# Patient Record
Sex: MALE | Race: BLACK OR AFRICAN AMERICAN | Employment: OTHER | URBAN - METROPOLITAN AREA
[De-identification: names, ages, dates, MRNs, and addresses within clinical notes are randomized per-mention and may not be internally consistent; named-entity substitution may affect disease eponyms.]

---

## 2021-01-01 ENCOUNTER — HOSPITAL ENCOUNTER (OUTPATIENT)
Dept: PHYSICAL THERAPY | Age: 51
Discharge: HOME | End: 2021-12-29
Payer: MEDICAID

## 2021-01-01 ENCOUNTER — TELEPHONE (OUTPATIENT)
Dept: ONCOLOGY | Age: 51
End: 2021-01-01

## 2021-01-01 ENCOUNTER — HOSPITAL ENCOUNTER (OUTPATIENT)
Dept: PHYSICAL THERAPY | Age: 51
Discharge: HOME | End: 2021-09-21
Payer: MEDICAID

## 2021-01-01 ENCOUNTER — HOSPITAL ENCOUNTER (OUTPATIENT)
Dept: PET IMAGING | Age: 51
Discharge: HOME | End: 2021-10-04
Attending: NURSE PRACTITIONER
Payer: MEDICAID

## 2021-01-01 ENCOUNTER — HOSPITAL ENCOUNTER (OUTPATIENT)
Dept: PHYSICAL THERAPY | Age: 51
Discharge: HOME | End: 2021-10-21
Payer: MEDICAID

## 2021-01-01 ENCOUNTER — APPOINTMENT (OUTPATIENT)
Dept: INFUSION THERAPY | Age: 51
End: 2021-01-01

## 2021-01-01 ENCOUNTER — HOSPITAL ENCOUNTER (OUTPATIENT)
Dept: PHYSICAL THERAPY | Age: 51
Discharge: HOME | End: 2021-11-30
Payer: MEDICAID

## 2021-01-01 ENCOUNTER — OFFICE VISIT (OUTPATIENT)
Dept: ONCOLOGY | Age: 51
End: 2021-01-01
Payer: MEDICAID

## 2021-01-01 ENCOUNTER — HOSPITAL ENCOUNTER (EMERGENCY)
Age: 51
Discharge: HOME | End: 2021-12-04
Attending: STUDENT IN AN ORGANIZED HEALTH CARE EDUCATION/TRAINING PROGRAM | Admitting: STUDENT IN AN ORGANIZED HEALTH CARE EDUCATION/TRAINING PROGRAM
Payer: MEDICAID

## 2021-01-01 ENCOUNTER — HOSPITAL ENCOUNTER (OUTPATIENT)
Dept: PHYSICAL THERAPY | Age: 51
Discharge: HOME | End: 2021-10-26
Payer: MEDICAID

## 2021-01-01 ENCOUNTER — HOSPITAL ENCOUNTER (OUTPATIENT)
Dept: PHYSICAL THERAPY | Age: 51
Discharge: HOME | End: 2021-10-07
Payer: MEDICAID

## 2021-01-01 ENCOUNTER — HOSPITAL ENCOUNTER (OUTPATIENT)
Dept: PHYSICAL THERAPY | Age: 51
Discharge: HOME | End: 2021-10-14
Payer: MEDICAID

## 2021-01-01 ENCOUNTER — HOSPITAL ENCOUNTER (OUTPATIENT)
Dept: PHYSICAL THERAPY | Age: 51
Discharge: HOME | End: 2021-12-17
Payer: MEDICAID

## 2021-01-01 ENCOUNTER — HOSPITAL ENCOUNTER (OUTPATIENT)
Dept: INFUSION THERAPY | Age: 51
Discharge: HOME | End: 2021-09-02
Payer: MEDICAID

## 2021-01-01 ENCOUNTER — HOSPITAL ENCOUNTER (OUTPATIENT)
Dept: PHYSICAL THERAPY | Age: 51
Discharge: HOME | End: 2021-09-30
Payer: MEDICAID

## 2021-01-01 ENCOUNTER — HOSPITAL ENCOUNTER (OUTPATIENT)
Dept: NON INVASIVE DIAGNOSTICS | Age: 51
Discharge: HOME | End: 2021-12-30
Payer: MEDICAID

## 2021-01-01 ENCOUNTER — HOSPITAL ENCOUNTER (OUTPATIENT)
Dept: NON INVASIVE DIAGNOSTICS | Age: 51
Discharge: HOME | End: 2021-12-20
Payer: MEDICAID

## 2021-01-01 ENCOUNTER — HOSPITAL ENCOUNTER (OUTPATIENT)
Dept: RADIATION THERAPY | Age: 51
Discharge: HOME | End: 2021-08-19

## 2021-01-01 ENCOUNTER — HOSPITAL ENCOUNTER (OUTPATIENT)
Dept: INFUSION THERAPY | Age: 51
Discharge: HOME | End: 2021-11-16
Payer: MEDICAID

## 2021-01-01 ENCOUNTER — HOSPITAL ENCOUNTER (OUTPATIENT)
Dept: NON INVASIVE DIAGNOSTICS | Age: 51
Discharge: HOME | End: 2021-11-22
Payer: MEDICAID

## 2021-01-01 ENCOUNTER — HOSPITAL ENCOUNTER (OUTPATIENT)
Dept: CT IMAGING | Age: 51
Discharge: HOME | End: 2021-11-22
Payer: MEDICAID

## 2021-01-01 ENCOUNTER — HOSPITAL ENCOUNTER (OUTPATIENT)
Dept: INFUSION THERAPY | Age: 51
Discharge: HOME | End: 2021-12-16
Payer: MEDICAID

## 2021-01-01 ENCOUNTER — HOSPITAL ENCOUNTER (OUTPATIENT)
Dept: PHYSICAL THERAPY | Age: 51
Discharge: HOME | End: 2021-09-28
Payer: MEDICAID

## 2021-01-01 ENCOUNTER — HOSPITAL ENCOUNTER (OUTPATIENT)
Dept: PHYSICAL THERAPY | Age: 51
Discharge: HOME | End: 2021-09-08
Payer: MEDICAID

## 2021-01-01 ENCOUNTER — APPOINTMENT (OUTPATIENT)
Dept: PHYSICAL THERAPY | Age: 51
End: 2021-01-01
Payer: MEDICAID

## 2021-01-01 ENCOUNTER — HOSPITAL ENCOUNTER (OUTPATIENT)
Dept: PHYSICAL THERAPY | Age: 51
Discharge: HOME | End: 2021-10-12
Payer: MEDICAID

## 2021-01-01 ENCOUNTER — APPOINTMENT (OUTPATIENT)
Dept: GENERAL RADIOLOGY | Age: 51
End: 2021-01-01
Attending: STUDENT IN AN ORGANIZED HEALTH CARE EDUCATION/TRAINING PROGRAM
Payer: MEDICAID

## 2021-01-01 ENCOUNTER — OFFICE VISIT (OUTPATIENT)
Dept: ONCOLOGY | Age: 51
End: 2021-01-01

## 2021-01-01 ENCOUNTER — HOSPITAL ENCOUNTER (OUTPATIENT)
Dept: PHYSICAL THERAPY | Age: 51
Discharge: HOME | End: 2021-09-23
Payer: MEDICAID

## 2021-01-01 ENCOUNTER — HOSPITAL ENCOUNTER (OUTPATIENT)
Dept: INFUSION THERAPY | Age: 51
Discharge: HOME | End: 2021-10-05
Payer: MEDICAID

## 2021-01-01 ENCOUNTER — APPOINTMENT (OUTPATIENT)
Dept: PHYSICAL THERAPY | Age: 51
End: 2021-01-01

## 2021-01-01 ENCOUNTER — HOSPITAL ENCOUNTER (OUTPATIENT)
Dept: PHYSICAL THERAPY | Age: 51
Discharge: HOME | End: 2021-10-19
Payer: MEDICAID

## 2021-01-01 VITALS
SYSTOLIC BLOOD PRESSURE: 131 MMHG | DIASTOLIC BLOOD PRESSURE: 100 MMHG | HEART RATE: 100 BPM | RESPIRATION RATE: 20 BRPM | TEMPERATURE: 98.8 F | OXYGEN SATURATION: 95 %

## 2021-01-01 VITALS
TEMPERATURE: 97.7 F | OXYGEN SATURATION: 100 % | HEART RATE: 106 BPM | RESPIRATION RATE: 18 BRPM | DIASTOLIC BLOOD PRESSURE: 60 MMHG | SYSTOLIC BLOOD PRESSURE: 119 MMHG

## 2021-01-01 VITALS
HEART RATE: 125 BPM | BODY MASS INDEX: 20.4 KG/M2 | SYSTOLIC BLOOD PRESSURE: 127 MMHG | OXYGEN SATURATION: 96 % | WEIGHT: 150.6 LBS | TEMPERATURE: 96 F | DIASTOLIC BLOOD PRESSURE: 87 MMHG | HEIGHT: 72 IN

## 2021-01-01 VITALS
DIASTOLIC BLOOD PRESSURE: 88 MMHG | BODY MASS INDEX: 20.28 KG/M2 | RESPIRATION RATE: 18 BRPM | HEART RATE: 103 BPM | SYSTOLIC BLOOD PRESSURE: 135 MMHG | HEIGHT: 72 IN | WEIGHT: 149.69 LBS | OXYGEN SATURATION: 100 %

## 2021-01-01 VITALS
OXYGEN SATURATION: 100 % | BODY MASS INDEX: 20.29 KG/M2 | SYSTOLIC BLOOD PRESSURE: 133 MMHG | HEART RATE: 91 BPM | WEIGHT: 149.8 LBS | DIASTOLIC BLOOD PRESSURE: 90 MMHG | RESPIRATION RATE: 16 BRPM | TEMPERATURE: 97.8 F | HEIGHT: 72 IN

## 2021-01-01 VITALS
TEMPERATURE: 97.8 F | BODY MASS INDEX: 20.97 KG/M2 | WEIGHT: 154.6 LBS | OXYGEN SATURATION: 99 % | HEART RATE: 86 BPM | DIASTOLIC BLOOD PRESSURE: 79 MMHG | RESPIRATION RATE: 16 BRPM | SYSTOLIC BLOOD PRESSURE: 121 MMHG

## 2021-01-01 VITALS
HEART RATE: 92 BPM | OXYGEN SATURATION: 99 % | BODY MASS INDEX: 21.46 KG/M2 | TEMPERATURE: 97.1 F | DIASTOLIC BLOOD PRESSURE: 86 MMHG | SYSTOLIC BLOOD PRESSURE: 127 MMHG | WEIGHT: 158.2 LBS | RESPIRATION RATE: 18 BRPM

## 2021-01-01 VITALS
SYSTOLIC BLOOD PRESSURE: 127 MMHG | HEIGHT: 72 IN | WEIGHT: 158.2 LBS | HEART RATE: 92 BPM | TEMPERATURE: 97.1 F | OXYGEN SATURATION: 99 % | BODY MASS INDEX: 21.43 KG/M2 | DIASTOLIC BLOOD PRESSURE: 86 MMHG

## 2021-01-01 VITALS
DIASTOLIC BLOOD PRESSURE: 86 MMHG | HEART RATE: 92 BPM | BODY MASS INDEX: 21.05 KG/M2 | HEIGHT: 72 IN | OXYGEN SATURATION: 97 % | WEIGHT: 155.4 LBS | SYSTOLIC BLOOD PRESSURE: 136 MMHG | TEMPERATURE: 96.6 F | RESPIRATION RATE: 16 BRPM

## 2021-01-01 VITALS
WEIGHT: 157.8 LBS | DIASTOLIC BLOOD PRESSURE: 60 MMHG | TEMPERATURE: 97.7 F | HEIGHT: 72 IN | RESPIRATION RATE: 18 BRPM | SYSTOLIC BLOOD PRESSURE: 119 MMHG | HEART RATE: 106 BPM | BODY MASS INDEX: 21.37 KG/M2 | OXYGEN SATURATION: 100 %

## 2021-01-01 DIAGNOSIS — C32.9 SQUAMOUS CELL CARCINOMA OF LARYNX (HCC): ICD-10-CM

## 2021-01-01 DIAGNOSIS — E03.9 ACQUIRED HYPOTHYROIDISM: Primary | ICD-10-CM

## 2021-01-01 DIAGNOSIS — D72.819 LEUKOPENIA, UNSPECIFIED TYPE: ICD-10-CM

## 2021-01-01 DIAGNOSIS — E03.9 ACQUIRED HYPOTHYROIDISM: ICD-10-CM

## 2021-01-01 DIAGNOSIS — R00.0 TACHYCARDIA: ICD-10-CM

## 2021-01-01 DIAGNOSIS — E87.6 HYPOKALEMIA: ICD-10-CM

## 2021-01-01 DIAGNOSIS — I26.99 OTHER ACUTE PULMONARY EMBOLISM WITHOUT ACUTE COR PULMONALE (HCC): ICD-10-CM

## 2021-01-01 DIAGNOSIS — R07.0 THROAT PAIN IN ADULT: ICD-10-CM

## 2021-01-01 DIAGNOSIS — C32.9 SQUAMOUS CELL CARCINOMA OF LARYNX (HCC): Primary | ICD-10-CM

## 2021-01-01 DIAGNOSIS — C77.0 METASTASIS TO CERVICAL LYMPH NODE (HCC): ICD-10-CM

## 2021-01-01 DIAGNOSIS — D53.9 MACROCYTIC ANEMIA: ICD-10-CM

## 2021-01-01 DIAGNOSIS — R59.0 CERVICAL LYMPHADENOPATHY: ICD-10-CM

## 2021-01-01 DIAGNOSIS — C32.9 LARYNGEAL CARCINOMA (HCC): Primary | ICD-10-CM

## 2021-01-01 DIAGNOSIS — I26.99 ACUTE PULMONARY EMBOLISM WITHOUT ACUTE COR PULMONALE, UNSPECIFIED PULMONARY EMBOLISM TYPE (HCC): ICD-10-CM

## 2021-01-01 DIAGNOSIS — Z29.8 IMMUNOTHERAPY ENCOUNTER: ICD-10-CM

## 2021-01-01 DIAGNOSIS — I26.99 OTHER ACUTE PULMONARY EMBOLISM WITHOUT ACUTE COR PULMONALE (HCC): Primary | ICD-10-CM

## 2021-01-01 DIAGNOSIS — E87.6 HYPOKALEMIA: Primary | ICD-10-CM

## 2021-01-01 DIAGNOSIS — R07.0 THROAT PAIN: ICD-10-CM

## 2021-01-01 DIAGNOSIS — R07.89 MUSCULAR CHEST PAIN: ICD-10-CM

## 2021-01-01 DIAGNOSIS — R00.0 TACHYCARDIA: Primary | ICD-10-CM

## 2021-01-01 DIAGNOSIS — C32.9 CARCINOMA LARYNX (HCC): ICD-10-CM

## 2021-01-01 LAB
ALBUMIN SERPL-MCNC: 3.2 G/DL (ref 3.5–5)
ALBUMIN SERPL-MCNC: 3.3 G/DL (ref 3.5–5)
ALBUMIN SERPL-MCNC: 3.5 G/DL (ref 3.5–5)
ALBUMIN SERPL-MCNC: 3.8 G/DL (ref 3.5–5)
ALBUMIN SERPL-MCNC: 4.1 G/DL (ref 3.5–5)
ALBUMIN/GLOB SERPL: 0.7 {RATIO} (ref 1.1–2.2)
ALBUMIN/GLOB SERPL: 0.7 {RATIO} (ref 1.1–2.2)
ALBUMIN/GLOB SERPL: 0.8 {RATIO} (ref 1.1–2.2)
ALBUMIN/GLOB SERPL: 0.9 {RATIO} (ref 1.1–2.2)
ALBUMIN/GLOB SERPL: 0.9 {RATIO} (ref 1.1–2.2)
ALP SERPL-CCNC: 45 U/L (ref 45–117)
ALP SERPL-CCNC: 48 U/L (ref 45–117)
ALP SERPL-CCNC: 53 U/L (ref 45–117)
ALP SERPL-CCNC: 58 U/L (ref 45–117)
ALP SERPL-CCNC: 62 U/L (ref 45–117)
ALT SERPL-CCNC: 13 U/L (ref 12–78)
ALT SERPL-CCNC: 14 U/L (ref 12–78)
ALT SERPL-CCNC: 15 U/L (ref 12–78)
ALT SERPL-CCNC: 15 U/L (ref 12–78)
ALT SERPL-CCNC: 19 U/L (ref 12–78)
ANION GAP SERPL CALC-SCNC: 2 MMOL/L (ref 5–15)
ANION GAP SERPL CALC-SCNC: 5 MMOL/L (ref 5–15)
ANION GAP SERPL CALC-SCNC: 7 MMOL/L (ref 5–15)
ANION GAP SERPL CALC-SCNC: 9 MMOL/L (ref 5–15)
ANION GAP SERPL CALC-SCNC: 9 MMOL/L (ref 5–15)
AST SERPL W P-5'-P-CCNC: 47 U/L (ref 15–37)
AST SERPL-CCNC: 15 U/L (ref 15–37)
AST SERPL-CCNC: 18 U/L (ref 15–37)
AST SERPL-CCNC: 36 U/L (ref 15–37)
AST SERPL-CCNC: 42 U/L (ref 15–37)
ATRIAL RATE: 71 BPM
ATRIAL RATE: 79 BPM
ATRIAL RATE: 82 BPM
BASOPHILS # BLD: 0 K/UL (ref 0–0.1)
BASOPHILS NFR BLD: 0 % (ref 0–1)
BASOPHILS NFR BLD: 1 % (ref 0–1)
BILIRUB SERPL-MCNC: 0.2 MG/DL (ref 0.2–1)
BILIRUB SERPL-MCNC: 0.3 MG/DL (ref 0.2–1)
BILIRUB SERPL-MCNC: 0.3 MG/DL (ref 0.2–1)
BILIRUB SERPL-MCNC: 0.4 MG/DL (ref 0.2–1)
BILIRUB SERPL-MCNC: 0.4 MG/DL (ref 0.2–1)
BUN SERPL-MCNC: 10 MG/DL (ref 6–20)
BUN SERPL-MCNC: 15 MG/DL (ref 6–20)
BUN SERPL-MCNC: 24 MG/DL (ref 6–20)
BUN SERPL-MCNC: 26 MG/DL (ref 6–20)
BUN SERPL-MCNC: 8 MG/DL (ref 6–20)
BUN/CREAT SERPL: 12 (ref 12–20)
BUN/CREAT SERPL: 16 (ref 12–20)
BUN/CREAT SERPL: 24 (ref 12–20)
BUN/CREAT SERPL: 24 (ref 12–20)
BUN/CREAT SERPL: 9 (ref 12–20)
CA-I BLD-MCNC: 9 MG/DL (ref 8.5–10.1)
CALCIUM SERPL-MCNC: 9.4 MG/DL (ref 8.5–10.1)
CALCIUM SERPL-MCNC: 9.5 MG/DL (ref 8.5–10.1)
CALCIUM SERPL-MCNC: 9.5 MG/DL (ref 8.5–10.1)
CALCIUM SERPL-MCNC: 9.6 MG/DL (ref 8.5–10.1)
CALCULATED P AXIS, ECG09: 46 DEGREES
CALCULATED P AXIS, ECG09: 48 DEGREES
CALCULATED P AXIS, ECG09: 95 DEGREES
CALCULATED R AXIS, ECG10: 61 DEGREES
CALCULATED R AXIS, ECG10: 74 DEGREES
CALCULATED R AXIS, ECG10: 83 DEGREES
CALCULATED T AXIS, ECG11: 70 DEGREES
CALCULATED T AXIS, ECG11: 71 DEGREES
CALCULATED T AXIS, ECG11: 72 DEGREES
CHLORIDE SERPL-SCNC: 102 MMOL/L (ref 97–108)
CHLORIDE SERPL-SCNC: 102 MMOL/L (ref 97–108)
CHLORIDE SERPL-SCNC: 103 MMOL/L (ref 97–108)
CHLORIDE SERPL-SCNC: 103 MMOL/L (ref 97–108)
CHLORIDE SERPL-SCNC: 104 MMOL/L (ref 97–108)
CK SERPL-CCNC: 525 U/L (ref 39–308)
CO2 SERPL-SCNC: 27 MMOL/L (ref 21–32)
CO2 SERPL-SCNC: 28 MMOL/L (ref 21–32)
CO2 SERPL-SCNC: 28 MMOL/L (ref 21–32)
CO2 SERPL-SCNC: 29 MMOL/L (ref 21–32)
CO2 SERPL-SCNC: 33 MMOL/L (ref 21–32)
CREAT SERPL-MCNC: 0.82 MG/DL (ref 0.7–1.3)
CREAT SERPL-MCNC: 0.9 MG/DL (ref 0.7–1.3)
CREAT SERPL-MCNC: 0.94 MG/DL (ref 0.7–1.3)
CREAT SERPL-MCNC: 1.01 MG/DL (ref 0.7–1.3)
CREAT SERPL-MCNC: 1.09 MG/DL (ref 0.7–1.3)
DIAGNOSIS, 93000: NORMAL
DIFFERENTIAL METHOD BLD: ABNORMAL
EOSINOPHIL # BLD: 0 K/UL (ref 0–0.4)
EOSINOPHIL NFR BLD: 0 % (ref 0–7)
EOSINOPHIL NFR BLD: 1 % (ref 0–7)
EOSINOPHIL NFR BLD: 1 % (ref 0–7)
ERYTHROCYTE [DISTWIDTH] IN BLOOD BY AUTOMATED COUNT: 13.4 % (ref 11.5–14.5)
ERYTHROCYTE [DISTWIDTH] IN BLOOD BY AUTOMATED COUNT: 14.3 % (ref 11.5–14.5)
ERYTHROCYTE [DISTWIDTH] IN BLOOD BY AUTOMATED COUNT: 14.9 % (ref 11.5–14.5)
ERYTHROCYTE [DISTWIDTH] IN BLOOD BY AUTOMATED COUNT: 15.1 % (ref 11.5–14.5)
ERYTHROCYTE [DISTWIDTH] IN BLOOD BY AUTOMATED COUNT: 15.9 % (ref 11.5–14.5)
FERRITIN SERPL-MCNC: 466 NG/ML (ref 26–388)
FERRITIN SERPL-MCNC: 939 NG/ML (ref 26–388)
FOLATE SERPL-MCNC: 24 NG/ML (ref 5–21)
GLOBULIN SER CALC-MCNC: 4.1 G/DL (ref 2–4)
GLOBULIN SER CALC-MCNC: 4.7 G/DL (ref 2–4)
GLOBULIN SER CALC-MCNC: 4.7 G/DL (ref 2–4)
GLOBULIN SER CALC-MCNC: 4.8 G/DL (ref 2–4)
GLOBULIN SER CALC-MCNC: 4.9 G/DL (ref 2–4)
GLUCOSE BLD STRIP.AUTO-MCNC: 113 MG/DL (ref 65–117)
GLUCOSE SERPL-MCNC: 132 MG/DL (ref 65–100)
GLUCOSE SERPL-MCNC: 73 MG/DL (ref 65–100)
GLUCOSE SERPL-MCNC: 74 MG/DL (ref 65–100)
GLUCOSE SERPL-MCNC: 79 MG/DL (ref 65–100)
GLUCOSE SERPL-MCNC: 82 MG/DL (ref 65–100)
HBV CORE AB SERPL QL IA: NEGATIVE
HBV SURFACE AB SER QL: NONREACTIVE
HBV SURFACE AB SER-ACNC: 3.86 MIU/ML
HBV SURFACE AG SER QL: 0.11 INDEX
HBV SURFACE AG SER QL: NEGATIVE
HCT VFR BLD AUTO: 26.6 % (ref 36.6–50.3)
HCT VFR BLD AUTO: 27.5 % (ref 36.6–50.3)
HCT VFR BLD AUTO: 28.7 % (ref 36.6–50.3)
HCT VFR BLD AUTO: 29.9 % (ref 36.6–50.3)
HCT VFR BLD AUTO: 31.7 % (ref 36.6–50.3)
HGB BLD-MCNC: 10.5 G/DL (ref 12.1–17)
HGB BLD-MCNC: 8.6 G/DL (ref 12.1–17)
HGB BLD-MCNC: 8.8 G/DL (ref 12.1–17)
HGB BLD-MCNC: 9.2 G/DL (ref 12.1–17)
HGB BLD-MCNC: 9.9 G/DL (ref 12.1–17)
IMM GRANULOCYTES # BLD AUTO: 0 K/UL (ref 0–0.04)
IMM GRANULOCYTES NFR BLD AUTO: 0 % (ref 0–0.5)
IMM GRANULOCYTES NFR BLD AUTO: 1 % (ref 0–0.5)
IRON SATN MFR SERPL: 16 % (ref 20–50)
IRON SATN MFR SERPL: 16 % (ref 20–50)
IRON SERPL-MCNC: 34 UG/DL (ref 35–150)
IRON SERPL-MCNC: 67 UG/DL (ref 35–150)
LYMPHOCYTES # BLD: 0.2 K/UL (ref 0.8–3.5)
LYMPHOCYTES # BLD: 0.3 K/UL (ref 0.8–3.5)
LYMPHOCYTES NFR BLD: 11 % (ref 12–49)
LYMPHOCYTES NFR BLD: 6 % (ref 12–49)
LYMPHOCYTES NFR BLD: 9 % (ref 12–49)
MAGNESIUM SERPL-MCNC: 1.8 MG/DL (ref 1.6–2.4)
MCH RBC QN AUTO: 31.8 PG (ref 26–34)
MCH RBC QN AUTO: 32.4 PG (ref 26–34)
MCH RBC QN AUTO: 32.7 PG (ref 26–34)
MCH RBC QN AUTO: 33.5 PG (ref 26–34)
MCH RBC QN AUTO: 35.1 PG (ref 26–34)
MCHC RBC AUTO-ENTMCNC: 31.3 G/DL (ref 30–36.5)
MCHC RBC AUTO-ENTMCNC: 32.1 G/DL (ref 30–36.5)
MCHC RBC AUTO-ENTMCNC: 33.1 G/DL (ref 30–36.5)
MCV RBC AUTO: 104.4 FL (ref 80–99)
MCV RBC AUTO: 112.2 FL (ref 80–99)
MCV RBC AUTO: 96.1 FL (ref 80–99)
MCV RBC AUTO: 97.8 FL (ref 80–99)
MCV RBC AUTO: 98.9 FL (ref 80–99)
MONOCYTES # BLD: 0.4 K/UL (ref 0–1)
MONOCYTES # BLD: 0.5 K/UL (ref 0–1)
MONOCYTES # BLD: 0.7 K/UL (ref 0–1)
MONOCYTES NFR BLD: 13 % (ref 5–13)
MONOCYTES NFR BLD: 13 % (ref 5–13)
MONOCYTES NFR BLD: 14 % (ref 5–13)
MONOCYTES NFR BLD: 16 % (ref 5–13)
MONOCYTES NFR BLD: 19 % (ref 5–13)
NEUTS SEG # BLD: 2.1 K/UL (ref 1.8–8)
NEUTS SEG # BLD: 2.3 K/UL (ref 1.8–8)
NEUTS SEG # BLD: 2.5 K/UL (ref 1.8–8)
NEUTS SEG # BLD: 2.5 K/UL (ref 1.8–8)
NEUTS SEG # BLD: 2.6 K/UL (ref 1.8–8)
NEUTS SEG NFR BLD: 69 % (ref 32–75)
NEUTS SEG NFR BLD: 75 % (ref 32–75)
NEUTS SEG NFR BLD: 75 % (ref 32–75)
NEUTS SEG NFR BLD: 77 % (ref 32–75)
NEUTS SEG NFR BLD: 81 % (ref 32–75)
NRBC # BLD: 0 K/UL (ref 0–0.01)
NRBC # BLD: 0.04 K/UL (ref 0–0.01)
NRBC BLD-RTO: 0 PER 100 WBC
NRBC BLD-RTO: 1.1 PER 100 WBC
P-R INTERVAL, ECG05: 176 MS
PLATELET # BLD AUTO: 238 K/UL (ref 150–400)
PLATELET # BLD AUTO: 241 K/UL (ref 150–400)
PLATELET # BLD AUTO: 260 K/UL (ref 150–400)
PLATELET # BLD AUTO: 306 K/UL (ref 150–400)
PLATELET # BLD AUTO: 382 K/UL (ref 150–400)
PMV BLD AUTO: 10.2 FL (ref 8.9–12.9)
PMV BLD AUTO: 10.3 FL (ref 8.9–12.9)
PMV BLD AUTO: 10.4 FL (ref 8.9–12.9)
PMV BLD AUTO: 9.7 FL (ref 8.9–12.9)
PMV BLD AUTO: 9.8 FL (ref 8.9–12.9)
POTASSIUM SERPL-SCNC: 3.4 MMOL/L (ref 3.5–5.1)
POTASSIUM SERPL-SCNC: 3.6 MMOL/L (ref 3.5–5.1)
POTASSIUM SERPL-SCNC: 4 MMOL/L (ref 3.5–5.1)
POTASSIUM SERPL-SCNC: 4 MMOL/L (ref 3.5–5.1)
POTASSIUM SERPL-SCNC: 4.4 MMOL/L (ref 3.5–5.1)
PROT SERPL-MCNC: 7.6 G/DL (ref 6.4–8.2)
PROT SERPL-MCNC: 8.1 G/DL (ref 6.4–8.2)
PROT SERPL-MCNC: 8.1 G/DL (ref 6.4–8.2)
PROT SERPL-MCNC: 8.5 G/DL (ref 6.4–8.2)
PROT SERPL-MCNC: 8.8 G/DL (ref 6.4–8.2)
Q-T INTERVAL, ECG07: 366 MS
Q-T INTERVAL, ECG07: 376 MS
Q-T INTERVAL, ECG07: 392 MS
QRS DURATION, ECG06: 86 MS
QRS DURATION, ECG06: 94 MS
QRS DURATION, ECG06: 94 MS
QTC CALCULATION (BEZET), ECG08: 419 MS
QTC CALCULATION (BEZET), ECG08: 425 MS
QTC CALCULATION (BEZET), ECG08: 439 MS
RBC # BLD AUTO: 2.45 M/UL (ref 4.1–5.7)
RBC # BLD AUTO: 2.69 M/UL (ref 4.1–5.7)
RBC # BLD AUTO: 2.75 M/UL (ref 4.1–5.7)
RBC # BLD AUTO: 3.11 M/UL (ref 4.1–5.7)
RBC # BLD AUTO: 3.24 M/UL (ref 4.1–5.7)
RBC MORPH BLD: ABNORMAL
SERVICE CMNT-IMP: NORMAL
SODIUM SERPL-SCNC: 137 MMOL/L (ref 136–145)
SODIUM SERPL-SCNC: 138 MMOL/L (ref 136–145)
SODIUM SERPL-SCNC: 140 MMOL/L (ref 136–145)
T4 FREE SERPL-MCNC: 0.6 NG/DL (ref 0.8–1.5)
T4 FREE SERPL-MCNC: 0.6 NG/DL (ref 0.8–1.5)
T4 FREE SERPL-MCNC: 0.7 NG/DL (ref 0.8–1.5)
T4 FREE SERPL-MCNC: 0.7 NG/DL (ref 0.8–1.5)
TIBC SERPL-MCNC: 216 UG/DL (ref 250–450)
TIBC SERPL-MCNC: 409 UG/DL (ref 250–450)
TROPONIN-HIGH SENSITIVITY: 6 NG/L (ref 0–76)
TROPONIN-HIGH SENSITIVITY: 6 NG/L (ref 0–76)
TSH SERPL DL<=0.05 MIU/L-ACNC: 10.6 UIU/ML (ref 0.36–3.74)
TSH SERPL DL<=0.05 MIU/L-ACNC: 16.5 UIU/ML (ref 0.36–3.74)
TSH SERPL DL<=0.05 MIU/L-ACNC: 18.6 UIU/ML (ref 0.36–3.74)
TSH SERPL DL<=0.05 MIU/L-ACNC: 36.8 UIU/ML (ref 0.36–3.74)
VENTRICULAR RATE, ECG03: 71 BPM
VENTRICULAR RATE, ECG03: 79 BPM
VENTRICULAR RATE, ECG03: 82 BPM
VIT B12 SERPL-MCNC: 664 PG/ML (ref 193–986)
WBC # BLD AUTO: 2.8 K/UL (ref 4.1–11.1)
WBC # BLD AUTO: 2.9 K/UL (ref 4.1–11.1)
WBC # BLD AUTO: 3.2 K/UL (ref 4.1–11.1)
WBC # BLD AUTO: 3.3 K/UL (ref 4.1–11.1)
WBC # BLD AUTO: 3.6 K/UL (ref 4.1–11.1)

## 2021-01-01 PROCEDURE — 92507 TX SP LANG VOICE COMM INDIV: CPT

## 2021-01-01 PROCEDURE — 80053 COMPREHEN METABOLIC PANEL: CPT

## 2021-01-01 PROCEDURE — 93005 ELECTROCARDIOGRAM TRACING: CPT

## 2021-01-01 PROCEDURE — 99214 OFFICE O/P EST MOD 30 MIN: CPT | Performed by: NURSE PRACTITIONER

## 2021-01-01 PROCEDURE — 84443 ASSAY THYROID STIM HORMONE: CPT

## 2021-01-01 PROCEDURE — 74011250636 HC RX REV CODE- 250/636: Performed by: INTERNAL MEDICINE

## 2021-01-01 PROCEDURE — 84439 ASSAY OF FREE THYROXINE: CPT

## 2021-01-01 PROCEDURE — 83540 ASSAY OF IRON: CPT

## 2021-01-01 PROCEDURE — 87340 HEPATITIS B SURFACE AG IA: CPT

## 2021-01-01 PROCEDURE — 36415 COLL VENOUS BLD VENIPUNCTURE: CPT

## 2021-01-01 PROCEDURE — 85025 COMPLETE CBC W/AUTO DIFF WBC: CPT

## 2021-01-01 PROCEDURE — 71260 CT THORAX DX C+: CPT

## 2021-01-01 PROCEDURE — 74011250636 HC RX REV CODE- 250/636: Performed by: NURSE PRACTITIONER

## 2021-01-01 PROCEDURE — 92524 BEHAVRAL QUALIT ANALYS VOICE: CPT

## 2021-01-01 PROCEDURE — 97110 THERAPEUTIC EXERCISES: CPT

## 2021-01-01 PROCEDURE — 83735 ASSAY OF MAGNESIUM: CPT

## 2021-01-01 PROCEDURE — 86706 HEP B SURFACE ANTIBODY: CPT

## 2021-01-01 PROCEDURE — 74011000636 HC RX REV CODE- 636: Performed by: INTERNAL MEDICINE

## 2021-01-01 PROCEDURE — 99285 EMERGENCY DEPT VISIT HI MDM: CPT

## 2021-01-01 PROCEDURE — 84484 ASSAY OF TROPONIN QUANT: CPT

## 2021-01-01 PROCEDURE — 77030012965 HC NDL HUBR BBMI -A

## 2021-01-01 PROCEDURE — 99214 OFFICE O/P EST MOD 30 MIN: CPT | Performed by: INTERNAL MEDICINE

## 2021-01-01 PROCEDURE — 86704 HEP B CORE ANTIBODY TOTAL: CPT

## 2021-01-01 PROCEDURE — 82728 ASSAY OF FERRITIN: CPT

## 2021-01-01 PROCEDURE — 82607 VITAMIN B-12: CPT

## 2021-01-01 PROCEDURE — A9552 F18 FDG: HCPCS

## 2021-01-01 PROCEDURE — 71045 X-RAY EXAM CHEST 1 VIEW: CPT

## 2021-01-01 PROCEDURE — 74011000258 HC RX REV CODE- 258: Performed by: INTERNAL MEDICINE

## 2021-01-01 PROCEDURE — 77030016057 HC NDL HUBR APOL -B

## 2021-01-01 PROCEDURE — 99215 OFFICE O/P EST HI 40 MIN: CPT | Performed by: INTERNAL MEDICINE

## 2021-01-01 PROCEDURE — 97162 PT EVAL MOD COMPLEX 30 MIN: CPT

## 2021-01-01 PROCEDURE — 74011250637 HC RX REV CODE- 250/637: Performed by: STUDENT IN AN ORGANIZED HEALTH CARE EDUCATION/TRAINING PROGRAM

## 2021-01-01 PROCEDURE — 96413 CHEMO IV INFUSION 1 HR: CPT

## 2021-01-01 PROCEDURE — 93010 ELECTROCARDIOGRAM REPORT: CPT | Performed by: INTERNAL MEDICINE

## 2021-01-01 PROCEDURE — 82550 ASSAY OF CK (CPK): CPT

## 2021-01-01 PROCEDURE — 96361 HYDRATE IV INFUSION ADD-ON: CPT

## 2021-01-01 PROCEDURE — 70491 CT SOFT TISSUE NECK W/DYE: CPT

## 2021-01-01 PROCEDURE — 74011250637 HC RX REV CODE- 250/637: Performed by: EMERGENCY MEDICINE

## 2021-01-01 PROCEDURE — 36591 DRAW BLOOD OFF VENOUS DEVICE: CPT

## 2021-01-01 PROCEDURE — 97140 MANUAL THERAPY 1/> REGIONS: CPT

## 2021-01-01 PROCEDURE — 82746 ASSAY OF FOLIC ACID SERUM: CPT

## 2021-01-01 PROCEDURE — 96523 IRRIG DRUG DELIVERY DEVICE: CPT

## 2021-01-01 RX ORDER — SODIUM CHLORIDE 0.9 % (FLUSH) 0.9 %
10 SYRINGE (ML) INJECTION
Status: COMPLETED | OUTPATIENT
Start: 2021-01-01 | End: 2021-01-01

## 2021-01-01 RX ORDER — SODIUM CHLORIDE 9 MG/ML
500 INJECTION, SOLUTION INTRAVENOUS CONTINUOUS
Status: DISPENSED | OUTPATIENT
Start: 2021-01-01 | End: 2021-01-01

## 2021-01-01 RX ORDER — ACETAMINOPHEN 325 MG/1
650 TABLET ORAL AS NEEDED
Status: CANCELLED
Start: 2021-01-01

## 2021-01-01 RX ORDER — HEPARIN 100 UNIT/ML
500 SYRINGE INTRAVENOUS AS NEEDED
Status: CANCELLED | OUTPATIENT
Start: 2021-01-01

## 2021-01-01 RX ORDER — METHOCARBAMOL 750 MG/1
750 TABLET, FILM COATED ORAL
Qty: 20 TABLET | Refills: 0 | Status: SHIPPED | OUTPATIENT
Start: 2021-01-01 | End: 2022-01-01

## 2021-01-01 RX ORDER — HEPARIN 100 UNIT/ML
300-500 SYRINGE INTRAVENOUS AS NEEDED
Status: CANCELLED
Start: 2021-01-01

## 2021-01-01 RX ORDER — POTASSIUM CHLORIDE 20 MEQ/1
40 TABLET, EXTENDED RELEASE ORAL
Status: DISCONTINUED | OUTPATIENT
Start: 2021-01-01 | End: 2021-01-01 | Stop reason: ALTCHOICE

## 2021-01-01 RX ORDER — DIPHENHYDRAMINE HYDROCHLORIDE 50 MG/ML
50 INJECTION, SOLUTION INTRAMUSCULAR; INTRAVENOUS AS NEEDED
Status: CANCELLED
Start: 2021-01-01

## 2021-01-01 RX ORDER — SODIUM CHLORIDE 9 MG/ML
10 INJECTION INTRAMUSCULAR; INTRAVENOUS; SUBCUTANEOUS AS NEEDED
Status: CANCELLED | OUTPATIENT
Start: 2021-01-01

## 2021-01-01 RX ORDER — SODIUM CHLORIDE 9 MG/ML
10 INJECTION INTRAMUSCULAR; INTRAVENOUS; SUBCUTANEOUS AS NEEDED
Status: ACTIVE | OUTPATIENT
Start: 2021-01-01 | End: 2021-01-01

## 2021-01-01 RX ORDER — MAG HYDROX/ALUMINUM HYD/SIMETH 200-200-20
30 SUSPENSION, ORAL (FINAL DOSE FORM) ORAL ONCE
Status: DISCONTINUED | OUTPATIENT
Start: 2021-01-01 | End: 2021-01-01

## 2021-01-01 RX ORDER — DIPHENHYDRAMINE HYDROCHLORIDE 50 MG/ML
25 INJECTION, SOLUTION INTRAMUSCULAR; INTRAVENOUS AS NEEDED
Status: CANCELLED
Start: 2021-01-01

## 2021-01-01 RX ORDER — DIPHENHYDRAMINE HYDROCHLORIDE 50 MG/ML
50 INJECTION, SOLUTION INTRAMUSCULAR; INTRAVENOUS AS NEEDED
Status: CANCELLED
Start: 2022-01-01

## 2021-01-01 RX ORDER — ALBUTEROL SULFATE 0.83 MG/ML
2.5 SOLUTION RESPIRATORY (INHALATION) AS NEEDED
Status: CANCELLED
Start: 2021-01-01

## 2021-01-01 RX ORDER — DIPHENHYDRAMINE HYDROCHLORIDE 50 MG/ML
25 INJECTION, SOLUTION INTRAMUSCULAR; INTRAVENOUS AS NEEDED
Status: CANCELLED
Start: 2022-01-01

## 2021-01-01 RX ORDER — LIDOCAINE HYDROCHLORIDE 20 MG/ML
15 SOLUTION OROPHARYNGEAL
Status: DISCONTINUED | OUTPATIENT
Start: 2021-01-01 | End: 2021-01-01

## 2021-01-01 RX ORDER — ACETAMINOPHEN 325 MG/1
650 TABLET ORAL
Qty: 20 TABLET | Refills: 0 | Status: SHIPPED | OUTPATIENT
Start: 2021-01-01 | End: 2022-01-01

## 2021-01-01 RX ORDER — SODIUM CHLORIDE 0.9 % (FLUSH) 0.9 %
10 SYRINGE (ML) INJECTION AS NEEDED
Status: DISPENSED | OUTPATIENT
Start: 2021-01-01 | End: 2021-01-01

## 2021-01-01 RX ORDER — ACETAMINOPHEN 325 MG/1
650 TABLET ORAL AS NEEDED
Status: CANCELLED
Start: 2022-01-01

## 2021-01-01 RX ORDER — IBUPROFEN 600 MG/1
600 TABLET ORAL
Status: COMPLETED | OUTPATIENT
Start: 2021-01-01 | End: 2021-01-01

## 2021-01-01 RX ORDER — ONDANSETRON 2 MG/ML
8 INJECTION INTRAMUSCULAR; INTRAVENOUS AS NEEDED
Status: CANCELLED | OUTPATIENT
Start: 2022-01-01

## 2021-01-01 RX ORDER — EPINEPHRINE 1 MG/ML
0.3 INJECTION, SOLUTION, CONCENTRATE INTRAVENOUS AS NEEDED
Status: CANCELLED | OUTPATIENT
Start: 2021-01-01

## 2021-01-01 RX ORDER — HEPARIN 100 UNIT/ML
500 SYRINGE INTRAVENOUS AS NEEDED
Status: ACTIVE | OUTPATIENT
Start: 2021-01-01 | End: 2021-01-01

## 2021-01-01 RX ORDER — METHOCARBAMOL 750 MG/1
1500 TABLET, FILM COATED ORAL ONCE
Status: COMPLETED | OUTPATIENT
Start: 2021-01-01 | End: 2021-01-01

## 2021-01-01 RX ORDER — SODIUM CHLORIDE 0.9 % (FLUSH) 0.9 %
10 SYRINGE (ML) INJECTION AS NEEDED
Status: CANCELLED | OUTPATIENT
Start: 2021-01-01

## 2021-01-01 RX ORDER — OXYCODONE HYDROCHLORIDE 5 MG/1
5 TABLET ORAL
Qty: 90 TABLET | Refills: 0 | Status: SHIPPED | OUTPATIENT
Start: 2021-01-01 | End: 2021-01-01 | Stop reason: SDUPTHER

## 2021-01-01 RX ORDER — LEVOTHYROXINE SODIUM 25 UG/1
25 TABLET ORAL
Qty: 30 TABLET | Refills: 0 | Status: SHIPPED | OUTPATIENT
Start: 2021-01-01 | End: 2021-01-01 | Stop reason: SDUPTHER

## 2021-01-01 RX ORDER — APIXABAN 5 MG (74)
KIT ORAL
Qty: 1 DOSE PACK | Refills: 0 | Status: SHIPPED | OUTPATIENT
Start: 2021-01-01 | End: 2022-01-01 | Stop reason: ALTCHOICE

## 2021-01-01 RX ORDER — EPINEPHRINE 1 MG/ML
0.3 INJECTION, SOLUTION, CONCENTRATE INTRAVENOUS AS NEEDED
Status: CANCELLED | OUTPATIENT
Start: 2022-01-01

## 2021-01-01 RX ORDER — ALBUTEROL SULFATE 0.83 MG/ML
2.5 SOLUTION RESPIRATORY (INHALATION) AS NEEDED
Status: CANCELLED
Start: 2022-01-01

## 2021-01-01 RX ORDER — LEVOTHYROXINE SODIUM 25 UG/1
25 TABLET ORAL
Qty: 30 TABLET | Refills: 0 | Status: SHIPPED | OUTPATIENT
Start: 2021-01-01 | End: 2022-01-01 | Stop reason: DRUGHIGH

## 2021-01-01 RX ORDER — ONDANSETRON 2 MG/ML
8 INJECTION INTRAMUSCULAR; INTRAVENOUS AS NEEDED
Status: CANCELLED | OUTPATIENT
Start: 2021-01-01

## 2021-01-01 RX ORDER — POTASSIUM CHLORIDE 1.5 G/1.77G
40 POWDER, FOR SOLUTION ORAL
Status: COMPLETED | OUTPATIENT
Start: 2021-01-01 | End: 2021-01-01

## 2021-01-01 RX ORDER — HEPARIN 100 UNIT/ML
300-500 SYRINGE INTRAVENOUS AS NEEDED
Status: ACTIVE | OUTPATIENT
Start: 2021-01-01 | End: 2021-01-01

## 2021-01-01 RX ORDER — SODIUM CHLORIDE 0.9 % (FLUSH) 0.9 %
5-10 SYRINGE (ML) INJECTION AS NEEDED
Status: CANCELLED | OUTPATIENT
Start: 2021-01-01

## 2021-01-01 RX ORDER — HYDROCORTISONE SODIUM SUCCINATE 100 MG/2ML
100 INJECTION, POWDER, FOR SOLUTION INTRAMUSCULAR; INTRAVENOUS AS NEEDED
Status: CANCELLED | OUTPATIENT
Start: 2021-01-01

## 2021-01-01 RX ORDER — SODIUM CHLORIDE 0.9 % (FLUSH) 0.9 %
5-10 SYRINGE (ML) INJECTION AS NEEDED
Status: DISPENSED | OUTPATIENT
Start: 2021-01-01 | End: 2021-01-01

## 2021-01-01 RX ORDER — FLUDEOXYGLUCOSE F-18 200 MCI/ML
10 INJECTION INTRAVENOUS ONCE
Status: COMPLETED | OUTPATIENT
Start: 2021-01-01 | End: 2021-01-01

## 2021-01-01 RX ORDER — ACETAMINOPHEN 325 MG/1
650 TABLET ORAL ONCE
Status: COMPLETED | OUTPATIENT
Start: 2021-01-01 | End: 2021-01-01

## 2021-01-01 RX ORDER — SODIUM CHLORIDE 9 MG/ML
25 INJECTION, SOLUTION INTRAVENOUS CONTINUOUS
Status: DISPENSED | OUTPATIENT
Start: 2021-01-01 | End: 2021-01-01

## 2021-01-01 RX ORDER — HYDROCORTISONE SODIUM SUCCINATE 100 MG/2ML
100 INJECTION, POWDER, FOR SOLUTION INTRAMUSCULAR; INTRAVENOUS AS NEEDED
Status: CANCELLED | OUTPATIENT
Start: 2022-01-01

## 2021-01-01 RX ORDER — SODIUM CHLORIDE 9 MG/ML
25 INJECTION, SOLUTION INTRAVENOUS CONTINUOUS
Status: CANCELLED | OUTPATIENT
Start: 2021-01-01

## 2021-01-01 RX ADMIN — SODIUM CHLORIDE 200 MG: 900 INJECTION, SOLUTION INTRAVENOUS at 12:26

## 2021-01-01 RX ADMIN — IBUPROFEN 600 MG: 600 TABLET, FILM COATED ORAL at 14:20

## 2021-01-01 RX ADMIN — SODIUM CHLORIDE 500 ML: 9 INJECTION, SOLUTION INTRAVENOUS at 11:39

## 2021-01-01 RX ADMIN — FLUDEOXYGLUCOSE F-18 10.16 MILLICURIE: 200 INJECTION INTRAVENOUS at 13:05

## 2021-01-01 RX ADMIN — ACETAMINOPHEN 650 MG: 325 TABLET ORAL at 19:19

## 2021-01-01 RX ADMIN — Medication 10 ML: at 13:05

## 2021-01-01 RX ADMIN — SODIUM CHLORIDE 25 ML/HR: 9 INJECTION, SOLUTION INTRAVENOUS at 11:38

## 2021-01-01 RX ADMIN — METHOCARBAMOL 1500 MG: 750 TABLET ORAL at 14:20

## 2021-01-01 RX ADMIN — POTASSIUM CHLORIDE 40 MEQ: 1.5 FOR SOLUTION ORAL at 18:33

## 2021-01-01 RX ADMIN — SODIUM CHLORIDE 10 ML: 9 INJECTION INTRAMUSCULAR; INTRAVENOUS; SUBCUTANEOUS at 13:15

## 2021-01-01 RX ADMIN — IOPAMIDOL 100 ML: 755 INJECTION, SOLUTION INTRAVENOUS at 09:18

## 2021-01-01 RX ADMIN — HEPARIN 500 UNITS: 100 SYRINGE at 12:28

## 2021-01-24 ENCOUNTER — APPOINTMENT (OUTPATIENT)
Dept: GENERAL RADIOLOGY | Age: 51
End: 2021-01-24
Attending: EMERGENCY MEDICINE
Payer: MEDICAID

## 2021-01-24 ENCOUNTER — HOSPITAL ENCOUNTER (EMERGENCY)
Age: 51
Discharge: HOME | End: 2021-01-24
Attending: EMERGENCY MEDICINE | Admitting: EMERGENCY MEDICINE
Payer: MEDICAID

## 2021-01-24 VITALS
OXYGEN SATURATION: 97 % | SYSTOLIC BLOOD PRESSURE: 131 MMHG | HEART RATE: 97 BPM | DIASTOLIC BLOOD PRESSURE: 89 MMHG | TEMPERATURE: 98.3 F | WEIGHT: 148 LBS | RESPIRATION RATE: 16 BRPM

## 2021-01-24 DIAGNOSIS — C32.0 CANCER OF VOCAL CORD (HCC): ICD-10-CM

## 2021-01-24 DIAGNOSIS — R52 ENCOUNTER FOR PAIN MANAGEMENT: ICD-10-CM

## 2021-01-24 DIAGNOSIS — E86.0 DEHYDRATION DETERMINED BY EXAMINATION: ICD-10-CM

## 2021-01-24 DIAGNOSIS — J44.1 ACUTE EXACERBATION OF CHRONIC OBSTRUCTIVE PULMONARY DISEASE (COPD) (HCC): Primary | ICD-10-CM

## 2021-01-24 DIAGNOSIS — R06.2 EXPIRATORY WHEEZING: ICD-10-CM

## 2021-01-24 LAB
ALBUMIN SERPL-MCNC: 3.6 G/DL (ref 3.5–5)
ALBUMIN/GLOB SERPL: 0.8 {RATIO} (ref 1.1–2.2)
ALP SERPL-CCNC: 78 U/L (ref 45–117)
ALT SERPL-CCNC: 25 U/L (ref 12–78)
ANION GAP SERPL CALC-SCNC: 8 MMOL/L (ref 5–15)
AST SERPL W P-5'-P-CCNC: 28 U/L (ref 15–37)
BASOPHILS # BLD: 0 K/UL (ref 0–0.1)
BASOPHILS NFR BLD: 0 % (ref 0–1)
BILIRUB SERPL-MCNC: 0.5 MG/DL (ref 0.2–1)
BUN SERPL-MCNC: 15 MG/DL (ref 6–20)
BUN/CREAT SERPL: 24 (ref 12–20)
CA-I BLD-MCNC: 9.7 MG/DL (ref 8.5–10.1)
CHLORIDE SERPL-SCNC: 100 MMOL/L (ref 97–108)
CO2 SERPL-SCNC: 32 MMOL/L (ref 21–32)
CREAT SERPL-MCNC: 0.62 MG/DL (ref 0.7–1.3)
DIFFERENTIAL METHOD BLD: ABNORMAL
EOSINOPHIL # BLD: 0 K/UL (ref 0–0.4)
EOSINOPHIL NFR BLD: 0 % (ref 0–7)
ERYTHROCYTE [DISTWIDTH] IN BLOOD BY AUTOMATED COUNT: 12.7 % (ref 11.5–14.5)
GLOBULIN SER CALC-MCNC: 4.8 G/DL (ref 2–4)
GLUCOSE SERPL-MCNC: 89 MG/DL (ref 65–100)
HCT VFR BLD AUTO: 43.1 % (ref 36.6–50.3)
HGB BLD-MCNC: 14.3 G/DL (ref 12.1–17)
IMM GRANULOCYTES # BLD AUTO: 0 K/UL (ref 0–0.04)
IMM GRANULOCYTES NFR BLD AUTO: 0 % (ref 0–0.5)
LACTATE SERPL-SCNC: 0.6 MMOL/L (ref 0.4–2)
LYMPHOCYTES # BLD: 1.5 K/UL (ref 0.8–3.5)
LYMPHOCYTES NFR BLD: 25 % (ref 12–49)
MCH RBC QN AUTO: 32.9 PG (ref 26–34)
MCHC RBC AUTO-ENTMCNC: 33.2 G/DL (ref 30–36.5)
MCV RBC AUTO: 99.3 FL (ref 80–99)
MONOCYTES # BLD: 0.5 K/UL (ref 0–1)
MONOCYTES NFR BLD: 8 % (ref 5–13)
NEUTS SEG # BLD: 3.8 K/UL (ref 1.8–8)
NEUTS SEG NFR BLD: 67 % (ref 32–75)
PLATELET # BLD AUTO: 360 K/UL (ref 150–400)
PMV BLD AUTO: 10.4 FL (ref 8.9–12.9)
POTASSIUM SERPL-SCNC: 3.6 MMOL/L (ref 3.5–5.1)
PROT SERPL-MCNC: 8.4 G/DL (ref 6.4–8.2)
RBC # BLD AUTO: 4.34 M/UL (ref 4.1–5.7)
SODIUM SERPL-SCNC: 140 MMOL/L (ref 136–145)
TROPONIN I SERPL-MCNC: <0.05 NG/ML
WBC # BLD AUTO: 5.8 K/UL (ref 4.1–11.1)

## 2021-01-24 PROCEDURE — 99285 EMERGENCY DEPT VISIT HI MDM: CPT

## 2021-01-24 PROCEDURE — 96376 TX/PRO/DX INJ SAME DRUG ADON: CPT

## 2021-01-24 PROCEDURE — 94640 AIRWAY INHALATION TREATMENT: CPT

## 2021-01-24 PROCEDURE — 96361 HYDRATE IV INFUSION ADD-ON: CPT

## 2021-01-24 PROCEDURE — 83605 ASSAY OF LACTIC ACID: CPT

## 2021-01-24 PROCEDURE — 74011250636 HC RX REV CODE- 250/636: Performed by: EMERGENCY MEDICINE

## 2021-01-24 PROCEDURE — 96375 TX/PRO/DX INJ NEW DRUG ADDON: CPT

## 2021-01-24 PROCEDURE — 74011250637 HC RX REV CODE- 250/637: Performed by: EMERGENCY MEDICINE

## 2021-01-24 PROCEDURE — 71045 X-RAY EXAM CHEST 1 VIEW: CPT

## 2021-01-24 PROCEDURE — 80053 COMPREHEN METABOLIC PANEL: CPT

## 2021-01-24 PROCEDURE — 96374 THER/PROPH/DIAG INJ IV PUSH: CPT

## 2021-01-24 PROCEDURE — 85025 COMPLETE CBC W/AUTO DIFF WBC: CPT

## 2021-01-24 PROCEDURE — 96372 THER/PROPH/DIAG INJ SC/IM: CPT

## 2021-01-24 PROCEDURE — 36415 COLL VENOUS BLD VENIPUNCTURE: CPT

## 2021-01-24 PROCEDURE — 84484 ASSAY OF TROPONIN QUANT: CPT

## 2021-01-24 PROCEDURE — 93005 ELECTROCARDIOGRAM TRACING: CPT

## 2021-01-24 RX ORDER — ALBUTEROL SULFATE 90 UG/1
3 AEROSOL, METERED RESPIRATORY (INHALATION) EVERY 8 HOURS
Qty: 1 INHALER | Refills: 3 | Status: SHIPPED | OUTPATIENT
Start: 2021-01-24 | End: 2021-01-01

## 2021-01-24 RX ORDER — ALBUTEROL SULFATE 90 UG/1
4 AEROSOL, METERED RESPIRATORY (INHALATION)
Status: COMPLETED | OUTPATIENT
Start: 2021-01-24 | End: 2021-01-24

## 2021-01-24 RX ORDER — HYDROXYZINE 50 MG/1
50 TABLET, FILM COATED ORAL
Qty: 40 TAB | Refills: 2 | Status: SHIPPED | OUTPATIENT
Start: 2021-01-24 | End: 2021-03-05

## 2021-01-24 RX ORDER — HYDROMORPHONE HYDROCHLORIDE 1 MG/ML
1 INJECTION, SOLUTION INTRAMUSCULAR; INTRAVENOUS; SUBCUTANEOUS ONCE
Status: COMPLETED | OUTPATIENT
Start: 2021-01-24 | End: 2021-01-24

## 2021-01-24 RX ORDER — HYDROMORPHONE HYDROCHLORIDE 1 MG/ML
0.5 INJECTION, SOLUTION INTRAMUSCULAR; INTRAVENOUS; SUBCUTANEOUS
Status: DISCONTINUED | OUTPATIENT
Start: 2021-01-24 | End: 2021-01-24 | Stop reason: HOSPADM

## 2021-01-24 RX ORDER — HYDROXYZINE 50 MG/ML
25 INJECTION, SOLUTION INTRAMUSCULAR
Status: COMPLETED | OUTPATIENT
Start: 2021-01-24 | End: 2021-01-24

## 2021-01-24 RX ORDER — HYDROMORPHONE HYDROCHLORIDE 1 MG/ML
0.5 INJECTION, SOLUTION INTRAMUSCULAR; INTRAVENOUS; SUBCUTANEOUS ONCE
Status: COMPLETED | OUTPATIENT
Start: 2021-01-24 | End: 2021-01-24

## 2021-01-24 RX ORDER — KETOROLAC TROMETHAMINE 30 MG/ML
30 INJECTION, SOLUTION INTRAMUSCULAR; INTRAVENOUS
Status: COMPLETED | OUTPATIENT
Start: 2021-01-24 | End: 2021-01-24

## 2021-01-24 RX ADMIN — SODIUM CHLORIDE 1000 ML: 9 INJECTION, SOLUTION INTRAVENOUS at 14:39

## 2021-01-24 RX ADMIN — HYDROMORPHONE HYDROCHLORIDE 1 MG: 1 INJECTION, SOLUTION INTRAMUSCULAR; INTRAVENOUS; SUBCUTANEOUS at 14:10

## 2021-01-24 RX ADMIN — DIPHENHYDRAMINE HYDROCHLORIDE AND LIDOCAINE HYDROCHLORIDE AND ALUMINUM HYDROXIDE AND MAGNESIUM HYDRO 10 ML: KIT at 14:10

## 2021-01-24 RX ADMIN — KETOROLAC TROMETHAMINE 30 MG: 30 INJECTION, SOLUTION INTRAMUSCULAR at 14:39

## 2021-01-24 RX ADMIN — SODIUM CHLORIDE 1000 ML: 9 INJECTION, SOLUTION INTRAVENOUS at 16:02

## 2021-01-24 RX ADMIN — ALBUTEROL SULFATE 4 PUFF: 108 AEROSOL, METERED RESPIRATORY (INHALATION) at 15:25

## 2021-01-24 RX ADMIN — SODIUM CHLORIDE 1000 ML: 9 INJECTION, SOLUTION INTRAVENOUS at 13:38

## 2021-01-24 RX ADMIN — HYDROXYZINE HYDROCHLORIDE 25 MG: 50 INJECTION, SOLUTION INTRAMUSCULAR at 17:02

## 2021-01-24 RX ADMIN — METHYLPREDNISOLONE SODIUM SUCCINATE 125 MG: 125 INJECTION, POWDER, FOR SOLUTION INTRAMUSCULAR; INTRAVENOUS at 14:39

## 2021-01-24 RX ADMIN — HYDROMORPHONE HYDROCHLORIDE 0.5 MG: 1 INJECTION, SOLUTION INTRAMUSCULAR; INTRAVENOUS; SUBCUTANEOUS at 13:30

## 2021-01-24 NOTE — DISCHARGE INSTRUCTIONS
Use ALBUTEROL INHALER 3 puffs every 8 hours on a tight schedule to reduce wheezing in your lungs. Take your pain medication from your ENT doctor every 8 hours for pain on a very tight schedule. That is the best way to keep pain under control. Use the MAGIC MOUTHWASH as directed to coat the sore areas in your throat and help with pain. Take Hydroxyzine as directed to help with anxiety as needed. Take one at bedtime every night to help you sleep. STOP USING YOUR HOME MOUTHWASH AS IT HAS ALCOHOL IN IT AND IS INCREASING IRRITATION IN YOUR THROAT. Try to eat soft foods like pudding, mash potatoes, protein drinks every 2-3 hours to keep your calories up. Drink lots of water to keep your urine clear in color. Return to the ED as needed or if you become concerned.

## 2021-01-24 NOTE — ED PROVIDER NOTES
EMERGENCY DEPARTMENT HISTORY AND PHYSICAL EXAM        Date: 1/24/2021  Patient Name: Rayo Ball    History of Presenting Illness     Chief Complaint   Patient presents with    Shortness of Breath       2:15 PM    History Provided By: Patient    HPI: Rayo Ball, 53 y.o. male with a history of COPD ( 30 year smoking history) and stage 4 larynx cancer presents to the ED via EMS c/o severe throat/mouth pain that is worsening despite previous ED visits this week for same. Pt has been prescribed hydrocodone for pain management but has not been using as he was having difficulty with approval from MEDICAID. Pt notes that he has been using Listerine mouthwash for additional throat/mouth pain management but feels pain getting worse. Pt reports difficulty swallowing due to his throat pain but denies dyspragia or obstruction. .    Pt also reports difficulty breathing despite using his albuterol inhalers; although, he states that they give minimal symptomatic relief. Pt states he has bee wheezing heavily in his chest but also has a whistling in his throat when he breaths that his Dr is  The reults o the tumor on his right vocal cord. Pt last saw Dr Phoenix May, ENT specialist, on 1/20/21; he is scheduled for surgical resection of his larynx on 2/8/21. Pt was recently told that his cancer is stage 4 and too advance to be cured with chemo. Julianne Hopkins PCP is Dr Mike Frazier at [facility]. PCP: Rosalind, MD Praful    Current Outpatient Medications   Medication Sig Dispense Refill    albuterol (PROVENTIL HFA, VENTOLIN HFA, PROAIR HFA) 90 mcg/actuation inhaler Take 3 Puffs by inhalation every eight (8) hours. Indications: chronic obstructive pulmonary disease 1 Inhaler 3    hydrOXYzine HCL (ATARAX) 50 mg tablet Take 1 Tab by mouth every eight to twelve (8-12) hours as needed for Anxiety or Sleep for up to 40 days. Indications: Pt has throat cancer and anxiety.  40 Tab 2       Past History     Past Medical History:  No past medical history on file. Past Surgical History:  No past surgical history on file. Family History:  No family history on file. Social History:  Social History     Tobacco Use    Smoking status: Not on file   Substance Use Topics    Alcohol use: Not on file    Drug use: Not on file       Allergies:  No Known Allergies    Review of Systems   Review of Systems   Constitutional: Negative for chills, diaphoresis and fever. HENT: Positive for sore throat and trouble swallowing. Negative for congestion. Eyes: Negative for visual disturbance. Respiratory: Positive for wheezing. Negative for cough and shortness of breath. Cardiovascular: Negative for chest pain and palpitations. Gastrointestinal: Negative for abdominal pain, diarrhea, nausea and vomiting. Endocrine: Negative for polydipsia, polyphagia and polyuria. Genitourinary: Negative for dysuria, frequency, hematuria and urgency. Musculoskeletal: Negative for gait problem and neck pain. Skin: Negative for rash. Neurological: Negative for dizziness, syncope and headaches. Psychiatric/Behavioral: The patient is nervous/anxious. Physical Exam   Physical Exam  Vitals signs reviewed. Constitutional:       General: He is not in acute distress. Appearance: He is not ill-appearing or toxic-appearing. Comments: Very thin AAM; in distress secondary to pain;,looks very worried   HENT:      Head: Normocephalic and atraumatic. Nose: Nose normal.      Mouth/Throat:      Pharynx: No posterior oropharyngeal erythema. Comments: Tacky; Raspy voice  No palpable masses; Eyes:      General: Vision grossly intact. Extraocular Movements: Extraocular movements intact. Conjunctiva/sclera: Conjunctivae normal.      Pupils: Pupils are equal, round, and reactive to light. Neck:      Musculoskeletal: Neck supple. Vascular: No JVD. Trachea: No tracheal deviation.       Comments: Good airway movement and mild stridor with expiration  Cardiovascular:      Rate and Rhythm: Regular rhythm. Tachycardia present. Pulses: Normal pulses. Carotid pulses are 2+ on the right side and 2+ on the left side. Radial pulses are 2+ on the right side and 2+ on the left side. Femoral pulses are 2+ on the right side and 2+ on the left side. Popliteal pulses are 2+ on the right side and 2+ on the left side. Dorsalis pedis pulses are 2+ on the right side and 2+ on the left side. Posterior tibial pulses are 2+ on the right side and 2+ on the left side. Heart sounds: Normal heart sounds. Pulmonary:      Effort: Pulmonary effort is normal.      Breath sounds: Normal air entry. Wheezing present. No rhonchi. Abdominal:      General: Abdomen is protuberant. Bowel sounds are normal.      Palpations: Abdomen is soft. Tenderness: There is no abdominal tenderness. There is no guarding or rebound. Musculoskeletal: Normal range of motion. General: No swelling or deformity. Right shoulder: He exhibits normal range of motion and no swelling. Right lower leg: No edema. Left lower leg: No edema. Skin:     General: Skin is warm and dry. Capillary Refill: Capillary refill takes less than 2 seconds. Findings: No signs of injury or rash. Neurological:      General: No focal deficit present. Mental Status: He is alert and oriented to person, place, and time. Cranial Nerves: No cranial nerve deficit. Comments: Normal Speech   Psychiatric:         Attention and Perception: Attention normal.         Mood and Affect: Affect normal. Mood is anxious. Speech: Speech normal.         Behavior: Behavior normal. Behavior is cooperative.          Diagnostic Study Results     Labs -     Recent Results (from the past 48 hour(s))   CBC WITH AUTOMATED DIFF    Collection Time: 01/24/21 12:33 PM   Result Value Ref Range    WBC 5.8 4.1 - 11.1 K/uL    RBC 4.34 4.10 - 5.70 M/uL    HGB 14.3 12.1 - 17.0 g/dL    HCT 43.1 36.6 - 50.3 %    MCV 99.3 (H) 80.0 - 99.0 FL    MCH 32.9 26.0 - 34.0 PG    MCHC 33.2 30.0 - 36.5 g/dL    RDW 12.7 11.5 - 14.5 %    PLATELET 239 657 - 060 K/uL    MPV 10.4 8.9 - 12.9 FL    NEUTROPHILS 67 32 - 75 %    LYMPHOCYTES 25 12 - 49 %    MONOCYTES 8 5 - 13 %    EOSINOPHILS 0 0 - 7 %    BASOPHILS 0 0 - 1 %    IMMATURE GRANULOCYTES 0 0.0 - 0.5 %    ABS. NEUTROPHILS 3.8 1.8 - 8.0 K/UL    ABS. LYMPHOCYTES 1.5 0.8 - 3.5 K/UL    ABS. MONOCYTES 0.5 0.0 - 1.0 K/UL    ABS. EOSINOPHILS 0.0 0.0 - 0.4 K/UL    ABS. BASOPHILS 0.0 0.0 - 0.1 K/UL    ABS. IMM. GRANS. 0.0 0.00 - 0.04 K/UL    DF AUTOMATED     METABOLIC PANEL, COMPREHENSIVE    Collection Time: 01/24/21 12:33 PM   Result Value Ref Range    Sodium 140 136 - 145 mmol/L    Potassium 3.6 3.5 - 5.1 mmol/L    Chloride 100 97 - 108 mmol/L    CO2 32 21 - 32 mmol/L    Anion gap 8 5 - 15 mmol/L    Glucose 89 65 - 100 mg/dL    BUN 15 6 - 20 mg/dL    Creatinine 0.62 (L) 0.70 - 1.30 mg/dL    BUN/Creatinine ratio 24 (H) 12 - 20      GFR est AA >60 >60 ml/min/1.73m2    GFR est non-AA >60 >60 ml/min/1.73m2    Calcium 9.7 8.5 - 10.1 mg/dL    Bilirubin, total 0.5 0.2 - 1.0 mg/dL    AST (SGOT) 28 15 - 37 U/L    ALT (SGPT) 25 12 - 78 U/L    Alk. phosphatase 78 45 - 117 U/L    Protein, total 8.4 (H) 6.4 - 8.2 g/dL    Albumin 3.6 3.5 - 5.0 g/dL    Globulin 4.8 (H) 2.0 - 4.0 g/dL    A-G Ratio 0.8 (L) 1.1 - 2.2     TROPONIN I    Collection Time: 01/24/21 12:33 PM   Result Value Ref Range    Troponin-I, Qt. <0.05 <0.05 ng/mL   LACTIC ACID    Collection Time: 01/24/21  1:25 PM   Result Value Ref Range    Lactic acid 0.6 0.4 - 2.0 mmol/L       Radiologic Studies -   XR CHEST SNGL V   Final Result   No acute process. CT Results  (Last 48 hours)    None        CXR Results  (Last 48 hours)               01/24/21 1256  XR CHEST SNGL V Final result    Impression:  No acute process.            Narrative:  Chest, AP portable, 1249 hours. No comparisons. The heart, mediastinum, and pulmonary vasculature appear within normal limits. No evidence of pulmonary edema, air space pneumonia, or pleural effusion. No   evidence of pneumothorax. Medical Decision Making and ED Course     I have also reviewed the vital signs, available nursing notes, past medical history, past surgical history, family history and social history. Vital Signs - Reviewed the patient's vital signs. No data found. EKG interpretation: (Preliminary): Performed at [id number], and read at 1255  Rhythm: normal sinus rhythm; and regular . Rate (approx.): 87; Axis: right axis deviation; NV interval: normal; QRS interval: normal ; ST/T wave: normal; Other findings: left ventricular hypertrophy. Records Reviewed: Nursing Notes, Old medical records and Ambulance Run Sheet as available. Medical Decision Making/Diff Dx:  Pain management issues, dehydration, electrolyte abnormalities, COPD exacerbation, PNA,     Unfortunate 52 yo AAM with vocal cord CA and imminnet surgery in 15 days who has been struggling with a number of issues related to managing his symptoms until then. Will give IV pain meds, Magic Mouthwash with lidcaine in the ED, IVF boluses, albuerol MDI,  do screening lasbs, CXR and dispo per results. ED course/Re-evaluation/Consultations/Other     ED Course as of Jan 25 0829   David Damon Jan 24, 2021   1709 Pt feeling much better after treatment and pain medications in the ED and would like to go home. . Also had good relief from the [address] with specific instructions for how to take his medications. Have added Vistaril for anxiety/sleep and called in Magic Mouthwash to [facility] so can  tonight.     [SP]      ED Course User Index  [SP] Iona Lefort, MD         Disposition         DISCHARGE PLAN:  1. Discharge Medication List as of 1/24/2021  6:53 PM        2. There are no discharge medications for this patient.     3. Follow-up Information    None       4. Return to ED if worse     Diagnosis     Clinical impression:   1. Acute exacerbation of chronic obstructive pulmonary disease (COPD) ([facility]    2. Cancer of vocal cord ([facility]    3. Encounter for pain management    4. Dehydration determined by examination    5. Expiratory wheezing           Attestation:  I, [Parveen Espitia] am scribing for, and in the presence of Dr. Mina Guerra MD]   I, Dr Mina Guerra MD], have reviewed any and all documentation by a scribe and authenticated its accuracy.

## 2021-01-25 LAB
ATRIAL RATE: 87 BPM
CALCULATED P AXIS, ECG09: 80 DEGREES
CALCULATED R AXIS, ECG10: 93 DEGREES
CALCULATED T AXIS, ECG11: 56 DEGREES
DIAGNOSIS, 93000: NORMAL
P-R INTERVAL, ECG05: 148 MS
Q-T INTERVAL, ECG07: 348 MS
QRS DURATION, ECG06: 92 MS
QTC CALCULATION (BEZET), ECG08: 418 MS
VENTRICULAR RATE, ECG03: 87 BPM

## 2021-01-28 ENCOUNTER — APPOINTMENT (OUTPATIENT)
Dept: GENERAL RADIOLOGY | Age: 51
DRG: 121 | End: 2021-01-28
Attending: EMERGENCY MEDICINE
Payer: MEDICAID

## 2021-01-28 ENCOUNTER — HOSPITAL ENCOUNTER (OUTPATIENT)
Dept: PREADMISSION TESTING | Age: 51
Discharge: HOME | End: 2021-01-28

## 2021-01-28 ENCOUNTER — HOSPITAL ENCOUNTER (EMERGENCY)
Age: 51
Discharge: HOME | DRG: 121 | End: 2021-01-28
Attending: EMERGENCY MEDICINE | Admitting: EMERGENCY MEDICINE
Payer: MEDICAID

## 2021-01-28 ENCOUNTER — APPOINTMENT (OUTPATIENT)
Dept: CT IMAGING | Age: 51
DRG: 121 | End: 2021-01-28
Attending: EMERGENCY MEDICINE
Payer: MEDICAID

## 2021-01-28 VITALS
WEIGHT: 133.82 LBS | SYSTOLIC BLOOD PRESSURE: 117 MMHG | HEIGHT: 72 IN | OXYGEN SATURATION: 97 % | TEMPERATURE: 97.9 F | RESPIRATION RATE: 22 BRPM | BODY MASS INDEX: 18.13 KG/M2 | DIASTOLIC BLOOD PRESSURE: 87 MMHG | HEART RATE: 108 BPM

## 2021-01-28 VITALS
SYSTOLIC BLOOD PRESSURE: 121 MMHG | WEIGHT: 134 LBS | HEIGHT: 72 IN | TEMPERATURE: 97.6 F | OXYGEN SATURATION: 97 % | DIASTOLIC BLOOD PRESSURE: 100 MMHG | HEART RATE: 112 BPM | BODY MASS INDEX: 18.15 KG/M2 | RESPIRATION RATE: 20 BRPM

## 2021-01-28 DIAGNOSIS — J98.8 NARROWING OF AIRWAY: ICD-10-CM

## 2021-01-28 DIAGNOSIS — R07.0 THROAT PAIN: ICD-10-CM

## 2021-01-28 DIAGNOSIS — R06.00 DYSPNEA, UNSPECIFIED TYPE: ICD-10-CM

## 2021-01-28 DIAGNOSIS — Z85.819 HISTORY OF THROAT CANCER: Primary | ICD-10-CM

## 2021-01-28 LAB
ANION GAP SERPL CALC-SCNC: 5 MMOL/L (ref 5–15)
BASOPHILS # BLD: 0 K/UL (ref 0–0.1)
BASOPHILS NFR BLD: 0 % (ref 0–1)
BNP SERPL-MCNC: 76 PG/ML
BUN SERPL-MCNC: 12 MG/DL (ref 6–20)
BUN/CREAT SERPL: 22 (ref 12–20)
CA-I BLD-MCNC: 9.1 MG/DL (ref 8.5–10.1)
CHLORIDE SERPL-SCNC: 101 MMOL/L (ref 97–108)
CO2 SERPL-SCNC: 35 MMOL/L (ref 21–32)
CREAT SERPL-MCNC: 0.55 MG/DL (ref 0.7–1.3)
DIFFERENTIAL METHOD BLD: ABNORMAL
EOSINOPHIL # BLD: 0 K/UL (ref 0–0.4)
EOSINOPHIL NFR BLD: 0 % (ref 0–7)
ERYTHROCYTE [DISTWIDTH] IN BLOOD BY AUTOMATED COUNT: 12.8 % (ref 11.5–14.5)
GLUCOSE SERPL-MCNC: 82 MG/DL (ref 65–100)
HCT VFR BLD AUTO: 41.2 % (ref 36.6–50.3)
HGB BLD-MCNC: 13.7 G/DL (ref 12.1–17)
IMM GRANULOCYTES # BLD AUTO: 0 K/UL (ref 0–0.04)
IMM GRANULOCYTES NFR BLD AUTO: 0 % (ref 0–0.5)
LYMPHOCYTES # BLD: 1.9 K/UL (ref 0.8–3.5)
LYMPHOCYTES NFR BLD: 39 % (ref 12–49)
MCH RBC QN AUTO: 32.6 PG (ref 26–34)
MCHC RBC AUTO-ENTMCNC: 33.3 G/DL (ref 30–36.5)
MCV RBC AUTO: 98.1 FL (ref 80–99)
MONOCYTES # BLD: 0.8 K/UL (ref 0–1)
MONOCYTES NFR BLD: 15 % (ref 5–13)
NEUTS SEG # BLD: 2.2 K/UL (ref 1.8–8)
NEUTS SEG NFR BLD: 46 % (ref 32–75)
PLATELET # BLD AUTO: 322 K/UL (ref 150–400)
PMV BLD AUTO: 10.3 FL (ref 8.9–12.9)
POTASSIUM SERPL-SCNC: 3.2 MMOL/L (ref 3.5–5.1)
RBC # BLD AUTO: 4.2 M/UL (ref 4.1–5.7)
SODIUM SERPL-SCNC: 141 MMOL/L (ref 136–145)
TROPONIN I SERPL-MCNC: <0.05 NG/ML
WBC # BLD AUTO: 4.9 K/UL (ref 4.1–11.1)

## 2021-01-28 PROCEDURE — 83880 ASSAY OF NATRIURETIC PEPTIDE: CPT

## 2021-01-28 PROCEDURE — 99284 EMERGENCY DEPT VISIT MOD MDM: CPT

## 2021-01-28 PROCEDURE — 84484 ASSAY OF TROPONIN QUANT: CPT

## 2021-01-28 PROCEDURE — 93005 ELECTROCARDIOGRAM TRACING: CPT

## 2021-01-28 PROCEDURE — 85025 COMPLETE CBC W/AUTO DIFF WBC: CPT

## 2021-01-28 PROCEDURE — 74011250636 HC RX REV CODE- 250/636: Performed by: EMERGENCY MEDICINE

## 2021-01-28 PROCEDURE — 36415 COLL VENOUS BLD VENIPUNCTURE: CPT

## 2021-01-28 PROCEDURE — 71045 X-RAY EXAM CHEST 1 VIEW: CPT

## 2021-01-28 PROCEDURE — 96375 TX/PRO/DX INJ NEW DRUG ADDON: CPT

## 2021-01-28 PROCEDURE — 96374 THER/PROPH/DIAG INJ IV PUSH: CPT

## 2021-01-28 PROCEDURE — 80048 BASIC METABOLIC PNL TOTAL CA: CPT

## 2021-01-28 PROCEDURE — 74011000250 HC RX REV CODE- 250: Performed by: EMERGENCY MEDICINE

## 2021-01-28 PROCEDURE — 74011250637 HC RX REV CODE- 250/637: Performed by: EMERGENCY MEDICINE

## 2021-01-28 PROCEDURE — 70490 CT SOFT TISSUE NECK W/O DYE: CPT

## 2021-01-28 RX ORDER — LORAZEPAM 2 MG/ML
0.5 INJECTION INTRAMUSCULAR
Status: COMPLETED | OUTPATIENT
Start: 2021-01-28 | End: 2021-01-28

## 2021-01-28 RX ORDER — KETAMINE HYDROCHLORIDE 50 MG/ML
0.25 INJECTION, SOLUTION INTRAMUSCULAR; INTRAVENOUS
Status: COMPLETED | OUTPATIENT
Start: 2021-01-28 | End: 2021-01-28

## 2021-01-28 RX ORDER — IPRATROPIUM BROMIDE AND ALBUTEROL SULFATE 2.5; .5 MG/3ML; MG/3ML
3 SOLUTION RESPIRATORY (INHALATION)
Status: COMPLETED | OUTPATIENT
Start: 2021-01-28 | End: 2021-01-28

## 2021-01-28 RX ORDER — MORPHINE SULFATE 2 MG/ML
2 INJECTION, SOLUTION INTRAMUSCULAR; INTRAVENOUS ONCE
Status: COMPLETED | OUTPATIENT
Start: 2021-01-28 | End: 2021-01-28

## 2021-01-28 RX ORDER — CLONIDINE HYDROCHLORIDE 0.1 MG/1
0.1 TABLET ORAL
Status: COMPLETED | OUTPATIENT
Start: 2021-01-28 | End: 2021-01-28

## 2021-01-28 RX ADMIN — LORAZEPAM 0.5 MG: 2 INJECTION INTRAMUSCULAR; INTRAVENOUS at 03:40

## 2021-01-28 RX ADMIN — METHYLPREDNISOLONE SODIUM SUCCINATE 125 MG: 125 INJECTION, POWDER, FOR SOLUTION INTRAMUSCULAR; INTRAVENOUS at 03:40

## 2021-01-28 RX ADMIN — MORPHINE SULFATE 2 MG: 2 INJECTION, SOLUTION INTRAMUSCULAR; INTRAVENOUS at 05:06

## 2021-01-28 RX ADMIN — IPRATROPIUM BROMIDE AND ALBUTEROL SULFATE 3 ML: .5; 3 SOLUTION RESPIRATORY (INHALATION) at 04:57

## 2021-01-28 RX ADMIN — KETAMINE HYDROCHLORIDE 15 MG: 50 INJECTION INTRAMUSCULAR; INTRAVENOUS at 03:40

## 2021-01-28 RX ADMIN — CLONIDINE HYDROCHLORIDE 0.1 MG: 0.1 TABLET ORAL at 05:09

## 2021-01-28 NOTE — PERIOP NOTES
PT PRESENTED TO PAT WITH SOB AND C/O SEVERE PAIN IN THROAT, EARS, AND NECK. HE WAS SEEN IN ER THIS MORNING AND IT WAS THEIR UNDERSTANDING THAT HIS PAT APPT TODAY  WAS AN APPT WITH ENT. HIS DISCHARGE INSTRUCTIONS TOLD HIM TO KEEP HIS APPT WITH ENT TODAY. PT HAS AUDIBLE WHEEZING AND STATED THAT THE ER TOLD HIM ITS FROM THE \"SWELLING AND CANCER IN HIS THROAT AND NOT FROM MY LUNGS\". PT IS TACHY B/T 107-130'S. O2 SATS B/T 95-99% ON ROOM AIR. SPOKE WITH ALISA-NURSE FOR DR. MATHEWS; PT IS TO BE SENT TO [facility] IMMEDIATELY TO BE SEEN.  DR. Vandana Reilly WILL SEE PT. CALLED TRANSPORTATION FOR PT TO ARRANGE RIDE, BECAUSE THE ORIGINAL  STATES HE WILL NOT BE BACK TO PICK PT UP FOR AT LEAST AN HOUR. ALISA CALLED BACK TO LET HER KNOW PT IS COMING BY TRANSPORTATION BUT I AM NOT SURE OF TIME FRAME. SHE WILL NOTIFY THE [facility]. PAT INTERVIEW NOT COMPLETED, BUT PTA MEDICATIONS WERE COMPLETED AND DIRECTIONS WERE GIVEN, HE WAS GIVEN CHG SOAP AND DIRECTIONS AS WELL. PT IS HOPING TO BE ADMITTED TO THE HOSPITAL FOR CARE AND PAIN CONTROL PRIOR TO HIS SURGERY STATING \"I CAN'T TAKE NO MORE\".

## 2021-01-28 NOTE — ED PROVIDER NOTES
EMERGENCY DEPARTMENT HISTORY AND PHYSICAL EXAM      Date: 1/28/2021  Patient Name: Ngozi Montez    History of Presenting Illness     Chief Complaint   Patient presents with    Respiratory Distress       History Provided By: Patient    HPI: Ngozi Montez, 53 y.o. male presents to the ED with cc of   Chief Complaint   Patient presents with    Respiratory Distress     Patient with history of throat cancer scheduled to see an ENT doctor in a.m. presenting to the ER with increased pain in the throat, patient was on pain medication which he stopped taking recently history of than 20 years of smoking quit 2 months ago history of COPD, denies any fever or increased shortness of breath    There are no other complaints, changes, or physical findings at this time. PCP: Shari Enrique NP    No current facility-administered medications on file prior to encounter. Current Outpatient Medications on File Prior to Encounter   Medication Sig Dispense Refill    aluminum-magnesium hydroxide 200-200 mg/5 mL susp 5 mL, diphenhydrAMINE 12.5 mg/5 mL liqd 5 mL, lidocaine 2 % soln 5 mL 5 mL by Swish and Spit route two (2) times a day. Magic mouth wash   Maalox  Lidocaine 2% viscous   Diphenhydramine oral solution     Pharmacy to mix equal portions of ingredients to a total volume as indicated in the dispense amount.  HYDROCODONE-ACETAMINOPHEN PO Take  by mouth. 1 TSP QID      albuterol (PROVENTIL HFA, VENTOLIN HFA, PROAIR HFA) 90 mcg/actuation inhaler Take 3 Puffs by inhalation every eight (8) hours. Indications: chronic obstructive pulmonary disease 1 Inhaler 3    hydrOXYzine HCL (ATARAX) 50 mg tablet Take 1 Tab by mouth every eight to twelve (8-12) hours as needed for Anxiety or Sleep for up to 40 days. Indications: Pt has throat cancer and anxiety.  36 Tab 2       Past History     Past Medical History:  Past Medical History:   Diagnosis Date    Chronic pain     THROAT, EARS, NECK R/T CANCER    COPD (chronic obstructive pulmonary disease) ([facility]     [facility]    Psychiatric disorder     ANXIETY R/T CANCER    Throat cancer (HCC)        Past Surgical History:  Past Surgical History:   Procedure Laterality Date    HX ORTHOPAEDIC      LEFT ARM- \" PUT PLATE IN\"       Family History:  Family History   Problem Relation Age of Onset    Diabetes Mother     Diabetes Father     Kidney Disease Father     Diabetes Sister     Heart Disease Daughter     Anesth Problems Neg Hx        Social History:  Social History     Tobacco Use    Smoking status: Former Smoker     Packs/day: 1.50     Years: 30.00     Pack years: 45.00     Quit date: 10/28/2020     Years since quittin.2    Smokeless tobacco: Never Used   Substance Use Topics    Alcohol use: Not Currently    Drug use: Never       Allergies:  No Known Allergies      Review of Systems   Review of Systems   Constitutional: Negative. HENT: Positive for sore throat. Negative for congestion, nosebleeds, sinus pressure and sinus pain. Eyes: Negative. Negative for photophobia. Respiratory: Positive for cough and choking. Negative for apnea, chest tightness, shortness of breath, wheezing and stridor. Cardiovascular: Negative for chest pain. Gastrointestinal: Negative. Genitourinary: Negative. Musculoskeletal: Negative. Negative for back pain and gait problem. Skin: Negative. Allergic/Immunologic: Negative. Neurological: Negative. Negative for weakness, light-headedness and numbness. Hematological: Negative. Psychiatric/Behavioral: Negative. Physical Exam   Physical Exam  Vitals signs and nursing note reviewed. Constitutional:       Appearance: Normal appearance. He is normal weight. HENT:      Head: Normocephalic and atraumatic. Jaw: Tenderness present. No swelling. Salivary Glands: Right salivary gland is not diffusely enlarged. Comments: Hoarseness of voice     Nose: No congestion or rhinorrhea.    Eyes: Extraocular Movements: Extraocular movements intact. Pupils: Pupils are equal, round, and reactive to light. Neck:      Musculoskeletal: Normal range of motion and neck supple. Cardiovascular:      Rate and Rhythm: Normal rate and regular rhythm. Pulmonary:      Effort: Pulmonary effort is normal. Tachypnea present. Breath sounds: Wheezing (Mild) present. Abdominal:      General: Abdomen is flat. Bowel sounds are normal. There is no distension. Tenderness: There is no abdominal tenderness. There is no guarding. Musculoskeletal: Normal range of motion. Skin:     General: Skin is warm and dry. Neurological:      General: No focal deficit present. Mental Status: He is alert and oriented to person, place, and time. Psychiatric:         Mood and Affect: Mood normal.         Diagnostic Study Results     Labs -     No results found for this or any previous visit (from the past 12 hour(s)). Labs reviewed by me    Radiologic Studies -   CT NECK SOFT TISSUE WO CONT   Final Result   Irregular soft tissue mass involving the aryepiglottic and the   larynx. There is significant narrowing of the airway at the level of the larynx   and the supraglottic region. Poorly defined cervical adenopathy is noted. Findings discussed with Dr. Thiago Woodson at 5:52 AM         XR CHEST PORT   Final Result   Minimal bilateral densities. Early pneumonia? .                  CT Results  (Last 48 hours)               01/28/21 0525  CT NECK SOFT TISSUE WO CONT Final result    Impression:  Irregular soft tissue mass involving the aryepiglottic and the   larynx. There is significant narrowing of the airway at the level of the larynx   and the supraglottic region. Poorly defined cervical adenopathy is noted. Findings discussed with Dr. Thiago Woodson at 5:52 AM           Narrative:  PROCEDURE: CT NECK SOFT TISSUE WO CONT       HISTORY:Throat cancer.  Short of breath       COMPARISON:None       Department policy stipulates all CT scans at this facility are performed using   dose reduction optimization techniques as appropriate to the performed exam,   including the following: Automated exposure control, adjustments of the mA   and/or KVP according to the patient size, and the use of iterative   reconstruction technique.           TECHNIQUE: Axial images through the neck with multiplanar reconstruction. No IV   contrast.   COMPARISON: None   LIMITATIONS: None   NASOPHARYNX: Normal   OROPHARYNX: Normal   HYPOPHARYNX: Normal   EPIGLOTTIS/ARYEPIGLOTTIC FOLDS: The epiglottis appears normal. At the level of   the area epiglottic folds and extending into the larynx there is a poorly   defined soft tissue mass associated with mucosa and submucosal tissues. The   process is bilateral extending posteriorly across the midline. Anteriorly there   is a very small component across the midline. There is a very asymmetric   narrowing of the airway at this level with a larger mass on the right. The   process extends down to and involves the lateral walls of the larynx including   both the true and false cords. Laryngeal ventricle is distorted on the right and   compressed on the left.   LARYNX: In addition to the mass extending into the larynx described above there   is poorly defined increased soft tissue density around the larynx. Fat planes in   this area are not well defined.   PARAPHARYNGEAL SPACE: The deep parapharyngeal spaces appear relatively clear.   RETROPHARYNGEAL/PREVERTEBRAL SOFT TISSUES: Normal   SALIVARY GLANDS: Normal   LYMPH NODES: There are poorly defined but the lymph nodes at the level of the   parapharyngeal spaces and extending inferiorly appear somewhat enlarged. No   necrotic nodes appreciated on this unenhanced scan   TRACHEA: Trachea appears normal below the level of the larynx   THYROID GLAND: Poorly defined   CAROTID ARTERIES: Limited evaluation without contrast   JUGULAR VEINS: Limited evaluation without  contrast   CERVICAL SPINE/OSSEOUS STRUCTURES: Normal   SOFT TISSUES: Normal   OTHER: None               CXR Results  (Last 48 hours)               01/28/21 0415  XR CHEST PORT Final result    Impression:  Minimal bilateral densities. Early pneumonia? .                    Narrative:  PROCEDURE: XR CHEST PORT       HISTORY:Short of breath       COMPARISON:Chest x-ray 24 January 2021           TECHNIQUE: AP portable chest       LIMITATIONS: None       TUBES/LINES: None       LUNG PARENCHYMA/PLEURA: There is some small areas of asymmetric density in the   right lower lung zone and both midlung zones. No well-defined infiltrate or   mass. TRACHEA/BRONCHI: Normal   PULMONARY VESSELS: Normal   HEART: Normal   MEDIASTINUM: Normal   BONE/SOFT TISSUES: No acute process       OTHER: None                   Medical Decision Making     I am the first provider for this patient. I reviewed the vital signs, available nursing notes, past medical history, past surgical history, family history and social history. RADIOLOGY report and LABS reviewed by me    Vital Signs-Reviewed the patient's vital signs. No data found. EKG interpretation: (Preliminary)  EKG shows sinus rhythm with occasional PACs, ventricular rate of 100, normal axis with nonspecific ST    Records Reviewed: Nurse's note. Provider Notes (Medical Decision Making):    Patient presents with DIFF DX : Laryngeal CA, narrowing of airway epiglottitis          ED Course:   Initial assessment performed. The patients presenting problems have been discussed, and they are in agreement with the care plan formulated and outlined with them. I have encouraged them to ask questions as they arise throughout their visit.     ED Course as of Jan 29 0757   Thu Jan 28, 2021   0606 Spoke to ED physician for transferring patient to [facility], advised to speak to ENT doctor  Jeannine Trotter firstfor acceptance    [address]      ED Course User Index  [KK] Júnior Rachel MD Spoke to ENT on-call at [facility] on call for Dr. Swain advised to send patient to his outpatient appointment, patient has a progressive recurrent laryngeal carcinoma and does not need an emergent surgery at this time, spoke to ED physician Juliet Zamudio, did not accept for transfer to ED ,I  will discharge patient as per ENT advice to keep his pre op appointment this am.    Discharge patient to see and keep his ENT appointment for today          [facility] [address], MD      Disposition:     Diagnostic tests were reviewed and questions answered. Diagnosis, care plan and treatment options were discussed. The patient understand instructions and will follow up as directed. Condition stable    Admitting Provider:  No admitting provider for patient encounter. Consulting Provider:  No ref. provider found       DISCHARGE PLAN:  1. Discharge Medication List as of 1/28/2021  6:53 AM        2. Follow-up Information     Follow up With Specialties Details Why Contact Info    Lisa Miranda NP Nurse Practitioner   [address]  [phone]      May, Garrett Martin MD Otol[facility] Today  [address]  [phone]          3. Return to ED if worse     Diagnosis     Clinical Impression:     ICD-10-CM ICD-9-CM    1. History of throat cancer  Z85.819 V10.02    2. Dyspnea, unspecified type  R06.00 786.09    3. Narrowing of airway  J98.8 519.8    4. Throat pain  R07.0 784.1         Attestations:    Octavia Gaston MD    Please note that this dictation was completed with Seriously, the computer voice recognition software. Quite often unanticipated grammatical, syntax, homophones, and other interpretive errors are inadvertently transcribed by the computer software. Please disregard these errors. Please excuse any errors that have escaped final proofreading. Thank you.

## 2021-01-28 NOTE — ED TRIAGE NOTES
Pt has hx of throat cancer, presents today in respiratory distress. Upon arrival stridor is heard in upper airway. Pt scheduled to see oncologist today at 21 [phone].

## 2021-01-29 LAB
ATRIAL RATE: 100 BPM
CALCULATED P AXIS, ECG09: 87 DEGREES
CALCULATED R AXIS, ECG10: 84 DEGREES
CALCULATED T AXIS, ECG11: 72 DEGREES
DIAGNOSIS, 93000: NORMAL
P-R INTERVAL, ECG05: 156 MS
Q-T INTERVAL, ECG07: 346 MS
QRS DURATION, ECG06: 90 MS
QTC CALCULATION (BEZET), ECG08: 446 MS
VENTRICULAR RATE, ECG03: 100 BPM

## 2021-01-31 ENCOUNTER — APPOINTMENT (OUTPATIENT)
Dept: GENERAL RADIOLOGY | Age: 51
DRG: 121 | End: 2021-01-31
Attending: PHYSICIAN ASSISTANT
Payer: MEDICAID

## 2021-01-31 ENCOUNTER — HOSPITAL ENCOUNTER (INPATIENT)
Age: 51
LOS: 7 days | Discharge: ANOTHER HOSPITAL | DRG: 121 | End: 2021-02-07
Attending: FAMILY MEDICINE | Admitting: FAMILY MEDICINE
Payer: MEDICAID

## 2021-01-31 DIAGNOSIS — R06.1 STRIDOR: ICD-10-CM

## 2021-01-31 DIAGNOSIS — J44.1 COPD EXACERBATION (HCC): Primary | ICD-10-CM

## 2021-01-31 DIAGNOSIS — R09.02 HYPOXIA: ICD-10-CM

## 2021-01-31 DIAGNOSIS — C14.0 THROAT CANCER (HCC): ICD-10-CM

## 2021-01-31 PROBLEM — J96.91 RESPIRATORY FAILURE WITH HYPOXIA (HCC): Status: ACTIVE | Noted: 2021-01-31

## 2021-01-31 PROBLEM — G47.00 INSOMNIA: Status: ACTIVE | Noted: 2021-01-31

## 2021-01-31 PROBLEM — F41.9 ANXIETY: Status: ACTIVE | Noted: 2021-01-31

## 2021-01-31 PROBLEM — J96.21 ACUTE AND CHRONIC RESPIRATORY FAILURE WITH HYPOXIA (HCC): Status: ACTIVE | Noted: 2021-01-31

## 2021-01-31 PROBLEM — G89.3 CHRONIC PAIN DUE TO NEOPLASM: Status: ACTIVE | Noted: 2021-01-31

## 2021-01-31 LAB
ANION GAP SERPL CALC-SCNC: 10 MMOL/L (ref 5–15)
ARTERIAL PATENCY WRIST A: ABNORMAL
BASE EXCESS BLDA CALC-SCNC: 5.6 MMOL/L (ref 0–2)
BASOPHILS # BLD: 0 K/UL (ref 0–0.1)
BASOPHILS NFR BLD: 0 % (ref 0–1)
BDY SITE: ABNORMAL
BUN SERPL-MCNC: 11 MG/DL (ref 6–20)
BUN/CREAT SERPL: 19 (ref 12–20)
CA-I BLD-MCNC: 10 MG/DL (ref 8.5–10.1)
CHLORIDE SERPL-SCNC: 93 MMOL/L (ref 97–108)
CO2 SERPL-SCNC: 32 MMOL/L (ref 21–32)
COVID-19 RAPID TEST, COVR: NOT DETECTED
CREAT SERPL-MCNC: 0.58 MG/DL (ref 0.7–1.3)
DIFFERENTIAL METHOD BLD: ABNORMAL
EOSINOPHIL # BLD: 0 K/UL (ref 0–0.4)
EOSINOPHIL NFR BLD: 0 % (ref 0–7)
ERYTHROCYTE [DISTWIDTH] IN BLOOD BY AUTOMATED COUNT: 12.7 % (ref 11.5–14.5)
GAS FLOW.O2 O2 DELIVERY SYS: 4 L/MIN
GLUCOSE SERPL-MCNC: 69 MG/DL (ref 65–100)
HCO3 BLDA-SCNC: 29 MMOL/L (ref 22–26)
HCT VFR BLD AUTO: 43.2 % (ref 36.6–50.3)
HGB BLD-MCNC: 14.7 G/DL (ref 12.1–17)
IMM GRANULOCYTES # BLD AUTO: 0 K/UL (ref 0–0.04)
IMM GRANULOCYTES NFR BLD AUTO: 0 % (ref 0–0.5)
LYMPHOCYTES # BLD: 1 K/UL (ref 0.8–3.5)
LYMPHOCYTES NFR BLD: 16 % (ref 12–49)
MCH RBC QN AUTO: 33.5 PG (ref 26–34)
MCHC RBC AUTO-ENTMCNC: 34 G/DL (ref 30–36.5)
MCV RBC AUTO: 98.4 FL (ref 80–99)
MONOCYTES # BLD: 0.9 K/UL (ref 0–1)
MONOCYTES NFR BLD: 14 % (ref 5–13)
NEUTS SEG # BLD: 4.5 K/UL (ref 1.8–8)
NEUTS SEG NFR BLD: 70 % (ref 32–75)
PCO2 BLDA: 61 MMHG (ref 35–45)
PH BLDA: 7.35 [PH] (ref 7.35–7.45)
PLATELET # BLD AUTO: 375 K/UL (ref 150–400)
PMV BLD AUTO: 10.1 FL (ref 8.9–12.9)
PO2 BLDA: 80 MMHG (ref 75–100)
POTASSIUM SERPL-SCNC: 3.5 MMOL/L (ref 3.5–5.1)
RBC # BLD AUTO: 4.39 M/UL (ref 4.1–5.7)
SAO2 % BLD: 95 %
SAO2% DEVICE SAO2% SENSOR NAME: ABNORMAL
SARS-COV-2, COV2: NORMAL
SODIUM SERPL-SCNC: 135 MMOL/L (ref 136–145)
SPECIMEN SOURCE: NORMAL
WBC # BLD AUTO: 6.4 K/UL (ref 4.1–11.1)

## 2021-01-31 PROCEDURE — 96375 TX/PRO/DX INJ NEW DRUG ADDON: CPT

## 2021-01-31 PROCEDURE — 94640 AIRWAY INHALATION TREATMENT: CPT

## 2021-01-31 PROCEDURE — 77010033678 HC OXYGEN DAILY

## 2021-01-31 PROCEDURE — 65270000029 HC RM PRIVATE

## 2021-01-31 PROCEDURE — 71045 X-RAY EXAM CHEST 1 VIEW: CPT

## 2021-01-31 PROCEDURE — 82803 BLOOD GASES ANY COMBINATION: CPT

## 2021-01-31 PROCEDURE — 74011000250 HC RX REV CODE- 250: Performed by: NURSE PRACTITIONER

## 2021-01-31 PROCEDURE — 74011250636 HC RX REV CODE- 250/636: Performed by: PHYSICIAN ASSISTANT

## 2021-01-31 PROCEDURE — 99285 EMERGENCY DEPT VISIT HI MDM: CPT

## 2021-01-31 PROCEDURE — 36415 COLL VENOUS BLD VENIPUNCTURE: CPT

## 2021-01-31 PROCEDURE — 87635 SARS-COV-2 COVID-19 AMP PRB: CPT

## 2021-01-31 PROCEDURE — 74011250637 HC RX REV CODE- 250/637: Performed by: NURSE PRACTITIONER

## 2021-01-31 PROCEDURE — 74011000250 HC RX REV CODE- 250: Performed by: PHYSICIAN ASSISTANT

## 2021-01-31 PROCEDURE — 94762 N-INVAS EAR/PLS OXIMTRY CONT: CPT

## 2021-01-31 PROCEDURE — 96374 THER/PROPH/DIAG INJ IV PUSH: CPT

## 2021-01-31 PROCEDURE — 85025 COMPLETE CBC W/AUTO DIFF WBC: CPT

## 2021-01-31 PROCEDURE — 80048 BASIC METABOLIC PNL TOTAL CA: CPT

## 2021-01-31 RX ORDER — DOXYCYCLINE 100 MG/1
100 CAPSULE ORAL EVERY 12 HOURS
Status: DISCONTINUED | OUTPATIENT
Start: 2021-01-31 | End: 2021-02-02

## 2021-01-31 RX ORDER — LORAZEPAM 2 MG/ML
1 INJECTION INTRAMUSCULAR ONCE
Status: COMPLETED | OUTPATIENT
Start: 2021-01-31 | End: 2021-01-31

## 2021-01-31 RX ORDER — BISACODYL 5 MG
5 TABLET, DELAYED RELEASE (ENTERIC COATED) ORAL DAILY PRN
Status: DISCONTINUED | OUTPATIENT
Start: 2021-01-31 | End: 2021-02-07 | Stop reason: HOSPADM

## 2021-01-31 RX ORDER — ONDANSETRON 2 MG/ML
4 INJECTION INTRAMUSCULAR; INTRAVENOUS
Status: DISCONTINUED | OUTPATIENT
Start: 2021-01-31 | End: 2021-02-07 | Stop reason: HOSPADM

## 2021-01-31 RX ORDER — TRAZODONE HYDROCHLORIDE 50 MG/1
100 TABLET ORAL
Status: DISCONTINUED | OUTPATIENT
Start: 2021-01-31 | End: 2021-02-02

## 2021-01-31 RX ORDER — IPRATROPIUM BROMIDE AND ALBUTEROL SULFATE 2.5; .5 MG/3ML; MG/3ML
3 SOLUTION RESPIRATORY (INHALATION)
Status: COMPLETED | OUTPATIENT
Start: 2021-01-31 | End: 2021-01-31

## 2021-01-31 RX ORDER — ACETAMINOPHEN 650 MG/1
650 SUPPOSITORY RECTAL
Status: DISCONTINUED | OUTPATIENT
Start: 2021-01-31 | End: 2021-02-07 | Stop reason: HOSPADM

## 2021-01-31 RX ORDER — DEXAMETHASONE SODIUM PHOSPHATE 10 MG/ML
10 INJECTION INTRAMUSCULAR; INTRAVENOUS ONCE
Status: COMPLETED | OUTPATIENT
Start: 2021-01-31 | End: 2021-01-31

## 2021-01-31 RX ORDER — SODIUM CHLORIDE 0.9 % (FLUSH) 0.9 %
5-40 SYRINGE (ML) INJECTION AS NEEDED
Status: DISCONTINUED | OUTPATIENT
Start: 2021-01-31 | End: 2021-02-07 | Stop reason: HOSPADM

## 2021-01-31 RX ORDER — ALPRAZOLAM 0.5 MG/1
0.5 TABLET ORAL 3 TIMES DAILY
Status: DISCONTINUED | OUTPATIENT
Start: 2021-01-31 | End: 2021-02-02

## 2021-01-31 RX ORDER — POLYETHYLENE GLYCOL 3350 17 G/17G
17 POWDER, FOR SOLUTION ORAL DAILY PRN
Status: DISCONTINUED | OUTPATIENT
Start: 2021-01-31 | End: 2021-02-07 | Stop reason: HOSPADM

## 2021-01-31 RX ORDER — MORPHINE SULFATE 2 MG/ML
2 INJECTION, SOLUTION INTRAMUSCULAR; INTRAVENOUS ONCE
Status: COMPLETED | OUTPATIENT
Start: 2021-01-31 | End: 2021-01-31

## 2021-01-31 RX ORDER — HYDROXYZINE 25 MG/1
50 TABLET, FILM COATED ORAL
Status: DISCONTINUED | OUTPATIENT
Start: 2021-01-31 | End: 2021-02-07 | Stop reason: HOSPADM

## 2021-01-31 RX ORDER — ALBUTEROL SULFATE 90 UG/1
2 AEROSOL, METERED RESPIRATORY (INHALATION)
Status: DISCONTINUED | OUTPATIENT
Start: 2021-01-31 | End: 2021-02-01

## 2021-01-31 RX ORDER — FENTANYL 50 UG/1
1 PATCH TRANSDERMAL
Status: DISCONTINUED | OUTPATIENT
Start: 2021-01-31 | End: 2021-02-02

## 2021-01-31 RX ORDER — ONDANSETRON 4 MG/1
4 TABLET, ORALLY DISINTEGRATING ORAL
Status: DISCONTINUED | OUTPATIENT
Start: 2021-01-31 | End: 2021-02-07 | Stop reason: HOSPADM

## 2021-01-31 RX ORDER — IPRATROPIUM BROMIDE AND ALBUTEROL SULFATE 2.5; .5 MG/3ML; MG/3ML
3 SOLUTION RESPIRATORY (INHALATION)
Status: DISCONTINUED | OUTPATIENT
Start: 2021-01-31 | End: 2021-02-02

## 2021-01-31 RX ORDER — SODIUM CHLORIDE 0.9 % (FLUSH) 0.9 %
5-40 SYRINGE (ML) INJECTION EVERY 8 HOURS
Status: DISCONTINUED | OUTPATIENT
Start: 2021-01-31 | End: 2021-02-07 | Stop reason: HOSPADM

## 2021-01-31 RX ORDER — FAMOTIDINE 20 MG/1
20 TABLET, FILM COATED ORAL EVERY 12 HOURS
Status: DISCONTINUED | OUTPATIENT
Start: 2021-01-31 | End: 2021-02-07 | Stop reason: HOSPADM

## 2021-01-31 RX ORDER — ACETAMINOPHEN 325 MG/1
650 TABLET ORAL
Status: DISCONTINUED | OUTPATIENT
Start: 2021-01-31 | End: 2021-02-07 | Stop reason: HOSPADM

## 2021-01-31 RX ADMIN — IPRATROPIUM BROMIDE AND ALBUTEROL SULFATE 3 ML: .5; 3 SOLUTION RESPIRATORY (INHALATION) at 09:26

## 2021-01-31 RX ADMIN — DOXYCYCLINE HYCLATE 100 MG: 100 CAPSULE ORAL at 22:06

## 2021-01-31 RX ADMIN — LIDOCAINE HYDROCHLORIDE: 20 SOLUTION ORAL; TOPICAL at 16:39

## 2021-01-31 RX ADMIN — ALBUTEROL SULFATE 2 PUFF: 108 AEROSOL, METERED RESPIRATORY (INHALATION) at 19:44

## 2021-01-31 RX ADMIN — ALPRAZOLAM 0.5 MG: 0.5 TABLET ORAL at 16:39

## 2021-01-31 RX ADMIN — Medication 10 ML: at 15:00

## 2021-01-31 RX ADMIN — TRAZODONE HYDROCHLORIDE 100 MG: 50 TABLET ORAL at 22:06

## 2021-01-31 RX ADMIN — DOXYCYCLINE HYCLATE 100 MG: 100 CAPSULE ORAL at 14:59

## 2021-01-31 RX ADMIN — FAMOTIDINE 20 MG: 20 TABLET, FILM COATED ORAL at 15:00

## 2021-01-31 RX ADMIN — LORAZEPAM 1 MG: 2 INJECTION INTRAMUSCULAR; INTRAVENOUS at 09:12

## 2021-01-31 RX ADMIN — FAMOTIDINE 20 MG: 20 TABLET, FILM COATED ORAL at 22:06

## 2021-01-31 RX ADMIN — Medication 5 ML: at 22:00

## 2021-01-31 RX ADMIN — ALPRAZOLAM 0.5 MG: 0.5 TABLET ORAL at 22:06

## 2021-01-31 RX ADMIN — DEXAMETHASONE SODIUM PHOSPHATE 10 MG: 10 INJECTION, SOLUTION INTRAMUSCULAR; INTRAVENOUS at 09:12

## 2021-01-31 RX ADMIN — MORPHINE SULFATE 2 MG: 2 INJECTION, SOLUTION INTRAMUSCULAR; INTRAVENOUS at 09:12

## 2021-01-31 RX ADMIN — LIDOCAINE HYDROCHLORIDE: 20 SOLUTION ORAL; TOPICAL at 22:48

## 2021-01-31 NOTE — ED TRIAGE NOTES
Pt has throat cancer, seen here recently for the same, today complains of pain and spasms in the throat, has surgery scheduled for Feb 8, states his pain medicine is not working and he needs  something for anxiety    Pt states he will return every 3 days until his procedure

## 2021-01-31 NOTE — ED PROVIDER NOTES
EMERGENCY DEPARTMENT HISTORY AND PHYSICAL EXAM      Date: 1/31/2021  Patient Name: Camila Carl    History of Presenting Illness     Chief Complaint   Patient presents with    Sore Throat       History Provided By: Patient    HPI: Camila Carl, 53 y.o. male with a past medical history significant throat cancer, emphysema presents to the ED with cc of throat pain and anxiety secondary to his advanced stage throat cancer. Patient has surgery in 8 days with his ENT specialist who was following him on his treatment. Patient states that he has oxycodone and hydrocodone as well as Magic mouthwash at home however his symptoms are causing him to have difficulty sleeping at night. Patient reports he has not had anything to eat in the last 2 days. He is requesting IV medication for his pain and anxiety. He was taken off of his steroids in preparation for upcoming surgery. Patient has a nebulizer machine at home for his wheezing and shortness of breath secondary to emphysema. Patient specifically denies fever, chills, body aches, chest pain, cough, nausea, vomiting, diarrhea. Patient has been to the ED multiple times for the same complaint and his surgeon has been contacted in the past visits and felt like the patient did not require admission at that time. There are no other complaints, changes, or physical findings at this time. PCP: Montana Antunez NP    Current Outpatient Medications   Medication Sig Dispense Refill    aluminum-magnesium hydroxide 200-200 mg/5 mL susp 5 mL, diphenhydrAMINE 12.5 mg/5 mL liqd 5 mL, lidocaine 2 % soln 5 mL 5 mL by Swish and Spit route two (2) times a day. Magic mouth wash   Maalox  Lidocaine 2% viscous   Diphenhydramine oral solution     Pharmacy to mix equal portions of ingredients to a total volume as indicated in the dispense amount.  HYDROCODONE-ACETAMINOPHEN PO Take  by mouth.  1 TSP QID      albuterol (PROVENTIL HFA, VENTOLIN HFA, PROAIR HFA) 90 mcg/actuation inhaler Take 3 Puffs by inhalation every eight (8) hours. Indications: chronic obstructive pulmonary disease 1 Inhaler 3    hydrOXYzine HCL (ATARAX) 50 mg tablet Take 1 Tab by mouth every eight to twelve (8-12) hours as needed for Anxiety or Sleep for up to 40 days. Indications: Pt has throat cancer and anxiety. 36 Tab 2       Past History     Past Medical History:  Past Medical History:   Diagnosis Date    Chronic pain     THROAT, EARS, NECK R/T CANCER    COPD (chronic obstructive pulmonary disease) ([facility])     EMPHASEMA    Psychiatric disorder     ANXIETY R/T CANCER    Throat cancer (H[facility])        Past Surgical History:  Past Surgical History:   Procedure Laterality Date    HX ORTHOPAEDIC      LEFT ARM- \" PUT PLATE IN\"       Family History:  Family History   Problem Relation Age of Onset    Diabetes Mother     Diabetes Father     Kidney Disease Father     Diabetes Sister     Heart Disease Daughter     Anesth Problems Neg Hx        Social History:  Social History     Tobacco Use    Smoking status: Former Smoker     Packs/day: 1.50     Years: 30.00     Pack years: 45.00     Quit date: 10/28/2020     Years since quittin.2    Smokeless tobacco: Never Used   Substance Use Topics    Alcohol use: Not Currently    Drug use: Never       Allergies:  No Known Allergies      Review of Systems   Review of Systems   Constitutional: Positive for appetite change. Negative for activity change, chills and fever. HENT: Positive for sore throat and trouble swallowing. Negative for congestion, ear pain and rhinorrhea. Eyes: Negative for pain and visual disturbance. Respiratory: Negative for cough and shortness of breath. Cardiovascular: Negative for chest pain. Gastrointestinal: Negative for abdominal pain, diarrhea, nausea and vomiting. Genitourinary: Negative for decreased urine volume, difficulty urinating, dysuria and hematuria. Musculoskeletal: Negative for arthralgias and myalgias.    Skin: Negative for rash. Neurological: Negative for weakness and headaches. Hematological: Negative for adenopathy. Psychiatric/Behavioral: Positive for sleep disturbance. The patient is nervous/anxious. All other systems reviewed and are negative. Physical Exam   Physical Exam  Vitals signs and nursing note reviewed. Constitutional:       General: He is not in acute distress. Appearance: He is well-developed. He is cachectic. HENT:      Head: Normocephalic and atraumatic. Mouth/Throat:      Mouth: Mucous membranes are moist.      Pharynx: Uvula midline. No pharyngeal swelling, oropharyngeal exudate or posterior oropharyngeal erythema. Comments: Airway patent  Eyes:      Extraocular Movements: Extraocular movements intact. Pupils: Pupils are equal, round, and reactive to light. Cardiovascular:      Rate and Rhythm: Normal rate and regular rhythm. Heart sounds: Normal heart sounds. Pulmonary:      Effort: Pulmonary effort is normal. No tachypnea or respiratory distress. Breath sounds: Normal breath sounds. Stridor (inspiratory) and transmitted upper airway sounds present. No wheezing or rhonchi. Comments: Prolonged expiration phase  Abdominal:      General: Abdomen is flat. Bowel sounds are normal.      Palpations: Abdomen is soft. Tenderness: There is no abdominal tenderness. Lymphadenopathy:      Cervical: No cervical adenopathy. Skin:     General: Skin is warm and dry. Capillary Refill: Capillary refill takes less than 2 seconds. Findings: No rash. Neurological:      General: No focal deficit present. Mental Status: He is alert. Cranial Nerves: No cranial nerve deficit. Psychiatric:         Mood and Affect: Mood is anxious.          Behavior: Behavior normal.         Diagnostic Study Results     Labs -     Recent Results (from the past 48 hour(s))   CBC WITH AUTOMATED DIFF    Collection Time: 01/31/21  9:00 AM   Result Value Ref Range    WBC 6.4 4.1 - 11.1 K/uL    RBC 4.39 4. 10 - 5.70 M/uL    HGB 14.7 12.1 - 17.0 g/dL    HCT 43.2 36.6 - 50.3 %    MCV 98.4 80.0 - 99.0 FL    MCH 33.5 26.0 - 34.0 PG    MCHC 34.0 30.0 - 36.5 g/dL    RDW 12.7 11.5 - 14.5 %    PLATELET 318 591 - 062 K/uL    MPV 10.1 8.9 - 12.9 FL    NEUTROPHILS 70 32 - 75 %    LYMPHOCYTES 16 12 - 49 %    MONOCYTES 14 (H) 5 - 13 %    EOSINOPHILS 0 0 - 7 %    BASOPHILS 0 0 - 1 %    IMMATURE GRANULOCYTES 0 0.0 - 0.5 %    ABS. NEUTROPHILS 4.5 1.8 - 8.0 K/UL    ABS. LYMPHOCYTES 1.0 0.8 - 3.5 K/UL    ABS. MONOCYTES 0.9 0.0 - 1.0 K/UL    ABS. EOSINOPHILS 0.0 0.0 - 0.4 K/UL    ABS. BASOPHILS 0.0 0.0 - 0.1 K/UL    ABS. IMM. GRANS. 0.0 0.00 - 0.04 K/UL    DF AUTOMATED     METABOLIC PANEL, BASIC    Collection Time: 01/31/21  9:00 AM   Result Value Ref Range    Sodium 135 (L) 136 - 145 mmol/L    Potassium 3.5 3.5 - 5.1 mmol/L    Chloride 93 (L) 97 - 108 mmol/L    CO2 32 21 - 32 mmol/L    Anion gap 10 5 - 15 mmol/L    Glucose 69 65 - 100 mg/dL    BUN 11 6 - 20 mg/dL    Creatinine 0.58 (L) 0.70 - 1.30 mg/dL    BUN/Creatinine ratio 19 12 - 20      GFR est AA >60 >60 ml/min/1.73m2    GFR est non-AA >60 >60 ml/min/1.73m2    Calcium 10.0 8.5 - 10.1 mg/dL       Radiologic Studies -   XR Results (most recent):  Results from Hospital Encounter encounter on 01/28/21   XR CHEST PORT    Narrative PROCEDURE: XR CHEST PORT    HISTORY:Short of breath    COMPARISON:Chest x-ray 24 January 2021      TECHNIQUE: AP portable chest    LIMITATIONS: None    TUBES/LINES: None    LUNG PARENCHYMA/PLEURA: There is some small areas of asymmetric density in the  right lower lung zone and both midlung zones. No well-defined infiltrate or  mass. TRACHEA/BRONCHI: Normal  PULMONARY VESSELS: Normal  HEART: Normal  MEDIASTINUM: Normal  BONE/SOFT TISSUES: No acute process    OTHER: None      Impression Minimal bilateral densities. Early pneumonia? .             CT Results  (Last 48 hours)    None            Medical Decision Making and ED Course   I am the first provider for this patient. I reviewed the vital signs, available nursing notes, past medical history, past surgical history, family history and social history. Vital Signs-Reviewed the patient's vital signs. Patient Vitals for the past 12 hrs:   Pulse Resp BP SpO2   01/31/21 1005 (!) 109 24 (!) 153/87 95 %   01/31/21 0927    92 %   01/31/21 0819 (!) 113 22 (!) 140/79 92 %       Records Reviewed: Nursing Notes, Old Medical Records, Previous Radiology Studies and Previous Laboratory Studies    Provider Notes (Medical Decision Making):       MDM  Number of Diagnoses or Management Options  COPD exacerbation ([facility] [address]  Hypoxia  Stridor  Throat cancer ([facility] [address]  Diagnosis management comments: This is a 41-year-old male with a history of emphysema stage throat cancer. Scheduled for surgery with ENT in 8 days. He presents with shortness of breath and wheezing. Suspect COPD exacerbation. He is requiring oxygen therapy here in the emergency department. Saturating 84% on room air. He is in no acute distress. No improvement after IV steroids and DuoNeb. Will admit him for oxygen therapy and further observation. Hospitalist agrees with plan. Amount and/or Complexity of Data Reviewed  Clinical lab tests: ordered and reviewed  Tests in the radiology section of CPT®: ordered and reviewed  Review and summarize past medical records: yes  Discuss the patient with other providers: yes (Dr. Annie Dinh)        ED Course:   Initial assessment performed. The patients presenting problems have been discussed, and they are in agreement with the care plan formulated and outlined with them. I have encouraged them to ask questions as they arise throughout their visit. 11:07 AM  Progress Note:  Patient was re-evaluated at bedside. Hypoxic at 84% on room air, improved to 92-94% on 2L NC.     Consult Note:  11:09 AM  Kalpesh Engel PA-C spoke with Dr. Dom Ram  Specialty: Internal Medicine  Discussed pt's hx, disposition, and available diagnostic and imaging results. Reviewed care plans. Dr. Margarito Prado will evaluate for admission. Admit Note:  11:09 AM  Pt is being admitted by Dr. Margarito Prado. The results of their tests and reason(s) for their admission have been discussed with pt and/or available family. They convey agreement and understanding for the need to be admitted and for admission diagnosis. ED Course as of Jan 31 1114   Sun Jan 31, 2021   1113 11:13 AM  I have personally seen and examined the patient, I have reviewed and agree with the MLP's findings, including all diagnostic interpretations, and plans as written. I was present during the key portions of separately billed procedures. Jasmin Stewart MD              [address]      ED Course User Index  [KK] Slava Caraballo MD         Procedures       Clair Palacios PA-C    Procedures     Clair Palacios, [place]        Disposition       Admitted        Diagnosis     Clinical Impression:   1. COPD exacerbation ([facility] [address]    2. Throat cancer ([facility] [address]    3. Stridor    4. Hypoxia        Attestations:    Clair Palacios PA-C    Please note that this dictation was completed with [employer], the computer voice recognition software. Quite often unanticipated grammatical, syntax, homophones, and other interpretive errors are inadvertently transcribed by the computer software. Please disregard these errors. Please excuse any errors that have escaped final proofreading. Thank you.

## 2021-01-31 NOTE — H&P
History and Physical    Patient: Shayne Ceja MRN: [id number]  SSN: [id number]    YOB: 1970  Age: 53 y.o. Sex: male      Subjective:      Shayne Ceja is a 53 y.o. male who admitted on 2021. PMH significant for throat cancer, emphysema, chronic pain and anxiety secondary to advanced age throat cancer. Patient has scheduled surgery in 8 days with ENT specialist at [facility]. He comes in to the emergency room with increased shortness of breath, insomnia, unmanaged throat pain and anxiety. He states he has not been able to eat in 2 days and cannot sleep. He is requesting IV medication for pain and anxiety. He has audible stridor, wheezing shortness of breath and hypoxia on room air sats are 84%. Admit for further management of acute hypoxic respiratory failure, COPD exacerbation, unmanaged pain and anxiety. Past Medical History:   Diagnosis Date    Chronic pain     THROAT, EARS, NECK R/T CANCER    [facility]chronic obstructive [facility] disease) ([facility]     EMPHASEMA    Psychiatric disorder     ANXIETY R/T CANCER    Throat cancer ([facility])      Past Surgical History:   Procedure Laterality Date    HX ORTHOPAEDIC      LEFT ARM- \" PUT PLATE IN\"      Family History   Problem Relation Age of Onset    Diabetes Mother     Diabetes Father     Kidney Disease Father     Diabetes Sister     Heart Disease Daughter     Anesth Problems Neg Hx      Social History     Tobacco Use    Smoking status: Former Smoker     Packs/day: 1.50     Years: 30.00     Pack years: 45.00     Quit date: 10/28/2020     Years since quittin.2    Smokeless tobacco: Never Used   Substance Use Topics    Alcohol use: Not Currently      Prior to Admission medications    Medication Sig Start Date End Date Taking? Authorizing Provider   aluminum-magnesium hydroxide 200-200 mg/5 mL susp 5 mL, diphenhydrAMINE 12.5 mg/5 mL liqd 5 mL, lidocaine 2 % soln 5 mL 5 mL by Swish and Spit route two (2) times a day.  Magic mouth wash   Maalox  Lidocaine 2% viscous   Diphenhydramine oral solution     Pharmacy to mix equal portions of ingredients to a total volume as indicated in the dispense amount. Provider, Historical   HYDROCODONE-ACETAMINOPHEN PO Take  by mouth. 1 TSP QID    Provider, Historical   albuterol (PROVENTIL HFA, VENTOLIN HFA, PROAIR HFA) 90 mcg/actuation inhaler Take 3 Puffs by inhalation every eight (8) hours. Indications: chronic obstructive pulmonary disease 1/24/21   Jo Hutton MD   hydrOXYzine HCL (ATARAX) 50 mg tablet Take 1 Tab by mouth every eight to twelve (8-12) hours as needed for Anxiety or Sleep for up to 40 days. Indications: Pt has throat cancer and anxiety. 1/24/21 3/5/21  Jo Hutton MD        No Known Allergies    Review of Systems:  Constitutional: No fevers, No chills, +++ fatigue, +++ weakness  Eyes: No visual disturbance  Ears, Nose, Mouth, Throat, and Face: +++nasal congestion, +++sore throat  Respiratory: No cough, No sputum, No wheezing, No SOB  Cardiovascular: No chest pain, No lower extremity edema, No Palpitations   Gastrointestinal: Poor appetite due to throat cancer   Genitourinary: No frequency, No dysuria, No hematuria  Integument/Breast: No rash, No skin lesion(s), No dryness  Musculoskeletal: No arthralgias, + + + neck pain, No back pain  Neurological: No headaches, No dizziness, No confusion,  No seizures  Behavioral/Psychiatric: + + Sign anxiety, No depression      Objective:     Vitals:    01/31/21 0819 01/31/21 0927 01/31/21 1005 01/31/21 1208   BP: (!) 140/79  (!) 153/87 (!) 155/81   Pulse: (!) 113  (!) 109 (!) 102   Resp: 22  24 25   SpO2: 92% 92% 95% 90%   Weight: 60.8 kg (134 lb)      Height: 6' (1.829 m)           Physical Exam:  General: alert, cooperative, mild to moderate respiratory distress  Eye: conjunctivae/corneas clear. PERRL, EOM's intact.    Throat and Neck: +++ mass   Lung: Audible upper airway stridor and wheezing  Heart: regular rate and rhythm,   Abdomen: soft, non-tender. Bowel sounds normal. No masses,  Extremities:  able to move all extremities normal, atraumatic  Skin: Normal.  Neurologic: AOx3. Cranial nerves 2-12 and sensation grossly intact. Psychiatric: Anxious    Recent Results (from the past 24 hour(s))   CBC WITH AUTOMATED DIFF    Collection Time: 01/31/21  9:00 AM   Result Value Ref Range    WBC 6.4 4.1 - 11.1 K/uL    RBC 4.39 4. 10 - 5.70 M/uL    HGB 14.7 12.1 - 17.0 g/dL    HCT 43.2 36.6 - 50.3 %    MCV 98.4 80.0 - 99.0 FL    MCH 33.5 26.0 - 34.0 PG    MCHC 34.0 30.0 - 36.5 g/dL    RDW 12.7 11.5 - 14.5 %    PLATELET 652 057 - 191 K/uL    MPV 10.1 8.9 - 12.9 FL    NEUTROPHILS 70 32 - 75 %    LYMPHOCYTES 16 12 - 49 %    MONOCYTES 14 (H) 5 - 13 %    EOSINOPHILS 0 0 - 7 %    BASOPHILS 0 0 - 1 %    IMMATURE GRANULOCYTES 0 0.0 - 0.5 %    ABS. NEUTROPHILS 4.5 1.8 - 8.0 K/UL    ABS. LYMPHOCYTES 1.0 0.8 - 3.5 K/UL    ABS. MONOCYTES 0.9 0.0 - 1.0 K/UL    ABS. EOSINOPHILS 0.0 0.0 - 0.4 K/UL    ABS. BASOPHILS 0.0 0.0 - 0.1 K/UL    ABS. IMM. GRANS. 0.0 0.00 - 0.04 K/UL    DF AUTOMATED     METABOLIC PANEL, BASIC    Collection Time: 01/31/21  9:00 AM   Result Value Ref Range    Sodium 135 (L) 136 - 145 mmol/L    Potassium 3.5 3.5 - 5.1 mmol/L    Chloride 93 (L) 97 - 108 mmol/L    CO2 32 21 - 32 mmol/L    Anion gap 10 5 - 15 mmol/L    Glucose 69 65 - 100 mg/dL    BUN 11 6 - 20 mg/dL    Creatinine 0.58 (L) 0.70 - 1.30 mg/dL    BUN/Creatinine ratio 19 12 - 20      GFR est AA >60 >60 ml/min/1.73m2    GFR est non-AA >60 >60 ml/min/1.73m2    Calcium 10.0 8.5 - 10.1 mg/dL       XR Results (maximum last 3):   Results from Hospital Encounter encounter on 01/28/21   XR CHEST PORT    Narrative PROCEDURE: XR CHEST PORT    HISTORY:Short of breath    COMPARISON:Chest x-ray 24 January 2021      TECHNIQUE: AP portable chest    LIMITATIONS: None    TUBES/LINES: None    LUNG PARENCHYMA/PLEURA: There is some small areas of asymmetric density in the  right lower lung zone and both midlung zones. No well-defined infiltrate or  mass. TRACHEA/BRONCHI: Normal  PULMONARY VESSELS: Normal  HEART: Normal  MEDIASTINUM: Normal  BONE/SOFT TISSUES: No acute process    OTHER: None      Impression Minimal bilateral densities. Early pneumonia? .          Results from [place] encounter on 01/24/21   XR CHEST SNGL V    Narrative Chest, AP portable, 1249 hours. No comparisons. The heart, mediastinum, and pulmonary vasculature appear within normal limits. No evidence of pulmonary edema, air space pneumonia, or pleural effusion. No  evidence of pneumothorax. Impression No acute process. CT Results (maximum last 3): Results from [place] encounter on 01/28/21   CT NECK SOFT TISSUE WO CONT    Narrative PROCEDURE: CT NECK SOFT TISSUE WO CONT    HISTORY:Throat cancer. Short of breath    COMPARISON:None    Department policy stipulates all CT scans at this facility are performed using  dose reduction optimization techniques as appropriate to the performed exam,  including the following: Automated exposure control, adjustments of the mA  and/or KVP according to the patient size, and the use of iterative  reconstruction technique. TECHNIQUE: Axial images through the neck with multiplanar reconstruction. No IV  contrast.  COMPARISON: None  LIMITATIONS: None  NASOPHARYNX: Normal  OROPHARYNX: Normal  HYPOPHARYNX: Normal  EPIGLOTTIS/ARYEPIGLOTTIC FOLDS: The epiglottis appears normal. At the level of  the area epiglottic folds and extending into the larynx there is a poorly  defined soft tissue mass associated with mucosa and submucosal tissues. The  process is bilateral extending posteriorly across the midline. Anteriorly there  is a very small component across the midline. There is a very asymmetric  narrowing of the airway at this level with a larger mass on the right. The  process extends down to and involves the lateral walls of the larynx including  both the true and false cords. Laryngeal ventricle is distorted on the right and  compressed on the left. LARYNX: In addition to the mass extending into the larynx described above there  is poorly defined increased soft tissue density around the larynx. Fat planes in  this area are not well defined. PARAPHARYNGEAL SPACE: The deep parapharyngeal spaces appear relatively clear. RETROPHARYNGEAL/PREVERTEBRAL SOFT TISSUES: Normal  SALIVARY GLANDS: Normal  LYMPH NODES: There are poorly defined but the lymph nodes at the level of the  parapharyngeal spaces and extending inferiorly appear somewhat enlarged. No  necrotic nodes appreciated on this unenhanced scan  TRACHEA: Trachea appears normal below the level of the larynx  THYROID GLAND: Poorly defined  CAROTID ARTERIES: Limited evaluation without contrast  JUGULAR VEINS: Limited evaluation without contrast  CERVICAL SPINE/OSSEOUS STRUCTURES: Normal  SOFT TISSUES: Normal  OTHER: None      Impression Irregular soft tissue mass involving the aryepiglottic and the  larynx. There is significant narrowing of the airway at the level of the larynx  and the supraglottic region. Poorly defined cervical adenopathy is noted. Findings discussed with Dr. Amna Syed at 5:52 AM         MRI Results (maximum last 3): No results found for this or any previous visit. Nuclear Medicine Results (maximum last 3): No results found for this or any previous visit. US Results (maximum last 3): No results found for this or any previous visit. Active Problems:    Throat cancer ([facility] [address] (1/31/2021)      COPD exacerbation ([address] (1/31/2021)      Acute and chronic respiratory failure with hypoxia ([address] (1/31/2021)      Chronic pain due to neoplasm (1/31/2021)      Anxiety (1/31/2021)      Insomnia (1/31/2021)        Assessment/Plan:     1. COPD exacerbation:  2.   Acute on chronic hypoxic respiratory failure:  · Desaturating on room air 84%94% on 2 L  · We will start IV Solu-Medrol, duo nebs, doxycycline  · Oxygen as needed to maintain saturations greater than 92%    3. Throat cancer:  · Scheduled for surgery by ENT in 8 days    4. Chronic pain: Secondary to throat cancer  · Continue home medication Magic mouthwash hydrocodone  · Add fentanyl patch    5. Anxiety:  · Continue home medication as needed hydroxyzine  · Had Xanax 3 times daily    6.   Insomnia:  · Add trazodone      DVT Prophylaxis: Heparin  Code Status: Full  POA/NOK:  Total Time spent in direct and indirect care including assessment review of labs and coordination of services and consultations: Greater than 75 minutes      Signed By: Jennifer Inman NP     January 31, 2021

## 2021-02-01 ENCOUNTER — ANESTHESIA (OUTPATIENT)
Dept: SURGERY | Age: 51
DRG: 121 | End: 2021-02-01
Payer: MEDICAID

## 2021-02-01 ENCOUNTER — ANESTHESIA EVENT (OUTPATIENT)
Dept: SURGERY | Age: 51
DRG: 121 | End: 2021-02-01
Payer: MEDICAID

## 2021-02-01 LAB
ANION GAP SERPL CALC-SCNC: 4 MMOL/L (ref 5–15)
ARTERIAL PATENCY WRIST A: ABNORMAL
BASE EXCESS BLDA CALC-SCNC: 3 MMOL/L (ref 0–2)
BASE EXCESS BLDA CALC-SCNC: 4.8 MMOL/L (ref 0–2)
BASE EXCESS BLDA CALC-SCNC: 8.2 MMOL/L (ref 0–2)
BDY SITE: ABNORMAL
BUN SERPL-MCNC: 16 MG/DL (ref 6–20)
BUN/CREAT SERPL: 22 (ref 12–20)
CA-I BLD-MCNC: 9.2 MG/DL (ref 8.5–10.1)
CHLORIDE SERPL-SCNC: 94 MMOL/L (ref 97–108)
CO2 SERPL-SCNC: 37 MMOL/L (ref 21–32)
CREAT SERPL-MCNC: 0.74 MG/DL (ref 0.7–1.3)
EPAP/CPAP/PEEP, PAPEEP: 0
ERYTHROCYTE [DISTWIDTH] IN BLOOD BY AUTOMATED COUNT: 13 % (ref 11.5–14.5)
FIO2 ON VENT: 100 %
FIO2 ON VENT: 21 %
GAS FLOW.O2 O2 DELIVERY SYS: 6 L/MIN
GLUCOSE SERPL-MCNC: 145 MG/DL (ref 65–100)
HCO3 BLDA-SCNC: 27 MMOL/L (ref 22–26)
HCO3 BLDA-SCNC: 29 MMOL/L (ref 22–26)
HCO3 BLDA-SCNC: 32 MMOL/L (ref 22–26)
HCT VFR BLD AUTO: 42.1 % (ref 36.6–50.3)
HGB BLD-MCNC: 13.9 G/DL (ref 12.1–17)
MCH RBC QN AUTO: 33.1 PG (ref 26–34)
MCHC RBC AUTO-ENTMCNC: 33 G/DL (ref 30–36.5)
MCV RBC AUTO: 100.2 FL (ref 80–99)
MRSA DNA SPEC QL NAA+PROBE: NOT DETECTED
PCO2 BLDA: 121 MMHG (ref 35–45)
PCO2 BLDA: 73 MMHG (ref 35–45)
PCO2 BLDA: 96 MMHG (ref 35–45)
PH BLDA: 7.1 [PH] (ref 7.35–7.45)
PH BLDA: 7.19 [PH] (ref 7.35–7.45)
PH BLDA: 7.32 [PH] (ref 7.35–7.45)
PLATELET # BLD AUTO: 333 K/UL (ref 150–400)
PMV BLD AUTO: 10 FL (ref 8.9–12.9)
PO2 BLDA: 74 MMHG (ref 75–100)
PO2 BLDA: 93 MMHG (ref 75–100)
PO2 BLDA: 99 MMHG (ref 75–100)
POTASSIUM SERPL-SCNC: 4.1 MMOL/L (ref 3.5–5.1)
RBC # BLD AUTO: 4.2 M/UL (ref 4.1–5.7)
SAO2 % BLD: 94 %
SAO2 % BLD: 95 %
SAO2 % BLD: 95 %
SAO2% DEVICE SAO2% SENSOR NAME: ABNORMAL
SAO2% DEVICE SAO2% SENSOR NAME: ABNORMAL
SERVICE CMNT-IMP: ABNORMAL
SERVICE CMNT-IMP: ABNORMAL
SODIUM SERPL-SCNC: 135 MMOL/L (ref 136–145)
WBC # BLD AUTO: 14 K/UL (ref 4.1–11.1)

## 2021-02-01 PROCEDURE — 0CBT8ZX EXCISION OF RIGHT VOCAL CORD, VIA NATURAL OR ARTIFICIAL OPENING ENDOSCOPIC, DIAGNOSTIC: ICD-10-PCS | Performed by: OTOLARYNGOLOGY

## 2021-02-01 PROCEDURE — 36600 WITHDRAWAL OF ARTERIAL BLOOD: CPT

## 2021-02-01 PROCEDURE — 74011000258 HC RX REV CODE- 258: Performed by: FAMILY MEDICINE

## 2021-02-01 PROCEDURE — 77030011267 HC ELECTRD BLD COVD -A: Performed by: OTOLARYNGOLOGY

## 2021-02-01 PROCEDURE — 77030034626 HC LIGASURE SM JAW SEAL OPN SURG COVD -E: Performed by: OTOLARYNGOLOGY

## 2021-02-01 PROCEDURE — 74011000250 HC RX REV CODE- 250: Performed by: OTOLARYNGOLOGY

## 2021-02-01 PROCEDURE — 65610000006 HC RM INTENSIVE CARE

## 2021-02-01 PROCEDURE — 87086 URINE CULTURE/COLONY COUNT: CPT

## 2021-02-01 PROCEDURE — 74011250636 HC RX REV CODE- 250/636: Performed by: FAMILY MEDICINE

## 2021-02-01 PROCEDURE — 94640 AIRWAY INHALATION TREATMENT: CPT

## 2021-02-01 PROCEDURE — 2709999900 HC NON-CHARGEABLE SUPPLY: Performed by: OTOLARYNGOLOGY

## 2021-02-01 PROCEDURE — 74011000250 HC RX REV CODE- 250: Performed by: NURSE ANESTHETIST, CERTIFIED REGISTERED

## 2021-02-01 PROCEDURE — 31535 LARYNGOSCOPY W/BIOPSY: CPT | Performed by: OTOLARYNGOLOGY

## 2021-02-01 PROCEDURE — 5A1945Z RESPIRATORY VENTILATION, 24-96 CONSECUTIVE HOURS: ICD-10-PCS | Performed by: INTERNAL MEDICINE

## 2021-02-01 PROCEDURE — 74011250636 HC RX REV CODE- 250/636: Performed by: NURSE ANESTHETIST, CERTIFIED REGISTERED

## 2021-02-01 PROCEDURE — 88305 TISSUE EXAM BY PATHOLOGIST: CPT

## 2021-02-01 PROCEDURE — 94002 VENT MGMT INPAT INIT DAY: CPT

## 2021-02-01 PROCEDURE — 99222 1ST HOSP IP/OBS MODERATE 55: CPT | Performed by: OTOLARYNGOLOGY

## 2021-02-01 PROCEDURE — 77030008463 HC STPLR SKN PROX J&J -B: Performed by: OTOLARYNGOLOGY

## 2021-02-01 PROCEDURE — 74011250637 HC RX REV CODE- 250/637: Performed by: NURSE PRACTITIONER

## 2021-02-01 PROCEDURE — 76060000034 HC ANESTHESIA 1.5 TO 2 HR: Performed by: OTOLARYNGOLOGY

## 2021-02-01 PROCEDURE — 87641 MR-STAPH DNA AMP PROBE: CPT

## 2021-02-01 PROCEDURE — 74011250636 HC RX REV CODE- 250/636: Performed by: INTERNAL MEDICINE

## 2021-02-01 PROCEDURE — 74011000250 HC RX REV CODE- 250: Performed by: INTERNAL MEDICINE

## 2021-02-01 PROCEDURE — 77010033678 HC OXYGEN DAILY

## 2021-02-01 PROCEDURE — 77030038156 HC CRD BPLR DISP CARF -A: Performed by: OTOLARYNGOLOGY

## 2021-02-01 PROCEDURE — 31603 EMER TRACHEOSTOMY TTRACH: CPT | Performed by: OTOLARYNGOLOGY

## 2021-02-01 PROCEDURE — 76010000153 HC OR TIME 1.5 TO 2 HR: Performed by: OTOLARYNGOLOGY

## 2021-02-01 PROCEDURE — 0B110F4 BYPASS TRACHEA TO CUTANEOUS WITH TRACHEOSTOMY DEVICE, OPEN APPROACH: ICD-10-PCS | Performed by: OTOLARYNGOLOGY

## 2021-02-01 PROCEDURE — 80048 BASIC METABOLIC PNL TOTAL CA: CPT

## 2021-02-01 PROCEDURE — 77030008793 HC TU TRACH CUF COVD -B: Performed by: OTOLARYNGOLOGY

## 2021-02-01 PROCEDURE — 74011000250 HC RX REV CODE- 250: Performed by: FAMILY MEDICINE

## 2021-02-01 PROCEDURE — 82803 BLOOD GASES ANY COMBINATION: CPT

## 2021-02-01 PROCEDURE — 0BH17EZ INSERTION OF ENDOTRACHEAL AIRWAY INTO TRACHEA, VIA NATURAL OR ARTIFICIAL OPENING: ICD-10-PCS | Performed by: INTERNAL MEDICINE

## 2021-02-01 PROCEDURE — 85027 COMPLETE CBC AUTOMATED: CPT

## 2021-02-01 RX ORDER — DEXAMETHASONE SODIUM PHOSPHATE 4 MG/ML
INJECTION, SOLUTION INTRA-ARTICULAR; INTRALESIONAL; INTRAMUSCULAR; INTRAVENOUS; SOFT TISSUE AS NEEDED
Status: DISCONTINUED | OUTPATIENT
Start: 2021-02-01 | End: 2021-02-01 | Stop reason: HOSPADM

## 2021-02-01 RX ORDER — ONDANSETRON 2 MG/ML
INJECTION INTRAMUSCULAR; INTRAVENOUS AS NEEDED
Status: DISCONTINUED | OUTPATIENT
Start: 2021-02-01 | End: 2021-02-01 | Stop reason: HOSPADM

## 2021-02-01 RX ORDER — ROCURONIUM BROMIDE 10 MG/ML
INJECTION, SOLUTION INTRAVENOUS AS NEEDED
Status: DISCONTINUED | OUTPATIENT
Start: 2021-02-01 | End: 2021-02-01 | Stop reason: HOSPADM

## 2021-02-01 RX ORDER — PROPOFOL 10 MG/ML
INJECTION, EMULSION INTRAVENOUS
Status: DISPENSED
Start: 2021-02-01 | End: 2021-02-02

## 2021-02-01 RX ORDER — MIDAZOLAM HYDROCHLORIDE 1 MG/ML
INJECTION, SOLUTION INTRAMUSCULAR; INTRAVENOUS AS NEEDED
Status: DISCONTINUED | OUTPATIENT
Start: 2021-02-01 | End: 2021-02-01 | Stop reason: HOSPADM

## 2021-02-01 RX ORDER — FENTANYL CITRATE 50 UG/ML
INJECTION, SOLUTION INTRAMUSCULAR; INTRAVENOUS AS NEEDED
Status: DISCONTINUED | OUTPATIENT
Start: 2021-02-01 | End: 2021-02-01 | Stop reason: HOSPADM

## 2021-02-01 RX ORDER — KETAMINE HYDROCHLORIDE 100 MG/ML
INJECTION, SOLUTION INTRAMUSCULAR; INTRAVENOUS AS NEEDED
Status: DISCONTINUED | OUTPATIENT
Start: 2021-02-01 | End: 2021-02-01 | Stop reason: HOSPADM

## 2021-02-01 RX ORDER — OXYCODONE AND ACETAMINOPHEN 5; 325 MG/1; MG/1
1 TABLET ORAL
Status: DISCONTINUED | OUTPATIENT
Start: 2021-02-01 | End: 2021-02-01

## 2021-02-01 RX ORDER — BUDESONIDE 0.5 MG/2ML
500 INHALANT ORAL
Status: DISCONTINUED | OUTPATIENT
Start: 2021-02-01 | End: 2021-02-03

## 2021-02-01 RX ORDER — IPRATROPIUM BROMIDE AND ALBUTEROL SULFATE 2.5; .5 MG/3ML; MG/3ML
3 SOLUTION RESPIRATORY (INHALATION)
Status: DISCONTINUED | OUTPATIENT
Start: 2021-02-01 | End: 2021-02-03

## 2021-02-01 RX ORDER — PROPOFOL 10 MG/ML
0-50 VIAL (ML) INTRAVENOUS
Status: DISCONTINUED | OUTPATIENT
Start: 2021-02-01 | End: 2021-02-02

## 2021-02-01 RX ORDER — EPINEPHRINE 0.1 MG/ML
INJECTION INTRACARDIAC; INTRAVENOUS AS NEEDED
Status: DISCONTINUED | OUTPATIENT
Start: 2021-02-01 | End: 2021-02-01 | Stop reason: HOSPADM

## 2021-02-01 RX ORDER — MORPHINE SULFATE 2 MG/ML
2 INJECTION, SOLUTION INTRAMUSCULAR; INTRAVENOUS
Status: DISCONTINUED | OUTPATIENT
Start: 2021-02-01 | End: 2021-02-02

## 2021-02-01 RX ORDER — OXYCODONE AND ACETAMINOPHEN 5; 325 MG/1; MG/1
2 TABLET ORAL
Status: DISCONTINUED | OUTPATIENT
Start: 2021-02-01 | End: 2021-02-01

## 2021-02-01 RX ORDER — LIDOCAINE HYDROCHLORIDE AND EPINEPHRINE 10; 10 MG/ML; UG/ML
INJECTION, SOLUTION INFILTRATION; PERINEURAL AS NEEDED
Status: DISCONTINUED | OUTPATIENT
Start: 2021-02-01 | End: 2021-02-01 | Stop reason: HOSPADM

## 2021-02-01 RX ORDER — ALBUTEROL SULFATE 90 UG/1
2 AEROSOL, METERED RESPIRATORY (INHALATION)
Status: DISCONTINUED | OUTPATIENT
Start: 2021-02-01 | End: 2021-02-02

## 2021-02-01 RX ORDER — SODIUM CHLORIDE, SODIUM LACTATE, POTASSIUM CHLORIDE, CALCIUM CHLORIDE 600; 310; 30; 20 MG/100ML; MG/100ML; MG/100ML; MG/100ML
INJECTION, SOLUTION INTRAVENOUS
Status: DISCONTINUED | OUTPATIENT
Start: 2021-02-01 | End: 2021-02-01 | Stop reason: HOSPADM

## 2021-02-01 RX ADMIN — ROCURONIUM BROMIDE 30 MG: 10 SOLUTION INTRAVENOUS at 14:59

## 2021-02-01 RX ADMIN — KETAMINE HYDROCHLORIDE 15 MG: 100 INJECTION, SOLUTION, CONCENTRATE INTRAMUSCULAR; INTRAVENOUS at 14:45

## 2021-02-01 RX ADMIN — IPRATROPIUM BROMIDE AND ALBUTEROL SULFATE 3 ML: .5; 3 SOLUTION RESPIRATORY (INHALATION) at 19:57

## 2021-02-01 RX ADMIN — Medication 10 ML: at 06:34

## 2021-02-01 RX ADMIN — METHYLPREDNISOLONE SODIUM SUCCINATE 125 MG: 125 INJECTION, POWDER, FOR SOLUTION INTRAMUSCULAR; INTRAVENOUS at 23:45

## 2021-02-01 RX ADMIN — ALBUTEROL SULFATE 2 PUFF: 108 AEROSOL, METERED RESPIRATORY (INHALATION) at 01:46

## 2021-02-01 RX ADMIN — PHENYLEPHRINE HYDROCHLORIDE 200 MCG: 10 INJECTION INTRAVENOUS at 14:00

## 2021-02-01 RX ADMIN — DEXMEDETOMIDINE HYDROCHLORIDE 0.4 MCG/KG/HR: 100 INJECTION, SOLUTION, CONCENTRATE INTRAVENOUS at 18:19

## 2021-02-01 RX ADMIN — PROPOFOL INJECTABLE EMULSION 40 MCG/KG/MIN: 10 INJECTION, EMULSION INTRAVENOUS at 19:30

## 2021-02-01 RX ADMIN — DEXAMETHASONE SODIUM PHOSPHATE 8 MG: 4 INJECTION, SOLUTION INTRA-ARTICULAR; INTRALESIONAL; INTRAMUSCULAR; INTRAVENOUS; SOFT TISSUE at 13:55

## 2021-02-01 RX ADMIN — KETAMINE HYDROCHLORIDE 15 MG: 100 INJECTION, SOLUTION, CONCENTRATE INTRAMUSCULAR; INTRAVENOUS at 14:32

## 2021-02-01 RX ADMIN — ALPRAZOLAM 0.5 MG: 0.5 TABLET ORAL at 09:35

## 2021-02-01 RX ADMIN — SODIUM CHLORIDE, POTASSIUM CHLORIDE, SODIUM LACTATE AND CALCIUM CHLORIDE: 600; 310; 30; 20 INJECTION, SOLUTION INTRAVENOUS at 13:30

## 2021-02-01 RX ADMIN — ALBUTEROL SULFATE 2 PUFF: 108 AEROSOL, METERED RESPIRATORY (INHALATION) at 07:50

## 2021-02-01 RX ADMIN — METHYLPREDNISOLONE SODIUM SUCCINATE 125 MG: 125 INJECTION, POWDER, FOR SOLUTION INTRAMUSCULAR; INTRAVENOUS at 18:49

## 2021-02-01 RX ADMIN — PHENYLEPHRINE HYDROCHLORIDE 200 MCG: 10 INJECTION INTRAVENOUS at 13:57

## 2021-02-01 RX ADMIN — PHENYLEPHRINE HYDROCHLORIDE 300 MCG: 10 INJECTION INTRAVENOUS at 14:02

## 2021-02-01 RX ADMIN — MIDAZOLAM HYDROCHLORIDE 2 MG: 2 INJECTION, SOLUTION INTRAMUSCULAR; INTRAVENOUS at 14:09

## 2021-02-01 RX ADMIN — PHENYLEPHRINE HYDROCHLORIDE 200 MCG: 10 INJECTION INTRAVENOUS at 14:43

## 2021-02-01 RX ADMIN — EPINEPHRINE 0.01 MG: 0.1 INJECTION, SOLUTION ENDOTRACHEAL; INTRACARDIAC; INTRAVENOUS at 14:03

## 2021-02-01 RX ADMIN — PROPOFOL INJECTABLE EMULSION 40 MCG/KG/MIN: 10 INJECTION, EMULSION INTRAVENOUS at 21:54

## 2021-02-01 RX ADMIN — ONDANSETRON 4 MG: 2 INJECTION INTRAMUSCULAR; INTRAVENOUS at 13:58

## 2021-02-01 RX ADMIN — PHENYLEPHRINE HYDROCHLORIDE 300 MCG: 10 INJECTION INTRAVENOUS at 14:07

## 2021-02-01 RX ADMIN — BUDESONIDE 500 MCG: 0.5 INHALANT RESPIRATORY (INHALATION) at 19:57

## 2021-02-01 RX ADMIN — FAMOTIDINE 20 MG: 20 TABLET, FILM COATED ORAL at 09:35

## 2021-02-01 RX ADMIN — FENTANYL CITRATE 100 MCG: 50 INJECTION, SOLUTION INTRAMUSCULAR; INTRAVENOUS at 13:50

## 2021-02-01 RX ADMIN — MIDAZOLAM HYDROCHLORIDE 2 MG: 2 INJECTION, SOLUTION INTRAMUSCULAR; INTRAVENOUS at 13:49

## 2021-02-01 RX ADMIN — ROCURONIUM BROMIDE 50 MG: 10 SOLUTION INTRAVENOUS at 13:51

## 2021-02-01 RX ADMIN — Medication 10 ML: at 21:53

## 2021-02-01 RX ADMIN — DOXYCYCLINE HYCLATE 100 MG: 100 CAPSULE ORAL at 09:35

## 2021-02-01 NOTE — ED NOTES
I have paged Dr. Valery Redd about patients declining respiratory status. Respiratory is on the way to bedside.

## 2021-02-01 NOTE — ANESTHESIA POSTPROCEDURE EVALUATION
Procedure(s):  TRACHEOSTOMY. general    Anesthesia Post Evaluation      Multimodal analgesia: multimodal analgesia used between 6 hours prior to anesthesia start to PACU discharge  Patient location during evaluation: ICU  Post-procedure mental status: sedated. Pain score: 0  Airway patency: patent  Anesthetic complications: no  Cardiovascular status: acceptable, hemodynamically stable and blood pressure returned to baseline  Respiratory status: acceptable and ventilator  Hydration status: acceptable  Comments: This patient was transferred to the ICU by anesthesia personnel. The patient was handed off to the ICU nursing team.  All questions regarding pre-, intra-, and postoperative care were answered.   Post anesthesia nausea and vomiting:  controlled  Final Post Anesthesia Temperature Assessment:  Normothermia (36.0-37.5 degrees C)      INITIAL Post-op Vital signs:   Vitals Value Taken Time   BP 99/76 02/01/21 1527   Temp 98.1    Pulse 116 02/01/21 1527   Resp 16    SpO2 100 % 02/01/21 1527

## 2021-02-01 NOTE — CONSULTS
Consult  Pulmonary, Critical Care    Name: Campos Pathak MRN: [id number]   : 1970 Hospital: [facility]   Date: 2021  Admission date: 2021 Hospital Day: 2       Subjective/Interval History:   Seen in the emergency room with stridor respiratory distress poor air movement over the neck. CT of the neck shows compromised airway. He is using accessory muscles or respirations     Patient Active Problem List   Diagnosis Code    Throat cancer ([facility] [address] C14.0    COPD exacerbation ([facility] [facility] J44.1    Acute and chronic respiratory failure with hypoxia ([facility]) J96.21    Chronic pain due to neoplasm G89.3    Anxiety F41.9    Insomnia G47.00    Respiratory failure with hypoxia ([facility]) J96.91       IMPRESSION:   1. Acute hypercapnic respiratory failure  2. Acute hypoxic respiratory failure  3. Known laryngeal carcinoma no treatment started yet  4. Audible vocal cord paralysis  5. COPD exacerbation  6. Stridor secondary to airway compromise from laryngeal carcinoma  7. Malnutrition  8. Body mass index is 18.17 kg/m². 9.       RECOMMENDATIONS/PLAN:   1. Would consider transfer to [facility] emergently and if not keep in our ICU  2. We will ask Dr. Chinmay Dai to see for consideration of emergency tracheostomy  3. When he has his surgeries he probably would benefit from a PEG tube due to his malnourished state  4. We will give epinephrine nebulized and nebulized albuterol Atrovent  5. Give IV steroids to try and relieve any airway edema  6. Subjective/Initial History:   I have reviewed the flowsheet and previous days notes. Seen earlier today on rounds. I was asked by Lonnie Hastings MD to see Campos Pathak a 53 y.o.    male  in consultation for a chief complaint of acute hypercapnic respiratory failure with respiratory distress and laryngeal carcinoma      The patient is very limited in his history primarily due to shortness of breath and loss of voice          Patient PCP: Vida Steel, Pattie Cornejo NP  PMH:  has a past medical history of Chronic pain, COPD (chronic obstructive pulmonary disease) ([facility] [address] Psychiatric disorder, and Throat cancer ([facility] [address] PSH:   has a past surgical history that includes hx orthopaedic. FHX: family history includes Diabetes in his father, mother, and sister; Heart Disease in his daughter; Kidney Disease in his father. SHX:  reports that he quit smoking about 3 months ago. He has a 45.00 pack-year smoking history. He has never used smokeless tobacco. He reports previous alcohol use. He reports that he does not use drugs.     Systemic review very limited due to his difficulty speaking    No Known Allergies   MEDS:   Current Facility-Administered Medications   Medication    magic mouthwash (FIRST-MOUTHWASH BLM) oral suspension 5 mL    methylPREDNISolone (PF) (Solu-MEDROL) injection 125 mg    racEPINEPHrine (VAPONEFRIN) 2.25% nebulizer solution    albuterol-ipratropium (DUO-NEB) 2.5 MG-0.5 MG/3 ML    budesonide (PULMICORT) 500 mcg/2 ml nebulizer suspension    sodium chloride (NS) flush 5-40 mL    sodium chloride (NS) flush 5-40 mL    acetaminophen (TYLENOL) tablet 650 mg    Or    acetaminophen (TYLENOL) suppository 650 mg    polyethylene glycol (MIRALAX) packet 17 g    bisacodyL (DULCOLAX) tablet 5 mg    ondansetron (ZOFRAN ODT) tablet 4 mg    Or    ondansetron (ZOFRAN) injection 4 mg    famotidine (PEPCID) tablet 20 mg    hydrOXYzine HCL (ATARAX) tablet 50 mg    albuterol (PROVENTIL HFA, VENTOLIN HFA, PROAIR HFA) inhaler 2 Puff    fentaNYL (DURAGESIC) 50 mcg/hr patch 1 Patch    ALPRAZolam (XANAX) tablet 0.5 mg    traZODone (DESYREL) tablet 100 mg    albuterol-ipratropium (DUO-NEB) 2.5 MG-0.5 MG/3 ML    doxycycline (VIBRAMYCIN) capsule 100 mg     Current Outpatient Medications   Medication Sig    aluminum-magnesium hydroxide 200-200 mg/5 mL susp 5 mL, diphenhydrAMINE 12.5 mg/5 mL liqd 5 mL, lidocaine 2 % soln 5 mL 5 mL by Swish and Spit route two (2) times a day. Magic mouth wash   Maalox  Lidocaine 2% viscous   Diphenhydramine oral solution     Pharmacy to mix equal portions of ingredients to a total volume as indicated in the dispense amount.  HYDROCODONE-ACETAMINOPHEN PO Take  by mouth. 1 TSP QID    albuterol (PROVENTIL HFA, VENTOLIN HFA, PROAIR HFA) 90 mcg/actuation inhaler Take 3 Puffs by inhalation every eight (8) hours. Indications: chronic obstructive pulmonary disease    hydrOXYzine HCL (ATARAX) 50 mg tablet Take 1 Tab by mouth every eight to twelve (8-12) hours as needed for Anxiety or Sleep for up to 40 days. Indications: Pt has throat cancer and anxiety.         Current Facility-Administered Medications:     magic mouthwash (FIRST-MOUTHWASH BLM) oral suspension 5 mL, 5 mL, Oral, TIDAC, Osvaldo Crawford MD    methylPREDNISolone (PF) (Solu-MEDROL) injection 125 mg, 125 mg, IntraVENous, Q6H, Ishaan Palacio MD    racEPINEPHrine (VAPONEFRIN) 2.25% nebulizer solution, 0.5 mL, Nebulization, Q1H, Ishaan Palacio MD    albuterol-ipratropium (DUO-NEB) 2.5 MG-0.5 MG/3 ML, 3 mL, Nebulization, Q6H RT, Ishaan Palacio MD    budesonide (PULMICORT) 500 mcg/2 ml nebulizer suspension, 500 mcg, Nebulization, BID RT, Ishaan Palacio MD    sodium chloride (NS) flush 5-40 mL, 5-40 mL, IntraVENous, Q8H, Michelle Reyes NP, 10 mL at 02/01/21 8930    sodium chloride (NS) flush 5-40 mL, 5-40 mL, IntraVENous, PRN, Michelle Reyes NP  Dossie Joann  acetaminophen (TYLENOL) tablet 650 mg, 650 mg, Oral, Q6H PRN **OR** acetaminophen (TYLENOL) suppository 650 mg, 650 mg, Rectal, Q6H PRN, Michelle Reyes NP    polyethylene glycol (MIRALAX) packet 17 g, 17 g, Oral, DAILY PRN, Michelle Reyes NP    bisacodyL (DULCOLAX) tablet 5 mg, 5 mg, Oral, DAILY PRN, Michelle Reyes NP    ondansetron (ZOFRAN ODT) tablet 4 mg, 4 mg, Oral, Q6H PRN **OR** ondansetron (ZOFRAN) injection 4 mg, 4 mg, IntraVENous, Q6H PRN, Michelle Reyes NP    famotidine (PEPCID) tablet 20 mg, 20 mg, Oral, Q12H, Brook Manzano NP, 20 mg at 02/01/21 0935    hydrOXYzine HCL (ATARAX) tablet 50 mg, 50 mg, Oral, QID PRN, Brook Manzano NP    albuterol (PROVENTIL HFA, VENTOLIN HFA, PROAIR HFA) inhaler 2 Puff, 2 Puff, Inhalation, Q6H RT, Brook Manzano NP, 2 Puff at 02/01/21 0750    fentaNYL (DURAGESIC) 50 mcg/hr patch 1 Patch, 1 Patch, TransDERmal, Q72H, Brook Manzano NP, 1 Patch at 01/31/21 1500    ALPRAZolam Earlean Luz Marina) tablet 0.5 mg, 0.5 mg, Oral, TID, Brook Manzano, DAT, 0.5 mg at 02/01/21 0935    traZODone (DESYREL) tablet 100 mg, 100 mg, Oral, QHS, Brook Manzano NP, 100 mg at 01/31/21 2206    albuterol-ipratropium (DUO-NEB) 2.5 MG-0.5 MG/3 ML, 3 mL, Nebulization, Q4H PRN, Brook Manzano NP    doxycycline (VIBRAMYCIN) capsule 100 mg, 100 mg, Oral, Q12H, Brook Manzano NP, 100 mg at 02/01/21 5148    Current Outpatient Medications:     aluminum-magnesium hydroxide 200-200 mg/5 mL susp 5 mL, diphenhydrAMINE 12.5 mg/5 mL liqd 5 mL, lidocaine 2 % soln 5 mL, 5 mL by Swish and Spit route two (2) times a day. Magic mouth wash  Maalox Lidocaine 2% viscous  Diphenhydramine oral solution   Pharmacy to mix equal portions of ingredients to a total volume as indicated in the dispense amount., Disp: , Rfl:     HYDROCODONE-ACETAMINOPHEN PO, Take  by mouth. 1 TSP QID, Disp: , Rfl:     albuterol (PROVENTIL HFA, VENTOLIN HFA, PROAIR HFA) 90 mcg/actuation inhaler, Take 3 Puffs by inhalation every eight (8) hours. Indications: chronic obstructive pulmonary disease, Disp: 1 Inhaler, Rfl: 3    hydrOXYzine HCL (ATARAX) 50 mg tablet, Take 1 Tab by mouth every eight to twelve (8-12) hours as needed for Anxiety or Sleep for up to 40 days. Indications: Pt has throat cancer and anxiety. , Disp: 40 Tab, Rfl: 2      Objective:     Vital Signs: Telemetry:    normal sinus rhythm Intake/Output:   Visit Vitals  BP (!) 140/83   Pulse (!) 114   Temp 97.4 °F (36.3 °C)   Resp 20   Ht 6' (1.829 m)   Wt 60.8 kg (134 lb) SpO2 96%   BMI 18.17 kg/m²       Temp (24hrs), Av.8 °F (36.6 °C), Min:97.4 °F (36.3 °C), Max:98.1 °F (36.7 °C)        O2 Device: Room air O2 Flow Rate (L/min): 5 l/min(decreased 02 to 4lpm nc)         Body mass index is 18.17 kg/m². Wt Readings from Last 4 Encounters:   21 60.8 kg (134 lb)   21 60.7 kg (133 lb 13.1 oz)   21 60.8 kg (134 lb)   21 67.1 kg (148 lb)        No intake or output data in the 24 hours ending 21 1229    Last shift:      No intake/output data recorded. Last 3 shifts: No intake/output data recorded. Hemodynamics:    CO:    CI:    CVP:    SVR:   PAP Systolic:    PAP Diastolic:    PVR:    LJ15:       Ventilator Settings:      Mode Rate TV Press PEEP FiO2 PIP Min. Vent               50 %            Physical Exam:    General:  male; her to severe respiratory distress with stridor severely emaciated  HEENT: NCAT, not able to open up his mouth very wide mucosa looks moist  Eyes: anicteric; conjunctiva clear extraocular movements intact  Neck: Seems to have adenopathy around his trachea with stridorous noise no JVD  Chest: no deformity, muscle wasting  Cardiac: Tachycardic but regular no edema  Lungs: Stridor over the neck with diffuse wheezing extremely poor air movement  Abd: Soft nontender normal bowel sounds  Ext: no edema; no joint swelling;  No clubbing  : clear urine  Neuro: Awake alert marked voice compromise is able to move all 4 extremities  Psych-seems oriented but is inappropriately taking his pants off  Skin: warm, dry, no cyanosis;  Pulses: Pedal radial femoral popliteal pulses intact  Capillary: Normal capillary refill      Labs:    Recent Labs     21  0305 21  0900   WBC 14.0* 6.4   HGB 13.9 14.7    375     Recent Labs     21  0305 21  0900   * 135*   K 4.1 3.5   CL 94* 93*   CO2 37* 32   * 69   BUN 16 11   CREA 0.74 0.58*   CA 9.2 10.0     Recent Labs     21  0620 02/01/21  0450 02/01/21  0430   PH 7.32* 7.19* 7.10*   PCO2 73* 96* 121*   PO2 74* 93 99   HCO3 32* 29* 27*   FIO2  --  21.0 100.0       Imaging:  I have personally reviewed the patients radiographs and have reviewed the reports:    CXR Results  (Last 48 hours)               01/31/21 1232  XR CHEST PORT Final result    Impression:  Impression:   No acute findings. Narrative:  Study: XR CHEST PORT       Clinical indication: hypoxia       Comparison: Chest x-ray 1/28/2021. Findings:       No consolidative airspace disease, pleural effusion or pneumothorax. Cardiomediastinal contours are within normal limits. No pulmonary edema. No acute osseous abnormality identified. Results from Hospital Encounter encounter on 01/31/21   XR CHEST PORT    Narrative Study: XR CHEST PORT    Clinical indication: hypoxia    Comparison: Chest x-ray 1/28/2021. Findings:    No consolidative airspace disease, pleural effusion or pneumothorax. Cardiomediastinal contours are within normal limits. No pulmonary edema. No acute osseous abnormality identified. Impression Impression:  No acute findings. Results from Hospital Encounter encounter on 01/28/21   XR CHEST PORT    Narrative PROCEDURE: XR CHEST PORT    HISTORY:Short of breath    COMPARISON:Chest x-ray 24 January 2021      TECHNIQUE: AP portable chest    LIMITATIONS: None    TUBES/LINES: None    LUNG PARENCHYMA/PLEURA: There is some small areas of asymmetric density in the  right lower lung zone and both midlung zones. No well-defined infiltrate or  mass. TRACHEA/BRONCHI: Normal  PULMONARY VESSELS: Normal  HEART: Normal  MEDIASTINUM: Normal  BONE/SOFT TISSUES: No acute process    OTHER: None      Impression Minimal bilateral densities. Early pneumonia? .          Results from [place] encounter on 01/24/21   XR CHEST SNGL V    Narrative Chest, AP portable, 1249 hours. No comparisons.     The heart, mediastinum, and pulmonary vasculature appear within normal limits. No evidence of pulmonary edema, air space pneumonia, or pleural effusion. No  evidence of pneumothorax. Impression No acute process. Results from [place] encounter on 01/28/21   CT NECK SOFT TISSUE WO CONT    Narrative PROCEDURE: CT NECK SOFT TISSUE WO CONT    HISTORY:Throat cancer. Short of breath    COMPARISON:None    Department policy stipulates all CT scans at this facility are performed using  dose reduction optimization techniques as appropriate to the performed exam,  including the following: Automated exposure control, adjustments of the mA  and/or KVP according to the patient size, and the use of iterative  reconstruction technique. TECHNIQUE: Axial images through the neck with multiplanar reconstruction. No IV  contrast.  COMPARISON: None  LIMITATIONS: None  NASOPHARYNX: Normal  OROPHARYNX: Normal  HYPOPHARYNX: Normal  EPIGLOTTIS/ARYEPIGLOTTIC FOLDS: The epiglottis appears normal. At the level of  the area epiglottic folds and extending into the larynx there is a poorly  defined soft tissue mass associated with mucosa and submucosal tissues. The  process is bilateral extending posteriorly across the midline. Anteriorly there  is a very small component across the midline. There is a very asymmetric  narrowing of the airway at this level with a larger mass on the right. The  process extends down to and involves the lateral walls of the larynx including  both the true and false cords. Laryngeal ventricle is distorted on the right and  compressed on the left. LARYNX: In addition to the mass extending into the larynx described above there  is poorly defined increased soft tissue density around the larynx. Fat planes in  this area are not well defined. PARAPHARYNGEAL SPACE: The deep parapharyngeal spaces appear relatively clear.   RETROPHARYNGEAL/PREVERTEBRAL SOFT TISSUES: Normal  SALIVARY GLANDS: Normal  LYMPH NODES: There are poorly defined but the lymph nodes at the level of the  parapharyngeal spaces and extending inferiorly appear somewhat enlarged. No  necrotic nodes appreciated on this unenhanced scan  TRACHEA: Trachea appears normal below the level of the larynx  THYROID GLAND: Poorly defined  CAROTID ARTERIES: Limited evaluation without contrast  JUGULAR VEINS: Limited evaluation without contrast  CERVICAL SPINE/OSSEOUS STRUCTURES: Normal  SOFT TISSUES: Normal  OTHER: None      Impression Irregular soft tissue mass involving the aryepiglottic and the  larynx. There is significant narrowing of the airway at the level of the larynx  and the supraglottic region. Poorly defined cervical adenopathy is noted. Findings discussed with Dr. Kane uSarez at 5:52 AM         Discussion: Patient with known laryngeal carcinoma scheduled for total laryngectomy radical neck dissection next week has marked worsening respiratory status with stridor CT of the neck shows compromised airway. I am concerned that he may need an emergency tracheostomy. He either needs to be emergently transferred to [facility] or maintained in ICU with consideration of tracheostomy. For the time being we will give steroids nebulized epinephrine and bronchodilators  Have discussed with attending and ENT as well as nursing  Time of care 50 minutes         Thank you for allowing us to participate in the care of this patient.   We will follow along with you    Brayden Swartz MD

## 2021-02-01 NOTE — OP NOTES
Operative Note    Patient: Sheldon Smiley  YOB: 1970  MRN: [id number]    Date of Procedure: 2/1/2021     Pre-Op Diagnosis: Stridor, acute upper airway obstruction    Post-Op Diagnosis: Same as preoperative diagnosis. Procedure(s):  TRACHEOSTOMY, emergency  Direct laryngoscopy with biopsy    Surgeon(s):  Delisa Chaney MD    Surgical Assistant: Surg Asst-1: Ammy Crisostomo    Anesthesia: General     Estimated Blood Loss (mL):  Minimal    Complications: None    Specimens:   ID Type Source Tests Collected by Time Destination   1 : right vocal cords Preservative Vocal Cord(s)  Delisa Chaney MD 2/1/2021 1405 Pathology        Implants: * No implants in log *    Drains: * No LDAs found *    Findings: Bilateral vocal fold paralysis, no jay evidence of mucosal tumor    Indications: 62-year-old male presents with progressive hoarseness and stridor. Apparently was evaluated by outside otolaryngologist was scheduled for biopsy next week but came to the emergency room 1 day prior with shortness of breath, worsening sore throat and stridor. Upon my evaluation I deem it necessary for an emergency tracheostomy. Detailed Description of Procedure:     Patient was brought to the operating room from emergency department and placed in the elevated supine position on the OR table. Patient was kept awake with oxygen running into the mouth via nasal cannula. The patient's neck was then prepped and draped in the usual sterile fashion with Betadine. I injected the proposed incision site with 10 mL of 1% lidocaine with 1-100,000 parts epinephrine. A limited horizontal incision was made just below the cricoid cartilage. Blunt dissection is carried out in the midline  the strap muscles until the thyroid isthmus is encountered. Thyroid isthmus is dissected off the pretracheal fascia and gently retracted revealing the tracheal rings.   Additional local anesthetic was then infiltrated into the pretracheal fascia and transtracheal.  A tracheotomy window was then made using a 15 blade and curved Wolff scissors. The tracheotomy was dilated and a size 6 Shiley cuffed tracheostomy tube was inserted without any difficulty. It was then connected to the ventilator. Good position was confirmed with end-tidal capnography readings. The tracheostomy was then sutured into place with 2-0 silk suture. Surgicel is packed around the incision for hemostasis. Trach strap is used to secure around the neck. General anesthesia is now induced. The bed is rotated 90 degrees and we began the direct laryngoscopy. Upper teeth is protected with a guard and then the Dedo laryngoscope was advanced into the mouth and down to the larynx. There is some retained food products in the postcricoid and piriform region which is suctioned. Careful inspection of the supraglottis and glottis reveals no obvious tumor. Vocal cords are in the midline and opposed despite IV muscle relaxant. I am able to get the long suction through the cords but cannot pass the laryngoscope through. The right false cord felt somewhat more firm therefore I took some biopsies of the right false cord and true cord. These are sent for permanent section. I then removed the laryngoscope and the mouthguard. Patient was then transferred to the ICU in stable but critical condition.       Electronically Signed by Uli Go MD on 2/1/2021 at 2:20 PM

## 2021-02-01 NOTE — PROGRESS NOTES
Hospitalist Progress Note    Subjective:   Daily Progress Note: 2/1/2021 12:35 PM    Hospital Course:     Robert Harrington is a 53 y.o. male who admitted on 1/31/2021. PMH significant for throat cancer, emphysema, chronic pain and anxiety secondary to advanced age throat cancer. Patient has scheduled surgery in 8 days with ENT specialist at [facility]. He comes in to the emergency room with increased shortness of breath, insomnia, unmanaged throat pain and anxiety. He states he has not been able to eat in 2 days and cannot sleep. He is requesting IV medication for pain and anxiety. He has audible stridor, wheezing shortness of breath and hypoxia on room air sats are 84%. Admit for further management of acute hypoxic respiratory failure, COPD exacerbation, unmanaged pain and anxiety. Continue airway compromise ENT consulted for tracheostomy. Subjective:  Examination of patient at the bedside. He is resting comfortably. Continues to have audible wheezing and upper airway.   ENT at the bedside for bedside tracheostomy, will transfer to ICU    Current Facility-Administered Medications   Medication Dose Route Frequency    magic mouthwash (FIRST-MOUTHWASH BLM) oral suspension 5 mL  5 mL Oral TIDAC    methylPREDNISolone (PF) (Solu-MEDROL) injection 125 mg  125 mg IntraVENous Q6H    racEPINEPHrine (VAPONEFRIN) 2.25% nebulizer solution  0.5 mL Nebulization Q1H    albuterol-ipratropium (DUO-NEB) 2.5 MG-0.5 MG/3 ML  3 mL Nebulization Q6H RT    budesonide (PULMICORT) 500 mcg/2 ml nebulizer suspension  500 mcg Nebulization BID RT    sodium chloride (NS) flush 5-40 mL  5-40 mL IntraVENous Q8H    sodium chloride (NS) flush 5-40 mL  5-40 mL IntraVENous PRN    acetaminophen (TYLENOL) tablet 650 mg  650 mg Oral Q6H PRN    Or    acetaminophen (TYLENOL) suppository 650 mg  650 mg Rectal Q6H PRN    polyethylene glycol (MIRALAX) packet 17 g  17 g Oral DAILY PRN    bisacodyL (DULCOLAX) tablet 5 mg  5 mg Oral DAILY PRN  ondansetron (ZOFRAN ODT) tablet 4 mg  4 mg Oral Q6H PRN    Or    ondansetron (ZOFRAN) injection 4 mg  4 mg IntraVENous Q6H PRN    famotidine (PEPCID) tablet 20 mg  20 mg Oral Q12H    hydrOXYzine HCL (ATARAX) tablet 50 mg  50 mg Oral QID PRN    albuterol (PROVENTIL HFA, VENTOLIN HFA, PROAIR HFA) inhaler 2 Puff  2 Puff Inhalation Q6H RT    fentaNYL (DURAGESIC) 50 mcg/hr patch 1 Patch  1 Patch TransDERmal Q72H    ALPRAZolam (XANAX) tablet 0.5 mg  0.5 mg Oral TID    traZODone (DESYREL) tablet 100 mg  100 mg Oral QHS    albuterol-ipratropium (DUO-NEB) 2.5 MG-0.5 MG/3 ML  3 mL Nebulization Q4H PRN    doxycycline (VIBRAMYCIN) capsule 100 mg  100 mg Oral Q12H     Current Outpatient Medications   Medication Sig    aluminum-magnesium hydroxide 200-200 mg/5 mL susp 5 mL, diphenhydrAMINE 12.5 mg/5 mL liqd 5 mL, lidocaine 2 % soln 5 mL 5 mL by Swish and Spit route two (2) times a day. Magic mouth wash   Maalox  Lidocaine 2% viscous   Diphenhydramine oral solution     Pharmacy to mix equal portions of ingredients to a total volume as indicated in the dispense amount.  HYDROCODONE-ACETAMINOPHEN PO Take  by mouth. 1 TSP QID    albuterol (PROVENTIL HFA, VENTOLIN HFA, PROAIR HFA) 90 mcg/actuation inhaler Take 3 Puffs by inhalation every eight (8) hours. Indications: chronic obstructive pulmonary disease    hydrOXYzine HCL (ATARAX) 50 mg tablet Take 1 Tab by mouth every eight to twelve (8-12) hours as needed for Anxiety or Sleep for up to 40 days. Indications: Pt has throat cancer and anxiety. REVIEW OF SYSTEMS    Review of Systems   Constitutional: Positive for malaise/fatigue and weight loss. HENT: Positive for congestion and sore throat. Eyes: Negative. Respiratory: Positive for cough, shortness of breath and stridor. Cardiovascular: Negative. Gastrointestinal: Positive for nausea. Genitourinary: Negative. Musculoskeletal: Positive for myalgias. Neurological: Positive for weakness. Psychiatric/Behavioral: The patient is nervous/anxious. Objective:     Visit Vitals  BP (!) 140/83   Pulse (!) 114   Temp 97.4 °F (36.3 °C)   Resp 20   Ht 6' (1.829 m)   Wt 60.8 kg (134 lb)   SpO2 96%   BMI 18.17 kg/m²    O2 Flow Rate (L/min): 5 l/min(decreased 02 to 4lpm nc) O2 Device: Room air    Temp (24hrs), Av.8 °F (36.6 °C), Min:97.4 °F (36.3 °C), Max:98.1 °F (36.7 °C)      No intake/output data recorded. No intake/output data recorded. PHYSICAL EXAM:    Physical Exam  Constitutional:       Appearance: He is ill-appearing. HENT:      Nose: Congestion present. Cardiovascular:      Rate and Rhythm: Tachycardia present. Pulmonary:      Breath sounds: Stridor present. Abdominal:      General: Bowel sounds are normal.   Skin:     General: Skin is warm and dry. Neurological:      Motor: Weakness present. Data Review    Recent Results (from the past 24 hour(s))   SARS-COV-2    Collection Time: 21  1:00 PM   Result Value Ref Range    SARS-CoV-2 Nasopharyngeal     COVID-19 RAPID TEST    Collection Time: 21  1:00 PM   Result Value Ref Range    Specimen source Nasopharyngeal      COVID-19 rapid test Not Detected Not Detected     METABOLIC PANEL, BASIC    Collection Time: 21  3:05 AM   Result Value Ref Range    Sodium 135 (L) 136 - 145 mmol/L    Potassium 4.1 3.5 - 5.1 mmol/L    Chloride 94 (L) 97 - 108 mmol/L    CO2 37 (H) 21 - 32 mmol/L    Anion gap 4 (L) 5 - 15 mmol/L    Glucose 145 (H) 65 - 100 mg/dL    BUN 16 6 - 20 mg/dL    Creatinine 0.74 0.70 - 1.30 mg/dL    BUN/Creatinine ratio 22 (H) 12 - 20      GFR est AA >60 >60 ml/min/1.73m2    GFR est non-AA >60 >60 ml/min/1.73m2    Calcium 9.2 8.5 - 10.1 mg/dL   CBC W/O DIFF    Collection Time: 21  3:05 AM   Result Value Ref Range    WBC 14.0 (H) 4.1 - 11.1 K/uL    RBC 4.20 4. 10 - 5.70 M/uL    HGB 13.9 12.1 - 17.0 g/dL    HCT 42.1 36.6 - 50.3 %    .2 (H) 80.0 - 99.0 FL    MCH 33.1 26.0 - 34.0 PG    MCHC 33.0 30.0 - 36.5 g/dL    RDW 13.0 11.5 - 14.5 %    PLATELET 175 078 - 152 K/uL    MPV 10.0 8.9 - 12.9 FL   BLOOD GAS, ARTERIAL    Collection Time: 02/01/21  4:30 AM   Result Value Ref Range    pH 7.10 (LL) 7.35 - 7.45      PCO2 121 (H) 35 - 45 mmHg    PO2 99 75 - 100 mmHg    O2 SAT 95 (L) >95 %    BICARBONATE 27 (H) 22 - 26 mmol/L    BASE EXCESS 3.0 (H) 0 - 2 mmol/L    O2 METHOD PENDING     FIO2 100.0 %    Tidal volume PENDING     PRESSURE SUPPORT PENDING     EPAP/CPAP/PEEP 0      SITE Left Brachial      JEN'S TEST PASS      Critical value read back Helen Renner RN    BLOOD GAS, ARTERIAL    Collection Time: 02/01/21  4:50 AM   Result Value Ref Range    pH 7.19 (LL) 7.35 - 7.45      PCO2 96 (H) 35 - 45 mmHg    PO2 93 75 - 100 mmHg    O2 SAT 95 (L) >95 %    BICARBONATE 29 (H) 22 - 26 mmol/L    BASE EXCESS 4.8 (H) 0 - 2 mmol/L    O2 METHOD Room air      FIO2 21.0 %    SITE Right Radial      JEN'S TEST PASS     BLOOD GAS, ARTERIAL    Collection Time: 02/01/21  6:20 AM   Result Value Ref Range    pH 7.32 (L) 7.35 - 7.45      PCO2 73 (H) 35 - 45 mmHg    PO2 74 (L) 75 - 100 mmHg    O2 SAT 94 (L) >95 %    BICARBONATE 32 (H) 22 - 26 mmol/L    BASE EXCESS 8.2 (H) 0 - 2 mmol/L    O2 METHOD Nasal Cannula      O2 FLOW RATE 6 L/min    SITE Right Radial      JEN'S TEST PASS      Critical value read back Helen Renner RN        XR CHEST PORT   Final Result   Impression:   No acute findings. Active Problems:    Throat cancer ([facility] [address] (1/31/2021)      COPD exacerbation ([facility] [address] (1/31/2021)      Acute and chronic respiratory failure with hypoxia ([facility] [address] (1/31/2021)      Chronic pain due to neoplasm (1/31/2021)      Anxiety (1/31/2021)      Insomnia (1/31/2021)      Respiratory failure with hypoxia ([facility] [address] (1/31/2021)        Assessment/Plan:     1. COPD exacerbation:  2.   Acute on chronic hypoxic respiratory failure:  · Desaturating on room air 84%94% on 2 L  · We will start IV Solu-Medrol, duo nebs, doxycycline  · Oxygen as needed to maintain saturations greater than 92%  · Pulmonary consulted     3. Throat cancer:  · Scheduled for surgery by ENT in 8 days  · ENT consulted for tracheostomy     4. Chronic pain: Secondary to throat cancer  · Continue home medication Magic mouthwash hydrocodone  · Add fentanyl patch     5. Anxiety:  · Continue home medication as needed hydroxyzine  · Started Xanax 3 times daily     6. Insomnia:  · Add trazodone        DVT Prophylaxis: Heparin  Code Status: Full  POA/NOK:  ______________________________________________________________________________  Time spent in direct care including coordination of service, review of data and examination: > 35 minutes  Care Plan discussed with: Patient and RN and consult physician ENT and pulmonary. Transferred to ICU.  ______________________________________________________________________________    Chad Atwood NP    This is dictation was done by dragon, computer voice recognition software. Quite often unanticipated grammatical, syntax, homophones and other interpretive errors or inadvertently transcribed by the computer software. Please excuse errors that have escaped final proofreading. Thank you.

## 2021-02-01 NOTE — ANESTHESIA PREPROCEDURE EVALUATION
Relevant Problems   RESPIRATORY SYSTEM   (+) COPD exacerbation (HCC)      PERSONAL HX & FAMILY HX OF CANCER   (+) Throat cancer (HCC)       Anesthetic History   No history of anesthetic complications            Review of Systems / Medical History  Patient summary reviewed, nursing notes reviewed and pertinent labs reviewed    Pulmonary    COPD (exacerbation)            Comments: Impending respiratory failure   Neuro/Psych         Psychiatric history     Cardiovascular  Within defined limits                  Comments: Sinus rhythm with Premature atrial complexes   Septal infarct , age undetermined   Abnormal ECG   When compared with ECG of 24-JAN-2021 12:55,   Premature atrial complexes are now Present   Septal infarct is now Present   Confirmed by Reshma Wu ([id number]) on 1/29/2021 1:08:12 PM       Results for Sherly Marin (MRN [id number]) as of 2/1/2021 13:07    2/1/2021 06:20  pH: 7.32 (L)  PCO2: 73 (H)  PO2: 74 (L)  BICARBONATE: 32 (H)  O2 SAT: 94 (L)  BASE EXCESS: 8.2 (H)  SITE: Right Radial  JEN'S TEST: PASS  O2 FLOW RATE: 6  O2 METHOD: Nasal Cannula  Critical value read back: Brian Nunes RN     GI/Hepatic/Renal  Within defined limits              Endo/Other        Cancer (throat)    Comments: Chronic pain Other Findings   Comments: Results for CYNTHIA LEAVITT (MRN [id number]) as of 2/1/2021 13:07    2/1/2021 03:05  WBC: 14.0 (H)  RBC: 4.20  HGB: 13.9  HCT: 42.1  MCV: 100.2 (H)  MCH: 33.1  MCHC: 33.0  RDW: 13.0  PLATELET: 891  MPV: 87.0  Sodium: 135 (L)  Potassium: 4.1  Chloride: 94 (L)  CO2: 37 (H)  Anion gap: 4 (L)  Glucose: 145 (H)  BUN: 16  Creatinine: 0.74  BUN/Creatinine ratio: 22 (H)  Calcium: 9.2  GFR est non-AA: >60  GFR est AA: >60       CT neck 1/28/21:  IMPRESSION  Irregular soft tissue mass involving the aryepiglottic and the  larynx. There is significant narrowing of the airway at the level of the larynx  and the supraglottic region.   Poorly defined cervical adenopathy is noted.    Physical Exam    Airway  Mallampati: II  TM Distance: 4 - 6 cm  Neck ROM: normal range of motion   Mouth opening: Normal     Cardiovascular    Rhythm: regular  Rate: normal         Dental    Dentition: Poor dentition     Pulmonary  Breath sounds clear to auscultation               Abdominal  GI exam deferred       Other Findings            Anesthetic Plan    ASA: 4, emergent  Anesthesia type: general          Induction: Intravenous  Anesthetic plan and risks discussed with: Patient

## 2021-02-01 NOTE — ED NOTES
Pt moved into trauma room for increased respiratory distress and possible intubation. Pt was given 2mg Narcan IV and a fentanyl patch was removed from left bicep. Pt is now alert and oriented to self.  Will repeat an ABG in 1h.

## 2021-02-01 NOTE — CONSULTS
Otolaryngology-H[facility] Consult    Subjective:    Daniel Pineda   53 y.o.   1970     Chief complaint/reason for consult -stridor    History of present illness    I am asked to urgently see this 80-year-old male admitted 1 day ago with increasing shortness of breath, stridor, dysphagia. He carries a diagnosis of COPD. He is a former heavy smoker quit about 3 months ago. For my interview he reports he is scheduled for a biopsy with Dr. Mariana Sinclair at [facility] next week. It appears he has not had an actual tissue diagnosis of head neck cancer at this point. From my review of the record he has had some desaturations while in emergency department. He was seen by Dr. Mary King who called me to consider him for an urgent tracheostomy. Review of Systems  Review of Systems   Constitutional: Positive for malaise/fatigue and weight loss. Negative for chills and fever. HENT: Positive for sore throat. Negative for ear pain, hearing loss, nosebleeds and tinnitus. Eyes: Negative for blurred vision and double vision. Respiratory: Positive for cough, shortness of breath and stridor. Negative for sputum production. Cardiovascular: Negative for chest pain and palpitations. Gastrointestinal: Negative for heartburn, nausea and vomiting. Musculoskeletal: Negative for joint pain and neck pain. Skin: Negative. Neurological: Positive for weakness. Negative for dizziness, speech change and headaches. Endo/Heme/Allergies: Negative for environmental allergies. Does not bruise/bleed easily. Psychiatric/Behavioral: Negative for memory loss. The patient does not have insomnia.           Past Medical History:   Diagnosis Date    Chronic pain     THROAT, EARS, NECK R/T CANCER    COPD (chronic obstructive pulmonary disease) (HCC)     EMPHASEMA    Psychiatric disorder     ANXIETY R/T CANCER    Throat cancer (HCC)      Past Surgical History:   Procedure Laterality Date    HX ORTHOPAEDIC      LEFT ARM- \" PUT PLATE IN\" Family History   Problem Relation Age of Onset    Diabetes Mother     Diabetes Father     Kidney Disease Father     Diabetes Sister     Heart Disease Daughter     Anesth Problems Neg Hx      Social History     Tobacco Use    Smoking status: Former Smoker     Packs/day: 1.50     Years: 30.00     Pack years: 45.00     Quit date: 10/28/2020     Years since quittin.2    Smokeless tobacco: Never Used   Substance Use Topics    Alcohol use: Not Currently      Prior to Admission medications    Medication Sig Start Date End Date Taking? Authorizing Provider   aluminum-magnesium hydroxide 200-200 mg/5 mL susp 5 mL, diphenhydrAMINE 12.5 mg/5 mL liqd 5 mL, lidocaine 2 % soln 5 mL 5 mL by Swish and Spit route two (2) times a day. Magic mouth wash   Maalox  Lidocaine 2% viscous   Diphenhydramine oral solution     Pharmacy to mix equal portions of ingredients to a total volume as indicated in the dispense amount. Provider, Historical   HYDROCODONE-ACETAMINOPHEN PO Take  by mouth. 1 TSP QID    Provider, Historical   albuterol (PROVENTIL HFA, VENTOLIN HFA, PROAIR HFA) 90 mcg/actuation inhaler Take 3 Puffs by inhalation every eight (8) hours. Indications: chronic obstructive pulmonary disease 21   Axel Mckenna MD   hydrOXYzine HCL (ATARAX) 50 mg tablet Take 1 Tab by mouth every eight to twelve (8-12) hours as needed for Anxiety or Sleep for up to 40 days. Indications: Pt has throat cancer and anxiety. 1/24/21 3/5/21  Axel Mckenna MD        No Known Allergies      Objective:     Visit Vitals  BP (!) 140/83   Pulse (!) 114   Temp 97.4 °F (36.3 °C)   Resp 20   Ht 6' (1.829 m)   Wt 134 lb (60.8 kg)   SpO2 96%   BMI 18.17 kg/m²        Physical Exam  Vitals signs reviewed. Constitutional:       General: He is in acute distress. Appearance: He is cachectic. He is ill-appearing. Interventions: Nasal cannula in place. HENT:      Head: Normocephalic and atraumatic. Jaw:  There is normal jaw occlusion. No trismus, tenderness or malocclusion. Salivary Glands: Right salivary gland is not diffusely enlarged or tender. Left salivary gland is not diffusely enlarged or tender. Right Ear: Hearing, tympanic membrane, ear canal and external ear normal.      Left Ear: Hearing, tympanic membrane, ear canal and external ear normal.      Ears:      Forman exam findings: does not lateralize. Right Rinne: AC > BC. Left Rinne: AC > BC. Nose: No septal deviation, mucosal edema or rhinorrhea. Right Turbinates: Not enlarged, swollen or pale. Left Turbinates: Not enlarged, swollen or pale. Right Sinus: No maxillary sinus tenderness or frontal sinus tenderness. Left Sinus: No maxillary sinus tenderness or frontal sinus tenderness. Mouth/Throat:      Lips: Pink. Mouth: Mucous membranes are moist. No oral lesions. Dentition: Abnormal dentition. No gum lesions. Tongue: No lesions. Palate: No mass and lesions. Pharynx: Oropharynx is clear. Uvula midline. Tonsils: No tonsillar exudate. 0 on the right. 0 on the left. Eyes:      General: Vision grossly intact. Extraocular Movements: Extraocular movements intact. Right eye: No nystagmus. Left eye: No nystagmus. Pupils: Pupils are equal, round, and reactive to light. Neck:      Musculoskeletal: Normal range of motion. No edema or erythema. Thyroid: No thyroid mass, thyromegaly or thyroid tenderness. Trachea: No tracheal tenderness. Comments: Significant hoarseness  Cardiovascular:      Rate and Rhythm: Normal rate and regular rhythm. Pulmonary:      Effort: Tachypnea, accessory muscle usage, respiratory distress and retractions present. Breath sounds: Normal breath sounds. Stridor, decreased air movement and transmitted upper airway sounds present. No wheezing. Musculoskeletal: Normal range of motion. Lymphadenopathy:      Cervical: No cervical adenopathy.   Skin:     General: Skin is warm and dry.   Neurological:      General: No focal deficit present.      Mental Status: He is oriented to person, place, and time. He is lethargic.      Cranial Nerves: Cranial nerves are intact.   Psychiatric:         Mood and Affect: Mood normal.         Behavior: Behavior normal. Behavior is cooperative.       Procedure Note - Fiberoptic Laryngoscopy    The nostril is packed with lidocaine/oxymetazoline cottonball for several minutes for topical anesthesia.  After several minutes the packing is removed and fiberoptic scope is advanced.  Findings are as summarized.    Nose - normal turbinates, septum  Nasopharynx - normal eustachian tubes, no mucosal lesions  Oropharynx - normal tonsils, tongue base and posterior wall  Hypopharynx - normal pyriform sinus and edema post cricoid region  Larynx -fullness right aryepiglottic fold and arytenoid; difficult to visualize vocal cords, cannot see appreciable airway  Subglottis -not visualized        Recent Results (from the past 24 hour(s))   METABOLIC PANEL, BASIC    Collection Time: 02/01/21  3:05 AM   Result Value Ref Range    Sodium 135 (L) 136 - 145 mmol/L    Potassium 4.1 3.5 - 5.1 mmol/L    Chloride 94 (L) 97 - 108 mmol/L    CO2 37 (H) 21 - 32 mmol/L    Anion gap 4 (L) 5 - 15 mmol/L    Glucose 145 (H) 65 - 100 mg/dL    BUN 16 6 - 20 mg/dL    Creatinine 0.74 0.70 - 1.30 mg/dL    BUN/Creatinine ratio 22 (H) 12 - 20      GFR est AA >60 >60 ml/min/1.73m2    GFR est non-AA >60 >60 ml/min/1.73m2    Calcium 9.2 8.5 - 10.1 mg/dL   CBC W/O DIFF    Collection Time: 02/01/21  3:05 AM   Result Value Ref Range    WBC 14.0 (H) 4.1 - 11.1 K/uL    RBC 4.20 4.10 - 5.70 M/uL    HGB 13.9 12.1 - 17.0 g/dL    HCT 42.1 36.6 - 50.3 %    .2 (H) 80.0 - 99.0 FL    MCH 33.1 26.0 - 34.0 PG    MCHC 33.0 30.0 - 36.5 g/dL    RDW 13.0 11.5 - 14.5 %    PLATELET 333 150 - 400 K/uL    MPV 10.0 8.9 - 12.9 FL   BLOOD GAS, ARTERIAL    Collection Time: 02/01/21  4:30  AM   Result Value Ref Range    pH 7.10 (LL) 7.35 - 7.45      PCO2 121 (H) 35 - 45 mmHg    PO2 99 75 - 100 mmHg    O2 SAT 95 (L) >95 %    BICARBONATE 27 (H) 22 - 26 mmol/L    BASE EXCESS 3.0 (H) 0 - 2 mmol/L    O2 METHOD PENDING     FIO2 100.0 %    Tidal volume PENDING     PRESSURE SUPPORT PENDING     EPAP/CPAP/PEEP 0      SITE Left Brachial      JEN'S TEST PASS      Critical value read back Gia Ramirez RN    BLOOD GAS, ARTERIAL    Collection Time: 02/01/21  4:50 AM   Result Value Ref Range    pH 7.19 (LL) 7.35 - 7.45      PCO2 96 (H) 35 - 45 mmHg    PO2 93 75 - 100 mmHg    O2 SAT 95 (L) >95 %    BICARBONATE 29 (H) 22 - 26 mmol/L    BASE EXCESS 4.8 (H) 0 - 2 mmol/L    O2 METHOD Room air      FIO2 21.0 %    SITE Right Radial      JEN'S TEST PASS     BLOOD GAS, ARTERIAL    Collection Time: 02/01/21  6:20 AM   Result Value Ref Range    pH 7.32 (L) 7.35 - 7.45      PCO2 73 (H) 35 - 45 mmHg    PO2 74 (L) 75 - 100 mmHg    O2 SAT 94 (L) >95 %    BICARBONATE 32 (H) 22 - 26 mmol/L    BASE EXCESS 8.2 (H) 0 - 2 mmol/L    O2 METHOD Nasal Cannula      O2 FLOW RATE 6 L/min    SITE Right Radial      JEN'S TEST PASS      Critical value read back Gia Ramirez RN           I have personally reviewed all pertinent notes, labs, results, and imaging studies for this patient. 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 Written consent is obtained for tracheostomy under local anesthesia with direct laryngoscopy to follow. We will take him directly to the operating room      Thank you for this consult. Reilly Crain MD, [address] [facility]  [address] [place] [address] [address]  [facility] [phone]

## 2021-02-01 NOTE — ED NOTES
Patient was found to have an O2 sat of 86-88% with 6 L NC. Respiratory therapist Jenna Connell assisted with getting patient on venti mask, 50% 15L. Patient continued to try to rip it off, until realizing he could breathe better with it. Patient back to resting in bed, no further distress noted.

## 2021-02-01 NOTE — PROGRESS NOTES
PT consult received and acknowledged . Currently , pt off the floor - OR for urgent tracheostomy . PT will continue to monitor and reattempt for PT eval when medically appropriate .  Thanks

## 2021-02-01 NOTE — PROGRESS NOTES
Admission and skin assessment    Pt transferred from [address] [place] [address] via RN and anesthesiologist, Rita Nichols. Pt stable, trach site patent and intact    97.6  82/67--intubated and sedated- Bert and prop on board     SPo2 98% being bagged    RT arrives at bed side with Vent. Settings  F16, ac500, peep 5, 100%. 2PIV and liu initiated. Skin assessment preformed  Dime sized pink tear on right buttock, pelvic bone ridge. Pt restless, obtaining orders from provider for post-op pain management.        Yesika COUGHLIN

## 2021-02-02 ENCOUNTER — APPOINTMENT (OUTPATIENT)
Dept: GENERAL RADIOLOGY | Age: 51
DRG: 121 | End: 2021-02-02
Attending: INTERNAL MEDICINE
Payer: MEDICAID

## 2021-02-02 ENCOUNTER — APPOINTMENT (OUTPATIENT)
Dept: ENDOSCOPY | Age: 51
DRG: 121 | End: 2021-02-02
Attending: INTERNAL MEDICINE
Payer: MEDICAID

## 2021-02-02 LAB
ALBUMIN SERPL-MCNC: 2.6 G/DL (ref 3.5–5)
ANION GAP SERPL CALC-SCNC: 8 MMOL/L (ref 5–15)
ARTERIAL PATENCY WRIST A: ABNORMAL
BASE EXCESS BLDA CALC-SCNC: 10.2 MMOL/L (ref 0–2)
BASOPHILS # BLD: 0 K/UL (ref 0–0.1)
BASOPHILS NFR BLD: 0 % (ref 0–1)
BDY SITE: ABNORMAL
BUN SERPL-MCNC: 23 MG/DL (ref 6–20)
BUN/CREAT SERPL: 33 (ref 12–20)
CA-I BLD-MCNC: 9.2 MG/DL (ref 8.5–10.1)
CHLORIDE SERPL-SCNC: 95 MMOL/L (ref 97–108)
CO2 SERPL-SCNC: 32 MMOL/L (ref 21–32)
CREAT SERPL-MCNC: 0.7 MG/DL (ref 0.7–1.3)
DIFFERENTIAL METHOD BLD: ABNORMAL
EOSINOPHIL # BLD: 0 K/UL (ref 0–0.4)
EOSINOPHIL NFR BLD: 0 % (ref 0–7)
EPAP/CPAP/PEEP, PAPEEP: 5
ERYTHROCYTE [DISTWIDTH] IN BLOOD BY AUTOMATED COUNT: 12.9 % (ref 11.5–14.5)
FIO2 ON VENT: 80 %
GAS FLOW.O2 SETTING OXYMISER: 16 L/MIN
GLUCOSE SERPL-MCNC: 142 MG/DL (ref 65–100)
HCO3 BLDA-SCNC: 34 MMOL/L (ref 22–26)
HCT VFR BLD AUTO: 35.6 % (ref 36.6–50.3)
HGB BLD-MCNC: 11.7 G/DL (ref 12.1–17)
IMM GRANULOCYTES # BLD AUTO: 0 K/UL (ref 0–0.04)
IMM GRANULOCYTES NFR BLD AUTO: 0 % (ref 0–0.5)
LYMPHOCYTES # BLD: 0.3 K/UL (ref 0.8–3.5)
LYMPHOCYTES NFR BLD: 4 % (ref 12–49)
MAGNESIUM SERPL-MCNC: 2.1 MG/DL (ref 1.6–2.4)
MCH RBC QN AUTO: 32.1 PG (ref 26–34)
MCHC RBC AUTO-ENTMCNC: 32.9 G/DL (ref 30–36.5)
MCV RBC AUTO: 97.8 FL (ref 80–99)
MONOCYTES # BLD: 0.2 K/UL (ref 0–1)
MONOCYTES NFR BLD: 3 % (ref 5–13)
NEUTS SEG # BLD: 6.8 K/UL (ref 1.8–8)
NEUTS SEG NFR BLD: 93 % (ref 32–75)
PCO2 BLDA: 43 MMHG (ref 35–45)
PH BLDA: 7.52 [PH] (ref 7.35–7.45)
PHOSPHATE SERPL-MCNC: 3.8 MG/DL (ref 2.6–4.7)
PLATELET # BLD AUTO: 298 K/UL (ref 150–400)
PMV BLD AUTO: 10.3 FL (ref 8.9–12.9)
PO2 BLDA: 272 MMHG (ref 75–100)
POTASSIUM SERPL-SCNC: 3.9 MMOL/L (ref 3.5–5.1)
RBC # BLD AUTO: 3.64 M/UL (ref 4.1–5.7)
SAO2 % BLD: 100 %
SAO2% DEVICE SAO2% SENSOR NAME: ABNORMAL
SODIUM SERPL-SCNC: 135 MMOL/L (ref 136–145)
SPECIMEN SITE: ABNORMAL
VENTILATION MODE VENT: ABNORMAL
VT SETTING VENT: 500 ML
WBC # BLD AUTO: 7.4 K/UL (ref 4.1–11.1)

## 2021-02-02 PROCEDURE — 94003 VENT MGMT INPAT SUBQ DAY: CPT

## 2021-02-02 PROCEDURE — 99232 SBSQ HOSP IP/OBS MODERATE 35: CPT | Performed by: OTOLARYNGOLOGY

## 2021-02-02 PROCEDURE — 83735 ASSAY OF MAGNESIUM: CPT

## 2021-02-02 PROCEDURE — 36600 WITHDRAWAL OF ARTERIAL BLOOD: CPT

## 2021-02-02 PROCEDURE — 36415 COLL VENOUS BLD VENIPUNCTURE: CPT

## 2021-02-02 PROCEDURE — 74011000250 HC RX REV CODE- 250: Performed by: INTERNAL MEDICINE

## 2021-02-02 PROCEDURE — 77010033678 HC OXYGEN DAILY

## 2021-02-02 PROCEDURE — 97116 GAIT TRAINING THERAPY: CPT | Performed by: PHYSICAL THERAPIST

## 2021-02-02 PROCEDURE — 71045 X-RAY EXAM CHEST 1 VIEW: CPT

## 2021-02-02 PROCEDURE — 94640 AIRWAY INHALATION TREATMENT: CPT

## 2021-02-02 PROCEDURE — 74011000250 HC RX REV CODE- 250: Performed by: FAMILY MEDICINE

## 2021-02-02 PROCEDURE — 85025 COMPLETE CBC W/AUTO DIFF WBC: CPT

## 2021-02-02 PROCEDURE — 74011250636 HC RX REV CODE- 250/636: Performed by: INTERNAL MEDICINE

## 2021-02-02 PROCEDURE — 92522 EVALUATE SPEECH PRODUCTION: CPT

## 2021-02-02 PROCEDURE — 74011250636 HC RX REV CODE- 250/636: Performed by: FAMILY MEDICINE

## 2021-02-02 PROCEDURE — 74011000258 HC RX REV CODE- 258: Performed by: FAMILY MEDICINE

## 2021-02-02 PROCEDURE — 97161 PT EVAL LOW COMPLEX 20 MIN: CPT | Performed by: PHYSICAL THERAPIST

## 2021-02-02 PROCEDURE — 80069 RENAL FUNCTION PANEL: CPT

## 2021-02-02 PROCEDURE — 65610000006 HC RM INTENSIVE CARE

## 2021-02-02 PROCEDURE — 82803 BLOOD GASES ANY COMBINATION: CPT

## 2021-02-02 RX ORDER — MORPHINE SULFATE 2 MG/ML
2 INJECTION, SOLUTION INTRAMUSCULAR; INTRAVENOUS
Status: DISPENSED | OUTPATIENT
Start: 2021-02-02 | End: 2021-02-04

## 2021-02-02 RX ORDER — TRAMADOL HYDROCHLORIDE 50 MG/1
50 TABLET ORAL
Status: DISCONTINUED | OUTPATIENT
Start: 2021-02-02 | End: 2021-02-06 | Stop reason: SDUPTHER

## 2021-02-02 RX ORDER — GLYCOPYRROLATE 0.2 MG/ML
0.1 INJECTION INTRAMUSCULAR; INTRAVENOUS EVERY 6 HOURS
Status: DISCONTINUED | OUTPATIENT
Start: 2021-02-02 | End: 2021-02-03

## 2021-02-02 RX ADMIN — IPRATROPIUM BROMIDE AND ALBUTEROL SULFATE 3 ML: .5; 3 SOLUTION RESPIRATORY (INHALATION) at 07:39

## 2021-02-02 RX ADMIN — Medication 10 ML: at 14:00

## 2021-02-02 RX ADMIN — DEXMEDETOMIDINE HYDROCHLORIDE 0.4 MCG/KG/HR: 100 INJECTION, SOLUTION, CONCENTRATE INTRAVENOUS at 04:49

## 2021-02-02 RX ADMIN — IPRATROPIUM BROMIDE AND ALBUTEROL SULFATE 3 ML: .5; 3 SOLUTION RESPIRATORY (INHALATION) at 19:47

## 2021-02-02 RX ADMIN — PROPOFOL INJECTABLE EMULSION 40 MCG/KG/MIN: 10 INJECTION, EMULSION INTRAVENOUS at 03:28

## 2021-02-02 RX ADMIN — PROPOFOL INJECTABLE EMULSION 40 MCG/KG/MIN: 10 INJECTION, EMULSION INTRAVENOUS at 06:04

## 2021-02-02 RX ADMIN — Medication 10 ML: at 05:40

## 2021-02-02 RX ADMIN — IPRATROPIUM BROMIDE AND ALBUTEROL SULFATE 3 ML: .5; 3 SOLUTION RESPIRATORY (INHALATION) at 13:21

## 2021-02-02 RX ADMIN — BUDESONIDE 500 MCG: 0.5 INHALANT RESPIRATORY (INHALATION) at 19:47

## 2021-02-02 RX ADMIN — Medication 10 ML: at 20:51

## 2021-02-02 RX ADMIN — IPRATROPIUM BROMIDE AND ALBUTEROL SULFATE 3 ML: .5; 3 SOLUTION RESPIRATORY (INHALATION) at 01:47

## 2021-02-02 RX ADMIN — METHYLPREDNISOLONE SODIUM SUCCINATE 125 MG: 125 INJECTION, POWDER, FOR SOLUTION INTRAMUSCULAR; INTRAVENOUS at 05:40

## 2021-02-02 RX ADMIN — GLYCOPYRROLATE 0.1 MG: 0.2 INJECTION, SOLUTION INTRAMUSCULAR; INTRAVENOUS at 14:08

## 2021-02-02 RX ADMIN — GLYCOPYRROLATE 0.1 MG: 0.2 INJECTION, SOLUTION INTRAMUSCULAR; INTRAVENOUS at 20:50

## 2021-02-02 RX ADMIN — PROPOFOL INJECTABLE EMULSION 40 MCG/KG/MIN: 10 INJECTION, EMULSION INTRAVENOUS at 01:27

## 2021-02-02 RX ADMIN — BUDESONIDE 500 MCG: 0.5 INHALANT RESPIRATORY (INHALATION) at 07:39

## 2021-02-02 NOTE — PROGRESS NOTES
Problem: Mobility Impaired (Adult and Pediatric)  Goal: *Acute Goals and Plan of Care (Insert Text)  Description: Pt will be I with LE HEP in 7 days. Pt will perform bed mobility with IND in 7 days. Pt will perform transfers with IND in 7 days. Pt will amb 150 feet with LRAD safely with mod I in 7 days. Outcome: Not Met     Problem: Patient Education: Go to Patient Education Activity  Goal: Patient/Family Education  Outcome: Not Met     PHYSICAL THERAPY EVALUATION  Patient: Ted Ríos [de-identified]53 y.o. male)  Date: 2/2/2021  Primary Diagnosis: Acute and chronic respiratory failure with hypoxia ([address] [J96.21]  Throat cancer ([facility] [address] [C14.0]  COPD exacerbation ([address] [J44.1]  Procedure(s) (LRB):  TRACHEOSTOMY (N/A) 1 Day Post-Op   Precautions: trach    ASSESSMENT  53 yom who came to the hospital on 1/31/21 due to SOB, insomnia, unmanaged throat pain and anxiety. Pt is scheduled for surgery on 2/8/21 [facility]. PMHx: throat cancer, emphysema, chronic pain and anxiety secondary to advanced age throat cancer, COPD. Due to tracheotomy yesterday, it's difficult for pt to communicate. Often times he has to write down his answers. Pt lives in 2 story home with girlfriend with 13 HOLLY with B railing. Sleeps on second floor on couch. Does not use AD at [facility]. Girlfriend is home during the day and pt has a brother that lives close by. PT spoke with nursing prior to PT eval explaining to nursing that he will need updated PT orders for PT to see pt due to   tracheotomy. Nursing reported that they would put in updated PT order and that it was ok for PT to see pt at that time. Respiratory was in pt's room as PT entered, starting a breathing treatment for pt. Pt was able to go from semi-supine > long sit > sitting EOB at supervision. Sit to stand at [facility], ambulation to commode at [facility]. While on commode, pt sat there for a long time without having a BM.  Stand to sit from commode at [facility], ambulation back to bed at [facility], sit to semi-supine at SBA. Throughout PT eval, pt coughed up phlegm. Pt demos weakness and poor balance. He would benefit from acute PT to address his limitations. PT rec DC to [facility] PT at this time. Patient will benefit from skilled therapy intervention to address the above noted impairments. PLAN :  Recommendations and Planned Interventions: bed mobility training, transfer training, gait training, therapeutic exercises, neuromuscular re-education, and therapeutic activities      Frequency/Duration: Patient will be followed by physical therapy:  3 times a week to address goals. Recommendation for discharge: (in order for the patient to meet his/her long term goals)   PT    This discharge recommendation:  Has not yet been discussed the attending provider and/or case management    IF patient discharges home will need the following DME: none         SUBJECTIVE:   Patient stated i'm going to have surgery next week and I think I'm going to have complications.     OBJECTIVE DATA SUMMARY:   HISTORY:    Past Medical History:   Diagnosis Date    Chronic pain     THROAT, EARS, NECK R/T CANCER    COPD (chronic obstructive pulmonary disease) ([facility] [address]     EMPHASEMA    Psychiatric disorder     ANXIETY R/T CANCER    Throat cancer (HCC)      Past Surgical History:   Procedure Laterality Date    HX ORTHOPAEDIC      LEFT ARM- \" PUT PLATE IN\"       Personal factors and/or comorbidities impacting plan of care: see pt chart    Home Situation  Home Environment: Private residence  # Steps to Enter: 15  Rails to Enter: Yes  Hand Rails : Bilateral  One/Two Story Residence: Two story  Living Alone: No(girlfrind, at home 24 hours)  Support Systems: Family member(s)  Patient Expects to be Discharged to[de-identified] Private residence  Current DME Used/Available at Home: None    PLOF: Pt IND for ADLS/IADLS, IND with mobility prior to admission.      EXAMINATION/PRESENTATION/DECISION MAKING:   Critical Behavior:  Neurologic State: Pharmacologically induced (comment)  Orientation Level: Unable to verbalize  Cognition: Unable to assess (comment)       Range Of Motion:  AROM: Within functional limits                       Strength:    Strength: Generally decreased, functional         Functional Mobility:  Bed Mobility:     Supine to Sit: Stand-by assistance  Sit to Supine: Stand-by assistance  Scooting: Stand-by assistance  Transfers:  Sit to Stand: Contact guard assistance  Stand to Sit: Contact guard assistance                       Balance:   Sitting: Intact  Standing: Impaired  Standing - Static: Good  Standing - Dynamic : Fair  Ambulation/Gait Training:  Distance (ft): 5 Feet (ft)  Assistive Device: Gait belt  Ambulation - Level of Assistance: Contact guard assistance     Gait Description (WDL): Exceptions to WDL           Base of Support: Widened  Stance: Left increased;Right increased  Speed/Isabelle: Slow  Step Length: Left shortened;Right shortened  Swing Pattern: Left symmetrical;Right symmetrical           Functional Measure:    [address] [place] [id number] AM-PAC 6 Clicks         Basic Mobility Inpatient Short Form  How much difficulty does the patient currently have. .. Unable A Lot A Little None   1. Turning over in bed (including adjusting bedclothes, sheets and blankets)? [] 1   [] 2   [x] 3   [] 4   2. Sitting down on and standing up from a chair with arms ( e.g., wheelchair, bedside commode, etc.)   [] 1   [] 2   [x] 3   [] 4   3. Moving from lying on back to sitting on the side of the bed? [] 1   [] 2   [x] 3   [] 4          How much help from another person does the patient currently need. .. Total A Lot A Little None   4. Moving to and from a bed to a chair (including a wheelchair)? [] 1   [] 2   [x] 3   [] 4   5. Need to walk in hospital room? [] 1   [] 2   [x] 3   [] 4   6. Climbing 3-5 steps with a railing? [] 1   [] 2   [x] 3   [] 4   © 2007, Trustees of [address] [place] [address] [address] [id number], under license to [employer].  All rights reserved     Score:  Initial: ms Most Recent: X (Date: -- )   Interpretation of Tool:  Represents activities that are increasingly more difficult (i.e. Bed mobility, Transfers, Gait). Score 24 23 22-20 19-15 14-10 9-7 6   Modifier CH CI CJ CK CL CM CN          Physical Therapy Evaluation Charge Determination   History Examination Presentation Decision-Making   LOW Complexity : Zero comorbidities / personal factors that will impact the outcome / POC LOW Complexity : 1-2 Standardized tests and measures addressing body structure, function, activity limitation and / or participation in recreation  LOW Complexity : Stable, uncomplicated  Other Functional Measure AMPA 6 18      Based on the above components, the patient evaluation is determined to be of the following complexity level: LOW     Pain Rating:  Reported some pain, nursing notified     Activity Tolerance:   Fair  Please refer to the flowsheet for vital signs taken during this treatment. After treatment patient left in no apparent distress:   Supine in bed and Call bell within reach    COMMUNICATION/EDUCATION:   The patients plan of care was discussed with: Registered nurse. Patient/family agree to work toward stated goals and plan of care.     Thank you for this referral.  Nayely Woody   Time Calculation: 45 mins

## 2021-02-02 NOTE — PROGRESS NOTES
Otolaryngology-HNS Consult follow-up    Subjective:    Ted Ríos   53 y.o.   1970     Chief complaint/reason for consult -stridor    History of present illness    I am asked to urgently see this 81-year-old male admitted 1 day ago with increasing shortness of breath, stridor, dysphagia. He carries a diagnosis of COPD. He is a former heavy smoker quit about 3 months ago. For my interview he reports he is scheduled for a biopsy with Dr. Mere Yeager at [address] [place] [address] next week. It appears he has not had an actual tissue diagnosis of head neck cancer at this point. From my review of the record he has had some desaturations while in emergency department. He was seen by Dr. Virgie Boss who called me to consider him for an urgent tracheostomy. 2021     Postoperative day 1 from urgent tracheotomy yesterday. No events overnight, he remains on the ventilator and sedated this morning. Review of Systems  Unable to obtain as patient is sedated on the ventilator     Past Medical History:   Diagnosis Date    Chronic pain     THROAT, EARS, NECK R/T CANCER    COPD (chronic obstructive pulmonary disease) ([facility] [address]     EMPHASEMA    Psychiatric disorder     ANXIETY R/T CANCER    Throat cancer (HCC)      Past Surgical History:   Procedure Laterality Date    HX ORTHOPAEDIC      LEFT ARM- \" PUT PLATE IN\"      Family History   Problem Relation Age of Onset    Diabetes Mother     Diabetes Father     Kidney Disease Father     Diabetes Sister     Heart Disease Daughter     Anesth Problems Neg Hx      Social History     Tobacco Use    Smoking status: Former Smoker     Packs/day: 1.50     Years: 30.00     Pack years: 45.00     Quit date: 10/28/2020     Years since quittin.2    Smokeless tobacco: Never Used   Substance Use Topics    Alcohol use: Not Currently      Prior to Admission medications    Medication Sig Start Date End Date Taking?  Authorizing Provider   aluminum-magnesium hydroxide 200-200 mg/5 mL susp 5 mL, diphenhydrAMINE 12.5 mg/5 mL liqd 5 mL, lidocaine 2 % soln 5 mL 5 mL by Swish and Spit route two (2) times a day. Magic mouth wash   Maalox  Lidocaine 2% viscous   Diphenhydramine oral solution     Pharmacy to mix equal portions of ingredients to a total volume as indicated in the dispense amount. Provider, Historical   HYDROCODONE-ACETAMINOPHEN PO Take  by mouth. 1 TSP QID    Provider, Historical   albuterol (PROVENTIL HFA, VENTOLIN HFA, PROAIR HFA) 90 mcg/actuation inhaler Take 3 Puffs by inhalation every eight (8) hours. Indications: chronic obstructive pulmonary disease 1/24/21   Braxton Saldivar MD   hydrOXYzine HCL (ATARAX) 50 mg tablet Take 1 Tab by mouth every eight to twelve (8-12) hours as needed for Anxiety or Sleep for up to 40 days. Indications: Pt has throat cancer and anxiety. 1/24/21 3/5/21  Braxton Saldivar MD        No Known Allergies      Objective:     Visit Vitals  BP 92/69 (BP 1 Location: Right upper arm, BP Patient Position: At rest)   Pulse 88   Temp 97.5 °F (36.4 °C)   Resp 15   Ht 6' (1.829 m)   Wt 133 lb 6.1 oz (60.5 kg)   SpO2 100%   BMI 18.09 kg/m²        Physical Exam  Vitals signs reviewed. Constitutional:       General: He is sleeping. Appearance: He is cachectic. He is ill-appearing. Comments: On ventilator   HENT:      Head: Normocephalic and atraumatic. Jaw: There is normal jaw occlusion. No trismus, tenderness or malocclusion. Salivary Glands: Right salivary gland is not diffusely enlarged or tender. Left salivary gland is not diffusely enlarged or tender. Right Ear: Hearing, tympanic membrane, ear canal and external ear normal.      Left Ear: Hearing, tympanic membrane, ear canal and external ear normal.      Ears:      Forman exam findings: does not lateralize. Right Rinne: AC > BC. Left Rinne: AC > BC. Nose: No septal deviation, mucosal edema or rhinorrhea. Right Turbinates: Not enlarged, swollen or pale.       Left Turbinates: Not enlarged, swollen or pale. Right Sinus: No maxillary sinus tenderness or frontal sinus tenderness. Left Sinus: No maxillary sinus tenderness or frontal sinus tenderness. Mouth/Throat:      Lips: Pink. Mouth: Mucous membranes are moist. No oral lesions. Dentition: Abnormal dentition. No gum lesions. Tongue: No lesions. Palate: No mass and lesions. Pharynx: Oropharynx is clear. Uvula midline. Tonsils: No tonsillar exudate. 0 on the right. 0 on the left. Eyes:      General: Vision grossly intact. Extraocular Movements: Extraocular movements intact. Right eye: No nystagmus. Left eye: No nystagmus. Pupils: Pupils are equal, round, and reactive to light. Neck:      Musculoskeletal: No edema or erythema. Thyroid: No thyroid mass, thyromegaly or thyroid tenderness. Trachea: Tracheostomy present. Comments: Minimal secretions around trach  Cardiovascular:      Rate and Rhythm: Normal rate and regular rhythm. Pulmonary:      Breath sounds: Normal breath sounds. No wheezing. Musculoskeletal: Normal range of motion. Lymphadenopathy:      Cervical: No cervical adenopathy. Skin:     General: Skin is warm and dry.    Neurological:      Comments: Sedated             Recent Results (from the past 24 hour(s))   MRSA SCREEN - PCR (NASAL)    Collection Time: 02/01/21  4:00 PM   Result Value Ref Range    MRSA by PCR, Nasal Not Detected Not Detected     RENAL FUNCTION PANEL    Collection Time: 02/02/21  4:50 AM   Result Value Ref Range    Sodium 135 (L) 136 - 145 mmol/L    Potassium 3.9 3.5 - 5.1 mmol/L    Chloride 95 (L) 97 - 108 mmol/L    CO2 32 21 - 32 mmol/L    Anion gap 8 5 - 15 mmol/L    Glucose 142 (H) 65 - 100 mg/dL    BUN 23 (H) 6 - 20 mg/dL    Creatinine 0.70 0.70 - 1.30 mg/dL    BUN/Creatinine ratio 33 (H) 12 - 20      GFR est AA >60 >60 ml/min/1.73m2    GFR est non-AA >60 >60 ml/min/1.73m2    Calcium 9.2 8.5 - 10.1 mg/dL    Phosphorus 3.8 2.6 - 4.7 mg/dL    Albumin 2.6 (L) 3.5 - 5.0 g/dL   CBC WITH AUTOMATED DIFF    Collection Time: 02/02/21  4:50 AM   Result Value Ref Range    WBC 7.4 4.1 - 11.1 K/uL    RBC 3.64 (L) 4.10 - 5.70 M/uL    HGB 11.7 (L) 12.1 - 17.0 g/dL    HCT 35.6 (L) 36.6 - 50.3 %    MCV 97.8 80.0 - 99.0 FL    MCH 32.1 26.0 - 34.0 PG    MCHC 32.9 30.0 - 36.5 g/dL    RDW 12.9 11.5 - 14.5 %    PLATELET 824 313 - 461 K/uL    MPV 10.3 8.9 - 12.9 FL    NEUTROPHILS 93 (H) 32 - 75 %    LYMPHOCYTES 4 (L) 12 - 49 %    MONOCYTES 3 (L) 5 - 13 %    EOSINOPHILS 0 0 - 7 %    BASOPHILS 0 0 - 1 %    IMMATURE GRANULOCYTES 0 0.0 - 0.5 %    ABS. NEUTROPHILS 6.8 1.8 - 8.0 K/UL    ABS. LYMPHOCYTES 0.3 (L) 0.8 - 3.5 K/UL    ABS. MONOCYTES 0.2 0.0 - 1.0 K/UL    ABS. EOSINOPHILS 0.0 0.0 - 0.4 K/UL    ABS. BASOPHILS 0.0 0.0 - 0.1 K/UL    ABS. IMM. GRANS. 0.0 0.00 - 0.04 K/UL    DF AUTOMATED     MAGNESIUM    Collection Time: 02/02/21  4:50 AM   Result Value Ref Range    Magnesium 2.1 1.6 - 2.4 mg/dL   BLOOD GAS, ARTERIAL    Collection Time: 02/02/21  5:05 AM   Result Value Ref Range    pH 7.52 (H) 7.35 - 7.45      PCO2 43 35 - 45 mmHg    PO2 272 (H) 75 - 100 mmHg    O2  >95 %    BICARBONATE 34 (H) 22 - 26 mmol/L    BASE EXCESS 10.2 (H) 0 - 2 mmol/L    O2 METHOD VENT      FIO2 80.0 %    MODE Assist Control/Volume Control      Tidal volume 500      SET RATE 16      EPAP/CPAP/PEEP 5.0      Sample source Arterial      SITE Right Brachial      JEN'S TEST PASS          I have personally reviewed all pertinent notes, labs, results, and imaging studies for this patient. 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 Awaiting results on his biopsy. Upon my review of his CT scan he may have a subglottic tumor and is possible I will have to take him back to the OR for deeper biopsies later this week.     As up with Dr. Posey Just we should work to get him breathing on his own and onto trach vimal. We will consult SLP to try Passy-Blanca valve. I also spoke with hospitalist and we should move forward with PEG tube placement as he will likely need chemoradiation and is already malnutrition. I will continue to follow,    Reilly Alanis MD, [address] [facility]  [address] [place] [address] [address]  [facility] [phone]

## 2021-02-02 NOTE — PROGRESS NOTES
I spoke with Dr. Jose Alberto Swain regarding Mr. Chloe Tello. Updated him that the patient required an urgent tracheostomy. Patient has had work-up already done as an outpatient confirming squamous carcinoma of the larynx, stage IV, he is scheduled for a laryngectomy at [facility] on February 8. He did agree that a PEG tube preoperatively would help with his postoperative care. We discussed that patient's best option may be to have him be transferred to [facility] in order to have his surgery done as scheduled. I have communicated this with Dr. Te Bacon.

## 2021-02-02 NOTE — PROGRESS NOTES
SPEECH LANGUAGE PATHOLOGY EVALUATION  Patient: Maikol Fuentes  (83 y.o. )  Date: 2/2/2021  Primary Diagnosis: Sore throat    Precautions:  aspiration    ASSESSMENT :  Patient is s/p emergent trach post op day 1. Patient now on trach collar w/ shiley 6 cuffed trach. Patient w/ copious thick blood tinged secretions requiring frequent suctioning. Do not recommend cuff deflation. Patient is not appropriate for PMV trials at this time. Provided patient w/ dry erase board. Patient able to effectively communicate basic wants/needs through writing. Patient scheduled for laryngectomy on 2/8/2021 per Dr. Bib Silver w/ possible plans to tx to [facility]. Pre laryngectomy teaching provided. Patient declined swallow eval at this time s/t pain. PLAN :  Recommendations and Planned Interventions:  Cont use of AAC, will plan for PMV once medically appropriate. GI consult for PEG pending. Plan for swallow eval. Discussed w/ Dr. Gregory Tomlin concern for pain and ?magic mouthwash. Frequency/Duration: Patient will be followed by speech-language pathology daily to address goals. Discharge Recommendations: Outpatient     SUBJECTIVE:   Patient reports recent dx of throat ca w/ plans for laryngectomy next week. Patient reports significant pain w/ swallowing and difficulty breathing. OBJECTIVE:   Unable tolerate cuff deflation  Meets wants/needs w/ AAC. After treatment:   Patient left in no apparent distress in bed, Call bell within reach, and Nursing notified    COMMUNICATION/EDUCATION:   Patient was educated regarding his deficit(s) of dysphagia and aphonia as this relates to his diagnosis of throat ca and tracheostomy. Pre laryngectomy education provided. Theodore Pinto He demonstrated Good understanding as evidenced by engagement and appropriate questions. The patient's plan of care including recommendations, planned interventions, and recommended diet changes were discussed with: Registered nurse and Physician.      Patient/family have participated as able in goal setting and plan of care. Thank you for this referral.  Dominik Jackson M.Ed., M.S., CCC-SLP  Time Calculation: 42 mins    Problem: Communication Impaired (Adult)  Goal: *Acute Goals and Plan of Care (Insert Text)  Description: Patient will tolerate PMV w/ adequate airflow and meet basic wants/needs.    Participate in swallow eval.   Outcome: Initial

## 2021-02-02 NOTE — PROGRESS NOTES
Hospitalist Progress Note               Daily Progress Note: 2/2/2021      Subjective: The patient is seen for follow up. 53 y.o. male who admitted on 1/31/2021. PMH significant for throat cancer, emphysema, chronic pain and anxiety secondary to advanced age throat cancer. Patient has scheduled surgery in 8 days with ENT specialist at [facility]. He came in to the emergency room with increased shortness of breath, insomnia, unmanaged throat pain and anxiety. He states he has not been able to eat in 2 days and cannot sleep. He had audible stridor, wheezing shortness of breath and hypoxia on room air sats are 84%. Admit for further management of acute hypoxic respiratory failure, COPD exacerbation, unmanaged pain and anxiety.     He was seen by ENT yesterday and taken for urgent tracheostomy    He continues on mechanical ventilation this morning    Problem List:  Problem List as of 2/2/2021 Never Reviewed          Codes Class Noted - Resolved    Throat cancer ([facility] ICD-10-CM: C14.0  ICD-9-CM: 149.0  1/31/2021 - Present        COPD exacerbation ([facility] ICD-10-CM: J44.1  ICD-9-CM: 491.21  1/31/2021 - Present        Acute and chronic respiratory failure with hypoxia ([facility] ICD-10-CM: J96.21  ICD-9-CM: 518.84, 799.02  1/31/2021 - Present        Chronic pain due to neoplasm ICD-10-CM: G89.3  ICD-9-CM: 338.3  1/31/2021 - Present        Anxiety ICD-10-CM: F41.9  ICD-9-CM: 300.00  1/31/2021 - Present        Insomnia ICD-10-CM: G47.00  ICD-9-CM: 780.52  1/31/2021 - Present        Respiratory failure with hypoxia [facility]) ICD-10-CM: J96.91  ICD-9-CM: 518.81  1/31/2021 - Present              Medications reviewed  Current Facility-Administered Medications   Medication Dose Route Frequency    magic mouthwash (FIRST-MOUTHWASH BLM) oral suspension 5 mL  5 mL Oral TIDAC    methylPREDNISolone (PF) (Solu-MEDROL) injection 125 mg  125 mg IntraVENous Q6H    albuterol-ipratropium (DUO-NEB) 2.5 MG-0.5 MG/3 ML  3 mL Nebulization Q6H RT    budesonide (PULMICORT) 500 mcg/2 ml nebulizer suspension  500 mcg Nebulization BID RT    albuterol (PROVENTIL HFA, VENTOLIN HFA, PROAIR HFA) inhaler 2 Puff  2 Puff Inhalation Q6H PRN    morphine injection 2 mg  2 mg IntraVENous Q4H PRN    dexmedeTOMidine (PRECEDEX) 400 mcg in 0.9% sodium chloride (MBP/ADV) 100 mL MBP  0.1-1.5 mcg/kg/hr IntraVENous TITRATE    propofol (DIPRIVAN) 10 mg/mL infusion  0-50 mcg/kg/min IntraVENous TITRATE    sodium chloride (NS) flush 5-40 mL  5-40 mL IntraVENous Q8H    sodium chloride (NS) flush 5-40 mL  5-40 mL IntraVENous PRN    acetaminophen (TYLENOL) tablet 650 mg  650 mg Oral Q6H PRN    Or    acetaminophen (TYLENOL) suppository 650 mg  650 mg Rectal Q6H PRN    polyethylene glycol (MIRALAX) packet 17 g  17 g Oral DAILY PRN    bisacodyL (DULCOLAX) tablet 5 mg  5 mg Oral DAILY PRN    ondansetron (ZOFRAN ODT) tablet 4 mg  4 mg Oral Q6H PRN    Or    ondansetron (ZOFRAN) injection 4 mg  4 mg IntraVENous Q6H PRN    famotidine (PEPCID) tablet 20 mg  20 mg Oral Q12H    hydrOXYzine HCL (ATARAX) tablet 50 mg  50 mg Oral QID PRN    fentaNYL (DURAGESIC) 50 mcg/hr patch 1 Patch  1 Patch TransDERmal Q72H    ALPRAZolam (XANAX) tablet 0.5 mg  0.5 mg Oral TID    traZODone (DESYREL) tablet 100 mg  100 mg Oral QHS    albuterol-ipratropium (DUO-NEB) 2.5 MG-0.5 MG/3 ML  3 mL Nebulization Q4H PRN    doxycycline (VIBRAMYCIN) capsule 100 mg  100 mg Oral Q12H       Review of Systems:   Review of systems not obtained due to patient factors. Objective:   Physical Exam:     Visit Vitals  BP 92/69 (BP 1 Location: Right upper arm, BP Patient Position: At rest)   Pulse 88   Temp 97.5 °F (36.4 °C)   Resp 15   Ht 6' (1.829 m)   Wt 60.5 kg (133 lb 6.1 oz)   SpO2 100%   BMI 18.09 kg/m²    O2 Flow Rate (L/min): 4 l/min O2 Device: Tracheostomy, Ventilator    Temp (24hrs), Av.7 °F (36.5 °C), Min:96.3 °F (35.7 °C), Max:98.7 °F (37.1 °C)    No intake/output data recorded. 01/31 1901 - 02/02 0700  In: 9472 [I.V.:1158]  Out: 800 [Urine:750]    General:   Sedated, on the ventilator. Appears cachectic and malnourished   Lungs:   Clear to auscultation bilaterally. Chest wall:  No tenderness or deformity. Heart:  Regular rate and rhythm, S1, S2 normal, no murmur, click, rub or gallop. Abdomen:   Soft, non-tender. Bowel sounds normal. No masses,  No organomegaly. Extremities: Extremities normal, atraumatic, no cyanosis or edema. Pulses: 2+ and symmetric all extremities. Skin: Skin color, texture, turgor normal. No rashes or lesions   Neurologic: CNII-XII intact.    Sedated and unresponsive         Data Review:       Recent Days:  Recent Labs     02/02/21  0450 02/01/21  0305 01/31/21  0900   WBC 7.4 14.0* 6.4   HGB 11.7* 13.9 14.7   HCT 35.6* 42.1 43.2    333 375     Recent Labs     02/02/21  0450 02/01/21  0305 01/31/21  0900   * 135* 135*   K 3.9 4.1 3.5   CL 95* 94* 93*   CO2 32 37* 32   * 145* 69   BUN 23* 16 11   CREA 0.70 0.74 0.58*   CA 9.2 9.2 10.0   MG 2.1  --   --    PHOS 3.8  --   --    ALB 2.6*  --   --      Recent Labs     02/02/21  0505 02/01/21  0620 02/01/21  0450 02/01/21  0430   PH 7.52* 7.32* 7.19* 7.10*   PCO2 43 73* 96* 121*   PO2 272* 74* 93 99   HCO3 34* 32* 29* 27*   FIO2 80.0  --  21.0 100.0       24 Hour Results:  Recent Results (from the past 24 hour(s))   MRSA SCREEN - PCR (NASAL)    Collection Time: 02/01/21  4:00 PM   Result Value Ref Range    MRSA by PCR, Nasal Not Detected Not Detected     RENAL FUNCTION PANEL    Collection Time: 02/02/21  4:50 AM   Result Value Ref Range    Sodium 135 (L) 136 - 145 mmol/L    Potassium 3.9 3.5 - 5.1 mmol/L    Chloride 95 (L) 97 - 108 mmol/L    CO2 32 21 - 32 mmol/L    Anion gap 8 5 - 15 mmol/L    Glucose 142 (H) 65 - 100 mg/dL    BUN 23 (H) 6 - 20 mg/dL    Creatinine 0.70 0.70 - 1.30 mg/dL    BUN/Creatinine ratio 33 (H) 12 - 20      GFR est AA >60 >60 ml/min/1.73m2    GFR est non-AA >60 >60 ml/min/1.73m2    Calcium 9.2 8.5 - 10.1 mg/dL    Phosphorus 3.8 2.6 - 4.7 mg/dL    Albumin 2.6 (L) 3.5 - 5.0 g/dL   CBC WITH AUTOMATED DIFF    Collection Time: 02/02/21  4:50 AM   Result Value Ref Range    WBC 7.4 4.1 - 11.1 K/uL    RBC 3.64 (L) 4.10 - 5.70 M/uL    HGB 11.7 (L) 12.1 - 17.0 g/dL    HCT 35.6 (L) 36.6 - 50.3 %    MCV 97.8 80.0 - 99.0 FL    MCH 32.1 26.0 - 34.0 PG    MCHC 32.9 30.0 - 36.5 g/dL    RDW 12.9 11.5 - 14.5 %    PLATELET 677 974 - 921 K/uL    MPV 10.3 8.9 - 12.9 FL    NEUTROPHILS 93 (H) 32 - 75 %    LYMPHOCYTES 4 (L) 12 - 49 %    MONOCYTES 3 (L) 5 - 13 %    EOSINOPHILS 0 0 - 7 %    BASOPHILS 0 0 - 1 %    IMMATURE GRANULOCYTES 0 0.0 - 0.5 %    ABS. NEUTROPHILS 6.8 1.8 - 8.0 K/UL    ABS. LYMPHOCYTES 0.3 (L) 0.8 - 3.5 K/UL    ABS. MONOCYTES 0.2 0.0 - 1.0 K/UL    ABS. EOSINOPHILS 0.0 0.0 - 0.4 K/UL    ABS. BASOPHILS 0.0 0.0 - 0.1 K/UL    ABS. IMM. GRANS. 0.0 0.00 - 0.04 K/UL    DF AUTOMATED     MAGNESIUM    Collection Time: 02/02/21  4:50 AM   Result Value Ref Range    Magnesium 2.1 1.6 - 2.4 mg/dL   BLOOD GAS, ARTERIAL    Collection Time: 02/02/21  5:05 AM   Result Value Ref Range    pH 7.52 (H) 7.35 - 7.45      PCO2 43 35 - 45 mmHg    PO2 272 (H) 75 - 100 mmHg    O2  >95 %    BICARBONATE 34 (H) 22 - 26 mmol/L    BASE EXCESS 10.2 (H) 0 - 2 mmol/L    O2 METHOD VENT      FIO2 80.0 %    MODE Assist Control/Volume Control      Tidal volume 500      SET RATE 16      EPAP/CPAP/PEEP 5.0      Sample source Arterial      SITE Right Brachial      JEN'S TEST PASS         XR CHEST PORT   Final Result   No acute cardiopulmonary abnormality. .       XR CHEST PORT   Final Result   Impression:   No acute findings.            Assessment:  Stridor due to vocal fold paralysis versus tumor, status post urgent tracheostomy    Acute on chronic respiratory failure with hypoxia    Throat cancer    Acute exacerbation of COPD    Chronic pain syndrome, opioid dependent    Protein calorie malnutrition    Plan:  Wean mechanical ventilation as tolerated per pulmonology  Continue treatment for COPD    Consult GI for PEG tube    Care Plan discussed with: consultant    Total time spent with patient: 30 minutes.     Cassius Ray MD

## 2021-02-02 NOTE — PROGRESS NOTES
OT eval order received 1/31/21. Pt has since had decline in medical status required urgent tracheostomy, transferred to ICU, intubated and sedated. Not appropriate for skilled acute OT services at this time. OT eval orders will be discontinued and will need new OT eval order once pt medically stable for evaluation. Thank you.

## 2021-02-02 NOTE — PROGRESS NOTES
Consult  Pulmonary, Critical Care    Name: Alok Allen MRN: [id number]   : 1970 Hospital: [address] [place] [address]   Date: 2021  Admission date: 2021 Hospital Day: 3       Subjective/Interval History:   Seen in the emergency room with stridor respiratory distress poor air movement over the neck. CT of the neck shows compromised airway. He is using accessory muscles or respirations     Patient Active Problem List   Diagnosis Code    Throat cancer ([facility] C14.0    COPD exacerbation ([facility] J44.1    Acute and chronic respiratory failure with hypoxia ([facility]) J96.21    Chronic pain due to neoplasm G89.3    Anxiety F41.9    Insomnia G47.00    Respiratory failure with hypoxia ([facility]) J96.91       IMPRESSION:   1. Acute hypercapnic respiratory failure resolved on ventilator  2. Acute hypoxic respiratory failure resolved on ventilator  3. 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 Status post emergent tracheostomy  5.  vocal cord paralysis  6. COPD exacerbation no wheezing today we will discontinue steroids  7. Stridor secondary to airway compromise from laryngeal carcinoma status post tracheostomy  8. Malnutrition  Body mass index is 18.09 kg/m². 9.       RECOMMENDATIONS/PLAN:   1. We will discontinue sedation and remove the ventilator try trach collar  2. We will add increased dose of morphine for pain  3. Would suggest GI consult for consideration of PEG tube due to malnourished state  4. We will ask speech to evaluate for potential Passy-Blanca valve  5. Subjective/Initial History:   I have reviewed the flowsheet and previous days notes. Seen earlier today on rounds. I was asked by Ana Sahu MD to see Alok Allen a 53 y.o.    male  in consultation for a chief complaint of acute hypercapnic respiratory failure with respiratory distress and laryngeal carcinoma      The patient is very limited in his history primarily due to shortness of breath and loss of voice          Patient PCP: Luis Enrique Arboleda NP  PMH:  has a past medical history of Chronic pain, COPD (chronic obstructive pulmonary disease) ([facility] Psychiatric disorder, and Throat cancer ([facility] PSH:   has a past surgical history that includes hx orthopaedic. FHX: family history includes Diabetes in his father, mother, and sister; Heart Disease in his daughter; Kidney Disease in his father. SHX:  reports that he quit smoking about 3 months ago. He has a 45.00 pack-year smoking history. He has never used smokeless tobacco. He reports previous alcohol use. He reports that he does not use drugs.     Systemic review very limited due to his difficulty speaking    No Known Allergies   MEDS:   Current Facility-Administered Medications   Medication    morphine injection 2 mg    traMADoL (ULTRAM) tablet 50 mg    magic mouthwash (FIRST-MOUTHWASH BLM) oral suspension 5 mL    albuterol-ipratropium (DUO-NEB) 2.5 MG-0.5 MG/3 ML    budesonide (PULMICORT) 500 mcg/2 ml nebulizer suspension    sodium chloride (NS) flush 5-40 mL    sodium chloride (NS) flush 5-40 mL    acetaminophen (TYLENOL) tablet 650 mg    Or    acetaminophen (TYLENOL) suppository 650 mg    polyethylene glycol (MIRALAX) packet 17 g    bisacodyL (DULCOLAX) tablet 5 mg    ondansetron (ZOFRAN ODT) tablet 4 mg    Or    ondansetron (ZOFRAN) injection 4 mg    famotidine (PEPCID) tablet 20 mg    hydrOXYzine HCL (ATARAX) tablet 50 mg        Current Facility-Administered Medications:     morphine injection 2 mg, 2 mg, IntraVENous, Q2H PRN, Gonzalez Vang MD    traMADoL [place]) tablet 50 mg, 50 mg, Oral, Q6H PRN, Gonzalez Vang MD    magic mouthwash (FIRST-MOUTHWASH BLM) oral suspension 5 mL, 5 mL, Oral, TIDAC, Renetta Sanchez MD, Stopped at 02/01/21 1630    albuterol-ipratropium (DUO-NEB) 2.5 MG-0.5 MG/3 ML, 3 mL, Nebulization, Q6H RT, Gonzalez Vang MD, 3 mL at 02/02/21 0739    budesonide (PULMICORT) 500 mcg/2 ml nebulizer suspension, 500 mcg, Nebulization, BID RT, Naeem Encarnacion MD, 500 mcg at 21 7589    sodium chloride (NS) flush 5-40 mL, 5-40 mL, IntraVENous, Q8H, Zach Wolff NP, 10 mL at 21 0540    sodium chloride (NS) flush 5-40 mL, 5-40 mL, IntraVENous, PRN, DAT [place]  acetaminophen (TYLENOL) tablet 650 mg, 650 mg, Oral, Q6H PRN **OR** acetaminophen (TYLENOL) suppository 650 mg, 650 mg, Rectal, Q6H PRN, Zach Wolff NP    polyethylene glycol (MIRALAX) packet 17 g, 17 g, Oral, DAILY PRN, Zach Wolff NP    bisacodyL (DULCOLAX) tablet 5 mg, 5 mg, Oral, DAILY PRN, Zach Wolff NP    ondansetron (ZOFRAN ODT) tablet 4 mg, 4 mg, Oral, Q6H PRN **OR** ondansetron (ZOFRAN) injection 4 mg, 4 mg, IntraVENous, Q6H PRN, Zach Wolff NP    famotidine (PEPCID) tablet 20 mg, 20 mg, Oral, Q12H, Zach Wolff NP, Stopped at 21 2100    hydrOXYzine HCL (ATARAX) tablet 50 mg, 50 mg, Oral, QID PRN, Zach Wolff NP      Objective:     Vital Signs: Telemetry:    normal sinus rhythm Intake/Output:   Visit Vitals  BP 92/69 (BP 1 Location: Right upper arm, BP Patient Position: At rest)   Pulse 88   Temp 97.5 °F (36.4 °C)   Resp 15   Ht 6' (1.829 m)   Wt 60.5 kg (133 lb 6.1 oz)   SpO2 100%   BMI 18.09 kg/m²       Temp (24hrs), Av.7 °F (36.5 °C), Min:96.3 °F (35.7 °C), Max:98.7 °F (37.1 °C)        O2 Device: Tracheostomy, Ventilator O2 Flow Rate (L/min): 4 l/min         Body mass index is 18.09 kg/m². Wt Readings from Last 4 Encounters:   21 60.5 kg (133 lb 6.1 oz)   21 60.7 kg (133 lb 13.1 oz)   21 60.8 kg (134 lb)   21 67.1 kg (148 lb)          Intake/Output Summary (Last 24 hours) at 2021 0943  Last data filed at 2021 0500  Gross per 24 hour   Intake 1158 ml   Output 800 ml   Net 358 ml       Last shift:      No intake/output data recorded.   Last 3 shifts: 1901 -  0700  In: 5547 [I.V.:1158]  Out: 800 [Urine:750]       Hemodynamics:    CO:    CI: CVP:    SVR:   PAP Systolic:    PAP Diastolic:    PVR:    ME56:       Ventilator Settings:      Mode Rate TV Press PEEP FiO2 PIP Min. Vent   Assist control, Volume control    500 ml    5 cm H20 25 %  25 cm H2O  8.43 l/min      Physical Exam:    General:  male; sedated on propofol and Precedex severely emaciated  HEENT: NCAT, oral mucosa clear  Eyes: anicteric; conjunctiva clear some random eye movement when eyelids are open  Neck:  tracheostomy in place  Chest: no deformity, muscle wasting  Cardiac: Tachycardic but regular no edema  Lungs: Good breath sounds bilaterally and clear no wheezes no rales  Abd: Soft nontender normal bowel sounds  Ext: no edema; no joint swelling; No clubbing  : clear urine  Neuro: Sedated on propofol and Precedex unresponsive some random eye movement flaccid extremities  Psych-unable to assess  Skin: warm, dry, no cyanosis;  Pulses: Pedal radial femoral popliteal pulses intact  Capillary: Normal capillary refill      Labs:    Recent Labs     02/02/21  0450 02/01/21  0305 01/31/21  0900   WBC 7.4 14.0* 6.4   HGB 11.7* 13.9 14.7    333 375     Recent Labs     02/02/21  0450 02/01/21  0305 01/31/21  0900   * 135* 135*   K 3.9 4.1 3.5   CL 95* 94* 93*   CO2 32 37* 32   * 145* 69   BUN 23* 16 11   CREA 0.70 0.74 0.58*   CA 9.2 9.2 10.0   MG 2.1  --   --    PHOS 3.8  --   --    ALB 2.6*  --   --      Recent Labs     02/02/21  0505 02/01/21  0620 02/01/21  0450 02/01/21  0430   PH 7.52* 7.32* 7.19* 7.10*   PCO2 43 73* 96* 121*   PO2 272* 74* 93 99   HCO3 34* 32* 29* 27*   FIO2 80.0  --  21.0 100.0   2/2 assist control 14 tidal volume 500 PEEP 5 FiO2 25%    Imaging:  I have personally reviewed the patients radiographs and have reviewed the reports:    CXR Results  (Last 48 hours)               02/02/21 0259  XR CHEST PORT Final result    Impression:  No acute cardiopulmonary abnormality. .        Narrative:  HISTORY:  Acute respiratory failure TECHNIQUE:  AP chest radiograph       COMPARISON: 1/31/2021   LIMITATIONS: None       TUBES/LINES: Tracheostomy tube present with tip below thoracic inlet. LUNG PARENCHYMA: No convincing acute pulmonary parenchymal abnormality   TRACHEA/BRONCHI: Normal   PULMONARY VESSELS: Normal   PLEURA: Normal   HEART: Normal   AORTIC SHADOW:Normal.     MEDIASTINUM: Normal   BONE/SOFT TISSUES: No acute abnormality. OTHER: None           01/31/21 1232  XR CHEST PORT Final result    Impression:  Impression:   No acute findings. Narrative:  Study: XR CHEST PORT       Clinical indication: hypoxia       Comparison: Chest x-ray 1/28/2021. Findings:       No consolidative airspace disease, pleural effusion or pneumothorax. Cardiomediastinal contours are within normal limits. No pulmonary edema. No acute osseous abnormality identified. Results from Hospital Encounter encounter on 01/31/21   XR CHEST PORT    Narrative HISTORY:  Acute respiratory failure    TECHNIQUE:  AP chest radiograph    COMPARISON: 1/31/2021  LIMITATIONS: None    TUBES/LINES: Tracheostomy tube present with tip below thoracic inlet. LUNG PARENCHYMA: No convincing acute pulmonary parenchymal abnormality  TRACHEA/BRONCHI: Normal  PULMONARY VESSELS: Normal  PLEURA: Normal  HEART: Normal  AORTIC SHADOW:Normal.    MEDIASTINUM: Normal  BONE/SOFT TISSUES: No acute abnormality. OTHER: None      Impression No acute cardiopulmonary abnormality. .    XR CHEST PORT    Narrative Study: XR CHEST PORT    Clinical indication: hypoxia    Comparison: Chest x-ray 1/28/2021. Findings:    No consolidative airspace disease, pleural effusion or pneumothorax. Cardiomediastinal contours are within normal limits. No pulmonary edema. No acute osseous abnormality identified. Impression Impression:  No acute findings.    Results from [place] encounter on 01/28/21   XR CHEST PORT    Narrative PROCEDURE: XR CHEST PORT    HISTORY:Short of breath    COMPARISON:Chest x-ray 24 January 2021      TECHNIQUE: AP portable chest    LIMITATIONS: None    TUBES/LINES: None    LUNG PARENCHYMA/PLEURA: There is some small areas of asymmetric density in the  right lower lung zone and both midlung zones. No well-defined infiltrate or  mass. TRACHEA/BRONCHI: Normal  PULMONARY VESSELS: Normal  HEART: Normal  MEDIASTINUM: Normal  BONE/SOFT TISSUES: No acute process    OTHER: None      Impression Minimal bilateral densities. Early pneumonia? .            Results from [place] encounter on 01/28/21   CT NECK SOFT TISSUE WO CONT    Narrative PROCEDURE: CT NECK SOFT TISSUE WO CONT    HISTORY:Throat cancer. Short of breath    COMPARISON:None    Department policy stipulates all CT scans at this facility are performed using  dose reduction optimization techniques as appropriate to the performed exam,  including the following: Automated exposure control, adjustments of the mA  and/or KVP according to the patient size, and the use of iterative  reconstruction technique. TECHNIQUE: Axial images through the neck with multiplanar reconstruction. No IV  contrast.  COMPARISON: None  LIMITATIONS: None  NASOPHARYNX: Normal  OROPHARYNX: Normal  HYPOPHARYNX: Normal  EPIGLOTTIS/ARYEPIGLOTTIC FOLDS: The epiglottis appears normal. At the level of  the area epiglottic folds and extending into the larynx there is a poorly  defined soft tissue mass associated with mucosa and submucosal tissues. The  process is bilateral extending posteriorly across the midline. Anteriorly there  is a very small component across the midline. There is a very asymmetric  narrowing of the airway at this level with a larger mass on the right. The  process extends down to and involves the lateral walls of the larynx including  both the true and false cords. Laryngeal ventricle is distorted on the right and  compressed on the left.   LARYNX: In addition to the mass extending into the larynx described above there  is poorly defined increased soft tissue density around the larynx. Fat planes in  this area are not well defined. PARAPHARYNGEAL SPACE: The deep parapharyngeal spaces appear relatively clear. RETROPHARYNGEAL/PREVERTEBRAL SOFT TISSUES: Normal  SALIVARY GLANDS: Normal  LYMPH NODES: There are poorly defined but the lymph nodes at the level of the  parapharyngeal spaces and extending inferiorly appear somewhat enlarged. No  necrotic nodes appreciated on this unenhanced scan  TRACHEA: Trachea appears normal below the level of the larynx  THYROID GLAND: Poorly defined  CAROTID ARTERIES: Limited evaluation without contrast  JUGULAR VEINS: Limited evaluation without contrast  CERVICAL SPINE/OSSEOUS STRUCTURES: Normal  SOFT TISSUES: Normal  OTHER: None      Impression Irregular soft tissue mass involving the aryepiglottic and the  larynx. There is significant narrowing of the airway at the level of the larynx  and the supraglottic region. Poorly defined cervical adenopathy is noted. Findings discussed with Dr. Cirilo Torres at 5:52 AM         Discussion: 2/1 patient with known laryngeal carcinoma scheduled for total laryngectomy radical neck dissection next week has marked worsening respiratory status with stridor CT of the neck shows compromised airway. I am concerned that he may need an emergency tracheostomy. He either needs to be emergently transferred to [facility] or maintained in ICU with consideration of tracheostomy. For the time being we will give steroids nebulized epinephrine and bronchodilators  2/2 respirations nonlabored but on the ventilator will attempt to switch to trach collar and discontinue sedation. We will ask speech to evaluate for swallow as well as for Passy-Ambridge valve.   He would probably benefit from PEG tube placement for nutritional supplementation  Time of care 30 minutes         Thank you for allowing us to participate in the care of this patient.   We will follow along with you    Kamrny Haskins MD

## 2021-02-02 NOTE — PROGRESS NOTES
Called and spoke with Dr. Efe Avalos about BP running in the 83F systolic and MAPS in the 79H. MD OK as long as MAP greater than 65. Will cont to watch pt.

## 2021-02-02 NOTE — WOUND CARE
Wound Care Note: Wound Care into see patient because of possible skin tear on buttocks Skin Care & Pressure Relief Recommendations: 
Minimize layers of linen Pads under patient to optimize support surface and microclimate Turn/reposition approximately every 2 hours. Pillow Wedges Manage incontinence Promote continence; Skin Protective lotion to buttocks and sacrum daily and as needed with incontinence care Offload heels with pillows or offloading boots. Discussed above plan with patient MADYSON Elliott no evidence of skin tear. Skin to sacrum, coccyx and buttocks in tack. Pt moves self around pretty good in bed. Place mepilex lower on sacrum. Pt with very prominent coccyx. Will plan to place on waffle overlay, patient does reposition self. Not currently incontinent. PT and Speech has been ordered.

## 2021-02-02 NOTE — CONSULTS
Consult    Patient: Romel Khan MRN: [id number]  SSN: [id number]    YOB: 1970  Age: 53 y.o. Sex: male      Subjective:      Romel Khan is a 53 y.o. male who is being seen for difficulty swallow, malnutrition.   throat cancer.  Patient has scheduled surgery in 8 days with ENT specialist at [facility] came in to the emergency room with increased shortness of breath, insomnia, unmanaged throat pain and anxiety.  He states he has not been able to eat in 2 days, Has been tracheostomy venting breathing,  Past Medical History:   Diagnosis Date    Chronic pain     THROAT, EARS, NECK R/T CANCER    COPD (chronic obstructive pulmonary disease) ([facility] [address]     [facility]    Psychiatric disorder     ANXIETY R/T CANCER    Throat cancer ([facility] [address]      Past Surgical History:   Procedure Laterality Date    HX ORTHOPAEDIC      LEFT ARM- \" PUT PLATE IN\"      Family History   Problem Relation Age of Onset    Diabetes Mother     Diabetes Father     Kidney Disease Father     Diabetes Sister     Heart Disease Daughter     Anesth Problems Neg Hx      Social History     Tobacco Use    Smoking status: Former Smoker     Packs/day: 1.50     Years: 30.00     Pack years: 45.00     Quit date: 10/28/2020     Years since quittin.2    Smokeless tobacco: Never Used   Substance Use Topics    Alcohol use: Not Currently      Current Facility-Administered Medications   Medication Dose Route Frequency Provider Last Rate Last Admin    morphine injection 2 mg  2 mg IntraVENous Q2H PRN Lisa Cardenas MD        traMADoL Pamalee Novel) tablet 50 mg  50 mg Oral Q6H PRN Lisa Cardenas MD        glycopyrrolate (ROBINUL) injection 0.1 mg  0.1 mg IntraVENous Q6H Lisa Cardenas MD   0.1 mg at 21 1408    magic mouthwash (FIRST-MOUTHWASH BLM) oral suspension 5 mL  5 mL Oral Q4H PRN Lisa Cardenas MD        magic mouthwash [facility]) oral suspension 5 mL  5 mL Oral TIDAC Tanika Weber MD   Stopped at 21 1630    albuterol-ipratropium (DUO-NEB) 2.5 MG-0.5 MG/3 ML  3 mL Nebulization Q6H RT Debbie Ramirez MD   3 mL at 02/02/21 0739    budesonide (PULMICORT) 500 mcg/2 ml nebulizer suspension  500 mcg Nebulization BID RT Debbie Ramirez MD   500 mcg at 02/02/21 0739    sodium chloride (NS) flush 5-40 mL  5-40 mL IntraVENous Q8H Alicia Deng NP   10 mL at 02/02/21 1400    sodium chloride (NS) flush 5-40 mL  5-40 mL IntraVENous PRN Alicia Deng, NP        acetaminophen (TYLENOL) tablet 650 mg  650 mg Oral Q6H PRN Alicia Deng NP        Or   Dilli.Crespo acetaminophen (TYLENOL) suppository 650 mg  650 mg Rectal Q6H PRN Alicia Deng NP        polyethylene glycol (MIRALAX) packet 17 g  17 g Oral DAILY PRN Alicia Deng NP        bisacodyL (DULCOLAX) tablet 5 mg  5 mg Oral DAILY PRN Alicia Deng NP        ondansetron (ZOFRAN ODT) tablet 4 mg  4 mg Oral Q6H PRN Alicia Deng NP        Or    ondansetron [facility]) injection 4 mg  4 mg IntraVENous Q6H PRN Alicia Deng NP        famotidine (PEPCID) tablet 20 mg  20 mg Oral Q12H Alicia Deng NP   Stopped at 02/01/21 2100    hydrOXYzine HCL (ATARAX) tablet 50 mg  50 mg Oral QID PRN Alicia Deng NP            No Known Allergies    Review of Systems:  Review of Systems   Constitutional: Positive for malaise/fatigue and weight loss. HENT: Positive for sore throat. Eyes: Negative. Respiratory: Positive for cough, sputum production and shortness of breath. Cardiovascular: Positive for palpitations. Gastrointestinal: Positive for heartburn. Negative for abdominal pain, blood in stool and diarrhea. Genitourinary: Negative. Musculoskeletal: Positive for myalgias and neck pain. Skin: Negative. Neurological: Negative. Psychiatric/Behavioral: Negative.          Objective:     Vitals:    02/02/21 1309 02/02/21 1400 02/02/21 1500 02/02/21 1600   BP:  121/84  119/83   Pulse:  (!) 101  100   Resp:  14  15   Temp:   99.7 °F (37.6 °C)    SpO2: 100% 98%  98%   Weight:       Height:            Physical Exam:  Physical Exam   Constitutional: He appears cachectic. He is cooperative. He has a sickly appearance. He appears ill. He appears distressed. Cervical collar in place. HENT:   Head: Atraumatic. Neck: Decreased carotid pulses and hepatojugular reflux present. No tracheal tenderness present. Cardiovascular: An irregular rhythm present. Pulmonary/Chest: No accessory muscle usage. No apnea. No respiratory distress. Abdominal: Soft. He exhibits no distension, no fluid wave and no mass. Bowel sounds are increased. Lymphadenopathy:        Head (left side): No submandibular and no preauricular adenopathy present. Recent Results (from the past 24 hour(s))   RENAL FUNCTION PANEL    Collection Time: 02/02/21  4:50 AM   Result Value Ref Range    Sodium 135 (L) 136 - 145 mmol/L    Potassium 3.9 3.5 - 5.1 mmol/L    Chloride 95 (L) 97 - 108 mmol/L    CO2 32 21 - 32 mmol/L    Anion gap 8 5 - 15 mmol/L    Glucose 142 (H) 65 - 100 mg/dL    BUN 23 (H) 6 - 20 mg/dL    Creatinine 0.70 0.70 - 1.30 mg/dL    BUN/Creatinine ratio 33 (H) 12 - 20      GFR est AA >60 >60 ml/min/1.73m2    GFR est non-AA >60 >60 ml/min/1.73m2    Calcium 9.2 8.5 - 10.1 mg/dL    Phosphorus 3.8 2.6 - 4.7 mg/dL    Albumin 2.6 (L) 3.5 - 5.0 g/dL   CBC WITH AUTOMATED DIFF    Collection Time: 02/02/21  4:50 AM   Result Value Ref Range    WBC 7.4 4.1 - 11.1 K/uL    RBC 3.64 (L) 4.10 - 5.70 M/uL    HGB 11.7 (L) 12.1 - 17.0 g/dL    HCT 35.6 (L) 36.6 - 50.3 %    MCV 97.8 80.0 - 99.0 FL    MCH 32.1 26.0 - 34.0 PG    MCHC 32.9 30.0 - 36.5 g/dL    RDW 12.9 11.5 - 14.5 %    PLATELET 578 985 - 935 K/uL    MPV 10.3 8.9 - 12.9 FL    NEUTROPHILS 93 (H) 32 - 75 %    LYMPHOCYTES 4 (L) 12 - 49 %    MONOCYTES 3 (L) 5 - 13 %    EOSINOPHILS 0 0 - 7 %    BASOPHILS 0 0 - 1 %    IMMATURE GRANULOCYTES 0 0.0 - 0.5 %    ABS. NEUTROPHILS 6.8 1.8 - 8.0 K/UL    ABS.  LYMPHOCYTES 0.3 (L) 0.8 - 3.5 K/UL    ABS. MONOCYTES 0.2 0.0 - 1.0 K/UL    ABS. EOSINOPHILS 0.0 0.0 - 0.4 K/UL    ABS. BASOPHILS 0.0 0.0 - 0.1 K/UL    ABS. IMM. GRANS. 0.0 0.00 - 0.04 K/UL    DF AUTOMATED     MAGNESIUM    Collection Time: 02/02/21  4:50 AM   Result Value Ref Range    Magnesium 2.1 1.6 - 2.4 mg/dL   BLOOD GAS, ARTERIAL    Collection Time: 02/02/21  5:05 AM   Result Value Ref Range    pH 7.52 (H) 7.35 - 7.45      PCO2 43 35 - 45 mmHg    PO2 272 (H) 75 - 100 mmHg    O2  >95 %    BICARBONATE 34 (H) 22 - 26 mmol/L    BASE EXCESS 10.2 (H) 0 - 2 mmol/L    O2 METHOD VENT      FIO2 80.0 %    MODE Assist Control/Volume Control      Tidal volume 500      SET RATE 16      EPAP/CPAP/PEEP 5.0      Sample source Arterial      SITE Right Brachial      JEN'S TEST PASS          XR CHEST PORT   Final Result   No acute cardiopulmonary abnormality. .       XR CHEST PORT   Final Result   Impression:   No acute findings.          Assessment:     Hospital Problems  Never Reviewed          Codes Class Noted POA    Throat cancer ([facility] ICD-10-CM: C14.0  ICD-9-CM: 149.0  1/31/2021 Unknown        COPD exacerbation ([facility] ICD-10-CM: J44.1  ICD-9-CM: 491.21  1/31/2021 Unknown        Acute and chronic respiratory failure with hypoxia ([facility] ICD-10-CM: J96.21  ICD-9-CM: 518.84, 799.02  1/31/2021 Unknown        Chronic pain due to neoplasm ICD-10-CM: G89.3  ICD-9-CM: 338.3  1/31/2021 Unknown        Anxiety ICD-10-CM: F41.9  ICD-9-CM: 300.00  1/31/2021 Unknown        Insomnia ICD-10-CM: G47.00  ICD-9-CM: 780.52  1/31/2021 Unknown        Respiratory failure with hypoxia [facility]) ICD-10-CM: J96.91  ICD-9-CM: 518.81  1/31/2021 Unknown          severe malnutrition, difficulty swallow,  Throat cancer  Acute exacerbation of COPD         Plan:    Continue treatment for COPD  Antibiotic treatment as ordered,  Nutrition speech to follow, followed by ENT as well,  If he is determined respiratory stable, EGD /PEG tube placement will be performed in the morning.     Indications, risks, option, limitations discussed with patient, who appeared to be understanding the issue, in writing,    Signed By: Ivis Isaacs MD     February 2, 2021         Thank you for allowing me to participate in this patients care  Cc Referring Physician   Luis Enrique Arboleda NP

## 2021-02-02 NOTE — PROGRESS NOTES
Patient currently on ventilator. Writer attempted to contact patient's brother Donny Trivedi [phone] for DCP assessment. No answer, VM full, unable to leave message. CM will attempt again at a later time.

## 2021-02-02 NOTE — PROGRESS NOTES
Comprehensive Nutrition Assessment    Type and Reason for Visit: Initial(low BMI, NST)    Nutrition Recommendations/Plan:   As PEG placed, rec'd Jevity 1.5 at goal rate 55mL/hr, continuous  Add 125mL free water q4hr  Goal feeds providing 1980kcal, 84g protein, 1753mL fluid (>/= 100% est needs)    Document TF rate, water flushes, and GRVs in EMR    Nutrition Assessment:  C/o spasms and pain in throat 2/2 CA; apparently plan for biopsy 2/8, per ENT no official dx H&N CA. Noted hypoxia on RA. Admitted for COPD exacerbation and acute resp failure. CT neck showing compromised airway. ENT consulted for emergent trach. Trach placed, pt intubated via trach. Now off vent, on trach collar. Not requiring pressors. Per H&P, no PO x2 days pta. No OG/NG in place; noted per ENT, pt with poor nutrition status so plans for PEG placement d/t throat mass. Spoke with pt at bedside, using whiteboard to communicate- pt reported poor appetite and significant 30lb wt loss x3 months. Pt aware and agreeable of plan for PEG placement, expressed significant hunger. Per RN, still working on tx to [facility] for tx. Labs: Na 135, BUN 23, Cr 0.70, , lytes wnl. Meds: tramadol, solumedrol, dulcolax, miralax, pulmicort    Malnutrition Assessment:  Malnutrition Status:  Severe malnutrition    Context:  Chronic illness     Findings of the 6 clinical characteristics of malnutrition:   Energy Intake:  7 - 75% or less est energy requirements for 1 month or longer  Weight Loss:  7.0 - Greater than 7.5% over 3 months     Body Fat Loss:  1 - Mild body fat loss, Fat overlying ribs   Muscle Mass Loss:  1 - Mild muscle mass loss, Clavicles (pectoralis &deltoids), Temples (temporalis), Thigh (quadraceps)  Fluid Accumulation:  No significant fluid accumulation,      Estimated Daily Nutrient Needs:  Energy (kcal): 1950kcal (32kcal/kg); Weight Used for Energy Requirements: Current  Protein (g): 79g (1.3g/kg);  Weight Used for Protein Requirements: Current  Fluid (ml/day): 1500mL (25mL/kg); Method Used for Fluid Requirements: ml/kg      Nutrition Related Findings:  NFPE finding mild to moderate fat and muscle wasting. Pt denied n/v/d. No BM documented since admit- noted multiple bowel meds ordered. Per pt, significant constipation- reports no BM x3 weeks pta. Wounds:    None       Current Nutrition Therapies:  No diet orders on file    Anthropometric Measures:  · Height:  6' (182.9 cm)  · Current Body Wt:  60.5 kg (133 lb 6.1 oz)(1/31)   · Usual Body Wt:  70.3 kg (155 lb)(11/2020)     · Ideal Body Wt:  178 lbs:  74.9 %   · BMI Category:  Underweight (BMI less than 18.5)     Per pt stated UBW, wt loss of 10kg (14%BW) x3 months. Wt Readings from Last 3 Encounters:   02/02/21 60.5 kg (133 lb 6.1 oz)   01/28/21 60.8 kg (134 lb)       Nutrition Diagnosis:   · Inadequate protein-energy intake related to catabolic illness, swallowing difficulty as evidenced by weight loss 7.5% in 3 months, BMI    Nutrition Interventions:   Food and/or Nutrient Delivery: Continue NPO, Start tube feeding  Nutrition Education and Counseling: No recommendations at this time  Coordination of Nutrition Care: No recommendation at this time    Goals:  Initiate means of nutrition to meet >75% EENs  Maintain skin integrity       Nutrition Monitoring and Evaluation:   Behavioral-Environmental Outcomes: None identified  Food/Nutrient Intake Outcomes: Enteral nutrition intake/tolerance, Diet advancement/tolerance  Physical Signs/Symptoms Outcomes: None identified, Weight    Discharge Planning:     Too soon to determine     Electronically signed by Marlene Gordillo on 2/2/2021 at 10:08 AM    Contact:

## 2021-02-03 ENCOUNTER — ANESTHESIA EVENT (OUTPATIENT)
Dept: SURGERY | Age: 51
DRG: 098 | End: 2021-02-03
Payer: MEDICAID

## 2021-02-03 ENCOUNTER — ANESTHESIA EVENT (OUTPATIENT)
Dept: ENDOSCOPY | Age: 51
DRG: 121 | End: 2021-02-03
Payer: MEDICAID

## 2021-02-03 ENCOUNTER — APPOINTMENT (OUTPATIENT)
Dept: ENDOSCOPY | Age: 51
DRG: 121 | End: 2021-02-03
Attending: INTERNAL MEDICINE
Payer: MEDICAID

## 2021-02-03 ENCOUNTER — ANESTHESIA (OUTPATIENT)
Dept: ENDOSCOPY | Age: 51
DRG: 121 | End: 2021-02-03
Payer: MEDICAID

## 2021-02-03 LAB
BACTERIA SPEC CULT: NORMAL
GLUCOSE BLD STRIP.AUTO-MCNC: 84 MG/DL (ref 65–100)
PERFORMED BY, TECHID: NORMAL
SARS-COV-2, COV2: NORMAL
SPECIAL REQUESTS,SREQ: NORMAL

## 2021-02-03 PROCEDURE — 2709999900 HC NON-CHARGEABLE SUPPLY: Performed by: INTERNAL MEDICINE

## 2021-02-03 PROCEDURE — 92610 EVALUATE SWALLOWING FUNCTION: CPT

## 2021-02-03 PROCEDURE — 99232 SBSQ HOSP IP/OBS MODERATE 35: CPT | Performed by: OTOLARYNGOLOGY

## 2021-02-03 PROCEDURE — 0DH64UZ INSERTION OF FEEDING DEVICE INTO STOMACH, PERCUTANEOUS ENDOSCOPIC APPROACH: ICD-10-PCS | Performed by: INTERNAL MEDICINE

## 2021-02-03 PROCEDURE — 94640 AIRWAY INHALATION TREATMENT: CPT

## 2021-02-03 PROCEDURE — 77030005122 HC CATH GASTMY PEG BSC -B: Performed by: INTERNAL MEDICINE

## 2021-02-03 PROCEDURE — 65270000029 HC RM PRIVATE

## 2021-02-03 PROCEDURE — 92507 TX SP LANG VOICE COMM INDIV: CPT

## 2021-02-03 PROCEDURE — 76060000033 HC ANESTHESIA 1 TO 1.5 HR: Performed by: INTERNAL MEDICINE

## 2021-02-03 PROCEDURE — 92524 BEHAVRAL QUALIT ANALYS VOICE: CPT

## 2021-02-03 PROCEDURE — 74011000250 HC RX REV CODE- 250: Performed by: NURSE ANESTHETIST, CERTIFIED REGISTERED

## 2021-02-03 PROCEDURE — 82962 GLUCOSE BLOOD TEST: CPT

## 2021-02-03 PROCEDURE — 74011250636 HC RX REV CODE- 250/636: Performed by: NURSE ANESTHETIST, CERTIFIED REGISTERED

## 2021-02-03 PROCEDURE — 74011000250 HC RX REV CODE- 250: Performed by: INTERNAL MEDICINE

## 2021-02-03 PROCEDURE — 74011250636 HC RX REV CODE- 250/636: Performed by: INTERNAL MEDICINE

## 2021-02-03 PROCEDURE — U0003 INFECTIOUS AGENT DETECTION BY NUCLEIC ACID (DNA OR RNA); SEVERE ACUTE RESPIRATORY SYNDROME CORONAVIRUS 2 (SARS-COV-2) (CORONAVIRUS DISEASE [COVID-19]), AMPLIFIED PROBE TECHNIQUE, MAKING USE OF HIGH THROUGHPUT TECHNOLOGIES AS DESCRIBED BY CMS-2020-01-R: HCPCS

## 2021-02-03 PROCEDURE — 97530 THERAPEUTIC ACTIVITIES: CPT

## 2021-02-03 PROCEDURE — 76040000008: Performed by: INTERNAL MEDICINE

## 2021-02-03 PROCEDURE — 97165 OT EVAL LOW COMPLEX 30 MIN: CPT

## 2021-02-03 PROCEDURE — 77010033711 HC HIGH FLOW OXYGEN

## 2021-02-03 RX ORDER — PROPOFOL 10 MG/ML
INJECTION, EMULSION INTRAVENOUS AS NEEDED
Status: DISCONTINUED | OUTPATIENT
Start: 2021-02-03 | End: 2021-02-03 | Stop reason: HOSPADM

## 2021-02-03 RX ORDER — ETOMIDATE 2 MG/ML
INJECTION INTRAVENOUS AS NEEDED
Status: DISCONTINUED | OUTPATIENT
Start: 2021-02-03 | End: 2021-02-03 | Stop reason: HOSPADM

## 2021-02-03 RX ORDER — SODIUM CHLORIDE, SODIUM LACTATE, POTASSIUM CHLORIDE, CALCIUM CHLORIDE 600; 310; 30; 20 MG/100ML; MG/100ML; MG/100ML; MG/100ML
INJECTION, SOLUTION INTRAVENOUS
Status: DISCONTINUED | OUTPATIENT
Start: 2021-02-03 | End: 2021-02-03 | Stop reason: HOSPADM

## 2021-02-03 RX ORDER — LIDOCAINE HCL/PF 100 MG/5ML
SYRINGE (ML) INTRAVENOUS
Status: DISPENSED
Start: 2021-02-03 | End: 2021-02-04

## 2021-02-03 RX ORDER — ETOMIDATE 2 MG/ML
INJECTION INTRAVENOUS
Status: COMPLETED
Start: 2021-02-03 | End: 2021-02-03

## 2021-02-03 RX ORDER — PROPOFOL 10 MG/ML
INJECTION, EMULSION INTRAVENOUS
Status: COMPLETED
Start: 2021-02-03 | End: 2021-02-03

## 2021-02-03 RX ORDER — LORAZEPAM 2 MG/ML
0.5 INJECTION INTRAMUSCULAR
Status: DISCONTINUED | OUTPATIENT
Start: 2021-02-03 | End: 2021-02-04

## 2021-02-03 RX ORDER — NORETHINDRONE AND ETHINYL ESTRADIOL 0.5-0.035
KIT ORAL
Status: DISPENSED
Start: 2021-02-03 | End: 2021-02-04

## 2021-02-03 RX ORDER — GLYCOPYRROLATE 0.2 MG/ML
0.1 INJECTION INTRAMUSCULAR; INTRAVENOUS EVERY 8 HOURS
Status: DISCONTINUED | OUTPATIENT
Start: 2021-02-03 | End: 2021-02-04

## 2021-02-03 RX ORDER — LIDOCAINE HYDROCHLORIDE 20 MG/ML
INJECTION, SOLUTION EPIDURAL; INFILTRATION; INTRACAUDAL; PERINEURAL AS NEEDED
Status: DISCONTINUED | OUTPATIENT
Start: 2021-02-03 | End: 2021-02-03 | Stop reason: HOSPADM

## 2021-02-03 RX ADMIN — BUDESONIDE 500 MCG: 0.5 INHALANT RESPIRATORY (INHALATION) at 08:28

## 2021-02-03 RX ADMIN — ETOMIDATE 4 MG: 2 INJECTION INTRAVENOUS at 14:10

## 2021-02-03 RX ADMIN — GLYCOPYRROLATE 0.1 MG: 0.2 INJECTION, SOLUTION INTRAMUSCULAR; INTRAVENOUS at 16:09

## 2021-02-03 RX ADMIN — ETOMIDATE 6 MG: 2 INJECTION INTRAVENOUS at 14:08

## 2021-02-03 RX ADMIN — PROPOFOL 70 MG: 10 INJECTION, EMULSION INTRAVENOUS at 14:08

## 2021-02-03 RX ADMIN — SODIUM CHLORIDE, POTASSIUM CHLORIDE, SODIUM LACTATE AND CALCIUM CHLORIDE: 600; 310; 30; 20 INJECTION, SOLUTION INTRAVENOUS at 13:58

## 2021-02-03 RX ADMIN — GLYCOPYRROLATE 0.1 MG: 0.2 INJECTION, SOLUTION INTRAMUSCULAR; INTRAVENOUS at 21:07

## 2021-02-03 RX ADMIN — IPRATROPIUM BROMIDE AND ALBUTEROL SULFATE 3 ML: .5; 3 SOLUTION RESPIRATORY (INHALATION) at 01:47

## 2021-02-03 RX ADMIN — LIDOCAINE HYDROCHLORIDE 100 MG: 20 INJECTION, SOLUTION EPIDURAL; INFILTRATION; INTRACAUDAL; PERINEURAL at 14:06

## 2021-02-03 RX ADMIN — PHENYLEPHRINE HYDROCHLORIDE 100 MCG: 10 INJECTION INTRAVENOUS at 14:11

## 2021-02-03 RX ADMIN — PROPOFOL 30 MG: 10 INJECTION, EMULSION INTRAVENOUS at 14:10

## 2021-02-03 RX ADMIN — PROPOFOL 20 MG: 10 INJECTION, EMULSION INTRAVENOUS at 14:16

## 2021-02-03 RX ADMIN — PROPOFOL 30 MG: 10 INJECTION, EMULSION INTRAVENOUS at 14:13

## 2021-02-03 RX ADMIN — IPRATROPIUM BROMIDE AND ALBUTEROL SULFATE 3 ML: .5; 3 SOLUTION RESPIRATORY (INHALATION) at 07:33

## 2021-02-03 RX ADMIN — LORAZEPAM 0.5 MG: 2 INJECTION INTRAMUSCULAR; INTRAVENOUS at 09:39

## 2021-02-03 RX ADMIN — Medication 10 ML: at 16:10

## 2021-02-03 RX ADMIN — MORPHINE SULFATE 2 MG: 2 INJECTION, SOLUTION INTRAMUSCULAR; INTRAVENOUS at 21:07

## 2021-02-03 RX ADMIN — Medication 10 ML: at 03:54

## 2021-02-03 RX ADMIN — Medication 10 ML: at 21:08

## 2021-02-03 RX ADMIN — GLYCOPYRROLATE 0.1 MG: 0.2 INJECTION, SOLUTION INTRAMUSCULAR; INTRAVENOUS at 03:53

## 2021-02-03 NOTE — PROGRESS NOTES
OCCUPATIONAL THERAPY EVALUATION  Patient: Shayne Ceja (57 y.o. male)  Date: 2/3/2021  Primary Diagnosis: Acute and chronic respiratory failure with hypoxia ([facility] [address] [J96.21]  Throat cancer ([facility] [address] [C14.0]  COPD exacerbation ([facility] [address] [J44.1]  Procedure(s) (LRB):  PERCUTANEOUS ENDOSCOPIC GASTROSTOMY TUBE INSERTION (N/A)  ESOPHAGOGASTRODUODENOSCOPY (EGD) (N/A) Day of Surgery   Precautions: Fall risk       ASSESSMENT  Pt is a 54 y/o M with PMH significant for throat cancer, emphysema, chronic pain and anxiety secondary to advanced age throat cancer, COPD, presenting to [facility] 1/31/21 due to SOB, insomnia, unmanaged throat pain and anxiety. Per medical record, pt is scheduled for surgery on 2/8/21 with ENT at [facility]. Pt with limited vocal communication 2' to recent tracheotomy, however is able to mouth words and use pen/paper appropriately to answer questions as needed. Pt received semi-supine in bed upon arrival, AXO x4, and agreeable to OT evaluation at this time. Per pt, he lives in 2 story home with girlfriend with 13 HOLLY with B railing from exterior, 12 HOLLY B HR to second story bed/bath. Pt reports being IND with ADLs, driving, and ambulating without AD at OF. Also reports his girlfriend is home during the day and pt has a brother that lives close by. Based on current observations, pt presents with deficits in generalized strength/AROM, static/dynamic sitting balance, static/dynamic standing balance, and functional activity tolerance, impacting overall performance of ADLs and functional transfers/mobility. Pt currently setup/SBA for basic grooming (face washing) and donning of socks at bed level with increased time. He transfers supine>sit with SBA to EOB, denies any dizziness. Sit><stand completed to/from EOB with RW and SBA and pt able to side step to [facility] with continued SBA for safety, no LOB observed. Pt limited in functional ambulation 2' to lines/leads in ICU.  HR noted 116 at rest, up to 138 with standing today, O2 sats >95% throughout session. Overall, pt tolerates session fair today. Pt would benefit from continued skilled OT services during hospital stay and at next level of care to address current impairments and improve overall IND and safety with self cares and functional mobility upon d/c. Pt noted to have possible scheduled laryngectomy at [facility] 2/8 and is pending transfer. If pt were to d/c prior to transfer, OT currently recommending HHOT once medically appropriate. Other factors to consider for discharge: Family support, DME, time since onset, severity of deficits      Patient will benefit from skilled therapy intervention to address the above noted impairments. PLAN :  Recommendations and Planned Interventions: self care training, functional mobility training, therapeutic exercise, balance training, therapeutic activities, endurance activities, neuromuscular re-education, patient education, and home safety training    Frequency/Duration: Patient will be followed by occupational therapy 4 times a week to address goals. Recommendation for discharge: (in order for the patient to meet his/her long term goals)  Occupational therapy at least 2 days/week in the home     This discharge recommendation:  Has been made in collaboration with the attending provider and/or case management    IF patient discharges home will need the following DME: TBD       SUBJECTIVE:   Patient stated I haven't slept in 5 days.     OBJECTIVE DATA SUMMARY:   HISTORY:   Past Medical History:   Diagnosis Date    Chronic pain     THROAT, EARS, NECK R/T CANCER    COPD (chronic obstructive pulmonary disease) ([facility] [address]     EMPHASEMA    Psychiatric disorder     ANXIETY R/T CANCER    Throat cancer (HCC)      Past Surgical History:   Procedure Laterality Date    HX ORTHOPAEDIC      LEFT ARM- \" PUT PLATE IN\"       Expanded or extensive additional review of patient history:     Home Situation  Home Environment: Private residence  # Steps to Enter: 15  Rails to Enter: Yes  Hand Rails : Bilateral  One/Two Story Residence: Two story  # of Interior Steps: 12(Full flight)  Living Alone: No  Support Systems: Spouse/Significant Other/Partner(Girlfriend)  Patient Expects to be Discharged to[de-identified] Private residence  Current DME Used/Available at Home: None    Hand dominance: Right    EXAMINATION OF PERFORMANCE DEFICITS:  Cognitive/Behavioral Status:  Neurologic State: Alert  Orientation Level: Oriented X4  Cognition: Follows commands    Hearing: Auditory  Auditory Impairment: None    Vision/Perceptual:     WFL      Range of Motion:  AROM: Generally decreased, functional    Strength:  Strength: Generally decreased, functional    Coordination:     Fine Motor Skills-Upper: Left Intact; Right Intact    Gross Motor Skills-Upper: Left Intact; Right Intact    Tone & Sensation:  WFL    Balance:  Sitting: Intact  Standing: Impaired; With support  Standing - Static: Fair;Occasional  Standing - Dynamic : Fair;Occasional    Functional Mobility and Transfers for ADLs:  Bed Mobility:  Rolling: Stand-by assistance  Supine to Sit: Stand-by assistance  Sit to Supine: Stand-by assistance  Scooting: Stand-by assistance    Transfers:  Sit to Stand: Contact guard assistance  Stand to Sit: Stand-by assistance    ADL Intervention and task modifications:  Grooming  Grooming Assistance: Set-up; Stand-by assistance  Position Performed: Long sitting on bed  Washing Face: Set-up; Stand-by assistance    Lower Body Dressing Assistance  Socks: Stand-by assistance(Additional time)  Position Performed: Long sitting on bed    Therapeutic Exercise:  Pt educated on UE HEP to complete throughout the day to improve ROM and strength with good understanding verbalized and demonstrated.      Functional Measure:    [address] [facility] [address] [address] AM-PACTM \"6 Clicks\"                                                       Daily Activity Inpatient Short Form  How much help from another person does the patient currently need... Total; A Lot A Little None   1. Putting on and taking off regular lower body clothing? []  1 []  2 [x]  3 []  4   2. Bathing (including washing, rinsing, drying)? []  1 []  2 [x]  3 []  4   3. Toileting, which includes using toilet, bedpan or urinal? [] 1 []  2 [x]  3 []  4   4. Putting on and taking off regular upper body clothing? []  1 []  2 [x]  3 []  4   5. Taking care of personal grooming such as brushing teeth? []  1 []  2 [x]  3 []  4   6. Eating meals? []  1 []  2 [x]  3 []  4   © , Trustees of [facility] [employer], under license to [employer]. All rights reserved     Score: 24     Interpretation of Tool:  Represents clinically-significant functional categories (i.e. Activities of daily living). Percentage of Impairment CH    0%   CI    1-19% CJ    20-39% CK    40-59% CL    60-79% CM    80-99% CN     100%   [facility]  Score 6-24 24 23 20-22 15-19 10-14 7-9 6        Occupational Therapy Evaluation Charge Determination   History Examination Decision-Making   LOW Complexity : Brief history review  MEDIUM Complexity : 3-5 performance deficits relating to physical, cognitive , or psychosocial skils that result in activity limitations and / or participation restrictions MEDIUM Complexity : Patient may present with comorbidities that affect occupational performnce. Miniml to moderate modification of tasks or assistance (eg, physical or verbal ) with assesment(s) is necessary to enable patient to complete evaluation       Based on the above components, the patient evaluation is determined to be of the following complexity level: LOW   Pain Ratin/10    Activity Tolerance:   Fair  Please refer to the flowsheet for vital signs taken during this treatment. After treatment patient left in no apparent distress:    Supine in bed, Call bell within reach, and Side rails x 3    COMMUNICATION/EDUCATION:   The patients plan of care was discussed with: Registered nurse.      Patient/family have participated as able in goal setting and plan of care. and Patient/family agree to work toward stated goals and plan of care. This patients plan of care is appropriate for delegation to \[facility] [place] [facility]\"".     Thank you for this referral.  Neo Wangshaw  Time Calculation: 37 mins    Problem: Self Care Deficits Care Plan (Adult)  Goal: *Acute Goals and Plan of Care (Insert Text)  Description: Pt will be Mod I sup <> sit in prep for EOB ADLs  Pt will be Mod I grooming standing sink side LRAD  Pt will be Mod I LE dressing sitting EOB/long sit  Pt will be Mod I sit <>  prep for toileting LRAD  Pt will be Mod I toileting/toilet transfer/cloth mgmt LRAD  Pt will be IND following UE HEP in prep for self care tasks  Outcome: Not Met

## 2021-02-03 NOTE — PROGRESS NOTES
Chart reviewed. Patient scheduled to have laryngectomy at [facility] 2/8. Plans for patient to transfer to [facility]. CM will continue to follow.

## 2021-02-03 NOTE — ANESTHESIA PREPROCEDURE EVALUATION
Relevant Problems   RESPIRATORY SYSTEM   (+) COPD exacerbation (HCC)      PERSONAL HX & FAMILY HX OF CANCER   (+) Throat cancer (HCC)       Anesthetic History   No history of anesthetic complications            Review of Systems / Medical History  Patient summary reviewed, nursing notes reviewed and pertinent labs reviewed    Pulmonary    COPD (exacerbation)            Comments: S/p trach 2/1/2021   Neuro/Psych         Psychiatric history    Comments: weakness Cardiovascular  Within defined limits                  Comments: Sinus rhythm with Premature atrial complexes   Septal infarct , age undetermined   Abnormal ECG   When compared with ECG of 24-JAN-2021 12:55,   Premature atrial complexes are now Present   Septal infarct is now Present   Confirmed by Kwan Nelson ([id number]) on 1/29/2021 1:08:12 PM       Results for Ivana Lujan (MRN [id number]) as of 2/1/2021 13:07    2/1/2021 06:20  pH: 7.32 (L)  PCO2: 73 (H)  PO2: 74 (L)  BICARBONATE: 32 (H)  O2 SAT: 94 (L)  BASE EXCESS: 8.2 (H)  SITE: Right Radial  JEN'S TEST: PASS  O2 FLOW RATE: 6  O2 METHOD: Nasal Cannula  Critical value read back: Ryan Lechuga RN     GI/Hepatic/Renal  Within defined limits             Comments: Weight loss Endo/Other        Cancer (throat)    Comments: Chronic pain Other Findings   Comments: Copied from IM admit note:  Ted Ríos is a 53 y.o. male who admitted on 1/31/2021. PMH significant for throat cancer, emphysema, chronic pain and anxiety secondary to advanced age throat cancer. Patient has scheduled surgery in 8 days with ENT specialist at [facility]. He comes in to the emergency room with increased shortness of breath, insomnia, unmanaged throat pain and anxiety. He states he has not been able to eat in 2 days and cannot sleep. He is requesting IV medication for pain and anxiety. He has audible stridor, wheezing shortness of breath and hypoxia on room air sats are 84%.   Admit for further management of acute hypoxic respiratory failure, COPD exacerbation, unmanaged pain and anxiety. Results for Herminia Henderson (MRN [id number]) as of 2/1/2021 13:07    2/1/2021 03:05  WBC: 14.0 (H)  RBC: 4.20  HGB: 13.9  HCT: 42.1  MCV: 100.2 (H)  MCH: 33.1  MCHC: 33.0  RDW: 13.0  PLATELET: 287  MPV: 85.9  Sodium: 135 (L)  Potassium: 4.1  Chloride: 94 (L)  CO2: 37 (H)  Anion gap: 4 (L)  Glucose: 145 (H)  BUN: 16  Creatinine: 0.74  BUN/Creatinine ratio: 22 (H)  Calcium: 9.2  GFR est non-AA: >60  GFR est AA: >60       CT neck 1/28/21:  IMPRESSION  Irregular soft tissue mass involving the aryepiglottic and the  larynx. There is significant narrowing of the airway at the level of the larynx  and the supraglottic region. Poorly defined cervical adenopathy is noted.            Physical Exam    Airway  Mallampati: II  TM Distance: 4 - 6 cm  Neck ROM: normal range of motion   Mouth opening: Normal     Cardiovascular    Rhythm: regular  Rate: normal         Dental    Dentition: Poor dentition     Pulmonary  Breath sounds clear to auscultation               Abdominal  GI exam deferred       Other Findings            Anesthetic Plan    ASA: 3  Anesthesia type: total IV anesthesia and general          Induction: Intravenous  Anesthetic plan and risks discussed with: Patient      General anesthesia was prescribed for this patient because by definition it is \"a drug-induced loss of consciousness during which patients are not arousable, even by painful stimulation. \" Sometimes, the ability to independently maintain ventilatory function is often impaired and patients often require assistance in maintaining a patent airway. Occasionally, positive pressure ventilation may be required because of depressed spontaneous ventilation or drug-induced depression of neuromuscular function. This depth of anesthesia is preferred for endoscopic procedures to facilitate the procedure and for patient safety/quality of care.

## 2021-02-03 NOTE — PROGRESS NOTES
SPEECH LANGUAGE PATHOLOGY BEDSIDE SWALLOW EVALUATIONS  Patient: Severo Pippins  (5 y.o. )  Date: 2/3/2021  Primary Diagnosis: Acute and chronic respiratory failure with hypoxia ([facility] [J96.21]  Throat cancer ([facility] [C14.0]  COPD exacerbation ([facility] [J44.1]  Procedure(s) (LRB):  PERCUTANEOUS ENDOSCOPIC GASTROSTOMY TUBE INSERTION (N/A)  ESOPHAGOGASTRODUODENOSCOPY (EGD) (N/A) Day of Surgery   Precautions: Aspiration and fall       ASSESSMENT :  Please see PMV eval for full report regarding PMV tolerance and communication. Patient alert, oriented x4, and follows basic commands. PMV donned and PO trials provided. Patient presents w/ moderate to severe pharyngeal dysphagia. Oral phase is wfl. Pharyngeal phase c/b timely swallow and HLE is reduced upon palpation. Most notable is limited hyoid protraction w/ atypical audible swallow. Overt s/s of pen/asp c/b weak cough w/ all consistencies. Patient reports neck fullness w/ PO trials. ? Tracheal suction of applesauce following PO trials. Patient is s/p PEG placement. Do not recommend MBS at this time s/t patient scheduled for laryngectomy on 2/8/2021 and now has PEG. Further questions answered regarding swallow and communication following laryngectomy. Patient will benefit from skilled intervention to address the above impairments. Patients rehabilitation potential is considered to be Fair     PLAN :  Recommendations and Planned Interventions:  Rec cont NPO w/ TF via PEG. Ice chips for pleasure ok. Frequency/Duration: Patient will be followed by speech-language pathology 5 times a week to address goals. Discharge Recommendations: Plans for tx to [facility] for procedure. SUBJECTIVE:   Patient reports dysphagia for past month w/ worsening 4 days prior to hospitalization. He reports 30 pound weight loss. OBJECTIVE:   Patient admitted w/ SOB underwent emergent trach.  Patient dx w/ IV throat ca w/ plans for laryngectomy on 2/8/2021 w/ Dr. Sariah Salazar at [place]. Any. Past Medical History:   Diagnosis Date    Chronic pain     THROAT, EARS, NECK R/T CANCER    COPD (chronic obstructive pulmonary disease) (HCC)     EMPHASEMA    Psychiatric disorder     ANXIETY R/T CANCER    Throat cancer (HCC)        XR CHEST PORT   Final Result   No acute cardiopulmonary abnormality. .       XR CHEST PORT   Final Result   Impression:   No acute findings. Diet prior to admission: Regular (limited)  Current Diet:  DIET NPO     Cognitive and Communication Status:  Neurologic State: Alert  Orientation Level: Oriented X4  Cognition: Follows commands           Swallowing Evaluation:   Oral Assessment:  Oral Assessment  Labial: No impairment  Dentition: Intact  Oral Hygiene: wfl(wfl)  Lingual: No impairment  Velum: No impairment  Mandible: No impairment  P.O. Trials:  Patient Position: upright in bed  Vocal quality prior to P.O.: Hoarse(hoarse w/ PMV, aphonic s/t tracheostomy)  Consistency Presented: Nectar thick liquid;Puree; Thin liquid  How Presented: Self-fed/presented;Cup/sip;Spoon     Bolus Acceptance: No impairment  Bolus Formation/Control: No impairment     Propulsion: No impairment  Oral Residue: None  Initiation of Swallow: No impairment  Laryngeal Elevation: Weak  Aspiration Signs/Symptoms: Weak cough  Pharyngeal Phase Characteristics: Audible swallow;Double swallow;Effortful swallow; Feeling of discomfort             Oral Phase Severity: No impairment  Pharyngeal Phase Severity : Moderate-severe  Voice:  Vocal Quality: Hoarse(hoarse w/ PMV, aphonic s/t tracheostomy)      Pain:  Pain Scale 1: Numeric (0 - 10)  Pain Intensity 1: 0   Does reports significant anxiety. Nsg and MD aware. After treatment:   Patient left in no apparent distress in bed, Call bell within reach, and Nursing notified    COMMUNICATION/EDUCATION:   Patient was educated regarding his deficit(s) of dysphagia as this relates to his diagnosis of throat ca.   He demonstrated Good understanding as evidenced by engagement and appropriate questions. .    The patient's plan of care including recommendations, planned interventions, and recommended diet changes were discussed with: Registered nurse and Physician. Patient/family have participated as able in goal setting and plan of care. Problem: Dysphagia (Adult)  Goal: *Acute Goals and Plan of Care (Insert Text)  Description: Speech Therapy Swallow Goals  Initiated 2/3/2021  -Patient will tolerate PO trials without clinical indicators of aspiration given no cues within 7 day(s).          [ ] Not met  [ ]  MET   [ ] Noam Walker  [ ] Discontinue    Outcome: Initial     Thank you for this referral.  Devan Clarke M.S., M.Ed., CCC-SLP  Time Calculation: 28 mins

## 2021-02-03 NOTE — PROGRESS NOTES
Report called to VLAD Crespo on [address]. Patient transferred to room 427 at 1700 with all belongings, via bed.

## 2021-02-03 NOTE — ANESTHESIA POSTPROCEDURE EVALUATION
Procedure(s):  PERCUTANEOUS ENDOSCOPIC GASTROSTOMY TUBE INSERTION  ESOPHAGOGASTRODUODENOSCOPY (EGD). total IV anesthesia, general    Anesthesia Post Evaluation      Multimodal analgesia: multimodal analgesia not used between 6 hours prior to anesthesia start to PACU discharge  Patient location during evaluation: ICU (Endoscopy suite)  Patient participation: complete - patient cannot participate  Level of consciousness: sleepy but conscious  Pain score: 0  Pain management: adequate  Airway patency: patent  Anesthetic complications: no  Cardiovascular status: acceptable  Respiratory status: acceptable (trach)  Hydration status: acceptable  Comments: This patient remained on the stretcher. The patient was handed off to the endoscopy nursing team.  All questions regarding pre-, intra-, and postoperative care were answered.   Post anesthesia nausea and vomiting:  none  Final Post Anesthesia Temperature Assessment:  Normothermia (36.0-37.5 degrees C)      INITIAL Post-op Vital signs:   Vitals Value Taken Time   /79 02/03/21 1422   Temp 36.7 °C (98 °F) 02/03/21 1422   Pulse 112 02/03/21 1422   Resp 22 02/03/21 1422   SpO2 97 % 02/03/21 1422

## 2021-02-03 NOTE — PROGRESS NOTES
4 eyes skin assessment completed with MADYSON Forde. No wounds noted. Skin dry and intact. Lylia Binning site has bloody drainage noted.

## 2021-02-03 NOTE — PROGRESS NOTES
Patient: Robert Harrington (8 y.o. male)  Date: 2/3/2021  Primary Diagnosis: Acute and chronic respiratory failure with hypoxia (HCC) [J96.21]  Throat cancer ([facility] [address] [C14.0]  COPD exacerbation ([facility] [address] [J44.1]  Procedure(s) (LRB):  PERCUTANEOUS ENDOSCOPIC GASTROSTOMY TUBE INSERTION (N/A)  ESOPHAGOGASTRODUODENOSCOPY (EGD) (N/A) Day of Surgery   Precautions: Aspiration and fall       ASSESSMENT :  Patient w/ improved secretions per nsg was started on Robinul on 2/2/2021. Patient w/ Shiley 6 cuffed trach. Tracheal suctioning provided, cuff deflated. Patient w/ moderate thick blood tinged secretions. W/ finger occlusion there hoarse weak vocal quality. Baseline vitals HR: 117, O2: 92%, RR: 22, and /97. Vitals stable throughout assessment. Education of PMV provided to patient prior to donning PMV. PMV donned to trach hub. Patient w/ free air movement w/ minimal evidence of air stacking. Vocal quality c/b breathy hoarse short phrases. Patient is able to meet basic wants/needs. Patient educated on donning/doffing and able to perform independently. Continued education on pre laryngectomy communication/swallow. Patient is motivated and receptive during eval and tx. Patient will benefit from skilled intervention to address the above impairments. Patients rehabilitation potential is considered to be Good     PLAN :  Recommendations and Planned Interventions:  Rec PMV use as tolerated. Do not wear during breathing txs and while sleeping. Patient able to independently don/doff  Speaking Valve Placement:  SLP / RT / RN and patient/family  Recommended Speaking Valve Wearing Schedule:  [x]  As tolerated  Frequency/Duration: Patient will be followed by speech-language pathology 2 times a week to address goals. Discharge Recommendations: Outpatient following laryngectomy     SUBJECTIVE:   Patient stated he is anxious and hasn't slept in 4 days. Requesting something for anxiety. MD notified.      OBJECTIVE:     Past Medical History:   Diagnosis Date    Chronic pain     THROAT, EARS, NECK R/T CANCER    COPD (chronic obstructive pulmonary disease) ([facility] [address]     EMPHASEMA    Psychiatric disorder     ANXIETY R/T CANCER    Throat cancer ([facility] [address]      Past Surgical History:   Procedure Laterality Date    HX ORTHOPAEDIC      LEFT ARM- \" PUT PLATE IN\"       CXR Results  (Last 48 hours)                 02/02/21 0259  XR CHEST PORT Final result    Impression:  No acute cardiopulmonary abnormality. .        Narrative:  HISTORY:  Acute respiratory failure       TECHNIQUE:  AP chest radiograph       COMPARISON: 1/31/2021   LIMITATIONS: None       TUBES/LINES: Tracheostomy tube present with tip below thoracic inlet. LUNG PARENCHYMA: No convincing acute pulmonary parenchymal abnormality   TRACHEA/BRONCHI: Normal   PULMONARY VESSELS: Normal   PLEURA: Normal   HEART: Normal   AORTIC SHADOW:Normal.     MEDIASTINUM: Normal   BONE/SOFT TISSUES: No acute abnormality. OTHER: None                   Diet prior to admission: regular  Current Diet:  DIET NPO     Cognitive and Communication Status:  Neurologic State: Alert  Orientation Level: Oriented X4  Cognition: Follows commands     Voice:  Breathy hoarse       Pain:  Pain Scale 1: Numeric (0 - 10)  Pain Intensity 1: 0         After treatment:   Patient left in no apparent distress in bed, Call bell within reach, and Nursing notified    COMMUNICATION/EDUCATION:   Patient receptive of education regarding PMV. He demonstrated Good understanding as evidenced by engagement and appropriate questions. The patient's plan of care including recommendations, planned interventions, and recommended diet changes were discussed with: Registered nurse and Physician. Patient/family have participated as able in goal setting and plan of care.       Problem: Communication Impaired (Adult)  Goal: *Acute Goals and Plan of Care (Insert Text)  Description: Patient will tolerate PMV w/ adequate airflow and meet basic wants/needs.    Participate in swallow eval.   Outcome: Progressing Towards Goal   Thank you for this referral.  Nicole Butts M.S., M.Ed., CCC-SLP  Time Calculation: 50 mins

## 2021-02-03 NOTE — PROGRESS NOTES
Hospitalist Progress Note               Daily Progress Note: 2/3/2021      Subjective: The patient is seen for follow up. He was successfully extubated yesterday. I spoke with ENT yesterday who spoke with ENT at [facility].   Our plan is to try to get patient transferred up there later this week for the surgery scheduled on Monday, rather than try to send him home first    He has had a lot of anxiety and has not slept for the past 5 nights reportedly    He is due for a COVID-19 test tomorrow at [facility] which we can do today    He is very anxious about getting up to [facility] for his surgery    Problem List:  Problem List as of 2/3/2021 Never Reviewed          Codes Class Noted - Resolved    Throat cancer [facility]) ICD-10-CM: C14.0  ICD-9-CM: 149.0  1/31/2021 - Present        COPD exacerbation ([facility] ICD-10-CM: J44.1  ICD-9-CM: 491.21  1/31/2021 - Present        Acute and chronic respiratory failure with hypoxia ([facility] ICD-10-CM: J96.21  ICD-9-CM: 518.84, 799.02  1/31/2021 - Present        Chronic pain due to neoplasm ICD-10-CM: G89.3  ICD-9-CM: 338.3  1/31/2021 - Present        Anxiety ICD-10-CM: F41.9  ICD-9-CM: 300.00  1/31/2021 - Present        Insomnia ICD-10-CM: G47.00  ICD-9-CM: 780.52  1/31/2021 - Present        Respiratory failure with hypoxia [facility]) ICD-10-CM: J96.91  ICD-9-CM: 518.81  1/31/2021 - Present              Medications reviewed  Current Facility-Administered Medications   Medication Dose Route Frequency    morphine injection 2 mg  2 mg IntraVENous Q2H PRN    traMADoL (ULTRAM) tablet 50 mg  50 mg Oral Q6H PRN    glycopyrrolate (ROBINUL) injection 0.1 mg  0.1 mg IntraVENous Q6H    magic mouthwash (FIRST-MOUTHWASH BLM) oral suspension 5 mL  5 mL Oral Q4H PRN    magic mouthwash (FIRST-MOUTHWASH BLM) oral suspension 5 mL  5 mL Oral TIDAC    albuterol-ipratropium (DUO-NEB) 2.5 MG-0.5 MG/3 ML  3 mL Nebulization Q6H RT    budesonide (PULMICORT) 500 mcg/2 ml nebulizer suspension  500 mcg Nebulization BID RT    sodium chloride (NS) flush 5-40 mL  5-40 mL IntraVENous Q8H    sodium chloride (NS) flush 5-40 mL  5-40 mL IntraVENous PRN    acetaminophen (TYLENOL) tablet 650 mg  650 mg Oral Q6H PRN    Or    acetaminophen (TYLENOL) suppository 650 mg  650 mg Rectal Q6H PRN    polyethylene glycol (MIRALAX) packet 17 g  17 g Oral DAILY PRN    bisacodyL (DULCOLAX) tablet 5 mg  5 mg Oral DAILY PRN    ondansetron (ZOFRAN ODT) tablet 4 mg  4 mg Oral Q6H PRN    Or    ondansetron (ZOFRAN) injection 4 mg  4 mg IntraVENous Q6H PRN    famotidine (PEPCID) tablet 20 mg  20 mg Oral Q12H    hydrOXYzine HCL (ATARAX) tablet 50 mg  50 mg Oral QID PRN       Review of Systems:   Review of systems not obtained due to patient factors. Objective:   Physical Exam:     Visit Vitals  BP (!) 139/106 (BP 1 Location: Right upper arm, BP Patient Position: At rest)   Pulse 99   Temp 98.6 °F (37 °C)   Resp 19   Ht 6' (1.829 m)   Wt 60.5 kg (133 lb 6.1 oz)   SpO2 100%   BMI 18.09 kg/m²    O2 Flow Rate (L/min): 10 l/min O2 Device: Heated, Tracheal collar    Temp (24hrs), Av.7 °F (37.1 °C), Min:97.9 °F (36.6 °C), Max:99.7 °F (37.6 °C)    No intake/output data recorded.  1901 -  0700  In: 258 [I.V.:258]  Out: 400 [Urine:400]    General:   Alert and oriented   Lungs:   Clear to auscultation bilaterally. Chest wall:  No tenderness or deformity. Heart:  Regular rate and rhythm, S1, S2 normal, no murmur, click, rub or gallop. Abdomen:   Soft, non-tender. Bowel sounds normal. No masses,  No organomegaly. Extremities: Extremities normal, atraumatic, no cyanosis or edema. Pulses: 2+ and symmetric all extremities. Skin: Skin color, texture, turgor normal. No rashes or lesions   Neurologic: CNII-XII intact.    Sedated and unresponsive         Data Review:       Recent Days:  Recent Labs     21  0450 21  0305 21  0900   WBC 7.4 14.0* 6.4   HGB 11.7* 13.9 14.7   HCT 35.6* 42.1 43.2    333 375     Recent Labs     02/02/21  0450 02/01/21  0305 01/31/21  0900   * 135* 135*   K 3.9 4.1 3.5   CL 95* 94* 93*   CO2 32 37* 32   * 145* 69   BUN 23* 16 11   CREA 0.70 0.74 0.58*   CA 9.2 9.2 10.0   MG 2.1  --   --    PHOS 3.8  --   --    ALB 2.6*  --   --      Recent Labs     02/02/21  0505 02/01/21  0620 02/01/21  0450 02/01/21  0430   PH 7.52* 7.32* 7.19* 7.10*   PCO2 43 73* 96* 121*   PO2 272* 74* 93 99   HCO3 34* 32* 29* 27*   FIO2 80.0  --  21.0 100.0       24 Hour Results:  No results found for this or any previous visit (from the past 24 hour(s)). XR CHEST PORT   Final Result   No acute cardiopulmonary abnormality. .       XR CHEST PORT   Final Result   Impression:   No acute findings. Assessment:  Stridor due to vocal fold paralysis versus tumor, status post urgent tracheostomy    Acute on chronic respiratory failure with hypoxia    Throat cancer    Acute exacerbation of COPD    Chronic pain syndrome, opioid dependent    Protein calorie malnutrition    Anxiety    Plan:  PEG tube is scheduled for today  Lorazepam as needed for anxiety  Plan transferred to [facility] tomorrow or Friday for laryngectomy on Monday  Obtain PCR Covid test today      Total time spent with patient: 30 minutes.     Court Mcrae MD

## 2021-02-03 NOTE — PROGRESS NOTES
Otolaryngology-HNS Consult follow-up    Subjective:    Ted Ríos   53 y.o.   1970     Chief complaint/reason for consult -stridor    History of present illness    I am asked to urgently see this 64-year-old male admitted 1 day ago with increasing shortness of breath, stridor, dysphagia. He carries a diagnosis of COPD. He is a former heavy smoker quit about 3 months ago. For my interview he reports he is scheduled for a biopsy with Dr. Mere Yeager at [facility] next week. It appears he has not had an actual tissue diagnosis of head neck cancer at this point. From my review of the record he has had some desaturations while in emergency department. He was seen by Dr. Virgie Boss who called me to consider him for an urgent tracheostomy. 2021   Postoperative day 1 from urgent tracheotomy yesterday. No events overnight, he remains on the ventilator and sedated this morning. 2/3/2021  Postoperative day 2. He is off the vent, has Passy-Blanca valve able to speak. Reports being comfortable, breathing well.     Review of Systems    Positive weight loss  Negative shortness of breath  Positive dysphagia  Positive weakness  Other systems reviewed are negative     Past Medical History:   Diagnosis Date    Chronic pain     THROAT, EARS, NECK R/T CANCER    COPD (chronic obstructive pulmonary disease) (HCC)     EMPHASEMA    Psychiatric disorder     ANXIETY R/T CANCER    Throat cancer (HCC)      Past Surgical History:   Procedure Laterality Date    HX ORTHOPAEDIC      LEFT ARM- \" PUT PLATE IN\"      Family History   Problem Relation Age of Onset    Diabetes Mother     Diabetes Father     Kidney Disease Father     Diabetes Sister     Heart Disease Daughter     Anesth Problems Neg Hx      Social History     Tobacco Use    Smoking status: Former Smoker     Packs/day: 1.50     Years: 30.00     Pack years: 45.00     Quit date: 10/28/2020     Years since quittin.2    Smokeless tobacco: Never Used   Substance Use Topics    Alcohol use: Not Currently      Prior to Admission medications    Medication Sig Start Date End Date Taking? Authorizing Provider   aluminum-magnesium hydroxide 200-200 mg/5 mL susp 5 mL, diphenhydrAMINE 12.5 mg/5 mL liqd 5 mL, lidocaine 2 % soln 5 mL 5 mL by Swish and Spit route two (2) times a day. Magic mouth wash   Maalox  Lidocaine 2% viscous   Diphenhydramine oral solution     Pharmacy to mix equal portions of ingredients to a total volume as indicated in the dispense amount. Provider, Historical   HYDROCODONE-ACETAMINOPHEN PO Take  by mouth. 1 TSP QID    Provider, Historical   albuterol (PROVENTIL HFA, VENTOLIN HFA, PROAIR HFA) 90 mcg/actuation inhaler Take 3 Puffs by inhalation every eight (8) hours. Indications: chronic obstructive pulmonary disease 1/24/21   Pepe Arroyo MD   hydrOXYzine HCL (ATARAX) 50 mg tablet Take 1 Tab by mouth every eight to twelve (8-12) hours as needed for Anxiety or Sleep for up to 40 days. Indications: Pt has throat cancer and anxiety. 1/24/21 3/5/21  Pepe Arroyo MD        No Known Allergies      Objective:     Visit Vitals  BP (!) 126/98 (BP 1 Location: Right upper arm, BP Patient Position: At rest)   Pulse (!) 111   Temp 98.6 °F (37 °C)   Resp 17   Ht 6' (1.829 m)   Wt 133 lb 6.1 oz (60.5 kg)   SpO2 95%   BMI 18.09 kg/m²        Physical Exam  Vitals signs reviewed. Constitutional:       Appearance: He is cachectic. Comments: On ventilator   HENT:      Head: Normocephalic and atraumatic. Jaw: There is normal jaw occlusion. No trismus, tenderness or malocclusion. Salivary Glands: Right salivary gland is not diffusely enlarged or tender. Left salivary gland is not diffusely enlarged or tender. Right Ear: Hearing, tympanic membrane, ear canal and external ear normal.      Left Ear: Hearing, tympanic membrane, ear canal and external ear normal.      Ears:      Forman exam findings: does not lateralize. Right Rinne: AC > BC.      Left Rinne: AC > BC. Nose: No septal deviation, mucosal edema or rhinorrhea. Right Turbinates: Not enlarged, swollen or pale. Left Turbinates: Not enlarged, swollen or pale. Right Sinus: No maxillary sinus tenderness or frontal sinus tenderness. Left Sinus: No maxillary sinus tenderness or frontal sinus tenderness. Mouth/Throat:      Lips: Pink. Mouth: Mucous membranes are moist. No oral lesions. Dentition: Abnormal dentition. No gum lesions. Tongue: No lesions. Palate: No mass and lesions. Pharynx: Oropharynx is clear. Uvula midline. Tonsils: No tonsillar exudate. 0 on the right. 0 on the left. Eyes:      General: Vision grossly intact. Extraocular Movements: Extraocular movements intact. Right eye: No nystagmus. Left eye: No nystagmus. Pupils: Pupils are equal, round, and reactive to light. Neck:      Musculoskeletal: No edema or erythema. Thyroid: No thyroid mass, thyromegaly or thyroid tenderness. Trachea: Tracheostomy present. Comments: Minimal secretions around trach    He is upright speaking with Passy-Cobb valve; hoarseness  Cardiovascular:      Rate and Rhythm: Normal rate and regular rhythm. Pulmonary:      Breath sounds: Normal breath sounds. No wheezing. Musculoskeletal: Normal range of motion. Lymphadenopathy:      Cervical: No cervical adenopathy. Skin:     General: Skin is warm and dry. Neurological:      Mental Status: He is alert and oriented to person, place, and time. Comments: Sedated         No results found for this or any previous visit (from the past 24 hour(s)). I have personally reviewed all pertinent notes, labs, results, and imaging studies for this patient. 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 Using speaking valve well, SLP is in the room currently for teaching. Continue current trach care.     I spoke with his head neck surgeon Dr. Swain who gave me the updated history. 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  He is scheduled for a total laryngectomy on February 8 at [facility]. We had discussed transferring him there so he can have his surgery done on as scheduled. In meantime PEG tube placement is appropriate. I will continue to follow,    Reilly Lambert MD, [address]  [address] [place] [address] [address]  [facility] [phone]

## 2021-02-03 NOTE — PROGRESS NOTES
Consult  Pulmonary, Critical Care    Name: Steven Maki MRN: [id number]   : 1970 Hospital: [facility]   Date: 2/3/2021  Admission date: 2021 Hospital Day: 4       Subjective/Interval History:   Seen in the emergency room with stridor respiratory distress poor air movement over the neck. CT of the neck shows compromised airway. He is using accessory muscles or respirations   2/3 Awake alert able to weakly talk with passey mamie valve. Less secretions with addition of Robinul. Room air oxygen saturation 98%    Patient Active Problem List   Diagnosis Code    Throat cancer ([facility] [address] C14.0    COPD exacerbation ([facility] [address] [facility] J44.1    Acute and chronic respiratory failure with hypoxia ([facility]) J96.21    Chronic pain due to neoplasm G89.3    Anxiety F41.9    Insomnia G47.00    Respiratory failure with hypoxia ([facility]) J96.91       IMPRESSION:   1. Acute hypercapnic respiratory failure resolved   2. Acute hypoxic respiratory failure resolved   3. 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 Status post emergent tracheostomy  5.  vocal cord paralysis  6. COPD exacerbation no wheezing continue nebulizer  7. Stridor secondary to airway compromise from laryngeal carcinoma status post tracheostomy  8. Malnutrition  Body mass index is 18.09 kg/m². 9.       RECOMMENDATIONS/PLAN:   1. Maintain room air  2. Maintain Robinul IV until PEG tube placed  3. Doing well with Passy-Mamie valve  4. Subjective/Initial History:   I have reviewed the flowsheet and previous days notes. Seen earlier today on rounds. I was asked by Russel Palacios MD to see Steven Maki a 53 y.o.  male  in consultation for a chief complaint of acute hypercapnic respiratory failure with respiratory distress and laryngeal carcinoma        Patient PCP: Luis Enrique Arboleda NP  PMH:  has a past medical history of Chronic pain, COPD (chronic obstructive pulmonary disease) ([facility] [address] [facility] Psychiatric disorder, and Throat cancer ([facility] [address] [facility]   PSH: has a past surgical history that includes hx orthopaedic. FHX: family history includes Diabetes in his father, mother, and sister; Heart Disease in his daughter; Kidney Disease in his father. SHX:  reports that he quit smoking about 3 months ago. He has a 45.00 pack-year smoking history. He has never used smokeless tobacco. He reports previous alcohol use. He reports that he does not use drugs.     Systemic review very limited due to his difficulty speaking    No Known Allergies   MEDS:   Current Facility-Administered Medications   Medication    LORazepam (ATIVAN) injection 0.5 mg    morphine injection 2 mg    traMADoL (ULTRAM) tablet 50 mg    glycopyrrolate (ROBINUL) injection 0.1 mg    magic mouthwash (FIRST-MOUTHWASH BLM) oral suspension 5 mL    magic mouthwash (FIRST-MOUTHWASH BLM) oral suspension 5 mL    albuterol-ipratropium (DUO-NEB) 2.5 MG-0.5 MG/3 ML    budesonide (PULMICORT) 500 mcg/2 ml nebulizer suspension    sodium chloride (NS) flush 5-40 mL    sodium chloride (NS) flush 5-40 mL    acetaminophen (TYLENOL) tablet 650 mg    Or    acetaminophen (TYLENOL) suppository 650 mg    polyethylene glycol (MIRALAX) packet 17 g    bisacodyL (DULCOLAX) tablet 5 mg    ondansetron (ZOFRAN ODT) tablet 4 mg    Or    ondansetron (ZOFRAN) injection 4 mg    famotidine (PEPCID) tablet 20 mg    hydrOXYzine HCL (ATARAX) tablet 50 mg        Current Facility-Administered Medications:     LORazepam (ATIVAN) injection 0.5 mg, 0.5 mg, IntraVENous, Q8H PRN, Heath Rendon MD    morphine injection 2 mg, 2 mg, IntraVENous, Q2H PRN, Parrish Knowles MD    traMADoL Opal Conklin) tablet 50 mg, 50 mg, Oral, Q6H PRN, Parrish Knowles MD    glycopyrrolate (ROBINUL) injection 0.1 mg, 0.1 mg, IntraVENous, Q6H, Parrish Knowles MD, 0.1 mg at 02/03/21 0353    magic mouthwash (FIRST-MOUTHWASH BLM) oral suspension 5 mL, 5 mL, Oral, Q4H PRN, Parrish Knowles MD    magic mouthwash [facility]) oral suspension 5 mL, 5 mL, Oral, TIDAC, Carolina Jeffries MD, Stopped at 21 1630    albuterol-ipratropium (DUO-NEB) 2.5 MG-0.5 MG/3 ML, 3 mL, Nebulization, Q6H RT, Jose J Murphy MD, 3 mL at 21 0733    budesonide (PULMICORT) 500 mcg/2 ml nebulizer suspension, 500 mcg, Nebulization, BID RT, Jose J Murphy MD, 500 mcg at 21 0023    sodium chloride (NS) flush 5-40 mL, 5-40 mL, IntraVENous, Q8H, Heidi Course, NP, 10 mL at 21 0354    sodium chloride (NS) flush 5-40 mL, 5-40 mL, IntraVENous, PRN, Heidi Course, NP  [facility]  acetaminophen (TYLENOL) tablet 650 mg, 650 mg, Oral, Q6H PRN **OR** acetaminophen (TYLENOL) suppository 650 mg, 650 mg, Rectal, Q6H PRN, Heidi Course, NP    polyethylene glycol (MIRALAX) packet 17 g, 17 g, Oral, DAILY PRN, Heidi Course, NP    bisacodyL (DULCOLAX) tablet 5 mg, 5 mg, Oral, DAILY PRN, Heidi Course, NP    ondansetron (ZOFRAN ODT) tablet 4 mg, 4 mg, Oral, Q6H PRN **OR** ondansetron (ZOFRAN) injection 4 mg, 4 mg, IntraVENous, Q6H PRN, Heidi Course, NP    famotidine (PEPCID) tablet 20 mg, 20 mg, Oral, Q12H, Heidi Course, NP, Stopped at 21 2100    hydrOXYzine HCL (ATARAX) tablet 50 mg, 50 mg, Oral, QID PRN, Heidi Course, NP      Objective:     Vital Signs: Telemetry:    normal sinus rhythm Intake/Output:   Visit Vitals  BP (!) 139/106 (BP 1 Location: Right upper arm, BP Patient Position: At rest)   Pulse 99   Temp 98.6 °F (37 °C)   Resp 19   Ht 6' (1.829 m)   Wt 60.5 kg (133 lb 6.1 oz)   SpO2 92%   BMI 18.09 kg/m²       Temp (24hrs), Av.7 °F (37.1 °C), Min:97.9 °F (36.6 °C), Max:99.7 °F (37.6 °C)        O2 Device: Heated, Tracheal collar O2 Flow Rate (L/min): 10 l/min         Body mass index is 18.09 kg/m².     Wt Readings from Last 4 Encounters:   21 60.5 kg (133 lb 6.1 oz)   21 60.7 kg (133 lb 13.1 oz)   21 60.8 kg (134 lb)   21 67.1 kg (148 lb)        No intake or output data in the 24 hours ending 21 0831    Last shift:      No intake/output data recorded.  Last 3 shifts: 02/01 1901 - 02/03 0700  In: 258 [I.V.:258]  Out: 400 [Urine:400]       Hemodynamics:    CO:    CI:    CVP:    SVR:   PAP Systolic:    PAP Diastolic:    PVR:    SV02:       Ventilator Settings:      Mode Rate TV Press PEEP FiO2 PIP Min. Vent   Assist control, Volume control    500 ml    5 cm H20 21 %  25 cm H2O  8.43 l/min      Physical Exam:    General:  male; awake alert is able to talk with Passy-Blacna valve in place  HEENT: NCAT, oral mucosa clear  Eyes: anicteric; conjunctiva clear extraocular movements intact  Neck:  tracheostomy in place  Chest: no deformity, muscle wasting  Cardiac: Regular rate and rhythm no murmur no edema  Lungs: Good breath sounds bilaterally and clear no wheezes no rales does have secretion noise in the upper airway  Abd: Soft nontender normal bowel sounds  Ext: no edema; no joint swelling; No clubbing  : clear urine  Neuro: Awake alert oriented moves all 4 extremities  Psych-unable to assess  Skin: warm, dry, no cyanosis;  Pulses: Pedal radial femoral popliteal pulses intact  Capillary: Normal capillary refill      Labs:    Recent Labs     02/02/21  0450 02/01/21  0305 01/31/21  0900   WBC 7.4 14.0* 6.4   HGB 11.7* 13.9 14.7    333 375     Recent Labs     02/02/21  0450 02/01/21  0305 01/31/21  0900   * 135* 135*   K 3.9 4.1 3.5   CL 95* 94* 93*   CO2 32 37* 32   * 145* 69   BUN 23* 16 11   CREA 0.70 0.74 0.58*   CA 9.2 9.2 10.0   MG 2.1  --   --    PHOS 3.8  --   --    ALB 2.6*  --   --      Recent Labs     02/02/21  0505 02/01/21  0620 02/01/21  0450 02/01/21  0430   PH 7.52* 7.32* 7.19* 7.10*   PCO2 43 73* 96* 121*   PO2 272* 74* 93 99   HCO3 34* 32* 29* 27*   FIO2 80.0  --  21.0 100.0   2/2 assist control 14 tidal volume 500 PEEP 5 FiO2 25%  2/3 room air oxygen saturation 98%  Imaging:  I have personally reviewed the patient’s radiographs and have reviewed the  reports:    CXR Results  (Last 48 hours)               02/02/21 0259  XR CHEST PORT Final result    Impression:  No acute cardiopulmonary abnormality. .        Narrative:  HISTORY:  Acute respiratory failure       TECHNIQUE:  AP chest radiograph       COMPARISON: 1/31/2021   LIMITATIONS: None       TUBES/LINES: Tracheostomy tube present with tip below thoracic inlet. LUNG PARENCHYMA: No convincing acute pulmonary parenchymal abnormality   TRACHEA/BRONCHI: Normal   PULMONARY VESSELS: Normal   PLEURA: Normal   HEART: Normal   AORTIC SHADOW:Normal.     MEDIASTINUM: Normal   BONE/SOFT TISSUES: No acute abnormality. OTHER: None               Results from Hospital Encounter encounter on 01/31/21   XR CHEST PORT    Narrative HISTORY:  Acute respiratory failure    TECHNIQUE:  AP chest radiograph    COMPARISON: 1/31/2021  LIMITATIONS: None    TUBES/LINES: Tracheostomy tube present with tip below thoracic inlet. LUNG PARENCHYMA: No convincing acute pulmonary parenchymal abnormality  TRACHEA/BRONCHI: Normal  PULMONARY VESSELS: Normal  PLEURA: Normal  HEART: Normal  AORTIC SHADOW:Normal.    MEDIASTINUM: Normal  BONE/SOFT TISSUES: No acute abnormality. OTHER: None      Impression No acute cardiopulmonary abnormality. .    XR CHEST PORT    Narrative Study: XR CHEST PORT    Clinical indication: hypoxia    Comparison: Chest x-ray 1/28/2021. Findings:    No consolidative airspace disease, pleural effusion or pneumothorax. Cardiomediastinal contours are within normal limits. No pulmonary edema. No acute osseous abnormality identified. Impression Impression:  No acute findings.    Results from Hospital Encounter encounter on 01/28/21   XR CHEST PORT    Narrative PROCEDURE: XR CHEST PORT    HISTORY:Short of breath    COMPARISON:Chest x-ray 24 January 2021      TECHNIQUE: AP portable chest    LIMITATIONS: None    TUBES/LINES: None    LUNG PARENCHYMA/PLEURA: There is some small areas of asymmetric density in the  right lower lung zone and both midlung zones. No well-defined infiltrate or  mass. TRACHEA/BRONCHI: Normal  PULMONARY VESSELS: Normal  HEART: Normal  MEDIASTINUM: Normal  BONE/SOFT TISSUES: No acute process    OTHER: None      Impression Minimal bilateral densities. Early pneumonia? .            Results from [facility] encounter on 01/28/21   CT NECK SOFT TISSUE WO CONT    Narrative PROCEDURE: CT NECK SOFT TISSUE WO CONT    HISTORY:Throat cancer. Short of breath    COMPARISON:None    Department policy stipulates all CT scans at this facility are performed using  dose reduction optimization techniques as appropriate to the performed exam,  including the following: Automated exposure control, adjustments of the mA  and/or KVP according to the patient size, and the use of iterative  reconstruction technique. TECHNIQUE: Axial images through the neck with multiplanar reconstruction. No IV  contrast.  COMPARISON: None  LIMITATIONS: None  NASOPHARYNX: Normal  OROPHARYNX: Normal  HYPOPHARYNX: Normal  EPIGLOTTIS/ARYEPIGLOTTIC FOLDS: The epiglottis appears normal. At the level of  the area epiglottic folds and extending into the larynx there is a poorly  defined soft tissue mass associated with mucosa and submucosal tissues. The  process is bilateral extending posteriorly across the midline. Anteriorly there  is a very small component across the midline. There is a very asymmetric  narrowing of the airway at this level with a larger mass on the right. The  process extends down to and involves the lateral walls of the larynx including  both the true and false cords. Laryngeal ventricle is distorted on the right and  compressed on the left. LARYNX: In addition to the mass extending into the larynx described above there  is poorly defined increased soft tissue density around the larynx. Fat planes in  this area are not well defined.   PARAPHARYNGEAL SPACE: The deep parapharyngeal spaces appear relatively clear. RETROPHARYNGEAL/PREVERTEBRAL SOFT TISSUES: Normal  SALIVARY GLANDS: Normal  LYMPH NODES: There are poorly defined but the lymph nodes at the level of the  parapharyngeal spaces and extending inferiorly appear somewhat enlarged. No  necrotic nodes appreciated on this unenhanced scan  TRACHEA: Trachea appears normal below the level of the larynx  THYROID GLAND: Poorly defined  CAROTID ARTERIES: Limited evaluation without contrast  JUGULAR VEINS: Limited evaluation without contrast  CERVICAL SPINE/OSSEOUS STRUCTURES: Normal  SOFT TISSUES: Normal  OTHER: None      Impression Irregular soft tissue mass involving the aryepiglottic and the  larynx. There is significant narrowing of the airway at the level of the larynx  and the supraglottic region. Poorly defined cervical adenopathy is noted. Findings discussed with Dr. Corinne Raja at 5:52 AM         Discussion: 2/1 patient with known laryngeal carcinoma scheduled for total laryngectomy radical neck dissection next week has marked worsening respiratory status with stridor CT of the neck shows compromised airway. I am concerned that he may need an emergency tracheostomy. He either needs to be emergently transferred to [facility] or maintained in ICU with consideration of tracheostomy. For the time being we will give steroids nebulized epinephrine and bronchodilators  2/2 respirations nonlabored but on the ventilator will attempt to switch to trach collar and discontinue sedation. We will ask speech to evaluate for swallow as well as for Passy-Blanca valve. He would probably benefit from PEG tube placement for nutritional supplementation  Time of care 30 minutes         Thank you for allowing us to participate in the care of this patient.   We will follow along with you    Kaila Benjamin MD

## 2021-02-04 LAB — SARS-COV-2, COV2NT: NOT DETECTED

## 2021-02-04 PROCEDURE — 74011250637 HC RX REV CODE- 250/637: Performed by: INTERNAL MEDICINE

## 2021-02-04 PROCEDURE — 65270000029 HC RM PRIVATE

## 2021-02-04 PROCEDURE — 74011250636 HC RX REV CODE- 250/636: Performed by: INTERNAL MEDICINE

## 2021-02-04 PROCEDURE — 74011250637 HC RX REV CODE- 250/637: Performed by: NURSE PRACTITIONER

## 2021-02-04 PROCEDURE — 94762 N-INVAS EAR/PLS OXIMTRY CONT: CPT

## 2021-02-04 PROCEDURE — 74011000250 HC RX REV CODE- 250: Performed by: INTERNAL MEDICINE

## 2021-02-04 PROCEDURE — 77010033711 HC HIGH FLOW OXYGEN

## 2021-02-04 PROCEDURE — 92526 ORAL FUNCTION THERAPY: CPT

## 2021-02-04 RX ORDER — GLYCOPYRROLATE 1 MG/1
1 TABLET ORAL 3 TIMES DAILY
Status: DISCONTINUED | OUTPATIENT
Start: 2021-02-04 | End: 2021-02-06

## 2021-02-04 RX ORDER — ZOLPIDEM TARTRATE 5 MG/1
10 TABLET ORAL
Status: DISCONTINUED | OUTPATIENT
Start: 2021-02-04 | End: 2021-02-07 | Stop reason: HOSPADM

## 2021-02-04 RX ADMIN — TRAMADOL HYDROCHLORIDE 50 MG: 50 TABLET, FILM COATED ORAL at 12:26

## 2021-02-04 RX ADMIN — Medication 10 ML: at 14:49

## 2021-02-04 RX ADMIN — Medication 10 ML: at 21:02

## 2021-02-04 RX ADMIN — TRAMADOL HYDROCHLORIDE 50 MG: 50 TABLET, FILM COATED ORAL at 21:06

## 2021-02-04 RX ADMIN — MORPHINE SULFATE 2 MG: 2 INJECTION, SOLUTION INTRAMUSCULAR; INTRAVENOUS at 03:47

## 2021-02-04 RX ADMIN — GLYCOPYRROLATE 1 MG: 1 TABLET ORAL at 15:00

## 2021-02-04 RX ADMIN — GLYCOPYRROLATE 0.1 MG: 0.2 INJECTION, SOLUTION INTRAMUSCULAR; INTRAVENOUS at 05:14

## 2021-02-04 RX ADMIN — Medication 10 ML: at 05:14

## 2021-02-04 RX ADMIN — MORPHINE SULFATE 2 MG: 2 INJECTION, SOLUTION INTRAMUSCULAR; INTRAVENOUS at 00:30

## 2021-02-04 RX ADMIN — FAMOTIDINE 20 MG: 20 TABLET, FILM COATED ORAL at 10:17

## 2021-02-04 RX ADMIN — GLYCOPYRROLATE 1 MG: 1 TABLET ORAL at 21:02

## 2021-02-04 RX ADMIN — FAMOTIDINE 20 MG: 20 TABLET, FILM COATED ORAL at 21:02

## 2021-02-04 NOTE — PROGRESS NOTES
Hospitalist Progress Note               Daily Progress Note: 2/4/2021      Subjective: The patient is seen for follow up. He is now on the floor. Breathing comfortably.     Problem List:  Problem List as of 2/4/2021 Never Reviewed          Codes Class Noted - Resolved    Throat cancer ([facility] ICD-10-CM: C14.0  ICD-9-CM: 149.0  1/31/2021 - Present        COPD exacerbation ([facility] ICD-10-CM: J44.1  ICD-9-CM: 491.21  1/31/2021 - Present        Acute and chronic respiratory failure with hypoxia ([facility] ICD-10-CM: J96.21  ICD-9-CM: 518.84, 799.02  1/31/2021 - Present        Chronic pain due to neoplasm ICD-10-CM: G89.3  ICD-9-CM: 338.3  1/31/2021 - Present        Anxiety ICD-10-CM: F41.9  ICD-9-CM: 300.00  1/31/2021 - Present        Insomnia ICD-10-CM: G47.00  ICD-9-CM: 780.52  1/31/2021 - Present        Respiratory failure with hypoxia [facility]) ICD-10-CM: J96.91  ICD-9-CM: 518.81  1/31/2021 - Present              Medications reviewed  Current Facility-Administered Medications   Medication Dose Route Frequency    LORazepam (ATIVAN) injection 0.5 mg  0.5 mg IntraVENous Q8H PRN    glycopyrrolate (ROBINUL) injection 0.1 mg  0.1 mg IntraVENous Q8H    morphine injection 2 mg  2 mg IntraVENous Q2H PRN    traMADoL (ULTRAM) tablet 50 mg  50 mg Oral Q6H PRN    magic mouthwash (FIRST-MOUTHWASH BLM) oral suspension 5 mL  5 mL Oral Q4H PRN    magic mouthwash (FIRST-MOUTHWASH BLM) oral suspension 5 mL  5 mL Oral TIDAC    sodium chloride (NS) flush 5-40 mL  5-40 mL IntraVENous Q8H    sodium chloride (NS) flush 5-40 mL  5-40 mL IntraVENous PRN    acetaminophen (TYLENOL) tablet 650 mg  650 mg Oral Q6H PRN    Or    acetaminophen (TYLENOL) suppository 650 mg  650 mg Rectal Q6H PRN    polyethylene glycol (MIRALAX) packet 17 g  17 g Oral DAILY PRN    bisacodyL (DULCOLAX) tablet 5 mg  5 mg Oral DAILY PRN    ondansetron (ZOFRAN ODT) tablet 4 mg  4 mg Oral Q6H PRN    Or    ondansetron (ZOFRAN) injection 4 mg  4 mg IntraVENous Q6H PRN    famotidine (PEPCID) tablet 20 mg  20 mg Oral Q12H    hydrOXYzine HCL (ATARAX) tablet 50 mg  50 mg Oral QID PRN       Review of Systems:   Review of systems not obtained due to patient factors. Objective:   Physical Exam:     Visit Vitals  /85   Pulse (!) 101   Temp 98.1 °F (36.7 °C)   Resp 20   Ht 6' (1.829 m)   Wt 60.5 kg (133 lb 6.1 oz)   SpO2 99%   BMI 18.09 kg/m²    O2 Flow Rate (L/min): 10 l/min O2 Device: Tracheal collar    Temp (24hrs), Av.5 °F (36.9 °C), Min:97.4 °F (36.3 °C), Max:99.5 °F (37.5 °C)    No intake/output data recorded.  1901 -  0700  In: 250 [I.V.:250]  Out: 1100 [Urine:1100]    General:   Alert and oriented   Lungs:   Clear to auscultation bilaterally. Chest wall:  No tenderness or deformity. Heart:  Regular rate and rhythm, S1, S2 normal, no murmur, click, rub or gallop. Abdomen:   Soft, non-tender. Bowel sounds normal. No masses,  No organomegaly. Extremities: Extremities normal, atraumatic, no cyanosis or edema. Pulses: 2+ and symmetric all extremities. Skin: Skin color, texture, turgor normal. No rashes or lesions   Neurologic: CNII-XII intact. Sedated and unresponsive         Data Review:       Recent Days:  Recent Labs     21  0450   WBC 7.4   HGB 11.7*   HCT 35.6*        Recent Labs     21  0450   *   K 3.9   CL 95*   CO2 32   *   BUN 23*   CREA 0.70   CA 9.2   MG 2.1   PHOS 3.8   ALB 2.6*     Recent Labs     21  0505   PH 7.52*   PCO2 43   PO2 272*   HCO3 34*   FIO2 80.0       24 Hour Results:  Recent Results (from the past 24 hour(s))   GLUCOSE, POC    Collection Time: 21 11:12 AM   Result Value Ref Range    Glucose (POC) 84 65 - 100 mg/dL    Performed by Nimisha Ro    SARS-COV-2    Collection Time: 21  1:00 PM   Result Value Ref Range    SARS-CoV-2 Please find results under separate order         XR CHEST PORT   Final Result   No acute cardiopulmonary abnormality. .       XR CHEST PORT   Final Result   Impression:   No acute findings. Assessment:  Stridor due to vocal fold paralysis versus tumor, status post urgent tracheostomy    Acute on chronic respiratory failure with hypoxia    Throat cancer    Acute exacerbation of COPD    Chronic pain syndrome, opioid dependent    Protein calorie malnutrition    Anxiety    Plan:  PEG tube is scheduled for today  Lorazepam as needed for anxiety  Plan transferred to [facility] tomorrow or Friday for laryngectomy on Monday  Obtain PCR Covid test today      Total time spent with patient: 30 minutes.     Linus Awad MD

## 2021-02-04 NOTE — ROUTINE PROCESS
Report given to Beryl Carrero RN oncoming nurse to include sbar, mar, kardex, recent orders and changes

## 2021-02-04 NOTE — DISCHARGE SUMMARY
Physician transfer Summary     Patient ID:    Shara Abdi  [id number]  33 y.o.  1970    Admit date: 1/31/2021    Transfer date : 2/4/2021    Chronic Diagnoses:    Problem List as of 2/4/2021 Never Reviewed          Codes Class Noted - Resolved    Throat cancer ([facility]) ICD-10-CM: C14.0  ICD-9-CM: 149.0  1/31/2021 - Present        COPD exacerbation ([facility]) ICD-10-CM: J44.1  ICD-9-CM: 491.21  1/31/2021 - Present        Acute and chronic respiratory failure with hypoxia ([facility]) ICD-10-CM: J96.21  ICD-9-CM: 518.84, 799.02  1/31/2021 - Present        Chronic pain due to neoplasm ICD-10-CM: G89.3  ICD-9-CM: 338.3  1/31/2021 - Present        Anxiety ICD-10-CM: F41.9  ICD-9-CM: 300.00  1/31/2021 - Present        Insomnia ICD-10-CM: G47.00  ICD-9-CM: 780.52  1/31/2021 - Present        Respiratory failure with hypoxia [facility]) ICD-10-CM: J96.91  ICD-9-CM: 518.81  1/31/2021 - Present          22    Final Diagnoses:   Acute and chronic respiratory failure with hypoxia ([facility]) [J96.21]  Throat cancer ([facility]) [C14.0]  COPD exacerbation ([facility]) [J44.1]  Protein calorie malnutrition  Anxiety neurosis    Reason for Hospitalization:  Patient is a 50-year-old male with a recently diagnosed T4 laryngeal cancer, biopsy-proven. He already underwent CT scans and PET scan and was scheduled for laryngectomy at [address] [place] [address] on May 8, by Dr. Swain    He presented to our facility on 1/31 with respiratory distress and stridor      Hospital Course:   Patient was admitted to the medical floor initially. He was seen by pulmonology and ENT on 2/1 and emergent tracheostomy was recommended. This was performed by Dr. Velma Oliver on 2/1    Patient was on the ventilator overnight and removed the next day. A Passy [place] valve was placed which allowed him to speak    Dr Pardeep Perez spoke with Dr. Jermain Bowen and the plan is for transfer to [facility] for his upcoming laryngectomy on Monday    GI was consulted for PEG tube which was placed on 2/3.   Tube feeds were started    I spoke with the hospitalist team at [address] [place] [address] who kindly accepted the patient in transfer on 2/4 pending bed availability              Discharge Medications:   Current Discharge Medication List          Current Facility-Administered Medications:     zolpidem (AMBIEN) tablet 10 mg, 10 mg, Per G Tube, QHS PRN, Gin Cruz MD    glycopyrrolate (ROBINUL) injection 0.1 mg, 0.1 mg, IntraVENous, Q8H, Ishaan Palacio MD, 0.1 mg at 02/04/21 0514    traMADoL (ULTRAM) tablet 50 mg, 50 mg, Oral, Q6H PRN, Ishaan Palacio MD, 50 mg at 02/04/21 1226    magic mouthwash (FIRST-MOUTHWASH BLM) oral suspension 5 mL, 5 mL, Oral, Q4H PRN, Ishaan Palacio MD    magic mouthwash (FIRST-MOUTHWASH BLM) oral suspension 5 mL, 5 mL, Oral, TIDAC, Osvaldo Crawford MD, Stopped at 02/01/21 1630    sodium chloride (NS) flush 5-40 mL, 5-40 mL, IntraVENous, Q8H, Michelle Reyes NP, 10 mL at 02/04/21 0514    sodium chloride (NS) flush 5-40 mL, 5-40 mL, IntraVENous, PRN, Michelle Reyes NP  Dossie Joann  acetaminophen (TYLENOL) tablet 650 mg, 650 mg, Oral, Q6H PRN **OR** acetaminophen (TYLENOL) suppository 650 mg, 650 mg, Rectal, Q6H PRN, Michelle Reyes NP    polyethylene glycol (MIRALAX) packet 17 g, 17 g, Oral, DAILY PRN, Michelle Reyes NP    bisacodyL (DULCOLAX) tablet 5 mg, 5 mg, Oral, DAILY PRN, Michelle Reyes NP    ondansetron (ZOFRAN ODT) tablet 4 mg, 4 mg, Oral, Q6H PRN **OR** ondansetron (ZOFRAN) injection 4 mg, 4 mg, IntraVENous, Q6H PRN, Michelle Reyes NP    famotidine (PEPCID) tablet 20 mg, 20 mg, Oral, Q12H, Michelle Reyes NP, 20 mg at 02/04/21 1017    hydrOXYzine HCL (ATARAX) tablet 50 mg, 50 mg, Oral, QID PRN, Michelle Reyes NP      Diet:  Tube feeds  With Jevity    Disposition:  [facility]    Advanced Directive:   FULL x   DNR      Discharge Exam:  General:  Alert, cooperative, no distress, cachectic in appearance. Lungs:   Clear to auscultation bilaterally.    Chest wall:  No tenderness or deformity. Heart:  Regular rate and rhythm, S1, S2 normal, no murmur, click, rub or gallop. Abdomen:   Soft, non-tender. Bowel sounds normal. No masses,  No organomegaly. Extremities: Extremities normal, atraumatic, no cyanosis or edema. Pulses: 2+ and symmetric all extremities. Skin: Skin color, texture, turgor normal. No rashes or lesions   Neurologic: CNII-XII intact. No gross sensory or motor deficits        CONSULTATIONS: Pulmonary/Intensive care, ENT    Significant Diagnostic Studies:   1/31/2021: BUN 11 mg/dL (Ref range: 6 - 20 mg/dL); Calcium 10.0 mg/dL (Ref range: 8.5 - 10.1 mg/dL); CO2 32 mmol/L (Ref range: 21 - 32 mmol/L); Creatinine 0.58 mg/dL* (Ref range: 0.70 - 1.30 mg/dL); Glucose 69 mg/dL (Ref range: 65 - 100 mg/dL); HCT 43.2 % (Ref range: 36.6 - 50.3 %); HGB 14.7 g/dL (Ref range: 12.1 - 17.0 g/dL); Potassium 3.5 mmol/L (Ref range: 3.5 - 5.1 mmol/L); Sodium 135 mmol/L* (Ref range: 136 - 145 mmol/L)  2/1/2021: BUN 16 mg/dL (Ref range: 6 - 20 mg/dL); Calcium 9.2 mg/dL (Ref range: 8.5 - 10.1 mg/dL); CO2 37 mmol/L* (Ref range: 21 - 32 mmol/L); Creatinine 0.74 mg/dL (Ref range: 0.70 - 1.30 mg/dL); Glucose 145 mg/dL* (Ref range: 65 - 100 mg/dL); HCT 42.1 % (Ref range: 36.6 - 50.3 %); HGB 13.9 g/dL (Ref range: 12.1 - 17.0 g/dL); Potassium 4.1 mmol/L (Ref range: 3.5 - 5.1 mmol/L); Sodium 135 mmol/L* (Ref range: 136 - 145 mmol/L)  Recent Labs     02/02/21  0450   WBC 7.4   HGB 11.7*   HCT 35.6*        Recent Labs     02/02/21  0450   *   K 3.9   CL 95*   CO2 32   BUN 23*   CREA 0.70   *   CA 9.2   MG 2.1   PHOS 3.8     Recent Labs     02/02/21  0450   ALB 2.6*     No results for input(s): INR, PTP, APTT, INREXT in the last 72 hours. No results for input(s): FE, TIBC, PSAT, FERR in the last 72 hours. Recent Labs     02/02/21  0505   PH 7.52*   PCO2 43   PO2 272*     No results for input(s): CPK, CKMB in the last 72 hours.     No lab exists for component: TROPONINI  Lab Results   Component Value Date/Time    Glucose (POC) 84 02/03/2021 11:12 AM       Discharge time spent 35 minutes    Signed:  Francine Johnson MD  2/4/2021  12:42 PM

## 2021-02-04 NOTE — PROGRESS NOTES
Consult  Pulmonary, Critical Care    Name: Rowena Elliott MRN: [id number]   : 1970 Hospital: [address] [place] [address]   Date: 2021  Admission date: 2021 Hospital Day: 5       Subjective/Interval History:   Seen in the emergency room with stridor respiratory distress poor air movement over the neck. CT of the neck shows compromised airway. He is using accessory muscles or respirations   2/3 Awake alert able to weakly talk with passey mamie valve. Less secretions with addition of Robinul. Room air oxygen saturation 98%  2/4 awake alert voice understandable with Passy-Mamie valve in place less secretions have turned his oxygen to 21% with saturation remaining 95%    Patient Active Problem List   Diagnosis Code    Throat cancer ([facility] C14.0    COPD exacerbation ([facility] J44.1    Acute and chronic respiratory failure with hypoxia ([facility]) J96.21    Chronic pain due to neoplasm G89.3    Anxiety F41.9    Insomnia G47.00    Respiratory failure with hypoxia ([facility]) J96.91       IMPRESSION:   1. Acute hypercapnic respiratory failure resolved   2. Acute hypoxic respiratory failure resolved   3. 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 Status post emergent tracheostomy  5.  vocal cord paralysis  6. COPD exacerbation no wheezing continue nebulizer  7. Stridor secondary to airway compromise from laryngeal carcinoma status post tracheostomy  8. Malnutrition  Body mass index is 18.09 kg/m². 9.       RECOMMENDATIONS/PLAN:   1. Maintain room air  2. Maintain Robinul per PEG tube   3. Doing well with Passy-Mamie valve  4. Subjective/Initial History:   I have reviewed the flowsheet and previous days notes. Seen earlier today on rounds. I was asked by Christine Leo MD to see Rowena Elliott a 53 y.o.    male  in consultation for a chief complaint of acute hypercapnic respiratory failure with respiratory distress and laryngeal carcinoma        Patient PCP: Emory Rodrigues NP  PMH:  has a past medical history of Chronic pain, COPD (chronic obstructive pulmonary disease) ([facility] Psychiatric disorder, and Throat cancer ([facility] PSH:   has a past surgical history that includes hx orthopaedic. FHX: family history includes Diabetes in his father, mother, and sister; Heart Disease in his daughter; Kidney Disease in his father. SHX:  reports that he quit smoking about 3 months ago. He has a 45.00 pack-year smoking history. He has never used smokeless tobacco. He reports previous alcohol use. He reports that he does not use drugs.     Systemic review very limited due to his difficulty speaking    No Known Allergies   MEDS:   Current Facility-Administered Medications   Medication    zolpidem (AMBIEN) tablet 10 mg    glycopyrrolate (ROBINUL) injection 0.1 mg    traMADoL (ULTRAM) tablet 50 mg    magic mouthwash (FIRST-MOUTHWASH BLM) oral suspension 5 mL    magic mouthwash (FIRST-MOUTHWASH BLM) oral suspension 5 mL    sodium chloride (NS) flush 5-40 mL    sodium chloride (NS) flush 5-40 mL    acetaminophen (TYLENOL) tablet 650 mg    Or    acetaminophen (TYLENOL) suppository 650 mg    polyethylene glycol (MIRALAX) packet 17 g    bisacodyL (DULCOLAX) tablet 5 mg    ondansetron (ZOFRAN ODT) tablet 4 mg    Or    ondansetron (ZOFRAN) injection 4 mg    famotidine (PEPCID) tablet 20 mg    hydrOXYzine HCL (ATARAX) tablet 50 mg        Current Facility-Administered Medications:     zolpidem (AMBIEN) tablet 10 mg, 10 mg, Per G Tube, QHS PRN, Haydee Ramon MD    glycopyrrolate (ROBINUL) injection 0.1 mg, 0.1 mg, IntraVENous, Q8H, Loli Kramer MD, 0.1 mg at 02/04/21 0514    traMADoL (ULTRAM) tablet 50 mg, 50 mg, Oral, Q6H PRN, Loli Kramer MD, 50 mg at 02/04/21 1226    magic mouthwash (FIRST-MOUTHWASH BLM) oral suspension 5 mL, 5 mL, Oral, Q4H PRN, Loli Kramer MD    magic mouthwash (FIRST-MOUTHWASH BLM) oral suspension 5 mL, 5 mL, Oral, TIDAC, Delilah Logan MD, Stopped at 02/01/21 9587   sodium chloride (NS) flush 5-40 mL, 5-40 mL, IntraVENous, Q8H, Radha Ball NP, 10 mL at 21 0514    sodium chloride (NS) flush 5-40 mL, 5-40 mL, IntraVENous, PRN, Radha Ball NP  [facility]  acetaminophen (TYLENOL) tablet 650 mg, 650 mg, Oral, Q6H PRN **OR** acetaminophen (TYLENOL) suppository 650 mg, 650 mg, Rectal, Q6H PRN, Radha Ball NP    polyethylene glycol (MIRALAX) packet 17 g, 17 g, Oral, DAILY PRN, Radha Ball NP    bisacodyL (DULCOLAX) tablet 5 mg, 5 mg, Oral, DAILY PRN, Radha Ball NP    ondansetron (ZOFRAN ODT) tablet 4 mg, 4 mg, Oral, Q6H PRN **OR** ondansetron (ZOFRAN) injection 4 mg, 4 mg, IntraVENous, Q6H PRN, Radha Ball NP    famotidine (PEPCID) tablet 20 mg, 20 mg, Oral, Q12H, Radha Ball NP, 20 mg at 21 1017    hydrOXYzine HCL (ATARAX) tablet 50 mg, 50 mg, Oral, QID PRN, Radha Ball NP      Objective:     Vital Signs: Telemetry:    normal sinus rhythm Intake/Output:   Visit Vitals  /85   Pulse (!) 101   Temp 98.1 °F (36.7 °C)   Resp 20   Ht 6' (1.829 m)   Wt 60.5 kg (133 lb 6.1 oz)   SpO2 99%   BMI 18.09 kg/m²       Temp (24hrs), Av.3 °F (36.8 °C), Min:97.4 °F (36.3 °C), Max:99 °F (37.2 °C)        O2 Device: Tracheal collar O2 Flow Rate (L/min): 10 l/min         Body mass index is 18.09 kg/m².     Wt Readings from Last 4 Encounters:   21 60.5 kg (133 lb 6.1 oz)   21 60.7 kg (133 lb 13.1 oz)   21 60.8 kg (134 lb)   21 67.1 kg (148 lb)          Intake/Output Summary (Last 24 hours) at 2021 1405  Last data filed at 2021 1226  Gross per 24 hour   Intake 250 ml   Output 300 ml   Net -50 ml       Last shift:       07 -  190  In: -   Out: 300 [Urine:300]  Last 3 shifts: 1901 -  0700  In: 250 [I.V.:250]  Out: 1100 [Urine:1100]       Hemodynamics:    CO:    CI:    CVP:    SVR:   PAP Systolic:    PAP Diastolic:    PVR:    AS53:       Ventilator Settings:      Mode Rate TV Press PEEP FiO2 PIP Min. Vent   Assist control, Volume control    500 ml    5 cm H20 30 %  25 cm H2O  8.43 l/min      Physical Exam:    General:  male; awake alert is able to talk with Passy-Blanca valve in place  HEENT: NCAT, oral mucosa clear  Eyes: anicteric; conjunctiva clear extraocular movements intact  Neck:  tracheostomy in place  Chest: no deformity, muscle wasting  Cardiac: Regular rate and rhythm no murmur no edema  Lungs: Good breath sounds bilaterally and clear no wheezes no rales does have secretion noise in the upper airway  Abd: Soft nontender normal bowel sounds  Ext: no edema; no joint swelling; No clubbing  : clear urine  Neuro: Awake alert oriented moves all 4 extremities  Psych-unable to assess  Skin: warm, dry, no cyanosis;  Pulses: Pedal radial femoral popliteal pulses intact  Capillary: Normal capillary refill      Labs:    Recent Labs     02/02/21  0450   WBC 7.4   HGB 11.7*        Recent Labs     02/02/21  0450   *   K 3.9   CL 95*   CO2 32   *   BUN 23*   CREA 0.70   CA 9.2   MG 2.1   PHOS 3.8   ALB 2.6*     Recent Labs     02/02/21  0505   PH 7.52*   PCO2 43   PO2 272*   HCO3 34*   FIO2 80.0   2/2 assist control 14 tidal volume 500 PEEP 5 FiO2 25%  2/3 room air oxygen saturation 98%  Imaging:  I have personally reviewed the patients radiographs and have reviewed the reports:    CXR Results  (Last 48 hours)    None        Results from Hospital Encounter encounter on 01/31/21   XR CHEST PORT    Narrative HISTORY:  Acute respiratory failure    TECHNIQUE:  AP chest radiograph    COMPARISON: 1/31/2021  LIMITATIONS: None    TUBES/LINES: Tracheostomy tube present with tip below thoracic inlet. LUNG PARENCHYMA: No convincing acute pulmonary parenchymal abnormality  TRACHEA/BRONCHI: Normal  PULMONARY VESSELS: Normal  PLEURA: Normal  HEART: Normal  AORTIC SHADOW:Normal.    MEDIASTINUM: Normal  BONE/SOFT TISSUES: No acute abnormality.       OTHER: None      Impression No acute cardiopulmonary abnormality. .    XR CHEST PORT    Narrative Study: XR CHEST PORT    Clinical indication: hypoxia    Comparison: Chest x-ray 1/28/2021. Findings:    No consolidative airspace disease, pleural effusion or pneumothorax. Cardiomediastinal contours are within normal limits. No pulmonary edema. No acute osseous abnormality identified. Impression Impression:  No acute findings. Results from Hospital Encounter encounter on 01/28/21   XR CHEST PORT    Narrative PROCEDURE: XR CHEST PORT    HISTORY:Short of breath    COMPARISON:Chest x-ray 24 January 2021      TECHNIQUE: AP portable chest    LIMITATIONS: None    TUBES/LINES: None    LUNG PARENCHYMA/PLEURA: There is some small areas of asymmetric density in the  right lower lung zone and both midlung zones. No well-defined infiltrate or  mass. TRACHEA/BRONCHI: Normal  PULMONARY VESSELS: Normal  HEART: Normal  MEDIASTINUM: Normal  BONE/SOFT TISSUES: No acute process    OTHER: None      Impression Minimal bilateral densities. Early pneumonia? .            Results from [place] encounter on 01/28/21   CT NECK SOFT TISSUE WO CONT    Narrative PROCEDURE: CT NECK SOFT TISSUE WO CONT    HISTORY:Throat cancer. Short of breath    COMPARISON:None    Department policy stipulates all CT scans at this facility are performed using  dose reduction optimization techniques as appropriate to the performed exam,  including the following: Automated exposure control, adjustments of the mA  and/or KVP according to the patient size, and the use of iterative  reconstruction technique. TECHNIQUE: Axial images through the neck with multiplanar reconstruction.  No IV  contrast.  COMPARISON: None  LIMITATIONS: None  NASOPHARYNX: Normal  OROPHARYNX: Normal  HYPOPHARYNX: Normal  EPIGLOTTIS/ARYEPIGLOTTIC FOLDS: The epiglottis appears normal. At the level of  the area epiglottic folds and extending into the larynx there is a poorly  defined soft tissue mass associated with mucosa and submucosal tissues. The  process is bilateral extending posteriorly across the midline. Anteriorly there  is a very small component across the midline. There is a very asymmetric  narrowing of the airway at this level with a larger mass on the right. The  process extends down to and involves the lateral walls of the larynx including  both the true and false cords. Laryngeal ventricle is distorted on the right and  compressed on the left. LARYNX: In addition to the mass extending into the larynx described above there  is poorly defined increased soft tissue density around the larynx. Fat planes in  this area are not well defined. PARAPHARYNGEAL SPACE: The deep parapharyngeal spaces appear relatively clear. RETROPHARYNGEAL/PREVERTEBRAL SOFT TISSUES: Normal  SALIVARY GLANDS: Normal  LYMPH NODES: There are poorly defined but the lymph nodes at the level of the  parapharyngeal spaces and extending inferiorly appear somewhat enlarged. No  necrotic nodes appreciated on this unenhanced scan  TRACHEA: Trachea appears normal below the level of the larynx  THYROID GLAND: Poorly defined  CAROTID ARTERIES: Limited evaluation without contrast  JUGULAR VEINS: Limited evaluation without contrast  CERVICAL SPINE/OSSEOUS STRUCTURES: Normal  SOFT TISSUES: Normal  OTHER: None      Impression Irregular soft tissue mass involving the aryepiglottic and the  larynx. There is significant narrowing of the airway at the level of the larynx  and the supraglottic region. Poorly defined cervical adenopathy is noted. Findings discussed with Dr. Shine Sweeney at 5:52 AM         Discussion: 2/1 patient with known laryngeal carcinoma scheduled for total laryngectomy radical neck dissection next week has marked worsening respiratory status with stridor CT of the neck shows compromised airway. I am concerned that he may need an emergency tracheostomy.   He either needs to be emergently transferred to [facility] or maintained in ICU with consideration of tracheostomy. For the time being we will give steroids nebulized epinephrine and bronchodilators  2/2 respirations nonlabored but on the ventilator will attempt to switch to trach collar and discontinue sedation. We will ask speech to evaluate for swallow as well as for Passy[place] valve. He would probably benefit from PEG tube placement for nutritional supplementation           Thank you for allowing us to participate in the care of this patient.   We will follow along with you    Nataliia Foreman MD

## 2021-02-04 NOTE — MED STUDENT NOTES
Hospitalist Progress Note Daily Progress Note: 2/4/2021 Subjective: The patient is seen for follow up. Patient had a successful PEG tube placement yesterday. He continues to complain of anxiety despite IV ativan which has caused to not sleep for the past 6 nights. He is also request food via his PEG tube as soon as possible. Per nursing staff, patient's morphine times out at 1 pm and they are requesting a reorder. COVID-19 test is pending prior to transfer to [facility] for his surgery scheduled on Monday 2/8/21. Patient is awaiting Nutrition evaluation prior to starting tube feeding. Problem List: 
Problem List as of 2/4/2021 Never Reviewed Codes Class Noted - Resolved Throat cancer [facility]) ICD-10-CM: C14.0 ICD-9-CM: 149.0  1/31/2021 - Present COPD exacerbation ([facility] [address] ICD-10-CM: J44.1 ICD-9-CM: 491.21  1/31/2021 - Present Acute and chronic respiratory failure with hypoxia [facility]) ICD-10-CM: J96.21 
ICD-9-CM: 518.84, 799.02  1/31/2021 - Present Chronic pain due to neoplasm ICD-10-CM: G89.3 ICD-9-CM: 338.3  1/31/2021 - Present Anxiety ICD-10-CM: F41.9 ICD-9-CM: 300.00  1/31/2021 - Present Insomnia ICD-10-CM: G47.00 ICD-9-CM: 780.52  1/31/2021 - Present Respiratory failure with hypoxia [facility]) ICD-10-CM: J96.91 
ICD-9-CM: 518.81  1/31/2021 - Present Medications reviewed Current Facility-Administered Medications Medication Dose Route Frequency  LORazepam (ATIVAN) injection 0.5 mg  0.5 mg IntraVENous Q8H PRN  
 glycopyrrolate (ROBINUL) injection 0.1 mg  0.1 mg IntraVENous Q8H  
 morphine injection 2 mg  2 mg IntraVENous Q2H PRN  
 traMADoL (ULTRAM) tablet 50 mg  50 mg Oral Q6H PRN  
 magic mouthwash (FIRST-MOUTHWASH BLM) oral suspension 5 mL  5 mL Oral Q4H PRN  
 magic mouthwash (FIRST-MOUTHWASH BLM) oral suspension 5 mL  5 mL Oral TIDAC  sodium chloride (NS) flush 5-40 mL  5-40 mL IntraVENous Q8H  
  sodium chloride (NS) flush 5-40 mL  5-40 mL IntraVENous PRN  
 acetaminophen (TYLENOL) tablet 650 mg  650 mg Oral Q6H PRN Or  
 acetaminophen (TYLENOL) suppository 650 mg  650 mg Rectal Q6H PRN  polyethylene glycol (MIRALAX) packet 17 g  17 g Oral DAILY PRN  
 bisacodyL (DULCOLAX) tablet 5 mg  5 mg Oral DAILY PRN  
 ondansetron (ZOFRAN ODT) tablet 4 mg  4 mg Oral Q6H PRN Or  
 ondansetron (ZOFRAN) injection 4 mg  4 mg IntraVENous Q6H PRN  
 famotidine (PEPCID) tablet 20 mg  20 mg Oral Q12H  hydrOXYzine HCL (ATARAX) tablet 50 mg  50 mg Oral QID PRN Review of Systems:  
Constitutional: Anorexia and fatigue GI: abdominal pain around PEG tube site Psych: anxiety Remained of ROS negative aside from what is noted in HPI and above. Objective:  
Physical Exam:  
 
Visit Vitals /85 Pulse (!) 101 Temp 98.1 °F (36.7 °C) Resp 20 Ht 6' (1.829 m) Wt 60.5 kg (133 lb 6.1 oz) SpO2 99% BMI 18.09 kg/m² O2 Flow Rate (L/min): 10 l/min O2 Device: Tracheal collar Temp (24hrs), Av.5 °F (36.9 °C), Min:97.4 °F (36.3 °C), Max:99.5 °F (37.5 °C) No intake/output data recorded.  1901 -  0700 In: 250 [I.V.:250] Out: 1100 [Urine:1100] General:   Awake and alert Lungs:    Stridor and mild expiratory crackles bilaterally Chest wall:  No tenderness or deformity. Heart:  Regular rate and rhythm, S1, S2 normal, no murmur, click, rub or gallop. Abdomen:   Soft, mildly-tender surrounding PEG tube site which is currently bandaged. Bowel sounds normal. No masses,  No organomegaly. Extremities: Extremities normal, atraumatic, no cyanosis or edema. Pulses: 2+ and symmetric all extremities. Skin: Skin color, texture, turgor normal. No rashes or lesions Neurologic: CNII-XII intact. No gross focal deficits Data Review:  
   
Recent Days: 
Recent Labs 21 
0450 WBC 7.4 HGB 11.7* HCT 35.6*  Recent Labs 21 
0450 * K 3.9 CL 95* CO2 32 * BUN 23* CREA 0.70 CA 9.2 MG 2.1 PHOS 3.8 ALB 2.6* Recent Labs 02/02/21 
0505 PH 7.52* PCO2 43 PO2 272* HCO3 34* FIO2 80.0  
 
 
24 Hour Results: 
Recent Results (from the past 24 hour(s)) GLUCOSE, POC Collection Time: 02/03/21 11:12 AM  
Result Value Ref Range Glucose (POC) 84 65 - 100 mg/dL Performed by Graciela Key   
SARS-COV-2 Collection Time: 02/03/21  1:00 PM  
Result Value Ref Range SARS-CoV-2 Please find results under separate order XR CHEST PORT Final Result No acute cardiopulmonary abnormality. [place] [facility] XR CHEST PORT Final Result Impression: No acute findings. Assessment: 
Stridor due to vocal fold paralysis versus tumor, status post urgent tracheostomy, stable 
  
Acute on chronic respiratory failure with hypoxia, stable. SpO2 99% 
  Throat cancer 
  
Acute exacerbation of COPD, stridor and mild expiratory crackles 
  
Chronic pain syndrome, opioid dependent 
  
Protein calorie malnutrition, Tube feedings pending Nutrition assessment 
  
Anxiety, worsening. Plan: 
Begin tube feedings following Nutritionist assessment Continue Lorazepam as needed for anxiety. Plan transfer to [facility] pending COVID PCR results today or tomorrow. Laryngectomy scheduled for 2/8/21. Care Plan discussed with: Patient/Family Total time spent with patient: 30 minutes. Andrew Ball *ATTENTION:  This note has been created by a medical student for educational purposes only. Please do not refer to the content of this note for clinical decision-making, billing, or other purposes. Please see attending physicians note to obtain clinical information on this patient. *

## 2021-02-04 NOTE — PROGRESS NOTES
Problem: Falls - Risk of  Goal: *Absence of Falls  Description: Document Alda Griffith Fall Risk and appropriate interventions in the flowsheet. Outcome: Progressing Towards Goal  Note: Fall Risk Interventions:  Mobility Interventions: Patient to call before getting OOB, PT Consult for mobility concerns, PT Consult for assist device competence         Medication Interventions: Bed/chair exit alarm, Evaluate medications/consider consulting pharmacy, Patient to call before getting OOB    Elimination Interventions: Bed/chair exit alarm, Call light in reach, Patient to call for help with toileting needs              Problem: Patient Education: Go to Patient Education Activity  Goal: Patient/Family Education  Outcome: Progressing Towards Goal     Problem: Ventilator Management  Goal: *Adequate oxygenation and ventilation  Outcome: Progressing Towards Goal  Goal: *Patient maintains clear airway/free of aspiration  Outcome: Progressing Towards Goal  Goal: *Absence of infection signs and symptoms  Outcome: Progressing Towards Goal  Goal: *Normal spontaneous ventilation  Outcome: Progressing Towards Goal     Problem: Patient Education: Go to Patient Education Activity  Goal: Patient/Family Education  Outcome: Progressing Towards Goal     Problem: Pressure Injury - Risk of  Goal: *Prevention of pressure injury  Description: Document Marin Scale and appropriate interventions in the flowsheet.   Outcome: Progressing Towards Goal  Note: Pressure Injury Interventions:  Sensory Interventions: Assess changes in LOC, Assess need for specialty bed, Avoid rigorous massage over bony prominences, Keep linens dry and wrinkle-free, Maintain/enhance activity level, Minimize linen layers, Monitor skin under medical devices, Pressure redistribution bed/mattress (bed type)    Moisture Interventions: Absorbent underpads, Apply protective barrier, creams and emollients, Assess need for specialty bed, Maintain skin hydration (lotion/cream), Minimize layers, Moisture barrier    Activity Interventions: Increase time out of bed, Pressure redistribution bed/mattress(bed type), PT/OT evaluation    Mobility Interventions: HOB 30 degrees or less, Pressure redistribution bed/mattress (bed type), PT/OT evaluation    Nutrition Interventions: Document food/fluid/supplement intake    Friction and Shear Interventions: Minimize layers, Foam dressings/transparent film/skin sealants, Apply protective barrier, creams and emollients                Problem: Patient Education: Go to Patient Education Activity  Goal: Patient/Family Education  Outcome: Progressing Towards Goal     Problem: Discharge Planning  Goal: *Discharge to safe environment  Outcome: Progressing Towards Goal  Goal: *Knowledge of medication management  Outcome: Progressing Towards Goal  Goal: *Knowledge of discharge instructions  Outcome: Progressing Towards Goal     Problem: Patient Education: Go to Patient Education Activity  Goal: Patient/Family Education  Outcome: Progressing Towards Goal     Problem: Patient Education: Go to Patient Education Activity  Goal: Patient/Family Education  Outcome: Progressing Towards Goal     Problem: Patient Education: Go to Patient Education Activity  Goal: Patient/Family Education  Outcome: Progressing Towards Goal     Problem: Non-Violent Restraints  Goal: *Removal from restraints as soon as assessed to be safe  Outcome: Progressing Towards Goal  Goal: *No harm/injury to patient while restraints in use  Outcome: Progressing Towards Goal  Goal: *Patient's dignity will be maintained  Outcome: Progressing Towards Goal  Goal: *Patient Specific Goal (EDIT GOAL, INSERT TEXT)  Outcome: Progressing Towards Goal  Goal: Non-violent Restaints:Standard Interventions  Outcome: Progressing Towards Goal  Goal: Non-violent Restraints:Patient Interventions  Outcome: Progressing Towards Goal  Goal: Patient/Family Education  Outcome: Progressing Towards Goal     Problem: Patient Education: Go to Patient Education Activity  Goal: Patient/Family Education  Outcome: Progressing Towards Goal     Problem: Patient Education: Go to Patient Education Activity  Goal: Patient/Family Education  Outcome: Progressing Towards Goal

## 2021-02-04 NOTE — PROGRESS NOTES
Hospitalist Progress Note               Daily Progress Note:     Subjective: The patient is seen for follow up. Patient had a successful PEG tube placement yesterday. He continues to complain of anxiety despite IV ativan which has caused to not sleep for the past 6 nights. He is also request food via his PEG tube as soon as possible. Per nursing staff, patient's morphine times out at 1 pm and they are requesting a reorder. COVID-19 test is pending prior to transfer to [facility] for his surgery scheduled on Monday 2/8/21. Patient is awaiting Nutrition evaluation prior to starting tube feeding.      Problem List:  Problem List as of 2/4/2021 Never Reviewed          Codes Class Noted - Resolved    Throat cancer ([facility] ICD-10-CM: C14.0  ICD-9-CM: 149.0  1/31/2021 - Present        COPD exacerbation ([facility] ICD-10-CM: J44.1  ICD-9-CM: 491.21  1/31/2021 - Present        Acute and chronic respiratory failure with hypoxia (HCC) ICD-10-CM: J96.21  ICD-9-CM: 518.84, 799.02  1/31/2021 - Present        Chronic pain due to neoplasm ICD-10-CM: G89.3  ICD-9-CM: 338.3  1/31/2021 - Present        Anxiety ICD-10-CM: F41.9  ICD-9-CM: 300.00  1/31/2021 - Present        Insomnia ICD-10-CM: G47.00  ICD-9-CM: 780.52  1/31/2021 - Present        Respiratory failure with hypoxia [facility]) ICD-10-CM: J96.91  ICD-9-CM: 518.81  1/31/2021 - Present              Medications reviewed  Current Facility-Administered Medications   Medication Dose Route Frequency    zolpidem (AMBIEN) tablet 10 mg  10 mg Per G Tube QHS PRN    glycopyrrolate (ROBINUL) injection 0.1 mg  0.1 mg IntraVENous Q8H    morphine injection 2 mg  2 mg IntraVENous Q2H PRN    traMADoL (ULTRAM) tablet 50 mg  50 mg Oral Q6H PRN    magic mouthwash (FIRST-MOUTHWASH BLM) oral suspension 5 mL  5 mL Oral Q4H PRN    magic mouthwash (FIRST-MOUTHWASH BLM) oral suspension 5 mL  5 mL Oral TIDAC    sodium chloride (NS) flush 5-40 mL  5-40 mL IntraVENous Q8H    sodium chloride (NS) flush 5-40 mL  5-40 mL IntraVENous PRN    acetaminophen (TYLENOL) tablet 650 mg  650 mg Oral Q6H PRN    Or    acetaminophen (TYLENOL) suppository 650 mg  650 mg Rectal Q6H PRN    polyethylene glycol (MIRALAX) packet 17 g  17 g Oral DAILY PRN    bisacodyL (DULCOLAX) tablet 5 mg  5 mg Oral DAILY PRN    ondansetron (ZOFRAN ODT) tablet 4 mg  4 mg Oral Q6H PRN    Or    ondansetron (ZOFRAN) injection 4 mg  4 mg IntraVENous Q6H PRN    famotidine (PEPCID) tablet 20 mg  20 mg Oral Q12H    hydrOXYzine HCL (ATARAX) tablet 50 mg  50 mg Oral QID PRN       Review of Systems:   Constitutional: Anorexia and fatigue  GI: abdominal pain around PEG tube site  Psych: anxiety    Remained of ROS negative aside from what is noted in HPI and above. Objective:   Physical Exam:     Visit Vitals  /85   Pulse (!) 101   Temp 98.1 °F (36.7 °C)   Resp 20   Ht 6' (1.829 m)   Wt 60.5 kg (133 lb 6.1 oz)   SpO2 99%   BMI 18.09 kg/m²    O2 Flow Rate (L/min): 10 l/min O2 Device: Tracheal collar    Temp (24hrs), Av.5 °F (36.9 °C), Min:97.4 °F (36.3 °C), Max:99.5 °F (37.5 °C)    No intake/output data recorded.  1901 -  0700  In: 250 [I.V.:250]  Out: 1100 [Urine:1100]    General:   Awake and alert   Lungs:    Stridor and mild expiratory crackles bilaterally    Chest wall:  No tenderness or deformity. Heart:  Regular rate and rhythm, S1, S2 normal, no murmur, click, rub or gallop. Abdomen:   Soft, mildly-tender surrounding PEG tube site which is currently bandaged. Bowel sounds normal. No masses,  No organomegaly. Extremities: Extremities normal, atraumatic, no cyanosis or edema. Pulses: 2+ and symmetric all extremities. Skin: Skin color, texture, turgor normal. No rashes or lesions   Neurologic: CNII-XII intact.   No gross focal deficits         Data Review:       Recent Days:  Recent Labs     21  0450   WBC 7.4   HGB 11.7*   HCT 35.6*        Recent Labs     21  0450   *   K 3.9   CL 95* CO2 32   *   BUN 23*   CREA 0.70   CA 9.2   MG 2.1   PHOS 3.8   ALB 2.6*     Recent Labs     02/02/21  0505   PH 7.52*   PCO2 43   PO2 272*   HCO3 34*   FIO2 80.0       24 Hour Results:  Recent Results (from the past 24 hour(s))   GLUCOSE, POC    Collection Time: 02/03/21 11:12 AM   Result Value Ref Range    Glucose (POC) 84 65 - 100 mg/dL    Performed by Anthony Barfield    SARS-COV-2    Collection Time: 02/03/21  1:00 PM   Result Value Ref Range    SARS-CoV-2 Please find results under separate order         XR CHEST PORT   Final Result   No acute cardiopulmonary abnormality. .       XR CHEST PORT   Final Result   Impression:   No acute findings. Assessment:  Stridor due to vocal fold paralysis versus tumor, status post urgent tracheostomy, stable     Acute on chronic respiratory failure with hypoxia, stable. SpO2 99%     Throat cancer     Acute exacerbation of COPD, stridor and mild expiratory crackles     Chronic pain syndrome, opioid dependent     Protein calorie malnutrition, Tube feedings pending Nutrition assessment     Anxiety, worsening. Plan:  Begin tube feedings following Nutritionist assessment  Zolpidem at bedtime  Plan transfer to [facility] pending COVID PCR results today or tomorrow. Laryngectomy scheduled for 2/8/21. Care Plan discussed with: Patient/Family    Total time spent with patient: 30 minutes.     Josemanuel Tellez MD

## 2021-02-04 NOTE — PROGRESS NOTES
SPEECH LANGUAGE PATHOLOGY DYSPHAGIA TREATMENT  Patient: Adriane Dotson (81 y.o. male)  Date: 2/4/2021  Diagnosis: Acute and chronic respiratory failure with hypoxia ([facility] [address] [J96.21]  [facility] [C14.0]  COPD exacerbation ([facility] [J44.1] <principal problem not specified>  Procedure(s) (LRB):  PERCUTANEOUS ENDOSCOPIC GASTROSTOMY TUBE INSERTION (N/A)  ESOPHAGOGASTRODUODENOSCOPY (EGD) (N/A) 1 Day Post-Op  Precautions:  Aspiration and fall    ASSESSMENT :  Patient alert sitting EOB upon arrival. No mirror present patient unable to independently randee PMV w/o visual. Clinician donned PMV and patient able to tolerate approx 15 mins prior to requesting break. Patient encouraged to wear w/ medical staff present for increased communication. Patient continues to be request PO trials however is anxious and timid allowing minimal trials. Patient demonstrated increased atypical audible swallow w/ multiple swallows (3-5) per 1/4 tsp bolus. Delayed cough noted w/ increased anxiety. Patient easily frustrated w/ his dysphagia. Do not recommend MBS at this time as patient is schecduled for laryngectomy on 2/8/2021. Plans for possible transfer this date pending bed availability. Recommendations and Planned Interventions:  Rec cont NPO w/ TF via PEG. Ice chips and 1/4 tsp applesauce for pleasure ok. Frequency/Duration: Patient will be followed by speech-language pathology 5 times a week to address goals. Discharge Recommendations: Plans for tx to [facility] for procedure. Rec ST to follow once transferred for continued pre laryngectomy education. SUBJECTIVE:   Patient reports he is hungry and asking when TF will be started. Notified nsg, TF orders in place and needs for initiation.      OBJECTIVE:     Past Medical History:   Diagnosis Date    Chronic pain     THROAT, EARS, NECK R/T CANCER    COPD (chronic obstructive pulmonary disease) (HCC)     EMPHASEMA    Psychiatric disorder     ANXIETY R/T CANCER    Throat cancer ([facility] [address]        CXR Results  (Last 48 hours)      None              Current Diet:  DIET NPO  DIET TUBE FEEDING     Cognitive and Communication Status:  Neurologic State: Alert  Orientation Level: Oriented X4  Cognition: Appropriate decision making, Appropriate for age attention/concentration, Follows commands           Dysphagia Treatment:  Oral Assessment:     P.O. Trials:  Patient Position: sitting EOB  Vocal quality prior to P.O.: (strained hoarse w/ PMV)  Consistency Presented: Ice chips;Pudding       Pain:   3 (PEG site)    After treatment:   Patient left in no apparent distress in bed, Call bell within reach, and Nursing notified    COMMUNICATION/EDUCATION:   Patient receptive of education regarding s/s aspiration, aspiration precautions, and diet recs. The patient's plan of care including recommendations, planned interventions, and recommended diet changes were discussed with: Registered nurse, Physician, and RD . Patient/family have participated as able in goal setting and plan of care. Problem: Dysphagia (Adult)  Goal: *Acute Goals and Plan of Care (Insert Text)  Description: Speech Therapy Swallow Goals  Initiated 2/3/2021  -Patient will tolerate PO trials without clinical indicators of aspiration given no cues within 7 day(s).          [ ] Not met  [ ]  MET   [ ] Progressing  [ ] Discontinue    Outcome: Progressing Towards Goal   Thank you for this referral.  Tashi Cotton M.S., M.Ed., CCC-SLP  Time Calculation: 17 mins

## 2021-02-04 NOTE — ROUTINE PROCESS
Bedside and verbal shift change report given to Mohsen Dixon RN (oncoming nurse) by Barbara Campa RN (offgoing nurse). Report included the following information SBAR, Kardex, Intake/Output, MAR, Recent Results, and Quality Measures.

## 2021-02-04 NOTE — PROGRESS NOTES
Progress Note    Patient: Rowena Elliott MRN: [id number]  SSN: [id number]    YOB: 1970  Age: 53 y.o.   Sex: male      Admit Date: 1/31/2021    LOS: 4 days     Subjective:   Patient examined, on tracheostomy oxygen, no nausea vomiting no shortness of breath, had PEG tube placement yesterday    Past Medical History:   Diagnosis Date    Chronic pain     THROAT, EARS, NECK R/T CANCER    COPD (chronic obstructive pulmonary disease) ([facility] [address]     EMPHASEMA    Psychiatric disorder     ANXIETY R/T CANCER    Throat cancer ([facility])         Current Facility-Administered Medications:     zolpidem (AMBIEN) tablet 10 mg, 10 mg, Per G Tube, QHS PRN, Mariia eHbert MD    glycopyrrolate (ROBINUL) injection 0.1 mg, 0.1 mg, IntraVENous, Q8H, eNss Elliott MD, 0.1 mg at 02/04/21 0514    traMADoL (ULTRAM) tablet 50 mg, 50 mg, Oral, Q6H PRN, Ness Elliott MD    magic mouthwash (FIRST-MOUTHWASH BLM) oral suspension 5 mL, 5 mL, Oral, Q4H PRN, Ness Elliott MD    magic mouthwash (FIRST-MOUTHWASH BLM) oral suspension 5 mL, 5 mL, Oral, TIDAC, Dola Galeazzi, MD, Stopped at 02/01/21 1630    sodium chloride (NS) flush 5-40 mL, 5-40 mL, IntraVENous, Q8H, Lilia Cook NP, 10 mL at 02/04/21 0514    sodium chloride (NS) flush 5-40 mL, 5-40 mL, IntraVENous, PRN, Lilia Cook NP  Jamesetta Medal  acetaminophen (TYLENOL) tablet 650 mg, 650 mg, Oral, Q6H PRN **OR** acetaminophen (TYLENOL) suppository 650 mg, 650 mg, Rectal, Q6H PRN, Lilia Cook NP    polyethylene glycol (MIRALAX) packet 17 g, 17 g, Oral, DAILY PRN, Lilia Cook NP    bisacodyL (DULCOLAX) tablet 5 mg, 5 mg, Oral, DAILY PRN, Lilia Cook NP    ondansetron (ZOFRAN ODT) tablet 4 mg, 4 mg, Oral, Q6H PRN **OR** ondansetron (ZOFRAN) injection 4 mg, 4 mg, IntraVENous, Q6H PRN, Lilia Cook NP    famotidine (PEPCID) tablet 20 mg, 20 mg, Oral, Q12H, Lilia Cook NP, 20 mg at 02/04/21 1017    hydrOXYzine HCL (ATARAX) tablet 50 mg, 50 mg, Oral, QID PRN, David Woods, NP    Objective:     Vitals:    02/03/21 2054 02/04/21 0055 02/04/21 0728 02/04/21 0854   BP: 134/88 (!) 140/98 120/85    Pulse: (!) 110 (!) 124 (!) 101    Resp: 20 20 20    Temp: 99 °F (37.2 °C) 97.4 °F (36.3 °C) 98.1 °F (36.7 °C)    SpO2: 90% 95% 99% 99%   Weight:       Height:            Intake and Output:  Current Shift: No intake/output data recorded. Last three shifts: 02/02 1901 - 02/04 0700  In: 250 [I.V.:250]  Out: 1100 [Urine:1100]    Physical Exam:   Physical Exam   Constitutional: He appears distressed. HENT:   Head: Atraumatic. Eyes: Pupils are equal, round, and reactive to light. Conjunctivae and EOM are normal. No scleral icterus. Neck: No thyromegaly present. Cardiovascular: Normal heart sounds. Pulmonary/Chest: Effort normal.   Abdominal: Soft. There is no abdominal tenderness. Musculoskeletal:         General: No tenderness or edema. Neurological: He is alert. Skin: Skin is dry. Psychiatric: His behavior is normal.   PEG tube site skin purified    Lab/Data Review:  Recent Results (from the past 24 hour(s))   SARS-COV-2    Collection Time: 02/03/21  1:00 PM   Result Value Ref Range    SARS-CoV-2 Please find results under separate order          XR CHEST PORT   Final Result   No acute cardiopulmonary abnormality. .       XR CHEST PORT   Final Result   Impression:   No acute findings.            Assessment:     Active Problems:    Throat cancer ([address] (1/31/2021)      COPD exacerbation (HCC) (1/31/2021)      Acute and chronic respiratory failure with hypoxia (HCC) (1/31/2021)      Chronic pain due to neoplasm (1/31/2021)      Anxiety (1/31/2021)      Insomnia (1/31/2021)      Respiratory failure with hypoxia ([address] (1/31/2021)         severe malnutrition, difficulty swallow,  Throat cancer  Acute exacerbation of COPD           Plan:    Continue treatment for COPD  Antibiotic treatment as ordered,  Nutrition speech to follow,   Start on tube feeding,  Sign off    Plan:       Signed By: Amina Smith MD     February 4, 2021        Thank you for allowing me to participate in this patients care  Cc Referring Physician   Koki Mcnulty NP

## 2021-02-04 NOTE — PROGRESS NOTES
Physician Progress Note      Daquan Hernnadez  [facility] #:                  [id number]  :                       1970  ADMIT DATE:       2021 8:15 AM  [address] [place] DATE:  RESPONDING  PROVIDER #:        Abel White MD          QUERY TEXT:    Dr. Kishore Mulligan    Pt admitted with acute respiratory failure and has protein calorie malnutrition documented. Please further specify type of malnutrition with documentation in the medical record. The medical record reflects the following:  Risk Factors: throat cancer, COPD, chronic pain  Clinical Indicators: BMI 18.0; Albumin 2.6; weight loss; constitutional exam with cachectic appearance  Treatment: liquid diet, speech therapy consult , I&O, daily weight    Thank you,  Rocio Trejo RN, CCDS, CPC  Options provided:  -- Mild Protein calorie malnutrition  -- Moderate Protein calorie malnutrition  -- Severe Protein calorie malnutrition  -- Other - I will add my own diagnosis  -- Disagree - Not applicable / Not valid  -- Disagree - Clinically unable to determine / Unknown  -- Refer to Clinical Documentation Reviewer    PROVIDER RESPONSE TEXT:    This patient has moderate protein calorie malnutrition.     Query created by: Turner Tovar on 2021 1:58 PM      Electronically signed by:  Abel White MD 2021 10:07 AM

## 2021-02-05 PROCEDURE — 74011250637 HC RX REV CODE- 250/637: Performed by: INTERNAL MEDICINE

## 2021-02-05 PROCEDURE — 65270000029 HC RM PRIVATE

## 2021-02-05 PROCEDURE — 92507 TX SP LANG VOICE COMM INDIV: CPT

## 2021-02-05 PROCEDURE — 94762 N-INVAS EAR/PLS OXIMTRY CONT: CPT

## 2021-02-05 PROCEDURE — 77010033711 HC HIGH FLOW OXYGEN

## 2021-02-05 PROCEDURE — 97530 THERAPEUTIC ACTIVITIES: CPT

## 2021-02-05 RX ADMIN — TRAMADOL HYDROCHLORIDE 50 MG: 50 TABLET, FILM COATED ORAL at 20:57

## 2021-02-05 RX ADMIN — POLYETHYLENE GLYCOL 3350 17 G: 17 POWDER, FOR SOLUTION ORAL at 17:54

## 2021-02-05 RX ADMIN — DIPHENHYDRAMINE HYDROCHLORIDE AND LIDOCAINE HYDROCHLORIDE AND ALUMINUM HYDROXIDE AND MAGNESIUM HYDRO 5 ML: KIT at 06:06

## 2021-02-05 RX ADMIN — Medication 10 ML: at 05:26

## 2021-02-05 RX ADMIN — FAMOTIDINE 20 MG: 20 TABLET, FILM COATED ORAL at 11:00

## 2021-02-05 RX ADMIN — TRAMADOL HYDROCHLORIDE 50 MG: 50 TABLET, FILM COATED ORAL at 02:07

## 2021-02-05 RX ADMIN — GLYCOPYRROLATE 1 MG: 1 TABLET ORAL at 17:54

## 2021-02-05 RX ADMIN — FAMOTIDINE 20 MG: 20 TABLET, FILM COATED ORAL at 20:54

## 2021-02-05 RX ADMIN — Medication 10 ML: at 20:55

## 2021-02-05 RX ADMIN — GLYCOPYRROLATE 1 MG: 1 TABLET ORAL at 11:00

## 2021-02-05 RX ADMIN — TRAMADOL HYDROCHLORIDE 50 MG: 50 TABLET, FILM COATED ORAL at 14:26

## 2021-02-05 RX ADMIN — GLYCOPYRROLATE 1 MG: 1 TABLET ORAL at 20:55

## 2021-02-05 RX ADMIN — Medication 10 ML: at 14:27

## 2021-02-05 NOTE — PROGRESS NOTES
Comprehensive Nutrition Assessment    Type and Reason for Visit: Reassess    Nutrition Recommendations/Plan:   Adjust TF via PEG to bolus feeds of Jevity 1.5 at goal rate 220mL/hr q4hr  Add 125mL free water q4hr  Goal feeds providing 1980kcal, 84g protein, 1753mL fluid (>/= 100% est needs)     Document TF rate, water flushes, and GRVs in EMR    Nutrition Assessment:  C/o spasms and pain in throat 2/2 CA; apparently plan for biopsy 2/8, per ENT no official dx H&N CA. Noted hypoxia on RA. Admitted for COPD exacerbation and acute resp failure. CT neck showing compromised airway. ENT consulted for emergent trach. Trach placed, pt intubated via trach. Now off vent, on trach collar. Not requiring pressors. Transferred to med floor 2/3. Per H&P, no PO x2 days pta. Pt with poor nutrition status so plans for PEG placement d/t throat mass. S/p PEG placement 2/3. PEG in place and TF recs in chart (2/1). TF ordered by RD yesterday AM at 0900. Per SLP, who attempted to eval pt, pt still with no TF initiated yesterday (2/4) at 1pm. Apparently, RN unable to decipher TF order entered by RD, and then unable to find pump. D/t lack of initiation, pt without nutrition x4 days since admit and 1x day since PEG placement. TF initiated as bolus feeds of 200mL- presumably q4hr as no documentation of provision available. RD to update TF order to bolus feeds. Pt resting at time of RD visit, did not disturb- will f/u on goal assessment. On initial assessment, spoke with pt at bedside, using whiteboard to communicate- pt reported poor appetite and significant 30lb wt loss x3 months. Per RN, still working on tx to [facility] for tx. Labs (2/2): Na 135, BUN 23, Cr 0.70, , lytes wnl.  Meds: tramadol, solumedrol, dulcolax, miralax, pulmicort       Malnutrition Assessment:  Malnutrition Status:  Severe malnutrition    Context:  Chronic illness     Findings of the 6 clinical characteristics of malnutrition:   Energy Intake:  7 - 75% or less est energy requirements for 1 month or longer  Weight Loss:  7.0 - Greater than 7.5% over 3 months     Body Fat Loss:  1 - Mild body fat loss, Fat overlying ribs   Muscle Mass Loss:  1 - Mild muscle mass loss, Clavicles (pectoralis &deltoids), Temples (temporalis), Thigh (quadraceps)  Fluid Accumulation:  No significant fluid accumulation,     Strength:  Not performed         Estimated Daily Nutrient Needs:  Energy (kcal): 1950kcal (32kcal/kg); Weight Used for Energy Requirements: Current  Protein (g): 79g (1.3g/kg); Weight Used for Protein Requirements: Current  Fluid (ml/day): 1500mL (25mL/kg); Method Used for Fluid Requirements: ml/kg      Nutrition Related Findings:  NFPE finding mild to moderate fat and muscle wasting. Pt denied n/v/d. No BM documented since admit- noted multiple bowel meds ordered. Per pt, significant constipation- reports no BM x3 weeks pta.  Discussed with MD.      Wounds:    None       Current Nutrition Therapies:  DIET NPO  DIET TUBE FEEDING Jevity 1.5    Anthropometric Measures:  · Height:  6' (182.9 cm)  · Current Body Wt:  60.5 kg (133 lb 6.1 oz)(1/31)   · Usual Body Wt:  70.3 kg (155 lb)(11/2020)     · Ideal Body Wt:  178 lbs:  74.9 %   · BMI Category:  Underweight (BMI less than 18.5)       Nutrition Diagnosis:   · Inadequate protein-energy intake related to catabolic illness, swallowing difficulty as evidenced by weight loss 7.5% in 3 months, BMI      Nutrition Interventions:   Food and/or Nutrient Delivery: Continue NPO, Start tube feeding  Nutrition Education and Counseling: No recommendations at this time  Coordination of Nutrition Care: No recommendation at this time    Goals:  Initiate means of nutrition to meet >75% EENs  Maintain skin integrity       Nutrition Monitoring and Evaluation:   Behavioral-Environmental Outcomes: None identified  Food/Nutrient Intake Outcomes: Enteral nutrition intake/tolerance, Diet advancement/tolerance  Physical Signs/Symptoms Outcomes: None identified, Weight    Discharge Planning:     Too soon to determine     Electronically signed by Erma Morales on 2/5/2021 at 1:21 PM    Contact: [id number]

## 2021-02-05 NOTE — PROGRESS NOTES
Problem: Falls - Risk of  Goal: *Absence of Falls  Description: Document Earma Olivia Fall Risk and appropriate interventions in the flowsheet. Outcome: Progressing Towards Goal  Note: Fall Risk Interventions:  Mobility Interventions: Patient to call before getting OOB, PT Consult for mobility concerns, PT Consult for assist device competence         Medication Interventions: Bed/chair exit alarm, Evaluate medications/consider consulting pharmacy, Patient to call before getting OOB, Teach patient to arise slowly    Elimination Interventions: Bed/chair exit alarm, Patient to call for help with toileting needs, Stay With Me (per policy)              Problem: Patient Education: Go to Patient Education Activity  Goal: Patient/Family Education  Outcome: Progressing Towards Goal     Problem: Ventilator Management  Goal: *Adequate oxygenation and ventilation  Outcome: Progressing Towards Goal  Goal: *Patient maintains clear airway/free of aspiration  Outcome: Progressing Towards Goal  Goal: *Absence of infection signs and symptoms  Outcome: Progressing Towards Goal  Goal: *Normal spontaneous ventilation  Outcome: Progressing Towards Goal     Problem: Patient Education: Go to Patient Education Activity  Goal: Patient/Family Education  Outcome: Progressing Towards Goal     Problem: Pressure Injury - Risk of  Goal: *Prevention of pressure injury  Description: Document Marin Scale and appropriate interventions in the flowsheet.   Outcome: Progressing Towards Goal  Note: Pressure Injury Interventions:  Sensory Interventions: Assess changes in LOC, Assess need for specialty bed, Avoid rigorous massage over bony prominences, Maintain/enhance activity level, Minimize linen layers, Monitor skin under medical devices, Keep linens dry and wrinkle-free, Pressure redistribution bed/mattress (bed type)    Moisture Interventions: Absorbent underpads, Apply protective barrier, creams and emollients, Assess need for specialty bed, Maintain skin hydration (lotion/cream), Minimize layers, Moisture barrier    Activity Interventions: Increase time out of bed, Pressure redistribution bed/mattress(bed type), PT/OT evaluation    Mobility Interventions: HOB 30 degrees or less, Pressure redistribution bed/mattress (bed type), PT/OT evaluation    Nutrition Interventions: Document food/fluid/supplement intake    Friction and Shear Interventions: Minimize layers, Foam dressings/transparent film/skin sealants, Apply protective barrier, creams and emollients                Problem: Patient Education: Go to Patient Education Activity  Goal: Patient/Family Education  Outcome: Progressing Towards Goal     Problem: Discharge Planning  Goal: *Discharge to safe environment  Outcome: Progressing Towards Goal  Goal: *Knowledge of medication management  Outcome: Progressing Towards Goal  Goal: *Knowledge of discharge instructions  Outcome: Progressing Towards Goal     Problem: Patient Education: Go to Patient Education Activity  Goal: Patient/Family Education  Outcome: Progressing Towards Goal     Problem: Patient Education: Go to Patient Education Activity  Goal: Patient/Family Education  Outcome: Progressing Towards Goal     Problem: Patient Education: Go to Patient Education Activity  Goal: Patient/Family Education  Outcome: Progressing Towards Goal     Problem: Non-Violent Restraints  Goal: *Removal from restraints as soon as assessed to be safe  Outcome: Progressing Towards Goal  Goal: *No harm/injury to patient while restraints in use  Outcome: Progressing Towards Goal  Goal: *Patient's dignity will be maintained  Outcome: Progressing Towards Goal  Goal: *Patient Specific Goal (EDIT GOAL, INSERT TEXT)  Outcome: Progressing Towards Goal  Goal: Non-violent Restaints:Standard Interventions  Outcome: Progressing Towards Goal  Goal: Non-violent Restraints:Patient Interventions  Outcome: Progressing Towards Goal  Goal: Patient/Family Education  Outcome: Progressing Towards Goal     Problem: Patient Education: Go to Patient Education Activity  Goal: Patient/Family Education  Outcome: Progressing Towards Goal     Problem: Patient Education: Go to Patient Education Activity  Goal: Patient/Family Education  Outcome: Progressing Towards Goal

## 2021-02-05 NOTE — PROGRESS NOTES
PATIENT PLACED ON HME TO ROOM AIR    O2 SAT 97%    O2 SATS CONSISTANT TO HEATED HUMIDITY TRACH COLLAR , 10 LPM AT 21%

## 2021-02-05 NOTE — PROGRESS NOTES
PHYSICAL THERAPY TREATMENT  Patient: Kim Moise (86 y.o. male)  Date: 2/5/2021  Diagnosis: Acute and chronic [facility] [J96.21]  Throat cancer ([facility] [C14.0]  COPD exacerbation ([facility] [J44.1] <principal problem not specified>  Procedure(s) (LRB):  PERCUTANEOUS ENDOSCOPIC GASTROSTOMY TUBE INSERTION (N/A)  ESOPHAGOGASTRODUODENOSCOPY (EGD) (N/A) 2 Days Post-Op  Precautions:    Chart, physical therapy assessment, plan of care and goals were reviewed. ASSESSMENT  Patient continues with skilled PT services and is progressing towards goals. Pt. Sitting on EOB upon arrival and agreeable to therapy session. Reports continued anxiety about future surgery, and pain from peg tube stating it feels like its pulling. Able to perform seated LE TE, and sit to stand transfers. Recommend DC to HHPT. .     Current Level of Function Impacting Discharge (mobility/balance): Endurance, cognition    Other factors to consider for discharge: PMH         PLAN :  Patient continues to benefit from skilled intervention to address the above impairments. Continue treatment per established plan of care. to address goals. Recommendation for discharge: (in order for the patient to meet his/her long term goals)  Physical therapy at least 2 days/week in the home     This discharge recommendation:  Has been made in collaboration with the attending provider and/or case management    IF patient discharges home will need the following DME: rollator       SUBJECTIVE:   Patient stated IM OKAY.     OBJECTIVE DATA SUMMARY:   Critical Behavior:  Neurologic State: Alert  Orientation Level: Oriented X4  Cognition: Appropriate decision making, Appropriate for age attention/concentration, Appropriate safety awareness, Follows commands     Functional Mobility Training:  Bed Mobility:  Rolling: Supervision  Supine to Sit: Supervision  Sit to Supine: Supervision  Scooting: Supervision        Transfers:  Sit to Stand: Supervision  Stand to Sit: Supervision                             Balance:  Sitting: Intact  Standing: Intact  Standing - Static: Good; Unsupported  Ambulation/Gait Training:                                                        Stairs: Therapeutic Exercises:   Therapeutic Exercises:       EXERCISE   Sets   Reps   Active Active Assist   Passive Self ROM   Comments   Ankle Pumps  40 [x] [] [] []    Quad Sets/Glut Sets   [] [] [] []    Hamstring Sets   [] [] [] []    Short Arc Quads   [] [] [] []    Heel Slides   [] [] [] []    Straight Leg Raises   [] [] [] []    Hip abd/add  40 [x] [] [] []    Long Arc Quads  40 [x] [] [] []    Marching  40 [x] [] [] []    Sit to stand  4x10 [x] [] [] []        Pain Ratin abdoment    Activity Tolerance:   Good  Please refer to the flowsheet for vital signs taken during this treatment. After treatment patient left in no apparent distress:   Sitting EOB    COMMUNICATION/COLLABORATION:   The patients plan of care was discussed with: Physical therapy assistant.      Lani Sarabia   Time Calculation: 23 mins

## 2021-02-05 NOTE — PROGRESS NOTES
SPEECH LANGUAGE PATHOLOGY SPEECH TREATMENT  Patient: Jyoti Mosquera (65 y.o. male)  Date: 2/5/2021  Diagnosis: Acute and chronic respiratory failure with hypoxia ([facility] [J96.21]  [facility] [C14.0]  COPD exacerbation ([facility] [J44.1]   Procedure(s) (LRB):  PERCUTANEOUS ENDOSCOPIC GASTROSTOMY TUBE INSERTION (N/A)  ESOPHAGOGASTRODUODENOSCOPY (EGD) (N/A) 2 Days Post-Op  Precautions: Fall and aspiration     ASSESSMENT:  Patient alert requesting clinician to discuss communication after laryngectomy and use of PMV until surgery. Patient continues w/ preferring written communication over PMV use, However he tolerates to meet basic needs/wants and was able to talk with girlfriend this date. Educated on breathing strategies to reduce anxiety w/ PMV. Patient receptive of education on different communication options following laryngectomy. Encouraged to discuss w/ MD and SLP once transferred. Patient continues to be highly motivated. No further ST intervention for PMV use. Patient declined PO trials at this time. Recommendations and Planned Interventions:  Rec PMV as tolerated until laryngectomy on 2/8. Rec cont NPO w/ TF via PEG. Ice chips and 1/4 tsp applesauce for pleasure ok. Frequency/Duration: Patient will be followed by speech-language pathology 3 times a week to address goals. Discharge Recommendations: Plans for tx to [facility] for procedure. Rec ST to follow once transferred for continued pre laryngectomy education. SUBJECTIVE:   Patient reports improvement in pain at PEG site and no longer hungry tolerating bolus TF. Patient continues to express anxiety over upcoming procedure.       OBJECTIVE:           Past Medical History:   Diagnosis Date    Chronic pain       THROAT, EARS, NECK R/T CANCER    COPD (chronic obstructive pulmonary disease) ([facility]       EMPHASEMA    Psychiatric disorder       ANXIETY R/T CANCER    Throat cancer (HCC)           CXR Results  (Last 48 hours)        None Current Diet:  DIET NPO  DIET TUBE FEEDING      Cognitive and Communication Status:  Neurologic State: Alert  Orientation Level: Oriented X4  Cognition: Appropriate decision making, Appropriate for age attention/concentration, Follows commands  Dysphagia Treatment:  Oral Assessment:  P.O. Trials:  Patient Position: sitting EOB  Vocal quality prior to P.O.: (strained hoarse w/ PMV)  Consistency Presented: Ice chips;Pudding     Pain:   3 (PEG site)     After treatment:   Patient left in no apparent distress in bed, Call bell within reach, and Nursing notified     COMMUNICATION/EDUCATION:   Patient receptive of education regarding donning/doffing PMV and pre laryngectomy education. The patient's plan of care including recommendations, planned interventions, and recommended diet changes were discussed with: Registered nurse and RD . Patient/family have participated as able in goal setting and plan of care. Thank you for this referral.  Rai Lau M.S., M.Ed., CCC-SLP  Time Calculation: 22 mins    Problem: Communication Impaired (Adult)  Goal: *Acute Goals and Plan of Care (Insert Text)  Description: Patient will tolerate PMV w/ adequate airflow and meet basic wants/needs.    Participate in swallow eval.   Outcome: Resolved/Met

## 2021-02-06 PROCEDURE — 65270000029 HC RM PRIVATE

## 2021-02-06 PROCEDURE — 74011250637 HC RX REV CODE- 250/637: Performed by: INTERNAL MEDICINE

## 2021-02-06 RX ORDER — ADHESIVE BANDAGE
30 BANDAGE TOPICAL EVERY 6 HOURS
Status: COMPLETED | OUTPATIENT
Start: 2021-02-06 | End: 2021-02-06

## 2021-02-06 RX ORDER — DOCUSATE SODIUM 50 MG/5ML
200 LIQUID ORAL DAILY
Status: DISCONTINUED | OUTPATIENT
Start: 2021-02-06 | End: 2021-02-07 | Stop reason: HOSPADM

## 2021-02-06 RX ORDER — OXYCODONE HYDROCHLORIDE 5 MG/1
5 TABLET ORAL
Status: DISCONTINUED | OUTPATIENT
Start: 2021-02-06 | End: 2021-02-07 | Stop reason: HOSPADM

## 2021-02-06 RX ADMIN — TRAMADOL HYDROCHLORIDE 50 MG: 50 TABLET, FILM COATED ORAL at 02:44

## 2021-02-06 RX ADMIN — MAGNESIUM HYDROXIDE 30 ML: 400 SUSPENSION ORAL at 18:00

## 2021-02-06 RX ADMIN — MAGNESIUM HYDROXIDE 30 ML: 400 SUSPENSION ORAL at 12:00

## 2021-02-06 RX ADMIN — Medication 10 ML: at 20:42

## 2021-02-06 RX ADMIN — Medication 5 ML: at 14:00

## 2021-02-06 RX ADMIN — TRAMADOL HYDROCHLORIDE 50 MG: 50 TABLET, FILM COATED ORAL at 20:42

## 2021-02-06 RX ADMIN — GLYCOPYRROLATE 1 MG: 1 TABLET ORAL at 09:00

## 2021-02-06 RX ADMIN — FAMOTIDINE 20 MG: 20 TABLET, FILM COATED ORAL at 20:42

## 2021-02-06 RX ADMIN — DOCUSATE SODIUM 200 MG: 50 LIQUID ORAL at 12:00

## 2021-02-06 RX ADMIN — FAMOTIDINE 20 MG: 20 TABLET, FILM COATED ORAL at 09:00

## 2021-02-06 RX ADMIN — TRAMADOL HYDROCHLORIDE 50 MG: 50 TABLET, FILM COATED ORAL at 09:00

## 2021-02-06 RX ADMIN — DIPHENHYDRAMINE HYDROCHLORIDE AND LIDOCAINE HYDROCHLORIDE AND ALUMINUM HYDROXIDE AND MAGNESIUM HYDRO 5 ML: KIT at 11:30

## 2021-02-06 RX ADMIN — ACETAMINOPHEN 650 MG: 325 TABLET ORAL at 00:46

## 2021-02-06 RX ADMIN — Medication 10 ML: at 06:18

## 2021-02-06 RX ADMIN — DIPHENHYDRAMINE HYDROCHLORIDE AND LIDOCAINE HYDROCHLORIDE AND ALUMINUM HYDROXIDE AND MAGNESIUM HYDRO 5 ML: KIT at 16:30

## 2021-02-06 NOTE — PROGRESS NOTES
Problem: Falls - Risk of  Goal: *Absence of Falls  Description: Document Green Salvia Fall Risk and appropriate interventions in the flowsheet.   2/6/2021 0347 by Kaykay Ferreira  Outcome: Progressing Towards Goal  Note: Fall Risk Interventions:  Mobility Interventions: Patient to call before getting OOB, PT Consult for mobility concerns, PT Consult for assist device competence         Medication Interventions: Bed/chair exit alarm, Evaluate medications/consider consulting pharmacy, Patient to call before getting OOB    Elimination Interventions: Bed/chair exit alarm, Call light in reach           2/6/2021 0337 by Terra GRAHAM  Outcome: Progressing Towards Goal  Note: Fall Risk Interventions:  Mobility Interventions: Patient to call before getting OOB, PT Consult for mobility concerns, PT Consult for assist device competence         Medication Interventions: Bed/chair exit alarm, Evaluate medications/consider consulting pharmacy, Patient to call before getting OOB    Elimination Interventions: Bed/chair exit alarm, Call light in reach              Problem: Patient Education: Go to Patient Education Activity  Goal: Patient/Family Education  2/6/2021 0347 by Kakyay Ferreira  Outcome: Progressing Towards Goal  2/6/2021 0337 by Kaykay Ferreira  Outcome: Progressing Towards Goal     Problem: Ventilator Management  Goal: *Adequate oxygenation and ventilation  2/6/2021 0347 by Kaykay Ferreira  Outcome: Progressing Towards Goal  2/6/2021 0337 by Kaykay Ferreira  Outcome: Progressing Towards Goal  Goal: *Patient maintains clear airway/free of aspiration  2/6/2021 0347 by Kaykay Ferreira  Outcome: Progressing Towards Goal  2/6/2021 0337 by Kaykay Ferreira  Outcome: Progressing Towards Goal  Goal: *Absence of infection signs and symptoms  2/6/2021 0347 by Kaykay Ferreira  Outcome: Progressing Towards Goal  2/6/2021 0337 by Kaykay Ferreira  Outcome: Progressing Towards Goal  Goal: *Normal spontaneous ventilation  2/6/2021 0347 by Kadie Franco  Outcome: Progressing Towards Goal  2/6/2021 1527 by Kadie Franco  Outcome: Progressing Towards Goal     Problem: Patient Education: Go to Patient Education Activity  Goal: Patient/Family Education  2/6/2021 0347 by Kadie Franco  Outcome: Progressing Towards Goal  2/6/2021 5923 by Marjan GRAHAM  Outcome: Progressing Towards Goal     Problem: Pressure Injury - Risk of  Goal: *Prevention of pressure injury  Description: Document Marin Scale and appropriate interventions in the flowsheet.   2/6/2021 0347 by Kadie Franco  Outcome: Progressing Towards Goal  Note: Pressure Injury Interventions:  Sensory Interventions: Assess changes in LOC, Assess need for specialty bed, Avoid rigorous massage over bony prominences, Keep linens dry and wrinkle-free, Maintain/enhance activity level, Minimize linen layers, Monitor skin under medical devices    Moisture Interventions: Absorbent underpads    Activity Interventions: Increase time out of bed, Pressure redistribution bed/mattress(bed type), PT/OT evaluation    Mobility Interventions: HOB 30 degrees or less, Pressure redistribution bed/mattress (bed type), PT/OT evaluation    Nutrition Interventions: Document food/fluid/supplement intake    Friction and Shear Interventions: Minimize layers, Foam dressings/transparent film/skin sealants, Apply protective barrier, creams and emollients             2/6/2021 0337 by Marjan GRAHAM  Outcome: Progressing Towards Goal  Note: Pressure Injury Interventions:  Sensory Interventions: Assess changes in LOC, Assess need for specialty bed, Avoid rigorous massage over bony prominences, Keep linens dry and wrinkle-free, Maintain/enhance activity level, Minimize linen layers, Monitor skin under medical devices    Moisture Interventions: Absorbent underpads    Activity Interventions: Increase time out of bed, Pressure redistribution bed/mattress(bed type), PT/OT evaluation    Mobility Interventions: HOB 30 degrees or less, Pressure redistribution bed/mattress (bed type), PT/OT evaluation    Nutrition Interventions: Document food/fluid/supplement intake    Friction and Shear Interventions: Minimize layers, Foam dressings/transparent film/skin sealants, Apply protective barrier, creams and emollients                Problem: Patient Education: Go to Patient Education Activity  Goal: Patient/Family Education  2/6/2021 0347 by José Miguel Hahn  Outcome: Progressing Towards Goal  2/6/2021 0337 by José Miguel Hahn  Outcome: Progressing Towards Goal     Problem: Discharge Planning  Goal: *Discharge to safe environment  2/6/2021 0347 by José Miguel Hahn  Outcome: Progressing Towards Goal  2/6/2021 0337 by José Miguel Hahn  Outcome: Progressing Towards Goal  Goal: *Knowledge of medication management  2/6/2021 0347 by José Miguel Hahn  Outcome: Progressing Towards Goal  2/6/2021 0337 by José Miguel Hahn  Outcome: Progressing Towards Goal  Goal: *Knowledge of discharge instructions  2/6/2021 0347 by José Miguel Hahn  Outcome: Progressing Towards Goal  2/6/2021 0337 by José Miguel Hahn  Outcome: Progressing Towards Goal     Problem: Patient Education: Go to Patient Education Activity  Goal: Patient/Family Education  2/6/2021 0347 by José Miguel Hahn  Outcome: Progressing Towards Goal  2/6/2021 0337 by José Miguel Hahn  Outcome: Progressing Towards Goal     Problem: Patient Education: Go to Patient Education Activity  Goal: Patient/Family Education  2/6/2021 0347 by José Miguel Hahn  Outcome: Progressing Towards Goal  2/6/2021 0337 by José Miguel Hahn  Outcome: Progressing Towards Goal     Problem: Patient Education: Go to Patient Education Activity  Goal: Patient/Family Education  2/6/2021 0347 by José Miguel Hahn  Outcome: Progressing Towards Goal  2/6/2021 0337 by José Miguel Hahn  Outcome: Progressing Towards Goal     Problem: Non-Violent Restraints  Goal: *Removal from restraints as soon as assessed to be safe  2/6/2021 0347 by Shelly Jasmine L  Outcome: Progressing Towards Goal  2/6/2021 0337 by Crissie Blight  Outcome: Progressing Towards Goal  Goal: *No harm/injury to patient while restraints in use  2/6/2021 0347 by Crissie Blight  Outcome: Progressing Towards Goal  2/6/2021 0337 by Crissie Blight  Outcome: Progressing Towards Goal  Goal: *Patient's dignity will be maintained  2/6/2021 0347 by Crissie Blight  Outcome: Progressing Towards Goal  2/6/2021 0337 by Crissie Blight  Outcome: Progressing Towards Goal  Goal: *Patient Specific Goal (EDIT GOAL, INSERT TEXT)  2/6/2021 0347 by Crissie Blight  Outcome: Progressing Towards Goal  2/6/2021 0337 by Crissie Blight  Outcome: Progressing Towards Goal  Goal: Non-violent Restaints:Standard Interventions  2/6/2021 0347 by Crissie Blight  Outcome: Progressing Towards Goal  2/6/2021 0337 by Crissie Blight  Outcome: Progressing Towards Goal  Goal: Non-violent Restraints:Patient Interventions  2/6/2021 0347 by Crissie Blight  Outcome: Progressing Towards Goal  2/6/2021 0337 by Crissie Blight  Outcome: Progressing Towards Goal  Goal: Patient/Family Education  2/6/2021 0347 by Crissie Blight  Outcome: Progressing Towards Goal  2/6/2021 0337 by Crissie Blight  Outcome: Progressing Towards Goal     Problem: Patient Education: Go to Patient Education Activity  Goal: Patient/Family Education  2/6/2021 [phone] by Crissie Blight  Outcome: Progressing Towards Goal  2/6/2021 0337 by Crissie Blight  Outcome: Progressing Towards Goal     Problem: Patient Education: Go to Patient Education Activity  Goal: Patient/Family Education  2/6/2021 0347 by Crissie Blight  Outcome: Progressing Towards Goal  2/6/2021 0337 by Rl GRAHAM  Outcome: Progressing Towards Goal

## 2021-02-06 NOTE — PROGRESS NOTES
Hospitalist Progress Note               Daily Progress Note: 2/2/2021      Subjective: The patient is seen for follow up. Patient was accepted to [address] [place] [address] yesterday, awaiting a bed.   No new complaints    Problem List:  Problem List as of 2/6/2021 Never Reviewed          Codes Class Noted - Resolved    Throat cancer ([facility] ICD-10-CM: C14.0  ICD-9-CM: 149.0  1/31/2021 - Present        COPD exacerbation ([facility] ICD-10-CM: J44.1  ICD-9-CM: 491.21  1/31/2021 - Present        Acute and chronic respiratory failure with hypoxia ([facility]) ICD-10-CM: J96.21  ICD-9-CM: 518.84, 799.02  1/31/2021 - Present        Chronic pain due to neoplasm ICD-10-CM: G89.3  ICD-9-CM: 338.3  1/31/2021 - Present        Anxiety ICD-10-CM: F41.9  ICD-9-CM: 300.00  1/31/2021 - Present        Insomnia ICD-10-CM: G47.00  ICD-9-CM: 780.52  1/31/2021 - Present        Respiratory failure with hypoxia [facility]) ICD-10-CM: J96.91  ICD-9-CM: 518.81  1/31/2021 - Present              Medications reviewed  Current Facility-Administered Medications   Medication Dose Route Frequency    zolpidem (AMBIEN) tablet 10 mg  10 mg Per G Tube QHS PRN    glycopyrrolate (ROBINUL) tablet 1 mg  1 mg Per G Tube TID    traMADoL (ULTRAM) tablet 50 mg  50 mg Oral Q6H PRN    magic mouthwash (FIRST-MOUTHWASH BLM) oral suspension 5 mL  5 mL Oral Q4H PRN    magic mouthwash (FIRST-MOUTHWASH BLM) oral suspension 5 mL  5 mL Oral TIDAC    sodium chloride (NS) flush 5-40 mL  5-40 mL IntraVENous Q8H    sodium chloride (NS) flush 5-40 mL  5-40 mL IntraVENous PRN    acetaminophen (TYLENOL) tablet 650 mg  650 mg Oral Q6H PRN    Or    acetaminophen (TYLENOL) suppository 650 mg  650 mg Rectal Q6H PRN    polyethylene glycol (MIRALAX) packet 17 g  17 g Oral DAILY PRN    bisacodyL (DULCOLAX) tablet 5 mg  5 mg Oral DAILY PRN    ondansetron (ZOFRAN ODT) tablet 4 mg  4 mg Oral Q6H PRN    Or    ondansetron (ZOFRAN) injection 4 mg  4 mg IntraVENous Q6H PRN    famotidine (PEPCID) tablet 20 mg  20 mg Oral Q12H    hydrOXYzine HCL (ATARAX) tablet 50 mg  50 mg Oral QID PRN       Review of Systems:   Review of systems not obtained due to patient factors. Objective:   Physical Exam:     Visit Vitals  /82 (BP 1 Location: Left upper arm, BP Patient Position: Sitting)   Pulse 78   Temp 97.5 °F (36.4 °C)   Resp 18   Ht 6' (1.829 m)   Wt 60.5 kg (133 lb 6.1 oz)   SpO2 97%   BMI 18.09 kg/m²    O2 Flow Rate (L/min): 0 l/min O2 Device: Room air    Temp (24hrs), Av.7 °F (36.5 °C), Min:97.5 °F (36.4 °C), Max:97.9 °F (36.6 °C)    No intake/output data recorded.  1901 -  0700  In: 1500   Out: 400 [Urine:400]    General:   Alert and oriented   Lungs:   Clear to auscultation bilaterally. Chest wall:  No tenderness or deformity. Heart:  Regular rate and rhythm, S1, S2 normal, no murmur, click, rub or gallop. Abdomen:   Soft, non-tender. Bowel sounds normal. No masses,  No organomegaly. Extremities: Extremities normal, atraumatic, no cyanosis or edema. Pulses: 2+ and symmetric all extremities. Skin: Skin color, texture, turgor normal. No rashes or lesions   Neurologic: CNII-XII intact. Sedated and unresponsive         Data Review:       Recent Days:  No results for input(s): WBC, HGB, HCT, PLT, HGBEXT, HCTEXT, PLTEXT, HGBEXT, HCTEXT, PLTEXT in the last 72 hours. No results for input(s): NA, K, CL, CO2, GLU, BUN, CREA, CA, MG, PHOS, ALB, TBIL, TBILI, ALT, INR, INREXT, INREXT in the last 72 hours. No lab exists for component: SGOT  No results for input(s): PH, PCO2, PO2, HCO3, FIO2 in the last 72 hours. 24 Hour Results:  No results found for this or any previous visit (from the past 24 hour(s)). XR CHEST PORT   Final Result   No acute cardiopulmonary abnormality. .       XR CHEST PORT   Final Result   Impression:   No acute findings.            Assessment:  Stridor due to vocal fold paralysis versus tumor, status post urgent tracheostomy    Acute on chronic respiratory failure with hypoxia    Throat cancer    Acute exacerbation of COPD    Chronic pain syndrome, opioid dependent    Protein calorie malnutrition    Anxiety    Plan:  Awaiting transfer to [facility]      Total time spent with patient: 30 minutes.     Cassius Ray MD

## 2021-02-06 NOTE — PROGRESS NOTES
Hospitalist Progress Note               Daily Progress Note: 2/2/2021      Subjective: The patient is seen for follow up. Still having trouble sleeping.   We are still awaiting a bed at [facility]    Problem List:  Problem List as of 2/6/2021 Never Reviewed          Codes Class Noted - Resolved    Throat cancer [facility]) ICD-10-CM: C14.0  ICD-9-CM: 149.0  1/31/2021 - Present        COPD exacerbation ([facility] ICD-10-CM: J44.1  ICD-9-CM: 491.21  1/31/2021 - Present        Acute and chronic respiratory failure with hypoxia ([facility] ICD-10-CM: J96.21  ICD-9-CM: 518.84, 799.02  1/31/2021 - Present        Chronic pain due to neoplasm ICD-10-CM: G89.3  ICD-9-CM: 338.3  1/31/2021 - Present        Anxiety ICD-10-CM: F41.9  ICD-9-CM: 300.00  1/31/2021 - Present        Insomnia ICD-10-CM: G47.00  ICD-9-CM: 780.52  1/31/2021 - Present        Respiratory failure with hypoxia [facility]) ICD-10-CM: J96.91  ICD-9-CM: 518.81  1/31/2021 - Present              Medications reviewed  Current Facility-Administered Medications   Medication Dose Route Frequency    zolpidem (AMBIEN) tablet 10 mg  10 mg Per G Tube QHS PRN    glycopyrrolate (ROBINUL) tablet 1 mg  1 mg Per G Tube TID    traMADoL (ULTRAM) tablet 50 mg  50 mg Oral Q6H PRN    magic mouthwash (FIRST-MOUTHWASH BLM) oral suspension 5 mL  5 mL Oral Q4H PRN    magic mouthwash (FIRST-MOUTHWASH BLM) oral suspension 5 mL  5 mL Oral TIDAC    sodium chloride (NS) flush 5-40 mL  5-40 mL IntraVENous Q8H    sodium chloride (NS) flush 5-40 mL  5-40 mL IntraVENous PRN    acetaminophen (TYLENOL) tablet 650 mg  650 mg Oral Q6H PRN    Or    acetaminophen (TYLENOL) suppository 650 mg  650 mg Rectal Q6H PRN    polyethylene glycol (MIRALAX) packet 17 g  17 g Oral DAILY PRN    bisacodyL (DULCOLAX) tablet 5 mg  5 mg Oral DAILY PRN    ondansetron (ZOFRAN ODT) tablet 4 mg  4 mg Oral Q6H PRN    Or    ondansetron (ZOFRAN) injection 4 mg  4 mg IntraVENous Q6H PRN    famotidine (PEPCID) tablet 20 mg  20 mg Oral Q12H    hydrOXYzine HCL (ATARAX) tablet 50 mg  50 mg Oral QID PRN       Review of Systems:   Review of systems not obtained due to patient factors. Objective:   Physical Exam:     Visit Vitals  /82 (BP 1 Location: Left upper arm, BP Patient Position: Sitting)   Pulse 78   Temp 97.5 °F (36.4 °C)   Resp 18   Ht 6' (1.829 m)   Wt 60.5 kg (133 lb 6.1 oz)   SpO2 97%   BMI 18.09 kg/m²    O2 Flow Rate (L/min): 0 l/min O2 Device: Room air    Temp (24hrs), Av.7 °F (36.5 °C), Min:97.5 °F (36.4 °C), Max:97.9 °F (36.6 °C)    No intake/output data recorded.  1901 -  0700  In: 1500   Out: 400 [Urine:400]    General:   Alert and oriented   Lungs:   Clear to auscultation bilaterally. Chest wall:  No tenderness or deformity. Heart:  Regular rate and rhythm, S1, S2 normal, no murmur, click, rub or gallop. Abdomen:   Soft, non-tender. Bowel sounds normal. No masses,  No organomegaly. Extremities: Extremities normal, atraumatic, no cyanosis or edema. Pulses: 2+ and symmetric all extremities. Skin: Skin color, texture, turgor normal. No rashes or lesions   Neurologic: CNII-XII intact. Sedated and unresponsive         Data Review:       Recent Days:  No results for input(s): WBC, HGB, HCT, PLT, HGBEXT, HCTEXT, PLTEXT, HGBEXT, HCTEXT, PLTEXT in the last 72 hours. No results for input(s): NA, K, CL, CO2, GLU, BUN, CREA, CA, MG, PHOS, ALB, TBIL, TBILI, ALT, INR, INREXT, INREXT in the last 72 hours. No lab exists for component: SGOT  No results for input(s): PH, PCO2, PO2, HCO3, FIO2 in the last 72 hours. 24 Hour Results:  No results found for this or any previous visit (from the past 24 hour(s)). XR CHEST PORT   Final Result   No acute cardiopulmonary abnormality. .       XR CHEST PORT   Final Result   Impression:   No acute findings.            Assessment:  Stridor due to vocal fold paralysis versus tumor, status post urgent tracheostomy    Acute on chronic respiratory failure with hypoxia    Throat cancer    Acute exacerbation of COPD    Chronic pain syndrome, opioid dependent    Protein calorie malnutrition    Anxiety    Plan:  Awaiting transfer to [facility]      Total time spent with patient: 30 minutes.     Edson Kolb MD

## 2021-02-06 NOTE — PROGRESS NOTES
Chart reviewed. Per Dr. Isaías Mahan note on 2/4, patient is to transfer to [facility] pending bed availability.

## 2021-02-06 NOTE — PROGRESS NOTES
Problem: Falls - Risk of  Goal: *Absence of Falls  Description: Document Moise Chance Fall Risk and appropriate interventions in the flowsheet. Outcome: Progressing Towards Goal  Note: Fall Risk Interventions:  Mobility Interventions: Patient to call before getting OOB, PT Consult for mobility concerns, PT Consult for assist device competence         Medication Interventions: Bed/chair exit alarm, Evaluate medications/consider consulting pharmacy, Patient to call before getting OOB    Elimination Interventions: Bed/chair exit alarm, Call light in reach              Problem: Patient Education: Go to Patient Education Activity  Goal: Patient/Family Education  Outcome: Progressing Towards Goal     Problem: Ventilator Management  Goal: *Adequate oxygenation and ventilation  Outcome: Progressing Towards Goal  Goal: *Patient maintains clear airway/free of aspiration  Outcome: Progressing Towards Goal  Goal: *Absence of infection signs and symptoms  Outcome: Progressing Towards Goal  Goal: *Normal spontaneous ventilation  Outcome: Progressing Towards Goal     Problem: Patient Education: Go to Patient Education Activity  Goal: Patient/Family Education  Outcome: Progressing Towards Goal     Problem: Pressure Injury - Risk of  Goal: *Prevention of pressure injury  Description: Document Marin Scale and appropriate interventions in the flowsheet.   Outcome: Progressing Towards Goal  Note: Pressure Injury Interventions:  Sensory Interventions: Assess changes in LOC, Assess need for specialty bed, Avoid rigorous massage over bony prominences, Keep linens dry and wrinkle-free, Maintain/enhance activity level, Minimize linen layers, Monitor skin under medical devices    Moisture Interventions: Absorbent underpads    Activity Interventions: Increase time out of bed, Pressure redistribution bed/mattress(bed type), PT/OT evaluation    Mobility Interventions: HOB 30 degrees or less, Pressure redistribution bed/mattress (bed type), PT/OT evaluation    Nutrition Interventions: Document food/fluid/supplement intake    Friction and Shear Interventions: Minimize layers, Foam dressings/transparent film/skin sealants, Apply protective barrier, creams and emollients                Problem: Patient Education: Go to Patient Education Activity  Goal: Patient/Family Education  Outcome: Progressing Towards Goal     Problem: Discharge Planning  Goal: *Discharge to safe environment  Outcome: Progressing Towards Goal  Goal: *Knowledge of medication management  Outcome: Progressing Towards Goal  Goal: *Knowledge of discharge instructions  Outcome: Progressing Towards Goal     Problem: Patient Education: Go to Patient Education Activity  Goal: Patient/Family Education  Outcome: Progressing Towards Goal     Problem: Patient Education: Go to Patient Education Activity  Goal: Patient/Family Education  Outcome: Progressing Towards Goal     Problem: Patient Education: Go to Patient Education Activity  Goal: Patient/Family Education  Outcome: Progressing Towards Goal     Problem: Non-Violent Restraints  Goal: *Removal from restraints as soon as assessed to be safe  Outcome: Progressing Towards Goal  Goal: *No harm/injury to patient while restraints in use  Outcome: Progressing Towards Goal  Goal: *Patient's dignity will be maintained  Outcome: Progressing Towards Goal  Goal: *Patient Specific Goal (EDIT GOAL, INSERT TEXT)  Outcome: Progressing Towards Goal  Goal: Non-violent Restaints:Standard Interventions  Outcome: Progressing Towards Goal  Goal: Non-violent Restraints:Patient Interventions  Outcome: Progressing Towards Goal  Goal: Patient/Family Education  Outcome: Progressing Towards Goal     Problem: Patient Education: Go to Patient Education Activity  Goal: Patient/Family Education  Outcome: Progressing Towards Goal     Problem: Patient Education: Go to Patient Education Activity  Goal: Patient/Family Education  Outcome: Progressing Towards Goal

## 2021-02-06 NOTE — PROGRESS NOTES
Consult  Pulmonary, Critical Care    Name: Natividad Santiago MRN: [id number]   : 1970 Hospital: [facility]   Date: 2021  Admission date: 2021 Hospital Day: 7       Subjective/Interval History:   Seen in the emergency room with stridor respiratory distress poor air movement over the neck. CT of the neck shows compromised airway. He is using accessory muscles or respirations   2/3 Awake alert able to weakly talk with passey mamie valve. Less secretions with addition of Robinul. Room air oxygen saturation 98%  2/4 awake alert voice understandable with Passy-Mamie valve in place less secretions have turned his oxygen to 21% with saturation remaining 95%  Room air oxygen saturation 96%. He complains of abdominal discomfort and constipation states he has not had a bowel movement in over 5 days  Patient Active Problem List   Diagnosis Code    Throat cancer ([facility] C14.0    COPD exacerbation ([facility]) J44.1    Acute and chronic respiratory failure with hypoxia ([facility]) J96.21    Chronic pain due to neoplasm G89.3    Anxiety F41.9    Insomnia G47.00    Respiratory failure with hypoxia ([facility]) J96.91       IMPRESSION:   1. Acute hypercapnic respiratory failure resolved   2. Acute hypoxic respiratory failure resolved   3. 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 Status post emergent tracheostomy  5. Constipation we will discontinue Robinul and give milk of magnesia today  6.  vocal cord paralysis  7. COPD exacerbation no wheezing continue nebulizer  8. Stridor secondary to airway compromise from laryngeal carcinoma status post tracheostomy  9. Malnutrition  Body mass index is 18.09 kg/m². 10.       RECOMMENDATIONS/PLAN:   1. Maintain room air  2. We will discontinue Robinul due to constipation  3. Doing well with Passy-[place] valve  4. Subjective/Initial History:   I have reviewed the flowsheet and previous days notes. Seen earlier today on rounds.     I was asked by Ramon Lopez MD to see Lucia Gutiérrez Whitney Muir a 53 y.o.  male  in consultation for a chief complaint of acute hypercapnic respiratory failure with respiratory distress and laryngeal carcinoma        Patient PCP: Juana Be NP  PMH:  has a past medical history of Chronic pain, COPD (chronic obstructive pulmonary disease) ([facility] Psychiatric disorder, and Throat cancer ([facility] PSH:   has a past surgical history that includes hx orthopaedic. FHX: family history includes Diabetes in his father, mother, and sister; Heart Disease in his daughter; Kidney Disease in his father. SHX:  reports that he quit smoking about 3 months ago. He has a 45.00 pack-year smoking history. He has never used smokeless tobacco. He reports previous alcohol use. He reports that he does not use drugs.     Systemic review very limited due to his difficulty speaking    No Known Allergies   MEDS:   Current Facility-Administered Medications   Medication    docusate (COLACE) 50 mg/5 mL oral liquid 200 mg    magnesium hydroxide (MILK OF MAGNESIA) 400 mg/5 mL oral suspension 30 mL    zolpidem (AMBIEN) tablet 10 mg    traMADoL (ULTRAM) tablet 50 mg    magic mouthwash (FIRST-MOUTHWASH BLM) oral suspension 5 mL    magic mouthwash (FIRST-MOUTHWASH BLM) oral suspension 5 mL    sodium chloride (NS) flush 5-40 mL    sodium chloride (NS) flush 5-40 mL    acetaminophen (TYLENOL) tablet 650 mg    Or    acetaminophen (TYLENOL) suppository 650 mg    polyethylene glycol (MIRALAX) packet 17 g    bisacodyL (DULCOLAX) tablet 5 mg    ondansetron (ZOFRAN ODT) tablet 4 mg    Or    ondansetron (ZOFRAN) injection 4 mg    famotidine (PEPCID) tablet 20 mg    hydrOXYzine HCL (ATARAX) tablet 50 mg        Current Facility-Administered Medications:     docusate (COLACE) 50 mg/5 mL oral liquid 200 mg, 200 mg, Per G Tube, DAILY, Naeem Encarnacion MD    magnesium hydroxide (MILK OF MAGNESIA) 400 mg/5 mL oral suspension 30 mL, 30 mL, Per Edyth Severe, Q6H, MD Rosaline Valencia zolpidem (AMBIEN) tablet 10 mg, 10 mg, Per G Tube, QHS PRN, Yazmin Bliss MD    traMADoL Lucina Foreign) tablet 50 mg, 50 mg, Oral, Q6H PRN, Yazmin Bliss MD, 50 mg at 21 0900    magic mouthwash (FIRST-MOUTHWASH BLM) oral suspension 5 mL, 5 mL, Oral, Q4H PRN, Anila Lynch MD    magic mouthwash (FIRST-MOUTHWASH BLM) oral suspension 5 mL, 5 mL, Oral, TIDAC, Anila Frias MD, 5 mL at 21 0606    sodium chloride (NS) flush 5-40 mL, 5-40 mL, IntraVENous, Q8H, Anila Frias MD, 10 mL at 21 0618    sodium chloride (NS) flush 5-40 mL, 5-40 mL, IntraVENous, PRN, Anila Lynch MD    acetaminophen (TYLENOL) tablet 650 mg, 650 mg, Oral, Q6H PRN, 650 mg at 21 0046 **OR** acetaminophen (TYLENOL) suppository 650 mg, 650 mg, Rectal, Q6H PRN, Yazmin Bliss MD    polyethylene glycol (MIRALAX) packet 17 g, 17 g, Oral, DAILY PRN, Yazmin Bliss MD, 17 g at 21 1754    bisacodyL (DULCOLAX) tablet 5 mg, 5 mg, Oral, DAILY PRN, Anila Lynch MD    ondansetron (ZOFRAN ODT) tablet 4 mg, 4 mg, Oral, Q6H PRN **OR** ondansetron (ZOFRAN) injection 4 mg, 4 mg, IntraVENous, Q6H PRN, Anila Lynch MD    famotidine (PEPCID) tablet 20 mg, 20 mg, Oral, Q12H, Anila Frias MD, 20 mg at 21 0900    hydrOXYzine HCL (ATARAX) tablet 50 mg, 50 mg, Oral, QID PRN, Anila Lynch MD      Objective:     Vital Signs: Telemetry:    normal sinus rhythm Intake/Output:   Visit Vitals  /82 (BP 1 Location: Left upper arm, BP Patient Position: Sitting)   Pulse 78   Temp 97.5 °F (36.4 °C)   Resp 18   Ht 6' (1.829 m)   Wt 60.5 kg (133 lb 6.1 oz)   SpO2 97%   BMI 18.09 kg/m²       Temp (24hrs), Av.7 °F (36.5 °C), Min:97.5 °F (36.4 °C), Max:97.9 °F (36.6 °C)        O2 Device: Room air O2 Flow Rate (L/min): 0 l/min         Body mass index is 18.09 kg/m².     Wt Readings from Last 4 Encounters:   21 60.5 kg (133 lb 6.1 oz)   21 60.7 kg (133 lb 13.1 oz) 01/28/21 60.8 kg (134 lb)   01/24/21 67.1 kg (148 lb)          Intake/Output Summary (Last 24 hours) at 2/6/2021 1157  Last data filed at 2/6/2021 0244  Gross per 24 hour   Intake    Output 200 ml   Net -200 ml       Last shift:      No intake/output data recorded. Last 3 shifts: 02/04 1901 - 02/06 0700  In: 1500   Out: 400 [Urine:400]       Hemodynamics:    CO:    CI:    CVP:    SVR:   PAP Systolic:    PAP Diastolic:    PVR:    YV18:       Ventilator Settings:      Mode Rate TV Press PEEP FiO2 PIP Min. Vent   Assist control, Volume control    500 ml    5 cm H20 21 %  25 cm H2O  8.43 l/min      Physical Exam:    General:  male; awake alert is able to talk with Passy-Blanca valve in place  HEENT: NCAT, oral mucosa clear  Eyes: anicteric; conjunctiva clear extraocular movements intact  Neck:  tracheostomy in place  Chest: no deformity, muscle wasting  Cardiac: Regular rate and rhythm no murmur no edema  Lungs: Good breath sounds bilaterally and clear no wheezes no rales   Abd: Soft mildly distended slightly tender bowel sounds diminished  Ext: no edema; no joint swelling; No clubbing  : clear urine  Neuro: Awake alert oriented moves all 4 extremities  Psych-unable to assess  Skin: warm, dry, no cyanosis;  Pulses: Pedal radial femoral popliteal pulses intact  Capillary: Normal capillary refill      Labs:  2/2 assist control 14 tidal volume 500 PEEP 5 FiO2 25%  2/3 room air oxygen saturation 98%  Imaging:  I have personally reviewed the patients radiographs and have reviewed the reports:    CXR Results  (Last 48 hours)    None        Results from Hospital Encounter encounter on 01/31/21   XR CHEST PORT    Narrative HISTORY:  Acute respiratory failure    TECHNIQUE:  AP chest radiograph    COMPARISON: 1/31/2021  LIMITATIONS: None    TUBES/LINES: Tracheostomy tube present with tip below thoracic inlet.     LUNG PARENCHYMA: No convincing acute pulmonary parenchymal abnormality  TRACHEA/BRONCHI: Normal  PULMONARY VESSELS: Normal  PLEURA: Normal  HEART: Normal  AORTIC SHADOW:Normal.    MEDIASTINUM: Normal  BONE/SOFT TISSUES: No acute abnormality. OTHER: None      Impression No acute cardiopulmonary abnormality. .    XR CHEST PORT    Narrative Study: XR CHEST PORT    Clinical indication: hypoxia    Comparison: Chest x-ray 1/28/2021. Findings:    No consolidative airspace disease, pleural effusion or pneumothorax. Cardiomediastinal contours are within normal limits. No pulmonary edema. No acute osseous abnormality identified. Impression Impression:  No acute findings. Results from Hospital Encounter encounter on 01/28/21   XR CHEST PORT    Narrative PROCEDURE: XR CHEST PORT    HISTORY:Short of breath    COMPARISON:Chest x-ray 24 January 2021      TECHNIQUE: AP portable chest    LIMITATIONS: None    TUBES/LINES: None    LUNG PARENCHYMA/PLEURA: There is some small areas of asymmetric density in the  right lower lung zone and both midlung zones. No well-defined infiltrate or  mass. TRACHEA/BRONCHI: Normal  PULMONARY VESSELS: Normal  HEART: Normal  MEDIASTINUM: Normal  BONE/SOFT TISSUES: No acute process    OTHER: None      Impression Minimal bilateral densities. Early pneumonia? .            Results from [place] encounter on 01/28/21   CT NECK SOFT TISSUE WO CONT    Narrative PROCEDURE: CT NECK SOFT TISSUE WO CONT    HISTORY:Throat cancer. Short of breath    COMPARISON:None    Department policy stipulates all CT scans at this facility are performed using  dose reduction optimization techniques as appropriate to the performed exam,  including the following: Automated exposure control, adjustments of the mA  and/or KVP according to the patient size, and the use of iterative  reconstruction technique. TECHNIQUE: Axial images through the neck with multiplanar reconstruction.  No IV  contrast.  COMPARISON: None  LIMITATIONS: None  NASOPHARYNX: Normal  OROPHARYNX: Normal  HYPOPHARYNX: Normal  EPIGLOTTIS/ARYEPIGLOTTIC FOLDS: The epiglottis appears normal. At the level of  the area epiglottic folds and extending into the larynx there is a poorly  defined soft tissue mass associated with mucosa and submucosal tissues. The  process is bilateral extending posteriorly across the midline. Anteriorly there  is a very small component across the midline. There is a very asymmetric  narrowing of the airway at this level with a larger mass on the right. The  process extends down to and involves the lateral walls of the larynx including  both the true and false cords. Laryngeal ventricle is distorted on the right and  compressed on the left. LARYNX: In addition to the mass extending into the larynx described above there  is poorly defined increased soft tissue density around the larynx. Fat planes in  this area are not well defined. PARAPHARYNGEAL SPACE: The deep parapharyngeal spaces appear relatively clear. RETROPHARYNGEAL/PREVERTEBRAL SOFT TISSUES: Normal  SALIVARY GLANDS: Normal  LYMPH NODES: There are poorly defined but the lymph nodes at the level of the  parapharyngeal spaces and extending inferiorly appear somewhat enlarged. No  necrotic nodes appreciated on this unenhanced scan  TRACHEA: Trachea appears normal below the level of the larynx  THYROID GLAND: Poorly defined  CAROTID ARTERIES: Limited evaluation without contrast  JUGULAR VEINS: Limited evaluation without contrast  CERVICAL SPINE/OSSEOUS STRUCTURES: Normal  SOFT TISSUES: Normal  OTHER: None      Impression Irregular soft tissue mass involving the aryepiglottic and the  larynx. There is significant narrowing of the airway at the level of the larynx  and the supraglottic region. Poorly defined cervical adenopathy is noted.     Findings discussed with Dr. Amna Syed at 5:52 AM         Discussion: 2/1 patient with known laryngeal carcinoma scheduled for total laryngectomy radical neck dissection next week has marked worsening respiratory status with stridor CT of the neck shows compromised airway. I am concerned that he may need an emergency tracheostomy. He either needs to be emergently transferred to [facility] or maintained in ICU with consideration of tracheostomy. For the time being we will give steroids nebulized epinephrine and bronchodilators  2/2 respirations nonlabored but on the ventilator will attempt to switch to trach collar and discontinue sedation. We will ask speech to evaluate for swallow as well as for Passy-Blanca valve. He would probably benefit from PEG tube placement for nutritional supplementation           Thank you for allowing us to participate in the care of this patient.   We will follow along with you    Nasim Fisher MD

## 2021-02-07 ENCOUNTER — HOSPITAL ENCOUNTER (INPATIENT)
Age: 51
LOS: 9 days | Discharge: HOME HEALTH | DRG: 098 | End: 2021-02-16
Attending: HOSPITALIST | Admitting: STUDENT IN AN ORGANIZED HEALTH CARE EDUCATION/TRAINING PROGRAM
Payer: MEDICAID

## 2021-02-07 VITALS
BODY MASS INDEX: 18.07 KG/M2 | RESPIRATION RATE: 18 BRPM | WEIGHT: 133.38 LBS | TEMPERATURE: 97.9 F | HEIGHT: 72 IN | OXYGEN SATURATION: 98 % | HEART RATE: 89 BPM | DIASTOLIC BLOOD PRESSURE: 81 MMHG | SYSTOLIC BLOOD PRESSURE: 117 MMHG

## 2021-02-07 DIAGNOSIS — C32.9 LARYNGEAL CARCINOMA (HCC): Primary | ICD-10-CM

## 2021-02-07 PROCEDURE — 74011250637 HC RX REV CODE- 250/637: Performed by: INTERNAL MEDICINE

## 2021-02-07 PROCEDURE — 65660000000 HC RM CCU STEPDOWN

## 2021-02-07 PROCEDURE — 74011250637 HC RX REV CODE- 250/637: Performed by: STUDENT IN AN ORGANIZED HEALTH CARE EDUCATION/TRAINING PROGRAM

## 2021-02-07 PROCEDURE — 94760 N-INVAS EAR/PLS OXIMETRY 1: CPT

## 2021-02-07 RX ORDER — ACETAMINOPHEN 650 MG/1
650 SUPPOSITORY RECTAL
Status: DISCONTINUED | OUTPATIENT
Start: 2021-02-07 | End: 2021-02-08

## 2021-02-07 RX ORDER — SODIUM CHLORIDE 0.9 % (FLUSH) 0.9 %
5-40 SYRINGE (ML) INJECTION AS NEEDED
Status: DISCONTINUED | OUTPATIENT
Start: 2021-02-07 | End: 2021-02-16 | Stop reason: HOSPADM

## 2021-02-07 RX ORDER — ZOLPIDEM TARTRATE 5 MG/1
5 TABLET ORAL
Status: DISCONTINUED | OUTPATIENT
Start: 2021-02-07 | End: 2021-02-08

## 2021-02-07 RX ORDER — OXYCODONE HYDROCHLORIDE 5 MG/1
5 TABLET ORAL
Status: DISCONTINUED | OUTPATIENT
Start: 2021-02-07 | End: 2021-02-08

## 2021-02-07 RX ORDER — FAMOTIDINE 20 MG/1
20 TABLET, FILM COATED ORAL EVERY 12 HOURS
Status: DISCONTINUED | OUTPATIENT
Start: 2021-02-07 | End: 2021-02-08

## 2021-02-07 RX ORDER — SODIUM CHLORIDE 9 MG/ML
75 INJECTION, SOLUTION INTRAVENOUS CONTINUOUS
Status: DISCONTINUED | OUTPATIENT
Start: 2021-02-07 | End: 2021-02-10

## 2021-02-07 RX ORDER — POLYETHYLENE GLYCOL 3350 17 G/17G
17 POWDER, FOR SOLUTION ORAL DAILY PRN
Status: DISCONTINUED | OUTPATIENT
Start: 2021-02-07 | End: 2021-02-08

## 2021-02-07 RX ORDER — ACETAMINOPHEN 325 MG/1
650 TABLET ORAL
Status: DISCONTINUED | OUTPATIENT
Start: 2021-02-07 | End: 2021-02-08

## 2021-02-07 RX ORDER — BISACODYL 5 MG
5 TABLET, DELAYED RELEASE (ENTERIC COATED) ORAL DAILY PRN
Status: DISCONTINUED | OUTPATIENT
Start: 2021-02-07 | End: 2021-02-08

## 2021-02-07 RX ORDER — HYDROXYZINE 25 MG/1
50 TABLET, FILM COATED ORAL
Status: DISCONTINUED | OUTPATIENT
Start: 2021-02-07 | End: 2021-02-08

## 2021-02-07 RX ORDER — METFORMIN HYDROCHLORIDE 500 MG/1
TABLET ORAL 2 TIMES DAILY WITH MEALS
COMMUNITY
End: 2022-01-01

## 2021-02-07 RX ORDER — SORBITOL SOLUTION 70 %
60 SOLUTION, ORAL MISCELLANEOUS
Status: DISCONTINUED | OUTPATIENT
Start: 2021-02-07 | End: 2021-02-07 | Stop reason: HOSPADM

## 2021-02-07 RX ORDER — DOCUSATE SODIUM 50 MG/5ML
200 LIQUID ORAL DAILY
Status: DISCONTINUED | OUTPATIENT
Start: 2021-02-08 | End: 2021-02-16 | Stop reason: HOSPADM

## 2021-02-07 RX ORDER — SODIUM CHLORIDE 0.9 % (FLUSH) 0.9 %
5-40 SYRINGE (ML) INJECTION EVERY 8 HOURS
Status: DISCONTINUED | OUTPATIENT
Start: 2021-02-07 | End: 2021-02-13 | Stop reason: SDUPTHER

## 2021-02-07 RX ORDER — ONDANSETRON 2 MG/ML
4 INJECTION INTRAMUSCULAR; INTRAVENOUS
Status: DISCONTINUED | OUTPATIENT
Start: 2021-02-07 | End: 2021-02-08

## 2021-02-07 RX ORDER — ONDANSETRON 4 MG/1
4 TABLET, ORALLY DISINTEGRATING ORAL
Status: DISCONTINUED | OUTPATIENT
Start: 2021-02-07 | End: 2021-02-08

## 2021-02-07 RX ADMIN — Medication 5 ML: at 14:00

## 2021-02-07 RX ADMIN — FAMOTIDINE 20 MG: 20 TABLET, FILM COATED ORAL at 23:47

## 2021-02-07 RX ADMIN — OXYCODONE HYDROCHLORIDE 5 MG: 5 TABLET ORAL at 09:53

## 2021-02-07 RX ADMIN — LACTULOSE 45 ML: 20 SOLUTION ORAL at 15:04

## 2021-02-07 RX ADMIN — FAMOTIDINE 20 MG: 20 TABLET, FILM COATED ORAL at 09:53

## 2021-02-07 RX ADMIN — ACETAMINOPHEN 650 MG: 325 TABLET ORAL at 23:47

## 2021-02-07 RX ADMIN — Medication 10 ML: at 06:40

## 2021-02-07 RX ADMIN — DOCUSATE SODIUM 200 MG: 50 LIQUID ORAL at 09:53

## 2021-02-07 RX ADMIN — OXYCODONE HYDROCHLORIDE 5 MG: 5 TABLET ORAL at 15:05

## 2021-02-07 NOTE — PROGRESS NOTES
DC order noted. Chart reviewed. Call placed to [facility] for bed availability@ [phone]. Call transferred to \"[email or web address] [phone]  left on recording with a request for call back today. CM name and number provided for call back this date. Received call back from [facility]. Stated he expects to have a bed available this afternoon, weather permitting. He will follow up with the transfer team.  No intervention needed by [facility] at this time.

## 2021-02-07 NOTE — PROGRESS NOTES
Hospitalist Progress Note               Daily Progress Note: 2/2/2021      Subjective: The patient is seen for follow up. We are still awaiting a bed at [facility] [place].   He has his surgery scheduled in the morning and is getting very anxious    Problem List:  Problem List as of 2/7/2021 Never Reviewed          Codes Class Noted - Resolved    Throat cancer [facility]) ICD-10-CM: C14.0  ICD-9-CM: 149.0  1/31/2021 - Present        COPD exacerbation ([facility] ICD-10-CM: J44.1  ICD-9-CM: 491.21  1/31/2021 - Present        Acute and chronic respiratory failure with hypoxia ([facility] ICD-10-CM: J96.21  ICD-9-CM: 518.84, 799.02  1/31/2021 - Present        Chronic pain due to neoplasm ICD-10-CM: G89.3  ICD-9-CM: 338.3  1/31/2021 - Present        Anxiety ICD-10-CM: F41.9  ICD-9-CM: 300.00  1/31/2021 - Present        Insomnia ICD-10-CM: G47.00  ICD-9-CM: 780.52  1/31/2021 - Present        Respiratory failure with hypoxia [facility]) ICD-10-CM: J96.91  ICD-9-CM: 518.81  1/31/2021 - Present              Medications reviewed  Current Facility-Administered Medications   Medication Dose Route Frequency    docusate (COLACE) 50 mg/5 mL oral liquid 200 mg  200 mg Per G Tube DAILY    oxyCODONE IR (ROXICODONE) tablet 5 mg  5 mg Oral Q6H PRN    zolpidem (AMBIEN) tablet 10 mg  10 mg Per G Tube QHS PRN    magic mouthwash (FIRST-MOUTHWASH BLM) oral suspension 5 mL  5 mL Oral Q4H PRN    magic mouthwash (FIRST-MOUTHWASH BLM) oral suspension 5 mL  5 mL Oral TIDAC    sodium chloride (NS) flush 5-40 mL  5-40 mL IntraVENous Q8H    sodium chloride (NS) flush 5-40 mL  5-40 mL IntraVENous PRN    acetaminophen (TYLENOL) tablet 650 mg  650 mg Oral Q6H PRN    Or    acetaminophen (TYLENOL) suppository 650 mg  650 mg Rectal Q6H PRN    polyethylene glycol (MIRALAX) packet 17 g  17 g Oral DAILY PRN    bisacodyL (DULCOLAX) tablet 5 mg  5 mg Oral DAILY PRN    ondansetron (ZOFRAN ODT) tablet 4 mg  4 mg Oral Q6H PRN    Or    ondansetron (ZOFRAN) injection 4 mg  4 mg IntraVENous Q6H PRN    famotidine (PEPCID) tablet 20 mg  20 mg Oral Q12H    hydrOXYzine HCL (ATARAX) tablet 50 mg  50 mg Oral QID PRN       Review of Systems:   Review of systems not obtained due to patient factors. Objective:   Physical Exam:     Visit Vitals  /81   Pulse 89   Temp 97.9 °F (36.6 °C)   Resp 18   Ht 6' (1.829 m)   Wt 60.5 kg (133 lb 6.1 oz)   SpO2 97%   BMI 18.09 kg/m²    O2 Flow Rate (L/min): 12 l/min O2 Device: Tracheal collar    Temp (24hrs), Av.2 °F (36.8 °C), Min:97.9 °F (36.6 °C), Max:98.3 °F (36.8 °C)    No intake/output data recorded.  1901 -  0700  In: 345   Out: 200 [Urine:200]    General:   Alert and oriented   Lungs:   Clear to auscultation bilaterally. Chest wall:  No tenderness or deformity. Heart:  Regular rate and rhythm, S1, S2 normal, no murmur, click, rub or gallop. Abdomen:   Soft, non-tender. Bowel sounds normal. No masses,  No organomegaly. Extremities: Extremities normal, atraumatic, no cyanosis or edema. Pulses: 2+ and symmetric all extremities. Skin: Skin color, texture, turgor normal. No rashes or lesions   Neurologic: CNII-XII intact. Sedated and unresponsive         Data Review:       Recent Days:  No results for input(s): WBC, HGB, HCT, PLT, HGBEXT, HCTEXT, PLTEXT, HGBEXT, HCTEXT, PLTEXT in the last 72 hours. No results for input(s): NA, K, CL, CO2, GLU, BUN, CREA, CA, MG, PHOS, ALB, TBIL, TBILI, ALT, INR, INREXT, INREXT in the last 72 hours. No lab exists for component: SGOT  No results for input(s): PH, PCO2, PO2, HCO3, FIO2 in the last 72 hours. 24 Hour Results:  No results found for this or any previous visit (from the past 24 hour(s)). XR CHEST PORT   Final Result   No acute cardiopulmonary abnormality. .       XR CHEST PORT   Final Result   Impression:   No acute findings.            Assessment:  Stridor due to vocal fold paralysis versus tumor, status post urgent tracheostomy    Acute on chronic respiratory failure with hypoxia    Throat cancer    Acute exacerbation of COPD    Chronic pain syndrome, opioid dependent    Protein calorie malnutrition    Anxiety    Plan:  Awaiting transfer to [address] [place] [address]  I spoke with the transfer center again and informed them of the importance of patient getting there today    Total time spent with patient: 30 minutes.     Katharine Montague MD

## 2021-02-07 NOTE — ROUTINE PROCESS
TRANSFER - IN REPORT: 
 
Verbal report received from [facility] the [place] (name) on Natividad Rob  being received from [facility] (unit) for routine progression of care Report consisted of patients Situation, Background, Assessment and  
Recommendations(SBAR). Information from the following report(s) SBAR and ED Summary was reviewed with the receiving nurse. Opportunity for questions and clarification was provided. Assessment completed upon patients arrival to unit and care assumed. TF: Jevity 1.5 QID with 220ml water flush  
 
ETA aobut 1.5 hours At 1935:  Pt not yet arrived. Bedside and Verbal shift change report given to Rocio Mcpherson (oncoming nurse) by Christian Padilla (offgoing nurse). Report included the following information SBAR, Kardex, Procedure Summary and Intake/Output.

## 2021-02-08 ENCOUNTER — ANESTHESIA (OUTPATIENT)
Dept: SURGERY | Age: 51
DRG: 098 | End: 2021-02-08
Payer: MEDICAID

## 2021-02-08 ENCOUNTER — HOSPITAL ENCOUNTER (OUTPATIENT)
Dept: LAB | Age: 51
Discharge: HOME | End: 2021-02-08

## 2021-02-08 LAB
ALBUMIN SERPL-MCNC: 2.6 G/DL (ref 3.5–5)
ALBUMIN/GLOB SERPL: 0.5 {RATIO} (ref 1.1–2.2)
ALP SERPL-CCNC: 83 U/L (ref 45–117)
ALT SERPL-CCNC: 22 U/L (ref 12–78)
ANION GAP SERPL CALC-SCNC: 10 MMOL/L (ref 5–15)
AST SERPL-CCNC: 28 U/L (ref 15–37)
BASOPHILS # BLD: 0 K/UL (ref 0–0.1)
BASOPHILS NFR BLD: 0 % (ref 0–1)
BILIRUB SERPL-MCNC: 0.4 MG/DL (ref 0.2–1)
BUN SERPL-MCNC: 13 MG/DL (ref 6–20)
BUN/CREAT SERPL: 26 (ref 12–20)
CALCIUM SERPL-MCNC: 9.6 MG/DL (ref 8.5–10.1)
CHLORIDE SERPL-SCNC: 94 MMOL/L (ref 97–108)
CO2 SERPL-SCNC: 26 MMOL/L (ref 21–32)
COMMENT, HOLDF: NORMAL
CREAT SERPL-MCNC: 0.5 MG/DL (ref 0.7–1.3)
DIFFERENTIAL METHOD BLD: NORMAL
EOSINOPHIL # BLD: 0 K/UL (ref 0–0.4)
EOSINOPHIL NFR BLD: 0 % (ref 0–7)
ERYTHROCYTE [DISTWIDTH] IN BLOOD BY AUTOMATED COUNT: 12.3 % (ref 11.5–14.5)
GLOBULIN SER CALC-MCNC: 5.2 G/DL (ref 2–4)
GLUCOSE BLD STRIP.AUTO-MCNC: 90 MG/DL (ref 65–100)
GLUCOSE SERPL-MCNC: 96 MG/DL (ref 65–100)
HCT VFR BLD AUTO: 39.7 % (ref 36.6–50.3)
HGB BLD-MCNC: 13.2 G/DL (ref 12.1–17)
IMM GRANULOCYTES # BLD AUTO: 0 K/UL (ref 0–0.04)
IMM GRANULOCYTES NFR BLD AUTO: 0 % (ref 0–0.5)
LYMPHOCYTES # BLD: 1 K/UL (ref 0.8–3.5)
LYMPHOCYTES NFR BLD: 14 % (ref 12–49)
MCH RBC QN AUTO: 31.7 PG (ref 26–34)
MCHC RBC AUTO-ENTMCNC: 33.2 G/DL (ref 30–36.5)
MCV RBC AUTO: 95.4 FL (ref 80–99)
MONOCYTES # BLD: 0.8 K/UL (ref 0–1)
MONOCYTES NFR BLD: 12 % (ref 5–13)
NEUTS SEG # BLD: 5 K/UL (ref 1.8–8)
NEUTS SEG NFR BLD: 74 % (ref 32–75)
NRBC # BLD: 0 K/UL (ref 0–0.01)
NRBC BLD-RTO: 0 PER 100 WBC
PLATELET # BLD AUTO: 343 K/UL (ref 150–400)
PMV BLD AUTO: 9.9 FL (ref 8.9–12.9)
POTASSIUM SERPL-SCNC: 3.8 MMOL/L (ref 3.5–5.1)
PROT SERPL-MCNC: 7.8 G/DL (ref 6.4–8.2)
RBC # BLD AUTO: 4.16 M/UL (ref 4.1–5.7)
SAMPLES BEING HELD,HOLD: NORMAL
SERVICE CMNT-IMP: NORMAL
SODIUM SERPL-SCNC: 130 MMOL/L (ref 136–145)
WBC # BLD AUTO: 6.8 K/UL (ref 4.1–11.1)

## 2021-02-08 PROCEDURE — 77030008462 HC STPLR SKN PROX J&J -A: Performed by: OTOLARYNGOLOGY

## 2021-02-08 PROCEDURE — 77030011267 HC ELECTRD BLD COVD -A: Performed by: OTOLARYNGOLOGY

## 2021-02-08 PROCEDURE — 77030002888 HC SUT CHRMC J&J -A: Performed by: OTOLARYNGOLOGY

## 2021-02-08 PROCEDURE — 77030013079 HC BLNKT BAIR HGGR 3M -A: Performed by: ANESTHESIOLOGY

## 2021-02-08 PROCEDURE — 77030002996 HC SUT SLK J&J -A: Performed by: OTOLARYNGOLOGY

## 2021-02-08 PROCEDURE — 74011250636 HC RX REV CODE- 250/636: Performed by: OTOLARYNGOLOGY

## 2021-02-08 PROCEDURE — 77030038552 HC DRN WND MDII -A: Performed by: OTOLARYNGOLOGY

## 2021-02-08 PROCEDURE — 94760 N-INVAS EAR/PLS OXIMETRY 1: CPT

## 2021-02-08 PROCEDURE — 77030020061 HC IV BLD WRMR ADMIN SET 3M -B: Performed by: ANESTHESIOLOGY

## 2021-02-08 PROCEDURE — 74011250636 HC RX REV CODE- 250/636: Performed by: NURSE ANESTHETIST, CERTIFIED REGISTERED

## 2021-02-08 PROCEDURE — 76060000040 HC ANESTHESIA 4.5 TO 5 HR: Performed by: OTOLARYNGOLOGY

## 2021-02-08 PROCEDURE — 74011000250 HC RX REV CODE- 250: Performed by: NURSE ANESTHETIST, CERTIFIED REGISTERED

## 2021-02-08 PROCEDURE — 2709999900 HC NON-CHARGEABLE SUPPLY: Performed by: OTOLARYNGOLOGY

## 2021-02-08 PROCEDURE — 77030002986 HC SUT PROL J&J -A: Performed by: OTOLARYNGOLOGY

## 2021-02-08 PROCEDURE — 77030010512 HC APPL CLP LIG J&J -C: Performed by: OTOLARYNGOLOGY

## 2021-02-08 PROCEDURE — 77030041386: Performed by: OTOLARYNGOLOGY

## 2021-02-08 PROCEDURE — 65660000001 HC RM ICU INTERMED STEPDOWN

## 2021-02-08 PROCEDURE — 77030003029 HC SUT VCRL J&J -B: Performed by: OTOLARYNGOLOGY

## 2021-02-08 PROCEDURE — 0CTS0ZZ RESECTION OF LARYNX, OPEN APPROACH: ICD-10-PCS | Performed by: OTOLARYNGOLOGY

## 2021-02-08 PROCEDURE — 77030040361 HC SLV COMPR DVT MDII -B: Performed by: OTOLARYNGOLOGY

## 2021-02-08 PROCEDURE — 77030005513 HC CATH URETH FOL11 MDII -B: Performed by: OTOLARYNGOLOGY

## 2021-02-08 PROCEDURE — 74011000258 HC RX REV CODE- 258: Performed by: OTOLARYNGOLOGY

## 2021-02-08 PROCEDURE — 77030040356 HC CORD BPLR FRCP COVD -A: Performed by: OTOLARYNGOLOGY

## 2021-02-08 PROCEDURE — 88331 PATH CONSLTJ SURG 1 BLK 1SPC: CPT

## 2021-02-08 PROCEDURE — 77030005401 HC CATH RAD ARRO -A: Performed by: ANESTHESIOLOGY

## 2021-02-08 PROCEDURE — 85025 COMPLETE CBC W/AUTO DIFF WBC: CPT

## 2021-02-08 PROCEDURE — 76010000175 HC OR TIME 4 TO 4.5 HR INTENSV-TIER 1: Performed by: OTOLARYNGOLOGY

## 2021-02-08 PROCEDURE — 74011250637 HC RX REV CODE- 250/637: Performed by: STUDENT IN AN ORGANIZED HEALTH CARE EDUCATION/TRAINING PROGRAM

## 2021-02-08 PROCEDURE — 77030040922 HC BLNKT HYPOTHRM STRY -A

## 2021-02-08 PROCEDURE — 74011250636 HC RX REV CODE- 250/636: Performed by: STUDENT IN AN ORGANIZED HEALTH CARE EDUCATION/TRAINING PROGRAM

## 2021-02-08 PROCEDURE — 77030008684 HC TU ET CUF COVD -B: Performed by: OTOLARYNGOLOGY

## 2021-02-08 PROCEDURE — 77030013797 HC KT TRNSDUC PRSSR EDWD -A: Performed by: ANESTHESIOLOGY

## 2021-02-08 PROCEDURE — 76210000016 HC OR PH I REC 1 TO 1.5 HR: Performed by: OTOLARYNGOLOGY

## 2021-02-08 PROCEDURE — 77030008683 HC TU ET CUF COVD -A: Performed by: OTOLARYNGOLOGY

## 2021-02-08 PROCEDURE — 36415 COLL VENOUS BLD VENIPUNCTURE: CPT

## 2021-02-08 PROCEDURE — P9045 ALBUMIN (HUMAN), 5%, 250 ML: HCPCS | Performed by: NURSE ANESTHETIST, CERTIFIED REGISTERED

## 2021-02-08 PROCEDURE — 77030002916 HC SUT ETHLN J&J -A: Performed by: OTOLARYNGOLOGY

## 2021-02-08 PROCEDURE — 80053 COMPREHEN METABOLIC PANEL: CPT

## 2021-02-08 PROCEDURE — 88305 TISSUE EXAM BY PATHOLOGIST: CPT

## 2021-02-08 PROCEDURE — 74011250636 HC RX REV CODE- 250/636: Performed by: ANESTHESIOLOGY

## 2021-02-08 PROCEDURE — 82962 GLUCOSE BLOOD TEST: CPT

## 2021-02-08 PROCEDURE — 88309 TISSUE EXAM BY PATHOLOGIST: CPT

## 2021-02-08 PROCEDURE — 0K840ZZ DIVISION OF TONGUE, PALATE, PHARYNX MUSCLE, OPEN APPROACH: ICD-10-PCS | Performed by: OTOLARYNGOLOGY

## 2021-02-08 PROCEDURE — 77030013797 HC KT TRNSDUC PRSSR EDWD -A

## 2021-02-08 PROCEDURE — 77030005402 HC CATH RAD ART LN KT TELE -B

## 2021-02-08 RX ORDER — MIDAZOLAM HYDROCHLORIDE 1 MG/ML
0.5 INJECTION, SOLUTION INTRAMUSCULAR; INTRAVENOUS
Status: DISCONTINUED | OUTPATIENT
Start: 2021-02-08 | End: 2021-02-08 | Stop reason: HOSPADM

## 2021-02-08 RX ORDER — FENTANYL CITRATE 50 UG/ML
INJECTION, SOLUTION INTRAMUSCULAR; INTRAVENOUS AS NEEDED
Status: DISCONTINUED | OUTPATIENT
Start: 2021-02-08 | End: 2021-02-08 | Stop reason: HOSPADM

## 2021-02-08 RX ORDER — DIPHENHYDRAMINE HYDROCHLORIDE 50 MG/ML
12.5 INJECTION, SOLUTION INTRAMUSCULAR; INTRAVENOUS AS NEEDED
Status: DISCONTINUED | OUTPATIENT
Start: 2021-02-08 | End: 2021-02-08 | Stop reason: HOSPADM

## 2021-02-08 RX ORDER — EPHEDRINE SULFATE/0.9% NACL/PF 50 MG/5 ML
SYRINGE (ML) INTRAVENOUS AS NEEDED
Status: DISCONTINUED | OUTPATIENT
Start: 2021-02-08 | End: 2021-02-08 | Stop reason: HOSPADM

## 2021-02-08 RX ORDER — SODIUM CHLORIDE 0.9 % (FLUSH) 0.9 %
5-40 SYRINGE (ML) INJECTION AS NEEDED
Status: DISCONTINUED | OUTPATIENT
Start: 2021-02-08 | End: 2021-02-08 | Stop reason: HOSPADM

## 2021-02-08 RX ORDER — HYDROCODONE BITARTRATE AND ACETAMINOPHEN 7.5; 325 MG/15ML; MG/15ML
15 SOLUTION ORAL
Status: DISCONTINUED | OUTPATIENT
Start: 2021-02-08 | End: 2021-02-09

## 2021-02-08 RX ORDER — SODIUM CHLORIDE 0.9 % (FLUSH) 0.9 %
5-40 SYRINGE (ML) INJECTION EVERY 8 HOURS
Status: DISCONTINUED | OUTPATIENT
Start: 2021-02-08 | End: 2021-02-08 | Stop reason: HOSPADM

## 2021-02-08 RX ORDER — SODIUM CHLORIDE 9 MG/ML
25 INJECTION, SOLUTION INTRAVENOUS CONTINUOUS
Status: DISCONTINUED | OUTPATIENT
Start: 2021-02-08 | End: 2021-02-08 | Stop reason: HOSPADM

## 2021-02-08 RX ORDER — ROPIVACAINE HYDROCHLORIDE 5 MG/ML
30 INJECTION, SOLUTION EPIDURAL; INFILTRATION; PERINEURAL ONCE
Status: DISCONTINUED | OUTPATIENT
Start: 2021-02-08 | End: 2021-02-08 | Stop reason: HOSPADM

## 2021-02-08 RX ORDER — ONDANSETRON 2 MG/ML
4 INJECTION INTRAMUSCULAR; INTRAVENOUS AS NEEDED
Status: DISCONTINUED | OUTPATIENT
Start: 2021-02-08 | End: 2021-02-08 | Stop reason: HOSPADM

## 2021-02-08 RX ORDER — ALBUMIN HUMAN 50 G/1000ML
SOLUTION INTRAVENOUS AS NEEDED
Status: DISCONTINUED | OUTPATIENT
Start: 2021-02-08 | End: 2021-02-08 | Stop reason: HOSPADM

## 2021-02-08 RX ORDER — ACETAMINOPHEN 325 MG/1
650 TABLET ORAL ONCE
Status: DISCONTINUED | OUTPATIENT
Start: 2021-02-08 | End: 2021-02-08 | Stop reason: HOSPADM

## 2021-02-08 RX ORDER — ONDANSETRON 2 MG/ML
4 INJECTION INTRAMUSCULAR; INTRAVENOUS
Status: DISCONTINUED | OUTPATIENT
Start: 2021-02-08 | End: 2021-02-16 | Stop reason: HOSPADM

## 2021-02-08 RX ORDER — PROPOFOL 10 MG/ML
INJECTION, EMULSION INTRAVENOUS AS NEEDED
Status: DISCONTINUED | OUTPATIENT
Start: 2021-02-08 | End: 2021-02-08 | Stop reason: HOSPADM

## 2021-02-08 RX ORDER — FENTANYL CITRATE 50 UG/ML
25 INJECTION, SOLUTION INTRAMUSCULAR; INTRAVENOUS
Status: COMPLETED | OUTPATIENT
Start: 2021-02-08 | End: 2021-02-08

## 2021-02-08 RX ORDER — MIDAZOLAM HYDROCHLORIDE 1 MG/ML
1 INJECTION, SOLUTION INTRAMUSCULAR; INTRAVENOUS AS NEEDED
Status: DISCONTINUED | OUTPATIENT
Start: 2021-02-08 | End: 2021-02-08 | Stop reason: HOSPADM

## 2021-02-08 RX ORDER — SODIUM CHLORIDE 0.9 % (FLUSH) 0.9 %
5-40 SYRINGE (ML) INJECTION EVERY 8 HOURS
Status: DISCONTINUED | OUTPATIENT
Start: 2021-02-08 | End: 2021-02-16 | Stop reason: HOSPADM

## 2021-02-08 RX ORDER — SODIUM CHLORIDE 0.9 % (FLUSH) 0.9 %
5-40 SYRINGE (ML) INJECTION AS NEEDED
Status: DISCONTINUED | OUTPATIENT
Start: 2021-02-08 | End: 2021-02-16 | Stop reason: HOSPADM

## 2021-02-08 RX ORDER — DEXAMETHASONE SODIUM PHOSPHATE 4 MG/ML
INJECTION, SOLUTION INTRA-ARTICULAR; INTRALESIONAL; INTRAMUSCULAR; INTRAVENOUS; SOFT TISSUE AS NEEDED
Status: DISCONTINUED | OUTPATIENT
Start: 2021-02-08 | End: 2021-02-08 | Stop reason: HOSPADM

## 2021-02-08 RX ORDER — CEFAZOLIN SODIUM 1 G/3ML
INJECTION, POWDER, FOR SOLUTION INTRAMUSCULAR; INTRAVENOUS AS NEEDED
Status: DISCONTINUED | OUTPATIENT
Start: 2021-02-08 | End: 2021-02-08 | Stop reason: HOSPADM

## 2021-02-08 RX ORDER — SODIUM CHLORIDE, SODIUM LACTATE, POTASSIUM CHLORIDE, CALCIUM CHLORIDE 600; 310; 30; 20 MG/100ML; MG/100ML; MG/100ML; MG/100ML
75 INJECTION, SOLUTION INTRAVENOUS CONTINUOUS
Status: DISCONTINUED | OUTPATIENT
Start: 2021-02-08 | End: 2021-02-08 | Stop reason: HOSPADM

## 2021-02-08 RX ORDER — FENTANYL CITRATE 50 UG/ML
50 INJECTION, SOLUTION INTRAMUSCULAR; INTRAVENOUS AS NEEDED
Status: DISCONTINUED | OUTPATIENT
Start: 2021-02-08 | End: 2021-02-08 | Stop reason: HOSPADM

## 2021-02-08 RX ORDER — ONDANSETRON 2 MG/ML
INJECTION INTRAMUSCULAR; INTRAVENOUS AS NEEDED
Status: DISCONTINUED | OUTPATIENT
Start: 2021-02-08 | End: 2021-02-08 | Stop reason: HOSPADM

## 2021-02-08 RX ORDER — KETAMINE HYDROCHLORIDE 10 MG/ML
INJECTION, SOLUTION INTRAMUSCULAR; INTRAVENOUS AS NEEDED
Status: DISCONTINUED | OUTPATIENT
Start: 2021-02-08 | End: 2021-02-08 | Stop reason: HOSPADM

## 2021-02-08 RX ORDER — SODIUM CHLORIDE, SODIUM LACTATE, POTASSIUM CHLORIDE, CALCIUM CHLORIDE 600; 310; 30; 20 MG/100ML; MG/100ML; MG/100ML; MG/100ML
INJECTION, SOLUTION INTRAVENOUS
Status: DISCONTINUED | OUTPATIENT
Start: 2021-02-08 | End: 2021-02-08 | Stop reason: HOSPADM

## 2021-02-08 RX ORDER — MIDAZOLAM HYDROCHLORIDE 1 MG/ML
INJECTION, SOLUTION INTRAMUSCULAR; INTRAVENOUS AS NEEDED
Status: DISCONTINUED | OUTPATIENT
Start: 2021-02-08 | End: 2021-02-08 | Stop reason: HOSPADM

## 2021-02-08 RX ORDER — MORPHINE SULFATE 10 MG/ML
2 INJECTION, SOLUTION INTRAMUSCULAR; INTRAVENOUS
Status: DISCONTINUED | OUTPATIENT
Start: 2021-02-08 | End: 2021-02-08 | Stop reason: HOSPADM

## 2021-02-08 RX ORDER — SODIUM CHLORIDE, SODIUM LACTATE, POTASSIUM CHLORIDE, CALCIUM CHLORIDE 600; 310; 30; 20 MG/100ML; MG/100ML; MG/100ML; MG/100ML
125 INJECTION, SOLUTION INTRAVENOUS CONTINUOUS
Status: DISCONTINUED | OUTPATIENT
Start: 2021-02-08 | End: 2021-02-08 | Stop reason: HOSPADM

## 2021-02-08 RX ORDER — PHENYLEPHRINE HCL IN 0.9% NACL 0.4MG/10ML
SYRINGE (ML) INTRAVENOUS AS NEEDED
Status: DISCONTINUED | OUTPATIENT
Start: 2021-02-08 | End: 2021-02-08 | Stop reason: HOSPADM

## 2021-02-08 RX ORDER — HYDROMORPHONE HYDROCHLORIDE 2 MG/ML
INJECTION, SOLUTION INTRAMUSCULAR; INTRAVENOUS; SUBCUTANEOUS AS NEEDED
Status: DISCONTINUED | OUTPATIENT
Start: 2021-02-08 | End: 2021-02-08 | Stop reason: HOSPADM

## 2021-02-08 RX ORDER — LIDOCAINE HYDROCHLORIDE 10 MG/ML
0.1 INJECTION, SOLUTION EPIDURAL; INFILTRATION; INTRACAUDAL; PERINEURAL AS NEEDED
Status: DISCONTINUED | OUTPATIENT
Start: 2021-02-08 | End: 2021-02-08 | Stop reason: HOSPADM

## 2021-02-08 RX ORDER — HYDROCODONE BITARTRATE AND ACETAMINOPHEN 7.5; 325 MG/15ML; MG/15ML
30 SOLUTION ORAL
Status: DISCONTINUED | OUTPATIENT
Start: 2021-02-08 | End: 2021-02-09

## 2021-02-08 RX ORDER — HYDROMORPHONE HYDROCHLORIDE 1 MG/ML
0.2 INJECTION, SOLUTION INTRAMUSCULAR; INTRAVENOUS; SUBCUTANEOUS
Status: DISCONTINUED | OUTPATIENT
Start: 2021-02-08 | End: 2021-02-08 | Stop reason: HOSPADM

## 2021-02-08 RX ORDER — DEXMEDETOMIDINE HYDROCHLORIDE 100 UG/ML
INJECTION, SOLUTION INTRAVENOUS AS NEEDED
Status: DISCONTINUED | OUTPATIENT
Start: 2021-02-08 | End: 2021-02-08 | Stop reason: HOSPADM

## 2021-02-08 RX ORDER — MORPHINE SULFATE 2 MG/ML
2 INJECTION, SOLUTION INTRAMUSCULAR; INTRAVENOUS
Status: DISCONTINUED | OUTPATIENT
Start: 2021-02-08 | End: 2021-02-16 | Stop reason: HOSPADM

## 2021-02-08 RX ADMIN — HYDROMORPHONE HYDROCHLORIDE 0.2 MG: 1 INJECTION, SOLUTION INTRAMUSCULAR; INTRAVENOUS; SUBCUTANEOUS at 16:25

## 2021-02-08 RX ADMIN — Medication 10 ML: at 22:00

## 2021-02-08 RX ADMIN — Medication 120 MCG: at 10:48

## 2021-02-08 RX ADMIN — FENTANYL CITRATE 25 MCG: 50 INJECTION, SOLUTION INTRAMUSCULAR; INTRAVENOUS at 17:00

## 2021-02-08 RX ADMIN — HYDROMORPHONE HYDROCHLORIDE 0.2 MG: 1 INJECTION, SOLUTION INTRAMUSCULAR; INTRAVENOUS; SUBCUTANEOUS at 16:10

## 2021-02-08 RX ADMIN — MEPERIDINE HYDROCHLORIDE 12.5 MG: 25 INJECTION INTRAMUSCULAR; INTRAVENOUS; SUBCUTANEOUS at 15:15

## 2021-02-08 RX ADMIN — Medication 10 ML: at 14:15

## 2021-02-08 RX ADMIN — Medication 40 MCG: at 07:55

## 2021-02-08 RX ADMIN — MIDAZOLAM 2 MG: 1 INJECTION INTRAMUSCULAR; INTRAVENOUS at 07:26

## 2021-02-08 RX ADMIN — SODIUM CHLORIDE 75 ML/HR: 9 INJECTION, SOLUTION INTRAVENOUS at 00:00

## 2021-02-08 RX ADMIN — HYDROMORPHONE HYDROCHLORIDE 0.2 MG: 1 INJECTION, SOLUTION INTRAMUSCULAR; INTRAVENOUS; SUBCUTANEOUS at 15:55

## 2021-02-08 RX ADMIN — Medication 10 ML: at 00:00

## 2021-02-08 RX ADMIN — Medication 10 ML: at 06:21

## 2021-02-08 RX ADMIN — Medication 10 MG: at 08:53

## 2021-02-08 RX ADMIN — PROPOFOL 50 MG: 10 INJECTION, EMULSION INTRAVENOUS at 07:55

## 2021-02-08 RX ADMIN — SODIUM CHLORIDE, POTASSIUM CHLORIDE, SODIUM LACTATE AND CALCIUM CHLORIDE: 600; 310; 30; 20 INJECTION, SOLUTION INTRAVENOUS at 09:59

## 2021-02-08 RX ADMIN — Medication 5 MG: at 09:12

## 2021-02-08 RX ADMIN — Medication 40 MCG: at 07:58

## 2021-02-08 RX ADMIN — DEXAMETHASONE SODIUM PHOSPHATE 4 MG: 4 INJECTION, SOLUTION INTRAMUSCULAR; INTRAVENOUS at 08:00

## 2021-02-08 RX ADMIN — DEXAMETHASONE SODIUM PHOSPHATE 4 MG: 4 INJECTION, SOLUTION INTRAMUSCULAR; INTRAVENOUS at 11:29

## 2021-02-08 RX ADMIN — Medication 80 MCG: at 11:57

## 2021-02-08 RX ADMIN — HYDROMORPHONE HYDROCHLORIDE 0.5 MG: 2 INJECTION, SOLUTION INTRAMUSCULAR; INTRAVENOUS; SUBCUTANEOUS at 11:59

## 2021-02-08 RX ADMIN — MEPERIDINE HYDROCHLORIDE 12.5 MG: 25 INJECTION INTRAMUSCULAR; INTRAVENOUS; SUBCUTANEOUS at 14:50

## 2021-02-08 RX ADMIN — KETAMINE HYDROCHLORIDE 25 MG: 10 INJECTION, SOLUTION INTRAMUSCULAR; INTRAVENOUS at 08:35

## 2021-02-08 RX ADMIN — LACTULOSE 45 ML: 20 SOLUTION ORAL at 03:30

## 2021-02-08 RX ADMIN — FENTANYL CITRATE 25 MCG: 50 INJECTION, SOLUTION INTRAMUSCULAR; INTRAVENOUS at 16:42

## 2021-02-08 RX ADMIN — SODIUM CHLORIDE, SODIUM LACTATE, POTASSIUM CHLORIDE, AND CALCIUM CHLORIDE: 600; 310; 30; 20 INJECTION, SOLUTION INTRAVENOUS at 07:26

## 2021-02-08 RX ADMIN — CEFAZOLIN 2 G: 330 INJECTION, POWDER, FOR SOLUTION INTRAMUSCULAR; INTRAVENOUS at 08:00

## 2021-02-08 RX ADMIN — SODIUM CHLORIDE, POTASSIUM CHLORIDE, SODIUM LACTATE AND CALCIUM CHLORIDE: 600; 310; 30; 20 INJECTION, SOLUTION INTRAVENOUS at 07:26

## 2021-02-08 RX ADMIN — PHENYLEPHRINE HYDROCHLORIDE 40 MCG/MIN: 10 INJECTION INTRAVENOUS at 07:54

## 2021-02-08 RX ADMIN — DEXMEDETOMIDINE HYDROCHLORIDE 4 MCG: 100 INJECTION, SOLUTION, CONCENTRATE INTRAVENOUS at 11:45

## 2021-02-08 RX ADMIN — Medication 120 MCG: at 11:42

## 2021-02-08 RX ADMIN — ALBUMIN (HUMAN) 250 ML: 12.5 INJECTION, SOLUTION INTRAVENOUS at 08:15

## 2021-02-08 RX ADMIN — KETAMINE HYDROCHLORIDE 25 MG: 10 INJECTION, SOLUTION INTRAMUSCULAR; INTRAVENOUS at 08:15

## 2021-02-08 RX ADMIN — Medication 200 MCG: at 08:02

## 2021-02-08 RX ADMIN — DEXMEDETOMIDINE HYDROCHLORIDE 8 MCG: 100 INJECTION, SOLUTION, CONCENTRATE INTRAVENOUS at 08:20

## 2021-02-08 RX ADMIN — Medication 120 MCG: at 10:04

## 2021-02-08 RX ADMIN — ONDANSETRON HYDROCHLORIDE 4 MG: 2 INJECTION, SOLUTION INTRAMUSCULAR; INTRAVENOUS at 11:29

## 2021-02-08 RX ADMIN — Medication 20 MG: at 09:46

## 2021-02-08 RX ADMIN — FENTANYL CITRATE 50 MCG: 50 INJECTION, SOLUTION INTRAMUSCULAR; INTRAVENOUS at 07:57

## 2021-02-08 RX ADMIN — FENTANYL CITRATE 25 MCG: 50 INJECTION, SOLUTION INTRAMUSCULAR; INTRAVENOUS at 16:50

## 2021-02-08 RX ADMIN — HYDROMORPHONE HYDROCHLORIDE 0.2 MG: 1 INJECTION, SOLUTION INTRAMUSCULAR; INTRAVENOUS; SUBCUTANEOUS at 15:40

## 2021-02-08 RX ADMIN — Medication 5 MG: at 09:39

## 2021-02-08 RX ADMIN — Medication 10 ML: at 14:00

## 2021-02-08 RX ADMIN — DEXMEDETOMIDINE HYDROCHLORIDE 8 MCG: 100 INJECTION, SOLUTION, CONCENTRATE INTRAVENOUS at 08:24

## 2021-02-08 RX ADMIN — Medication 5 MG: at 08:39

## 2021-02-08 RX ADMIN — FENTANYL CITRATE 50 MCG: 50 INJECTION, SOLUTION INTRAMUSCULAR; INTRAVENOUS at 08:00

## 2021-02-08 RX ADMIN — HYDROMORPHONE HYDROCHLORIDE 0.2 MG: 1 INJECTION, SOLUTION INTRAMUSCULAR; INTRAVENOUS; SUBCUTANEOUS at 16:40

## 2021-02-08 RX ADMIN — Medication 40 MCG: at 08:06

## 2021-02-08 RX ADMIN — CEFAZOLIN SODIUM 1 G: 1 INJECTION, POWDER, FOR SOLUTION INTRAMUSCULAR; INTRAVENOUS at 20:00

## 2021-02-08 RX ADMIN — HYDROMORPHONE HYDROCHLORIDE 0.5 MG: 2 INJECTION, SOLUTION INTRAMUSCULAR; INTRAVENOUS; SUBCUTANEOUS at 11:39

## 2021-02-08 RX ADMIN — FENTANYL CITRATE 50 MCG: 50 INJECTION, SOLUTION INTRAMUSCULAR; INTRAVENOUS at 08:29

## 2021-02-08 RX ADMIN — PROPOFOL 50 MG: 10 INJECTION, EMULSION INTRAVENOUS at 07:56

## 2021-02-08 RX ADMIN — FENTANYL CITRATE 25 MCG: 50 INJECTION, SOLUTION INTRAMUSCULAR; INTRAVENOUS at 17:30

## 2021-02-08 RX ADMIN — Medication 80 MCG: at 11:45

## 2021-02-08 RX ADMIN — FENTANYL CITRATE 100 MCG: 50 INJECTION, SOLUTION INTRAMUSCULAR; INTRAVENOUS at 08:33

## 2021-02-08 RX ADMIN — DEXMEDETOMIDINE HYDROCHLORIDE 8 MCG: 100 INJECTION, SOLUTION, CONCENTRATE INTRAVENOUS at 11:57

## 2021-02-08 RX ADMIN — SODIUM CHLORIDE 75 ML/HR: 9 INJECTION, SOLUTION INTRAVENOUS at 13:00

## 2021-02-08 RX ADMIN — Medication 5 MG: at 08:47

## 2021-02-08 RX ADMIN — MORPHINE SULFATE 2 MG: 2 INJECTION, SOLUTION INTRAMUSCULAR; INTRAVENOUS at 21:15

## 2021-02-08 NOTE — BRIEF OP NOTE
Brief Postoperative Note    Patient: Campos Pathak  YOB: 1970  MRN: [id number]    Date of Procedure: 2/8/2021     Pre-Op Diagnosis: TRANSGLOTTIC MALIGNANT NEOPLASM OF LARYNX    Post-Op Diagnosis: Same as preoperative diagnosis.       Procedure(s):  TOTAL LARYNGECTOMY, cricopharyngeal myotomy    Surgeon(s):  Mariah Swain MD Josiah Core, MD    Surgical Assistant: None    Anesthesia: General     Estimated Blood Loss (mL): 50    Complications: None    Specimens:   ID Type Source Tests Collected by Time Destination   1 : Total Laryngectomy  Fresh Throat  Mariah Swain MD 2/8/2021 6943 Pathology   2 : ANTERIOR TRACHEAL MARGIN Frozen Section Tracheostomy site  Mariah Swain MD 2/8/2021 0957 Pathology   3 : POSTERIOR TRACHEAL MARGIN Frozen Section Tracheostomy site  Mariah Swain MD 2/8/2021 1000 Pathology   4 : POST CRICORD MARGIN Frozen Section Tracheostomy site  Mariah Swain MD 2/8/2021 1011 Pathology   5 : LEFT PYREFORM SINUS Frozen Section Tracheostomy site  Mariah Swain MD 2/8/2021 1011 Pathology   6 : LEFT LATERAL PHARYNGEAL WALL Frozen Section Tracheostomy site  Mariah Swain MD 2/8/2021 1012 Pathology   7 : RIGHT PYREFORM SINUS Frozen Section Tracheostomy site  Mariah Swain MD 2/8/2021 1012 Pathology   8 : RIGHT LATERAL PHARYNGEAL WALL Frozen Section Tracheostomy site  Mariah Swain MD 2/8/2021 1013 Pathology   9 : LEFT BASE OF TONGUE Frozen Section Tracheostomy site  Mariah Swain MD 2/8/2021 1013 Pathology   10 : RIGHT BASE OF TONGUE Frozen Section Tracheostomy site  Mariah Swain MD 2/8/2021 1014 Pathology        Implants: * No implants in log *    Drains:   [REMOVED] PEG/Gastrostomy Tube 02/03/21 (Removed)   Site Assessment Clean, dry, & intact 02/08/21 0400   Dressing Status Clean, dry, & intact 02/08/21 0400   G Port Status Clamped 02/08/21 0400   Action Taken Placement verified (comment) 02/08/21 0400   Gastric Residual (mL) 5 ml 02/04/21 1200 Tube Feeding/Formula Options Jevity 1.5 02/07/21 2015   Tube Feeding/Verify Rate (mL/hr) 200 02/05/21 0600   Water Flush Volume (mL) 125 mL 02/07/21 0333   Intake (ml) 220 ml 02/07/21 0333       Findings: frozen margins neg    Electronically Signed by Parish Caldwell MD on 2/8/2021 at 11:59 AM

## 2021-02-08 NOTE — PROGRESS NOTES
Physical Therapy  Orders received and chart reviewed. Patient in the OR at this time for total laryngectomy. Will follow up tomorrow.   Shanta Benites, PT

## 2021-02-08 NOTE — ANESTHESIA PREPROCEDURE EVALUATION
Relevant Problems   RESPIRATORY SYSTEM   (+) COPD exacerbation (HCC)      PERSONAL HX & FAMILY HX OF CANCER   (+) Throat cancer (HCC)       Anesthetic History   No history of anesthetic complications            Review of Systems / Medical History  Patient summary reviewed, nursing notes reviewed and pertinent labs reviewed    Pulmonary    COPD (exacerbation)            Comments: S/p trach 2/1/2021   Neuro/Psych         Psychiatric history    Comments: weakness Cardiovascular  Within defined limits                  Comments: Sinus rhythm with Premature atrial complexes   Septal infarct , age undetermined   Abnormal ECG   When compared with ECG of 24-JAN-2021 12:55,   Premature atrial complexes are now Present   Septal infarct is now Present   Confirmed by Sofy Comse ([id number]) on 1/29/2021 1:08:12 PM       Results for Shane Ma (MRN [id number]) as of 2/1/2021 13:07    2/1/2021 06:20  pH: 7.32 (L)  PCO2: 73 (H)  PO2: 74 (L)  BICARBONATE: 32 (H)  O2 SAT: 94 (L)  BASE EXCESS: 8.2 (H)  SITE: Right Radial  JEN'S TEST: PASS  O2 FLOW RATE: 6  O2 METHOD: Nasal Cannula  Critical value read back: Ivonne Cortes RN     GI/Hepatic/Renal  Within defined limits             Comments: Weight loss Endo/Other        Cancer (throat)    Comments: Chronic pain Other Findings   Comments: Copied from IM admit note:  Hyun Key is a 53 y.o. male who admitted on 1/31/2021. PMH significant for throat cancer, emphysema, chronic pain and anxiety secondary to advanced age throat cancer. Patient has scheduled surgery in 8 days with ENT specialist at [facility]. He comes in to the emergency room with increased shortness of breath, insomnia, unmanaged throat pain and anxiety. He states he has not been able to eat in 2 days and cannot sleep. He is requesting IV medication for pain and anxiety. He has audible stridor, wheezing shortness of breath and hypoxia on room air sats are 84%.   Admit for further management of acute hypoxic respiratory failure, COPD exacerbation, unmanaged pain and anxiety. Results for Silverio Boudreaux (MRN [id number]) as of 2/1/2021 13:07    2/1/2021 03:05  WBC: 14.0 (H)  RBC: 4.20  HGB: 13.9  HCT: 42.1  MCV: 100.2 (H)  MCH: 33.1  MCHC: 33.0  RDW: 13.0  PLATELET: 271  MPV: 07.9  Sodium: 135 (L)  Potassium: 4.1  Chloride: 94 (L)  CO2: 37 (H)  Anion gap: 4 (L)  Glucose: 145 (H)  BUN: 16  Creatinine: 0.74  BUN/Creatinine ratio: 22 (H)  Calcium: 9.2  GFR est non-AA: >60  GFR est AA: >60       CT neck 1/28/21:  IMPRESSION  Irregular soft tissue mass involving the aryepiglottic and the  larynx. There is significant narrowing of the airway at the level of the larynx  and the supraglottic region. Poorly defined cervical adenopathy is noted.              Physical Exam    Airway  Mallampati: II  TM Distance: 4 - 6 cm  Neck ROM: normal range of motion   Mouth opening: Normal     Cardiovascular    Rhythm: regular  Rate: normal         Dental    Dentition: Poor dentition     Pulmonary  Breath sounds clear to auscultation               Abdominal  GI exam deferred       Other Findings            Anesthetic Plan    ASA: 3  Anesthesia type: general    Monitoring Plan: Arterial line      Induction: Intravenous  Anesthetic plan and risks discussed with: Patient      General anesthesia was prescribed for this patient because by definition it is \"a drug-induced loss of consciousness during which patients are not arousable, even by painful stimulation. \" Sometimes, the ability to independently maintain ventilatory function is often impaired and patients often require assistance in maintaining a patent airway. Occasionally, positive pressure ventilation may be required because of depressed spontaneous ventilation or drug-induced depression of neuromuscular function. This depth of anesthesia is preferred for endoscopic procedures to facilitate the procedure and for patient safety/quality of care.

## 2021-02-08 NOTE — ROUTINE PROCESS
Patient: Alok Allen MRN: [id number]  SSN: [id number] YOB: 1970  Age: 53 y.o. Sex: male Patient is status post Procedure(s): 
TOTAL LARYNGECTOMY,. Surgeon(s) and Role: * Torsten Castellano MD - Primary Terry Carlton MD - Assisting Local/Dose/Irrigation:  SEE MAR Peripheral IV 02/01/21 Left;Posterior Forearm (Active) Site Assessment Clean, dry, & intact 02/08/21 0400 Phlebitis Assessment 0 02/08/21 0400 Infiltration Assessment 0 02/08/21 0400 Dressing Status Clean, dry, & intact 02/08/21 0400 Dressing Type Transparent 02/08/21 0400 Hub Color/Line Status Infusing 02/08/21 0400 Alcohol Cap Used Yes 02/07/21 2015 Peripheral IV 02/08/21 Left;Posterior Hand (Active) Site Assessment Clean, dry, & intact 02/08/21 0734 Phlebitis Assessment 0 02/08/21 0734 Infiltration Assessment 0 02/08/21 0734 Dressing Status Clean, dry, & intact; New 02/08/21 0734 Dressing Type Tape;Transparent 02/08/21 0734 Hub Color/Line Status Green; Infusing 02/08/21 0734 Peripheral IV 02/08/21 Right Forearm (Active) Arterial Line 02/08/21 Right Radial artery (Active) Washington-Reyna Drain 02/08/21 Anterior; Left Neck (Active) Washington-Reyna Drain 02/08/21 Anterior;Right Neck (Active) Airway - Tracheostomy Tube 02/08/21 Cuffless (Active) Dressing/Packing:  Incision 02/08/21 Neck-Dressing/Treatment: Dry dressing(BACITRACIN ON WOUND, OPEN TO AIR) (02/08/21 1149) Splint/Cast:  ] Other:

## 2021-02-08 NOTE — PROGRESS NOTES
Otolaryngology, Head and Neck Surgery      Underwent trach last week for airway compromise. Transferred to [facility] last evening. Recent Results (from the past 24 hour(s))   METABOLIC PANEL, COMPREHENSIVE    Collection Time: 02/08/21  2:07 AM   Result Value Ref Range    Sodium 130 (L) 136 - 145 mmol/L    Potassium 3.8 3.5 - 5.1 mmol/L    Chloride 94 (L) 97 - 108 mmol/L    CO2 26 21 - 32 mmol/L    Anion gap 10 5 - 15 mmol/L    Glucose 96 65 - 100 mg/dL    BUN 13 6 - 20 MG/DL    Creatinine 0.50 (L) 0.70 - 1.30 MG/DL    BUN/Creatinine ratio 26 (H) 12 - 20      GFR est AA >60 >60 ml/min/1.73m2    GFR est non-AA >60 >60 ml/min/1.73m2    Calcium 9.6 8.5 - 10.1 MG/DL    Bilirubin, total 0.4 0.2 - 1.0 MG/DL    ALT (SGPT) 22 12 - 78 U/L    AST (SGOT) 28 15 - 37 U/L    Alk. phosphatase 83 45 - 117 U/L    Protein, total 7.8 6.4 - 8.2 g/dL    Albumin 2.6 (L) 3.5 - 5.0 g/dL    Globulin 5.2 (H) 2.0 - 4.0 g/dL    A-G Ratio 0.5 (L) 1.1 - 2.2     CBC WITH AUTOMATED DIFF    Collection Time: 02/08/21  2:07 AM   Result Value Ref Range    WBC 6.8 4.1 - 11.1 K/uL    RBC 4.16 4.10 - 5.70 M/uL    HGB 13.2 12.1 - 17.0 g/dL    HCT 39.7 36.6 - 50.3 %    MCV 95.4 80.0 - 99.0 FL    MCH 31.7 26.0 - 34.0 PG    MCHC 33.2 30.0 - 36.5 g/dL    RDW 12.3 11.5 - 14.5 %    PLATELET 237 712 - 236 K/uL    MPV 9.9 8.9 - 12.9 FL    NRBC 0.0 0  WBC    ABSOLUTE NRBC 0.00 0.00 - 0.01 K/uL    NEUTROPHILS 74 32 - 75 %    LYMPHOCYTES 14 12 - 49 %    MONOCYTES 12 5 - 13 %    EOSINOPHILS 0 0 - 7 %    BASOPHILS 0 0 - 1 %    IMMATURE GRANULOCYTES 0 0.0 - 0.5 %    ABS. NEUTROPHILS 5.0 1.8 - 8.0 K/UL    ABS. LYMPHOCYTES 1.0 0.8 - 3.5 K/UL    ABS. MONOCYTES 0.8 0.0 - 1.0 K/UL    ABS. EOSINOPHILS 0.0 0.0 - 0.4 K/UL    ABS. BASOPHILS 0.0 0.0 - 0.1 K/UL    ABS. IMM.  GRANS. 0.0 0.00 - 0.04 K/UL    DF AUTOMATED     SAMPLES BEING HELD    Collection Time: 02/08/21  2:07 AM   Result Value Ref Range    SAMPLES BEING HELD  1 blue     COMMENT        Add-on orders for these samples will be processed based on acceptable specimen integrity and analyte stability, which may vary by analyte. Visit Vitals  BP (!) 130/91 (BP 1 Location: Left upper arm, BP Patient Position: Supine)   Pulse 95   Temp 98.5 °F (36.9 °C)   Resp 15   Wt 53.8 kg (118 lb 9.7 oz)   SpO2 96%   BMI 16.09 kg/m²     No intake or output data in the 24 hours ending 02/08/21 4147    The patient is a well developed, well nourished adult in no distress  Neck: trach in place,  Chest: symmetric expansion bilat  Cardiovascular: Regular rate and rhythm    Lower extremities: no calf tenderness    A:   Hospital Problems  Never Reviewed          Codes Class Noted POA    [facility] ICD-10-CM: C32.9  ICD-9-CM: 161.9  2/7/2021 Unknown                    Plan: To OR for total laryngectomy, neck dissection, possible pectoralis flap. Reviewed surgery, risks and recovery at length.

## 2021-02-08 NOTE — PROGRESS NOTES
SLP Contact Note    Patient for total laryngectomy today. Therefore, SLP to hold for now and will follow for education and communication. Note that swallowing will be per Dr. Swain. Will follow-up tomorrow.     Thank you,  FABRICE SalcidoEd, [address] [facility]  Speech-Language Pathologist

## 2021-02-08 NOTE — PERIOP NOTES
TRANSFER - OUT REPORT:    Verbal report given to Lara Joe (name) on Rowena Elliott  being transferred to [facility] room 659 (unit) for routine post - op       Report consisted of patients Situation, Background, Assessment and   Recommendations(SBAR). Time Pre op antibiotic given:0800  Anesthesia Stop time: 5928  Laboy Present on Transfer to floor:YES   Order for Laboy on Chart:YES  Discharge Prescriptions with Chart:NO    Information from the following report(s) SBAR, Kardex, OR Summary, Intake/Output, MAR, Med Rec Status and Cardiac Rhythm SR was reviewed with the receiving nurse. Opportunity for questions and clarification was provided. Is the patient on 02? RA         Is the patient on a monitor? YES    Is the nurse transporting with the patient? YES    Surgical Waiting Area notified of patient's transfer from PACU?  YES      The following personal items collected during your admission accompanied patient upon transfer:   Dental Appliance:    Vision: Visual Aid: None  Hearing Aid: Hearing Aid: None  Jewelry:    Clothing:    Other Valuables:    Valuables sent to safe:

## 2021-02-08 NOTE — H&P
History & Physical    Primary Care Provider: Danielle Hutchinson NP  Source of Information: Patient and chart review    History of Presenting Illness:   Sheldon Smiley is a 53 y.o. male with past medical history of laryngeal carcinoma, emphysema, chronic pain and opioid dependence, generalized anxiety disorder, NIDDM II who presented to [facility] ER on January 31 with complaints of increased shortness of breath and intense neck/throat pain for 2 days. Chart review shows he was noted to have audible stridor, wheezing and hypoxia with oxygen saturations of 84% on room air. He was subsequently admitted for management of hypoxic respiratory failure, intractable pain, anxiety and COPD exacerbation. Patient underwent urgent tracheostomy with Passy-Tilden valve placement. Patient was transiently placed on the ventilator and switched to trach collar. He had PEG tube placement and was started on tube feeds. ENT was consulted and recommendation was made for patient to be transferred to [facility] for laryngectomy and radical neck dissection. The patient denies any fever, chills, chest pain, cough, congestion, recent illness, palpitations, or dysuria. Review of Systems:  A comprehensive review of systems was negative except for that written in the History of Present Illness. Past Medical History:   Diagnosis Date    Chronic pain     THROAT, EARS, NECK R/T CANCER    COPD (chronic obstructive pulmonary disease) (HCC)     EMPHASEMA    Psychiatric disorder     ANXIETY R/T CANCER    Throat cancer (HCC)       Past Surgical History:   Procedure Laterality Date    HX ORTHOPAEDIC      LEFT ARM- \" PUT PLATE IN\"     Prior to Admission medications    Medication Sig Start Date End Date Taking? Authorizing Provider   metFORMIN (GLUCOPHAGE) 500 mg tablet Take  by mouth two (2) times daily (with meals).     Provider, Historical   aluminum-magnesium hydroxide 200-200 mg/5 mL susp 5 mL, diphenhydrAMINE 12.5 mg/5 mL liqd 5 mL, lidocaine 2 % soln 5 mL 5 mL by Swish and Spit route two (2) times a day. Magic mouth wash   Maalox  Lidocaine 2% viscous   Diphenhydramine oral solution     Pharmacy to mix equal portions of ingredients to a total volume as indicated in the dispense amount. Provider, Historical   HYDROCODONE-ACETAMINOPHEN PO Take  by mouth. 1 TSP QID    Provider, Historical   albuterol (PROVENTIL HFA, VENTOLIN HFA, PROAIR HFA) 90 mcg/actuation inhaler Take 3 Puffs by inhalation every eight (8) hours. Indications: chronic obstructive pulmonary disease 1/24/21   David Manuel MD   hydrOXYzine HCL (ATARAX) 50 mg tablet Take 1 Tab by mouth every eight to twelve (8-12) hours as needed for Anxiety or Sleep for up to 40 days. Indications: Pt has throat cancer and anxiety. 1/24/21 3/5/21  David Manuel MD     No Known Allergies   Family History   Problem Relation Age of Onset    Diabetes Mother     Diabetes Father     Kidney Disease Father     Diabetes Sister     Heart Disease Daughter     Anesth Problems Neg Hx         SOCIAL HISTORY:  Patient resides:  Independently x   Assisted Living    SNF    With family care       Smoking history:   None    Former    Chronic x     Alcohol history:   None x   Social    Chronic      Ambulates:   Independently x   w/cane    w/walker    w/wc    CODE STATUS:  DNR    Full x   Other      Objective:     Physical Exam:     Visit Vitals  BP (!) 135/90 (BP 1 Location: Left upper arm, BP Patient Position: At rest)   Pulse (!) 112   Temp 98.4 °F (36.9 °C)   Resp 20   SpO2 100%           General:  Alert, cooperative, no distress, appears stated age. Nonverbal   Head:  Normocephalic, without obvious abnormality, atraumatic. Eyes:  Conjunctivae/corneas clear. PERRL, EOMs intact. Nose: Nares normal. Septum midline. Dry mucosa. No drainage or sinus tenderness.    Throat: Lips, mucosa, and tongue normal. Teeth and gums normal.   Neck: Supple, symmetrical, trachea midline, no adenopathy, thyroid: no enlargement/tenderness/nodules, no carotid bruit and no JVD. Trach collar in place. Back:    No CVA tenderness. Lungs:   Clear to auscultation bilaterally. Chest wall:  No tenderness or deformity. Heart:  Regular rate and rhythm, S1, S2 normal, no murmur, click, rub or gallop. PEG tube in place with no drainage or erythema. Abdomen:   Soft, non-tender. Bowel sounds normal. No masses,  No organomegaly. Extremities: Extremities normal, atraumatic, no cyanosis or edema. Pulses: 2+ and symmetric all extremities. Skin: Skin color, texture, turgor normal. No rashes or lesions   Neurologic: CNII-XII intact. Data Review:     Recent Days:  No results for input(s): WBC, HGB, HCT, PLT, HGBEXT, HCTEXT, PLTEXT in the last 72 hours. No results for input(s): NA, K, CL, CO2, GLU, BUN, CREA, CA, MG, PHOS, ALB, TBIL, ALT, INR, INREXT in the last 72 hours. No lab exists for component: SGOT  No results for input(s): PH, PCO2, PO2, HCO3, FIO2 in the last 72 hours. 24 Hour Results:  No results found for this or any previous visit (from the past 24 hour(s)).       Imaging:     Assessment:     Nirali Anthony is a 53 y.o. male with past medical history of laryngeal carcinoma, emphysema, chronic pain and opioid dependence, generalized anxiety disorder, NIDDM II who is transferred to [address] [place] [address] for surgical management of laryngeal carcinoma       Plan:       Acute on Chronic hypoxic  hypercapnic respiratory failure  2/2 laryngeal carcinoma  Vocal cord paralysis  Status post emergent tracheostomy with trach collar  -Magic mouthwash 3 times daily  -Keep n.p.o.  -Twice daily Pepcid  -Trach collar management per respiratory  -ENT with plans for laryngectomy and radical neck dissection    COPD   -Not in acute exacerbation  -DuoNebs as needed    PEG Tube Feed Dependent  -Nutrition on consult for tube feedings  - resume PEG tube feeds following laryngectomy  -Keep n.p.o.     Chronic constipation  -Continue Dulcolax, MiraLAX and lactulose    Generalized anxiety disorder with panic attacks  -Continue hydroxyzine as needed                FEN/GI -  NS @ 100 ml/hr  Activity - As tolerated  DVT prophylaxis - SCDs  GI prophylaxis -  NI  NOK:  - [phone]  Disposition - TBD    CODE STATUS:  Full code       Signed By: Ela Larson MD     February 7, 2021

## 2021-02-08 NOTE — OP NOTES
[facility]  OPERATIVE REPORT    Name:  Ann Marie Feldman  MR#:  [id number]  :  1970  ACCOUNT #:  [de-identified]  DATE OF SERVICE:  2021      PREOPERATIVE DIAGNOSIS:  Squamous cell carcinoma of the larynx. POSTOPERATIVE DIAGNOSIS:  Squamous cell carcinoma of the larynx. PROCEDURES PERFORMED:  1. Total laryngectomy. 2.  Cricopharyngeal myotomy. SURGEON:  Marisel Heaton MD    ASSISTANT:  Jaden Marshall MD    ANESTHESIA:  General endotracheal anesthesia. COMPLICATIONS:  None. SPECIMENS REMOVED:  Total laryngectomy. IMPLANTS:  none. ESTIMATED BLOOD LOSS:  50 mL. INDICATIONS:  This is a 28-year-old gentleman who presented with progressive throat complaints. He ultimately underwent direct laryngoscopy with biopsy showing squamous cell carcinoma involving the larynx and subglottis. This was confirmed on imaging which showed no regional or distant metastasis. After discussing treatment options at length, he was scheduled for total laryngectomy with possible flap. Prior to his scheduled surgery, he developed worsening shortness of breath such that he presented to an outside emergency room where an emergent tracheostomy was performed. Risks of surgery discussed included bleeding, infection, pain, airway complication, pharyngocutaneous fistula, nerve injury, and need for further surgery. Additionally, the post-laryngectomy rehabilitations of speech and swallowing were discussed at length. OPERATIVE DETAIL:  The patient was brought to the operating room and general endotracheal anesthesia was induced via the existing tracheostomy tube. Once he was asleep, the tracheostomy tube was removed and a wire reinforced endotracheal tube was placed directly into the tracheostomy site. The neck was then prepped and draped in usual sterile fashion.   An apron type incision was then designed including an ellipse of skin around the tracheostomy site such that this would be resected in continuity with the larynx. The incision was infiltrated with 1% lidocaine with epinephrine 1:100,000. The incisions were then made and taken down through the platysma. Subplatysmal skin flaps were raised. Dissection was carried in the plane medial to the sternocleidomastoid muscle so that the sternocleidomastoid muscle and carotid sheath contents could be lateralized on the left side. This was repeated on the right side. The sternal heads of both sternocleidomastoid muscles were freed. The strap muscles were identified inferiorly and freed from the clavicles. The anterior wall of the trachea could then be isolated. Superiorly, dissection was carried down onto the superior surface of the hyoid releasing all the suprahyoid musculature from it. Finally, the pharyngeal constrictors were freed from the lateral edge of the thyroid cartilage on both sides. Gerarda Real was used to free the underlying piriform sinus mucosa from the thyroid cartilage. The right thyroid lobe was carefully dissected out of the neck and left in continuity with the larynx. The left thyroid lobe was then freed from the larynx to be left intact in the neck. A 15-blade was then used to enter the trachea inferior to the tracheostomy site. Curved Wolff scissors were then used to bevel this superiorly. The inferior skin flap was secured to the anterior wall of the trachea with 3-0 Prolene sutures. The endotracheal tube was removed from the tracheostomy site and a new tube was placed directly into the trachea. An Army-Navy was inserted into the oral cavity and advanced down into the vallecula. The pharynx was entered on the left side. The epiglottis was then grabbed through the pharyngotomy site. Under direct vision, the pharyngotomy was then continued on either side with curved Wolff scissors sparing as much normal mucosa as possible. Finally, postcricoid mucosa was freed.   The common party wall was then released freeing the specimen. The tracheal margins were taken from the specimen itself. The pharyngeal margins were then taken from the patient. These were all sent for frozen and returned as negative. A fresh 15-blade was then used to perform a cricopharyngeal myotomy. Attention was then turned towards closing the pharynx. The pharynx was closed in a T shape using 3-0 Vicryl in a modified interrupted Mayte fashion. A 10 flat drain was brought out through either side of the neck through separate stab incisions and secured. The incisions were then closed using 3-0 Vicryl to close the platysma and staples on the skin of the neck. The posterior tracheal wall was secured with the superior skin flap with 3-0 chromic sutures. At this time, the procedure was terminated. The patient was awakened. The endotracheal tube was removed from the stoma and a #10 Provox laryngectomy tube was placed. He was then transported to the recovery room in stable condition.       MD GORDON [email or web address]  D:  02/08/2021 12:14  T:  02/08/2021 16:47  JOB #:  [id number]

## 2021-02-08 NOTE — PROGRESS NOTES
Occupational Therapy   Orders received, chart review completed. Note patient POD #0. OT will follow up tomorrow for evaluation. Recommend OOB to chair three times a day for meals, self-completion of ADLs as able and medically stable. Thank you.

## 2021-02-08 NOTE — ANESTHESIA POSTPROCEDURE EVALUATION
Post-Anesthesia Evaluation and Assessment    Patient: Natividad Rob MRN: [id number]  SSN: [id number]    YOB: 1970  Age: 53 y.o. Sex: male      I have evaluated the patient and they are stable and ready for discharge from the PACU. Cardiovascular Function/Vital Signs  Visit Vitals  /87   Pulse 96   Temp 36.6 °C (97.8 °F)   Resp 11   Wt 53.8 kg (118 lb 9.7 oz)   SpO2 96%   BMI 16.09 kg/m²       Patient is status post General anesthesia for Procedure(s):  TOTAL LARYNGECTOMY,. Nausea/Vomiting: None    Postoperative hydration reviewed and adequate. Pain:  Pain Scale 1: Numeric (0 - 10) (02/08/21 1215)  Pain Intensity 1: 0 (02/08/21 1215)   Managed    Neurological Status:   Neuro (WDL): Exceptions to WDL (02/08/21 1215)  Neuro  Neurologic State: Drowsy; Pharmacologically induced (comment); Eyes open to voice (02/08/21 1215)  Orientation Level: Unable to verbalize(s/p laryngectomy and stoma) (02/08/21 1215)  Cognition: Appropriate decision making; Follows commands (02/08/21 0130)  Assessment L Pupil: Brisk (02/08/21 0130)  Size L Pupil (mm): 3 (02/08/21 0130)  Assessment R Pupil: Brisk (02/08/21 0130)  Size R Pupil (mm): 2 (02/08/21 0130)   At baseline    Mental Status, Level of Consciousness: Alert and  oriented to person, place, and time    Pulmonary Status:   O2 Device: Blow by oxygen(unhumidified) (02/08/21 1215)   Adequate oxygenation and airway patent    Complications related to anesthesia: None    Post-anesthesia assessment completed.  No concerns    Signed By: Bettye Siu MD     February 8, 2021

## 2021-02-09 ENCOUNTER — APPOINTMENT (OUTPATIENT)
Dept: CT IMAGING | Age: 51
DRG: 098 | End: 2021-02-09
Attending: NURSE PRACTITIONER
Payer: MEDICAID

## 2021-02-09 LAB
ALBUMIN SERPL-MCNC: 2.7 G/DL (ref 3.5–5)
ALBUMIN/GLOB SERPL: 0.6 {RATIO} (ref 1.1–2.2)
ALP SERPL-CCNC: 77 U/L (ref 45–117)
ALT SERPL-CCNC: 16 U/L (ref 12–78)
ANION GAP SERPL CALC-SCNC: 11 MMOL/L (ref 5–15)
AST SERPL-CCNC: 28 U/L (ref 15–37)
BASOPHILS # BLD: 0 K/UL (ref 0–0.1)
BASOPHILS NFR BLD: 0 % (ref 0–1)
BILIRUB SERPL-MCNC: 0.4 MG/DL (ref 0.2–1)
BUN SERPL-MCNC: 12 MG/DL (ref 6–20)
BUN/CREAT SERPL: 19 (ref 12–20)
CALCIUM SERPL-MCNC: 8.8 MG/DL (ref 8.5–10.1)
CHLORIDE SERPL-SCNC: 96 MMOL/L (ref 97–108)
CO2 SERPL-SCNC: 27 MMOL/L (ref 21–32)
CREAT SERPL-MCNC: 0.64 MG/DL (ref 0.7–1.3)
DIFFERENTIAL METHOD BLD: ABNORMAL
EOSINOPHIL # BLD: 0 K/UL (ref 0–0.4)
EOSINOPHIL NFR BLD: 0 % (ref 0–7)
ERYTHROCYTE [DISTWIDTH] IN BLOOD BY AUTOMATED COUNT: 12.7 % (ref 11.5–14.5)
EST. AVERAGE GLUCOSE BLD GHB EST-MCNC: 126 MG/DL
GLOBULIN SER CALC-MCNC: 4.2 G/DL (ref 2–4)
GLUCOSE BLD STRIP.AUTO-MCNC: 77 MG/DL (ref 65–100)
GLUCOSE BLD STRIP.AUTO-MCNC: 79 MG/DL (ref 65–100)
GLUCOSE BLD STRIP.AUTO-MCNC: 89 MG/DL (ref 65–100)
GLUCOSE SERPL-MCNC: 92 MG/DL (ref 65–100)
HBA1C MFR BLD: 6 % (ref 4–5.6)
HCT VFR BLD AUTO: 33.8 % (ref 36.6–50.3)
HGB BLD-MCNC: 11.2 G/DL (ref 12.1–17)
IMM GRANULOCYTES # BLD AUTO: 0 K/UL (ref 0–0.04)
IMM GRANULOCYTES NFR BLD AUTO: 0 % (ref 0–0.5)
LYMPHOCYTES # BLD: 1.3 K/UL (ref 0.8–3.5)
LYMPHOCYTES NFR BLD: 16 % (ref 12–49)
MCH RBC QN AUTO: 32.1 PG (ref 26–34)
MCHC RBC AUTO-ENTMCNC: 33.1 G/DL (ref 30–36.5)
MCV RBC AUTO: 96.8 FL (ref 80–99)
MONOCYTES # BLD: 1 K/UL (ref 0–1)
MONOCYTES NFR BLD: 12 % (ref 5–13)
NEUTS SEG # BLD: 5.8 K/UL (ref 1.8–8)
NEUTS SEG NFR BLD: 72 % (ref 32–75)
NRBC # BLD: 0 K/UL (ref 0–0.01)
NRBC BLD-RTO: 0 PER 100 WBC
PLATELET # BLD AUTO: 388 K/UL (ref 150–400)
PMV BLD AUTO: 10.1 FL (ref 8.9–12.9)
POTASSIUM SERPL-SCNC: 3.7 MMOL/L (ref 3.5–5.1)
PROT SERPL-MCNC: 6.9 G/DL (ref 6.4–8.2)
RBC # BLD AUTO: 3.49 M/UL (ref 4.1–5.7)
SERVICE CMNT-IMP: NORMAL
SODIUM SERPL-SCNC: 134 MMOL/L (ref 136–145)
WBC # BLD AUTO: 8.2 K/UL (ref 4.1–11.1)

## 2021-02-09 PROCEDURE — 97116 GAIT TRAINING THERAPY: CPT

## 2021-02-09 PROCEDURE — 92524 BEHAVRAL QUALIT ANALYS VOICE: CPT

## 2021-02-09 PROCEDURE — 74011250636 HC RX REV CODE- 250/636: Performed by: OTOLARYNGOLOGY

## 2021-02-09 PROCEDURE — 74011000258 HC RX REV CODE- 258: Performed by: OTOLARYNGOLOGY

## 2021-02-09 PROCEDURE — 74011250637 HC RX REV CODE- 250/637: Performed by: OTOLARYNGOLOGY

## 2021-02-09 PROCEDURE — 97165 OT EVAL LOW COMPLEX 30 MIN: CPT

## 2021-02-09 PROCEDURE — 85025 COMPLETE CBC W/AUTO DIFF WBC: CPT

## 2021-02-09 PROCEDURE — 74011250637 HC RX REV CODE- 250/637: Performed by: NURSE PRACTITIONER

## 2021-02-09 PROCEDURE — 36415 COLL VENOUS BLD VENIPUNCTURE: CPT

## 2021-02-09 PROCEDURE — 80053 COMPREHEN METABOLIC PANEL: CPT

## 2021-02-09 PROCEDURE — 97161 PT EVAL LOW COMPLEX 20 MIN: CPT

## 2021-02-09 PROCEDURE — 74011000636 HC RX REV CODE- 636: Performed by: RADIOLOGY

## 2021-02-09 PROCEDURE — 83036 HEMOGLOBIN GLYCOSYLATED A1C: CPT

## 2021-02-09 PROCEDURE — 65660000001 HC RM ICU INTERMED STEPDOWN

## 2021-02-09 PROCEDURE — 93005 ELECTROCARDIOGRAM TRACING: CPT

## 2021-02-09 PROCEDURE — 74177 CT ABD & PELVIS W/CONTRAST: CPT

## 2021-02-09 PROCEDURE — 82962 GLUCOSE BLOOD TEST: CPT

## 2021-02-09 RX ORDER — OXYCODONE HCL 5 MG/5 ML
5 SOLUTION, ORAL ORAL EVERY 4 HOURS
Status: COMPLETED | OUTPATIENT
Start: 2021-02-09 | End: 2021-02-09

## 2021-02-09 RX ORDER — HYDROCODONE BITARTRATE AND ACETAMINOPHEN 7.5; 325 MG/15ML; MG/15ML
10 SOLUTION ORAL
Status: DISCONTINUED | OUTPATIENT
Start: 2021-02-09 | End: 2021-02-16 | Stop reason: HOSPADM

## 2021-02-09 RX ORDER — MAGNESIUM SULFATE 100 %
4 CRYSTALS MISCELLANEOUS AS NEEDED
Status: DISCONTINUED | OUTPATIENT
Start: 2021-02-09 | End: 2021-02-16 | Stop reason: HOSPADM

## 2021-02-09 RX ORDER — HYDROCODONE BITARTRATE AND ACETAMINOPHEN 7.5; 325 MG/15ML; MG/15ML
5 SOLUTION ORAL
Status: DISCONTINUED | OUTPATIENT
Start: 2021-02-09 | End: 2021-02-16 | Stop reason: HOSPADM

## 2021-02-09 RX ORDER — LANOLIN ALCOHOL/MO/W.PET/CERES
100 CREAM (GRAM) TOPICAL DAILY
Status: COMPLETED | OUTPATIENT
Start: 2021-02-10 | End: 2021-02-16

## 2021-02-09 RX ORDER — HYDROXYZINE HYDROCHLORIDE 10 MG/5ML
50 SYRUP ORAL
Status: DISCONTINUED | OUTPATIENT
Start: 2021-02-09 | End: 2021-02-16 | Stop reason: HOSPADM

## 2021-02-09 RX ORDER — DEXTROSE 50 % IN WATER (D50W) INTRAVENOUS SYRINGE
25-50 AS NEEDED
Status: DISCONTINUED | OUTPATIENT
Start: 2021-02-09 | End: 2021-02-16 | Stop reason: HOSPADM

## 2021-02-09 RX ORDER — METOCLOPRAMIDE HYDROCHLORIDE 5 MG/ML
10 INJECTION INTRAMUSCULAR; INTRAVENOUS EVERY 6 HOURS
Status: DISCONTINUED | OUTPATIENT
Start: 2021-02-10 | End: 2021-02-10

## 2021-02-09 RX ORDER — INSULIN LISPRO 100 [IU]/ML
INJECTION, SOLUTION INTRAVENOUS; SUBCUTANEOUS EVERY 6 HOURS
Status: DISCONTINUED | OUTPATIENT
Start: 2021-02-09 | End: 2021-02-16 | Stop reason: HOSPADM

## 2021-02-09 RX ADMIN — DOCUSATE SODIUM 200 MG: 50 LIQUID ORAL at 08:46

## 2021-02-09 RX ADMIN — IOPAMIDOL 100 ML: 755 INJECTION, SOLUTION INTRAVENOUS at 16:52

## 2021-02-09 RX ADMIN — Medication 10 ML: at 14:46

## 2021-02-09 RX ADMIN — CEFAZOLIN SODIUM 1 G: 1 INJECTION, POWDER, FOR SOLUTION INTRAMUSCULAR; INTRAVENOUS at 12:32

## 2021-02-09 RX ADMIN — OXYCODONE HYDROCHLORIDE 5 MG: 5 SOLUTION ORAL at 12:29

## 2021-02-09 RX ADMIN — OXYCODONE HYDROCHLORIDE 5 MG: 5 SOLUTION ORAL at 01:43

## 2021-02-09 RX ADMIN — ONDANSETRON 4 MG: 2 INJECTION INTRAMUSCULAR; INTRAVENOUS at 11:05

## 2021-02-09 RX ADMIN — Medication 10 ML: at 21:43

## 2021-02-09 RX ADMIN — CEFAZOLIN SODIUM 1 G: 1 INJECTION, POWDER, FOR SOLUTION INTRAMUSCULAR; INTRAVENOUS at 04:15

## 2021-02-09 RX ADMIN — MORPHINE SULFATE 2 MG: 2 INJECTION, SOLUTION INTRAMUSCULAR; INTRAVENOUS at 17:27

## 2021-02-09 RX ADMIN — Medication 10 ML: at 05:35

## 2021-02-09 RX ADMIN — MORPHINE SULFATE 2 MG: 2 INJECTION, SOLUTION INTRAMUSCULAR; INTRAVENOUS at 11:11

## 2021-02-09 RX ADMIN — MORPHINE SULFATE 2 MG: 2 INJECTION, SOLUTION INTRAMUSCULAR; INTRAVENOUS at 07:06

## 2021-02-09 RX ADMIN — MORPHINE SULFATE 2 MG: 2 INJECTION, SOLUTION INTRAMUSCULAR; INTRAVENOUS at 21:39

## 2021-02-09 RX ADMIN — OXYCODONE HYDROCHLORIDE 5 MG: 5 SOLUTION ORAL at 08:46

## 2021-02-09 RX ADMIN — MORPHINE SULFATE 2 MG: 2 INJECTION, SOLUTION INTRAMUSCULAR; INTRAVENOUS at 00:16

## 2021-02-09 RX ADMIN — Medication 10 ML: at 21:39

## 2021-02-09 RX ADMIN — OXYCODONE HYDROCHLORIDE 5 MG: 5 SOLUTION ORAL at 05:35

## 2021-02-09 RX ADMIN — Medication 10 ML: at 05:36

## 2021-02-09 RX ADMIN — SODIUM CHLORIDE 75 ML/HR: 9 INJECTION, SOLUTION INTRAVENOUS at 18:58

## 2021-02-09 NOTE — PROGRESS NOTES
Problem: Mobility Impaired (Adult and Pediatric)  Goal: *Acute Goals and Plan of Care (Insert Text)  Description: FUNCTIONAL STATUS PRIOR TO ADMISSION: Patient was independent and active without use of DME.    HOME SUPPORT PRIOR TO ADMISSION: The patient lived with significant other and friends but did not require assist. Sleeps on a couch on the second floor. Lots of stairs to enter home/bedroom. Physical Therapy Goals  Initiated 2/9/2021  1. Patient will move from supine to sit and sit to supine  in bed with independence within 7 day(s). 2.  Patient will transfer from bed to chair and chair to bed with independence using the least restrictive device within 7 day(s). 3.  Patient will perform sit to stand with independence within 7 day(s). 4.  Patient will ambulate with independence for 300 feet with the least restrictive device within 7 day(s). 5.  Patient will ascend/descend 14 stairs with 2 handrail(s) with modified independence within 7 day(s). Outcome: Progressing Towards Goal     PHYSICAL THERAPY EVALUATION  Patient: Rayo Ball (32 y.o. male)  Date: 2/9/2021  Primary Diagnosis: Laryngeal carcinoma ([facility] [C32.9]  Procedure(s) (LRB):  TOTAL LARYNGECTOMY, (N/A) 1 Day Post-Op   Precautions: Trach, nonverbal       ASSESSMENT  Based on the objective data described below, the patient presents with decreased activity tolerance, increase neck pain, and general increase in debility POD #1 s/p total laryngectomy. Patient able to transfer to commode and ambulate in the room with device, CGA. Slow but steady gait pattern. Mobility mostly limited by 7-8/10 neck pain and fatigue. -120 with mobility. Suspect good progress with mobility as Patient is young and previously independent. Will follow up to promote walking plan and assess stairs as appropriate. Current Level of Function Impacting Discharge (mobility/balance): CGA     Functional Outcome Measure:   The patient scored 23/28 on the Tinetti balance outcome measure which is indicative of moderate fall risk. Other factors to consider for discharge: Stairs     Patient will benefit from skilled therapy intervention to address the above noted impairments. PLAN :  Recommendations and Planned Interventions: gait training, therapeutic exercises, neuromuscular re-education, patient and family training/education, and therapeutic activities      Frequency/Duration: Patient will be followed by physical therapy:  3 times a week to address goals. Recommendation for discharge: (in order for the patient to meet his/her long term goals)  No skilled physical therapy/ follow up rehabilitation needs identified at this time. This discharge recommendation:  Has not yet been discussed the attending provider and/or case management    IF patient discharges home will need the following DME: to be determined (TBD)         SUBJECTIVE:   Patient is nonverbal. Lendia Lava out needs appropriately. Nodding, shaking head and using hand gestures appropriately.      OBJECTIVE DATA SUMMARY:   HISTORY:    Past Medical History:   Diagnosis Date    Chronic pain     THROAT, EARS, NECK R/T CANCER    COPD (chronic obstructive pulmonary disease) ([facility]     EMPHASEMA    Psychiatric disorder     ANXIETY R/T CANCER    Throat cancer ([facility]      Past Surgical History:   Procedure Laterality Date    HX ORTHOPAEDIC      LEFT ARM- \" PUT PLATE IN\"       Personal factors and/or comorbidities impacting plan of care: anxiety , DM    Home Situation  Home Environment: Private residence  # Steps to Enter: 13  Rails to Enter: Yes  One/Two Story Residence: Two story  # of Interior Steps: 15  Interior Rails: Both  Living Alone: No  Support Systems: Spouse/Significant Other/Partner, Friends \ neighbors  Patient Expects to be Discharged to[de-identified] Private residence  Current DME Used/Available at Home: None    EXAMINATION/PRESENTATION/DECISION MAKING:   Critical Behavior:  Neurologic State: Alert, Eyes open spontaneously  Orientation Level: Unable to verbalize, Oriented X4  Cognition: Appropriate decision making     Strength:    Strength: Within functional limits    Tone & Sensation:   Tone: Normal  Sensation: Intact    Coordination:  Coordination: Within functional limits    Functional Mobility:  Bed Mobility:  Supine to Sit: Minimum assistance;Assist x1  Sit to Supine: Contact guard assistance     Transfers:  Sit to Stand: Contact guard assistance  Stand to Sit: Contact guard assistance  Bed to Chair: Contact guard assistance    Balance:   Sitting: Intact  Standing: Impaired  Standing - Static: Good; Unsupported  Standing - Dynamic : Fair    Ambulation/Gait Training:  Distance (ft): 30 Feet (ft)  Assistive Device: Gait belt  Ambulation - Level of Assistance: Contact guard assistance  Gait Abnormalities: Decreased step clearance  Base of Support: Widened  Speed/Isabelle: Slow  Step Length: Right shortened;Left shortened    Functional Measure:  Tinetti test:    Sitting Balance: 1  Arises: 1  Attempts to Rise: 2  Immediate Standing Balance: 2  Standing Balance: 2  Nudged: 2  Eyes Closed: 0  Turn 360 Degrees - Continuous/Discontinuous: 1  Turn 360 Degrees - Steady/Unsteady: 1  Sitting Down: 2  Balance Score: 14 Balance total score  Indication of Gait: 1  R Step Length/Height: 0  L Step Length/Height: 0  R Foot Clearance: 1  L Foot Clearance: 1  Step Symmetry: 1  Step Continuity: 1  Path: 2  Trunk: 1  Walking Time: 1  Gait Score: 9 Gait total score  Total Score: 23/28 Overall total score         Tinetti Tool Score Risk of Falls  <19 = High Fall Risk  19-24 = Moderate Fall Risk  25-28 = Low Fall Risk  Tinetti ME. Performance-Oriented Assessment of Mobility Problems in Elderly Patients. [place] 66; [id number].  (Scoring Description: PT Bulletin Feb. 10, 1993)    Older adults: Erlinda Santos et al, 2009; n = 1000 [facility] elderly evaluated with ABC, ABIMAEL, ADL, and IADL)  · Mean ABIMAEL score for males aged 69-68 years = 26.21(3.40)  · Mean ABIMAEL score for females age 69-68 years = 25.16(4.30)  · Mean ABIMAEL score for males over 80 years = 23.29(6.02)  · Mean ABIMAEL score for females over 80 years = 17.20(8.32)           Physical Therapy Evaluation Charge Determination   History Examination Presentation Decision-Making   MEDIUM  Complexity : 1-2 comorbidities / personal factors will impact the outcome/ POC  LOW Complexity : 1-2 Standardized tests and measures addressing body structure, function, activity limitation and / or participation in recreation  LOW Complexity : Stable, uncomplicated  LOW Complexity : FOTO score of       Based on the above components, the patient evaluation is determined to be of the following complexity level: LOW     Pain Ratin/10 throat pain    Activity Tolerance:   Fair, SpO2 stable on RA, requires rest breaks, and HR up to 120 with mobility    After treatment patient left in no apparent distress:   Supine in bed, Call bell within reach, and Side rails x 3    COMMUNICATION/EDUCATION:   The patients plan of care was discussed with: Occupational therapist, Speech therapist, and Registered nurse. Fall prevention education was provided and the patient/caregiver indicated understanding., Patient/family have participated as able in goal setting and plan of care. , and Patient/family agree to work toward stated goals and plan of care.     Thank you for this referral.  Irena Flowers, PT, DPT  Geriatric Clinical Specialist     Time Calculation: 16 mins

## 2021-02-09 NOTE — PROGRESS NOTES
Progress Note    Date of Service: 02/08/2021  Primary Care Provider: Carolina Chirinos NP  Source of Information: Patient and chart review    History of Presenting Illness:   Natividad Rob is a 53 y.o. male with past medical history of laryngeal carcinoma, emphysema, chronic pain and opioid dependence, generalized anxiety disorder, NIDDM II who presented to [facility] ER on January 31 with complaints of increased shortness of breath and intense neck/throat pain for 2 days. Chart review shows he was noted to have audible stridor, wheezing and hypoxia with oxygen saturations of 84% on room air. He was subsequently admitted for management of hypoxic respiratory failure, intractable pain, anxiety and COPD exacerbation. Patient underwent urgent tracheostomy with Passy-Blanca valve placement. Patient was transiently placed on the ventilator and switched to trach collar. He had PEG tube placement and was started on tube feeds. ENT was consulted and recommendation was made for patient to be transferred to [facility] for laryngectomy and radical neck dissection. Today the patient is seen directly after OR. Alert, following commands and using writing board to communicate. Having mild pain but otherwise no complaints. Review of Systems:  A comprehensive review of systems was negative except for that written in the History of Present Illness. Past Medical History:   Diagnosis Date    Chronic pain     THROAT, EARS, NECK R/T CANCER    COPD (chronic obstructive pulmonary disease) (HCC)     EMPHASEMA    Psychiatric disorder     ANXIETY R/T CANCER    Throat cancer (HCC)       Past Surgical History:   Procedure Laterality Date    HX ORTHOPAEDIC      LEFT ARM- \" PUT PLATE IN\"     Prior to Admission medications    Medication Sig Start Date End Date Taking?  Authorizing Provider   metFORMIN (GLUCOPHAGE) 500 mg tablet Take  by mouth two (2) times daily (with meals). Provider, Historical   aluminum-magnesium hydroxide 200-200 mg/5 mL susp 5 mL, diphenhydrAMINE 12.5 mg/5 mL liqd 5 mL, lidocaine 2 % soln 5 mL 5 mL by Swish and Spit route two (2) times a day. Magic mouth wash   Maalox  Lidocaine 2% viscous   Diphenhydramine oral solution     Pharmacy to mix equal portions of ingredients to a total volume as indicated in the dispense amount. Provider, Historical   HYDROCODONE-ACETAMINOPHEN PO Take  by mouth. 1 TSP QID    Provider, Historical   albuterol (PROVENTIL HFA, VENTOLIN HFA, PROAIR HFA) 90 mcg/actuation inhaler Take 3 Puffs by inhalation every eight (8) hours. Indications: chronic obstructive pulmonary disease 1/24/21   Braxton Saldivar MD   hydrOXYzine HCL (ATARAX) 50 mg tablet Take 1 Tab by mouth every eight to twelve (8-12) hours as needed for Anxiety or Sleep for up to 40 days. Indications: Pt has throat cancer and anxiety. 1/24/21 3/5/21  Braxton Saldivar MD     No Known Allergies   Family History   Problem Relation Age of Onset    Diabetes Mother     Diabetes Father     Kidney Disease Father     Diabetes Sister     Heart Disease Daughter     Anesth Problems Neg Hx         Objective:     Physical Exam:     Visit Vitals  /86 (BP 1 Location: Left upper arm, BP Patient Position: At rest)   Pulse 87   Temp 98.5 °F (36.9 °C)   Resp 12   Wt 53.8 kg (118 lb 9.7 oz)   SpO2 96%   BMI 16.09 kg/m²      O2 Device: Room air    General:  Alert, cooperative, no distress, appears stated age. Nonverbal   Neck: Supple, symmetrical, Trach in place. Lungs:   Clear to auscultation bilaterally. Heart:  Regular rate and rhythm, S1, S2 normal, no murmur, click, rub or gallop. PEG tube in place with no drainage or erythema. Abdomen:   Soft, non-tender. Bowel sounds normal. No masses,  No organomegaly. Extremities: Extremities normal, atraumatic, no cyanosis or edema. Pulses: 2+ and symmetric all extremities.    Skin: Skin color, texture, turgor normal. No rashes or lesions   Neurologic: CNII-XII grossly intact. WELLS. A&Ox3       Data Review:     Recent Days:  Recent Labs     02/08/21  0207   WBC 6.8   HGB 13.2   HCT 39.7        Recent Labs     02/08/21  0207   *   K 3.8   CL 94*   CO2 26   GLU 96   BUN 13   CREA 0.50*   CA 9.6   ALB 2.6*   ALT 22     No results for input(s): PH, PCO2, PO2, HCO3, FIO2 in the last 72 hours. 24 Hour Results:  Recent Results (from the past 24 hour(s))   METABOLIC PANEL, COMPREHENSIVE    Collection Time: 02/08/21  2:07 AM   Result Value Ref Range    Sodium 130 (L) 136 - 145 mmol/L    Potassium 3.8 3.5 - 5.1 mmol/L    Chloride 94 (L) 97 - 108 mmol/L    CO2 26 21 - 32 mmol/L    Anion gap 10 5 - 15 mmol/L    Glucose 96 65 - 100 mg/dL    BUN 13 6 - 20 MG/DL    Creatinine 0.50 (L) 0.70 - 1.30 MG/DL    BUN/Creatinine ratio 26 (H) 12 - 20      GFR est AA >60 >60 ml/min/1.73m2    GFR est non-AA >60 >60 ml/min/1.73m2    Calcium 9.6 8.5 - 10.1 MG/DL    Bilirubin, total 0.4 0.2 - 1.0 MG/DL    ALT (SGPT) 22 12 - 78 U/L    AST (SGOT) 28 15 - 37 U/L    Alk. phosphatase 83 45 - 117 U/L    Protein, total 7.8 6.4 - 8.2 g/dL    Albumin 2.6 (L) 3.5 - 5.0 g/dL    Globulin 5.2 (H) 2.0 - 4.0 g/dL    A-G Ratio 0.5 (L) 1.1 - 2.2     CBC WITH AUTOMATED DIFF    Collection Time: 02/08/21  2:07 AM   Result Value Ref Range    WBC 6.8 4.1 - 11.1 K/uL    RBC 4.16 4.10 - 5.70 M/uL    HGB 13.2 12.1 - 17.0 g/dL    HCT 39.7 36.6 - 50.3 %    MCV 95.4 80.0 - 99.0 FL    MCH 31.7 26.0 - 34.0 PG    MCHC 33.2 30.0 - 36.5 g/dL    RDW 12.3 11.5 - 14.5 %    PLATELET 779 681 - 591 K/uL    MPV 9.9 8.9 - 12.9 FL    NRBC 0.0 0  WBC    ABSOLUTE NRBC 0.00 0.00 - 0.01 K/uL    NEUTROPHILS 74 32 - 75 %    LYMPHOCYTES 14 12 - 49 %    MONOCYTES 12 5 - 13 %    EOSINOPHILS 0 0 - 7 %    BASOPHILS 0 0 - 1 %    IMMATURE GRANULOCYTES 0 0.0 - 0.5 %    ABS. NEUTROPHILS 5.0 1.8 - 8.0 K/UL    ABS. LYMPHOCYTES 1.0 0.8 - 3.5 K/UL    ABS. MONOCYTES 0.8 0.0 - 1.0 K/UL    ABS.  EOSINOPHILS 0.0 0.0 - 0.4 K/UL    ABS. BASOPHILS 0.0 0.0 - 0.1 K/UL    ABS. IMM. GRANS. 0.0 0.00 - 0.04 K/UL    DF AUTOMATED     SAMPLES BEING HELD    Collection Time: 02/08/21  2:07 AM   Result Value Ref Range    SAMPLES BEING HELD  1 blue     COMMENT        Add-on orders for these samples will be processed based on acceptable specimen integrity and analyte stability, which may vary by analyte. GLUCOSE, POC    Collection Time: 02/08/21 11:38 PM   Result Value Ref Range    Glucose (POC) 90 65 - 100 mg/dL    Performed by An Celaya          Imaging:     Assessment:     Kim Moise is a 53 y.o. male with past medical history of laryngeal carcinoma, emphysema, chronic pain and opioid dependence, generalized anxiety disorder, NIDDM II who is transferred to [facility] for surgical management of laryngeal carcinoma       Plan:       Acute on Chronic hypoxic  hypercapnic respiratory failure  2/2 laryngeal carcinoma  Vocal cord paralysis  Status post emergent tracheostomy with trach collar  -Magic mouthwash 3 times daily  -Keep n.p.o.  -Twice daily Pepcid  -Trach collar management per respiratory  -ENT with plans for laryngectomy and radical neck dissection    COPD   -Not in acute exacerbation  -DuoNebs as needed    PEG Tube Feed Dependent  -Nutrition on consult for tube feedings  - resume PEG tube feeds following laryngectomy  -Keep n.p.o.     Chronic constipation  -Continue Dulcolax, MiraLAX and lactulose    Generalized anxiety disorder with panic attacks  -Continue hydroxyzine as needed      FEN/GI -  NS @ 100 ml/hr  Activity - As tolerated  DVT prophylaxis - SCDs  GI prophylaxis -  NI  NOK: Brother - [phone]  Disposition - TBD    CODE STATUS:  Full code       Signed By: Bronwyn Lopez NP     February 9, 2021

## 2021-02-09 NOTE — PROGRESS NOTES
Problem: Self Care Deficits Care Plan (Adult)  Goal: *Acute Goals and Plan of Care (Insert Text)  Description:   FUNCTIONAL STATUS PRIOR TO ADMISSION: Patient poor historian - Indicated he required minimal assistance for basic and instrumental ADL's prior to admission. HOME SUPPORT: The patient lived alone with girlfriend to provide assistance. Occupational Therapy Goals  Initiated 2/9/2021  1. Patient will perform lower body dressing with supervision/set-up within 7 day(s). 2.  Patient will perform simulated tub transfer with supervision/set-up within 7 day(s). 3.  Patient will perform toilet transfers with supervision/set-up within 7 day(s). 4.  Patient will perform all aspects of toileting with supervision/set-up within 7 day(s). 5.  Patient will participate in upper extremity therapeutic exercise/activities with supervision/set-up for 5 minutes within 7 day(s). 6.  Patient will utilize energy conservation techniques during functional activities with verbal cues within 7 day(s). Outcome: Not Met     OCCUPATIONAL THERAPY EVALUATION  Patient: Missy Beverly (70 y.o. male)  Date: 2/9/2021  Primary Diagnosis: L[facility] ([facility] [C32.9]  Procedure(s) (LRB):  TOTAL LARYNGECTOMY, (N/A) 1 Day Post-Op   Precautions: None       ASSESSMENT  Based on the objective data described below, the patient presents with neck pain, nausea, decreased strength, and decreased activity tolerance. Patient poor historian 2/2 laryngectomy - w/ use of note pad indicated he lived w/ girlfriend prior to admission and received help w/ self-care. Patient resistant to sitting EOB for completion of ADLs 2/2 stomach pain and nausea. Willing to complete brief supine assessment, demonstrating good LB access via tailor st and long sitting and function UE ROM. Patient reports he was able to get up and move around w/ PT w/o difficulty, anticipates he would have no problem getting to and from toilet w/ nursing.  Anticipate d/c home w/ family assist.      Current Level of Function Impacting Discharge (ADLs/self-care): Up to min A for mobility, up to [address] for self-care    Functional Outcome Measure: The patient scored Total: 65/100 on the Barthel Index outcome measure which is indicative of 35% impaired ability to care for basic self needs/dependency on others; inferred 25% dependency on others for instrumental ADLs. Other factors to consider for discharge: Decreased communication     Patient will benefit from skilled therapy intervention to address the above noted impairments. PLAN :  Recommendations and Planned Interventions: self care training, functional mobility training, therapeutic exercise, balance training, therapeutic activities, endurance activities, patient education, home safety training and family training/education    Frequency/Duration: Patient will be followed by occupational therapy 3 times a week to address goals. Recommendation for discharge: (in order for the patient to meet his/her long term goals)  No skilled occupational therapy/ follow up rehabilitation needs identified at this time.     This discharge recommendation:  Has been made in collaboration with the attending provider and/or case management    IF patient discharges home will need the following DME: patient owns DME required for discharge       SUBJECTIVE:   Patient unable to verbalize    OBJECTIVE DATA SUMMARY:   HISTORY:   Past Medical History:   Diagnosis Date    Chronic pain     THROAT, EARS, NECK R/T CANCER    COPD (chronic obstructive pulmonary disease) ([facility]     [facility]    Psychiatric disorder     ANXIETY R/T CANCER    Throat cancer ([facility]      Past Surgical History:   Procedure Laterality Date    HX ORTHOPAEDIC      LEFT ARM- \" PUT PLATE IN\"       Expanded or extensive additional review of patient history:     Home Situation  Home Environment: Private residence  # Steps to Enter: 13  Rails to Enter: Yes  One/Two Story Residence: [facility] story  # of Interior Steps: 12  Interior Rails: Both  Living Alone: No  Support Systems: Spouse/Significant Other/Partner, Friends \ neighbors  Patient Expects to be Discharged to[de-identified] Private residence  Current DME Used/Available at Home: Shower chair  Tub or Shower Type: Tub/Shower combination    Hand dominance: Right    EXAMINATION OF PERFORMANCE DEFICITS:  Cognitive/Behavioral Status:  Neurologic State: Alert  Orientation Level: Unable to verbalize;Oriented X4  Cognition: Appropriate decision making             Skin: Appears intact     Edema: None apparant    Hearing:       Vision/Perceptual:    Tracking: Able to track stimulus in all quadrants w/o difficulty                      Acuity: Within Defined Limits           AROM: Within functional limits                         Strength:    Strength: Within functional limits                Coordination:  Coordination: Within functional limits            Tone & Sensation:    Tone: Normal  Sensation: Intact                      Balance:  Sitting: Intact  Standing: Impaired  Standing - Static: Good; Unsupported  Standing - Dynamic : Fair    Functional Mobility and Transfers for ADLs:  Bed Mobility:  Supine to Sit: Minimum assistance;Assist x1  Sit to Supine: Contact guard assistance    Transfers:  Sit to Stand: Contact guard assistance  Stand to Sit: Contact guard assistance  Bed to Chair: Contact guard assistance  Toilet Transfer : Contact guard assistance(Infer per PT note)    ADL Assessment:  Feeding: Setup    Oral Facial Hygiene/Grooming: Setup    Bathing: Setup    Upper Body Dressing: Minimum assistance    Lower Body Dressing: Contact guard assistance    Toileting: Contact guard assistance              Functional Measure:  Barthel Index:    Bathin  Bladder: 10  Bowels: 10  Groomin  Dressing: 10  Feeding: 10  Mobility: 0  Stairs: 0  Toilet Use: 5  Transfer (Bed to Chair and Back): 10  Total: 65/100        The Barthel ADL Index: Guidelines  1.  The index should be used as a record of what a patient does, not as a record of what a patient could do. 2. The main aim is to establish degree of independence from any help, physical or verbal, however minor and for whatever reason. 3. The need for supervision renders the patient not independent. 4. A patient's performance should be established using the best available evidence. Asking the patient, friends/relatives and nurses are the usual sources, but direct observation and common sense are also important. However direct testing is not needed. 5. Usually the patient's performance over the preceding 24-48 hours is important, but occasionally longer periods will be relevant. 6. Middle categories imply that the patient supplies over 50 per cent of the effort. 7. Use of aids to be independent is allowed. Izabel West., Barthel, D.W. (3074). Functional evaluation: the Barthel Index. [address] [facility] (14)2. TAHIR Almaraz, Evita Mc., Catalino Woodys., [employer], [address] [place] [address] (1999). Measuring the change indisability after inpatient rehabilitation; comparison of the responsiveness of the Barthel Index and Functional [place] Measure. Journal of Neurology, Neurosurgery, and Psychiatry, 66(4), 514-236. Anastasiya Caldwell, N.J.A, LOVELY Bowen, & Sandrita Stein, M.A. (2004.) Assessment of post-stroke quality of life in cost-effectiveness studies: The usefulness of the Barthel Index and the EuroQoL-5D.  Quality of Life Research, 15, 559-74         Occupational Therapy Evaluation Charge Determination   History Examination Decision-Making   LOW Complexity : Brief history review  LOW Complexity : 1-3 performance deficits relating to physical, cognitive , or psychosocial skils that result in activity limitations and / or participation restrictions  LOW Complexity : No comorbidities that affect functional and no verbal or physical assistance needed to complete eval tasks       Based on the above components, the patient evaluation is determined to be of the following complexity level: LOW   Pain Rating: Patient reporting nausea and stomach pain - RN made aware    Activity Tolerance:   Patient unwilling to get out of bed for further assessment    After treatment patient left in no apparent distress:    Supine in bed, Call bell within reach, Bed / chair alarm activated and Side rails x 3    COMMUNICATION/EDUCATION:   The patients plan of care was discussed with: Physical therapist and Registered nurse. Home safety education was provided and the patient/caregiver indicated understanding., Patient/family have participated as able in goal setting and plan of care. and Patient/family agree to work toward stated goals and plan of care. This patients plan of care is appropriate for delegation to EMILIE. Thank you for this referral.  [facility]  Time Calculation: 10 mins     Regarding student involvement in patient care:  A student participated in this treatment session. Per CMS Medicare statements and AOTA guidelines I certify that the following was true:  1. I was present and directly observed the entire session. 2. I made all skilled judgments and clinical decisions regarding care. 3. I am the practitioner responsible for assessment, treatment, and documentation.

## 2021-02-09 NOTE — PROGRESS NOTES
Bedside and Verbal shift change report given to [address] [place] [address] (oncoming nurse) by Awilda Sunshine (offgoing nurse). Report included the following information SBAR, Kardex, Recent Results and Dual Neuro Assessment.

## 2021-02-09 NOTE — PROGRESS NOTES
Progress Note    Date of Service: 02/08/2021  Primary Care Provider: Kulwant Eagle NP  Source of Information: Patient and chart review    History of Presenting Illness:   Nirali Anthony is a 53 y.o. male with past medical history of laryngeal carcinoma, emphysema, chronic pain and opioid dependence, generalized anxiety disorder, NIDDM II who presented to [facility] ER on January 31 with complaints of increased shortness of breath and intense neck/throat pain for 2 days. Chart review shows he was noted to have audible stridor, wheezing and hypoxia with oxygen saturations of 84% on room air. He was subsequently admitted for management of hypoxic respiratory failure, intractable pain, anxiety and COPD exacerbation. Patient underwent urgent tracheostomy with Passy-Blanca valve placement. Patient was transiently placed on the ventilator and switched to trach collar. He had PEG tube placement and was started on tube feeds. ENT was consulted and recommendation was made for patient to be transferred to [facility] [address] [place] [address] for laryngectomy and radical neck dissection. Subjective:   Patient seen today with RN at bedside. He is sitting on BSC. Was c/o abd pain/tenderness around peg site and so CTA abd/pelv was completed and showed that peg is in place but stomach and peg both slightly displaced from fluid filled colon. Patient reports only one BM in last several days. Feels constipated like he needs to have a BM. Already on scheduled colace and PRN lactulose which hasn't yet been given. Otherwise he has no complaints. Review of Systems:  A comprehensive review of systems was negative except for that written in the History of Present Illness.      Past Medical History:   Diagnosis Date    Chronic pain     THROAT, EARS, NECK R/T CANCER    COPD (chronic obstructive pulmonary disease) (HCC)     EMPHASEMA    Psychiatric disorder     ANXIETY R/T CANCER    Throat cancer [facility])       Past Surgical History:   Procedure Laterality Date    HX ORTHOPAEDIC      LEFT ARM- \" PUT PLATE IN\"     Prior to Admission medications    Medication Sig Start Date End Date Taking? Authorizing Provider   metFORMIN (GLUCOPHAGE) 500 mg tablet Take  by mouth two (2) times daily (with meals). Provider, Historical   aluminum-magnesium hydroxide 200-200 mg/5 mL susp 5 mL, diphenhydrAMINE 12.5 mg/5 mL liqd 5 mL, lidocaine 2 % soln 5 mL 5 mL by Swish and Spit route two (2) times a day. Magic mouth wash   Maalox  Lidocaine 2% viscous   Diphenhydramine oral solution     Pharmacy to mix equal portions of ingredients to a total volume as indicated in the dispense amount. Provider, Historical   HYDROCODONE-ACETAMINOPHEN PO Take  by mouth. 1 TSP QID    Provider, Historical   albuterol (PROVENTIL HFA, VENTOLIN HFA, PROAIR HFA) 90 mcg/actuation inhaler Take 3 Puffs by inhalation every eight (8) hours. Indications: chronic obstructive pulmonary disease 1/24/21   Sekou John MD   hydrOXYzine HCL (ATARAX) 50 mg tablet Take 1 Tab by mouth every eight to twelve (8-12) hours as needed for Anxiety or Sleep for up to 40 days. Indications: Pt has throat cancer and anxiety. 1/24/21 3/5/21  Sekou John MD     No Known Allergies   Family History   Problem Relation Age of Onset    Diabetes Mother     Diabetes Father     Kidney Disease Father     Diabetes Sister     Heart Disease Daughter     Anesth Problems Neg Hx         Objective:     Physical Exam:     Visit Vitals  /74 (BP 1 Location: Left upper arm, BP Patient Position: At rest)   Pulse 94   Temp 97.8 °F (36.6 °C)   Resp 12   Ht 6' (1.829 m)   Wt 53.6 kg (118 lb 2.7 oz)   SpO2 95%   BMI 16.03 kg/m²      O2 Device: Room air    General:  Alert, cooperative, no distress, appears stated age. Nonverbal   Neck: Supple, symmetrical, Trach in place. Lungs:   Clear to auscultation bilaterally.    Heart:  Regular rate and rhythm, S1, S2 normal, no murmur, click, rub or gallop. Abdomen:   Soft, +ttp, non-distended. Bowel sounds hypoactive. PEG in place   Extremities: Extremities normal, atraumatic, no cyanosis or edema. Pulses: 2+ and symmetric all extremities. Skin: Skin color, texture, turgor normal. No rashes or lesions   Neurologic: CNII-XII grossly intact. WELLS. A&Ox4       Data Review:     Recent Days:  Recent Labs     02/09/21  0420 02/08/21  0207   WBC 8.2 6.8   HGB 11.2* 13.2   HCT 33.8* 39.7    343     Recent Labs     02/09/21  0420 02/08/21  0207   * 130*   K 3.7 3.8   CL 96* 94*   CO2 27 26   GLU 92 96   BUN 12 13   CREA 0.64* 0.50*   CA 8.8 9.6   ALB 2.7* 2.6*   ALT 16 22     No results for input(s): PH, PCO2, PO2, HCO3, FIO2 in the last 72 hours. 24 Hour Results:  Recent Results (from the past 24 hour(s))   GLUCOSE, POC    Collection Time: 02/08/21 11:38 PM   Result Value Ref Range    Glucose (POC) 90 65 - 100 mg/dL    Performed by ALEM Vickers    Collection Time: 02/09/21  4:20 AM   Result Value Ref Range    Sodium 134 (L) 136 - 145 mmol/L    Potassium 3.7 3.5 - 5.1 mmol/L    Chloride 96 (L) 97 - 108 mmol/L    CO2 27 21 - 32 mmol/L    Anion gap 11 5 - 15 mmol/L    Glucose 92 65 - 100 mg/dL    BUN 12 6 - 20 MG/DL    Creatinine 0.64 (L) 0.70 - 1.30 MG/DL    BUN/Creatinine ratio 19 12 - 20      GFR est AA >60 >60 ml/min/1.73m2    GFR est non-AA >60 >60 ml/min/1.73m2    Calcium 8.8 8.5 - 10.1 MG/DL    Bilirubin, total 0.4 0.2 - 1.0 MG/DL    ALT (SGPT) 16 12 - 78 U/L    AST (SGOT) 28 15 - 37 U/L    Alk.  phosphatase 77 45 - 117 U/L    Protein, total 6.9 6.4 - 8.2 g/dL    Albumin 2.7 (L) 3.5 - 5.0 g/dL    Globulin 4.2 (H) 2.0 - 4.0 g/dL    A-G Ratio 0.6 (L) 1.1 - 2.2     CBC WITH AUTOMATED DIFF    Collection Time: 02/09/21  4:20 AM   Result Value Ref Range    WBC 8.2 4.1 - 11.1 K/uL    RBC 3.49 (L) 4.10 - 5.70 M/uL    HGB 11.2 (L) 12.1 - 17.0 g/dL    HCT 33.8 (L) 36.6 - 50.3 %    MCV 96.8 80.0 - 99.0 FL MCH 32.1 26.0 - 34.0 PG    MCHC 33.1 30.0 - 36.5 g/dL    RDW 12.7 11.5 - 14.5 %    PLATELET 791 335 - 110 K/uL    MPV 10.1 8.9 - 12.9 FL    NRBC 0.0 0  WBC    ABSOLUTE NRBC 0.00 0.00 - 0.01 K/uL    NEUTROPHILS 72 32 - 75 %    LYMPHOCYTES 16 12 - 49 %    MONOCYTES 12 5 - 13 %    EOSINOPHILS 0 0 - 7 %    BASOPHILS 0 0 - 1 %    IMMATURE GRANULOCYTES 0 0.0 - 0.5 %    ABS. NEUTROPHILS 5.8 1.8 - 8.0 K/UL    ABS. LYMPHOCYTES 1.3 0.8 - 3.5 K/UL    ABS. MONOCYTES 1.0 0.0 - 1.0 K/UL    ABS. EOSINOPHILS 0.0 0.0 - 0.4 K/UL    ABS. BASOPHILS 0.0 0.0 - 0.1 K/UL    ABS. IMM.  GRANS. 0.0 0.00 - 0.04 K/UL    DF AUTOMATED     HEMOGLOBIN A1C WITH EAG    Collection Time: 02/09/21  4:20 AM   Result Value Ref Range    Hemoglobin A1c 6.0 (H) 4.0 - 5.6 %    Est. average glucose 126 mg/dL   GLUCOSE, POC    Collection Time: 02/09/21  5:34 AM   Result Value Ref Range    Glucose (POC) 89 65 - 100 mg/dL    Performed by [address] [place] [address] And  [place], POC    Collection Time: 02/09/21 12:31 PM   Result Value Ref Range    Glucose (POC) 79 65 - 100 mg/dL    Performed by Jania Henry    GLUCOSE, POC    Collection Time: 02/09/21  5:52 PM   Result Value Ref Range    Glucose (POC) 77 65 - 100 mg/dL    Performed by [employer]          Imaging:     Assessment:     Natividad Rob is a 53 y.o. male with past medical history of laryngeal carcinoma, emphysema, chronic pain and opioid dependence, generalized anxiety disorder, NIDDM II who is transferred to [facility] for surgical management of laryngeal carcinoma       Plan:       Acute on Chronic hypoxic hypercapnic respiratory failure  2/2 laryngeal carcinoma  S/P emergent tracheostomy with trach collar @[facility] 02/01  S/P laryngectomy and radial neck dissection @[facility] 02/08  -Stable on RA  -Keep n.p.o. - speech following  -Trach care per ENT  -Supportive care  -Appreciate ENT input    COPD   -Stable, not in acute exacerbation  -DuoNebs as needed    PEG Tube: Placed @[facility] 02/03  -Hold TF until constipation is resolved  -TF per nutrition  -NPO - speech following    Acute on Chronic constipation  -Suspect 2/2 inc opioid use w/ surgeries  -CTA Abd/Pelv (02/09) G-tube in place; g-tube and abd slightly displaced d/t fluid-filled mildly distended colon  -Start IV reglan to improve peristalsis (hypoactive BS)  -Continue scheduled dulcolax BID, MiraLAX and lactulose PRN  -Hold TF for now/IVF while held  -Consult GI    Generalized anxiety disorder with panic attacks  -Stable, continue hydroxyzine as needed    DVTppx: SCDs  Gippx: NA  Code Status: Full code  Diet: NPO; TF per nutrition  Activity: OOB to chair TID and PRN  Discharge: Plan to discharge home pending progress; ENT clearance; likely >48h         Signed By: Niall Manuel NP     February 9, 2021

## 2021-02-09 NOTE — PROGRESS NOTES
SPEECH LANGUAGE PATHOLOGY EVALUATION  Patient: Ngozi Montez (55 y.o. male)  Date: 2/9/2021  Primary Diagnosis: Laryngeal carcinoma ([facility] [C32.9]  Procedure(s) (LRB):  TOTAL LARYNGECTOMY, (N/A) 1 Day Post-Op   Precautions:        ASSESSMENT :  Laryngectomy education initiated. Discussed post-laryngectomy anatomical changes, communication strategies, HMEs, larytubes, and swallowing s/p laryngectomy. Patient expressed understanding. Of note, swallowing will be addressed by ENT, as pt only able to aspirate now if a tracheoesophageal fistula present or if food/drink inserted directly into stoma (which would not occur). Patient will benefit from skilled intervention to address the above impairments. Patients rehabilitation potential is considered to be Excellent     PLAN :  Recommendations and Planned Interventions:  --continue laryngectomy education    Frequency/Duration: Patient will be followed by speech-language pathology 4 times a week to address goals. Discharge Recommendations: outpatient     SUBJECTIVE:   Patient stated, \"That's great.     OBJECTIVE:     Past Medical History:   Diagnosis Date    Chronic pain     THROAT, EARS, NECK R/T CANCER    COPD (chronic obstructive pulmonary disease) (HCC)     EMPHASEMA    Psychiatric disorder     ANXIETY R/T CANCER    Throat cancer (HCC)      Past Surgical History:   Procedure Laterality Date    HX ORTHOPAEDIC      LEFT ARM- \" PUT PLATE IN\"       Prior Level of Function/Home Situation:   Stoma site appearance: pink  Secretions appear: pink and blood tinged  Home Situation  Home Environment: Private residence  # Steps to Enter: 15  Rails to Enter: Yes  One/Two Story Residence: Two story  # of Interior Steps: 12  Interior Rails: Both  Living Alone: No  Support Systems: Spouse/Significant Other/Partner, Friends \ neighbors  Patient Expects to be Discharged to[de-identified] Private residence  Current DME Used/Available at Home: Shower chair  Tub or Shower Type: Tub/Shower combination    Mental Status:  Neurologic State: Alert  Orientation Level: Unable to verbalize, Oriented X4  Cognition: Appropriate decision making           Laryngectomy Education  --Discussed pre- and post-laryngectomy anatomical changes          --Patient's only airway is through stoma in neck  --Explained HMEs and Larytubes          --Discarding HMEs at least 24 hours and not to get HME wet          --Cleaning larytubes at least every 24 hours   --Discussed communication strategies including:          --Esophageal speech          --Electrolarynx (both flush to skin and with oral adapter)          --Augmentative/Alternative Communication (communication board, white board, etc.)          --Tracheoesophageal prosthesis (explained that the ability to do this is determined by MD)  --Discussed swallowing with laryngectomy  --Explained that pt not longer able to aspirate unless he has a tracheoesophageal fistula, a leaking   TEP, or liquid/solids placed directly into stoma  --Discussed that barium swallow will need to be completed prior to diet initiation. Explained that the timing/diet initiation is determined by ENT. Laryngectomy ATOS Paperwork:  --Coming Home Consent Form and Patient's Insurance Information left at bedside to be completed  --ATOS Prescription in patient's hard chart awaiting ENT MD signature  --Will fax these forms to [employer]OS when completed      Pain:  Pain Scale 1: Numeric (0 - 10)  Pain Intensity 1: 6  Pain Location 1: Neck    After treatment:   Patient left in no apparent distress in bed, Call bell within reach and Nursing notified  COMMUNICATION/EDUCATION:     The patient's plan of care including recommendations, planned interventions, and recommended diet changes were discussed with: Registered nurse and RD. Patient/family have participated as able in goal setting and plan of care. Patient/family agree to work toward stated goals and plan of care.     Thank you for this referral.  [address]  Aimee Larson, SLP  Time Calculation: 19 mins

## 2021-02-09 NOTE — PROGRESS NOTES
Bedside shift change report given to Madhuri Dennison RN (oncoming nurse) by Scott Thomson RN (offgoing nurse). Report included the following information SBAR, Kardex, ED Summary, Procedure Summary, Intake/Output, MAR, Accordion, Recent Results, Cardiac Rhythm SR and Dual Neuro Assessment.

## 2021-02-09 NOTE — CONSULTS
Comprehensive Nutrition Assessment    Type and Reason for Visit: Initial, Consult    Nutrition Recommendations/Plan:    1. Initiate TF of TwoCal HN - 1 can (237 mL) 5x/day (6AM, 10AM, 2PM, 6PM, 10PM) and flush with 120 mL H2O after each bolus  i. Adjust flushes to meet hydration needs off IVFs/ pending pt's ability to take PO  2. Monitor for s/s of refeeding given severe protein-calorie malnutrition. i. Monitor BMP, Mg, Phos for first 72 hours. 3. Add 100 mg thiamine daily for 7 days  4. Continue bowel regimen, adjust PRN     Nutrition Assessment:     53 y.o. male with PMHx emphysema/ COPD, chronic pain with opioid dependence, and generalized anxiety disorder. ?hx of DM, HbA1C = 6% today which indicates some insulin resistance, but not DM. However, pt with significant wt loss recently so A1C may be improved. Will monitor BG. 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 Had planned laryngectomy with Dr. Swain on 2/8, but became increasingly SOB, worsening neck/throat pain, and audible stridor and presented to [facility] on Jan 31st for management of hypoxic respiratory failure. Underwent emergent tracheostomy on 2/1, vented overnight and transitioned to trach collar with PMV placement. PEG placed 2/3. Tube feeds of Jevity 1.5 started per RD notes. Pt tx to [facility] and underwent total laryngectomy with radical neck dissection yesterday. Consult received for tube feeding. Met with pt at bedside. He communicated with pen/paper. Confirmed above PEG placement/ formula. C/o PEG tube site pain that is chronic. However, he reports that when he was started on Jevity 1.5 he had associated cramping and gas pain. He does report no BM x 3 weeks PTA, but abdominal symptoms exacerbated by feeding/formula. Although it is documented that he had a +BM yesterday, pt indicates this was small/insignificant and does not feel relief at this time.   He is very skeptical about the Jevity 1.5 formula and would like to try an alternative. While I am unsure if cramping/ gas directly r/t formula at this point (as could be d/t multiple factors - i.e. constipation from dehydration/malnutrition vs narcotics, ?anxiety induced IBS), given pt's anxiety about resuming this formula, I am agreeable to trying alternative at this time. Pt c/o hunger pain and wants to be fed ASAP. Standard formula options would be Osmolite 1.5 which contains no fiber, or TwoCal HN. Discussed with pt, plan to trial TwoCal HN first as I believe this will better meet his needs and require less formula/ feedings. Pt is hypercatabolic/ metabolic with active, significant wt loss.  lb. Pt wrote \"54 lb\" wt loss since CA dx, although appears to be around 42 lb based on wt hx. I asked him to clarify time frame as unclear when he was dx with cancer and he wrote \"5 months\" - which I suspect is when mass initially discovered. Per my discussion with SLP today, pt had already been working with [facility] in preparation for above procedure and was well versed on what to expect. It is unclear/ undecided if pt will require additional treatment at this time. Recommend at minimum 2100 kcal/day, although probably more depending on additional tx. Pt is young and still fairly active PTA. He exhibits significant muscle wasting and loss of SQ. He has lost 26% of his BW in less than 6 months. Meet criteria for severe protein-calorie malnutrition. Recommend minimum goal of 6 cartons of 1.5 kcal formula daily, or minimum 4.5-5 cartons of TwoCal HN daily. For now, recommend TwoCal HN - 1 can 5x/day with 120 mL H2O flush after each bolus to provide ~1185 mL, 2375 kcal, 100 gm pro, and [phone] mL free H2O. Pt will require more free water (minimum 2 L/day). PO diet per Dr. Swain. Discussed with SLP - given laryngectomy, anatomy is such that pt cannot aspirate so long as there is no fistula.   Will likely require esophagram prior to initiating diet. PEG was placed d/t malnutrition. Pt with minimal intake/ tube feeds since PEG placed and NPO with poor PO intake PTA/ significant wt loss. He is at significant refeeding risk. Will start thiamine 100 mg daily. Monitor lytes with PRN repletion. If does not do well with TwoCal, recommend trial of Osmolite 1.5 - 360 mL QID with 120 mL H2O flush after each feed to start. This provides 1440 mL, 2160 kcal, 90 gm pro, 1095+812=7135 mL free H2O. May need some added Benefiber daily.        Malnutrition Assessment:  Malnutrition Status:  Severe malnutrition    Context:  Acute illness     Findings of the 6 clinical characteristics of malnutrition:   Energy Intake:  7 - 50% or less of est energy requirements for 5 or more days  Weight Loss:  7.00 - Greater than 7.5% over 3 months     Body Fat Loss:  7 - Moderate body fat loss, Orbital   Muscle Mass Loss:  7 - Moderate muscle mass loss, Temples (temporalis), Clavicles (pectoralis & deltoids)  Fluid Accumulation:  No significant fluid accumulation,     Strength:  Not performed       Nutritionally Significant Medications:   [email or web address] mL/hr, Ancef, Colace, SSI, oxycodone  PRN: lactulose, morphine      Estimated Daily Nutrient Needs:  Energy (kcal): 1320-9727(30-35 kcal/kg UBW of 72.7 kg, or 40+ kcal/kg of ABW)  Protein (g): (1.2-1.5 gm/kg UBW or 1.6-2 gm/kg ABW)  Fluid (ml/day): 1 mL/kcal    Nutrition Related Findings:   Edema: none  Last BM: 2/8  Abdomen: PEG in place    Wounds:    Surgical incision(neck)       Current Nutrition Therapies:  DIET NPO    Anthropometric Measures:  · Height:  6' (182.9 cm)  · Current Body Wt:  53.6 kg (118 lb 2.7 oz)   · Admission Body Wt:  118 lb 9.7 oz(53.8 kg - standing)    · Usual Body Wt:  72.6 kg (160 lb)     · Ideal Body Wt:  178 lbs:  66.4 %   · BMI Category:  Underweight (BMI less than 18.5)       Wt Readings from Last 20 Encounters:   02/09/21 53.6 kg (118 lb 2.7 oz)   02/07/21 53.8 kg (118 lb 9.7 oz) - standing scale, admission   02/02/21 60.5 kg (133 lb 6.1 oz)   01/28/21 60.7 kg (133 lb 13.1 oz)   01/28/21 60.8 kg (134 lb)   01/24/21 67.1 kg (148 lb)       Nutrition Diagnosis:   · Inadequate oral intake related to increased demand for energy/nutrients, swallowing difficulty as evidenced by weight loss, BMI, poor intake prior to admission, NPO or clear liquid status due to medical condition, nutrition support-enteral nutrition    · Increased nutrient needs related to catabolic illness as evidenced by moderate muscle loss, moderate loss of subcutaneous fat, BMI, weight loss(Head and Neck CA)      Nutrition Interventions:   Food and/or Nutrient Delivery: Start tube feeding, Modify tube feeding  Nutrition Education and Counseling: No recommendations at this time  Coordination of Nutrition Care: Continue to monitor while inpatient, Speech therapy    Goals:  Resume TF via PEG, tolerate prescribed regimen within 2-3 days, stabilize wt loss        Nutrition Monitoring and Evaluation:   Behavioral-Environmental Outcomes: None identified  Food/Nutrient Intake Outcomes: Enteral nutrition intake/tolerance, IVF intake, Vitamin/mineral intake, Diet advancement/tolerance  Physical Signs/Symptoms Outcomes: Biochemical data, Chewing or swallowing, Constipation, Nutrition focused physical findings, Weight    Discharge Planning:    Enteral nutrition     Recent Labs     02/09/21  0420 02/08/21  0207   GLU 92 96   BUN 12 13   CREA 0.64* 0.50*   * 130*   K 3.7 3.8   CL 96* 94*   CO2 27 26   CA 8.8 9.6       Recent Labs     02/09/21  0420 02/08/21  0207   ALT 16 22   AP 77 83   TBILI 0.4 0.4   TP 6.9 7.8   ALB 2.7* 2.6*   GLOB 4.2* 5.2*       No results for input(s): LAC in the last 72 hours. Recent Labs     02/09/21  0420 02/08/21  0207   WBC 8.2 6.8   HGB 11.2* 13.2   HCT 33.8* 39.7    343       No results for input(s): PREALB in the last 72 hours.     No results for input(s): TRIGL in the last 72 hours.     Recent Labs     02/09/21  1231 02/09/21  0534 02/08/21  2338   GLUCPOC 79 89 90       Lab Results   Component Value Date/Time    Hemoglobin A1c 6.0 (H) 02/09/2021 04:20 AM         Tereza Rubin RD  Available via Shortlist  Or Pager# [phone]

## 2021-02-09 NOTE — PROGRESS NOTES
Otolaryngology, Head and Neck Surgery      No issues overnight. Pain controlled. No dyspnea.      Current Facility-Administered Medications   Medication Dose Route Frequency    glucose chewable tablet 16 g  4 Tab Oral PRN    dextrose (D50W) injection syrg 12.5-25 g  25-50 mL IntraVENous PRN    glucagon (GLUCAGEN) injection 1 mg  1 mg IntraMUSCular PRN    insulin lispro (HUMALOG) injection   SubCUTAneous Q6H    iopamidoL (ISOVUE-370) 76 % injection 100 mL  100 mL IntraVENous RAD ONCE    [START ON 2/10/2021] thiamine HCL (B-1) tablet 100 mg  100 mg Per G Tube DAILY    sodium chloride (NS) flush 5-40 mL  5-40 mL IntraVENous Q8H    sodium chloride (NS) flush 5-40 mL  5-40 mL IntraVENous PRN    acetaminophen (TYLENOL) solution 650 mg  650 mg Per G Tube Q4H PRN    morphine injection 2 mg  2 mg IntraVENous Q4H PRN    ondansetron (ZOFRAN) injection 4 mg  4 mg IntraVENous Q4H PRN    HYDROcodone-acetaminophen (HYCET) 0.5-21.7 mg/mL oral solution 7.5 mg  15 mL PEG Tube Q4H PRN    HYDROcodone-acetaminophen (HYCET) 0.5-21.7 mg/mL oral solution 15 mg  30 mL Oral Q4H PRN    docusate (COLACE) 50 mg/5 mL oral liquid 200 mg  200 mg Per G Tube DAILY    lactulose (CHRONULAC) 10 gram/15 mL solution 45 mL  30 g Per G Tube DAILY PRN    sodium chloride (NS) flush 5-40 mL  5-40 mL IntraVENous PRN    sodium chloride (NS) flush 5-40 mL  5-40 mL IntraVENous Q8H    sodium chloride (NS) flush 5-40 mL  5-40 mL IntraVENous PRN    0.9% sodium chloride infusion  75 mL/hr IntraVENous CONTINUOUS       Recent Results (from the past 24 hour(s))   GLUCOSE, POC    Collection Time: 02/08/21 11:38 PM   Result Value Ref Range    Glucose (POC) 90 65 - 100 mg/dL    Performed by Leona Monaco    METABOLIC PANEL, COMPREHENSIVE    Collection Time: 02/09/21  4:20 AM   Result Value Ref Range    Sodium 134 (L) 136 - 145 mmol/L    Potassium 3.7 3.5 - 5.1 mmol/L    Chloride 96 (L) 97 - 108 mmol/L    CO2 27 21 - 32 mmol/L    Anion gap 11 5 - 15 mmol/L Glucose 92 65 - 100 mg/dL    BUN 12 6 - 20 MG/DL    Creatinine 0.64 (L) 0.70 - 1.30 MG/DL    BUN/Creatinine ratio 19 12 - 20      GFR est AA >60 >60 ml/min/1.73m2    GFR est non-AA >60 >60 ml/min/1.73m2    Calcium 8.8 8.5 - 10.1 MG/DL    Bilirubin, total 0.4 0.2 - 1.0 MG/DL    ALT (SGPT) 16 12 - 78 U/L    AST (SGOT) 28 15 - 37 U/L    Alk. phosphatase 77 45 - 117 U/L    Protein, total 6.9 6.4 - 8.2 g/dL    Albumin 2.7 (L) 3.5 - 5.0 g/dL    Globulin 4.2 (H) 2.0 - 4.0 g/dL    A-G Ratio 0.6 (L) 1.1 - 2.2     CBC WITH AUTOMATED DIFF    Collection Time: 02/09/21  4:20 AM   Result Value Ref Range    WBC 8.2 4.1 - 11.1 K/uL    RBC 3.49 (L) 4.10 - 5.70 M/uL    HGB 11.2 (L) 12.1 - 17.0 g/dL    HCT 33.8 (L) 36.6 - 50.3 %    MCV 96.8 80.0 - 99.0 FL    MCH 32.1 26.0 - 34.0 PG    MCHC 33.1 30.0 - 36.5 g/dL    RDW 12.7 11.5 - 14.5 %    PLATELET 338 084 - 842 K/uL    MPV 10.1 8.9 - 12.9 FL    NRBC 0.0 0  WBC    ABSOLUTE NRBC 0.00 0.00 - 0.01 K/uL    NEUTROPHILS 72 32 - 75 %    LYMPHOCYTES 16 12 - 49 %    MONOCYTES 12 5 - 13 %    EOSINOPHILS 0 0 - 7 %    BASOPHILS 0 0 - 1 %    IMMATURE GRANULOCYTES 0 0.0 - 0.5 %    ABS. NEUTROPHILS 5.8 1.8 - 8.0 K/UL    ABS. LYMPHOCYTES 1.3 0.8 - 3.5 K/UL    ABS. MONOCYTES 1.0 0.0 - 1.0 K/UL    ABS. EOSINOPHILS 0.0 0.0 - 0.4 K/UL    ABS. BASOPHILS 0.0 0.0 - 0.1 K/UL    ABS. IMM.  GRANS. 0.0 0.00 - 0.04 K/UL    DF AUTOMATED     HEMOGLOBIN A1C WITH EAG    Collection Time: 02/09/21  4:20 AM   Result Value Ref Range    Hemoglobin A1c 6.0 (H) 4.0 - 5.6 %    Est. average glucose 126 mg/dL   GLUCOSE, POC    Collection Time: 02/09/21  5:34 AM   Result Value Ref Range    Glucose (POC) 89 65 - 100 mg/dL    Performed by [address] [place] [address], POC    Collection Time: 02/09/21 12:31 PM   Result Value Ref Range    Glucose (POC) 79 65 - 100 mg/dL    Performed by Darion Goldman            Visit Vitals  /69 (BP 1 Location: Left upper arm, BP Patient Position: At rest)   Pulse 93   Temp 97.8 °F (36.6 °C)   Resp 13   Ht 6' (1.829 m)   Wt 53.6 kg (118 lb 2.7 oz)   SpO2 96%   BMI 16.03 kg/m²       Intake/Output Summary (Last 24 hours) at 2/9/2021 1627  Last data filed at 2/9/2021 1149  Gross per 24 hour   Intake 300 ml   Output 550 ml   Net -250 ml       Gen: nad, awake, alert  Neck; flat. Incisions intact. Stoma patent and clea1n. Lower extremities: no calf tenderness    A:   Hospital Problems  Never Reviewed          Codes Class Noted POA    [facility] ICD-10-CM: C32.9  ICD-9-CM: 161.9  2/7/2021 Unknown                  Post op day 1 from total laryngectomy.     Plan:  -resume tube feeds  -oob/ambulate  -stoma care  -cont npo

## 2021-02-09 NOTE — PROGRESS NOTES
Transition of Care Plan   RUR- 15 % Low Risks    DISPOSITION: home with girlfriend when ready     Transport: [place] Medicaid  [phone] (ccp plus)    Reason for Admission:   transferred from [facility], was admitted there due to shortness of breath                    RUR Score: 15 % low                  Plan for utilizing home health:          PCP: First and Last name:  David Fox, NP    Name of Practice: [facility]    Are you a current patient: Yes/No:  Yes    Approximate date of last visit: couple weeks ago    Can you participate in a virtual visit with your PCP: NO                    Current Advanced Directive/Advance Care Plan: Full Code, not interested now, girlfriend Darion Ch [phone] makes decision as needed                          Transition of Care Plan:   Home when ready. Reviewed chart for transitions of care,and discussed in rounds. CM met with patient at bedside to explain role and offer support. Patient is alert and oriented x4, and confirmed demographics. He is non-verbal but answers the question on writing. I have updated GF's contact info on the chart. Baseline: independent, ambulates freely   ADLs/IDALS:  Independent, ambulates freely    Previous Home Health: none   Previous SNF: none   ER Contact: [facility] [phone]    Patient lives in a 2 level don with 13 steps to enter. Patient is independent with ADLs/IDALs. Patient doesn't use any DMEs to ambulate. Patient's preferred pharmacy is [address] [place] [address]  located in [place], [place]. Patient will need transport at discharge. CM to arrange- Medicaid [address] Medicaid  [phone] (ccp plus). Care Management Interventions  PCP Verified by CM: Yes  Palliative Care Criteria Met (RRAT>21 & CHF Dx)?: No  Mode of Transport at Discharge:  Other (see comment)(Medicaid [place] )  Transition of Care Consult (CM Consult): Discharge Planning  MyChart Signup: No  Discharge Durable Medical Equipment: No  Health Maintenance Reviewed: Yes  Physical Therapy Consult: Yes  Occupational Therapy Consult: Yes  Speech Therapy Consult: Yes  Current Support Network: Own Home, Lives with Spouse(lives with girlfriend )  Confirm Follow Up Transport: Other (see comment)(Medicaid [place] )  The Patient and/or Patient Representative was Provided with a Choice of Provider and Agrees with the Discharge Plan?: Yes  Freedom of Choice List was Provided with Basic Dialogue that Supports the Patient's Individualized Plan of Care/Goals, Treatment Preferences and Shares the Quality Data Associated with the Providers?: Yes  [place] Resource Information Provided?: No  Discharge Location  Discharge Placement: Home with family assistance    ELIEZER Meyers

## 2021-02-10 LAB
ALBUMIN SERPL-MCNC: 2.4 G/DL (ref 3.5–5)
ALBUMIN/GLOB SERPL: 0.6 {RATIO} (ref 1.1–2.2)
ALP SERPL-CCNC: 73 U/L (ref 45–117)
ALT SERPL-CCNC: 13 U/L (ref 12–78)
ANION GAP SERPL CALC-SCNC: 8 MMOL/L (ref 5–15)
AST SERPL-CCNC: 27 U/L (ref 15–37)
ATRIAL RATE: 82 BPM
BASOPHILS # BLD: 0 K/UL (ref 0–0.1)
BASOPHILS NFR BLD: 0 % (ref 0–1)
BILIRUB SERPL-MCNC: 0.4 MG/DL (ref 0.2–1)
BUN SERPL-MCNC: 8 MG/DL (ref 6–20)
BUN/CREAT SERPL: 20 (ref 12–20)
CALCIUM SERPL-MCNC: 8.4 MG/DL (ref 8.5–10.1)
CALCULATED P AXIS, ECG09: 72 DEGREES
CALCULATED R AXIS, ECG10: 67 DEGREES
CALCULATED T AXIS, ECG11: 69 DEGREES
CHLORIDE SERPL-SCNC: 98 MMOL/L (ref 97–108)
CO2 SERPL-SCNC: 26 MMOL/L (ref 21–32)
CREAT SERPL-MCNC: 0.41 MG/DL (ref 0.7–1.3)
DIAGNOSIS, 93000: NORMAL
DIFFERENTIAL METHOD BLD: ABNORMAL
EOSINOPHIL # BLD: 0 K/UL (ref 0–0.4)
EOSINOPHIL NFR BLD: 0 % (ref 0–7)
ERYTHROCYTE [DISTWIDTH] IN BLOOD BY AUTOMATED COUNT: 12.7 % (ref 11.5–14.5)
GLOBULIN SER CALC-MCNC: 4.1 G/DL (ref 2–4)
GLUCOSE BLD STRIP.AUTO-MCNC: 133 MG/DL (ref 65–100)
GLUCOSE BLD STRIP.AUTO-MCNC: 77 MG/DL (ref 65–100)
GLUCOSE BLD STRIP.AUTO-MCNC: 79 MG/DL (ref 65–100)
GLUCOSE BLD STRIP.AUTO-MCNC: 80 MG/DL (ref 65–100)
GLUCOSE BLD STRIP.AUTO-MCNC: 93 MG/DL (ref 65–100)
GLUCOSE SERPL-MCNC: 80 MG/DL (ref 65–100)
HCT VFR BLD AUTO: 31.5 % (ref 36.6–50.3)
HGB BLD-MCNC: 10.6 G/DL (ref 12.1–17)
IMM GRANULOCYTES # BLD AUTO: 0 K/UL (ref 0–0.04)
IMM GRANULOCYTES NFR BLD AUTO: 0 % (ref 0–0.5)
LYMPHOCYTES # BLD: 0.9 K/UL (ref 0.8–3.5)
LYMPHOCYTES NFR BLD: 13 % (ref 12–49)
MAGNESIUM SERPL-MCNC: 1.5 MG/DL (ref 1.6–2.4)
MCH RBC QN AUTO: 32.4 PG (ref 26–34)
MCHC RBC AUTO-ENTMCNC: 33.7 G/DL (ref 30–36.5)
MCV RBC AUTO: 96.3 FL (ref 80–99)
MONOCYTES # BLD: 0.8 K/UL (ref 0–1)
MONOCYTES NFR BLD: 12 % (ref 5–13)
NEUTS SEG # BLD: 5.2 K/UL (ref 1.8–8)
NEUTS SEG NFR BLD: 75 % (ref 32–75)
NRBC # BLD: 0 K/UL (ref 0–0.01)
NRBC BLD-RTO: 0 PER 100 WBC
P-R INTERVAL, ECG05: 160 MS
PHOSPHATE SERPL-MCNC: 1.9 MG/DL (ref 2.6–4.7)
PLATELET # BLD AUTO: 333 K/UL (ref 150–400)
PMV BLD AUTO: 10 FL (ref 8.9–12.9)
POTASSIUM SERPL-SCNC: 3.6 MMOL/L (ref 3.5–5.1)
PROT SERPL-MCNC: 6.5 G/DL (ref 6.4–8.2)
Q-T INTERVAL, ECG07: 408 MS
QRS DURATION, ECG06: 98 MS
QTC CALCULATION (BEZET), ECG08: 476 MS
RBC # BLD AUTO: 3.27 M/UL (ref 4.1–5.7)
SERVICE CMNT-IMP: ABNORMAL
SERVICE CMNT-IMP: NORMAL
SODIUM SERPL-SCNC: 132 MMOL/L (ref 136–145)
VENTRICULAR RATE, ECG03: 82 BPM
WBC # BLD AUTO: 7 K/UL (ref 4.1–11.1)

## 2021-02-10 PROCEDURE — 83735 ASSAY OF MAGNESIUM: CPT

## 2021-02-10 PROCEDURE — 74011250636 HC RX REV CODE- 250/636: Performed by: OTOLARYNGOLOGY

## 2021-02-10 PROCEDURE — 65660000001 HC RM ICU INTERMED STEPDOWN

## 2021-02-10 PROCEDURE — 74011250637 HC RX REV CODE- 250/637: Performed by: FAMILY MEDICINE

## 2021-02-10 PROCEDURE — 85025 COMPLETE CBC W/AUTO DIFF WBC: CPT

## 2021-02-10 PROCEDURE — 92507 TX SP LANG VOICE COMM INDIV: CPT

## 2021-02-10 PROCEDURE — 74011250636 HC RX REV CODE- 250/636: Performed by: NURSE PRACTITIONER

## 2021-02-10 PROCEDURE — 84100 ASSAY OF PHOSPHORUS: CPT

## 2021-02-10 PROCEDURE — 82962 GLUCOSE BLOOD TEST: CPT

## 2021-02-10 PROCEDURE — 36415 COLL VENOUS BLD VENIPUNCTURE: CPT

## 2021-02-10 PROCEDURE — 80053 COMPREHEN METABOLIC PANEL: CPT

## 2021-02-10 PROCEDURE — 97116 GAIT TRAINING THERAPY: CPT

## 2021-02-10 RX ORDER — POLYETHYLENE GLYCOL 3350 17 G/17G
17 POWDER, FOR SOLUTION ORAL 3 TIMES DAILY
Status: DISCONTINUED | OUTPATIENT
Start: 2021-02-10 | End: 2021-02-11

## 2021-02-10 RX ADMIN — METOCLOPRAMIDE 10 MG: 5 INJECTION, SOLUTION INTRAMUSCULAR; INTRAVENOUS at 00:09

## 2021-02-10 RX ADMIN — Medication 10 ML: at 21:23

## 2021-02-10 RX ADMIN — METOCLOPRAMIDE 10 MG: 5 INJECTION, SOLUTION INTRAMUSCULAR; INTRAVENOUS at 06:02

## 2021-02-10 RX ADMIN — METOCLOPRAMIDE 10 MG: 5 INJECTION, SOLUTION INTRAMUSCULAR; INTRAVENOUS at 12:49

## 2021-02-10 RX ADMIN — MORPHINE SULFATE 2 MG: 2 INJECTION, SOLUTION INTRAMUSCULAR; INTRAVENOUS at 04:35

## 2021-02-10 RX ADMIN — Medication 10 ML: at 14:00

## 2021-02-10 RX ADMIN — Medication 10 ML: at 05:45

## 2021-02-10 RX ADMIN — Medication 100 MG: at 08:40

## 2021-02-10 RX ADMIN — SODIUM CHLORIDE 75 ML/HR: 9 INJECTION, SOLUTION INTRAVENOUS at 08:39

## 2021-02-10 RX ADMIN — Medication 10 ML: at 21:24

## 2021-02-10 NOTE — PROGRESS NOTES
Problem: Mobility Impaired (Adult and Pediatric)  Goal: *Acute Goals and Plan of Care (Insert Text)  Description: FUNCTIONAL STATUS PRIOR TO ADMISSION: Patient was independent and active without use of DME.    HOME SUPPORT PRIOR TO ADMISSION: The patient lived with significant other and friends but did not require assist. Sleeps on a couch on the second floor. Lots of stairs to enter home/bedroom. Physical Therapy Goals  Initiated 2/9/2021  1. Patient will move from supine to sit and sit to supine  in bed with independence within 7 day(s). 2.  Patient will transfer from bed to chair and chair to bed with independence using the least restrictive device within 7 day(s). 3.  Patient will perform sit to stand with independence within 7 day(s). 4.  Patient will ambulate with independence for 300 feet with the least restrictive device within 7 day(s). 5.  Patient will ascend/descend 14 stairs with 2 handrail(s) with modified independence within 7 day(s). Outcome: Progressing Towards Goal   PHYSICAL THERAPY TREATMENT  Patient: Robert Harrington (84 y.o. male)  Date: 2/10/2021  Diagnosis: Laryngeal carcinoma ([facility] [C32.9] Laryngeal carcinoma ([facility]  Procedure(s) (LRB):  TOTAL LARYNGECTOMY, (N/A) 2 Days Post-Op  Precautions:    Chart, physical therapy assessment, plan of care and goals were reviewed. ASSESSMENT  Patient continues with skilled PT services and is progressing towards goals. Patient received in bed and most agreeable to therapy. Moving well today requiring just supervision at the most.  Managed bed mobility, transfers on off BSC, brian care and ambulation in the fowler all with supervision. Increased gait distance without difficulty. At this time, only needs step assessment but do not anticipate that being an issue. .     Current Level of Function Impacting Discharge (mobility/balance): supervision to modified independent    Other factors to consider for discharge:          PLAN :  Patient continues to benefit from skilled intervention to address the above impairments. Continue treatment per established plan of care. to address goals. Recommendation for discharge: (in order for the patient to meet his/her long term goals)  No skilled physical therapy/ follow up rehabilitation needs identified at this time. This discharge recommendation:  Has not yet been discussed the attending provider and/or case management    IF patient discharges home will need the following DME: none       SUBJECTIVE:   Patient stated thank you.  mouthed    OBJECTIVE DATA SUMMARY:   Critical Behavior:  Neurologic State: Alert  Orientation Level: Appropriate for age, Oriented X4  Cognition: Follows commands, Appropriate for age attention/concentration, Appropriate safety awareness     Functional Mobility Training:  Bed Mobility:     Supine to Sit: Independent     Scooting: Independent        Transfers:  Sit to Stand: Supervision  Stand to Sit: Supervision        Bed to Chair: Supervision                    Balance:  Sitting: Intact  Standing: Impaired; Without support  Standing - Static: Good  Standing - Dynamic : Good  Ambulation/Gait Training:  Distance (ft): 300 Feet (ft)  Assistive Device: Gait belt  Ambulation - Level of Assistance: Supervision                 Base of Support: Widened     Speed/Isabelle: Slow         Activity Tolerance:   Good and SpO2 stable on RA    After treatment patient left in no apparent distress:   Supine in bed, Call bell within reach, and Side rails x 3    COMMUNICATION/COLLABORATION:   The patients plan of care was discussed with: Registered nurse.      Steffanie Bynum PT   Time Calculation: 15 mins

## 2021-02-10 NOTE — PROGRESS NOTES
Transition of Care Plan   RUR- 19 % Medium Risks    DISPOSITION: home with girlfriend when ready    Transport: [address] Medicaid [phone] ([facility] plus)     Reviewed chart for transitions of care,and discussed in rounds. Noted CM consult to assist patient to apply unemployment benefit with [place] [employer]. Assisted the patient to apply 11 weeks of additional benefits as he had exhausted initial 13 weeks of unemployment benefits. Amadou Tavera RN witnessed to verify patient's identity  on the phone as patient is non-verbal but communicates via writing. Patient receive a letter in mail regarding his benefits.      ELIEZER Pardo

## 2021-02-10 NOTE — PROGRESS NOTES
Progress Note    Date of Service: 02/08/2021  Primary Care Provider: Angel Capps NP  Source of Information: Patient and chart review    History of Presenting Illness:   Romel Khan is a 53 y.o. male with past medical history of laryngeal carcinoma, emphysema, chronic pain and opioid dependence, generalized anxiety disorder, NIDDM II who presented to [facility] ER on January 31 with complaints of increased shortness of breath and intense neck/throat pain for 2 days. Chart review shows he was noted to have audible stridor, wheezing and hypoxia with oxygen saturations of 84% on room air. He was subsequently admitted for management of hypoxic respiratory failure, intractable pain, anxiety and COPD exacerbation. Patient underwent urgent tracheostomy with Passy-Blanca valve placement. Patient was transiently placed on the ventilator and switched to trach collar. He had PEG tube placement and was started on tube feeds. ENT was consulted and recommendation was made for patient to be transferred to [facility] for laryngectomy and radical neck dissection. Subjective:   Patient seen today in bed in [facility]. He reports 7 BMs since yesterday and that he feels tremendously better. CM and RN were able to get his unemployment stuff done today as well. He has no complaints. Review of Systems:  A comprehensive review of systems was negative except for that written in the History of Present Illness. Past Medical History:   Diagnosis Date    Chronic pain     THROAT, EARS, NECK R/T CANCER    COPD (chronic obstructive pulmonary disease) (HCC)     EMPHASEMA    Psychiatric disorder     ANXIETY R/T CANCER    Throat cancer (HCC)       Past Surgical History:   Procedure Laterality Date    HX ORTHOPAEDIC      LEFT ARM- \" PUT PLATE IN\"     Prior to Admission medications    Medication Sig Start Date End Date Taking?  Authorizing Provider   metFORMIN (GLUCOPHAGE) 500 mg tablet Take  by mouth two (2) times daily (with meals). Provider, Historical   aluminum-magnesium hydroxide 200-200 mg/5 mL susp 5 mL, diphenhydrAMINE 12.5 mg/5 mL liqd 5 mL, lidocaine 2 % soln 5 mL 5 mL by Swish and Spit route two (2) times a day. Magic mouth wash   Maalox  Lidocaine 2% viscous   Diphenhydramine oral solution     Pharmacy to mix equal portions of ingredients to a total volume as indicated in the dispense amount. Provider, Historical   HYDROCODONE-ACETAMINOPHEN PO Take  by mouth. 1 TSP QID    Provider, Historical   albuterol (PROVENTIL HFA, VENTOLIN HFA, PROAIR HFA) 90 mcg/actuation inhaler Take 3 Puffs by inhalation every eight (8) hours. Indications: chronic obstructive pulmonary disease 1/24/21   Braxton Saldivar MD   hydrOXYzine HCL (ATARAX) 50 mg tablet Take 1 Tab by mouth every eight to twelve (8-12) hours as needed for Anxiety or Sleep for up to 40 days. Indications: Pt has throat cancer and anxiety. 1/24/21 3/5/21  Braxton Saldivar MD     No Known Allergies   Family History   Problem Relation Age of Onset    Diabetes Mother     Diabetes Father     Kidney Disease Father     Diabetes Sister     Heart Disease Daughter     Anesth Problems Neg Hx         Objective:     Physical Exam:     Visit Vitals  /73 (BP 1 Location: Right upper arm, BP Patient Position: At rest)   Pulse (!) 106   Temp 98.9 °F (37.2 °C)   Resp 18   Ht 6' (1.829 m)   Wt 65.9 kg (145 lb 4.5 oz)   SpO2 95%   BMI 19.70 kg/m²      O2 Device: Room air    General:  Alert, cooperative, no distress, appears stated age. Nonverbal   Neck: Supple, symmetrical, Trach in place. Lungs:   Clear to auscultation bilaterally. Heart:  Regular rate and rhythm, S1, S2 normal, no murmur, click, rub or gallop. Abdomen:   Soft, +ttp, non-distended. Bowel sounds hypoactive. PEG in place   Extremities: Extremities normal, atraumatic, no cyanosis or edema. Pulses: 2+ and symmetric all extremities.    Skin: Skin color, texture, turgor normal. No rashes or lesions   Neurologic: CNII-XII grossly intact. WELLS. A&Ox4       Data Review:     Recent Days:  Recent Labs     02/10/21  0200 02/09/21  0420 02/08/21  0207   WBC 7.0 8.2 6.8   HGB 10.6* 11.2* 13.2   HCT 31.5* 33.8* 39.7    388 343     Recent Labs     02/10/21  0200 02/09/21  0420 02/08/21  0207   * 134* 130*   K 3.6 3.7 3.8   CL 98 96* 94*   CO2 26 27 26   GLU 80 92 96   BUN 8 12 13   CREA 0.41* 0.64* 0.50*   CA 8.4* 8.8 9.6   MG 1.5*  --   --    PHOS 1.9*  --   --    ALB 2.4* 2.7* 2.6*   ALT 13 16 22     No results for input(s): PH, PCO2, PO2, HCO3, FIO2 in the last 72 hours.     24 Hour Results:  Recent Results (from the past 24 hour(s))   GLUCOSE, POC    Collection Time: 02/09/21  5:52 PM   Result Value Ref Range    Glucose (POC) 77 65 - 100 mg/dL    Performed by Davon Morales    EKG, 12 LEAD, INITIAL    Collection Time: 02/09/21  9:35 PM   Result Value Ref Range    Ventricular Rate 82 BPM    Atrial Rate 82 BPM    P-R Interval 160 ms    QRS Duration 98 ms    Q-T Interval 408 ms    QTC Calculation (Bezet) 476 ms    Calculated P Axis 72 degrees    Calculated R Axis 67 degrees    Calculated T Axis 69 degrees    Diagnosis Normal sinus rhythm  No previous ECGs available      GLUCOSE, POC    Collection Time: 02/10/21 12:08 AM   Result Value Ref Range    Glucose (POC) 79 65 - 100 mg/dL    Performed by Lucrecia ANDERSON    METABOLIC PANEL, COMPREHENSIVE    Collection Time: 02/10/21  2:00 AM   Result Value Ref Range    Sodium 132 (L) 136 - 145 mmol/L    Potassium 3.6 3.5 - 5.1 mmol/L    Chloride 98 97 - 108 mmol/L    CO2 26 21 - 32 mmol/L    Anion gap 8 5 - 15 mmol/L    Glucose 80 65 - 100 mg/dL    BUN 8 6 - 20 MG/DL    Creatinine 0.41 (L) 0.70 - 1.30 MG/DL    BUN/Creatinine ratio 20 12 - 20      GFR est AA >60 >60 ml/min/1.73m2    GFR est non-AA >60 >60 ml/min/1.73m2    Calcium 8.4 (L) 8.5 - 10.1 MG/DL    Bilirubin, total 0.4 0.2 - 1.0 MG/DL    ALT (SGPT) 13 12 - 78 U/L    AST (SGOT) 27 15 - 37 U/L    Alk. phosphatase 73 45 - 117 U/L    Protein, total 6.5 6.4 - 8.2 g/dL    Albumin 2.4 (L) 3.5 - 5.0 g/dL    Globulin 4.1 (H) 2.0 - 4.0 g/dL    A-G Ratio 0.6 (L) 1.1 - 2.2     CBC WITH AUTOMATED DIFF    Collection Time: 02/10/21  2:00 AM   Result Value Ref Range    WBC 7.0 4.1 - 11.1 K/uL    RBC 3.27 (L) 4.10 - 5.70 M/uL    HGB 10.6 (L) 12.1 - 17.0 g/dL    HCT 31.5 (L) 36.6 - 50.3 %    MCV 96.3 80.0 - 99.0 FL    MCH 32.4 26.0 - 34.0 PG    MCHC 33.7 30.0 - 36.5 g/dL    RDW 12.7 11.5 - 14.5 %    PLATELET 874 828 - 458 K/uL    MPV 10.0 8.9 - 12.9 FL    NRBC 0.0 0  WBC    ABSOLUTE NRBC 0.00 0.00 - 0.01 K/uL    NEUTROPHILS 75 32 - 75 %    LYMPHOCYTES 13 12 - 49 %    MONOCYTES 12 5 - 13 %    EOSINOPHILS 0 0 - 7 %    BASOPHILS 0 0 - 1 %    IMMATURE GRANULOCYTES 0 0.0 - 0.5 %    ABS. NEUTROPHILS 5.2 1.8 - 8.0 K/UL    ABS. LYMPHOCYTES 0.9 0.8 - 3.5 K/UL    ABS. MONOCYTES 0.8 0.0 - 1.0 K/UL    ABS. EOSINOPHILS 0.0 0.0 - 0.4 K/UL    ABS. BASOPHILS 0.0 0.0 - 0.1 K/UL    ABS. IMM.  GRANS. 0.0 0.00 - 0.04 K/UL    DF AUTOMATED     PHOSPHORUS    Collection Time: 02/10/21  2:00 AM   Result Value Ref Range    Phosphorus 1.9 (L) 2.6 - 4.7 MG/DL   MAGNESIUM    Collection Time: 02/10/21  2:00 AM   Result Value Ref Range    Magnesium 1.5 (L) 1.6 - 2.4 mg/dL   GLUCOSE, POC    Collection Time: 02/10/21  5:31 AM   Result Value Ref Range    Glucose (POC) 77 65 - 100 mg/dL    Performed by Kale Swartz, POC    Collection Time: 02/10/21 12:06 PM   Result Value Ref Range    Glucose (POC) 80 65 - 100 mg/dL    Performed by Jigar Jeffrey          Imaging:     Assessment:     Rayo Ball is a 53 y.o. male with past medical history of laryngeal carcinoma, emphysema, chronic pain and opioid dependence, generalized anxiety disorder, NIDDM II who is transferred to [facility] for surgical management of laryngeal carcinoma       Plan:       Acute on Chronic hypoxic hypercapnic respiratory failure  2/2 laryngeal carcinoma  S/P emergent tracheostomy with trach collar @[facility] 02/01  S/P laryngectomy and radial neck dissection @[facility] 02/08  -Stable on RA  -Keep n.p.o. - speech following  -Trach care per ENT  -Supportive care  -Appreciate ENT input    COPD   -Stable, not in acute exacerbation  -DuoNebs as needed    PEG Tube: Placed @[facility] 02/03  -Hold TF until constipation is resolved  -TF per nutrition  -NPO - speech following    Acute on Chronic constipation  -Suspect 2/2 inc opioid use w/ surgeries  -CTA Abd/Pelv (02/09) G-tube in place; g-tube and abd slightly displaced d/t fluid-filled mildly distended colon  -Start IV reglan to improve peristalsis (hypoactive BS)  -Continue scheduled dulcolax BID, MiraLAX and lactulose PRN  -Hold TF for now/IVF while held  -Consult GI    Generalized anxiety disorder with panic attacks  -Stable, continue hydroxyzine as needed    DVTppx: SCDs  Gippx: NA  Code Status: Full code  Diet: NPO; TF per nutrition  Activity: OOB to chair TID and PRN  Discharge: Plan to discharge home pending progress; ENT clearance; likely >48h         Signed By: Ingrid Presley NP     February 10, 2021

## 2021-02-10 NOTE — PROGRESS NOTES
NUTRITION brief         Recommendations/Plan:   1. Resume 2CalHN 5 cans daily bolus feeds. @Noon - RN was giving 1/2 can of TF. RD gave verbal orders to then other 1/2 can in about 30-60 mins, if well tolerated, begin bolus feeds as written of 1 can 5x daily (6a, 10a, 2p, 6p & 10pm). 2. Replace low Mag and Phos - recheck and replace until stable for 3 days with feeding     3. Add Thiamine 100mg daily - done thank you    RD attended & discussed patient during interdisciplinary rounds on unit, KAILEY RN and visited with patient. Pt was ready to resume bolus feeding schedule. Pt had received 1 feeding yesterday morning and then TF was held to resolve constipation. Pt has had several BMs now. SLP notes reviewed. Pt should be allowed PO once cleared by surgeon. Pt is unable to aspirate with completed laryngectomy. TF will continue for now until PO is safe to resume.       Lab Results   Component Value Date/Time    Sodium 132 (L) 02/10/2021 02:00 AM    Potassium 3.6 02/10/2021 02:00 AM    Chloride 98 02/10/2021 02:00 AM    CO2 26 02/10/2021 02:00 AM     Magnesium   Date Value Ref Range Status   02/10/2021 1.5 (L) 1.6 - 2.4 mg/dL Final   02/02/2021 2.1 1.6 - 2.4 mg/dL Final     Lab Results   Component Value Date/Time    Calcium 8.4 (L) 02/10/2021 02:00 AM    Phosphorus 1.9 (L) 02/10/2021 02:00 AM       Jo Ann Lazar RD, MS  Contact via [employer]

## 2021-02-10 NOTE — PROGRESS NOTES
Bedside shift change report given to Kristin RN (oncoming nurse) by Lori Jewell RN (offgoing nurse). Report included the following information SBAR, Kardex, ED Summary, Procedure Summary, Intake/Output, MAR, Accordion, Recent Results, Cardiac Rhythm SR and Dual Neuro Assessment.

## 2021-02-10 NOTE — CONSULTS
[address]  [address]  [address] [place], [address]  [phone]                     GI CONSULTATION NOTE      NAME:  Romel Khan   :   1970   MRN:   [id number]     Consult Date: 2/10/2021     Chief Complaint: Constipation    History of Present Illness:  Patient is a 53 y.o. who is seen in consultation at the request of DAT Abad for the above problem. He has laryngeal carcinoma, presented to [facility] ER on  with complaints of increased shortness of breath and intense neck/throat pain for 2 days. He was found to be in hypoxic respiratory failure, was admitted and underwent an urgent tracheostomy. While there a PEG was placed on 2/3/21 by Dr. Deisy Quispe. Patient was transferred here for a laryngectomy and radical neck dissection, which was done on 21. He reports that he has been feeling constipated and bloated, particularly since having the surgery. With this, he was having pain at the PEG site as well. He denies nausea, vomiting, intolerance to feeding or meds. He has been passing gas, and this morning he had 3 large BMs and feels better. CT abd/pelvis 21:   Gastrostomy balloon appropriately positioned in the gastric lumen, with a decompressed stomach. The stomach and gastrostomy tube are slightly displaced laterally and compressed by a fluid-filled mildly distended transverse colon. Fluid-filled mildly distended colon from the cecum to the sigmoid colon, without a transition suspected.       PMH:  Past Medical History:   Diagnosis Date    Chronic pain     THROAT, EARS, NECK R/T CANCER    COPD (chronic obstructive pulmonary disease) (HCC)     EMPHASEMA    Psychiatric disorder     ANXIETY R/T CANCER    Throat cancer ([facility])        PSH:  Past Surgical History:   Procedure Laterality Date    HX ORTHOPAEDIC      LEFT ARM- \" PUT PLATE IN\"       Allergies:  No Known Allergies    Home Medications:  Prior to Admission Medications   Prescriptions Last Dose Informant Patient Reported? Taking? HYDROCODONE-ACETAMINOPHEN PO   Yes No   Sig: Take  by mouth. 1 TSP QID   albuterol (PROVENTIL HFA, VENTOLIN HFA, PROAIR HFA) 90 mcg/actuation inhaler   No No   Sig: Take 3 Puffs by inhalation every eight (8) hours. Indications: chronic obstructive pulmonary disease   aluminum-magnesium hydroxide 200-200 mg/5 mL susp 5 mL, diphenhydrAMINE 12.5 mg/5 mL liqd 5 mL, lidocaine 2 % soln 5 mL   Yes No   Si mL by Swish and Spit route two (2) times a day. Magic mouth wash   Maalox  Lidocaine 2% viscous   Diphenhydramine oral solution     Pharmacy to mix equal portions of ingredients to a total volume as indicated in the dispense amount. hydrOXYzine HCL (ATARAX) 50 mg tablet   No No   Sig: Take 1 Tab by mouth every eight to twelve (8-12) hours as needed for Anxiety or Sleep for up to 40 days. Indications: Pt has throat cancer and anxiety. metFORMIN (GLUCOPHAGE) 500 mg tablet   Yes No   Sig: Take  by mouth two (2) times daily (with meals).       Facility-Administered Medications: None       Hospital Medications:  Current Facility-Administered Medications   Medication Dose Route Frequency    phenol throat spray (CHLORASEPTIC) 1 Spray  1 Spray Oral PRN    polyethylene glycol (MIRALAX) packet 17 g  17 g Per G Tube TID    glucose chewable tablet 16 g  4 Tab Oral PRN    dextrose (D50W) injection syrg 12.5-25 g  25-50 mL IntraVENous PRN    glucagon (GLUCAGEN) injection 1 mg  1 mg IntraMUSCular PRN    insulin lispro (HUMALOG) injection   SubCUTAneous Q6H    thiamine HCL (B-1) tablet 100 mg  100 mg Per G Tube DAILY    metoclopramide HCl (REGLAN) injection 10 mg  10 mg IntraVENous Q6H    HYDROcodone-acetaminophen (HYCET) 0.5-21.7 mg/mL oral solution 10 mg  10 mg PEG Tube Q6H PRN    HYDROcodone-acetaminophen (HYCET) 0.5-21.7 mg/mL oral solution 5 mg  5 mg Oral Q6H PRN    hydrOXYzine (ATARAX) 10 mg/5 mL syrup 50 mg  50 mg Per G Tube Q6H PRN    sodium chloride (NS) flush 5-40 mL  5-40 mL IntraVENous Q8H   • sodium chloride (NS) flush 5-40 mL  5-40 mL IntraVENous PRN   • acetaminophen (TYLENOL) solution 650 mg  650 mg Per G Tube Q4H PRN   • morphine injection 2 mg  2 mg IntraVENous Q4H PRN   • ondansetron (ZOFRAN) injection 4 mg  4 mg IntraVENous Q4H PRN   • docusate (COLACE) 50 mg/5 mL oral liquid 200 mg  200 mg Per G Tube DAILY   • sodium chloride (NS) flush 5-40 mL  5-40 mL IntraVENous PRN   • sodium chloride (NS) flush 5-40 mL  5-40 mL IntraVENous Q8H   • sodium chloride (NS) flush 5-40 mL  5-40 mL IntraVENous PRN   • 0.9% sodium chloride infusion  75 mL/hr IntraVENous CONTINUOUS       Social History:  Social History     Tobacco Use   • Smoking status: Former Smoker     Packs/day: 1.50     Years: 30.00     Pack years: 45.00     Quit date: 10/28/2020     Years since quittin.2   • Smokeless tobacco: Never Used   Substance Use Topics   • Alcohol use: Not Currently       Family History:  Family History   Problem Relation Age of Onset   • Diabetes Mother    • Diabetes Father    • Kidney Disease Father    • Diabetes Sister    • Heart Disease Daughter    • Anesth Problems Neg Hx        Review of Systems:  Pertinents per HPI      Objective:     Patient Vitals for the past 8 hrs:   BP Temp Pulse Resp SpO2   02/10/21 1029 111/73 98.9 °F (37.2 °C) (!) 106 18 95 %   02/10/21 0615 118/75 98.8 °F (37.1 °C) 96 17 96 %     02/10 0701 - 02/10 1900  In: -   Out: 700 [Urine:700]  1901 - 02/10 0700  In: -   Out: 375 [Urine:350; Drains:25]      PHYSICAL EXAM:  General appearance: cooperative, no distress, appears stated age, thin, seems comfortable sitting in bed  Skin: Extremities and face reveal no rashes. No rodgers erythema.   HEENT: Sclerae anicteric. Extra-occular muscles are intact.  Trach with Passy-Jefferson valve   Cardiovascular: Regular rate and rhythm. No murmurs, gallops, or rubs.   Respiratory: Comfortable breathing with no accessory muscle use. Clear breath sounds with no wheezes,  rales, or rhonchi. GI: Abdomen nondistended, soft, and nontender. Normal active bowel sounds. Rectal: Deferred   Musculoskeletal: No edema of the lower legs. Neurological: Gross memory appears intact. Patient is alert and oriented. Communicates with a whiteboard  Psychiatric: Mood appears appropriate with good judgement. No anxiety or agitation. Data Review     Recent Labs     02/10/21  0200 02/09/21  0420   WBC 7.0 8.2   HGB 10.6* 11.2*   HCT 31.5* 33.8*    388     Recent Labs     02/10/21  0200 02/09/21  0420   * 134*   K 3.6 3.7   CL 98 96*   CO2 26 27   BUN 8 12   CREA 0.41* 0.64*   GLU 80 92   PHOS 1.9*  --    CA 8.4* 8.8     Recent Labs     02/10/21  0200 02/09/21  0420   AP 73 77   TP 6.5 6.9   ALB 2.4* 2.7*   GLOB 4.1* 4.2*     No results for input(s): INR, PTP, APTT, INREXT in the last 72 hours.      Imaging studies reviewed    Assessment / Plan :     Acute constipation, colonic distention  - Most c/w post-op ileus (laryngectomy 2/8/21)  - Resolving, had 3 large BMs this morning  - Continue Colace, start Miralax TID via PEG  - Stop lactulose as it can increase gassiness  - TFs can be held if he regresses, defer to hospitalist     Laryngeal cancer  - Post tracheostomy with Passy-Artemus valve placement  - Post PEG tube 2/3/21 (Dr. Lakshmi Cueva) -- in proper position on CT  - Post laryngectomy 2/8/21      Patient C/Maria Jose David Aggarwal 1106 Problem List:   Principal Problem:    Laryngeal carcinoma ([facility] [address] (2/7/2021)

## 2021-02-10 NOTE — PROGRESS NOTES
SPEECH LANGUAGE PATHOLOGY TREATMENT  Patient: Natividad Rob (32 y.o. male)  Date: 2/10/2021  Diagnosis: Laryngeal carcinoma ([facility] [address] [C32.9] Laryngeal carcinoma ([facility] [address]  Procedure(s) (LRB):  TOTAL LARYNGECTOMY, (N/A) 2 Days Post-Op    ASSESSMENT:  Laryngectomy education initiated. Discussed pre- and post-laryngectomy anatomical changes, communication strategies, HMEs, larytubes, and swallowing s/p laryngectomy. Patient expressed understanding. Of note, SLP will not be following patient for swallowing, as pt swallowing is addressed by ENT who completed laryngectomy. Of note, Dr. Swain typically completes an esophagram when he has determined patient is appropriate to check for a fistula. Otherwise, pt is unable to aspirate given his new anatomy. Therefore, will continue to defer to Dr. Swain regarding swallowing. Additionally, pt inquiring as to tracheoesopahgeal prothesis placement (TEP). Message left with Dr. Mireles Setting nurse so that Dr. Evelin Fitch can address with patient. PLAN:  Patient continues to benefit from skilled intervention to address the above impairments. Continue treatment per established plan of care. Recommendations and Planned Interventions:  --continue laryngectomy education  --all paperwork already completed by pt's outpatient SLP, Rosa Mishra. Discharge Recommendations:  Outpatient     SUBJECTIVE:   Patient stated, \"Thank you.     OBJECTIVE:   Mental Status:  Neurologic State: Alert  Orientation Level: Appropriate for age, Oriented X4  Cognition: Follows commands, Appropriate for age attention/concentration, Appropriate safety awareness           Treatment & Interventions:  Past Medical History:   Diagnosis Date    Chronic pain     THROAT, EARS, NECK R/T CANCER    COPD (chronic obstructive pulmonary disease) ([address]     EMPHASEMA    Psychiatric disorder     ANXIETY R/T CANCER    Throat cancer (HCC)      Past Surgical History:   Procedure Laterality Date    HX ORTHOPAEDIC      LEFT ARM- \" PUT PLATE IN\"     Stoma site appearance: pink  Secretions appear: blood tinged and thick    Home Situation  Home Environment: Private residence  # Steps to Enter: 15  Rails to Enter: Yes  One/Two Story Residence: Two story  # of Interior Steps: 12  Interior Rails: Both  Living Alone: No  Support Systems: Spouse/Significant Other/Partner, Friends \ neighbors  Patient Expects to be Discharged to[de-identified] Private residence  Current DME Used/Available at Home: Shower chair  Tub or Shower Type: Tub/Shower combination  Mental Status:  Neurologic State: Alert  Orientation Level: Appropriate for age, Oriented X4  Cognition: Follows commands, Appropriate for age attention/concentration, Appropriate safety awareness           Laryngectomy Education  --Discussed pre- and post-laryngectomy anatomical changes          --Patient's only airway is through stoma in neck  --Explained HMEs and Larytubes          --Discarding HMEs at least 24 hours and not to get HME wet          --Cleaning larytubes at least every 24 hours   --Demonstrated larytube cleaning and HME changing  --Discussed communication strategies including:          --Esophageal speech          --Electrolarynx (both flush to skin and with oral adapter)          --Augmentative/Alternative Communication (communication board, boogie board, white board, etc.)          --Tracheoesophageal prosthesis (explained that the ability to do this is determined by MD)  --Discussed swallowing with laryngectomy  --Explained that pt not longer able to aspirate unless he has a tracheoesophageal fistula, a leaking   TEP, or liquid/solids placed directly into stoma  --Discussed that barium swallow will need to be completed prior to diet initiation. Explained that the timing/diet initiation is determined by ENT.         Pain:  Pain Scale 1: Numeric (0 - 10)  Pain Intensity 1: 4  Pain Location 1: Abdomen    After treatment:   Call bell within reach and Nursing notified    COMMUNICATION/EDUCATION:     The patient's plan of care including recommendations, planned interventions, and recommended diet changes were discussed with: Registered nurse and Physician.      MARY Phillips  Time Calculation: 25 mins

## 2021-02-11 LAB
ALBUMIN SERPL-MCNC: 2.4 G/DL (ref 3.5–5)
ALBUMIN/GLOB SERPL: 0.6 {RATIO} (ref 1.1–2.2)
ALP SERPL-CCNC: 71 U/L (ref 45–117)
ALT SERPL-CCNC: 24 U/L (ref 12–78)
ANION GAP SERPL CALC-SCNC: 9 MMOL/L (ref 5–15)
AST SERPL-CCNC: 52 U/L (ref 15–37)
BASOPHILS # BLD: 0 K/UL (ref 0–0.1)
BASOPHILS NFR BLD: 0 % (ref 0–1)
BILIRUB SERPL-MCNC: 0.2 MG/DL (ref 0.2–1)
BUN SERPL-MCNC: 6 MG/DL (ref 6–20)
BUN/CREAT SERPL: 16 (ref 12–20)
CALCIUM SERPL-MCNC: 8.9 MG/DL (ref 8.5–10.1)
CHLORIDE SERPL-SCNC: 96 MMOL/L (ref 97–108)
CO2 SERPL-SCNC: 27 MMOL/L (ref 21–32)
CREAT SERPL-MCNC: 0.37 MG/DL (ref 0.7–1.3)
DIFFERENTIAL METHOD BLD: ABNORMAL
EOSINOPHIL # BLD: 0 K/UL (ref 0–0.4)
EOSINOPHIL NFR BLD: 0 % (ref 0–7)
ERYTHROCYTE [DISTWIDTH] IN BLOOD BY AUTOMATED COUNT: 12.7 % (ref 11.5–14.5)
GLOBULIN SER CALC-MCNC: 4.3 G/DL (ref 2–4)
GLUCOSE BLD STRIP.AUTO-MCNC: 83 MG/DL (ref 65–100)
GLUCOSE BLD STRIP.AUTO-MCNC: 90 MG/DL (ref 65–100)
GLUCOSE BLD STRIP.AUTO-MCNC: 96 MG/DL (ref 65–100)
GLUCOSE BLD STRIP.AUTO-MCNC: 97 MG/DL (ref 65–100)
GLUCOSE SERPL-MCNC: 85 MG/DL (ref 65–100)
HCT VFR BLD AUTO: 31 % (ref 36.6–50.3)
HGB BLD-MCNC: 10.3 G/DL (ref 12.1–17)
IMM GRANULOCYTES # BLD AUTO: 0 K/UL (ref 0–0.04)
IMM GRANULOCYTES NFR BLD AUTO: 0 % (ref 0–0.5)
LYMPHOCYTES # BLD: 0.7 K/UL (ref 0.8–3.5)
LYMPHOCYTES NFR BLD: 12 % (ref 12–49)
MAGNESIUM SERPL-MCNC: 1.8 MG/DL (ref 1.6–2.4)
MCH RBC QN AUTO: 31.7 PG (ref 26–34)
MCHC RBC AUTO-ENTMCNC: 33.2 G/DL (ref 30–36.5)
MCV RBC AUTO: 95.4 FL (ref 80–99)
MONOCYTES # BLD: 0.6 K/UL (ref 0–1)
MONOCYTES NFR BLD: 11 % (ref 5–13)
NEUTS SEG # BLD: 4.3 K/UL (ref 1.8–8)
NEUTS SEG NFR BLD: 77 % (ref 32–75)
NRBC # BLD: 0 K/UL (ref 0–0.01)
NRBC BLD-RTO: 0 PER 100 WBC
PHOSPHATE SERPL-MCNC: 2.1 MG/DL (ref 2.6–4.7)
PLATELET # BLD AUTO: 345 K/UL (ref 150–400)
PLATELET COMMENTS,PCOM: ABNORMAL
PMV BLD AUTO: 10.3 FL (ref 8.9–12.9)
POTASSIUM SERPL-SCNC: 3.4 MMOL/L (ref 3.5–5.1)
PROT SERPL-MCNC: 6.7 G/DL (ref 6.4–8.2)
RBC # BLD AUTO: 3.25 M/UL (ref 4.1–5.7)
RBC MORPH BLD: ABNORMAL
SERVICE CMNT-IMP: NORMAL
SODIUM SERPL-SCNC: 132 MMOL/L (ref 136–145)
WBC # BLD AUTO: 5.6 K/UL (ref 4.1–11.1)

## 2021-02-11 PROCEDURE — 97535 SELF CARE MNGMENT TRAINING: CPT

## 2021-02-11 PROCEDURE — 74011250637 HC RX REV CODE- 250/637: Performed by: FAMILY MEDICINE

## 2021-02-11 PROCEDURE — 65660000001 HC RM ICU INTERMED STEPDOWN

## 2021-02-11 PROCEDURE — 83735 ASSAY OF MAGNESIUM: CPT

## 2021-02-11 PROCEDURE — 92507 TX SP LANG VOICE COMM INDIV: CPT

## 2021-02-11 PROCEDURE — 85025 COMPLETE CBC W/AUTO DIFF WBC: CPT

## 2021-02-11 PROCEDURE — 80053 COMPREHEN METABOLIC PANEL: CPT

## 2021-02-11 PROCEDURE — 84100 ASSAY OF PHOSPHORUS: CPT

## 2021-02-11 PROCEDURE — 74011250636 HC RX REV CODE- 250/636: Performed by: OTOLARYNGOLOGY

## 2021-02-11 PROCEDURE — 36415 COLL VENOUS BLD VENIPUNCTURE: CPT

## 2021-02-11 PROCEDURE — 82962 GLUCOSE BLOOD TEST: CPT

## 2021-02-11 RX ORDER — POLYETHYLENE GLYCOL 3350 17 G/17G
17 POWDER, FOR SOLUTION ORAL DAILY
Status: DISCONTINUED | OUTPATIENT
Start: 2021-02-12 | End: 2021-02-13

## 2021-02-11 RX ADMIN — Medication 10 ML: at 05:44

## 2021-02-11 RX ADMIN — Medication 100 MG: at 08:53

## 2021-02-11 RX ADMIN — Medication 10 ML: at 21:32

## 2021-02-11 RX ADMIN — Medication 10 ML: at 14:00

## 2021-02-11 RX ADMIN — MORPHINE SULFATE 2 MG: 2 INJECTION, SOLUTION INTRAMUSCULAR; INTRAVENOUS at 01:11

## 2021-02-11 NOTE — PROGRESS NOTES
Bedside shift change report given to Gayatri Domínguez RN and Cassandra Severance, RN (oncoming nurse) by Roxanne Curtis RN (offgoing nurse). Report included the following information SBAR, Kardex, Intake/Output, MAR, Recent Results, Cardiac Rhythm NSR/ST and Dual Neuro Assessment.

## 2021-02-11 NOTE — PROGRESS NOTES
[facility].  [address] [place] [address] E Grant Epstein, [address]   [phone]                     GI PROGRESS NOTE    Patient Name: Shara Abdi      : 1970      MRN: [id number]  Admit Date: 2021  Today's Date: 2021    Subjective:     He nods and shakes head and gives thumbs up to answer. He reports having multiple BMs yesterday, all loose to watery. He no longer has abdominal distention or discomfort. Objective:     Blood pressure 114/68, pulse 93, temperature 98.8 °F (37.1 °C), resp. rate 18, height 6' (1.829 m), weight 67.5 kg (148 lb 13 oz), SpO2 97 %. Physical Exam:  General appearance: cooperative, no distress, seems comfortable sitting in bed - undergoing SLP eval  HEENT:  Trach with Passy-Blanca valve   GI: Abdomen nondistended. Musculoskeletal: No edema of the lower legs. Neurological:  Patient is alert and oriented. Psychiatric: No anxiety or agitation.     Data Review:    Recent Results (from the past 24 hour(s))   GLUCOSE, POC    Collection Time: 02/10/21 12:06 PM   Result Value Ref Range    Glucose (POC) 80 65 - 100 mg/dL    Performed by Sarah Cummings    GLUCOSE, POC    Collection Time: 02/10/21  6:17 PM   Result Value Ref Range    Glucose (POC) 93 65 - 100 mg/dL    Performed by More Nunes    GLUCOSE, POC    Collection Time: 02/10/21 11:40 PM   Result Value Ref Range    Glucose (POC) 133 (H) 65 - 100 mg/dL    Performed by Nathan Chavis    MAGNESIUM    Collection Time: 21  5:24 AM   Result Value Ref Range    Magnesium 1.8 1.6 - 2.4 mg/dL   PHOSPHORUS    Collection Time: 21  5:24 AM   Result Value Ref Range    Phosphorus 2.1 (L) 2.6 - 4.7 MG/DL   METABOLIC PANEL, COMPREHENSIVE    Collection Time: 21  5:26 AM   Result Value Ref Range    Sodium 132 (L) 136 - 145 mmol/L    Potassium 3.4 (L) 3.5 - 5.1 mmol/L    Chloride 96 (L) 97 - 108 mmol/L    CO2 27 21 - 32 mmol/L    Anion gap 9 5 - 15 mmol/L    Glucose 85 65 - 100 mg/dL    BUN 6 6 - 20 MG/DL Creatinine 0.37 (L) 0.70 - 1.30 MG/DL    BUN/Creatinine ratio 16 12 - 20      GFR est AA >60 >60 ml/min/1.73m2    GFR est non-AA >60 >60 ml/min/1.73m2    Calcium 8.9 8.5 - 10.1 MG/DL    Bilirubin, total 0.2 0.2 - 1.0 MG/DL    ALT (SGPT) 24 12 - 78 U/L    AST (SGOT) 52 (H) 15 - 37 U/L    Alk. phosphatase 71 45 - 117 U/L    Protein, total 6.7 6.4 - 8.2 g/dL    Albumin 2.4 (L) 3.5 - 5.0 g/dL    Globulin 4.3 (H) 2.0 - 4.0 g/dL    A-G Ratio 0.6 (L) 1.1 - 2.2     CBC WITH AUTOMATED DIFF    Collection Time: 02/11/21  5:26 AM   Result Value Ref Range    WBC 5.6 4.1 - 11.1 K/uL    RBC 3.25 (L) 4.10 - 5.70 M/uL    HGB 10.3 (L) 12.1 - 17.0 g/dL    HCT 31.0 (L) 36.6 - 50.3 %    MCV 95.4 80.0 - 99.0 FL    MCH 31.7 26.0 - 34.0 PG    MCHC 33.2 30.0 - 36.5 g/dL    RDW 12.7 11.5 - 14.5 %    PLATELET 730 892 - 222 K/uL    MPV 10.3 8.9 - 12.9 FL    NRBC 0.0 0  WBC    ABSOLUTE NRBC 0.00 0.00 - 0.01 K/uL    NEUTROPHILS 77 (H) 32 - 75 %    LYMPHOCYTES 12 12 - 49 %    MONOCYTES 11 5 - 13 %    EOSINOPHILS 0 0 - 7 %    BASOPHILS 0 0 - 1 %    IMMATURE GRANULOCYTES 0 0.0 - 0.5 %    ABS. NEUTROPHILS 4.3 1.8 - 8.0 K/UL    ABS. LYMPHOCYTES 0.7 (L) 0.8 - 3.5 K/UL    ABS. MONOCYTES 0.6 0.0 - 1.0 K/UL    ABS. EOSINOPHILS 0.0 0.0 - 0.4 K/UL    ABS. BASOPHILS 0.0 0.0 - 0.1 K/UL    ABS. IMM.  GRANS. 0.0 0.00 - 0.04 K/UL    DF SMEAR SCANNED      PLATELET COMMENTS Large Platelets      RBC COMMENTS NORMOCYTIC, NORMOCHROMIC     GLUCOSE, POC    Collection Time: 02/11/21  5:26 AM   Result Value Ref Range    Glucose (POC) 96 65 - 100 mg/dL    Performed by Everardo Lopez        Assessment / Plan :     Acute constipation, colonic distention - RESOLVED  - Most c/w post-op ileus (laryngectomy 2/8/21)  - Had 7 large BMs yesterday, diarrhea  - Continue Colace  - Decreased Miralax to once/day this am (was TID yd) - can d/c if diarrhea continues    GI is signing off, defer further management to hospitalist.  Please call if GI is needed again prior to discharge.      Laryngeal cancer  - Post tracheostomy with Passy-Curlew valve placement  - Post PEG tube 2/3/21 (Dr. Tasia Cuevas) -- in proper position on CT  - Post laryngectomy 2/8/21      Patient Active Hospital Problem List:   Principal Problem:    Laryngeal carcinoma ([facility] [address] (2/7/2021)

## 2021-02-11 NOTE — PROGRESS NOTES
[facility]  Hospitalist Group                                                                                          Hospitalist Progress Note  Cirilo Frankel MD  Answering service: [phone] OR [phone] from in house phone        Date of Service:  2021  NAME:  Nirali Anthony  :  1970  MRN:  [id number]      Admission Summary:   Nirali Anthony is a 53 y.o. male with past medical history of laryngeal carcinoma, emphysema, chronic pain and opioid dependence, generalized anxiety disorder, NIDDM II who presented to [facility] regional ER on  with complaints of increased shortness of breath and intense neck/throat pain for 2 days. Interval history / Subjective:   Doing well. Had multiple bowel movements, and now complaining of diarrhea.    Otherwise no complaints of pain     Assessment & Plan:     Acute on Chronic hypoxic hypercapnic respiratory failure  2/2 laryngeal carcinoma  S/P emergent tracheostomy with trach collar @[facility]   S/P laryngectomy and radial neck dissection @[facility]   -Stable on RA  -Keep n.p.o. - speech following  -Trach care per ENT  -Supportive care  -Appreciate ENT input, drains in place with minimal output  - ?discharge planning, when would be ok with ENT, call made to Dr. Swain      Dysphmarcia s/p PEG Tube: Placed @[facility]   - Resume tube feeding  - NPO for now, and will likely need swallow eval soon (?can be done outpatient)  - Will need o/p speech therapy on DC    COPD   -Stable, not in acute exacerbation  -DuoNebs as needed      Constipation  - Hold all miralax, senna, etc. Today and resume tomorrow  - DC Reglan  - GI now signed off   - Increase bowel regimen as needed     Generalized anxiety disorder with panic attacks  -Stable, continue hydroxyzine as needed     Code status: FULL  DVT prophylaxis: SCDs    Care Plan discussed with: Patient/Family  Anticipated Disposition: Home w/Family  Anticipated Discharge: 24 hours to 48 hours, awaiting recommendations from ENT     Hospital Problems  Never Reviewed          Codes Class Noted POA    * (Principal) Laryngeal carcinoma ([facility] [address] ICD-10-CM: C32.9  ICD-9-CM: 161.9  2/7/2021 Yes                Review of Systems:   A comprehensive review of systems was negative except for that written in the HPI. Vital Signs:    Last 24hrs VS reviewed since prior progress note. Most recent are:  Visit Vitals  /62 (BP 1 Location: Right upper arm, BP Patient Position: Sitting)   Pulse 85   Temp 98 °F (36.7 °C)   Resp 16   Ht 6' (1.829 m)   Wt 67.5 kg (148 lb 13 oz)   SpO2 97%   BMI 20.18 kg/m²         Intake/Output Summary (Last 24 hours) at 2/11/2021 1554  Last data filed at 2/10/2021 2230  Gross per 24 hour   Intake 360 ml   Output 320 ml   Net 40 ml        Physical Examination:     I had a face to face encounter with this patient and independently examined them on 2/11/2021 as outlined below:          Constitutional:  No acute distress, cooperative, pleasant, cachectic    ENT:  Oral mucosa moist, oropharynx benign. Trach with AP drain in place   Resp:  CTA bilaterally. No wheezing/rhonchi/rales. No accessory muscle use   CV:  Regular rhythm, normal rate, no murmurs, gallops, rubs    GI:  Soft, non distended, non tender. normoactive bowel sounds, no hepatosplenomegaly, + PEG in place     Musculoskeletal:  No edema, warm, 2+ pulses throughout    Neurologic:  Moves all extremities.   AAOx3, CN II-XII reviewed     Skin:  Good turgor, no rashes or ulcers       Data Review:    Review and/or order of clinical lab test  Review and/or order of tests in the radiology section of CPT  Review and/or order of tests in the medicine section of CPT    Labs:     Recent Labs     02/11/21  0526 02/10/21  0200   WBC 5.6 7.0   HGB 10.3* 10.6*   HCT 31.0* 31.5*    333     Recent Labs     02/11/21  0526 02/11/21  0524 02/10/21  0200 02/09/21  0420   *  --  132* 134*   K 3.4*  --  3.6 3.7   CL 96*  --  98 96*   CO2 27  --  26 27   BUN 6  --  8 12   CREA 0.37*  --  0.41* 0.64*   GLU 85  --  80 92   CA 8.9  --  8.4* 8.8   MG  --  1.8 1.5*  --    PHOS  --  2.1* 1.9*  --      Recent Labs     02/11/21  0526 02/10/21  0200 02/09/21  0420   ALT 24 13 16   AP 71 73 77   TBILI 0.2 0.4 0.4   TP 6.7 6.5 6.9   ALB 2.4* 2.4* 2.7*   GLOB 4.3* 4.1* 4.2*     No results for input(s): INR, PTP, APTT, INREXT in the last 72 hours. No results for input(s): FE, TIBC, PSAT, FERR in the last 72 hours. No results found for: FOL, RBCF   No results for input(s): PH, PCO2, PO2 in the last 72 hours. No results for input(s): CPK, CKNDX, TROIQ in the last 72 hours.     No lab exists for component: CPKMB  No results found for: CHOL, CHOLX, CHLST, CHOLV, HDL, HDLP, LDL, LDLC, DLDLP, TGLX, TRIGL, TRIGP, CHHD, CHHDX  Lab Results   Component Value Date/Time    Glucose (POC) 97 02/11/2021 11:47 AM    Glucose (POC) 96 02/11/2021 05:26 AM    Glucose (POC) 133 (H) 02/10/2021 11:40 PM    Glucose (POC) 93 02/10/2021 06:17 PM    Glucose (POC) 80 02/10/2021 12:06 PM     No results found for: COLOR, APPRN, SPGRU, REFSG, NATALYA, PROTU, GLUCU, KETU, BILU, UROU, CHARISSA, LEUKU, GLUKE, EPSU, BACTU, WBCU, RBCU, CASTS, UCRY      Medications Reviewed:     Current Facility-Administered Medications   Medication Dose Route Frequency    [Held by provider] polyethylene glycol (MIRALAX) packet 17 g  17 g Per G Tube DAILY    phenol throat spray (CHLORASEPTIC) 1 Spray  1 Spray Oral PRN    glucose chewable tablet 16 g  4 Tab Oral PRN    dextrose (D50W) injection syrg 12.5-25 g  25-50 mL IntraVENous PRN    glucagon (GLUCAGEN) injection 1 mg  1 mg IntraMUSCular PRN    insulin lispro (HUMALOG) injection   SubCUTAneous Q6H    thiamine HCL (B-1) tablet 100 mg  100 mg Per G Tube DAILY    HYDROcodone-acetaminophen (HYCET) 0.5-21.7 mg/mL oral solution 10 mg  10 mg PEG Tube Q6H PRN    HYDROcodone-acetaminophen (HYCET) 0.5-21.7 mg/mL oral solution 5 mg  5 mg Oral Q6H PRN    hydrOXYzine (ATARAX) 10 mg/5 mL syrup 50 mg  50 mg Per G Tube Q6H PRN    sodium chloride (NS) flush 5-40 mL  5-40 mL IntraVENous Q8H    sodium chloride (NS) flush 5-40 mL  5-40 mL IntraVENous PRN    acetaminophen (TYLENOL) solution 650 mg  650 mg Per G Tube Q4H PRN    morphine injection 2 mg  2 mg IntraVENous Q4H PRN    ondansetron (ZOFRAN) injection 4 mg  4 mg IntraVENous Q4H PRN    [Held by provider] docusate (COLACE) 50 mg/5 mL oral liquid 200 mg  200 mg Per G Tube DAILY    sodium chloride (NS) flush 5-40 mL  5-40 mL IntraVENous PRN    sodium chloride (NS) flush 5-40 mL  5-40 mL IntraVENous Q8H    sodium chloride (NS) flush 5-40 mL  5-40 mL IntraVENous PRN     ______________________________________________________________________  EXPECTED LENGTH OF STAY: 11d 0h  ACTUAL LENGTH OF STAY:          4                 Sonam Moise MD

## 2021-02-11 NOTE — PROGRESS NOTES
Problem: Laryngectomy (Adult)  Goal: *Acute Goals and Plan of Care (Insert Text)  Description: Speech Therapy Goals  Initiated 2/11/2021    1. Patient will participate in TLx education within 7 days. Outcome: Progressing Towards Goal     SPEECH LANGUAGE PATHOLOGY TREATMENT  Patient: Ngozi Montez (89 y.o. male)  Date: 2/11/2021  Diagnosis: Laryngeal carcinoma ([facility] [address] [C32.9] Laryngeal carcinoma ([address]  Procedure(s) (LRB):  TOTAL LARYNGECTOMY, (N/A) 3 Days Post-Op    ASSESSMENT:  Laryngectomy education initiated. Discussed larytube cleaning and electrolarynx. Patient expressed understanding. Of note, please ensure larytube cleaning is being completed Qshift. SLP has been completing each morning, if another practitioner can please ensure cleaning on night shift. PLAN:  Patient continues to benefit from skilled intervention to address the above impairments. Continue treatment per established plan of care. Recommendations and Planned Interventions:  --continue laryngectomy education      Discharge Recommendations:  Outpatient     SUBJECTIVE:   Patient stated, \"Hello how are you today?  with electrolarynx with oral adapter.     OBJECTIVE:   Mental Status:  Neurologic State: Alert  Orientation Level: Oriented X4  Cognition: Follows commands           Treatment & Interventions:  Past Medical History:   Diagnosis Date    Chronic pain     THROAT, EARS, NECK R/T CANCER    COPD (chronic obstructive pulmonary disease) ([address]     EMPHASEMA    Psychiatric disorder     ANXIETY R/T CANCER    Throat cancer (HCC)      Past Surgical History:   Procedure Laterality Date    HX ORTHOPAEDIC      LEFT ARM- \" PUT PLATE IN\"       Home Situation  Home Environment: Private residence  # Steps to Enter: 13  Rails to Enter: Yes  One/Two Story Residence: Two story  # of Interior Steps: 12  Interior Rails: Both  Living Alone: No  Support Systems: Spouse/Significant Other/Partner, Friends \ neighbors  Patient Expects to be Discharged to[de-identified] Private residence  Current DME Used/Available at Home: Shower chair  Tub or Shower Type: Tub/Shower combination  Mental Status:  Neurologic State: Alert  Orientation Level: Oriented X4  Cognition: Follows commands           Laryngectomy Education    --Explained HMEs and Larytubes          --Discarding HMEs at least 24 hours and not to get HME wet          --Cleaning larytubes at least every 24 hours   --Demonstrated larytube cleaning and HME changing          --Patient able to complete larytube cleaning independently: no           --Patient able to complete HME change independently: no   --Discussed communication strategies including:          --Electrolarynx (both flush to skin and with oral adapter)        Pain:  Pain Scale 1: Numeric (0 - 10)  Pain Intensity 1: 0       After treatment:   Call bell within reach and Nursing notified    COMMUNICATION/EDUCATION:     The patient's plan of care including recommendations, planned interventions, and recommended diet changes were discussed with: Registered nurse.      Lilia Kern, SLP  Time Calculation: 20 mins

## 2021-02-11 NOTE — PROGRESS NOTES
I agree with care, assessment and charting on this patient by Filomena Gaston RN. Bedside and Verbal shift change report given to MADYSON Foster (oncoming nurse) by Gabby Senior RN (offgoing nurse). Report included the following information SBAR, Kardex, OR Summary, Procedure Summary, Intake/Output, MAR, Recent Results and Cardiac Rhythm NSR.

## 2021-02-11 NOTE — PROGRESS NOTES
Transition of Care Plan   RUR- Low  17%   DISPOSITION: home with assist from girlfriend   Transport: [address] Medicaid - [phone]    CM confirmed patient's transport for 2/12 at 1pm at hospital main entrance; trip # [id number]. A referral has been sent to [facility] via all scripts for tube feeding supplies evaluation. They will do teaching at beside if needed.      Corrine Adamson (Maximiliano Stewart) MARCIA Hunter, MSW Supervisee    Ligia Lowery MSW

## 2021-02-11 NOTE — PROGRESS NOTES
Bedside and Verbal shift change report given to [facility] (oncoming nurse) by Anita Leyva RN (offgoing nurse). Report included the following information SBAR, Kardex, MAR, Cardiac Rhythm NSR/ST and Dual Neuro Assessment.

## 2021-02-11 NOTE — PROGRESS NOTES
Otolaryngology, Head and Neck Surgery      Had several BM this am. Otherwise no issue.s     Recent Results (from the past 24 hour(s))   GLUCOSE, POC    Collection Time: 02/10/21 12:08 AM   Result Value Ref Range    Glucose (POC) 79 65 - 100 mg/dL    Performed by Katharine ANDERSON    METABOLIC PANEL, COMPREHENSIVE    Collection Time: 02/10/21  2:00 AM   Result Value Ref Range    Sodium 132 (L) 136 - 145 mmol/L    Potassium 3.6 3.5 - 5.1 mmol/L    Chloride 98 97 - 108 mmol/L    CO2 26 21 - 32 mmol/L    Anion gap 8 5 - 15 mmol/L    Glucose 80 65 - 100 mg/dL    BUN 8 6 - 20 MG/DL    Creatinine 0.41 (L) 0.70 - 1.30 MG/DL    BUN/Creatinine ratio 20 12 - 20      GFR est AA >60 >60 ml/min/1.73m2    GFR est non-AA >60 >60 ml/min/1.73m2    Calcium 8.4 (L) 8.5 - 10.1 MG/DL    Bilirubin, total 0.4 0.2 - 1.0 MG/DL    ALT (SGPT) 13 12 - 78 U/L    AST (SGOT) 27 15 - 37 U/L    Alk. phosphatase 73 45 - 117 U/L    Protein, total 6.5 6.4 - 8.2 g/dL    Albumin 2.4 (L) 3.5 - 5.0 g/dL    Globulin 4.1 (H) 2.0 - 4.0 g/dL    A-G Ratio 0.6 (L) 1.1 - 2.2     CBC WITH AUTOMATED DIFF    Collection Time: 02/10/21  2:00 AM   Result Value Ref Range    WBC 7.0 4.1 - 11.1 K/uL    RBC 3.27 (L) 4.10 - 5.70 M/uL    HGB 10.6 (L) 12.1 - 17.0 g/dL    HCT 31.5 (L) 36.6 - 50.3 %    MCV 96.3 80.0 - 99.0 FL    MCH 32.4 26.0 - 34.0 PG    MCHC 33.7 30.0 - 36.5 g/dL    RDW 12.7 11.5 - 14.5 %    PLATELET 098 072 - 978 K/uL    MPV 10.0 8.9 - 12.9 FL    NRBC 0.0 0  WBC    ABSOLUTE NRBC 0.00 0.00 - 0.01 K/uL    NEUTROPHILS 75 32 - 75 %    LYMPHOCYTES 13 12 - 49 %    MONOCYTES 12 5 - 13 %    EOSINOPHILS 0 0 - 7 %    BASOPHILS 0 0 - 1 %    IMMATURE GRANULOCYTES 0 0.0 - 0.5 %    ABS. NEUTROPHILS 5.2 1.8 - 8.0 K/UL    ABS. LYMPHOCYTES 0.9 0.8 - 3.5 K/UL    ABS. MONOCYTES 0.8 0.0 - 1.0 K/UL    ABS. EOSINOPHILS 0.0 0.0 - 0.4 K/UL    ABS. BASOPHILS 0.0 0.0 - 0.1 K/UL    ABS. IMM.  GRANS. 0.0 0.00 - 0.04 K/UL    DF AUTOMATED     PHOSPHORUS    Collection Time: 02/10/21 2:00 AM   Result Value Ref Range    Phosphorus 1.9 (L) 2.6 - 4.7 MG/DL   MAGNESIUM    Collection Time: 02/10/21  2:00 AM   Result Value Ref Range    Magnesium 1.5 (L) 1.6 - 2.4 mg/dL   GLUCOSE, POC    Collection Time: 02/10/21  5:31 AM   Result Value Ref Range    Glucose (POC) 77 65 - 100 mg/dL    Performed by [address], POC    Collection Time: 02/10/21 12:06 PM   Result Value Ref Range    Glucose (POC) 80 65 - 100 mg/dL    Performed by Pati Schaefer    GLUCOSE, POC    Collection Time: 02/10/21  6:17 PM   Result Value Ref Range    Glucose (POC) 93 65 - 100 mg/dL    Performed by Clarice Reading            Visit Vitals  /71 (BP 1 Location: Right upper arm, BP Patient Position: At rest)   Pulse (!) 102   Temp 99.1 °F (37.3 °C)   Resp 18   Ht 6' (1.829 m)   Wt 65.9 kg (145 lb 4.5 oz)   SpO2 97%   BMI 19.70 kg/m²       Intake/Output Summary (Last 24 hours) at 2/10/2021 2153  Last data filed at 2/10/2021 2150  Gross per 24 hour   Intake    Output 1245 ml   Net -1245 ml       The patient is a well developed, well nourished adult in no distress    Neck: incision intact, no swelling or increased erythema or induration. Stoma patent and clean. Drains serosanguinous      A:   Hospital Problems  Never Reviewed          Codes Class Noted POA    * (Principal) [facility] ICD-10-CM: C32.9  ICD-9-CM: 161.9  2/7/2021 Yes                  Post op day 2 from total laryngectomy.     Plan:  -d/c liu  -oob/ambulate  -stoma care and teaching  -cont NPO  -plan for swallow eval in several days

## 2021-02-11 NOTE — PROGRESS NOTES
Problem: Self Care Deficits Care Plan (Adult)  Goal: *Acute Goals and Plan of Care (Insert Text)  Description:   FUNCTIONAL STATUS PRIOR TO ADMISSION: Patient poor historian - Indicated he required minimal assistance for basic and instrumental ADL's prior to admission. HOME SUPPORT: The patient lived alone with girlfriend to provide assistance. Occupational Therapy Goals  Initiated 2/9/2021  1. Patient will perform lower body dressing with supervision/set-up within 7 day(s). 2.  Patient will perform simulated tub transfer with supervision/set-up within 7 day(s). 3.  Patient will perform toilet transfers with supervision/set-up within 7 day(s). 4.  Patient will perform all aspects of toileting with supervision/set-up within 7 day(s). 5.  Patient will participate in upper extremity therapeutic exercise/activities with supervision/set-up for 5 minutes within 7 day(s). 6.  Patient will utilize energy conservation techniques during functional activities with verbal cues within 7 day(s). Outcome: Resolved/Met       OCCUPATIONAL THERAPY TREATMENT/DISCHARGE  Patient: Severo Pippins (83 y.o. male)  Date: 2/11/2021  Diagnosis: Laryngeal carcinoma ([facility] [address] [C32.9] Laryngeal carcinoma ([facility] [address]  Procedure(s) (LRB):  TOTAL LARYNGECTOMY, (N/A) 3 Days Post-Op  Precautions:    Chart, occupational therapy assessment, plan of care, and goals were reviewed. ASSESSMENT  Patient continues with skilled OT services and has progressed towards goals. Patient able to complete all mobility and self-care tasks this session w/ no more than supervision. Educated on energy conservation and home safety w/ patient indicating understanding via gestural cues (some difficulty w/ use of electrolarynx during open-ended discussion ). Safe to d/c home w/ 24/7 supervision when medically appropriate - no further acute OT needs at this time. Current Level of Function (ADLs/self-care):  Up to supervision w/ mobility and self-care    Other factors to consider for discharge: None         PLAN :  Rationale for discharge: Goals achieved  Recommend with staff: Recommend with nursing, ADLs with supervision/setup, OOB to chair 3x/day and toileting via functional mobility to and from bathroom spv assist. Thank you for completing as able in order to maintain patient strength, endurance and independence. Recommendation for discharge: (in order for the patient to meet his/her long term goals)  No skilled occupational therapy/ follow up rehabilitation needs identified at this time. This discharge recommendation:  Has been made in collaboration with the attending provider and/or case management    IF patient discharges home will need the following DME:none       SUBJECTIVE:   Patient stated my name is Ngozi Montez.  using electrolarynx, multiple trials required for clarity    OBJECTIVE DATA SUMMARY:   Cognitive/Behavioral Status:  Neurologic State: Alert  Orientation Level: Oriented X4  Cognition: Appropriate safety awareness             Functional Mobility and Transfers for ADLs:  Bed Mobility:  Supine to Sit: Supervision  Sit to Supine: Supervision    Transfers:  Sit to Stand: Supervision  Functional Transfers  Bathroom Mobility: Supervision/set up  Toilet Transfer : Supervision  Tub Transfer: Supervision       ADL  Patient completed reaching task, retrieving item from high and low shelf w/ supervision. Good safety awareness, no LOB. Balance:  Sitting: Intact  Standing: Impaired  Standing - Static: Good  Standing - Dynamic : Good    Pain:  None noted    Activity Tolerance:   Good     Patient instructed and indicated understanding the benefits of maintaining activity tolerance, functional mobility, and independence with self care tasks during acute stay  to ensure safe return home and to baseline.  Encouraged patient to increase frequency and duration OOB, be out of bed for all meals, perform daily ADLs (as approved by RN/MD regarding bathing etc), and performing functional mobility to/from bathroom. Patient instructed and indicated understanding home modifications (ie raise height of ADL objects, appropriate chair heights, recliner safety), safety (ie change of floor surfaces, clear pathways), to increase independence and fall prevention. After treatment patient left in no apparent distress:   Supine in bed, Call bell within reach, and Side rails x 3    COMMUNICATION/COLLABORATION:   The patients plan of care was discussed with: Registered nurse. ELIAS Tolentino  Time Calculation: 15 mins     Regarding student involvement in patient care:  A student participated in this treatment session. Per CMS Medicare statements and AOTA guidelines I certify that the following was true:  1. I was present and directly observed the entire session. 2. I made all skilled judgments and clinical decisions regarding care. 3. I am the practitioner responsible for assessment, treatment, and documentation.

## 2021-02-11 NOTE — PROGRESS NOTES
Problem: Falls - Risk of  Goal: *Absence of Falls  Description: Document Rebecca Doan Fall Risk and appropriate interventions in the flowsheet. Outcome: Progressing Towards Goal  Note: Fall Risk Interventions:  Mobility Interventions: Communicate number of staff needed for ambulation/transfer, PT Consult for mobility concerns, PT Consult for assist device competence         Medication Interventions: Evaluate medications/consider consulting pharmacy, Patient to call before getting OOB, Teach patient to arise slowly    Elimination Interventions: Call light in reach              Problem: Patient Education: Go to Patient Education Activity  Goal: Patient/Family Education  Outcome: Progressing Towards Goal     Problem: Pressure Injury - Risk of  Goal: *Prevention of pressure injury  Description: Document Marin Scale and appropriate interventions in the flowsheet.   Outcome: Progressing Towards Goal  Note: Pressure Injury Interventions:  Sensory Interventions: Assess changes in LOC, Assess need for specialty bed, Discuss PT/OT consult with provider, Keep linens dry and wrinkle-free, Minimize linen layers    Moisture Interventions: Apply protective barrier, creams and emollients, Assess need for specialty bed, Internal/External urinary devices, Minimize layers    Activity Interventions: Assess need for specialty bed, Increase time out of bed, Pressure redistribution bed/mattress(bed type)    Mobility Interventions: Assess need for specialty bed, Float heels, HOB 30 degrees or less, Pressure redistribution bed/mattress (bed type)    Nutrition Interventions: Document food/fluid/supplement intake, Offer support with meals,snacks and hydration    Friction and Shear Interventions: Apply protective barrier, creams and emollients, HOB 30 degrees or less, Minimize layers                Problem: Patient Education: Go to Patient Education Activity  Goal: Patient/Family Education  Outcome: Progressing Towards Goal     Problem: Patient Education: Go to Patient Education Activity  Goal: Patient/Family Education  Outcome: Progressing Towards Goal     Problem: Patient Education: Go to Patient Education Activity  Goal: Patient/Family Education  Outcome: Progressing Towards Goal     Problem: Discharge Planning  Goal: *Discharge to safe environment  Outcome: Progressing Towards Goal  Goal: *Knowledge of medication management  Outcome: Progressing Towards Goal  Goal: *Knowledge of discharge instructions  Outcome: Progressing Towards Goal     Problem: Patient Education: Go to Patient Education Activity  Goal: Patient/Family Education  Outcome: Progressing Towards Goal     Problem: Nutrition Deficit  Goal: *Optimize nutritional status  Outcome: Progressing Towards Goal

## 2021-02-11 NOTE — PROGRESS NOTES
Nutrition Note    Home Tube feed regimen:    Bolus TwoCal HN- of 1 can (237 mL) 5x daily (6a, 10a, 2p, 6p & 10pm). Flush 150 mL before and after bolus feeds. Or- flush 150 mL after feeds and 150 mL free water between feeds if flushes before and after with tube feed is too much volume. Tube feeds provide 2400 kcal, 100 g Pro, 840 mL free water. Flushes provide 1500 mL free water for total of 2340 mL free water.      Electronically signed by Santana Warren RD on 2/11/2021     Contact: [phone]

## 2021-02-12 LAB
ALBUMIN SERPL-MCNC: 2.3 G/DL (ref 3.5–5)
ALBUMIN/GLOB SERPL: 0.5 {RATIO} (ref 1.1–2.2)
ALP SERPL-CCNC: 71 U/L (ref 45–117)
ALT SERPL-CCNC: 22 U/L (ref 12–78)
ANION GAP SERPL CALC-SCNC: 7 MMOL/L (ref 5–15)
AST SERPL-CCNC: 38 U/L (ref 15–37)
BASOPHILS # BLD: 0 K/UL (ref 0–0.1)
BASOPHILS NFR BLD: 0 % (ref 0–1)
BILIRUB SERPL-MCNC: 0.2 MG/DL (ref 0.2–1)
BUN SERPL-MCNC: 6 MG/DL (ref 6–20)
BUN/CREAT SERPL: 17 (ref 12–20)
CALCIUM SERPL-MCNC: 8.2 MG/DL (ref 8.5–10.1)
CHLORIDE SERPL-SCNC: 99 MMOL/L (ref 97–108)
CO2 SERPL-SCNC: 28 MMOL/L (ref 21–32)
CREAT SERPL-MCNC: 0.35 MG/DL (ref 0.7–1.3)
DIFFERENTIAL METHOD BLD: ABNORMAL
EOSINOPHIL # BLD: 0 K/UL (ref 0–0.4)
EOSINOPHIL NFR BLD: 0 % (ref 0–7)
ERYTHROCYTE [DISTWIDTH] IN BLOOD BY AUTOMATED COUNT: 12.5 % (ref 11.5–14.5)
GLOBULIN SER CALC-MCNC: 4.2 G/DL (ref 2–4)
GLUCOSE BLD STRIP.AUTO-MCNC: 111 MG/DL (ref 65–100)
GLUCOSE BLD STRIP.AUTO-MCNC: 83 MG/DL (ref 65–100)
GLUCOSE BLD STRIP.AUTO-MCNC: 84 MG/DL (ref 65–100)
GLUCOSE BLD STRIP.AUTO-MCNC: 94 MG/DL (ref 65–100)
GLUCOSE SERPL-MCNC: 85 MG/DL (ref 65–100)
HCT VFR BLD AUTO: 31.2 % (ref 36.6–50.3)
HGB BLD-MCNC: 10.1 G/DL (ref 12.1–17)
IMM GRANULOCYTES # BLD AUTO: 0 K/UL (ref 0–0.04)
IMM GRANULOCYTES NFR BLD AUTO: 0 % (ref 0–0.5)
LYMPHOCYTES # BLD: 0.7 K/UL (ref 0.8–3.5)
LYMPHOCYTES NFR BLD: 12 % (ref 12–49)
MCH RBC QN AUTO: 32.2 PG (ref 26–34)
MCHC RBC AUTO-ENTMCNC: 32.4 G/DL (ref 30–36.5)
MCV RBC AUTO: 99.4 FL (ref 80–99)
MONOCYTES # BLD: 0.7 K/UL (ref 0–1)
MONOCYTES NFR BLD: 12 % (ref 5–13)
NEUTS SEG # BLD: 4.2 K/UL (ref 1.8–8)
NEUTS SEG NFR BLD: 76 % (ref 32–75)
NRBC # BLD: 0 K/UL (ref 0–0.01)
NRBC BLD-RTO: 0 PER 100 WBC
PLATELET # BLD AUTO: 342 K/UL (ref 150–400)
PLATELET COMMENTS,PCOM: ABNORMAL
PMV BLD AUTO: 9.9 FL (ref 8.9–12.9)
POTASSIUM SERPL-SCNC: 3.3 MMOL/L (ref 3.5–5.1)
PROT SERPL-MCNC: 6.5 G/DL (ref 6.4–8.2)
RBC # BLD AUTO: 3.14 M/UL (ref 4.1–5.7)
RBC MORPH BLD: ABNORMAL
RBC MORPH BLD: ABNORMAL
SERVICE CMNT-IMP: ABNORMAL
SERVICE CMNT-IMP: NORMAL
SODIUM SERPL-SCNC: 134 MMOL/L (ref 136–145)
WBC # BLD AUTO: 5.6 K/UL (ref 4.1–11.1)

## 2021-02-12 PROCEDURE — 74011250636 HC RX REV CODE- 250/636: Performed by: OTOLARYNGOLOGY

## 2021-02-12 PROCEDURE — 82962 GLUCOSE BLOOD TEST: CPT

## 2021-02-12 PROCEDURE — 74011250637 HC RX REV CODE- 250/637: Performed by: NURSE PRACTITIONER

## 2021-02-12 PROCEDURE — 65270000029 HC RM PRIVATE

## 2021-02-12 PROCEDURE — 97116 GAIT TRAINING THERAPY: CPT

## 2021-02-12 PROCEDURE — 36415 COLL VENOUS BLD VENIPUNCTURE: CPT

## 2021-02-12 PROCEDURE — 80053 COMPREHEN METABOLIC PANEL: CPT

## 2021-02-12 PROCEDURE — 92507 TX SP LANG VOICE COMM INDIV: CPT

## 2021-02-12 PROCEDURE — 74011250637 HC RX REV CODE- 250/637: Performed by: OTOLARYNGOLOGY

## 2021-02-12 PROCEDURE — 74011250637 HC RX REV CODE- 250/637: Performed by: FAMILY MEDICINE

## 2021-02-12 PROCEDURE — 85025 COMPLETE CBC W/AUTO DIFF WBC: CPT

## 2021-02-12 PROCEDURE — 74011250637 HC RX REV CODE- 250/637: Performed by: INTERNAL MEDICINE

## 2021-02-12 RX ORDER — SUCRALFATE 1 G/1
1 TABLET ORAL
Status: DISCONTINUED | OUTPATIENT
Start: 2021-02-12 | End: 2021-02-16 | Stop reason: HOSPADM

## 2021-02-12 RX ADMIN — Medication 10 ML: at 17:25

## 2021-02-12 RX ADMIN — ACETAMINOPHEN ORAL SOLUTION 650 MG: 650 SOLUTION ORAL at 03:12

## 2021-02-12 RX ADMIN — SUCRALFATE 1 G: 1 TABLET ORAL at 21:42

## 2021-02-12 RX ADMIN — MORPHINE SULFATE 2 MG: 2 INJECTION, SOLUTION INTRAMUSCULAR; INTRAVENOUS at 09:11

## 2021-02-12 RX ADMIN — ONDANSETRON 4 MG: 2 INJECTION INTRAMUSCULAR; INTRAVENOUS at 03:12

## 2021-02-12 RX ADMIN — SUCRALFATE 1 G: 1 TABLET ORAL at 17:24

## 2021-02-12 RX ADMIN — SUCRALFATE 1 G: 1 TABLET ORAL at 12:20

## 2021-02-12 RX ADMIN — Medication 100 MG: at 09:10

## 2021-02-12 RX ADMIN — POTASSIUM BICARBONATE 40 MEQ: 391 TABLET, EFFERVESCENT ORAL at 09:10

## 2021-02-12 RX ADMIN — Medication 10 ML: at 21:42

## 2021-02-12 RX ADMIN — Medication 10 ML: at 21:43

## 2021-02-12 RX ADMIN — SUCRALFATE 1 G: 1 TABLET ORAL at 05:09

## 2021-02-12 NOTE — PROGRESS NOTES
Otolaryngology, Head and Neck Surgery        The patient is s/p laryngectomy on 2/8/21. he is doing welland the pain is under control. Some nausea and diarrhea from tube feeds yesterday   The patient denies any chest pain or shortness of breath. Drains not putting out much but not holding much suction.       Hospital Problems  Never Reviewed          Codes Class Noted POA    * (Principal) Laryngeal carcinoma ([facility] [address] ICD-10-CM: C32.9  ICD-9-CM: 161.9  2/7/2021 Yes              Current Facility-Administered Medications   Medication Dose Route Frequency    sucralfate (CARAFATE) tablet 1 g  1 g Per G Tube AC&HS    [Held by provider] polyethylene glycol (MIRALAX) packet 17 g  17 g Per G Tube DAILY    phenol throat spray (CHLORASEPTIC) 1 Spray  1 Spray Oral PRN    glucose chewable tablet 16 g  4 Tab Oral PRN    dextrose (D50W) injection syrg 12.5-25 g  25-50 mL IntraVENous PRN    glucagon (GLUCAGEN) injection 1 mg  1 mg IntraMUSCular PRN    insulin lispro (HUMALOG) injection   SubCUTAneous Q6H    thiamine HCL (B-1) tablet 100 mg  100 mg Per G Tube DAILY    HYDROcodone-acetaminophen (HYCET) 0.5-21.7 mg/mL oral solution 10 mg  10 mg PEG Tube Q6H PRN    HYDROcodone-acetaminophen (HYCET) 0.5-21.7 mg/mL oral solution 5 mg  5 mg Oral Q6H PRN    hydrOXYzine (ATARAX) 10 mg/5 mL syrup 50 mg  50 mg Per G Tube Q6H PRN    sodium chloride (NS) flush 5-40 mL  5-40 mL IntraVENous Q8H    sodium chloride (NS) flush 5-40 mL  5-40 mL IntraVENous PRN    acetaminophen (TYLENOL) solution 650 mg  650 mg Per G Tube Q4H PRN    morphine injection 2 mg  2 mg IntraVENous Q4H PRN    ondansetron (ZOFRAN) injection 4 mg  4 mg IntraVENous Q4H PRN    [Held by provider] docusate (COLACE) 50 mg/5 mL oral liquid 200 mg  200 mg Per G Tube DAILY    sodium chloride (NS) flush 5-40 mL  5-40 mL IntraVENous PRN    sodium chloride (NS) flush 5-40 mL  5-40 mL IntraVENous Q8H    sodium chloride (NS) flush 5-40 mL  5-40 mL IntraVENous PRN Recent Results (from the past 24 hour(s))   GLUCOSE, POC    Collection Time: 02/11/21 11:47 AM   Result Value Ref Range    Glucose (POC) 97 65 - 100 mg/dL    Performed by Papito Bernard    GLUCOSE, POC    Collection Time: 02/11/21  6:10 PM   Result Value Ref Range    Glucose (POC) 83 65 - 100 mg/dL    Performed by Edilberto spencer RN    GLUCOSE, POC    Collection Time: 02/11/21 11:27 PM   Result Value Ref Range    Glucose (POC) 90 65 - 100 mg/dL    Performed by OLI [address]erg[address], COMPREHENSIVE    Collection Time: 02/12/21  1:28 AM   Result Value Ref Range    Sodium 134 (L) 136 - 145 mmol/L    Potassium 3.3 (L) 3.5 - 5.1 mmol/L    Chloride 99 97 - 108 mmol/L    CO2 28 21 - 32 mmol/L    Anion gap 7 5 - 15 mmol/L    Glucose 85 65 - 100 mg/dL    BUN 6 6 - 20 MG/DL    Creatinine 0.35 (L) 0.70 - 1.30 MG/DL    BUN/Creatinine ratio 17 12 - 20      GFR est AA >60 >60 ml/min/1.73m2    GFR est non-AA >60 >60 ml/min/1.73m2    Calcium 8.2 (L) 8.5 - 10.1 MG/DL    Bilirubin, total 0.2 0.2 - 1.0 MG/DL    ALT (SGPT) 22 12 - 78 U/L    AST (SGOT) 38 (H) 15 - 37 U/L    Alk. phosphatase 71 45 - 117 U/L    Protein, total 6.5 6.4 - 8.2 g/dL    Albumin 2.3 (L) 3.5 - 5.0 g/dL    Globulin 4.2 (H) 2.0 - 4.0 g/dL    A-G Ratio 0.5 (L) 1.1 - 2.2     CBC WITH AUTOMATED DIFF    Collection Time: 02/12/21  1:28 AM   Result Value Ref Range    WBC 5.6 4.1 - 11.1 K/uL    RBC 3.14 (L) 4.10 - 5.70 M/uL    HGB 10.1 (L) 12.1 - 17.0 g/dL    HCT 31.2 (L) 36.6 - 50.3 %    MCV 99.4 (H) 80.0 - 99.0 FL    MCH 32.2 26.0 - 34.0 PG    MCHC 32.4 30.0 - 36.5 g/dL    RDW 12.5 11.5 - 14.5 %    PLATELET 362 076 - 118 K/uL    MPV 9.9 8.9 - 12.9 FL    NRBC 0.0 0  WBC    ABSOLUTE NRBC 0.00 0.00 - 0.01 K/uL    NEUTROPHILS 76 (H) 32 - 75 %    LYMPHOCYTES 12 12 - 49 %    MONOCYTES 12 5 - 13 %    EOSINOPHILS 0 0 - 7 %    BASOPHILS 0 0 - 1 %    IMMATURE GRANULOCYTES 0 0.0 - 0.5 %    ABS. NEUTROPHILS 4.2 1.8 - 8.0 K/UL    ABS.  LYMPHOCYTES 0.7 (L) 0.8 - 3.5 K/UL    ABS. MONOCYTES 0.7 0.0 - 1.0 K/UL    ABS. EOSINOPHILS 0.0 0.0 - 0.4 K/UL    ABS. BASOPHILS 0.0 0.0 - 0.1 K/UL    ABS. IMM. GRANS. 0.0 0.00 - 0.04 K/UL    DF SMEAR SCANNED      PLATELET COMMENTS Large Platelets      RBC COMMENTS MACROCYTOSIS  1+        RBC COMMENTS HYPOCHROMIA  PRESENT       GLUCOSE, POC    Collection Time: 02/12/21  5:11 AM   Result Value Ref Range    Glucose (POC) 94 65 - 100 mg/dL    Performed by Camila Chavez            Visit Vitals  BP (!) 93/59 (BP 1 Location: Right upper arm, BP Patient Position: At rest)   Pulse 85   Temp 98.8 °F (37.1 °C)   Resp 17   Ht 6' (1.829 m)   Wt 65.8 kg (145 lb 1 oz)   SpO2 96%   BMI 19.67 kg/m²       Intake/Output Summary (Last 24 hours) at 2/12/2021 0950  Last data filed at 2/11/2021 2029  Gross per 24 hour   Intake 1071 ml   Output 500 ml   Net 571 ml       The patient is a well developed, thin adult in no distress    Neck: incision intact, no swelling or increased erythema or induration. Stoma healthy      Chest: Clear to auscultation bilaterally. No wheezes or crackles  Cardiovascular: Regular rate and rhythm      A:   Hospital Problems  Never Reviewed          Codes Class Noted POA    * (Principal) Laryngeal carcinoma ([facility] [address] ICD-10-CM: C32.9  ICD-9-CM: 161.9  2/7/2021 Yes                  Post op day 4 from laryngectomy. Plan:  Continue tube feeds. Plan for barium swallow Sunday if possible to assess for leak. Continue home health planning with care management  He will need to have tube feeding supplies, suction, stoma cleaning supplies at home prior to discharge. If that can get done, then we could anticipate d/c Monday.     Richie Virgen MD

## 2021-02-12 NOTE — PROGRESS NOTES
Bedside shift change report given to Satnam Thomas RN (oncoming nurse) by Vera Yu RN (offgoing nurse). Report included the following information SBAR, Kardex, Intake/Output, MAR, Recent Results, Cardiac Rhythm NSR and Dual Neuro Assessment.

## 2021-02-12 NOTE — PROGRESS NOTES
Otolaryngology, Head and Neck Surgery      Kel TF. No ne issues. Recent Results (from the past 24 hour(s))   GLUCOSE, POC    Collection Time: 02/10/21 11:40 PM   Result Value Ref Range    Glucose (POC) 133 (H) 65 - 100 mg/dL    Performed by Meaghan Skinner    MAGNESIUM    Collection Time: 02/11/21  5:24 AM   Result Value Ref Range    Magnesium 1.8 1.6 - 2.4 mg/dL   PHOSPHORUS    Collection Time: 02/11/21  5:24 AM   Result Value Ref Range    Phosphorus 2.1 (L) 2.6 - 4.7 MG/DL   METABOLIC PANEL, COMPREHENSIVE    Collection Time: 02/11/21  5:26 AM   Result Value Ref Range    Sodium 132 (L) 136 - 145 mmol/L    Potassium 3.4 (L) 3.5 - 5.1 mmol/L    Chloride 96 (L) 97 - 108 mmol/L    CO2 27 21 - 32 mmol/L    Anion gap 9 5 - 15 mmol/L    Glucose 85 65 - 100 mg/dL    BUN 6 6 - 20 MG/DL    Creatinine 0.37 (L) 0.70 - 1.30 MG/DL    BUN/Creatinine ratio 16 12 - 20      GFR est AA >60 >60 ml/min/1.73m2    GFR est non-AA >60 >60 ml/min/1.73m2    Calcium 8.9 8.5 - 10.1 MG/DL    Bilirubin, total 0.2 0.2 - 1.0 MG/DL    ALT (SGPT) 24 12 - 78 U/L    AST (SGOT) 52 (H) 15 - 37 U/L    Alk. phosphatase 71 45 - 117 U/L    Protein, total 6.7 6.4 - 8.2 g/dL    Albumin 2.4 (L) 3.5 - 5.0 g/dL    Globulin 4.3 (H) 2.0 - 4.0 g/dL    A-G Ratio 0.6 (L) 1.1 - 2.2     CBC WITH AUTOMATED DIFF    Collection Time: 02/11/21  5:26 AM   Result Value Ref Range    WBC 5.6 4.1 - 11.1 K/uL    RBC 3.25 (L) 4.10 - 5.70 M/uL    HGB 10.3 (L) 12.1 - 17.0 g/dL    HCT 31.0 (L) 36.6 - 50.3 %    MCV 95.4 80.0 - 99.0 FL    MCH 31.7 26.0 - 34.0 PG    MCHC 33.2 30.0 - 36.5 g/dL    RDW 12.7 11.5 - 14.5 %    PLATELET 337 506 - 148 K/uL    MPV 10.3 8.9 - 12.9 FL    NRBC 0.0 0  WBC    ABSOLUTE NRBC 0.00 0.00 - 0.01 K/uL    NEUTROPHILS 77 (H) 32 - 75 %    LYMPHOCYTES 12 12 - 49 %    MONOCYTES 11 5 - 13 %    EOSINOPHILS 0 0 - 7 %    BASOPHILS 0 0 - 1 %    IMMATURE GRANULOCYTES 0 0.0 - 0.5 %    ABS. NEUTROPHILS 4.3 1.8 - 8.0 K/UL    ABS.  LYMPHOCYTES 0.7 (L) 0.8 - 3.5 K/UL    ABS. MONOCYTES 0.6 0.0 - 1.0 K/UL    ABS. EOSINOPHILS 0.0 0.0 - 0.4 K/UL    ABS. BASOPHILS 0.0 0.0 - 0.1 K/UL    ABS. IMM.  GRANS. 0.0 0.00 - 0.04 K/UL    DF SMEAR SCANNED      PLATELET COMMENTS Large Platelets      RBC COMMENTS NORMOCYTIC, NORMOCHROMIC     GLUCOSE, POC    Collection Time: 02/11/21  5:26 AM   Result Value Ref Range    Glucose (POC) 96 65 - 100 mg/dL    Performed by Leola [address], POC    Collection Time: 02/11/21 11:47 AM   Result Value Ref Range    Glucose (POC) 97 65 - 100 mg/dL    Performed by Parth Gregory    GLUCOSE, POC    Collection Time: 02/11/21  6:10 PM   Result Value Ref Range    Glucose (POC) 83 65 - 100 mg/dL    Performed by Mihir spencer RN            Visit Vitals  BP (!) 99/59 (BP 1 Location: Right upper arm, BP Patient Position: At rest)   Pulse 92   Temp 98.8 °F (37.1 °C)   Resp 20   Ht 6' (1.829 m)   Wt 67.5 kg (148 lb 13 oz)   SpO2 97%   BMI 20.18 kg/m²       Intake/Output Summary (Last 24 hours) at 2/11/2021 2229  Last data filed at 2/11/2021 2029  Gross per 24 hour   Intake 1251 ml   Output 500 ml   Net 751 ml       The patient is a well developed, well nourished adult in no distress    Neck: incision intact, no swelling or increased erythema or induration        A:   Hospital Problems  Never Reviewed          Codes Class Noted POA    * (Principal) Laryngeal carcinoma ([facility] [address] ICD-10-CM: C32.9  ICD-9-CM: 161.9  2/7/2021 Yes                  Post op day 3    Plan:  -stoma care and teaching  -oob/mobilize  -Cont TF  -likely swallow this weekend

## 2021-02-12 NOTE — PROGRESS NOTES
Bedside and Verbal shift change report given to Edelmira Mcbride RN (oncoming nurse) by Raphael Campuzano RN (offgoing nurse). Report included the following information SBAR, Kardex, Intake/Output, MAR, Cardiac Rhythm NSR and Dual Neuro Assessment.

## 2021-02-12 NOTE — PROGRESS NOTES
Problem: Laryngectomy (Adult)  Goal: *Acute Goals and Plan of Care (Insert Text)  Description: Speech Therapy Goals  Initiated 2/11/2021    1. Patient will participate in TLx education within 7 days. 2/12/2021 1413 by Laquita Bruno, SLP  Outcome: Progressing Towards Goal  2/12/2021 1413 by Laquita Bruno, SLP  Outcome: Progressing Towards Goal     SPEECH LANGUAGE PATHOLOGY TREATMENT  Patient: Ana Nugent (78 y.o. male)  Date: 2/12/2021  Diagnosis: Laryngeal carcinoma ([facility] [address] [C32.9] Laryngeal carcinoma ([facility] [address]  Procedure(s) (LRB):  TOTAL LARYNGECTOMY, (N/A) 4 Days Post-Op    ASSESSMENT:  Pt able to complete larytube care and HME change for the first time today with little to no assistance. Some challenge getting velcro through phalange, however, this is reasonable given small space through which velcro is fed. Discussed with pt and pt's brother Fara Diana via phone. Family training certainly reasonable with Fara Diana, however, he is unable to come to [facility]. Offered to have family training virtual so that Fara Diana can be educated on further laryngectomy care, and possible coordinate with RN so that they can educate re: PEG tube feedings. Furthermore, as stated previously, swallowing is per ENT as pt cannot aspirate unless he has a fistula. Plan according to their note is to complete esophagram to assess for leak over the weekend. Diet initiation will be per ENT and thus SLP will not address swallowing. Additionally, pt provided SLP with letter that he wrote asking for help with DMV, as they are wanting him to give verbal consent in order to have rides set up for appointments and he is unable to given his laryngectcomy. Thus, SLP will write a letter outlining nature of his condition and his inability to give verbal consent and provide to patient. PLAN:  Patient continues to benefit from skilled intervention to address the above impairments.   Continue treatment per established plan of care.  Recommendations and Planned Interventions:  --continue laryngectomy education  ATOS prescription signed by ENT and completed  ATOS Coming Home consent form completed  ATOS Insurance Information form completed    Discharge Recommendations:  Outpatient and None     SUBJECTIVE:   Patient very kind and participative.     OBJECTIVE:   Mental Status:  Neurologic State: Alert  Orientation Level: Oriented X4  Cognition: Appropriate decision making, Appropriate for age attention/concentration, Appropriate safety awareness, Follows commands           Treatment & Interventions:  Past Medical History:   Diagnosis Date    Chronic pain     THROAT, EARS, NECK R/T CANCER    COPD (chronic obstructive pulmonary disease) ([facility] [address]     EMPHASEMA    Psychiatric disorder     ANXIETY R/T CANCER    Throat cancer (HCC)      Past Surgical History:   Procedure Laterality Date    HX ORTHOPAEDIC      LEFT ARM- \" PUT PLATE IN\"     Stoma site appearance: pink and dried blood/secretions  Secretions appear: blood tinged    Home Situation  Home Environment: Private residence  # Steps to Enter: 15  Rails to Enter: Yes  One/Two Story Residence: Two story  # of Interior Steps: 12  Interior Rails: Both  Living Alone: No  Support Systems: Spouse/Significant Other/Partner, Friends \ neighbors  Patient Expects to be Discharged to[de-identified] Private residence  Current DME Used/Available at Home: Shower chair  Tub or Shower Type: Tub/Shower combination  Mental Status:  Neurologic State: Alert  Orientation Level: Oriented X4  Cognition: Appropriate decision making, Appropriate for age attention/concentration, Appropriate safety awareness, Follows commands           Laryngectomy Education  --Discussed pre- and post-laryngectomy anatomical changes          --Patient's only airway is through stoma in neck  --Explained HMEs and Larytubes          --Discarding HMEs at least 24 hours and not to get HME wet          --Cleaning larytubes at least every 24 hours --Demonstrated larytube cleaning and HME changing          --Patient able to complete larytube cleaning independently: yes           --Patient able to complete HME change independently: yes   --Discussed communication strategies including:          --Esophageal speech          --Electrolarynx (both flush to skin and with oral adapter)          --Augmentative/Alternative Communication (communication board, white board, etc.)          --Tracheoesophageal prosthesis (explained that the ability to do this is determined by MD)  --Discussed swallowing with laryngectomy  --Explained that pt not longer able to aspirate unless he has a tracheoesophageal fistula, a leaking   TEP, or liquid/solids placed directly into stoma  --Discussed that barium swallow will need to be completed prior to diet initiation. Explained that the timing/diet initiation is determined by ENT. Laryngectomy ATOS Paperwork:  --Coming Home Consent Form and Patient's Insurance Information left at bedside to be completed  --ATOS Prescription in patient's hard chart awaiting ENT MD signature  --Will fax these forms to ATOS when completed  Pain:  Pain Scale 1: Numeric (0 - 10)  Pain Intensity 1: 5  Pain Location 1: Abdomen    After treatment:   Patient left in no apparent distress in bed, Call bell within reach, and Nursing notified    COMMUNICATION/EDUCATION:     The patient's plan of care including recommendations, planned interventions, and recommended diet changes were discussed with: Registered nurse and Case management.      Pamela Lopez SLP  Time Calculation: 50 mins

## 2021-02-12 NOTE — PROGRESS NOTES
Problem: Mobility Impaired (Adult and Pediatric)  Goal: *Acute Goals and Plan of Care (Insert Text)  Description: FUNCTIONAL STATUS PRIOR TO ADMISSION: Patient was independent and active without use of DME.    HOME SUPPORT PRIOR TO ADMISSION: The patient lived with significant other and friends but did not require assist. Sleeps on a couch on the second floor. Lots of stairs to enter home/bedroom. Physical Therapy Goals  Initiated 2/9/2021  1. Patient will move from supine to sit and sit to supine  in bed with independence within 7 day(s). 2.  Patient will transfer from bed to chair and chair to bed with independence using the least restrictive device within 7 day(s). 3.  Patient will perform sit to stand with independence within 7 day(s). 4.  Patient will ambulate with independence for 300 feet with the least restrictive device within 7 day(s). 5.  Patient will ascend/descend 14 stairs with 2 handrail(s) with modified independence within 7 day(s). Outcome: Resolved/Met  PHYSICAL THERAPY TREATMENT/DISCHARGE  Patient: Randal Ferrell (30 y.o. male)  Date: 2/12/2021  Diagnosis: Laryngeal carcinoma ([facility] [address] [C32.9] Laryngeal carcinoma ([facility] [address]  Procedure(s) (LRB):  TOTAL LARYNGECTOMY, (N/A) 4 Days Post-Op  Precautions:    Chart, physical therapy assessment, plan of care and goals were reviewed. ASSESSMENT  Patient continues with skilled PT services, now POD4 s/p laryngectomy. He was independent with bed mobility, transfers, and ambulation and has been up ad russell in his room. Session focused on stair training. Pt navigated 16 stairs with single handrail and SBA without difficulty. He has no further PT needs at this time, will sign off. Other factors to consider for discharge: independent baseline         PLAN :  Patient will be discharged from acute skilled physical therapy at this time.     Rationale for discharge:  Goals achieved    Recommendation for discharge: (in order for the patient to meet his/her long term goals)  No skilled physical therapy/ follow up rehabilitation needs identified at this time. This discharge recommendation:  Has not yet been discussed the attending provider and/or case management    IF patient discharges home will need the following DME: none       SUBJECTIVE:   Patient nodded yes/no appropriately     OBJECTIVE DATA SUMMARY:   Critical Behavior:  Neurologic State: Alert  Orientation Level: Oriented X4  Cognition: Appropriate decision making, Appropriate for age attention/concentration, Appropriate safety awareness, Follows commands     Functional Mobility Training:  Bed Mobility:  Supine to Sit: Independent  Sit to Supine: Independent    Transfers:  Sit to Stand: Independent  Stand to Sit: Independent    Balance:  Sitting: Intact  Standing: Intact    Ambulation/Gait Training:  Distance (ft): 150 Feet (ft)  Assistive Device: Gait belt    Stairs:  Number of Stairs Trained: 16  Stairs - Level of Assistance: Stand-by assistance   Rail Use: Left     Activity Tolerance: WNL    After treatment patient left in no apparent distress:   Up ad russell in room, all needs met    COMMUNICATION/COLLABORATION:   The patients plan of care was discussed with: Occupational therapist, Speech therapist, and Registered nurse.      Kyle Mendenhall, PT, DPT   Time Calculation: 9 mins

## 2021-02-12 NOTE — PROGRESS NOTES
2030 Pt refusing 2200 can on TwoCal feeds due to stomach upset. He has been having bile/liquid diarrhea. RN informed patient to notify staff if he becomes hungry. Pt denies nausea at this time. 0300 Pt called out complaining of abdominal pain, 6/10. Pt has had 5+ loose, bile bowel movements. Tylenol and zofran given. 0345 Pt states that tylenol and zofran has helped a little. Agreed to give the meds 15 more mins. Pt refuses to have 0600 tube feeds as he believes the TwoCal HN is the source of his GI upset. 0400 pt sleeping in bed, pain seems to be under control. 0500 pt complaining of abdominal pain 5/10. Alpa Goodwin NP notified and ordered carafate 4x/day and HS.

## 2021-02-12 NOTE — PROGRESS NOTES
Nutrition Note    Pt discussed in rounds. RN noted pt refusing Tube feeds 2/2 diarrhea and abdominal pain. Pt had multiple motility agents which have since been discontinued. If pt continues to have diarrhea, consider anti-diarrhea meds or adding Banatrol. If pt continues to not tolerate TF- pt could change to Osmolite 1.5. Would need to adjust rate/bolus size due to reduced caloric content. TwoCal HN- 240 mL x 5 per day. Flush 150 mL b/a after bolus. Osmolite 1.5 - 360 mL x 4 per day plus 180 mL x 1 per day. 115 mL b/a bolus.      Electronically signed by Jodi Gitelman, RD on 2/12/2021    Contact: [phone]

## 2021-02-12 NOTE — PROGRESS NOTES
[address] [facility] Adult  Hospitalist Group                                                                                          Hospitalist Progress Note  Jordan Ngo MD  Answering service: [phone] OR [phone] from in house phone        Date of Service:  2021  NAME:  Maikol Fuentes  :  1970  MRN:  [id number]      Admission Summary:   Maikol Fuentes is a 53 y.o. male with past medical history of laryngeal carcinoma, emphysema, chronic pain and opioid dependence, generalized anxiety disorder, NIDDM II who [facility]  with complaints of increased shortness of breath and intense neck/throat pain for 2 days. Interval history / Subjective:   Doing well. Complaining today about a lot of abdominal discomfort. Ongoing diarrhea.       Assessment & Plan:     Acute on Chronic hypoxic hypercapnic respiratory failure  2/2 laryngeal carcinoma  S/P emergent tracheostomy with trach collar @[facility]   S/P laryngectomy and radial neck dissection @[facility]   -Stable on RA  -Keep n.p.o. - speech following  -Trach care per ENT  -Supportive care  -Appreciate ENT input, drains in place with minimal output  - Anticipate DC on Monday   - Will need trach care, tube feeding, and teaching.     Dysphasia s/p PEG Tube: Placed @[facility]   - Resume tube feeding  - NPO for now, and will likely need swallow eval soon (plan for )  - Will need o/p speech therapy on DC    COPD   -Stable, not in acute exacerbation  -DuoNebs as needed      Constipation now with diarrhea   - Hold all miralax, senna,   - GI now signed off   - Nutrition to review tube feeding     Generalized anxiety disorder with panic attacks  -Stable, continue hydroxyzine as needed     Code status: FULL  DVT prophylaxis: Thang Cardenas discussed with: Patient/Family  Anticipated Disposition: Home w/Family  Anticipated Discharge: 24 hours to 48 hours, awaiting recommendations from ENT     Hospital Problems Never Reviewed          Codes Class Noted POA    * (Principal) Laryngeal carcinoma ([facility] [address] ICD-10-CM: C32.9  ICD-9-CM: 161.9  2/7/2021 Yes                Review of Systems:   A comprehensive review of systems was negative except for that written in the HPI. Vital Signs:    Last 24hrs VS reviewed since prior progress note. Most recent are:  Visit Vitals  BP (!) 104/59 (BP 1 Location: Right upper arm, BP Patient Position: At rest)   Pulse 84   Temp 98.5 °F (36.9 °C)   Resp 15   Ht 6' (1.829 m)   Wt 65.8 kg (145 lb 1 oz)   SpO2 96%   BMI 19.67 kg/m²         Intake/Output Summary (Last 24 hours) at 2/12/2021 1601  Last data filed at 2/12/2021 1430  Gross per 24 hour   Intake 667 ml   Output 500 ml   Net 167 ml        Physical Examination:     I had a face to face encounter with this patient and independently examined them on 2/12/2021 as outlined below:          Constitutional:  No acute distress, cooperative, pleasant, cachectic    ENT:  Oral mucosa moist, oropharynx benign. Trach with AP drain in place   Resp:  CTA bilaterally. No wheezing/rhonchi/rales. No accessory muscle use   CV:  Regular rhythm, normal rate, no murmurs, gallops, rubs    GI:  Soft, non distended, non tender. normoactive bowel sounds, no hepatosplenomegaly, + PEG in place     Musculoskeletal:  No edema, warm, 2+ pulses throughout    Neurologic:  Moves all extremities.   AAOx3, CN II-XII reviewed     Skin:  Good turgor, no rashes or ulcers       Data Review:    Review and/or order of clinical lab test  Review and/or order of tests in the radiology section of CPT  Review and/or order of tests in the medicine section of CPT    Labs:     Recent Labs     02/12/21 0128 02/11/21 0526   WBC 5.6 5.6   HGB 10.1* 10.3*   HCT 31.2* 31.0*    345     Recent Labs     02/12/21 0128 02/11/21  0526 02/11/21  0524 02/10/21  0200   * 132*  --  132*   K 3.3* 3.4*  --  3.6   CL 99 96*  --  98   CO2 28 27  --  26   BUN 6 6  --  8   CREA 0.35* 0.37*  -- 0.41*   GLU 85 85  --  80   CA 8.2* 8.9  --  8.4*   MG  --   --  1.8 1.5*   PHOS  --   --  2.1* 1.9*     Recent Labs     02/12/21  0128 02/11/21  0526 02/10/21  0200   ALT 22 24 13   AP 71 71 73   TBILI 0.2 0.2 0.4   TP 6.5 6.7 6.5   ALB 2.3* 2.4* 2.4*   GLOB 4.2* 4.3* 4.1*     No results for input(s): INR, PTP, APTT, INREXT, INREXT in the last 72 hours. No results for input(s): FE, TIBC, PSAT, FERR in the last 72 hours. No results found for: FOL, RBCF   No results for input(s): PH, PCO2, PO2 in the last 72 hours. No results for input(s): CPK, CKNDX, TROIQ in the last 72 hours.     No lab exists for component: CPKMB  No results found for: CHOL, CHOLX, CHLST, CHOLV, HDL, HDLP, LDL, LDLC, DLDLP, TGLX, TRIGL, TRIGP, CHHD, CHHDX  Lab Results   Component Value Date/Time    Glucose (POC) 83 02/12/2021 11:29 AM    Glucose (POC) 94 02/12/2021 05:11 AM    Glucose (POC) 90 02/11/2021 11:27 PM    Glucose (POC) 83 02/11/2021 06:10 PM    Glucose (POC) 97 02/11/2021 11:47 AM     No results found for: COLOR, APPRN, SPGRU, REFSG, NATALYA, PROTU, GLUCU, KETU, BILU, UROU, CHARISSA, LEUKU, GLUKE, EPSU, BACTU, WBCU, RBCU, CASTS, UCRY      Medications Reviewed:     Current Facility-Administered Medications   Medication Dose Route Frequency    sucralfate (CARAFATE) tablet 1 g  1 g Per G Tube AC&HS    [Held by provider] polyethylene glycol (MIRALAX) packet 17 g  17 g Per G Tube DAILY    phenol throat spray (CHLORASEPTIC) 1 Spray  1 Spray Oral PRN    glucose chewable tablet 16 g  4 Tab Oral PRN    dextrose (D50W) injection syrg 12.5-25 g  25-50 mL IntraVENous PRN    glucagon (GLUCAGEN) injection 1 mg  1 mg IntraMUSCular PRN    insulin lispro (HUMALOG) injection   SubCUTAneous Q6H    thiamine HCL (B-1) tablet 100 mg  100 mg Per G Tube DAILY    HYDROcodone-acetaminophen (HYCET) 0.5-21.7 mg/mL oral solution 10 mg  10 mg PEG Tube Q6H PRN    HYDROcodone-acetaminophen (HYCET) 0.5-21.7 mg/mL oral solution 5 mg  5 mg Oral Q6H PRN    hydrOXYzine (ATARAX) 10 mg/5 mL syrup 50 mg  50 mg Per G Tube Q6H PRN    sodium chloride (NS) flush 5-40 mL  5-40 mL IntraVENous Q8H    sodium chloride (NS) flush 5-40 mL  5-40 mL IntraVENous PRN    acetaminophen (TYLENOL) solution 650 mg  650 mg Per G Tube Q4H PRN    morphine injection 2 mg  2 mg IntraVENous Q4H PRN    ondansetron (ZOFRAN) injection 4 mg  4 mg IntraVENous Q4H PRN    [Held by provider] docusate (COLACE) 50 mg/5 mL oral liquid 200 mg  200 mg Per G Tube DAILY    sodium chloride (NS) flush 5-40 mL  5-40 mL IntraVENous PRN    sodium chloride (NS) flush 5-40 mL  5-40 mL IntraVENous Q8H    sodium chloride (NS) flush 5-40 mL  5-40 mL IntraVENous PRN     ______________________________________________________________________  EXPECTED LENGTH OF STAY: 11d 0h  ACTUAL LENGTH OF STAY:          5                 Davide Ann MD

## 2021-02-12 NOTE — PROGRESS NOTES
Transition of Care Plan  · RUR- Medium 20%  · DISPOSITION: home with assist from girlfriend  · Transport: Medicaid [place] [employer] - [phone]    CM rescheduled patient's Medicaid transport for Sunday at 3pm - updated trip  #[phone].      MARCIA Tony, MSW Supervisee

## 2021-02-13 LAB
ALBUMIN SERPL-MCNC: 2.2 G/DL (ref 3.5–5)
ALBUMIN/GLOB SERPL: 0.6 {RATIO} (ref 1.1–2.2)
ALP SERPL-CCNC: 67 U/L (ref 45–117)
ALT SERPL-CCNC: 19 U/L (ref 12–78)
ANION GAP SERPL CALC-SCNC: 3 MMOL/L (ref 5–15)
AST SERPL-CCNC: 33 U/L (ref 15–37)
BASOPHILS # BLD: 0 K/UL (ref 0–0.1)
BASOPHILS NFR BLD: 0 % (ref 0–1)
BILIRUB SERPL-MCNC: 0.1 MG/DL (ref 0.2–1)
BUN SERPL-MCNC: 7 MG/DL (ref 6–20)
BUN/CREAT SERPL: 16 (ref 12–20)
CALCIUM SERPL-MCNC: 8.2 MG/DL (ref 8.5–10.1)
CHLORIDE SERPL-SCNC: 98 MMOL/L (ref 97–108)
CO2 SERPL-SCNC: 30 MMOL/L (ref 21–32)
CREAT SERPL-MCNC: 0.44 MG/DL (ref 0.7–1.3)
DIFFERENTIAL METHOD BLD: ABNORMAL
EOSINOPHIL # BLD: 0.1 K/UL (ref 0–0.4)
EOSINOPHIL NFR BLD: 1 % (ref 0–7)
ERYTHROCYTE [DISTWIDTH] IN BLOOD BY AUTOMATED COUNT: 12.6 % (ref 11.5–14.5)
GLOBULIN SER CALC-MCNC: 4 G/DL (ref 2–4)
GLUCOSE BLD STRIP.AUTO-MCNC: 102 MG/DL (ref 65–100)
GLUCOSE BLD STRIP.AUTO-MCNC: 117 MG/DL (ref 65–100)
GLUCOSE BLD STRIP.AUTO-MCNC: 135 MG/DL (ref 65–100)
GLUCOSE BLD STRIP.AUTO-MCNC: 89 MG/DL (ref 65–100)
GLUCOSE SERPL-MCNC: 84 MG/DL (ref 65–100)
HCT VFR BLD AUTO: 26.6 % (ref 36.6–50.3)
HGB BLD-MCNC: 9.1 G/DL (ref 12.1–17)
IMM GRANULOCYTES # BLD AUTO: 0 K/UL (ref 0–0.04)
IMM GRANULOCYTES NFR BLD AUTO: 0 % (ref 0–0.5)
LYMPHOCYTES # BLD: 0.9 K/UL (ref 0.8–3.5)
LYMPHOCYTES NFR BLD: 17 % (ref 12–49)
MAGNESIUM SERPL-MCNC: 1.8 MG/DL (ref 1.6–2.4)
MCH RBC QN AUTO: 33 PG (ref 26–34)
MCHC RBC AUTO-ENTMCNC: 34.2 G/DL (ref 30–36.5)
MCV RBC AUTO: 96.4 FL (ref 80–99)
MONOCYTES # BLD: 0.7 K/UL (ref 0–1)
MONOCYTES NFR BLD: 13 % (ref 5–13)
NEUTS SEG # BLD: 3.7 K/UL (ref 1.8–8)
NEUTS SEG NFR BLD: 69 % (ref 32–75)
NRBC # BLD: 0 K/UL (ref 0–0.01)
NRBC BLD-RTO: 0 PER 100 WBC
PHOSPHATE SERPL-MCNC: 1.6 MG/DL (ref 2.6–4.7)
PLATELET # BLD AUTO: 388 K/UL (ref 150–400)
PMV BLD AUTO: 10 FL (ref 8.9–12.9)
POTASSIUM SERPL-SCNC: 3.7 MMOL/L (ref 3.5–5.1)
PROT SERPL-MCNC: 6.2 G/DL (ref 6.4–8.2)
RBC # BLD AUTO: 2.76 M/UL (ref 4.1–5.7)
SERVICE CMNT-IMP: ABNORMAL
SERVICE CMNT-IMP: NORMAL
SODIUM SERPL-SCNC: 131 MMOL/L (ref 136–145)
WBC # BLD AUTO: 5.4 K/UL (ref 4.1–11.1)

## 2021-02-13 PROCEDURE — 84100 ASSAY OF PHOSPHORUS: CPT

## 2021-02-13 PROCEDURE — 36415 COLL VENOUS BLD VENIPUNCTURE: CPT

## 2021-02-13 PROCEDURE — 74011000250 HC RX REV CODE- 250: Performed by: INTERNAL MEDICINE

## 2021-02-13 PROCEDURE — 74011250637 HC RX REV CODE- 250/637: Performed by: NURSE PRACTITIONER

## 2021-02-13 PROCEDURE — 74011250636 HC RX REV CODE- 250/636: Performed by: OTOLARYNGOLOGY

## 2021-02-13 PROCEDURE — 83735 ASSAY OF MAGNESIUM: CPT

## 2021-02-13 PROCEDURE — 85025 COMPLETE CBC W/AUTO DIFF WBC: CPT

## 2021-02-13 PROCEDURE — 82962 GLUCOSE BLOOD TEST: CPT

## 2021-02-13 PROCEDURE — 74011250637 HC RX REV CODE- 250/637: Performed by: INTERNAL MEDICINE

## 2021-02-13 PROCEDURE — 74011250637 HC RX REV CODE- 250/637: Performed by: FAMILY MEDICINE

## 2021-02-13 PROCEDURE — 80053 COMPREHEN METABOLIC PANEL: CPT

## 2021-02-13 PROCEDURE — 74011250636 HC RX REV CODE- 250/636: Performed by: NURSE PRACTITIONER

## 2021-02-13 PROCEDURE — 74011250637 HC RX REV CODE- 250/637: Performed by: OTOLARYNGOLOGY

## 2021-02-13 PROCEDURE — 74011250636 HC RX REV CODE- 250/636: Performed by: INTERNAL MEDICINE

## 2021-02-13 PROCEDURE — 65270000032 HC RM SEMIPRIVATE

## 2021-02-13 RX ORDER — SODIUM CHLORIDE 9 MG/ML
125 INJECTION, SOLUTION INTRAVENOUS CONTINUOUS
Status: DISCONTINUED | OUTPATIENT
Start: 2021-02-13 | End: 2021-02-16 | Stop reason: HOSPADM

## 2021-02-13 RX ORDER — POLYETHYLENE GLYCOL 3350 17 G/17G
17 POWDER, FOR SOLUTION ORAL DAILY
Status: DISCONTINUED | OUTPATIENT
Start: 2021-02-13 | End: 2021-02-16 | Stop reason: HOSPADM

## 2021-02-13 RX ADMIN — PHENOL 1 SPRAY: 1.4 SPRAY ORAL at 13:48

## 2021-02-13 RX ADMIN — SODIUM CHLORIDE 125 ML/HR: 9 INJECTION, SOLUTION INTRAVENOUS at 02:27

## 2021-02-13 RX ADMIN — BENZOCAINE AND MENTHOL 1 LOZENGE: 15; 3.6 LOZENGE ORAL at 21:45

## 2021-02-13 RX ADMIN — PHENOL 1 SPRAY: 1.4 SPRAY ORAL at 21:46

## 2021-02-13 RX ADMIN — SUCRALFATE 1 G: 1 TABLET ORAL at 18:03

## 2021-02-13 RX ADMIN — HYDROCODONE BITARTRATE AND ACETAMINOPHEN 10 MG: 7.5; 325 SOLUTION ORAL at 18:09

## 2021-02-13 RX ADMIN — ACETAMINOPHEN ORAL SOLUTION 650 MG: 650 SOLUTION ORAL at 00:13

## 2021-02-13 RX ADMIN — SODIUM CHLORIDE 125 ML/HR: 9 INJECTION, SOLUTION INTRAVENOUS at 18:33

## 2021-02-13 RX ADMIN — SODIUM PHOSPHATE, MONOBASIC, MONOHYDRATE: 276; 142 INJECTION, SOLUTION INTRAVENOUS at 10:29

## 2021-02-13 RX ADMIN — BENZOCAINE AND MENTHOL 1 LOZENGE: 15; 3.6 LOZENGE ORAL at 04:37

## 2021-02-13 RX ADMIN — SUCRALFATE 1 G: 1 TABLET ORAL at 06:07

## 2021-02-13 RX ADMIN — POLYETHYLENE GLYCOL 3350 17 G: 17 POWDER, FOR SOLUTION ORAL at 18:02

## 2021-02-13 RX ADMIN — SUCRALFATE 1 G: 1 TABLET ORAL at 13:47

## 2021-02-13 RX ADMIN — SUCRALFATE 1 G: 1 TABLET ORAL at 21:45

## 2021-02-13 RX ADMIN — MORPHINE SULFATE 2 MG: 2 INJECTION, SOLUTION INTRAMUSCULAR; INTRAVENOUS at 02:27

## 2021-02-13 RX ADMIN — Medication 10 ML: at 21:45

## 2021-02-13 RX ADMIN — BENZOCAINE AND MENTHOL 1 LOZENGE: 15; 3.6 LOZENGE ORAL at 18:09

## 2021-02-13 RX ADMIN — Medication 100 MG: at 10:29

## 2021-02-13 RX ADMIN — BENZOCAINE AND MENTHOL 1 LOZENGE: 15; 3.6 LOZENGE ORAL at 21:30

## 2021-02-13 RX ADMIN — BENZOCAINE AND MENTHOL 1 LOZENGE: 15; 3.6 LOZENGE ORAL at 21:46

## 2021-02-13 RX ADMIN — BENZOCAINE AND MENTHOL 1 LOZENGE: 15; 3.6 LOZENGE ORAL at 21:37

## 2021-02-13 NOTE — PROGRESS NOTES
Bedside shift change report given to Vera Yu RN (oncoming nurse) by Keagan Brower (offgoing nurse). Report included the following information SBAR, Kardex, Intake/Output and Quality Measures.

## 2021-02-13 NOTE — PROGRESS NOTES
Otolaryngology, Head and Neck Surgery        The patient is post laryngectomy on 2/8/21. Pt is doing well and is tolerating tube feed and the pain is under control. The patient denies any chest pain or shortness of breath.       Hospital Problems  Never Reviewed          Codes Class Noted POA    * (Principal) Laryngeal carcinoma ([facility] [address] ICD-10-CM: C32.9  ICD-9-CM: 161.9  2/7/2021 Yes              Current Facility-Administered Medications   Medication Dose Route Frequency    0.9% sodium chloride infusion  125 mL/hr IntraVENous CONTINUOUS    benzocaine-menthoL (CEPACOL) lozenge 1 Lozenge  1 Lozenge Mucous Membrane PRN    sucralfate (CARAFATE) tablet 1 g  1 g Per G Tube AC&HS    [Held by provider] polyethylene glycol (MIRALAX) packet 17 g  17 g Per G Tube DAILY    phenol throat spray (CHLORASEPTIC) 1 Spray  1 Spray Oral PRN    glucose chewable tablet 16 g  4 Tab Oral PRN    dextrose (D50W) injection syrg 12.5-25 g  25-50 mL IntraVENous PRN    glucagon (GLUCAGEN) injection 1 mg  1 mg IntraMUSCular PRN    insulin lispro (HUMALOG) injection   SubCUTAneous Q6H    thiamine HCL (B-1) tablet 100 mg  100 mg Per G Tube DAILY    HYDROcodone-acetaminophen (HYCET) 0.5-21.7 mg/mL oral solution 10 mg  10 mg PEG Tube Q6H PRN    HYDROcodone-acetaminophen (HYCET) 0.5-21.7 mg/mL oral solution 5 mg  5 mg Oral Q6H PRN    hydrOXYzine (ATARAX) 10 mg/5 mL syrup 50 mg  50 mg Per G Tube Q6H PRN    sodium chloride (NS) flush 5-40 mL  5-40 mL IntraVENous Q8H    sodium chloride (NS) flush 5-40 mL  5-40 mL IntraVENous PRN    acetaminophen (TYLENOL) solution 650 mg  650 mg Per G Tube Q4H PRN    morphine injection 2 mg  2 mg IntraVENous Q4H PRN    ondansetron (ZOFRAN) injection 4 mg  4 mg IntraVENous Q4H PRN    [Held by provider] docusate (COLACE) 50 mg/5 mL oral liquid 200 mg  200 mg Per G Tube DAILY    sodium chloride (NS) flush 5-40 mL  5-40 mL IntraVENous PRN    sodium chloride (NS) flush 5-40 mL  5-40 mL IntraVENous Q8H    sodium chloride (NS) flush 5-40 mL  5-40 mL IntraVENous PRN       Recent Results (from the past 24 hour(s))   GLUCOSE, POC    Collection Time: 02/12/21 11:29 AM   Result Value Ref Range    Glucose (POC) 83 65 - 100 mg/dL    Performed by Sofia Esposito 1841, POC    Collection Time: 02/12/21  6:24 PM   Result Value Ref Range    Glucose (POC) 84 65 - 100 mg/dL    Performed by Sofia Esposito 1841, POC    Collection Time: 02/12/21 11:09 PM   Result Value Ref Range    Glucose (POC) 111 (H) 65 - 100 mg/dL    Performed by Dio Ortega    CBC WITH AUTOMATED DIFF    Collection Time: 02/13/21  1:13 AM   Result Value Ref Range    WBC 5.4 4.1 - 11.1 K/uL    RBC 2.76 (L) 4.10 - 5.70 M/uL    HGB 9.1 (L) 12.1 - 17.0 g/dL    HCT 26.6 (L) 36.6 - 50.3 %    MCV 96.4 80.0 - 99.0 FL    MCH 33.0 26.0 - 34.0 PG    MCHC 34.2 30.0 - 36.5 g/dL    RDW 12.6 11.5 - 14.5 %    PLATELET 652 835 - 085 K/uL    MPV 10.0 8.9 - 12.9 FL    NRBC 0.0 0  WBC    ABSOLUTE NRBC 0.00 0.00 - 0.01 K/uL    NEUTROPHILS 69 32 - 75 %    LYMPHOCYTES 17 12 - 49 %    MONOCYTES 13 5 - 13 %    EOSINOPHILS 1 0 - 7 %    BASOPHILS 0 0 - 1 %    IMMATURE GRANULOCYTES 0 0.0 - 0.5 %    ABS. NEUTROPHILS 3.7 1.8 - 8.0 K/UL    ABS. LYMPHOCYTES 0.9 0.8 - 3.5 K/UL    ABS. MONOCYTES 0.7 0.0 - 1.0 K/UL    ABS. EOSINOPHILS 0.1 0.0 - 0.4 K/UL    ABS. BASOPHILS 0.0 0.0 - 0.1 K/UL    ABS. IMM.  GRANS. 0.0 0.00 - 0.04 K/UL    DF AUTOMATED     MAGNESIUM    Collection Time: 02/13/21  1:13 AM   Result Value Ref Range    Magnesium 1.8 1.6 - 2.4 mg/dL   PHOSPHORUS    Collection Time: 02/13/21  1:13 AM   Result Value Ref Range    Phosphorus 1.6 (L) 2.6 - 4.7 MG/DL   METABOLIC PANEL, COMPREHENSIVE    Collection Time: 02/13/21  1:13 AM   Result Value Ref Range    Sodium 131 (L) 136 - 145 mmol/L    Potassium 3.7 3.5 - 5.1 mmol/L    Chloride 98 97 - 108 mmol/L    CO2 30 21 - 32 mmol/L    Anion gap 3 (L) 5 - 15 mmol/L    Glucose 84 65 - 100 mg/dL    BUN 7 6 - 20 MG/DL    Creatinine 0.44 (L) 0.70 - 1.30 MG/DL    BUN/Creatinine ratio 16 12 - 20      GFR est AA >60 >60 ml/min/1.73m2    GFR est non-AA >60 >60 ml/min/1.73m2    Calcium 8.2 (L) 8.5 - 10.1 MG/DL    Bilirubin, total 0.1 (L) 0.2 - 1.0 MG/DL    ALT (SGPT) 19 12 - 78 U/L    AST (SGOT) 33 15 - 37 U/L    Alk. phosphatase 67 45 - 117 U/L    Protein, total 6.2 (L) 6.4 - 8.2 g/dL    Albumin 2.2 (L) 3.5 - 5.0 g/dL    Globulin 4.0 2.0 - 4.0 g/dL    A-G Ratio 0.6 (L) 1.1 - 2.2     GLUCOSE, POC    Collection Time: 02/13/21  6:06 AM   Result Value Ref Range    Glucose (POC) 89 65 - 100 mg/dL    Performed by Leo Jara            Visit Vitals  BP 94/60 (BP 1 Location: Right arm, BP Patient Position: At rest)   Pulse 81   Temp 98 °F (36.7 °C)   Resp 12   Ht 6' (1.829 m)   Wt 65.8 kg (145 lb 1 oz)   SpO2 98%   BMI 19.67 kg/m²       Intake/Output Summary (Last 24 hours) at 2/13/2021 0650  Last data filed at 2/13/2021 3146  Gross per 24 hour   Intake 720 ml   Output 0 ml   Net 720 ml       The patient is a well developed, well nourished adult in no distress    Neck: incision intact, no swelling or increased erythema or induration, tracheal stoma healing well. AP serosanguinous discharge. Chest: Clear to auscultation bilaterally. No wheezes or crackles  Cardiovascular: Regular rate and rhythm    Lower extremities: no calf tenderness    A:   Hospital Problems  Never Reviewed          Codes Class Noted POA    * (Principal) Laryngeal carcinoma ([facility] [address] ICD-10-CM: C32.9  ICD-9-CM: 161.9  2/7/2021 Yes                  Plan:  Continue tube feed. Pt scheduled for barium swallow tomorrow. Continue home health planning.

## 2021-02-13 NOTE — PROGRESS NOTES
Overnight Hospitalist Progress Note    Name: Severo Pippins  YOB: 1970  MRN: [id number]  Admission Date: 2/7/2021    Date of service: 2:25 AM    Notified by primary nurse the patient complaining of pain at surgical site neck. He is requesting morphine, but she is reluctant to give it due to his soft blood pressure. We will start IVF NS at 125 cc an hour and give morphine.   Bolus if necessary    Patient Vitals for the past 12 hrs:   Temp Pulse Resp BP SpO2   02/13/21 0148 98.3 °F (36.8 °C) 75 18 (!) 89/54 96 %   02/13/21 0013  80  (!) 83/55    02/12/21 2202 98.7 °F (37.1 °C) 89 12 98/62 96 %   02/12/21 1812 98.4 °F (36.9 °C) 82 14 (!) 98/58 98 %   02/12/21 1500 98.5 °F (36.9 °C) 84 15 (!) 104/59 96 %         Fredy Sneed, MSN, RN, NP-C  [phone] or via Perfect [employer]

## 2021-02-13 NOTE — PROGRESS NOTES
Bedside shift change report given to Jason Desir RN (oncoming nurse) by Maverick Thomas RN (offgoing nurse). Report included the following information SBAR, Kardex, Intake/Output, MAR, Recent Results, Cardiac Rhythm NSR/ST and Dual Neuro Assessment.

## 2021-02-13 NOTE — PROGRESS NOTES
[address] [facility] Adult  Hospitalist Group                                                                                          Hospitalist Progress Note  Seng Kruse MD  Answering service: [phone] OR [phone] from in house phone        Date of Service:  2021  NAME:  Shayne Ceja  :  1970  MRN:  [id number]      Admission Summary:   Shayne Ceja is a 53 y.o. male with past medical history of laryngeal carcinoma, emphysema, chronic pain and opioid dependence, generalized anxiety disorder, NIDDM II who presented to [facility] regional ER on  with complaints of increased shortness of breath and intense neck/throat pain for 2 days. Interval history / Subjective:   Abdominal pain improved, tolerating his tube feeding. Not having any more diarrhea. Assessment & Plan:     Acute on Chronic hypoxic hypercapnic respiratory failure  2/2 laryngeal carcinoma  S/P emergent tracheostomy with trach collar @[facility]   S/P laryngectomy and radial neck dissection @[facility]   -Stable on RA  -Keep n.p.o. - speech following. Plan for barium swallow tomorrow.   Josseline Glasgow care per ENT. Pt will have to go home with trach care teaching. His family will be unable to come to the hospital for teaching. Attempting to have [facility] set up teaching session with family   -Supportive care  -Appreciate ENT input, drains in place with minimal output  - Anticipate DC on Monday   - Will need trach care, tube feeding, and teaching.     Dysphasia s/p PEG Tube: Placed @[facility]   - NPO for now, and plan for barium swallow tomorrow (plan for )  - Will need o/p speech therapy on DC  - Abdominal pain improved with osmolite, holding all bowel regimen  - Nutrition switched tube feeding to osmolite.      COPD   -Stable, not in acute exacerbation  -DuoNebs as needed      Constipation/diarrhea  - Resume miralax   - GI now signed off   - Nutrition to review tube feeding     Generalized anxiety disorder with panic attacks  -Stable, continue hydroxyzine as needed     Code status: FULL  DVT prophylaxis: SCDs    Care Plan discussed with: Patient/Family  Anticipated Disposition: Home w/Family  Anticipated Discharge: 24 hours to 48 hours, plan for Monday. Hospital Problems  Never Reviewed          Codes Class Noted POA    * (Principal) Laryngeal carcinoma ([facility] [address] ICD-10-CM: C32.9  ICD-9-CM: 161.9  2/7/2021 Yes                Review of Systems:   A comprehensive review of systems was negative except for that written in the HPI. Vital Signs:    Last 24hrs VS reviewed since prior progress note. Most recent are:  Visit Vitals  BP (!) 95/55 (BP Patient Position: At rest)   Pulse 81   Temp 98.4 °F (36.9 °C)   Resp 16   Ht 6' (1.829 m)   Wt 65.8 kg (145 lb 1 oz)   SpO2 96%   BMI 19.67 kg/m²         Intake/Output Summary (Last 24 hours) at 2/13/2021 1427  Last data filed at 2/13/2021 6187  Gross per 24 hour   Intake 640 ml   Output    Net 640 ml        Physical Examination:     I had a face to face encounter with this patient and independently examined them on 2/13/2021 as outlined below:          Constitutional:  No acute distress, cooperative, pleasant, cachectic    ENT:  Oral mucosa moist, oropharynx benign. Trach with AP drain in place, post surgical staples in place    Resp:  CTA bilaterally. No wheezing/rhonchi/rales. No accessory muscle use   CV:  Regular rhythm, normal rate, no murmurs, gallops, rubs    GI:  Soft, non distended, non tender. normoactive bowel sounds, no hepatosplenomegaly, + PEG in place     Musculoskeletal:  No edema, warm, 2+ pulses throughout    Neurologic:  Moves all extremities.   AAOx3, CN II-XII reviewed     Skin:  Good turgor, no rashes or ulcers       Data Review:    Review and/or order of clinical lab test  Review and/or order of tests in the radiology section of CPT  Review and/or order of tests in the medicine section of CPT    Labs:     Recent Labs     02/13/21  0113 02/12/21  0128   WBC 5.4 5. 6   HGB 9.1* 10.1*   HCT 26.6* 31.2*    342     Recent Labs     02/13/21 0113 02/12/21 0128 02/11/21  0526 02/11/21  0524   * 134* 132*  --    K 3.7 3.3* 3.4*  --    CL 98 99 96*  --    CO2 30 28 27  --    BUN 7 6 6  --    CREA 0.44* 0.35* 0.37*  --    GLU 84 85 85  --    CA 8.2* 8.2* 8.9  --    MG 1.8  --   --  1.8   PHOS 1.6*  --   --  2.1*     Recent Labs     02/13/21 0113 02/12/21 0128 02/11/21  0526   ALT 19 22 24   AP 67 71 71   TBILI 0.1* 0.2 0.2   TP 6.2* 6.5 6.7   ALB 2.2* 2.3* 2.4*   GLOB 4.0 4.2* 4.3*     No results for input(s): INR, PTP, APTT, INREXT, INREXT in the last 72 hours. No results for input(s): FE, TIBC, PSAT, FERR in the last 72 hours. No results found for: FOL, RBCF   No results for input(s): PH, PCO2, PO2 in the last 72 hours. No results for input(s): CPK, CKNDX, TROIQ in the last 72 hours.     No lab exists for component: CPKMB  No results found for: CHOL, CHOLX, CHLST, CHOLV, HDL, HDLP, LDL, LDLC, DLDLP, TGLX, TRIGL, TRIGP, CHHD, CHHDX  Lab Results   Component Value Date/Time    Glucose (POC) 135 (H) 02/13/2021 11:49 AM    Glucose (POC) 89 02/13/2021 06:06 AM    Glucose (POC) 111 (H) 02/12/2021 11:09 PM    Glucose (POC) 84 02/12/2021 06:24 PM    Glucose (POC) 83 02/12/2021 11:29 AM     No results found for: COLOR, APPRN, SPGRU, REFSG, NATALYA, PROTU, GLUCU, KETU, BILU, UROU, CHARISSA, LEUKU, GLUKE, EPSU, BACTU, WBCU, RBCU, CASTS, UCRY      Medications Reviewed:     Current Facility-Administered Medications   Medication Dose Route Frequency    0.9% sodium chloride infusion  125 mL/hr IntraVENous CONTINUOUS    benzocaine-menthoL (CEPACOL) lozenge 1 Lozenge  1 Lozenge Mucous Membrane PRN    sodium phosphate 30 mmol in 0.9% sodium chloride 250 mL infusion   IntraVENous ONCE    polyethylene glycol (MIRALAX) packet 17 g  17 g Per G Tube DAILY    sucralfate (CARAFATE) tablet 1 g  1 g Per G Tube AC&HS    phenol throat spray (CHLORASEPTIC) 1 Spray  1 Spray Oral PRN    glucose chewable tablet 16 g  4 Tab Oral PRN    dextrose (D50W) injection syrg 12.5-25 g  25-50 mL IntraVENous PRN    glucagon (GLUCAGEN) injection 1 mg  1 mg IntraMUSCular PRN    insulin lispro (HUMALOG) injection   SubCUTAneous Q6H    thiamine HCL (B-1) tablet 100 mg  100 mg Per G Tube DAILY    HYDROcodone-acetaminophen (HYCET) 0.5-21.7 mg/mL oral solution 10 mg  10 mg PEG Tube Q6H PRN    HYDROcodone-acetaminophen (HYCET) 0.5-21.7 mg/mL oral solution 5 mg  5 mg Oral Q6H PRN    hydrOXYzine (ATARAX) 10 mg/5 mL syrup 50 mg  50 mg Per G Tube Q6H PRN    sodium chloride (NS) flush 5-40 mL  5-40 mL IntraVENous Q8H    sodium chloride (NS) flush 5-40 mL  5-40 mL IntraVENous PRN    acetaminophen (TYLENOL) solution 650 mg  650 mg Per G Tube Q4H PRN    morphine injection 2 mg  2 mg IntraVENous Q4H PRN    ondansetron (ZOFRAN) injection 4 mg  4 mg IntraVENous Q4H PRN    [Held by provider] docusate (COLACE) 50 mg/5 mL oral liquid 200 mg  200 mg Per G Tube DAILY    sodium chloride (NS) flush 5-40 mL  5-40 mL IntraVENous PRN    sodium chloride (NS) flush 5-40 mL  5-40 mL IntraVENous Q8H    sodium chloride (NS) flush 5-40 mL  5-40 mL IntraVENous PRN     ______________________________________________________________________  EXPECTED LENGTH OF STAY: 11d 0h  ACTUAL LENGTH OF STAY:          6                 Jordan Ngo MD

## 2021-02-14 LAB
ANION GAP SERPL CALC-SCNC: 5 MMOL/L (ref 5–15)
BASOPHILS # BLD: 0 K/UL (ref 0–0.1)
BASOPHILS NFR BLD: 0 % (ref 0–1)
BUN SERPL-MCNC: 6 MG/DL (ref 6–20)
BUN/CREAT SERPL: 17 (ref 12–20)
CALCIUM SERPL-MCNC: 8 MG/DL (ref 8.5–10.1)
CHLORIDE SERPL-SCNC: 102 MMOL/L (ref 97–108)
CO2 SERPL-SCNC: 29 MMOL/L (ref 21–32)
CREAT SERPL-MCNC: 0.35 MG/DL (ref 0.7–1.3)
DIFFERENTIAL METHOD BLD: ABNORMAL
EOSINOPHIL # BLD: 0.1 K/UL (ref 0–0.4)
EOSINOPHIL NFR BLD: 1 % (ref 0–7)
ERYTHROCYTE [DISTWIDTH] IN BLOOD BY AUTOMATED COUNT: 12.7 % (ref 11.5–14.5)
GLUCOSE BLD STRIP.AUTO-MCNC: 103 MG/DL (ref 65–100)
GLUCOSE BLD STRIP.AUTO-MCNC: 135 MG/DL (ref 65–100)
GLUCOSE BLD STRIP.AUTO-MCNC: 171 MG/DL (ref 65–100)
GLUCOSE BLD STRIP.AUTO-MCNC: 84 MG/DL (ref 65–100)
GLUCOSE SERPL-MCNC: 82 MG/DL (ref 65–100)
HCT VFR BLD AUTO: 26.4 % (ref 36.6–50.3)
HGB BLD-MCNC: 8.8 G/DL (ref 12.1–17)
IMM GRANULOCYTES # BLD AUTO: 0 K/UL (ref 0–0.04)
IMM GRANULOCYTES NFR BLD AUTO: 0 % (ref 0–0.5)
LYMPHOCYTES # BLD: 0.9 K/UL (ref 0.8–3.5)
LYMPHOCYTES NFR BLD: 17 % (ref 12–49)
MAGNESIUM SERPL-MCNC: 1.6 MG/DL (ref 1.6–2.4)
MCH RBC QN AUTO: 32 PG (ref 26–34)
MCHC RBC AUTO-ENTMCNC: 33.3 G/DL (ref 30–36.5)
MCV RBC AUTO: 96 FL (ref 80–99)
MONOCYTES # BLD: 0.6 K/UL (ref 0–1)
MONOCYTES NFR BLD: 12 % (ref 5–13)
NEUTS SEG # BLD: 3.7 K/UL (ref 1.8–8)
NEUTS SEG NFR BLD: 70 % (ref 32–75)
NRBC # BLD: 0 K/UL (ref 0–0.01)
NRBC BLD-RTO: 0 PER 100 WBC
PHOSPHATE SERPL-MCNC: 2.6 MG/DL (ref 2.6–4.7)
PLATELET # BLD AUTO: 385 K/UL (ref 150–400)
PMV BLD AUTO: 9.6 FL (ref 8.9–12.9)
POTASSIUM SERPL-SCNC: 3.8 MMOL/L (ref 3.5–5.1)
RBC # BLD AUTO: 2.75 M/UL (ref 4.1–5.7)
SERVICE CMNT-IMP: ABNORMAL
SERVICE CMNT-IMP: NORMAL
SODIUM SERPL-SCNC: 136 MMOL/L (ref 136–145)
WBC # BLD AUTO: 5.4 K/UL (ref 4.1–11.1)

## 2021-02-14 PROCEDURE — 74011250637 HC RX REV CODE- 250/637: Performed by: INTERNAL MEDICINE

## 2021-02-14 PROCEDURE — 85025 COMPLETE CBC W/AUTO DIFF WBC: CPT

## 2021-02-14 PROCEDURE — 74011250636 HC RX REV CODE- 250/636: Performed by: NURSE PRACTITIONER

## 2021-02-14 PROCEDURE — 80048 BASIC METABOLIC PNL TOTAL CA: CPT

## 2021-02-14 PROCEDURE — 84100 ASSAY OF PHOSPHORUS: CPT

## 2021-02-14 PROCEDURE — 74011250637 HC RX REV CODE- 250/637: Performed by: NURSE PRACTITIONER

## 2021-02-14 PROCEDURE — 82962 GLUCOSE BLOOD TEST: CPT

## 2021-02-14 PROCEDURE — 65270000032 HC RM SEMIPRIVATE

## 2021-02-14 PROCEDURE — 74011250636 HC RX REV CODE- 250/636: Performed by: OTOLARYNGOLOGY

## 2021-02-14 PROCEDURE — 36415 COLL VENOUS BLD VENIPUNCTURE: CPT

## 2021-02-14 PROCEDURE — 74011250636 HC RX REV CODE- 250/636: Performed by: INTERNAL MEDICINE

## 2021-02-14 PROCEDURE — 74011250637 HC RX REV CODE- 250/637: Performed by: FAMILY MEDICINE

## 2021-02-14 PROCEDURE — 83735 ASSAY OF MAGNESIUM: CPT

## 2021-02-14 RX ORDER — MAGNESIUM SULFATE HEPTAHYDRATE 40 MG/ML
2 INJECTION, SOLUTION INTRAVENOUS ONCE
Status: COMPLETED | OUTPATIENT
Start: 2021-02-14 | End: 2021-02-14

## 2021-02-14 RX ADMIN — SUCRALFATE 1 G: 1 TABLET ORAL at 16:11

## 2021-02-14 RX ADMIN — MORPHINE SULFATE 2 MG: 2 INJECTION, SOLUTION INTRAMUSCULAR; INTRAVENOUS at 10:36

## 2021-02-14 RX ADMIN — SUCRALFATE 1 G: 1 TABLET ORAL at 11:25

## 2021-02-14 RX ADMIN — SUCRALFATE 1 G: 1 TABLET ORAL at 22:13

## 2021-02-14 RX ADMIN — MAGNESIUM SULFATE HEPTAHYDRATE 2 G: 40 INJECTION, SOLUTION INTRAVENOUS at 08:56

## 2021-02-14 RX ADMIN — Medication 10 ML: at 14:38

## 2021-02-14 RX ADMIN — SODIUM CHLORIDE 125 ML/HR: 9 INJECTION, SOLUTION INTRAVENOUS at 22:32

## 2021-02-14 RX ADMIN — MORPHINE SULFATE 2 MG: 2 INJECTION, SOLUTION INTRAMUSCULAR; INTRAVENOUS at 00:58

## 2021-02-14 RX ADMIN — Medication 100 MG: at 08:56

## 2021-02-14 RX ADMIN — SODIUM CHLORIDE 125 ML/HR: 9 INJECTION, SOLUTION INTRAVENOUS at 05:24

## 2021-02-14 RX ADMIN — POLYETHYLENE GLYCOL 3350 17 G: 17 POWDER, FOR SOLUTION ORAL at 08:56

## 2021-02-14 RX ADMIN — SUCRALFATE 1 G: 1 TABLET ORAL at 06:34

## 2021-02-14 RX ADMIN — SODIUM CHLORIDE 125 ML/HR: 9 INJECTION, SOLUTION INTRAVENOUS at 14:38

## 2021-02-14 NOTE — ROUTINE PROCESS
Bedside shift change report given to Karol Lama RN (oncoming nurse) by Kinjal Giles RN (offgoing nurse). Report included the following information SBAR, Kardex, Intake/Output, MAR, Cardiac Rhythm SR and Quality Measures.

## 2021-02-14 NOTE — PROGRESS NOTES
Otolaryngology, Head and Neck Surgery        The patient is post laryngectomy on 2/8/21. he is doing well with good pain control. The patient denies any chest pain or shortness of breath.       Hospital Problems  Never Reviewed          Codes Class Noted POA    * (Principal) Laryngeal carcinoma ([facility] [address] ICD-10-CM: C32.9  ICD-9-CM: 161.9  2/7/2021 Yes              Current Facility-Administered Medications   Medication Dose Route Frequency    0.9% sodium chloride infusion  125 mL/hr IntraVENous CONTINUOUS    benzocaine-menthoL (CEPACOL) lozenge 1 Lozenge  1 Lozenge Mucous Membrane PRN    polyethylene glycol (MIRALAX) packet 17 g  17 g Per G Tube DAILY    sucralfate (CARAFATE) tablet 1 g  1 g Per G Tube AC&HS    phenol throat spray (CHLORASEPTIC) 1 Spray  1 Spray Oral PRN    glucose chewable tablet 16 g  4 Tab Oral PRN    dextrose (D50W) injection syrg 12.5-25 g  25-50 mL IntraVENous PRN    glucagon (GLUCAGEN) injection 1 mg  1 mg IntraMUSCular PRN    insulin lispro (HUMALOG) injection   SubCUTAneous Q6H    thiamine HCL (B-1) tablet 100 mg  100 mg Per G Tube DAILY    HYDROcodone-acetaminophen (HYCET) 0.5-21.7 mg/mL oral solution 10 mg  10 mg PEG Tube Q6H PRN    HYDROcodone-acetaminophen (HYCET) 0.5-21.7 mg/mL oral solution 5 mg  5 mg Oral Q6H PRN    hydrOXYzine (ATARAX) 10 mg/5 mL syrup 50 mg  50 mg Per G Tube Q6H PRN    sodium chloride (NS) flush 5-40 mL  5-40 mL IntraVENous Q8H    sodium chloride (NS) flush 5-40 mL  5-40 mL IntraVENous PRN    acetaminophen (TYLENOL) solution 650 mg  650 mg Per G Tube Q4H PRN    morphine injection 2 mg  2 mg IntraVENous Q4H PRN    ondansetron (ZOFRAN) injection 4 mg  4 mg IntraVENous Q4H PRN    [Held by provider] docusate (COLACE) 50 mg/5 mL oral liquid 200 mg  200 mg Per G Tube DAILY    sodium chloride (NS) flush 5-40 mL  5-40 mL IntraVENous PRN    sodium chloride (NS) flush 5-40 mL  5-40 mL IntraVENous PRN       Recent Results (from the past 24 hour(s)) GLUCOSE, POC    Collection Time: 02/13/21  6:04 PM   Result Value Ref Range    Glucose (POC) 117 (H) 65 - 100 mg/dL    Performed by Reji Sahu    GLUCOSE, POC    Collection Time: 02/13/21 11:50 PM   Result Value Ref Range    Glucose (POC) 102 (H) 65 - 100 mg/dL    Performed by MARIBELL COLUNGA JR. [facility]  PCT    CBC WITH AUTOMATED DIFF    Collection Time: 02/14/21  1:10 AM   Result Value Ref Range    WBC 5.4 4.1 - 11.1 K/uL    RBC 2.75 (L) 4.10 - 5.70 M/uL    HGB 8.8 (L) 12.1 - 17.0 g/dL    HCT 26.4 (L) 36.6 - 50.3 %    MCV 96.0 80.0 - 99.0 FL    MCH 32.0 26.0 - 34.0 PG    MCHC 33.3 30.0 - 36.5 g/dL    RDW 12.7 11.5 - 14.5 %    PLATELET 227 314 - 699 K/uL    MPV 9.6 8.9 - 12.9 FL    NRBC 0.0 0  WBC    ABSOLUTE NRBC 0.00 0.00 - 0.01 K/uL    NEUTROPHILS 70 32 - 75 %    LYMPHOCYTES 17 12 - 49 %    MONOCYTES 12 5 - 13 %    EOSINOPHILS 1 0 - 7 %    BASOPHILS 0 0 - 1 %    IMMATURE GRANULOCYTES 0 0.0 - 0.5 %    ABS. NEUTROPHILS 3.7 1.8 - 8.0 K/UL    ABS. LYMPHOCYTES 0.9 0.8 - 3.5 K/UL    ABS. MONOCYTES 0.6 0.0 - 1.0 K/UL    ABS. EOSINOPHILS 0.1 0.0 - 0.4 K/UL    ABS. BASOPHILS 0.0 0.0 - 0.1 K/UL    ABS. IMM.  GRANS. 0.0 0.00 - 0.04 K/UL    DF AUTOMATED     MAGNESIUM    Collection Time: 02/14/21  1:10 AM   Result Value Ref Range    Magnesium 1.6 1.6 - 2.4 mg/dL   METABOLIC PANEL, BASIC    Collection Time: 02/14/21  1:10 AM   Result Value Ref Range    Sodium 136 136 - 145 mmol/L    Potassium 3.8 3.5 - 5.1 mmol/L    Chloride 102 97 - 108 mmol/L    CO2 29 21 - 32 mmol/L    Anion gap 5 5 - 15 mmol/L    Glucose 82 65 - 100 mg/dL    BUN 6 6 - 20 MG/DL    Creatinine 0.35 (L) 0.70 - 1.30 MG/DL    BUN/Creatinine ratio 17 12 - 20      GFR est AA >60 >60 ml/min/1.73m2    GFR est non-AA >60 >60 ml/min/1.73m2    Calcium 8.0 (L) 8.5 - 10.1 MG/DL   PHOSPHORUS    Collection Time: 02/14/21  1:10 AM   Result Value Ref Range    Phosphorus 2.6 2.6 - 4.7 MG/DL   GLUCOSE, POC    Collection Time: 02/14/21  6:13 AM   Result Value Ref Range Glucose (POC) 103 (H) 65 - 100 mg/dL    Performed by JAZIEL Ibrahim  PCT    GLUCOSE, POC    Collection Time: 02/14/21 11:14 AM   Result Value Ref Range    Glucose (POC) 84 65 - 100 mg/dL    Performed by SILVANA MORGAN            Visit Vitals  /61 (BP 1 Location: Right upper arm, BP Patient Position: At rest;Sitting)   Pulse 90   Temp 98 °F (36.7 °C)   Resp 17   Ht 6' (1.829 m)   Wt 60.1 kg (132 lb 9.6 oz)   SpO2 98%   BMI 17.98 kg/m²       Intake/Output Summary (Last 24 hours) at 2/14/2021 1219  Last data filed at 2/14/2021 0634  Gross per 24 hour   Intake 3340 ml   Output 1200 ml   Net 2140 ml       The patient is a well developed, well nourished adult in no distress    Neck: incision intact, no swelling or increased erythema or induration.  AP with serosanguinous discharge.      Chest: Clear to auscultation bilaterally.  No wheezes or crackles  Cardiovascular: Regular rate and rhythm    Lower extremities: no calf tenderness    A:   Hospital Problems  Never Reviewed          Codes Class Noted POA    * (Principal) Laryngeal carcinoma (HCC) ICD-10-CM: C32.9  ICD-9-CM: 161.9  2/7/2021 Yes                  Plan:  -  Pt awaiting swallow study.  -  Will start PO after the barium swallow

## 2021-02-14 NOTE — PROGRESS NOTES
TRANSFER - OUT REPORT:    Verbal report given to Koffi Strauss RN (name) on Jennifer Orlando  being transferred to [address] [place] [address] (unit) for routine progression of care       Report consisted of patients Situation, Background, Assessment and   Recommendations(SBAR). Information from the following report(s) SBAR, Kardex, Intake/Output, MAR, Recent Results, Cardiac Rhythm NSR/ST and Dual Neuro Assessment was reviewed with the receiving nurse. Lines:   Peripheral IV 02/01/21 Posterior;Right Forearm (Active)   Site Assessment Clean, dry, & intact 02/13/21 2000   Phlebitis Assessment 0 02/13/21 2000   Infiltration Assessment 0 02/13/21 2000   Dressing Status Clean, dry, & intact 02/13/21 2000   Dressing Type Transparent;Tape 02/13/21 2000   Hub Color/Line Status Pink;Capped 02/13/21 2000   Action Taken Open ports on tubing capped 02/13/21 2000   Alcohol Cap Used Yes 02/13/21 2000        Opportunity for questions and clarification was provided.       Patient transported with:   Monitor  Patient's medications from home  Patient-specific medications from Pharmacy  Registered Nurse

## 2021-02-14 NOTE — PROGRESS NOTES
Bedside shift change report given to Mendoza David RN (oncoming nurse) by Janelle Maldonado RN (offgoing nurse). Report included the following information SBAR, Kardex, Intake/Output, MAR and Recent Results.

## 2021-02-14 NOTE — PROGRESS NOTES
Pt requested RN help him set up transport to home Monday if d/c that day. Pt pointed to paper stating that [facility]'s number was [phone]. RN will pass off to day shift.

## 2021-02-14 NOTE — PROGRESS NOTES
10:11 AM  Patient stated transport was for today, checked with  and Sekou Baker MD, MD stated that patient will discharge tomorrow and he is planned for barium swallow today. Patient notified and wife updated on plan. Transport called at [phone], transporter stated to call him whenever the patient is ready for  tomorrow. 6:53 PM  I have reviewed and agree with Alem Gilmore RN documentation.

## 2021-02-14 NOTE — PROGRESS NOTES
[facility] Adult  Hospitalist Group                                                                                          Hospitalist Progress Note  Jae Parkinson MD  Answering service: [phone] OR [phone] from in house phone        Date of Service:  2021  NAME:  Bossman Bush  :  1970  MRN:  [id number]      Admission Summary:   Bossman Bush is a 53 y.o. male with past medical history of laryngeal carcinoma, emphysema, chronic pain and opioid dependence, generalized anxiety disorder, NIDDM II [facility]  with complaints of increased shortness of breath and intense neck/throat pain for 2 days. Interval history / Subjective:      Pt complaining about discomfort around his AP drains. Unable to get swallow test today. Patient antsy about going home. Patient feels comfortable doing his own PEG feeding. Assessment & Plan:     Acute on Chronic hypoxic hypercapnic respiratory failure  2/2 laryngeal carcinoma  S/P emergent tracheostomy with trach collar @[facility]   S/P laryngectomy and radial neck dissection [facility]   -Stable on RA  -Keep n.p.o. - speech following. Plan for barium swallow tomorrow.   Alpheus Marrow care per ENT. Pt will have to go home with trach care teaching. His family will be unable to come to the hospital for teaching. Attempting to have [facility] set up teaching session with family   -Supportive care  -Appreciate ENT input, drains in place with minimal output  - Anticipate DC on Monday, tomorrow. - Will need trach care, tube feeding, and teaching.     Dysphasia s/p PEG Tube: Placed @[facility]   - NPO for now, and plan for barium swallow tomorrow (plan for )  - Will need o/p speech therapy on DC  - Abdominal pain improved with osmolite, holding all bowel regimen  - Nutrition switched tube feeding to osmolite.      COPD   -Stable, not in acute exacerbation  -DuoNebs as needed      Constipation/diarrhea  - Resume miralax - GI now signed off   - Nutrition to review tube feeding     Generalized anxiety disorder with panic attacks  -Stable, continue hydroxyzine as needed     Code status: FULL  DVT prophylaxis: SCDs    Care Plan discussed with: Patient/Family  Anticipated Disposition: Home w/Family  Anticipated Discharge: Less than 24 hours, plan for Monday. Hospital Problems  Never Reviewed          Codes Class Noted POA    * (Principal) Laryngeal carcinoma ([facility] [address] ICD-10-CM: C32.9  ICD-9-CM: 161.9  2/7/2021 Yes                Review of Systems:   A comprehensive review of systems was negative except for that written in the HPI. Vital Signs:    Last 24hrs VS reviewed since prior progress note. Most recent are:  Visit Vitals  /61 (BP 1 Location: Right upper arm, BP Patient Position: At rest;Sitting)   Pulse 90   Temp 98 °F (36.7 °C)   Resp 17   Ht 6' (1.829 m)   Wt 60.1 kg (132 lb 9.6 oz)   SpO2 98%   BMI 17.98 kg/m²         Intake/Output Summary (Last 24 hours) at 2/14/2021 1355  Last data filed at 2/14/2021 1320  Gross per 24 hour   Intake 3980 ml   Output 1550 ml   Net 2430 ml        Physical Examination:     I had a face to face encounter with this patient and independently examined them on 2/14/2021 as outlined below:          Constitutional:  No acute distress, cooperative, pleasant, cachectic    ENT:  Oral mucosa moist, oropharynx benign. Trach with AP drain in place, post surgical staples in place    Resp:  CTA bilaterally. No wheezing/rhonchi/rales. No accessory muscle use   CV:  Regular rhythm, normal rate, no murmurs, gallops, rubs    GI:  Soft, non distended, non tender. normoactive bowel sounds, no hepatosplenomegaly, + PEG in place     Musculoskeletal:  No edema, warm, 2+ pulses throughout    Neurologic:  Moves all extremities.   AAOx3, CN II-XII reviewed     Skin:  Good turgor, no rashes or ulcers       Data Review:    Review and/or order of clinical lab test  Review and/or order of tests in the radiology section of CPT  Review and/or order of tests in the medicine section of CPT    Labs:     Recent Labs     02/14/21 0110 02/13/21 0113   WBC 5.4 5.4   HGB 8.8* 9.1*   HCT 26.4* 26.6*    388     Recent Labs     02/14/21  0110 02/13/21 0113 02/12/21 0128    131* 134*   K 3.8 3.7 3.3*    98 99   CO2 29 30 28   BUN 6 7 6   CREA 0.35* 0.44* 0.35*   GLU 82 84 85   CA 8.0* 8.2* 8.2*   MG 1.6 1.8  --    PHOS 2.6 1.6*  --      Recent Labs     02/13/21 0113 02/12/21 0128   ALT 19 22   AP 67 71   TBILI 0.1* 0.2   TP 6.2* 6.5   ALB 2.2* 2.3*   GLOB 4.0 4.2*     No results for input(s): INR, PTP, APTT, INREXT, INREXT in the last 72 hours. No results for input(s): FE, TIBC, PSAT, FERR in the last 72 hours. No results found for: FOL, RBCF   No results for input(s): PH, PCO2, PO2 in the last 72 hours. No results for input(s): CPK, CKNDX, TROIQ in the last 72 hours.     No lab exists for component: CPKMB  No results found for: CHOL, CHOLX, CHLST, CHOLV, HDL, HDLP, LDL, LDLC, DLDLP, TGLX, TRIGL, TRIGP, CHHD, CHHDX  Lab Results   Component Value Date/Time    Glucose (POC) 84 02/14/2021 11:14 AM    Glucose (POC) 103 (H) 02/14/2021 06:13 AM    Glucose (POC) 102 (H) 02/13/2021 11:50 PM    Glucose (POC) 117 (H) 02/13/2021 06:04 PM    Glucose (POC) 135 (H) 02/13/2021 11:49 AM     No results found for: COLOR, APPRN, SPGRU, REFSG, NATALYA, PROTU, GLUCU, KETU, BILU, UROU, CHARISSA, LEUKU, GLUKE, EPSU, BACTU, WBCU, RBCU, CASTS, UCRY      Medications Reviewed:     Current Facility-Administered Medications   Medication Dose Route Frequency    0.9% sodium chloride infusion  125 mL/hr IntraVENous CONTINUOUS    benzocaine-menthoL (CEPACOL) lozenge 1 Lozenge  1 Lozenge Mucous Membrane PRN    polyethylene glycol (MIRALAX) packet 17 g  17 g Per G Tube DAILY    sucralfate (CARAFATE) tablet 1 g  1 g Per G Tube AC&HS    phenol throat spray (CHLORASEPTIC) 1 Spray  1 Spray Oral PRN    glucose chewable tablet 16 g  4 Tab Oral PRN  dextrose (D50W) injection syrg 12.5-25 g  25-50 mL IntraVENous PRN    glucagon (GLUCAGEN) injection 1 mg  1 mg IntraMUSCular PRN    insulin lispro (HUMALOG) injection   SubCUTAneous Q6H    thiamine HCL (B-1) tablet 100 mg  100 mg Per G Tube DAILY    HYDROcodone-acetaminophen (HYCET) 0.5-21.7 mg/mL oral solution 10 mg  10 mg PEG Tube Q6H PRN    HYDROcodone-acetaminophen (HYCET) 0.5-21.7 mg/mL oral solution 5 mg  5 mg Oral Q6H PRN    hydrOXYzine (ATARAX) 10 mg/5 mL syrup 50 mg  50 mg Per G Tube Q6H PRN    sodium chloride (NS) flush 5-40 mL  5-40 mL IntraVENous Q8H    sodium chloride (NS) flush 5-40 mL  5-40 mL IntraVENous PRN    acetaminophen (TYLENOL) solution 650 mg  650 mg Per G Tube Q4H PRN    morphine injection 2 mg  2 mg IntraVENous Q4H PRN    ondansetron (ZOFRAN) injection 4 mg  4 mg IntraVENous Q4H PRN    [Held by provider] docusate (COLACE) 50 mg/5 mL oral liquid 200 mg  200 mg Per G Tube DAILY    sodium chloride (NS) flush 5-40 mL  5-40 mL IntraVENous PRN    sodium chloride (NS) flush 5-40 mL  5-40 mL IntraVENous PRN     ______________________________________________________________________  EXPECTED LENGTH OF STAY: 11d 0h  ACTUAL LENGTH OF STAY:          7                 Maryjo Sexton MD

## 2021-02-15 ENCOUNTER — APPOINTMENT (OUTPATIENT)
Dept: GENERAL RADIOLOGY | Age: 51
DRG: 098 | End: 2021-02-15
Attending: OTOLARYNGOLOGY
Payer: MEDICAID

## 2021-02-15 PROBLEM — E43 SEVERE PROTEIN-CALORIE MALNUTRITION (HCC): Status: ACTIVE | Noted: 2021-02-15

## 2021-02-15 LAB
ANION GAP SERPL CALC-SCNC: 5 MMOL/L (ref 5–15)
BASOPHILS # BLD: 0 K/UL (ref 0–0.1)
BASOPHILS NFR BLD: 0 % (ref 0–1)
BUN SERPL-MCNC: 6 MG/DL (ref 6–20)
BUN/CREAT SERPL: 15 (ref 12–20)
CALCIUM SERPL-MCNC: 8.5 MG/DL (ref 8.5–10.1)
CHLORIDE SERPL-SCNC: 103 MMOL/L (ref 97–108)
CO2 SERPL-SCNC: 27 MMOL/L (ref 21–32)
CREAT SERPL-MCNC: 0.41 MG/DL (ref 0.7–1.3)
DIFFERENTIAL METHOD BLD: ABNORMAL
EOSINOPHIL # BLD: 0 K/UL (ref 0–0.4)
EOSINOPHIL NFR BLD: 1 % (ref 0–7)
ERYTHROCYTE [DISTWIDTH] IN BLOOD BY AUTOMATED COUNT: 13.2 % (ref 11.5–14.5)
GLUCOSE BLD STRIP.AUTO-MCNC: 105 MG/DL (ref 65–100)
GLUCOSE BLD STRIP.AUTO-MCNC: 123 MG/DL (ref 65–100)
GLUCOSE BLD STRIP.AUTO-MCNC: 140 MG/DL (ref 65–100)
GLUCOSE BLD STRIP.AUTO-MCNC: 90 MG/DL (ref 65–100)
GLUCOSE SERPL-MCNC: 83 MG/DL (ref 65–100)
HCT VFR BLD AUTO: 29 % (ref 36.6–50.3)
HGB BLD-MCNC: 9.5 G/DL (ref 12.1–17)
IMM GRANULOCYTES # BLD AUTO: 0 K/UL (ref 0–0.04)
IMM GRANULOCYTES NFR BLD AUTO: 0 % (ref 0–0.5)
LYMPHOCYTES # BLD: 0.9 K/UL (ref 0.8–3.5)
LYMPHOCYTES NFR BLD: 12 % (ref 12–49)
MAGNESIUM SERPL-MCNC: 2 MG/DL (ref 1.6–2.4)
MCH RBC QN AUTO: 31.6 PG (ref 26–34)
MCHC RBC AUTO-ENTMCNC: 32.8 G/DL (ref 30–36.5)
MCV RBC AUTO: 96.3 FL (ref 80–99)
MONOCYTES # BLD: 0.7 K/UL (ref 0–1)
MONOCYTES NFR BLD: 9 % (ref 5–13)
NEUTS SEG # BLD: 6.1 K/UL (ref 1.8–8)
NEUTS SEG NFR BLD: 78 % (ref 32–75)
NRBC # BLD: 0 K/UL (ref 0–0.01)
NRBC BLD-RTO: 0 PER 100 WBC
PHOSPHATE SERPL-MCNC: 2.7 MG/DL (ref 2.6–4.7)
PLATELET # BLD AUTO: 468 K/UL (ref 150–400)
PMV BLD AUTO: 9.7 FL (ref 8.9–12.9)
POTASSIUM SERPL-SCNC: 4 MMOL/L (ref 3.5–5.1)
RBC # BLD AUTO: 3.01 M/UL (ref 4.1–5.7)
SERVICE CMNT-IMP: ABNORMAL
SERVICE CMNT-IMP: NORMAL
SODIUM SERPL-SCNC: 135 MMOL/L (ref 136–145)
WBC # BLD AUTO: 7.7 K/UL (ref 4.1–11.1)

## 2021-02-15 PROCEDURE — 82962 GLUCOSE BLOOD TEST: CPT

## 2021-02-15 PROCEDURE — 74220 X-RAY XM ESOPHAGUS 1CNTRST: CPT

## 2021-02-15 PROCEDURE — 74011000636 HC RX REV CODE- 636: Performed by: INTERNAL MEDICINE

## 2021-02-15 PROCEDURE — 84100 ASSAY OF PHOSPHORUS: CPT

## 2021-02-15 PROCEDURE — 74011250636 HC RX REV CODE- 250/636: Performed by: OTOLARYNGOLOGY

## 2021-02-15 PROCEDURE — 74011250637 HC RX REV CODE- 250/637: Performed by: INTERNAL MEDICINE

## 2021-02-15 PROCEDURE — 36415 COLL VENOUS BLD VENIPUNCTURE: CPT

## 2021-02-15 PROCEDURE — 74011250637 HC RX REV CODE- 250/637: Performed by: FAMILY MEDICINE

## 2021-02-15 PROCEDURE — 65270000032 HC RM SEMIPRIVATE

## 2021-02-15 PROCEDURE — 74011250637 HC RX REV CODE- 250/637: Performed by: NURSE PRACTITIONER

## 2021-02-15 PROCEDURE — 74011250636 HC RX REV CODE- 250/636: Performed by: NURSE PRACTITIONER

## 2021-02-15 PROCEDURE — 85025 COMPLETE CBC W/AUTO DIFF WBC: CPT

## 2021-02-15 PROCEDURE — 77030018842 HC SOL IRR SOD CL 9% BAXT -A

## 2021-02-15 PROCEDURE — 83735 ASSAY OF MAGNESIUM: CPT

## 2021-02-15 PROCEDURE — 80048 BASIC METABOLIC PNL TOTAL CA: CPT

## 2021-02-15 RX ADMIN — SUCRALFATE 1 G: 1 TABLET ORAL at 11:16

## 2021-02-15 RX ADMIN — IOHEXOL 50 ML: 240 INJECTION, SOLUTION INTRATHECAL; INTRAVASCULAR; INTRAVENOUS; ORAL at 10:35

## 2021-02-15 RX ADMIN — MORPHINE SULFATE 2 MG: 2 INJECTION, SOLUTION INTRAMUSCULAR; INTRAVENOUS at 01:33

## 2021-02-15 RX ADMIN — POLYETHYLENE GLYCOL 3350 17 G: 17 POWDER, FOR SOLUTION ORAL at 08:26

## 2021-02-15 RX ADMIN — SODIUM CHLORIDE 125 ML/HR: 9 INJECTION, SOLUTION INTRAVENOUS at 17:00

## 2021-02-15 RX ADMIN — SUCRALFATE 1 G: 1 TABLET ORAL at 21:56

## 2021-02-15 RX ADMIN — SUCRALFATE 1 G: 1 TABLET ORAL at 06:36

## 2021-02-15 RX ADMIN — Medication 10 ML: at 12:54

## 2021-02-15 RX ADMIN — Medication 10 ML: at 21:56

## 2021-02-15 RX ADMIN — Medication 100 MG: at 08:26

## 2021-02-15 RX ADMIN — SODIUM CHLORIDE 125 ML/HR: 9 INJECTION, SOLUTION INTRAVENOUS at 06:38

## 2021-02-15 NOTE — PROGRESS NOTES
Transitions of Care plan: Home with home health, DME    -RUR: 18%  -Call recived from attending MD stating patient was ready for discharge today and that all home needs should have already been arranged.  -Chart reviewed, noted previous CM had sent a referral to [facility] for the home infusion. Message sent to [phone], awaiting response. -CM contacted [address] [place] [address] (who provides trach supplies) to see if they can travel to patient's address and accept his insurance, they do. CM faxed progress notes, surgical report, demographics to them ([phone], f: [phone], f: [phone], Gaurang Romero: [phone]). Gaurang Romero will review the paperwork and then fax CM all of the orders that will need to be signed. -Patient will also need home health SN for lab draws, PICC care, trach management. CM will obtain an order for this. 12:35pm: CM submitted home health orders to multiple home health agencies to see who can accept his insurance and travel to [place], [place].     1:30pm: CM received a message from the speech therapist, stating that patient did not need any DME set up for his larytube, that it had already been set up outpatient through [facility] ([phone]). 1:45pm: CM met with patient at the bedside to discuss further. Patient indicated that he did not have all of the supplies he needed, showing CM what was in the box provided by [employer]. CM called [employer] and the representative stated they had no information on this patient. The representative will now call the speech therapist for clarification. 2:20pm: CM and SLP have connected, discussed case. [employer] will now be faxed the orders and start the process for the items he will need at home. Patient does have enough supplies to last him this week until the supplies are delivered (which take 3-5 days). 2:40pm: CM contacted [phone], informed them patient would not be discharging today.  They stated they still need to know who the home health company is (not set up yet) as well as documentation from the dietician indicating the tube feed will be providing over 75% of his nutrition needs. CM will review dietician notes.      Sharmaine PRATT, ACM-SW

## 2021-02-15 NOTE — PROGRESS NOTES
[address] [facility] Adult  Hospitalist Group                                                                                          Hospitalist Progress Note  Cha Galarza MD  Answering service: [phone] OR [phone] from in house phone        Date of Service:  2/15/2021  NAME:  Hyun Key  :  1970  MRN:  [id number]      Admission Summary:   Hyun Key is a 53 y.o. male with past medical history of laryngeal carcinoma, emphysema, chronic pain and opioid dependence, generalized anxiety disorder, NIDDM II who presented to [facility] regional ER on  with complaints of increased shortness of breath and intense neck/throat pain for 2 days. Interval history / Subjective:      Ongoing discomfort related to AP drains. Wants to go home, and wants his drains removed. Barium swallow pending. Cm working on ensuring all equipment is set up for home. Assessment & Plan:     Acute on Chronic hypoxic hypercapnic respiratory failure  2/2 laryngeal carcinoma  S/P emergent tracheostomy with trach collar @[facility]   S/P laryngectomy and radial neck dissection @[facility]   -Stable on RA  -Keep n.p.o. - speech following. Plan for barium swallow tomorrow.   [facility] care per ENT. Pt will have to go home with [facility] care teaching. His family will be unable to come to the hospital for teaching. Attempting to have [facility] set up teaching session with family   -Supportive care  -Appreciate ENT input, drains in place with minimal output ? Can be removed  - Anticipate DC once all home health needs are obtained, tube feeding, trach supplies. Home heatlh. Cm aware. - Will need trach care, tube feeding, and teaching.     Dysphasia s/p PEG Tube: Placed @[facility]   - NPO for now, and plan for barium swallow today. Pending results. - Will need o/p speech therapy on DC  - Abdominal pain improved with osmolite, holding all bowel regimen  - Nutrition switched tube feeding to osmolite.      COPD   -Stable, not in acute exacerbation  -DuoNebs as needed      Constipation/diarrhea  - Resume miralax   - GI now signed off   - Nutrition to review tube feeding     Generalized anxiety disorder with panic attacks  -Stable, continue hydroxyzine as needed     Code status: FULL  DVT prophylaxis: SCDs    Care Plan discussed with: Patient/Family  Anticipated Disposition: Home w/Family  Anticipated Discharge: Less than 24 hours, medically ready. Waiting for all home health needs ot be set up      Hospital Problems  Never Reviewed          Codes Class Noted POA    Severe protein-calorie malnutrition ([facility] [address] ICD-10-CM: E43  ICD-9-CM: 918  2/15/2021 Yes        * (Principal) Laryngeal carcinoma ([facility] [address] ICD-10-CM: C32.9  ICD-9-CM: 161.9  2/7/2021 Yes                Review of Systems:   A comprehensive review of systems was negative except for that written in the HPI. Vital Signs:    Last 24hrs VS reviewed since prior progress note. Most recent are:  Visit Vitals  /67 (BP 1 Location: Right upper arm, BP Patient Position: At rest;Sitting)   Pulse (!) 107   Temp 98.4 °F (36.9 °C)   Resp 16   Ht 6' (1.829 m)   Wt 63.1 kg (139 lb 3.2 oz)   SpO2 98%   BMI 18.88 kg/m²         Intake/Output Summary (Last 24 hours) at 2/15/2021 1523  Last data filed at 2/15/2021 1220  Gross per 24 hour   Intake 3280 ml   Output 320 ml   Net 2960 ml        Physical Examination:     I had a face to face encounter with this patient and independently examined them on 2/15/2021 as outlined below:          Constitutional:  No acute distress, cooperative, pleasant, cachectic    ENT:  Oral mucosa moist, oropharynx benign. Trach with AP drain in place, post surgical staples in place    Resp:  CTA bilaterally. No wheezing/rhonchi/rales. No accessory muscle use   CV:  Regular rhythm, normal rate, no murmurs, gallops, rubs    GI:  Soft, non distended, non tender.  normoactive bowel sounds, no hepatosplenomegaly, + PEG in place     Musculoskeletal:  No edema, warm, 2+ pulses throughout    Neurologic:  Moves all extremities. AAOx3, CN II-XII reviewed     Skin:  Good turgor, no rashes or ulcers       Data Review:    Review and/or order of clinical lab test  Review and/or order of tests in the radiology section of CPT  Review and/or order of tests in the medicine section of CPT    Labs:     Recent Labs     02/15/21  0403 02/14/21  0110   WBC 7.7 5.4   HGB 9.5* 8.8*   HCT 29.0* 26.4*   * 385     Recent Labs     02/15/21  0403 02/14/21  0110 02/13/21  0113   * 136 131*   K 4.0 3.8 3.7    102 98   CO2 27 29 30   BUN 6 6 7   CREA 0.41* 0.35* 0.44*   GLU 83 82 84   CA 8.5 8.0* 8.2*   MG 2.0 1.6 1.8   PHOS 2.7 2.6 1.6*     Recent Labs     02/13/21 0113   ALT 19   AP 67   TBILI 0.1*   TP 6.2*   ALB 2.2*   GLOB 4.0     No results for input(s): INR, PTP, APTT, INREXT, INREXT in the last 72 hours. No results for input(s): FE, TIBC, PSAT, FERR in the last 72 hours. No results found for: FOL, RBCF   No results for input(s): PH, PCO2, PO2 in the last 72 hours. No results for input(s): CPK, CKNDX, TROIQ in the last 72 hours.     No lab exists for component: CPKMB  No results found for: CHOL, CHOLX, CHLST, CHOLV, HDL, HDLP, LDL, LDLC, DLDLP, TGLX, TRIGL, TRIGP, CHHD, CHHDX  Lab Results   Component Value Date/Time    Glucose (POC) 123 (H) 02/15/2021 11:48 AM    Glucose (POC) 90 02/15/2021 05:17 AM    Glucose (POC) 135 (H) 02/14/2021 11:28 PM    Glucose (POC) 171 (H) 02/14/2021 04:25 PM    Glucose (POC) 84 02/14/2021 11:14 AM     No results found for: COLOR, APPRN, SPGRU, REFSG, NATALYA, PROTU, GLUCU, KETU, BILU, UROU, CHARISSA, LEUKU, GLUKE, EPSU, BACTU, WBCU, RBCU, CASTS, UCRY      Medications Reviewed:     Current Facility-Administered Medications   Medication Dose Route Frequency    0.9% sodium chloride infusion  125 mL/hr IntraVENous CONTINUOUS    polyethylene glycol (MIRALAX) packet 17 g  17 g Per G Tube DAILY    sucralfate (CARAFATE) tablet 1 g  1 g Per G Tube AC&HS    phenol throat spray (CHLORASEPTIC) 1 Spray  1 Spray Oral PRN    glucose chewable tablet 16 g  4 Tab Oral PRN    dextrose (D50W) injection syrg 12.5-25 g  25-50 mL IntraVENous PRN    glucagon (GLUCAGEN) injection 1 mg  1 mg IntraMUSCular PRN    insulin lispro (HUMALOG) injection   SubCUTAneous Q6H    thiamine HCL (B-1) tablet 100 mg  100 mg Per G Tube DAILY    HYDROcodone-acetaminophen (HYCET) 0.5-21.7 mg/mL oral solution 10 mg  10 mg PEG Tube Q6H PRN    HYDROcodone-acetaminophen (HYCET) 0.5-21.7 mg/mL oral solution 5 mg  5 mg Oral Q6H PRN    hydrOXYzine (ATARAX) 10 mg/5 mL syrup 50 mg  50 mg Per G Tube Q6H PRN    sodium chloride (NS) flush 5-40 mL  5-40 mL IntraVENous Q8H    sodium chloride (NS) flush 5-40 mL  5-40 mL IntraVENous PRN    acetaminophen (TYLENOL) solution 650 mg  650 mg Per G Tube Q4H PRN    morphine injection 2 mg  2 mg IntraVENous Q4H PRN    ondansetron (ZOFRAN) injection 4 mg  4 mg IntraVENous Q4H PRN    [Held by provider] docusate (COLACE) 50 mg/5 mL oral liquid 200 mg  200 mg Per G Tube DAILY    sodium chloride (NS) flush 5-40 mL  5-40 mL IntraVENous PRN    sodium chloride (NS) flush 5-40 mL  5-40 mL IntraVENous PRN     ______________________________________________________________________  EXPECTED LENGTH OF STAY: 11d 0h  ACTUAL LENGTH OF STAY:          8                 Jordan Ngo MD

## 2021-02-15 NOTE — PROGRESS NOTES
Pt completed his PEG tube feedings by himself with nurse at bedside. He tolerated feedings without nausea. Bedside shift change report given to [facility] (oncoming nurse) by Adelina COUGHLIN (offgoing nurse). Report included the following information SBAR, Intake/Output, MAR and Recent Results.

## 2021-02-15 NOTE — PROGRESS NOTES
Comprehensive Nutrition Assessment    Type and Reason for Visit: Initial, Consult    Nutrition Recommendations/Plan:    1. Diet advancement per ENT  2. Continue tube feeds as ordered. - Osmolite 1.5, 360ml bolus 4x/day + 180ml bolus 1x/day with 115ml flush pre/post bolus (or equivalent formula)   - Can be adjusted at home pending diet advancement and adequacy of oral intake. 3. Discontinue IVF since will not be getting at home    Nutrition Assessment:    Pt admitted for SOB. PMHx emphysema/ COPD, chronic pain with opioid dependence, and generalized anxiety disorder. Emergent tracheostomy on 2/1with PEG placement on 2/3 at [facility]. Total laryngectomy with radical neck dissection completed on 2/8. Tube feeds started and tolerating with no issues. Provides: 1620ml, 2430kcal, 101g protein, 1231ml free fluid + 1150ml flush = 2381ml fluid. Meets 100% energy and protein need. Addendum: enteral nutrition with be patients primary source of nutrition to meet >75% needs with current NPO status. Energy needs estimated to be over 2000kcal/day with hypercatabolic illness and severe malnutrition on admission. Pt visited today. Self-administering feeds with no issues. Pt is set up with [facility] for home tube feeds. Pt with questions regarding formula for home, case mgmt to check on when delivery is made vs needing supplies until they can be delivered. Pt with no other nutrition-related questions/concerns at this time. Per SLP notes: \"Furthermore, as stated previously, swallowing is per ENT as pt cannot aspirate unless he has a fistula. .. Diet initiation will be per ENT and thus SLP will not address swallowing. \"  MBS completed today with possible d/c later per chart review. Severe wt loss of 26% ( at least 42#) over past 5-6 months with #. Muscle and fat wasting observed.      Malnutrition Assessment:  Malnutrition Status:  Severe malnutrition    Context:  Acute illness     Findings of the 6 clinical characteristics of malnutrition:   Energy Intake:  7 - 50% or less of est energy requirements for 5 or more days  Weight Loss:  7.00 - Greater than 7.5% over 3 months     Body Fat Loss:  7 - Moderate body fat loss, Orbital   Muscle Mass Loss:  7 - Moderate muscle mass loss, Temples (temporalis), Clavicles (pectoralis & deltoids)  Fluid Accumulation:  No significant fluid accumulation,     Strength:  Not performed     Nutritionally Significant Medications: SSI, miralax, carafate, thiamine, NS @ 125ml/hr    Estimated Daily Nutrient Needs:  Energy (kcal): 1252-8400(30-35 kcal/kg UBW of 72.7 kg, or 40+ kcal/kg of ABW); Weight Used for Energy Requirements:    Protein (g): (1.2-1.5 gm/kg UBW or 1.6-2 gm/kg ABW); Weight Used for Protein Requirements:    Fluid (ml/day): 1 mL/kcal; Method Used for Fluid Requirements:      Nutrition Related Findings:       BM: 2/15 - loose  Edema: none  Wounds:  Surgical incision(neck)       Current Nutrition Therapies:   Diet: NPO  Tube feeds: Osmolite 1.5, 360ml bolus 4x/day + 180ml bolus 1x/day with 115ml flush pre/post bolus.      Anthropometric Measures:  · Height:  6' (182.9 cm)  · Current Body Wt:  53.6 kg (118 lb 2.7 oz)   · Admission Body Wt:  118 lb 9.7 oz(53.8 kg - standing)    · Usual Body Wt:      160lb  · Ideal Body Wt:   :  66.4 %   · BMI Categories:  Underweight (BMI less than 18.5)     Wt Readings from Last 10 Encounters:   02/15/21 63.1 kg (139 lb 3.2 oz)   02/02/21 60.5 kg (133 lb 6.1 oz)   01/28/21 60.7 kg (133 lb 13.1 oz)   01/28/21 60.8 kg (134 lb)   01/24/21 67.1 kg (148 lb)     Nutrition Diagnosis:   · Inadequate oral intake related to increased demand for energy/nutrients, swallowing difficulty as evidenced by weight loss, BMI, poor intake prior to admission, NPO or clear liquid status due to medical condition, nutrition support-enteral nutrition    · Increased nutrient needs, Severe malnutrition related to catabolic illness as evidenced by moderate muscle loss, moderate loss of subcutaneous fat, BMI, weight loss(Head and Neck CA)    Nutrition Interventions:   Food and/or Nutrient Delivery: Continue tube feeding  Nutrition Education and Counseling: Education completed  Coordination of Nutrition Care: Continue to monitor while inpatient, Interdisciplinary rounds, Coordination of community care    Goals:  EN continuing to meet at least 90% needs in 5-7 days; gradual wt gain of 1-2 lbs per week       Nutrition Monitoring and Evaluation:   Behavioral-Environmental Outcomes: None identified  Food/Nutrient Intake Outcomes: Enteral nutrition intake/tolerance  Physical Signs/Symptoms Outcomes: Weight    Discharge Planning:    Enteral nutrition     Abiodun Joyce, RD  [address] , Pager #[phone] or via commercet[employer]

## 2021-02-15 NOTE — PROGRESS NOTES
SLP Contact Note    Noted pt is able to complete PEG tube feedings and changing and cleaning larytube. Patient states he has no other questions at this time. Available to help as needed.       Thank you,  NOAH Phillips.Ed, [address] [facility]  Speech-Language Pathologist

## 2021-02-15 NOTE — PROGRESS NOTES
3:56 PM  May MD called to ask about diet and remaining AP drain removal. Orders for clear liquid diet obtained and MD stated he will keep AP drain overnight and advance diet tomorrow then possible discharge. I have reviewed and agree with Magdaleno Thomson RN documentation.

## 2021-02-15 NOTE — CONSULTS
[address]    Name:  Shane Ma  MR#:  [id number]  :  1970  ACCOUNT #:  [de-identified]  DATE OF SERVICE:  02/15/2021      HOSPITAL FOLLOWUP CONSULT NOTE    The patient is status post laryngectomy with minimal, minimal drain output today, seems to be tolerating his tube feedings appropriately. The left drain was removed today without incident, and the wound is flat. He had some crusting around his stoma, which I cleaned with a sterile forceps. He is awaiting his barium swallow today, and then pending the result of the barium swallow, a decision will be made by Dr. Darlin Hogan whether to discharge him home or to continue in-hospital care.       Karthik Boyle MD      [id number]  D:  02/15/2021 8:53  T:  02/15/2021 12:56  JOB #:  [id number]

## 2021-02-16 VITALS
TEMPERATURE: 98.8 F | SYSTOLIC BLOOD PRESSURE: 107 MMHG | RESPIRATION RATE: 16 BRPM | OXYGEN SATURATION: 99 % | WEIGHT: 139.2 LBS | DIASTOLIC BLOOD PRESSURE: 62 MMHG | BODY MASS INDEX: 18.85 KG/M2 | HEART RATE: 99 BPM | HEIGHT: 72 IN

## 2021-02-16 LAB
ANION GAP SERPL CALC-SCNC: 4 MMOL/L (ref 5–15)
BASOPHILS # BLD: 0 K/UL (ref 0–0.1)
BASOPHILS NFR BLD: 0 % (ref 0–1)
BUN SERPL-MCNC: 4 MG/DL (ref 6–20)
BUN/CREAT SERPL: 10 (ref 12–20)
CALCIUM SERPL-MCNC: 8.9 MG/DL (ref 8.5–10.1)
CHLORIDE SERPL-SCNC: 100 MMOL/L (ref 97–108)
CO2 SERPL-SCNC: 31 MMOL/L (ref 21–32)
CREAT SERPL-MCNC: 0.42 MG/DL (ref 0.7–1.3)
DIFFERENTIAL METHOD BLD: ABNORMAL
EOSINOPHIL # BLD: 0 K/UL (ref 0–0.4)
EOSINOPHIL NFR BLD: 1 % (ref 0–7)
ERYTHROCYTE [DISTWIDTH] IN BLOOD BY AUTOMATED COUNT: 13.1 % (ref 11.5–14.5)
GLUCOSE BLD STRIP.AUTO-MCNC: 72 MG/DL (ref 65–100)
GLUCOSE BLD STRIP.AUTO-MCNC: 95 MG/DL (ref 65–100)
GLUCOSE SERPL-MCNC: 86 MG/DL (ref 65–100)
HCT VFR BLD AUTO: 29.3 % (ref 36.6–50.3)
HGB BLD-MCNC: 9.8 G/DL (ref 12.1–17)
IMM GRANULOCYTES # BLD AUTO: 0 K/UL (ref 0–0.04)
IMM GRANULOCYTES NFR BLD AUTO: 1 % (ref 0–0.5)
LYMPHOCYTES # BLD: 0.8 K/UL (ref 0.8–3.5)
LYMPHOCYTES NFR BLD: 10 % (ref 12–49)
MAGNESIUM SERPL-MCNC: 1.9 MG/DL (ref 1.6–2.4)
MCH RBC QN AUTO: 32 PG (ref 26–34)
MCHC RBC AUTO-ENTMCNC: 33.4 G/DL (ref 30–36.5)
MCV RBC AUTO: 95.8 FL (ref 80–99)
MONOCYTES # BLD: 0.8 K/UL (ref 0–1)
MONOCYTES NFR BLD: 9 % (ref 5–13)
NEUTS SEG # BLD: 6.7 K/UL (ref 1.8–8)
NEUTS SEG NFR BLD: 79 % (ref 32–75)
NRBC # BLD: 0 K/UL (ref 0–0.01)
NRBC BLD-RTO: 0 PER 100 WBC
PHOSPHATE SERPL-MCNC: 3.2 MG/DL (ref 2.6–4.7)
PLATELET # BLD AUTO: 472 K/UL (ref 150–400)
PMV BLD AUTO: 9.8 FL (ref 8.9–12.9)
POTASSIUM SERPL-SCNC: 3.8 MMOL/L (ref 3.5–5.1)
RBC # BLD AUTO: 3.06 M/UL (ref 4.1–5.7)
SERVICE CMNT-IMP: NORMAL
SERVICE CMNT-IMP: NORMAL
SODIUM SERPL-SCNC: 135 MMOL/L (ref 136–145)
WBC # BLD AUTO: 8.3 K/UL (ref 4.1–11.1)

## 2021-02-16 PROCEDURE — 80048 BASIC METABOLIC PNL TOTAL CA: CPT

## 2021-02-16 PROCEDURE — 74011250637 HC RX REV CODE- 250/637: Performed by: NURSE PRACTITIONER

## 2021-02-16 PROCEDURE — 83735 ASSAY OF MAGNESIUM: CPT

## 2021-02-16 PROCEDURE — 74011250637 HC RX REV CODE- 250/637: Performed by: FAMILY MEDICINE

## 2021-02-16 PROCEDURE — 85025 COMPLETE CBC W/AUTO DIFF WBC: CPT

## 2021-02-16 PROCEDURE — 84100 ASSAY OF PHOSPHORUS: CPT

## 2021-02-16 PROCEDURE — 36415 COLL VENOUS BLD VENIPUNCTURE: CPT

## 2021-02-16 PROCEDURE — 82962 GLUCOSE BLOOD TEST: CPT

## 2021-02-16 RX ORDER — LANOLIN ALCOHOL/MO/W.PET/CERES
100 CREAM (GRAM) TOPICAL DAILY
Qty: 30 TAB | Refills: 0 | Status: SHIPPED | OUTPATIENT
Start: 2021-02-16 | End: 2021-03-18

## 2021-02-16 RX ORDER — ONDANSETRON 4 MG/1
4 TABLET, FILM COATED ORAL
Qty: 10 TAB | Refills: 0 | Status: SHIPPED | OUTPATIENT
Start: 2021-02-16 | End: 2021-02-26

## 2021-02-16 RX ORDER — SUCRALFATE 1 G/1
1 TABLET ORAL
Qty: 120 TAB | Refills: 0 | Status: SHIPPED | OUTPATIENT
Start: 2021-02-16 | End: 2021-03-18

## 2021-02-16 RX ORDER — HYDROCODONE BITARTRATE AND ACETAMINOPHEN 7.5; 325 MG/15ML; MG/15ML
15 SOLUTION ORAL
Qty: 118 ML | Refills: 0 | Status: SHIPPED | OUTPATIENT
Start: 2021-02-16 | End: 2021-02-19

## 2021-02-16 RX ORDER — POLYETHYLENE GLYCOL 3350 17 G/17G
17 POWDER, FOR SOLUTION ORAL DAILY
Qty: 30 PACKET | Refills: 0 | Status: SHIPPED | OUTPATIENT
Start: 2021-02-16 | End: 2021-03-18

## 2021-02-16 RX ADMIN — Medication 100 MG: at 09:49

## 2021-02-16 RX ADMIN — SUCRALFATE 1 G: 1 TABLET ORAL at 11:42

## 2021-02-16 RX ADMIN — SUCRALFATE 1 G: 1 TABLET ORAL at 06:34

## 2021-02-16 RX ADMIN — HYDROXYZINE HYDROCHLORIDE 50 MG: 10 SYRUP ORAL at 02:34

## 2021-02-16 NOTE — DISCHARGE SUMMARY
Discharge Summary       PATIENT ID: Hyun Key  MRN: [id number]   YOB: 1970    DATE OF ADMISSION: 2/7/2021  8:37 PM    DATE OF DISCHARGE: 02/16/2021   PRIMARY CARE PROVIDER: Alfonso Adamson NP       DISCHARGING PROVIDER: Cha Galarza MD    To contact this individual call [phone] and ask the  to page. If unavailable ask to be transferred the Adult Hospitalist Department. CONSULTATIONS: IP CONSULT TO GASTROENTEROLOGY    PROCEDURES/SURGERIES: Procedure(s):  TOTAL LARYNGECTOMY,    ADMITTING DIAGNOSES & HOSPITAL COURSE:   Acute on chronic hypoxic and hypercapnic respiratory failure  Laryngeal carcinoma status post emergent trach, laryngectomy and radical neck dissection  Dysphagia status post PEG  COPD  Constipation/diarrhea    Viky Peterson is a 53 y.o. male with past medical history of laryngeal carcinoma, emphysema, chronic pain and opioid dependence, generalized anxiety disorder, NIDDM II who presented to [facility] regional ER on January 31 with complaints of increased shortness of breath and intense neck/throat pain for 2 days. He unfortunately had to undergo emergent trach with trach collar placement at [facility] [place] on 2/1, and subsequently was transferred to [facility].  He was found to have a head and neck cancer, and underwent laryngectomy with radical neck dissection on 2/8 by Dr. Swain. Course was complicated by an episode of severe constipation, and ileus. He was started on aggressive bowel regimen, and subsequently had diarrhea. His tube feeding regimen had to be changed. He has now tolerating his diet well. Case management has been working on getting him tube feeding and tracheostomy supplies for home. He underwent teaching by our nurses, and has been able to feed himself via PEG. His family was unable to come into the hospital and be taught as they are unfortunately over 2 hours away.   Stable for discharge, all drains were removed prior to discharge. DISCHARGE DIAGNOSES / PLAN:      Acute on Chronic hypoxic hypercapnic respiratory failure  2/2 laryngeal carcinoma  S/P emergent tracheostomy with trach collar @[facility] 02/01  S/P laryngectomy and radial neck dissection @[facility] 02/08  -Stable on RA  -Allow soft foods per ENT recs. Oneyda Mouse care per ENT. Pt will have to go home with trach care teaching. His family will be unable to come to the hospital for teaching. Attempting to have [facility] set up teaching session with family   -Supportive care  -Appreciate ENT input, drains to be DC today   -  Home heatlh. Cm aware. - Will need trach care, tube feeding, and teaching --> all done.      Dysphasia s/p PEG Tube: Placed @[facility] 02/03  - Barium swallow with no leak 2/15, started clears and advance to soft foods   - Will need o/p speech therapy on DC  - Abdominal pain improved with osmolite, holding all bowel regimen  - Nutrition switched tube feeding to osmolite.      COPD   -Stable, not in acute exacerbation  -DuoNebs as needed      Constipation/diarrhea  - Resume miralax   - GI now signed off   - Nutrition to review tube feeding     Generalized anxiety disorder with panic attacks  -Stable, continue hydroxyzine as needed     ADDITIONAL CARE RECOMMENDATIONS:   - Please take all medications as prescribed. Note changes as below. **Start as needed pain medications, and as needed   - Please make sure to follow up with your primary care physician within 1-2 weeks of discharge for hospital follow up. You also need to follow up with Dr. Swain with ENT as previously scheduled. - Please make sure to continue to monitor for: sudden difficulty breathing, high fevers, increased pain and return to the Emergency Department with any of these symptoms:   - Please get up slowly from a seated or laying position, avoid falls. - Avoid tobacco, alcohol and other illicit drug use.    - Please note that you should use the following DME: Homehealth, trach supplies, and feeding tube supplies.   - Your PEG feeding: Osmolite 1.5, 360ml bolus x 4 per day + 180ml bolus x 1 per day with 115ml flush pre/post bolus.   - You may eat soft foods, if you have trouble swallowing, then stop and call your ENT physician immediately. PENDING TEST RESULTS:   At the time of discharge the following test results are still pending: None    FOLLOW UP APPOINTMENTS:    Follow-up Information     Follow up With Specialties Details Why Contact Info    Lisa Miranda NP Nurse Practitioner   [id number] Jeanette Ziegler is your [employer] [phone]             DIET: Soft food diet   Osmolite 1.5, 360ml bolus x 4 per day + 180ml bolus x 1 per day with 115ml flush pre/post bolus. ACTIVITY: Activity as tolerated    WOUND CARE: None    EQUIPMENT needed: Tube feeding, trach supplies      DISCHARGE MEDICATIONS:  Current Discharge Medication List      START taking these medications    Details   polyethylene glycol (MIRALAX) 17 gram packet 1 Packet by Per G Tube route daily for 30 days. Indications: constipation  Qty: 30 Packet, Refills: 0      sucralfate (CARAFATE) 1 gram tablet 1 Tab by Per G Tube route Before breakfast, lunch, dinner and at bedtime for 30 days. Qty: 120 Tab, Refills: 0      thiamine HCL (B-1) 100 mg tablet 1 Tab by Per G Tube route daily for 30 days. Qty: 30 Tab, Refills: 0      ondansetron hcl (Zofran) 4 mg tablet Take 1 Tab by mouth every eight (8) hours as needed for Nausea or Vomiting for up to 10 days. Qty: 10 Tab, Refills: 0         CONTINUE these medications which have CHANGED    Details   HYDROcodone-acetaminophen (HYCET) 0.5-21.7 mg/mL oral solution Take 15 mL by mouth every six (6) hours as needed for Pain for up to 3 days.  Max Daily Amount: 30 mg.  Qty: 118 mL, Refills: 0    Associated Diagnoses: Laryngeal carcinoma ([address]         CONTINUE these medications which have NOT CHANGED    Details metFORMIN (GLUCOPHAGE) 500 mg tablet Take  by mouth two (2) times daily (with meals). aluminum-magnesium hydroxide 200-200 mg/5 mL susp 5 mL, diphenhydrAMINE 12.5 mg/5 mL liqd 5 mL, lidocaine 2 % soln 5 mL 5 mL by Swish and Spit route two (2) times a day. Magic mouth wash   Maalox  Lidocaine 2% viscous   Diphenhydramine oral solution     Pharmacy to mix equal portions of ingredients to a total volume as indicated in the dispense amount. albuterol (PROVENTIL HFA, VENTOLIN HFA, PROAIR HFA) 90 mcg/actuation inhaler Take 3 Puffs by inhalation every eight (8) hours. Indications: chronic obstructive pulmonary disease  Qty: 1 Inhaler, Refills: 3      hydrOXYzine HCL (ATARAX) 50 mg tablet Take 1 Tab by mouth every eight to twelve (8-12) hours as needed for Anxiety or Sleep for up to 40 days. Indications: Pt has throat cancer and anxiety. Qty: 40 Tab, Refills: 2               NOTIFY YOUR PHYSICIAN FOR ANY OF THE FOLLOWING:   Fever over 101 degrees for 24 hours. Chest pain, shortness of breath, fever, chills, nausea, vomiting, diarrhea, change in mentation, falling, weakness, bleeding. Severe pain or pain not relieved by medications. Or, any other signs or symptoms that you may have questions about.     DISPOSITION:   X Home With:   OT  PT X HH  RN       Long term SNF/Inpatient Rehab    Independent/assisted living    Hospice    Other:       PATIENT CONDITION AT DISCHARGE:     Functional status    Poor     Deconditioned    X Independent      Cognition    X Lucid     Forgetful     Dementia      Catheters/lines (plus indication)    Laboy     PICC    X PEG    X Trach      Code status    X Full code     DNR      PHYSICAL EXAMINATION AT DISCHARGE:  Visit Vitals  /62   Pulse 99   Temp 98.8 °F (37.1 °C)   Resp 16   Ht 6' (1.829 m)   Wt 63.1 kg (139 lb 3.2 oz)   SpO2 99%   BMI 18.88 kg/m²     Gen: NAD, awake in bed, cachectic   HEENT: NC/AT, sclera anicteric, PERRL, EOMI, Trach in place, AP drain on R in place (to be removed)  CV: RRR no m/r/g, normal S1 and S2, no pedal edema   Resp: CTA b/l no increased work of breathing, no wheezing or rhonchi, speaking in full sentences   Abd: NT/ND, normal bowel sounds, no rebound or guarding, + PEG  Ext: 2+ pulses, no edema  Skin: No rashes or lesions        CHRONIC MEDICAL DIAGNOSES:  Problem List as of 2/16/2021 Never Reviewed          Codes Class Noted - Resolved    Severe protein-calorie malnutrition ([facility] ICD-10-CM: E43  ICD-9-CM: 063  2/15/2021 - Present        * (Principal) Laryngeal carcinoma ([facility] ICD-10-CM: C32.9  ICD-9-CM: 161.9  2/7/2021 - Present        Throat cancer ([facility] ICD-10-CM: C14.0  ICD-9-CM: 149.0  1/31/2021 - Present        COPD exacerbation ([facility] ICD-10-CM: J44.1  ICD-9-CM: 491.21  1/31/2021 - Present        Acute and chronic respiratory failure with hypoxia (HCC) ICD-10-CM: J96.21  ICD-9-CM: 518.84, 799.02  1/31/2021 - Present        Chronic pain due to neoplasm ICD-10-CM: G89.3  ICD-9-CM: 338.3  1/31/2021 - Present        Anxiety ICD-10-CM: F41.9  ICD-9-CM: 300.00  1/31/2021 - Present        Insomnia ICD-10-CM: G47.00  ICD-9-CM: 780.52  1/31/2021 - Present        Respiratory failure with hypoxia [facility]) ICD-10-CM: J96.91  ICD-9-CM: 518.81  1/31/2021 - Present              Greater than 30 minutes were spent with the patient on counseling and coordination of care    Signed:   Heriberto Anand MD  2/16/2021  11:05 AM

## 2021-02-16 NOTE — PROGRESS NOTES
Transitions of Care plan: Home today with home health and DME    -RUR: 18%  -CM continuing to try to find a home health agency that accepts patient's insurance and travels to his address. CM has sent over 150 referrals and they are pending.  -[facility] will provide the home tube feeds and have been informed of the discharge for today. -[facility] will provide patient's supplies for his larytube. 11:00am: [facility] has accepted patient with a [facility] of 2/18/2021. Follow up information placed on AVS.     11:10am: CM met with patient to notify him that all has been set up. Patient stated he will need transportation home. Patient provided me with the contact information for [employer] transportation ([phone]) and the reference # [id number].    3:05pm: Patient requested CM call [place] transportation again to follow up on the ride ([phone]), reference #[id number]. Currently still waiting on hold- 45 minutes. 3:50pm: Transportation will be arriving in 45 minutes. Patient and nurse informed.        Lucita PRATT, ACM-SW

## 2021-02-16 NOTE — PROGRESS NOTES
Pt denied severe pain today. He had minor pain near his right AP drain on his neck but declined pain medications. He is now on clear liquids and is tolerating his diet. He cleaned his trach twice today by himself with nurse at bedside. Bedside shift change report given to [address] [place] [address] (oncoming nurse) by Adelina COUGHLIN (offgoing nurse). Report included the following information SBAR, Intake/Output, MAR and Recent Results.

## 2021-02-16 NOTE — PROGRESS NOTES
Otolaryngology, Head and Neck Surgery      Kel liquids. Recent Results (from the past 24 hour(s))   GLUCOSE, POC    Collection Time: 02/15/21 11:48 AM   Result Value Ref Range    Glucose (POC) 123 (H) 65 - 100 mg/dL    Performed by Ranjit, POC    Collection Time: 02/15/21  6:00 PM   Result Value Ref Range    Glucose (POC) 140 (H) 65 - 100 mg/dL    Performed by [address], POC    Collection Time: 02/15/21 11:25 PM   Result Value Ref Range    Glucose (POC) 105 (H) 65 - 100 mg/dL    Performed by Criss Barrios (PCT)    CBC WITH AUTOMATED DIFF    Collection Time: 02/16/21  2:10 AM   Result Value Ref Range    WBC 8.3 4.1 - 11.1 K/uL    RBC 3.06 (L) 4.10 - 5.70 M/uL    HGB 9.8 (L) 12.1 - 17.0 g/dL    HCT 29.3 (L) 36.6 - 50.3 %    MCV 95.8 80.0 - 99.0 FL    MCH 32.0 26.0 - 34.0 PG    MCHC 33.4 30.0 - 36.5 g/dL    RDW 13.1 11.5 - 14.5 %    PLATELET 949 (H) 185 - 400 K/uL    MPV 9.8 8.9 - 12.9 FL    NRBC 0.0 0  WBC    ABSOLUTE NRBC 0.00 0.00 - 0.01 K/uL    NEUTROPHILS 79 (H) 32 - 75 %    LYMPHOCYTES 10 (L) 12 - 49 %    MONOCYTES 9 5 - 13 %    EOSINOPHILS 1 0 - 7 %    BASOPHILS 0 0 - 1 %    IMMATURE GRANULOCYTES 1 (H) 0.0 - 0.5 %    ABS. NEUTROPHILS 6.7 1.8 - 8.0 K/UL    ABS. LYMPHOCYTES 0.8 0.8 - 3.5 K/UL    ABS. MONOCYTES 0.8 0.0 - 1.0 K/UL    ABS. EOSINOPHILS 0.0 0.0 - 0.4 K/UL    ABS. BASOPHILS 0.0 0.0 - 0.1 K/UL    ABS. IMM.  GRANS. 0.0 0.00 - 0.04 K/UL    DF AUTOMATED     MAGNESIUM    Collection Time: 02/16/21  2:10 AM   Result Value Ref Range    Magnesium 1.9 1.6 - 2.4 mg/dL   METABOLIC PANEL, BASIC    Collection Time: 02/16/21  2:10 AM   Result Value Ref Range    Sodium 135 (L) 136 - 145 mmol/L    Potassium 3.8 3.5 - 5.1 mmol/L    Chloride 100 97 - 108 mmol/L    CO2 31 21 - 32 mmol/L    Anion gap 4 (L) 5 - 15 mmol/L    Glucose 86 65 - 100 mg/dL    BUN 4 (L) 6 - 20 MG/DL    Creatinine 0.42 (L) 0.70 - 1.30 MG/DL    BUN/Creatinine ratio 10 (L) 12 - 20      GFR est AA >60 >60 ml/min/1.73m2    GFR est non-AA >60 >60 ml/min/1.73m2    Calcium 8.9 8.5 - 10.1 MG/DL   PHOSPHORUS    Collection Time: 02/16/21  2:10 AM   Result Value Ref Range    Phosphorus 3.2 2.6 - 4.7 MG/DL   GLUCOSE, POC    Collection Time: 02/16/21  6:48 AM   Result Value Ref Range    Glucose (POC) 95 65 - 100 mg/dL    Performed by Yissel Rocks   PCT            Visit Vitals  /62   Pulse 99   Temp 98.8 °F (37.1 °C)   Resp 16   Ht 6' (1.829 m)   Wt 63.1 kg (139 lb 3.2 oz)   SpO2 99%   BMI 18.88 kg/m²       Intake/Output Summary (Last 24 hours) at 2/16/2021 5953  Last data filed at 2/15/2021 1800  Gross per 24 hour   Intake 880 ml   Output 550 ml   Net 330 ml       The patient is a well developed, well nourished adult in no distress    Neck: incision intact, no swelling or increased erythema or induration,stoma patent and clean  Lower extremities: no calf tenderness    A:   Hospital Problems  Never Reviewed          Codes Class Noted POA    Severe protein-calorie malnutrition ([facility] ICD-10-CM: E43  ICD-9-CM: 641  2/15/2021 Yes        * (Principal) Laryngeal carcinoma ([facility] ICD-10-CM: C32.9  ICD-9-CM: 161.9  2/7/2021 Yes                  Post op day 8     Plan:  -d/c AP  -d/c staples  -advance diet to soft    OK to d/c home from ENT standpoint. Cont TF for now, liquid/soft diet as tolerated with plans to wean TF as PO intake increases. Reviewed wound/stoma care. F/u with me in 1 week.

## 2021-02-16 NOTE — ROUTINE PROCESS
Attempted to schedule [facility] PCP appt with [facility] Rojo. , unable to reach practice at this time. Left voicemail and currently awaiting return call.

## 2021-02-16 NOTE — DISCHARGE INSTRUCTIONS
Dear Adriane Dotson,    Thank you for choosing [address] [place] [address] for your healthcare needs. We strive to provide EXCELLENT care to you and your family. In an effort to explain clearly why you were here in the hospital, I've also written a very brief summary below. Other details including formal diagnosis, medication changes, and follow up appointment recommendations can also be found in this packet. You were admitted for trouble breathing due to laryngeal mass for which you were started on tube feeding, had a radical neck dissection. Your course was also complicated by constipation and then diarrhea. You also received care from specialist physicians in the following specialties:  [id number] [place] [address]    Here are the updates to your medication list:  **Start as needed pain medications, and as needed stool softeners     Remember that it is important for you to take your medications exactly as they are prescribed. It is helpful to keep a list of your medication with the names, dosages, and times to be taken in your wallet. Additionally,   - Please make sure to follow up with your primary care physician within 1-2 weeks of discharge for hospital follow up. You also need to follow up with Dr. Swain with ENT as previously scheduled. - Please make sure to continue to monitor for: sudden difficulty breathing, high fevers, increased pain and return to the Emergency Department with any of these symptoms:   - Please get up slowly from a seated or laying position, avoid falls. - Avoid tobacco, alcohol and other illicit drug use. - Please note that you should use the following DME: Homehealth, trach supplies, and feeding tube supplies.   - Your PEG feeding: Osmolite 1.5, 360ml bolus x 4 per day + 180ml bolus x 1 per day with 115ml flush pre/post bolus.   - You may eat soft foods, if you have trouble swallowing, then stop and call your ENT physician immediately.      Make sure to also see your primary care doctor for follow-up. Bring these papers with you and be sure to review your medication list with your doctor. I cannot stress the importance of follow up enough. I've included the information for your follow-up appointments below: Follow-up Information     Follow up With Specialties Details Why Contact Info    Carolina Chirinos NP Nurse Practitioner   [address]   this is your home [employer] [phone]    Keyanna Hernandez MD Otolaryngology  Please come in for your scheduled appt with [facility]. [address]  [phone]            At this time, the following test results are still pending: None  Again, please follow-up these results with your primary care provider. Should you have any fever over 101 degrees for 24 hours, chest pain, shortness of breath, fever, chills, nausea, vomiting, diarrhea, change in mentation, falling, weakness, bleeding, or worsening pain, please seek medical attention immediately. Finally, as your discharging physician, you may be receiving a survey regarding my care. I would greatly value and appreciate your input in the survey as we strive for excellence. If you have any questions, I can be reached at [phone].   Thank you so much again for allowing me to care for you at [address] [place] [address].    Respectfully yours,  Dameon Euceda MD

## 2021-02-16 NOTE — PROGRESS NOTES
Bedside shift change report given to Dhara Harris RN (oncoming nurse) by Judd Zheng RN (offgoing nurse). Report included the following information SBAR, Kardex, Intake/Output, MAR and Recent Results.

## 2021-02-25 ENCOUNTER — APPOINTMENT (OUTPATIENT)
Dept: GENERAL RADIOLOGY | Age: 51
End: 2021-02-25
Attending: PHYSICIAN ASSISTANT
Payer: MEDICAID

## 2021-02-25 ENCOUNTER — HOSPITAL ENCOUNTER (EMERGENCY)
Age: 51
Discharge: HOME | End: 2021-02-25
Payer: MEDICAID

## 2021-02-25 ENCOUNTER — APPOINTMENT (OUTPATIENT)
Dept: CT IMAGING | Age: 51
End: 2021-02-25
Attending: PHYSICIAN ASSISTANT
Payer: MEDICAID

## 2021-02-25 VITALS
RESPIRATION RATE: 18 BRPM | WEIGHT: 156 LBS | TEMPERATURE: 98.3 F | HEART RATE: 75 BPM | OXYGEN SATURATION: 100 % | HEIGHT: 72 IN | DIASTOLIC BLOOD PRESSURE: 74 MMHG | SYSTOLIC BLOOD PRESSURE: 129 MMHG | BODY MASS INDEX: 21.13 KG/M2

## 2021-02-25 DIAGNOSIS — E88.09 EDEMA DUE TO HYPOALBUMINEMIA: Primary | ICD-10-CM

## 2021-02-25 DIAGNOSIS — C14.0 THROAT CANCER (HCC): ICD-10-CM

## 2021-02-25 LAB
ALBUMIN SERPL-MCNC: 2.8 G/DL (ref 3.5–5)
ALBUMIN/GLOB SERPL: 0.7 {RATIO} (ref 1.1–2.2)
ALP SERPL-CCNC: 54 U/L (ref 45–117)
ALT SERPL-CCNC: 15 U/L (ref 12–78)
ANION GAP SERPL CALC-SCNC: 5 MMOL/L (ref 5–15)
AST SERPL W P-5'-P-CCNC: 13 U/L (ref 15–37)
BASOPHILS # BLD: 0 K/UL (ref 0–0.1)
BASOPHILS NFR BLD: 0 % (ref 0–1)
BILIRUB SERPL-MCNC: 0.2 MG/DL (ref 0.2–1)
BNP SERPL-MCNC: 473 PG/ML
BUN SERPL-MCNC: 5 MG/DL (ref 6–20)
BUN/CREAT SERPL: 8 (ref 12–20)
CA-I BLD-MCNC: 8.3 MG/DL (ref 8.5–10.1)
CHLORIDE SERPL-SCNC: 107 MMOL/L (ref 97–108)
CO2 SERPL-SCNC: 31 MMOL/L (ref 21–32)
CREAT SERPL-MCNC: 0.61 MG/DL (ref 0.7–1.3)
DIFFERENTIAL METHOD BLD: ABNORMAL
EOSINOPHIL # BLD: 0.1 K/UL (ref 0–0.4)
EOSINOPHIL NFR BLD: 2 % (ref 0–7)
ERYTHROCYTE [DISTWIDTH] IN BLOOD BY AUTOMATED COUNT: 14.6 % (ref 11.5–14.5)
GLOBULIN SER CALC-MCNC: 3.8 G/DL (ref 2–4)
GLUCOSE SERPL-MCNC: 93 MG/DL (ref 65–100)
HCT VFR BLD AUTO: 26.6 % (ref 36.6–50.3)
HGB BLD-MCNC: 8.5 G/DL (ref 12.1–17)
IMM GRANULOCYTES # BLD AUTO: 0 K/UL (ref 0–0.04)
IMM GRANULOCYTES NFR BLD AUTO: 0 % (ref 0–0.5)
LACTATE SERPL-SCNC: 0.6 MMOL/L (ref 0.4–2)
LYMPHOCYTES # BLD: 1.4 K/UL (ref 0.8–3.5)
LYMPHOCYTES NFR BLD: 32 % (ref 12–49)
MCH RBC QN AUTO: 31.6 PG (ref 26–34)
MCHC RBC AUTO-ENTMCNC: 32 G/DL (ref 30–36.5)
MCV RBC AUTO: 98.9 FL (ref 80–99)
MONOCYTES # BLD: 0.6 K/UL (ref 0–1)
MONOCYTES NFR BLD: 14 % (ref 5–13)
NEUTS SEG # BLD: 2.3 K/UL (ref 1.8–8)
NEUTS SEG NFR BLD: 52 % (ref 32–75)
PLATELET # BLD AUTO: 446 K/UL (ref 150–400)
PMV BLD AUTO: 8.5 FL (ref 8.9–12.9)
POTASSIUM SERPL-SCNC: 3.6 MMOL/L (ref 3.5–5.1)
PROT SERPL-MCNC: 6.6 G/DL (ref 6.4–8.2)
RBC # BLD AUTO: 2.69 M/UL (ref 4.1–5.7)
SODIUM SERPL-SCNC: 143 MMOL/L (ref 136–145)
TROPONIN I SERPL-MCNC: <0.05 NG/ML
WBC # BLD AUTO: 4.5 K/UL (ref 4.1–11.1)

## 2021-02-25 PROCEDURE — 99284 EMERGENCY DEPT VISIT MOD MDM: CPT

## 2021-02-25 PROCEDURE — 93005 ELECTROCARDIOGRAM TRACING: CPT

## 2021-02-25 PROCEDURE — 83605 ASSAY OF LACTIC ACID: CPT

## 2021-02-25 PROCEDURE — 74011000636 HC RX REV CODE- 636: Performed by: PHYSICIAN ASSISTANT

## 2021-02-25 PROCEDURE — 36415 COLL VENOUS BLD VENIPUNCTURE: CPT

## 2021-02-25 PROCEDURE — 85025 COMPLETE CBC W/AUTO DIFF WBC: CPT

## 2021-02-25 PROCEDURE — 84484 ASSAY OF TROPONIN QUANT: CPT

## 2021-02-25 PROCEDURE — 71275 CT ANGIOGRAPHY CHEST: CPT

## 2021-02-25 PROCEDURE — 71046 X-RAY EXAM CHEST 2 VIEWS: CPT

## 2021-02-25 PROCEDURE — 83880 ASSAY OF NATRIURETIC PEPTIDE: CPT

## 2021-02-25 PROCEDURE — 80053 COMPREHEN METABOLIC PANEL: CPT

## 2021-02-25 RX ADMIN — IOPAMIDOL 100 ML: 755 INJECTION, SOLUTION INTRAVENOUS at 18:12

## 2021-02-25 NOTE — ED PROVIDER NOTES
EMERGENCY DEPARTMENT HISTORY AND PHYSICAL EXAM      Date: 2/25/2021  Patient Name: Shayne Ceja    History of Presenting Illness     Chief Complaint   Patient presents with    Leg Swelling       History Provided By: Patient    HPI: Shayne Ceja, 53 y.o. male with a past medical history significant throat cancer, COPD, tracheostomy 18 days ago presents to the ED with cc of bilateral lower extremity edema x1 week, worsening. Patient was just at his ear nose and throat follow-up appointment where they noted his leg swelling, advised that he come to the ED for evaluation. Patient is never received chemo or radiation therapy. Patient specifically denies history of fluid overload, chest pain, shortness of breath, nausea, vomiting, difficulty urinating, recent travel. Patient was started on thiamine at discharge. There are no other complaints, changes, or physical findings at this time. PCP: Tulio Manuel NP    Current Outpatient Medications   Medication Sig Dispense Refill    polyethylene glycol (MIRALAX) 17 gram packet 1 Packet by Per G Tube route daily for 30 days. Indications: constipation 30 Packet 0    sucralfate (CARAFATE) 1 gram tablet 1 Tab by Per G Tube route Before breakfast, lunch, dinner and at bedtime for 30 days. 120 Tab 0    thiamine HCL (B-1) 100 mg tablet 1 Tab by Per G Tube route daily for 30 days. 30 Tab 0    ondansetron hcl (Zofran) 4 mg tablet Take 1 Tab by mouth every eight (8) hours as needed for Nausea or Vomiting for up to 10 days. 10 Tab 0    metFORMIN (GLUCOPHAGE) 500 mg tablet Take  by mouth two (2) times daily (with meals).  aluminum-magnesium hydroxide 200-200 mg/5 mL susp 5 mL, diphenhydrAMINE 12.5 mg/5 mL liqd 5 mL, lidocaine 2 % soln 5 mL 5 mL by Swish and Spit route two (2) times a day.  Magic mouth wash   Maalox  Lidocaine 2% viscous   Diphenhydramine oral solution     Pharmacy to mix equal portions of ingredients to a total volume as indicated in the dispense amount.  albuterol (PROVENTIL HFA, VENTOLIN HFA, PROAIR HFA) 90 mcg/actuation inhaler Take 3 Puffs by inhalation every eight (8) hours. Indications: chronic obstructive pulmonary disease 1 Inhaler 3    hydrOXYzine HCL (ATARAX) 50 mg tablet Take 1 Tab by mouth every eight to twelve (8-12) hours as needed for Anxiety or Sleep for up to 40 days. Indications: Pt has throat cancer and anxiety. 36 Tab 2       Past History     Past Medical History:  Past Medical History:   Diagnosis Date    Chronic pain     THROAT, EARS, NECK R/T CANCER    COPD (chronic obstructive pulmonary disease) (HCC)     EMPHASEMA    Psychiatric disorder     ANXIETY R/T CANCER    Throat cancer (HCC)     [facility]acheostomy present ([facility])        Past Surgical History:  Past Surgical History:   Procedure Laterality Date    HX ORTHOPAEDIC      LEFT ARM- \" PUT PLATE IN\"       Family History:  Family History   Problem Relation Age of Onset    Diabetes Mother     Diabetes Father     Kidney Disease Father     Diabetes Sister     Heart Disease Daughter     Anesth Problems Neg Hx        Social History:  Social History     Tobacco Use    Smoking status: Former Smoker     Packs/day: 1.50     Years: 30.00     Pack years: 45.00     Quit date: 10/28/2020     Years since quittin.3    Smokeless tobacco: Never Used   Substance Use Topics    Alcohol use: Not Currently    Drug use: Never       Allergies:  No Known Allergies      Review of Systems   Review of Systems   Constitutional: Negative for activity change, chills and fever. HENT: Negative for congestion, ear pain, rhinorrhea and trouble swallowing. Eyes: Negative for pain and visual disturbance. Respiratory: Negative for cough, chest tightness, shortness of breath and wheezing. Cardiovascular: Positive for palpitations and leg swelling. Negative for chest pain. Gastrointestinal: Negative for abdominal pain, diarrhea, nausea and vomiting.    Genitourinary: Negative for decreased urine volume, difficulty urinating, dysuria and hematuria. Musculoskeletal: Negative for arthralgias and myalgias. Skin: Negative for rash. Neurological: Negative for weakness and headaches. Hematological: Negative for adenopathy. Psychiatric/Behavioral: The patient is not nervous/anxious. All other systems reviewed and are negative. Physical Exam   Physical Exam  Vitals signs and nursing note reviewed. Constitutional:       General: He is not in acute distress. Appearance: He is well-developed. HENT:      Head: Normocephalic and atraumatic. Mouth/Throat:      Mouth: Mucous membranes are moist.   Eyes:      Extraocular Movements: Extraocular movements intact. Pupils: Pupils are equal, round, and reactive to light. Neck:      Trachea: Tracheostomy present. Cardiovascular:      Rate and Rhythm: Regular rhythm. Tachycardia present. Pulses: Normal pulses. Heart sounds: Normal heart sounds. Heart sounds not distant. Comments: L>R  Pulmonary:      Effort: Pulmonary effort is normal. No respiratory distress. Breath sounds: Normal breath sounds. No rales. Abdominal:      General: Abdomen is flat. Bowel sounds are normal.      Palpations: Abdomen is soft. Tenderness: There is no abdominal tenderness. Musculoskeletal:      Right lower leg: 3+ Edema present. Left lower leg: 3+ Edema present. Skin:     General: Skin is warm and dry. Capillary Refill: Capillary refill takes less than 2 seconds. Findings: No rash. Neurological:      General: No focal deficit present. Mental Status: He is alert and oriented to person, place, and time. Cranial Nerves: No cranial nerve deficit.    Psychiatric:         Mood and Affect: Mood normal.         Behavior: Behavior normal.         Diagnostic Study Results     Labs -     Recent Results (from the past 48 hour(s))   BNP    Collection Time: 02/25/21  3:30 PM   Result Value Ref Range    NT pro- (H) <125 pg/mL   CBC WITH AUTOMATED DIFF    Collection Time: 02/25/21  3:30 PM   Result Value Ref Range    WBC 4.5 4.1 - 11.1 K/uL    RBC 2.69 (L) 4.10 - 5.70 M/uL    HGB 8.5 (L) 12.1 - 17.0 g/dL    HCT 26.6 (L) 36.6 - 50.3 %    MCV 98.9 80.0 - 99.0 FL    MCH 31.6 26.0 - 34.0 PG    MCHC 32.0 30.0 - 36.5 g/dL    RDW 14.6 (H) 11.5 - 14.5 %    PLATELET 540 (H) 785 - 400 K/uL    MPV 8.5 (L) 8.9 - 12.9 FL    NEUTROPHILS 52 32 - 75 %    LYMPHOCYTES 32 12 - 49 %    MONOCYTES 14 (H) 5 - 13 %    EOSINOPHILS 2 0 - 7 %    BASOPHILS 0 0 - 1 %    IMMATURE GRANULOCYTES 0 0.0 - 0.5 %    ABS. NEUTROPHILS 2.3 1.8 - 8.0 K/UL    ABS. LYMPHOCYTES 1.4 0.8 - 3.5 K/UL    ABS. MONOCYTES 0.6 0.0 - 1.0 K/UL    ABS. EOSINOPHILS 0.1 0.0 - 0.4 K/UL    ABS. BASOPHILS 0.0 0.0 - 0.1 K/UL    ABS. IMM. GRANS. 0.0 0.00 - 0.04 K/UL    DF AUTOMATED     METABOLIC PANEL, COMPREHENSIVE    Collection Time: 02/25/21  3:30 PM   Result Value Ref Range    Sodium 143 136 - 145 mmol/L    Potassium 3.6 3.5 - 5.1 mmol/L    Chloride 107 97 - 108 mmol/L    CO2 31 21 - 32 mmol/L    Anion gap 5 5 - 15 mmol/L    Glucose 93 65 - 100 mg/dL    BUN 5 (L) 6 - 20 mg/dL    Creatinine 0.61 (L) 0.70 - 1.30 mg/dL    BUN/Creatinine ratio 8 (L) 12 - 20      GFR est AA >60 >60 ml/min/1.73m2    GFR est non-AA >60 >60 ml/min/1.73m2    Calcium 8.3 (L) 8.5 - 10.1 mg/dL    Bilirubin, total 0.2 0.2 - 1.0 mg/dL    AST (SGOT) 13 (L) 15 - 37 U/L    ALT (SGPT) 15 12 - 78 U/L    Alk.  phosphatase 54 45 - 117 U/L    Protein, total 6.6 6.4 - 8.2 g/dL    Albumin 2.8 (L) 3.5 - 5.0 g/dL    Globulin 3.8 2.0 - 4.0 g/dL    A-G Ratio 0.7 (L) 1.1 - 2.2     TROPONIN I    Collection Time: 02/25/21  3:30 PM   Result Value Ref Range    Troponin-I, Qt. <0.05 <0.05 ng/mL   LACTIC ACID    Collection Time: 02/25/21  3:30 PM   Result Value Ref Range    Lactic acid 0.6 0.4 - 2.0 mmol/L       Radiologic Studies -   XR Results (most recent):  Results from Hospital Encounter encounter on 02/25/21   XR CHEST PA LAT Narrative Chest 2 views. Comparison single view chest February 2, 2021    Stable tracheostomy tube. Lungs clear; no lobar opacity to suggest pneumonia. Cardiac and mediastinal structures unchanged. No pneumothorax or sizable pleural  effusion. [place] left neck region, correlate to prior surgery. CT Results  (Last 48 hours)               02/25/21 1811  CTA CHEST W OR W WO CONT Final result    Impression:  Minimal scarring or subsegmental atelectasis in the left lung base. No evidence of pulmonary embolism or pneumonia. Narrative:  Dose Reduction:        All CT scans at this facility are performed using dose reduction optimization   techniques as appropriate to a performed exam including the following: Automated   exposure control, adjustments of the mA and/or kV according to patient size, or   use of iterative reconstruction technique. CT angiography of the chest with contrast. Axial images of the chest were   obtained during IV administration of 100 mL Isovue-370. Multiple reconstructions   including MIP reconstructions were created. No prior CTA for comparison. Tracheostomy tube is in place. Thin linear densities consistent with minimal   subsegmental atelectasis or scarring are noted in the left lung base. The lungs   are otherwise clear and well expanded and no focal airspace disease pneumothorax   or pleural effusion is seen. The thoracic aorta is unremarkable with no evidence   of aneurysm or dissection. No mediastinal mass or abnormal fluid collection is   seen. No pulmonary arterial filling defects are identified. Medical Decision Making and ED Course   I am the first provider for this patient. I reviewed the vital signs, available nursing notes, past medical history, past surgical history, family history and social history. Vital Signs-Reviewed the patient's vital signs.   Patient Vitals for the past 12 hrs:   Temp Pulse Resp BP SpO2   02/25/21 2121 98.3 °F (36.8 °C) 75 18 129/74 100 %   02/25/21 1510 98.6 °F (37 °C) (!) 124 16 108/73 99 %       EKG interpretation: (Preliminary)  Sinus rhythm with PACs, ventricular rate 94 bpm, nonspecific T wave abnormality, read by Dr. Shellie Bell at [phone], no STEMI    Records Reviewed: Nursing Notes, Old Medical Records, Previous Radiology Studies and Previous Laboratory Studies    Provider Notes (Medical Decision Making):       MDM  Number of Diagnoses or Management Options  Edema due to hypoalbuminemia  Diagnosis management comments: 21-year-old male with a history of throat cancer, recent tracheostomy placement. Presenting with bilateral leg swelling, pitting edema x1 week. There are no complaints for shortness of breath or chest pain however he is tachycardic and mildly hypotensive on arrival.  With his history of cancer, ruling out a pulmonary embolism with CTA of the chest.  Of note, patient has hypoalbuminemia on labs. This is basically unchanged from 1 week ago when he was admitted to [facility].  We will have him follow-up with his PCP in regards to this. Will write a prescription for compression stockings. Patient otherwise in no acute distress. Oxygen saturation 99% on room air. Amount and/or Complexity of Data Reviewed  Clinical lab tests: ordered and reviewed  Tests in the radiology section of CPT®: ordered and reviewed  Review and summarize past medical records: yes          ED Course:   Initial assessment performed. The patients presenting problems have been discussed, and they are in agreement with the care plan formulated and outlined with them. I have encouraged them to ask questions as they arise throughout their visit. 6:40 PM  Patient Transfer Care Note:  Patient care has been transferred to Marco A Jernigan NP.      ED Course as of Feb 25 2205   Thu Feb 25, 2021 2002 Radiology called regarding CTA results    [LP]   2030 Patient updated on results.     [LP]      ED Course User Index  [LP] Jorge Thacker NP         Procedures       Marylin Tolliver PA-C    Procedures   Marylin Tolliver PA-C        Disposition     Disposition: Condition stable    Discharged     DISCHARGE PLAN:  1. Current Discharge Medication List      CONTINUE these medications which have NOT CHANGED    Details   polyethylene glycol (MIRALAX) 17 gram packet 1 Packet by Per G Tube route daily for 30 days. Indications: constipation  Qty: 30 Packet, Refills: 0      sucralfate (CARAFATE) 1 gram tablet 1 Tab by Per G Tube route Before breakfast, lunch, dinner and at bedtime for 30 days. Qty: 120 Tab, Refills: 0      thiamine HCL (B-1) 100 mg tablet 1 Tab by Per G Tube route daily for 30 days. Qty: 30 Tab, Refills: 0      ondansetron hcl (Zofran) 4 mg tablet Take 1 Tab by mouth every eight (8) hours as needed for Nausea or Vomiting for up to 10 days. Qty: 10 Tab, Refills: 0      metFORMIN (GLUCOPHAGE) 500 mg tablet Take  by mouth two (2) times daily (with meals). aluminum-magnesium hydroxide 200-200 mg/5 mL susp 5 mL, diphenhydrAMINE 12.5 mg/5 mL liqd 5 mL, lidocaine 2 % soln 5 mL 5 mL by Swish and Spit route two (2) times a day. Magic mouth wash   Maalox  Lidocaine 2% viscous   Diphenhydramine oral solution     Pharmacy to mix equal portions of ingredients to a total volume as indicated in the dispense amount. albuterol (PROVENTIL HFA, VENTOLIN HFA, PROAIR HFA) 90 mcg/actuation inhaler Take 3 Puffs by inhalation every eight (8) hours. Indications: chronic obstructive pulmonary disease  Qty: 1 Inhaler, Refills: 3      hydrOXYzine HCL (ATARAX) 50 mg tablet Take 1 Tab by mouth every eight to twelve (8-12) hours as needed for Anxiety or Sleep for up to 40 days. Indications: Pt has throat cancer and anxiety. Qty: 40 Tab, Refills: 2           2.    Follow-up Information     Follow up With Specialties Details Why Contact Info    Kyleigh Bobo NP Nurse Practitioner Schedule an appointment as soon as possible for a visit  for follow up from ER visit [address] [address]      [facility] DEPT Emergency Medicine  As needed, If symptoms worsen [address] [facility], [place] [address] [phone]        3. Return to ED if worse   4. Current Discharge Medication List          Diagnosis     Clinical Impression:   1. Edema due to hypoalbuminemia    2. Throat cancer [facility])        Attestations:    Raymond Vergara PA-C    Please note that this dictation was completed with [employer]Kit, the computer voice recognition software. Quite often unanticipated grammatical, syntax, homophones, and other interpretive errors are inadvertently transcribed by the computer software. Please disregard these errors. Please excuse any errors that have escaped final proofreading. Thank you.

## 2021-02-25 NOTE — ED NOTES
Pt also has a PEG tube that was placed at least 2 weeks ago, states that he does not us the tube and eats regularly by mouth

## 2021-02-25 NOTE — ED TRIAGE NOTES
Pt to ED via EMS from his ENT's office and they noticed that pt has bilateral lower extremity swelling, onset 7 days ago per pt. Denies chest discomfort, DESHAWN,  shortness of breath, reports pain 5/10 in both legs.  Legs appears tight, shiny and swollen upon arrival.

## 2021-02-26 LAB
ATRIAL RATE: 94 BPM
CALCULATED P AXIS, ECG09: 60 DEGREES
CALCULATED R AXIS, ECG10: 78 DEGREES
CALCULATED T AXIS, ECG11: 48 DEGREES
DIAGNOSIS, 93000: NORMAL
P-R INTERVAL, ECG05: 166 MS
Q-T INTERVAL, ECG07: 362 MS
QRS DURATION, ECG06: 92 MS
QTC CALCULATION (BEZET), ECG08: 452 MS
VENTRICULAR RATE, ECG03: 94 BPM

## 2021-03-03 NOTE — DISCHARGE SUMMARY
Physician transfer Summary     Patient ID:    Tato Epps  [id number]  2 y.o.  1970    Admit date: 1/31/2021    Transfer date : 2/7/2021    Chronic Diagnoses:    Problem List as of 2/7/2021 Never Reviewed          Codes Class Noted - Resolved    Laryngeal carcinoma ([facility] ICD-10-CM: C32.9  ICD-9-CM: 161.9  2/7/2021 - Present        Throat cancer ([facility] ICD-10-CM: C14.0  ICD-9-CM: 149.0  1/31/2021 - Present        COPD exacerbation ([facility] ICD-10-CM: J44.1  ICD-9-CM: 491.21  1/31/2021 - Present        Acute and chronic respiratory failure with hypoxia ([facility] ICD-10-CM: J96.21  ICD-9-CM: 518.84, 799.02  1/31/2021 - Present        Chronic pain due to neoplasm ICD-10-CM: G89.3  ICD-9-CM: 338.3  1/31/2021 - Present        Anxiety ICD-10-CM: F41.9  ICD-9-CM: 300.00  1/31/2021 - Present        Insomnia ICD-10-CM: G47.00  ICD-9-CM: 780.52  1/31/2021 - Present        Respiratory failure with hypoxia [facility]) ICD-10-CM: J96.91  ICD-9-CM: 518.81  1/31/2021 - Present          22    Final Diagnoses:   Acute and chronic respiratory failure with hypoxia ([facility] [J96.21]  Throat cancer ([facility] [C14.0]  COPD exacerbation ([facility] [J44.1]  Protein calorie malnutrition  Anxiety neurosis    Reason for Hospitalization:  Patient is a 71-year-old male with a recently diagnosed T4 laryngeal cancer, biopsy-proven. He already underwent CT scans and PET scan and was scheduled for laryngectomy at [facility] on May 8, by Dr. Swain    He presented to our facility on 1/31 with respiratory distress and stridor      Hospital Course:   Patient was admitted to the medical floor initially. He was seen by pulmonology and ENT on 2/1 and emergent tracheostomy was recommended. This was performed by Dr. Addison Mohr on 2/1    Patient was on the ventilator overnight and removed the next day.     A Passy Fortescue valve was placed which allowed him to speak    Dr Ríos He spoke with Dr. Blanco Vicente and the plan is for transfer to [facility] for his upcoming laryngectomy on Monday    GI was consulted for PEG tube which was placed on 2/3. Tube feeds were started    I spoke with the hospitalist team at [facility] who kindly accepted the patient in transfer on 2/4 pending bed availability    The bed did not become available until 2/7          Discharge Medications:   Discharge Medication List as of 2/7/2021  5:14 PM        No current facility-administered medications for this encounter. Current Outpatient Medications:     metFORMIN (GLUCOPHAGE) 500 mg tablet, Take  by mouth two (2) times daily (with meals). , Disp: , Rfl:     polyethylene glycol (MIRALAX) 17 gram packet, 1 Packet by Per G Tube route daily for 30 days. Indications: constipation, Disp: 30 Packet, Rfl: 0    sucralfate (CARAFATE) 1 gram tablet, 1 Tab by Per G Tube route Before breakfast, lunch, dinner and at bedtime for 30 days. , Disp: 120 Tab, Rfl: 0    thiamine HCL (B-1) 100 mg tablet, 1 Tab by Per G Tube route daily for 30 days. , Disp: 30 Tab, Rfl: 0    aluminum-magnesium hydroxide 200-200 mg/5 mL susp 5 mL, diphenhydrAMINE 12.5 mg/5 mL liqd 5 mL, lidocaine 2 % soln 5 mL, 5 mL by Swish and Spit route two (2) times a day. Magic mouth wash  Maalox Lidocaine 2% viscous  Diphenhydramine oral solution   Pharmacy to mix equal portions of ingredients to a total volume as indicated in the dispense amount., Disp: , Rfl:     albuterol (PROVENTIL HFA, VENTOLIN HFA, PROAIR HFA) 90 mcg/actuation inhaler, Take 3 Puffs by inhalation every eight (8) hours. Indications: chronic obstructive pulmonary disease, Disp: 1 Inhaler, Rfl: 3    hydrOXYzine HCL (ATARAX) 50 mg tablet, Take 1 Tab by mouth every eight to twelve (8-12) hours as needed for Anxiety or Sleep for up to 40 days. Indications: Pt has throat cancer and anxiety. , Disp: 40 Tab, Rfl: 2      Diet:  Tube feeds  With Jevity    Disposition:  [facility]    Advanced Directive:   FULL x   DNR      Discharge Exam:  General:  Alert, cooperative, no distress, cachectic in appearance. Lungs:   Clear to auscultation bilaterally. Chest wall:  No tenderness or deformity. Heart:  Regular rate and rhythm, S1, S2 normal, no murmur, click, rub or gallop. Abdomen:   Soft, non-tender. Bowel sounds normal. No masses,  No organomegaly. Extremities: Extremities normal, atraumatic, no cyanosis or edema. Pulses: 2+ and symmetric all extremities. Skin: Skin color, texture, turgor normal. No rashes or lesions   Neurologic: CNII-XII intact. No gross sensory or motor deficits        CONSULTATIONS: Pulmonary/Intensive care, ENT    Significant Diagnostic Studies:   1/31/2021: BUN 11 mg/dL (Ref range: 6 - 20 mg/dL); Calcium 10.0 mg/dL (Ref range: 8.5 - 10.1 mg/dL); CO2 32 mmol/L (Ref range: 21 - 32 mmol/L); Creatinine 0.58 mg/dL* (Ref range: 0.70 - 1.30 mg/dL); Glucose 69 mg/dL (Ref range: 65 - 100 mg/dL); HCT 43.2 % (Ref range: 36.6 - 50.3 %); HGB 14.7 g/dL (Ref range: 12.1 - 17.0 g/dL); Potassium 3.5 mmol/L (Ref range: 3.5 - 5.1 mmol/L); Sodium 135 mmol/L* (Ref range: 136 - 145 mmol/L)  2/1/2021: BUN 16 mg/dL (Ref range: 6 - 20 mg/dL); Calcium 9.2 mg/dL (Ref range: 8.5 - 10.1 mg/dL); CO2 37 mmol/L* (Ref range: 21 - 32 mmol/L); Creatinine 0.74 mg/dL (Ref range: 0.70 - 1.30 mg/dL); Glucose 145 mg/dL* (Ref range: 65 - 100 mg/dL); HCT 42.1 % (Ref range: 36.6 - 50.3 %); HGB 13.9 g/dL (Ref range: 12.1 - 17.0 g/dL); Potassium 4.1 mmol/L (Ref range: 3.5 - 5.1 mmol/L); Sodium 135 mmol/L* (Ref range: 136 - 145 mmol/L)  No results for input(s): WBC, HGB, HCT, PLT, HGBEXT, HCTEXT, PLTEXT, HGBEXT, HCTEXT, PLTEXT in the last 72 hours. No results for input(s): NA, K, CL, CO2, BUN, CREA, GLU, CA, MG, PHOS, URICA in the last 72 hours. No results for input(s): ALT, AP, TBIL, TBILI, TP, ALB, GLOB, GGT, AML, LPSE in the last 72 hours. No lab exists for component: SGOT, GPT, AMYP, HLPSE  No results for input(s): INR, PTP, APTT, INREXT, INREXT in the last 72 hours.    No results for input(s): FE, TIBC, PSAT, FERR in the last 72 hours. No results for input(s): PH, PCO2, PO2 in the last 72 hours. No results for input(s): CPK, CKMB in the last 72 hours.     No lab exists for component: TROPONINI  Lab Results   Component Value Date/Time    Glucose (POC) 72 02/16/2021 11:17 AM    Glucose (POC) 95 02/16/2021 06:48 AM    Glucose (POC) 105 (H) 02/15/2021 11:25 PM    Glucose (POC) 140 (H) 02/15/2021 06:00 PM    Glucose (POC) 123 (H) 02/15/2021 11:48 AM       Discharge time spent 35 minutes    Signed:  Linus Awad MD  3/3/2021  12:42 PM

## 2021-03-10 ENCOUNTER — TRANSCRIBE ORDER (OUTPATIENT)
Dept: SCHEDULING | Age: 51
End: 2021-03-10

## 2021-03-10 DIAGNOSIS — Z93.1 GASTROSTOMY STATUS (HCC): Primary | ICD-10-CM

## 2021-03-16 ENCOUNTER — APPOINTMENT (OUTPATIENT)
Dept: PHYSICAL THERAPY | Age: 51
End: 2021-03-16

## 2021-03-17 ENCOUNTER — HOSPITAL ENCOUNTER (OUTPATIENT)
Dept: RADIATION THERAPY | Age: 51
Discharge: HOME | End: 2021-03-17

## 2021-04-22 ENCOUNTER — TRANSCRIBE ORDER (OUTPATIENT)
Dept: SCHEDULING | Age: 51
End: 2021-04-22

## 2021-04-22 DIAGNOSIS — C32.0 MALIGNANT NEOPLASM OF GLOTTIS (HCC): Primary | ICD-10-CM

## 2021-05-07 ENCOUNTER — HOSPITAL ENCOUNTER (OUTPATIENT)
Dept: PET IMAGING | Age: 51
Discharge: HOME | End: 2021-05-07
Attending: RADIOLOGY
Payer: MEDICAID

## 2021-05-07 VITALS — HEIGHT: 72 IN | BODY MASS INDEX: 19.64 KG/M2 | WEIGHT: 145 LBS

## 2021-05-07 DIAGNOSIS — C32.0 MALIGNANT NEOPLASM OF GLOTTIS (HCC): ICD-10-CM

## 2021-05-07 LAB
GLUCOSE BLD STRIP.AUTO-MCNC: 97 MG/DL (ref 65–100)
SERVICE CMNT-IMP: NORMAL

## 2021-05-07 PROCEDURE — 78815 PET IMAGE W/CT SKULL-THIGH: CPT

## 2021-05-07 RX ORDER — SODIUM CHLORIDE 0.9 % (FLUSH) 0.9 %
10 SYRINGE (ML) INJECTION
Status: COMPLETED | OUTPATIENT
Start: 2021-05-07 | End: 2021-05-07

## 2021-05-07 RX ORDER — SODIUM CHLORIDE 0.9 % (FLUSH) 0.9 %
10 SYRINGE (ML) INJECTION
Status: DISPENSED | OUTPATIENT
Start: 2021-05-07 | End: 2021-05-07

## 2021-05-07 RX ADMIN — Medication 10 ML: at 08:20

## 2021-05-11 NOTE — PROGRESS NOTES
[id number] [facility] Oncology at [address] [place] [address]  [phone]    Hematology / Oncology Consult    Reason for Visit:   Liz Celis is a 53 y.o. male who is seen in consultation at the request of Dr. Duyen Kumar for evaluation of . 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 Stage: CATARINO [[id number]] at diagnosis, regional recurrence in 4/2021    Pathology:     Prior Treatment: 2/8/21 total laryngectomy/cricopharyngeal myotomy    Current Treatment: planning for Cisplatin 100mg/m2 every 3 weeks x 3 cycles, to start 5/31/21. Treatment duration: 6-7 weeks  Frequency of visits: weekly     History of Present Illness:   Liz Celis is a 53 y.o. male who with COPD who is referred for Head and neck cancer. He presented with throat pain and hoarseness in Sept 2020. Was evaluated by Dr. Fabricio Anderson in ENT who performed laryngoscopy and diagnosed patient with squamous cell carcinoma involving the larynx and subglottis. In late Dec 2020, neck CT showed a 3 cm mass in Right supraglottic larynx with extension to thyroid cartilage, but no cervical LNs. PET 1/4/21 showed uptake in the laryngeal mass at right vocal fold, extending to thyroid cartilage. He was scheduled for surgery, but he required emergent tracheostomy prior to that; done late Jan 2021. Also had PEG placed 1/30/21. On 2/8/21, he underwent total laryngectomy/cricopharyngeal myotomy. He quit smoking in Feb 2021. He was evaluated by Dr. Duyen Kumar in [address] [place] [address] in March 2021 who recommended post-op radiation. There were multiple delays given difficult getting in for dental evaluation prior to RT. [facility] simulation scan was notable for a necrotic appearing tumor in Right neck. PET 5/7/21 confirmed hypermetabolic uptake in right neck. PEG removed in March 2021 due to problems with it. Per Dr. Duyen Kumar, pt had 7 teeth extracted by dentist. He returns to the dentist again for dental fillings on 5/26/21.    Pt states his neck feel tight, causing pulling sensation but no current pain. PMHx: COPD, throat cancer, anxiety  PSurgHx: Left arm plate placement, tracheostomy, total laryngectomy 2/8/21  SHx: Quit smoking 10/28/20 after 45 pack years. No EtOH  FHx: Mother with DM; Father with DM, CKD; Sister with DM. No h/o malignancy. Current Outpatient Medications   Medication Sig    metFORMIN (GLUCOPHAGE) 500 mg tablet Take  by mouth two (2) times daily (with meals).  aluminum-magnesium hydroxide 200-200 mg/5 mL susp 5 mL, diphenhydrAMINE 12.5 mg/5 mL liqd 5 mL, lidocaine 2 % soln 5 mL 5 mL by Swish and Spit route two (2) times a day. Magic mouth wash   Maalox  Lidocaine 2% viscous   Diphenhydramine oral solution     Pharmacy to mix equal portions of ingredients to a total volume as indicated in the dispense amount.  albuterol (PROVENTIL HFA, VENTOLIN HFA, PROAIR HFA) 90 mcg/actuation inhaler Take 3 Puffs by inhalation every eight (8) hours. Indications: chronic obstructive pulmonary disease     No current facility-administered medications for this visit. No Known Allergies     Review of Systems: A complete review of systems was obtained, negative except as described above. Physical Exam:   Blood pressure 131/83, pulse 97, temperature 98 °F (36.7 °C), resp. rate 16, height 6' (1.829 m), weight 148 lb 6.4 oz (67.3 kg), SpO2 96 %. ECOG PS: 0  General: Well developed, no acute distress  Eyes: PERRLA, EOMI, anicteric sclerae  HENT: Atraumatic, OP clear, TMs intact without erythema  Neck: Supple, Tracheostomy noted  Lymphatic: No supraclavicular, axillary or inguinal adenopathy. 4x5cm firm Right cervical LN noted. Respiratory: CTAB, normal respiratory effort  CV: Normal rate, regular rhythm, no murmurs, no peripheral edema  GI: Soft, nontender, nondistended, no masses, no hepatomegaly, no splenomegaly. MS: Normal gait and station. Digits without clubbing or cyanosis. Skin: No rashes, ecchymoses, or petechiae. Normal temperature, turgor, and texture. Neuro/Psych: Alert, oriented. 5/5 strength in all 4 extremities. Appropriate affect, normal judgment/insight. Communicates by writing given tracheostomy. Results:     Lab Results   Component Value Date/Time    WBC 4.5 2021 03:30 PM    HGB 8.5 (L) 2021 03:30 PM    HCT 26.6 (L) 2021 03:30 PM    PLATELET 401 (H)  03:30 PM    MCV 98.9 2021 03:30 PM    ABS. NEUTROPHILS 2.3 2021 03:30 PM     Lab Results   Component Value Date/Time    Sodium 143 2021 03:30 PM    Potassium 3.6 2021 03:30 PM    Chloride 107 2021 03:30 PM    CO2 31 2021 03:30 PM    Glucose 93 2021 03:30 PM    BUN 5 (L) 2021 03:30 PM    Creatinine 0.61 (L) 2021 03:30 PM    GFR est AA >60 2021 03:30 PM    GFR est non-AA >60 2021 03:30 PM    Calcium 8.3 (L) 2021 03:30 PM    Glucose (POC) 97 2021 08:15 AM     Lab Results   Component Value Date/Time    Bilirubin, total 0.2 2021 03:30 PM    ALT (SGPT) 15 2021 03:30 PM    Alk. phosphatase 54 2021 03:30 PM    Protein, total 6.6 2021 03:30 PM    Albumin 2.8 (L) 2021 03:30 PM    Globulin 3.8 2021 03:30 PM     No results found for: IRON, FE, TIBC, IBCT, PSAT, FERR    No results found for: B12LT, FOL, RBCF  No results found for: TSH, TSH2, TSH3, TSHP, TSHEXT  No results found for: HAMAT, HAAB, HABT, HAAT, HBSAG, HBSB, HBSAT, HBABN, HBCM, HBCAB, HBCAT, Prudy Coffee, [facility], [address] [place] [address] Avenue, [employer], [id number], [address] [facility], [facility], [facility], [facility], [id number], [id number], [address] [place] [address], [id number], [facility] UPMCT, [id number], HCGAT      Imagin/28/21 Neck CT: IMPRESSION  Irregular soft tissue mass involving the aryepiglottic and the larynx. There is significant narrowing of the airway at the level of the larynx  and the supraglottic region. Poorly defined cervical adenopathy is noted.     21 Chest CTA:  IMPRESSION  Minimal scarring or subsegmental atelectasis in the left lung base. No evidence of pulmonary embolism or pneumonia. 5/7/21 PET:  IMPRESSION  2 large necrotic appearing mass lesions are noted in the right neck (SUV 6.4). Small hypermetabolic right posterior triangle lymph node. No PET avid evidence  of distant metastatic disease. Assessment & Plan:   Young Street is a 53 y.o. male is seen for laryngeal cancer. 1. Squamous cell carcinoma of larynx, Stage CATARINO [pT4a cN2 Mx]:  S/p total laryngectomy and tracheostomy in Jan 2021. Afterwards, he developed disease recurrence in Right neck confirmed by PET scan. I recommend concurrent chemoradiation using Cisplatin to treat his disease. We discussed the risks and benefits of cisplatin chemotherapy. Potential side effects include, but are not limited to, nausea, vomiting, diarrhea, taste changes, myelosuppression, infection, fatigue, alopecia, neuropathy, hearing loss, renal failure, allergic reactions, infertility, and rarely, death. Additionally, chemotherapy leads to radiosensitization which can intensify the side effects of radiation therapy. The patient has consented to beginning chemotherapy and chemo teaching completed. Supportive care medications include: Zofran, Olanzapine, Dexamethasone, Emla cream  -- Port placement ASAP, arranging at [facility]  -- Return on 5/24/21 for C1D1 of Cisplatin. Labs and IVF weekly on Tuesdays. -- Will see weekly during treatment along with labs and IVF  -- Needs to see Maria Fernanda Ramirez when feasible  -- Needs to provide 5 day notice for his transportation company. 2. H/o DM: No longer on Metformin per patient. HgbA1c 6.0 in 2/2021. Is at risk of steroid induced hyperglycemia. 3. COPD / H/o tobacco abuse: Quit in 2/2021. Uses Albuterol inhaler prn. Emotional well being: Pt is coping well with his/her disease and has excellent support. I appreciate the opportunity to participate in Mr. Shaun Peterson's care.     Signed By: Mary Medrano MD     May 13, 2021

## 2021-05-13 ENCOUNTER — OFFICE VISIT (OUTPATIENT)
Dept: ONCOLOGY | Age: 51
End: 2021-05-13
Payer: MEDICAID

## 2021-05-13 ENCOUNTER — TELEPHONE (OUTPATIENT)
Dept: ONCOLOGY | Age: 51
End: 2021-05-13

## 2021-05-13 VITALS
TEMPERATURE: 98 F | HEIGHT: 72 IN | SYSTOLIC BLOOD PRESSURE: 131 MMHG | OXYGEN SATURATION: 96 % | WEIGHT: 148.4 LBS | HEART RATE: 97 BPM | BODY MASS INDEX: 20.1 KG/M2 | DIASTOLIC BLOOD PRESSURE: 83 MMHG | RESPIRATION RATE: 16 BRPM

## 2021-05-13 DIAGNOSIS — C32.9 SQUAMOUS CELL CARCINOMA OF LARYNX (HCC): Primary | ICD-10-CM

## 2021-05-13 DIAGNOSIS — K59.03 DRUG-INDUCED CONSTIPATION: ICD-10-CM

## 2021-05-13 DIAGNOSIS — C77.0 METASTASIS TO CERVICAL LYMPH NODE (HCC): ICD-10-CM

## 2021-05-13 DIAGNOSIS — Z86.39 HISTORY OF DIABETES MELLITUS: ICD-10-CM

## 2021-05-13 DIAGNOSIS — J44.9 CHRONIC OBSTRUCTIVE PULMONARY DISEASE, UNSPECIFIED COPD TYPE (HCC): ICD-10-CM

## 2021-05-13 DIAGNOSIS — T45.1X5A CINV (CHEMOTHERAPY-INDUCED NAUSEA AND VOMITING): ICD-10-CM

## 2021-05-13 DIAGNOSIS — R11.2 CINV (CHEMOTHERAPY-INDUCED NAUSEA AND VOMITING): ICD-10-CM

## 2021-05-13 PROCEDURE — 99245 OFF/OP CONSLTJ NEW/EST HI 55: CPT | Performed by: INTERNAL MEDICINE

## 2021-05-13 RX ORDER — OLANZAPINE 10 MG/1
TABLET ORAL
Qty: 12 TAB | Refills: 0 | Status: SHIPPED | OUTPATIENT
Start: 2021-05-13 | End: 2021-01-01

## 2021-05-13 RX ORDER — DEXAMETHASONE 4 MG/1
TABLET ORAL
Qty: 12 TAB | Refills: 0 | Status: SHIPPED | OUTPATIENT
Start: 2021-05-13 | End: 2021-01-01 | Stop reason: ALTCHOICE

## 2021-05-13 RX ORDER — LIDOCAINE AND PRILOCAINE 25; 25 MG/G; MG/G
CREAM TOPICAL
Qty: 30 G | Refills: 0 | Status: SHIPPED | OUTPATIENT
Start: 2021-05-13 | End: 2022-01-01

## 2021-05-13 RX ORDER — AMOXICILLIN 250 MG
1 CAPSULE ORAL
Qty: 30 TAB | Refills: 1 | Status: SHIPPED | OUTPATIENT
Start: 2021-05-13 | End: 2021-07-20 | Stop reason: SDUPTHER

## 2021-05-13 RX ORDER — ONDANSETRON 8 MG/1
8 TABLET, ORALLY DISINTEGRATING ORAL
Qty: 30 TAB | Refills: 2 | Status: SHIPPED | OUTPATIENT
Start: 2021-05-13 | End: 2021-01-01

## 2021-05-13 NOTE — TELEPHONE ENCOUNTER
[address] at [facility]  [phone]        05/13/21 2:30 PM Attempted to call [facility] IR to schedule urgent port placement. Spoke with Celestino Melendez who advised department is not answering and may be in a case. Provided contact information for staff to call this nurse back. Also faxed patient demographics and order to [phone], office note not available at this time. Patient unable to schedule procedure for 05/26 due to dental appointment and also requires 5 business days notice for transportation. Anticipate starting treatment on Monday, 05/24, and will need port placed prior to that. Will continue to monitor. 05/14/21 9:06 AM Spoke with Catie Bal with [facility] department. Verified that she is able to schedule for [facility] and is in the process of trying to reach IR department. Once appointment is scheduled, she will notify this office so patient can be contacted. 9:40 AM Return call placed to [facility] in [address] Scheduled port placement for Friday, 05/21, at 9 AM. Patient to arrive to 2nd floor at 7 AM. Patient should be NPO at midnight, but can take morning medications with sip of water. [facility] shared that facility does not accept [place]. Discussed that patient has Medicaid plan ([employer]). [facility] stated preadmission will evaluate, but patient may be responsible for out of pocket cost and it may be less expensive to have procedure done at preferred facility. Will notify MD and then call patient regarding above. H & P faxed to IR as well. 10:41 AM Called patient's brother, Monika Riley, per patient request. Notified him of appointment per above. He read back appointment information. Discussed potential out of pocket cost. Advised that patient/brother notify this office as soon as possible if they prefer to have procedure done elsewhere so that office can assist with rescheduling. Monika Rosemary voiced understanding.  No further questions or concerns at this time.

## 2021-05-13 NOTE — ONCOLOGY PATHWAY NOTE
START ON PATHWAY REGIMEN - Head and Neck    [id number]        Cisplatin (Platinol)     **Always confirm dose/schedule in your pharmacy ordering system**    Patient Characteristics:  Larynx, Local Recurrence, Candidate for Radiation Therapy  Disease Classification: Larynx  Current Disease Status: Local Recurrence  AJCC T Category: T4  AJCC 8 Stage Grouping: Unknown  AJCC N Category: cN0  AJCC M Category: M0  Is patient a candidate for radiation therapy<= Yes  Intent of Therapy:  Curative Intent, Discussed with Patient

## 2021-05-13 NOTE — PROGRESS NOTES
Chief Complaint   Patient presents with    New Patient     Yvonne Lockwood is a pleasant 53year old male who presents as a new patient for head and neck cancer.  He denies pain

## 2021-05-14 NOTE — TELEPHONE ENCOUNTER
[facility] called to give a heads up that their hospital does not accept [place]. So it is possible that this will need to be a another facility. Last office notes sent to [phone].

## 2021-05-18 NOTE — PROGRESS NOTES
Attempted to complete a phone assessment with patient. Patient non-verbal and unable to participate over the phone. Family Friend Radha Garcia present but unable to provide information. No family available at this time. Procedure instructions given, patient instructed to bring meds with him to procedure and that he needs someone to ride with him home. Jane Jerome verbalized that patient understood information.

## 2021-05-18 NOTE — PROGRESS NOTES
[id number] [facility] at [facility]  [phone]    Hematology / Oncology Consult    Reason for Visit:   Carmela Mcdaniel is a 53 y.o. male who is seen in consultation at the request of Dr. Lakesha Crum for evaluation of laryngeal cancer. 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 Stage: CATARINO [[id number]] at diagnosis, regional recurrence in 4/2021    Pathology:     Prior Treatment: 2/8/21 total laryngectomy/cricopharyngeal myotomy    Current Treatment: Cisplatin 100mg/m2 every 3 weeks x 3 cycles, start 5/24/21. Treatment duration: 6-7 weeks  Frequency of visits: weekly     History of Present Illness:   Carmela Mcdaniel is a 53 y.o. male who with COPD who is referred for Head and neck cancer. He presented with throat pain and hoarseness in Sept 2020. Was evaluated by Dr. Shahana Wang in ENT who performed laryngoscopy and diagnosed patient with squamous cell carcinoma involving the larynx and subglottis. In late Dec 2020, neck CT showed a 3 cm mass in Right supraglottic larynx with extension to thyroid cartilage, but no cervical LNs. PET 1/4/21 showed uptake in the laryngeal mass at right vocal fold, extending to thyroid cartilage. He was scheduled for surgery, but he required emergent tracheostomy prior to that; done late Jan 2021. Also had PEG placed 1/30/21. On 2/8/21, he underwent total laryngectomy/cricopharyngeal myotomy. He quit smoking in Feb 2021. He was evaluated by Dr. Lakesha Crum in [address] [place] [address] in March 2021 who recommended post-op radiation. There were multiple delays given difficult getting in for dental evaluation prior to RT. [facility] simulation scan was notable for a necrotic appearing tumor in Right neck. PET 5/7/21 confirmed hypermetabolic uptake in right neck. PEG removed in March 2021 due to problems with it. Per Dr. Lakesha Crum, pt had 7 teeth extracted by dentist. He returns to the dentist again for dental fillings on 5/26/21.    Pt states his neck feel tight, causing pulling sensation but no current pain. Interval History:  Patient here for follow up of throat cancer and start of chemoradiation. Port placed on 5/21/21. Has not picked up his supportive meds, but verified that pharmacy has them. Pt did not realize radiation was daily. PMHx: COPD, throat cancer, anxiety  PSurgHx: Left arm plate placement, tracheostomy, total laryngectomy 2/8/21  SHx: Quit smoking 10/28/20 after 45 pack years. No EtOH  FHx: Mother with DM; Father with DM, CKD; Sister with DM. No h/o malignancy. Review of Systems: A complete review of systems was obtained, negative except as described above. Physical Exam:   Blood pressure (!) 149/90, pulse 95, temperature 98.5 °F (36.9 °C), resp. rate 16, height 6' (1.829 m), weight 146 lb 12.8 oz (66.6 kg), SpO2 98 %. ECOG PS: 0  General: Well developed, no acute distress  Eyes: PERRLA, EOMI, anicteric sclerae  HENT: Atraumatic, OP clear, TMs intact without erythema  Neck: Supple, Tracheostomy noted  Lymphatic: No supraclavicular, axillary or inguinal adenopathy. 4x5cm firm Right cervical LN noted. Respiratory: CTAB, normal respiratory effort  CV: Normal rate, regular rhythm, no murmurs, no peripheral edema  GI: Soft, nontender, nondistended, no masses, no hepatomegaly, no splenomegaly. MS: Normal gait and station. Digits without clubbing or cyanosis. Skin: No rashes, ecchymoses, or petechiae. Normal temperature, turgor, and texture. Neuro/Psych: Alert, oriented. 5/5 strength in all 4 extremities. Appropriate affect, normal judgment/insight. Communicates by writing given tracheostomy. Results:     Lab Results   Component Value Date/Time    WBC 3.5 (L) 05/24/2021 09:58 AM    HGB 12.3 05/24/2021 09:58 AM    HCT 37.2 05/24/2021 09:58 AM    PLATELET 392 96/09/6067 09:58 AM    .5 (H) 05/24/2021 09:58 AM    ABS.  NEUTROPHILS 2.4 05/24/2021 09:58 AM     Lab Results   Component Value Date/Time    Sodium 139 2021 09:58 AM    Potassium 3.0 (L) 2021 09:58 AM    Chloride 103 2021 09:58 AM    CO2 28 2021 09:58 AM    Glucose 94 2021 09:58 AM    BUN 10 2021 09:58 AM    Creatinine 0.58 (L) 2021 09:58 AM    GFR est AA >60 2021 09:58 AM    GFR est non-AA >60 2021 09:58 AM    Calcium 8.5 2021 09:58 AM    Glucose (POC) 97 2021 08:15 AM     Lab Results   Component Value Date/Time    Bilirubin, total 0.2 2021 09:58 AM    ALT (SGPT) 20 2021 09:58 AM    Alk. phosphatase 55 2021 09:58 AM    Protein, total 8.6 (H) 2021 09:58 AM    Albumin 4.0 2021 09:58 AM    Globulin 4.6 (H) 2021 09:58 AM     No results found for: IRON, FE, TIBC, IBCT, PSAT, FERR    No results found for: B12LT, FOL, RBCF  No results found for: TSH, TSH2, TSH3, TSHP, TSHEXT, TSHEXT  No results found for: HAMAT, HAAB, HABT, HAAT, HBSAG, HBSB, HBSAT, HBABN, HBCM, HBCAB, HBCAT, XBCABS, [facility], [address] [place] [address], XHES, [id number], [facility], HBCLT, [address], [id number], [id number], [address] [place] [address], [id number], HBCMLT, [id number], HCGAT      Imagin/28/21 Neck CT: IMPRESSION  Irregular soft tissue mass involving the aryepiglottic and the larynx. There is significant narrowing of the airway at the level of the larynx  and the supraglottic region. Poorly defined cervical adenopathy is noted. 21 Chest CTA:  IMPRESSION  Minimal scarring or subsegmental atelectasis in the left lung base. No evidence of pulmonary embolism or pneumonia. 5/7/21 PET:  IMPRESSION  2 large necrotic appearing mass lesions are noted in the right neck (SUV 6.4). Small hypermetabolic right posterior triangle lymph node. No PET avid evidence  of distant metastatic disease. Assessment & Plan:   Roberth James is a 53 y.o. male is seen for laryngeal cancer. 1. Squamous cell carcinoma of larynx, Stage CATARINO [pT4a cN2 Mx]:  S/p total laryngectomy and tracheostomy in 2021.  Afterwards, he developed disease recurrence in Right neck confirmed by PET scan. I recommend concurrent chemoradiation using Cisplatin to treat his disease. We discussed the risks and benefits of cisplatin chemotherapy. Potential side effects include, but are not limited to, nausea, vomiting, diarrhea, taste changes, myelosuppression, infection, fatigue, alopecia, neuropathy, hearing loss, renal failure, allergic reactions, infertility, and rarely, death. Additionally, chemotherapy leads to radiosensitization which can intensify the side effects of radiation therapy. The patient has consented to beginning chemotherapy and chemo teaching completed. Supportive care medications include: Zofran, Olanzapine, Dexamethasone, Emla cream  -- Port placed 5/21/21  -- Proceed with C1D1 of Cisplatin today, IVF tomorrow. -- Reviewed supportive meds with patient. 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 -- Follow up in 1 week for labs, MD visit. 2. H/o DM: No longer on Metformin per patient. HgbA1c 6.0 in 2/2021. Is at risk of steroid induced hyperglycemia. 3. COPD / H/o tobacco abuse: Quit in 2/2021. Uses Albuterol inhaler prn.    4. FEN/GI:   Treating hypokalemia today with 40mEQ liquid potassium in OPIC. Also getting 10mEQ in his hydration. Emotional well being: Pt is coping well with his/her disease and has excellent support. I appreciate the opportunity to participate in Mr. Mayra Peterson's care.     Signed By: Darron John MD     May 24, 2021

## 2021-05-19 NOTE — PATIENT INSTRUCTIONS
1. Please remember to take olanzapine at bedtime starting tomorrow. You will take it for 4 nights in a row. This medication is to help prevent nausea. 2. You can take zofran every 8 hours for nausea. 3. Remember to take dexamethasone 8mg in the morning with food on 5/25 and 5/26

## 2021-05-21 ENCOUNTER — HOSPITAL ENCOUNTER (OUTPATIENT)
Dept: INTERVENTIONAL RADIOLOGY/VASCULAR | Age: 51
Discharge: HOME | End: 2021-05-21
Payer: MEDICAID

## 2021-05-21 VITALS
SYSTOLIC BLOOD PRESSURE: 149 MMHG | OXYGEN SATURATION: 99 % | HEIGHT: 72 IN | WEIGHT: 147 LBS | TEMPERATURE: 98.5 F | RESPIRATION RATE: 16 BRPM | HEART RATE: 75 BPM | BODY MASS INDEX: 19.91 KG/M2 | DIASTOLIC BLOOD PRESSURE: 90 MMHG

## 2021-05-21 DIAGNOSIS — C14.0 THROAT CANCER (HCC): ICD-10-CM

## 2021-05-21 DIAGNOSIS — C32.9 CARCINOMA LARYNX (HCC): ICD-10-CM

## 2021-05-21 LAB
ANION GAP SERPL CALC-SCNC: 5 MMOL/L (ref 5–15)
BASOPHILS # BLD: 0 K/UL (ref 0–0.1)
BASOPHILS NFR BLD: 1 % (ref 0–1)
BUN SERPL-MCNC: 7 MG/DL (ref 6–20)
BUN/CREAT SERPL: 10 (ref 12–20)
CA-I BLD-MCNC: 9.3 MG/DL (ref 8.5–10.1)
CHLORIDE SERPL-SCNC: 103 MMOL/L (ref 97–108)
CO2 SERPL-SCNC: 30 MMOL/L (ref 21–32)
CREAT SERPL-MCNC: 0.71 MG/DL (ref 0.7–1.3)
DIFFERENTIAL METHOD BLD: ABNORMAL
EOSINOPHIL # BLD: 0 K/UL (ref 0–0.4)
EOSINOPHIL NFR BLD: 0 % (ref 0–7)
ERYTHROCYTE [DISTWIDTH] IN BLOOD BY AUTOMATED COUNT: 14.6 % (ref 11.5–14.5)
GLUCOSE SERPL-MCNC: 71 MG/DL (ref 65–100)
HCT VFR BLD AUTO: 41 % (ref 36.6–50.3)
HGB BLD-MCNC: 13.6 G/DL (ref 12.1–17)
IMM GRANULOCYTES # BLD AUTO: 0 K/UL (ref 0–0.04)
IMM GRANULOCYTES NFR BLD AUTO: 0 % (ref 0–0.5)
INR PPP: 1 (ref 0.9–1.1)
LYMPHOCYTES # BLD: 1 K/UL (ref 0.8–3.5)
LYMPHOCYTES NFR BLD: 30 % (ref 12–49)
MCH RBC QN AUTO: 33.6 PG (ref 26–34)
MCHC RBC AUTO-ENTMCNC: 33.2 G/DL (ref 30–36.5)
MCV RBC AUTO: 101.2 FL (ref 80–99)
MONOCYTES # BLD: 0.4 K/UL (ref 0–1)
MONOCYTES NFR BLD: 11 % (ref 5–13)
NEUTS SEG # BLD: 1.8 K/UL (ref 1.8–8)
NEUTS SEG NFR BLD: 58 % (ref 32–75)
NRBC # BLD: 0 K/UL (ref 0–0.01)
NRBC BLD-RTO: 0 PER 100 WBC
PLATELET # BLD AUTO: 278 K/UL (ref 150–400)
PMV BLD AUTO: 10.1 FL (ref 8.9–12.9)
POTASSIUM SERPL-SCNC: 3.3 MMOL/L (ref 3.5–5.1)
PROTHROMBIN TIME: 12.9 SEC (ref 11.9–14.7)
RBC # BLD AUTO: 4.05 M/UL (ref 4.1–5.7)
SODIUM SERPL-SCNC: 138 MMOL/L (ref 136–145)
WBC # BLD AUTO: 3.2 K/UL (ref 4.1–11.1)

## 2021-05-21 PROCEDURE — C1788 PORT, INDWELLING, IMP: HCPCS

## 2021-05-21 PROCEDURE — 74011000250 HC RX REV CODE- 250: Performed by: RADIOLOGY

## 2021-05-21 PROCEDURE — 85025 COMPLETE CBC W/AUTO DIFF WBC: CPT

## 2021-05-21 PROCEDURE — 74011250636 HC RX REV CODE- 250/636: Performed by: RADIOLOGY

## 2021-05-21 PROCEDURE — C1894 INTRO/SHEATH, NON-LASER: HCPCS

## 2021-05-21 PROCEDURE — 80048 BASIC METABOLIC PNL TOTAL CA: CPT

## 2021-05-21 PROCEDURE — 76937 US GUIDE VASCULAR ACCESS: CPT

## 2021-05-21 PROCEDURE — 85610 PROTHROMBIN TIME: CPT

## 2021-05-21 PROCEDURE — 36415 COLL VENOUS BLD VENIPUNCTURE: CPT

## 2021-05-21 PROCEDURE — 77001 FLUOROGUIDE FOR VEIN DEVICE: CPT

## 2021-05-21 RX ORDER — MIDAZOLAM HYDROCHLORIDE 1 MG/ML
.25-2 INJECTION, SOLUTION INTRAMUSCULAR; INTRAVENOUS ONCE
Status: COMPLETED | OUTPATIENT
Start: 2021-05-21 | End: 2021-05-21

## 2021-05-21 RX ORDER — SUCRALFATE 1 G/1
1 TABLET ORAL 4 TIMES DAILY
COMMUNITY
End: 2021-01-01 | Stop reason: ALTCHOICE

## 2021-05-21 RX ORDER — CEFAZOLIN SODIUM 1 G/3ML
2 INJECTION, POWDER, FOR SOLUTION INTRAMUSCULAR; INTRAVENOUS
Status: DISCONTINUED | OUTPATIENT
Start: 2021-05-21 | End: 2021-05-21

## 2021-05-21 RX ORDER — TRAZODONE HYDROCHLORIDE 100 MG/1
100 TABLET ORAL
COMMUNITY
End: 2021-01-01

## 2021-05-21 RX ORDER — FENTANYL CITRATE 50 UG/ML
25-100 INJECTION, SOLUTION INTRAMUSCULAR; INTRAVENOUS ONCE
Status: COMPLETED | OUTPATIENT
Start: 2021-05-21 | End: 2021-05-21

## 2021-05-21 RX ORDER — HYDROCODONE BITARTRATE AND ACETAMINOPHEN 7.5; 325 MG/15ML; MG/15ML
SOLUTION ORAL
COMMUNITY
End: 2021-07-07 | Stop reason: ALTCHOICE

## 2021-05-21 RX ORDER — LANOLIN ALCOHOL/MO/W.PET/CERES
CREAM (GRAM) TOPICAL DAILY
COMMUNITY
End: 2021-01-01

## 2021-05-21 RX ORDER — OXYCODONE AND ACETAMINOPHEN 10; 325 MG/1; MG/1
TABLET ORAL
COMMUNITY
End: 2021-07-07 | Stop reason: ALTCHOICE

## 2021-05-21 RX ORDER — CEFAZOLIN SODIUM 1 G/3ML
INJECTION, POWDER, FOR SOLUTION INTRAMUSCULAR; INTRAVENOUS
Status: DISPENSED
Start: 2021-05-21 | End: 2021-05-21

## 2021-05-21 RX ORDER — TRAZODONE HYDROCHLORIDE 50 MG/1
TABLET ORAL
COMMUNITY
End: 2021-01-01

## 2021-05-21 RX ADMIN — MIDAZOLAM HYDROCHLORIDE 1 MG: 1 INJECTION, SOLUTION INTRAMUSCULAR; INTRAVENOUS at 09:17

## 2021-05-21 RX ADMIN — CEFAZOLIN SODIUM 2 G: 1 INJECTION, POWDER, FOR SOLUTION INTRAMUSCULAR; INTRAVENOUS at 09:10

## 2021-05-21 RX ADMIN — FENTANYL CITRATE 50 MCG: 50 INJECTION, SOLUTION INTRAMUSCULAR; INTRAVENOUS at 09:17

## 2021-05-21 NOTE — PERIOP NOTES
Patient alert and oriented x 4 with respirations even and unlabored on RA. Patient discharged home per physician's order. Patient verbalizes understanding of discharge instructions. Powerport booklet/card given to patient. Patient transported via wheelchair to front hospital entrance accompanied by family member with cab present to transport patient and patient's family member for discharge.

## 2021-05-21 NOTE — PROGRESS NOTES
Patient trach size 10. Patient uses pen and notepad to communicate along with nonverbal communication. Patient asked for some 10 size trachs.

## 2021-05-21 NOTE — PROCEDURES
Interventional Radiology Post Procedure Note    5/21/2021    Indications: H&N CA, chemotherapy    Procedure(s): Port-a-cath placement (R IJV, 22.5 cm)    Preliminary Findings (full report to follow): Patent right IJV    Fluoro Time: 1 minute(s)    Contrast: None    Specimen: None    Implants: See above    Estimated blood loss: Minimal    Complications: None    Plan: Port ready to use     Follow Up: PRN for removal

## 2021-05-24 ENCOUNTER — HOSPITAL ENCOUNTER (OUTPATIENT)
Dept: INFUSION THERAPY | Age: 51
Discharge: HOME | End: 2021-05-24
Payer: MEDICAID

## 2021-05-24 ENCOUNTER — OFFICE VISIT (OUTPATIENT)
Dept: ONCOLOGY | Age: 51
End: 2021-05-24
Payer: MEDICAID

## 2021-05-24 ENCOUNTER — DOCUMENTATION ONLY (OUTPATIENT)
Dept: ONCOLOGY | Age: 51
End: 2021-05-24

## 2021-05-24 VITALS
WEIGHT: 146.8 LBS | OXYGEN SATURATION: 98 % | BODY MASS INDEX: 19.88 KG/M2 | RESPIRATION RATE: 16 BRPM | HEART RATE: 95 BPM | SYSTOLIC BLOOD PRESSURE: 149 MMHG | HEIGHT: 72 IN | DIASTOLIC BLOOD PRESSURE: 90 MMHG | TEMPERATURE: 98.5 F

## 2021-05-24 VITALS
RESPIRATION RATE: 16 BRPM | BODY MASS INDEX: 19.88 KG/M2 | DIASTOLIC BLOOD PRESSURE: 92 MMHG | SYSTOLIC BLOOD PRESSURE: 145 MMHG | WEIGHT: 146.8 LBS | HEART RATE: 84 BPM | HEIGHT: 72 IN | OXYGEN SATURATION: 98 %

## 2021-05-24 DIAGNOSIS — J44.9 CHRONIC OBSTRUCTIVE PULMONARY DISEASE, UNSPECIFIED COPD TYPE (HCC): ICD-10-CM

## 2021-05-24 DIAGNOSIS — C32.9 CARCINOMA LARYNX (HCC): ICD-10-CM

## 2021-05-24 DIAGNOSIS — Z86.39 HISTORY OF DIABETES MELLITUS: ICD-10-CM

## 2021-05-24 DIAGNOSIS — C77.0 METASTASIS TO CERVICAL LYMPH NODE (HCC): ICD-10-CM

## 2021-05-24 DIAGNOSIS — C32.9 SQUAMOUS CELL CARCINOMA OF LARYNX (HCC): Primary | ICD-10-CM

## 2021-05-24 DIAGNOSIS — Z51.11 CHEMOTHERAPY MANAGEMENT, ENCOUNTER FOR: ICD-10-CM

## 2021-05-24 DIAGNOSIS — E87.6 HYPOKALEMIA: ICD-10-CM

## 2021-05-24 DIAGNOSIS — C32.9 LARYNGEAL CARCINOMA (HCC): Primary | ICD-10-CM

## 2021-05-24 LAB
ALBUMIN SERPL-MCNC: 4 G/DL (ref 3.5–5)
ALBUMIN/GLOB SERPL: 0.9 {RATIO} (ref 1.1–2.2)
ALP SERPL-CCNC: 55 U/L (ref 45–117)
ALT SERPL-CCNC: 20 U/L (ref 12–78)
ANION GAP SERPL CALC-SCNC: 8 MMOL/L (ref 5–15)
AST SERPL-CCNC: 62 U/L (ref 15–37)
BASOPHILS # BLD: 0 K/UL (ref 0–0.1)
BASOPHILS NFR BLD: 1 % (ref 0–1)
BILIRUB SERPL-MCNC: 0.2 MG/DL (ref 0.2–1)
BUN SERPL-MCNC: 10 MG/DL (ref 6–20)
BUN/CREAT SERPL: 17 (ref 12–20)
CALCIUM SERPL-MCNC: 8.5 MG/DL (ref 8.5–10.1)
CHLORIDE SERPL-SCNC: 103 MMOL/L (ref 97–108)
CO2 SERPL-SCNC: 28 MMOL/L (ref 21–32)
CREAT SERPL-MCNC: 0.58 MG/DL (ref 0.7–1.3)
DIFFERENTIAL METHOD BLD: ABNORMAL
EOSINOPHIL # BLD: 0 K/UL (ref 0–0.4)
EOSINOPHIL NFR BLD: 0 % (ref 0–7)
ERYTHROCYTE [DISTWIDTH] IN BLOOD BY AUTOMATED COUNT: 14.6 % (ref 11.5–14.5)
GLOBULIN SER CALC-MCNC: 4.6 G/DL (ref 2–4)
GLUCOSE SERPL-MCNC: 94 MG/DL (ref 65–100)
HCT VFR BLD AUTO: 37.2 % (ref 36.6–50.3)
HGB BLD-MCNC: 12.3 G/DL (ref 12.1–17)
IMM GRANULOCYTES # BLD AUTO: 0 K/UL (ref 0–0.04)
IMM GRANULOCYTES NFR BLD AUTO: 1 % (ref 0–0.5)
LYMPHOCYTES # BLD: 0.7 K/UL (ref 0.8–3.5)
LYMPHOCYTES NFR BLD: 21 % (ref 12–49)
MAGNESIUM SERPL-MCNC: 2 MG/DL (ref 1.6–2.4)
MCH RBC QN AUTO: 33.9 PG (ref 26–34)
MCHC RBC AUTO-ENTMCNC: 33.1 G/DL (ref 30–36.5)
MCV RBC AUTO: 102.5 FL (ref 80–99)
MONOCYTES # BLD: 0.4 K/UL (ref 0–1)
MONOCYTES NFR BLD: 10 % (ref 5–13)
NEUTS SEG # BLD: 2.4 K/UL (ref 1.8–8)
NEUTS SEG NFR BLD: 67 % (ref 32–75)
NRBC # BLD: 0 K/UL (ref 0–0.01)
NRBC BLD-RTO: 0 PER 100 WBC
PLATELET # BLD AUTO: 236 K/UL (ref 150–400)
PMV BLD AUTO: 10.4 FL (ref 8.9–12.9)
POTASSIUM SERPL-SCNC: 3 MMOL/L (ref 3.5–5.1)
PROT SERPL-MCNC: 8.6 G/DL (ref 6.4–8.2)
RBC # BLD AUTO: 3.63 M/UL (ref 4.1–5.7)
RBC MORPH BLD: ABNORMAL
SODIUM SERPL-SCNC: 139 MMOL/L (ref 136–145)
WBC # BLD AUTO: 3.5 K/UL (ref 4.1–11.1)

## 2021-05-24 PROCEDURE — 77030016057 HC NDL HUBR APOL -B

## 2021-05-24 PROCEDURE — 96361 HYDRATE IV INFUSION ADD-ON: CPT

## 2021-05-24 PROCEDURE — 85025 COMPLETE CBC W/AUTO DIFF WBC: CPT

## 2021-05-24 PROCEDURE — 36415 COLL VENOUS BLD VENIPUNCTURE: CPT

## 2021-05-24 PROCEDURE — 96367 TX/PROPH/DG ADDL SEQ IV INF: CPT

## 2021-05-24 PROCEDURE — 96413 CHEMO IV INFUSION 1 HR: CPT

## 2021-05-24 PROCEDURE — 96415 CHEMO IV INFUSION ADDL HR: CPT

## 2021-05-24 PROCEDURE — 99215 OFFICE O/P EST HI 40 MIN: CPT | Performed by: INTERNAL MEDICINE

## 2021-05-24 PROCEDURE — 83735 ASSAY OF MAGNESIUM: CPT

## 2021-05-24 PROCEDURE — 74011250636 HC RX REV CODE- 250/636: Performed by: INTERNAL MEDICINE

## 2021-05-24 PROCEDURE — 96375 TX/PRO/DX INJ NEW DRUG ADDON: CPT

## 2021-05-24 PROCEDURE — 80053 COMPREHEN METABOLIC PANEL: CPT

## 2021-05-24 PROCEDURE — 74011250637 HC RX REV CODE- 250/637: Performed by: INTERNAL MEDICINE

## 2021-05-24 PROCEDURE — 74011000258 HC RX REV CODE- 258: Performed by: INTERNAL MEDICINE

## 2021-05-24 RX ORDER — DIPHENHYDRAMINE HYDROCHLORIDE 50 MG/ML
50 INJECTION, SOLUTION INTRAMUSCULAR; INTRAVENOUS AS NEEDED
Status: CANCELLED
Start: 2021-05-24

## 2021-05-24 RX ORDER — SODIUM CHLORIDE 0.9 % (FLUSH) 0.9 %
10 SYRINGE (ML) INJECTION AS NEEDED
Status: DISPENSED | OUTPATIENT
Start: 2021-05-24 | End: 2021-05-24

## 2021-05-24 RX ORDER — SODIUM CHLORIDE 9 MG/ML
25 INJECTION, SOLUTION INTRAVENOUS CONTINUOUS
Status: CANCELLED | OUTPATIENT
Start: 2021-05-24

## 2021-05-24 RX ORDER — SODIUM CHLORIDE 9 MG/ML
10 INJECTION INTRAMUSCULAR; INTRAVENOUS; SUBCUTANEOUS AS NEEDED
Status: CANCELLED | OUTPATIENT
Start: 2021-05-24

## 2021-05-24 RX ORDER — PALONOSETRON 0.05 MG/ML
0.25 INJECTION, SOLUTION INTRAVENOUS ONCE
Status: CANCELLED | OUTPATIENT
Start: 2021-05-24 | End: 2021-05-24

## 2021-05-24 RX ORDER — ONDANSETRON 2 MG/ML
8 INJECTION INTRAMUSCULAR; INTRAVENOUS AS NEEDED
Status: CANCELLED | OUTPATIENT
Start: 2021-05-24

## 2021-05-24 RX ORDER — DIPHENHYDRAMINE HYDROCHLORIDE 50 MG/ML
25 INJECTION, SOLUTION INTRAMUSCULAR; INTRAVENOUS AS NEEDED
Status: CANCELLED
Start: 2021-05-24

## 2021-05-24 RX ORDER — HYDROCORTISONE SODIUM SUCCINATE 100 MG/2ML
100 INJECTION, POWDER, FOR SOLUTION INTRAMUSCULAR; INTRAVENOUS AS NEEDED
Status: CANCELLED | OUTPATIENT
Start: 2021-05-24

## 2021-05-24 RX ORDER — HEPARIN 100 UNIT/ML
300-500 SYRINGE INTRAVENOUS AS NEEDED
Status: ACTIVE | OUTPATIENT
Start: 2021-05-24 | End: 2021-05-24

## 2021-05-24 RX ORDER — SODIUM CHLORIDE 9 MG/ML
10 INJECTION INTRAMUSCULAR; INTRAVENOUS; SUBCUTANEOUS AS NEEDED
Status: ACTIVE | OUTPATIENT
Start: 2021-05-24 | End: 2021-05-24

## 2021-05-24 RX ORDER — EPINEPHRINE 1 MG/ML
0.3 INJECTION, SOLUTION, CONCENTRATE INTRAVENOUS AS NEEDED
Status: CANCELLED | OUTPATIENT
Start: 2021-05-24

## 2021-05-24 RX ORDER — SODIUM CHLORIDE 9 MG/ML
25 INJECTION, SOLUTION INTRAVENOUS CONTINUOUS
Status: DISPENSED | OUTPATIENT
Start: 2021-05-24 | End: 2021-05-24

## 2021-05-24 RX ORDER — POTASSIUM CHLORIDE 20MEQ/15ML
40 LIQUID (ML) ORAL ONCE
Status: CANCELLED
Start: 2021-05-24 | End: 2021-05-24

## 2021-05-24 RX ORDER — POTASSIUM CHLORIDE 1.5 G/1.77G
40 POWDER, FOR SOLUTION ORAL ONCE
Status: COMPLETED | OUTPATIENT
Start: 2021-05-24 | End: 2021-05-24

## 2021-05-24 RX ORDER — HEPARIN 100 UNIT/ML
300-500 SYRINGE INTRAVENOUS AS NEEDED
Status: CANCELLED
Start: 2021-05-24

## 2021-05-24 RX ORDER — SODIUM CHLORIDE 0.9 % (FLUSH) 0.9 %
10 SYRINGE (ML) INJECTION AS NEEDED
Status: CANCELLED | OUTPATIENT
Start: 2021-05-24

## 2021-05-24 RX ORDER — PALONOSETRON 0.05 MG/ML
0.25 INJECTION, SOLUTION INTRAVENOUS ONCE
Status: COMPLETED | OUTPATIENT
Start: 2021-05-24 | End: 2021-05-24

## 2021-05-24 RX ORDER — ALBUTEROL SULFATE 0.83 MG/ML
2.5 SOLUTION RESPIRATORY (INHALATION) AS NEEDED
Status: CANCELLED
Start: 2021-05-24

## 2021-05-24 RX ORDER — ACETAMINOPHEN 325 MG/1
650 TABLET ORAL AS NEEDED
Status: CANCELLED
Start: 2021-05-24

## 2021-05-24 RX ADMIN — POTASSIUM CHLORIDE: 2 INJECTION, SOLUTION, CONCENTRATE INTRAVENOUS at 15:30

## 2021-05-24 RX ADMIN — HEPARIN 500 UNITS: 100 SYRINGE at 16:30

## 2021-05-24 RX ADMIN — FOSAPREPITANT 150 MG: 150 INJECTION, POWDER, LYOPHILIZED, FOR SOLUTION INTRAVENOUS at 11:35

## 2021-05-24 RX ADMIN — SODIUM CHLORIDE 10 ML: 9 INJECTION INTRAMUSCULAR; INTRAVENOUS; SUBCUTANEOUS at 10:06

## 2021-05-24 RX ADMIN — POTASSIUM CHLORIDE 40 MEQ: 1.5 POWDER, FOR SOLUTION ORAL at 15:08

## 2021-05-24 RX ADMIN — DEXAMETHASONE SODIUM PHOSPHATE 12 MG: 10 INJECTION, SOLUTION INTRAMUSCULAR; INTRAVENOUS at 11:35

## 2021-05-24 RX ADMIN — Medication 10 ML: at 10:06

## 2021-05-24 RX ADMIN — SODIUM CHLORIDE 25 ML/HR: 9 INJECTION, SOLUTION INTRAVENOUS at 11:31

## 2021-05-24 RX ADMIN — CISPLATIN 185 MG: 1 INJECTION INTRAVENOUS at 13:28

## 2021-05-24 RX ADMIN — POTASSIUM CHLORIDE: 2 INJECTION, SOLUTION, CONCENTRATE INTRAVENOUS at 12:05

## 2021-05-24 RX ADMIN — PALONOSETRON 0.25 MG: 0.05 INJECTION, SOLUTION INTRAVENOUS at 11:33

## 2021-05-24 RX ADMIN — SODIUM CHLORIDE 10 ML: 9 INJECTION INTRAMUSCULAR; INTRAVENOUS; SUBCUTANEOUS at 16:30

## 2021-05-24 NOTE — PROGRESS NOTES
Oncology Social Work  Psychosocial Assessment    Reason for Assessment:      [x] Social Work Referral [x] Initial Assessment [x] New Diagnosis [] Other    Advance Care Planning:  Advance Care Planning 5/24/2021   Patient's Natasha Schumacher is: Legal Next of [address] Directive None   Patient Would Like to Complete Advance Directive No       Sources of Information:    [x]Patient  []Family  [x]Staff  [x]Medical Record    Mental Status:    [x]Alert  []Lethargic  []Unresponsive   [] Unable to assess   Oriented to:  [x]Person  [x]Place  [x]Time  [x]Situation      Relationship Status:  []Single  []  [x]Significant Other/Life Partner  []  []  []    Living Circumstances:  []Lives Alone  [x]Family/Significant Other in Household  []Roommates  []Children in the Home  []Paid Caregivers  []Assisted Living Facility/Group [address]  []Homeless  []Incarcerated  []Environmental/Care Concerns  []Other:    Employment Status:  []Employed Full-time []Employed Part-time []Homemaker  [] Retired [] Short-Term Disability [] Long-Term Disability  [x] Unemployed     Barriers to Learning:    []Language  []Developmental  []Cognitive  []Altered Mental Status  []Visual/Hearing Impairment  []Unable to Read/Write  []Motivational  [] Challenges Understanding Medical Jargon [x]No Barriers Identified      Financial/Legal Concerns:    []Uninsured  []Limited Income/Resources  []Non-Citizen  []Food Insecurity [x]No Concerns Identified   []Other:    Gnosticist/Spiritual/Existential:  Does patient have any spiritual or Sikh beliefs? [] Yes [] No  Is the patient involved in a spiritual, angelique or Sikh community?  [] Yes [] No  Patient expressing spiritual/existential angst? [] Yes [] No  Notes:    Support System:    Identified Support Person/Group: Peg (girlfriend)  [x]Strong  []Fair  []Limited    Coping with Illness:   [x]  Coping Well  [] Challenges Coping with Serious Illness [] Situational Depression [] Situational Anxiety [] Anticipatory Grief  [] Recent Loss [] Caregiver [place]            Narrative: Patient here for follow-up with Dr. Bronson Martin and start of treatment. Met with patient and her girlfriend, Elham, to introduce social work role and supports. Patient and Peg live together in their private residence. Patient is unable to spoke and communicates by writing. He does not work and has [employer]. Patient reports he's well supported with practical and emotional needs by family and friends. His primary support person is Elham, his girlfriend. Patient uses Medicaid transportation to appointments. He requested assistance coordinating transportation to radiation appointments this week as he did not realize he has radiation treatment 5 days a week. Patient requested assistance ordering laryngectomy supplies through [employer]. Patient denies feels of depression. He endorsed situational anxiety but feels he is adequately managing. Denies anxiety impacts his functioning. Provided patient with folder with information on cancer support resources and services. Encouraged patient to contact me as needed for psychosocial support. Plan:   1. Assist patient coordinate transportation from dental appointment to radiation on 3/26  2. Assist patient schedule transportation for transportation to chemo/rad appointments through 6/4.  3. Assist patient order [employer] HME and Fela Tube with [employer].      Referral/Handouts:   Transportation referral  Complementary therapies referral  Support Groups referral  DME/WIG referral    Thank you,  Christine Osborn LCSW

## 2021-05-24 NOTE — ROUTINE PROCESS
[address] [place] [address] DISCHARGE INSTRUCTIONS FOR:  CHEMOTHERAPY / BIOTHERAPY Chemotherapy has the potential to cause many side effects. The following are general precautions that chemo patients should take: 1. Practice good hand washing: * Use soap and water for at least 15 seconds, covering all areas of hands. * Always wash hands before eating. * Wash hands after contact with public surfaces such as door knobs and 
       handles, shopping carts, telephones and elevator buttons. 2. Get plenty of rest:   
* You will likely experience fatigue three to five days following your treatment. It may last as long as seven days. 3. Drink plenty of fluids. Water is best. 
 
4. Eat a well balanced diet: 
* Small frequent meals may help if you are having trouble with nausea or your  appetite. Some people also do well with nutritional supplements. 5. Pace yourself with daily activities: * Take frequent breaks and ask for help if you need it. 6. Exercise is very important: 
* It will increase circulation and will help the fatigue. Do what you can each day. 7. If your regimen results in hair loss: *  You will likely notice effects between two and three weeks following your first treatment. Some lose all hair while others only experience thinning. 8. Practice good oral hygiene: 
 *  Notify your M.D. immediately if any mouth sores or discomfort develop. 9. Protect yourself from the sun. Signs/Symptoms of an allergic reaction and/or some side effects may require immediate medical attention. Notify your physician if you develop one or more of the following:  
 
Temperature of 100.5 degrees or greater;  
Skin redness, itching, swelling, blistering, weeping, crusting, rash, or hives;   
Wheezing, chest tightness, cough, or shortness of breath;  
Swelling of the face, eyelids, lips, tongue, or throat; 
Severe, persistent headache; 
Stuffy nose, runny nose, sneezing;  
Red (bloodshot), itchy, swollen, or watery eyes;  
Stomach  pain, nausea, vomiting, diarrhea, or bloody stools; 
Mouth sores Your physician should also be aware of the following symptoms: 
 
Persistent and unresolved nausea and/or vomiting;  
Persistent and unresolved diarrhea or constipation;  
Numbness/tingling/burning of the extremities, including the fingers and toes; Bleeding or unexplained bruising; Unexplained redness/swelling/pain in the arms or legs; Shortness of breath or fatigue that worsens;  
Pain with urination or blood in the urine; Chills; 
Cough, especially a productive cough; 
Mouth sores or a white coating of the tongue; Redness, swelling, pain or drainage at the port-a-cath or IV site; Increased feeling of bloating or water retention; Excessive weight loss or gain; 
Ringing in the ears; Difficulty swallowing; 
Dizziness, vertigo, lightheadedness or fainting. Althea Gannon Signature: ____________________________ 5/24/2021 Bonnie Foote RN

## 2021-05-24 NOTE — PROGRESS NOTES
[facility] Progress Note    Date: May 24, 2021    Name: Betty Reeves    MRN: [id number]         : 1970    Mr. Peterson Arrived ambulatory and in no distress for C1D1 of Cisplatin Regimen. Assessment was completed, no acute issues at this time, no new complaints voiced. Right chest wall port accessed without difficulty, labs drawn & sent for processing. The following COVID-19 screening questions were asked to the patient:  1. Do you have any symptoms of COVID-19? SOB, coughing, fever, or generally not feeling well? No.  2. Have you been exposed to COVID-19 recently? No.   3. Have you had any recent contact with family/friend that has a pending COVID test?   No.     Chemotherapy Flowsheet 2021   Cycle C1D1   Date 2021   Drug / Regimen Cisplatin   Pre Hydration given   Post Hydration given   Pre Meds given       1000 Patient proceed to appointment with Dr. Daniel Gilmore.  Omer Curtis vitals were reviewed. Patient Vitals for the past 12 hrs:   Pulse Resp BP SpO2   21 1632 84 16 (!) 145/92    21 0950 95 16 119/78 98 %       Lab results were obtained and reviewed. Recent Results (from the past 12 hour(s))   CBC WITH AUTOMATED DIFF    Collection Time: 21  9:58 AM   Result Value Ref Range    WBC 3.5 (L) 4.1 - 11.1 K/uL    RBC 3.63 (L) 4.10 - 5.70 M/uL    HGB 12.3 12.1 - 17.0 g/dL    HCT 37.2 36.6 - 50.3 %    .5 (H) 80.0 - 99.0 FL    MCH 33.9 26.0 - 34.0 PG    MCHC 33.1 30.0 - 36.5 g/dL    RDW 14.6 (H) 11.5 - 14.5 %    PLATELET 054 110 - 099 K/uL    MPV 10.4 8.9 - 12.9 FL    NRBC 0.0 0  WBC    ABSOLUTE NRBC 0.00 0.00 - 0.01 K/uL    NEUTROPHILS 67 32 - 75 %    LYMPHOCYTES 21 12 - 49 %    MONOCYTES 10 5 - 13 %    EOSINOPHILS 0 0 - 7 %    BASOPHILS 1 0 - 1 %    IMMATURE GRANULOCYTES 1 (H) 0.0 - 0.5 %    ABS. NEUTROPHILS 2.4 1.8 - 8.0 K/UL    ABS. LYMPHOCYTES 0.7 (L) 0.8 - 3.5 K/UL    ABS. MONOCYTES 0.4 0.0 - 1.0 K/UL    ABS. EOSINOPHILS 0.0 0.0 - 0.4 K/UL    ABS.  BASOPHILS 0.0 0.0 - 0.1 K/UL    ABS. IMM. GRANS. 0.0 0.00 - 0.04 K/UL    DF SMEAR SCANNED      RBC COMMENTS MACROCYTOSIS  1+       METABOLIC PANEL, COMPREHENSIVE    Collection Time: 05/24/21  9:58 AM   Result Value Ref Range    Sodium 139 136 - 145 mmol/L    Potassium 3.0 (L) 3.5 - 5.1 mmol/L    Chloride 103 97 - 108 mmol/L    CO2 28 21 - 32 mmol/L    Anion gap 8 5 - 15 mmol/L    Glucose 94 65 - 100 mg/dL    BUN 10 6 - 20 MG/DL    Creatinine 0.58 (L) 0.70 - 1.30 MG/DL    BUN/Creatinine ratio 17 12 - 20      GFR est AA >60 >60 ml/min/1.73m2    GFR est non-AA >60 >60 ml/min/1.73m2    Calcium 8.5 8.5 - 10.1 MG/DL    Bilirubin, total 0.2 0.2 - 1.0 MG/DL    ALT (SGPT) 20 12 - 78 U/L    AST (SGOT) 62 (H) 15 - 37 U/L    Alk.  phosphatase 55 45 - 117 U/L    Protein, total 8.6 (H) 6.4 - 8.2 g/dL    Albumin 4.0 3.5 - 5.0 g/dL    Globulin 4.6 (H) 2.0 - 4.0 g/dL    A-G Ratio 0.9 (L) 1.1 - 2.2     MAGNESIUM    Collection Time: 05/24/21  9:58 AM   Result Value Ref Range    Magnesium 2.0 1.6 - 2.4 mg/dL       Medications:  Medications Administered     0.9% sodium chloride 1,000 mL with potassium chloride 10 mEq, magnesium sulfate 2 g infusion     Admin Date  05/24/2021 Action  Given Dose   Rate  1,000 mL/hr Route  IntraVENous Administered By  No Vega RN           Admin Date  05/24/2021 Action  New Bag Dose   Rate  1,000 mL/hr Route  IntraVENous Administered By  Fabiola Perez RN          0.9% sodium chloride infusion     Admin Date  05/24/2021 Action  New Bag Dose  25 mL/hr Rate  25 mL/hr Route  IntraVENous Administered By  Fabiola Perez RN          0.9% sodium chloride injection 10 mL     Admin Date  05/24/2021 Action  Given Dose  10 mL Route  IntraVENous Administered By  Fabiola Perez RN           Admin Date  05/24/2021 Action  Given Dose  10 mL Route  IntraVENous Administered By  Fabiola Perez RN          CISplatin (PLATINOL) 185 mg in 0.9% sodium chloride 500 mL, overfill volume 50 mL chemo infusion Admin Date  05/24/2021 Action  New Bag Dose  185 mg Rate  367.5 mL/hr Route  IntraVENous Administered By  Cesar Andres, MADYSON          dexamethasone (DECADRON) 12 mg in 0.9% sodium chloride 50 mL IVPB     Admin Date  05/24/2021 Action  New Bag Dose  12 mg Route  IntraVENous Administered By  Cesar Andres, RN          fosaprepitant (EMEND) 150 mg in 0.9% sodium chloride 150 mL IVPB     Admin Date  05/24/2021 Action  Given Dose  150 mg Rate  450 mL/hr Route  IntraVENous Administered By  Cesar Andres, MADYSON          heparin (porcine) pf 300-500 Units     Admin Date  05/24/2021 Action  Given Dose  500 Units Route  InterCATHeter Administered By  Cesar Andres, MADYSON          palonosetron HCl (ALOXI) injection 0.25 mg     Admin Date  05/24/2021 Action  Given Dose  0.25 mg Route  IntraVENous Administered By  Cesar Andres, MADYSON          potassium chloride (KLOR-CON) packet for solution 40 mEq     Admin Date  05/24/2021 Action  Given Dose  40 mEq Route  Oral Administered By  Cesar Andres, MADYSON          sodium chloride (NS) flush 10 mL     Admin Date  05/24/2021 Action  Given Dose  10 mL Route  IntraVENous Administered By  Cesar Andres, MADYSON                Mr. Micheal Demarco tolerated treatment well and was discharged from [place] [address] in stable condition at [id number]. Port de-accessed, flushed & heparinized per protocol. He is to return on May 25 at 37 for his next appointment.     Argelia Jaimes RN  May 24, 2021

## 2021-05-24 NOTE — PROGRESS NOTES
Chief Complaint   Patient presents with   Rain Luther is a pleasant 53year old male who presents as a follow up for head and neck cancer.  He denies pain

## 2021-05-25 ENCOUNTER — HOSPITAL ENCOUNTER (OUTPATIENT)
Dept: INFUSION THERAPY | Age: 51
Discharge: HOME | End: 2021-05-25
Payer: MEDICAID

## 2021-05-25 VITALS
RESPIRATION RATE: 16 BRPM | OXYGEN SATURATION: 100 % | BODY MASS INDEX: 20.32 KG/M2 | HEART RATE: 61 BPM | WEIGHT: 149.8 LBS | SYSTOLIC BLOOD PRESSURE: 154 MMHG | TEMPERATURE: 96 F | DIASTOLIC BLOOD PRESSURE: 88 MMHG

## 2021-05-25 DIAGNOSIS — E87.6 HYPOKALEMIA: Primary | ICD-10-CM

## 2021-05-25 DIAGNOSIS — C32.9 LARYNGEAL CARCINOMA (HCC): ICD-10-CM

## 2021-05-25 LAB
ANION GAP SERPL CALC-SCNC: 5 MMOL/L (ref 5–15)
BUN SERPL-MCNC: 9 MG/DL (ref 6–20)
BUN/CREAT SERPL: 17 (ref 12–20)
CALCIUM SERPL-MCNC: 8.7 MG/DL (ref 8.5–10.1)
CHLORIDE SERPL-SCNC: 103 MMOL/L (ref 97–108)
CO2 SERPL-SCNC: 28 MMOL/L (ref 21–32)
CREAT SERPL-MCNC: 0.53 MG/DL (ref 0.7–1.3)
GLUCOSE SERPL-MCNC: 95 MG/DL (ref 65–100)
POTASSIUM SERPL-SCNC: 4.2 MMOL/L (ref 3.5–5.1)
SODIUM SERPL-SCNC: 136 MMOL/L (ref 136–145)

## 2021-05-25 PROCEDURE — 36415 COLL VENOUS BLD VENIPUNCTURE: CPT

## 2021-05-25 PROCEDURE — 80048 BASIC METABOLIC PNL TOTAL CA: CPT

## 2021-05-25 PROCEDURE — 74011250636 HC RX REV CODE- 250/636: Performed by: INTERNAL MEDICINE

## 2021-05-25 PROCEDURE — 96360 HYDRATION IV INFUSION INIT: CPT

## 2021-05-25 PROCEDURE — 77030016057 HC NDL HUBR APOL -B

## 2021-05-25 RX ORDER — SODIUM CHLORIDE 9 MG/ML
1000 INJECTION, SOLUTION INTRAVENOUS ONCE
Status: CANCELLED
Start: 2021-06-01 | End: 2021-06-01

## 2021-05-25 RX ORDER — SODIUM CHLORIDE 9 MG/ML
1000 INJECTION, SOLUTION INTRAVENOUS ONCE
Status: COMPLETED | OUTPATIENT
Start: 2021-05-25 | End: 2021-05-25

## 2021-05-25 RX ORDER — SODIUM CHLORIDE 9 MG/ML
1000 INJECTION, SOLUTION INTRAVENOUS ONCE
Status: CANCELLED
Start: 2021-06-10 | End: 2021-06-08

## 2021-05-25 RX ADMIN — SODIUM CHLORIDE 1000 ML: 9 INJECTION, SOLUTION INTRAVENOUS at 09:38

## 2021-05-25 NOTE — PROGRESS NOTES
[facility]patient [facility]fusion [facility] Short Visit Progress Note     Patient admitted to [facility] for hydration ambulatory in stable condition. Assessment completed. No new concerns voiced. Patient communicates with mouthing words and pen and paper. Covid Screening  1. Do you have any symptoms of COVID-19? SOB, coughing, fever, or generally not feeling well ? no  2. Have you been exposed to COVID-19 recently? no  3. Have you had any recent contact with family/friend that has a pending COVID test? no    Vital Signs:  Visit Vitals  BP (!) 154/88   Pulse 61   Temp (!) 96 °F (35.6 °C)   Resp 16   Wt 67.9 kg (149 lb 12.8 oz)   SpO2 100%   BMI 20.32 kg/m²       R chest port with positive blood return. Lab Results:  Recent Results (from the past 12 hour(s))   METABOLIC PANEL, BASIC    Collection Time: 05/25/21  9:30 AM   Result Value Ref Range    Sodium 136 136 - 145 mmol/L    Potassium 4.2 3.5 - 5.1 mmol/L    Chloride 103 97 - 108 mmol/L    CO2 28 21 - 32 mmol/L    Anion gap 5 5 - 15 mmol/L    Glucose 95 65 - 100 mg/dL    BUN 9 6 - 20 MG/DL    Creatinine 0.53 (L) 0.70 - 1.30 MG/DL    BUN/Creatinine ratio 17 12 - 20      GFR est AA >60 >60 ml/min/1.73m2    GFR est non-AA >60 >60 ml/min/1.73m2    Calcium 8.7 8.5 - 10.1 MG/DL           Medications:  Medications Administered     0.9% sodium chloride infusion 1,000 mL     Admin Date  05/25/2021 Action  New Bag Dose  1,000 mL Rate  1,000 mL/hr Route  IntraVENous Administered By  Daniela Chirinos                Patient tolerated treatment well. Patient discharged from [place] [address] ambulatory in no distress. Patient aware of next appointment.     Jorge Emmanuel, MADYSON    Future Appointments   Date Time Provider Nathanael Haas   6/1/2021  1:00 PM [facility]   6/1/2021  1:30 PM Vicente Mascorro, NP [facility]   6/8/2021 11:30 AM [facility]   6/15/2021 10:00 AM SS INF7 CH4 <4H [facility]   6/16/2021 11:30 AM SS INF3 CH4 <2H [facility] [address] [place] [address]   6/22/2021 11:30 [facility]   6/29/2021 11:30 AM SS INF3 CH4 <2H [facility]   7/6/2021  9:00 AM SS INF3 CH3 <4H [facility]   7/7/2021 11:30 AM SS INF1 CH4 <2H [facility]   7/13/2021 11:30 AM SS INF3 CH4 <2H [facility]   7/20/2021 11:30 AM SS INF3 CH4 <2H [facility]   7/27/2021 11:30 AM SS INF3 CH4 <2H [facility] [address] [place] [address]

## 2021-05-26 ENCOUNTER — TELEPHONE (OUTPATIENT)
Dept: ONCOLOGY | Age: 51
End: 2021-05-26

## 2021-05-26 NOTE — TELEPHONE ENCOUNTER
[id number]  Social Work Navigator Encounter     Patient Name:  Fatou Rodriguez    Medical History: h&n cancer    Advance Directives: none on file; conversation deferred    Narrative: Spoke with representative [facility] ([phone]) per patient;s request and was advised this worker is not authorized to order supplies. Only patient, Corey Garcia (brother) and Peg (girlfriend) are able to place orders. Confirmed they have patient's insurance and a prescription on file. Patient representative is [facility] ([phone]). Called patient and provided above information. Patient on speak phone with Peg. Encouraged to contact me if additional assistance is needed. They voiced understanding and appreciation. Plan:  1. Provided guidance to patient/family on ordering supplies ([employer] HME & Fela Tube) with [facility].      Referral:   DME/Wig referral    Thank you,  Fred García, CHINTANW

## 2021-05-27 NOTE — PROGRESS NOTES
[id number] [facility] Oncology at [facility]  [phone]    Hematology / Oncology Established Visit    Reason for Visit:   Jv Nelson is a 53 y.o. male who is seen in consultation at the request of Dr. Ekaterina [address] for evaluation of laryngeal cancer. 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 Stage: CATARINO [[id number]] at diagnosis, regional recurrence in 4/2021    Pathology:   2/8/21 Total laryngectomy: Invasive squamous cell carcinoma  Tumor size 3.5cm, moderately differentiated (G2), PNI present, LVI not identified, margins negative  [id number]      Prior Treatment: 2/8/21 total laryngectomy/cricopharyngeal myotomy    Current Treatment: Cisplatin 100mg/m2 every 3 weeks x 3 cycles, start 5/24/21. Treatment duration: 6-7 weeks  Frequency of visits: weekly     History of Present Illness:   Jv Nelson is a 53 y.o. male who with COPD who is referred for Head and neck cancer. He presented with throat pain and hoarseness in Sept 2020. Was evaluated by Dr. Ambrocio Pabon in ENT who performed laryngoscopy and diagnosed patient with squamous cell carcinoma involving the larynx and subglottis. In late Dec 2020, neck CT showed a 3 cm mass in Right supraglottic larynx with extension to thyroid cartilage, but no cervical LNs. PET 1/4/21 showed uptake in the laryngeal mass at right vocal fold, extending to thyroid cartilage. He was scheduled for surgery, but he required emergent tracheostomy prior to that; done late Jan 2021. Also had PEG placed 1/30/21. On 2/8/21, he underwent total laryngectomy/cricopharyngeal myotomy. He quit smoking in Feb 2021. He was evaluated by Dr. Ekaterina Kaminski [address] in [address] [place] [address] in March 2021 who recommended post-op radiation. There were multiple delays given difficult getting in for dental evaluation prior to RT. [facility] simulation scan was notable for a necrotic appearing tumor in Right neck. PET 5/7/21 confirmed hypermetabolic uptake in right neck. PEG removed in March 2021 due to problems with it. Per Dr. Rina Franco, pt had 7 teeth extracted by dentist. He returns to the dentist again for dental fillings on 5/26/21. Pt states his neck feel tight, causing pulling sensation but no current pain. Interval History:  Patient here for follow up of throat cancer and week 2 of chemoradiation. He is still able to eat 3 full meals a day with snacks in between. Discussed high protein snacks. Denies dysphagia or throat pain. Report he developed a lesion on his left wrist last week. Denies pain or tenderness. Took olanzapine as scheduled on days 1-4 after treatment. Denies n/v after treatment. No SOB, or chest pain. PMHx: COPD, throat cancer, anxiety  PSurgHx: Left arm plate placement, tracheostomy, total laryngectomy 2/8/21  SHx: Quit smoking 10/28/20 after 45 pack years. No EtOH  FHx: Mother with DM; Father with DM, CKD; Sister with DM. No h/o malignancy. Review of Systems: A complete review of systems was obtained, negative except as described above. Physical Exam:   Blood pressure 118/79, pulse 91, temperature 97.2 °F (36.2 °C), resp. rate 16, height 6' (1.829 m), weight 147 lb 9.6 oz (67 kg), SpO2 96 %. ECOG PS: 0  General: Well developed, no acute distress  Eyes: Anicteric sclerae  HENT: Atraumatic  Neck: Supple, Tracheostomy noted  Lymphatic: No supraclavicular, axillary adenopathy. Right cervical LAD noted. Respiratory: CTAB, normal respiratory effort  CV: Normal rate, regular rhythm, no murmurs, no peripheral edema  GI: Soft, nontender, nondistended, no masses  MS: Normal gait and station. Digits without clubbing or cyanosis. Firm nodule on left wrist.  Skin: No rashes, ecchymoses, or petechiae. Normal temperature, turgor, and texture. Neuro/Psych: Alert, oriented. Moves all 4 extremities. Appropriate affect, normal judgment/insight. Communicates by writing given tracheostomy.     Results:     Lab Results   Component Value Date/Time WBC 2.7 (L) 2021 10:49 AM    HGB 11.8 (L) 2021 10:49 AM    HCT 35.5 (L) 2021 10:49 AM    PLATELET 972  10:49 AM    .3 (H) 2021 10:49 AM    ABS. NEUTROPHILS 1.8 2021 10:49 AM     Lab Results   Component Value Date/Time    Sodium 131 (L) 2021 10:49 AM    Potassium 3.1 (L) 2021 10:49 AM    Chloride 95 (L) 2021 10:49 AM    CO2 32 2021 10:49 AM    Glucose 142 (H) 2021 10:49 AM    BUN 11 2021 10:49 AM    Creatinine 0.72 2021 10:49 AM    GFR est AA >60 2021 10:49 AM    GFR est non-AA >60 2021 10:49 AM    Calcium 8.8 2021 10:49 AM    Glucose (POC) 97 2021 08:15 AM     Lab Results   Component Value Date/Time    Bilirubin, total 0.2 2021 09:58 AM    ALT (SGPT) 20 2021 09:58 AM    Alk. phosphatase 55 2021 09:58 AM    Protein, total 8.6 (H) 2021 09:58 AM    Albumin 4.0 2021 09:58 AM    Globulin 4.6 (H) 2021 09:58 AM         Imagin/28/21 Neck CT: IMPRESSION  Irregular soft tissue mass involving the aryepiglottic and the larynx. There is significant narrowing of the airway at the level of the larynx  and the supraglottic region. Poorly defined cervical adenopathy is noted. 21 Chest CTA:  IMPRESSION  Minimal scarring or subsegmental atelectasis in the left lung base. No evidence of pulmonary embolism or pneumonia. 21 PET:  IMPRESSION  2 large necrotic appearing mass lesions are noted in the right neck (SUV 6.4). Small hypermetabolic right posterior triangle lymph node. No PET avid evidence  of distant metastatic disease. Assessment & Plan:   Carmela Mcdaniel is a 53 y.o. male is seen for laryngeal cancer. 1. Squamous cell carcinoma of larynx, Stage CATARINO [pT4a cN2 Mx]:  S/p total laryngectomy and tracheostomy in 2021. Afterwards, he developed disease recurrence in Right neck confirmed by PET scan.  I recommend concurrent chemoradiation using Cisplatin to treat his disease. We discussed the risks and benefits of cisplatin chemotherapy. Potential side effects include, but are not limited to, nausea, vomiting, diarrhea, taste changes, myelosuppression, infection, fatigue, alopecia, neuropathy, hearing loss, renal failure, allergic reactions, infertility, and rarely, death. Additionally, chemotherapy leads to radiosensitization which can intensify the side effects of radiation therapy. The patient has consented to beginning chemotherapy and chemo teaching completed. Supportive care medications include: Zofran, Olanzapine, Dexamethasone, Emla cream  -- Received C1D1 of Cisplatin on 5/24/21. -- Reviewed supportive meds with patient. 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 -- Proceed with labs/fluids today. -- Follow up in 1 week for labs, MD visit. 2. H/o DM:   No longer on Metformin per patient. HgbA1c 6.0 in 2/2021. Is at risk of steroid induced hyperglycemia. 3. COPD / H/o tobacco abuse:   Quit in 2/2021. Uses Albuterol inhaler prn.    4. FEN/GI:   No dysphagia. Maintaining weight. Encouraged high protein snacks and provided examples. Katrina Mosquera RD. Treating hypokalemia with home potassium Rx.   -- Rx Guaifenisen 400mg/10ml liquid every 4 hours as needed for increased mucus secretions. -- Start KCL 20meq BID. Advised foods high in K+. Emotional well being: Pt is coping well with his/her disease and has excellent support. I appreciate the opportunity to participate in Mr. Ankita Peterson's care. I have personally seen and evaluated the patient in conjunction with Sherry Chavira NP. I find the patient's history and physical exam are consistent with the NP's documentation. I agree with the above assessment and plan, which I have modified as needed. Pt tolerated C1 of Cisplatin well. Has hypokalemia which will be treated with supplementation.  Good renal function.      Signed By: Marisela Maciel MD     June 1, 2021

## 2021-06-01 ENCOUNTER — HOSPITAL ENCOUNTER (OUTPATIENT)
Dept: INFUSION THERAPY | Age: 51
Discharge: HOME | End: 2021-06-01
Payer: MEDICAID

## 2021-06-01 ENCOUNTER — OFFICE VISIT (OUTPATIENT)
Dept: ONCOLOGY | Age: 51
End: 2021-06-01

## 2021-06-01 ENCOUNTER — DOCUMENTATION ONLY (OUTPATIENT)
Dept: ONCOLOGY | Age: 51
End: 2021-06-01

## 2021-06-01 VITALS
OXYGEN SATURATION: 100 % | SYSTOLIC BLOOD PRESSURE: 132 MMHG | HEART RATE: 80 BPM | TEMPERATURE: 97.5 F | RESPIRATION RATE: 16 BRPM | DIASTOLIC BLOOD PRESSURE: 78 MMHG

## 2021-06-01 VITALS
DIASTOLIC BLOOD PRESSURE: 79 MMHG | OXYGEN SATURATION: 96 % | RESPIRATION RATE: 16 BRPM | WEIGHT: 147.6 LBS | HEART RATE: 91 BPM | SYSTOLIC BLOOD PRESSURE: 118 MMHG | HEIGHT: 72 IN | TEMPERATURE: 97.2 F | BODY MASS INDEX: 19.99 KG/M2

## 2021-06-01 DIAGNOSIS — C32.9 SQUAMOUS CELL CARCINOMA OF LARYNX (HCC): Primary | ICD-10-CM

## 2021-06-01 DIAGNOSIS — E87.6 HYPOKALEMIA: ICD-10-CM

## 2021-06-01 DIAGNOSIS — Z51.11 CHEMOTHERAPY MANAGEMENT, ENCOUNTER FOR: ICD-10-CM

## 2021-06-01 DIAGNOSIS — E87.6 HYPOKALEMIA: Primary | ICD-10-CM

## 2021-06-01 DIAGNOSIS — K11.7 INCREASED OROPHARYNGEAL SECRETIONS: ICD-10-CM

## 2021-06-01 DIAGNOSIS — C32.9 LARYNGEAL CARCINOMA (HCC): ICD-10-CM

## 2021-06-01 LAB
ALBUMIN SERPL-MCNC: 3.6 G/DL (ref 3.5–5)
ALBUMIN/GLOB SERPL: 0.8 {RATIO} (ref 1.1–2.2)
ALP SERPL-CCNC: 52 U/L (ref 45–117)
ALT SERPL-CCNC: 23 U/L (ref 12–78)
ANION GAP SERPL CALC-SCNC: 4 MMOL/L (ref 5–15)
AST SERPL-CCNC: 43 U/L (ref 15–37)
BASOPHILS # BLD: 0 K/UL (ref 0–0.1)
BASOPHILS NFR BLD: 1 % (ref 0–1)
BILIRUB SERPL-MCNC: 0.3 MG/DL (ref 0.2–1)
BUN SERPL-MCNC: 11 MG/DL (ref 6–20)
BUN/CREAT SERPL: 15 (ref 12–20)
CALCIUM SERPL-MCNC: 8.8 MG/DL (ref 8.5–10.1)
CHLORIDE SERPL-SCNC: 95 MMOL/L (ref 97–108)
CO2 SERPL-SCNC: 32 MMOL/L (ref 21–32)
CREAT SERPL-MCNC: 0.72 MG/DL (ref 0.7–1.3)
DIFFERENTIAL METHOD BLD: ABNORMAL
EOSINOPHIL # BLD: 0 K/UL (ref 0–0.4)
EOSINOPHIL NFR BLD: 0 % (ref 0–7)
ERYTHROCYTE [DISTWIDTH] IN BLOOD BY AUTOMATED COUNT: 13.7 % (ref 11.5–14.5)
GLOBULIN SER CALC-MCNC: 4.4 G/DL (ref 2–4)
GLUCOSE SERPL-MCNC: 142 MG/DL (ref 65–100)
HCT VFR BLD AUTO: 35.5 % (ref 36.6–50.3)
HGB BLD-MCNC: 11.8 G/DL (ref 12.1–17)
IMM GRANULOCYTES # BLD AUTO: 0 K/UL (ref 0–0.04)
IMM GRANULOCYTES NFR BLD AUTO: 0 % (ref 0–0.5)
LYMPHOCYTES # BLD: 0.4 K/UL (ref 0.8–3.5)
LYMPHOCYTES NFR BLD: 15 % (ref 12–49)
MCH RBC QN AUTO: 33.3 PG (ref 26–34)
MCHC RBC AUTO-ENTMCNC: 33.2 G/DL (ref 30–36.5)
MCV RBC AUTO: 100.3 FL (ref 80–99)
MONOCYTES # BLD: 0.5 K/UL (ref 0–1)
MONOCYTES NFR BLD: 18 % (ref 5–13)
NEUTS SEG # BLD: 1.8 K/UL (ref 1.8–8)
NEUTS SEG NFR BLD: 66 % (ref 32–75)
NRBC # BLD: 0 K/UL (ref 0–0.01)
NRBC BLD-RTO: 0 PER 100 WBC
PLATELET # BLD AUTO: 191 K/UL (ref 150–400)
PMV BLD AUTO: 10.6 FL (ref 8.9–12.9)
POTASSIUM SERPL-SCNC: 3.1 MMOL/L (ref 3.5–5.1)
PROT SERPL-MCNC: 8 G/DL (ref 6.4–8.2)
RBC # BLD AUTO: 3.54 M/UL (ref 4.1–5.7)
RBC MORPH BLD: ABNORMAL
SODIUM SERPL-SCNC: 131 MMOL/L (ref 136–145)
WBC # BLD AUTO: 2.7 K/UL (ref 4.1–11.1)

## 2021-06-01 PROCEDURE — 74011250636 HC RX REV CODE- 250/636: Performed by: NURSE PRACTITIONER

## 2021-06-01 PROCEDURE — 36415 COLL VENOUS BLD VENIPUNCTURE: CPT

## 2021-06-01 PROCEDURE — 80053 COMPREHEN METABOLIC PANEL: CPT

## 2021-06-01 PROCEDURE — 99214 OFFICE O/P EST MOD 30 MIN: CPT | Performed by: INTERNAL MEDICINE

## 2021-06-01 PROCEDURE — 77030016057 HC NDL HUBR APOL -B

## 2021-06-01 PROCEDURE — 96360 HYDRATION IV INFUSION INIT: CPT

## 2021-06-01 PROCEDURE — 85025 COMPLETE CBC W/AUTO DIFF WBC: CPT

## 2021-06-01 RX ORDER — SODIUM CHLORIDE 9 MG/ML
1000 INJECTION, SOLUTION INTRAVENOUS ONCE
Status: COMPLETED | OUTPATIENT
Start: 2021-06-01 | End: 2021-06-01

## 2021-06-01 RX ORDER — POTASSIUM CHLORIDE 20 MEQ/1
20 TABLET, EXTENDED RELEASE ORAL 2 TIMES DAILY
Qty: 60 TABLET | Refills: 1 | Status: SHIPPED | OUTPATIENT
Start: 2021-06-01 | End: 2021-01-01

## 2021-06-01 RX ORDER — POTASSIUM CHLORIDE 750 MG/1
40 TABLET, FILM COATED, EXTENDED RELEASE ORAL
Status: DISCONTINUED | OUTPATIENT
Start: 2021-06-01 | End: 2021-06-02 | Stop reason: HOSPADM

## 2021-06-01 RX ADMIN — SODIUM CHLORIDE 1000 ML: 9 INJECTION, SOLUTION INTRAVENOUS at 10:58

## 2021-06-01 NOTE — PROGRESS NOTES
40 meq potassium in opic today but patient left. Please call patient and advise I would like him to start 20 meq BID potassium at home. Eat foods high in K such as sweet potatoes, spinach, bananas, tomatoes.

## 2021-06-01 NOTE — PROGRESS NOTES
Outpatient Infusion Center Short Visit Progress Note    Patient admitted to [facility] for hydration ambulatory in stable condition. Assessment completed. No new concerns voiced. Patient had visitor today that spoke for him. Covid Screening  1. Do you have any symptoms of COVID-19? SOB, coughing, fever, or generally not feeling well ? no  2. Have you been exposed to COVID-19 recently? no  3. Have you had any recent contact with family/friend that has a pending COVID test? no    Vital Signs:  Visit Vitals  /78   Pulse 80   Temp 97.5 °F (36.4 °C)   Resp 16   SpO2 100%         R chest port with positive blood return. Lab Results:  Recent Results (from the past 12 hour(s))   CBC WITH AUTOMATED DIFF    Collection Time: 06/01/21 10:49 AM   Result Value Ref Range    WBC 2.7 (L) 4.1 - 11.1 K/uL    RBC 3.54 (L) 4.10 - 5.70 M/uL    HGB 11.8 (L) 12.1 - 17.0 g/dL    HCT 35.5 (L) 36.6 - 50.3 %    .3 (H) 80.0 - 99.0 FL    MCH 33.3 26.0 - 34.0 PG    MCHC 33.2 30.0 - 36.5 g/dL    RDW 13.7 11.5 - 14.5 %    PLATELET 883 640 - 494 K/uL    MPV 10.6 8.9 - 12.9 FL    NRBC 0.0 0  WBC    ABSOLUTE NRBC 0.00 0.00 - 0.01 K/uL    NEUTROPHILS PENDING %    LYMPHOCYTES PENDING %    MONOCYTES PENDING %    EOSINOPHILS PENDING %    BASOPHILS PENDING %    IMMATURE GRANULOCYTES PENDING %    ABS. NEUTROPHILS PENDING K/UL    ABS. LYMPHOCYTES PENDING K/UL    ABS. MONOCYTES PENDING K/UL    ABS. EOSINOPHILS PENDING K/UL    ABS. BASOPHILS PENDING K/UL    ABS. IMM. GRANS. PENDING K/UL    DF PENDING            Medications:  Medications Administered     0.9% sodium chloride infusion 1,000 mL     Admin Date  06/01/2021 Action  New Bag Dose  1,000 mL Rate  1,000 mL/hr Route  IntraVENous Administered By  Leopold Hollingshead                Patient tolerated treatment well. Patient discharged from [place] [address] ambulatory in no distress. Patient aware of next appointment.     Lorna Senters, RN    Future Appointments   Date Time Provider Nathanael Haas   6/1/2021  1:30 PM Jose Mascorro Mai, NP [facility] AMB   6/8/2021 11:30 AM SS INF3 CH4 <2H [facility]   6/8/2021 11:45 AM Jose Mascorro Mai, NP [facility] AMB   6/15/2021 10:00 AM SS INF7 CH4 <4H [facility]   6/16/2021 11:30 AM SS INF3 CH4 <2H [facility]   6/22/2021 11:30 AM SS INF3 CH4 <2H [facility]   6/29/2021 11:30 AM SS INF3 CH4 <2H [facility]   7/6/2021  9:00 AM SS INF3 CH3 <4H [facility]   7/7/2021 11:30 AM SS INF1 CH4 <2H [facility]   7/13/2021 11:30 AM SS INF3 CH4 <2H [facility]   7/20/2021 11:30 AM SS INF3 CH4 <2H [facility]   7/27/2021 11:30 AM SS INF3 CH4 <2H [facility] [address] [place] [address]

## 2021-06-01 NOTE — PROGRESS NOTES
Chief Complaint   Patient presents with   Antonia Shaver is a pleasant 53year old male who presents as a follow up for head and neck cancer.  He reports neck pain

## 2021-06-01 NOTE — PROGRESS NOTES
[facility] at [address] [facility] [address] [facility]  Anette Ellis: [phone]  F: [phone]    Medical Nutrition Therapy      Reason for nutrition visit:   Supportive visit with patient to introduce self and discussed role of oncology dietitian in providing supportive nutrition care. Explained that RD is available to be a resource for managing symptoms, minimizing weight changes and maintaining optimal nutrition status during and after cancer treatment. Spoke with patient and family member. Patient unable to speak, communicates by writing. He previously had a feeding tube but no longer in place. He is eating and drinking without difficulty. He has no swallowing issues. He is drinking 3 Boost plus shakes a day. Results:   Diagnosis: Laryngeal cancer   Cisplatin every 3 weeks and concurrent radiation. Started on 5/25/21    Chemotherapy Flowsheet 5/24/2021   Cycle C1D1   Date 5/24/2021   Drug / Regimen Cisplatin   Pre Hydration given   Post Hydration given   Pre Meds given     Wt Readings from Last 5 Encounters:   06/01/21 147 lb 9.6 oz (67 kg)   05/25/21 149 lb 12.8 oz (67.9 kg)   05/24/21 146 lb 12.8 oz (66.6 kg)   05/24/21 146 lb 12.8 oz (66.6 kg)   05/21/21 147 lb (66.7 kg)       Estimated Nutrition Needs:   Calorie Range: 2345-2680kcal/day    Protein Range: 67-77g/day     Fluid Needs: 2200ml     Assessment:   Inadequate oral food or beverage intake (ptential) related to concurrent chemoradiation as evidence by treatment side effects. Plan:   - Discussed increased energy demands.  - Continue to eat as tolerated. - Continue with at least 3 Boost plus shakes. I appreciate the opportunity to participate in Mr. Shaun Peterson's care.     Signed By: Alpesh Crandall, [address], [address]     Contact: [phone]

## 2021-06-01 NOTE — PROGRESS NOTES
Called patient and advised of lab results and recommendations per Louie Vences NP. Significant other with patient and voiced understanding. No further questions or concerns at this time.

## 2021-06-03 ENCOUNTER — TELEPHONE (OUTPATIENT)
Dept: ONCOLOGY | Age: 51
End: 2021-06-03

## 2021-06-03 NOTE — TELEPHONE ENCOUNTER
[address]  Social Work Navigator Encounter     Patient Name:  Tom Beyer    Medical History: h&n cancer    Advance Directives: none on file    Narrative: Per patient's requested called Medicaid transportation ([phone]) and arranged transportation to radiation treatments from June 7th - June 11th and June 14th - June 18th. Called [employer] ([phone]) and left voicemail message for [place]. Patient requesting a longer Fela Tube and additional HME since he had to use several because Fela Tube is too short. 6/7 - Call placed to [employer] ([phone]) to place order for additional supplies as requested by patient. Advised this worker is not able to place orders per their policy. Patient, Hudson Caller (girlfriend) and Tariq Latham (brother) are authorized to place orders. Called patient and spoke with him and Geovani Mayo to relay this information. She voiced understanding. No additional needs at this time. Plan: Patient or authorized representative required to place supply order with [employer]. Provided guidance on how to do this.     Referral:   Transportation referral  DME/Wig referral    Thank you,  Alba Rodriguez LCSW

## 2021-06-08 ENCOUNTER — HOSPITAL ENCOUNTER (OUTPATIENT)
Dept: INFUSION THERAPY | Age: 51
Discharge: HOME | End: 2021-06-08

## 2021-06-09 ENCOUNTER — DOCUMENTATION ONLY (OUTPATIENT)
Dept: ONCOLOGY | Age: 51
End: 2021-06-09

## 2021-06-09 NOTE — PROGRESS NOTES
[id number] [facility] Oncology at [facility] [facility]  [phone]    Hematology / Oncology Established Visit    Reason for Visit:   Young Street is a 53 y.o. male who is seen in consultation at the request of Dr. Ekaterina [address]inski Rd for evaluation of laryngeal cancer. 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 Stage: CATARINO [[id number]] at diagnosis, regional recurrence in 4/2021    Pathology:   2/8/21 Total laryngectomy: Invasive squamous cell carcinoma  Tumor size 3.5cm, moderately differentiated (G2), PNI present, LVI not identified, margins negative  [id number]      Prior Treatment: 2/8/21 total laryngectomy/cricopharyngeal myotomy    Current Treatment: Cisplatin 100mg/m2 every 3 weeks x 3 cycles, start 5/24/21. Treatment duration: 6-7 weeks  Frequency of visits: weekly     History of Present Illness:   Young Street is a 53 y.o. male who with COPD who is referred for Head and neck cancer. He presented with throat pain and hoarseness in Sept 2020. Was evaluated by Dr. Hansel Canas in ENT who performed laryngoscopy and diagnosed patient with squamous cell carcinoma involving the larynx and subglottis. In late Dec 2020, neck CT showed a 3 cm mass in Right supraglottic larynx with extension to thyroid cartilage, but no cervical LNs. PET 1/4/21 showed uptake in the laryngeal mass at right vocal fold, extending to thyroid cartilage. He was scheduled for surgery, but he required emergent tracheostomy prior to that; done late Jan 2021. Also had PEG placed 1/30/21. On 2/8/21, he underwent total laryngectomy/cricopharyngeal myotomy. He quit smoking in Feb 2021. He was evaluated by Dr. Ekaterina Kaminski Rd in [address] [place] [address] in March 2021 who recommended post-op radiation. There were multiple delays given difficult getting in for dental evaluation prior to RT. [facility] simulation scan was notable for a necrotic appearing tumor in Right neck. PET 5/7/21 confirmed hypermetabolic uptake in right neck. PEG removed in March 2021 due to problems with it. Per Dr. Anette Maldonado, pt had 7 teeth extracted by dentist. He returns to the dentist again for dental fillings on 5/26/21. Pt states his neck feel tight, causing pulling sensation but no current pain. Interval History:  Patient here for follow up of throat cancer and week 3 of chemoradiation. He missed his appts on Tuesday, 6/8, due to transportation bringing him too early and pt could not wait because he had other things to do. Eating 3 full meals a day with snacks in between. Supplementing with 3 boost per day. Lost 5 lbs. Denies dysphagia or throat pain. Lesion on left wrist is painful to him. Denies n/v. No SOB, or chest pain. No fevers/chills      PMHx: COPD, throat cancer, anxiety  PSurgHx: Left arm plate placement, tracheostomy, total laryngectomy 2/8/21  SHx: Quit smoking 10/28/20 after 45 pack years. No EtOH  FHx: Mother with DM; Father with DM, CKD; Sister with DM. No h/o malignancy. Review of Systems: A complete review of systems was obtained, negative except as described above. Physical Exam:   Blood pressure 138/80, pulse 80, temperature (!) 95.6 °F (35.3 °C), resp. rate 16, height 6' (1.829 m), weight 142 lb 3.2 oz (64.5 kg), SpO2 98 %. ECOG PS: 0  General: Well developed, no acute distress  Eyes: Anicteric sclerae  HENT: Atraumatic  Neck: Supple, Tracheostomy noted  Lymphatic: No supraclavicular, axillary adenopathy. Right cervical LAD noted. Respiratory: CTAB, normal respiratory effort  CV: Normal rate, regular rhythm, no murmurs, no peripheral edema  GI: Soft, nontender, nondistended, no masses  MS: Normal gait and station. Digits without clubbing or cyanosis. 1cm firm nodule on left wrist.  Skin: No rashes, ecchymoses, or petechiae. Normal temperature, turgor, and texture. Neuro/Psych: Alert, oriented. Moves all 4 extremities. Appropriate affect, normal judgment/insight. Communicates by writing given tracheostomy.     Results: Lab Results   Component Value Date/Time    WBC 2.2 (L) 06/10/2021 10:54 AM    HGB 11.7 (L) 06/10/2021 10:54 AM    HCT 35.1 (L) 06/10/2021 10:54 AM    PLATELET 406 1145 10:54 AM    .9 (H) 06/10/2021 10:54 AM    ABS. NEUTROPHILS PENDING 06/10/2021 10:54 AM     Lab Results   Component Value Date/Time    Sodium 131 (L) 2021 10:49 AM    Potassium 3.1 (L) 2021 10:49 AM    Chloride 95 (L) 2021 10:49 AM    CO2 32 2021 10:49 AM    Glucose 142 (H) 2021 10:49 AM    BUN 11 2021 10:49 AM    Creatinine 0.72 2021 10:49 AM    GFR est AA >60 2021 10:49 AM    GFR est non-AA >60 2021 10:49 AM    Calcium 8.8 2021 10:49 AM    Glucose (POC) 97 2021 08:15 AM     Lab Results   Component Value Date/Time    Bilirubin, total 0.3 2021 10:49 AM    ALT (SGPT) 23 2021 10:49 AM    Alk. phosphatase 52 2021 10:49 AM    Protein, total 8.0 2021 10:49 AM    Albumin 3.6 2021 10:49 AM    Globulin 4.4 (H) 2021 10:49 AM         Imagin/28/21 Neck CT: IMPRESSION  Irregular soft tissue mass involving the aryepiglottic and the larynx. There is significant narrowing of the airway at the level of the larynx  and the supraglottic region. Poorly defined cervical adenopathy is noted. 21 Chest CTA:  IMPRESSION  Minimal scarring or subsegmental atelectasis in the left lung base. No evidence of pulmonary embolism or pneumonia. 21 PET:  IMPRESSION  2 large necrotic appearing mass lesions are noted in the right neck (SUV 6.4). Small hypermetabolic right posterior triangle lymph node. No PET avid evidence  of distant metastatic disease. Assessment & Plan:   Flo Barlow is a 53 y.o. male is seen for laryngeal cancer. 1. Squamous cell carcinoma of larynx, Stage CATARINO [pT4a cN2 Mx]:  S/p total laryngectomy and tracheostomy in 2021. Afterwards, he developed disease recurrence in Right neck confirmed by PET scan.  I recommend concurrent chemoradiation using Cisplatin to treat his disease. We discussed the risks and benefits of cisplatin chemotherapy. Potential side effects include, but are not limited to, nausea, vomiting, diarrhea, taste changes, myelosuppression, infection, fatigue, alopecia, neuropathy, hearing loss, renal failure, allergic reactions, infertility, and rarely, death. Additionally, chemotherapy leads to radiosensitization which can intensify the side effects of radiation therapy. The patient has consented to beginning chemotherapy and chemo teaching completed. Supportive care medications include: Zofran, Olanzapine, Dexamethasone, Emla cream  -- Received C1D1 of Cisplatin on 5/24/21. -- Reviewed supportive meds with patient. 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 -- Proceed with labs/fluids today. -- Follow up 6/15/21 for C1D22 Cisplatin, MD visit. 2. H/o DM:   No longer on Metformin per patient. HgbA1c 6.0 in 2/2021. Is at risk of steroid induced hyperglycemia. 3. COPD / H/o tobacco abuse:   Quit in 2/2021. Uses Albuterol inhaler prn.    4. FEN/GI:   No dysphagia. Lost some weight. Encouraged high protein snacks and provided examples. Sharda Saez RD. Treating hypokalemia with home potassium Rx.   -- Rx Guaifenisen 400mg/10ml liquid every 4 hours as needed for increased mucus secretions. -- KCL 20meq BID. Advised foods high in K+. Emotional well being: Pt is coping well with his/her disease and has excellent support. I appreciate the opportunity to participate in Mr. Kameron Peterson's care. I have personally seen and evaluated the patient in conjunction with Lacey Thomas NP. I find the patient's history and physical exam are consistent with the NP's documentation. I agree with the above assessment and plan, which I have modified as needed. Maintaining good nutrition and hydration.  Emphasized the importance of appointments. Advised he prioritize his treatments for the next 4-6 weeks.      Signed By: Sukhjinder Lopez MD     Soni 10, 2021

## 2021-06-09 NOTE — PROGRESS NOTES
[facility] [place] at [address], [address] [facility] [address] [facility]  Db Olivasings: [phone]  F: [phone]    Medical Nutrition Therapy    Nutrition Encounter:  Met with patient and friend in radiation. He has lost 2 pound over the past week. He writes that he is eating everything. He drinking 3 Ensure/Boost products daily. Discussed increasing this. Provided him with samples of Boost VHC. He reports no pain with swallowing. No increased mucus production. Per Dr. Hassan Litten, the treatment field is large which will cause dry mouth. Results:   Diagnosis: Laryngeal cancer   Cisplatin every 3 weeks and concurrent radiation. Started on 5/25/21    Chemotherapy Flowsheet 5/24/2021   Cycle C1D1   Date 5/24/2021   Drug / Regimen Cisplatin   Pre Hydration given   Post Hydration given   Pre Meds given     Wt Readings from Last 5 Encounters:   06/01/21 147 lb 9.6 oz (67 kg)   05/25/21 149 lb 12.8 oz (67.9 kg)   05/24/21 146 lb 12.8 oz (66.6 kg)   05/24/21 146 lb 12.8 oz (66.6 kg)   05/21/21 147 lb (66.7 kg)       Estimated Nutrition Needs:   Calorie Range: 2345-2680kcal/day    Protein Range: 67-77g/day     Fluid Needs: 2200ml     Assessment:   Inadequate oral food or beverage intake (ptential) related to concurrent chemoradiation as evidence by treatment side effects. Plan:   - Discussed increased energy demands.  - Continue to eat as tolerated. - Try to drink Boost VHC and at least 3 Boost plus daily. I appreciate the opportunity to participate in Mr. Abundio Peterson's care.     Signed By: Sergio Conway, [address], [address]     Contact: [phone]

## 2021-06-10 ENCOUNTER — HOSPITAL ENCOUNTER (OUTPATIENT)
Dept: INFUSION THERAPY | Age: 51
Discharge: HOME | End: 2021-06-10
Payer: MEDICAID

## 2021-06-10 ENCOUNTER — OFFICE VISIT (OUTPATIENT)
Dept: ONCOLOGY | Age: 51
End: 2021-06-10
Payer: MEDICAID

## 2021-06-10 VITALS
BODY MASS INDEX: 19.26 KG/M2 | HEART RATE: 80 BPM | HEIGHT: 72 IN | OXYGEN SATURATION: 98 % | RESPIRATION RATE: 16 BRPM | SYSTOLIC BLOOD PRESSURE: 138 MMHG | TEMPERATURE: 95.6 F | WEIGHT: 142.2 LBS | DIASTOLIC BLOOD PRESSURE: 80 MMHG

## 2021-06-10 VITALS
DIASTOLIC BLOOD PRESSURE: 82 MMHG | SYSTOLIC BLOOD PRESSURE: 136 MMHG | RESPIRATION RATE: 16 BRPM | OXYGEN SATURATION: 98 % | TEMPERATURE: 95.6 F | HEART RATE: 76 BPM

## 2021-06-10 DIAGNOSIS — C32.9 SQUAMOUS CELL CARCINOMA OF LARYNX (HCC): Primary | ICD-10-CM

## 2021-06-10 DIAGNOSIS — E87.6 HYPOKALEMIA: Primary | ICD-10-CM

## 2021-06-10 DIAGNOSIS — Z86.39 HISTORY OF DIABETES MELLITUS: ICD-10-CM

## 2021-06-10 DIAGNOSIS — E87.6 HYPOKALEMIA: ICD-10-CM

## 2021-06-10 DIAGNOSIS — C77.0 METASTASIS TO CERVICAL LYMPH NODE (HCC): ICD-10-CM

## 2021-06-10 DIAGNOSIS — C32.9 LARYNGEAL CARCINOMA (HCC): ICD-10-CM

## 2021-06-10 LAB
ALBUMIN SERPL-MCNC: 3.7 G/DL (ref 3.5–5)
ALBUMIN/GLOB SERPL: 0.8 {RATIO} (ref 1.1–2.2)
ALP SERPL-CCNC: 49 U/L (ref 45–117)
ALT SERPL-CCNC: 15 U/L (ref 12–78)
ANION GAP SERPL CALC-SCNC: 6 MMOL/L (ref 5–15)
AST SERPL-CCNC: 31 U/L (ref 15–37)
BASOPHILS # BLD: 0 K/UL (ref 0–0.1)
BASOPHILS NFR BLD: 1 % (ref 0–1)
BILIRUB SERPL-MCNC: 0.4 MG/DL (ref 0.2–1)
BUN SERPL-MCNC: 12 MG/DL (ref 6–20)
BUN/CREAT SERPL: 18 (ref 12–20)
CALCIUM SERPL-MCNC: 9 MG/DL (ref 8.5–10.1)
CHLORIDE SERPL-SCNC: 103 MMOL/L (ref 97–108)
CO2 SERPL-SCNC: 30 MMOL/L (ref 21–32)
CREAT SERPL-MCNC: 0.65 MG/DL (ref 0.7–1.3)
DIFFERENTIAL METHOD BLD: ABNORMAL
EOSINOPHIL # BLD: 0 K/UL (ref 0–0.4)
EOSINOPHIL NFR BLD: 1 % (ref 0–7)
ERYTHROCYTE [DISTWIDTH] IN BLOOD BY AUTOMATED COUNT: 13.8 % (ref 11.5–14.5)
GLOBULIN SER CALC-MCNC: 4.4 G/DL (ref 2–4)
GLUCOSE SERPL-MCNC: 95 MG/DL (ref 65–100)
HCT VFR BLD AUTO: 35.1 % (ref 36.6–50.3)
HGB BLD-MCNC: 11.7 G/DL (ref 12.1–17)
IMM GRANULOCYTES # BLD AUTO: 0 K/UL (ref 0–0.04)
IMM GRANULOCYTES NFR BLD AUTO: 1 % (ref 0–0.5)
LYMPHOCYTES # BLD: 0.2 K/UL (ref 0.8–3.5)
LYMPHOCYTES NFR BLD: 11 % (ref 12–49)
MCH RBC QN AUTO: 34.3 PG (ref 26–34)
MCHC RBC AUTO-ENTMCNC: 33.3 G/DL (ref 30–36.5)
MCV RBC AUTO: 102.9 FL (ref 80–99)
MONOCYTES # BLD: 0.2 K/UL (ref 0–1)
MONOCYTES NFR BLD: 10 % (ref 5–13)
NEUTS SEG # BLD: 1.8 K/UL (ref 1.8–8)
NEUTS SEG NFR BLD: 76 % (ref 32–75)
NRBC # BLD: 0 K/UL (ref 0–0.01)
NRBC BLD-RTO: 0 PER 100 WBC
PLATELET # BLD AUTO: 258 K/UL (ref 150–400)
PMV BLD AUTO: 10 FL (ref 8.9–12.9)
POTASSIUM SERPL-SCNC: 4.1 MMOL/L (ref 3.5–5.1)
PROT SERPL-MCNC: 8.1 G/DL (ref 6.4–8.2)
RBC # BLD AUTO: 3.41 M/UL (ref 4.1–5.7)
RBC MORPH BLD: ABNORMAL
SODIUM SERPL-SCNC: 139 MMOL/L (ref 136–145)
WBC # BLD AUTO: 2.2 K/UL (ref 4.1–11.1)
WBC MORPH BLD: ABNORMAL

## 2021-06-10 PROCEDURE — 85025 COMPLETE CBC W/AUTO DIFF WBC: CPT

## 2021-06-10 PROCEDURE — 77030016057 HC NDL HUBR APOL -B

## 2021-06-10 PROCEDURE — 80053 COMPREHEN METABOLIC PANEL: CPT

## 2021-06-10 PROCEDURE — 36415 COLL VENOUS BLD VENIPUNCTURE: CPT

## 2021-06-10 PROCEDURE — 96360 HYDRATION IV INFUSION INIT: CPT

## 2021-06-10 PROCEDURE — 99214 OFFICE O/P EST MOD 30 MIN: CPT | Performed by: INTERNAL MEDICINE

## 2021-06-10 PROCEDURE — 74011250636 HC RX REV CODE- 250/636: Performed by: NURSE PRACTITIONER

## 2021-06-10 RX ORDER — SODIUM CHLORIDE 9 MG/ML
1000 INJECTION, SOLUTION INTRAVENOUS ONCE
Status: COMPLETED | OUTPATIENT
Start: 2021-06-10 | End: 2021-06-10

## 2021-06-10 RX ADMIN — SODIUM CHLORIDE 1000 ML: 9 INJECTION, SOLUTION INTRAVENOUS at 11:47

## 2021-06-10 NOTE — PROGRESS NOTES
Outpatient Infusion Center Short Visit Progress Note    Patient admitted to [facility] for hydration ambulatory in stable condition. Assessment completed. No new concerns voiced. patient to MD visit. Vital Signs:  Visit Vitals  /82   Pulse 76   Temp (!) 95.6 °F (35.3 °C)   Resp 16   SpO2 98%         R chest port with positive blood return. Lab Results:  Recent Results (from the past 12 hour(s))   CBC WITH AUTOMATED DIFF    Collection Time: 06/10/21 10:54 AM   Result Value Ref Range    WBC 2.2 (L) 4.1 - 11.1 K/uL    RBC 3.41 (L) 4.10 - 5.70 M/uL    HGB 11.7 (L) 12.1 - 17.0 g/dL    HCT 35.1 (L) 36.6 - 50.3 %    .9 (H) 80.0 - 99.0 FL    MCH 34.3 (H) 26.0 - 34.0 PG    MCHC 33.3 30.0 - 36.5 g/dL    RDW 13.8 11.5 - 14.5 %    PLATELET 826 836 - 833 K/uL    MPV 10.0 8.9 - 12.9 FL    NRBC 0.0 0  WBC    ABSOLUTE NRBC 0.00 0.00 - 0.01 K/uL    NEUTROPHILS 76 (H) 32 - 75 %    LYMPHOCYTES 11 (L) 12 - 49 %    MONOCYTES 10 5 - 13 %    EOSINOPHILS 1 0 - 7 %    BASOPHILS 1 0 - 1 %    IMMATURE GRANULOCYTES 1 (H) 0.0 - 0.5 %    ABS. NEUTROPHILS 1.8 1.8 - 8.0 K/UL    ABS. LYMPHOCYTES 0.2 (L) 0.8 - 3.5 K/UL    ABS. MONOCYTES 0.2 0.0 - 1.0 K/UL    ABS. EOSINOPHILS 0.0 0.0 - 0.4 K/UL    ABS. BASOPHILS 0.0 0.0 - 0.1 K/UL    ABS. IMM.  GRANS. 0.0 0.00 - 0.04 K/UL    DF SMEAR SCANNED      RBC COMMENTS MACROCYTOSIS  1+        WBC COMMENTS REACTIVE LYMPHS     METABOLIC PANEL, COMPREHENSIVE    Collection Time: 06/10/21 10:54 AM   Result Value Ref Range    Sodium 139 136 - 145 mmol/L    Potassium 4.1 3.5 - 5.1 mmol/L    Chloride 103 97 - 108 mmol/L    CO2 30 21 - 32 mmol/L    Anion gap 6 5 - 15 mmol/L    Glucose 95 65 - 100 mg/dL    BUN 12 6 - 20 MG/DL    Creatinine 0.65 (L) 0.70 - 1.30 MG/DL    BUN/Creatinine ratio 18 12 - 20      GFR est AA >60 >60 ml/min/1.73m2    GFR est non-AA >60 >60 ml/min/1.73m2    Calcium 9.0 8.5 - 10.1 MG/DL    Bilirubin, total 0.4 0.2 - 1.0 MG/DL    ALT (SGPT) 15 12 - 78 U/L    AST (SGOT) 31 15 - 37 U/L    Alk. phosphatase 49 45 - 117 U/L    Protein, total 8.1 6.4 - 8.2 g/dL    Albumin 3.7 3.5 - 5.0 g/dL    Globulin 4.4 (H) 2.0 - 4.0 g/dL    A-G Ratio 0.8 (L) 1.1 - 2.2             Medications:  Medications Administered     0.9% sodium chloride infusion 1,000 mL     Admin Date  06/10/2021 Action  New Bag Dose  1,000 mL Rate  1,000 mL/hr Route  IntraVENous Administered By  Daniela Chirinos                 Patient tolerated treatment well. Patient discharged from [place] [address] ambulatory in no distress . Patient aware of next appointment.     Jorge Emmanuel, MADYSON    Future Appointments   Date Time Provider Nathanael Haas   6/15/2021 10:00 AM SS INF7 CH4 <4H [facility]   6/15/2021 10:45 AM Vicente Mascorro, DAT [facility] BS AMB   6/16/2021 11:30 AM SS INF3 CH4 <2H [facility]   6/22/2021 11:30 AM SS INF3 CH4 <2H [facility]   6/29/2021 11:30 AM SS INF3 CH4 <2H [facility]   7/6/2021  9:00 AM SS INF3 CH3 <4H [facility]   7/7/2021 11:30 AM [facility]   7/13/2021 11:30 AM SS INF3 CH4 <2H [facility]   7/20/2021 11:30 AM SS INF3 CH4 <2H [facility]   7/27/2021 11:30 AM SS INF3 CH4 <2H [facility]

## 2021-06-10 NOTE — LETTER
6/10/2021 Patient: Kane Braun YOB: 1970 Date of Visit: 6/10/2021 Niesha Granados NP 
[address] [facility]  [facility] [address] Via Fax: [phone] Dear Niesha Granados NP, Thank you for referring Mr. Kane Braun to [facility] for evaluation. My notes for this consultation are attached. If you have questions, please do not hesitate to call me. I look forward to following your patient along with you.  
 
 
Sincerely, 
 
Marvin Bryant NP

## 2021-06-14 ENCOUNTER — TELEPHONE (OUTPATIENT)
Dept: ONCOLOGY | Age: 51
End: 2021-06-14

## 2021-06-14 RX ORDER — SODIUM CHLORIDE 0.9 % (FLUSH) 0.9 %
10 SYRINGE (ML) INJECTION AS NEEDED
Status: CANCELLED | OUTPATIENT
Start: 2021-06-15

## 2021-06-14 RX ORDER — EPINEPHRINE 1 MG/ML
0.3 INJECTION, SOLUTION, CONCENTRATE INTRAVENOUS AS NEEDED
Status: CANCELLED | OUTPATIENT
Start: 2021-06-15

## 2021-06-14 RX ORDER — DIPHENHYDRAMINE HYDROCHLORIDE 50 MG/ML
50 INJECTION, SOLUTION INTRAMUSCULAR; INTRAVENOUS AS NEEDED
Status: CANCELLED
Start: 2021-06-15

## 2021-06-14 RX ORDER — ONDANSETRON 2 MG/ML
8 INJECTION INTRAMUSCULAR; INTRAVENOUS AS NEEDED
Status: CANCELLED | OUTPATIENT
Start: 2021-06-15

## 2021-06-14 RX ORDER — PALONOSETRON 0.05 MG/ML
0.25 INJECTION, SOLUTION INTRAVENOUS ONCE
Status: CANCELLED | OUTPATIENT
Start: 2021-06-15 | End: 2021-06-15

## 2021-06-14 RX ORDER — ACETAMINOPHEN 325 MG/1
650 TABLET ORAL AS NEEDED
Status: CANCELLED
Start: 2021-06-15

## 2021-06-14 RX ORDER — HEPARIN 100 UNIT/ML
300-500 SYRINGE INTRAVENOUS AS NEEDED
Status: CANCELLED
Start: 2021-06-15

## 2021-06-14 RX ORDER — HYDROCORTISONE SODIUM SUCCINATE 100 MG/2ML
100 INJECTION, POWDER, FOR SOLUTION INTRAMUSCULAR; INTRAVENOUS AS NEEDED
Status: CANCELLED | OUTPATIENT
Start: 2021-06-15

## 2021-06-14 RX ORDER — ALBUTEROL SULFATE 0.83 MG/ML
2.5 SOLUTION RESPIRATORY (INHALATION) AS NEEDED
Status: CANCELLED
Start: 2021-06-15

## 2021-06-14 RX ORDER — DIPHENHYDRAMINE HYDROCHLORIDE 50 MG/ML
25 INJECTION, SOLUTION INTRAMUSCULAR; INTRAVENOUS AS NEEDED
Status: CANCELLED
Start: 2021-06-15

## 2021-06-14 RX ORDER — SODIUM CHLORIDE 9 MG/ML
10 INJECTION INTRAMUSCULAR; INTRAVENOUS; SUBCUTANEOUS AS NEEDED
Status: CANCELLED | OUTPATIENT
Start: 2021-06-15

## 2021-06-14 RX ORDER — SODIUM CHLORIDE 9 MG/ML
25 INJECTION, SOLUTION INTRAVENOUS CONTINUOUS
Status: CANCELLED | OUTPATIENT
Start: 2021-06-15

## 2021-06-14 NOTE — PROGRESS NOTES
[address] [facility] Oncology at [facility]  [phone]    Hematology / Oncology Established Visit    Reason for Visit:   Liz Celis is a 53 y.o. male who is seen in follow up of laryngeal cancer. Referred by Dr. 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 Stage: CATARINO [[id number]] at diagnosis, regional recurrence in 4/2021    Pathology:   2/8/21 Total laryngectomy: Invasive squamous cell carcinoma  Tumor size 3.5cm, moderately differentiated (G2), PNI present, LVI not identified, margins negative  [id number]      Prior Treatment: 2/8/21 total laryngectomy/cricopharyngeal myotomy    Current Treatment: Cisplatin 100mg/m2 every 3 weeks x 3 cycles, start 5/24/21. Treatment duration: 6-7 weeks  Frequency of visits: weekly     History of Present Illness:   Liz Celis is a 53 y.o. male who with COPD who is referred for Head and neck cancer. He presented with throat pain and hoarseness in Sept 2020. Was evaluated by Dr. Fabricio Anderson in ENT who performed laryngoscopy and diagnosed patient with squamous cell carcinoma involving the larynx and subglottis. In late Dec 2020, neck CT showed a 3 cm mass in Right supraglottic larynx with extension to thyroid cartilage, but no cervical LNs. PET 1/4/21 showed uptake in the laryngeal mass at right vocal fold, extending to thyroid cartilage. He was scheduled for surgery, but he required emergent tracheostomy prior to that; done late Jan 2021. Also had PEG placed 1/30/21. On 2/8/21, he underwent total laryngectomy/cricopharyngeal myotomy. He quit smoking in Feb 2021. He was evaluated by Dr. Ekaterina Kaminski [address] in [address] [place] [address] in March 2021 who recommended post-op radiation. There were multiple delays given difficult getting in for dental evaluation prior to RT. [facility] simulation scan was notable for a necrotic appearing tumor in Right neck. PET 5/7/21 confirmed hypermetabolic uptake in right neck.     PEG removed in March 2021 due to problems with it. Per Dr. Marielena Pineda, pt had 7 teeth extracted by dentist. He returns to the dentist again for dental fillings on 5/26/21. Pt states his neck feel tight, causing pulling sensation but no current pain. Interval History:  Patient here for follow up of throat cancer and week 4 of chemoradiation. Eating 3 full meals a day with snacks in between. Supplementing with 3 boost per day. Lost another 2 lbs. Denies dysphagia or throat pain. No dryness present in mouth. Lesion on left wrist is stable. Denies n/v. No SOB, or chest pain. No fevers/chills. No constipation or diarrhea. No neuropathy or ringing in ears. PMHx: COPD, throat cancer, anxiety  PSurgHx: Left arm plate placement, tracheostomy, total laryngectomy 2/8/21  SHx: Quit smoking 10/28/20 after 45 pack years. No EtOH  FHx: Mother with DM; Father with DM, CKD; Sister with DM. No h/o malignancy. Review of Systems: A complete review of systems was obtained, negative except as described above. Physical Exam:   Blood pressure 111/67, pulse (!) 107, temperature 98 °F (36.7 °C), resp. rate 16, height 6' (1.829 m), weight 140 lb 3.4 oz (63.6 kg), SpO2 98 %. ECOG PS: 0  General: Well developed, no acute distress  Eyes: Anicteric sclerae  HENT: Atraumatic  Neck: Supple, Tracheostomy noted  Lymphatic: No supraclavicular, axillary adenopathy. Right cervical LAD noted, smaller than prior. Respiratory: CTAB, normal respiratory effort  CV: tachycardia, regular rhythm, no murmurs, no peripheral edema  GI: Soft, nontender, nondistended, no masses  MS: Normal gait and station. Digits without clubbing or cyanosis. 1cm firm nodule on left wrist.  Skin: No rashes, ecchymoses, or petechiae. Normal temperature, turgor, and texture. Hyperpigmented skin at neck bilaterally. Neuro/Psych: Alert, oriented. Moves all 4 extremities. Appropriate affect, normal judgment/insight. Communicates by writing given tracheostomy.     Results:     Lab Results   Component Value Date/Time    WBC 1.7 (L) 06/15/2021 11:39 AM    HGB 11.2 (L) 06/15/2021 11:39 AM    HCT 33.7 (L) 06/15/2021 11:39 AM    PLATELET 667  11:39 AM    .1 (H) 06/15/2021 11:39 AM    ABS. NEUTROPHILS 1.1 (L) 06/15/2021 11:39 AM     Lab Results   Component Value Date/Time    Sodium 141 06/15/2021 11:39 AM    Potassium 3.6 06/15/2021 11:39 AM    Chloride 105 06/15/2021 11:39 AM    CO2 28 06/15/2021 11:39 AM    Glucose 88 06/15/2021 11:39 AM    BUN 13 06/15/2021 11:39 AM    Creatinine 0.57 (L) 06/15/2021 11:39 AM    GFR est AA >60 06/15/2021 11:39 AM    GFR est non-AA >60 06/15/2021 11:39 AM    Calcium 8.7 06/15/2021 11:39 AM    Glucose (POC) 97 2021 08:15 AM     Lab Results   Component Value Date/Time    Bilirubin, total 0.2 06/15/2021 11:39 AM    ALT (SGPT) 12 06/15/2021 11:39 AM    Alk. phosphatase 48 06/15/2021 11:39 AM    Protein, total 7.6 06/15/2021 11:39 AM    Albumin 3.8 06/15/2021 11:39 AM    Globulin 3.8 06/15/2021 11:39 AM     Imagin/28/21 Neck CT: IMPRESSION  Irregular soft tissue mass involving the aryepiglottic and the larynx. There is significant narrowing of the airway at the level of the larynx  and the supraglottic region. Poorly defined cervical adenopathy is noted. 21 Chest CTA:  IMPRESSION  Minimal scarring or subsegmental atelectasis in the left lung base. No evidence of pulmonary embolism or pneumonia. 21 PET:  IMPRESSION  2 large necrotic appearing mass lesions are noted in the right neck (SUV 6.4). Small hypermetabolic right posterior triangle lymph node. No PET avid evidence  of distant metastatic disease. Assessment & Plan:   Lani Jaeger is a 53 y.o. male is seen for laryngeal cancer. 1. Squamous cell carcinoma of larynx, Stage CATARINO [pT4a cN2 Mx]:  S/p total laryngectomy and tracheostomy in 2021. Afterwards, he developed disease recurrence in Right neck confirmed by PET scan.  I recommend concurrent chemoradiation using Cisplatin to treat his disease. We discussed the risks and benefits of cisplatin chemotherapy. Potential side effects include, but are not limited to, nausea, vomiting, diarrhea, taste changes, myelosuppression, infection, fatigue, alopecia, neuropathy, hearing loss, renal failure, allergic reactions, infertility, and rarely, death. Additionally, chemotherapy leads to radiosensitization which can intensify the side effects of radiation therapy. The patient has consented to beginning chemotherapy and chemo teaching completed. Supportive care medications include: Zofran, Olanzapine, Dexamethasone, Emla cream  -- Reviewed supportive meds with patient. 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 -- Proceed with C1D22 Cisplatin today with fluids the next day. -- Follow up in 1 week for labs/fluids, MD visit. 2. H/o DM:   No longer on Metformin per patient. HgbA1c 6.0 in 2/2021. Is at risk of steroid induced hyperglycemia. 3. COPD / H/o tobacco abuse:   Quit in 2/2021. Uses Albuterol inhaler prn.    4. FEN/GI:   No dysphagia. Lost some weight. Encouraged high protein snacks, boost and provided examples. Lisa Ventura RD. Treating hypokalemia with home potassium Rx.   -- Rx Guaifenisen 400mg/10ml liquid every 4 hours as needed for increased mucus secretions. -- KCL 20meq BID. Advised foods high in K+. Emotional well being: Pt is coping well with his/her disease and has excellent support. I appreciate the opportunity to participate in Mr. Matthew Peterson's care. I have personally seen and evaluated the patient in conjunction with Barton Dandy, NP. I find the patient's history and physical exam are consistent with the NP's documentation. I agree with the above assessment and plan, which I have modified as needed. Proceed with D22 of Cisplatin today. Mass is finally shrinking.  He is maintaining weight and adequate hydration. Will continue weekly labs, IVF, visits.     Signed By: Juan Ramon Daniels MD     Soni 15, 2021

## 2021-06-14 NOTE — TELEPHONE ENCOUNTER
[employer]  Social Work Navigator Encounter     Patient Name:  Audrey Nguyen    Medical History: h&n cancer    Advance Directives: none on file; conversation deferred    Narrative: Per patient's requested called Medicaid transportation ([phone]) and arranged transportation to treatment appointments from June 21-25 and June 28- July 2. Radiation appointments are at 11am daily. Noted patient has two appointments on 6/23. Transportation was scheduled to transport to both appointments. · Speech Therapy at [facility]. [place], [place]    · Radiation at 11am - [address] [place] [address]     Barriers to Care: nonverbal     Plan:  1. Transportation appointments arranged.      Referral:   Transportation referral    Thank you,   Сергей Nieto LCSW

## 2021-06-15 ENCOUNTER — OFFICE VISIT (OUTPATIENT)
Dept: ONCOLOGY | Age: 51
End: 2021-06-15
Payer: MEDICAID

## 2021-06-15 ENCOUNTER — HOSPITAL ENCOUNTER (OUTPATIENT)
Dept: INFUSION THERAPY | Age: 51
Discharge: HOME | End: 2021-06-15
Payer: MEDICAID

## 2021-06-15 VITALS
HEART RATE: 107 BPM | TEMPERATURE: 98 F | RESPIRATION RATE: 16 BRPM | BODY MASS INDEX: 18.99 KG/M2 | DIASTOLIC BLOOD PRESSURE: 67 MMHG | OXYGEN SATURATION: 98 % | WEIGHT: 140.21 LBS | SYSTOLIC BLOOD PRESSURE: 111 MMHG | HEIGHT: 72 IN

## 2021-06-15 VITALS
TEMPERATURE: 97.8 F | WEIGHT: 140.2 LBS | SYSTOLIC BLOOD PRESSURE: 145 MMHG | DIASTOLIC BLOOD PRESSURE: 91 MMHG | HEIGHT: 72 IN | HEART RATE: 81 BPM | OXYGEN SATURATION: 98 % | BODY MASS INDEX: 18.99 KG/M2 | RESPIRATION RATE: 16 BRPM

## 2021-06-15 DIAGNOSIS — C32.9 SQUAMOUS CELL CARCINOMA OF LARYNX (HCC): Primary | ICD-10-CM

## 2021-06-15 DIAGNOSIS — Z51.11 CHEMOTHERAPY MANAGEMENT, ENCOUNTER FOR: ICD-10-CM

## 2021-06-15 DIAGNOSIS — E87.6 HYPOKALEMIA: Primary | ICD-10-CM

## 2021-06-15 DIAGNOSIS — K11.7 INCREASED OROPHARYNGEAL SECRETIONS: ICD-10-CM

## 2021-06-15 DIAGNOSIS — C77.0 METASTASIS TO CERVICAL LYMPH NODE (HCC): ICD-10-CM

## 2021-06-15 DIAGNOSIS — C32.9 LARYNGEAL CARCINOMA (HCC): ICD-10-CM

## 2021-06-15 LAB
ALBUMIN SERPL-MCNC: 3.8 G/DL (ref 3.5–5)
ALBUMIN/GLOB SERPL: 1 {RATIO} (ref 1.1–2.2)
ALP SERPL-CCNC: 48 U/L (ref 45–117)
ALT SERPL-CCNC: 12 U/L (ref 12–78)
ANION GAP SERPL CALC-SCNC: 8 MMOL/L (ref 5–15)
AST SERPL-CCNC: 31 U/L (ref 15–37)
BASOPHILS # BLD: 0 K/UL (ref 0–0.1)
BASOPHILS NFR BLD: 1 % (ref 0–1)
BILIRUB SERPL-MCNC: 0.2 MG/DL (ref 0.2–1)
BUN SERPL-MCNC: 13 MG/DL (ref 6–20)
BUN/CREAT SERPL: 23 (ref 12–20)
CALCIUM SERPL-MCNC: 8.7 MG/DL (ref 8.5–10.1)
CHLORIDE SERPL-SCNC: 105 MMOL/L (ref 97–108)
CO2 SERPL-SCNC: 28 MMOL/L (ref 21–32)
CREAT SERPL-MCNC: 0.57 MG/DL (ref 0.7–1.3)
DIFFERENTIAL METHOD BLD: ABNORMAL
EOSINOPHIL # BLD: 0 K/UL (ref 0–0.4)
EOSINOPHIL NFR BLD: 1 % (ref 0–7)
ERYTHROCYTE [DISTWIDTH] IN BLOOD BY AUTOMATED COUNT: 13.5 % (ref 11.5–14.5)
GLOBULIN SER CALC-MCNC: 3.8 G/DL (ref 2–4)
GLUCOSE SERPL-MCNC: 88 MG/DL (ref 65–100)
HCT VFR BLD AUTO: 33.7 % (ref 36.6–50.3)
HGB BLD-MCNC: 11.2 G/DL (ref 12.1–17)
IMM GRANULOCYTES # BLD AUTO: 0 K/UL (ref 0–0.04)
IMM GRANULOCYTES NFR BLD AUTO: 1 % (ref 0–0.5)
LYMPHOCYTES # BLD: 0.3 K/UL (ref 0.8–3.5)
LYMPHOCYTES NFR BLD: 16 % (ref 12–49)
MAGNESIUM SERPL-MCNC: 2 MG/DL (ref 1.6–2.4)
MCH RBC QN AUTO: 33.9 PG (ref 26–34)
MCHC RBC AUTO-ENTMCNC: 33.2 G/DL (ref 30–36.5)
MCV RBC AUTO: 102.1 FL (ref 80–99)
MONOCYTES # BLD: 0.3 K/UL (ref 0–1)
MONOCYTES NFR BLD: 19 % (ref 5–13)
NEUTS SEG # BLD: 1.1 K/UL (ref 1.8–8)
NEUTS SEG NFR BLD: 62 % (ref 32–75)
NRBC # BLD: 0 K/UL (ref 0–0.01)
NRBC BLD-RTO: 0 PER 100 WBC
PLATELET # BLD AUTO: 257 K/UL (ref 150–400)
PMV BLD AUTO: 9.8 FL (ref 8.9–12.9)
POTASSIUM SERPL-SCNC: 3.6 MMOL/L (ref 3.5–5.1)
PROT SERPL-MCNC: 7.6 G/DL (ref 6.4–8.2)
RBC # BLD AUTO: 3.3 M/UL (ref 4.1–5.7)
RBC MORPH BLD: ABNORMAL
SODIUM SERPL-SCNC: 141 MMOL/L (ref 136–145)
WBC # BLD AUTO: 1.7 K/UL (ref 4.1–11.1)

## 2021-06-15 PROCEDURE — 36415 COLL VENOUS BLD VENIPUNCTURE: CPT

## 2021-06-15 PROCEDURE — 85025 COMPLETE CBC W/AUTO DIFF WBC: CPT

## 2021-06-15 PROCEDURE — 99215 OFFICE O/P EST HI 40 MIN: CPT | Performed by: INTERNAL MEDICINE

## 2021-06-15 PROCEDURE — 80053 COMPREHEN METABOLIC PANEL: CPT

## 2021-06-15 PROCEDURE — 96367 TX/PROPH/DG ADDL SEQ IV INF: CPT

## 2021-06-15 PROCEDURE — 83735 ASSAY OF MAGNESIUM: CPT

## 2021-06-15 PROCEDURE — 77030012965 HC NDL HUBR BBMI -A

## 2021-06-15 PROCEDURE — 96415 CHEMO IV INFUSION ADDL HR: CPT

## 2021-06-15 PROCEDURE — 96361 HYDRATE IV INFUSION ADD-ON: CPT

## 2021-06-15 PROCEDURE — 74011000258 HC RX REV CODE- 258: Performed by: NURSE PRACTITIONER

## 2021-06-15 PROCEDURE — 74011250636 HC RX REV CODE- 250/636: Performed by: NURSE PRACTITIONER

## 2021-06-15 PROCEDURE — 96375 TX/PRO/DX INJ NEW DRUG ADDON: CPT

## 2021-06-15 PROCEDURE — 96413 CHEMO IV INFUSION 1 HR: CPT

## 2021-06-15 RX ORDER — PALONOSETRON 0.05 MG/ML
0.25 INJECTION, SOLUTION INTRAVENOUS ONCE
Status: COMPLETED | OUTPATIENT
Start: 2021-06-15 | End: 2021-06-15

## 2021-06-15 RX ORDER — SODIUM CHLORIDE 0.9 % (FLUSH) 0.9 %
10 SYRINGE (ML) INJECTION AS NEEDED
Status: DISCONTINUED | OUTPATIENT
Start: 2021-06-15 | End: 2021-06-16 | Stop reason: HOSPADM

## 2021-06-15 RX ORDER — SODIUM CHLORIDE 9 MG/ML
1000 INJECTION, SOLUTION INTRAVENOUS ONCE
Status: CANCELLED
Start: 2021-06-29 | End: 2021-06-29

## 2021-06-15 RX ORDER — SODIUM CHLORIDE 9 MG/ML
1000 INJECTION, SOLUTION INTRAVENOUS ONCE
Status: CANCELLED
Start: 2021-06-16 | End: 2021-06-16

## 2021-06-15 RX ORDER — SODIUM CHLORIDE 9 MG/ML
25 INJECTION, SOLUTION INTRAVENOUS CONTINUOUS
Status: DISCONTINUED | OUTPATIENT
Start: 2021-06-15 | End: 2021-06-16 | Stop reason: HOSPADM

## 2021-06-15 RX ORDER — SODIUM CHLORIDE 9 MG/ML
10 INJECTION INTRAMUSCULAR; INTRAVENOUS; SUBCUTANEOUS AS NEEDED
Status: DISCONTINUED | OUTPATIENT
Start: 2021-06-15 | End: 2021-06-16 | Stop reason: HOSPADM

## 2021-06-15 RX ORDER — HEPARIN 100 UNIT/ML
300-500 SYRINGE INTRAVENOUS AS NEEDED
Status: DISCONTINUED | OUTPATIENT
Start: 2021-06-15 | End: 2021-06-16 | Stop reason: HOSPADM

## 2021-06-15 RX ORDER — SODIUM CHLORIDE 9 MG/ML
1000 INJECTION, SOLUTION INTRAVENOUS ONCE
Status: CANCELLED
Start: 2021-06-22 | End: 2021-06-22

## 2021-06-15 RX ADMIN — DEXAMETHASONE SODIUM PHOSPHATE 12 MG: 10 INJECTION, SOLUTION INTRAMUSCULAR; INTRAVENOUS at 14:15

## 2021-06-15 RX ADMIN — FOSAPREPITANT 150 MG: 150 INJECTION, POWDER, LYOPHILIZED, FOR SOLUTION INTRAVENOUS at 14:11

## 2021-06-15 RX ADMIN — PALONOSETRON 0.25 MG: 0.05 INJECTION, SOLUTION INTRAVENOUS at 14:08

## 2021-06-15 RX ADMIN — POTASSIUM CHLORIDE: 2 INJECTION, SOLUTION, CONCENTRATE INTRAVENOUS at 13:05

## 2021-06-15 RX ADMIN — CISPLATIN 181 MG: 1 INJECTION INTRAVENOUS at 15:05

## 2021-06-15 RX ADMIN — POTASSIUM CHLORIDE: 2 INJECTION, SOLUTION, CONCENTRATE INTRAVENOUS at 17:28

## 2021-06-15 RX ADMIN — SODIUM CHLORIDE 25 ML/HR: 9 INJECTION, SOLUTION INTRAVENOUS at 14:07

## 2021-06-15 NOTE — LETTER
6/15/2021 Patient: Royal Alanis YOB: 1970 Date of Visit: 6/15/2021 Shira Boykin NP 
[address] [facility]  [address] [facility] [address] Via Fax: [phone] Dear Shira Boykin NP, Thank you for referring Mr. Royal Alanis to [facility] for evaluation. My notes for this consultation are attached. If you have questions, please do not hesitate to call me. I look forward to following your patient along with you.  
 
 
Sincerely, 
 
Nick Levine NP

## 2021-06-15 NOTE — PATIENT INSTRUCTIONS
1. Start ZYPREXA or OLANZAPINE 10mg on 6/16/21. Take at night for 4 days after treatment. This medication is supposed to prevent nausea. You can take zofran every 8 hours as needed for nausea.

## 2021-06-15 NOTE — PROGRESS NOTES
[facility] Progress Note    Date: Soni 15, 2021    Name: Carmela Mcdaniel    MRN: [id number]         : 1970    Mr. Peterson Arrived ambulatory and in no distress for C1D22 of Cisplatin Regimen. Assessment was completed by Ginger RODRIGUEZ chest wall port accessed without difficulty, labs drawn & sent for processing by Jorge Brown RN. Covid Questionnaire completed. 1. Do you have any symptoms of covid 19? SOB, coughing, fever, or generally not feeling well? - no  2. Have you been exposed to covid 19 recently? - no  3. Have you had any recent contact with family/friend that has a pending covid test? no      Chemotherapy Flowsheet 6/15/2021   Cycle C1D22   Date 6/15/2021   Drug / Regimen Cisplatin   Pre Hydration given   Post Hydration given   Pre Meds given   Notes given       Patient proceed to MD appointment. Patient returned with no change in treatment. Mr. Jania Roque vitals were reviewed. Visit Vitals  BP (!) 145/91   Pulse 81   Temp 97.8 °F (36.6 °C)   Resp 16   Ht 6' (1.829 m)   Wt 63.6 kg (140 lb 3.2 oz)   SpO2 98%   BMI 19.01 kg/m²       Lab results were obtained and reviewed. Recent Results (from the past 12 hour(s))   CBC WITH AUTOMATED DIFF    Collection Time: 06/15/21 11:39 AM   Result Value Ref Range    WBC 1.7 (L) 4.1 - 11.1 K/uL    RBC 3.30 (L) 4.10 - 5.70 M/uL    HGB 11.2 (L) 12.1 - 17.0 g/dL    HCT 33.7 (L) 36.6 - 50.3 %    .1 (H) 80.0 - 99.0 FL    MCH 33.9 26.0 - 34.0 PG    MCHC 33.2 30.0 - 36.5 g/dL    RDW 13.5 11.5 - 14.5 %    PLATELET 812 266 - 704 K/uL    MPV 9.8 8.9 - 12.9 FL    NRBC 0.0 0  WBC    ABSOLUTE NRBC 0.00 0.00 - 0.01 K/uL    NEUTROPHILS 62 32 - 75 %    LYMPHOCYTES 16 12 - 49 %    MONOCYTES 19 (H) 5 - 13 %    EOSINOPHILS 1 0 - 7 %    BASOPHILS 1 0 - 1 %    IMMATURE GRANULOCYTES 1 (H) 0.0 - 0.5 %    ABS. NEUTROPHILS 1.1 (L) 1.8 - 8.0 K/UL    ABS. LYMPHOCYTES 0.3 (L) 0.8 - 3.5 K/UL    ABS. MONOCYTES 0.3 0.0 - 1.0 K/UL    ABS. EOSINOPHILS 0.0 0.0 - 0.4 K/UL    ABS. BASOPHILS 0.0 0.0 - 0.1 K/UL    ABS. IMM. GRANS. 0.0 0.00 - 0.04 K/UL    DF SMEAR SCANNED      RBC COMMENTS MACROCYTOSIS  1+       METABOLIC PANEL, COMPREHENSIVE    Collection Time: 06/15/21 11:39 AM   Result Value Ref Range    Sodium 141 136 - 145 mmol/L    Potassium 3.6 3.5 - 5.1 mmol/L    Chloride 105 97 - 108 mmol/L    CO2 28 21 - 32 mmol/L    Anion gap 8 5 - 15 mmol/L    Glucose 88 65 - 100 mg/dL    BUN 13 6 - 20 MG/DL    Creatinine 0.57 (L) 0.70 - 1.30 MG/DL    BUN/Creatinine ratio 23 (H) 12 - 20      GFR est AA >60 >60 ml/min/1.73m2    GFR est non-AA >60 >60 ml/min/1.73m2    Calcium 8.7 8.5 - 10.1 MG/DL    Bilirubin, total 0.2 0.2 - 1.0 MG/DL    ALT (SGPT) 12 12 - 78 U/L    AST (SGOT) 31 15 - 37 U/L    Alk.  phosphatase 48 45 - 117 U/L    Protein, total 7.6 6.4 - 8.2 g/dL    Albumin 3.8 3.5 - 5.0 g/dL    Globulin 3.8 2.0 - 4.0 g/dL    A-G Ratio 1.0 (L) 1.1 - 2.2     MAGNESIUM    Collection Time: 06/15/21 11:39 AM   Result Value Ref Range    Magnesium 2.0 1.6 - 2.4 mg/dL     Medications:  Medications Administered     0.9% sodium chloride 1,000 mL with potassium chloride 10 mEq, magnesium sulfate 2 g infusion     Admin Date  06/15/2021 Action  New Bag Dose   Rate  1,000 mL/hr Route  IntraVENous Administered By  Jeffery Carvalho RN           Admin Date  06/15/2021 Action  New Bag Dose   Rate  1,000 mL/hr Route  IntraVENous Administered By  Maverick Roa RN          0.9% sodium chloride infusion     Admin Date  06/15/2021 Action  New Bag Dose  25 mL/hr Rate  25 mL/hr Route  IntraVENous Administered By  Jeffery Carvalho RN          CISplatin (PLATINOL) 181 mg in 0.9% sodium chloride 500 mL, overfill volume 50 mL chemo infusion     Admin Date  06/15/2021 Action  New Bag Dose  181 mg Rate  365.5 mL/hr Route  IntraVENous Administered By  Jeffery Carvalho RN          dexamethasone (DECADRON) 12 mg in 0.9% sodium chloride 50 mL IVPB     Admin Date  06/15/2021 Action  New Bag Dose  12 mg Route  IntraVENous Administered By  Angel Connell RN          fosaprepitant (EMEND) 150 mg in 0.9% sodium chloride 150 mL IVPB     Admin Date  06/15/2021 Action  New Bag Dose  150 mg Rate  450 mL/hr Route  IntraVENous Administered By  Angel Connell RN          palonosetron HCl (ALOXI) injection 0.25 mg     Admin Date  06/15/2021 Action  Given Dose  0.25 mg Route  IntraVENous Administered By  Angel Connell RN                  Mr. Rubia Temple tolerated treatment well and was discharged from [place] [address] in stable condition. Port de-accessed, flushed & heparinized per protocol. He is to return on June 16 for his next appointment.     Froy Denise RN  Soni 15, 2021

## 2021-06-15 NOTE — PROGRESS NOTES
Outpatient Infusion Center - Chemotherapy Progress Note    1130 Pt admit to [facility] for C1D22 Cisplatin in stable condition. Assessment completed by LEYLA Suarez RN. No new concerns voiced. PAC accessed by LEYLA Suarez RN-- with positive blood return. Labs were drawn and sent for processing. Pt proceeded to scheduled appt.       Chemotherapy Flowsheet 6/15/2021   Cycle C1D22   Date 6/15/2021   Drug / Regimen Cisplatin   Pre Hydration -   Post Hydration -   Pre Meds -         VS  Patient Vitals for the past 24 hrs:   Temp Pulse Resp BP SpO2   06/15/21 1821 97.8 °F (36.6 °C) 81 16 (!) 145/91 98 %   06/15/21 1134 98 °F (36.7 °C) (!) 107 16 111/67          Medications:  Medications Administered     0.9% sodium chloride 1,000 mL with potassium chloride 10 mEq, magnesium sulfate 2 g infusion     Admin Date  06/15/2021 Action  New Bag Dose   Rate  1,000 mL/hr Route  IntraVENous Administered By  Karlos Cosme RN           Admin Date  06/15/2021 Action  New Bag Dose   Rate  1,000 mL/hr Route  IntraVENous Administered By  Stella Blanco RN          0.9% sodium chloride infusion     Admin Date  06/15/2021 Action  New Bag Dose  25 mL/hr Rate  25 mL/hr Route  IntraVENous Administered By  Karlos Cosme RN          CISplatin (PLATINOL) 181 mg in 0.9% sodium chloride 500 mL, overfill volume 50 mL chemo infusion     Admin Date  06/15/2021 Action  New Bag Dose  181 mg Rate  365.5 mL/hr Route  IntraVENous Administered By  Karlos Cosme RN          dexamethasone (DECADRON) 12 mg in 0.9% sodium chloride 50 mL IVPB     Admin Date  06/15/2021 Action  New Bag Dose  12 mg Route  IntraVENous Administered By  Karlos Cosme RN          fosaprepitant (EMEND) 150 mg in 0.9% sodium chloride 150 mL IVPB     Admin Date  06/15/2021 Action  New Bag Dose  150 mg Rate  450 mL/hr Route  IntraVENous Administered By  Karlso Cosme RN          palonosetron HCl (ALOXI) injection 0.25 mg     Admin Date  06/15/2021 Action  Given Dose  0.25 mg Route  IntraVENous Administered By  Daryle Lesches, RN              9755 SBAR report given to SANJANA Salgado RN. Pt aware of next appointment scheduled for 6/16 at 1130. Labs  Recent Results (from the past 24 hour(s))   CBC WITH AUTOMATED DIFF    Collection Time: 06/15/21 11:39 AM   Result Value Ref Range    WBC 1.7 (L) 4.1 - 11.1 K/uL    RBC 3.30 (L) 4.10 - 5.70 M/uL    HGB 11.2 (L) 12.1 - 17.0 g/dL    HCT 33.7 (L) 36.6 - 50.3 %    .1 (H) 80.0 - 99.0 FL    MCH 33.9 26.0 - 34.0 PG    MCHC 33.2 30.0 - 36.5 g/dL    RDW 13.5 11.5 - 14.5 %    PLATELET 593 168 - 779 K/uL    MPV 9.8 8.9 - 12.9 FL    NRBC 0.0 0  WBC    ABSOLUTE NRBC 0.00 0.00 - 0.01 K/uL    NEUTROPHILS 62 32 - 75 %    LYMPHOCYTES 16 12 - 49 %    MONOCYTES 19 (H) 5 - 13 %    EOSINOPHILS 1 0 - 7 %    BASOPHILS 1 0 - 1 %    IMMATURE GRANULOCYTES 1 (H) 0.0 - 0.5 %    ABS. NEUTROPHILS 1.1 (L) 1.8 - 8.0 K/UL    ABS. LYMPHOCYTES 0.3 (L) 0.8 - 3.5 K/UL    ABS. MONOCYTES 0.3 0.0 - 1.0 K/UL    ABS. EOSINOPHILS 0.0 0.0 - 0.4 K/UL    ABS. BASOPHILS 0.0 0.0 - 0.1 K/UL    ABS. IMM. GRANS. 0.0 0.00 - 0.04 K/UL    DF SMEAR SCANNED      RBC COMMENTS MACROCYTOSIS  1+       METABOLIC PANEL, COMPREHENSIVE    Collection Time: 06/15/21 11:39 AM   Result Value Ref Range    Sodium 141 136 - 145 mmol/L    Potassium 3.6 3.5 - 5.1 mmol/L    Chloride 105 97 - 108 mmol/L    CO2 28 21 - 32 mmol/L    Anion gap 8 5 - 15 mmol/L    Glucose 88 65 - 100 mg/dL    BUN 13 6 - 20 MG/DL    Creatinine 0.57 (L) 0.70 - 1.30 MG/DL    BUN/Creatinine ratio 23 (H) 12 - 20      GFR est AA >60 >60 ml/min/1.73m2    GFR est non-AA >60 >60 ml/min/1.73m2    Calcium 8.7 8.5 - 10.1 MG/DL    Bilirubin, total 0.2 0.2 - 1.0 MG/DL    ALT (SGPT) 12 12 - 78 U/L    AST (SGOT) 31 15 - 37 U/L    Alk.  phosphatase 48 45 - 117 U/L    Protein, total 7.6 6.4 - 8.2 g/dL    Albumin 3.8 3.5 - 5.0 g/dL    Globulin 3.8 2.0 - 4.0 g/dL    A-G Ratio 1.0 (L) 1.1 - 2. 2     MAGNESIUM    Collection Time: 06/15/21 11:39 AM   Result Value Ref Range    Magnesium 2.0 1.6 - 2.4 mg/dL

## 2021-06-16 ENCOUNTER — TELEPHONE (OUTPATIENT)
Dept: ONCOLOGY | Age: 51
End: 2021-06-16

## 2021-06-16 ENCOUNTER — HOSPITAL ENCOUNTER (OUTPATIENT)
Dept: INFUSION THERAPY | Age: 51
Discharge: HOME | End: 2021-06-16
Payer: MEDICAID

## 2021-06-16 VITALS
RESPIRATION RATE: 16 BRPM | OXYGEN SATURATION: 97 % | HEART RATE: 68 BPM | DIASTOLIC BLOOD PRESSURE: 82 MMHG | SYSTOLIC BLOOD PRESSURE: 147 MMHG | TEMPERATURE: 97.4 F

## 2021-06-16 DIAGNOSIS — E87.6 HYPOKALEMIA: Primary | ICD-10-CM

## 2021-06-16 DIAGNOSIS — C32.9 LARYNGEAL CARCINOMA (HCC): ICD-10-CM

## 2021-06-16 PROCEDURE — 77030016057 HC NDL HUBR APOL -B

## 2021-06-16 PROCEDURE — 96360 HYDRATION IV INFUSION INIT: CPT

## 2021-06-16 PROCEDURE — 74011250636 HC RX REV CODE- 250/636: Performed by: NURSE PRACTITIONER

## 2021-06-16 RX ORDER — SODIUM CHLORIDE 9 MG/ML
1000 INJECTION, SOLUTION INTRAVENOUS ONCE
Status: COMPLETED | OUTPATIENT
Start: 2021-06-16 | End: 2021-06-16

## 2021-06-16 RX ADMIN — SODIUM CHLORIDE 1000 ML: 9 INJECTION, SOLUTION INTRAVENOUS at 12:15

## 2021-06-16 NOTE — PROGRESS NOTES
[facility] Progress Note    Date: 2021    Name: Aliza Prado    MRN: [id number]         : 1970    Mr. Peterson Arrived ambulatory and in no distress for Hydration. Assessment was completed, no acute issues at this time, no new complaints voiced. Right chest wall port accessed without difficulty, labs drawn & sent for processing. Mr. Behzad Murguia vitals were reviewed. Visit Vitals  BP (!) 147/82 (BP 1 Location: Right arm, BP Patient Position: Sitting)   Pulse 68   Temp 97.4 °F (36.3 °C)   Resp 16   SpO2 97%         Medications:  1 L Bolus     Mr. Moe Morris tolerated treatment well and was discharged from [place] [address] in stable condition at 1320. Port de-accessed, flushed & heparinized per protocol. He is to return on   for his next appointment.     [facility]  2021

## 2021-06-16 NOTE — TELEPHONE ENCOUNTER
[address] at [facility]  [phone]        06/16/21 2:14 PM Called patient's brother, Kelechi Dawn, listed as emergency contact. Discussed that patient or appointed representative would have to contact [employer] to order trach supplies. Janeth Pratt, , previously attempted to assist but was told staff from MD office could not place order on patient's behalf. Kelechi Chamaurice voiced understanding but stated he already placed supply order last week with alternate company in [place], [place]. He was unable to verify name of supply company but stated it was not [employer]. Advised this nurse would note above and encouraged [employer] or [employer] to contact this office if new orders were needed. He voiced understanding. No further questions or concerns at this time. 06/17/21 11:12 AM Met with patient while here for radiation appointment. Patient communicated that he is only receiving 2 boxes of HME supplies per month and requires 4 due to increased secretions from radiation. He shared he has 1 pack left which should last until end of next week. This nurse discussed that Janeth Pratt had previously attempted to order supplies with [employer], but was unable to. Per representative, patient or appointed representative has to place order. Further discussed that [employer] verified they have appropriate order on file. This nurse advised that patient's brother, Kelechi Dawn, call to place order, specifically asking for 4 boxes per patient request. If supply company unable to fulfill this request, asked that [employer] or supply company contact this office to clarify what additional information is needed. Patient nodded head and denies any further questions or concerns at this time.

## 2021-06-16 NOTE — PROGRESS NOTES
[address] [facility] at [facility]  [phone]    Hematology / Oncology Established Visit    Reason for Visit:   Shannon Jim is a 53 y.o. male who is seen in follow up of laryngeal cancer. Referred by Dr. 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 Stage: CATARINO [[id number]] at diagnosis, regional recurrence in 4/2021    Pathology:   2/8/21 Total laryngectomy: Invasive squamous cell carcinoma  Tumor size 3.5cm, moderately differentiated (G2), PNI present, LVI not identified, margins negative  [id number]      Prior Treatment: 2/8/21 total laryngectomy/cricopharyngeal myotomy    Current Treatment: Cisplatin 100mg/m2 every 3 weeks x 3 cycles, start 5/24/21. Treatment duration: 6-7 weeks  Frequency of visits: weekly     History of Present Illness:   Shannon Jim is a 53 y.o. male who with COPD who is referred for Head and neck cancer. He presented with throat pain and hoarseness in Sept 2020. Was evaluated by Dr. Marbella Fowler in ENT who performed laryngoscopy and diagnosed patient with squamous cell carcinoma involving the larynx and subglottis. In late Dec 2020, neck CT showed a 3 cm mass in Right supraglottic larynx with extension to thyroid cartilage, but no cervical LNs. PET 1/4/21 showed uptake in the laryngeal mass at right vocal fold, extending to thyroid cartilage. He was scheduled for surgery, but he required emergent tracheostomy prior to that; done late Jan 2021. Also had PEG placed 1/30/21. On 2/8/21, he underwent total laryngectomy/cricopharyngeal myotomy. He quit smoking in Feb 2021. He was evaluated by Dr. Keisha Espinoza in [address] [place] [address] in March 2021 who recommended post-op radiation. There were multiple delays given difficult getting in for dental evaluation prior to RT. [facility] simulation scan was notable for a necrotic appearing tumor in Right neck. PET 5/7/21 confirmed hypermetabolic uptake in right neck.     PEG removed in March 2021 due to problems with it. Per Dr. Rina Franco, pt had 7 teeth extracted by dentist. He returns to the dentist again for dental fillings on 5/26/21. Pt states his neck feel tight, causing pulling sensation but no current pain. Interval History:  Patient here for follow up of throat cancer and week 5 of chemoradiation. Reports increased mucus secretions. Pharmacy did not have liquid mucinex in stock. Reports burning in mouth. Reports throat is more sore. Eating 3 full meals a day with snacks in between. Supplementing with 3 boost per day. Reports decreased appetite and taste is altered. Continues to loose weight. Drinking at least 6 cups of water per day. No dryness present in mouth. Lesion on left wrist is stable. Denies n/v. No SOB, or chest pain. No fevers/chills. No constipation or diarrhea. No neuropathy or ringing in ears. PMHx: COPD, throat cancer, anxiety  PSurgHx: Left arm plate placement, tracheostomy, total laryngectomy 2/8/21  SHx: Quit smoking 10/28/20 after 45 pack years. No EtOH  FHx: Mother with DM; Father with DM, CKD; Sister with DM. No h/o malignancy. Review of Systems: A complete review of systems was obtained, negative except as described above. Physical Exam:   Blood pressure 101/70, pulse 82, temperature 97 °F (36.1 °C), height 6' (1.829 m), weight 137 lb 6.4 oz (62.3 kg), SpO2 98 %. ECOG PS: 0  General: Well developed, no acute distress, thin   Eyes: Anicteric sclerae  HENT: Atraumatic  Neck: Supple, Tracheostomy noted  Lymphatic: No supraclavicular, axillary adenopathy. Right cervical LAD noted, smaller than prior. Respiratory: CTAB, normal respiratory effort  CV: tachycardia, regular rhythm, no murmurs, no peripheral edema  GI: Soft, nontender, nondistended, no masses  MS: Normal gait and station. Digits without clubbing or cyanosis. 1cm firm nodule on left wrist.  Skin: No rashes, ecchymoses, or petechiae. Normal temperature, turgor, and texture. Hyperpigmented skin at neck bilaterally. Neuro/Psych: Alert, oriented. Moves all 4 extremities. Appropriate affect, normal judgment/insight. Communicates by writing given tracheostomy. Results:     Lab Results   Component Value Date/Time    WBC 1.4 (L) 2021 12:12 PM    HGB 10.9 (L) 2021 12:12 PM    HCT 32.8 (L) 2021 12:12 PM    PLATELET 888  12:12 PM    .9 (H) 2021 12:12 PM    ABS. NEUTROPHILS 0.9 (L) 2021 12:12 PM     Lab Results   Component Value Date/Time    Sodium 133 (L) 2021 12:05 PM    Potassium 3.3 (L) 2021 12:05 PM    Chloride 95 (L) 2021 12:05 PM    CO2 33 (H) 2021 12:05 PM    Glucose 116 (H) 2021 12:05 PM    BUN 20 2021 12:05 PM    Creatinine 1.30 2021 12:05 PM    GFR est AA >60 2021 12:05 PM    GFR est non-AA 59 (L) 2021 12:05 PM    Calcium 8.3 (L) 2021 12:05 PM    Glucose (POC) 97 2021 08:15 AM     Lab Results   Component Value Date/Time    Bilirubin, total 0.4 2021 12:05 PM    ALT (SGPT) 17 2021 12:05 PM    Alk. phosphatase 43 (L) 2021 12:05 PM    Protein, total 8.1 2021 12:05 PM    Albumin 3.7 2021 12:05 PM    Globulin 4.4 (H) 2021 12:05 PM     Imagin/28/21 Neck CT: IMPRESSION  Irregular soft tissue mass involving the aryepiglottic and the larynx. There is significant narrowing of the airway at the level of the larynx  and the supraglottic region. Poorly defined cervical adenopathy is noted. 21 Chest CTA:  IMPRESSION  Minimal scarring or subsegmental atelectasis in the left lung base. No evidence of pulmonary embolism or pneumonia. 21 PET:  IMPRESSION  2 large necrotic appearing mass lesions are noted in the right neck (SUV 6.4). Small hypermetabolic right posterior triangle lymph node. No PET avid evidence  of distant metastatic disease. Assessment & Plan:   Jv Nelson is a 53 y.o. male is seen for laryngeal cancer. 1. Squamous cell carcinoma of larynx, Stage CATARINO [pT4a cN2 Mx]:  S/p total laryngectomy and tracheostomy in Jan 2021. Afterwards, he developed disease recurrence in Right neck confirmed by PET scan. I recommend concurrent chemoradiation using Cisplatin to treat his disease. We discussed the risks and benefits of cisplatin chemotherapy. Potential side effects include, but are not limited to, nausea, vomiting, diarrhea, taste changes, myelosuppression, infection, fatigue, alopecia, neuropathy, hearing loss, renal failure, allergic reactions, infertility, and rarely, death. Additionally, chemotherapy leads to radiosensitization which can intensify the side effects of radiation therapy. The patient has consented to beginning chemotherapy and chemo teaching completed. 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 -- Recieved C1D22 Cisplatin on 6/15/21. -- Proceed with fluids/labs today. -- Follow up in 1 week for labs/fluids, MD visit. 2. H/o DM:   No longer on Metformin per patient. HgbA1c 6.0 in 2/2021. Is at risk of steroid induced hyperglycemia. 3. COPD / H/o tobacco abuse:   Quit in 2/2021. Uses Albuterol inhaler prn.    4. FEN/GI:   No dysphagia. Lost weight. Encouraged high protein snacks, boost and provided examples. Denman Leyden, RD. Treating hypokalemia with home potassium Rx. Treating oral thrush with nystatin and mucus secretions with mucinex. -- Rx Guaifenisen 400mg/10ml liquid; however not in stock. Rx Guaif 600mg PO BID.   -- Start Nystain 5mL QID for oral thrush. -- Start magic mouthwash as needed for mouth sores. Can take OTC Tylenol 500mg q6h prn throat. -- KCL 20meq BID. Advised foods high in K+. 5. Chemotherapy induced neutropenia: Monitor for infection.      Emotional well being: Pt is coping well with his/her disease and has excellent support. I have personally seen and evaluated the patient in conjunction with Jasbir Olivier NP. I find the patient's history and physical exam are consistent with the NP's documentation. I agree with the above assessment and plan, which I have modified as needed. He is tolerating therapy quite well. Continue with radiation. IVFs today.       Signed By: Triny Engel MD     June 22, 2021

## 2021-06-22 ENCOUNTER — OFFICE VISIT (OUTPATIENT)
Dept: ONCOLOGY | Age: 51
End: 2021-06-22

## 2021-06-22 ENCOUNTER — HOSPITAL ENCOUNTER (OUTPATIENT)
Dept: INFUSION THERAPY | Age: 51
Discharge: HOME | End: 2021-06-22
Payer: MEDICAID

## 2021-06-22 VITALS
TEMPERATURE: 97 F | OXYGEN SATURATION: 98 % | DIASTOLIC BLOOD PRESSURE: 74 MMHG | HEART RATE: 75 BPM | SYSTOLIC BLOOD PRESSURE: 134 MMHG | BODY MASS INDEX: 18.61 KG/M2 | WEIGHT: 137.4 LBS | RESPIRATION RATE: 16 BRPM | HEIGHT: 72 IN

## 2021-06-22 VITALS
HEIGHT: 72 IN | HEART RATE: 82 BPM | TEMPERATURE: 97 F | DIASTOLIC BLOOD PRESSURE: 70 MMHG | WEIGHT: 137.4 LBS | OXYGEN SATURATION: 98 % | SYSTOLIC BLOOD PRESSURE: 101 MMHG | BODY MASS INDEX: 18.61 KG/M2

## 2021-06-22 DIAGNOSIS — E87.6 HYPOKALEMIA: Primary | ICD-10-CM

## 2021-06-22 DIAGNOSIS — C32.9 SQUAMOUS CELL CARCINOMA OF LARYNX (HCC): ICD-10-CM

## 2021-06-22 DIAGNOSIS — D70.1 CHEMOTHERAPY INDUCED NEUTROPENIA (HCC): ICD-10-CM

## 2021-06-22 DIAGNOSIS — K11.7 INCREASED OROPHARYNGEAL SECRETIONS: ICD-10-CM

## 2021-06-22 DIAGNOSIS — C32.9 LARYNGEAL CARCINOMA (HCC): ICD-10-CM

## 2021-06-22 DIAGNOSIS — T45.1X5A CHEMOTHERAPY INDUCED NEUTROPENIA (HCC): ICD-10-CM

## 2021-06-22 DIAGNOSIS — B37.0 ORAL THRUSH: ICD-10-CM

## 2021-06-22 DIAGNOSIS — R07.0 THROAT PAIN IN ADULT: Primary | ICD-10-CM

## 2021-06-22 DIAGNOSIS — C77.0 METASTASIS TO CERVICAL LYMPH NODE (HCC): ICD-10-CM

## 2021-06-22 LAB
ALBUMIN SERPL-MCNC: 3.7 G/DL (ref 3.5–5)
ALBUMIN/GLOB SERPL: 0.8 {RATIO} (ref 1.1–2.2)
ALP SERPL-CCNC: 43 U/L (ref 45–117)
ALT SERPL-CCNC: 17 U/L (ref 12–78)
ANION GAP SERPL CALC-SCNC: 5 MMOL/L (ref 5–15)
AST SERPL-CCNC: 41 U/L (ref 15–37)
BASOPHILS # BLD: 0 K/UL (ref 0–0.1)
BASOPHILS NFR BLD: 1 % (ref 0–1)
BILIRUB SERPL-MCNC: 0.4 MG/DL (ref 0.2–1)
BUN SERPL-MCNC: 20 MG/DL (ref 6–20)
BUN/CREAT SERPL: 15 (ref 12–20)
CALCIUM SERPL-MCNC: 8.3 MG/DL (ref 8.5–10.1)
CHLORIDE SERPL-SCNC: 95 MMOL/L (ref 97–108)
CO2 SERPL-SCNC: 33 MMOL/L (ref 21–32)
CREAT SERPL-MCNC: 1.3 MG/DL (ref 0.7–1.3)
DIFFERENTIAL METHOD BLD: ABNORMAL
EOSINOPHIL # BLD: 0 K/UL (ref 0–0.4)
EOSINOPHIL NFR BLD: 0 % (ref 0–7)
ERYTHROCYTE [DISTWIDTH] IN BLOOD BY AUTOMATED COUNT: 13.2 % (ref 11.5–14.5)
GLOBULIN SER CALC-MCNC: 4.4 G/DL (ref 2–4)
GLUCOSE SERPL-MCNC: 116 MG/DL (ref 65–100)
HCT VFR BLD AUTO: 32.8 % (ref 36.6–50.3)
HGB BLD-MCNC: 10.9 G/DL (ref 12.1–17)
IMM GRANULOCYTES # BLD AUTO: 0 K/UL (ref 0–0.04)
IMM GRANULOCYTES NFR BLD AUTO: 1 % (ref 0–0.5)
LYMPHOCYTES # BLD: 0.2 K/UL (ref 0.8–3.5)
LYMPHOCYTES NFR BLD: 11 % (ref 12–49)
MCH RBC QN AUTO: 33.9 PG (ref 26–34)
MCHC RBC AUTO-ENTMCNC: 33.2 G/DL (ref 30–36.5)
MCV RBC AUTO: 101.9 FL (ref 80–99)
MONOCYTES # BLD: 0.3 K/UL (ref 0–1)
MONOCYTES NFR BLD: 19 % (ref 5–13)
NEUTS SEG # BLD: 0.9 K/UL (ref 1.8–8)
NEUTS SEG NFR BLD: 68 % (ref 32–75)
NRBC # BLD: 0 K/UL (ref 0–0.01)
NRBC BLD-RTO: 0 PER 100 WBC
PLATELET # BLD AUTO: 156 K/UL (ref 150–400)
PMV BLD AUTO: 9.8 FL (ref 8.9–12.9)
POTASSIUM SERPL-SCNC: 3.3 MMOL/L (ref 3.5–5.1)
PROT SERPL-MCNC: 8.1 G/DL (ref 6.4–8.2)
RBC # BLD AUTO: 3.22 M/UL (ref 4.1–5.7)
RBC MORPH BLD: ABNORMAL
SODIUM SERPL-SCNC: 133 MMOL/L (ref 136–145)
WBC # BLD AUTO: 1.4 K/UL (ref 4.1–11.1)

## 2021-06-22 PROCEDURE — 74011250636 HC RX REV CODE- 250/636: Performed by: NURSE PRACTITIONER

## 2021-06-22 PROCEDURE — 80053 COMPREHEN METABOLIC PANEL: CPT

## 2021-06-22 PROCEDURE — 77030016057 HC NDL HUBR APOL -B

## 2021-06-22 PROCEDURE — 99215 OFFICE O/P EST HI 40 MIN: CPT | Performed by: INTERNAL MEDICINE

## 2021-06-22 PROCEDURE — 85025 COMPLETE CBC W/AUTO DIFF WBC: CPT

## 2021-06-22 PROCEDURE — 96360 HYDRATION IV INFUSION INIT: CPT

## 2021-06-22 PROCEDURE — 36415 COLL VENOUS BLD VENIPUNCTURE: CPT

## 2021-06-22 RX ORDER — NYSTATIN 100000 [USP'U]/ML
500000 SUSPENSION ORAL 4 TIMES DAILY
Qty: 60 ML | Refills: 3 | Status: SHIPPED | OUTPATIENT
Start: 2021-06-22 | End: 2021-07-13 | Stop reason: SDUPTHER

## 2021-06-22 RX ORDER — NUT TX, LACT-REDUCED, IRON 0.09G-2.25
4 LIQUID (ML) ORAL DAILY
Qty: 30 BOTTLE | Refills: 3
Start: 2021-06-22 | End: 2022-01-01

## 2021-06-22 RX ORDER — GUAIFENESIN 600 MG/1
600 TABLET, EXTENDED RELEASE ORAL 2 TIMES DAILY
Qty: 60 TABLET | Refills: 1 | Status: SHIPPED | OUTPATIENT
Start: 2021-06-22 | End: 2022-01-01

## 2021-06-22 RX ORDER — SODIUM CHLORIDE 9 MG/ML
1000 INJECTION, SOLUTION INTRAVENOUS ONCE
Status: COMPLETED | OUTPATIENT
Start: 2021-06-22 | End: 2021-06-22

## 2021-06-22 RX ADMIN — SODIUM CHLORIDE 1000 ML: 9 INJECTION, SOLUTION INTRAVENOUS at 12:55

## 2021-06-22 NOTE — PROGRESS NOTES
Please call patient and advise his potassium is mildly low today. I would like him to take one extra potassium pill today. He normally takes 2 per day but he should take 3 pills today then go back to taking 2 per day starting tomorrow. Encourage him to eat bananas and increase boost to the drinks per day.

## 2021-06-22 NOTE — PROGRESS NOTES
[facility] Progress Note    Date: 2021    Name: Royal Alanis    MRN: [id number]         : 1970    Mr. Peterson Arrived ambulatory and in no distress for Hydration. Assessment was completed, no acute issues at this time, no new complaints voiced. Right chest wall port accessed without difficulty, labs drawn & sent for processing. Mr. Paula Segura vitals were reviewed. Visit Vitals  /74   Pulse 75   Temp 97 °F (36.1 °C)   Resp 16   Ht 6' (1.829 m)   Wt 62.3 kg (137 lb 6.4 oz)   SpO2 98%   BMI 18.63 kg/m²         Medications:  1 L Bolus     Mr. Graciela Mistry tolerated treatment well and was discharged from [place] [address] in stable condition at 1400. Port de-accessed, flushed & heparinized per protocol. He is to return on  at 1130 for his next appointment.     [facility]  2021

## 2021-06-22 NOTE — PATIENT INSTRUCTIONS
1. For mucus secretions: Start taking mucinex or guaifenesin. This medication is found over the counter. 2. For mouth sores: start using magic mouthwash, you can swish and spit for mouth pain or swish and swallow for sore throat. Best to use before meals    3. For burning in your mouth: Start nystatin swish and spit four times a day. Use a soft bristle tooth brush. Start using baking soda and salt water rinse before and after meals. (1/2 teaspoon of salt and 1/2 teaspoon baking soda)      4. For pain: you can take tylenol 500mg every 4-6 hours, do not take more than 3000mg of tylenol in a day.

## 2021-06-22 NOTE — PROGRESS NOTES
Spoke with patient significant other who is on PHI and advised results and recommendations per amisha.  She verbalized understanding

## 2021-06-22 NOTE — PROGRESS NOTES
[facility] [place] at [place] [address] [address] [facility] : [phone]  F: [phone]    Medical Nutrition Therapy    Nutrition Encounter:  Met with patient and friend in conjunction with NP. He has lost 3 pound over the past week. He is still able to swallowing, but more painful. Eating but mostly drinking supplements. Supplements are meeting at least 75% of estimated nutrition needs. Recommended Boost VHC 4 cans a day to provide 2120kcal, 88g protein. More increased pain- mouth is sore, like a burning sensation. Pain with swallowing. Increased mucus production - discussed mucinex. No dry mouth. Per Dr. Lauren Sears, the treatment field is large which will cause dry mouth. Results:   Diagnosis: Laryngeal cancer   Cisplatin every 3 weeks and concurrent radiation. Started on 5/25/21    Chemotherapy Flowsheet 6/15/2021   Cycle C1D22   Date 6/15/2021   Drug / Regimen Cisplatin   Pre Hydration given   Post Hydration given   Pre Meds given   Notes given     Wt Readings from Last 5 Encounters:   06/22/21 137 lb 6.4 oz (62.3 kg)   06/22/21 137 lb 6.4 oz (62.3 kg)   06/15/21 140 lb 3.2 oz (63.6 kg)   06/15/21 140 lb 3.4 oz (63.6 kg)   06/10/21 142 lb 3.2 oz (64.5 kg)       Estimated Nutrition Needs:   Calorie Range: 2345-2680kcal/day    Protein Range: 67-77g/day     Fluid Needs: 2200ml     Assessment:   Inadequate oral food or beverage intake (ptential) related to concurrent chemoradiation as evidence by treatment side effects. Plan:   - Discussed increased energy demands.  - Continue to eat as tolerated to preserve swallow integrity.   - Try to drink Boost VHC and at least 4 Boost plus daily. I appreciate the opportunity to participate in Mr. Mayra Peterson's care.     Signed By: Kym Canela, [address], [address]     Contact: [phone]

## 2021-06-22 NOTE — PROGRESS NOTES
Flash Oro is a 53 y.o. male here for follow up of squamous cell carcinoma of larynx / glottis. Patient with complaints of throat pain, rates as a 3 out of 10.

## 2021-06-23 ENCOUNTER — TELEPHONE (OUTPATIENT)
Dept: ONCOLOGY | Age: 51
End: 2021-06-23

## 2021-06-23 NOTE — TELEPHONE ENCOUNTER
Lois Jackson from Radiology called and stated that the patient only received 2 of his medications that was ordered yesterday. Please advise and call patient with update.

## 2021-06-23 NOTE — TELEPHONE ENCOUNTER
[address] at [place] [address]  [phone]        06/23/21 11:47 AM Called [facility] pharmacy and spoke with Sarah Mendosa. Mucinex and nystatin suspension dispensed yesterday. This nurse inquired if pharmacy received prescription for magic mouthwash sent via fax yesterday. Sarah Mendosa confirmed that she now sees that pharmacy received this and will prepare refill. No further questions or concerns at this time.

## 2021-06-23 NOTE — TELEPHONE ENCOUNTER
6/23/21- Received fax from [facility] that prior authorization is required for patients Magic Mouth Wash. Called [facility] at [phone] to initiate a prior authorization. Per [employer] rep due to quantity a quantity override prior authorization is required. Per rep authorization is pending due to pharmacist review and marked urgent and should be decided with in 24 hours. Rep provided fax number to send clinicals to [phone]. Rep also stated the diphenhydramine is giving a denial due to discontinued or invalid NDC # and that would have to be shared with [facility] as well for claim to go thru if prior authorization is approved. Faxed clinicals. Fax confirmation received.

## 2021-06-24 NOTE — TELEPHONE ENCOUNTER
6/24/21- Received an approval from [address] [facility] for patient's Magic Mouthwash. Called [facility] and spoke to a pharmacy tech to re run claim on the Bob. Rep ran claim but said it was still denying. Informed rep of the information from [employer] that the [facility] number for the Diphenhydramine is invalid. Rep stated she will work on this and if there is any issue she will call the office back.

## 2021-06-25 ENCOUNTER — TELEPHONE (OUTPATIENT)
Dept: ONCOLOGY | Age: 51
End: 2021-06-25

## 2021-06-25 NOTE — PROGRESS NOTES
[id number] [facility] Oncology at [address] [place] [address]  [phone]    Hematology / Oncology Established Visit    Reason for Visit:   Tomasz Stallworth is a 53 y.o. male who is seen in follow up of laryngeal cancer. Referred by Dr. 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 Stage: CATARINO [[id number]] at diagnosis, regional recurrence in 4/2021    Pathology:   2/8/21 Total laryngectomy: Invasive squamous cell carcinoma  Tumor size 3.5cm, moderately differentiated (G2), PNI present, LVI not identified, margins negative  [id number]      Prior Treatment: 2/8/21 total laryngectomy/cricopharyngeal myotomy    Current Treatment: Cisplatin 100mg/m2 every 3 weeks x 3 cycles, start 5/24/21. Treatment duration: 6-7 weeks  Frequency of visits: weekly     History of Present Illness:   Tomasz Stallworth is a 53 y.o. male who with COPD who is referred for Head and neck cancer. He presented with throat pain and hoarseness in Sept 2020. Was evaluated by Dr. Suman Alvarado in ENT who performed laryngoscopy and diagnosed patient with squamous cell carcinoma involving the larynx and subglottis. In late Dec 2020, neck CT showed a 3 cm mass in Right supraglottic larynx with extension to thyroid cartilage, but no cervical LNs. PET 1/4/21 showed uptake in the laryngeal mass at right vocal fold, extending to thyroid cartilage. He was scheduled for surgery, but he required emergent tracheostomy prior to that; done late Jan 2021. Also had PEG placed 1/30/21. On 2/8/21, he underwent total laryngectomy/cricopharyngeal myotomy. He quit smoking in Feb 2021. He was evaluated by Dr. Sherin Tillman in [address] [place] [address] in March 2021 who recommended post-op radiation. There were multiple delays given difficult getting in for dental evaluation prior to RT. [facility] simulation scan was notable for a necrotic appearing tumor in Right neck. PET 5/7/21 confirmed hypermetabolic uptake in right neck.     PEG removed in March 2021 due to problems with it. Per Dr. Bryanna Tolbert, pt had 7 teeth extracted by dentist. He returns to the dentist again for dental fillings on 5/26/21. Pt states his neck feel tight, causing pulling sensation but no current pain. Interval History:  Patient here for follow up of throat cancer and week 6 of chemoradiation. Had follow up with PCP Keiko Leal for annual visit and is requesting PSA and lipid panel be drawn here and faxed to her office. Reports he is still able to tolerate all PO. Reports mild throat soreness is managed with magic mouthwash. Reports increased mucus secretions are managed with mucinex. Reports burning in mouth resolved with nystatin. Eating 2.5 full meals a day with snacks in between. Supplementing with 3 boost per day. Reports decreased appetite and taste is altered. Gained 2 lbs. Drinking at least 4 cups of water per day. Lesion on left wrist is stable. Denies n/v. No SOB, or chest pain. No fevers/chills. No constipation or diarrhea. No neuropathy or ringing in ears. PMHx: COPD, throat cancer, anxiety  PSurgHx: Left arm plate placement, tracheostomy, total laryngectomy 2/8/21  SHx: Quit smoking 10/28/20 after 45 pack years. No EtOH  FHx: Mother with DM; Father with DM, CKD; Sister with DM. No h/o malignancy. Review of Systems: A complete review of systems was obtained, negative except as described above. Physical Exam:   Blood pressure 124/77, pulse 92, temperature 97.8 °F (36.6 °C), resp. rate 16, height 6' (1.829 m), weight 139 lb 12.4 oz (63.4 kg), SpO2 99 %. ECOG PS: 0  General: Well developed, no acute distress, thin   Eyes: Anicteric sclerae  HENT: Atraumatic  Neck: Supple, Tracheostomy noted  Lymphatic: No supraclavicular, axillary adenopathy. Right cervical LAD noted, smaller than prior.   Respiratory: CTAB, normal respiratory effort  CV: tachycardia, regular rhythm, no murmurs, no peripheral edema  GI: Soft, nontender, nondistended, no masses  MS: Normal gait and station. Digits without clubbing or cyanosis. 1cm firm nodule on left wrist.  Skin: No rashes, ecchymoses, or petechiae. Normal temperature, turgor, and texture. Hyperpigmented skin at neck bilaterally. Neuro/Psych: Alert, oriented. Moves all 4 extremities. Appropriate affect, normal judgment/insight. Communicates by writing given tracheostomy. Results:     Lab Results   Component Value Date/Time    WBC 2.0 (L) 2021 11:31 AM    HGB 9.7 (L) 2021 11:31 AM    HCT 29.3 (L) 2021 11:31 AM    PLATELET 113 (L)  11:31 AM    .4 (H) 2021 11:31 AM    ABS. NEUTROPHILS 1.7 (L) 2021 11:31 AM     Lab Results   Component Value Date/Time    Sodium 137 2021 11:31 AM    Potassium 3.8 2021 11:31 AM    Chloride 101 2021 11:31 AM    CO2 31 2021 11:31 AM    Glucose 75 2021 11:31 AM    BUN 20 2021 11:31 AM    Creatinine 1.09 2021 11:31 AM    GFR est AA >60 2021 11:31 AM    GFR est non-AA >60 2021 11:31 AM    Calcium 8.9 2021 11:31 AM    Glucose (POC) 97 2021 08:15 AM     Lab Results   Component Value Date/Time    Bilirubin, total 0.3 2021 11:31 AM    ALT (SGPT) 13 2021 11:31 AM    Alk. phosphatase 42 (L) 2021 11:31 AM    Protein, total 8.2 2021 11:31 AM    Albumin 3.6 2021 11:31 AM    Globulin 4.6 (H) 2021 11:31 AM     Imagin/28/21 Neck CT: IMPRESSION  Irregular soft tissue mass involving the aryepiglottic and the larynx. There is significant narrowing of the airway at the level of the larynx  and the supraglottic region. Poorly defined cervical adenopathy is noted. 21 Chest CTA:  IMPRESSION  Minimal scarring or subsegmental atelectasis in the left lung base. No evidence of pulmonary embolism or pneumonia. 21 PET:  IMPRESSION  2 large necrotic appearing mass lesions are noted in the right neck (SUV 6.4).   Small hypermetabolic right posterior triangle lymph node. No PET avid evidence  of distant metastatic disease. Assessment & Plan:   Yvonne Lockwood is a 53 y.o. male is seen for laryngeal cancer. 1. Squamous cell carcinoma of larynx, Stage CATARINO [pT4a cN2 Mx]:  S/p total laryngectomy and tracheostomy in Jan 2021. Afterwards, he developed disease recurrence in Right neck confirmed by PET scan. I recommend concurrent chemoradiation using Cisplatin to treat his disease. We discussed the risks and benefits of cisplatin chemotherapy. Potential side effects include, but are not limited to, nausea, vomiting, diarrhea, taste changes, myelosuppression, infection, fatigue, alopecia, neuropathy, hearing loss, renal failure, allergic reactions, infertility, and rarely, death. Additionally, chemotherapy leads to radiosensitization which can intensify the side effects of radiation therapy. The patient has consented to beginning chemotherapy and chemo teaching completed. 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 -- Recieved C1D22 Cisplatin on 6/15/21. -- Proceed with fluids/labs today. Urged better oral hydration. -- Follow up in 1 week for C1D43 Cisplatin, MD visit. 2. H/o DM:   No longer on Metformin per patient. HgbA1c 6.0 in 2/2021. Is at risk of steroid induced hyperglycemia. 3. COPD / H/o tobacco abuse:   Quit in 2/2021. Uses Albuterol inhaler prn.    4. FEN/GI:   No dysphagia. Lost weight. Encouraged high protein snacks, boost and provided examples. Kajal Barrios RD. Treating hypokalemia with home potassium Rx. Treating oral thrush with nystatin and mucus secretions with mucinex. -- Rx Guaifenisen 400mg/10ml liquid; however not in stock. Rx Guaif 600mg PO BID.   -- Continue Nystain 5mL QID for oral thrush. -- Continue magic mouthwash as needed for mouth sores.  Can take OTC Tylenol 500mg q6h prn throat. -- KCL 20meq BID. Advised foods high in K+. 5. Chemotherapy induced neutropenia: Monitor for infection. 6. Health maintenance: Provider Cassandra Martini requesting PSA and lipid panel be checked and results faxed to her office. Emotional well being: Pt is coping well with his/her disease and has excellent support. I have personally seen and evaluated the patient in conjunction with Dante Arevalo NP. I find the patient's history and physical exam are consistent with the NP's documentation. I agree with the above assessment and plan, which I have modified as needed. Patient continues to do well with treatment. Taking PO, maintaining weight, no pain.       Signed By: Asael Felipe MD     June 29, 2021

## 2021-06-25 NOTE — TELEPHONE ENCOUNTER
[address]  Social Work Navigator Encounter     Patient Name: Sherman Louise    Medical History: h&n cancer    Advance Directives: none on file; conversation deferred    Narrative: Patient requested assistance ordering smartphone from [address] Called [employer] and was advised a smartphone ([employer]) is already attached to the phone number [phone] and the provider is [employer]. Representative recommended calling back with the IMEI number to confirm phone is linked to the right account. IMEI number can be located by dialing *#[phone]#. Call placed to patient. Spoke with Patricia Cantor while patient was one speaker phone. Patient prefers to meet with me on 6/28 to review update from [employer] and clarify request.     Barriers to Care: patient is nonverbal     Plan:  1.  Meet with patient on 6/28 to assist.     Referral:   Other referral    Thank you,  Lakshmi Miranda LCSW

## 2021-06-28 ENCOUNTER — DOCUMENTATION ONLY (OUTPATIENT)
Dept: ONCOLOGY | Age: 51
End: 2021-06-28

## 2021-06-28 NOTE — PROGRESS NOTES
[employer]  Social Work Navigator Encounter     Patient Name: Geo Hill    Medical History: h&n cancer    Advance Directives: none on file; conversation deferred    Narrative: Met with patient and assist with [employer] application for free smartphone and service. Application is being process and patient will be noticed of status within 7-10 business days. Enrollment ID [id number]. Encouraged patient to contact me as needed for additional support. Barriers to Care: Patient is nonverbal     Plan:  1. Completed Safe Link application for free smartphone and service.      Referral:   Other referral [employer] Phone    Thank you,  Jun Young LCSW

## 2021-06-29 ENCOUNTER — OFFICE VISIT (OUTPATIENT)
Dept: ONCOLOGY | Age: 51
End: 2021-06-29

## 2021-06-29 ENCOUNTER — HOSPITAL ENCOUNTER (OUTPATIENT)
Dept: INFUSION THERAPY | Age: 51
Discharge: HOME | End: 2021-06-29
Payer: MEDICAID

## 2021-06-29 VITALS
TEMPERATURE: 97.8 F | HEIGHT: 72 IN | RESPIRATION RATE: 16 BRPM | OXYGEN SATURATION: 99 % | SYSTOLIC BLOOD PRESSURE: 124 MMHG | DIASTOLIC BLOOD PRESSURE: 77 MMHG | BODY MASS INDEX: 18.93 KG/M2 | WEIGHT: 139.77 LBS | HEART RATE: 92 BPM

## 2021-06-29 VITALS
HEIGHT: 72 IN | OXYGEN SATURATION: 99 % | BODY MASS INDEX: 18.93 KG/M2 | SYSTOLIC BLOOD PRESSURE: 134 MMHG | TEMPERATURE: 97.8 F | WEIGHT: 139.8 LBS | DIASTOLIC BLOOD PRESSURE: 82 MMHG | RESPIRATION RATE: 16 BRPM | HEART RATE: 80 BPM

## 2021-06-29 DIAGNOSIS — B37.0 ORAL THRUSH: ICD-10-CM

## 2021-06-29 DIAGNOSIS — Z86.39 HISTORY OF DIABETES MELLITUS: ICD-10-CM

## 2021-06-29 DIAGNOSIS — C32.9 LARYNGEAL CARCINOMA (HCC): ICD-10-CM

## 2021-06-29 DIAGNOSIS — C32.9 SQUAMOUS CELL CARCINOMA OF LARYNX (HCC): Primary | ICD-10-CM

## 2021-06-29 DIAGNOSIS — K11.7 INCREASED OROPHARYNGEAL SECRETIONS: ICD-10-CM

## 2021-06-29 DIAGNOSIS — E87.6 HYPOKALEMIA: Primary | ICD-10-CM

## 2021-06-29 DIAGNOSIS — C77.0 METASTASIS TO CERVICAL LYMPH NODE (HCC): ICD-10-CM

## 2021-06-29 LAB
ALBUMIN SERPL-MCNC: 3.6 G/DL (ref 3.5–5)
ALBUMIN/GLOB SERPL: 0.8 {RATIO} (ref 1.1–2.2)
ALP SERPL-CCNC: 42 U/L (ref 45–117)
ALT SERPL-CCNC: 13 U/L (ref 12–78)
ANION GAP SERPL CALC-SCNC: 5 MMOL/L (ref 5–15)
AST SERPL-CCNC: 30 U/L (ref 15–37)
BASOPHILS # BLD: 0 K/UL (ref 0–0.1)
BASOPHILS NFR BLD: 1 % (ref 0–1)
BILIRUB SERPL-MCNC: 0.3 MG/DL (ref 0.2–1)
BUN SERPL-MCNC: 20 MG/DL (ref 6–20)
BUN/CREAT SERPL: 18 (ref 12–20)
CALCIUM SERPL-MCNC: 8.9 MG/DL (ref 8.5–10.1)
CHLORIDE SERPL-SCNC: 101 MMOL/L (ref 97–108)
CHOLEST SERPL-MCNC: 200 MG/DL
CO2 SERPL-SCNC: 31 MMOL/L (ref 21–32)
CREAT SERPL-MCNC: 1.09 MG/DL (ref 0.7–1.3)
DIFFERENTIAL METHOD BLD: ABNORMAL
EOSINOPHIL # BLD: 0 K/UL (ref 0–0.4)
EOSINOPHIL NFR BLD: 1 % (ref 0–7)
ERYTHROCYTE [DISTWIDTH] IN BLOOD BY AUTOMATED COUNT: 13.2 % (ref 11.5–14.5)
GLOBULIN SER CALC-MCNC: 4.6 G/DL (ref 2–4)
GLUCOSE SERPL-MCNC: 75 MG/DL (ref 65–100)
HCT VFR BLD AUTO: 29.3 % (ref 36.6–50.3)
HDLC SERPL-MCNC: 85 MG/DL
HDLC SERPL: 2.4 {RATIO} (ref 0–5)
HGB BLD-MCNC: 9.7 G/DL (ref 12.1–17)
IMM GRANULOCYTES # BLD AUTO: 0 K/UL (ref 0–0.04)
IMM GRANULOCYTES NFR BLD AUTO: 0 % (ref 0–0.5)
LDLC SERPL CALC-MCNC: 105.4 MG/DL (ref 0–100)
LYMPHOCYTES # BLD: 0.1 K/UL (ref 0.8–3.5)
LYMPHOCYTES NFR BLD: 7 % (ref 12–49)
MCH RBC QN AUTO: 33.6 PG (ref 26–34)
MCHC RBC AUTO-ENTMCNC: 33.1 G/DL (ref 30–36.5)
MCV RBC AUTO: 101.4 FL (ref 80–99)
MONOCYTES # BLD: 0.2 K/UL (ref 0–1)
MONOCYTES NFR BLD: 11 % (ref 5–13)
NEUTS SEG # BLD: 1.7 K/UL (ref 1.8–8)
NEUTS SEG NFR BLD: 80 % (ref 32–75)
NRBC # BLD: 0 K/UL (ref 0–0.01)
NRBC BLD-RTO: 0 PER 100 WBC
PLATELET # BLD AUTO: 106 K/UL (ref 150–400)
PMV BLD AUTO: 10.1 FL (ref 8.9–12.9)
POTASSIUM SERPL-SCNC: 3.8 MMOL/L (ref 3.5–5.1)
PROT SERPL-MCNC: 8.2 G/DL (ref 6.4–8.2)
PSA SERPL-MCNC: 1 NG/ML (ref 0.01–4)
RBC # BLD AUTO: 2.89 M/UL (ref 4.1–5.7)
RBC MORPH BLD: ABNORMAL
SODIUM SERPL-SCNC: 137 MMOL/L (ref 136–145)
TRIGL SERPL-MCNC: 48 MG/DL (ref ?–150)
VLDLC SERPL CALC-MCNC: 9.6 MG/DL
WBC # BLD AUTO: 2 K/UL (ref 4.1–11.1)

## 2021-06-29 PROCEDURE — 80061 LIPID PANEL: CPT

## 2021-06-29 PROCEDURE — 85025 COMPLETE CBC W/AUTO DIFF WBC: CPT

## 2021-06-29 PROCEDURE — 74011250636 HC RX REV CODE- 250/636: Performed by: NURSE PRACTITIONER

## 2021-06-29 PROCEDURE — 84153 ASSAY OF PSA TOTAL: CPT

## 2021-06-29 PROCEDURE — 77030016057 HC NDL HUBR APOL -B

## 2021-06-29 PROCEDURE — 36415 COLL VENOUS BLD VENIPUNCTURE: CPT

## 2021-06-29 PROCEDURE — 80053 COMPREHEN METABOLIC PANEL: CPT

## 2021-06-29 PROCEDURE — 96360 HYDRATION IV INFUSION INIT: CPT

## 2021-06-29 PROCEDURE — 99214 OFFICE O/P EST MOD 30 MIN: CPT | Performed by: NURSE PRACTITIONER

## 2021-06-29 RX ORDER — HYDROCORTISONE SODIUM SUCCINATE 100 MG/2ML
100 INJECTION, POWDER, FOR SOLUTION INTRAMUSCULAR; INTRAVENOUS AS NEEDED
Status: CANCELLED | OUTPATIENT
Start: 2021-07-07

## 2021-06-29 RX ORDER — SODIUM CHLORIDE 9 MG/ML
25 INJECTION, SOLUTION INTRAVENOUS CONTINUOUS
Status: CANCELLED | OUTPATIENT
Start: 2021-07-07

## 2021-06-29 RX ORDER — SODIUM CHLORIDE 0.9 % (FLUSH) 0.9 %
10 SYRINGE (ML) INJECTION AS NEEDED
Status: CANCELLED | OUTPATIENT
Start: 2021-07-07

## 2021-06-29 RX ORDER — ACETAMINOPHEN 325 MG/1
650 TABLET ORAL AS NEEDED
Status: CANCELLED
Start: 2021-07-07

## 2021-06-29 RX ORDER — DIPHENHYDRAMINE HYDROCHLORIDE 50 MG/ML
50 INJECTION, SOLUTION INTRAMUSCULAR; INTRAVENOUS AS NEEDED
Status: CANCELLED
Start: 2021-07-07

## 2021-06-29 RX ORDER — SODIUM CHLORIDE 9 MG/ML
1000 INJECTION, SOLUTION INTRAVENOUS ONCE
Status: CANCELLED
Start: 2021-07-27 | End: 2021-07-13

## 2021-06-29 RX ORDER — SODIUM CHLORIDE 9 MG/ML
1000 INJECTION, SOLUTION INTRAVENOUS ONCE
Status: COMPLETED | OUTPATIENT
Start: 2021-06-29 | End: 2021-06-29

## 2021-06-29 RX ORDER — EPINEPHRINE 1 MG/ML
0.3 INJECTION, SOLUTION, CONCENTRATE INTRAVENOUS AS NEEDED
Status: CANCELLED | OUTPATIENT
Start: 2021-07-07

## 2021-06-29 RX ORDER — SODIUM CHLORIDE 9 MG/ML
10 INJECTION INTRAMUSCULAR; INTRAVENOUS; SUBCUTANEOUS AS NEEDED
Status: CANCELLED | OUTPATIENT
Start: 2021-07-07

## 2021-06-29 RX ORDER — ALBUTEROL SULFATE 0.83 MG/ML
2.5 SOLUTION RESPIRATORY (INHALATION) AS NEEDED
Status: CANCELLED
Start: 2021-07-07

## 2021-06-29 RX ORDER — PALONOSETRON 0.05 MG/ML
0.25 INJECTION, SOLUTION INTRAVENOUS ONCE
Status: CANCELLED | OUTPATIENT
Start: 2021-07-07 | End: 2021-07-06

## 2021-06-29 RX ORDER — SODIUM CHLORIDE 9 MG/ML
1000 INJECTION, SOLUTION INTRAVENOUS ONCE
Status: CANCELLED
Start: 2021-07-20 | End: 2021-07-07

## 2021-06-29 RX ORDER — ONDANSETRON 2 MG/ML
8 INJECTION INTRAMUSCULAR; INTRAVENOUS AS NEEDED
Status: CANCELLED | OUTPATIENT
Start: 2021-07-07

## 2021-06-29 RX ORDER — DIPHENHYDRAMINE HYDROCHLORIDE 50 MG/ML
25 INJECTION, SOLUTION INTRAMUSCULAR; INTRAVENOUS AS NEEDED
Status: CANCELLED
Start: 2021-07-07

## 2021-06-29 RX ORDER — HEPARIN 100 UNIT/ML
300-500 SYRINGE INTRAVENOUS AS NEEDED
Status: CANCELLED
Start: 2021-07-07

## 2021-06-29 RX ADMIN — SODIUM CHLORIDE 1000 ML: 9 INJECTION, SOLUTION INTRAVENOUS at 12:10

## 2021-06-29 NOTE — PROGRESS NOTES
[facility] Progress Note    Date: 2021    Name: Alvaro Arora    MRN: [id number]         : 1970    Mr. Peterson Arrived ambulatory and in no distress for Hydration. Assessment was completed, no acute issues at this time, no new complaints voiced. Right chest wall port accessed without difficulty, labs drawn & sent for processing. Mr. Otto Larsen vitals were reviewed. Visit Vitals  /82   Pulse 80   Temp 97.8 °F (36.6 °C)   Resp 16   Ht 6' (1.829 m)   Wt 63.4 kg (139 lb 12.8 oz)   SpO2 99%   BMI 18.96 kg/m²       Lab results were obtained and reviewed. Recent Results (from the past 12 hour(s))   CBC WITH AUTOMATED DIFF    Collection Time: 21 11:31 AM   Result Value Ref Range    WBC 2.0 (L) 4.1 - 11.1 K/uL    RBC 2.89 (L) 4.10 - 5.70 M/uL    HGB 9.7 (L) 12.1 - 17.0 g/dL    HCT 29.3 (L) 36.6 - 50.3 %    .4 (H) 80.0 - 99.0 FL    MCH 33.6 26.0 - 34.0 PG    MCHC 33.1 30.0 - 36.5 g/dL    RDW 13.2 11.5 - 14.5 %    PLATELET 256 (L) 245 - 400 K/uL    MPV 10.1 8.9 - 12.9 FL    NRBC 0.0 0  WBC    ABSOLUTE NRBC 0.00 0.00 - 0.01 K/uL    NEUTROPHILS 80 (H) 32 - 75 %    LYMPHOCYTES 7 (L) 12 - 49 %    MONOCYTES 11 5 - 13 %    EOSINOPHILS 1 0 - 7 %    BASOPHILS 1 0 - 1 %    IMMATURE GRANULOCYTES 0 0.0 - 0.5 %    ABS. NEUTROPHILS 1.7 (L) 1.8 - 8.0 K/UL    ABS. LYMPHOCYTES 0.1 (L) 0.8 - 3.5 K/UL    ABS. MONOCYTES 0.2 0.0 - 1.0 K/UL    ABS. EOSINOPHILS 0.0 0.0 - 0.4 K/UL    ABS. BASOPHILS 0.0 0.0 - 0.1 K/UL    ABS. IMM.  GRANS. 0.0 0.00 - 0.04 K/UL    DF SMEAR SCANNED      RBC COMMENTS ANISOCYTOSIS  PRESENT       METABOLIC PANEL, COMPREHENSIVE    Collection Time: 21 11:31 AM   Result Value Ref Range    Sodium 137 136 - 145 mmol/L    Potassium 3.8 3.5 - 5.1 mmol/L    Chloride 101 97 - 108 mmol/L    CO2 31 21 - 32 mmol/L    Anion gap 5 5 - 15 mmol/L    Glucose 75 65 - 100 mg/dL    BUN 20 6 - 20 MG/DL    Creatinine 1.09 0.70 - 1.30 MG/DL    BUN/Creatinine ratio 18 12 - 20      GFR est AA >60 >60 ml/min/1.73m2    GFR est non-AA >60 >60 ml/min/1.73m2    Calcium 8.9 8.5 - 10.1 MG/DL    Bilirubin, total 0.3 0.2 - 1.0 MG/DL    ALT (SGPT) 13 12 - 78 U/L    AST (SGOT) 30 15 - 37 U/L    Alk. phosphatase 42 (L) 45 - 117 U/L    Protein, total 8.2 6.4 - 8.2 g/dL    Albumin 3.6 3.5 - 5.0 g/dL    Globulin 4.6 (H) 2.0 - 4.0 g/dL    A-G Ratio 0.8 (L) 1.1 - 2.2         Medications:  1 L Bolus     Mr. Kayli Shin tolerated treatment well and was discharged from [place] [address] in stable condition at 1315. Port de-accessed, flushed & heparinized per protocol. He is to return on July 6 at 0900 for his next appointment.     [facility]  June 29, 2021

## 2021-06-29 NOTE — PROGRESS NOTES
Chief Complaint   Patient presents with   Lucero Calderon is a pleasant 53year old male who presents as a follow up for throat cancer.  He denies pain

## 2021-06-30 ENCOUNTER — DOCUMENTATION ONLY (OUTPATIENT)
Dept: ONCOLOGY | Age: 51
End: 2021-06-30

## 2021-06-30 DIAGNOSIS — C32.9 LARYNGEAL CARCINOMA (HCC): Primary | ICD-10-CM

## 2021-06-30 RX ORDER — SILVER SULFADIAZINE 10 G/1000G
CREAM TOPICAL
Qty: 50 G | Refills: 3 | Status: SHIPPED | OUTPATIENT
Start: 2021-06-30 | End: 2021-01-01

## 2021-06-30 NOTE — PROGRESS NOTES
[address]  Social Work Navigator Encounter     Patient Name: Lora Serna    Medical History: h&n cancer    Narrative: Patient requested assistance scheduling transportation to radiation appointment. Called placed to [facility] transportation ([phone]) and arranged transportation for the following dates 7/6 - 7/9 and 7/12-7/16. Patient requested assistance checking on the status of his SafeLink (phone) application. Called placed to [address] [place] [address] ([phone]). Spoke with representative who confirmed patient was approved for free data/text/talk. Patient will need to provide his own unlocked smartphone. Patient can access this benefit by calling [employer] 1-22 days from application date (6/82). This worked noticed patient of the above. Barriers to Care: nonverbal     Plan:  1. Transportation arranged. 2. SafeLink phone services confirmed.      Referral:   Transportation referral  Other referral    Thank you,  Kristina Champion LCSW

## 2021-07-06 ENCOUNTER — TELEPHONE (OUTPATIENT)
Dept: ONCOLOGY | Age: 51
End: 2021-07-06

## 2021-07-06 ENCOUNTER — HOSPITAL ENCOUNTER (OUTPATIENT)
Dept: INFUSION THERAPY | Age: 51
End: 2021-07-06

## 2021-07-07 ENCOUNTER — HOSPITAL ENCOUNTER (OUTPATIENT)
Dept: INFUSION THERAPY | Age: 51
Discharge: HOME | End: 2021-07-07
Payer: MEDICAID

## 2021-07-07 VITALS
BODY MASS INDEX: 18.38 KG/M2 | WEIGHT: 135.7 LBS | TEMPERATURE: 96.2 F | SYSTOLIC BLOOD PRESSURE: 138 MMHG | RESPIRATION RATE: 18 BRPM | OXYGEN SATURATION: 98 % | HEART RATE: 79 BPM | HEIGHT: 72 IN | DIASTOLIC BLOOD PRESSURE: 89 MMHG

## 2021-07-07 DIAGNOSIS — C32.9 LARYNGEAL CARCINOMA (HCC): ICD-10-CM

## 2021-07-07 DIAGNOSIS — C14.0 THROAT CANCER (HCC): ICD-10-CM

## 2021-07-07 DIAGNOSIS — C32.9 CARCINOMA LARYNX (HCC): Primary | ICD-10-CM

## 2021-07-07 DIAGNOSIS — E87.6 HYPOKALEMIA: Primary | ICD-10-CM

## 2021-07-07 LAB
ALBUMIN SERPL-MCNC: 3.7 G/DL (ref 3.5–5)
ALBUMIN/GLOB SERPL: 0.8 {RATIO} (ref 1.1–2.2)
ALP SERPL-CCNC: 46 U/L (ref 45–117)
ALT SERPL-CCNC: 11 U/L (ref 12–78)
ANION GAP SERPL CALC-SCNC: 4 MMOL/L (ref 5–15)
AST SERPL-CCNC: 23 U/L (ref 15–37)
BASOPHILS # BLD: 0 K/UL (ref 0–0.1)
BASOPHILS NFR BLD: 0 % (ref 0–1)
BILIRUB SERPL-MCNC: 0.2 MG/DL (ref 0.2–1)
BUN SERPL-MCNC: 23 MG/DL (ref 6–20)
BUN/CREAT SERPL: 24 (ref 12–20)
CALCIUM SERPL-MCNC: 9.2 MG/DL (ref 8.5–10.1)
CHLORIDE SERPL-SCNC: 101 MMOL/L (ref 97–108)
CO2 SERPL-SCNC: 32 MMOL/L (ref 21–32)
CREAT SERPL-MCNC: 0.96 MG/DL (ref 0.7–1.3)
DIFFERENTIAL METHOD BLD: ABNORMAL
EOSINOPHIL # BLD: 0 K/UL (ref 0–0.4)
EOSINOPHIL NFR BLD: 1 % (ref 0–7)
ERYTHROCYTE [DISTWIDTH] IN BLOOD BY AUTOMATED COUNT: 13.5 % (ref 11.5–14.5)
GLOBULIN SER CALC-MCNC: 4.8 G/DL (ref 2–4)
GLUCOSE SERPL-MCNC: 82 MG/DL (ref 65–100)
HCT VFR BLD AUTO: 29.9 % (ref 36.6–50.3)
HGB BLD-MCNC: 9.9 G/DL (ref 12.1–17)
IMM GRANULOCYTES # BLD AUTO: 0 K/UL
IMM GRANULOCYTES NFR BLD AUTO: 0 %
LYMPHOCYTES # BLD: 0.3 K/UL (ref 0.8–3.5)
LYMPHOCYTES NFR BLD: 24 % (ref 12–49)
MAGNESIUM SERPL-MCNC: 2.2 MG/DL (ref 1.6–2.4)
MCH RBC QN AUTO: 34.1 PG (ref 26–34)
MCHC RBC AUTO-ENTMCNC: 33.1 G/DL (ref 30–36.5)
MCV RBC AUTO: 103.1 FL (ref 80–99)
MONOCYTES # BLD: 0.3 K/UL (ref 0–1)
MONOCYTES NFR BLD: 24 % (ref 5–13)
MYELOCYTES NFR BLD MANUAL: 1 %
NEUTS BAND NFR BLD MANUAL: 6 % (ref 0–6)
NEUTS SEG # BLD: 0.6 K/UL (ref 1.8–8)
NEUTS SEG NFR BLD: 44 % (ref 32–75)
NRBC # BLD: 0 K/UL (ref 0–0.01)
NRBC BLD-RTO: 0 PER 100 WBC
PLATELET # BLD AUTO: 231 K/UL (ref 150–400)
PMV BLD AUTO: 10.7 FL (ref 8.9–12.9)
POTASSIUM SERPL-SCNC: 4 MMOL/L (ref 3.5–5.1)
PROT SERPL-MCNC: 8.5 G/DL (ref 6.4–8.2)
RBC # BLD AUTO: 2.9 M/UL (ref 4.1–5.7)
RBC MORPH BLD: ABNORMAL
SODIUM SERPL-SCNC: 137 MMOL/L (ref 136–145)
WBC # BLD AUTO: 1.1 K/UL (ref 4.1–11.1)

## 2021-07-07 PROCEDURE — 96523 IRRIG DRUG DELIVERY DEVICE: CPT

## 2021-07-07 PROCEDURE — 77030016057 HC NDL HUBR APOL -B

## 2021-07-07 PROCEDURE — 85025 COMPLETE CBC W/AUTO DIFF WBC: CPT

## 2021-07-07 PROCEDURE — 36415 COLL VENOUS BLD VENIPUNCTURE: CPT

## 2021-07-07 PROCEDURE — 80053 COMPREHEN METABOLIC PANEL: CPT

## 2021-07-07 PROCEDURE — 83735 ASSAY OF MAGNESIUM: CPT

## 2021-07-07 RX ORDER — OXYCODONE HYDROCHLORIDE 5 MG/1
5-10 TABLET ORAL
Qty: 80 TABLET | Refills: 0 | Status: SHIPPED | OUTPATIENT
Start: 2021-07-07 | End: 2021-07-21

## 2021-07-07 RX ORDER — SODIUM CHLORIDE 0.9 % (FLUSH) 0.9 %
10 SYRINGE (ML) INJECTION AS NEEDED
Status: DISPENSED | OUTPATIENT
Start: 2021-07-07 | End: 2021-07-07

## 2021-07-07 RX ORDER — HEPARIN 100 UNIT/ML
300-500 SYRINGE INTRAVENOUS AS NEEDED
Status: ACTIVE | OUTPATIENT
Start: 2021-07-07 | End: 2021-07-07

## 2021-07-07 RX ORDER — SODIUM CHLORIDE 9 MG/ML
10 INJECTION INTRAMUSCULAR; INTRAVENOUS; SUBCUTANEOUS AS NEEDED
Status: ACTIVE | OUTPATIENT
Start: 2021-07-07 | End: 2021-07-07

## 2021-07-07 RX ADMIN — Medication 10 ML: at 10:14

## 2021-07-07 RX ADMIN — SODIUM CHLORIDE 10 ML: 9 INJECTION INTRAMUSCULAR; INTRAVENOUS; SUBCUTANEOUS at 10:13

## 2021-07-07 NOTE — PROGRESS NOTES
[facility] Progress Note    Date: 2021    Name: Olga Arias    MRN: [id number]         : 1970    Mr. Peterson Arrived ambulatory and in no distress for C1D43 of Cisplatin Regimen HELD. Assessment was completed, no acute issues at this time, no new complaints voiced. Right chest wall port accessed without difficulty, labs drawn & sent for processing. Covid questionnaire completed. 1. Do you have any symptoms of COVID-19? SOB, coughing, fever, or generally not feeling well - no    2. Have you been exposed to COVID-19 recently? - no    3. Have you had any recent contact with family/friend that has a pending COVID test? - no      Mr. Peterson's vitals were reviewed. Patient Vitals for the past 12 hrs:   Temp Pulse Resp BP SpO2   21 0959 (!) 96.2 °F (35.7 °C) 79 18 138/89 98 %       Lab results were obtained and reviewed. Recent Results (from the past 12 hour(s))   CBC WITH AUTOMATED DIFF    Collection Time: 21 10:00 AM   Result Value Ref Range    WBC 1.1 (L) 4.1 - 11.1 K/uL    RBC 2.90 (L) 4.10 - 5.70 M/uL    HGB 9.9 (L) 12.1 - 17.0 g/dL    HCT 29.9 (L) 36.6 - 50.3 %    .1 (H) 80.0 - 99.0 FL    MCH 34.1 (H) 26.0 - 34.0 PG    MCHC 33.1 30.0 - 36.5 g/dL    RDW 13.5 11.5 - 14.5 %    PLATELET 962 813 - 338 K/uL    MPV 10.7 8.9 - 12.9 FL    NRBC 0.0 0  WBC    ABSOLUTE NRBC 0.00 0.00 - 0.01 K/uL    NEUTROPHILS 44 32 - 75 %    BAND NEUTROPHILS 6 0 - 6 %    LYMPHOCYTES 24 12 - 49 %    MONOCYTES 24 (H) 5 - 13 %    EOSINOPHILS 1 0 - 7 %    BASOPHILS 0 0 - 1 %    MYELOCYTES 1 (H) 0 %    IMMATURE GRANULOCYTES 0 %    ABS. NEUTROPHILS 0.6 (L) 1.8 - 8.0 K/UL    ABS. LYMPHOCYTES 0.3 (L) 0.8 - 3.5 K/UL    ABS. MONOCYTES 0.3 0.0 - 1.0 K/UL    ABS. EOSINOPHILS 0.0 0.0 - 0.4 K/UL    ABS. BASOPHILS 0.0 0.0 - 0.1 K/UL    ABS. IMM.  GRANS. 0.0 K/UL    DF MANUAL      RBC COMMENTS NORMOCYTIC, NORMOCHROMIC     METABOLIC PANEL, COMPREHENSIVE    Collection Time: 21 10:00 AM   Result Value Ref Range Sodium 137 136 - 145 mmol/L    Potassium 4.0 3.5 - 5.1 mmol/L    Chloride 101 97 - 108 mmol/L    CO2 32 21 - 32 mmol/L    Anion gap 4 (L) 5 - 15 mmol/L    Glucose 82 65 - 100 mg/dL    BUN 23 (H) 6 - 20 MG/DL    Creatinine 0.96 0.70 - 1.30 MG/DL    BUN/Creatinine ratio 24 (H) 12 - 20      GFR est AA >60 >60 ml/min/1.73m2    GFR est non-AA >60 >60 ml/min/1.73m2    Calcium 9.2 8.5 - 10.1 MG/DL    Bilirubin, total 0.2 0.2 - 1.0 MG/DL    ALT (SGPT) 11 (L) 12 - 78 U/L    AST (SGOT) 23 15 - 37 U/L    Alk. phosphatase 46 45 - 117 U/L    Protein, total 8.5 (H) 6.4 - 8.2 g/dL    Albumin 3.7 3.5 - 5.0 g/dL    Globulin 4.8 (H) 2.0 - 4.0 g/dL    A-G Ratio 0.8 (L) 1.1 - 2.2     MAGNESIUM    Collection Time: 07/07/21 10:00 AM   Result Value Ref Range    Magnesium 2.2 1.6 - 2.4 mg/dL     Called MD regarding abs neutrophils. Md order to HOLD treatment and retry Monday or Tuesday. Pt coming in tomorrow for fluids and to see MD.      Mr. Kayli Shin was discharged from [place] [address] in stable condition. Port de-accessed, flushed & heparinized per protocol. He is to return on 7/8/21 for his next appointment.     Shira Scott RN  July 7, 2021

## 2021-07-08 ENCOUNTER — OFFICE VISIT (OUTPATIENT)
Dept: ONCOLOGY | Age: 51
End: 2021-07-08
Payer: MEDICAID

## 2021-07-08 ENCOUNTER — HOSPITAL ENCOUNTER (OUTPATIENT)
Dept: INFUSION THERAPY | Age: 51
Discharge: HOME | End: 2021-07-08
Payer: MEDICAID

## 2021-07-08 VITALS
RESPIRATION RATE: 16 BRPM | TEMPERATURE: 97.1 F | BODY MASS INDEX: 18.39 KG/M2 | DIASTOLIC BLOOD PRESSURE: 89 MMHG | SYSTOLIC BLOOD PRESSURE: 143 MMHG | OXYGEN SATURATION: 98 % | WEIGHT: 135.8 LBS | HEART RATE: 74 BPM | HEIGHT: 72 IN

## 2021-07-08 VITALS
TEMPERATURE: 97.1 F | HEART RATE: 74 BPM | RESPIRATION RATE: 16 BRPM | DIASTOLIC BLOOD PRESSURE: 89 MMHG | SYSTOLIC BLOOD PRESSURE: 143 MMHG

## 2021-07-08 DIAGNOSIS — R07.0 THROAT PAIN IN ADULT: ICD-10-CM

## 2021-07-08 DIAGNOSIS — D70.1 CHEMOTHERAPY INDUCED NEUTROPENIA (HCC): ICD-10-CM

## 2021-07-08 DIAGNOSIS — C32.9 SQUAMOUS CELL CARCINOMA OF LARYNX (HCC): Primary | ICD-10-CM

## 2021-07-08 DIAGNOSIS — K59.03 DRUG-INDUCED CONSTIPATION: ICD-10-CM

## 2021-07-08 DIAGNOSIS — T45.1X5A CHEMOTHERAPY INDUCED NEUTROPENIA (HCC): ICD-10-CM

## 2021-07-08 DIAGNOSIS — B37.0 ORAL THRUSH: ICD-10-CM

## 2021-07-08 DIAGNOSIS — C32.9 LARYNGEAL CARCINOMA (HCC): Primary | ICD-10-CM

## 2021-07-08 PROCEDURE — 74011250636 HC RX REV CODE- 250/636: Performed by: NURSE PRACTITIONER

## 2021-07-08 PROCEDURE — 99214 OFFICE O/P EST MOD 30 MIN: CPT | Performed by: INTERNAL MEDICINE

## 2021-07-08 RX ORDER — SODIUM CHLORIDE 9 MG/ML
10 INJECTION INTRAMUSCULAR; INTRAVENOUS; SUBCUTANEOUS AS NEEDED
Status: CANCELLED | OUTPATIENT
Start: 2021-07-08

## 2021-07-08 RX ORDER — ALBUTEROL SULFATE 0.83 MG/ML
2.5 SOLUTION RESPIRATORY (INHALATION) AS NEEDED
Status: CANCELLED
Start: 2021-07-13

## 2021-07-08 RX ORDER — SODIUM CHLORIDE 0.9 % (FLUSH) 0.9 %
10 SYRINGE (ML) INJECTION AS NEEDED
Status: CANCELLED | OUTPATIENT
Start: 2021-07-13

## 2021-07-08 RX ORDER — ONDANSETRON 2 MG/ML
8 INJECTION INTRAMUSCULAR; INTRAVENOUS AS NEEDED
Status: CANCELLED | OUTPATIENT
Start: 2021-07-13

## 2021-07-08 RX ORDER — SODIUM CHLORIDE 0.9 % (FLUSH) 0.9 %
10 SYRINGE (ML) INJECTION AS NEEDED
Status: CANCELLED | OUTPATIENT
Start: 2021-07-08

## 2021-07-08 RX ORDER — HEPARIN 100 UNIT/ML
300-500 SYRINGE INTRAVENOUS AS NEEDED
Status: ACTIVE | OUTPATIENT
Start: 2021-07-08 | End: 2021-07-08

## 2021-07-08 RX ORDER — DIPHENHYDRAMINE HYDROCHLORIDE 50 MG/ML
50 INJECTION, SOLUTION INTRAMUSCULAR; INTRAVENOUS AS NEEDED
Status: CANCELLED
Start: 2021-07-13

## 2021-07-08 RX ORDER — NYSTATIN 100000 [USP'U]/ML
500000 SUSPENSION ORAL 4 TIMES DAILY
Qty: 120 ML | Refills: 2 | Status: SHIPPED | OUTPATIENT
Start: 2021-07-08 | End: 2021-07-13 | Stop reason: SDUPTHER

## 2021-07-08 RX ORDER — SODIUM CHLORIDE 0.9 % (FLUSH) 0.9 %
10 SYRINGE (ML) INJECTION AS NEEDED
Status: CANCELLED | OUTPATIENT
Start: 2021-07-09

## 2021-07-08 RX ORDER — SODIUM CHLORIDE 9 MG/ML
25 INJECTION, SOLUTION INTRAVENOUS CONTINUOUS
Status: CANCELLED | OUTPATIENT
Start: 2021-07-13

## 2021-07-08 RX ORDER — HYDROCORTISONE SODIUM SUCCINATE 100 MG/2ML
100 INJECTION, POWDER, FOR SOLUTION INTRAMUSCULAR; INTRAVENOUS AS NEEDED
Status: CANCELLED | OUTPATIENT
Start: 2021-07-13

## 2021-07-08 RX ORDER — HEPARIN 100 UNIT/ML
300-500 SYRINGE INTRAVENOUS AS NEEDED
Status: CANCELLED | OUTPATIENT
Start: 2021-07-09

## 2021-07-08 RX ORDER — SODIUM CHLORIDE 0.9 % (FLUSH) 0.9 %
10 SYRINGE (ML) INJECTION AS NEEDED
Status: CANCELLED | OUTPATIENT
Start: 2021-07-12

## 2021-07-08 RX ORDER — SODIUM CHLORIDE 9 MG/ML
10 INJECTION INTRAMUSCULAR; INTRAVENOUS; SUBCUTANEOUS AS NEEDED
Status: ACTIVE | OUTPATIENT
Start: 2021-07-08 | End: 2021-07-08

## 2021-07-08 RX ORDER — SODIUM CHLORIDE 9 MG/ML
10 INJECTION INTRAMUSCULAR; INTRAVENOUS; SUBCUTANEOUS AS NEEDED
Status: CANCELLED | OUTPATIENT
Start: 2021-07-12

## 2021-07-08 RX ORDER — SODIUM CHLORIDE 9 MG/ML
10 INJECTION INTRAMUSCULAR; INTRAVENOUS; SUBCUTANEOUS AS NEEDED
Status: CANCELLED | OUTPATIENT
Start: 2021-07-09

## 2021-07-08 RX ORDER — ACETAMINOPHEN 325 MG/1
650 TABLET ORAL AS NEEDED
Status: CANCELLED
Start: 2021-07-13

## 2021-07-08 RX ORDER — HEPARIN 100 UNIT/ML
300-500 SYRINGE INTRAVENOUS AS NEEDED
Status: CANCELLED | OUTPATIENT
Start: 2021-07-13

## 2021-07-08 RX ORDER — EPINEPHRINE 1 MG/ML
0.3 INJECTION, SOLUTION, CONCENTRATE INTRAVENOUS AS NEEDED
Status: CANCELLED | OUTPATIENT
Start: 2021-07-13

## 2021-07-08 RX ORDER — FLUCONAZOLE 200 MG/1
200 TABLET ORAL DAILY
Qty: 7 TABLET | Refills: 0 | Status: SHIPPED | OUTPATIENT
Start: 2021-07-08 | End: 2021-07-15

## 2021-07-08 RX ORDER — PALONOSETRON 0.05 MG/ML
0.25 INJECTION, SOLUTION INTRAVENOUS ONCE
Status: CANCELLED | OUTPATIENT
Start: 2021-07-13 | End: 2021-07-13

## 2021-07-08 RX ORDER — SODIUM CHLORIDE 0.9 % (FLUSH) 0.9 %
10 SYRINGE (ML) INJECTION AS NEEDED
Status: DISPENSED | OUTPATIENT
Start: 2021-07-08 | End: 2021-07-08

## 2021-07-08 RX ORDER — HEPARIN 100 UNIT/ML
300-500 SYRINGE INTRAVENOUS AS NEEDED
Status: CANCELLED
Start: 2021-07-08

## 2021-07-08 RX ORDER — DIPHENHYDRAMINE HYDROCHLORIDE 50 MG/ML
25 INJECTION, SOLUTION INTRAMUSCULAR; INTRAVENOUS AS NEEDED
Status: CANCELLED
Start: 2021-07-13

## 2021-07-08 RX ORDER — DOCUSATE SODIUM 100 MG/1
100 CAPSULE, LIQUID FILLED ORAL 2 TIMES DAILY
Qty: 60 CAPSULE | Refills: 2 | Status: SHIPPED | OUTPATIENT
Start: 2021-07-08 | End: 2021-01-01

## 2021-07-08 RX ORDER — HEPARIN 100 UNIT/ML
300-500 SYRINGE INTRAVENOUS AS NEEDED
Status: CANCELLED | OUTPATIENT
Start: 2021-07-12

## 2021-07-08 RX ORDER — SODIUM CHLORIDE 9 MG/ML
10 INJECTION INTRAMUSCULAR; INTRAVENOUS; SUBCUTANEOUS AS NEEDED
Status: CANCELLED | OUTPATIENT
Start: 2021-07-13

## 2021-07-08 RX ADMIN — SODIUM CHLORIDE 1000 ML: 9 INJECTION, SOLUTION INTRAVENOUS at 09:19

## 2021-07-08 RX ADMIN — Medication 10 ML: at 10:25

## 2021-07-08 RX ADMIN — Medication 500 UNITS: at 10:25

## 2021-07-08 RX ADMIN — SODIUM CHLORIDE 10 ML: 9 INJECTION INTRAMUSCULAR; INTRAVENOUS; SUBCUTANEOUS at 09:10

## 2021-07-08 NOTE — PROGRESS NOTES
[id number] [facility] at [address] [place] [address]  [phone]    Hematology / Oncology Established Visit    Reason for Visit:   Brice Stewart is a 53 y.o. male who is seen in follow up of laryngeal cancer. Referred by Dr. 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 Stage: CATARINO [[id number]] at diagnosis, regional recurrence in 4/2021    Pathology:   2/8/21 Total laryngectomy: Invasive squamous cell carcinoma  Tumor size 3.5cm, moderately differentiated (G2), PNI present, LVI not identified, margins negative  [id number]    Prior Treatment: 2/8/21 total laryngectomy/cricopharyngeal myotomy    Current Treatment: Cisplatin 100mg/m2 every 3 weeks x 3 cycles, 5/24/21 - current  Treatment duration: 6-7 weeks  Frequency of visits: weekly     History of Present Illness:   Brice Stewart is a 53 y.o. male who with COPD who is referred for Head and neck cancer. He presented with throat pain and hoarseness in Sept 2020. Was evaluated by Dr. Chasity Alberto in ENT who performed laryngoscopy and diagnosed patient with squamous cell carcinoma involving the larynx and subglottis. In late Dec 2020, neck CT showed a 3 cm mass in Right supraglottic larynx with extension to thyroid cartilage, but no cervical LNs. PET 1/4/21 showed uptake in the laryngeal mass at right vocal fold, extending to thyroid cartilage. He was scheduled for surgery, but he required emergent tracheostomy prior to that; done late Jan 2021. Also had PEG placed 1/30/21. On 2/8/21, he underwent total laryngectomy/cricopharyngeal myotomy. He quit smoking in Feb 2021. He was evaluated by Dr. Summer Valles in [address] [place] [address] in March 2021 who recommended post-op radiation. There were multiple delays given difficult getting in for dental evaluation prior to RT. [facility] simulation scan was notable for a necrotic appearing tumor in Right neck. PET 5/7/21 confirmed hypermetabolic uptake in right neck.     PEG removed in March 2021 due to problems with it. Per Dr. Sanam Asher, pt had 7 teeth extracted by dentist. He returns to the dentist again for dental fillings on 5/26/21. Pt states his neck feel tight, causing pulling sensation but no current pain. Interval History:  Patient here for follow up of throat cancer and chemoradiation. Reports throat pain has increased. Rates it 6/10. Reports sore tongue and Reports he is only able to swallow boost and water. He is averaging 4 boost per day and 2 x 12 oz bottles of water per day. Reports he was oxycodone by Dr. Sanam Asher yesterday. He has not picked up the prescription. Reports increased mucus secretions are managed with mucinex. Reports rhinorrhea. Denies n/v. No SOB, or chest pain. No fevers/chills. No constipation or diarrhea. No neuropathy or ringing in ears. PMHx: COPD, throat cancer, anxiety  PSurgHx: Left arm plate placement, tracheostomy, total laryngectomy 2/8/21  SHx: Quit smoking 10/28/20 after 45 pack years. No EtOH  FHx: Mother with DM; Father with DM, CKD; Sister with DM. No h/o malignancy. Review of Systems: A complete review of systems was obtained, negative except as described above. Physical Exam:   Blood pressure (!) 143/89, pulse 74, temperature 97.1 °F (36.2 °C), resp. rate 16, height 6' (1.829 m), weight 135 lb 12.9 oz (61.6 kg), SpO2 98 %. ECOG PS: 0  General: Well developed, no acute distress, thin   Eyes: Anicteric sclerae  HENT: Atraumatic  Neck: Supple, Tracheostomy noted  Lymphatic: No supraclavicular, axillary adenopathy. Right cervical LAD noted, smaller than prior. Respiratory: CTAB, normal respiratory effort  CV: tachycardia, regular rhythm, no murmurs, no peripheral edema  GI: Soft, nontender, nondistended, no masses  MS: Normal gait and station. Digits without clubbing or cyanosis. 1cm firm nodule on left wrist.  Skin: No rashes, ecchymoses, or petechiae. Normal temperature, turgor, and texture.  Hyperpigmented skin at neck bilaterally. Neuro/Psych: Alert, oriented. Moves all 4 extremities. Appropriate affect, normal judgment/insight. Communicates by writing given tracheostomy. Results:     Lab Results   Component Value Date/Time    WBC 1.1 (L) 2021 10:00 AM    HGB 9.9 (L) 2021 10:00 AM    HCT 29.9 (L) 2021 10:00 AM    PLATELET 747  10:00 AM    .1 (H) 2021 10:00 AM    ABS. NEUTROPHILS 0.6 (L) 2021 10:00 AM     Lab Results   Component Value Date/Time    Sodium 137 2021 10:00 AM    Potassium 4.0 2021 10:00 AM    Chloride 101 2021 10:00 AM    CO2 32 2021 10:00 AM    Glucose 82 2021 10:00 AM    BUN 23 (H) 2021 10:00 AM    Creatinine 0.96 2021 10:00 AM    GFR est AA >60 2021 10:00 AM    GFR est non-AA >60 2021 10:00 AM    Calcium 9.2 2021 10:00 AM    Glucose (POC) 97 2021 08:15 AM     Lab Results   Component Value Date/Time    Bilirubin, total 0.2 2021 10:00 AM    ALT (SGPT) 11 (L) 2021 10:00 AM    Alk. phosphatase 46 2021 10:00 AM    Protein, total 8.5 (H) 2021 10:00 AM    Albumin 3.7 2021 10:00 AM    Globulin 4.8 (H) 2021 10:00 AM     Imagin/28/21 Neck CT: IMPRESSION  Irregular soft tissue mass involving the aryepiglottic and the larynx. There is significant narrowing of the airway at the level of the larynx  and the supraglottic region. Poorly defined cervical adenopathy is noted. 21 Chest CTA:  IMPRESSION  Minimal scarring or subsegmental atelectasis in the left lung base. No evidence of pulmonary embolism or pneumonia. 5/7/21 PET:  IMPRESSION  2 large necrotic appearing mass lesions are noted in the right neck (SUV 6.4). Small hypermetabolic right posterior triangle lymph node. No PET avid evidence  of distant metastatic disease. Assessment & Plan:   Katie Prakash is a 53 y.o. male is seen for laryngeal cancer.      1. Squamous cell carcinoma of larynx, Stage CATARINO [pT4a cN2 Mx]:  S/p total laryngectomy and tracheostomy in Jan 2021. Afterwards, he developed disease recurrence in Right neck confirmed by PET scan. I recommend concurrent chemoradiation using Cisplatin to treat his disease. We discussed the risks and benefits of cisplatin chemotherapy. Potential side effects include, but are not limited to, nausea, vomiting, diarrhea, taste changes, myelosuppression, infection, fatigue, alopecia, neuropathy, hearing loss, renal failure, allergic reactions, infertility, and rarely, death. Additionally, chemotherapy leads to radiosensitization which can intensify the side effects of radiation therapy. The patient has consented to beginning chemotherapy and chemo teaching completed. Supportive care medications include: Zofran, Olanzapine, Dexamethasone, Emla cream  -- Will see weekly during treatment along with labs and IVF  -- Continue concurrent RT. End date 7/19/21.  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 -- Held C1D43 Cisplatin due to neutropenia. Will retry on 7/13/21 with MD/NP visit same day. -- Fluids 7/9 and 7/12 due to decreased PO intake. 2. H/o DM:   No longer on Metformin per patient. HgbA1c 6.0 in 2/2021. Is at risk of steroid induced hyperglycemia. 3. COPD / H/o tobacco abuse:   Quit in 2/2021. Uses Albuterol inhaler prn.    4. FEN/GI:   Has odynophagia 2/2 RT and thrush. Lost weight. Encouraged high protein snacks, boost and provided examples. Kamla Betancur RD. Treating hypokalemia with home potassium Rx. Treating oral thrush with nystatin, 7 days of Fluconazole and mucus secretions with mucinex. -- Continue Nystain 5mL QID for oral thrush. Start fluconazole 200mg daily x 7 days. Hold trazodone due to drug interaction. -- Continue magic mouthwash as needed for mouth sores. Can take OTC Tylenol 500mg q4-6h prn throat pain. Start Oxycodone 5-10mg q4h prn rx by Dr. Enrico Cheney.   -- Docusate 1-2 times daily prn constipation. -- KCL 20meq BID. Advised foods high in K+. 5. Chemotherapy induced neutropenia: Monitor for infection. Neutropenic precautions advised. 6. Health maintenance: Faxed PSA and lipid panel to PCP Amalia Null. Advised pt follow up with PCP to discuss results. 7. Renal insufficiency: Encouraged 64 oz water daily and weekly IV. Setting up for additional hydration 7/9 and 7/12/21 due to decreased PO intake. Emotional well being: Pt is coping well with his/her disease and has excellent support. I have personally seen and evaluated the patient in conjunction with Cayden Luke NP. I find the patient's history and physical exam are consistent with the NP's documentation. I agree with the above assessment and plan, which I have modified as needed. Pt now with oropharyngeal thrush and throat pain related to this as well as chemoRT treatments. Will treat thrush more aggressively, and retry chemotherapy next week.       Signed By: Michelle Mendoza MD     July 8, 2021

## 2021-07-08 NOTE — PATIENT INSTRUCTIONS
1. For oral thrush:   -Start nystatin swish and spit 4 times a day. I refilled this today.   -start fluconazole 200mg oral pill once per day x 7 days. Please hold trazodone while you are taking fluconazole. 2. If you develop constipation taking oxycodone pain medication start:  - docusate 1-2 tabs daily as needed for constipation. This is a stool softener. 3. Decrease ibuprofen to once per day. You can start taking acetaminophen (Tylenol) 500mg every 4-6 hours as needed for pain. Do not go over 3000mg in one day. You do not need a prescription.   4. Start using nasal saline spray twice daily for runny nose

## 2021-07-08 NOTE — PROGRESS NOTES
Chief Complaint   Patient presents with   Mouna Baeza is a pleasant 53year old male who presents as a follow up for lung cancer.  He reports throat and tongue pain and reports loss of appetite

## 2021-07-08 NOTE — PROGRESS NOTES
Outpatient Infusion Center Progress Note    0392 Pt admit to [facility] for hydration ambulatory in stable condition. Assessment completed. Patient reports that 4 days ago, she started having a sore throat and tongue that prevents him from eating solid food. States he is drinking 4 high calorie Boost shakes per day and 2 small tran. Denies any difficulty breathing, denies any throat or tongue swelling, denies any nausea or vomiting. Patient was seen yesterday and had treatment HELD. No labs ordered for today. Port accessed without difficulty. Hydration initiated per orders. Prior to treatment, patient was screened for COVID 19. Denies any signs or symptoms of COVID. Denies any known physical contact with anyone exposed to, diagnosed with or with pending or positive COVID test. Denies any pending or positive COVID test themself. Visit Vitals  BP (!) 143/89 (BP 1 Location: Right arm, BP Patient Position: Sitting)   Pulse 74   Temp 97.1 °F (36.2 °C)   Resp 16       Medications Administered     sodium chloride 0.9 % bolus infusion 1,000 mL     Admin Date  07/08/2021 Action  New Bag Dose  1,000 mL Rate  1,000 mL/hr Route  IntraVENous Administered By  Ana Khan                1030 Pt tolerated treatment well. Port flushed, heparinized and de-accessed per protocol. D/c home ambulatory in no distress. Pt aware of next appointment scheduled.       Frnak Muniz RN

## 2021-07-09 ENCOUNTER — HOSPITAL ENCOUNTER (OUTPATIENT)
Dept: INFUSION THERAPY | Age: 51
Discharge: HOME | End: 2021-07-09
Payer: MEDICAID

## 2021-07-09 ENCOUNTER — DOCUMENTATION ONLY (OUTPATIENT)
Dept: ONCOLOGY | Age: 51
End: 2021-07-09

## 2021-07-09 VITALS
TEMPERATURE: 97.4 F | SYSTOLIC BLOOD PRESSURE: 135 MMHG | RESPIRATION RATE: 18 BRPM | HEART RATE: 84 BPM | DIASTOLIC BLOOD PRESSURE: 84 MMHG | OXYGEN SATURATION: 100 %

## 2021-07-09 DIAGNOSIS — C32.9 LARYNGEAL CARCINOMA (HCC): Primary | ICD-10-CM

## 2021-07-09 PROCEDURE — 74011250636 HC RX REV CODE- 250/636: Performed by: NURSE PRACTITIONER

## 2021-07-09 PROCEDURE — 96360 HYDRATION IV INFUSION INIT: CPT

## 2021-07-09 PROCEDURE — 77030016057 HC NDL HUBR APOL -B

## 2021-07-09 RX ORDER — SODIUM CHLORIDE 0.9 % (FLUSH) 0.9 %
10 SYRINGE (ML) INJECTION AS NEEDED
Status: DISPENSED | OUTPATIENT
Start: 2021-07-09 | End: 2021-07-09

## 2021-07-09 RX ORDER — HEPARIN 100 UNIT/ML
300-500 SYRINGE INTRAVENOUS AS NEEDED
Status: ACTIVE | OUTPATIENT
Start: 2021-07-09 | End: 2021-07-09

## 2021-07-09 RX ORDER — SODIUM CHLORIDE 9 MG/ML
10 INJECTION INTRAMUSCULAR; INTRAVENOUS; SUBCUTANEOUS AS NEEDED
Status: ACTIVE | OUTPATIENT
Start: 2021-07-09 | End: 2021-07-09

## 2021-07-09 RX ADMIN — Medication 500 UNITS: at 10:15

## 2021-07-09 RX ADMIN — Medication 10 ML: at 10:15

## 2021-07-09 RX ADMIN — SODIUM CHLORIDE 1000 ML: 9 INJECTION, SOLUTION INTRAVENOUS at 09:15

## 2021-07-09 NOTE — PROGRESS NOTES
[facility]   Social Work Navigator Encounter     Patient Name:  Renuka Weber    Medical History: h&n cancer    Advance Directives: none on file; conversation deferred    Narrative: Met with patient to assist him apply for a free smartphone through the [employer]. Called patient's insurance [facility]) and representative advised patient would need to call [employer] ([phone]) to inquire how to obtain free smartphone. Call placed to [employer] and representative advised patient is not eligible to receive another cell phone because he received one through [facility] in 2018. This program only provides one phone per person. As a result patient has been approved for services but will need to provide his own smart phone. [facility] ([phone]) who also participates with the [employer] program. Advised patient would need to complete application online. Assisted patient with online application per his request. Patient was approved for free service and a free smartphone. He will receive a letter and phone in the mail within 7-10 business days. Patient is in the process of applying for SSDI. Her provided paperwork that needs to be completed by his provider. Paperwork given to Gerald Vicente RN. Barriers to Care: none verbal; financial     Plan:  1. Patient applied for free phone and service through [employer]. A smartphone will increase his independence. 2. SSDI paperwork to be completed by our office.      Referral:   Insurance/Entitlements referral  Other referral ([employer])    Thank you,   aSndra Galvaiz LCSW

## 2021-07-09 NOTE — PROGRESS NOTES
[facility] Progress Note    Date: 2021    Name: Sobeida Hernandez    MRN: [id number]         : 1970    Mr. Peterson Arrived ambulatory and in no distress for Hydration. Assessment was completed, patient reports no changes from yesterday. R chest wall port accessed without difficulty, labs drawn & sent for processing. Mr. Felisha Parker vitals were reviewed. Patient Vitals for the past 12 hrs:   Temp Pulse Resp BP SpO2   21 1015  84  135/84    21 0908 97.4 °F (36.3 °C) 81 18 123/84 100 %       Medications:    Medications Administered     heparin (porcine) pf 300-500 Units     Admin Date  2021 Action  Given Dose  500 Units Route  InterCATHeter Administered By  Chris Robison RN          sodium chloride (NS) flush 10 mL     Admin Date  2021 Action  Given Dose  10 mL Route  IntraVENous Administered By  Chris Robison RN          sodium chloride 0.9 % bolus infusion 1,000 mL     Admin Date  2021 Action  New Bag Dose  1,000 mL Rate  1,000 mL/hr Route  IntraVENous Administered By  Chris Robison RN                Mr. Rah Jon tolerated treatment well and was discharged from [place] [address] in stable condition. Port de-accessed, flushed & heparinized per protocol. He is to return on 21 for his next appointment.     Ping Amador RN  2021

## 2021-07-09 NOTE — PROGRESS NOTES
[address] [facility] Oncology at [facility]  [phone]    Hematology / Oncology Established Visit    Reason for Visit:   Sobeida Hernandez is a 53 y.o. male who is seen in follow up of laryngeal cancer. Referred by Dr. 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 Stage: CATARINO [[id number]] at diagnosis, regional recurrence in 4/2021    Pathology:   2/8/21 Total laryngectomy: Invasive squamous cell carcinoma  Tumor size 3.5cm, moderately differentiated (G2), PNI present, LVI not identified, margins negative  [id number]    Prior Treatment: 2/8/21 total laryngectomy/cricopharyngeal myotomy    Current Treatment: Cisplatin 100mg/m2 every 3 weeks x 3 cycles, 5/24/21 - current  Treatment duration: 6-7 weeks  Frequency of visits: weekly     History of Present Illness:   Sobeida Hernandez is a 53 y.o. male who with COPD who is referred for Head and neck cancer. He presented with throat pain and hoarseness in Sept 2020. Was evaluated by Dr. Angela Peters in ENT who performed laryngoscopy and diagnosed patient with squamous cell carcinoma involving the larynx and subglottis. In late Dec 2020, neck CT showed a 3 cm mass in Right supraglottic larynx with extension to thyroid cartilage, but no cervical LNs. PET 1/4/21 showed uptake in the laryngeal mass at right vocal fold, extending to thyroid cartilage. He was scheduled for surgery, but he required emergent tracheostomy prior to that; done late Jan 2021. Also had PEG placed 1/30/21. On 2/8/21, he underwent total laryngectomy/cricopharyngeal myotomy. He quit smoking in Feb 2021. He was evaluated by Dr. Sonia Stewart in [address] [place] [address] in March 2021 who recommended post-op radiation. There were multiple delays given difficult getting in for dental evaluation prior to RT. [facility] simulation scan was notable for a necrotic appearing tumor in Right neck. PET 5/7/21 confirmed hypermetabolic uptake in right neck.     PEG removed in March 2021 due to problems with it. Per Dr. Angeles Fisher, pt had 7 teeth extracted by dentist. He returns to the dentist again for dental fillings on 5/26/21. Pt states his neck feel tight, causing pulling sensation but no current pain. Interval History:  Patient here for follow up of throat cancer and chemoradiation. Reports throat pain is stable, not improved. States the Oxycodone 5mg three times a day is helping only a little bit. He is drinking 5-6 high calorie Boosts per day. Denies n/v. No SOB, or chest pain. No fevers/chills. No constipation or diarrhea. No neuropathy or ringing in ears. PMHx: COPD, throat cancer, anxiety  PSurgHx: Left arm plate placement, tracheostomy, total laryngectomy 2/8/21  SHx: Quit smoking 10/28/20 after 45 pack years. No EtOH  FHx: Mother with DM; Father with DM, CKD; Sister with DM. No h/o malignancy. Review of Systems: A complete review of systems was obtained, negative except as described above. Physical Exam:   Blood pressure 119/82, pulse 86, temperature 98.2 °F (36.8 °C), temperature source Temporal, resp. rate 12, height 6' (1.829 m), weight 140 lb (63.5 kg), SpO2 97 %. ECOG PS: 0  General: Well developed, no acute distress, thin   Eyes: Anicteric sclerae  HENT: Atraumatic  Neck: Supple, Tracheostomy noted  Lymphatic: No supraclavicular, axillary adenopathy. Right cervical LAD noted, much smaller than prior. Respiratory: CTAB, normal respiratory effort  CV: tachycardia, regular rhythm, no murmurs, no peripheral edema  GI: Soft, nontender, nondistended, no masses  MS: Normal gait and station. Digits without clubbing or cyanosis. 1cm firm nodule on left wrist.  Skin: No rashes, ecchymoses, or petechiae. Normal temperature, turgor, and texture. Hyperpigmented skin at neck bilaterally. Neuro/Psych: Alert, oriented. Moves all 4 extremities. Appropriate affect, normal judgment/insight. Communicates by writing given tracheostomy.     Results:     Lab Results Component Value Date/Time    WBC 3.1 (L) 2021 09:19 AM    HGB 8.9 (L) 2021 09:19 AM    HCT 27.7 (L) 2021 09:19 AM    PLATELET 639  09:19 AM    .9 (H) 2021 09:19 AM    ABS. NEUTROPHILS 2.1 2021 09:19 AM     Lab Results   Component Value Date/Time    Sodium 140 2021 09:19 AM    Potassium 4.3 2021 09:19 AM    Chloride 104 2021 09:19 AM    CO2 31 2021 09:19 AM    Glucose 104 (H) 2021 09:19 AM    BUN 28 (H) 2021 09:19 AM    Creatinine 0.92 2021 09:19 AM    GFR est AA >60 2021 09:19 AM    GFR est non-AA >60 2021 09:19 AM    Calcium 9.2 2021 09:19 AM    Glucose (POC) 97 2021 08:15 AM     Lab Results   Component Value Date/Time    Bilirubin, total 0.2 2021 09:19 AM    ALT (SGPT) 12 2021 09:19 AM    Alk. phosphatase 58 2021 09:19 AM    Protein, total 8.4 (H) 2021 09:19 AM    Albumin 3.6 2021 09:19 AM    Globulin 4.8 (H) 2021 09:19 AM     Imagin/28/21 Neck CT: IMPRESSION  Irregular soft tissue mass involving the aryepiglottic and the larynx. There is significant narrowing of the airway at the level of the larynx  and the supraglottic region. Poorly defined cervical adenopathy is noted. 21 Chest CTA:  IMPRESSION  Minimal scarring or subsegmental atelectasis in the left lung base. No evidence of pulmonary embolism or pneumonia. 21 PET:  IMPRESSION  2 large necrotic appearing mass lesions are noted in the right neck (SUV 6.4). Small hypermetabolic right posterior triangle lymph node. No PET avid evidence  of distant metastatic disease. Assessment & Plan:   Nito Dillon is a 53 y.o. male is seen for laryngeal cancer. 1. Squamous cell carcinoma of larynx, Stage CATARINO [pT4a cN2 Mx]:  S/p total laryngectomy and tracheostomy in 2021. Afterwards, he developed disease recurrence in Right neck confirmed by PET scan.  I recommend concurrent chemoradiation using Cisplatin to treat his disease. We discussed the risks and benefits of cisplatin chemotherapy. Potential side effects include, but are not limited to, nausea, vomiting, diarrhea, taste changes, myelosuppression, infection, fatigue, alopecia, neuropathy, hearing loss, renal failure, allergic reactions, infertility, and rarely, death. Additionally, chemotherapy leads to radiosensitization which can intensify the side effects of radiation therapy. The patient has consented to beginning chemotherapy and chemo teaching completed. Supportive care medications include: Zofran, Olanzapine, Dexamethasone, Emla cream  -- Will see weekly during treatment along with labs and IVF  -- Continue concurrent RT. End date 7/19/21.  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 -- Proceed with the retry of C1D43 Cisplatin as ANC improved. -- Fluids 7/14 and weekly on Tuesdays. 2. H/o DM:   No longer on Metformin per patient. HgbA1c 6.0 in 2/2021. Is at risk of steroid induced hyperglycemia. 3. COPD / H/o tobacco abuse:   Quit in 2/2021. Uses Albuterol inhaler prn.    4. FEN/GI:   Has odynophagia 2/2 RT and thrush. Lost weight. Encouraged high protein snacks, boost and provided examples. Juan Ramon Bowen RD. Treating hypokalemia with home potassium Rx. Treating oral thrush with nystatin, 7 days of Fluconazole and mucus secretions with mucinex. -- Continue Nystain 5mL QID for oral thrush. Start fluconazole 200mg daily x 7 days. Hold trazodone due to drug interaction. -- Continue magic mouthwash as needed for mouth sores. Can take OTC Tylenol 500mg q4-6h prn throat pain. -- Advised he increase Oxycodone to 10mg q8h prn (Prescribed by Dr. Abby Bailey. )  -- Docusate 1-2 times daily prn constipation. -- KCL 20meq BID. Advised foods high in K+. 5. Chemotherapy induced neutropenia: Monitor for infection. Neutropenic precautions advised.      6. Health maintenance: Faxed PSA and lipid panel to PCP Lorna Armendariz. Advised pt follow up with PCP to discuss results. 7. Renal insufficiency: Encouraged 64 oz water daily and weekly IV.   -- Hydration tomorrow and weekly afterwards    Emotional well being: Pt is coping well with his/her disease and has excellent support.       Signed By: Jordi Mcpherson MD     July 13, 2021

## 2021-07-12 ENCOUNTER — HOSPITAL ENCOUNTER (OUTPATIENT)
Dept: INFUSION THERAPY | Age: 51
Discharge: HOME | End: 2021-07-12
Payer: MEDICAID

## 2021-07-12 VITALS
BODY MASS INDEX: 18.92 KG/M2 | WEIGHT: 139.5 LBS | OXYGEN SATURATION: 99 % | DIASTOLIC BLOOD PRESSURE: 74 MMHG | HEART RATE: 82 BPM | RESPIRATION RATE: 18 BRPM | SYSTOLIC BLOOD PRESSURE: 117 MMHG | TEMPERATURE: 97.1 F

## 2021-07-12 DIAGNOSIS — C32.9 LARYNGEAL CARCINOMA (HCC): Primary | ICD-10-CM

## 2021-07-12 PROCEDURE — 77030016057 HC NDL HUBR APOL -B

## 2021-07-12 PROCEDURE — 96360 HYDRATION IV INFUSION INIT: CPT

## 2021-07-12 PROCEDURE — 74011250636 HC RX REV CODE- 250/636: Performed by: NURSE PRACTITIONER

## 2021-07-12 RX ORDER — SODIUM CHLORIDE 9 MG/ML
10 INJECTION INTRAMUSCULAR; INTRAVENOUS; SUBCUTANEOUS AS NEEDED
Status: ACTIVE | OUTPATIENT
Start: 2021-07-12 | End: 2021-07-12

## 2021-07-12 RX ORDER — SODIUM CHLORIDE 0.9 % (FLUSH) 0.9 %
10 SYRINGE (ML) INJECTION AS NEEDED
Status: DISPENSED | OUTPATIENT
Start: 2021-07-12 | End: 2021-07-12

## 2021-07-12 RX ORDER — HEPARIN 100 UNIT/ML
300-500 SYRINGE INTRAVENOUS AS NEEDED
Status: ACTIVE | OUTPATIENT
Start: 2021-07-12 | End: 2021-07-12

## 2021-07-12 RX ADMIN — Medication 10 ML: at 12:58

## 2021-07-12 RX ADMIN — SODIUM CHLORIDE 1000 ML: 9 INJECTION, SOLUTION INTRAVENOUS at 11:55

## 2021-07-12 RX ADMIN — Medication 500 UNITS: at 12:58

## 2021-07-12 NOTE — PROGRESS NOTES
[facility] Progress Note    Date: 2021    Name: Cherrie Arcos    MRN: [id number]         : 1970    Mr. Peterson Arrived ambulatory and in no distress for Hydration. Assessment was completed, patient reports pain is decreased to 3/10 today. R chest wall port accessed without difficulty, labs drawn & sent for processing. Mr. Barber Barney vitals were reviewed. Patient Vitals for the past 12 hrs:   Temp Pulse Resp BP SpO2   21 1257  82  117/74    21 1151 97.1 °F (36.2 °C) 83 18 129/80 99 %       Medications:    Medications Administered     heparin (porcine) pf 300-500 Units     Admin Date  2021 Action  Given Dose  500 Units Route  InterCATHeter Administered By  Brittni Price RN          sodium chloride (NS) flush 10 mL     Admin Date  2021 Action  Given Dose  10 mL Route  IntraVENous Administered By  Brittni Price RN          sodium chloride 0.9 % bolus infusion 1,000 mL     Admin Date  2021 Action  New Bag Dose  1,000 mL Rate  1,000 mL/hr Route  IntraVENous Administered By  Brittni Price RN                  Mr. Anisa Calle tolerated treatment well and was discharged from [place] [address] in stable condition. Port de-accessed, flushed & heparinized per protocol. He is to return on 21 for his next appointment.     Lisa Molina RN  2021

## 2021-07-13 ENCOUNTER — OFFICE VISIT (OUTPATIENT)
Dept: ONCOLOGY | Age: 51
End: 2021-07-13

## 2021-07-13 ENCOUNTER — HOSPITAL ENCOUNTER (OUTPATIENT)
Dept: INFUSION THERAPY | Age: 51
Discharge: HOME | End: 2021-07-13
Payer: MEDICAID

## 2021-07-13 VITALS
WEIGHT: 140 LBS | BODY MASS INDEX: 18.96 KG/M2 | SYSTOLIC BLOOD PRESSURE: 119 MMHG | TEMPERATURE: 98.2 F | HEIGHT: 72 IN | RESPIRATION RATE: 12 BRPM | DIASTOLIC BLOOD PRESSURE: 82 MMHG | OXYGEN SATURATION: 97 % | HEART RATE: 86 BPM

## 2021-07-13 VITALS
WEIGHT: 140 LBS | SYSTOLIC BLOOD PRESSURE: 129 MMHG | HEART RATE: 85 BPM | BODY MASS INDEX: 18.96 KG/M2 | DIASTOLIC BLOOD PRESSURE: 86 MMHG | TEMPERATURE: 98 F | HEIGHT: 72 IN | RESPIRATION RATE: 14 BRPM | OXYGEN SATURATION: 100 %

## 2021-07-13 DIAGNOSIS — C32.9 LARYNGEAL CARCINOMA (HCC): ICD-10-CM

## 2021-07-13 DIAGNOSIS — E87.6 HYPOKALEMIA: Primary | ICD-10-CM

## 2021-07-13 DIAGNOSIS — R07.0 THROAT PAIN IN ADULT: ICD-10-CM

## 2021-07-13 DIAGNOSIS — C32.9 SQUAMOUS CELL CARCINOMA OF LARYNX (HCC): Primary | ICD-10-CM

## 2021-07-13 DIAGNOSIS — Z51.11 CHEMOTHERAPY MANAGEMENT, ENCOUNTER FOR: ICD-10-CM

## 2021-07-13 DIAGNOSIS — B37.0 ORAL THRUSH: ICD-10-CM

## 2021-07-13 LAB
ALBUMIN SERPL-MCNC: 3.6 G/DL (ref 3.5–5)
ALBUMIN/GLOB SERPL: 0.8 {RATIO} (ref 1.1–2.2)
ALP SERPL-CCNC: 58 U/L (ref 45–117)
ALT SERPL-CCNC: 12 U/L (ref 12–78)
ANION GAP SERPL CALC-SCNC: 5 MMOL/L (ref 5–15)
AST SERPL-CCNC: 19 U/L (ref 15–37)
BASOPHILS # BLD: 0 K/UL (ref 0–0.1)
BASOPHILS NFR BLD: 0 % (ref 0–1)
BILIRUB SERPL-MCNC: 0.2 MG/DL (ref 0.2–1)
BUN SERPL-MCNC: 28 MG/DL (ref 6–20)
BUN/CREAT SERPL: 30 (ref 12–20)
CALCIUM SERPL-MCNC: 9.2 MG/DL (ref 8.5–10.1)
CHLORIDE SERPL-SCNC: 104 MMOL/L (ref 97–108)
CO2 SERPL-SCNC: 31 MMOL/L (ref 21–32)
CREAT SERPL-MCNC: 0.92 MG/DL (ref 0.7–1.3)
DIFFERENTIAL METHOD BLD: ABNORMAL
EOSINOPHIL # BLD: 0 K/UL (ref 0–0.4)
EOSINOPHIL NFR BLD: 0 % (ref 0–7)
ERYTHROCYTE [DISTWIDTH] IN BLOOD BY AUTOMATED COUNT: 14 % (ref 11.5–14.5)
GLOBULIN SER CALC-MCNC: 4.8 G/DL (ref 2–4)
GLUCOSE SERPL-MCNC: 104 MG/DL (ref 65–100)
HCT VFR BLD AUTO: 27.7 % (ref 36.6–50.3)
HGB BLD-MCNC: 8.9 G/DL (ref 12.1–17)
IMM GRANULOCYTES # BLD AUTO: 0 K/UL (ref 0–0.04)
IMM GRANULOCYTES NFR BLD AUTO: 1 % (ref 0–0.5)
LYMPHOCYTES # BLD: 0.2 K/UL (ref 0.8–3.5)
LYMPHOCYTES NFR BLD: 7 % (ref 12–49)
MAGNESIUM SERPL-MCNC: 2.1 MG/DL (ref 1.6–2.4)
MCH RBC QN AUTO: 34.4 PG (ref 26–34)
MCHC RBC AUTO-ENTMCNC: 32.1 G/DL (ref 30–36.5)
MCV RBC AUTO: 106.9 FL (ref 80–99)
MONOCYTES # BLD: 0.8 K/UL (ref 0–1)
MONOCYTES NFR BLD: 25 % (ref 5–13)
NEUTS SEG # BLD: 2.1 K/UL (ref 1.8–8)
NEUTS SEG NFR BLD: 67 % (ref 32–75)
NRBC # BLD: 0 K/UL (ref 0–0.01)
NRBC BLD-RTO: 0 PER 100 WBC
PLATELET # BLD AUTO: 368 K/UL (ref 150–400)
PMV BLD AUTO: 10.3 FL (ref 8.9–12.9)
POTASSIUM SERPL-SCNC: 4.3 MMOL/L (ref 3.5–5.1)
PROT SERPL-MCNC: 8.4 G/DL (ref 6.4–8.2)
RBC # BLD AUTO: 2.59 M/UL (ref 4.1–5.7)
RBC MORPH BLD: ABNORMAL
SODIUM SERPL-SCNC: 140 MMOL/L (ref 136–145)
WBC # BLD AUTO: 3.1 K/UL (ref 4.1–11.1)

## 2021-07-13 PROCEDURE — 77030016057 HC NDL HUBR APOL -B

## 2021-07-13 PROCEDURE — 74011250636 HC RX REV CODE- 250/636: Performed by: NURSE PRACTITIONER

## 2021-07-13 PROCEDURE — 36415 COLL VENOUS BLD VENIPUNCTURE: CPT

## 2021-07-13 PROCEDURE — 96375 TX/PRO/DX INJ NEW DRUG ADDON: CPT

## 2021-07-13 PROCEDURE — 85025 COMPLETE CBC W/AUTO DIFF WBC: CPT

## 2021-07-13 PROCEDURE — 80053 COMPREHEN METABOLIC PANEL: CPT

## 2021-07-13 PROCEDURE — 74011000258 HC RX REV CODE- 258: Performed by: NURSE PRACTITIONER

## 2021-07-13 PROCEDURE — 96415 CHEMO IV INFUSION ADDL HR: CPT

## 2021-07-13 PROCEDURE — 99215 OFFICE O/P EST HI 40 MIN: CPT | Performed by: INTERNAL MEDICINE

## 2021-07-13 PROCEDURE — 83735 ASSAY OF MAGNESIUM: CPT

## 2021-07-13 PROCEDURE — 96367 TX/PROPH/DG ADDL SEQ IV INF: CPT

## 2021-07-13 PROCEDURE — 96413 CHEMO IV INFUSION 1 HR: CPT

## 2021-07-13 RX ORDER — SODIUM CHLORIDE 0.9 % (FLUSH) 0.9 %
10 SYRINGE (ML) INJECTION AS NEEDED
Status: DISPENSED | OUTPATIENT
Start: 2021-07-13 | End: 2021-07-13

## 2021-07-13 RX ORDER — NYSTATIN 100000 [USP'U]/ML
500000 SUSPENSION ORAL 4 TIMES DAILY
Qty: 60 ML | Refills: 3 | Status: SHIPPED | OUTPATIENT
Start: 2021-07-13 | End: 2021-07-20 | Stop reason: SDUPTHER

## 2021-07-13 RX ORDER — SODIUM CHLORIDE 9 MG/ML
10 INJECTION INTRAMUSCULAR; INTRAVENOUS; SUBCUTANEOUS AS NEEDED
Status: ACTIVE | OUTPATIENT
Start: 2021-07-13 | End: 2021-07-13

## 2021-07-13 RX ORDER — PALONOSETRON 0.05 MG/ML
0.25 INJECTION, SOLUTION INTRAVENOUS ONCE
Status: COMPLETED | OUTPATIENT
Start: 2021-07-13 | End: 2021-07-13

## 2021-07-13 RX ORDER — HEPARIN 100 UNIT/ML
300-500 SYRINGE INTRAVENOUS AS NEEDED
Status: ACTIVE | OUTPATIENT
Start: 2021-07-13 | End: 2021-07-13

## 2021-07-13 RX ORDER — SODIUM CHLORIDE 9 MG/ML
25 INJECTION, SOLUTION INTRAVENOUS CONTINUOUS
Status: DISPENSED | OUTPATIENT
Start: 2021-07-13 | End: 2021-07-13

## 2021-07-13 RX ADMIN — POTASSIUM CHLORIDE: 2 INJECTION, SOLUTION, CONCENTRATE INTRAVENOUS at 11:35

## 2021-07-13 RX ADMIN — Medication 10 ML: at 09:14

## 2021-07-13 RX ADMIN — SODIUM CHLORIDE 10 ML: 9 INJECTION INTRAMUSCULAR; INTRAVENOUS; SUBCUTANEOUS at 09:14

## 2021-07-13 RX ADMIN — DEXAMETHASONE SODIUM PHOSPHATE 12 MG: 10 INJECTION, SOLUTION INTRAMUSCULAR; INTRAVENOUS at 11:07

## 2021-07-13 RX ADMIN — SODIUM CHLORIDE 25 ML/HR: 900 INJECTION, SOLUTION INTRAVENOUS at 11:04

## 2021-07-13 RX ADMIN — PALONOSETRON 0.25 MG: 0.05 INJECTION, SOLUTION INTRAVENOUS at 11:04

## 2021-07-13 RX ADMIN — HEPARIN 500 UNITS: 100 SYRINGE at 15:50

## 2021-07-13 RX ADMIN — FOSAPREPITANT 150 MG: 150 INJECTION, POWDER, LYOPHILIZED, FOR SOLUTION INTRAVENOUS at 11:07

## 2021-07-13 RX ADMIN — POTASSIUM CHLORIDE: 2 INJECTION, SOLUTION, CONCENTRATE INTRAVENOUS at 14:45

## 2021-07-13 RX ADMIN — CISPLATIN 177 MG: 1 INJECTION INTRAVENOUS at 12:39

## 2021-07-13 RX ADMIN — SODIUM CHLORIDE 10 ML: 9 INJECTION INTRAMUSCULAR; INTRAVENOUS; SUBCUTANEOUS at 15:50

## 2021-07-13 NOTE — PROGRESS NOTES
Identified pt with two pt identifiers(name and ). Reviewed record in preparation for visit and have obtained necessary documentation. Chief Complaint   Patient presents with    Follow-up     laryngeal cancer    Sore Throat     has been going on for 2 weeks        would like to have sore throat reviewed. Pt. Drinks 5-6 Boost drinks a day         Visit Vitals  /82 (BP 1 Location: Left upper arm, BP Patient Position: Sitting, BP Cuff Size: Adult)   Pulse 86   Temp 98.2 °F (36.8 °C) (Temporal)   Resp 12   Ht 6' (1.829 m)   Wt 140 lb (63.5 kg)   SpO2 97%   BMI 18.99 kg/m²     Pain Scale: 0 - No pain/10    Coordination of Care Questionnaire:  :   1. Have you been to the ER, urgent care clinic since your last visit? Hospitalized since your last visit? No    2. Have you seen or consulted any other health care providers outside of the [address] [place] [address] since your last visit? Include any pap smears or colon screening.  No

## 2021-07-13 NOTE — PROGRESS NOTES
[facility] Progress Note    Date: 2021    Name: Lesly Henderson    MRN: [id number]         : 1970    Mr. Peterson Arrived ambulatory and in no distress for RETRY C1D43 of Cisplatin Regimen. Assessment was completed, no acute issues at this time, no new complaints voiced. right chest wall port accessed without difficulty, labs drawn & sent for processing. Covid questionnaire completed. 1. Do you have any symptoms of COVID-19? SOB, coughing, fever, or generally not feeling well - no    2. Have you been exposed to COVID-19 recently? - no    3. Have you had any recent contact with family/friend that has a pending COVID test? - no    Chemotherapy Flowsheet 2021   Cycle C1D43   Date -   Drug / Regimen -   Pre Hydration -   Post Hydration -   Pre Meds -   Notes -       Patient proceed to appointment with . Mr. Rosy Saunders vitals were reviewed. Patient Vitals for the past 12 hrs:   Temp Pulse Resp BP SpO2   21 0911 98.8 °F (37.1 °C) 94 18 122/83 100 %       Lab results were obtained and reviewed. Recent Results (from the past 12 hour(s))   CBC WITH AUTOMATED DIFF    Collection Time: 21  9:19 AM   Result Value Ref Range    WBC 3.1 (L) 4.1 - 11.1 K/uL    RBC 2.59 (L) 4.10 - 5.70 M/uL    HGB 8.9 (L) 12.1 - 17.0 g/dL    HCT 27.7 (L) 36.6 - 50.3 %    .9 (H) 80.0 - 99.0 FL    MCH 34.4 (H) 26.0 - 34.0 PG    MCHC 32.1 30.0 - 36.5 g/dL    RDW 14.0 11.5 - 14.5 %    PLATELET 313 958 - 785 K/uL    MPV 10.3 8.9 - 12.9 FL    NRBC 0.0 0  WBC    ABSOLUTE NRBC 0.00 0.00 - 0.01 K/uL    NEUTROPHILS 67 32 - 75 %    LYMPHOCYTES 7 (L) 12 - 49 %    MONOCYTES 25 (H) 5 - 13 %    EOSINOPHILS 0 0 - 7 %    BASOPHILS 0 0 - 1 %    IMMATURE GRANULOCYTES 1 (H) 0.0 - 0.5 %    ABS. NEUTROPHILS 2.1 1.8 - 8.0 K/UL    ABS. LYMPHOCYTES 0.2 (L) 0.8 - 3.5 K/UL    ABS. MONOCYTES 0.8 0.0 - 1.0 K/UL    ABS. EOSINOPHILS 0.0 0.0 - 0.4 K/UL    ABS. BASOPHILS 0.0 0.0 - 0.1 K/UL    ABS. IMM.  GRANS. 0.0 0.00 - 0.04 K/UL    DF SMEAR SCANNED      RBC COMMENTS NORMOCYTIC, NORMOCHROMIC     METABOLIC PANEL, COMPREHENSIVE    Collection Time: 07/13/21  9:19 AM   Result Value Ref Range    Sodium 140 136 - 145 mmol/L    Potassium 4.3 3.5 - 5.1 mmol/L    Chloride 104 97 - 108 mmol/L    CO2 31 21 - 32 mmol/L    Anion gap 5 5 - 15 mmol/L    Glucose 104 (H) 65 - 100 mg/dL    BUN 28 (H) 6 - 20 MG/DL    Creatinine 0.92 0.70 - 1.30 MG/DL    BUN/Creatinine ratio 30 (H) 12 - 20      GFR est AA >60 >60 ml/min/1.73m2    GFR est non-AA >60 >60 ml/min/1.73m2    Calcium 9.2 8.5 - 10.1 MG/DL    Bilirubin, total 0.2 0.2 - 1.0 MG/DL    ALT (SGPT) 12 12 - 78 U/L    AST (SGOT) 19 15 - 37 U/L    Alk.  phosphatase 58 45 - 117 U/L    Protein, total 8.4 (H) 6.4 - 8.2 g/dL    Albumin 3.6 3.5 - 5.0 g/dL    Globulin 4.8 (H) 2.0 - 4.0 g/dL    A-G Ratio 0.8 (L) 1.1 - 2.2     MAGNESIUM    Collection Time: 07/13/21  9:19 AM   Result Value Ref Range    Magnesium 2.1 1.6 - 2.4 mg/dL       Medications:  Medications Administered     0.9% sodium chloride 1,000 mL with potassium chloride 10 mEq, magnesium sulfate 2 g infusion     Admin Date  07/13/2021 Action  New Bag Dose   Rate  1,000 mL/hr Route  IntraVENous Administered By  Ruddy Bates RN           Admin Date  07/13/2021 Action  New Bag Dose   Rate  1,000 mL/hr Route  IntraVENous Administered By  Ruddy Bates RN          0.9% sodium chloride infusion     Admin Date  07/13/2021 Action  New Bag Dose  25 mL/hr Rate  25 mL/hr Route  IntraVENous Administered By  Ruddy Bates RN          0.9% sodium chloride injection 10 mL     Admin Date  07/13/2021 Action  Given Dose  10 mL Route  IntraVENous Administered By  Ruddy Bates RN           Admin Date  07/13/2021 Action  Given Dose  10 mL Route  IntraVENous Administered By  Ruddy Btaes RN          CISplatin (PLATINOL) 177 mg in 0.9% sodium chloride 500 mL, overfill volume 50 mL chemo infusion     Admin Date  07/13/2021 Action  New Bag Dose  177 mg Rate  363.5 mL/hr Route  IntraVENous Administered By  Claudia Rodriguez RN          dexamethasone (DECADRON) 12 mg in 0.9% sodium chloride 50 mL IVPB     Admin Date  07/13/2021 Action  New Bag Dose  12 mg Route  IntraVENous Administered By  Claudia Rodriguez RN          fosaprepitant (EMEND) 150 mg in 0.9% sodium chloride 150 mL IVPB     Admin Date  07/13/2021 Action  New Bag Dose  150 mg Rate  450 mL/hr Route  IntraVENous Administered By  Claudia Rodriguez RN          heparin (porcine) pf 300-500 Units     Admin Date  07/13/2021 Action  Given Dose  500 Units Route  InterCATHeter Administered By  Claudia Rodriguez RN          palonosetron HCl (ALOXI) injection 0.25 mg     Admin Date  07/13/2021 Action  Given Dose  0.25 mg Route  IntraVENous Administered By  Claudia Rodriguez RN          sodium chloride (NS) flush 10 mL     Admin Date  07/13/2021 Action  Given Dose  10 mL Route  IntraVENous Administered By  Claudia Rodriguez RN                  Mr. Scotty Aranda tolerated treatment well and was discharged from [place] [address] in stable condition. Port de-accessed, flushed & heparinized per protocol. He is to return on 7/20/21 for his next appointment.     Codi Shankar RN  July 13, 2021

## 2021-07-13 NOTE — PROGRESS NOTES
[facility] [place] at [address] [facility]  Lisa Bowensnes: [phone]  F: [phone]    Medical Nutrition Therapy    Nutrition Encounter:  Met with patient and friend in conjunction with MD. He has gained 1lb. More pain with swallowing. Still has thrush, using nystatin 4 times a day. Drinking only supplements, no solid foods. Drinking 5-6 Boost VHC per day this is providing 2650-3180kcal, 110-132g protein. Drinking 32oz of water. Drinking orange juice, reports no burning. Some nausea - taking zofran. Results:   Diagnosis: Laryngeal cancer   Cisplatin every 3 weeks and concurrent radiation. Started on 5/25/21    Chemotherapy Flowsheet 7/13/2021   Cycle C1D43   Date 7/13/2021   Drug / Regimen cisplatin   Pre Hydration -   Post Hydration -   Pre Meds -   Notes -     Wt Readings from Last 5 Encounters:   07/13/21 140 lb (63.5 kg)   07/13/21 140 lb (63.5 kg)   07/12/21 139 lb 8 oz (63.3 kg)   07/08/21 135 lb 12.9 oz (61.6 kg)   07/07/21 135 lb 11.2 oz (61.6 kg)       Estimated Nutrition Needs:   Calorie Range: 2345-2680kcal/day    Protein Range: 67-77g/day     Fluid Needs: 2200ml     Assessment:   Inadequate oral food or beverage intake related to concurrent chemoradiation as evidence by treatment side effects. Plan:   - Continue to drink 5-6 Boost VHC   - Continue with fluid intake  - Can advance diet as tolerated. I appreciate the opportunity to participate in Mr. Marianela Peterson's care.     Signed By: Kevin Allan, [address], [address]     Contact: [phone]

## 2021-07-14 ENCOUNTER — TELEPHONE (OUTPATIENT)
Dept: ONCOLOGY | Age: 51
End: 2021-07-14

## 2021-07-14 NOTE — TELEPHONE ENCOUNTER
[employer]  Social Work Navigator Encounter     Patient Name:  Toño Akins     Medical History: h&n cancer    Advance Directives: none on file; conversation deferred    Narrative: Per patient's requested called Medicaid Transportation ([phone]) to schedule trip for IV hydration appointments from 7/19 - 7/23. Trip numbers:  7/19 - [id number]  7/20 - [id number]  7/21 - [id number]  7/22 - [id number]  7/23 - [id number]    Round trip transportation setup for appointment time of 8am. Patient will be picked up 90 mins prior to appointment time. Patient's SO (Ferd Paci) is about to ride with him. Barriers to Care: nonverbal     Plan:  1. Transportation scheduled.      Referral:   Transportation referral    Thank you,   Brianne Santana LCSW

## 2021-07-19 RX ORDER — SODIUM CHLORIDE 9 MG/ML
1000 INJECTION, SOLUTION INTRAVENOUS ONCE
Status: CANCELLED
Start: 2021-08-03 | End: 2021-07-27

## 2021-07-19 NOTE — PROGRESS NOTES
[id number] [facility] at [facility]  [phone]    Hematology / Oncology Established Visit    Reason for Visit:   Best Begum is a 53 y.o. male who is seen in follow up of laryngeal cancer. Referred by Dr. 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 Stage: CATARINO [[id number]] at diagnosis, regional recurrence in 4/2021    Pathology:   2/8/21 Total laryngectomy: Invasive squamous cell carcinoma  Tumor size 3.5cm, moderately differentiated (G2), PNI present, LVI not identified, margins negative  [id number]    Prior Treatment:   1. 2/8/21 total laryngectomy/cricopharyngeal myotomy  2. Cisplatin 100mg/m2 every 3 weeks x 3 cycles, 5/24/21-7/13/21  3. Radiation 5/24/21-7/19/21    Current Treatment:  Surveillance    History of Present Illness:   Best Begum is a 53 y.o. male who with COPD who is referred for Head and neck cancer. He presented with throat pain and hoarseness in Sept 2020. Was evaluated by Dr. Murali Ly in ENT who performed laryngoscopy and diagnosed patient with squamous cell carcinoma involving the larynx and subglottis. In late Dec 2020, neck CT showed a 3 cm mass in Right supraglottic larynx with extension to thyroid cartilage, but no cervical LNs. PET 1/4/21 showed uptake in the laryngeal mass at right vocal fold, extending to thyroid cartilage. He was scheduled for surgery, but he required emergent tracheostomy prior to that; done late Jan 2021. Also had PEG placed 1/30/21. On 2/8/21, he underwent total laryngectomy/cricopharyngeal myotomy. He quit smoking in Feb 2021. He was evaluated by Dr. Aurelia Diaz in [address] [place] [address] in March 2021 who recommended post-op radiation. There were multiple delays given difficult getting in for dental evaluation prior to RT. [facility] simulation scan was notable for a necrotic appearing tumor in Right neck  . PET 5/7/21 confirmed hypermetabolic uptake in right neck.     PEG removed in March 2021 due to problems with it. Per Dr. Oneyda Alba, pt had 7 teeth extracted by dentist. He returns to the dentist again for dental fillings on 5/26/21. Pt states his neck feel tight, causing pulling sensation but no current pain. Interval History:  Patient here for follow up of throat cancer. Completed chemoradiation. Reports throat pain is stable to slightly improved. Reports stinging at radiation site. States he is taking Oxycodone 10mg three times a day which is helping. Reports he needs a refill on oxycodone because the pills accidentally fell in the toilet. He is drinking 6 high calorie Boosts per day. Gained weight. Drinking 2 x 12 oz bottles of water per day. Reports mild numbness in fingertips. Reports constipation somewhat managed with senna-plus but asks for additional laxative. Denies n/v. No SOB, or chest pain. No fevers/chills. PMHx: COPD, throat cancer, anxiety  PSurgHx: Left arm plate placement, tracheostomy, total laryngectomy 2/8/21  SHx: Quit smoking 10/28/20 after 45 pack years. No EtOH  FHx: Mother with DM; Father with DM, CKD; Sister with DM. No h/o malignancy. Review of Systems: A complete review of systems was obtained, negative except as described above. Physical Exam:   Blood pressure 114/73, pulse 84, temperature (!) 96.7 °F (35.9 °C), resp. rate 14, height 6' (1.829 m), weight 146 lb (66.2 kg), SpO2 98 %. ECOG PS: 0  General: Well developed, no acute distress, thin   Eyes: Anicteric sclerae  HENT: Atraumatic  Neck: Supple, Tracheostomy noted  Lymphatic: No supraclavicular, axillary adenopathy. Right cervical LAD noted, much smaller than prior. Respiratory: CTAB, normal respiratory effort  CV: tachycardia, regular rhythm, no murmurs, no peripheral edema  GI: Soft, nontender, nondistended, no masses  MS: Normal gait and station. Digits without clubbing or cyanosis. 1cm firm nodule on left wrist.  Skin: No rashes, ecchymoses, or petechiae. Normal temperature, turgor, and texture. Hyperpigmented skin at neck bilaterally. Neuro/Psych: Alert, oriented. Moves all 4 extremities. Appropriate affect, normal judgment/insight. Communicates by writing given tracheostomy. Results:     Lab Results   Component Value Date/Time    WBC 3.1 (L) 2021 09:19 AM    HGB 8.9 (L) 2021 09:19 AM    HCT 27.7 (L) 2021 09:19 AM    PLATELET 070 649 09:19 AM    .9 (H) 2021 09:19 AM    ABS. NEUTROPHILS 2.1 2021 09:19 AM     Lab Results   Component Value Date/Time    Sodium 138 2021 01:00 PM    Potassium 4.5 2021 01:00 PM    Chloride 102 2021 01:00 PM    CO2 32 2021 01:00 PM    Glucose 74 2021 01:00 PM    BUN 36 (H) 2021 01:00 PM    Creatinine 0.97 2021 01:00 PM    GFR est AA >60 2021 01:00 PM    GFR est non-AA >60 2021 01:00 PM    Calcium 8.8 2021 01:00 PM    Glucose (POC) 97 2021 08:15 AM     Lab Results   Component Value Date/Time    Bilirubin, total 0.2 2021 09:19 AM    ALT (SGPT) 12 2021 09:19 AM    Alk. phosphatase 58 2021 09:19 AM    Protein, total 8.4 (H) 2021 09:19 AM    Albumin 3.6 2021 09:19 AM    Globulin 4.8 (H) 2021 09:19 AM     Imagin/28/21 Neck CT: IMPRESSION  Irregular soft tissue mass involving the aryepiglottic and the larynx. There is significant narrowing of the airway at the level of the larynx  and the supraglottic region. Poorly defined cervical adenopathy is noted. 21 Chest CTA:  IMPRESSION  Minimal scarring or subsegmental atelectasis in the left lung base. No evidence of pulmonary embolism or pneumonia. 21 PET:  IMPRESSION  2 large necrotic appearing mass lesions are noted in the right neck (SUV 6.4). Small hypermetabolic right posterior triangle lymph node. No PET avid evidence  of distant metastatic disease. Assessment & Plan:   Jesi White is a 53 y.o. male is seen for laryngeal cancer.      1. Squamous cell carcinoma of larynx, Stage CATARINO [pT4a cN2 Mx]:  S/p total laryngectomy and tracheostomy in Jan 2021. Afterwards, he developed disease recurrence in Right neck confirmed by PET scan. I recommend concurrent chemoradiation using Cisplatin to treat his disease. We discussed the risks and benefits of cisplatin chemotherapy. Potential side effects include, but are not limited to, nausea, vomiting, diarrhea, taste changes, myelosuppression, infection, fatigue, alopecia, neuropathy, hearing loss, renal failure, allergic reactions, infertility, and rarely, death. Additionally, chemotherapy leads to radiosensitization which can intensify the side effects of radiation therapy. The patient has consented to beginning chemotherapy and chemo teaching completed. Supportive care medications include: Zofran, Olanzapine, Dexamethasone, Emla cream.   Will now start on surveillance per NCCN guidelines: H&P every 3 months for y1, every 3-6 mo for y2, every 4-8mo for y3-5, then annually. TSH every 6-12 mo.   -- Completed radiation 7/19/21.   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 -- Proceed with fluids today and 7/29/21.   -- Return 2 weeks for fluids, NP visit. -- Repeat PET in 12 weeks, around 10/11/21.     2. Supportive care / Survivorship:  Will advise on the following at next visit. -- Dental evaluation for oral cavity and sites exposed to significant intraoral radiation treatment  -- Speech/hearing and swallowing eval and rehab as clinically indicated  -- Evaluate for nutritional needs, depression prn  -- Smoking cessation and alcohol counseling     3. COPD / H/o tobacco abuse:   Quit in 2/2021. Uses Albuterol inhaler prn.    4. FEN/GI:   Has odynophagia 2/2 RT and thrush. -- Continue Nystain 5mL QID for oral thrush. Refilled rx today. -- Can take Oxycodone 10mg q8h prn for a few more days before cutting back to 5mg tid. Prescribed by Dr. Christopher Majano. -- Senna-plus BID prn constipation. Start daily miralax.  Rx sent.   -- KCL 20meq BID. Advised foods high in K+. 5. Chemotherapy induced neutropenia:   Should resolve now that chemoradiation is complete. 6. Health maintenance: Faxed PSA and lipid panel to PCP Rosa Isela Dawson. Advised pt follow up with PCP to discuss results. Emotional well being: Pt is coping well with his/her disease and has excellent support. I have personally seen and evaluated the patient in conjunction with Ro Flanagan NP. I find the patient's history and physical exam are consistent with the NP's documentation. I agree with the above assessment and plan, which I have modified as needed. Sabino Setting is still present but finally starting to improve. Pain is only mildly improved. Will continue to monitor every 2 weeks for now since pt has completed chemoRT and is gaining/maintaining weight.       Signed By: Lane George MD     July 20, 2021

## 2021-07-20 ENCOUNTER — TELEPHONE (OUTPATIENT)
Dept: ONCOLOGY | Age: 51
End: 2021-07-20

## 2021-07-20 ENCOUNTER — OFFICE VISIT (OUTPATIENT)
Dept: ONCOLOGY | Age: 51
End: 2021-07-20

## 2021-07-20 ENCOUNTER — HOSPITAL ENCOUNTER (OUTPATIENT)
Dept: INFUSION THERAPY | Age: 51
Discharge: HOME | End: 2021-07-20
Payer: MEDICAID

## 2021-07-20 VITALS
HEIGHT: 72 IN | BODY MASS INDEX: 19.77 KG/M2 | DIASTOLIC BLOOD PRESSURE: 73 MMHG | OXYGEN SATURATION: 98 % | RESPIRATION RATE: 14 BRPM | WEIGHT: 146 LBS | TEMPERATURE: 96.7 F | HEART RATE: 84 BPM | SYSTOLIC BLOOD PRESSURE: 114 MMHG

## 2021-07-20 VITALS
SYSTOLIC BLOOD PRESSURE: 114 MMHG | DIASTOLIC BLOOD PRESSURE: 73 MMHG | RESPIRATION RATE: 14 BRPM | OXYGEN SATURATION: 98 % | HEART RATE: 84 BPM | TEMPERATURE: 96.7 F

## 2021-07-20 DIAGNOSIS — C32.9 LARYNGEAL CARCINOMA (HCC): ICD-10-CM

## 2021-07-20 DIAGNOSIS — B37.0 ORAL THRUSH: ICD-10-CM

## 2021-07-20 DIAGNOSIS — K59.03 DRUG-INDUCED CONSTIPATION: ICD-10-CM

## 2021-07-20 DIAGNOSIS — E87.6 HYPOKALEMIA: ICD-10-CM

## 2021-07-20 DIAGNOSIS — C32.9 SQUAMOUS CELL CARCINOMA OF LARYNX (HCC): Primary | ICD-10-CM

## 2021-07-20 DIAGNOSIS — R07.0 THROAT PAIN IN ADULT: ICD-10-CM

## 2021-07-20 DIAGNOSIS — E87.6 HYPOKALEMIA: Primary | ICD-10-CM

## 2021-07-20 LAB
ANION GAP SERPL CALC-SCNC: 4 MMOL/L (ref 5–15)
BUN SERPL-MCNC: 36 MG/DL (ref 6–20)
BUN/CREAT SERPL: 37 (ref 12–20)
CALCIUM SERPL-MCNC: 8.8 MG/DL (ref 8.5–10.1)
CHLORIDE SERPL-SCNC: 102 MMOL/L (ref 97–108)
CO2 SERPL-SCNC: 32 MMOL/L (ref 21–32)
CREAT SERPL-MCNC: 0.97 MG/DL (ref 0.7–1.3)
GLUCOSE SERPL-MCNC: 74 MG/DL (ref 65–100)
POTASSIUM SERPL-SCNC: 4.5 MMOL/L (ref 3.5–5.1)
SODIUM SERPL-SCNC: 138 MMOL/L (ref 136–145)

## 2021-07-20 PROCEDURE — 77030012965 HC NDL HUBR BBMI -A

## 2021-07-20 PROCEDURE — 96360 HYDRATION IV INFUSION INIT: CPT

## 2021-07-20 PROCEDURE — 36415 COLL VENOUS BLD VENIPUNCTURE: CPT

## 2021-07-20 PROCEDURE — 99214 OFFICE O/P EST MOD 30 MIN: CPT | Performed by: INTERNAL MEDICINE

## 2021-07-20 PROCEDURE — 80048 BASIC METABOLIC PNL TOTAL CA: CPT

## 2021-07-20 PROCEDURE — 74011250636 HC RX REV CODE- 250/636: Performed by: NURSE PRACTITIONER

## 2021-07-20 RX ORDER — SODIUM CHLORIDE 0.9 % (FLUSH) 0.9 %
10 SYRINGE (ML) INJECTION AS NEEDED
Status: CANCELLED | OUTPATIENT
Start: 2021-07-29

## 2021-07-20 RX ORDER — POLYETHYLENE GLYCOL 3350 17 G/17G
17 POWDER, FOR SOLUTION ORAL DAILY
Qty: 30 EACH | Refills: 1 | Status: SHIPPED | OUTPATIENT
Start: 2021-07-20 | End: 2021-01-01

## 2021-07-20 RX ORDER — SODIUM CHLORIDE 9 MG/ML
1000 INJECTION, SOLUTION INTRAVENOUS ONCE
Status: COMPLETED | OUTPATIENT
Start: 2021-07-20 | End: 2021-07-20

## 2021-07-20 RX ORDER — NYSTATIN 100000 [USP'U]/ML
500000 SUSPENSION ORAL 4 TIMES DAILY
Qty: 60 ML | Refills: 3 | Status: SHIPPED | OUTPATIENT
Start: 2021-07-20 | End: 2021-08-05 | Stop reason: SDUPTHER

## 2021-07-20 RX ORDER — AMOXICILLIN 250 MG
1 CAPSULE ORAL
Qty: 30 TABLET | Refills: 1 | Status: SHIPPED | OUTPATIENT
Start: 2021-07-20 | End: 2021-01-01 | Stop reason: ALTCHOICE

## 2021-07-20 RX ORDER — HEPARIN 100 UNIT/ML
300-500 SYRINGE INTRAVENOUS AS NEEDED
Status: CANCELLED | OUTPATIENT
Start: 2021-08-05

## 2021-07-20 RX ORDER — SODIUM CHLORIDE 9 MG/ML
10 INJECTION INTRAMUSCULAR; INTRAVENOUS; SUBCUTANEOUS AS NEEDED
Status: CANCELLED | OUTPATIENT
Start: 2021-07-29

## 2021-07-20 RX ORDER — SODIUM CHLORIDE 0.9 % (FLUSH) 0.9 %
10 SYRINGE (ML) INJECTION AS NEEDED
Status: CANCELLED | OUTPATIENT
Start: 2021-08-05

## 2021-07-20 RX ORDER — HEPARIN 100 UNIT/ML
300-500 SYRINGE INTRAVENOUS AS NEEDED
Status: CANCELLED | OUTPATIENT
Start: 2021-07-29

## 2021-07-20 RX ORDER — SODIUM CHLORIDE 9 MG/ML
10 INJECTION INTRAMUSCULAR; INTRAVENOUS; SUBCUTANEOUS AS NEEDED
Status: CANCELLED | OUTPATIENT
Start: 2021-08-05

## 2021-07-20 RX ADMIN — SODIUM CHLORIDE 1000 ML: 9 INJECTION, SOLUTION INTRAVENOUS at 13:13

## 2021-07-20 NOTE — PROGRESS NOTES
Outpatient Infusion Center Short Visit Progress Note    0301 Patient admitted to [facility] for hydration and labs ambulatory in stable condition. Assessment completed. No new concerns voiced. Covid Screening      1. Do you have any symptoms of COVID-19? SOB, coughing, fever, or generally not feeling well ? NO  2. Have you been exposed to COVID-19 recently? NO  3. Have you had any recent contact with family/friend that has a pending COVID test? NO    Vital Signs:  Visit Vitals  /73   Pulse 84   Temp (!) 96.7 °F (35.9 °C)   Resp 14   SpO2 98%         Right chest port accessed with positive blood return, labs drawn and sent for processing. Lab Results:  Recent Results (from the past 12 hour(s))   METABOLIC PANEL, BASIC    Collection Time: 07/20/21  1:00 PM   Result Value Ref Range    Sodium 138 136 - 145 mmol/L    Potassium 4.5 3.5 - 5.1 mmol/L    Chloride 102 97 - 108 mmol/L    CO2 32 21 - 32 mmol/L    Anion gap 4 (L) 5 - 15 mmol/L    Glucose 74 65 - 100 mg/dL    BUN 36 (H) 6 - 20 MG/DL    Creatinine 0.97 0.70 - 1.30 MG/DL    BUN/Creatinine ratio 37 (H) 12 - 20      GFR est AA >60 >60 ml/min/1.73m2    GFR est non-AA >60 >60 ml/min/1.73m2    Calcium 8.8 8.5 - 10.1 MG/DL           Medications:  Medications Administered     0.9% sodium chloride infusion 1,000 mL     Admin Date  07/20/2021 Action  New Bag Dose  1,000 mL Rate  1,000 mL/hr Route  IntraVENous Administered By  Catalina Rojas, MADYSON                 Patient tolerated treatment well. Patient discharged from [place] [address] ambulatory in no distress at 1415. Patient aware of next appointment.     Future Appointments   Date Time Provider Nathanael Haas   7/27/2021 11:15 AM Camila Mascorro NP [facility]   7/27/2021 11:30 AM SS INF3 CH4 <2H [facility]   8/3/2021 11:30 AM SS INF3 CH4 <2H [facility]   8/10/2021 11:30 AM SS INF3 CH4 <2H [facility] Kajal Foster

## 2021-07-20 NOTE — TELEPHONE ENCOUNTER
[employer]  Social Work Navigator Encounter     Patient Name:  Gerald Pires    Medical History: h&n cancer    Advance Directives: none one file    Narrative:   Called Medicaid transportation ([phone]) and schedule roundtrip transportation for his radiation appointment on 7/29 at 11:30am. Patient should be ready for pickup 60 prior to appointment. Trip #  [id number]    Barriers to Care: nonverbal     Plan:  1. Medicaid transportation scheduled.      Referral:   Transportation referral    Thank you,  Kristina Champion LCSW

## 2021-07-20 NOTE — PROGRESS NOTES
Chief Complaint   Patient presents with   Yazan Baird is a pleasant 53year old male who presents as a follow for laryngeal cancer.  He reports left sided neck pain

## 2021-07-20 NOTE — TELEPHONE ENCOUNTER
E Saber Seven at [place]  [phone]        07/20/21 12:25 PM Attempted to call patient via home/cell number listed to check on him as he has not yet arrived for [facility] or MD visits. No answer and voicemail has not been set up yet. Will attempt to call again later if no return call received. Per Bosie Show, patient to reschedule IV fluid and MD appointments for sometime this week.

## 2021-07-20 NOTE — PATIENT INSTRUCTIONS
For dry mouth you can try ( all found over the counter ):       1. Biotene - this is a mouthwash     2. Act mouth wash or ACT dry mouth gum     3.  Mouth guard at for bedtime - locks in saliva     5. xylimelts

## 2021-07-21 DIAGNOSIS — C32.9 CARCINOMA LARYNX (HCC): ICD-10-CM

## 2021-07-21 RX ORDER — OXYCODONE HYDROCHLORIDE 5 MG/1
5-10 TABLET ORAL
Qty: 80 TABLET | Refills: 0 | Status: SHIPPED | OUTPATIENT
Start: 2021-07-21 | End: 2021-01-01

## 2021-07-27 ENCOUNTER — HOSPITAL ENCOUNTER (OUTPATIENT)
Dept: INFUSION THERAPY | Age: 51
End: 2021-07-27

## 2021-07-28 NOTE — PROGRESS NOTES
[address] [facility] Oncology at [facility]  [phone]    Hematology / Oncology Established Visit    Reason for Visit:   Charley Canada is a 53 y.o. male who is seen in follow up of laryngeal cancer. Referred by Dr. 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 Stage: CATARINO [[id number]] at diagnosis, regional recurrence in 4/2021    Pathology:   2/8/21 Total laryngectomy: Invasive squamous cell carcinoma  Tumor size 3.5cm, moderately differentiated (G2), PNI present, LVI not identified, margins negative  [id number]    Prior Treatment:   1. 2/8/21 total laryngectomy/cricopharyngeal myotomy  2. Cisplatin 100mg/m2 every 3 weeks x 3 cycles, 5/24/21-7/13/21  3. Radiation 5/24/21-7/19/21    Current Treatment:  Surveillance    History of Present Illness:   Charley Canada is a 53 y.o. male who with COPD who is referred for Head and neck cancer. He presented with throat pain and hoarseness in Sept 2020. Was evaluated by Dr. Hervey Nyhan in ENT who performed laryngoscopy and diagnosed patient with squamous cell carcinoma involving the larynx and subglottis. In late Dec 2020, neck CT showed a 3 cm mass in Right supraglottic larynx with extension to thyroid cartilage, but no cervical LNs. PET 1/4/21 showed uptake in the laryngeal mass at right vocal fold, extending to thyroid cartilage. He was scheduled for surgery, but he required emergent tracheostomy prior to that; done late Jan 2021. Also had PEG placed 1/30/21. On 2/8/21, he underwent total laryngectomy/cricopharyngeal myotomy. He quit smoking in Feb 2021. He was evaluated by Dr. Joseph Vanegas in [address] [place] [address] in March 2021 who recommended post-op radiation. There were multiple delays given difficult getting in for dental evaluation prior to RT. [facility] simulation scan was notable for a necrotic appearing tumor in Right neck  . PET 5/7/21 confirmed hypermetabolic uptake in right neck.     PEG removed in March 2021 due to problems with it. Per Dr. Amy Epps, pt had 7 teeth extracted by dentist. He returns to the dentist again for dental fillings on 5/26/21. Pt states his neck feel tight, causing pulling sensation but no current pain. Interval History:  Patient here for follow up of throat cancer. Completed chemoradiation. His throat has mild pain when swallowing foods, but much improved from prior. His throat and neck feel tight when turning his head left and right. Has cut down to Oxycodone 5mg twice a day. Drinking 6 Boosts per day and trying some soft fruits and meats. No n/v. No SOB, or chest pain. No fevers/chills. PMHx: COPD, throat cancer, anxiety  PSurgHx: Left arm plate placement, tracheostomy, total laryngectomy 2/8/21  SHx: Quit smoking 10/28/20 after 45 pack years. No EtOH  FHx: Mother with DM; Father with DM, CKD; Sister with DM. No h/o malignancy. Review of Systems: A complete review of systems was obtained, negative except as described above. Physical Exam:   Blood pressure 111/76, pulse 86, temperature 97.6 °F (36.4 °C), resp. rate 18, height 6' (1.829 m), weight 146 lb 6.4 oz (66.4 kg), SpO2 98 %. ECOG PS: 0  General: Well developed, no acute distress, thin   Eyes: Anicteric sclerae  HENT: Atraumatic  Neck: Supple, Tracheostomy noted  Lymphatic: No supraclavicular, axillary adenopathy. Right cervical LAD noted, much smaller than prior. Respiratory: CTAB, normal respiratory effort  CV: tachycardia, regular rhythm, no murmurs, no peripheral edema  GI: Soft, nontender, nondistended, no masses  MS: Normal gait and station. Digits without clubbing or cyanosis. 1cm firm nodule on left wrist.  Skin: No rashes, ecchymoses, or petechiae. Normal temperature, turgor, and texture. Hyperpigmented skin at neck bilaterally. Neuro/Psych: Alert, oriented. Moves all 4 extremities. Appropriate affect, normal judgment/insight. Communicates by writing given tracheostomy.     Results:     Lab Results   Component Value Date/Time    WBC 1.9 (L) 2021 12:36 PM    HGB 7.7 (L) 2021 12:36 PM    HCT 23.9 (L) 2021 12:36 PM    PLATELET 480  12:36 PM    .7 (H) 2021 12:36 PM    ABS. NEUTROPHILS 1.2 (L) 2021 12:36 PM     Lab Results   Component Value Date/Time    Sodium 139 2021 12:36 PM    Potassium 4.1 2021 12:36 PM    Chloride 105 2021 12:36 PM    CO2 31 2021 12:36 PM    Glucose 96 2021 12:36 PM    BUN 30 (H) 2021 12:36 PM    Creatinine 0.94 2021 12:36 PM    GFR est AA >60 2021 12:36 PM    GFR est non-AA >60 2021 12:36 PM    Calcium 9.5 2021 12:36 PM    Glucose (POC) 97 2021 08:15 AM     Lab Results   Component Value Date/Time    Bilirubin, total 0.2 2021 09:19 AM    ALT (SGPT) 12 2021 09:19 AM    Alk. phosphatase 58 2021 09:19 AM    Protein, total 8.4 (H) 2021 09:19 AM    Albumin 3.6 2021 09:19 AM    Globulin 4.8 (H) 2021 09:19 AM     Imagin/28/21 Neck CT: IMPRESSION  Irregular soft tissue mass involving the aryepiglottic and the larynx. There is significant narrowing of the airway at the level of the larynx  and the supraglottic region. Poorly defined cervical adenopathy is noted. 21 Chest CTA:  IMPRESSION  Minimal scarring or subsegmental atelectasis in the left lung base. No evidence of pulmonary embolism or pneumonia. 21 PET:  IMPRESSION  2 large necrotic appearing mass lesions are noted in the right neck (SUV 6.4). Small hypermetabolic right posterior triangle lymph node. No PET avid evidence  of distant metastatic disease. Assessment & Plan:   Elias Cordero is a 53 y.o. male is seen for laryngeal cancer. 1. Squamous cell carcinoma of larynx, Stage CATARINO [pT4a cN2 Mx]:  S/p total laryngectomy and tracheostomy in 2021. Afterwards, he developed disease recurrence in Right neck confirmed by PET scan.  I recommend concurrent chemoradiation using Cisplatin to treat his disease. We discussed the risks and benefits of cisplatin chemotherapy. Potential side effects include, but are not limited to, nausea, vomiting, diarrhea, taste changes, myelosuppression, infection, fatigue, alopecia, neuropathy, hearing loss, renal failure, allergic reactions, infertility, and rarely, death. Additionally, chemotherapy leads to radiosensitization which can intensify the side effects of radiation therapy. The patient has consented to beginning chemotherapy and chemo teaching completed. Supportive care medications include: Zofran, Olanzapine, Dexamethasone, Emla cream.   Will now start on surveillance per NCCN guidelines: H&P every 3 months for y1, every 3-6 mo for y2, every 4-8mo for y3-5, then annually. TSH every 6-12 mo.   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 -- Completed radiation 7/19/21.   -- Return 4 weeks for labs, MD visit to check on nutrition status. -- Repeat PET in 12 weeks, around 10/11/21.     2. Supportive care / Survivorship:  Will advise on the following at next visit. -- Dental evaluation for oral cavity and sites exposed to significant intraoral radiation treatment  -- Speech/hearing and swallowing eval and rehab as clinically indicated  -- Evaluate for nutritional needs, depression prn  -- Smoking cessation and alcohol counseling     3. COPD / H/o tobacco abuse:   Quit in 2/2021. Uses Albuterol inhaler prn.    4. FEN/GI:   Odynophagia 2/2 RT is improving.   -- Continue Nystain 5mL QID for oral thrush. Refilled rx today. -- Continue Oxycodone 5mg bid prn and cut down further as tolerated. Prescribed by Dr. David Kaba. -- Senna-plus BID prn constipation. Start daily miralax. Rx sent. -- KCL 20meq BID. Advised foods high in K+. -- Discussed replacing 1-2 Boosts per day with soft foods and slowly transitioning back to food rather than supplements each week.     5. Chemotherapy induced neutropenia:   Should resolve now that chemoradiation is complete. 6. Health maintenance: Faxed PSA and lipid panel to PCP Claire Saavedra. Advised pt follow up with PCP to discuss results. 7. Macrocytic anemia:   Likely due to poor nutrition and/or vitamin deficiency. -- Check ferritin, iron profile, vitamin b12 and folate at 9/1/21 follow up. Emotional well being: Pt is coping well with his/her disease and has excellent support.       Signed By: Robin Coats MD     August 5, 2021

## 2021-07-29 ENCOUNTER — HOSPITAL ENCOUNTER (OUTPATIENT)
Dept: INFUSION THERAPY | Age: 51
Discharge: HOME | End: 2021-07-29
Payer: MEDICAID

## 2021-07-29 ENCOUNTER — HOSPITAL ENCOUNTER (OUTPATIENT)
Dept: RADIATION THERAPY | Age: 51
Discharge: HOME | End: 2021-07-29

## 2021-07-29 VITALS
TEMPERATURE: 97.8 F | OXYGEN SATURATION: 100 % | SYSTOLIC BLOOD PRESSURE: 128 MMHG | DIASTOLIC BLOOD PRESSURE: 70 MMHG | HEIGHT: 72 IN | BODY MASS INDEX: 19.31 KG/M2 | RESPIRATION RATE: 18 BRPM | HEART RATE: 84 BPM | WEIGHT: 142.6 LBS

## 2021-07-29 DIAGNOSIS — C32.9 LARYNGEAL CARCINOMA (HCC): Primary | ICD-10-CM

## 2021-07-29 LAB
ANION GAP SERPL CALC-SCNC: 4 MMOL/L (ref 5–15)
BUN SERPL-MCNC: 32 MG/DL (ref 6–20)
BUN/CREAT SERPL: 35 (ref 12–20)
CALCIUM SERPL-MCNC: 9.4 MG/DL (ref 8.5–10.1)
CHLORIDE SERPL-SCNC: 104 MMOL/L (ref 97–108)
CO2 SERPL-SCNC: 31 MMOL/L (ref 21–32)
CREAT SERPL-MCNC: 0.91 MG/DL (ref 0.7–1.3)
ERYTHROCYTE [DISTWIDTH] IN BLOOD BY AUTOMATED COUNT: 14.3 % (ref 11.5–14.5)
GLUCOSE SERPL-MCNC: 88 MG/DL (ref 65–100)
HCT VFR BLD AUTO: 25.2 % (ref 36.6–50.3)
HGB BLD-MCNC: 8.1 G/DL (ref 12.1–17)
MCH RBC QN AUTO: 34.2 PG (ref 26–34)
MCHC RBC AUTO-ENTMCNC: 32.1 G/DL (ref 30–36.5)
MCV RBC AUTO: 106.3 FL (ref 80–99)
NRBC # BLD: 0 K/UL (ref 0–0.01)
NRBC BLD-RTO: 0 PER 100 WBC
PLATELET # BLD AUTO: 228 K/UL (ref 150–400)
PMV BLD AUTO: 10.5 FL (ref 8.9–12.9)
POTASSIUM SERPL-SCNC: 4.5 MMOL/L (ref 3.5–5.1)
RBC # BLD AUTO: 2.37 M/UL (ref 4.1–5.7)
SODIUM SERPL-SCNC: 139 MMOL/L (ref 136–145)
WBC # BLD AUTO: 2.9 K/UL (ref 4.1–11.1)

## 2021-07-29 PROCEDURE — 96360 HYDRATION IV INFUSION INIT: CPT

## 2021-07-29 PROCEDURE — 36415 COLL VENOUS BLD VENIPUNCTURE: CPT

## 2021-07-29 PROCEDURE — 85027 COMPLETE CBC AUTOMATED: CPT

## 2021-07-29 PROCEDURE — 80048 BASIC METABOLIC PNL TOTAL CA: CPT

## 2021-07-29 PROCEDURE — 74011250636 HC RX REV CODE- 250/636: Performed by: NURSE PRACTITIONER

## 2021-07-29 PROCEDURE — 77030012965 HC NDL HUBR BBMI -A

## 2021-07-29 RX ORDER — HEPARIN 100 UNIT/ML
300-500 SYRINGE INTRAVENOUS AS NEEDED
Status: DISCONTINUED | OUTPATIENT
Start: 2021-07-29 | End: 2021-07-30 | Stop reason: HOSPADM

## 2021-07-29 RX ORDER — SODIUM CHLORIDE 0.9 % (FLUSH) 0.9 %
10 SYRINGE (ML) INJECTION AS NEEDED
Status: DISCONTINUED | OUTPATIENT
Start: 2021-07-29 | End: 2021-07-30 | Stop reason: HOSPADM

## 2021-07-29 RX ORDER — SODIUM CHLORIDE 9 MG/ML
10 INJECTION INTRAMUSCULAR; INTRAVENOUS; SUBCUTANEOUS AS NEEDED
Status: DISCONTINUED | OUTPATIENT
Start: 2021-07-29 | End: 2021-07-30 | Stop reason: HOSPADM

## 2021-07-29 RX ADMIN — SODIUM CHLORIDE 1000 ML: 9 INJECTION, SOLUTION INTRAVENOUS at 12:57

## 2021-07-29 RX ADMIN — Medication 10 ML: at 12:40

## 2021-07-29 RX ADMIN — SODIUM CHLORIDE 10 ML: 9 INJECTION INTRAMUSCULAR; INTRAVENOUS; SUBCUTANEOUS at 14:17

## 2021-07-29 RX ADMIN — Medication 500 UNITS: at 14:17

## 2021-07-29 NOTE — PROGRESS NOTES
Outpatient Infusion Center Progress Note    8505 Pt admit to [facility] for hydration ambulatory in stable condition. Assessment completed. No new concerns voiced. Patient reports that he has been consuming mostly Boost shakes for nutrition with some soft fruits. States overall he is tolerating that well. Additionally reports that his throat is sore rated 3-4/10, but does not impede his ability to swallow. No other changes reported at this time. Port accessed without difficulty, labs drawn and sent for processing. Hydration initiated per orders. Prior to treatment, patient was screened for COVID 19. Denies any signs or symptoms of COVID. Denies any known physical contact with anyone exposed to, diagnosed with or with pending or positive COVID test. Denies any pending or positive COVID test themself. Visit Vitals  /70 (BP 1 Location: Right arm, BP Patient Position: Sitting)   Pulse 84   Temp 97.8 °F (36.6 °C)   Resp 18   Ht 6' (1.829 m)   Wt 64.7 kg (142 lb 9.6 oz)   SpO2 100%   BMI 19.34 kg/m²       Medications Administered     0.9% sodium chloride injection 10 mL     Admin Date  07/29/2021 Action  Given Dose  10 mL Route  IntraVENous Administered By  Ruthellen Spruce          heparin (porcine) pf 300-500 Units     Admin Date  07/29/2021 Action  Given Dose  500 Units Route  InterCATHeter Administered By  Validushellen Spruce          sodium chloride (NS) flush 10 mL     Admin Date  07/29/2021 Action  Given Dose  10 mL Route  IntraVENous Administered By  Ruthellen Spruce          sodium chloride 0.9 % bolus infusion 1,000 mL     Admin Date  07/29/2021 Action  New Bag Dose  1,000 mL Rate  1,000 mL/hr Route  IntraVENous Administered By  Ruthellen Spruce                1410 Pt tolerated treatment well. Port flushed, heparinized and de-accessed per protocol. D/c home ambulatory in no distress. Pt aware of next appointment scheduled.     Recent Results (from the past 12 hour(s))   CBC W/O DIFF    Collection Time: 07/29/21 12:48 PM   Result Value Ref Range    WBC 2.9 (L) 4.1 - 11.1 K/uL    RBC 2.37 (L) 4.10 - 5.70 M/uL    HGB 8.1 (L) 12.1 - 17.0 g/dL    HCT 25.2 (L) 36.6 - 50.3 %    .3 (H) 80.0 - 99.0 FL    MCH 34.2 (H) 26.0 - 34.0 PG    MCHC 32.1 30.0 - 36.5 g/dL    RDW 14.3 11.5 - 14.5 %    PLATELET 691 176 - 195 K/uL    MPV 10.5 8.9 - 12.9 FL    NRBC 0.0 0  WBC    ABSOLUTE NRBC 0.00 0.00 - 7.14 K/uL   METABOLIC PANEL, BASIC    Collection Time: 07/29/21 12:48 PM   Result Value Ref Range    Sodium 139 136 - 145 mmol/L    Potassium 4.5 3.5 - 5.1 mmol/L    Chloride 104 97 - 108 mmol/L    CO2 31 21 - 32 mmol/L    Anion gap 4 (L) 5 - 15 mmol/L    Glucose 88 65 - 100 mg/dL    BUN 32 (H) 6 - 20 MG/DL    Creatinine 0.91 0.70 - 1.30 MG/DL    BUN/Creatinine ratio 35 (H) 12 - 20      GFR est AA >60 >60 ml/min/1.73m2    GFR est non-AA >60 >60 ml/min/1.73m2    Calcium 9.4 8.5 - 10.1 MG/DL     Magdaleno English RN

## 2021-07-30 ENCOUNTER — TELEPHONE (OUTPATIENT)
Dept: ONCOLOGY | Age: 51
End: 2021-07-30

## 2021-07-30 NOTE — TELEPHONE ENCOUNTER
[address]  Social Work Navigator Encounter     Patient Name:  Jaime Fields    Medical History: h&n cancer    Advance Directives: none on file; conversation deferred    Narrative: Call placed to [employer] transportation and arranged roundtrip transportation to the following appointments:    8/2 - Tori Vo, [address]  [phone]  Appointment at 8:15am  Confirmation # [id number]   -Noted this is a short notice trip and  recommended patient called on 8/1 to confirm ride was picked up by a . 8/5 - [facility], [address] [place] [address]  Appointment at 1pm  Confirmation # [id number]    Call placed to patient and provided update on the above information. Patient confirmed understanding.      Barriers to Care: patient is nonverbal     Referral:   Transportation referral    Thank you,  Mary Glass, CHINTANW

## 2021-08-03 ENCOUNTER — APPOINTMENT (OUTPATIENT)
Dept: INFUSION THERAPY | Age: 51
End: 2021-08-03

## 2021-08-05 ENCOUNTER — HOSPITAL ENCOUNTER (OUTPATIENT)
Dept: INFUSION THERAPY | Age: 51
Discharge: HOME | End: 2021-08-05
Payer: MEDICAID

## 2021-08-05 ENCOUNTER — OFFICE VISIT (OUTPATIENT)
Dept: ONCOLOGY | Age: 51
End: 2021-08-05

## 2021-08-05 VITALS
SYSTOLIC BLOOD PRESSURE: 111 MMHG | WEIGHT: 146.4 LBS | OXYGEN SATURATION: 98 % | DIASTOLIC BLOOD PRESSURE: 76 MMHG | TEMPERATURE: 97.6 F | BODY MASS INDEX: 19.83 KG/M2 | RESPIRATION RATE: 18 BRPM | HEART RATE: 86 BPM | HEIGHT: 72 IN

## 2021-08-05 VITALS
HEART RATE: 86 BPM | DIASTOLIC BLOOD PRESSURE: 76 MMHG | TEMPERATURE: 97.6 F | SYSTOLIC BLOOD PRESSURE: 111 MMHG | OXYGEN SATURATION: 98 % | RESPIRATION RATE: 18 BRPM

## 2021-08-05 DIAGNOSIS — C77.0 METASTASIS TO CERVICAL LYMPH NODE (HCC): ICD-10-CM

## 2021-08-05 DIAGNOSIS — R07.0 THROAT PAIN IN ADULT: ICD-10-CM

## 2021-08-05 DIAGNOSIS — T45.1X5A CHEMOTHERAPY INDUCED NEUTROPENIA (HCC): Primary | ICD-10-CM

## 2021-08-05 DIAGNOSIS — C32.9 SQUAMOUS CELL CARCINOMA OF LARYNX (HCC): ICD-10-CM

## 2021-08-05 DIAGNOSIS — C32.9 LARYNGEAL CARCINOMA (HCC): Primary | ICD-10-CM

## 2021-08-05 DIAGNOSIS — D70.1 CHEMOTHERAPY INDUCED NEUTROPENIA (HCC): Primary | ICD-10-CM

## 2021-08-05 DIAGNOSIS — B37.0 ORAL THRUSH: ICD-10-CM

## 2021-08-05 LAB
ANION GAP SERPL CALC-SCNC: 3 MMOL/L (ref 5–15)
BASOPHILS # BLD: 0 K/UL (ref 0–0.1)
BASOPHILS NFR BLD: 0 % (ref 0–1)
BUN SERPL-MCNC: 30 MG/DL (ref 6–20)
BUN/CREAT SERPL: 32 (ref 12–20)
CALCIUM SERPL-MCNC: 9.5 MG/DL (ref 8.5–10.1)
CHLORIDE SERPL-SCNC: 105 MMOL/L (ref 97–108)
CO2 SERPL-SCNC: 31 MMOL/L (ref 21–32)
CREAT SERPL-MCNC: 0.94 MG/DL (ref 0.7–1.3)
DIFFERENTIAL METHOD BLD: ABNORMAL
EOSINOPHIL # BLD: 0 K/UL (ref 0–0.4)
EOSINOPHIL NFR BLD: 2 % (ref 0–7)
ERYTHROCYTE [DISTWIDTH] IN BLOOD BY AUTOMATED COUNT: 15.4 % (ref 11.5–14.5)
GLUCOSE SERPL-MCNC: 96 MG/DL (ref 65–100)
HCT VFR BLD AUTO: 23.9 % (ref 36.6–50.3)
HGB BLD-MCNC: 7.7 G/DL (ref 12.1–17)
IMM GRANULOCYTES # BLD AUTO: 0 K/UL (ref 0–0.04)
IMM GRANULOCYTES NFR BLD AUTO: 0 % (ref 0–0.5)
LYMPHOCYTES # BLD: 0.2 K/UL (ref 0.8–3.5)
LYMPHOCYTES NFR BLD: 12 % (ref 12–49)
MCH RBC QN AUTO: 34.4 PG (ref 26–34)
MCHC RBC AUTO-ENTMCNC: 32.2 G/DL (ref 30–36.5)
MCV RBC AUTO: 106.7 FL (ref 80–99)
MONOCYTES # BLD: 0.5 K/UL (ref 0–1)
MONOCYTES NFR BLD: 25 % (ref 5–13)
NEUTS SEG # BLD: 1.2 K/UL (ref 1.8–8)
NEUTS SEG NFR BLD: 61 % (ref 32–75)
NRBC # BLD: 0 K/UL (ref 0–0.01)
NRBC BLD-RTO: 0 PER 100 WBC
PLATELET # BLD AUTO: 188 K/UL (ref 150–400)
PMV BLD AUTO: 10.4 FL (ref 8.9–12.9)
POTASSIUM SERPL-SCNC: 4.1 MMOL/L (ref 3.5–5.1)
RBC # BLD AUTO: 2.24 M/UL (ref 4.1–5.7)
RBC MORPH BLD: ABNORMAL
SODIUM SERPL-SCNC: 139 MMOL/L (ref 136–145)
TSH SERPL DL<=0.05 MIU/L-ACNC: 10.4 UIU/ML (ref 0.36–3.74)
WBC # BLD AUTO: 1.9 K/UL (ref 4.1–11.1)
WBC MORPH BLD: ABNORMAL

## 2021-08-05 PROCEDURE — 99214 OFFICE O/P EST MOD 30 MIN: CPT | Performed by: INTERNAL MEDICINE

## 2021-08-05 PROCEDURE — 36415 COLL VENOUS BLD VENIPUNCTURE: CPT

## 2021-08-05 PROCEDURE — 84443 ASSAY THYROID STIM HORMONE: CPT

## 2021-08-05 PROCEDURE — 80048 BASIC METABOLIC PNL TOTAL CA: CPT

## 2021-08-05 PROCEDURE — 77030012965 HC NDL HUBR BBMI -A

## 2021-08-05 PROCEDURE — 85025 COMPLETE CBC W/AUTO DIFF WBC: CPT

## 2021-08-05 PROCEDURE — 74011250636 HC RX REV CODE- 250/636: Performed by: NURSE PRACTITIONER

## 2021-08-05 PROCEDURE — 96360 HYDRATION IV INFUSION INIT: CPT

## 2021-08-05 RX ORDER — HEPARIN 100 UNIT/ML
500 SYRINGE INTRAVENOUS AS NEEDED
Status: CANCELLED | OUTPATIENT
Start: 2021-01-01

## 2021-08-05 RX ORDER — SODIUM CHLORIDE 0.9 % (FLUSH) 0.9 %
5-10 SYRINGE (ML) INJECTION AS NEEDED
Status: CANCELLED | OUTPATIENT
Start: 2021-01-01

## 2021-08-05 RX ORDER — SODIUM CHLORIDE 9 MG/ML
10 INJECTION INTRAMUSCULAR; INTRAVENOUS; SUBCUTANEOUS AS NEEDED
Status: DISCONTINUED | OUTPATIENT
Start: 2021-08-05 | End: 2021-08-06 | Stop reason: HOSPADM

## 2021-08-05 RX ORDER — HEPARIN 100 UNIT/ML
300-500 SYRINGE INTRAVENOUS AS NEEDED
Status: DISCONTINUED | OUTPATIENT
Start: 2021-08-05 | End: 2021-08-06 | Stop reason: HOSPADM

## 2021-08-05 RX ORDER — NYSTATIN 100000 [USP'U]/ML
500000 SUSPENSION ORAL 4 TIMES DAILY
Qty: 60 ML | Refills: 3 | Status: SHIPPED | OUTPATIENT
Start: 2021-08-05 | End: 2021-01-01 | Stop reason: SDUPTHER

## 2021-08-05 RX ORDER — SODIUM CHLORIDE 9 MG/ML
10 INJECTION INTRAMUSCULAR; INTRAVENOUS; SUBCUTANEOUS AS NEEDED
Status: CANCELLED | OUTPATIENT
Start: 2021-01-01

## 2021-08-05 RX ORDER — SODIUM CHLORIDE 0.9 % (FLUSH) 0.9 %
10 SYRINGE (ML) INJECTION AS NEEDED
Status: DISCONTINUED | OUTPATIENT
Start: 2021-08-05 | End: 2021-08-06 | Stop reason: HOSPADM

## 2021-08-05 RX ADMIN — SODIUM CHLORIDE 1000 ML: 9 INJECTION, SOLUTION INTRAVENOUS at 12:56

## 2021-08-05 RX ADMIN — SODIUM CHLORIDE 10 ML: 9 INJECTION INTRAMUSCULAR; INTRAVENOUS; SUBCUTANEOUS at 13:59

## 2021-08-05 NOTE — PROGRESS NOTES
[facility] Progress Note    Date: 2021    Name: Thalia López    MRN: [id number]         : 1970    Mr. Peterson Arrived ambulatory and in no distress for Hydration. Assessment was completed, no acute issues at this time, no new complaints voiced. Right chest wall port accessed without difficulty, labs drawn & sent for processing. Mr. Veronica Mcpherson vitals were reviewed. Visit Vitals  /66 (BP 1 Location: Left arm)   Pulse 93   Temp 97.7 °F (36.5 °C)   Resp 18   SpO2 98%       Lab results were obtained and reviewed. Recent Results (from the past 12 hour(s))   CBC WITH AUTOMATED DIFF    Collection Time: 21 12:36 PM   Result Value Ref Range    WBC 1.9 (L) 4.1 - 11.1 K/uL    RBC 2.24 (L) 4.10 - 5.70 M/uL    HGB 7.7 (L) 12.1 - 17.0 g/dL    HCT 23.9 (L) 36.6 - 50.3 %    .7 (H) 80.0 - 99.0 FL    MCH 34.4 (H) 26.0 - 34.0 PG    MCHC 32.2 30.0 - 36.5 g/dL    RDW 15.4 (H) 11.5 - 14.5 %    PLATELET 096 736 - 831 K/uL    MPV 10.4 8.9 - 12.9 FL    NRBC 0.0 0  WBC    ABSOLUTE NRBC 0.00 0.00 - 0.01 K/uL    NEUTROPHILS PENDING %    LYMPHOCYTES PENDING %    MONOCYTES PENDING %    EOSINOPHILS PENDING %    BASOPHILS PENDING %    IMMATURE GRANULOCYTES PENDING %    ABS. NEUTROPHILS PENDING K/UL    ABS. LYMPHOCYTES PENDING K/UL    ABS. MONOCYTES PENDING K/UL    ABS. EOSINOPHILS PENDING K/UL    ABS. BASOPHILS PENDING K/UL    ABS. IMM. GRANS. PENDING K/UL    DF PENDING        Medications:  1 L Bolus     Mr. Jeffrie Ormond tolerated treatment well and was discharged from [place] [address] in stable condition. Port de-accessed, flushed & heparinized per protocol.      Ruven Boas, RN  2021

## 2021-08-05 NOTE — PROGRESS NOTES
Chief Complaint   Patient presents with   Celso Otero is a pleasant 53year old male who presents as a follow up for head and neck cancer. He reports neck pain.

## 2021-08-05 NOTE — LETTER
8/6/2021    Patient: Pa Brito   YOB: 1970   Date of Visit: 8/5/2021     Xavier Doll NP  [address] [address]  Via Fax: [phone]    Dear Xavier Doll NP,      Thank you for referring Mr. Pa Brito to [facility] for evaluation. My notes for this consultation are attached. If you have questions, please do not hesitate to call me. I look forward to following your patient along with you.       Sincerely,    Carmencita Maciel NP

## 2021-08-06 ENCOUNTER — TELEPHONE (OUTPATIENT)
Dept: ONCOLOGY | Age: 51
End: 2021-08-06

## 2021-08-06 NOTE — TELEPHONE ENCOUNTER
Collaborated with social workers Amanda Sharma and Emily López regarding additional HME supplies needed. Called [employer] [phone] and spoke with Evelyn Joseph. She indicated patient ordered 1 box on July 13th and 1 box on July 27th. He is not eligible for another box until August 27th. She indicated medicaid only allows 2 boxes a month. Inquired about providing an new orders for 3 boxes/month and provided justification could that be an option. She said it would have to go through insurance but still could not get any until the 27th of August.    Called another company ([address] [employer]) for a 2nd opinion. She indicated that we could transfer the service and we could try for additional supplies. However, she said given the patient has Medicaid they likely put a cap on how many they can have per month. She recommended we call Medicaid and inquire. She did have 2 good ideas: reaching out to the supply company about financial assist [employer] asked [employer] and was told they do not) or trying goodrx. Amanda Sharma indicated she will look for grants that could help with the cost.     Called patients brother to give update. Asked how many the patient had left but he did not know. Explained that if he does run out before we can figure out a solution, then he needs to go to the ED. Brother will pass information along.       Sebastián Spangler RD

## 2021-08-06 NOTE — TELEPHONE ENCOUNTER
08/06/21 8:32 AM Called patient's brother, Lila Wood, and explained I called [facility] to see if he could get HME's sooner than 8/26/21 and was told he would have to pay 101.50 out of pocket to get them early. Advised I will reach out to social work to see if there are any options to help cover cost of the out of pocket HME. Lila Wood will relay message to patient.

## 2021-08-06 NOTE — TELEPHONE ENCOUNTER
Called [facility] and told them patient no longer has a feeding tube and will not need the Osmolite 1.5 formula.

## 2021-08-10 ENCOUNTER — HOSPITAL ENCOUNTER (OUTPATIENT)
Dept: INFUSION THERAPY | Age: 51
End: 2021-08-10

## 2021-08-10 ENCOUNTER — APPOINTMENT (OUTPATIENT)
Dept: INFUSION THERAPY | Age: 51
End: 2021-08-10

## 2021-08-11 ENCOUNTER — TELEPHONE (OUTPATIENT)
Dept: ONCOLOGY | Age: 51
End: 2021-08-11

## 2021-08-11 NOTE — TELEPHONE ENCOUNTER
Assisted Grazyna Rodriguez LCSW and Sirisha Greenfield, RN in obtaining additional HME supplies. Filled out application for [address] with Elena Perry. Submitted to volunteer services and spoke with Jose Moreno who attempted to call [employer] and pay for it. [employer] told her she could not order the supplies because she is not authorized. Indicated that patients brother (authorized for ordering) called to order the 3rd box but then was told it would be an out of pocket cost. She was going to call back.

## 2021-08-11 NOTE — TELEPHONE ENCOUNTER
Patient called and stated that he would like for [employer] to be called in for him.  Please advise      [phone]      # [phone]

## 2021-08-11 NOTE — TELEPHONE ENCOUNTER
[facility]XI at [facility]  [phone]        08/11/21 10:37 AM Called patient's brother, Austin Quintana, to clarify if patient needs medication refill or if message was in regards to needing supplies from [employer]. Austin Quintana states message is likely regarding supplies. He shared that patient communicated that he had 3-4 days worth of supplies left as of Sunday, 08/08. Austin Quintana has not yet discussed GoodRx card with patient. Reviewed, per dietician note, that if patient runs out of supplies before solution is established, then patient will need to go to ED. Davidgrabiel Quintana voiced understanding. Advised this nurse would also follow up with  to check on status of willard availability and that someone from clinical team will call Austin Quintana back with update. He voiced understanding. 12:18 PM Called Juwan back. He states he spoke with patient after initial call above. Patient reports that he has a few more days of supplies left. Discussed, per Iris Covarrubias (dietician), that [email or web address] is able to provide immediate funding to pay for extra supply. Iris Covarrubias to contact [employer] to request invoice. Requested that Remigio Radford in about 2 hours to proceed with ordering supplies. Explained that office will utilize this time to coordinate obtaining above paperwork. Austin Quintana stated that patient normally receives delivery within 2-3 days. Again, reminded Austin Quintana that patient should go to ED if shipment is delayed and he is running low on supplies. Austin Quintana voiced understanding and expressed appreciation for assistance. 4:09 PM Spoke with patient's brother, Austin Quintana. Discussed that Melinda Morales, supervisor of volunteer services, unable to order additional supplies with [employer] as she is not listed as authorized representative for patient. Requested that Remigio Radford today to order 3 boxes and that he indicate financial staff will be calling back to pay for the third box.  Also asked that he obtain 's name and extension so Karma Meek can speak to same individual. Stella Moses voiced understanding. He shared he is currently at work and may be able to call [employer] in an hour. Advised that he call this office back prior to 5 PM, if possible, or tomorrow morning to provide contact information per above. He voiced understanding and agreeable to plan. Anjelica Thompson, dietician, updated of above. Anjelica Thompson to notify Karma Meek as well.

## 2021-08-11 NOTE — TELEPHONE ENCOUNTER
[address]  Social Work Navigator Encounter     Patient Name:  Victor Manuel Tolliver    Medical History: head & neck cancer    Advance Directives: None on file    Narrative: Call placed to [employer] [employer] ([phone]) and arranged transportation for the following medical appointments:    8/19/21 at 11:30am with Dr. Jose Angel Geronimo  [facility], [address]    Trip # [id number]    [facility] [address]    Trip # [id number]    Barriers to Care: nonverbal    Referral:   Transportation referral    Thank you,   CHINTAN GarnicaW

## 2021-08-12 ENCOUNTER — TELEPHONE (OUTPATIENT)
Dept: ONCOLOGY | Age: 51
End: 2021-08-12

## 2021-08-12 NOTE — TELEPHONE ENCOUNTER
Patients brother called and stated that he spoke with Shelby Cornejo at [employer], she stated that a provider needs to give them a call to order the medical supplies. Please advise.     [phone]        # [phone]

## 2021-08-13 NOTE — TELEPHONE ENCOUNTER
Late entry (8/12/21): Spoke with Mulu at [phone]. She indicated that payment could be accepted and that for the future we could be added to the consent form to assist with ordering. Form just needed to be signed by patient. Then spoke with Mary Jamison at [employer] would be making the purchase. She attempted several times and left several messages with Mulu. And was unsuccessful. Friday (8/13): At 9:45am, spoke with Mulu and said she was going to call AT now. Spoke with her again at 11:45 at which she said she could not get through to [facility], and was on hold for an extended period of time. She then spoke to Naya, who stated that in order for a payment the patient needs to fill out a financial waiver. Naya emailed the form to the patient. Mary Jamison was able to leave credit card information with [employer]aya, so that once the waiver is filled out, the payment could go through. Discussed with RN. Update: (Noon): Called [facility] and spoke with Wili Broussard, he indicated that a payment has been processed. He attempted to reach Naya who processed the payment, but she was unavailable but she would call back to discuss. Attempted to get Scott's extension at which point he said the \"extension system does not work well, here is my direct number\". Scott's direct number: [phone] and Kelly's direct number: [phone].    12:45pm: [employer] called to indicate the payment has been processed and the product will be shipped out today. RN made aware.

## 2021-08-19 NOTE — TELEPHONE ENCOUNTER
[employer]  Social Work Navigator Encounter     Patient Name: Leela Condon    Medical History: h&n cancer    Narrative: Call placed to [facility] ([phone]) and arranged transportation for the following medical appointments:     8/19/21 at 9:30am with Dr. Lila Daniels & 10:00 am with Dr. Sarmiento Dears  [facility], [address] [facility]    Trip # [id number]      Referral:   Transportation referral    Thank you,   Inés Choudhury LCSW Dental infection:  Penicillin 500 mg 4 times a day for 7 days.  Use ibuprofen 400-600 mg up to 4 times per day if needed for pain. Stop if it is causing nausea or abdominal pain.   Add Norco 5-325 (hydrocodone-acetaminophen) 1 tablet up to every 6 hours if needed for pain. Do not use alcohol, operate machinery, drive, or climb on ladders for 8 hours after taking Norco. Use docusate (100mg) 2 times a day to prevent constipation while on narcotics.      Psoriasis:  Triamcinolone 0.1% ointment, apply twice a day to rash.  Recommend a visit with dermatology.  6 5 1-92-

## 2021-08-27 NOTE — PROGRESS NOTES
[id number] [facility] Oncology at [facility]  [phone]    Hematology / Oncology Established Visit    Reason for Visit:   Olga Arias is a 49 y.o. male who is seen in follow up of laryngeal cancer. Referred by Dr. 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 Stage: CATARINO [[id number]] at diagnosis, regional recurrence in 4/2021    Pathology:   2/8/21 Total laryngectomy: Invasive squamous cell carcinoma  Tumor size 3.5cm, moderately differentiated (G2), PNI present, LVI not identified, margins negative  [id number]    Prior Treatment:   1. 2/8/21 total laryngectomy/cricopharyngeal myotomy  2. Cisplatin 100mg/m2 every 3 weeks x 3 cycles, 5/24/21-7/13/21  3. Radiation 5/24/21-7/19/21    Current Treatment:  Surveillance    History of Present Illness:   Olga Arias is a 49 y.o. male who with COPD who is referred for Head and neck cancer. He presented with throat pain and hoarseness in Sept 2020. Was evaluated by Dr. Skip Wagner in ENT who performed laryngoscopy and diagnosed patient with squamous cell carcinoma involving the larynx and subglottis. In late Dec 2020, neck CT showed a 3 cm mass in Right supraglottic larynx with extension to thyroid cartilage, but no cervical LNs. PET 1/4/21 showed uptake in the laryngeal mass at right vocal fold, extending to thyroid cartilage. He was scheduled for surgery, but he required emergent tracheostomy prior to that; done late Jan 2021. Also had PEG placed 1/30/21. On 2/8/21, he underwent total laryngectomy/cricopharyngeal myotomy. He quit smoking in Feb 2021. He was evaluated by Dr. Jose Angel Geronimo in [address] [place] [address] in March 2021 who recommended post-op radiation. There were multiple delays given difficult getting in for dental evaluation prior to RT. [facility] simulation scan was notable for a necrotic appearing tumor in Right neck  . PET 5/7/21 confirmed hypermetabolic uptake in right neck.     PEG removed in March 2021 due to problems with it. Per Dr. Christopher Majano, pt had 7 teeth extracted by dentist. He returns to the dentist again for dental fillings on 5/26/21. Pt states his neck feel tight, causing pulling sensation but no current pain. Interval History:  Patient here for follow up of throat cancer. Completed chemoradiation in mid July 2021. Gained 10 lbs since the last visit. Drinking 6 boost drinks per day since he was having difficulty with some solids. Is tolerating a soft diet. Still has mild throat pain with swallowing. Continues taking oxycodone but plans to stop after prescription is complete. Reports mucus secretions are less. Reports dry mouth is improving. Using biotene. No n/v. No SOB, or chest pain. No fevers/chills. No constipation or diarrhea. PMHx: COPD, throat cancer, anxiety  PSurgHx: Left arm plate placement, tracheostomy, total laryngectomy 2/8/21  SHx: Quit smoking 10/28/20 after 45 pack years. No EtOH  FHx: Mother with DM; Father with DM, CKD; Sister with DM. No h/o malignancy. Review of Systems: A complete review of systems was obtained, negative except as described above. Physical Exam:   Blood pressure 136/86, pulse 92, temperature (!) 96.6 °F (35.9 °C), resp. rate 16, height 6' (1.829 m), weight 155 lb 6.4 oz (70.5 kg), SpO2 97 %. ECOG PS: 0  General: Well developed, no acute distress, thin   Eyes: Anicteric sclerae  HENT: Atraumatic  Neck: Supple, Tracheostomy noted  Lymphatic: No supraclavicular, axillary adenopathy. Right firm cervical LAD noted, smaller than prior. Respiratory: CTAB, normal respiratory effort  CV: tachycardia, regular rhythm, no murmurs, no peripheral edema  GI: Soft, nontender, nondistended, no masses  MS: Normal gait and station. Digits without clubbing or cyanosis. 1cm firm nodule on left wrist.  Skin: No rashes, ecchymoses, or petechiae. Normal temperature, turgor, and texture. Hyperpigmented skin at neck bilaterally. Neuro/Psych: Alert, oriented.  Moves all 4 extremities. Appropriate affect, normal judgment/insight. Communicates by writing given tracheostomy. Results:     Lab Results   Component Value Date/Time    WBC 3.6 (L) 2021 11:23 AM    HGB 8.6 (L) 2021 11:23 AM    HCT 27.5 (L) 2021 11:23 AM    PLATELET 993  11:23 AM    .2 (H) 2021 11:23 AM    ABS. NEUTROPHILS 2.6 2021 11:23 AM     Lab Results   Component Value Date/Time    Sodium 138 2021 11:23 AM    Potassium 4.4 2021 11:23 AM    Chloride 103 2021 11:23 AM    CO2 33 (H) 2021 11:23 AM    Glucose 82 2021 11:23 AM    BUN 26 (H) 2021 11:23 AM    Creatinine 1.09 2021 11:23 AM    GFR est AA >60 2021 11:23 AM    GFR est non-AA >60 2021 11:23 AM    Calcium 9.5 2021 11:23 AM    Glucose (POC) 97 2021 08:15 AM     Lab Results   Component Value Date/Time    Bilirubin, total 0.3 2021 11:23 AM    ALT (SGPT) 19 2021 11:23 AM    Alk. phosphatase 58 2021 11:23 AM    Protein, total 8.8 (H) 2021 11:23 AM    Albumin 4.1 2021 11:23 AM    Globulin 4.7 (H) 2021 11:23 AM     No results found for: IRON, FE, TIBC, IBCT, PSAT, FERR   No results found for: B12LT, FOL, RBCF      Imagin/28/21 Neck CT: IMPRESSION  Irregular soft tissue mass involving the aryepiglottic and the larynx. There is significant narrowing of the airway at the level of the larynx  and the supraglottic region. Poorly defined cervical adenopathy is noted. 21 Chest CTA:  IMPRESSION  Minimal scarring or subsegmental atelectasis in the left lung base. No evidence of pulmonary embolism or pneumonia. 21 PET:  IMPRESSION  2 large necrotic appearing mass lesions are noted in the right neck (SUV 6.4). Small hypermetabolic right posterior triangle lymph node. No PET avid evidence  of distant metastatic disease. Assessment & Plan:   Jesi White is a 49 y.o. male is seen for laryngeal cancer.      1. Squamous cell carcinoma of larynx, Stage CATARINO [pT4a cN2 Mx]:  S/p total laryngectomy and tracheostomy in Jan 2021. Afterwards, he developed disease recurrence in Right neck confirmed by PET scan. I recommended concurrent chemoradiation using Cisplatin to treat his disease. Pt completed concurrent chemoradiation radiation 7/19/21. Has evidence of treatment response based on physical exam with continued decrease in size of bulky right cervical LN mass. Supportive care medications include: Zofran, Olanzapine, Dexamethasone, Emla cream.   Will now start on surveillance per NCCN guidelines: H&P every 3 months for y1, every 3-6 mo for y2, every 4-8mo for y3-5, then annually. TSH every 6-12 mo.   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 -- Repeat PET in 12 weeks, Scheduled for 10/4/21. -- Return 10/5/21 for portflush/labs, MD visit and review PET. 2. Supportive care / Survivorship:  Will advise on the following at next visit. -- Dental evaluation for oral cavity and sites exposed to significant intraoral radiation treatment  -- Speech/hearing and swallowing eval and rehab as clinically indicated  -- Evaluate for nutritional needs, depression prn  -- Smoking cessation and alcohol counseling     3. COPD / H/o tobacco abuse:   Quit in 2/2021. Uses Albuterol inhaler prn.    4. FEN/GI / Elevated protein:   Odynophagia 2/2 RT is improving. Senna-plus BID prn constipation. High protein likely due to too much Boost.  -- On Oxycodone 5mg bid prn and continues to cut down further as tolerated. Prescribed by Dr. Joseph Vanegas. -- KCL 20meq BID. Advised foods high in K+- will advise to stop if K is normal today. -- Decrease Boost continuously: first cut down to 4 boost daily and then replacing 1-2 Boosts per day with soft foods and slowly transitioning back to food rather than supplements each week. Jimy John RD to discuss caloric intake. 5. Leukopenia:   2/2 prior chemotherapy and improving. Prior neutropenia resolved. 6. Health maintenance: Faxed PSA and lipid panel to PCP Natalie Zepeda. Advised pt follow up with PCP to discuss results. 7. Macrocytic anemia:   2/2 prior chemotherapy and now improving. Checking for vitamin / iron deficiencies. -- B12, folate, iron stores, ferritin today      Emotional well being: Pt is coping well with his/her disease and has excellent support. I have personally seen and evaluated the patient in conjunction with Dianne Moncada NP. I find the patient's history and physical exam are consistent with the NP's documentation. I agree with the above assessment and plan, which I have modified as needed. Pt continues to have treatment response, is gaining weight and counts are recovering slowly after prior chemoradiation. Repeat imaging due in early Oct 2021.     Signed By: Padmini Hu MD     09/02/21

## 2021-09-02 NOTE — LETTER
9/2/2021    Patient: Best Begum   YOB: 1970   Date of Visit: 9/2/2021     Leena Pisano NP  [address] [address]  Via Fax: [phone]    Dear Leena Pisano NP,      Thank you for referring Mr. Best Begum to [facility] for evaluation. My notes for this consultation are attached. If you have questions, please do not hesitate to call me. I look forward to following your patient along with you.       Sincerely,    Heyward Crigler, MD

## 2021-09-02 NOTE — PROGRESS NOTES
Chief Complaint   Patient presents with   Kit Route is a pleasant 49year old male who presents as a follow up for head and neck cancer.  He reports neck pain

## 2021-09-02 NOTE — TELEPHONE ENCOUNTER
[employer]  Social Work Navigator Encounter     Patient Name: [facility]    Medical History: h&n cancer    Advance Directives: none on file    Narrative: Call placed to [facility] ([phone]) and arranged transportation for the following medical appointments:     9/8/2021 at 8:30am - speech therapy   [address] [facility] [address] [phone]  confirmation # [id number]    10/4/2021 at [address]  [address], [address] [facility] [address]  confirmation # [id number]    10/7/2021 at 11:20 am - ENT apt with Dr. Brynn Ewing [address] [place] [address]), [address] [address]  confirmation #[id number]    Barriers to Care: financial; h&n     Plan:  1. Scheduled transportation.     Referral:   Transportation referral    Thank you,  Cristina Salgado LCSW

## 2021-09-02 NOTE — PROGRESS NOTES
[facility] Lab Visit:    1932 Pt arrived ambulatory and in no distress, labs drawn one stick in Right AC per KK , . Departed [facility] ambulatory and in no distress. Visit Vitals  /79   Pulse 86   Temp 97.8 °F (36.6 °C)   Resp 16   Wt 70.1 kg (154 lb 9.6 oz)   SpO2 99%   BMI 20.97 kg/m²       Labs available in CC once resulted.

## 2021-09-08 NOTE — PROGRESS NOTES
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 Assessment/ key information:   Patient is s/p total laryngectomy 2021 and currently presents w/ difficulty using electrolarynx independently for functional and intelligible communication. Pharyngoesophageal dysphagia suspected s/t c/o of globus sensation and regurgitation of some solids into oral cavity. Possible upper esophageal sphincter dysfunction and or narrowing due to radiation and post surgical changes negatively impacting bolus transit. ASSESSMENT :  Patient non-vocal s/t laryngectomy. Larytube w/ HME present. Appears clean and clear. No skin breakdown noted. Patient does report sutures still present. Patient has appointment w/ ENT, . Patient denies copious secretions and excessive mucous production. Education of electrolarynx provided to patient prior to use. Patient has a [employer] davin tone electrolarynx w/ oral adapter. Assessed patient's ability to independently use the intra oral adapter and w/o. Patient w/ inaccurate placement of oral adapter and reduced surface contact w/o oral adapter negatively impacting speech intelligibility.    W/ placement cues both intra oral, external neck surface and use of speech intelligibility strategies, audibility and speech intelligibility improved. Patient also presents w/ thick lingual thrush and ulcers on lateral tongue. He has been using Nystatin 4x a day w/ little improvement. He continues w/ mouth pain. Patient also w/ c/o of difficulty swallowing c/b globus sensation and regurgitation into oral cavity w/ some solids. Due to presence of oral thrush and lingual ulcers, patient has been avoiding some consistencies. He also reports pain and tenderness on right submandibular portion of neck. Pain on neck and in mouth has negatively impacted consistency and frequency of using his speaking device. Presence of mechanosensitivity, reduced flexibility and ROM of the neck that is negatively impacted by fibrotic, radiated tissue. Administered thin liquids and puree. Oral phase WFL. Swallow initiation w/ delay, but to be suspected. Component of elevation is palpable. No regurgitation or c/o of globus sensation. Due to patient's total   laryngectomy, patient can not aspirate unless presence of a fistula. Evaluation for leak via esophagram was completed and r/o on 2/15/21. 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 2/8/2021, patient underwent a total laryngectomy and cricopharyngeal myotomy. S/p laryngectomy, and prior to his initiation of radiation, he had a recurrence of laryngeal cancer. Patient has now completed radiation tx. He has a PET scheduled for October 4th. He continues w/ a palpable mass and pain to the right side of his throat. Esophagram was completed 2/15 to r/o fistual. Patient is currently consuming a p.o. diet of soft solids, thin liquids and Boost. He reports gaining weight slowly, but is experiencing some swallowing difficulty. Presently w/ oral thrush and on an anti-fungal w/ slow improvement.          PLAN :  Recommendations and Planned Interventions:  Patient would benefit from outpatient ST services for obtaining functional communication and independent use of electrolarynx, improve swallow function and reduce pain as a result from radiation tx. Soft diet and thin liquids. Use of compensatory swallow strategies to improve bolus transit. Choices of smooth and slick food items. Small bites. Liquid wash. Use of multiple swallows. Call PCP and see if they can adjust anti-fungal px. Use of electrolarynx, placement and speech strategies to improve functional and intelligible communication. MBS w/ esophageal sweep as indicated. SUBJECTIVE:   Patient seen for Speech therapy evaluation. He is alert and arrives independently. He brought his electrolarynx. OBJECTIVE:     Past Medical History:   Diagnosis Date    Chronic pain     THROAT, EARS, NECK R/T CANCER    COPD (chronic obstructive pulmonary disease) (HCC)     EMPHASEMA    Psychiatric disorder     ANXIETY R/T CANCER    Throat cancer (HCC)     Tracheostomy present ([facility] [address]      Past Surgical History:   Procedure Laterality Date    HX ORTHOPAEDIC      LEFT ARM- \" PUT PLATE IN\"    HX TRACHEOSTOMY  02/08/2021    IR INSERT TUNL CVC W PORT OVER 5 YEARS  5/21/2021    IR PLACE CVAD FLUORO GUIDE  5/21/2021       CXR Results  (Last 48 hours)    None          Pain: 0         Problem List:   Dysphagia and Otherimpaired communication    Treatment Plan may include any combination of the following: Treatment of Swallowing, Patient Education and OtherUse of adaptive communication device      Patient / Family readiness to learn indicated by: asking questions, trying to perform skills and interest    Persons(s) to be included in education:   patient (P)    Barriers to Learning/Limitations: None    Patient Goal (s): \" to get back to normal    Patient Self Reported Health Status: good    Rehabilitation Potential: good    Short Term Goals:  To be accomplished in 4-6 weeks  -Oral and pharyngeal strengthening exercises to improve bolus transit and pharyngeal clearance. -MBS as indicated   -Independently activate and place electrolarynx to improve intelligibility.   -Independent use of speech intelligibility strategies   -tolerate hard solids and thin liquids w/ reduced globus sensation and improved pharyngeal clearance w/ independent use of swallow strategies. -patient will participate in manual therapytechniques applied to the anterior and lateral portions of the neck to reduce mechanosensitiviy and improve flexibility and ROM impacted by fibrotic, radiated tissue. Long Term Goals: To be accomplished in 6-8 weeks   -reduced neck pain and improve ROM  -tolerate LRD w/ improved bolus transit and reduced globus sensation  -Independent use and intelligibility with electrolarynx     Frequency / Duration: Patient to be seen 2 times per week for 8 weeks:        Patient/ Caregiver education and instruction: Diagnosis, prognosis, Compensatory Techniques,  POC    Certification Period: 9/8/21-10/27/21    MARY Casas 9/8/2021   ________________________________________________________________________    I certify that the above Therapy Services are being furnished while the patient is under my care. I agree with the treatment plan and certify that this therapy is necessary.     Physician's Signature:____________________  Date:___________Time:_________           Gloria James MD        Please sign and return to [address]  Ph: [phone]    Fax: [phone]     Thank you

## 2021-09-21 NOTE — PROGRESS NOTES
SPEECH LANGUAGE PATHOLOGY: DAILY NOTE      Patient Name: Charley Canada   2021   : 1970  [x]  Patient  Verified  Payor: [facility] MEDICAID / Plan: KAITLIN NICHOLAS [facility] / Product Type: Managed Care Medicaid /   In time: 0915am Out time: 1015am  Total Treatment Time (min): 60  1:1 Treatment Time ([facility] Only): 60    SUBJECTIVE  Pain Level (0-10 scale): 2/10     Subjective functional status/changes:    Patient seen for ST. Patient reports doing \" so, so.\" He states increased difficulty in swallowing. Food getting stuck and he has to bring it back up into his mouth. He is using his electrolarynx \"some. \"   Continues w/ pain in his next. He has a follow -up with his ENT on . OBJECTIVE  Treatment provided includes the following:  Increase/Improve:   []  Voice Quality []  Expressive Language []  Oral Motor Skills   []  Vocal Loudness []  Auditory Comprehension []  Eating/Swallowing Skills   []  Vocal Cord Function []  Writing Skills []  Laryngeal/Pharyngeal Function   []  Resonance []  Reading Comprehension [x] myofascial release/manual therapy   []  Breath Support/Coord. []  Cognitive-Linguistic Skills [x] Use of electrolarynx   []  Speech Intelligibility []  Safety Awareness []   []  Articulation []  Attention []   []  Fluency []  Memory []       Manual therapy included myofascial release techniques applied to the anterior and lateral portions of the neck to reduce mechanosensitiviy and improve flexibility and range of motion that is impacted by fibrotic, radiated tissue. Patient tolerated well. Patient report 2/10 pain and decreased tension post.  Reviewed neck stretches w/ patient: Upper trapezium stretch, levator scapulae stretch, head drop w/ jaw extension, shoulder and neck circles. Patient performed each. Patient was able to activate and place electrolarynx w/ intra oral adapter with moderate accuracy to improve intelligibility Independently.  Patient needs moderate cues at this time for placement and accurate surface contact. Increased difficulty and discomfort w/o oral adapter and using neck surface contact. Patient prefers the intra oral adapter. Patient read aloud syllabic words w/ use of oral adapter w/ 50% intelligibility, subjectively judged w/ mod-max assist for use of speech strategies ( over-articulation ) and oral adapter placement. Advised patient to make diet modifications as tolerated. Use of softer more blended textures. Compensatory swallow strategies of: small bites, sips, liquid wash and slow rate of intake. If difficulty swallowing continues and c/o of regurgitation of food, further instrumental assessment may be warranted. Advised patient to discuss w/ ENT. Patient/Caregiver instruction/education: Practice use of oral adapter and speech strategies, massage and neck stretches. Word practice handout provided. Pain Level (0-10 scale) post treatment: 2/10    Short Term Goals: To be accomplished in 4-6 weeks  -Oral and pharyngeal strengthening exercises to improve bolus transit and pharyngeal clearance. -MBS as indicated   -Independently activate and place electrolarynx to improve intelligibility.   -Independent use of speech intelligibility strategies   -tolerate hard solids and thin liquids w/ reduced globus sensation and improved pharyngeal clearance w/ independent use of swallow strategies. -patient will participate in manual therapytechniques applied to the anterior and lateral portions of the neck to reduce mechanosensitiviy and improve flexibility and ROM impacted by fibrotic, radiated tissue.          Long Term Goals:  To be accomplished in 6-8 weeks            -reduced neck pain and improve ROM  -tolerate LRD w/ improved bolus transit and reduced globus sensation  -Independent use and intelligibility with electrolarynx     ASSESSMENT  [x]   Improving appropriately and progressing toward goals  []   Improving slowly and progressing toward goals  [] Approximating goals/maximum potential  []   Continues to benefit from skilled therapy to address remaining functional deficits  []   Not progressing toward goals and plan of care will be adjusted    PLAN   [x]  Continue Plan of Care    []  See progress note/recertification  []  Upgrade activities as tolerated      []  Discharge due to:  []  Other:    MARY Brito S.CCC-SLP    9/21/2021,

## 2021-09-23 NOTE — PROGRESS NOTES
SPEECH LANGUAGE PATHOLOGY: DAILY NOTE      Patient Name: Kenneth Bell   2021   : 1970  [x]  Patient  Verified  Payor: [facility] MEDICAID / [facility] / Product Type: Managed Care Medicaid /   In time: [id number] Out time: 1015AM  Total Treatment Time (min): 60  1:1 Treatment Time (MC Only): 60    SUBJECTIVE  Pain Level (0-10 scale):0      Subjective functional status/changes:    Patient seen for ST. Patient wrote a message stating, \" I am doing so, so, I am still having pain and tightness when I swallow. \"     OBJECTIVE  Treatment provided includes the following:  Increase/Improve:   []  Voice Quality []  Expressive Language []  Oral Motor Skills   []  Vocal Loudness []  Auditory Comprehension [x]  Eating/Swallowing Skills   []  Vocal Cord Function []  Writing Skills []  Laryngeal/Pharyngeal Function   []  Resonance []  Reading Comprehension [x] Manual therapy/MFR   []  Breath Support/Coord. []  Cognitive-Linguistic Skills []   [x]  Speech Intelligibility []  Safety Awareness []   []  Articulation []  Attention []   []  Fluency []  Memory []       Patient participated in the following tx:    Manual therapy included myofascial release techniques applied to the anterior and lateral portions of the neck to reduce mechanosensitiviy and improve flexibility and range of motion that is impacted by fibrotic, radiated tissue. Patient tolerated well. Patient report 2/10 pain and decreased tension post.  Reviewed neck stretches w/ patient: Upper trapezium stretch, levator scapulae stretch, head drop w/ jaw extension, shoulder and neck circles. Patient performed each. Administered thin liquids and a soft donut for patient to trial.   No difficulty noted w/ thin liquid and consecutive swallows. Patient w/ slow, cautions mastication for soft solid, bolus fragmentation and multiple swallows elicited. Patient needed to use liquid washes to help w/ pharyngeal transport of bolus.   Patient w/ x 1 cough response w/ second solid trial s/t it \"feeling stuck. \"   Per chart review, patient did have an esophagram completed s/o laryngectomy w/ no evidence of a leak. Patient was able to activate and place electrolarynx w/ intra oral adapter with minimal assist to improve intelligibility Independently. Patient needed minimal cues for oral placement.      Patient read aloud syllabic words w/ use of oral adapter w/ 70% intelligibility, subjectively judged. Sound production of back sounds /k/, /g/, /c/ continue to be difficult to understand. Encouraged over-articulation.          Patient/Caregiver instruction/education:   Swallow strategies and diet modificaitons     Pain Level (0-10 scale) post treatment: 0    Short Term Goals: To be accomplished in 4-6 weeks  -Oral and pharyngeal strengthening exercises to improve bolus transit and pharyngeal clearance.   -MBS as indicated   -Independently activate and place electrolarynx to improve intelligibility.   -Independent use of speech intelligibility strategies   -tolerate hard solids and thin liquids w/ reduced globus sensation and improved pharyngeal clearance w/ independent use of swallow strategies.    -patient will participate in manual therapytechniques applied to the anterior and lateral portions of the neck to reduce mechanosensitiviy and improve flexibility and ROM impacted by fibrotic, radiated tissue.          Long Term Goals: To be accomplished in 6-8 weeks            -reduced neck pain and improve ROM  -tolerate LRD w/ improved bolus transit and reduced globus sensation  -Independent use and intelligibility with electrolarynx       ASSESSMENT  [x]   Improving appropriately and progressing toward goals  []   Improving slowly and progressing toward goals  []   Approximating goals/maximum potential  []   Continues to benefit from skilled therapy to address remaining functional deficits  []   Not progressing toward goals and plan of care will be adjusted    PLAN   [x] Continue Plan of Care    []  See progress note/recertification  []  Upgrade activities as tolerated      []  Discharge due to:  []  Other:    MARY Alexander M. S.CCC-SLP    9/23/2021,

## 2021-09-28 NOTE — PROGRESS NOTES
SPEECH LANGUAGE PATHOLOGY: DAILY NOTE      Patient Name: Binnie Gottron   2021   : 1970  [x]  Patient  Verified  Payor: [facility] MEDICAID / Plan: KAITLIN NICHOLAS [facility] / Product Type: Managed Care Medicaid /   In time: [id number] Out time: 0915am   Total Treatment Time (min): 45  1:1 Treatment Time (MC Only): 45    SUBJECTIVE  Pain Level (0-10 scale): 2/10     Subjective functional status/changes:   Patient seen for ST. Patient wrote a message, \" Im doing ok, my neck is still tight. \"      OBJECTIVE  Treatment provided includes the following:  Increase/Improve:   []  Voice Quality []  Expressive Language []  Oral Motor Skills   []  Vocal Loudness []  Auditory Comprehension [x]  Eating/Swallowing Skills   []  Vocal Cord Function []  Writing Skills []  Laryngeal/Pharyngeal Function   []  Resonance []  Reading Comprehension [x]MFR, manual therapy   []  Breath Support/Coord. []  Cognitive-Linguistic Skills [x] Use of electrolarynx   [x]  Speech Intelligibility []  Safety Awareness []   []  Articulation []  Attention []   []  Fluency []  Memory []     Decrease:  []  Dysphagia []  Apraxia []  Dysphonia   []  Dysarthria []  Dysfluency []  Cognitive Ling. Deficit   []  Aphasia []  Vocal Cord Dysfunction []  Dysphonia     ST included the following:    Manual therapy included myofascial release techniques applied to the anterior and lateral portions of the neck to reduce mechanosensitiviy and improve flexibility and range of motion that is impacted by fibrotic tissue and post surgical tissue changes. Patient tolerated well. Patient report 0/10 pain and decreased tension and stiffness post.  Reviewed neck stretches w/ patient: Upper trapezium stretch, levator scapulae stretch, head drop w/ jaw extension, shoulder and neck circles. Encouraged patient to keep completing at home. Patient was able to activate and place electrolarynx w/ intra oral adapter with minimal assist to improve intelligibility Independently. Patient continues to need minimal cues for oral placement. Patient likes to place oral adapter between cheek and teeth, which reduces intelligibility. Patient benefits from placement on lateral portion of tongue, against teeth.      Patient read aloud phrases words w/ use of oral adapter w/ 40% intelligibility, subjectively judged. W/ repetition and cues for improved placement and use of \" over-articulation,\" intelligibility improves. Patient/Caregiver instruction/education:   Neck stretches and massage at home. Compensatory swallow strategies  Smooth, slick food items. Soft foods that are easy to mash w/ a fork. Pain Level (0-10 scale) post treatment: 0    Short Term Goals: To be accomplished in 4-6 weeks  -Oral and pharyngeal strengthening exercises to improve bolus transit and pharyngeal clearance.   -MBS as indicated   -Independently activate and place electrolarynx to improve intelligibility.   -Independent use of speech intelligibility strategies   -tolerate hard solids and thin liquids w/ reduced globus sensation and improved pharyngeal clearance w/ independent use of swallow strategies.    -patient will participate in manual therapytechniques applied to the anterior and lateral portions of the neck to reduce mechanosensitiviy and improve flexibility and ROM impacted by fibrotic, radiated tissue.          Long Term Goals: To be accomplished in 6-8 weeks            -reduced neck pain and improve ROM  -tolerate LRD w/ improved bolus transit and reduced globus sensation  -Independent use and intelligibility with electrolarynx          ASSESSMENT  [x]   Improving appropriately and progressing toward goals  []   Improving slowly and progressing toward goals  []   Approximating goals/maximum potential  []   Continues to benefit from skilled therapy to address remaining functional deficits  []   Not progressing toward goals and plan of care will be adjusted    PLAN   [x]  Continue Plan of Care    []  See progress note/recertification  []  Upgrade activities as tolerated      []  Discharge due to:  []  Other:    MARY Hall MJuarez S.CCC-SLP    9/28/2021,

## 2021-09-29 NOTE — PROGRESS NOTES
[id number] [facility] at [facility]  [phone]    Hematology / Oncology Established Visit    Reason for Visit:   Danika Larsen is a 49 y.o. male who is seen in follow up of laryngeal cancer. Referred by Dr. 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 Stage: CATARINO [[id number]] at diagnosis, regional recurrence in 4/2021    Pathology:   2/8/21 Total laryngectomy: Invasive squamous cell carcinoma  Tumor size 3.5cm, moderately differentiated (G2), PNI present, LVI not identified, margins negative  [id number]    Prior Treatment:   1. 2/8/21 total laryngectomy/cricopharyngeal myotomy  2. Cisplatin 100mg/m2 every 3 weeks x 3 cycles, 5/24/21-7/13/21  3. Radiation 5/24/21-7/19/21    Current Treatment:  Surveillance    History of Present Illness:   Danika Larsen is a 49 y.o. male who with COPD who is referred for Head and neck cancer. He presented with throat pain and hoarseness in Sept 2020. Was evaluated by Dr. Ирина Flor in ENT who performed laryngoscopy and diagnosed patient with squamous cell carcinoma involving the larynx and subglottis. In late Dec 2020, neck CT showed a 3 cm mass in Right supraglottic larynx with extension to thyroid cartilage, but no cervical LNs. PET 1/4/21 showed uptake in the laryngeal mass at right vocal fold, extending to thyroid cartilage. He was scheduled for surgery, but he required emergent tracheostomy prior to that; done late Jan 2021. Also had PEG placed 1/30/21. On 2/8/21, he underwent total laryngectomy/cricopharyngeal myotomy. He quit smoking in Feb 2021. He was evaluated by Dr. Sherron Sanders in [address] [place] [address] in March 2021 who recommended post-op radiation. There were multiple delays given difficult getting in for dental evaluation prior to RT. [facility] simulation scan was notable for a necrotic appearing tumor in Right neck  . PET 5/7/21 confirmed hypermetabolic uptake in right neck.     PEG removed in March 2021 due to problems with it. Per Dr. Mariana Leblanc, pt had 7 teeth extracted by dentist. He returns to the dentist again for dental fillings on 5/26/21. Pt states his neck feel tight, causing pulling sensation but no current pain. Interval History:  Patient here for follow up of throat cancer. Had PET scan completed. Met with speech pathologist   Labs show hypothyroidism. Reports pain in bilateral sides of his neck. Was told by speech pathologist pain is likely due to tight muscles and should improve with time. Taking oxycodone to manage the pain. Reports slight pain when swallowing. Drinking 5 boost per day. Eating 2 full meals per day. Reports dry mouth. Taste is improving. Able to tolerate most foods but has to take time chewing. PMHx: COPD, throat cancer, anxiety  PSurgHx: Left arm plate placement, tracheostomy, total laryngectomy 2/8/21  SHx: Quit smoking 10/28/20 after 45 pack years. No EtOH  FHx: Mother with DM; Father with DM, CKD; Sister with DM. No h/o malignancy. Review of Systems: A complete review of systems was obtained, negative except as described above. Physical Exam:   Blood pressure 127/86, pulse 92, temperature 97.1 °F (36.2 °C), height 6' (1.829 m), weight 158 lb 3.2 oz (71.8 kg), SpO2 99 %. ECOG PS: 0  General: Well developed, no acute distress, thin   Eyes: Anicteric sclerae  HENT: Atraumatic  Neck: Supple, Tracheostomy noted  Lymphatic: No supraclavicular, axillary adenopathy. Right firm cervical LAD noted, smaller than prior, TTP of right side of neck. Respiratory: CTAB, normal respiratory effort  CV: tachycardia, regular rhythm, no murmurs, no peripheral edema  GI: Soft, nontender, nondistended, no masses  MS: Normal gait and station. Digits without clubbing or cyanosis. 1cm firm nodule on left wrist.  Skin: No rashes, ecchymoses, or petechiae. Normal temperature, turgor, and texture. Hyperpigmented skin at neck bilaterally. Neuro/Psych: Alert, oriented. Moves all 4 extremities. Appropriate affect, normal judgment/insight. Communicates by writing given tracheostomy. Results:     Lab Results   Component Value Date/Time    WBC 3.2 (L) 10/05/2021 10:41 AM    HGB 9.2 (L) 10/05/2021 10:41 AM    HCT 28.7 (L) 10/05/2021 10:41 AM    PLATELET 931  10:41 AM    .4 (H) 10/05/2021 10:41 AM    ABS. NEUTROPHILS 2.5 10/05/2021 10:41 AM     Lab Results   Component Value Date/Time    Sodium 138 10/05/2021 10:41 AM    Potassium 4.0 10/05/2021 10:41 AM    Chloride 104 10/05/2021 10:41 AM    CO2 29 10/05/2021 10:41 AM    Glucose 132 (H) 10/05/2021 10:41 AM    BUN 24 (H) 10/05/2021 10:41 AM    Creatinine 1.01 10/05/2021 10:41 AM    GFR est AA >60 10/05/2021 10:41 AM    GFR est non-AA >60 10/05/2021 10:41 AM    Calcium 9.5 10/05/2021 10:41 AM    Glucose (POC) 113 10/04/2021 01:04 PM     Lab Results   Component Value Date/Time    Bilirubin, total 0.2 10/05/2021 10:41 AM    ALT (SGPT) 13 10/05/2021 10:41 AM    Alk. phosphatase 62 10/05/2021 10:41 AM    Protein, total 8.5 (H) 10/05/2021 10:41 AM    Albumin 3.8 10/05/2021 10:41 AM    Globulin 4.7 (H) 10/05/2021 10:41 AM     Lab Results   Component Value Date/Time    Iron 67 2021 11:23 AM    TIBC 409 2021 11:23 AM    Iron % saturation 16 (L) 2021 11:23 AM    Ferritin 466 (H) 2021 11:23 AM      Lab Results   Component Value Date/Time    Vitamin B12 664 2021 11:23 AM    Folate 24.0 (H) 2021 11:23 AM         Imagin/28/21 Neck CT: IMPRESSION  Irregular soft tissue mass involving the aryepiglottic and the larynx. There is significant narrowing of the airway at the level of the larynx  and the supraglottic region. Poorly defined cervical adenopathy is noted. 21 Chest CTA:  IMPRESSION  Minimal scarring or subsegmental atelectasis in the left lung base. No evidence of pulmonary embolism or pneumonia.     21 PET:  IMPRESSION  2 large necrotic appearing mass lesions are noted in the right neck (SUV 6.4).  Small hypermetabolic right posterior triangle lymph node. No PET avid evidence  of distant metastatic disease. 10/4/21 PET:  FINDINGS:  HEAD/NECK: There is physiologic tracer activity in the oral cavity and piriform  sinuses. Physiologic tracer activity is also seen in the neck musculature,  particularly the right sternocleidomastoid muscle. 2 enlarged right level 2  cervical lymph nodes are slightly decreased in size; these demonstrate no  significant residual FDG activity. Previously seen small lymph node in the right  posterior cervical triangle also demonstrates no residual FDG activity. CHEST: There is a new 3.0 x 2.6 cm focus of nodular airspace disease in the left  upper lobe which demonstrates increased FDG activity, maximal SUV 2.8. New foci  of nodular airspace disease in the left lower lobe, measuring 1.2 cm and 1.0 cm,  in the right lower lobe, measuring 0.8 cm, and in the right middle lobe,  measuring 1.6 cm, demonstrate no appreciable FDG activity. ABDOMEN/PELVIS: No foci of abnormal hypermetabolism. SKELETON: No foci of abnormal hypermetabolism in the axial and visualized  appendicular skeleton. IMPRESSION  1. Previously seen enlarged cervical lymph nodes demonstrate no residual  abnormal FDG activity. 2. New foci of nodular airspace disease in both lungs, including a 3 cm area in  the left upper lobe which demonstrates low-grade increased FDG activity. Findings may be infectious etiology, although neoplasm is not excluded;  attention on follow-up examinations is recommended. Assessment & Plan:   Marvel Meeks is a 49 y.o. male is seen for laryngeal cancer. 1. Squamous cell carcinoma of larynx, Stage CATARINO [pT4a cN2 Mx]:  S/p total laryngectomy and tracheostomy in Jan 2021. Afterwards, he developed disease recurrence in Right neck confirmed by PET scan. I recommended concurrent chemoradiation using Cisplatin to treat his disease.    Pt completed concurrent chemoradiation radiation 7/19/21. Evidence of treatment response per physical exam and 10/4/21 PET with decreased size and resolved hypermetabolic uptake of right cervical LNs. Will discuss with RadOnc and ENT whether to repeat imaging with repeat PET or neck and chest CT in 6-8 weeks given residual LNs and airspace opacity. Will now start on surveillance per NCCN guidelines: H&P every 3 months for y1, every 3-6 mo for y2, every 4-8mo for y3-5, then annually. TSH every 6-12 mo.   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 -- Sees Dr. Swain this week for follow up - will forward PET scan. Also alerting Dr. Tyler Perez to PET results. -- May need repeat imaging in 6-8 weeks. Will discuss with treatment team.  -- Return in 6 weeks for labs/port flush, MD visit. 2. Supportive care / Survivorship:  Will advise on the following at next visit. -- Dental evaluation for oral cavity and sites exposed to significant intraoral radiation treatment  -- Speech/hearing and swallowing eval and rehab as clinically indicated  -- Evaluate for nutritional needs, depression prn  -- Smoking cessation and alcohol counseling     3. COPD / H/o tobacco abuse:   Quit in 2/2021. Uses Albuterol inhaler prn.    4. FEN/GI / Elevated protein:   Odynophagia 2/2 RT is improving. Senna-plus BID prn constipation. High protein likely due to too much Boost.  -- On Oxycodone 5mg bid prn-advised he slowly cut down. Prescribed by Dr. Tyler Perez. -- KCL 20meq BID. Advised foods high in K+- will advise to stop if K is normal today. -- Advised again to decrease Boost continuously: first cut down to 4 boost daily and then replacing 1-2 Boosts per day with soft foods and slowly transitioning back to food rather than supplements each week. 5. Leukopenia:   2/2 prior chemotherapy and stable. Prior neutropenia resolved. 6. Macrocytic anemia:   2/2 prior chemotherapy and now improving. B12, folate normal; ferritin high and iron sat mildly low.     7. Hypothyroidism:   Due to radiation. TSH high and free T4 low. -- Start levothyroxine 25mcg/d. Reviewed instructions. Recheck labs in 6 weeks. -- Sending communication to PCP to request assistance with management. 8. Airspace disease:   Seen on recent PET. Unclear etiology. May be infectious, but neoplasm can not be excluded. -- Consider chest CT in 6-8 weeks     Emotional well being: Pt is coping well with his/her disease and has excellent support. I have personally seen and evaluated the patient in conjunction with Aniket Berg NP. I find the patient's history and physical exam are consistent with the NP's documentation. I agree with the above assessment and plan, which I have modified as needed. Pt has significant tenderness in R neck with PET looking overall improved. Will discuss with treatment team what type of imaging needs to be repeated in 6-8 week time frame.      Signed By: Chey De La Cruz MD     10/05/21

## 2021-09-30 NOTE — TELEPHONE ENCOUNTER
[facility] from prior auth called and stated that this patient needs a peer to peer. Please advise.       Jeremy Cohen [id number]    Ref#  [id number]

## 2021-09-30 NOTE — PROGRESS NOTES
SPEECH LANGUAGE PATHOLOGY: DAILY NOTE      Patient Name: Deepak Mustafa   2021   : 1970  [x]  Patient  Verified  Payor: [employer]ICA[employer] / Plan: [address] [place] [address] / Product Type: Managed Care Medicaid /   In time: 0930am Out time: 1015am  Total Treatment Time (min): 45  1:1 Treatment Time (MC Only): 45    SUBJECTIVE  Pain Level (0-10 scale): 0     Subjective functional status/changes:    Patient seen for ST. Patient reports no pain today. Neck tension is slightly improving. OBJECTIVE  Treatment provided includes the following:  Increase/Improve:   []  Voice Quality []  Expressive Language []  Oral Motor Skills   []  Vocal Loudness []  Auditory Comprehension []  Eating/Swallowing Skills   []  Vocal Cord Function []  Writing Skills []  Laryngeal/Pharyngeal Function   []  Resonance []  Reading Comprehension [x] Manual therapy, MFR   []  Breath Support/Coord. []  Cognitive-Linguistic Skills [x] Use of electrolarynx   []  Speech Intelligibility []  Safety Awareness []   []  Articulation []  Attention []   []  Fluency []  Memory []       ST included the following:     Manual therapy included myofascial release techniques applied to the anterior and lateral portions of the neck to reduce mechanosensitiviy and improve flexibility and range of motion that is impacted by fibrotic tissue and post surgical tissue changes. Patient tolerated well. Patient report 0/10 pain and decreased tension and stiffness post.  Reviewed neck stretches w/ patient: Upper trapezium stretch, levator scapulae stretch, head drop w/ jaw extension, shoulder and neck circles. Encouraged patient to keep completing at home.      Patient was able to activate and place electrolarynx w/ intra oral adapter  independently.    Patient read aloud phrases  w/ use of oral adapter w/ 85% intelligibility, subjectively judged. W/ repetition and cues for improved placement and use of \" over-articulation,\" intelligibility improves. Assisted patient w/ registering for PET scan, Monday October 4th.        Patient/Caregiver instruction/education:   Continue w/ soft diet, smooth and slick textures. Liquids wash. Small bites. GERD precautions. Use of of electrolarynx w/ emphasis on over-articulation  Neck stretches and massage. Pain Level (0-10 scale) post treatment: 0    Short Term Goals: To be accomplished in 4-6 weeks  -Oral and pharyngeal strengthening exercises to improve bolus transit and pharyngeal clearance.   -MBS as indicated   -Independently activate and place electrolarynx to improve intelligibility.   -Independent use of speech intelligibility strategies   -tolerate hard solids and thin liquids w/ reduced globus sensation and improved pharyngeal clearance w/ independent use of swallow strategies. -patient will participate in manual therapytechniques applied to the anterior and lateral portions of the neck to reduce mechanosensitiviy and improve flexibility and ROM impacted by fibrotic, radiated tissue.          Long Term Goals: To be accomplished in 6-8 weeks            -reduced neck pain and improve ROM  -tolerate LRD w/ improved bolus transit and reduced globus sensation  -Independent use and intelligibility with electrolarynx        ASSESSMENT  [x]   Improving appropriately and progressing toward goals  []   Improving slowly and progressing toward goals  []   Approximating goals/maximum potential  []   Continues to benefit from skilled therapy to address remaining functional deficits  []   Not progressing toward goals and plan of care will be adjusted    PLAN   [x]  Continue Plan of Care    []  See progress note/recertification  []  Upgrade activities as tolerated      []  Discharge due to:  []  Other:    MARY Matos M. S.CCC-SLP    9/30/2021,

## 2021-10-01 NOTE — TELEPHONE ENCOUNTER
[employer] at [facility]  [phone]        10/01/21 9:07 AM Called Bertha. Advised of approval for PET. Provided confirmation # and effective dates as noted by Estiven Burns NP. No further questions or concerns at this time.

## 2021-10-05 NOTE — PROGRESS NOTES
Outpatient Infusion Center   Visit Progress Note    Patient admitted to [facility] for [address] Flush+Labs ambulatory in stable condition. Visit Vitals  /86   Pulse 92   Temp 97.1 °F (36.2 °C)   Resp 18   Wt 71.8 kg (158 lb 3.2 oz)   SpO2 99%   BMI 21.46 kg/m²       Right chest PAC accessed with positive blood return. Labs drawn and sent for processing. Medications:  NS Flush  Heparin Flush    Labs pending in CC. Patient tolerated treatment well. Patient discharged from [place] [address] ambulatory in no distress. Patient aware of next appointment.     Future Appointments   Date Time Provider Nathanael Haas   10/7/2021  9:15 AM MARY Hernandez [facility]   10/12/2021  9:15 AM MARY Hernandez [facility]   10/14/2021  9:15 AM MARY Hernandez [facility]   11/16/2021 10:30 AM [facility]   11/16/2021 10:45 AM Catie Mascorro, DAT [facility] AMB   12/14/2021 10:30 AM SS INF4 CH4 <1H [facility] [address] [place] [address]

## 2021-10-05 NOTE — PATIENT INSTRUCTIONS
1. For your dry mouth: Try biotene mouth spray. Can also try Xylimelts. These are found without a prescription at the drug store. 2. For thyroid dysfunction: Start taking levothyroxine (Synthroid) 25mcg in the morning. Take 2 hours before eating or other medications. Can take with water. I will recheck your labs in 6 weeks. 3. Please try to decrease intake of protein drinks and substitute with food. First cut down to 4 boost daily and then replace 1-2 boosts per day with soft foods.

## 2021-10-05 NOTE — PROGRESS NOTES
Missy Beverly is a 49 y.o. male here for follow up of laryngeal cancer. Patient with complaints of throat pain, rates as a 5 out of 10.

## 2021-10-05 NOTE — LETTER
10/5/2021    Patient: Chaney Schaumann   YOB: 1970   Date of Visit: 10/5/2021     Royal Siu NP  [address] [id number]  Via Fax: [phone]    Dear Royal Siu NP,      Thank you for referring Mr. Chaney Schaumann to [facility] for evaluation. My notes for this consultation are attached. If you have questions, please do not hesitate to call me. I look forward to following your patient along with you.       Sincerely,    Yael May NP

## 2021-10-06 NOTE — TELEPHONE ENCOUNTER
[address]  Social Work Navigator Encounter     Patient Name: Cresencio García    Medical History: h&n cancer    Advance Directives: none on file; conversation deferred    Narrative: Call placed to [employer] ([phone]) and arranged transportation for the following medical appointments:     11/16 at 10:30am - [facility]  [facility], [address] [place] [address]  Confirmation #[id number]    Called patient to confirm transportation has been scheduled.      Barriers to Care: none verbal     Referral:   Transportation referral    Thank you,  Earlene Rivera LCSW

## 2021-10-07 NOTE — PROGRESS NOTES
ST DAILY TREATMENT NOTE    Patient Name: Camila Carl  Date:10/7/2021  : 1970  [x]  Patient  Verified  Payor: Payor: [facility] MEDICAID / Plan: KAITLIN NICHOLAS [facility] / Product Type: Managed Care Medicaid /   In time: 0915am Out time: 0950am  Total Treatment Time (min): 35  Total Timed Codes (min): 35  1:1 Treatment Time ( only): 35  Visit #: 6 of 16      Treatment Diagnosis: Malignant neoplasm of glottis [C32.0]    SUBJECTIVE  Pain Level (0-10 scale): 3/10 neck pain  Any medication changes, allergies to medications, adverse drug reactions, diagnosis change, or new procedure performed?: [x] No    [] Yes (see summary sheet for update)   Patient has a PET scan completed 10/4. Results reviewed. Subjective functional status/changes:   [x] No changes reported   Patient seen for ST. Patient reports appointment w/ Dr. Dwayne Nieves in [place] and needs to shorten appointment. Patient reports he was told PET scan showed \" increased fluid on his right side of his neck. \"     OBJECTIVE  Treatment provided includes:   IIncrease/Improve:  []  Voice Quality []  Cognitive Linguistic Skills []  Laryngeal/Pharyngeal Exercises   []  Vocal Loudness []  Reading Comprehension []  Swallowing Skills    []  Vocal Cord Function []  Auditory Comprehension []  Oral Motor Skills   []  Resonance []  Writing Skills []  Compensatory strategies    []  Speech Intelligibility []  Expressive Language []  Attention   []  Breath Support/Coord. []  Receptive language []  Memory   []  Articulation []  Safety Awareness [x] Manual tx and MFR   []  Fluency []  Word Retrieval [x] Neck stretches       Treatment Provided:  Manual therapy included myofascial release techniques applied to the anterior and lateral portions of the neck to reduce mechanosensitiviy and improve flexibility and range of motion that is impacted by fibrotic tissue and post surgical tissue changes. Patient tolerated well.  Patient report 0/10 pain and decreased tension and stiffness post.    Reviewed neck stretches w/ patient: Upper trapezium stretch, levator scapulae stretch, head drop w/ jaw extension, shoulder and neck circles. Patient completed each. Encouraged patient to keep completing at home. Patient reports tolerating mechanical soft solids and thin liquids w/ increased effort. Continues to use liquid wash and careful chewing. He still has some regurgitation time to time. Educated patient to new anatomy of his swallow function and advised him to discuss w/ ENT. Reviewed MD notes that stated to reduced BOOST ingestion and supplement w/ solid foods. Patient/Caregiver  Education: [x] Review HEP         Pain Level (0-10 scale) post treatment: 0/10    ASSESSMENT   []   Improving appropriately and progressing toward goals  [x]   Improving slowly and progressing toward goals  []   Approximating goals/maximum potential  []   Continues to benefit from skilled therapy to address remaining functional deficits  []   Not progressing toward goals and plan of care will be adjusted    Patient will continue to benefit from skilled therapy to address remaining functional deficits:  Speech and swallowing    Progress towards goals / Updated goals:     PLAN  [x]  Continue plan of care  []  Modify Goals/Treatment Plan      []  Discharge due to:  [] Other:    Short Term Goals: To be accomplished in 4-6 weeks  -Oral and pharyngeal strengthening exercises to improve bolus transit and pharyngeal clearance.   -MBS as indicated   -Independently activate and place electrolarynx to improve intelligibility.   -Independent use of speech intelligibility strategies   -tolerate hard solids and thin liquids w/ reduced globus sensation and improved pharyngeal clearance w/ independent use of swallow strategies.    -patient will participate in manual therapytechniques applied to the anterior and lateral portions of the neck to reduce mechanosensitiviy and improve flexibility and ROM impacted by fibrotic, radiated tissue.          Long Term Goals: To be accomplished in 6-8 weeks            -reduced neck pain and improve ROM  -tolerate LRD w/ improved bolus transit and reduced globus sensation  -Independent use and intelligibility with electrolarynx         MARY Temple, M.S. [address] [facility]   10/7/2021  1:57 PM

## 2021-10-11 NOTE — PROGRESS NOTES
[address] [facility] Oncology at [facility]  [phone]    Hematology / Oncology Established Visit    Reason for Visit:   Sheldon Smiley is a 49 y.o. male who is seen in follow up of laryngeal cancer. Referred by Dr. 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 Stage: CATARINO [[id number]] at diagnosis, regional recurrence in 4/2021    Pathology:   2/8/21 Total laryngectomy: Invasive squamous cell carcinoma  Tumor size 3.5cm, moderately differentiated (G2), PNI present, LVI not identified, margins negative  [id number]    Prior Treatment:   1. 2/8/21 total laryngectomy/cricopharyngeal myotomy  2. Cisplatin 100mg/m2 every 3 weeks x 3 cycles, 5/24/21-7/13/21  3. Radiation 5/24/21-7/19/21    Current Treatment:  Surveillance    History of Present Illness:   Sheldon Smiley is a 49 y.o. male who with COPD who is referred for Head and neck cancer. He presented with throat pain and hoarseness in Sept 2020. Was evaluated by Dr. Meghna Senior in ENT who performed laryngoscopy and diagnosed patient with squamous cell carcinoma involving the larynx and subglottis. In late Dec 2020, neck CT showed a 3 cm mass in Right supraglottic larynx with extension to thyroid cartilage, but no cervical LNs. PET 1/4/21 showed uptake in the laryngeal mass at right vocal fold, extending to thyroid cartilage. He was scheduled for surgery, but he required emergent tracheostomy prior to that; done late Jan 2021. Also had PEG placed 1/30/21. On 2/8/21, he underwent total laryngectomy/cricopharyngeal myotomy. He quit smoking in Feb 2021. He was evaluated by Dr. Erika Hyde in [address] [place] [address] in March 2021 who recommended post-op radiation. There were multiple delays given difficult getting in for dental evaluation prior to RT. [facility] simulation scan was notable for a necrotic appearing tumor in Right neck  . PET 5/7/21 confirmed hypermetabolic uptake in right neck.     PEG removed in March 2021 due to problems with it. Per Dr. Jaime Sierra, pt had 7 teeth extracted by dentist. He returns to the dentist again for dental fillings on 5/26/21. Pt states his neck feel tight, causing pulling sensation but no current pain. Interval History:  Patient here for follow up of throat cancer. Took levothyroxine for 3 days but stopped due to rapid HR. Continues to have fast HR despite stopping levothyroxine. Denies SOB, CP or dizziness. Reports throat pain is improving-not requiring pain medication. Just completed speech therapy - able to eat a soft diet and no longer requires supplement drinks. Plans to start PT for neck on 11/30/21. Reports dry mouth- has not tried xylimelts. Taste is improving. PMHx: COPD, throat cancer, anxiety  PSurgHx: Left arm plate placement, tracheostomy, total laryngectomy 2/8/21  SHx: Quit smoking 10/28/20 after 45 pack years. No EtOH  FHx: Mother with DM; Father with DM, CKD; Sister with DM. No h/o malignancy. Review of Systems: A complete review of systems was obtained, negative except as described above. Physical Exam:   Blood pressure 119/60, pulse (!) 106, temperature 97.7 °F (36.5 °C), resp. rate 18, height 6' (1.829 m), weight 157 lb 12.8 oz (71.6 kg), SpO2 100 %. ECOG PS: 0  General: Well developed, no acute distress  Eyes: Anicteric sclerae  HENT: Atraumatic  Neck: Supple, Tracheostomy noted  Lymphatic: No supraclavicular, axillary adenopathy. No bilateral cervical LAD, but firm skin at neck bilaterally. Respiratory: CTAB, normal respiratory effort  CV: tachycardia, regular rhythm, no murmurs, no peripheral edema  GI: Soft, nontender, nondistended, no masses  MS: Normal gait and station. Digits without clubbing or cyanosis. 1cm firm nodule on left wrist.  Skin: No rashes. Hyperpigmented skin at neck bilaterally. Neuro/Psych: Alert. Communicates by writing given tracheostomy.       Results:     Lab Results   Component Value Date/Time    WBC 2.8 (L) 11/16/2021 10:30 AM HGB 10.5 (L) 2021 10:30 AM    HCT 31.7 (L) 2021 10:30 AM    PLATELET 489 89/10/8503 10:30 AM    MCV 97.8 2021 10:30 AM    ABS. NEUTROPHILS 2.1 2021 10:30 AM     Lab Results   Component Value Date/Time    Sodium 138 2021 10:30 AM    Potassium 4.0 2021 10:30 AM    Chloride 102 2021 10:30 AM    CO2 27 2021 10:30 AM    Glucose 74 2021 10:30 AM    BUN 8 2021 10:30 AM    Creatinine 0.90 2021 10:30 AM    GFR est AA >60 2021 10:30 AM    GFR est non-AA >60 2021 10:30 AM    Calcium 9.6 2021 10:30 AM    Glucose (POC) 113 10/04/2021 01:04 PM     Lab Results   Component Value Date/Time    Bilirubin, total 0.4 2021 10:30 AM    ALT (SGPT) 15 2021 10:30 AM    Alk. phosphatase 48 2021 10:30 AM    Protein, total 7.6 2021 10:30 AM    Albumin 3.5 2021 10:30 AM    Globulin 4.1 (H) 2021 10:30 AM     Lab Results   Component Value Date/Time    Iron 67 2021 11:23 AM    TIBC 409 2021 11:23 AM    Iron % saturation 16 (L) 2021 11:23 AM    Ferritin 466 (H) 2021 11:23 AM      Lab Results   Component Value Date/Time    Vitamin B12 664 2021 11:23 AM    Folate 24.0 (H) 2021 11:23 AM         Imagin/28/21 Neck CT: IMPRESSION  Irregular soft tissue mass involving the aryepiglottic and the larynx. There is significant narrowing of the airway at the level of the larynx  and the supraglottic region. Poorly defined cervical adenopathy is noted. 21 Chest CTA:  IMPRESSION  Minimal scarring or subsegmental atelectasis in the left lung base. No evidence of pulmonary embolism or pneumonia. 21 PET:  IMPRESSION  2 large necrotic appearing mass lesions are noted in the right neck (SUV 6.4). Small hypermetabolic right posterior triangle lymph node. No PET avid evidence  of distant metastatic disease.     10/4/21 PET:  FINDINGS:  HEAD/NECK: There is physiologic tracer activity in the oral cavity and piriform  sinuses. Physiologic tracer activity is also seen in the neck musculature,  particularly the right sternocleidomastoid muscle. 2 enlarged right level 2  cervical lymph nodes are slightly decreased in size; these demonstrate no  significant residual FDG activity. Previously seen small lymph node in the right  posterior cervical triangle also demonstrates no residual FDG activity. CHEST: There is a new 3.0 x 2.6 cm focus of nodular airspace disease in the left  upper lobe which demonstrates increased FDG activity, maximal SUV 2.8. New foci  of nodular airspace disease in the left lower lobe, measuring 1.2 cm and 1.0 cm,  in the right lower lobe, measuring 0.8 cm, and in the right middle lobe,  measuring 1.6 cm, demonstrate no appreciable FDG activity. ABDOMEN/PELVIS: No foci of abnormal hypermetabolism. SKELETON: No foci of abnormal hypermetabolism in the axial and visualized  appendicular skeleton. IMPRESSION  1. Previously seen enlarged cervical lymph nodes demonstrate no residual  abnormal FDG activity. 2. New foci of nodular airspace disease in both lungs, including a 3 cm area in  the left upper lobe which demonstrates low-grade increased FDG activity. Findings may be infectious etiology, although neoplasm is not excluded;  attention on follow-up examinations is recommended. Assessment & Plan:   Ana Nugent is a 49 y.o. male is seen for laryngeal cancer. 1. Squamous cell carcinoma of larynx, Stage CATARINO [pT4a cN2 Mx]:  S/p total laryngectomy and tracheostomy in Jan 2021. Afterwards, he developed disease recurrence in Right neck confirmed by PET scan. I recommended concurrent chemoradiation using Cisplatin to treat his disease. Pt completed concurrent chemoradiation radiation 7/19/21. Evidence of treatment response per physical exam and 10/4/21 PET with decreased size and resolved hypermetabolic uptake of right cervical LNs.  Discuss with RadOnc and ENT and will repeat imaging with repeat PET or neck 6-8 weeks from last PET given residual LNs and airspace opacity. -- On surveillance per NCCN guidelines: H&P every 3 months for y1, every 3-6 mo for y2, every 4-8mo for y3-5, then annually. TSH every 6-12 mo.   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 -- Due for repeat PET now- will schedule for patient and ask Amalia Gonzalez to assist with setting up transportation. -- Return in 12 weeks for labs/port flush, MD visit. 2. Supportive care / Survivorship:  Discussed the following today:   -- Dental evaluation for oral cavity and sites exposed to significant intraoral radiation treatment  -- Evaluate for nutritional needs, depression prn  -- Alcohol counseling     3. COPD / H/o tobacco abuse:   Quit in 2/2021. Uses Albuterol inhaler prn.    4. FEN/GI / Elevated protein:   Odynophagia 2/2 RT continues to improve. Senna-plus BID prn constipation. 5. Leukopenia:   2/2 prior chemotherapy and should continue to improve. 6. Normocytic anemia:   2/2 prior chemotherapy and improving. B12, folate normal; ferritin high and iron sat mildly low. 7. Hypothyroidism:   Due to prior radiation. Was unable to tolerate levothyroxine 25mcg/d due to tachycardia and he discontinued. TSH/free T4 worsening on 11/16/21 .  -- Called patient's brother and advised restarting levothyroxine 25mcg/d. Needs to recheck labs in 6 weeks around 12/28/21. He needs to call if he develops increased heart rate. -- Sending communication to PCP to request assistance with management. 8. Airspace disease:   Seen on recent PET. Unclear etiology. May be infectious, but neoplasm can not be excluded. -- Repeat PET now- call with results. 9. Tachycardia:  Likely due to hypovolemia. Occurred after taking levothyroxine and continues despite holding the medication. No SOB, CP or dizziness. Only hydrating with 2 glasses of water daily. -- EKG now - call with results.    -- Advised increasing oral hydration with water to 32-60 oz daily. Emotional well being: Pt is coping well with his/her disease and has excellent support. I have personally seen and evaluated the patient in conjunction with Deven Asif NP. I find the patient's history and physical exam are consistent with the NP's documentation. I agree with the above assessment and plan, which I have modified as needed. Pt is seen for follow up of head and neck cancer after previously completing chemoRT. The prior PET showed good response in H&N region, but increased uptake at a lung nodule - repeating PET now. Prior trouble with pain and swallowing improved. Advised on increasing oral hydration.       Signed By: Tonya Sagastume MD     11/16/21

## 2021-10-12 NOTE — PROGRESS NOTES
SPEECH LANGUAGE PATHOLOGY: DAILY NOTE      Patient Name: Ministerio Grissom   10/12/2021   : 1970  [x]  Patient  Verified  Payor: [facility] MEDICAID / Plan: KAITLIN NICHOLAS [facility] / Product Type: Managed Care Medicaid /   In time: 0920am Out time: 1020am  Total Treatment Time (min): 60  1:1 Treatment Time (MC Only): 60    SUBJECTIVE  Pain Level (0-10 scale): 2/10 neck pain     Subjective functional status/changes:   Patient seen for ST. He had his f/u w/ his ENT. Patient wrote on a paper that ENT wants another PET scan completed to further evaluate the fluid that is in his neck and r/o malignancy. He told patient to continue consuming soft foods and making sure he \" chews thoroughly. \"     OBJECTIVE  Treatment provided includes the following:  Increase/Improve:   []  Voice Quality []  Expressive Language []  Oral Motor Skills   []  Vocal Loudness []  Auditory Comprehension [x]  Eating/Swallowing Skills   []  Vocal Cord Function []  Writing Skills []  Laryngeal/Pharyngeal Function   []  Resonance []  Reading Comprehension [x] MFR and manual therapy    []  Breath Support/Coord. []  Cognitive-Linguistic Skills [x] Use of electrolarynx. [x]  Speech Intelligibility []  Safety Awareness []   []  Articulation []  Attention []   []  Fluency []  Memory []       ST included the following:    Manual therapy included myofascial release techniques applied to the anterior and lateral portions of the neck to reduce mechanosensitiviy and improve flexibility and range of motion that is impacted by fibrotic tissue and post surgical tissue changes. Patient tolerated well. Patient reported continued 2/10 pain but decreased tension and stiffness post. Pain primarily located in scalenes. Reviewed neck stretches w/ patient: Upper trapezium stretch, levator scapulae stretch, head drop w/ jaw extension, shoulder and neck circles.  Encouraged patient to keep completing at home.      Patient was able to activate and place electrolarynx w/ intra oral adapter  Independently. Patient likes to place oral adapter between cheek and teeth, which reduces intelligibility.      Patient read aloud phrases w/ use of oral adapter w/ 87% intelligibility, 35/40 trials, subjectively judged. W/ repetition and cues for improved placement and use of \" over-articulation,\" intelligibility improves. Reviewed compensatory swallow strategies w/ patient:   Choose soft and slick foods  Chew thoroughly  Multiple swallows  Liquid wash    Patient reports he continues to have difficulty w/ consumption of \" fried chicken. \" Advised patient that he needs to avoid fried foods. Patient/Caregiver instruction/education:  Swallow strategies, diet modifications, speech strategies   Massage and neck stretches  POC     Pain Level (0-10 scale) post treatment: 2/10      Short Term Goals: To be accomplished in 4-6 weeks  -Oral and pharyngeal strengthening exercises to improve bolus transit and pharyngeal clearance.   -MBS as indicated   -Independently activate and place electrolarynx to improve intelligibility.   -Independent use of speech intelligibility strategies   -tolerate hard solids and thin liquids w/ reduced globus sensation and improved pharyngeal clearance w/ independent use of swallow strategies.    -patient will participate in manual therapytechniques applied to the anterior and lateral portions of the neck to reduce mechanosensitiviy and improve flexibility and ROM impacted by fibrotic, radiated tissue.          Long Term Goals: To be accomplished in 6-8 weeks            -reduced neck pain and improve ROM  -tolerate LRD w/ improved bolus transit and reduced globus sensation  -Independent use and intelligibility with electrolarynx      ASSESSMENT  []   Improving appropriately and progressing toward goals  [x]   Improving slowly and progressing toward goals  []   Approximating goals/maximum potential  []   Continues to benefit from skilled therapy to address remaining functional deficits  []   Not progressing toward goals and plan of care will be adjusted    PLAN   [x]  Continue Plan of Care    []  See progress note/recertification  []  Upgrade activities as tolerated      []  Discharge due to:  []  Other:    MARY Valdez S.CCC-SLP    10/12/2021,

## 2021-10-14 NOTE — PROGRESS NOTES
SPEECH LANGUAGE PATHOLOGY: DAILY NOTE      Patient Name: Jyoti Mosquera   10/14/2021   : 1970  [x]  Patient  Verified  Payor: [facility] MEDICAID / Plan: KAITLIN NICHOLAS [facility] / Product Type: Managed Care Medicaid /   In time: 0915am Out time: 1015am  Total Treatment Time (min): 60  1:1 Treatment Time (MC Only): 60    SUBJECTIVE  Pain Level (0-10 scale): 2/10     Subjective functional status/changes:    Patient seen for ST. He writes, \" doing better, eating more, neck still tight, probably could eat more if neck wasn't tight. Swallow is getting better. \"     OBJECTIVE  Treatment provided includes the following:  Increase/Improve:   []  Voice Quality []  Expressive Language []  Oral Motor Skills   []  Vocal Loudness []  Auditory Comprehension [x]  Eating/Swallowing Skills   []  Vocal Cord Function []  Writing Skills []  Laryngeal/Pharyngeal Function   []  Resonance []  Reading Comprehension [x] MFR, manual massage   []  Breath Support/Coord. []  Cognitive-Linguistic Skills [x] use of electrolarynx   [x]  Speech Intelligibility []  Safety Awareness []   []  Articulation []  Attention []   []  Fluency []  Memory []     ST included the following:     Manual therapy included myofascial release techniques applied to the anterior and lateral portions of the neck to reduce mechanosensitiviy and improve flexibility and range of motion that is impacted by fibrotic tissue and post surgical tissue changes. Patient tolerated well. Patient reported continued 2/10 pain but decreased tension and stiffness post.   Reviewed neck stretches w/ patient: Upper trapezium stretch, levator scapulae stretch, head drop w/ jaw extension, shoulder and neck circles. Encouraged patient to keep completing at home.      Patient was able to activate and place electrolarynx w/ intra oral adapter w/ minimal assist today. Patient tends to place oral adapter on tip of tongue reducing intelligibility. Educated patient to base of tongue placement.    Patient read aloud phrases w/ use of oral adapter w/ 89% intelligibility, 17/19 trials, subjectively judged. Minimal assist to need for repetition and use of \"over-articulation, open mouth posture,\" to improve resonance and intelligibility.  W/ these strategies, intelligibility improves. Showed patient videos of people using electrolarynx w/ oral adapter for additional reference and education. HEP:   Massage, neck stretches    Patient/Caregiver instruction/education:   Swallow function post laryngectomy, diet modifications and placement of electrolarynx     Pain Level (0-10 scale) post treatment: 2/10 in neck    Short Term Goals: To be accomplished in 4-6 weeks  -Oral and pharyngeal strengthening exercises to improve bolus transit and pharyngeal clearance.   -MBS as indicated   -Independently activate and place electrolarynx to improve intelligibility.   -Independent use of speech intelligibility strategies   -tolerate hard solids and thin liquids w/ reduced globus sensation and improved pharyngeal clearance w/ independent use of swallow strategies.    -patient will participate in manual therapytechniques applied to the anterior and lateral portions of the neck to reduce mechanosensitiviy and improve flexibility and ROM impacted by fibrotic, radiated tissue.          Long Term Goals: To be accomplished in 6-8 weeks            -reduced neck pain and improve ROM  -tolerate LRD w/ improved bolus transit and reduced globus sensation  -Independent use and intelligibility with electrolarynx       ASSESSMENT  []   Improving appropriately and progressing toward goals  [x]   Improving slowly and progressing toward goals  [x]   Approximating goals/maximum potential  []   Continues to benefit from skilled therapy to address remaining functional deficits  []   Not progressing toward goals and plan of care will be adjusted    PLAN   [x]  Continue Plan of Care    []  See progress note/recertification  []  Upgrade activities as tolerated      []  Discharge due to:  []  Other:    [facility]    10/14/2021,

## 2021-10-19 NOTE — PROGRESS NOTES
SPEECH LANGUAGE PATHOLOGY: DAILY NOTE      Patient Name: Adriane Dotson   10/19/2021   : 1970  [x]  Patient  Verified  Payor: [facility] MEDICAID / Plan: KAITLIN NICHOLAS [facility] / Product Type: Managed Care Medicaid /   In time: 0815am  Out time: 0900am  Total Treatment Time (min): 45  1:1 Treatment Time (MC Only): 45    SUBJECTIVE  Pain Level (0-10 scale):0/10     Subjective functional status/changes:   Patient seen for ST. He has no new c/o at this time. Writes,\" I am doing o.k. other than my neck is still tight. \"   Patient is inquiring about physical therapy when discharged from [place] [address] provided includes the following:  Increase/Improve:   []  Voice Quality []  Expressive Language []  Oral Motor Skills   []  Vocal Loudness []  Auditory Comprehension []  Eating/Swallowing Skills   []  Vocal Cord Function []  Writing Skills []  Laryngeal/Pharyngeal Function   [x]  Resonance []  Reading Comprehension [x] MFR, manual massage. []  Breath Support/Coord. []  Cognitive-Linguistic Skills [x] Use of electrolarynx   [x]  Speech Intelligibility []  Safety Awareness []   []  Articulation []  Attention []   []  Fluency []  Memory []     ST included the following:     Manual therapy included myofascial release techniques applied to the anterior and lateral portions of the neck to reduce mechanosensitiviy and improve flexibility and range of motion that is impacted by fibrotic tissue and post surgical tissue changes. Patient tolerated well. Patient reported reduced tension and stiffness post.   Reviewed neck stretches w/ patient: Upper trapezium stretch, levator scapulae stretch, head drop w/ jaw extension, shoulder and neck circles. Encouraged patient to keep completing at home.      Patient was able to activate and place electrolarynx w/ intra oral adapter Independently today. Reminded patient about placement and to try and place on medial tongue vs tongue tip.  Patient tends to place oral adapter on tip of tongue reducing intelligibility.       Patient read aloud phrases w/ use of oral adapter w/ 80% intelligibility, 12/15 opportunities, subjectively judged. Need for repetition and use of \"over-articulation, open mouth posture,\" to improve resonance and intelligibility.  W/ these strategies, intelligibility improves. Patient answered open- ended questions w/ 100% intelligibility using electrolarynx. Most of patient's responses were 1-2 word responses. Patient/Caregiver instruction/education:  Continue w/ massage, stretches, diet modifications and compensatory strategies. Patient will be followed for x2 more visits. Educated patient on obtaining referral from PCP for physical therapy for neck pain. Pain Level (0-10 scale) post treatment: 0/10     Short Term Goals: To be accomplished in 4-6 weeks  -Oral and pharyngeal strengthening exercises to improve bolus transit and pharyngeal clearance.   -MBS as indicated   -Independently activate and place electrolarynx to improve intelligibility.   -Independent use of speech intelligibility strategies   -tolerate hard solids and thin liquids w/ reduced globus sensation and improved pharyngeal clearance w/ independent use of swallow strategies.    -patient will participate in manual therapytechniques applied to the anterior and lateral portions of the neck to reduce mechanosensitiviy and improve flexibility and ROM impacted by fibrotic, radiated tissue.          Long Term Goals: To be accomplished in 6-8 weeks            -reduced neck pain and improve ROM  -tolerate LRD w/ improved bolus transit and reduced globus sensation  -Independent use and intelligibility with electrolarynx     ASSESSMENT  []   Improving appropriately and progressing toward goals  [x]   Improving slowly and progressing toward goals  [x]   Approximating goals/maximum potential  []   Continues to benefit from skilled therapy to address remaining functional deficits  []   Not progressing toward goals and plan of care will be adjusted    PLAN   [x]  Continue Plan of Care    []  See progress note/recertification  []  Upgrade activities as tolerated      []  Discharge due to:  []  Other:    MARY [facility]SL[facility]    10/19/2021,

## 2021-10-21 NOTE — PROGRESS NOTES
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 He reports sore arm but that is it. Subjective functional status/changes:   [x] No changes reported  Patient seen for ST. He reports he needs a shortened visit today due to spouse not feeling well and he needs to get back home to her. Patient requesting if clinician can contact his doctor for PT referral for his continued neck pain. Patient reports decreased tension and pain after myofascial release and massage, but it only last two hours and stiffness/tension returns. OBJECTIVE  Treatment provided includes:   IIncrease/Improve:  []  Voice Quality []  Cognitive Linguistic Skills []  Laryngeal/Pharyngeal Exercises   []  Vocal Loudness []  Reading Comprehension []  Swallowing Skills    []  Vocal Cord Function []  Auditory Comprehension []  Oral Motor Skills   []  Resonance []  Writing Skills []  Compensatory strategies    []  Speech Intelligibility []  Expressive Language []  Attention   []  Breath Support/Coord.  []  Receptive language []  Memory   []  Articulation []  Safety Awareness [x] MFR, manual therapy    []  Fluency []  Word Retrieval []        Treatment Provided:  Manual therapy included myofascial release techniques applied to the anterior and lateral portions of the neck to reduce mechanosensitiviy and improve flexibility and range of motion that is impacted by fibrotic tissue and post surgical tissue changes. Patient tolerated well. Patient reported 0/10 pain/tension post.     Reviewed neck stretches w/ patient: Upper trapezium stretch, levator scapulae stretch, head drop w/ jaw extension, shoulder and neck circles. Encouraged patient to keep completing at home.     Patient reports eating better and increasing management of softer and harder solids. Patient/Caregiver  Education: [] Review HEP      Patient will be seen for one more visit. Pain Level (0-10 scale) post treatment: 0/2    ASSESSMENT   []   Improving appropriately and progressing toward goals  []   Improving slowly and progressing toward goals  [x]   Approximating goals/maximum potential  []   Continues to benefit from skilled therapy to address remaining functional deficits  []   Not progressing toward goals and plan of care will be adjusted    Patient will continue to benefit from skilled therapy to address remaining functional deficits: Independent use of electrolarynx, improved intelligibility w/ electrolarynx and swallowing. Progress towards goals / Updated goals:     PLAN  [x]  Continue plan of care  []  Modify Goals/Treatment Plan      []  Discharge due to:  [] Other:    Short Term Goals: To be accomplished in 4-6 weeks  -Oral and pharyngeal strengthening exercises to improve bolus transit and pharyngeal clearance.   -MBS as indicated   -Independently activate and place electrolarynx to improve intelligibility.   -Independent use of speech intelligibility strategies   -tolerate hard solids and thin liquids w/ reduced globus sensation and improved pharyngeal clearance w/ independent use of swallow strategies.    -patient will participate in manual therapytechniques applied to the anterior and lateral portions of the neck to reduce mechanosensitiviy and improve flexibility and ROM impacted by fibrotic, radiated tissue.          Long Term Goals: To be accomplished in 6-8 weeks            -reduced neck pain and improve ROM  -tolerate LRD w/ improved bolus transit and reduced globus sensation  -Independent use and intelligibility with electrolarynx         MARY Kong, M.S. [address] [facility]   10/21/2021  1:57 PM

## 2021-10-21 NOTE — PROGRESS NOTES
1000 22 staples removed from neck incision. R AP removed per order. 1117 pt given cracker and OJ. No c/o dizziness or tremors. Will continue to monitor  1200 lunch annalise at bedside. Pt ate 100% of meal   1212 discharge medications and instructions reviewed with pt. Pt verbalizes understanding. Trach and peg care education provided. Pt verbalizes understanding.   safety maintained Patient: Fran Houser    Procedure Summary     Date: 10/21/21 Room / Location: [facility] ENDOSCOPY 1 /  [facility] OR    Anesthesia Start: 0820 Anesthesia Stop: 0852    Procedure: COLONOSCOPY WITH POLYPECTOMY (N/A ) Diagnosis:       Family history of colon cancer      Encounter for screening for malignant neoplasm of colon      Diverticulosis      Colon polyp      (Family history of colon cancer [Z80.0])      (Encounter for screening for malignant neoplasm of colon [Z12.11])    Surgeons: Elier Poon MD Provider: Venessa Richey MD    Anesthesia Type: MAC ASA Status: 3          Anesthesia Type: MAC    Vitals  Vitals Value Taken Time   /77 10/21/21 0858   Temp 97.8 °F (36.6 °C) 10/21/21 0858   Pulse 47 10/21/21 0858   Resp 20 10/21/21 0858   SpO2 97 % 10/21/21 0858           Post Anesthesia Care and Evaluation    Patient location during evaluation: bedside  Patient participation: complete - patient participated  Level of consciousness: awake and alert  Pain score: 2  Pain management: adequate  Airway patency: patent  Anesthetic complications: No anesthetic complications  PONV Status: none  Cardiovascular status: acceptable  Respiratory status: acceptable  Hydration status: acceptable

## 2021-10-26 NOTE — PROGRESS NOTES
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 He has no new c/o at this time. OBJECTIVE  Treatment provided includes:   IIncrease/Improve:  []  Voice Quality []  Cognitive Linguistic Skills []  Laryngeal/Pharyngeal Exercises   []  Vocal Loudness []  Reading Comprehension []  Swallowing Skills    []  Vocal Cord Function []  Auditory Comprehension []  Oral Motor Skills   []  Resonance []  Writing Skills []  Compensatory strategies    []  Speech Intelligibility []  Expressive Language []  Attention   []  Breath Support/Coord. []  Receptive language []  Memory   []  Articulation []  Safety Awareness [x] manual therapy/massage   []  Fluency []  Word Retrieval [x] use of electrolarynx       Treatment Provided:  Manual therapy included myofascial release techniques applied to the anterior and lateral portions of the neck to reduce mechanosensitiviy and improve flexibility and range of motion that is impacted by fibrotic tissue and post surgical tissue changes. Thick, stiff tissue noted submental. This did improve post massage. Patient reported 0/10 pain/tension post.   Patient reports pain and tension comes and goes.      Patient was able to activate and place electrolarynx w/ intra oral adapter Independently today. Reminded patient about placement and to try and place on medial tongue vs tongue tip. Patient tends to place oral adapter on tip of tongue reducing intelligibility.       Patient read aloud phrases w/ use of oral adapter w/ 95% intelligibility, 19/20 opportunities, subjectively judged, few repetitions needed.  Need for repetition and use of \"over-articulation, and open mouth posture,\" to improve resonance and intelligibility.  W/ these strategies, intelligibility improves. Patient read aloud sentences of 5 + words w/ use of oral adapter w/ 90% intelligibility, subjectively judged, repetitions included. Reviewed ST and LT goals w/ patient. Patient reports consuming a soft diet and thin liquids w/ improved swallow function. He is independently using compensatory swallow strategies of \" increased mastication, effortful swallow, multiple swallows and liquid wash. He reports reduced globus sensation and improved bolus transit w/ using these strategies. He reports he is using his electrolarynx more at home. Patient/Caregiver  Education: [x] Review HEP      Continue w/ exercises and massage. Continue to consume a soft diet and thin liquids w/ use of compensatory swallow strategies. PT referral pending. Pain Level (0-10 scale) post treatment: 0    ASSESSMENT   []   Improving appropriately and progressing toward goals  []   Improving slowly and progressing toward goals  [x]   Approximating goals/maximum potential  []   Continues to benefit from skilled therapy to address remaining functional deficits  []   Not progressing toward goals and plan of care will be adjusted      Progress towards goals / Updated goals:     PLAN  []  Continue plan of care  []  Modify Goals/Treatment Plan      [x]  Discharge due to: goals met  [] Other:    Short Term Goals:  To be accomplished in 4-6 weeks  -Oral and pharyngeal strengthening exercises to improve bolus transit and pharyngeal clearance. [MET]  -MBS as indicated [DISCOUNTINUE]- not warranted  -Independently activate and place electrolarynx to improve intelligibility. [MET]  -Independent use of speech intelligibility strategies [MET]  -tolerate hard solids and thin liquids w/ reduced globus sensation and improved pharyngeal clearance w/ independent use of swallow strategies. [MET]- patient is tolerating a soft diet and thin liquids . -patient will participate in manual therapytechniques applied to the anterior and lateral portions of the neck to reduce mechanosensitiviy and improve flexibility and ROM impacted by fibrotic, radiated tissue.  [PROGRESSING]- pain continues to come and go.  Massage and MFR does reduce it.         Long Term Goals: To be accomplished in 6-8 weeks            -reduced neck pain and improve ROM [MET]  -tolerate LRD w/ improved bolus transit and reduced globus sensation [MET]- patient tolerating a soft diet and thin liquids.   -Independent use and intelligibility with electrolarynx [MET]         MARY Matos, M.S. [id number] [place] [address]   10/26/2021  1:57 PM

## 2021-10-26 NOTE — PROGRESS NOTES
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 He is tolerating mainly a soft diet and thin liquids w/ use of compensatory swallow strategies. He reports improvement in his swallow function c/b reduced globus sensation, smoother bolus transit and tolerance of advanced textures. He is gaining weight. He is able to communicate more intelligibly w/ use of his electrolarynx and oral adapter. Patient has chosen to use the oral adapter for increased comfort and function due to fibrous neck tissue. He benefits from speech strategies of over-articulate and opening mouth to improve resonance. This is something he continues to work on. His neck pain still comes and goes. Massage and myofascial release techniques do reduce neck pain and tension. He does have some reduced ROM and would benefit from PT for neck pain and reduced ROM. A referral is pending. No further ST services warranted at this time. Educated patient to on-going recommendations and POC. Patient in agreement. Short Term Goals:  To be accomplished in 4-6 weeks  -Oral and pharyngeal strengthening exercises to improve bolus transit and pharyngeal clearance. [MET]  -MBS as indicated [DISCOUNTINUE]- not warranted  -Independently activate and place electrolarynx to improve intelligibility. [MET]  -Independent use of speech intelligibility strategies [MET]  -tolerate hard solids and thin liquids w/ reduced globus sensation and improved pharyngeal clearance w/ independent use of swallow strategies. [MET]- patient is tolerating a soft diet and thin liquids . -patient will participate in manual therapytechniques applied to the anterior and lateral portions of the neck to reduce mechanosensitiviy and improve flexibility and ROM impacted by fibrotic, radiated tissue.  [PROGRESSING]- pain continues to come and go.  Massage and MFR does reduce it.         Long Term Goals: To be accomplished in 6-8 weeks            -reduced neck pain and improve ROM [MET]  -tolerate LRD w/ improved bolus transit and reduced globus sensation [MET]- patient tolerating a soft diet and thin liquids.   -Independent use and intelligibility with electrolarynx [MET]         RECOMMENDATIONS:  [x]Discontinue therapy: [x]Patient has reached or is progressing toward set goals     []Patient is non-compliant or has abdicated     []Due to lack of appreciable progress towards set goals     []Other    Ty Cam M.S. [address] [facility]

## 2021-11-16 NOTE — PROGRESS NOTES
Chief Complaint   Patient presents with   Ronna Souza is a pleasant 49year old male who presents as a follow up for throat cancer.  He denies pain

## 2021-11-16 NOTE — LETTER
11/16/2021    Patient: Tato Epps   YOB: 1970   Date of Visit: 11/16/2021     Tony Espinoza NP  [address] [place] [address]  Via Fax: [phone]    Dear Tony Espinoza NP,      Thank you for referring Mr. Tato Epps to [address] [place] [address] for evaluation. My notes for this consultation are attached. If you have questions, please do not hesitate to call me. I look forward to following your patient along with you.       Sincerely,    Kirby Leon NP

## 2021-11-16 NOTE — TELEPHONE ENCOUNTER
[address]  Social Work Navigator Encounter     Patient Name:  Jeremy Overall    Medical History: none on file; conversation deferred    Advance Directives: none on file; conversation deferred    Narrative: Call placed to [employer] ([phone]) and arranged transportation for the following medical appointment:    11/22 at 9 am (8:30am arrival time) - PET/CT at [facility]  [address] [facility], [address] [place] [address]  Confirmation # [id number]    Called patient to confirm transportation has been scheduled. Advised appointment details; no food 4 hrs prior to procedure, can drink plenty of water, no gum, smoking, or caffeine 4 hrs prior. Barriers to Care: transportation    Plan:  1. Medicaid transportation scheduled for PET on 11/22.      Referral:   Transportation referral    Thank you,  Savannah Gonzales, LCSW

## 2021-11-16 NOTE — PROGRESS NOTES
[facility] Short Visit Progress Note    [id number] Patient admitted to [facility] for port flush and labs ambulatory in stable condition. Assessment completed, patient has complaints of increased heart rate over the past two weeks. He has denied starting any new medications. Covid Screening      1. Do you have any symptoms of COVID-19? SOB, coughing, fever, or generally not feeling well ? NO  2. Have you been exposed to COVID-19 recently? NO  3. Have you had any recent contact with family/friend that has a pending COVID test? NO    Vital Signs:  Visit Vitals  /60   Pulse (!) 106   Temp 97.7 °F (36.5 °C)   Resp 18   SpO2 100%         Right chest wall port with positive blood return, labs drawn and sent for processing. Lab Results:  Recent Results (from the past 12 hour(s))   CBC WITH AUTOMATED DIFF    Collection Time: 11/16/21 10:30 AM   Result Value Ref Range    WBC 2.8 (L) 4.1 - 11.1 K/uL    RBC 3.24 (L) 4.10 - 5.70 M/uL    HGB 10.5 (L) 12.1 - 17.0 g/dL    HCT 31.7 (L) 36.6 - 50.3 %    MCV 97.8 80.0 - 99.0 FL    MCH 32.4 26.0 - 34.0 PG    MCHC 33.1 30.0 - 36.5 g/dL    RDW 14.3 11.5 - 14.5 %    PLATELET 170 437 - 558 K/uL    MPV 10.2 8.9 - 12.9 FL    NRBC 0.0 0  WBC    ABSOLUTE NRBC 0.00 0.00 - 0.01 K/uL    NEUTROPHILS 75 32 - 75 %    LYMPHOCYTES 11 (L) 12 - 49 %    MONOCYTES 14 (H) 5 - 13 %    EOSINOPHILS 0 0 - 7 %    BASOPHILS 0 0 - 1 %    IMMATURE GRANULOCYTES 0 0.0 - 0.5 %    ABS. NEUTROPHILS 2.1 1.8 - 8.0 K/UL    ABS. LYMPHOCYTES 0.3 (L) 0.8 - 3.5 K/UL    ABS. MONOCYTES 0.4 0.0 - 1.0 K/UL    ABS. EOSINOPHILS 0.0 0.0 - 0.4 K/UL    ABS. BASOPHILS 0.0 0.0 - 0.1 K/UL    ABS. IMM.  GRANS. 0.0 0.00 - 0.04 K/UL    DF SMEAR SCANNED      RBC COMMENTS NORMOCYTIC, NORMOCHROMIC     METABOLIC PANEL, COMPREHENSIVE    Collection Time: 11/16/21 10:30 AM   Result Value Ref Range    Sodium 138 136 - 145 mmol/L    Potassium 4.0 3.5 - 5.1 mmol/L    Chloride 102 97 - 108 mmol/L    CO2 27 21 - 32 mmol/L Anion gap 9 5 - 15 mmol/L    Glucose 74 65 - 100 mg/dL    BUN 8 6 - 20 MG/DL    Creatinine 0.90 0.70 - 1.30 MG/DL    BUN/Creatinine ratio 9 (L) 12 - 20      GFR est AA >60 >60 ml/min/1.73m2    GFR est non-AA >60 >60 ml/min/1.73m2    Calcium 9.6 8.5 - 10.1 MG/DL    Bilirubin, total 0.4 0.2 - 1.0 MG/DL    ALT (SGPT) 15 12 - 78 U/L    AST (SGOT) 42 (H) 15 - 37 U/L    Alk. phosphatase 48 45 - 117 U/L    Protein, total 7.6 6.4 - 8.2 g/dL    Albumin 3.5 3.5 - 5.0 g/dL    Globulin 4.1 (H) 2.0 - 4.0 g/dL    A-G Ratio 0.9 (L) 1.1 - 2.2     TSH 3RD GENERATION    Collection Time: 11/16/21 10:30 AM   Result Value Ref Range    TSH 16.50 (H) 0.36 - 3.74 uIU/mL   T4, FREE    Collection Time: 11/16/21 10:30 AM   Result Value Ref Range    T4, Free 0.7 (L) 0.8 - 1.5 NG/DL           Medications:  N/A     Patient tolerated treatment well. Patient discharged from [place] [address] ambulatory in no distress at 1045. Patient aware of next appointment.     Future Appointments   Date Time Provider Nathanael Haas   11/22/2021  9:00 AM SRM PET 1 [facility]   11/30/2021 11:15 AM Lui Aviles, PT, DPT [facility]   2/8/2022 10:30 [facility]   2/8/2022 10:45 AM Tish Mascorro NP [facility]

## 2021-11-16 NOTE — TELEPHONE ENCOUNTER
11/16/21 1:27 PM Called brother, Valencia Dixon, on PHI and advised pt thyroid function is worsening. He needs to try restarting his synthroid - he needs to call if he is not able to take this medication. Also, advised Reggie Oneil will call with transportation and PET scan appt time. Valencia Dixon verbalized understanding and will relay message to patient.

## 2021-11-19 NOTE — PROGRESS NOTES
11/19/21 1:44 PM Called [facility] health insurance. PET was not approved. CT of neck and chest was approved. Placed orders and will reach out to patient's POC (brother) with change of plans.     Authorization # [id number] valid 11/18/21-12/16/21

## 2021-11-19 NOTE — TELEPHONE ENCOUNTER
[employer] at [facility]  [phone]        11/19/21 2:13 PM Called [facility]. Spoke with [employer]. Advised of PET denial per NP message. Scheduled CT chest and neck at [facility] on 11/22. Patient should arrive at 8:30 AM and register in the main lobby. Per 's note, transportation already arranged for this date and time. Patient should be NPO four hours prior to test, but clear liquid is okay to drink. Attempted to call patient's brother, Fara Diana, to update him of above. No answer and voicemail is presently full. Will attempt to call again later if no return call received. 3:25 PM Attempted to call patient's brother, Fara Diana. No answer and voicemail presently full. Patient unable to communicate via phone. Will notify NP that this nurse has been unable to reach family to provide update. 4:18 PM Attempted to call patient's brother again. No answer and unable to leave voicemail. Will attempt again early Monday morning per NP.     11/22/21 9:14 AM This nurse unable to attempt to contact patient's brother this morning. Per chart review, appears patient has arrived for imaging. Will notify NP as well.

## 2021-11-19 NOTE — TELEPHONE ENCOUNTER
Deandra Morales from [address] [place] [address] called and stated this patient is scheduled for a PET scan next week and a peer to peer needs to be done. Please advise.      # [id number]  Reference number [id number]

## 2021-11-22 NOTE — PROGRESS NOTES
Called patient's brother, Karely Gill, and advised of EKG results per Rosalva Doan NP. Verified that patient has started levothyroxine. No further questions or concerns at this time.

## 2021-11-30 NOTE — TELEPHONE ENCOUNTER
[employer] at [facility]  [phone]        11/30/21 3:34 PM Attempted to place return call to Pat Engel via contact number provided. No answer and voicemail has not been set up, unable to leave message. Per recent PT note, \"Pt has been having some bleeding on the left side of his stoma and is unsure why, he would like help calling his PCP to get an earlier appointment. Pt states it has been happening off and on for 2 months and he thought it would get better. \" Will attempt to call Pat Engel again later if no return call received. 12/01/21 3:31 PM Called [facility]. She states patient is scheduled to see Dr. Pavel Covarrubias, ENT in [facility], tomorrow to evaluate for bleeding. Pat Engel stated that patient has noted some blood on the \"thing on his throat. \" Patient denies hemoptysis. No tenderness around stoma and denies swelling around stoma. Advised this nurse would update provider of above. Encouraged patient and Pat Engel to contact office if further questions or needs arise. She voiced understanding.

## 2021-11-30 NOTE — PROGRESS NOTES
[address]  Ph: [phone]    Fax: [phone]    Initial Evaluation/Plan of Care/Statement of Necessity for Physical Therapy Services     Patient name: Hyun Key    Date/Start of Care:2021  : 1970  [x]  Patient  Verified Provider#: [id number]          Referral source: Koko Marsh NP Return visit to MD: 22     Medical/Treatment Diagnosis: Neck pain [M54.2]    Payor: [facility] MEDICAID / [facility] Product Type: Managed Care Medicaid /       Prior Hospitalization: see medical history     Comorbidities: COPD  Prior Level of Function: independent  Medications: Verified on Patient Summary List       Patient / Family readiness to learn indicated by: asking questions and trying to perform skills  Persons(s) to be included in education: patient (P)  Barriers to Learning/Limitations: yes;  other communication - unable to talk  Patient Self Reported Health Status: fair  Rehabilitation Potential: fair  Previous Treatment/Compliance: speech therapy  PMHx/Surgical Hx: laryngectomy 2021  Work Hx: not working  Living Situation: lives with spouse  Barriers to progress: communication, fibrotic scar tissue  Motivation: good  Substance use: none reported  Cognition: A & O x 4   Onset Date: 2021    Visit #:     SUBJECTIVE  From SLP notes: Pt is a 50 yo male w/ 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 2021, patient underwent a total laryngectomy and cricopharyngeal myotomy. S/p laryngectomy, and prior to his initiation of radiation, he had a recurrence of laryngeal cancer. Patient completed radiation treatment and attended speech therapy at this clinic to improve speech, communication, and swallow. Pt prefers to communicate with pen/paper as this is easier but has an electrolarynx with oral adapter.   Pt has tightness in the anterior neck which he states worsens when eating. He also has pain when sleeping, but denies difficulty with other functional tasks. Pt has been having some bleeding on the left side of his stoma and is unsure why, he would like help calling his PCP to get an earlier appointment. Pt states it has been happening off and on for 2 months and he thought it would get better. Pt lives with spouse and his brother assists with communications. PAIN  Area of pain: anterior neck and throat     Pain Level (0-10 scale): 3-7/10  Aggravating factors: eating  Things that ease pain: sitting up and relaxing    OBJECTIVE:   Physical Findings   Ortho:   Posture: Forward head posture, rounded shoulders. Increase R lat flexion and L rotation neck posturing in supine position  Palpation: Severe TTP anterior neck musculature including SCM bilaterally, anterior scalenes. Moderate TTP posterior cervical muscles including upper trap, splenius, posterior scalenes, suboccipitals. Worse on the L side. R SCM tightness, Increased tissue turgor on the L side with fibrotic scar tissue and skin discoloration bilaterally from radiation treatment. Gait/Functional Mobility:  Independent  Gross findings:  Complete laryngotomy with stoma and larytube    Spine: *normal values in ()  CERVICAL SPINE    AROM       PROM  Flexion 43    Extension 32        AROM        PROM   R L R L   Lat Flexion 30 25     Rotation 50 53     Additional Comments: pain at end range of motion    Specific joints:   SHOULDER                                         AROM   PROM            MMT   R L R L R L   Flexion (180) WFL WFL   5 5   Extension (60)         Abduction (180) WFL WFL       Adduction (0)         IR (70) WFL WFL   4 5   ER (90) WFL WFL   4 5   Additional comments: no pain or difficulty         Ampac Score:   0%    ASSESSMENT/Changes in Function:   Pt is a 49year old male presenting to outpatient PT services with diagnosis of neck pain s/p laryngectomy and radiation treatment.  Pt has difficulty with eating due to neck tightness, scar tissue, increased pain. Denies difficulty with other functional tasks. Pt presents with significant muscle tightness and fibrotic tissue through the anterior cervical neck musculature, decreased ROM, and tenderness to palpation, worse on the L side. Pt also notes bleeding around the L side of the stoma off and on for the past few months. Recommended he call PCP for next steps. Pt will benefit from skilled PT services to address impairments and allow improved ROM, tissue extensibility, and decrease pain. Problem List/Impairments: pain affecting function, decrease ROM and decrease flexibility/ joint mobility  Treatment Plan may include any combination of the following: Therapeutic exercise, Physical agent/modality, Manual therapy, Patient education and Self Care training  Patient/ Caregiver education and instruction: self care and exercises  Frequency / Duration: Patient to be seen 2-3 times per week for 16-24 treatments. Certification Period: 11/30/21 - 2/28/22    Patient Goal (s): reduce pain    [] Met [] Not met [] Partially met   Short Term Goals: To be accomplished in 6-8 treatments. 1. Patient will be compliant with HEP to assist in pain reduction. 2. Patient will demo 5-10 degree improvement in cervical ROM in all planes. Long Term Goals: To be accomplished in 16-24 treatments. 1. Patient will report decrease in pain and difficulty with eating due to neck pain/tightness. 2. Patient will report decrease in neck pain to max of 4/10 per VAS.     TODAY'S TREATMENT:  In time:1120am   Out time:1215pm  Total Treatment Time (min): 55     Pain Level (0-10 scale) pre treatment: 4/10     Pain Level (0-10 scale) post treatment: 4/10     EVALUATION PERFORMED      With   [] TE   [] TA   [] Neuro   [] SC   [] other: Patient Education: [x] Review HEP    [] Progressed/Changed HEP based on:   [] positioning   [] body mechanics   [] transfers   [] heat/ice application [x] other: recommended pt call MD about bleeding, attempted to call for pt but unable to leave message at MD office     [x]  Plan of care has been reviewed with PTA. The Plan of Care is based on information from the initial evaluation. Marla Stacy, PT, DPT 11/30/2021   ________________________________________________________________________    I certify that the above Therapy Services are being furnished while the patient is under my care. I agree with the treatment plan and certify that this therapy is necessary.     Physician's Signature:_________________________________________________  Date:____________Time: ____________     Sherrell Martinez NP

## 2021-11-30 NOTE — TELEPHONE ENCOUNTER
Patients girlfriend Jimbo Jimena called and stated this patient needs to come in for an appointment because he has had some bleeding in his throat. Please advise.     Cb # [phone]

## 2021-12-04 NOTE — ED TRIAGE NOTES
Pt coming from home c/o CP since this AM, no cardiac history. Tachycardic en route, other vitals stable.

## 2021-12-04 NOTE — DISCHARGE INSTRUCTIONS
Thank you! Thank you for allowing me to care for you in the emergency department. I sincerely hope that you are satisfied with your visit today. It is my goal to provide you with excellent care. Below you will find a list of your labs and imaging from your visit today. Should you have any questions regarding these results please do not hesitate to call the emergency department. Labs -     Recent Results (from the past 12 hour(s))   CBC WITH AUTOMATED DIFF    Collection Time: 12/04/21  2:15 PM   Result Value Ref Range    WBC 2.9 (L) 4.1 - 11.1 K/uL    RBC 3.11 (L) 4.10 - 5.70 M/uL    HGB 9.9 (L) 12.1 - 17.0 g/dL    HCT 29.9 (L) 36.6 - 50.3 %    MCV 96.1 80.0 - 99.0 FL    MCH 31.8 26.0 - 34.0 PG    MCHC 33.1 30.0 - 36.5 g/dL    RDW 15.1 (H) 11.5 - 14.5 %    PLATELET 556 624 - 732 K/uL    MPV 9.7 8.9 - 12.9 FL    NRBC 0.0 0.0  WBC    ABSOLUTE NRBC 0.00 0.00 - 0.01 K/uL    NEUTROPHILS 81 (H) 32 - 75 %    LYMPHOCYTES 6 (L) 12 - 49 %    MONOCYTES 13 5 - 13 %    EOSINOPHILS 0 0 - 7 %    BASOPHILS 0 0 - 1 %    IMMATURE GRANULOCYTES 0 0 - 0.5 %    ABS. NEUTROPHILS 2.3 1.8 - 8.0 K/UL    ABS. LYMPHOCYTES 0.2 (L) 0.8 - 3.5 K/UL    ABS. MONOCYTES 0.4 0.0 - 1.0 K/UL    ABS. EOSINOPHILS 0.0 0.0 - 0.4 K/UL    ABS. BASOPHILS 0.0 0.0 - 0.1 K/UL    ABS. IMM. GRANS. 0.0 0.00 - 0.04 K/UL    DF AUTOMATED     METABOLIC PANEL, COMPREHENSIVE    Collection Time: 12/04/21  2:15 PM   Result Value Ref Range    Sodium 137 136 - 145 mmol/L    Potassium 3.4 (L) 3.5 - 5.1 mmol/L    Chloride 102 97 - 108 mmol/L    CO2 28 21 - 32 mmol/L    Anion gap 7 5 - 15 mmol/L    Glucose 73 65 - 100 mg/dL    BUN 15 6 - 20 mg/dL    Creatinine 0.94 0.70 - 1.30 mg/dL    BUN/Creatinine ratio 16 12 - 20      GFR est AA >60 >60 ml/min/1.73m2    GFR est non-AA >60 >60 ml/min/1.73m2    Calcium 9.0 8.5 - 10.1 mg/dL    Bilirubin, total 0.3 0.2 - 1.0 mg/dL    AST (SGOT) 47 (H) 15 - 37 U/L    ALT (SGPT) 15 12 - 78 U/L    Alk.  phosphatase 45 45 - 117 U/L Protein, total 8.1 6.4 - 8.2 g/dL    Albumin 3.3 (L) 3.5 - 5.0 g/dL    Globulin 4.8 (H) 2.0 - 4.0 g/dL    A-G Ratio 0.7 (L) 1.1 - 2.2     TROPONIN-HIGH SENSITIVITY    Collection Time: 12/04/21  2:15 PM   Result Value Ref Range    Troponin-High Sensitivity 6 0 - 76 ng/L   MAGNESIUM    Collection Time: 12/04/21  2:15 PM   Result Value Ref Range    Magnesium 1.8 1.6 - 2.4 mg/dL   CK    Collection Time: 12/04/21  2:15 PM   Result Value Ref Range     (H) 39 - 308 U/L       Radiologic Studies -   XR CHEST PORT   Final Result   Bilateral pulmonary airspace disease, increasing in the left lower   lung. CT Results  (Last 48 hours)      None          CXR Results  (Last 48 hours)                 12/04/21 1410  XR CHEST PORT Final result    Impression:  Bilateral pulmonary airspace disease, increasing in the left lower   lung. Narrative:  Chest pain. Comparison chest CT 11/22/2021. FINDINGS: Single frontal view of the chest. Right port distal tip SVC/RA region. Unchanged cardiomediastinal silhouette. Increasing left lower lung opacity. Similar right middle lobe and right upper lung opacity. Similar to improved left   upper lung opacity. Small left pleural effusion or pleural thickening. No   pneumothorax. No free air under the diaphragm. Surgical staples at the thoracic   inlet and neck. Bony structures grossly intact. If you feel that you have not received excellent quality care or timely care, please ask to speak to the nurse manager. Please choose us in the future for your continued health care needs. ------------------------------------------------------------------------------------------------------------  The exam and treatment you received in the Emergency Department were for an urgent problem and are not intended as complete care.  It is important that you follow-up with a doctor, nurse practitioner, or physician assistant to:  (1) confirm your diagnosis,  (2) re-evaluation of changes in your illness and treatment, and  (3) for ongoing care. If your symptoms become worse or you do not improve as expected and you are unable to reach your usual health care provider, you should return to the Emergency Department. We are available 24 hours a day. Please take your discharge instructions with you when you go to your follow-up appointment. If you have any problem arranging a follow-up appointment, contact the Emergency Department immediately. If a prescription has been provided, please have it filled as soon as possible to prevent a delay in treatment. Read the entire medication instruction sheet provided to you by the pharmacy. If you have any questions or reservations about taking the medication due to side effects or interactions with other medications, please call your primary care physician or contact the ER to speak with the charge nurse. Make an appointment with your family doctor or the physician you were referred to for follow-up of this visit as instructed on your discharge paperwork, as this is a mandatory follow-up. Return to the ER if you are unable to be seen or if you are unable to be seen in a timely manner. If you have any problem arranging the follow-up visit, contact the Emergency Department immediately.

## 2021-12-04 NOTE — ED PROVIDER NOTES
EMERGENCY DEPARTMENT HISTORY AND PHYSICAL EXAM      Date: 12/4/2021  Patient Name: Ted Ríos    History of Presenting Illness     Chief Complaint   Patient presents with    Chest Pain       HPI: Ted Ríos, 49 y.o. male with history of laryngectomy due to throat cancer without any evidence of metastasis with a recently discovered lung nodule that is being investigated presenting with left-sided chest pain. The chest pain is the first episode, has not had this pain before. It is described as pressure-like left-sided chest pain that waxes and wanes and last for hours at its maximum intensity. It is associated with tenderness to palpation over the left side of the chest and  pain on deep inspiration. He does not have any shortness of breath, orthopnea, PND, or lower extremity edema. Has not had any nausea, or vomiting. No lightheadedness, dizziness, or palpitations. Has not had any muscle cramps. No trauma. No fevers or chills. No cough. No URI symptoms. Has received his COVID-19 vaccines. PCP: Alok Ríos NP    Current Outpatient Medications   Medication Sig Dispense Refill    acetaminophen (TYLENOL) 325 mg tablet Take 2 Tablets by mouth every six (6) hours as needed for Pain. 20 Tablet 0    methocarbamoL (Robaxin-750) 750 mg tablet Take 1 Tablet by mouth three (3) times daily as needed for Muscle Spasm(s). 20 Tablet 0    levothyroxine (SYNTHROID) 25 mcg tablet Take 1 Tablet by mouth Daily (before breakfast). Do not take with other medications. 30 Tablet 0    nystatin (MYCOSTATIN) 100,000 unit/mL suspension Take 5 mL by mouth four (4) times daily. swish and spit 200 mL 2    magic mouthwash solution Magic mouth wash   Maalox   Lidocaine 2% viscous   Diphenhydramine oral solution     Pharmacy to mix equal portions of ingredients to a total volume as indicated in the dispense amount.      Swish and spit 10mL every 4 hours as needed for mouth pain, okay to swallow for throat pain 420 mL 2    nut tx, lact-reduced, iron (Boost VHC) 0.09-2.25 gram-kcal/mL liqd Take 4 Cans by mouth daily. 30 Bottle 3    guaiFENesin ER (Mucinex) 600 mg ER tablet Take 1 Tablet by mouth two (2) times a day. 60 Tablet 1    potassium chloride (K-DUR, KLOR-CON) 20 mEq tablet Take 1 Tablet by mouth two (2) times a day. 60 Tablet 1    lidocaine-prilocaine (EMLA) topical cream Apply a dime size amount to port site 30-60 minutes before chemotherapy to prevent pain with access. 30 g 0    metFORMIN (GLUCOPHAGE) 500 mg tablet Take  by mouth two (2) times daily (with meals). (Patient not taking: Reported on 10/5/2021)      aluminum-magnesium hydroxide 200-200 mg/5 mL susp 5 mL, diphenhydrAMINE 12.5 mg/5 mL liqd 5 mL, lidocaine 2 % soln 5 mL 5 mL by Swish and Spit route two (2) times a day. Magic mouth wash   Maalox  Lidocaine 2% viscous   Diphenhydramine oral solution     Pharmacy to mix equal portions of ingredients to a total volume as indicated in the dispense amount.          Medical History   I reviewed the medical, surgical, family, and social history, as well as allergies:    Past Medical History:  Past Medical History:   Diagnosis Date    Chronic pain     THROAT, EARS, NECK R/T CANCER    COPD (chronic obstructive pulmonary disease) ([facility] [address]     [facility]    Psychiatric disorder     ANXIETY R/T CANCER    Throat cancer ([facility] [address]     Tracheostomy present ([facility] [address]        Past Surgical History:  Past Surgical History:   Procedure Laterality Date    HX ORTHOPAEDIC      LEFT ARM- \" PUT PLATE IN\"    HX TRACHEOSTOMY  02/08/2021    IR INSERT TUNL CVC W PORT OVER 5 YEARS  5/21/2021    IR PLACE CVAD FLUORO GUIDE  5/21/2021       Family History:  Family History   Problem Relation Age of Onset    Diabetes Mother     Diabetes Father     Kidney Disease Father     Diabetes Sister     Heart Disease Daughter     Anesth Problems Neg Hx        Social History:  Social History     Tobacco Use    Smoking status: Former Smoker Packs/day: 1.50     Years: 30.00     Pack years: 37.1     Quit date: 10/28/2020     Years since quittin.1    Smokeless tobacco: Never Used   Vaping Use    Vaping Use: Never used   Substance Use Topics    Alcohol use: Not Currently    Drug use: Never       Allergies:  No Known Allergies    Review of Systems     Review of Systems   All other systems reviewed and are negative. Physical Exam and Vital Signs   Vital Signs - Reviewed the patient's vital signs. No data found. Physical Exam:    GENERAL: awake, alert, cooperative, not in distress  HEENT:  * Pupils equal, EOMI  * Head atraumatic  CV:  * regular rhythm  * warm and perfused extremities bilaterally  *Chest wall tenderness to palpation over the left side that reproduces the pain that he feels on deep inspiration  PULMONARY: Good air movement, no wheezes or crackles  ABDOMEN: soft, not distended, no guarding, not tenderness to palpation  : No suprapubic tenderness  EXTREMITIES/BACK: warm and perfused, no tenderness, no edema  SKIN: no rashes or signs of trauma  NEURO:  * Speech clear  * Moves U&LE to command      Medical Decision Making and ED Course   - I am the first and primary provider for this patient and am the primary provider of record. - I reviewed the vital signs, available nursing notes, past medical history, past surgical history, family history and social history. - Initial assessment performed. The patients presenting problems have been discussed, and the staff are in agreement with the care plan formulated and outlined with them. I have encouraged them to ask questions as they arise throughout their visit. - Available medical records, nursing notes, old EKGs, and EMS run sheets (if patient was EMS transported) were reviewed    MDM:   Patient is a 49 y.o. male presenting for rest pain. Vitals reveal no abnormalities and physical exam reveals left chest wall tenderness to palpation. EKG showed no abnormalities.  Based on the history, physical exam, risk factors, and vitals signs, differential includes: Costochondritis, pneumothorax, pulmonary edema, pneumonia, muscle spasm, Tietze's syndrome, ACS. I doubt PE as the patient does not have any hypoxia, shortness of breath, or tachycardia, and no concern for CHF. Will not do D-dimer or BNP. Results     Labs:  No results found for this or any previous visit (from the past 12 hour(s)). Radiologic Studies:  CT Results  (Last 48 hours)    None        CXR Results  (Last 48 hours)               12/04/21 1410  XR CHEST PORT Final result    Impression:  Bilateral pulmonary airspace disease, increasing in the left lower   lung. Narrative:  Chest pain. Comparison chest CT 11/22/2021. FINDINGS: Single frontal view of the chest. Right port distal tip SVC/RA region. Unchanged cardiomediastinal silhouette. Increasing left lower lung opacity. Similar right middle lobe and right upper lung opacity. Similar to improved left   upper lung opacity. Small left pleural effusion or pleural thickening. No   pneumothorax. No free air under the diaphragm. Surgical staples at the thoracic   inlet and neck. Bony structures grossly intact. Medications ordered:  Medications   methocarbamoL (ROBAXIN) tablet 1,500 mg (1,500 mg Oral Given 12/4/21 1420)   ibuprofen (MOTRIN) tablet 600 mg (600 mg Oral Given 12/4/21 1420)   potassium chloride (KLOR-CON) packet for solution 40 mEq (40 mEq Oral Given 12/4/21 1833)   acetaminophen (TYLENOL) tablet 650 mg (650 mg Oral Given 12/4/21 1919)        ED Course     ED Course:     ED Course as of 12/05/21 1556   Sat Dec 04, 2021   1459 CBC does not show any evidence of acute process. Leukopenia at baseline. Hemoglobin at baseline without evidence of acute anemia. Platelet count is normal.   [SS]   1501 CXR: Increasing left lower lung opacity. Similar right middle lobe and right upper lung opacity. Improving ROSENDO opacity.  [SS]   3585 Magnesium within normal limits. Trop 6, will trend. CPK does not reflect any significant rhabdomyolysis. [SS]   1551 Beside mild hypokalemia, electrolytes are within range. Creatinine is not elevated more than baseline range making TASHI unlikely. No significant transaminitis noted. Normal bilirubin. [SS]   9748 Patient signed out to me pending repeat troponin. Repeat troponin noted to be 6. Patient stable for discharge home at this time. [RS]      ED Course User Index  [RS] Moise Arriaza DO  [SS] Sonia Morris MD       Reassessment / Disposition / Discussion:    Repeat trop normal. Patient DCed. Final Disposition     Disposition: Condition stable  DC- Adult Discharges: All of the diagnostic tests were reviewed and questions answered. Diagnosis, care plan and treatment options were discussed. The patient understands the instructions and will follow up as directed. The patients results have been reviewed with them. They have been counseled regarding their diagnosis. The patient verbally convey understanding and agreement of the signs, symptoms, diagnosis, treatment and prognosis and additionally agrees to follow up as recommended with their PCP in 24 - 48 hours. They also agree with the care-plan and convey that all of their questions have been answered. I have also put together some discharge instructions for them that include: 1) educational information regarding their diagnosis, 2) how to care for their diagnosis at home, as well a 3) list of reasons why they would want to return to the ED prior to their follow-up appointment, should their condition change. DISCHARGE PLAN:  1. Current Discharge Medication List      START taking these medications    Details   acetaminophen (TYLENOL) 325 mg tablet Take 2 Tablets by mouth every six (6) hours as needed for Pain.   Qty: 20 Tablet, Refills: 0      methocarbamoL (Robaxin-750) 750 mg tablet Take 1 Tablet by mouth three (3) times daily as needed for Muscle Spasm(s). Qty: 20 Tablet, Refills: 0         CONTINUE these medications which have NOT CHANGED    Details   levothyroxine (SYNTHROID) 25 mcg tablet Take 1 Tablet by mouth Daily (before breakfast). Do not take with other medications. Qty: 30 Tablet, Refills: 0    Associated Diagnoses: Acquired hypothyroidism      nystatin (MYCOSTATIN) 100,000 unit/mL suspension Take 5 mL by mouth four (4) times daily. swish and spit  Qty: 200 mL, Refills: 2    Associated Diagnoses: Squamous cell carcinoma of larynx ([facility] [address] Throat pain in adult; Oral thrush      magic mouthwash solution Magic mouth wash   Maalox   Lidocaine 2% viscous   Diphenhydramine oral solution     Pharmacy to mix equal portions of ingredients to a total volume as indicated in the dispense amount. Swish and spit 10mL every 4 hours as needed for mouth pain, okay to swallow for throat pain  Qty: 420 mL, Refills: 2    Associated Diagnoses: Throat pain in adult; Squamous cell carcinoma of larynx (HCC)      nut tx, lact-reduced, iron (Boost VHC) 0.09-2.25 gram-kcal/mL liqd Take 4 Cans by mouth daily. Qty: 30 Bottle, Refills: 3    Comments: Strawberry flavor  Associated Diagnoses: Squamous cell carcinoma of larynx (HCC)      guaiFENesin ER (Mucinex) 600 mg ER tablet Take 1 Tablet by mouth two (2) times a day. Qty: 60 Tablet, Refills: 1    Associated Diagnoses: Squamous cell carcinoma of larynx ([facility] [address] Increased oropharyngeal secretions      potassium chloride (K-DUR, KLOR-CON) 20 mEq tablet Take 1 Tablet by mouth two (2) times a day. Qty: 60 Tablet, Refills: 1    Associated Diagnoses: Hypokalemia      lidocaine-prilocaine (EMLA) topical cream Apply a dime size amount to port site 30-60 minutes before chemotherapy to prevent pain with access. Qty: 30 g, Refills: 0    Associated Diagnoses: Squamous cell carcinoma of larynx (HCC)      metFORMIN (GLUCOPHAGE) 500 mg tablet Take  by mouth two (2) times daily (with meals).       aluminum-magnesium hydroxide 200-200 mg/5 mL susp 5 mL, diphenhydrAMINE 12.5 mg/5 mL liqd 5 mL, lidocaine 2 % soln 5 mL 5 mL by Swish and Spit route two (2) times a day. Magic mouth wash   Maalox  Lidocaine 2% viscous   Diphenhydramine oral solution     Pharmacy to mix equal portions of ingredients to a total volume as indicated in the dispense amount. 2.   Follow-up Information     Follow up With Specialties Details Why Contact Sherif Enrique NP Nurse Practitioner Schedule an appointment as soon as possible for a visit in 3 days  [address] [place] [address] EMERGENCY DEPT Emergency Medicine Go to  If symptoms worsen [address] [place] [address] Drive [address] [phone]        3. Return to ED if worse   4. Discharge Medication List as of 12/4/2021  8:00 PM      START taking these medications    Details   acetaminophen (TYLENOL) 325 mg tablet Take 2 Tablets by mouth every six (6) hours as needed for Pain., Normal, Disp-20 Tablet, R-0      methocarbamoL (Robaxin-750) 750 mg tablet Take 1 Tablet by mouth three (3) times daily as needed for Muscle Spasm(s). , Normal, Disp-20 Tablet, R-0         CONTINUE these medications which have NOT CHANGED    Details   levothyroxine (SYNTHROID) 25 mcg tablet Take 1 Tablet by mouth Daily (before breakfast). Do not take with other medications. , Normal, Disp-30 Tablet, R-0      nystatin (MYCOSTATIN) 100,000 unit/mL suspension Take 5 mL by mouth four (4) times daily. swish and spit, Normal, Disp-200 mL, R-2      magic mouthwash solution Magic mouth wash   Maalox   Lidocaine 2% viscous   Diphenhydramine oral solution     Pharmacy to mix equal portions of ingredients to a total volume as indicated in the dispense amount. Swish and spit 10mL every 4 hours as needed for mouth pain, okay  to swallow for throat pain, Print, Disp-420 mL, R-2      nut tx, lact-reduced, iron (Boost VHC) 0.09-2.25 gram-kcal/mL liqd Take 4 Cans by mouth daily. , No Print, Disp-30 Bottle, R-3Strawberry flavor      guaiFENesin ER (Mucinex) 600 mg ER tablet Take 1 Tablet by mouth two (2) times a day., Normal, Disp-60 Tablet, R-1      potassium chloride (K-DUR, KLOR-CON) 20 mEq tablet Take 1 Tablet by mouth two (2) times a day., Normal, Disp-60 Tablet, R-1      lidocaine-prilocaine (EMLA) topical cream Apply a dime size amount to port site 30-60 minutes before chemotherapy to prevent pain with access., Normal, Disp-30 g, R-0      metFORMIN (GLUCOPHAGE) 500 mg tablet Take  by mouth two (2) times daily (with meals). , Historical Med      aluminum-magnesium hydroxide 200-200 mg/5 mL susp 5 mL, diphenhydrAMINE 12.5 mg/5 mL liqd 5 mL, lidocaine 2 % soln 5 mL 5 mL by Swish and Spit route two (2) times a day. Magic mouth wash   Maalox  Lidocaine 2% viscous   Diphenhydramine oral solution     Pharmacy to mix equal portions of ingredients to a total volume as indicated in the dispense amount., Historical Med             ED Procedures & Consultations   Performed by: Prudencio Parada MD  Procedures     EKG interpretation (Preliminary):  Rhythm: normal sinus rhythm; and regular . Rate (approx.): 94. Axis: normal;  RI interval: normal;  QRS interval: normal ;  ST/T wave: normal;  Other findings: normal.      Diagnosis     Clinical Impression:   1. Hypokalemia    2. Muscular chest pain        Attestations:    Prudencio Parada MD    Please note that this dictation was completed with [email or web address], the [employer] voice recognition software. Quite often unanticipated grammatical, syntax, homophones, and other interpretive errors are inadvertently transcribed by the computer software. Please disregard these errors. Please excuse any errors that have escaped final proofreading. Thank you.

## 2021-12-04 NOTE — ED NOTES
Bedside shift change report given to Makeda Urban RN (oncoming nurse) by Sweetie Adair RN (offgoing nurse). Report included the following information SBAR, Kardex, ED Summary, [facility] and Recent Results.

## 2021-12-05 NOTE — TELEPHONE ENCOUNTER
Received a call by Radiologist. He was reviewing CT from Nov. Noticed a small PE. Since this is incidental and not super acute, I did not call or bring the patient in. Please address tomorrow.

## 2021-12-06 NOTE — TELEPHONE ENCOUNTER
[address] at [place]  [phone]        12/06/21 2:29 PM Attempted to call [facility] via mobile number listed. Someone answered but did not respond when this nurse requested to speak with Seble Nicholas. Call placed to patient's brother, Ceola Goldmann. He states patient reported feeling \"so-so\" yesterday. Patient did not have specific complaints per Ceola Goldmann-- he states patient did not mention having chest pain or SOB at that time. He has not seen or spoken with patient today. Asked that Ceola Goldmann contact office if patient is having any chest pain or shortness of breath. Will also update Dr. Olena Yan and Leela Robin of above. He voiced understanding.

## 2021-12-07 NOTE — TELEPHONE ENCOUNTER
12/07/21 4:19 PM   Called brother Vinny Adame, on [place]. Discussed radiologist re-reviewed November CT scan and found small blood clot - advised this may be contributing to some of the patient's SOB. Advised I would like him to start on eliquis starter pack- 10mg BID x 7 days, then 5mg BID - reviewed instructions and potential side effects - advised patient should start tonight. Will follow up with patient in the office on Friday. Vinny Adame verbalized understanding and will relay message to the patient. Ears: no ear pain and no hearing problems.Nose: no nasal congestion and no nasal drainage.Mouth/Throat: no dysphagia, no hoarseness and no throat pain.Neck: no lumps, no pain, no stiffness and no swollen glands.

## 2021-12-07 NOTE — TELEPHONE ENCOUNTER
Attempted to call patients brother to make appt for this Friday at 5 or 9:45 per Dr. Lorena Verma. No answer.  Left a vm

## 2021-12-08 NOTE — ONCOLOGY PATHWAY NOTE
DISCONTINUE ON PATHWAY REGIMEN - Head and Neck    [id number]        Cisplatin (Platinol)     **Always confirm dose/schedule in your pharmacy ordering system**    REASON: Disease Progression  PRIOR TREATMENT: [id number]  TREATMENT RESPONSE: Partial Response ([place])    START ON PATHWAY REGIMEN - Head and Neck    [id number]        Pembrolizumab Samir Both)     **Always confirm dose/schedule in your pharmacy ordering system**    Patient Characteristics:  Larynx, Metastatic, First Line, Prior Platinum-Based Chemoradiation within 6 Months, Candidate for Checkpoint Inhibitor  Disease Classification: Larynx  AJCC T Category: T4  AJCC 8 Stage Grouping: Unknown  Therapeutic Status: Metastatic Disease  AJCC N Category: cN0  AJCC M Category: M0  Line of Therapy: First Line  Prior Platinum Status: Prior Platinum-Based Chemoradiation within 6 Months  Checkpoint Inhibitor Candidacy: Candidate for Checkpoint Inhibitor  Intent of Therapy:  Non-Curative / Palliative Intent, Discussed with Patient

## 2021-12-08 NOTE — PROGRESS NOTES
[id number] [facility] Oncology at [address] [place] [address]  [phone]    Hematology / Oncology Established Visit    Reason for Visit:   Danika Larsen is a 49 y.o. male who is seen in follow up of laryngeal cancer. Referred by Dr. 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 Stage: CATARINO [[id number]] at diagnosis, regional recurrence in 4/2021    Pathology:   2/8/21 Total laryngectomy: Invasive squamous cell carcinoma  Tumor size 3.5cm, moderately differentiated (G2), PNI present, LVI not identified, margins negative  [id number]    Prior Treatment:   1. 2/8/21 total laryngectomy/cricopharyngeal myotomy  2. Cisplatin 100mg/m2 every 3 weeks x 3 cycles, 5/24/21-7/13/21  3. Radiation 5/24/21-7/19/21    Current Treatment:  Surveillance    History of Present Illness:   Danika Larsen is a 49 y.o. male who with COPD who is referred for Head and neck cancer. He presented with throat pain and hoarseness in Sept 2020. Was evaluated by Dr. Ирина Flor in ENT who performed laryngoscopy and diagnosed patient with squamous cell carcinoma involving the larynx and subglottis. In late Dec 2020, neck CT showed a 3 cm mass in Right supraglottic larynx with extension to thyroid cartilage, but no cervical LNs. PET 1/4/21 showed uptake in the laryngeal mass at right vocal fold, extending to thyroid cartilage. He was scheduled for surgery, but he required emergent tracheostomy prior to that; done late Jan 2021. Also had PEG placed 1/30/21. On 2/8/21, he underwent total laryngectomy/cricopharyngeal myotomy. He quit smoking in Feb 2021. He was evaluated by Dr. Sherron Sanders in [address] [place] [address] in March 2021 who recommended post-op radiation. There were multiple delays given difficult getting in for dental evaluation prior to RT. [facility] simulation scan was notable for a necrotic appearing tumor in Right neck  . PET 5/7/21 confirmed hypermetabolic uptake in right neck.     PEG removed in March 2021 due to problems with it. Per Dr. Ros Garvey, pt had 7 teeth extracted by dentist. He returns to the dentist again for dental fillings on 5/26/21. Pt states his neck feel tight, causing pulling sensation but no current pain. Interval History:  Patient here for follow up of throat cancer and review of recent imaging. Of note, had ED visit on 12/4/21 for chest pain- EKG, cxr and trop were normal. Today, reports having dyspnea with exertion, bilateral upper chest pain that comes and goes. Reports heart rate is consistently elevated - reports hydrating with 48 oz water daily. Found to have a blood clot on recent CT scan and started on eliquis. Taking levothyroxine as directed. Has not started PT of neck - plans to start next Friday. Lost weight - reports eating 3 meals daily - mainly sticking to soft diet. Using xylimelts for mouth dryness. Reports stoma on left side is painful with intermittent bleeding - saw Dr. Swain who said it is due to radiation - scab present today. PMHx: COPD, throat cancer, anxiety  PSurgHx: Left arm plate placement, tracheostomy, total laryngectomy 2/8/21  SHx: Quit smoking 10/28/20 after 45 pack years. No EtOH  FHx: Mother with DM; Father with DM, CKD; Sister with DM. No h/o malignancy. Review of Systems: A complete review of systems was obtained, negative except as described above. Physical Exam:   Blood pressure 127/87, pulse (!) 125, temperature (!) 96 °F (35.6 °C), temperature source Temporal, height 6' (1.829 m), weight 150 lb 9.6 oz (68.3 kg), SpO2 96 %. ECOG PS: 0  General: Well developed, no acute distress  Eyes: Anicteric sclerae  HENT: Atraumatic  Neck: Supple, Tracheostomy noted, small scab present left side of stoma - TTP to outside of stoma  Lymphatic: No supraclavicular, axillary adenopathy. No bilateral cervical LAD, but firm skin at neck bilaterally.    Respiratory: CTAB, normal respiratory effort  CV: tachycardia, regular rhythm, no murmurs, no peripheral edema  GI: Soft, nontender, nondistended, no masses  MS: Normal gait and station. Digits without clubbing or cyanosis. 1cm firm nodule on left wrist.  Skin: No rashes. Hyperpigmented skin at neck bilaterally. Neuro/Psych: Alert. Communicates by writing given tracheostomy. Results:     Lab Results   Component Value Date/Time    WBC 2.9 (L) 2021 02:15 PM    HGB 9.9 (L) 2021 02:15 PM    HCT 29.9 (L) 2021 02:15 PM    PLATELET 221  02:15 PM    MCV 96.1 2021 02:15 PM    ABS. NEUTROPHILS 2.3 2021 02:15 PM     Lab Results   Component Value Date/Time    Sodium 137 2021 02:15 PM    Potassium 3.4 (L) 2021 02:15 PM    Chloride 102 2021 02:15 PM    CO2 28 2021 02:15 PM    Glucose 73 2021 02:15 PM    BUN 15 2021 02:15 PM    Creatinine 0.94 2021 02:15 PM    GFR est AA >60 2021 02:15 PM    GFR est non-AA >60 2021 02:15 PM    Calcium 9.0 2021 02:15 PM    Glucose (POC) 113 10/04/2021 01:04 PM     Lab Results   Component Value Date/Time    Bilirubin, total 0.3 2021 02:15 PM    ALT (SGPT) 15 2021 02:15 PM    Alk. phosphatase 45 2021 02:15 PM    Protein, total 8.1 2021 02:15 PM    Albumin 3.3 (L) 2021 02:15 PM    Globulin 4.8 (H) 2021 02:15 PM     Lab Results   Component Value Date/Time    Iron 67 2021 11:23 AM    TIBC 409 2021 11:23 AM    Iron % saturation 16 (L) 2021 11:23 AM    Ferritin 466 (H) 2021 11:23 AM      Lab Results   Component Value Date/Time    Vitamin B12 664 2021 11:23 AM    Folate 24.0 (H) 2021 11:23 AM         Imagin/28/21 Neck CT: IMPRESSION  Irregular soft tissue mass involving the aryepiglottic and the larynx. There is significant narrowing of the airway at the level of the larynx  and the supraglottic region. Poorly defined cervical adenopathy is noted.     21 Chest CTA:  IMPRESSION  Minimal scarring or subsegmental atelectasis in the left lung base. No evidence of pulmonary embolism or pneumonia. 5/7/21 PET:  IMPRESSION  2 large necrotic appearing mass lesions are noted in the right neck (SUV 6.4). Small hypermetabolic right posterior triangle lymph node. No PET avid evidence  of distant metastatic disease. 10/4/21 PET:  FINDINGS:  HEAD/NECK: There is physiologic tracer activity in the oral cavity and piriform  sinuses. Physiologic tracer activity is also seen in the neck musculature,  particularly the right sternocleidomastoid muscle. 2 enlarged right level 2  cervical lymph nodes are slightly decreased in size; these demonstrate no  significant residual FDG activity. Previously seen small lymph node in the right  posterior cervical triangle also demonstrates no residual FDG activity. CHEST: There is a new 3.0 x 2.6 cm focus of nodular airspace disease in the left  upper lobe which demonstrates increased FDG activity, maximal SUV 2.8. New foci  of nodular airspace disease in the left lower lobe, measuring 1.2 cm and 1.0 cm,  in the right lower lobe, measuring 0.8 cm, and in the right middle lobe,  measuring 1.6 cm, demonstrate no appreciable FDG activity. ABDOMEN/PELVIS: No foci of abnormal hypermetabolism. SKELETON: No foci of abnormal hypermetabolism in the axial and visualized  appendicular skeleton. IMPRESSION  1. Previously seen enlarged cervical lymph nodes demonstrate no residual  abnormal FDG activity. 2. New foci of nodular airspace disease in both lungs, including a 3 cm area in  the left upper lobe which demonstrates low-grade increased FDG activity. Findings may be infectious etiology, although neoplasm is not excluded;  attention on follow-up examinations is recommended. 11/22/21 CT neck:  IMPRESSION  No significant interval change since the previous CT PET  examination. Necrotic right level 2A lymph nodes unchanged in size. No definite  new pathologic lymph nodes by CT criteria.  Postradiation changes as described  above. Narrowed nasopharyngeal and oropharyngeal airway unchanged. No new pathologic lymphadenopathy by CT criteria is demonstrated. 11/22/21 CT chest:   There is a 15 mm node in the right hilum, station 10 R, series 201 image 39. There is increasing multifocal multi lobar inflammatory appearing airspace  disease in the lungs most suggestive of multifocal pneumonia. Dominant focus of  airspace disease in the lingula on series 204 image 41 measures 3.3 x 3.2 cm,  previously 3.0 x 2.6 cm. Likewise other foci have increased in size with  dominant lesion on the right measuring 2.8 x 1.9 cm on image 50, previously 16  mm. Suspicious right upper lobe nodules are present including a dominant 6 x 9 mm  nodule on series 204 image 25. There are no suspicious lytic or blastic skeletal lesions in the thorax. Incidental imaging of the upper abdomen demonstrates liver steatosis with  probable focal steatosis adjacent to the fissure for the falciform ligament. There may be a small retrocardiac hiatal hernia. IMPRESSION  Increasing multifocal airspace disease with new suspicious solid appearing  nodules in the right upper lobe. Assessment & Plan:   Ngozi Montez is a 49 y.o. male is seen for laryngeal cancer. 1. Squamous cell carcinoma of larynx, Stage CATARINO [pT4a cN2 Mx]:  S/p total laryngectomy and tracheostomy in Jan 2021. Afterwards, he developed disease recurrence in Right neck confirmed by PET scan. I recommended concurrent chemoradiation using Cisplatin to treat his disease. Pt completed concurrent chemoradiation radiation 7/19/21. Evidence of local treatment response per physical exam and 10/4/21 PET. However repeat imaging with chest CT 11/22/21 shows increasing size in lung nodules, suspicious for disease progression outside of prior radiation field. Per discussion with Thoracic Tumor Board, these nodules would be amenable to biopsy by navigation bronchoscopy if desired.  However, RadOnc and I feel this is obvious disease progression given extent of disease at diagnosis. Will proceed with systemic therapy now. 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 -- Discussed with patient about disease progression. Discussed pros/cons of biopsy to confirm metastatic disease (pro: confirmation rather than assumption of metastatic disease; ability to send metastatic tissue for NGS; con: will lead to treatment delay of clinically likely metastatic disease. ) He wants to proceed with treatment without repeating biopsy. -- Testing prior tissue for PDL1 status - follow up results. ,   -- Discussed Pembrolizumab alone vs Cis-5FU-Pembrolizumab (both with plan to continue Pembrolizumab for 2 yrs.) First option preferred with PDL1 CPS >= 20. Will start Pembrolizumab alone and uptitrate if needed depending on PDL1 score. -- [Cisplatin (100 mg/m2 intravenous, day 1) and fluorouracil (1000 mg/m2 per day, continuous infusion, for four days) is a standard combination chemotherapy regimen for use in the palliative setting. In phase III trials, this regimen produced response rates around 30 percent.]  -- Return on 12/16/21 for C1 of Pembrolizumab    2. Supportive care / Survivorship:  Discussed the following today:   -- Dental evaluation for oral cavity and sites exposed to significant intraoral radiation treatment  -- Evaluate for nutritional needs, depression prn  -- Alcohol counseling     3. COPD / H/o tobacco abuse:   Quit in 2/2021. Uses Albuterol inhaler prn.    4. FEN/GI / Elevated protein:   Odynophagia 2/2 RT continues to improve. Senna-plus BID prn constipation. 5. Leukopenia:   2/2 prior chemotherapy and should continue to improve. 6. Normocytic anemia:   2/2 prior chemotherapy and improving. B12, folate normal; ferritin high and iron sat mildly low. 7. Hypothyroidism:   Due to prior radiation. On levothyroxine 25mcg/d - will decreased to every other day given tachycardia.  Previously sent communication to PCP for management. -- Recheck TSH/free T4 in 6 weeks on 12/28/21. 8. Pulmonary embolism:  Provoked by #1. 11/2021 CT chest. On eliquis. 9. Bleeding/tenderness at stoma site:  Scab present today - TTP. Evaluated by Dr. Ирина Flor and told bleeding is due to prior radiation. -- Will trial course antibiotics - doxycycline 100mg BID x 7 days. 10. Tachycardia:   EKG, trop negative. Encouraged hydration with 64 oz fluids daily. May be related to PE or levothyroxine. -- Will recheck next week. Emotional well being: Pt is coping well with his/her disease and has excellent support. I have personally seen and evaluated the patient in conjunction with Christofer Leal NP. I find the patient's history and physical exam are consistent with the NP's documentation. I agree with the above assessment and plan, which I have modified as needed. Pt unfortunately has disease progression and will need to restart on systemic treatment. Will start on Keytruda initially, with plan to add Cis-5-FU if PDL1 score is low.      Signed By: Ana Barriga MD     12/08/21

## 2021-12-10 NOTE — TELEPHONE ENCOUNTER
[address]  Social Work Navigator Encounter     Patient Name:  Sherie Rdz     Medical History: h&n cancer    Advance Directives: none on file    Narrative: Called patient to inform transportation (Medicaid/ #[id number]) has been arranged for the appointments listed below. No answer and unable to leave voicemail message. Will attempt to reach patient again. 12/16 at 8:30am   Trip # [phone]    1/6 at 10:00am   Trip # [id number]    Barriers to Care: non-verbal     Plan:  1. Transportation arranged for treatment appointments.      Referral:   Transportation referral    Thank you,   Valentin Lambert LCSW

## 2021-12-10 NOTE — PROGRESS NOTES
Robert Harrington is a 49 y.o. male here for follow up of squamous cell carcinoma of larynx / glottis. Patient with complaints of chest pain, rates as a 4 out of 10.

## 2021-12-10 NOTE — PATIENT INSTRUCTIONS
1. Increase fluids to 64 oz water daily. 2. Start taking levothyroxine EVERY OTHER day - see if this helps with your tachycardia    3. The elevated heart rate may be due to the recent blood clot or side effects from the levothyroxine. 4. We wait to place cardiology referral to see if the above interventions help decrease your heart rate    5. I am starting you on immunotherapy with Keytruda - we are hoping that the tissue sample with have a high PDL1 number - if the tissue sample does not - then we will have to switch to chemotherapy     6. Start taking tylenol 500mg every 4-6 hours as needed for throat pain - we are going to wait on giving you antibiotics because it may be inflammation.

## 2021-12-13 NOTE — TELEPHONE ENCOUNTER
Digna from the authorization department called and stated this patient received a denial for his Keytruda. She stated a peer to peer needs to be done.      Peer to Peer Cb # [phone]

## 2021-12-13 NOTE — TELEPHONE ENCOUNTER
[address] at [facility]  [phone]        12/13/21 2:04 PM Attempted to call Digna with authorization department back. No answer, left message requesting return call. Provided office phone number as well.     12/14/21 9:17 AM Received return call from [address] No[address] of NP message. Provided Digna with contact phone number and asked that she contact this office if any further action needed thereafter. She voiced understanding.

## 2021-12-16 NOTE — PROGRESS NOTES
[facility] - Chemotherapy Progress Note    C 1 D 1 Keytruda ambulatory in stable condition. Assessment completed. No new concerns voiced. PAC with positive blood return. Patient assessment and access by Jasmin Warren. Pt is unable to speak due trach but can write to communicate and answers question with gestures/nods. Chemotherapy Flowsheet 12/16/2021   Cycle C1D1   Date 12/16/2021   Drug / Regimen Keytruda   Pre Hydration -   Post Hydration -   Pre Meds -   Notes fluids       Visit Vitals  /88   Pulse (!) 103   Temp 98 °F (36.7 °C)   Resp 18   Ht 6' (1.829 m)   Wt 67.9 kg (149 lb 12.8 oz)   SpO2 100%   BMI 20.32 kg/m²     Medications:  Medications Administered     0.9% sodium chloride infusion     Admin Date  12/16/2021 Action  New Bag Dose  25 mL/hr Rate  25 mL/hr Route  IntraVENous Administered By  Magda Fields RN          0.9% sodium chloride infusion 500 mL     Admin Date  12/16/2021 Action  New Bag Dose  500 mL Rate  1,000 mL/hr Route  IntraVENous Administered By  Magda Fields RN          0.9% sodium chloride injection 10 mL     Admin Date  12/16/2021 Action  Given Dose  10 mL Route  IntraVENous Administered By  Magda Fields RN          heparin (porcine) pf 300-500 Units     Admin Date  12/16/2021 Action  Given Dose  500 Units Route  InterCATHeter Administered By  Magda Fields RN          pembrolizumab (KEYTRUDA) 200 mg in 0.9% sodium chloride 100 mL, overfill volume 10 mL IVPB     Admin Date  12/16/2021 Action  New Bag Dose  200 mg Rate  236 mL/hr Route  IntraVENous Administered By  Magda Fields RN              Additional labs to be resulted in CC.  1315 Pt tolerated treatment well. PACmaintained positive blood return throughout treatment, flushed with positive blood return at conclusion and throughout treatment. D/c home ambulatory in no distress. Pt aware of next appointment scheduled for 1/6/22.     Recent Results (from the past 12 hour(s))   CBC WITH AUTOMATED DIFF Collection Time: 12/16/21  8:52 AM   Result Value Ref Range    WBC 3.3 (L) 4.1 - 11.1 K/uL    RBC 2.69 (L) 4.10 - 5.70 M/uL    HGB 8.8 (L) 12.1 - 17.0 g/dL    HCT 26.6 (L) 36.6 - 50.3 %    MCV 98.9 80.0 - 99.0 FL    MCH 32.7 26.0 - 34.0 PG    MCHC 33.1 30.0 - 36.5 g/dL    RDW 15.9 (H) 11.5 - 14.5 %    PLATELET 960 095 - 837 K/uL    MPV 9.8 8.9 - 12.9 FL    NRBC 0.0 0  WBC    ABSOLUTE NRBC 0.00 0.00 - 0.01 K/uL    NEUTROPHILS 75 32 - 75 %    LYMPHOCYTES 9 (L) 12 - 49 %    MONOCYTES 16 (H) 5 - 13 %    EOSINOPHILS 0 0 - 7 %    BASOPHILS 0 0 - 1 %    IMMATURE GRANULOCYTES 0 0.0 - 0.5 %    ABS. NEUTROPHILS 2.5 1.8 - 8.0 K/UL    ABS. LYMPHOCYTES 0.3 (L) 0.8 - 3.5 K/UL    ABS. MONOCYTES 0.5 0.0 - 1.0 K/UL    ABS. EOSINOPHILS 0.0 0.0 - 0.4 K/UL    ABS. BASOPHILS 0.0 0.0 - 0.1 K/UL    ABS. IMM. GRANS. 0.0 0.00 - 0.04 K/UL    DF SMEAR SCANNED      RBC COMMENTS ANISOCYTOSIS  1+        RBC COMMENTS MACROCYTOSIS  1+       METABOLIC PANEL, COMPREHENSIVE    Collection Time: 12/16/21  8:52 AM   Result Value Ref Range    Sodium 140 136 - 145 mmol/L    Potassium 3.6 3.5 - 5.1 mmol/L    Chloride 103 97 - 108 mmol/L    CO2 28 21 - 32 mmol/L    Anion gap 9 5 - 15 mmol/L    Glucose 79 65 - 100 mg/dL    BUN 10 6 - 20 MG/DL    Creatinine 0.82 0.70 - 1.30 MG/DL    BUN/Creatinine ratio 12 12 - 20      GFR est AA >60 >60 ml/min/1.73m2    GFR est non-AA >60 >60 ml/min/1.73m2    Calcium 9.4 8.5 - 10.1 MG/DL    Bilirubin, total 0.4 0.2 - 1.0 MG/DL    ALT (SGPT) 14 12 - 78 U/L    AST (SGOT) 36 15 - 37 U/L    Alk.  phosphatase 53 45 - 117 U/L    Protein, total 8.1 6.4 - 8.2 g/dL    Albumin 3.2 (L) 3.5 - 5.0 g/dL    Globulin 4.9 (H) 2.0 - 4.0 g/dL    A-G Ratio 0.7 (L) 1.1 - 2.2     TSH 3RD GENERATION    Collection Time: 12/16/21  8:52 AM   Result Value Ref Range    TSH 36.80 (H) 0.36 - 3.74 uIU/mL

## 2021-12-16 NOTE — TELEPHONE ENCOUNTER
[address]  Social Work Navigator Encounter     Patient Name:  Cresencio García    Medical History: h&n cancer    Advance Directives: none on file    Narrative: Called can scheduled transportation for patient's EKG appointment on 12/20 at [facility]. Pickup at 1:00pm  Trip# [phone]    Patient aware of transportation. Per Archana Pepe NP reiterate patient should call EMS if experiencing chest pains or rapid heart beat. Barriers to Care: transportation     Plan:  1. Transportation scheduled.      Referral:   Transportation referral    Thank you,   Earlene Rivera LCSW

## 2021-12-16 NOTE — PROGRESS NOTES
Chief Complaint   Patient presents with   Bennett Rudolph is a pleasant 49year old male who presents as a follow up for throat cancer.  He reports chest pain

## 2021-12-16 NOTE — PROGRESS NOTES
[id number] [facility] at [address] [place] [address]  [phone]    Hematology / Oncology Established Visit    Reason for Visit:   Sheldon Smiley is a 49 y.o. male who is seen in follow up of laryngeal cancer. Referred by Dr. 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 Stage: CATARINO [[id number]] at diagnosis, regional recurrence in 4/2021    Pathology:   2/8/21 Total laryngectomy: Invasive squamous cell carcinoma  Tumor size 3.5cm, moderately differentiated (G2), PNI present, LVI not identified, margins negative  [id number]    Prior Treatment:   1. 2/8/21 total laryngectomy/cricopharyngeal myotomy  2. Cisplatin 100mg/m2 every 3 weeks x 3 cycles, 5/24/21-7/13/21  3. Radiation 5/24/21-7/19/21    Current Treatment:  Surveillance    History of Present Illness:   Sheldon Smiley is a 49 y.o. male who with COPD who is referred for Head and neck cancer. He presented with throat pain and hoarseness in Sept 2020. Was evaluated by Dr. Meghna Senior in ENT who performed laryngoscopy and diagnosed patient with squamous cell carcinoma involving the larynx and subglottis. In late Dec 2020, neck CT showed a 3 cm mass in Right supraglottic larynx with extension to thyroid cartilage, but no cervical LNs. PET 1/4/21 showed uptake in the laryngeal mass at right vocal fold, extending to thyroid cartilage. He was scheduled for surgery, but he required emergent tracheostomy prior to that; done late Jan 2021. Also had PEG placed 1/30/21. On 2/8/21, he underwent total laryngectomy/cricopharyngeal myotomy. He quit smoking in Feb 2021. He was evaluated by Dr. Erika Hyde in [address] [place] [address] in March 2021 who recommended post-op radiation. There were multiple delays given difficult getting in for dental evaluation prior to RT. [facility] simulation scan was notable for a necrotic appearing tumor in Right neck  . PET 5/7/21 confirmed hypermetabolic uptake in right neck.     PEG removed in March 2021 due to problems with it. Per Dr. Justin Kamara, pt had 7 teeth extracted by dentist. He returns to the dentist again for dental fillings on 5/26/21. Pt states his neck feel tight, causing pulling sensation but no current pain. Interval History:  Patient here for follow up of throat cancer and review of recent imaging. Reports last night his heart rate was high - was uncomfortable and unable to sleep - with associated chest pain. Reports elevated HR normally occurs intermittently during the night - not as much during the day. Denies current CP, or SOB right now. Due to high heart rate - did not take eliquis today and reportedly has not been taking levothyroxine. Lost weight - reports eating 3 meals daily - mainly sticking to soft diet. Using xylimelts for mouth dryness. Reports stoma on left side is slightly less painful - no bleeding since last week. PMHx: COPD, throat cancer, anxiety  PSurgHx: Left arm plate placement, tracheostomy, total laryngectomy 2/8/21  SHx: Quit smoking 10/28/20 after 45 pack years. No EtOH  FHx: Mother with DM; Father with DM, CKD; Sister with DM. No h/o malignancy. Review of Systems: A complete review of systems was obtained, negative except as described above. Physical Exam:   Blood pressure 135/88, pulse (!) 103, resp. rate 18, height 6' (1.829 m), weight 149 lb 11.1 oz (67.9 kg), SpO2 100 %. ECOG PS: 0  General: Well developed, no acute distress  Eyes: Anicteric sclerae  HENT: Atraumatic  Neck: Supple, Tracheostomy noted, small scab present left side of stoma - TTP to outside of stoma  Lymphatic: No supraclavicular, axillary adenopathy. No bilateral cervical LAD, but firm skin at neck bilaterally. Respiratory: CTAB, normal respiratory effort  CV: tachycardia, regular rhythm, no murmurs, no peripheral edema  GI: Soft, nontender, nondistended, no masses  MS: Normal gait and station. Digits without clubbing or cyanosis. 1cm firm nodule on left wrist.  Skin: No rashes. Hyperpigmented skin at neck bilaterally. Neuro/Psych: Alert. Communicates by writing given tracheostomy. Results:     Lab Results   Component Value Date/Time    WBC 2.9 (L) 2021 02:15 PM    HGB 9.9 (L) 2021 02:15 PM    HCT 29.9 (L) 2021 02:15 PM    PLATELET 642  02:15 PM    MCV 96.1 2021 02:15 PM    ABS. NEUTROPHILS 2.3 2021 02:15 PM     Lab Results   Component Value Date/Time    Sodium 137 2021 02:15 PM    Potassium 3.4 (L) 2021 02:15 PM    Chloride 102 2021 02:15 PM    CO2 28 2021 02:15 PM    Glucose 73 2021 02:15 PM    BUN 15 2021 02:15 PM    Creatinine 0.94 2021 02:15 PM    GFR est AA >60 2021 02:15 PM    GFR est non-AA >60 2021 02:15 PM    Calcium 9.0 2021 02:15 PM    Glucose (POC) 113 10/04/2021 01:04 PM     Lab Results   Component Value Date/Time    Bilirubin, total 0.3 2021 02:15 PM    ALT (SGPT) 15 2021 02:15 PM    Alk. phosphatase 45 2021 02:15 PM    Protein, total 8.1 2021 02:15 PM    Albumin 3.3 (L) 2021 02:15 PM    Globulin 4.8 (H) 2021 02:15 PM     Lab Results   Component Value Date/Time    Iron 67 2021 11:23 AM    TIBC 409 2021 11:23 AM    Iron % saturation 16 (L) 2021 11:23 AM    Ferritin 466 (H) 2021 11:23 AM      Lab Results   Component Value Date/Time    Vitamin B12 664 2021 11:23 AM    Folate 24.0 (H) 2021 11:23 AM         Imagin/28/21 Neck CT: IMPRESSION  Irregular soft tissue mass involving the aryepiglottic and the larynx. There is significant narrowing of the airway at the level of the larynx  and the supraglottic region. Poorly defined cervical adenopathy is noted. 21 Chest CTA:  IMPRESSION  Minimal scarring or subsegmental atelectasis in the left lung base. No evidence of pulmonary embolism or pneumonia.     21 PET:  IMPRESSION  2 large necrotic appearing mass lesions are noted in the right neck (SUV 6.4). Small hypermetabolic right posterior triangle lymph node. No PET avid evidence  of distant metastatic disease. 10/4/21 PET:  FINDINGS:  HEAD/NECK: There is physiologic tracer activity in the oral cavity and piriform  sinuses. Physiologic tracer activity is also seen in the neck musculature,  particularly the right sternocleidomastoid muscle. 2 enlarged right level 2  cervical lymph nodes are slightly decreased in size; these demonstrate no  significant residual FDG activity. Previously seen small lymph node in the right  posterior cervical triangle also demonstrates no residual FDG activity. CHEST: There is a new 3.0 x 2.6 cm focus of nodular airspace disease in the left  upper lobe which demonstrates increased FDG activity, maximal SUV 2.8. New foci  of nodular airspace disease in the left lower lobe, measuring 1.2 cm and 1.0 cm,  in the right lower lobe, measuring 0.8 cm, and in the right middle lobe,  measuring 1.6 cm, demonstrate no appreciable FDG activity. ABDOMEN/PELVIS: No foci of abnormal hypermetabolism. SKELETON: No foci of abnormal hypermetabolism in the axial and visualized  appendicular skeleton. IMPRESSION  1. Previously seen enlarged cervical lymph nodes demonstrate no residual  abnormal FDG activity. 2. New foci of nodular airspace disease in both lungs, including a 3 cm area in  the left upper lobe which demonstrates low-grade increased FDG activity. Findings may be infectious etiology, although neoplasm is not excluded;  attention on follow-up examinations is recommended. 11/22/21 CT neck:  IMPRESSION  No significant interval change since the previous CT PET  examination. Necrotic right level 2A lymph nodes unchanged in size. No definite  new pathologic lymph nodes by CT criteria. Postradiation changes as described  above. Narrowed nasopharyngeal and oropharyngeal airway unchanged. No new pathologic lymphadenopathy by CT criteria is demonstrated.     11/22/21 CT chest: There is a 15 mm node in the right hilum, station 10 R, series 201 image 39. There is increasing multifocal multi lobar inflammatory appearing airspace  disease in the lungs most suggestive of multifocal pneumonia. Dominant focus of  airspace disease in the lingula on series 204 image 41 measures 3.3 x 3.2 cm,  previously 3.0 x 2.6 cm. Likewise other foci have increased in size with  dominant lesion on the right measuring 2.8 x 1.9 cm on image 50, previously 16  mm. Suspicious right upper lobe nodules are present including a dominant 6 x 9 mm  nodule on series 204 image 25. There are no suspicious lytic or blastic skeletal lesions in the thorax. Incidental imaging of the upper abdomen demonstrates liver steatosis with  probable focal steatosis adjacent to the fissure for the falciform ligament. There may be a small retrocardiac hiatal hernia. IMPRESSION  Increasing multifocal airspace disease with new suspicious solid appearing  nodules in the right upper lobe. Assessment & Plan:   Nirali Anthony is a 49 y.o. male is seen for laryngeal cancer. 1. Squamous cell carcinoma of larynx, Stage CATARINO [pT4a cN2 Mx]:  S/p total laryngectomy and tracheostomy in Jan 2021. Afterwards, he developed disease recurrence in Right neck confirmed by PET scan. I recommended concurrent chemoradiation using Cisplatin to treat his disease. Pt completed concurrent chemoradiation radiation 7/19/21. Evidence of local treatment response per physical exam and 10/4/21 PET. However repeat imaging with chest CT 11/22/21 shows increasing size in lung nodules, suspicious for disease progression outside of prior radiation field. Per discussion with Thoracic Tumor Board, these nodules would be amenable to biopsy by navigation bronchoscopy if desired. However, RadOnc and I feel this is obvious disease progression given extent of disease at diagnosis. Discussed with patient about disease progression.  Discussed pros/cons of biopsy to confirm metastatic disease (pro: confirmation rather than assumption of metastatic disease; ability to send metastatic tissue for NGS; con: will lead to treatment delay of clinically likely metastatic disease. ) He elected to proceed with treatment without repeating biopsy. 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 -- f/u PDL1 status which is still pending as of today. -- Proceed with C1 of Pembrolizumab today. -- Discussed Pembrolizumab alone vs Cis-5FU-Pembrolizumab (both with plan to continue Pembrolizumab for 2 yrs.) First option preferred with PDL1 CPS >= 20. Will start Pembrolizumab alone and uptitrate if needed depending on PDL1 score. -- [Cisplatin (100 mg/m2 intravenous, day 1) and fluorouracil (1000 mg/m2 per day, continuous infusion, for four days) is a standard combination chemotherapy regimen for use in the palliative setting. In phase III trials, this regimen produced response rates around 30 percent.]  -- Return in 3 weeks for C2 of Pembrolizumab, MD/NP visit. 2. Supportive care / Survivorship:  Discussed dental evaluation for oral cavity and sites exposed to significant intraoral radiation treatment and abstaining from alcohol. 3. COPD / H/o tobacco abuse:   Quit in 2/2021. Uses Albuterol inhaler prn.    4. FEN/GI:   Odynophagia 2/2 RT continues to improve. Senna-plus BID prn constipation. 5. Leukopenia:   2/2 prior chemotherapy and should continue to improve. 6. Normocytic anemia:   2/2 prior chemotherapy - worsening trend may be due to cancer progression. B12, folate normal; ferritin high and iron sat mildly low. -- Rechecking iron profile and ferritin today given worsening anemia. 7. Hypothyroidism:   Due to prior radiation and worsening. Previously sent communication to PCP for management - but have not heard response. -- Recheck TSH/free T4 in 6 weeks on 12/28/21.   -- On levothyroxine 25mcg/d - encouraged patient to start taking consistently. -- Referral endocrinology placed due to rising TSH. 8. Pulmonary embolism:  Provoked by #1. 11/2021 CT chest. On eliquis. 9. Tenderness at stoma site:  Scab present and improved today. Evaluated by Dr. Keegan Delgado and told bleeding is due to prior radiation. 10. Tachycardia:   Prior EKGs, trop negative. Encouraged hydration with 64 oz fluids daily. Could be related to recent PE. Recommend cardiology follow in next 1-2 weeks. -- Referral to cardiology. -- EKG now - pt will go tomorrow given transportation issues. -- Advised pt go to ED if he develops chest pain with tachycardia. Emotional well being: Pt is coping well with his/her disease and has excellent support. I have personally seen and evaluated the patient in conjunction with Matthew Burr NP. I find the patient's history and physical exam are consistent with the NP's documentation. I agree with the above assessment and plan, which I have modified as needed. Proceed with C1 of Pembrolizumab today. Referring to Cardiology given persistent tachycardia despite good fluid hydration.     Signed By: Thalia Haas MD     12/16/21

## 2021-12-17 NOTE — PROGRESS NOTES
PT DAILY TREATMENT NOTE - [facility]     Patient Name: Shara Abdi  Date:2021  : 1970  [x]  Patient  Verified  Payor: [facility] MEDICAID / Plan: KAITLIN NICHOLAS [facility] CCCP / Product Type: Managed Care Medicaid /    Treatment Area: Neck pain [M54.2]   Next MD APPT: 22  In time:0908am  Out time:0955am  Total Treatment Time (min): 47  Total Timed Codes (min): 37  1:1 Treatment Time ( only): 37   Visit #: 2     SUBJECTIVE  Pain Level (0-10 scale) pre treatment: 3/10 Pain Level (0-10 scale) post treatment: 2/10  Any medication changes, allergies to medications, adverse drug reactions, diagnosis change, or new procedure performed?:   [] No    [] Yes (see summary sheet for update)  Subjective functional status/changes:   [] No changes reported  Pt states his stoma has healed and is not bleeding anymore. No other changes. Pt states he does have chest pain and was asked about mets, pt noted he found out he found out in the past few weeks he has lung mets and is now getting chemo. Per EMR, pt has small PE in his lungs.     OBJECTIVE    Modality rationale: decrease pain and increase tissue extensibility to improve the patients ability to eat with less discomfort   Min Type Additional Details    [] Estim: []UnAtt   []Att       []TENS instruct                  []IFC  []Premod   []NMES                     []Other:  []w/US   []w/ice   []w/heat  Position:  Location:   10 []  Ice     [x]  Heat  []  Ice massage Position: seated  Location: B cervical    []  Traction: [] Cervical       []Lumbar                       [] Prone          []Supine                       []Intermittent   []Continuous Lbs:  []w/heat  []W/heat and Estim    []  Ultrasound: []Continuous   [] Pulsed at:                           []1MHz   []3MHz Location:  W/cm2:      [x] Skin assessment post-treatment:   [x]intact  []redness- no adverse reaction   []redness  adverse reaction:   21 min Therapeutic Exercise:  [x] See flow sheet : Provided HEP Handout Rationale: increase ROM and increase strength to improve the patients ability to eat with less discomfort  16 min Manual Therapy: STM león anterior cervical musculature    Rationale: increase ROM, increase tissue extensibility and decrease trigger points to improve the patients ability to eat with less discomfort    With   [x] TE   [] TA   [] Neuro   [] SC   [] other: Patient Education: [x] Review HEP    [] Progressed/Changed HEP based on:   [] positioning   [] body mechanics   [] transfers   [] Use of heat/ice    [] other:          Other Objective/Functional Measures: fibrotic scarring anterior cervical musculature and taut muscle     ASSESSMENT/Changes in Function:   Pt performed exercises well with minimal cueing. Provided HEP and instruction on how to access on MTailor. Performed manual therapy to ease muscle tightness. Patient will continue to benefit from skilled PT services to modify and progress therapeutic interventions, address ROM deficits and analyze and address soft tissue restrictions to attain remaining goals. GOALS/Progress towards goals:  Patient Goal (s): reduce pain    []? Met []? Not met []? Partially met   Short Term Goals: To be accomplished in 6-8 treatments. 1. Patient will be compliant with HEP to assist in pain reduction. [] Met [] Not met [] Partially met   2. Patient will demo 5-10 degree improvement in cervical ROM in all planes. [] Met [] Not met [] Partially met   Long Term Goals: To be accomplished in 16-24 treatments. 1. Patient will report decrease in pain and difficulty with eating due to neck pain/tightness. [] Met [] Not met [] Partially met   2. Patient will report decrease in neck pain to max of 4/10 per VAS. [] Met [] Not met [] Partially met     PLAN  Frequency / Duration: Patient to be seen 2-3 times per week for 16-24 treatments.   Certification Period: 11/30/21 - 2/28/22  []  Upgrade activities as tolerated     [x]  Continue plan of care  []  Update interventions per flow sheet       []  Discharge due to:_  []  Other:_      Catherne Cowden, PT, DPT 12/17/2021

## 2021-12-29 NOTE — PROGRESS NOTES
PT DAILY TREATMENT NOTE - [facility]     Patient Name: Albertina Castro  Date:2021  : 1970  [x]  Patient  Verified  Payor: [facility] MEDICAID / Plan: KAITLIN NICHOLAS [facility] CCCP / Product Type: Managed Care Medicaid /    Treatment Area: Neck pain [M54.2]   Next MD APPT: 22  In time:9:10am  Out time: 9:50am  Total Treatment Time (min): 40  Total Timed Codes (min): 40   1:1 Treatment Time ( only): 40   Visit #: 3    SUBJECTIVE  Pain Level (0-10 scale) pre treatment: 0/10 Pain Level (0-10 scale) post treatment: 0/10  Any medication changes, allergies to medications, adverse drug reactions, diagnosis change, or new procedure performed?:   [x] No    [] Yes (see summary sheet for update)  Subjective functional status/changes:   [] No changes reported  Pt reporting no pain today. No new complaints. States he feels like the stretches are helpful.       OBJECTIVE  Declined modalities   Modality rationale: decrease pain and increase tissue extensibility to improve the patients ability to eat with less discomfort   Min Type Additional Details    [] Estim: []UnAtt   []Att       []TENS instruct                  []IFC  []Premod   []NMES                     []Other:  []w/US   []w/ice   []w/heat  Position:  Location:    []  Ice     [x]  Heat  []  Ice massage Position: seated  Location: B cervical    []  Traction: [] Cervical       []Lumbar                       [] Prone          []Supine                       []Intermittent   []Continuous Lbs:  []w/heat  []W/heat and Estim    []  Ultrasound: []Continuous   [] Pulsed at:                           []1MHz   []3MHz Location:  W/cm2:      [x] Skin assessment post-treatment:   [x]intact  []redness- no adverse reaction   []redness  adverse reaction:   35 min Therapeutic Exercise:  [x] See flow sheet :    Rationale: increase ROM and increase strength to improve the patients ability to eat with less discomfort  5 min Manual Therapy: STM león anterior cervical musculature    Rationale: increase ROM, increase tissue extensibility and decrease trigger points to improve the patients ability to eat with less discomfort    With   [x] TE   [] TA   [] Neuro   [] SC   [] other: Patient Education: [x] Review HEP    [] Progressed/Changed HEP based on:   [] positioning   [] body mechanics   [] transfers   [] Use of heat/ice    [] other:          Other Objective/Functional Measures: Added corner stretch and scapula retractions. ASSESSMENT/Changes in Function:   Pt responded well to tx with no pain pre or post tx. Pt communicating non-verbally this session. Noted tightness in the SCM and scalenes. Reviewed scar massage technique with pt to perform at home. Noted decrease mobility in scapula with retractions. Will continue to work on soft tissue mobility and ROM as tolerated. Patient will continue to benefit from skilled PT services to modify and progress therapeutic interventions, address ROM deficits and analyze and address soft tissue restrictions to attain remaining goals. GOALS/Progress towards goals:  Patient Goal (s): reduce pain    []? Met []? Not met []? Partially met     Short Term Goals: To be accomplished in 6-8 treatments. 1. Patient will be compliant with HEP to assist in pain reduction. [x] Met [] Not met [] Partially met  12/29  2. Patient will demo 5-10 degree improvement in cervical ROM in all planes. [] Met [] Not met [] Partially met     Long Term Goals: To be accomplished in 16-24 treatments. 1. Patient will report decrease in pain and difficulty with eating due to neck pain/tightness. [] Met [] Not met [] Partially met   2. Patient will report decrease in neck pain to max of 4/10 per VAS. [] Met [] Not met [] Partially met     PLAN  Frequency / Duration: Patient to be seen 2-3 times per week for 16-24 treatments.   Certification Period: 11/30/21 - 2/28/22  []  Upgrade activities as tolerated     [x]  Continue plan of care  []  Update interventions per flow sheet []  Discharge due to:_  []  Other:_      Shadia Chahal, PT, DPT 12/29/2021

## 2021-12-29 NOTE — PROGRESS NOTES
[id number] [facility] Oncology at [facility]  [phone]    Hematology / Oncology Established Visit    Reason for Visit:   Nieves Godfrey is a 49 y.o. male who is seen in follow up of laryngeal cancer. Referred by Dr. 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 Stage: CATARINO [[id number]] at diagnosis, regional recurrence in 4/2021    Pathology:   2/8/21 Total laryngectomy: Invasive squamous cell carcinoma  Tumor size 3.5cm, moderately differentiated (G2), PNI present, LVI not identified, margins negative  [id number]  -PDL1 (tested 12/28/21 on above specimen) positive with CPS 10. Prior Treatment:   1. 2/8/21 total laryngectomy/cricopharyngeal myotomy  2. Cisplatin 100mg/m2 every 3 weeks x 3 cycles, 5/24/21-7/13/21  3. Radiation 5/24/21-7/19/21    Current Treatment:  Pembrolizumab every 3 weeks with future plan to switch to Cis-5FU-Pembrolizuamb     History of Present Illness:   Nieves Godfrey is a 49 y.o. male who with COPD who is referred for Head and neck cancer. He presented with throat pain and hoarseness in Sept 2020. Was evaluated by Dr. Bakari Santana in ENT who performed laryngoscopy and diagnosed patient with squamous cell carcinoma involving the larynx and subglottis. In late Dec 2020, neck CT showed a 3 cm mass in Right supraglottic larynx with extension to thyroid cartilage, but no cervical LNs. PET 1/4/21 showed uptake in the laryngeal mass at right vocal fold, extending to thyroid cartilage. He was scheduled for surgery, but he required emergent tracheostomy prior to that; done late Jan 2021. Also had PEG placed 1/30/21. On 2/8/21, he underwent total laryngectomy/cricopharyngeal myotomy. He quit smoking in Feb 2021. He was evaluated by Dr. Yahir Frank in [address] [place] [address] in March 2021 who recommended post-op radiation. There were multiple delays given difficult getting in for dental evaluation prior to RT.  [facility] simulation scan was notable for a necrotic appearing tumor in Right neck  . PET 5/7/21 confirmed hypermetabolic uptake in right neck. PEG removed in March 2021 due to problems with it. Per Dr. Christina Owens, pt had 7 teeth extracted by dentist. He returns to the dentist again for dental fillings on 5/26/21. Pt states his neck feel tight, causing pulling sensation but no current pain. Interval History:  Patient here for follow up of throat cancer and C2 Keytruda. He has restarted his levothyroxine with every other day dosing. Taking Eliquis twice a day. Is eating 3 meals a day. He still has bleeding from stoma at times. No CP, SOB, fevers, chills. Eating and drinking well, gaining weight. PMHx: COPD, throat cancer, anxiety  PSurgHx: Left arm plate placement, tracheostomy, total laryngectomy 2/8/21  SHx: Quit smoking 10/28/20 after 45 pack years. No EtOH  FHx: Mother with DM; Father with DM, CKD; Sister with DM. No h/o malignancy. Review of Systems: A complete review of systems was obtained, negative except as described above. Physical Exam:   Blood pressure 123/80, pulse (!) 110, temperature 98.4 °F (36.9 °C), temperature source Temporal, resp. rate 16, height 6' (1.829 m), SpO2 98 %. ECOG PS: 0  General: Well developed, no acute distress  Eyes: Anicteric sclerae  HENT: Atraumatic  Neck: Supple, Tracheostomy noted, 1cm scab appearance inside left aspect of stoma - TTP to outside of stoma  Lymphatic: No supraclavicular, axillary adenopathy. No bilateral cervical LAD, but firm skin at neck bilaterally. Respiratory: CTAB, normal respiratory effort  CV: tachycardia, regular rhythm, no murmurs, no peripheral edema  GI: Soft, nontender, nondistended, no masses  MS: Normal gait and station. Digits without clubbing or cyanosis. 1cm firm nodule on left wrist.  Skin: No rashes. Hyperpigmented skin at neck bilaterally. Neuro/Psych: Alert. Communicates by writing given tracheostomy.       Results:     Lab Results   Component Value Date/Time WBC 3.5 (L) 2022 10:07 AM    HGB 8.4 (L) 2022 10:07 AM    HCT 26.1 (L) 2022 10:07 AM    PLATELET 739  10:07 AM    MCV 99.2 (H) 2022 10:07 AM    ABS. NEUTROPHILS 2.6 2022 10:07 AM     Lab Results   Component Value Date/Time    Sodium 140 2022 10:07 AM    Potassium 3.5 2022 10:07 AM    Chloride 105 2022 10:07 AM    CO2 28 2022 10:07 AM    Glucose 93 2022 10:07 AM    BUN 9 2022 10:07 AM    Creatinine 0.83 2022 10:07 AM    GFR est AA >60 2022 10:07 AM    GFR est non-AA >60 2022 10:07 AM    Calcium 8.8 2022 10:07 AM    Glucose (POC) 113 10/04/2021 01:04 PM     Lab Results   Component Value Date/Time    Bilirubin, total 0.3 2022 10:07 AM    ALT (SGPT) 10 (L) 2022 10:07 AM    Alk. phosphatase 49 2022 10:07 AM    Protein, total 7.4 2022 10:07 AM    Albumin 3.1 (L) 2022 10:07 AM    Globulin 4.3 (H) 2022 10:07 AM     Lab Results   Component Value Date/Time    Iron 34 (L) 2021 12:17 PM    TIBC 216 (L) 2021 12:17 PM    Iron % saturation 16 (L) 2021 12:17 PM    Ferritin 939 (H) 2021 12:17 PM      Lab Results   Component Value Date/Time    Vitamin B12 664 2021 11:23 AM    Folate 24.0 (H) 2021 11:23 AM         Imagin/28/21 Neck CT: IMPRESSION  Irregular soft tissue mass involving the aryepiglottic and the larynx. There is significant narrowing of the airway at the level of the larynx  and the supraglottic region. Poorly defined cervical adenopathy is noted. 21 Chest CTA:  IMPRESSION  Minimal scarring or subsegmental atelectasis in the left lung base. No evidence of pulmonary embolism or pneumonia. 21 PET:  IMPRESSION  2 large necrotic appearing mass lesions are noted in the right neck (SUV 6.4). Small hypermetabolic right posterior triangle lymph node. No PET avid evidence  of distant metastatic disease.     10/4/21 PET: FINDINGS:  HEAD/NECK: There is physiologic tracer activity in the oral cavity and piriform  sinuses. Physiologic tracer activity is also seen in the neck musculature,  particularly the right sternocleidomastoid muscle. 2 enlarged right level 2  cervical lymph nodes are slightly decreased in size; these demonstrate no  significant residual FDG activity. Previously seen small lymph node in the right  posterior cervical triangle also demonstrates no residual FDG activity. CHEST: There is a new 3.0 x 2.6 cm focus of nodular airspace disease in the left  upper lobe which demonstrates increased FDG activity, maximal SUV 2.8. New foci  of nodular airspace disease in the left lower lobe, measuring 1.2 cm and 1.0 cm,  in the right lower lobe, measuring 0.8 cm, and in the right middle lobe,  measuring 1.6 cm, demonstrate no appreciable FDG activity. ABDOMEN/PELVIS: No foci of abnormal hypermetabolism. SKELETON: No foci of abnormal hypermetabolism in the axial and visualized  appendicular skeleton. IMPRESSION  1. Previously seen enlarged cervical lymph nodes demonstrate no residual  abnormal FDG activity. 2. New foci of nodular airspace disease in both lungs, including a 3 cm area in  the left upper lobe which demonstrates low-grade increased FDG activity. Findings may be infectious etiology, although neoplasm is not excluded;  attention on follow-up examinations is recommended. 11/22/21 CT neck:  IMPRESSION  No significant interval change since the previous CT PET  examination. Necrotic right level 2A lymph nodes unchanged in size. No definite  new pathologic lymph nodes by CT criteria. Postradiation changes as described  above. Narrowed nasopharyngeal and oropharyngeal airway unchanged. No new pathologic lymphadenopathy by CT criteria is demonstrated. 11/22/21 CT chest:   There is a 15 mm node in the right hilum, station 10 R, series 201 image 39.   There is increasing multifocal multi lobar inflammatory appearing airspace  disease in the lungs most suggestive of multifocal pneumonia. Dominant focus of  airspace disease in the lingula on series 204 image 41 measures 3.3 x 3.2 cm,  previously 3.0 x 2.6 cm. Likewise other foci have increased in size with  dominant lesion on the right measuring 2.8 x 1.9 cm on image 50, previously 16  mm. Suspicious right upper lobe nodules are present including a dominant 6 x 9 mm  nodule on series 204 image 25. There are no suspicious lytic or blastic skeletal lesions in the thorax. Incidental imaging of the upper abdomen demonstrates liver steatosis with  probable focal steatosis adjacent to the fissure for the falciform ligament. There may be a small retrocardiac hiatal hernia. IMPRESSION  Increasing multifocal airspace disease with new suspicious solid appearing  nodules in the right upper lobe. Assessment & Plan:   Pedrito García is a 49 y.o. male is seen for laryngeal cancer. 1. Squamous cell carcinoma of larynx, Stage CATARINO [pT4a cN2 Mx]:  S/p total laryngectomy and tracheostomy in Jan 2021. Afterwards, he developed disease recurrence in Right neck confirmed by PET scan. I recommended concurrent chemoradiation using Cisplatin to treat his disease. Pt completed concurrent chemoradiation radiation 7/19/21. Evidence of local treatment response per physical exam and 10/4/21 PET. However repeat imaging with chest CT 11/22/21 shows increasing size in lung nodules, suspicious for disease progression outside of prior radiation field. Per discussion with Thoracic Tumor Board, these nodules would be amenable to biopsy by navigation bronchoscopy if desired. However, RadOnc and I feel this is obvious disease progression given extent of disease at diagnosis. Discussed with patient about disease progression.  Discussed pros/cons of biopsy to confirm metastatic disease (pro: confirmation rather than assumption of metastatic disease; ability to send metastatic tissue for NGS; con: will lead to treatment delay of clinically likely metastatic disease. ) He elected to proceed with treatment without repeating biopsy. 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 -- Proceed with C2 of Pembrolizumab today. -- Will switch to Cis-5FU-Pembrolizumab (both with plan to continue Pembrolizumab for 2 yrs) given PDL1 CPS < 20.      -- [Cisplatin (100 mg/m2 intravenous, day 1) and fluorouracil (1000 mg/m2 per day, continuous infusion, for four days) is a standard combination chemotherapy regimen for use in the palliative setting. In phase III trials, this regimen produced response rates around 30 percent.]  -- Return in 3 weeks for C1 of Cis-5-FU-Pembrolizumab, MD/NP visit on 1/28/22 with pump d/c on Tue, 2/1/22.    2. Supportive care / Survivorship:  Discussed dental evaluation for oral cavity and sites exposed to significant intraoral radiation treatment and abstaining from alcohol. 3. COPD / H/o tobacco abuse:   Quit in 2/2021. Uses Albuterol inhaler prn.    4. FEN/GI:   Odynophagia 2/2 RT continues to improve. Senna-plus BID prn constipation. 5. Leukopenia:   2/2 prior chemotherapy and should continue to improve. 6. Normocytic anemia:   2/2 prior chemotherapy and worsening due to recent cancer progression. B12, folate normal; ferritin high and iron sat mildly low. 7. Hypothyroidism:   Due to prior radiation and worsening. Previously sent communication to PCP for management - but have not heard response. -- Recheck TSH/free T4 in 6 weeks on 12/28/21.   -- On levothyroxine 25mcg/d - encouraged patient to start taking consistently and now daily rather than every other day. -- Referral endocrinology placed due to rising TSH. 8. Pulmonary embolism:  Provoked by #1. 11/2021 CT chest. On eliquis. 9. Tenderness at stoma site:  Scab present and improved today. Evaluated by Dr. Adrian Jernigan and told bleeding is due to prior radiation.      10. Tachycardia:   Prior EKGs, trop negative. Encouraged hydration with 64 oz fluids daily. Could be related to recent PE. Recommend cardiology follow in next 1-2 weeks. Emotional well being: Pt is coping well with his/her disease and has excellent support.       Signed By: Sweta Morales MD     01/05/22

## 2021-12-30 NOTE — PROGRESS NOTES
Attempted to call patient's brother, Oswald Hughes. No answer and voicemail is presently full. Will attempt to call again later if return call not received.

## 2022-01-01 ENCOUNTER — ANESTHESIA EVENT (OUTPATIENT)
Dept: SURGERY | Age: 52
DRG: 021 | End: 2022-01-01
Payer: MEDICAID

## 2022-01-01 ENCOUNTER — HOSPITAL ENCOUNTER (OUTPATIENT)
Dept: INFUSION THERAPY | Age: 52
Discharge: HOME | End: 2022-01-28
Payer: MEDICAID

## 2022-01-01 ENCOUNTER — HOSPITAL ENCOUNTER (OUTPATIENT)
Dept: PHYSICAL THERAPY | Age: 52
Discharge: HOME | End: 2022-01-13
Payer: MEDICAID

## 2022-01-01 ENCOUNTER — HOSPITAL ENCOUNTER (OUTPATIENT)
Dept: INFUSION THERAPY | Age: 52
Discharge: HOME | End: 2022-04-26
Payer: MEDICAID

## 2022-01-01 ENCOUNTER — APPOINTMENT (OUTPATIENT)
Dept: INFUSION THERAPY | Age: 52
End: 2022-01-01

## 2022-01-01 ENCOUNTER — TELEPHONE (OUTPATIENT)
Dept: ONCOLOGY | Age: 52
End: 2022-01-01

## 2022-01-01 ENCOUNTER — OFFICE VISIT (OUTPATIENT)
Dept: ONCOLOGY | Age: 52
End: 2022-01-01
Payer: COMMERCIAL

## 2022-01-01 ENCOUNTER — APPOINTMENT (OUTPATIENT)
Dept: CT IMAGING | Age: 52
DRG: 021 | End: 2022-01-01
Attending: NEUROLOGICAL SURGERY
Payer: MEDICAID

## 2022-01-01 ENCOUNTER — HOSPITAL ENCOUNTER (OUTPATIENT)
Dept: PHYSICAL THERAPY | Age: 52
Discharge: HOME | End: 2022-01-05
Payer: MEDICAID

## 2022-01-01 ENCOUNTER — APPOINTMENT (OUTPATIENT)
Dept: INFUSION THERAPY | Age: 52
End: 2022-01-01
Payer: MEDICAID

## 2022-01-01 ENCOUNTER — HOSPITAL ENCOUNTER (OUTPATIENT)
Dept: PHYSICAL THERAPY | Age: 52
Discharge: HOME | End: 2022-07-13
Payer: MEDICAID

## 2022-01-01 ENCOUNTER — HOSPITAL ENCOUNTER (OUTPATIENT)
Dept: INFUSION THERAPY | Age: 52
Discharge: HOME | End: 2022-02-22
Payer: MEDICAID

## 2022-01-01 ENCOUNTER — APPOINTMENT (OUTPATIENT)
Dept: CT IMAGING | Age: 52
DRG: 021 | End: 2022-01-01
Attending: NURSE PRACTITIONER
Payer: MEDICAID

## 2022-01-01 ENCOUNTER — APPOINTMENT (OUTPATIENT)
Dept: GENERAL RADIOLOGY | Age: 52
DRG: 021 | End: 2022-01-01
Attending: NURSE PRACTITIONER
Payer: MEDICAID

## 2022-01-01 ENCOUNTER — APPOINTMENT (OUTPATIENT)
Dept: ENDOSCOPY | Age: 52
End: 2022-01-01
Attending: INTERNAL MEDICINE
Payer: MEDICAID

## 2022-01-01 ENCOUNTER — OFFICE VISIT (OUTPATIENT)
Dept: ENDOCRINOLOGY | Age: 52
End: 2022-01-01
Payer: MEDICAID

## 2022-01-01 ENCOUNTER — HOSPITAL ENCOUNTER (OUTPATIENT)
Dept: INFUSION THERAPY | Age: 52
Discharge: HOME | End: 2022-07-08
Payer: MEDICAID

## 2022-01-01 ENCOUNTER — APPOINTMENT (OUTPATIENT)
Dept: PHYSICAL THERAPY | Age: 52
End: 2022-01-01
Payer: MEDICAID

## 2022-01-01 ENCOUNTER — HOSPITAL ENCOUNTER (OUTPATIENT)
Dept: PHYSICAL THERAPY | Age: 52
Discharge: HOME | End: 2022-02-21
Payer: COMMERCIAL

## 2022-01-01 ENCOUNTER — HOSPITAL ENCOUNTER (OUTPATIENT)
Dept: PHYSICAL THERAPY | Age: 52
Discharge: HOME | End: 2022-04-04
Payer: MEDICAID

## 2022-01-01 ENCOUNTER — APPOINTMENT (OUTPATIENT)
Dept: GENERAL RADIOLOGY | Age: 52
DRG: 041 | End: 2022-01-01
Attending: EMERGENCY MEDICINE
Payer: MEDICAID

## 2022-01-01 ENCOUNTER — APPOINTMENT (OUTPATIENT)
Dept: MRI IMAGING | Age: 52
DRG: 021 | End: 2022-01-01
Attending: NURSE PRACTITIONER
Payer: MEDICAID

## 2022-01-01 ENCOUNTER — HOSPITAL ENCOUNTER (OUTPATIENT)
Dept: INFUSION THERAPY | Age: 52
Discharge: HOME | End: 2022-03-25
Payer: MEDICAID

## 2022-01-01 ENCOUNTER — APPOINTMENT (OUTPATIENT)
Dept: GENERAL RADIOLOGY | Age: 52
End: 2022-01-01
Attending: EMERGENCY MEDICINE
Payer: MEDICAID

## 2022-01-01 ENCOUNTER — ANESTHESIA EVENT (OUTPATIENT)
Dept: ENDOSCOPY | Age: 52
End: 2022-01-01
Payer: MEDICAID

## 2022-01-01 ENCOUNTER — APPOINTMENT (OUTPATIENT)
Dept: GENERAL RADIOLOGY | Age: 52
DRG: 041 | End: 2022-01-01
Attending: INTERNAL MEDICINE
Payer: MEDICAID

## 2022-01-01 ENCOUNTER — HOSPITAL ENCOUNTER (OUTPATIENT)
Dept: INFUSION THERAPY | Age: 52
Discharge: HOME | End: 2022-06-28
Payer: MEDICAID

## 2022-01-01 ENCOUNTER — APPOINTMENT (OUTPATIENT)
Dept: ENDOSCOPY | Age: 52
DRG: 110 | End: 2022-01-01
Attending: INTERNAL MEDICINE
Payer: MEDICAID

## 2022-01-01 ENCOUNTER — HOSPITAL ENCOUNTER (OUTPATIENT)
Dept: PHYSICAL THERAPY | Age: 52
Discharge: HOME | End: 2022-05-04
Payer: MEDICAID

## 2022-01-01 ENCOUNTER — HOSPITAL ENCOUNTER (OUTPATIENT)
Dept: INFUSION THERAPY | Age: 52
Discharge: HOME | End: 2022-03-29
Payer: MEDICAID

## 2022-01-01 ENCOUNTER — OFFICE VISIT (OUTPATIENT)
Dept: ONCOLOGY | Age: 52
End: 2022-01-01
Payer: MEDICAID

## 2022-01-01 ENCOUNTER — APPOINTMENT (OUTPATIENT)
Dept: CT IMAGING | Age: 52
End: 2022-01-01
Attending: EMERGENCY MEDICINE
Payer: MEDICAID

## 2022-01-01 ENCOUNTER — HOSPITAL ENCOUNTER (OUTPATIENT)
Dept: PHYSICAL THERAPY | Age: 52
Discharge: HOME | End: 2022-02-24
Payer: COMMERCIAL

## 2022-01-01 ENCOUNTER — HOSPITAL ENCOUNTER (OUTPATIENT)
Dept: INFUSION THERAPY | Age: 52
Discharge: HOME | End: 2022-02-01
Payer: MEDICAID

## 2022-01-01 ENCOUNTER — HOSPITAL ENCOUNTER (OUTPATIENT)
Dept: INFUSION THERAPY | Age: 52
End: 2022-01-01

## 2022-01-01 ENCOUNTER — APPOINTMENT (OUTPATIENT)
Dept: GENERAL RADIOLOGY | Age: 52
DRG: 021 | End: 2022-01-01
Attending: INTERNAL MEDICINE
Payer: MEDICAID

## 2022-01-01 ENCOUNTER — OFFICE VISIT (OUTPATIENT)
Dept: ONCOLOGY | Age: 52
End: 2022-01-01

## 2022-01-01 ENCOUNTER — HOSPITAL ENCOUNTER (OUTPATIENT)
Dept: INFUSION THERAPY | Age: 52
Discharge: HOME | End: 2022-04-15
Payer: MEDICAID

## 2022-01-01 ENCOUNTER — HOSPITAL ENCOUNTER (OUTPATIENT)
Dept: INFUSION THERAPY | Age: 52
Discharge: HOME | End: 2022-06-24
Payer: MEDICAID

## 2022-01-01 ENCOUNTER — TELEPHONE (OUTPATIENT)
Dept: ENDOCRINOLOGY | Age: 52
End: 2022-01-01

## 2022-01-01 ENCOUNTER — DOCUMENTATION ONLY (OUTPATIENT)
Dept: ONCOLOGY | Age: 52
End: 2022-01-01

## 2022-01-01 ENCOUNTER — HOSPITAL ENCOUNTER (OUTPATIENT)
Dept: CT IMAGING | Age: 52
Discharge: HOME | End: 2022-04-29
Payer: MEDICAID

## 2022-01-01 ENCOUNTER — APPOINTMENT (OUTPATIENT)
Dept: PHYSICAL THERAPY | Age: 52
End: 2022-01-01
Payer: COMMERCIAL

## 2022-01-01 ENCOUNTER — HOSPITAL ENCOUNTER (OUTPATIENT)
Dept: INFUSION THERAPY | Age: 52
Discharge: HOME | End: 2022-02-18
Payer: MEDICAID

## 2022-01-01 ENCOUNTER — HOSPITAL ENCOUNTER (OUTPATIENT)
Dept: INFUSION THERAPY | Age: 52
Discharge: HOME | End: 2022-07-26
Payer: MEDICAID

## 2022-01-01 ENCOUNTER — HOSPITAL ENCOUNTER (OUTPATIENT)
Dept: INFUSION THERAPY | Age: 52
Discharge: HOME | End: 2022-03-01
Payer: MEDICAID

## 2022-01-01 ENCOUNTER — ANESTHESIA EVENT (OUTPATIENT)
Dept: ENDOSCOPY | Age: 52
DRG: 110 | End: 2022-01-01
Payer: MEDICAID

## 2022-01-01 ENCOUNTER — ANESTHESIA (OUTPATIENT)
Dept: ENDOSCOPY | Age: 52
DRG: 110 | End: 2022-01-01
Payer: MEDICAID

## 2022-01-01 ENCOUNTER — HOSPITAL ENCOUNTER (OUTPATIENT)
Dept: PHYSICAL THERAPY | Age: 52
Discharge: HOME | End: 2022-04-27
Payer: MEDICAID

## 2022-01-01 ENCOUNTER — HOSPITAL ENCOUNTER (OUTPATIENT)
Dept: INFUSION THERAPY | Age: 52
Discharge: HOME | End: 2022-07-06
Payer: MEDICAID

## 2022-01-01 ENCOUNTER — HOSPITAL ENCOUNTER (OUTPATIENT)
Dept: PHYSICAL THERAPY | Age: 52
Discharge: HOME | End: 2022-02-23
Payer: COMMERCIAL

## 2022-01-01 ENCOUNTER — HOSPITAL ENCOUNTER (OUTPATIENT)
Dept: INFUSION THERAPY | Age: 52
Discharge: HOME | End: 2022-04-05
Payer: MEDICAID

## 2022-01-01 ENCOUNTER — HOSPITAL ENCOUNTER (OUTPATIENT)
Dept: INFUSION THERAPY | Age: 52
Discharge: HOME | End: 2022-04-08
Payer: MEDICAID

## 2022-01-01 ENCOUNTER — HOSPITAL ENCOUNTER (OUTPATIENT)
Dept: INFUSION THERAPY | Age: 52
Discharge: HOME | End: 2022-03-18

## 2022-01-01 ENCOUNTER — APPOINTMENT (OUTPATIENT)
Dept: GENERAL RADIOLOGY | Age: 52
DRG: 021 | End: 2022-01-01
Payer: MEDICAID

## 2022-01-01 ENCOUNTER — ANESTHESIA (OUTPATIENT)
Dept: ENDOSCOPY | Age: 52
End: 2022-01-01
Payer: MEDICAID

## 2022-01-01 ENCOUNTER — HOSPITAL ENCOUNTER (OUTPATIENT)
Dept: INFUSION THERAPY | Age: 52
Discharge: HOME | End: 2022-05-03
Payer: MEDICAID

## 2022-01-01 ENCOUNTER — HOSPITAL ENCOUNTER (OUTPATIENT)
Dept: PHYSICAL THERAPY | Age: 52
Discharge: HOME | End: 2022-02-09
Payer: COMMERCIAL

## 2022-01-01 ENCOUNTER — HOSPITAL ENCOUNTER (EMERGENCY)
Age: 52
Discharge: ANOTHER HOSPITAL | End: 2022-06-18
Attending: EMERGENCY MEDICINE
Payer: MEDICAID

## 2022-01-01 ENCOUNTER — APPOINTMENT (OUTPATIENT)
Dept: RADIATION THERAPY | Age: 52
DRG: 041 | End: 2022-01-01
Payer: MEDICAID

## 2022-01-01 ENCOUNTER — APPOINTMENT (OUTPATIENT)
Dept: GENERAL RADIOLOGY | Age: 52
DRG: 021 | End: 2022-01-01
Attending: HOSPITALIST
Payer: MEDICAID

## 2022-01-01 ENCOUNTER — HOSPITAL ENCOUNTER (OUTPATIENT)
Dept: PHYSICAL THERAPY | Age: 52
Discharge: HOME | End: 2022-02-07
Payer: COMMERCIAL

## 2022-01-01 ENCOUNTER — APPOINTMENT (OUTPATIENT)
Dept: PHYSICAL THERAPY | Age: 52
End: 2022-01-01

## 2022-01-01 ENCOUNTER — HOSPITAL ENCOUNTER (OUTPATIENT)
Dept: INFUSION THERAPY | Age: 52
Discharge: HOME | End: 2022-06-21
Payer: MEDICAID

## 2022-01-01 ENCOUNTER — APPOINTMENT (OUTPATIENT)
Dept: GENERAL RADIOLOGY | Age: 52
DRG: 021 | End: 2022-01-01
Attending: ANESTHESIOLOGY
Payer: MEDICAID

## 2022-01-01 ENCOUNTER — APPOINTMENT (OUTPATIENT)
Dept: GENERAL RADIOLOGY | Age: 52
DRG: 110 | End: 2022-01-01
Attending: EMERGENCY MEDICINE
Payer: MEDICAID

## 2022-01-01 ENCOUNTER — HOSPITAL ENCOUNTER (OUTPATIENT)
Dept: INFUSION THERAPY | Age: 52
Discharge: HOME | End: 2022-07-01
Payer: MEDICAID

## 2022-01-01 ENCOUNTER — HOSPITAL ENCOUNTER (OUTPATIENT)
Dept: INFUSION THERAPY | Age: 52
Discharge: HOME | End: 2022-02-15

## 2022-01-01 ENCOUNTER — HOSPITAL ENCOUNTER (OUTPATIENT)
Age: 52
Setting detail: OBSERVATION
Discharge: HOME | DRG: 110 | End: 2022-06-08
Attending: EMERGENCY MEDICINE | Admitting: HOSPITALIST
Payer: MEDICAID

## 2022-01-01 ENCOUNTER — HOSPITAL ENCOUNTER (OUTPATIENT)
Dept: INFUSION THERAPY | Age: 52
Discharge: HOME | End: 2022-01-06
Payer: MEDICAID

## 2022-01-01 ENCOUNTER — HOSPITAL ENCOUNTER (OUTPATIENT)
Age: 52
Setting detail: OBSERVATION
Discharge: HOME | DRG: 254 | End: 2022-06-18
Attending: HOSPITALIST | Admitting: HOSPITALIST
Payer: MEDICAID

## 2022-01-01 ENCOUNTER — HOSPITAL ENCOUNTER (OUTPATIENT)
Dept: INFUSION THERAPY | Age: 52
Discharge: HOME | End: 2022-07-29
Payer: MEDICAID

## 2022-01-01 ENCOUNTER — HOSPITAL ENCOUNTER (OUTPATIENT)
Dept: INFUSION THERAPY | Age: 52
Discharge: HOME | End: 2022-07-15
Payer: MEDICAID

## 2022-01-01 ENCOUNTER — HOSPITAL ENCOUNTER (OUTPATIENT)
Dept: PHYSICAL THERAPY | Age: 52
Discharge: HOME | End: 2022-01-12
Payer: MEDICAID

## 2022-01-01 ENCOUNTER — HOSPITAL ENCOUNTER (INPATIENT)
Age: 52
LOS: 13 days | DRG: 021 | End: 2022-08-15
Attending: FAMILY MEDICINE | Admitting: ANESTHESIOLOGY
Payer: MEDICAID

## 2022-01-01 ENCOUNTER — HOSPITAL ENCOUNTER (OUTPATIENT)
Dept: INFUSION THERAPY | Age: 52
Discharge: HOME | End: 2022-04-19
Payer: MEDICAID

## 2022-01-01 ENCOUNTER — HOSPITAL ENCOUNTER (OUTPATIENT)
Dept: INFUSION THERAPY | Age: 52
Discharge: HOME | End: 2022-05-20
Payer: MEDICAID

## 2022-01-01 ENCOUNTER — HOSPITAL ENCOUNTER (OUTPATIENT)
Dept: INFUSION THERAPY | Age: 52
Discharge: HOME | End: 2022-07-22
Payer: MEDICAID

## 2022-01-01 ENCOUNTER — HOSPITAL ENCOUNTER (OUTPATIENT)
Dept: PHYSICAL THERAPY | Age: 52
Discharge: HOME | End: 2022-02-04
Payer: COMMERCIAL

## 2022-01-01 ENCOUNTER — APPOINTMENT (OUTPATIENT)
Dept: CT IMAGING | Age: 52
DRG: 041 | End: 2022-01-01
Attending: EMERGENCY MEDICINE
Payer: MEDICAID

## 2022-01-01 ENCOUNTER — APPOINTMENT (OUTPATIENT)
Dept: GENERAL RADIOLOGY | Age: 52
DRG: 110 | End: 2022-01-01
Attending: NURSE PRACTITIONER
Payer: MEDICAID

## 2022-01-01 ENCOUNTER — HOSPITAL ENCOUNTER (OUTPATIENT)
Dept: CT IMAGING | Age: 52
Discharge: HOME | End: 2022-03-16
Payer: MEDICAID

## 2022-01-01 ENCOUNTER — HOSPITAL ENCOUNTER (OUTPATIENT)
Age: 52
Setting detail: OBSERVATION
Discharge: HOME | DRG: 110 | End: 2022-06-01
Attending: EMERGENCY MEDICINE | Admitting: FAMILY MEDICINE
Payer: MEDICAID

## 2022-01-01 ENCOUNTER — HOSPITAL ENCOUNTER (OUTPATIENT)
Dept: PHYSICAL THERAPY | Age: 52
Discharge: HOME | End: 2022-01-31
Payer: MEDICAID

## 2022-01-01 ENCOUNTER — HOSPITAL ENCOUNTER (OUTPATIENT)
Age: 52
Setting detail: OBSERVATION
Discharge: HOME | End: 2022-05-13
Attending: EMERGENCY MEDICINE | Admitting: HOSPITALIST
Payer: MEDICAID

## 2022-01-01 ENCOUNTER — HOSPITAL ENCOUNTER (OUTPATIENT)
Dept: INFUSION THERAPY | Age: 52
Discharge: HOME | End: 2022-05-17

## 2022-01-01 ENCOUNTER — HOSPITAL ENCOUNTER (OUTPATIENT)
Dept: INFUSION THERAPY | Age: 52
Discharge: HOME | End: 2022-05-06

## 2022-01-01 ENCOUNTER — HOSPITAL ENCOUNTER (OUTPATIENT)
Dept: INFUSION THERAPY | Age: 52
Discharge: HOME | End: 2022-02-11
Payer: MEDICAID

## 2022-01-01 ENCOUNTER — HOSPITAL ENCOUNTER (OUTPATIENT)
Dept: INFUSION THERAPY | Age: 52
Discharge: HOME | End: 2022-02-25
Payer: MEDICAID

## 2022-01-01 ENCOUNTER — HOSPITAL ENCOUNTER (OUTPATIENT)
Dept: PHYSICAL THERAPY | Age: 52
Discharge: HOME | End: 2022-02-02
Payer: COMMERCIAL

## 2022-01-01 ENCOUNTER — ANESTHESIA (OUTPATIENT)
Dept: SURGERY | Age: 52
DRG: 021 | End: 2022-01-01
Payer: MEDICAID

## 2022-01-01 ENCOUNTER — HOSPITAL ENCOUNTER (OUTPATIENT)
Dept: INFUSION THERAPY | Age: 52
Discharge: HOME | End: 2022-06-14

## 2022-01-01 ENCOUNTER — APPOINTMENT (OUTPATIENT)
Dept: NON INVASIVE DIAGNOSTICS | Age: 52
DRG: 021 | End: 2022-01-01
Attending: INTERNAL MEDICINE
Payer: MEDICAID

## 2022-01-01 ENCOUNTER — HOSPITAL ENCOUNTER (OUTPATIENT)
Dept: INFUSION THERAPY | Age: 52
Discharge: HOME | End: 2022-06-07

## 2022-01-01 ENCOUNTER — HOSPITAL ENCOUNTER (OUTPATIENT)
Dept: INFUSION THERAPY | Age: 52
Discharge: HOME | End: 2022-03-22
Payer: MEDICAID

## 2022-01-01 ENCOUNTER — HOSPITAL ENCOUNTER (OUTPATIENT)
Dept: PHYSICAL THERAPY | Age: 52
Discharge: HOME | End: 2022-04-20
Payer: MEDICAID

## 2022-01-01 ENCOUNTER — HOSPITAL ENCOUNTER (OUTPATIENT)
Dept: INFUSION THERAPY | Age: 52
Discharge: HOME | End: 2022-05-31

## 2022-01-01 ENCOUNTER — HOSPITAL ENCOUNTER (OUTPATIENT)
Dept: GENERAL RADIOLOGY | Age: 52
Discharge: HOME | DRG: 041 | End: 2022-07-28
Payer: MEDICAID

## 2022-01-01 ENCOUNTER — HOSPITAL ENCOUNTER (OUTPATIENT)
Dept: PHYSICAL THERAPY | Age: 52
Discharge: HOME | End: 2022-01-26
Payer: MEDICAID

## 2022-01-01 ENCOUNTER — HOSPITAL ENCOUNTER (OUTPATIENT)
Dept: INFUSION THERAPY | Age: 52
Discharge: HOME | End: 2022-03-08
Payer: MEDICAID

## 2022-01-01 ENCOUNTER — HOSPITAL ENCOUNTER (EMERGENCY)
Age: 52
Discharge: HOME | End: 2022-07-05
Admitting: STUDENT IN AN ORGANIZED HEALTH CARE EDUCATION/TRAINING PROGRAM
Payer: MEDICAID

## 2022-01-01 ENCOUNTER — APPOINTMENT (OUTPATIENT)
Dept: CT IMAGING | Age: 52
DRG: 110 | End: 2022-01-01
Attending: FAMILY MEDICINE
Payer: MEDICAID

## 2022-01-01 ENCOUNTER — HOSPITAL ENCOUNTER (OUTPATIENT)
Dept: INFUSION THERAPY | Age: 52
Discharge: HOME | End: 2022-05-13

## 2022-01-01 ENCOUNTER — APPOINTMENT (OUTPATIENT)
Dept: CT IMAGING | Age: 52
DRG: 041 | End: 2022-01-01
Attending: INTERNAL MEDICINE
Payer: MEDICAID

## 2022-01-01 ENCOUNTER — HOSPITAL ENCOUNTER (INPATIENT)
Age: 52
LOS: 3 days | Discharge: ANOTHER HOSPITAL | DRG: 041 | End: 2022-08-02
Attending: EMERGENCY MEDICINE | Admitting: INTERNAL MEDICINE
Payer: MEDICAID

## 2022-01-01 ENCOUNTER — HOSPITAL ENCOUNTER (OUTPATIENT)
Dept: INFUSION THERAPY | Age: 52
Discharge: HOME | End: 2022-05-24
Payer: MEDICAID

## 2022-01-01 ENCOUNTER — HOSPITAL ENCOUNTER (OUTPATIENT)
Dept: INFUSION THERAPY | Age: 52
Discharge: HOME | End: 2022-04-12
Payer: MEDICAID

## 2022-01-01 VITALS
HEART RATE: 92 BPM | OXYGEN SATURATION: 100 % | BODY MASS INDEX: 20.36 KG/M2 | RESPIRATION RATE: 17 BRPM | TEMPERATURE: 98.7 F | WEIGHT: 150.35 LBS | HEIGHT: 72 IN | SYSTOLIC BLOOD PRESSURE: 100 MMHG | DIASTOLIC BLOOD PRESSURE: 65 MMHG

## 2022-01-01 VITALS
HEART RATE: 102 BPM | BODY MASS INDEX: 19.76 KG/M2 | SYSTOLIC BLOOD PRESSURE: 110 MMHG | TEMPERATURE: 98.9 F | OXYGEN SATURATION: 99 % | DIASTOLIC BLOOD PRESSURE: 69 MMHG | HEIGHT: 72 IN | RESPIRATION RATE: 17 BRPM | WEIGHT: 145.9 LBS

## 2022-01-01 VITALS
TEMPERATURE: 97.5 F | OXYGEN SATURATION: 100 % | SYSTOLIC BLOOD PRESSURE: 101 MMHG | BODY MASS INDEX: 21.26 KG/M2 | RESPIRATION RATE: 16 BRPM | DIASTOLIC BLOOD PRESSURE: 63 MMHG | HEIGHT: 72 IN | WEIGHT: 157 LBS | HEART RATE: 89 BPM

## 2022-01-01 VITALS
OXYGEN SATURATION: 28 % | HEIGHT: 72 IN | WEIGHT: 160.5 LBS | BODY MASS INDEX: 21.74 KG/M2 | SYSTOLIC BLOOD PRESSURE: 117 MMHG | DIASTOLIC BLOOD PRESSURE: 53 MMHG | TEMPERATURE: 98.5 F

## 2022-01-01 VITALS
TEMPERATURE: 97.3 F | OXYGEN SATURATION: 99 % | BODY MASS INDEX: 21.67 KG/M2 | HEART RATE: 97 BPM | HEIGHT: 72 IN | DIASTOLIC BLOOD PRESSURE: 82 MMHG | WEIGHT: 160 LBS | RESPIRATION RATE: 16 BRPM | SYSTOLIC BLOOD PRESSURE: 123 MMHG

## 2022-01-01 VITALS
TEMPERATURE: 96.7 F | HEART RATE: 98 BPM | SYSTOLIC BLOOD PRESSURE: 123 MMHG | DIASTOLIC BLOOD PRESSURE: 74 MMHG | RESPIRATION RATE: 18 BRPM | OXYGEN SATURATION: 98 %

## 2022-01-01 VITALS
TEMPERATURE: 97.1 F | SYSTOLIC BLOOD PRESSURE: 101 MMHG | RESPIRATION RATE: 16 BRPM | DIASTOLIC BLOOD PRESSURE: 68 MMHG | HEART RATE: 86 BPM

## 2022-01-01 VITALS
DIASTOLIC BLOOD PRESSURE: 78 MMHG | RESPIRATION RATE: 18 BRPM | WEIGHT: 150 LBS | SYSTOLIC BLOOD PRESSURE: 121 MMHG | OXYGEN SATURATION: 99 % | HEIGHT: 72 IN | BODY MASS INDEX: 20.32 KG/M2 | HEART RATE: 98 BPM

## 2022-01-01 VITALS
SYSTOLIC BLOOD PRESSURE: 118 MMHG | OXYGEN SATURATION: 98 % | HEART RATE: 78 BPM | TEMPERATURE: 96.2 F | DIASTOLIC BLOOD PRESSURE: 78 MMHG | RESPIRATION RATE: 16 BRPM

## 2022-01-01 VITALS
HEIGHT: 72 IN | SYSTOLIC BLOOD PRESSURE: 134 MMHG | DIASTOLIC BLOOD PRESSURE: 76 MMHG | RESPIRATION RATE: 16 BRPM | OXYGEN SATURATION: 99 % | TEMPERATURE: 97.8 F | BODY MASS INDEX: 20.9 KG/M2 | WEIGHT: 154.32 LBS | HEART RATE: 99 BPM

## 2022-01-01 VITALS
HEART RATE: 87 BPM | DIASTOLIC BLOOD PRESSURE: 70 MMHG | HEIGHT: 72 IN | SYSTOLIC BLOOD PRESSURE: 119 MMHG | OXYGEN SATURATION: 100 % | TEMPERATURE: 98.1 F | WEIGHT: 148.5 LBS | RESPIRATION RATE: 16 BRPM | BODY MASS INDEX: 20.11 KG/M2

## 2022-01-01 VITALS
HEIGHT: 72 IN | WEIGHT: 157 LBS | HEART RATE: 107 BPM | SYSTOLIC BLOOD PRESSURE: 134 MMHG | TEMPERATURE: 98.2 F | BODY MASS INDEX: 21.26 KG/M2 | DIASTOLIC BLOOD PRESSURE: 89 MMHG | RESPIRATION RATE: 18 BRPM

## 2022-01-01 VITALS
TEMPERATURE: 96.4 F | WEIGHT: 154.6 LBS | SYSTOLIC BLOOD PRESSURE: 115 MMHG | RESPIRATION RATE: 18 BRPM | OXYGEN SATURATION: 97 % | BODY MASS INDEX: 20.97 KG/M2 | DIASTOLIC BLOOD PRESSURE: 77 MMHG | HEART RATE: 86 BPM

## 2022-01-01 VITALS
OXYGEN SATURATION: 100 % | RESPIRATION RATE: 16 BRPM | SYSTOLIC BLOOD PRESSURE: 106 MMHG | HEART RATE: 99 BPM | WEIGHT: 160 LBS | HEIGHT: 72 IN | TEMPERATURE: 98.8 F | DIASTOLIC BLOOD PRESSURE: 80 MMHG | BODY MASS INDEX: 21.67 KG/M2

## 2022-01-01 VITALS
WEIGHT: 154.6 LBS | BODY MASS INDEX: 20.94 KG/M2 | TEMPERATURE: 98.3 F | OXYGEN SATURATION: 97 % | DIASTOLIC BLOOD PRESSURE: 78 MMHG | HEART RATE: 92 BPM | RESPIRATION RATE: 16 BRPM | HEIGHT: 72 IN | SYSTOLIC BLOOD PRESSURE: 115 MMHG

## 2022-01-01 VITALS
DIASTOLIC BLOOD PRESSURE: 58 MMHG | SYSTOLIC BLOOD PRESSURE: 95 MMHG | HEART RATE: 86 BPM | TEMPERATURE: 97.9 F | BODY MASS INDEX: 21.66 KG/M2 | HEIGHT: 72 IN | WEIGHT: 159.9 LBS | RESPIRATION RATE: 18 BRPM

## 2022-01-01 VITALS
OXYGEN SATURATION: 99 % | BODY MASS INDEX: 21.14 KG/M2 | DIASTOLIC BLOOD PRESSURE: 60 MMHG | WEIGHT: 156.1 LBS | HEART RATE: 100 BPM | HEIGHT: 72 IN | SYSTOLIC BLOOD PRESSURE: 100 MMHG | TEMPERATURE: 98.8 F

## 2022-01-01 VITALS
WEIGHT: 152.6 LBS | DIASTOLIC BLOOD PRESSURE: 62 MMHG | HEIGHT: 72 IN | BODY MASS INDEX: 20.67 KG/M2 | SYSTOLIC BLOOD PRESSURE: 110 MMHG

## 2022-01-01 VITALS
OXYGEN SATURATION: 100 % | WEIGHT: 144.8 LBS | TEMPERATURE: 97.3 F | SYSTOLIC BLOOD PRESSURE: 107 MMHG | HEART RATE: 88 BPM | RESPIRATION RATE: 16 BRPM | HEIGHT: 72 IN | BODY MASS INDEX: 19.61 KG/M2 | DIASTOLIC BLOOD PRESSURE: 72 MMHG

## 2022-01-01 VITALS
DIASTOLIC BLOOD PRESSURE: 76 MMHG | HEART RATE: 79 BPM | OXYGEN SATURATION: 98 % | SYSTOLIC BLOOD PRESSURE: 120 MMHG | TEMPERATURE: 98.4 F | RESPIRATION RATE: 16 BRPM

## 2022-01-01 VITALS
DIASTOLIC BLOOD PRESSURE: 67 MMHG | HEART RATE: 104 BPM | TEMPERATURE: 98.5 F | OXYGEN SATURATION: 100 % | SYSTOLIC BLOOD PRESSURE: 145 MMHG | RESPIRATION RATE: 16 BRPM

## 2022-01-01 VITALS
TEMPERATURE: 98 F | OXYGEN SATURATION: 98 % | RESPIRATION RATE: 18 BRPM | DIASTOLIC BLOOD PRESSURE: 63 MMHG | HEART RATE: 88 BPM | SYSTOLIC BLOOD PRESSURE: 101 MMHG

## 2022-01-01 VITALS
SYSTOLIC BLOOD PRESSURE: 123 MMHG | HEIGHT: 72 IN | DIASTOLIC BLOOD PRESSURE: 80 MMHG | RESPIRATION RATE: 16 BRPM | TEMPERATURE: 98.4 F | OXYGEN SATURATION: 98 % | HEART RATE: 110 BPM | BODY MASS INDEX: 20.3 KG/M2

## 2022-01-01 VITALS
DIASTOLIC BLOOD PRESSURE: 77 MMHG | TEMPERATURE: 97.7 F | HEART RATE: 88 BPM | RESPIRATION RATE: 16 BRPM | HEIGHT: 72 IN | OXYGEN SATURATION: 96 % | SYSTOLIC BLOOD PRESSURE: 113 MMHG | WEIGHT: 157 LBS | BODY MASS INDEX: 21.26 KG/M2

## 2022-01-01 VITALS
HEART RATE: 91 BPM | SYSTOLIC BLOOD PRESSURE: 119 MMHG | WEIGHT: 154 LBS | HEIGHT: 72 IN | RESPIRATION RATE: 16 BRPM | DIASTOLIC BLOOD PRESSURE: 73 MMHG | BODY MASS INDEX: 20.86 KG/M2 | TEMPERATURE: 97.8 F | OXYGEN SATURATION: 98 %

## 2022-01-01 VITALS
OXYGEN SATURATION: 100 % | DIASTOLIC BLOOD PRESSURE: 71 MMHG | TEMPERATURE: 97.6 F | RESPIRATION RATE: 16 BRPM | BODY MASS INDEX: 19.91 KG/M2 | HEART RATE: 106 BPM | SYSTOLIC BLOOD PRESSURE: 112 MMHG | WEIGHT: 146.8 LBS

## 2022-01-01 VITALS
SYSTOLIC BLOOD PRESSURE: 94 MMHG | DIASTOLIC BLOOD PRESSURE: 73 MMHG | RESPIRATION RATE: 18 BRPM | WEIGHT: 154.54 LBS | HEART RATE: 80 BPM | HEIGHT: 72 IN | OXYGEN SATURATION: 98 % | TEMPERATURE: 98.1 F | BODY MASS INDEX: 20.93 KG/M2

## 2022-01-01 VITALS
SYSTOLIC BLOOD PRESSURE: 121 MMHG | HEIGHT: 72 IN | RESPIRATION RATE: 18 BRPM | HEART RATE: 107 BPM | TEMPERATURE: 97.7 F | DIASTOLIC BLOOD PRESSURE: 51 MMHG | WEIGHT: 157 LBS | DIASTOLIC BLOOD PRESSURE: 78 MMHG | OXYGEN SATURATION: 100 % | HEIGHT: 72 IN | OXYGEN SATURATION: 97 % | TEMPERATURE: 98.2 F | HEART RATE: 87 BPM | SYSTOLIC BLOOD PRESSURE: 106 MMHG | WEIGHT: 152 LBS | BODY MASS INDEX: 20.59 KG/M2 | BODY MASS INDEX: 21.26 KG/M2

## 2022-01-01 VITALS
HEIGHT: 72 IN | DIASTOLIC BLOOD PRESSURE: 81 MMHG | BODY MASS INDEX: 20.86 KG/M2 | TEMPERATURE: 97.8 F | RESPIRATION RATE: 16 BRPM | WEIGHT: 154 LBS | HEART RATE: 87 BPM | OXYGEN SATURATION: 97 % | SYSTOLIC BLOOD PRESSURE: 121 MMHG

## 2022-01-01 VITALS
BODY MASS INDEX: 20.75 KG/M2 | OXYGEN SATURATION: 96 % | DIASTOLIC BLOOD PRESSURE: 70 MMHG | HEART RATE: 85 BPM | HEIGHT: 72 IN | SYSTOLIC BLOOD PRESSURE: 116 MMHG | RESPIRATION RATE: 16 BRPM | WEIGHT: 153.2 LBS

## 2022-01-01 VITALS
OXYGEN SATURATION: 100 % | HEART RATE: 77 BPM | SYSTOLIC BLOOD PRESSURE: 117 MMHG | TEMPERATURE: 97.8 F | RESPIRATION RATE: 16 BRPM | DIASTOLIC BLOOD PRESSURE: 78 MMHG

## 2022-01-01 VITALS
BODY MASS INDEX: 21.14 KG/M2 | TEMPERATURE: 98.8 F | WEIGHT: 156.1 LBS | DIASTOLIC BLOOD PRESSURE: 64 MMHG | HEIGHT: 72 IN | RESPIRATION RATE: 18 BRPM | HEART RATE: 94 BPM | OXYGEN SATURATION: 100 % | SYSTOLIC BLOOD PRESSURE: 107 MMHG

## 2022-01-01 VITALS
HEART RATE: 110 BPM | DIASTOLIC BLOOD PRESSURE: 76 MMHG | OXYGEN SATURATION: 100 % | BODY MASS INDEX: 20.4 KG/M2 | SYSTOLIC BLOOD PRESSURE: 117 MMHG | HEIGHT: 72 IN | TEMPERATURE: 97.9 F | WEIGHT: 150.6 LBS

## 2022-01-01 VITALS
SYSTOLIC BLOOD PRESSURE: 134 MMHG | HEIGHT: 72 IN | DIASTOLIC BLOOD PRESSURE: 74 MMHG | HEART RATE: 99 BPM | HEART RATE: 100 BPM | DIASTOLIC BLOOD PRESSURE: 76 MMHG | OXYGEN SATURATION: 98 % | RESPIRATION RATE: 16 BRPM | SYSTOLIC BLOOD PRESSURE: 123 MMHG | TEMPERATURE: 97.8 F | BODY MASS INDEX: 20.9 KG/M2 | OXYGEN SATURATION: 99 % | WEIGHT: 157 LBS | WEIGHT: 154.3 LBS | TEMPERATURE: 98 F | BODY MASS INDEX: 21.26 KG/M2 | HEIGHT: 72 IN

## 2022-01-01 VITALS
DIASTOLIC BLOOD PRESSURE: 76 MMHG | SYSTOLIC BLOOD PRESSURE: 117 MMHG | OXYGEN SATURATION: 100 % | RESPIRATION RATE: 18 BRPM | HEIGHT: 72 IN | HEART RATE: 110 BPM | BODY MASS INDEX: 21.7 KG/M2 | TEMPERATURE: 97.9 F

## 2022-01-01 VITALS
OXYGEN SATURATION: 100 % | RESPIRATION RATE: 16 BRPM | DIASTOLIC BLOOD PRESSURE: 78 MMHG | HEART RATE: 88 BPM | TEMPERATURE: 98.8 F | SYSTOLIC BLOOD PRESSURE: 104 MMHG

## 2022-01-01 VITALS
OXYGEN SATURATION: 93 % | TEMPERATURE: 97.9 F | RESPIRATION RATE: 16 BRPM | HEART RATE: 93 BPM | SYSTOLIC BLOOD PRESSURE: 112 MMHG | DIASTOLIC BLOOD PRESSURE: 65 MMHG | BODY MASS INDEX: 20.89 KG/M2 | WEIGHT: 154 LBS

## 2022-01-01 VITALS
BODY MASS INDEX: 20.97 KG/M2 | WEIGHT: 154.8 LBS | HEIGHT: 72 IN | RESPIRATION RATE: 18 BRPM | OXYGEN SATURATION: 99 % | TEMPERATURE: 98.2 F | HEART RATE: 94 BPM | SYSTOLIC BLOOD PRESSURE: 118 MMHG | DIASTOLIC BLOOD PRESSURE: 78 MMHG

## 2022-01-01 VITALS
TEMPERATURE: 97.1 F | BODY MASS INDEX: 21.24 KG/M2 | DIASTOLIC BLOOD PRESSURE: 68 MMHG | HEIGHT: 72 IN | WEIGHT: 156.8 LBS | SYSTOLIC BLOOD PRESSURE: 101 MMHG | HEART RATE: 86 BPM | OXYGEN SATURATION: 100 %

## 2022-01-01 VITALS
TEMPERATURE: 97.6 F | WEIGHT: 158 LBS | DIASTOLIC BLOOD PRESSURE: 68 MMHG | BODY MASS INDEX: 21.43 KG/M2 | SYSTOLIC BLOOD PRESSURE: 107 MMHG | HEART RATE: 89 BPM | RESPIRATION RATE: 15 BRPM

## 2022-01-01 VITALS
SYSTOLIC BLOOD PRESSURE: 105 MMHG | DIASTOLIC BLOOD PRESSURE: 76 MMHG | RESPIRATION RATE: 18 BRPM | OXYGEN SATURATION: 100 % | HEART RATE: 75 BPM | TEMPERATURE: 97.6 F | BODY MASS INDEX: 21.28 KG/M2 | HEIGHT: 72 IN | WEIGHT: 157.1 LBS

## 2022-01-01 VITALS
WEIGHT: 159.1 LBS | RESPIRATION RATE: 20 BRPM | TEMPERATURE: 98.5 F | OXYGEN SATURATION: 99 % | BODY MASS INDEX: 21.55 KG/M2 | HEIGHT: 72 IN | SYSTOLIC BLOOD PRESSURE: 114 MMHG | DIASTOLIC BLOOD PRESSURE: 70 MMHG | HEART RATE: 93 BPM

## 2022-01-01 VITALS
TEMPERATURE: 98 F | HEIGHT: 72 IN | WEIGHT: 156 LBS | BODY MASS INDEX: 21.13 KG/M2 | HEART RATE: 95 BPM | OXYGEN SATURATION: 98 % | SYSTOLIC BLOOD PRESSURE: 117 MMHG | RESPIRATION RATE: 17 BRPM | DIASTOLIC BLOOD PRESSURE: 77 MMHG

## 2022-01-01 VITALS
DIASTOLIC BLOOD PRESSURE: 73 MMHG | HEART RATE: 100 BPM | RESPIRATION RATE: 17 BRPM | SYSTOLIC BLOOD PRESSURE: 113 MMHG | OXYGEN SATURATION: 100 % | TEMPERATURE: 98.8 F

## 2022-01-01 VITALS
DIASTOLIC BLOOD PRESSURE: 60 MMHG | BODY MASS INDEX: 21.03 KG/M2 | TEMPERATURE: 98.1 F | HEART RATE: 94 BPM | OXYGEN SATURATION: 99 % | RESPIRATION RATE: 16 BRPM | SYSTOLIC BLOOD PRESSURE: 108 MMHG | WEIGHT: 155.3 LBS | HEIGHT: 72 IN

## 2022-01-01 VITALS
HEIGHT: 72 IN | HEART RATE: 119 BPM | BODY MASS INDEX: 19.9 KG/M2 | SYSTOLIC BLOOD PRESSURE: 108 MMHG | WEIGHT: 146.9 LBS | DIASTOLIC BLOOD PRESSURE: 70 MMHG | TEMPERATURE: 98.7 F | OXYGEN SATURATION: 97 %

## 2022-01-01 VITALS
HEART RATE: 102 BPM | OXYGEN SATURATION: 95 % | DIASTOLIC BLOOD PRESSURE: 77 MMHG | TEMPERATURE: 98.6 F | BODY MASS INDEX: 20.15 KG/M2 | WEIGHT: 148.8 LBS | RESPIRATION RATE: 16 BRPM | HEIGHT: 72 IN | SYSTOLIC BLOOD PRESSURE: 117 MMHG

## 2022-01-01 VITALS
SYSTOLIC BLOOD PRESSURE: 116 MMHG | OXYGEN SATURATION: 97 % | WEIGHT: 156 LBS | RESPIRATION RATE: 18 BRPM | BODY MASS INDEX: 21.13 KG/M2 | HEIGHT: 72 IN | HEART RATE: 95 BPM | TEMPERATURE: 98 F | DIASTOLIC BLOOD PRESSURE: 80 MMHG

## 2022-01-01 VITALS
HEART RATE: 76 BPM | TEMPERATURE: 98.1 F | RESPIRATION RATE: 18 BRPM | OXYGEN SATURATION: 99 % | HEIGHT: 72 IN | WEIGHT: 154.5 LBS | SYSTOLIC BLOOD PRESSURE: 105 MMHG | BODY MASS INDEX: 20.93 KG/M2 | DIASTOLIC BLOOD PRESSURE: 75 MMHG

## 2022-01-01 VITALS
RESPIRATION RATE: 18 BRPM | TEMPERATURE: 98.7 F | OXYGEN SATURATION: 97 % | SYSTOLIC BLOOD PRESSURE: 110 MMHG | DIASTOLIC BLOOD PRESSURE: 73 MMHG | HEART RATE: 106 BPM

## 2022-01-01 VITALS
TEMPERATURE: 98.8 F | RESPIRATION RATE: 18 BRPM | DIASTOLIC BLOOD PRESSURE: 75 MMHG | HEART RATE: 99 BPM | OXYGEN SATURATION: 98 % | SYSTOLIC BLOOD PRESSURE: 109 MMHG

## 2022-01-01 VITALS
HEART RATE: 110 BPM | BODY MASS INDEX: 20.05 KG/M2 | HEIGHT: 72 IN | DIASTOLIC BLOOD PRESSURE: 75 MMHG | OXYGEN SATURATION: 89 % | RESPIRATION RATE: 16 BRPM | TEMPERATURE: 98.6 F | SYSTOLIC BLOOD PRESSURE: 115 MMHG | WEIGHT: 148 LBS

## 2022-01-01 DIAGNOSIS — J34.89 RHINORRHEA: ICD-10-CM

## 2022-01-01 DIAGNOSIS — J30.89 ENVIRONMENTAL AND SEASONAL ALLERGIES: ICD-10-CM

## 2022-01-01 DIAGNOSIS — C32.9 SQUAMOUS CELL CARCINOMA OF LARYNX (HCC): Primary | ICD-10-CM

## 2022-01-01 DIAGNOSIS — C14.0 THROAT CANCER (HCC): ICD-10-CM

## 2022-01-01 DIAGNOSIS — C32.9 LARYNGEAL CARCINOMA (HCC): Primary | ICD-10-CM

## 2022-01-01 DIAGNOSIS — K92.2 GASTROINTESTINAL HEMORRHAGE, UNSPECIFIED GASTROINTESTINAL HEMORRHAGE TYPE: ICD-10-CM

## 2022-01-01 DIAGNOSIS — R00.2 PALPITATIONS: ICD-10-CM

## 2022-01-01 DIAGNOSIS — K29.60 EROSIVE GASTRITIS: ICD-10-CM

## 2022-01-01 DIAGNOSIS — C32.9 LARYNGEAL CARCINOMA (HCC): ICD-10-CM

## 2022-01-01 DIAGNOSIS — Z29.8 IMMUNOTHERAPY ENCOUNTER: ICD-10-CM

## 2022-01-01 DIAGNOSIS — E03.9 ACQUIRED HYPOTHYROIDISM: Primary | ICD-10-CM

## 2022-01-01 DIAGNOSIS — D64.9 ANEMIA, UNSPECIFIED TYPE: ICD-10-CM

## 2022-01-01 DIAGNOSIS — G89.3 NEOPLASM RELATED PAIN: ICD-10-CM

## 2022-01-01 DIAGNOSIS — D64.9 ACUTE ON CHRONIC ANEMIA: Primary | ICD-10-CM

## 2022-01-01 DIAGNOSIS — R05.8 PRODUCTIVE COUGH: ICD-10-CM

## 2022-01-01 DIAGNOSIS — R07.0 THROAT PAIN: ICD-10-CM

## 2022-01-01 DIAGNOSIS — E87.6 HYPOKALEMIA: ICD-10-CM

## 2022-01-01 DIAGNOSIS — Z51.11 CHEMOTHERAPY MANAGEMENT, ENCOUNTER FOR: ICD-10-CM

## 2022-01-01 DIAGNOSIS — D70.1 CHEMOTHERAPY INDUCED NEUTROPENIA (HCC): ICD-10-CM

## 2022-01-01 DIAGNOSIS — D53.9 MACROCYTIC ANEMIA: ICD-10-CM

## 2022-01-01 DIAGNOSIS — T45.1X5A CINV (CHEMOTHERAPY-INDUCED NAUSEA AND VOMITING): ICD-10-CM

## 2022-01-01 DIAGNOSIS — R11.2 CINV (CHEMOTHERAPY-INDUCED NAUSEA AND VOMITING): ICD-10-CM

## 2022-01-01 DIAGNOSIS — R10.84 GENERALIZED ABDOMINAL PAIN: ICD-10-CM

## 2022-01-01 DIAGNOSIS — Z76.89 PREVENTION OF CHEMOTHERAPY-INDUCED NEUTROPENIA: ICD-10-CM

## 2022-01-01 DIAGNOSIS — C32.9 SQUAMOUS CELL CARCINOMA OF LARYNX (HCC): ICD-10-CM

## 2022-01-01 DIAGNOSIS — K92.2 GASTROINTESTINAL HEMORRHAGE, UNSPECIFIED GASTROINTESTINAL HEMORRHAGE TYPE: Primary | ICD-10-CM

## 2022-01-01 DIAGNOSIS — Z76.89 PREVENTION OF CHEMOTHERAPY-INDUCED NEUTROPENIA: Primary | ICD-10-CM

## 2022-01-01 DIAGNOSIS — K29.51 OTHER CHRONIC GASTRITIS WITH HEMORRHAGE: ICD-10-CM

## 2022-01-01 DIAGNOSIS — I26.99 OTHER ACUTE PULMONARY EMBOLISM WITHOUT ACUTE COR PULMONALE (HCC): ICD-10-CM

## 2022-01-01 DIAGNOSIS — K12.30 MUCOSITIS: Primary | ICD-10-CM

## 2022-01-01 DIAGNOSIS — K92.1 GASTROINTESTINAL HEMORRHAGE WITH MELENA: ICD-10-CM

## 2022-01-01 DIAGNOSIS — E03.2 HYPOTHYROIDISM DUE TO MEDICATION: ICD-10-CM

## 2022-01-01 DIAGNOSIS — R04.2 COUGH WITH HEMOPTYSIS: ICD-10-CM

## 2022-01-01 DIAGNOSIS — K59.00 CONSTIPATION, UNSPECIFIED CONSTIPATION TYPE: ICD-10-CM

## 2022-01-01 DIAGNOSIS — E03.9 ACQUIRED HYPOTHYROIDISM: ICD-10-CM

## 2022-01-01 DIAGNOSIS — K12.30 MUCOSITIS: ICD-10-CM

## 2022-01-01 DIAGNOSIS — K92.1 GASTROINTESTINAL HEMORRHAGE WITH MELENA: Primary | ICD-10-CM

## 2022-01-01 DIAGNOSIS — R07.9 CHEST PAIN, UNSPECIFIED TYPE: ICD-10-CM

## 2022-01-01 DIAGNOSIS — F51.01 PRIMARY INSOMNIA: ICD-10-CM

## 2022-01-01 DIAGNOSIS — Z71.89 COUNSELING REGARDING ADVANCE CARE PLANNING AND GOALS OF CARE: ICD-10-CM

## 2022-01-01 DIAGNOSIS — J06.9 VIRAL UPPER RESPIRATORY TRACT INFECTION: Primary | ICD-10-CM

## 2022-01-01 DIAGNOSIS — C77.0 METASTASIS TO CERVICAL LYMPH NODE (HCC): ICD-10-CM

## 2022-01-01 DIAGNOSIS — J96.21 ACUTE AND CHRONIC RESPIRATORY FAILURE WITH HYPOXIA (HCC): ICD-10-CM

## 2022-01-01 DIAGNOSIS — R07.0 THROAT PAIN IN ADULT: ICD-10-CM

## 2022-01-01 DIAGNOSIS — D64.9 NORMOCYTIC ANEMIA: ICD-10-CM

## 2022-01-01 DIAGNOSIS — D64.9 ANEMIA, UNSPECIFIED TYPE: Primary | ICD-10-CM

## 2022-01-01 DIAGNOSIS — R05.8 PRODUCTIVE COUGH: Primary | ICD-10-CM

## 2022-01-01 DIAGNOSIS — R53.1 GENERALIZED WEAKNESS: Primary | ICD-10-CM

## 2022-01-01 DIAGNOSIS — E86.0 DEHYDRATION DETERMINED BY EXAMINATION: ICD-10-CM

## 2022-01-01 DIAGNOSIS — G93.89 BRAIN MASS: ICD-10-CM

## 2022-01-01 DIAGNOSIS — E87.6 HYPOKALEMIA: Primary | ICD-10-CM

## 2022-01-01 DIAGNOSIS — K21.9 CHRONIC GERD: Primary | ICD-10-CM

## 2022-01-01 DIAGNOSIS — Z51.5 PALLIATIVE CARE BY SPECIALIST: Primary | ICD-10-CM

## 2022-01-01 DIAGNOSIS — M54.2 NECK PAIN: ICD-10-CM

## 2022-01-01 DIAGNOSIS — R00.0 TACHYCARDIA: ICD-10-CM

## 2022-01-01 DIAGNOSIS — J98.8 RESPIRATORY INFECTION: ICD-10-CM

## 2022-01-01 DIAGNOSIS — T45.1X5A CHEMOTHERAPY INDUCED NEUTROPENIA (HCC): ICD-10-CM

## 2022-01-01 DIAGNOSIS — B37.0 ORAL THRUSH: ICD-10-CM

## 2022-01-01 DIAGNOSIS — R79.89 AZOTEMIA: ICD-10-CM

## 2022-01-01 DIAGNOSIS — N17.9 AKI (ACUTE KIDNEY INJURY) (HCC): ICD-10-CM

## 2022-01-01 DIAGNOSIS — C32.9 LARYNGEAL CANCER (HCC): ICD-10-CM

## 2022-01-01 LAB
1,3 BETA GLUCAN SER-MCNC: <31 PG/ML
ABO + RH BLD: NORMAL
ALBUMIN SERPL ELPH-MCNC: 3 G/DL (ref 2.9–4.4)
ALBUMIN SERPL-MCNC: 2.1 G/DL (ref 3.5–5)
ALBUMIN SERPL-MCNC: 2.4 G/DL (ref 3.5–5)
ALBUMIN SERPL-MCNC: 2.4 G/DL (ref 3.5–5)
ALBUMIN SERPL-MCNC: 2.6 G/DL (ref 3.5–5)
ALBUMIN SERPL-MCNC: 2.6 G/DL (ref 3.5–5)
ALBUMIN SERPL-MCNC: 2.7 G/DL (ref 3.5–5)
ALBUMIN SERPL-MCNC: 2.8 G/DL (ref 3.5–5)
ALBUMIN SERPL-MCNC: 2.9 G/DL (ref 3.5–5)
ALBUMIN SERPL-MCNC: 2.9 G/DL (ref 3.5–5)
ALBUMIN SERPL-MCNC: 3 G/DL (ref 3.5–5)
ALBUMIN SERPL-MCNC: 3.1 G/DL (ref 3.5–5)
ALBUMIN SERPL-MCNC: 3.2 G/DL (ref 3.5–5)
ALBUMIN SERPL-MCNC: 3.2 G/DL (ref 3.5–5)
ALBUMIN SERPL-MCNC: 3.3 G/DL (ref 3.5–5)
ALBUMIN SERPL-MCNC: 3.4 G/DL (ref 3.5–5)
ALBUMIN SERPL-MCNC: 3.6 G/DL (ref 3.5–5)
ALBUMIN SERPL-MCNC: 3.6 G/DL (ref 3.5–5)
ALBUMIN SERPL-MCNC: 3.7 G/DL (ref 3.5–5)
ALBUMIN/GLOB SERPL: 0.5 {RATIO} (ref 1.1–2.2)
ALBUMIN/GLOB SERPL: 0.6 {RATIO} (ref 1.1–2.2)
ALBUMIN/GLOB SERPL: 0.7 {RATIO} (ref 1.1–2.2)
ALBUMIN/GLOB SERPL: 0.8 {RATIO} (ref 1.1–2.2)
ALBUMIN/GLOB SERPL: 0.9 {RATIO} (ref 1.1–2.2)
ALBUMIN/GLOB SERPL: 1 {RATIO} (ref 0.7–1.7)
ALBUMIN/GLOB SERPL: 1.1 {RATIO} (ref 1.1–2.2)
ALP SERPL-CCNC: 105 U/L (ref 45–117)
ALP SERPL-CCNC: 49 U/L (ref 45–117)
ALP SERPL-CCNC: 58 U/L (ref 45–117)
ALP SERPL-CCNC: 60 U/L (ref 45–117)
ALP SERPL-CCNC: 60 U/L (ref 45–117)
ALP SERPL-CCNC: 62 U/L (ref 45–117)
ALP SERPL-CCNC: 67 U/L (ref 45–117)
ALP SERPL-CCNC: 69 U/L (ref 45–117)
ALP SERPL-CCNC: 70 U/L (ref 45–117)
ALP SERPL-CCNC: 71 U/L (ref 45–117)
ALP SERPL-CCNC: 72 U/L (ref 45–117)
ALP SERPL-CCNC: 73 U/L (ref 45–117)
ALP SERPL-CCNC: 73 U/L (ref 45–117)
ALP SERPL-CCNC: 74 U/L (ref 45–117)
ALP SERPL-CCNC: 76 U/L (ref 45–117)
ALP SERPL-CCNC: 76 U/L (ref 45–117)
ALP SERPL-CCNC: 77 U/L (ref 45–117)
ALP SERPL-CCNC: 77 U/L (ref 45–117)
ALP SERPL-CCNC: 78 U/L (ref 45–117)
ALP SERPL-CCNC: 81 U/L (ref 45–117)
ALP SERPL-CCNC: 86 U/L (ref 45–117)
ALP SERPL-CCNC: 86 U/L (ref 45–117)
ALPHA1 GLOB SERPL ELPH-MCNC: 0.3 G/DL (ref 0–0.4)
ALPHA2 GLOB SERPL ELPH-MCNC: 1 G/DL (ref 0.4–1)
ALT SERPL-CCNC: 10 U/L (ref 12–78)
ALT SERPL-CCNC: 11 U/L (ref 12–78)
ALT SERPL-CCNC: 11 U/L (ref 12–78)
ALT SERPL-CCNC: 12 U/L (ref 12–78)
ALT SERPL-CCNC: 14 U/L (ref 12–78)
ALT SERPL-CCNC: 14 U/L (ref 12–78)
ALT SERPL-CCNC: 15 U/L (ref 12–78)
ALT SERPL-CCNC: 19 U/L (ref 12–78)
ALT SERPL-CCNC: 7 U/L (ref 12–78)
ALT SERPL-CCNC: 8 U/L (ref 12–78)
ALT SERPL-CCNC: 9 U/L (ref 12–78)
ANA SER QL: NEGATIVE
ANION GAP SERPL CALC-SCNC: 10 MMOL/L (ref 5–15)
ANION GAP SERPL CALC-SCNC: 10 MMOL/L (ref 5–15)
ANION GAP SERPL CALC-SCNC: 12 MMOL/L (ref 5–15)
ANION GAP SERPL CALC-SCNC: 2 MMOL/L (ref 5–15)
ANION GAP SERPL CALC-SCNC: 3 MMOL/L (ref 5–15)
ANION GAP SERPL CALC-SCNC: 3 MMOL/L (ref 5–15)
ANION GAP SERPL CALC-SCNC: 4 MMOL/L (ref 5–15)
ANION GAP SERPL CALC-SCNC: 5 MMOL/L (ref 5–15)
ANION GAP SERPL CALC-SCNC: 6 MMOL/L (ref 5–15)
ANION GAP SERPL CALC-SCNC: 7 MMOL/L (ref 5–15)
ANION GAP SERPL CALC-SCNC: 8 MMOL/L (ref 5–15)
ANION GAP SERPL CALC-SCNC: 9 MMOL/L (ref 5–15)
APPEARANCE UR: CLEAR
APTT PPP: 36.9 SEC (ref 22.1–31)
ARTERIAL PATENCY WRIST A: ABNORMAL
ARTERIAL PATENCY WRIST A: POSITIVE
AST SERPL W P-5'-P-CCNC: 12 U/L (ref 15–37)
AST SERPL W P-5'-P-CCNC: 12 U/L (ref 15–37)
AST SERPL W P-5'-P-CCNC: 13 U/L (ref 15–37)
AST SERPL W P-5'-P-CCNC: 14 U/L (ref 15–37)
AST SERPL W P-5'-P-CCNC: 16 U/L (ref 15–37)
AST SERPL W P-5'-P-CCNC: 16 U/L (ref 15–37)
AST SERPL W P-5'-P-CCNC: 17 U/L (ref 15–37)
AST SERPL W P-5'-P-CCNC: 18 U/L (ref 15–37)
AST SERPL W P-5'-P-CCNC: 19 U/L (ref 15–37)
AST SERPL-CCNC: 12 U/L (ref 15–37)
AST SERPL-CCNC: 14 U/L (ref 15–37)
AST SERPL-CCNC: 15 U/L (ref 15–37)
AST SERPL-CCNC: 16 U/L (ref 15–37)
AST SERPL-CCNC: 18 U/L (ref 15–37)
AST SERPL-CCNC: 19 U/L (ref 15–37)
AST SERPL-CCNC: 19 U/L (ref 15–37)
AST SERPL-CCNC: 24 U/L (ref 15–37)
AST SERPL-CCNC: 26 U/L (ref 15–37)
AST SERPL-CCNC: 27 U/L (ref 15–37)
ATRIAL RATE: 100 BPM
ATRIAL RATE: 100 BPM
ATRIAL RATE: 107 BPM
ATRIAL RATE: 113 BPM
ATRIAL RATE: 129 BPM
ATRIAL RATE: 137 BPM
ATRIAL RATE: 170 BPM
B-GLOBULIN SERPL ELPH-MCNC: 0.9 G/DL (ref 0.7–1.3)
BACTERIA SPEC CULT: ABNORMAL
BACTERIA SPEC CULT: NORMAL
BACTERIA URNS QL MICRO: NEGATIVE /HPF
BACTERIA URNS QL MICRO: NEGATIVE /HPF
BASE DEFICIT BLD-SCNC: 1.2 MMOL/L
BASE DEFICIT BLD-SCNC: 1.4 MMOL/L
BASE DEFICIT BLD-SCNC: 2.5 MMOL/L
BASE DEFICIT BLD-SCNC: 3 MMOL/L
BASE DEFICIT BLD-SCNC: 3.1 MMOL/L
BASE DEFICIT BLD-SCNC: 4 MMOL/L
BASE DEFICIT BLD-SCNC: 4.6 MMOL/L
BASE DEFICIT BLD-SCNC: 4.6 MMOL/L
BASE DEFICIT BLDA-SCNC: 1.3 MMOL/L
BASE DEFICIT BLDA-SCNC: 1.4 MMOL/L
BASE DEFICIT BLDA-SCNC: 1.5 MMOL/L
BASE DEFICIT BLDA-SCNC: 2.2 MMOL/L
BASE DEFICIT BLDA-SCNC: 3.8 MMOL/L
BASE DEFICIT BLDA-SCNC: 3.9 MMOL/L
BASE DEFICIT BLDA-SCNC: 4.8 MMOL/L
BASE DEFICIT BLDA-SCNC: 5.6 MMOL/L
BASE DEFICIT BLDA-SCNC: 6.3 MMOL/L
BASE DEFICIT BLDA-SCNC: 6.7 MMOL/L
BASE DEFICIT BLDA-SCNC: 7 MMOL/L
BASE DEFICIT BLDA-SCNC: 7.3 MMOL/L
BASE EXCESS BLD CALC-SCNC: 0.1 MMOL/L
BASE EXCESS BLD CALC-SCNC: 0.3 MMOL/L
BASE EXCESS BLDA CALC-SCNC: 0.1 MMOL/L
BASE EXCESS BLDA CALC-SCNC: 2.4 MMOL/L
BASOPHILS # BLD: 0 K/UL (ref 0–0.1)
BASOPHILS # BLD: 0.1 K/UL (ref 0–0.1)
BASOPHILS NFR BLD: 0 % (ref 0–1)
BASOPHILS NFR BLD: 1 % (ref 0–1)
BDY SITE: ABNORMAL
BILIRUB DIRECT SERPL-MCNC: 0.2 MG/DL (ref 0–0.2)
BILIRUB SERPL-MCNC: 0.2 MG/DL (ref 0.2–1)
BILIRUB SERPL-MCNC: 0.3 MG/DL (ref 0.2–1)
BILIRUB SERPL-MCNC: 0.4 MG/DL (ref 0.2–1)
BILIRUB SERPL-MCNC: 0.5 MG/DL (ref 0.2–1)
BILIRUB SERPL-MCNC: 0.6 MG/DL (ref 0.2–1)
BILIRUB UR QL: NEGATIVE
BLD PROD TYP BPU: NORMAL
BLOOD GROUP ANTIBODIES SERPL: NEGATIVE
BLOOD GROUP ANTIBODIES SERPL: NORMAL
BNP SERPL-MCNC: 117 PG/ML
BNP SERPL-MCNC: 1315 PG/ML
BNP SERPL-MCNC: 176 PG/ML
BNP SERPL-MCNC: 6938 PG/ML
BPU ID: NORMAL
BUN SERPL-MCNC: 13 MG/DL (ref 6–20)
BUN SERPL-MCNC: 14 MG/DL (ref 6–20)
BUN SERPL-MCNC: 17 MG/DL (ref 6–20)
BUN SERPL-MCNC: 21 MG/DL (ref 6–20)
BUN SERPL-MCNC: 21 MG/DL (ref 6–20)
BUN SERPL-MCNC: 22 MG/DL (ref 6–20)
BUN SERPL-MCNC: 23 MG/DL (ref 6–20)
BUN SERPL-MCNC: 24 MG/DL (ref 6–20)
BUN SERPL-MCNC: 24 MG/DL (ref 6–20)
BUN SERPL-MCNC: 25 MG/DL (ref 6–20)
BUN SERPL-MCNC: 26 MG/DL (ref 6–20)
BUN SERPL-MCNC: 27 MG/DL (ref 6–20)
BUN SERPL-MCNC: 27 MG/DL (ref 6–20)
BUN SERPL-MCNC: 28 MG/DL (ref 6–20)
BUN SERPL-MCNC: 28 MG/DL (ref 6–20)
BUN SERPL-MCNC: 29 MG/DL (ref 6–20)
BUN SERPL-MCNC: 29 MG/DL (ref 6–20)
BUN SERPL-MCNC: 31 MG/DL (ref 6–20)
BUN SERPL-MCNC: 33 MG/DL (ref 6–20)
BUN SERPL-MCNC: 33 MG/DL (ref 6–20)
BUN SERPL-MCNC: 35 MG/DL (ref 6–20)
BUN SERPL-MCNC: 36 MG/DL (ref 6–20)
BUN SERPL-MCNC: 38 MG/DL (ref 6–20)
BUN SERPL-MCNC: 41 MG/DL (ref 6–20)
BUN SERPL-MCNC: 43 MG/DL (ref 6–20)
BUN SERPL-MCNC: 43 MG/DL (ref 6–20)
BUN SERPL-MCNC: 45 MG/DL (ref 6–20)
BUN SERPL-MCNC: 50 MG/DL (ref 6–20)
BUN SERPL-MCNC: 57 MG/DL (ref 6–20)
BUN SERPL-MCNC: 64 MG/DL (ref 6–20)
BUN SERPL-MCNC: 9 MG/DL (ref 6–20)
BUN/CREAT SERPL: 11 (ref 12–20)
BUN/CREAT SERPL: 11 (ref 12–20)
BUN/CREAT SERPL: 13 (ref 12–20)
BUN/CREAT SERPL: 14 (ref 12–20)
BUN/CREAT SERPL: 15 (ref 12–20)
BUN/CREAT SERPL: 16 (ref 12–20)
BUN/CREAT SERPL: 16 (ref 12–20)
BUN/CREAT SERPL: 17 (ref 12–20)
BUN/CREAT SERPL: 18 (ref 12–20)
BUN/CREAT SERPL: 19 (ref 12–20)
BUN/CREAT SERPL: 19 (ref 12–20)
BUN/CREAT SERPL: 20 (ref 12–20)
BUN/CREAT SERPL: 21 (ref 12–20)
BUN/CREAT SERPL: 21 (ref 12–20)
BUN/CREAT SERPL: 22 (ref 12–20)
BUN/CREAT SERPL: 23 (ref 12–20)
BUN/CREAT SERPL: 24 (ref 12–20)
BUN/CREAT SERPL: 24 (ref 12–20)
BUN/CREAT SERPL: 25 (ref 12–20)
BUN/CREAT SERPL: 26 (ref 12–20)
BUN/CREAT SERPL: 27 (ref 12–20)
BUN/CREAT SERPL: 28 (ref 12–20)
BUN/CREAT SERPL: 29 (ref 12–20)
BUN/CREAT SERPL: 29 (ref 12–20)
BUN/CREAT SERPL: 30 (ref 12–20)
BUN/CREAT SERPL: 31 (ref 12–20)
BUN/CREAT SERPL: 37 (ref 12–20)
BUN/CREAT SERPL: 9 (ref 12–20)
BUN/CREAT SERPL: 9 (ref 12–20)
C-ANCA TITR SER IF: NORMAL TITER
CA-I BLD-MCNC: 8.1 MG/DL (ref 8.5–10.1)
CA-I BLD-MCNC: 8.3 MG/DL (ref 8.5–10.1)
CA-I BLD-MCNC: 8.9 MG/DL (ref 8.5–10.1)
CA-I BLD-MCNC: 9 MG/DL (ref 8.5–10.1)
CA-I BLD-MCNC: 9 MG/DL (ref 8.5–10.1)
CA-I BLD-MCNC: 9.1 MG/DL (ref 8.5–10.1)
CA-I BLD-MCNC: 9.1 MG/DL (ref 8.5–10.1)
CA-I BLD-MCNC: 9.2 MG/DL (ref 8.5–10.1)
CA-I BLD-MCNC: 9.3 MG/DL (ref 8.5–10.1)
CA-I BLD-MCNC: 9.4 MG/DL (ref 8.5–10.1)
CA-I BLD-MCNC: 9.5 MG/DL (ref 8.5–10.1)
CA-I BLD-SCNC: 0.93 MMOL/L (ref 1.13–1.32)
CALCIUM SERPL-MCNC: 10.8 MG/DL (ref 8.5–10.1)
CALCIUM SERPL-MCNC: 6.8 MG/DL (ref 8.5–10.1)
CALCIUM SERPL-MCNC: 6.9 MG/DL (ref 8.5–10.1)
CALCIUM SERPL-MCNC: 7.1 MG/DL (ref 8.5–10.1)
CALCIUM SERPL-MCNC: 7.1 MG/DL (ref 8.5–10.1)
CALCIUM SERPL-MCNC: 7.2 MG/DL (ref 8.5–10.1)
CALCIUM SERPL-MCNC: 7.2 MG/DL (ref 8.5–10.1)
CALCIUM SERPL-MCNC: 7.3 MG/DL (ref 8.5–10.1)
CALCIUM SERPL-MCNC: 7.4 MG/DL (ref 8.5–10.1)
CALCIUM SERPL-MCNC: 7.5 MG/DL (ref 8.5–10.1)
CALCIUM SERPL-MCNC: 7.5 MG/DL (ref 8.5–10.1)
CALCIUM SERPL-MCNC: 7.6 MG/DL (ref 8.5–10.1)
CALCIUM SERPL-MCNC: 7.8 MG/DL (ref 8.5–10.1)
CALCIUM SERPL-MCNC: 7.9 MG/DL (ref 8.5–10.1)
CALCIUM SERPL-MCNC: 7.9 MG/DL (ref 8.5–10.1)
CALCIUM SERPL-MCNC: 8.7 MG/DL (ref 8.5–10.1)
CALCIUM SERPL-MCNC: 8.8 MG/DL (ref 8.5–10.1)
CALCIUM SERPL-MCNC: 9 MG/DL (ref 8.5–10.1)
CALCIUM SERPL-MCNC: 9.1 MG/DL (ref 8.5–10.1)
CALCIUM SERPL-MCNC: 9.3 MG/DL (ref 8.5–10.1)
CALCIUM SERPL-MCNC: 9.3 MG/DL (ref 8.5–10.1)
CALCIUM SERPL-MCNC: 9.4 MG/DL (ref 8.5–10.1)
CALCIUM SERPL-MCNC: 9.5 MG/DL (ref 8.5–10.1)
CALCIUM SERPL-MCNC: 9.6 MG/DL (ref 8.5–10.1)
CALCULATED P AXIS, ECG09: 42 DEGREES
CALCULATED P AXIS, ECG09: 46 DEGREES
CALCULATED P AXIS, ECG09: 57 DEGREES
CALCULATED R AXIS, ECG10: 47 DEGREES
CALCULATED R AXIS, ECG10: 65 DEGREES
CALCULATED R AXIS, ECG10: 72 DEGREES
CALCULATED R AXIS, ECG10: 74 DEGREES
CALCULATED R AXIS, ECG10: 74 DEGREES
CALCULATED R AXIS, ECG10: 87 DEGREES
CALCULATED R AXIS, ECG10: 88 DEGREES
CALCULATED T AXIS, ECG11: 104 DEGREES
CALCULATED T AXIS, ECG11: 39 DEGREES
CALCULATED T AXIS, ECG11: 46 DEGREES
CALCULATED T AXIS, ECG11: 58 DEGREES
CALCULATED T AXIS, ECG11: 66 DEGREES
CALCULATED T AXIS, ECG11: 78 DEGREES
CALCULATED T AXIS, ECG11: 8 DEGREES
CARDIOLIPIN IGA SER IA-ACNC: <9 APL U/ML (ref 0–11)
CARDIOLIPIN IGG SER IA-ACNC: <9 GPL U/ML (ref 0–14)
CARDIOLIPIN IGM SER IA-ACNC: 20 MPL U/ML (ref 0–12)
CHLORIDE SERPL-SCNC: 100 MMOL/L (ref 97–108)
CHLORIDE SERPL-SCNC: 101 MMOL/L (ref 97–108)
CHLORIDE SERPL-SCNC: 102 MMOL/L (ref 97–108)
CHLORIDE SERPL-SCNC: 103 MMOL/L (ref 97–108)
CHLORIDE SERPL-SCNC: 104 MMOL/L (ref 97–108)
CHLORIDE SERPL-SCNC: 105 MMOL/L (ref 97–108)
CHLORIDE SERPL-SCNC: 106 MMOL/L (ref 97–108)
CHLORIDE SERPL-SCNC: 106 MMOL/L (ref 97–108)
CHLORIDE SERPL-SCNC: 107 MMOL/L (ref 97–108)
CHLORIDE SERPL-SCNC: 108 MMOL/L (ref 97–108)
CHLORIDE SERPL-SCNC: 110 MMOL/L (ref 97–108)
CHLORIDE SERPL-SCNC: 111 MMOL/L (ref 97–108)
CHLORIDE SERPL-SCNC: 97 MMOL/L (ref 97–108)
CHLORIDE SERPL-SCNC: 98 MMOL/L (ref 97–108)
CHLORIDE SERPL-SCNC: 98 MMOL/L (ref 97–108)
CHLORIDE SERPL-SCNC: 99 MMOL/L (ref 97–108)
CK SERPL-CCNC: 251 U/L (ref 39–308)
CO2 SERPL-SCNC: 20 MMOL/L (ref 21–32)
CO2 SERPL-SCNC: 22 MMOL/L (ref 21–32)
CO2 SERPL-SCNC: 23 MMOL/L (ref 21–32)
CO2 SERPL-SCNC: 23 MMOL/L (ref 21–32)
CO2 SERPL-SCNC: 24 MMOL/L (ref 21–32)
CO2 SERPL-SCNC: 25 MMOL/L (ref 21–32)
CO2 SERPL-SCNC: 25 MMOL/L (ref 21–32)
CO2 SERPL-SCNC: 26 MMOL/L (ref 21–32)
CO2 SERPL-SCNC: 27 MMOL/L (ref 21–32)
CO2 SERPL-SCNC: 28 MMOL/L (ref 21–32)
CO2 SERPL-SCNC: 29 MMOL/L (ref 21–32)
CO2 SERPL-SCNC: 29 MMOL/L (ref 21–32)
CO2 SERPL-SCNC: 30 MMOL/L (ref 21–32)
CO2 SERPL-SCNC: 31 MMOL/L (ref 21–32)
CO2 SERPL-SCNC: 32 MMOL/L (ref 21–32)
CO2 SERPL-SCNC: 32 MMOL/L (ref 21–32)
CO2 SERPL-SCNC: 33 MMOL/L (ref 21–32)
COHGB MFR BLD: 0.3 % (ref 1–2)
COHGB MFR BLD: 1.1 % (ref 1–2)
COLLECT DATE STL: ABNORMAL
COLLECT DATE STL: NORMAL
COLOR UR: ABNORMAL
COLOR UR: ABNORMAL
COLOR UR: YELLOW
COMMENT, HOLDF: NORMAL
CORTIS SERPL-MCNC: 8.1 UG/DL
COVID-19 RAPID TEST, COVR: NOT DETECTED
CREAT SERPL-MCNC: 0.83 MG/DL (ref 0.7–1.3)
CREAT SERPL-MCNC: 0.99 MG/DL (ref 0.7–1.3)
CREAT SERPL-MCNC: 1 MG/DL (ref 0.7–1.3)
CREAT SERPL-MCNC: 1.01 MG/DL (ref 0.7–1.3)
CREAT SERPL-MCNC: 1.03 MG/DL (ref 0.7–1.3)
CREAT SERPL-MCNC: 1.03 MG/DL (ref 0.7–1.3)
CREAT SERPL-MCNC: 1.05 MG/DL (ref 0.7–1.3)
CREAT SERPL-MCNC: 1.1 MG/DL (ref 0.7–1.3)
CREAT SERPL-MCNC: 1.1 MG/DL (ref 0.7–1.3)
CREAT SERPL-MCNC: 1.18 MG/DL (ref 0.7–1.3)
CREAT SERPL-MCNC: 1.19 MG/DL (ref 0.7–1.3)
CREAT SERPL-MCNC: 1.19 MG/DL (ref 0.7–1.3)
CREAT SERPL-MCNC: 1.23 MG/DL (ref 0.7–1.3)
CREAT SERPL-MCNC: 1.31 MG/DL (ref 0.7–1.3)
CREAT SERPL-MCNC: 1.36 MG/DL (ref 0.7–1.3)
CREAT SERPL-MCNC: 1.37 MG/DL (ref 0.7–1.3)
CREAT SERPL-MCNC: 1.39 MG/DL (ref 0.7–1.3)
CREAT SERPL-MCNC: 1.39 MG/DL (ref 0.7–1.3)
CREAT SERPL-MCNC: 1.41 MG/DL (ref 0.7–1.3)
CREAT SERPL-MCNC: 1.47 MG/DL (ref 0.7–1.3)
CREAT SERPL-MCNC: 1.48 MG/DL (ref 0.7–1.3)
CREAT SERPL-MCNC: 1.49 MG/DL (ref 0.7–1.3)
CREAT SERPL-MCNC: 1.5 MG/DL (ref 0.7–1.3)
CREAT SERPL-MCNC: 1.53 MG/DL (ref 0.7–1.3)
CREAT SERPL-MCNC: 1.57 MG/DL (ref 0.7–1.3)
CREAT SERPL-MCNC: 1.61 MG/DL (ref 0.7–1.3)
CREAT SERPL-MCNC: 1.62 MG/DL (ref 0.7–1.3)
CREAT SERPL-MCNC: 1.64 MG/DL (ref 0.7–1.3)
CREAT SERPL-MCNC: 1.65 MG/DL (ref 0.7–1.3)
CREAT SERPL-MCNC: 1.65 MG/DL (ref 0.7–1.3)
CREAT SERPL-MCNC: 1.66 MG/DL (ref 0.7–1.3)
CREAT SERPL-MCNC: 1.68 MG/DL (ref 0.7–1.3)
CREAT SERPL-MCNC: 1.68 MG/DL (ref 0.7–1.3)
CREAT SERPL-MCNC: 1.69 MG/DL (ref 0.7–1.3)
CREAT SERPL-MCNC: 1.75 MG/DL (ref 0.7–1.3)
CREAT SERPL-MCNC: 1.76 MG/DL (ref 0.7–1.3)
CREAT SERPL-MCNC: 1.81 MG/DL (ref 0.7–1.3)
CREAT SERPL-MCNC: 1.82 MG/DL (ref 0.7–1.3)
CREAT SERPL-MCNC: 1.84 MG/DL (ref 0.7–1.3)
CREAT SERPL-MCNC: 1.85 MG/DL (ref 0.7–1.3)
CROSSMATCH RESULT,%XM: NORMAL
CRP SERPL-MCNC: 18.6 MG/DL (ref 0–0.6)
DIAGNOSIS, 93000: NORMAL
DIFFERENTIAL METHOD BLD: ABNORMAL
ECHO AV AREA PEAK VELOCITY: 3.4 CM2
ECHO AV AREA/BSA PEAK VELOCITY: 1.8 CM2/M2
ECHO AV PEAK GRADIENT: 4 MMHG
ECHO AV PEAK VELOCITY: 1 M/S
ECHO AV VELOCITY RATIO: 1
ECHO LA DIAMETER INDEX: 1.66 CM/M2
ECHO LA DIAMETER: 3.2 CM
ECHO LV E' LATERAL VELOCITY: 13 CM/S
ECHO LV E' SEPTAL VELOCITY: 12 CM/S
ECHO LV FRACTIONAL SHORTENING: 30 % (ref 28–44)
ECHO LV INTERNAL DIMENSION DIASTOLE INDEX: 1.4 CM/M2
ECHO LV INTERNAL DIMENSION DIASTOLIC: 2.7 CM (ref 4.2–5.9)
ECHO LV INTERNAL DIMENSION SYSTOLIC INDEX: 0.98 CM/M2
ECHO LV INTERNAL DIMENSION SYSTOLIC: 1.9 CM
ECHO LV IVSD: 1.8 CM (ref 0.6–1)
ECHO LV MASS 2D: 192.3 G (ref 88–224)
ECHO LV MASS INDEX 2D: 99.6 G/M2 (ref 49–115)
ECHO LV POSTERIOR WALL DIASTOLIC: 1.8 CM (ref 0.6–1)
ECHO LV RELATIVE WALL THICKNESS RATIO: 1.33
ECHO LVOT AREA: 3.5 CM2
ECHO LVOT DIAM: 2.1 CM
ECHO LVOT PEAK GRADIENT: 4 MMHG
ECHO LVOT PEAK VELOCITY: 1 M/S
ECHO PULMONARY ARTERY SYSTOLIC PRESSURE (PASP): 75 MMHG
ECHO PV MAX VELOCITY: 0.7 M/S
ECHO PV PEAK GRADIENT: 2 MMHG
ECHO RV FREE WALL PEAK S': 11 CM/S
ECHO RV INTERNAL DIMENSION: 2.9 CM
ECHO RV TAPSE: 2 CM (ref 1.7–?)
ECHO TV REGURGITANT MAX VELOCITY: 4.16 M/S
ECHO TV REGURGITANT PEAK GRADIENT: 69 MMHG
EOSINOPHIL # BLD: 0 K/UL (ref 0–0.4)
EOSINOPHIL # BLD: 0.1 K/UL (ref 0–0.4)
EOSINOPHIL # BLD: 0.2 K/UL (ref 0–0.4)
EOSINOPHIL NFR BLD: 0 % (ref 0–7)
EOSINOPHIL NFR BLD: 1 % (ref 0–7)
EOSINOPHIL NFR BLD: 2 % (ref 0–7)
EOSINOPHIL NFR BLD: 2 % (ref 0–7)
ERYTHROCYTE [DISTWIDTH] IN BLOOD BY AUTOMATED COUNT: 14.5 % (ref 11.5–14.5)
ERYTHROCYTE [DISTWIDTH] IN BLOOD BY AUTOMATED COUNT: 14.6 % (ref 11.5–14.5)
ERYTHROCYTE [DISTWIDTH] IN BLOOD BY AUTOMATED COUNT: 14.6 % (ref 11.5–14.5)
ERYTHROCYTE [DISTWIDTH] IN BLOOD BY AUTOMATED COUNT: 14.8 % (ref 11.5–14.5)
ERYTHROCYTE [DISTWIDTH] IN BLOOD BY AUTOMATED COUNT: 15 % (ref 11.5–14.5)
ERYTHROCYTE [DISTWIDTH] IN BLOOD BY AUTOMATED COUNT: 15 % (ref 11.5–14.5)
ERYTHROCYTE [DISTWIDTH] IN BLOOD BY AUTOMATED COUNT: 15.1 % (ref 11.5–14.5)
ERYTHROCYTE [DISTWIDTH] IN BLOOD BY AUTOMATED COUNT: 15.1 % (ref 11.5–14.5)
ERYTHROCYTE [DISTWIDTH] IN BLOOD BY AUTOMATED COUNT: 15.2 % (ref 11.5–14.5)
ERYTHROCYTE [DISTWIDTH] IN BLOOD BY AUTOMATED COUNT: 15.2 % (ref 11.5–14.5)
ERYTHROCYTE [DISTWIDTH] IN BLOOD BY AUTOMATED COUNT: 15.3 % (ref 11.5–14.5)
ERYTHROCYTE [DISTWIDTH] IN BLOOD BY AUTOMATED COUNT: 15.6 % (ref 11.5–14.5)
ERYTHROCYTE [DISTWIDTH] IN BLOOD BY AUTOMATED COUNT: 15.8 % (ref 11.5–14.5)
ERYTHROCYTE [DISTWIDTH] IN BLOOD BY AUTOMATED COUNT: 15.9 % (ref 11.5–14.5)
ERYTHROCYTE [DISTWIDTH] IN BLOOD BY AUTOMATED COUNT: 16 % (ref 11.5–14.5)
ERYTHROCYTE [DISTWIDTH] IN BLOOD BY AUTOMATED COUNT: 16.2 % (ref 11.5–14.5)
ERYTHROCYTE [DISTWIDTH] IN BLOOD BY AUTOMATED COUNT: 16.3 % (ref 11.5–14.5)
ERYTHROCYTE [DISTWIDTH] IN BLOOD BY AUTOMATED COUNT: 16.3 % (ref 11.5–14.5)
ERYTHROCYTE [DISTWIDTH] IN BLOOD BY AUTOMATED COUNT: 16.4 % (ref 11.5–14.5)
ERYTHROCYTE [DISTWIDTH] IN BLOOD BY AUTOMATED COUNT: 16.5 % (ref 11.5–14.5)
ERYTHROCYTE [DISTWIDTH] IN BLOOD BY AUTOMATED COUNT: 16.6 % (ref 11.5–14.5)
ERYTHROCYTE [DISTWIDTH] IN BLOOD BY AUTOMATED COUNT: 16.6 % (ref 11.5–14.5)
ERYTHROCYTE [DISTWIDTH] IN BLOOD BY AUTOMATED COUNT: 16.7 % (ref 11.5–14.5)
ERYTHROCYTE [DISTWIDTH] IN BLOOD BY AUTOMATED COUNT: 16.7 % (ref 11.5–14.5)
ERYTHROCYTE [DISTWIDTH] IN BLOOD BY AUTOMATED COUNT: 16.8 % (ref 11.5–14.5)
ERYTHROCYTE [DISTWIDTH] IN BLOOD BY AUTOMATED COUNT: 16.9 % (ref 11.5–14.5)
ERYTHROCYTE [DISTWIDTH] IN BLOOD BY AUTOMATED COUNT: 17.1 % (ref 11.5–14.5)
ERYTHROCYTE [DISTWIDTH] IN BLOOD BY AUTOMATED COUNT: 17.2 % (ref 11.5–14.5)
ERYTHROCYTE [DISTWIDTH] IN BLOOD BY AUTOMATED COUNT: 17.4 % (ref 11.5–14.5)
ERYTHROCYTE [DISTWIDTH] IN BLOOD BY AUTOMATED COUNT: 17.6 % (ref 11.5–14.5)
ERYTHROCYTE [DISTWIDTH] IN BLOOD BY AUTOMATED COUNT: 17.9 % (ref 11.5–14.5)
ERYTHROCYTE [DISTWIDTH] IN BLOOD BY AUTOMATED COUNT: 17.9 % (ref 11.5–14.5)
ERYTHROCYTE [DISTWIDTH] IN BLOOD BY AUTOMATED COUNT: 18.4 % (ref 11.5–14.5)
ERYTHROCYTE [DISTWIDTH] IN BLOOD BY AUTOMATED COUNT: 19.5 % (ref 11.5–14.5)
ERYTHROCYTE [DISTWIDTH] IN BLOOD BY AUTOMATED COUNT: 19.5 % (ref 11.5–14.5)
ERYTHROCYTE [DISTWIDTH] IN BLOOD BY AUTOMATED COUNT: 19.8 % (ref 11.5–14.5)
ERYTHROCYTE [DISTWIDTH] IN BLOOD BY AUTOMATED COUNT: 21.2 % (ref 11.5–14.5)
ERYTHROCYTE [DISTWIDTH] IN BLOOD BY AUTOMATED COUNT: 21.5 % (ref 11.5–14.5)
ERYTHROCYTE [SEDIMENTATION RATE] IN BLOOD: 126 MM/HR (ref 0–20)
FERRITIN SERPL-MCNC: 328 NG/ML (ref 26–388)
FERRITIN SERPL-MCNC: 349 NG/ML (ref 26–388)
FERRITIN SERPL-MCNC: 400 NG/ML (ref 26–388)
FERRITIN SERPL-MCNC: 597 NG/ML (ref 26–388)
FERRITIN SERPL-MCNC: 607 NG/ML (ref 26–388)
FIBRINOGEN PPP-MCNC: >800 MG/DL (ref 200–475)
FIO2 ON VENT: 100 %
FIO2 ON VENT: 40 %
FIO2 ON VENT: 40 %
FIO2 ON VENT: 50 %
FIO2 ON VENT: 80 %
FIO2 ON VENT: 80 %
FIO2 ON VENT: 90 %
FLUAV AG NPH QL IA: NEGATIVE
FLUBV AG NOSE QL IA: NEGATIVE
FOLATE SERPL-MCNC: 13.7 NG/ML (ref 5–21)
FOLATE SERPL-MCNC: 13.9 NG/ML (ref 5–21)
GALACTOMANNAN AG SPEC IA-ACNC: 0.02 INDEX (ref 0–0.49)
GAMMA GLOB SERPL ELPH-MCNC: 0.9 G/DL (ref 0.4–1.8)
GAS FLOW.O2 O2 DELIVERY SYS: ABNORMAL L/MIN
GAS FLOW.O2 SETTING OXYMISER: 20 L/MIN
GAS FLOW.O2 SETTING OXYMISER: 24 BPM
GAS FLOW.O2 SETTING OXYMISER: 24 L/MIN
GAS FLOW.O2 SETTING OXYMISER: 28 BPM
GAS FLOW.O2 SETTING OXYMISER: 28 L/MIN
GAS FLOW.O2 SETTING OXYMISER: 30 BPM
GAS FLOW.O2 SETTING OXYMISER: 30 L/MIN
GAS FLOW.O2 SETTING OXYMISER: 32 BPM
GAS FLOW.O2 SETTING OXYMISER: 32 L/MIN
GAS FLOW.O2 SETTING OXYMISER: 32 L/MIN
GBM IGG SER-ACNC: <0.2 UNITS (ref 0–0.9)
GLOBULIN SER CALC-MCNC: 3 G/DL (ref 2–4)
GLOBULIN SER CALC-MCNC: 3.1 G/DL (ref 2.2–3.9)
GLOBULIN SER CALC-MCNC: 3.4 G/DL (ref 2–4)
GLOBULIN SER CALC-MCNC: 3.4 G/DL (ref 2–4)
GLOBULIN SER CALC-MCNC: 3.8 G/DL (ref 2–4)
GLOBULIN SER CALC-MCNC: 3.8 G/DL (ref 2–4)
GLOBULIN SER CALC-MCNC: 3.9 G/DL (ref 2–4)
GLOBULIN SER CALC-MCNC: 4 G/DL (ref 2–4)
GLOBULIN SER CALC-MCNC: 4.1 G/DL (ref 2–4)
GLOBULIN SER CALC-MCNC: 4.2 G/DL (ref 2–4)
GLOBULIN SER CALC-MCNC: 4.3 G/DL (ref 2–4)
GLOBULIN SER CALC-MCNC: 4.4 G/DL (ref 2–4)
GLOBULIN SER CALC-MCNC: 4.5 G/DL (ref 2–4)
GLOBULIN SER CALC-MCNC: 4.6 G/DL (ref 2–4)
GLOBULIN SER CALC-MCNC: 4.6 G/DL (ref 2–4)
GLOBULIN SER CALC-MCNC: 4.7 G/DL (ref 2–4)
GLOBULIN SER CALC-MCNC: 4.8 G/DL (ref 2–4)
GLOBULIN SER CALC-MCNC: 4.8 G/DL (ref 2–4)
GLUCOSE BLD STRIP.AUTO-MCNC: 110 MG/DL (ref 65–117)
GLUCOSE BLD STRIP.AUTO-MCNC: 125 MG/DL (ref 65–117)
GLUCOSE BLD STRIP.AUTO-MCNC: 133 MG/DL (ref 65–117)
GLUCOSE BLD STRIP.AUTO-MCNC: 142 MG/DL (ref 65–117)
GLUCOSE BLD STRIP.AUTO-MCNC: 146 MG/DL (ref 65–117)
GLUCOSE BLD STRIP.AUTO-MCNC: 146 MG/DL (ref 65–117)
GLUCOSE BLD STRIP.AUTO-MCNC: 150 MG/DL (ref 65–117)
GLUCOSE BLD STRIP.AUTO-MCNC: 156 MG/DL (ref 65–117)
GLUCOSE BLD STRIP.AUTO-MCNC: 159 MG/DL (ref 65–117)
GLUCOSE BLD STRIP.AUTO-MCNC: 163 MG/DL (ref 65–117)
GLUCOSE BLD STRIP.AUTO-MCNC: 163 MG/DL (ref 65–117)
GLUCOSE BLD STRIP.AUTO-MCNC: 165 MG/DL (ref 65–117)
GLUCOSE BLD STRIP.AUTO-MCNC: 175 MG/DL (ref 65–117)
GLUCOSE BLD STRIP.AUTO-MCNC: 181 MG/DL (ref 65–117)
GLUCOSE BLD STRIP.AUTO-MCNC: 182 MG/DL (ref 65–117)
GLUCOSE BLD STRIP.AUTO-MCNC: 189 MG/DL (ref 65–117)
GLUCOSE BLD STRIP.AUTO-MCNC: 198 MG/DL (ref 65–117)
GLUCOSE BLD STRIP.AUTO-MCNC: 201 MG/DL (ref 65–117)
GLUCOSE BLD STRIP.AUTO-MCNC: 201 MG/DL (ref 65–117)
GLUCOSE BLD STRIP.AUTO-MCNC: 228 MG/DL (ref 65–117)
GLUCOSE BLD STRIP.AUTO-MCNC: 85 MG/DL (ref 65–117)
GLUCOSE BLD STRIP.AUTO-MCNC: 87 MG/DL (ref 65–117)
GLUCOSE SERPL-MCNC: 100 MG/DL (ref 65–100)
GLUCOSE SERPL-MCNC: 102 MG/DL (ref 65–100)
GLUCOSE SERPL-MCNC: 102 MG/DL (ref 65–100)
GLUCOSE SERPL-MCNC: 103 MG/DL (ref 65–100)
GLUCOSE SERPL-MCNC: 106 MG/DL (ref 65–100)
GLUCOSE SERPL-MCNC: 107 MG/DL (ref 65–100)
GLUCOSE SERPL-MCNC: 107 MG/DL (ref 65–100)
GLUCOSE SERPL-MCNC: 109 MG/DL (ref 65–100)
GLUCOSE SERPL-MCNC: 110 MG/DL (ref 65–100)
GLUCOSE SERPL-MCNC: 111 MG/DL (ref 65–100)
GLUCOSE SERPL-MCNC: 113 MG/DL (ref 65–100)
GLUCOSE SERPL-MCNC: 114 MG/DL (ref 65–100)
GLUCOSE SERPL-MCNC: 120 MG/DL (ref 65–100)
GLUCOSE SERPL-MCNC: 121 MG/DL (ref 65–100)
GLUCOSE SERPL-MCNC: 123 MG/DL (ref 65–100)
GLUCOSE SERPL-MCNC: 125 MG/DL (ref 65–100)
GLUCOSE SERPL-MCNC: 127 MG/DL (ref 65–100)
GLUCOSE SERPL-MCNC: 132 MG/DL (ref 65–100)
GLUCOSE SERPL-MCNC: 132 MG/DL (ref 65–100)
GLUCOSE SERPL-MCNC: 133 MG/DL (ref 65–100)
GLUCOSE SERPL-MCNC: 133 MG/DL (ref 65–100)
GLUCOSE SERPL-MCNC: 137 MG/DL (ref 65–100)
GLUCOSE SERPL-MCNC: 139 MG/DL (ref 65–100)
GLUCOSE SERPL-MCNC: 142 MG/DL (ref 65–100)
GLUCOSE SERPL-MCNC: 148 MG/DL (ref 65–100)
GLUCOSE SERPL-MCNC: 156 MG/DL (ref 65–100)
GLUCOSE SERPL-MCNC: 171 MG/DL (ref 65–100)
GLUCOSE SERPL-MCNC: 199 MG/DL (ref 65–100)
GLUCOSE SERPL-MCNC: 70 MG/DL (ref 65–100)
GLUCOSE SERPL-MCNC: 75 MG/DL (ref 65–100)
GLUCOSE SERPL-MCNC: 80 MG/DL (ref 65–100)
GLUCOSE SERPL-MCNC: 83 MG/DL (ref 65–100)
GLUCOSE SERPL-MCNC: 85 MG/DL (ref 65–100)
GLUCOSE SERPL-MCNC: 85 MG/DL (ref 65–100)
GLUCOSE SERPL-MCNC: 86 MG/DL (ref 65–100)
GLUCOSE SERPL-MCNC: 87 MG/DL (ref 65–100)
GLUCOSE SERPL-MCNC: 89 MG/DL (ref 65–100)
GLUCOSE SERPL-MCNC: 90 MG/DL (ref 65–100)
GLUCOSE SERPL-MCNC: 90 MG/DL (ref 65–100)
GLUCOSE SERPL-MCNC: 93 MG/DL (ref 65–100)
GLUCOSE SERPL-MCNC: 94 MG/DL (ref 65–100)
GLUCOSE SERPL-MCNC: 98 MG/DL (ref 65–100)
GLUCOSE UR STRIP.AUTO-MCNC: 50 MG/DL
GLUCOSE UR STRIP.AUTO-MCNC: NEGATIVE MG/DL
GLUCOSE UR STRIP.AUTO-MCNC: NEGATIVE MG/DL
GRAM STN SPEC: ABNORMAL
GRAM STN SPEC: NORMAL
HCO3 BLD-SCNC: 20.9 MMOL/L (ref 22–26)
HCO3 BLD-SCNC: 21.1 MMOL/L (ref 22–26)
HCO3 BLD-SCNC: 21.7 MMOL/L (ref 22–26)
HCO3 BLD-SCNC: 22 MMOL/L (ref 22–26)
HCO3 BLD-SCNC: 22.1 MMOL/L (ref 22–26)
HCO3 BLD-SCNC: 22.5 MMOL/L (ref 22–26)
HCO3 BLD-SCNC: 22.9 MMOL/L (ref 22–26)
HCO3 BLD-SCNC: 24.8 MMOL/L (ref 22–26)
HCO3 BLD-SCNC: 25.2 MMOL/L (ref 22–26)
HCO3 BLD-SCNC: 25.2 MMOL/L (ref 22–26)
HCO3 BLDA-SCNC: 21 MMOL/L (ref 22–26)
HCO3 BLDA-SCNC: 21 MMOL/L (ref 22–26)
HCO3 BLDA-SCNC: 22 MMOL/L (ref 22–26)
HCO3 BLDA-SCNC: 22 MMOL/L (ref 22–26)
HCO3 BLDA-SCNC: 23 MMOL/L (ref 22–26)
HCO3 BLDA-SCNC: 24 MMOL/L (ref 22–26)
HCO3 BLDA-SCNC: 24 MMOL/L (ref 22–26)
HCO3 BLDA-SCNC: 25 MMOL/L (ref 22–26)
HCO3 BLDA-SCNC: 25 MMOL/L (ref 22–26)
HCO3 BLDA-SCNC: 28 MMOL/L (ref 22–26)
HCT VFR BLD AUTO: 14 % (ref 36.6–50.3)
HCT VFR BLD AUTO: 15.9 % (ref 36.6–50.3)
HCT VFR BLD AUTO: 18 % (ref 36.6–50.3)
HCT VFR BLD AUTO: 19 % (ref 36.6–50.3)
HCT VFR BLD AUTO: 20.2 % (ref 36.6–50.3)
HCT VFR BLD AUTO: 20.9 % (ref 36.6–50.3)
HCT VFR BLD AUTO: 21 % (ref 36.6–50.3)
HCT VFR BLD AUTO: 21.3 % (ref 36.6–50.3)
HCT VFR BLD AUTO: 21.6 % (ref 36.6–50.3)
HCT VFR BLD AUTO: 21.7 % (ref 36.6–50.3)
HCT VFR BLD AUTO: 21.8 % (ref 36.6–50.3)
HCT VFR BLD AUTO: 21.9 % (ref 36.6–50.3)
HCT VFR BLD AUTO: 22 % (ref 36.6–50.3)
HCT VFR BLD AUTO: 22 % (ref 36.6–50.3)
HCT VFR BLD AUTO: 22.2 % (ref 36.6–50.3)
HCT VFR BLD AUTO: 22.3 % (ref 36.6–50.3)
HCT VFR BLD AUTO: 22.4 % (ref 36.6–50.3)
HCT VFR BLD AUTO: 22.4 % (ref 36.6–50.3)
HCT VFR BLD AUTO: 22.5 % (ref 36.6–50.3)
HCT VFR BLD AUTO: 22.5 % (ref 36.6–50.3)
HCT VFR BLD AUTO: 22.6 % (ref 36.6–50.3)
HCT VFR BLD AUTO: 22.6 % (ref 36.6–50.3)
HCT VFR BLD AUTO: 22.7 % (ref 36.6–50.3)
HCT VFR BLD AUTO: 22.7 % (ref 36.6–50.3)
HCT VFR BLD AUTO: 22.8 % (ref 36.6–50.3)
HCT VFR BLD AUTO: 22.8 % (ref 36.6–50.3)
HCT VFR BLD AUTO: 22.9 % (ref 36.6–50.3)
HCT VFR BLD AUTO: 23.1 % (ref 36.6–50.3)
HCT VFR BLD AUTO: 23.1 % (ref 36.6–50.3)
HCT VFR BLD AUTO: 23.2 % (ref 36.6–50.3)
HCT VFR BLD AUTO: 23.2 % (ref 36.6–50.3)
HCT VFR BLD AUTO: 23.4 % (ref 36.6–50.3)
HCT VFR BLD AUTO: 23.5 % (ref 36.6–50.3)
HCT VFR BLD AUTO: 23.6 % (ref 36.6–50.3)
HCT VFR BLD AUTO: 23.6 % (ref 36.6–50.3)
HCT VFR BLD AUTO: 23.8 % (ref 36.6–50.3)
HCT VFR BLD AUTO: 24.1 % (ref 36.6–50.3)
HCT VFR BLD AUTO: 24.2 % (ref 36.6–50.3)
HCT VFR BLD AUTO: 24.2 % (ref 36.6–50.3)
HCT VFR BLD AUTO: 24.3 % (ref 36.6–50.3)
HCT VFR BLD AUTO: 24.3 % (ref 36.6–50.3)
HCT VFR BLD AUTO: 24.4 % (ref 36.6–50.3)
HCT VFR BLD AUTO: 24.4 % (ref 36.6–50.3)
HCT VFR BLD AUTO: 24.6 % (ref 36.6–50.3)
HCT VFR BLD AUTO: 24.7 % (ref 36.6–50.3)
HCT VFR BLD AUTO: 25.2 % (ref 36.6–50.3)
HCT VFR BLD AUTO: 25.3 % (ref 36.6–50.3)
HCT VFR BLD AUTO: 25.5 % (ref 36.6–50.3)
HCT VFR BLD AUTO: 25.5 % (ref 36.6–50.3)
HCT VFR BLD AUTO: 25.7 % (ref 36.6–50.3)
HCT VFR BLD AUTO: 26.1 % (ref 36.6–50.3)
HCT VFR BLD AUTO: 26.2 % (ref 36.6–50.3)
HCT VFR BLD AUTO: 26.3 % (ref 36.6–50.3)
HCT VFR BLD AUTO: 26.4 % (ref 36.6–50.3)
HCT VFR BLD AUTO: 26.6 % (ref 36.6–50.3)
HCT VFR BLD AUTO: 26.7 % (ref 36.6–50.3)
HCT VFR BLD AUTO: 26.8 % (ref 36.6–50.3)
HCT VFR BLD AUTO: 26.8 % (ref 36.6–50.3)
HCT VFR BLD AUTO: 26.9 % (ref 36.6–50.3)
HCT VFR BLD AUTO: 27.1 % (ref 36.6–50.3)
HCT VFR BLD AUTO: 27.7 % (ref 36.6–50.3)
HCT VFR BLD AUTO: 29.3 % (ref 36.6–50.3)
HCT VFR BLD AUTO: 29.8 % (ref 36.6–50.3)
HCT VFR BLD AUTO: 31.3 % (ref 36.6–50.3)
HEMOCCULT SP1 STL QL: NEGATIVE
HEMOCCULT SP1 STL QL: POSITIVE
HGB BLD OXIMETRY-MCNC: 8.1 G/DL (ref 14–17)
HGB BLD OXIMETRY-MCNC: 8.5 G/DL (ref 14–17)
HGB BLD-MCNC: 4.4 G/DL (ref 12.1–17)
HGB BLD-MCNC: 5 G/DL (ref 12.1–17)
HGB BLD-MCNC: 5.7 G/DL (ref 12.1–17)
HGB BLD-MCNC: 6 G/DL (ref 12.1–17)
HGB BLD-MCNC: 6.4 G/DL (ref 12.1–17)
HGB BLD-MCNC: 6.5 G/DL (ref 12.1–17)
HGB BLD-MCNC: 6.6 G/DL (ref 12.1–17)
HGB BLD-MCNC: 6.7 G/DL (ref 12.1–17)
HGB BLD-MCNC: 6.8 G/DL (ref 12.1–17)
HGB BLD-MCNC: 6.9 G/DL (ref 12.1–17)
HGB BLD-MCNC: 7 G/DL (ref 12.1–17)
HGB BLD-MCNC: 7.1 G/DL (ref 12.1–17)
HGB BLD-MCNC: 7.2 G/DL (ref 12.1–17)
HGB BLD-MCNC: 7.3 G/DL (ref 12.1–17)
HGB BLD-MCNC: 7.4 G/DL (ref 12.1–17)
HGB BLD-MCNC: 7.5 G/DL (ref 12.1–17)
HGB BLD-MCNC: 7.6 G/DL (ref 12.1–17)
HGB BLD-MCNC: 7.7 G/DL (ref 12.1–17)
HGB BLD-MCNC: 7.8 G/DL (ref 12.1–17)
HGB BLD-MCNC: 7.9 G/DL (ref 12.1–17)
HGB BLD-MCNC: 8 G/DL (ref 12.1–17)
HGB BLD-MCNC: 8 G/DL (ref 12.1–17)
HGB BLD-MCNC: 8.1 G/DL (ref 12.1–17)
HGB BLD-MCNC: 8.2 G/DL (ref 12.1–17)
HGB BLD-MCNC: 8.3 G/DL (ref 12.1–17)
HGB BLD-MCNC: 8.4 G/DL (ref 12.1–17)
HGB BLD-MCNC: 8.5 G/DL (ref 12.1–17)
HGB BLD-MCNC: 8.7 G/DL (ref 12.1–17)
HGB BLD-MCNC: 8.8 G/DL (ref 12.1–17)
HGB BLD-MCNC: 9.1 G/DL (ref 12.1–17)
HGB BLD-MCNC: 9.3 G/DL (ref 12.1–17)
HGB BLD-MCNC: 9.5 G/DL (ref 12.1–17)
HGB UR QL STRIP: NEGATIVE
HISTORY CHECKED?,CKHIST: NORMAL
IMM GRANULOCYTES # BLD AUTO: 0 K/UL
IMM GRANULOCYTES # BLD AUTO: 0 K/UL (ref 0–0.04)
IMM GRANULOCYTES # BLD AUTO: 0.1 K/UL (ref 0–0.04)
IMM GRANULOCYTES # BLD AUTO: 0.2 K/UL (ref 0–0.04)
IMM GRANULOCYTES # BLD AUTO: 0.4 K/UL (ref 0–0.04)
IMM GRANULOCYTES # BLD AUTO: 0.4 K/UL (ref 0–0.04)
IMM GRANULOCYTES # BLD AUTO: 0.5 K/UL (ref 0–0.04)
IMM GRANULOCYTES NFR BLD AUTO: 0 %
IMM GRANULOCYTES NFR BLD AUTO: 0 % (ref 0–0.5)
IMM GRANULOCYTES NFR BLD AUTO: 1 % (ref 0–0.5)
IMM GRANULOCYTES NFR BLD AUTO: 2 % (ref 0–0.5)
INR PPP: 1.2 (ref 0.9–1.1)
INSPIRATION.DURATION SETTING TIME VENT: 0.63 SEC
INSPIRATION.DURATION SETTING TIME VENT: 0.66 SEC
IRON SATN MFR SERPL: 12 % (ref 20–50)
IRON SATN MFR SERPL: 13 % (ref 20–50)
IRON SATN MFR SERPL: 16 % (ref 20–50)
IRON SATN MFR SERPL: 5 % (ref 20–50)
IRON SATN MFR SERPL: 8 % (ref 20–50)
IRON SERPL-MCNC: 12 UG/DL (ref 35–150)
IRON SERPL-MCNC: 25 UG/DL (ref 35–150)
IRON SERPL-MCNC: 25 UG/DL (ref 35–150)
IRON SERPL-MCNC: 32 UG/DL (ref 35–150)
IRON SERPL-MCNC: 35 UG/DL (ref 35–150)
KETONES UR QL STRIP.AUTO: 10 MG/DL
KETONES UR QL STRIP.AUTO: 5 MG/DL
KETONES UR QL STRIP.AUTO: 5 MG/DL
L PNEUMO1 AG UR QL IA: NEGATIVE
LACTATE SERPL-SCNC: 0.7 MMOL/L (ref 0.4–2)
LACTATE SERPL-SCNC: 1.3 MMOL/L (ref 0.4–2)
LDH SERPL L TO P-CCNC: 216 U/L (ref 85–241)
LEUKOCYTE ESTERASE UR QL STRIP.AUTO: NEGATIVE
LYMPHOCYTES # BLD: 0 K/UL (ref 0.8–3.5)
LYMPHOCYTES # BLD: 0.1 K/UL (ref 0.8–3.5)
LYMPHOCYTES # BLD: 0.2 K/UL (ref 0.8–3.5)
LYMPHOCYTES # BLD: 0.3 K/UL (ref 0.8–3.5)
LYMPHOCYTES # BLD: 0.4 K/UL (ref 0.8–3.5)
LYMPHOCYTES # BLD: 0.5 K/UL (ref 0.8–3.5)
LYMPHOCYTES # BLD: 0.6 K/UL (ref 0.8–3.5)
LYMPHOCYTES # BLD: 0.8 K/UL (ref 0.8–3.5)
LYMPHOCYTES # BLD: 0.8 K/UL (ref 0.8–3.5)
LYMPHOCYTES NFR BLD: 0 % (ref 12–49)
LYMPHOCYTES NFR BLD: 1 % (ref 12–49)
LYMPHOCYTES NFR BLD: 11 % (ref 12–49)
LYMPHOCYTES NFR BLD: 12 % (ref 12–49)
LYMPHOCYTES NFR BLD: 2 % (ref 12–49)
LYMPHOCYTES NFR BLD: 21 % (ref 12–49)
LYMPHOCYTES NFR BLD: 3 % (ref 12–49)
LYMPHOCYTES NFR BLD: 4 % (ref 12–49)
LYMPHOCYTES NFR BLD: 5 % (ref 12–49)
LYMPHOCYTES NFR BLD: 6 % (ref 12–49)
LYMPHOCYTES NFR BLD: 7 % (ref 12–49)
LYMPHOCYTES NFR BLD: 8 % (ref 12–49)
LYMPHOCYTES NFR BLD: 8 % (ref 12–49)
LYMPHOCYTES NFR BLD: 9 % (ref 12–49)
M PNEUMO IGM SER IA-ACNC: NONREACTIVE
M PROTEIN SERPL ELPH-MCNC: NORMAL G/DL
MAGNESIUM SERPL-MCNC: 1.9 MG/DL (ref 1.6–2.4)
MAGNESIUM SERPL-MCNC: 2.1 MG/DL (ref 1.6–2.4)
MAGNESIUM SERPL-MCNC: 2.2 MG/DL (ref 1.6–2.4)
MAGNESIUM SERPL-MCNC: 2.3 MG/DL (ref 1.6–2.4)
MAGNESIUM SERPL-MCNC: 2.4 MG/DL (ref 1.6–2.4)
MAGNESIUM SERPL-MCNC: 2.4 MG/DL (ref 1.6–2.4)
MAGNESIUM SERPL-MCNC: 2.5 MG/DL (ref 1.6–2.4)
MAGNESIUM SERPL-MCNC: 2.6 MG/DL (ref 1.6–2.4)
MAGNESIUM SERPL-MCNC: 2.6 MG/DL (ref 1.6–2.4)
MCH RBC QN AUTO: 27.3 PG (ref 26–34)
MCH RBC QN AUTO: 27.5 PG (ref 26–34)
MCH RBC QN AUTO: 27.6 PG (ref 26–34)
MCH RBC QN AUTO: 27.7 PG (ref 26–34)
MCH RBC QN AUTO: 27.7 PG (ref 26–34)
MCH RBC QN AUTO: 27.8 PG (ref 26–34)
MCH RBC QN AUTO: 27.9 PG (ref 26–34)
MCH RBC QN AUTO: 28 PG (ref 26–34)
MCH RBC QN AUTO: 28.1 PG (ref 26–34)
MCH RBC QN AUTO: 28.2 PG (ref 26–34)
MCH RBC QN AUTO: 28.2 PG (ref 26–34)
MCH RBC QN AUTO: 28.3 PG (ref 26–34)
MCH RBC QN AUTO: 28.4 PG (ref 26–34)
MCH RBC QN AUTO: 28.6 PG (ref 26–34)
MCH RBC QN AUTO: 28.9 PG (ref 26–34)
MCH RBC QN AUTO: 28.9 PG (ref 26–34)
MCH RBC QN AUTO: 29.1 PG (ref 26–34)
MCH RBC QN AUTO: 29.2 PG (ref 26–34)
MCH RBC QN AUTO: 29.2 PG (ref 26–34)
MCH RBC QN AUTO: 29.3 PG (ref 26–34)
MCH RBC QN AUTO: 29.4 PG (ref 26–34)
MCH RBC QN AUTO: 29.4 PG (ref 26–34)
MCH RBC QN AUTO: 29.5 PG (ref 26–34)
MCH RBC QN AUTO: 29.6 PG (ref 26–34)
MCH RBC QN AUTO: 29.8 PG (ref 26–34)
MCH RBC QN AUTO: 29.9 PG (ref 26–34)
MCH RBC QN AUTO: 30 PG (ref 26–34)
MCH RBC QN AUTO: 30.1 PG (ref 26–34)
MCH RBC QN AUTO: 30.1 PG (ref 26–34)
MCH RBC QN AUTO: 30.3 PG (ref 26–34)
MCH RBC QN AUTO: 31.2 PG (ref 26–34)
MCH RBC QN AUTO: 31.5 PG (ref 26–34)
MCH RBC QN AUTO: 31.5 PG (ref 26–34)
MCH RBC QN AUTO: 31.6 PG (ref 26–34)
MCH RBC QN AUTO: 31.9 PG (ref 26–34)
MCH RBC QN AUTO: 31.9 PG (ref 26–34)
MCH RBC QN AUTO: 32 PG (ref 26–34)
MCH RBC QN AUTO: 32 PG (ref 26–34)
MCH RBC QN AUTO: 32.3 PG (ref 26–34)
MCH RBC QN AUTO: 32.4 PG (ref 26–34)
MCH RBC QN AUTO: 32.5 PG (ref 26–34)
MCH RBC QN AUTO: 32.8 PG (ref 26–34)
MCH RBC QN AUTO: 33.2 PG (ref 26–34)
MCHC RBC AUTO-ENTMCNC: 30.4 G/DL (ref 30–36.5)
MCHC RBC AUTO-ENTMCNC: 30.5 G/DL (ref 30–36.5)
MCHC RBC AUTO-ENTMCNC: 30.6 G/DL (ref 30–36.5)
MCHC RBC AUTO-ENTMCNC: 30.8 G/DL (ref 30–36.5)
MCHC RBC AUTO-ENTMCNC: 30.9 G/DL (ref 30–36.5)
MCHC RBC AUTO-ENTMCNC: 31.1 G/DL (ref 30–36.5)
MCHC RBC AUTO-ENTMCNC: 31.2 G/DL (ref 30–36.5)
MCHC RBC AUTO-ENTMCNC: 31.4 G/DL (ref 30–36.5)
MCHC RBC AUTO-ENTMCNC: 31.5 G/DL (ref 30–36.5)
MCHC RBC AUTO-ENTMCNC: 31.6 G/DL (ref 30–36.5)
MCHC RBC AUTO-ENTMCNC: 31.7 G/DL (ref 30–36.5)
MCHC RBC AUTO-ENTMCNC: 31.8 G/DL (ref 30–36.5)
MCHC RBC AUTO-ENTMCNC: 31.8 G/DL (ref 30–36.5)
MCHC RBC AUTO-ENTMCNC: 31.9 G/DL (ref 30–36.5)
MCHC RBC AUTO-ENTMCNC: 31.9 G/DL (ref 30–36.5)
MCHC RBC AUTO-ENTMCNC: 32 G/DL (ref 30–36.5)
MCHC RBC AUTO-ENTMCNC: 32.1 G/DL (ref 30–36.5)
MCHC RBC AUTO-ENTMCNC: 32.2 G/DL (ref 30–36.5)
MCHC RBC AUTO-ENTMCNC: 32.2 G/DL (ref 30–36.5)
MCHC RBC AUTO-ENTMCNC: 32.3 G/DL (ref 30–36.5)
MCHC RBC AUTO-ENTMCNC: 32.4 G/DL (ref 30–36.5)
MCHC RBC AUTO-ENTMCNC: 32.5 G/DL (ref 30–36.5)
MCHC RBC AUTO-ENTMCNC: 32.5 G/DL (ref 30–36.5)
MCHC RBC AUTO-ENTMCNC: 32.6 G/DL (ref 30–36.5)
MCV RBC AUTO: 100 FL (ref 80–99)
MCV RBC AUTO: 100.4 FL (ref 80–99)
MCV RBC AUTO: 101.2 FL (ref 80–99)
MCV RBC AUTO: 101.3 FL (ref 80–99)
MCV RBC AUTO: 101.4 FL (ref 80–99)
MCV RBC AUTO: 101.4 FL (ref 80–99)
MCV RBC AUTO: 102 FL (ref 80–99)
MCV RBC AUTO: 102.8 FL (ref 80–99)
MCV RBC AUTO: 102.9 FL (ref 80–99)
MCV RBC AUTO: 102.9 FL (ref 80–99)
MCV RBC AUTO: 86.6 FL (ref 80–99)
MCV RBC AUTO: 86.7 FL (ref 80–99)
MCV RBC AUTO: 86.8 FL (ref 80–99)
MCV RBC AUTO: 86.9 FL (ref 80–99)
MCV RBC AUTO: 87.2 FL (ref 80–99)
MCV RBC AUTO: 87.4 FL (ref 80–99)
MCV RBC AUTO: 87.6 FL (ref 80–99)
MCV RBC AUTO: 87.7 FL (ref 80–99)
MCV RBC AUTO: 87.8 FL (ref 80–99)
MCV RBC AUTO: 88 FL (ref 80–99)
MCV RBC AUTO: 88.4 FL (ref 80–99)
MCV RBC AUTO: 88.5 FL (ref 80–99)
MCV RBC AUTO: 88.6 FL (ref 80–99)
MCV RBC AUTO: 89.2 FL (ref 80–99)
MCV RBC AUTO: 89.3 FL (ref 80–99)
MCV RBC AUTO: 89.5 FL (ref 80–99)
MCV RBC AUTO: 89.6 FL (ref 80–99)
MCV RBC AUTO: 89.7 FL (ref 80–99)
MCV RBC AUTO: 89.7 FL (ref 80–99)
MCV RBC AUTO: 89.8 FL (ref 80–99)
MCV RBC AUTO: 90.3 FL (ref 80–99)
MCV RBC AUTO: 90.5 FL (ref 80–99)
MCV RBC AUTO: 90.5 FL (ref 80–99)
MCV RBC AUTO: 90.7 FL (ref 80–99)
MCV RBC AUTO: 90.8 FL (ref 80–99)
MCV RBC AUTO: 90.8 FL (ref 80–99)
MCV RBC AUTO: 90.9 FL (ref 80–99)
MCV RBC AUTO: 90.9 FL (ref 80–99)
MCV RBC AUTO: 91.1 FL (ref 80–99)
MCV RBC AUTO: 91.5 FL (ref 80–99)
MCV RBC AUTO: 91.6 FL (ref 80–99)
MCV RBC AUTO: 91.7 FL (ref 80–99)
MCV RBC AUTO: 91.7 FL (ref 80–99)
MCV RBC AUTO: 92.1 FL (ref 80–99)
MCV RBC AUTO: 92.2 FL (ref 80–99)
MCV RBC AUTO: 92.3 FL (ref 80–99)
MCV RBC AUTO: 92.4 FL (ref 80–99)
MCV RBC AUTO: 93.3 FL (ref 80–99)
MCV RBC AUTO: 93.5 FL (ref 80–99)
MCV RBC AUTO: 93.9 FL (ref 80–99)
MCV RBC AUTO: 94 FL (ref 80–99)
MCV RBC AUTO: 96 FL (ref 80–99)
MCV RBC AUTO: 97.9 FL (ref 80–99)
MCV RBC AUTO: 99.2 FL (ref 80–99)
MCV RBC AUTO: 99.6 FL (ref 80–99)
METAMYELOCYTES NFR BLD MANUAL: 2 %
METHGB MFR BLD: 0.6 % (ref 0–1.4)
METHGB MFR BLD: 0.6 % (ref 0–1.4)
MONOCYTES # BLD: 0.2 K/UL (ref 0–1)
MONOCYTES # BLD: 0.2 K/UL (ref 0–1)
MONOCYTES # BLD: 0.3 K/UL (ref 0–1)
MONOCYTES # BLD: 0.4 K/UL (ref 0–1)
MONOCYTES # BLD: 0.5 K/UL (ref 0–1)
MONOCYTES # BLD: 0.5 K/UL (ref 0–1)
MONOCYTES # BLD: 0.6 K/UL (ref 0–1)
MONOCYTES # BLD: 0.7 K/UL (ref 0–1)
MONOCYTES # BLD: 0.8 K/UL (ref 0–1)
MONOCYTES # BLD: 0.9 K/UL (ref 0–1)
MONOCYTES # BLD: 1 K/UL (ref 0–1)
MONOCYTES # BLD: 1.1 K/UL (ref 0–1)
MONOCYTES # BLD: 1.2 K/UL (ref 0–1)
MONOCYTES # BLD: 1.2 K/UL (ref 0–1)
MONOCYTES # BLD: 1.3 K/UL (ref 0–1)
MONOCYTES NFR BLD: 10 % (ref 5–13)
MONOCYTES NFR BLD: 11 % (ref 5–13)
MONOCYTES NFR BLD: 12 % (ref 5–13)
MONOCYTES NFR BLD: 13 % (ref 5–13)
MONOCYTES NFR BLD: 14 % (ref 5–13)
MONOCYTES NFR BLD: 15 % (ref 5–13)
MONOCYTES NFR BLD: 17 % (ref 5–13)
MONOCYTES NFR BLD: 2 % (ref 5–13)
MONOCYTES NFR BLD: 2 % (ref 5–13)
MONOCYTES NFR BLD: 22 % (ref 5–13)
MONOCYTES NFR BLD: 3 % (ref 5–13)
MONOCYTES NFR BLD: 32 % (ref 5–13)
MONOCYTES NFR BLD: 4 % (ref 5–13)
MONOCYTES NFR BLD: 5 % (ref 5–13)
MONOCYTES NFR BLD: 6 % (ref 5–13)
MONOCYTES NFR BLD: 6 % (ref 5–13)
MONOCYTES NFR BLD: 7 % (ref 5–13)
MONOCYTES NFR BLD: 7 % (ref 5–13)
MONOCYTES NFR BLD: 8 % (ref 5–13)
MONOCYTES NFR BLD: 9 % (ref 5–13)
MRSA DNA SPEC QL NAA+PROBE: NOT DETECTED
MUCOUS THREADS URNS QL MICRO: ABNORMAL /LPF
MYELOCYTES NFR BLD MANUAL: 2 %
MYELOPEROXIDASE AB SER IA-ACNC: <0.2 UNITS (ref 0–0.9)
NEUTS BAND NFR BLD MANUAL: 1 % (ref 0–6)
NEUTS BAND NFR BLD MANUAL: 16 % (ref 0–6)
NEUTS BAND NFR BLD MANUAL: 5 % (ref 0–6)
NEUTS BAND NFR BLD MANUAL: 9 %
NEUTS SEG # BLD: 0.5 K/UL (ref 1.8–8)
NEUTS SEG # BLD: 10.5 K/UL (ref 1.8–8)
NEUTS SEG # BLD: 11.5 K/UL (ref 1.8–8)
NEUTS SEG # BLD: 12.7 K/UL (ref 1.8–8)
NEUTS SEG # BLD: 13.8 K/UL (ref 1.8–8)
NEUTS SEG # BLD: 15.2 K/UL (ref 1.8–8)
NEUTS SEG # BLD: 15.5 K/UL (ref 1.8–8)
NEUTS SEG # BLD: 15.7 K/UL (ref 1.8–8)
NEUTS SEG # BLD: 17 K/UL (ref 1.8–8)
NEUTS SEG # BLD: 17.9 K/UL (ref 1.8–8)
NEUTS SEG # BLD: 19.1 K/UL (ref 1.8–8)
NEUTS SEG # BLD: 19.6 K/UL (ref 1.8–8)
NEUTS SEG # BLD: 19.6 K/UL (ref 1.8–8)
NEUTS SEG # BLD: 19.7 K/UL (ref 1.8–8)
NEUTS SEG # BLD: 2.6 K/UL (ref 1.8–8)
NEUTS SEG # BLD: 2.6 K/UL (ref 1.8–8)
NEUTS SEG # BLD: 20.4 K/UL (ref 1.8–8)
NEUTS SEG # BLD: 22 K/UL (ref 1.8–8)
NEUTS SEG # BLD: 22.4 K/UL (ref 1.8–8)
NEUTS SEG # BLD: 23.8 K/UL (ref 1.8–8)
NEUTS SEG # BLD: 3.3 K/UL (ref 1.8–8)
NEUTS SEG # BLD: 4.1 K/UL (ref 1.8–8)
NEUTS SEG # BLD: 4.2 K/UL (ref 1.8–8)
NEUTS SEG # BLD: 4.9 K/UL (ref 1.8–8)
NEUTS SEG # BLD: 4.9 K/UL (ref 1.8–8)
NEUTS SEG # BLD: 5.8 K/UL (ref 1.8–8)
NEUTS SEG # BLD: 5.8 K/UL (ref 1.8–8)
NEUTS SEG # BLD: 6 K/UL (ref 1.8–8)
NEUTS SEG # BLD: 6.4 K/UL (ref 1.8–8)
NEUTS SEG # BLD: 6.5 K/UL (ref 1.8–8)
NEUTS SEG # BLD: 6.5 K/UL (ref 1.8–8)
NEUTS SEG # BLD: 6.6 K/UL (ref 1.8–8)
NEUTS SEG # BLD: 6.7 K/UL (ref 1.8–8)
NEUTS SEG # BLD: 7.1 K/UL (ref 1.8–8)
NEUTS SEG # BLD: 7.1 K/UL (ref 1.8–8)
NEUTS SEG # BLD: 7.2 K/UL (ref 1.8–8)
NEUTS SEG # BLD: 7.2 K/UL (ref 1.8–8)
NEUTS SEG # BLD: 7.4 K/UL (ref 1.8–8)
NEUTS SEG # BLD: 7.7 K/UL (ref 1.8–8)
NEUTS SEG # BLD: 7.8 K/UL (ref 1.8–8)
NEUTS SEG # BLD: 7.8 K/UL (ref 1.8–8)
NEUTS SEG # BLD: 8.2 K/UL (ref 1.8–8)
NEUTS SEG # BLD: 8.3 K/UL (ref 1.8–8)
NEUTS SEG # BLD: 8.3 K/UL (ref 1.8–8)
NEUTS SEG # BLD: 8.8 K/UL (ref 1.8–8)
NEUTS SEG # BLD: 9.2 K/UL (ref 1.8–8)
NEUTS SEG # BLD: 9.3 K/UL (ref 1.8–8)
NEUTS SEG # BLD: 9.4 K/UL (ref 1.8–8)
NEUTS SEG # BLD: 9.8 K/UL (ref 1.8–8)
NEUTS SEG NFR BLD: 45 % (ref 32–75)
NEUTS SEG NFR BLD: 59 % (ref 32–75)
NEUTS SEG NFR BLD: 68 % (ref 32–75)
NEUTS SEG NFR BLD: 74 % (ref 32–75)
NEUTS SEG NFR BLD: 74 % (ref 32–75)
NEUTS SEG NFR BLD: 77 % (ref 32–75)
NEUTS SEG NFR BLD: 78 % (ref 32–75)
NEUTS SEG NFR BLD: 80 % (ref 32–75)
NEUTS SEG NFR BLD: 80 % (ref 32–75)
NEUTS SEG NFR BLD: 81 % (ref 32–75)
NEUTS SEG NFR BLD: 82 % (ref 32–75)
NEUTS SEG NFR BLD: 83 % (ref 32–75)
NEUTS SEG NFR BLD: 84 % (ref 32–75)
NEUTS SEG NFR BLD: 85 % (ref 32–75)
NEUTS SEG NFR BLD: 86 % (ref 32–75)
NEUTS SEG NFR BLD: 87 % (ref 32–75)
NEUTS SEG NFR BLD: 88 % (ref 32–75)
NEUTS SEG NFR BLD: 88 % (ref 32–75)
NEUTS SEG NFR BLD: 89 % (ref 32–75)
NEUTS SEG NFR BLD: 91 % (ref 32–75)
NEUTS SEG NFR BLD: 92 % (ref 32–75)
NEUTS SEG NFR BLD: 92 % (ref 32–75)
NEUTS SEG NFR BLD: 93 % (ref 32–75)
NEUTS SEG NFR BLD: 93 % (ref 32–75)
NEUTS SEG NFR BLD: 94 % (ref 32–75)
NEUTS SEG NFR BLD: 95 % (ref 32–75)
NEUTS SEG NFR BLD: 96 % (ref 32–75)
NEUTS SEG NFR BLD: 97 % (ref 32–75)
NITRITE UR QL STRIP.AUTO: NEGATIVE
NRBC # BLD: 0 K/UL (ref 0–0.01)
NRBC # BLD: 0.02 K/UL (ref 0–0.01)
NRBC # BLD: 0.04 K/UL (ref 0–0.01)
NRBC # BLD: 0.05 K/UL (ref 0–0.01)
NRBC # BLD: 0.05 K/UL (ref 0–0.01)
NRBC # BLD: 0.06 K/UL (ref 0–0.01)
NRBC # BLD: 0.08 K/UL (ref 0–0.01)
NRBC # BLD: 0.1 K/UL
NRBC # BLD: 0.11 K/UL (ref 0–0.01)
NRBC BLD MANUAL-RTO: 1 PER 100 WBC
NRBC BLD-RTO: 0 PER 100 WBC
NRBC BLD-RTO: 0.1 PER 100 WBC
NRBC BLD-RTO: 0.2 PER 100 WBC
NRBC BLD-RTO: 0.3 PER 100 WBC
NRBC BLD-RTO: 0.4 PER 100 WBC
NRBC BLD-RTO: 0.6 PER 100 WBC
NRBC BLD-RTO: 1 PER 100 WBC
O2/TOTAL GAS SETTING VFR VENT: 100 %
O2/TOTAL GAS SETTING VFR VENT: 40 %
O2/TOTAL GAS SETTING VFR VENT: 40 %
O2/TOTAL GAS SETTING VFR VENT: 50 %
O2/TOTAL GAS SETTING VFR VENT: 50 %
O2/TOTAL GAS SETTING VFR VENT: 60 %
O2/TOTAL GAS SETTING VFR VENT: 80 %
O2/TOTAL GAS SETTING VFR VENT: 80 %
OXYHGB MFR BLD: 86.4 % (ref 94–97)
OXYHGB MFR BLD: 95.4 % (ref 94–97)
P-ANCA ATYPICAL TITR SER IF: NORMAL TITER
P-ANCA TITR SER IF: NORMAL TITER
P-R INTERVAL, ECG05: 104 MS
P-R INTERVAL, ECG05: 130 MS
P-R INTERVAL, ECG05: 140 MS
P-R INTERVAL, ECG05: 144 MS
P-R INTERVAL, ECG05: 160 MS
P-R INTERVAL, ECG05: 164 MS
PAW @ MEAN EXP FLOW ON VENT: 21 CMH2O
PAW @ MEAN EXP FLOW ON VENT: 25 CMH2O
PAW @ MEAN EXP FLOW ON VENT: 25 CMH2O
PCO2 BLD: 34.7 MMHG (ref 35–45)
PCO2 BLD: 37.6 MMHG (ref 35–45)
PCO2 BLD: 38.7 MMHG (ref 35–45)
PCO2 BLD: 39 MMHG (ref 35–45)
PCO2 BLD: 39.1 MMHG (ref 35–45)
PCO2 BLD: 39.7 MMHG (ref 35–45)
PCO2 BLD: 41 MMHG (ref 35–45)
PCO2 BLD: 41.1 MMHG (ref 35–45)
PCO2 BLD: 41.6 MMHG (ref 35–45)
PCO2 BLD: 49.1 MMHG (ref 35–45)
PCO2 BLDA: 36 MMHG (ref 35–45)
PCO2 BLDA: 37 MMHG (ref 35–45)
PCO2 BLDA: 39 MMHG (ref 35–45)
PCO2 BLDA: 44 MMHG (ref 35–45)
PCO2 BLDA: 44 MMHG (ref 35–45)
PCO2 BLDA: 45 MMHG (ref 35–45)
PCO2 BLDA: 46 MMHG (ref 35–45)
PCO2 BLDA: 46 MMHG (ref 35–45)
PCO2 BLDA: 52 MMHG (ref 35–45)
PCO2 BLDA: 55 MMHG (ref 35–45)
PCO2 BLDA: 56 MMHG (ref 35–45)
PCO2 BLDA: 56 MMHG (ref 35–45)
PCO2 BLDA: 62 MMHG (ref 35–45)
PCO2 BLDA: 68 MMHG (ref 35–45)
PEEP RESPIRATORY: 10 CM[H2O]
PEEP RESPIRATORY: 12 CMH2O
PEEP RESPIRATORY: 12 CM[H2O]
PEEP RESPIRATORY: 13 CM[H2O]
PEEP RESPIRATORY: 14 CMH2O
PEEP RESPIRATORY: 15 CMH2O
PEEP RESPIRATORY: 15 CM[H2O]
PEEP RESPIRATORY: 16 CMH2O
PEEP RESPIRATORY: 8 CM[H2O]
PERFORMED BY, TECHID: NORMAL
PERFORMED BY, TECHID: NORMAL
PH BLD: 7.32 [PH] (ref 7.35–7.45)
PH BLD: 7.32 [PH] (ref 7.35–7.45)
PH BLD: 7.33 [PH] (ref 7.35–7.45)
PH BLD: 7.34 [PH] (ref 7.35–7.45)
PH BLD: 7.36 [PH] (ref 7.35–7.45)
PH BLD: 7.37 [PH] (ref 7.35–7.45)
PH BLD: 7.38 [PH] (ref 7.35–7.45)
PH BLD: 7.4 [PH] (ref 7.35–7.45)
PH BLD: 7.4 [PH] (ref 7.35–7.45)
PH BLD: 7.43 [PH] (ref 7.35–7.45)
PH BLDA: 7.15 [PH] (ref 7.35–7.45)
PH BLDA: 7.18 [PH] (ref 7.35–7.45)
PH BLDA: 7.21 [PH] (ref 7.35–7.45)
PH BLDA: 7.23 [PH] (ref 7.35–7.45)
PH BLDA: 7.25 [PH] (ref 7.35–7.45)
PH BLDA: 7.27 [PH] (ref 7.35–7.45)
PH BLDA: 7.3 [PH] (ref 7.35–7.45)
PH BLDA: 7.32 [PH] (ref 7.35–7.45)
PH BLDA: 7.35 [PH] (ref 7.35–7.45)
PH BLDA: 7.36 [PH] (ref 7.35–7.45)
PH BLDA: 7.36 [PH] (ref 7.35–7.45)
PH BLDA: 7.41 [PH] (ref 7.35–7.45)
PH BLDA: 7.42 [PH] (ref 7.35–7.45)
PH BLDA: 7.43 [PH] (ref 7.35–7.45)
PH UR STRIP: 5 [PH] (ref 5–8)
PH UR STRIP: 6 [PH] (ref 5–8)
PH UR STRIP: 8 [PH] (ref 5–8)
PHOSPHATE SERPL-MCNC: 1.8 MG/DL (ref 2.6–4.7)
PHOSPHATE SERPL-MCNC: 2.5 MG/DL (ref 2.6–4.7)
PHOSPHATE SERPL-MCNC: 3.8 MG/DL (ref 2.6–4.7)
PIP ISTAT,IPIP: 14
PIP ISTAT,IPIP: 18
PLATELET # BLD AUTO: 169 K/UL (ref 150–400)
PLATELET # BLD AUTO: 171 K/UL (ref 150–400)
PLATELET # BLD AUTO: 185 K/UL (ref 150–400)
PLATELET # BLD AUTO: 208 K/UL (ref 150–400)
PLATELET # BLD AUTO: 210 K/UL (ref 150–400)
PLATELET # BLD AUTO: 212 K/UL (ref 150–400)
PLATELET # BLD AUTO: 216 K/UL (ref 150–400)
PLATELET # BLD AUTO: 218 K/UL (ref 150–400)
PLATELET # BLD AUTO: 227 K/UL (ref 150–400)
PLATELET # BLD AUTO: 254 K/UL (ref 150–400)
PLATELET # BLD AUTO: 258 K/UL (ref 150–400)
PLATELET # BLD AUTO: 265 K/UL (ref 150–400)
PLATELET # BLD AUTO: 267 K/UL (ref 150–400)
PLATELET # BLD AUTO: 271 K/UL (ref 150–400)
PLATELET # BLD AUTO: 294 K/UL (ref 150–400)
PLATELET # BLD AUTO: 295 K/UL (ref 150–400)
PLATELET # BLD AUTO: 306 K/UL (ref 150–400)
PLATELET # BLD AUTO: 331 K/UL (ref 150–400)
PLATELET # BLD AUTO: 334 K/UL (ref 150–400)
PLATELET # BLD AUTO: 357 K/UL (ref 150–400)
PLATELET # BLD AUTO: 362 K/UL (ref 150–400)
PLATELET # BLD AUTO: 362 K/UL (ref 150–400)
PLATELET # BLD AUTO: 366 K/UL (ref 150–400)
PLATELET # BLD AUTO: 372 K/UL (ref 150–400)
PLATELET # BLD AUTO: 378 K/UL (ref 150–400)
PLATELET # BLD AUTO: 383 K/UL (ref 150–400)
PLATELET # BLD AUTO: 393 K/UL (ref 150–400)
PLATELET # BLD AUTO: 411 K/UL (ref 150–400)
PLATELET # BLD AUTO: 412 K/UL (ref 150–400)
PLATELET # BLD AUTO: 413 K/UL (ref 150–400)
PLATELET # BLD AUTO: 414 K/UL (ref 150–400)
PLATELET # BLD AUTO: 426 K/UL (ref 150–400)
PLATELET # BLD AUTO: 430 K/UL (ref 150–400)
PLATELET # BLD AUTO: 434 K/UL (ref 150–400)
PLATELET # BLD AUTO: 434 K/UL (ref 150–400)
PLATELET # BLD AUTO: 439 K/UL (ref 150–400)
PLATELET # BLD AUTO: 442 K/UL (ref 150–400)
PLATELET # BLD AUTO: 445 K/UL (ref 150–400)
PLATELET # BLD AUTO: 446 K/UL (ref 150–400)
PLATELET # BLD AUTO: 454 K/UL (ref 150–400)
PLATELET # BLD AUTO: 462 K/UL (ref 150–400)
PLATELET # BLD AUTO: 465 K/UL (ref 150–400)
PLATELET # BLD AUTO: 492 K/UL (ref 150–400)
PLATELET # BLD AUTO: 495 K/UL (ref 150–400)
PLATELET # BLD AUTO: 509 K/UL (ref 150–400)
PLATELET # BLD AUTO: 510 K/UL (ref 150–400)
PLATELET # BLD AUTO: 556 K/UL (ref 150–400)
PLATELET # BLD AUTO: 567 K/UL (ref 150–400)
PLATELET # BLD AUTO: 568 K/UL (ref 150–400)
PLATELET # BLD AUTO: 576 K/UL (ref 150–400)
PLATELET # BLD AUTO: 619 K/UL (ref 150–400)
PLATELET # BLD AUTO: 625 K/UL (ref 150–400)
PLATELET # BLD AUTO: 633 K/UL (ref 150–400)
PLATELET # BLD AUTO: 647 K/UL (ref 150–400)
PLATELET # BLD AUTO: 681 K/UL (ref 150–400)
PLATELET COMMENTS,PCOM: ABNORMAL
PMV BLD AUTO: 10 FL (ref 8.9–12.9)
PMV BLD AUTO: 10.2 FL (ref 8.9–12.9)
PMV BLD AUTO: 10.3 FL (ref 8.9–12.9)
PMV BLD AUTO: 10.4 FL (ref 8.9–12.9)
PMV BLD AUTO: 10.6 FL (ref 8.9–12.9)
PMV BLD AUTO: 10.6 FL (ref 8.9–12.9)
PMV BLD AUTO: 10.8 FL (ref 8.9–12.9)
PMV BLD AUTO: 10.9 FL (ref 8.9–12.9)
PMV BLD AUTO: 11 FL (ref 8.9–12.9)
PMV BLD AUTO: 11.1 FL (ref 8.9–12.9)
PMV BLD AUTO: 8.7 FL (ref 8.9–12.9)
PMV BLD AUTO: 8.8 FL (ref 8.9–12.9)
PMV BLD AUTO: 8.8 FL (ref 8.9–12.9)
PMV BLD AUTO: 8.9 FL (ref 8.9–12.9)
PMV BLD AUTO: 8.9 FL (ref 8.9–12.9)
PMV BLD AUTO: 9 FL (ref 8.9–12.9)
PMV BLD AUTO: 9.1 FL (ref 8.9–12.9)
PMV BLD AUTO: 9.2 FL (ref 8.9–12.9)
PMV BLD AUTO: 9.3 FL (ref 8.9–12.9)
PMV BLD AUTO: 9.3 FL (ref 8.9–12.9)
PMV BLD AUTO: 9.4 FL (ref 8.9–12.9)
PMV BLD AUTO: 9.5 FL (ref 8.9–12.9)
PMV BLD AUTO: 9.5 FL (ref 8.9–12.9)
PMV BLD AUTO: 9.6 FL (ref 8.9–12.9)
PMV BLD AUTO: 9.7 FL (ref 8.9–12.9)
PMV BLD AUTO: 9.8 FL (ref 8.9–12.9)
PMV BLD AUTO: 9.9 FL (ref 8.9–12.9)
PMV BLD AUTO: 9.9 FL (ref 8.9–12.9)
PO2 BLD: 108 MMHG (ref 80–100)
PO2 BLD: 114 MMHG (ref 80–100)
PO2 BLD: 116 MMHG (ref 80–100)
PO2 BLD: 136 MMHG (ref 80–100)
PO2 BLD: 160 MMHG (ref 80–100)
PO2 BLD: 177 MMHG (ref 80–100)
PO2 BLD: 180 MMHG (ref 80–100)
PO2 BLD: 60 MMHG (ref 80–100)
PO2 BLD: 91 MMHG (ref 80–100)
PO2 BLD: 92 MMHG (ref 80–100)
PO2 BLDA: 116 MMHG (ref 80–100)
PO2 BLDA: 139 MMHG (ref 80–100)
PO2 BLDA: 140 MMHG (ref 80–100)
PO2 BLDA: 44 MMHG (ref 80–100)
PO2 BLDA: 54 MMHG (ref 80–100)
PO2 BLDA: 55 MMHG (ref 80–100)
PO2 BLDA: 56 MMHG (ref 80–100)
PO2 BLDA: 66 MMHG (ref 80–100)
PO2 BLDA: 76 MMHG (ref 80–100)
PO2 BLDA: 77 MMHG (ref 80–100)
PO2 BLDA: 78 MMHG (ref 80–100)
PO2 BLDA: 79 MMHG (ref 80–100)
PO2 BLDA: 85 MMHG (ref 80–100)
PO2 BLDA: 88 MMHG (ref 80–100)
POTASSIUM SERPL-SCNC: 3.5 MMOL/L (ref 3.5–5.1)
POTASSIUM SERPL-SCNC: 3.7 MMOL/L (ref 3.5–5.1)
POTASSIUM SERPL-SCNC: 3.8 MMOL/L (ref 3.5–5.1)
POTASSIUM SERPL-SCNC: 3.8 MMOL/L (ref 3.5–5.1)
POTASSIUM SERPL-SCNC: 3.9 MMOL/L (ref 3.5–5.1)
POTASSIUM SERPL-SCNC: 4 MMOL/L (ref 3.5–5.1)
POTASSIUM SERPL-SCNC: 4 MMOL/L (ref 3.5–5.1)
POTASSIUM SERPL-SCNC: 4.1 MMOL/L (ref 3.5–5.1)
POTASSIUM SERPL-SCNC: 4.2 MMOL/L (ref 3.5–5.1)
POTASSIUM SERPL-SCNC: 4.3 MMOL/L (ref 3.5–5.1)
POTASSIUM SERPL-SCNC: 4.3 MMOL/L (ref 3.5–5.1)
POTASSIUM SERPL-SCNC: 4.4 MMOL/L (ref 3.5–5.1)
POTASSIUM SERPL-SCNC: 4.6 MMOL/L (ref 3.5–5.1)
POTASSIUM SERPL-SCNC: 4.7 MMOL/L (ref 3.5–5.1)
POTASSIUM SERPL-SCNC: 4.8 MMOL/L (ref 3.5–5.1)
POTASSIUM SERPL-SCNC: 5 MMOL/L (ref 3.5–5.1)
POTASSIUM SERPL-SCNC: 5.2 MMOL/L (ref 3.5–5.1)
POTASSIUM SERPL-SCNC: 5.2 MMOL/L (ref 3.5–5.1)
POTASSIUM SERPL-SCNC: 5.3 MMOL/L (ref 3.5–5.1)
PROCALCITONIN SERPL-MCNC: 0.19 NG/ML
PROCALCITONIN SERPL-MCNC: 0.19 NG/ML
PROCALCITONIN SERPL-MCNC: 2.26 NG/ML
PROCALCITONIN SERPL-MCNC: 2.46 NG/ML
PROCALCITONIN SERPL-MCNC: 4.34 NG/ML
PROCALCITONIN SERPL-MCNC: 4.49 NG/ML
PROT SERPL-MCNC: 5.7 G/DL (ref 6.4–8.2)
PROT SERPL-MCNC: 6.1 G/DL (ref 6–8.5)
PROT SERPL-MCNC: 6.4 G/DL (ref 6.4–8.2)
PROT SERPL-MCNC: 6.4 G/DL (ref 6.4–8.2)
PROT SERPL-MCNC: 6.6 G/DL (ref 6.4–8.2)
PROT SERPL-MCNC: 6.7 G/DL (ref 6.4–8.2)
PROT SERPL-MCNC: 6.7 G/DL (ref 6.4–8.2)
PROT SERPL-MCNC: 7 G/DL (ref 6.4–8.2)
PROT SERPL-MCNC: 7.1 G/DL (ref 6.4–8.2)
PROT SERPL-MCNC: 7.2 G/DL (ref 6.4–8.2)
PROT SERPL-MCNC: 7.3 G/DL (ref 6.4–8.2)
PROT SERPL-MCNC: 7.3 G/DL (ref 6.4–8.2)
PROT SERPL-MCNC: 7.4 G/DL (ref 6.4–8.2)
PROT SERPL-MCNC: 7.4 G/DL (ref 6.4–8.2)
PROT SERPL-MCNC: 7.5 G/DL (ref 6.4–8.2)
PROT SERPL-MCNC: 7.6 G/DL (ref 6.4–8.2)
PROT SERPL-MCNC: 7.7 G/DL (ref 6.4–8.2)
PROT SERPL-MCNC: 7.8 G/DL (ref 6.4–8.2)
PROT SERPL-MCNC: 7.9 G/DL (ref 6.4–8.2)
PROT UR STRIP-MCNC: NEGATIVE MG/DL
PROTEINASE3 AB SER IA-ACNC: <0.2 UNITS (ref 0–0.9)
PROTHROMBIN TIME: 12.3 SEC (ref 9–11.1)
Q-T INTERVAL, ECG07: 292 MS
Q-T INTERVAL, ECG07: 296 MS
Q-T INTERVAL, ECG07: 312 MS
Q-T INTERVAL, ECG07: 326 MS
Q-T INTERVAL, ECG07: 332 MS
Q-T INTERVAL, ECG07: 344 MS
Q-T INTERVAL, ECG07: 396 MS
QRS DURATION, ECG06: 72 MS
QRS DURATION, ECG06: 74 MS
QRS DURATION, ECG06: 74 MS
QRS DURATION, ECG06: 86 MS
QRS DURATION, ECG06: 88 MS
QTC CALCULATION (BEZET), ECG08: 422 MS
QTC CALCULATION (BEZET), ECG08: 428 MS
QTC CALCULATION (BEZET), ECG08: 435 MS
QTC CALCULATION (BEZET), ECG08: 440 MS
QTC CALCULATION (BEZET), ECG08: 443 MS
QTC CALCULATION (BEZET), ECG08: 470 MS
QTC CALCULATION (BEZET), ECG08: 580 MS
RBC # BLD AUTO: 1.36 M/UL (ref 4.1–5.7)
RBC # BLD AUTO: 1.7 M/UL (ref 4.1–5.7)
RBC # BLD AUTO: 1.98 M/UL (ref 4.1–5.7)
RBC # BLD AUTO: 1.99 M/UL (ref 4.1–5.7)
RBC # BLD AUTO: 2.16 M/UL (ref 4.1–5.7)
RBC # BLD AUTO: 2.22 M/UL (ref 4.1–5.7)
RBC # BLD AUTO: 2.31 M/UL (ref 4.1–5.7)
RBC # BLD AUTO: 2.31 M/UL (ref 4.1–5.7)
RBC # BLD AUTO: 2.32 M/UL (ref 4.1–5.7)
RBC # BLD AUTO: 2.33 M/UL (ref 4.1–5.7)
RBC # BLD AUTO: 2.37 M/UL (ref 4.1–5.7)
RBC # BLD AUTO: 2.4 M/UL (ref 4.1–5.7)
RBC # BLD AUTO: 2.4 M/UL (ref 4.1–5.7)
RBC # BLD AUTO: 2.41 M/UL (ref 4.1–5.7)
RBC # BLD AUTO: 2.44 M/UL (ref 4.1–5.7)
RBC # BLD AUTO: 2.45 M/UL (ref 4.1–5.7)
RBC # BLD AUTO: 2.46 M/UL (ref 4.1–5.7)
RBC # BLD AUTO: 2.47 M/UL (ref 4.1–5.7)
RBC # BLD AUTO: 2.48 M/UL (ref 4.1–5.7)
RBC # BLD AUTO: 2.49 M/UL (ref 4.1–5.7)
RBC # BLD AUTO: 2.5 M/UL (ref 4.1–5.7)
RBC # BLD AUTO: 2.52 M/UL (ref 4.1–5.7)
RBC # BLD AUTO: 2.52 M/UL (ref 4.1–5.7)
RBC # BLD AUTO: 2.53 M/UL (ref 4.1–5.7)
RBC # BLD AUTO: 2.58 M/UL (ref 4.1–5.7)
RBC # BLD AUTO: 2.59 M/UL (ref 4.1–5.7)
RBC # BLD AUTO: 2.62 M/UL (ref 4.1–5.7)
RBC # BLD AUTO: 2.63 M/UL (ref 4.1–5.7)
RBC # BLD AUTO: 2.63 M/UL (ref 4.1–5.7)
RBC # BLD AUTO: 2.71 M/UL (ref 4.1–5.7)
RBC # BLD AUTO: 2.72 M/UL (ref 4.1–5.7)
RBC # BLD AUTO: 2.76 M/UL (ref 4.1–5.7)
RBC # BLD AUTO: 2.78 M/UL (ref 4.1–5.7)
RBC # BLD AUTO: 2.82 M/UL (ref 4.1–5.7)
RBC # BLD AUTO: 2.82 M/UL (ref 4.1–5.7)
RBC # BLD AUTO: 2.83 M/UL (ref 4.1–5.7)
RBC # BLD AUTO: 2.89 M/UL (ref 4.1–5.7)
RBC # BLD AUTO: 2.9 M/UL (ref 4.1–5.7)
RBC # BLD AUTO: 2.93 M/UL (ref 4.1–5.7)
RBC # BLD AUTO: 2.95 M/UL (ref 4.1–5.7)
RBC # BLD AUTO: 2.97 M/UL (ref 4.1–5.7)
RBC # BLD AUTO: 2.98 M/UL (ref 4.1–5.7)
RBC # BLD AUTO: 2.98 M/UL (ref 4.1–5.7)
RBC # BLD AUTO: 3.03 M/UL (ref 4.1–5.7)
RBC # BLD AUTO: 3.09 M/UL (ref 4.1–5.7)
RBC # BLD AUTO: 3.25 M/UL (ref 4.1–5.7)
RBC # BLD AUTO: 3.31 M/UL (ref 4.1–5.7)
RBC # BLD AUTO: 3.4 M/UL (ref 4.1–5.7)
RBC #/AREA URNS HPF: ABNORMAL /HPF (ref 0–5)
RBC #/AREA URNS HPF: ABNORMAL /HPF (ref 0–5)
RBC MORPH BLD: ABNORMAL
RETICS # AUTO: 0.06 M/UL (ref 0.03–0.1)
RETICS # AUTO: 0.08 M/UL (ref 0.03–0.1)
RETICS/RBC NFR AUTO: 2.2 % (ref 0.7–2.1)
RETICS/RBC NFR AUTO: 2.9 % (ref 0.7–2.1)
RHEUMATOID FACT SERPL-ACNC: <10 IU/ML
SAMPLES BEING HELD,HOLD: NORMAL
SAO2 % BLD: 66 % (ref 92–97)
SAO2 % BLD: 83 % (ref 92–97)
SAO2 % BLD: 84 % (ref 92–97)
SAO2 % BLD: 86 % (ref 92–97)
SAO2 % BLD: 87 % (ref 95–99)
SAO2 % BLD: 89 % (ref 92–97)
SAO2 % BLD: 92 % (ref 92–97)
SAO2 % BLD: 95 % (ref 92–97)
SAO2 % BLD: 95 % (ref 92–97)
SAO2 % BLD: 96 % (ref 92–97)
SAO2 % BLD: 96.4 % (ref 92–97)
SAO2 % BLD: 97 % (ref 95–99)
SAO2 % BLD: 97.3 % (ref 92–97)
SAO2 % BLD: 97.7 % (ref 92–97)
SAO2 % BLD: 98 % (ref 92–97)
SAO2 % BLD: 98.1 % (ref 92–97)
SAO2 % BLD: 98.4 % (ref 92–97)
SAO2 % BLD: 99 % (ref 92–97)
SAO2 % BLD: 99 % (ref 92–97)
SAO2 % BLD: 99.1 % (ref 92–97)
SAO2 % BLD: 99.4 % (ref 92–97)
SAO2 % BLD: 99.6 % (ref 92–97)
SAO2 % BLD: 99.6 % (ref 92–97)
SAO2% DEVICE SAO2% SENSOR NAME: ABNORMAL
SERVICE CMNT-IMP: ABNORMAL
SERVICE CMNT-IMP: NORMAL
SODIUM SERPL-SCNC: 132 MMOL/L (ref 136–145)
SODIUM SERPL-SCNC: 133 MMOL/L (ref 136–145)
SODIUM SERPL-SCNC: 133 MMOL/L (ref 136–145)
SODIUM SERPL-SCNC: 134 MMOL/L (ref 136–145)
SODIUM SERPL-SCNC: 135 MMOL/L (ref 136–145)
SODIUM SERPL-SCNC: 136 MMOL/L (ref 136–145)
SODIUM SERPL-SCNC: 137 MMOL/L (ref 136–145)
SODIUM SERPL-SCNC: 138 MMOL/L (ref 136–145)
SODIUM SERPL-SCNC: 139 MMOL/L (ref 136–145)
SODIUM SERPL-SCNC: 140 MMOL/L (ref 136–145)
SP GR UR REFRACTOMETRY: 1.01 (ref 1–1.03)
SPECIAL REQUESTS,SREQ: NORMAL
SPECIMEN EXP DATE BLD: NORMAL
SPECIMEN SITE: ABNORMAL
SPECIMEN SOURCE: NORMAL
SPECIMEN TYPE: ABNORMAL
STATUS OF UNIT,%ST: NORMAL
T4 FREE SERPL-MCNC: 0.6 NG/DL (ref 0.8–1.5)
T4 FREE SERPL-MCNC: 0.8 NG/DL (ref 0.82–1.77)
T4 FREE SERPL-MCNC: 1 NG/DL (ref 0.8–1.5)
THERAPEUTIC RANGE,PTTT: ABNORMAL SECS (ref 58–77)
TIBC SERPL-MCNC: 186 UG/DL (ref 250–450)
TIBC SERPL-MCNC: 204 UG/DL (ref 250–450)
TIBC SERPL-MCNC: 220 UG/DL (ref 250–450)
TIBC SERPL-MCNC: 285 UG/DL (ref 250–450)
TIBC SERPL-MCNC: 302 UG/DL (ref 250–450)
TRANSFUSION STATUS PATIENT QL: NORMAL
TROPONIN-HIGH SENSITIVITY: 3 NG/L (ref 0–76)
TROPONIN-HIGH SENSITIVITY: 3 NG/L (ref 0–76)
TROPONIN-HIGH SENSITIVITY: 4 NG/L (ref 0–76)
TROPONIN-HIGH SENSITIVITY: 44 NG/L (ref 0–76)
TROPONIN-HIGH SENSITIVITY: <3 NG/L (ref 0–76)
TSH SERPL DL<=0.005 MIU/L-ACNC: 37.5 UIU/ML (ref 0.45–4.5)
TSH SERPL DL<=0.05 MIU/L-ACNC: 11.1 UIU/ML (ref 0.36–3.74)
TSH SERPL DL<=0.05 MIU/L-ACNC: 18.6 UIU/ML (ref 0.36–3.74)
TSH SERPL DL<=0.05 MIU/L-ACNC: 24.6 UIU/ML (ref 0.36–3.74)
TSH SERPL DL<=0.05 MIU/L-ACNC: 26.7 UIU/ML (ref 0.36–3.74)
TSH SERPL DL<=0.05 MIU/L-ACNC: 42.2 UIU/ML (ref 0.36–3.74)
TSH SERPL DL<=0.05 MIU/L-ACNC: 68.2 UIU/ML (ref 0.36–3.74)
UA: UC IF INDICATED,UAUC: ABNORMAL
UNIT DIVISION, %UDIV: 0
UROBILINOGEN UR QL STRIP.AUTO: 0.1 EU/DL (ref 0.1–1)
VANCOMYCIN SERPL-MCNC: 17.1 UG/ML
VANCOMYCIN SERPL-MCNC: 18 UG/ML
VANCOMYCIN SERPL-MCNC: 19.9 UG/ML
VENTILATION MODE VENT: ABNORMAL
VENTRICULAR RATE, ECG03: 100 BPM
VENTRICULAR RATE, ECG03: 100 BPM
VENTRICULAR RATE, ECG03: 107 BPM
VENTRICULAR RATE, ECG03: 110 BPM
VENTRICULAR RATE, ECG03: 129 BPM
VENTRICULAR RATE, ECG03: 137 BPM
VENTRICULAR RATE, ECG03: 152 BPM
VIT B12 SERPL-MCNC: >2000 PG/ML (ref 193–986)
VIT B12 SERPL-MCNC: >2000 PG/ML (ref 193–986)
VT SETTING VENT: 320 ML
VT SETTING VENT: 350 ML
VT SETTING VENT: 350 ML
VT SETTING VENT: 450 ML
WBC # BLD AUTO: 1.2 K/UL (ref 4.1–11.1)
WBC # BLD AUTO: 10.1 K/UL (ref 4.1–11.1)
WBC # BLD AUTO: 10.6 K/UL (ref 4.1–11.1)
WBC # BLD AUTO: 10.9 K/UL (ref 4.1–11.1)
WBC # BLD AUTO: 11.1 K/UL (ref 4.1–11.1)
WBC # BLD AUTO: 12.1 K/UL (ref 4.1–11.1)
WBC # BLD AUTO: 13 K/UL (ref 4.1–11.1)
WBC # BLD AUTO: 14.2 K/UL (ref 4.1–11.1)
WBC # BLD AUTO: 14.2 K/UL (ref 4.1–11.1)
WBC # BLD AUTO: 16.3 K/UL (ref 4.1–11.1)
WBC # BLD AUTO: 16.5 K/UL (ref 4.1–11.1)
WBC # BLD AUTO: 16.9 K/UL (ref 4.1–11.1)
WBC # BLD AUTO: 18 K/UL (ref 4.1–11.1)
WBC # BLD AUTO: 19.6 K/UL (ref 4.1–11.1)
WBC # BLD AUTO: 19.7 K/UL (ref 4.1–11.1)
WBC # BLD AUTO: 20.4 K/UL (ref 4.1–11.1)
WBC # BLD AUTO: 20.4 K/UL (ref 4.1–11.1)
WBC # BLD AUTO: 20.5 K/UL (ref 4.1–11.1)
WBC # BLD AUTO: 20.7 K/UL (ref 4.1–11.1)
WBC # BLD AUTO: 21 K/UL (ref 4.1–11.1)
WBC # BLD AUTO: 22.1 K/UL (ref 4.1–11.1)
WBC # BLD AUTO: 23.4 K/UL (ref 4.1–11.1)
WBC # BLD AUTO: 24.1 K/UL (ref 4.1–11.1)
WBC # BLD AUTO: 24.7 K/UL (ref 4.1–11.1)
WBC # BLD AUTO: 3.3 K/UL (ref 4.1–11.1)
WBC # BLD AUTO: 3.5 K/UL (ref 4.1–11.1)
WBC # BLD AUTO: 3.5 K/UL (ref 4.1–11.1)
WBC # BLD AUTO: 4.9 K/UL (ref 4.1–11.1)
WBC # BLD AUTO: 5.1 K/UL (ref 4.1–11.1)
WBC # BLD AUTO: 5.5 K/UL (ref 4.1–11.1)
WBC # BLD AUTO: 5.9 K/UL (ref 4.1–11.1)
WBC # BLD AUTO: 6.1 K/UL (ref 4.1–11.1)
WBC # BLD AUTO: 7 K/UL (ref 4.1–11.1)
WBC # BLD AUTO: 7.2 K/UL (ref 4.1–11.1)
WBC # BLD AUTO: 7.3 K/UL (ref 4.1–11.1)
WBC # BLD AUTO: 7.8 K/UL (ref 4.1–11.1)
WBC # BLD AUTO: 8.1 K/UL (ref 4.1–11.1)
WBC # BLD AUTO: 8.1 K/UL (ref 4.1–11.1)
WBC # BLD AUTO: 8.3 K/UL (ref 4.1–11.1)
WBC # BLD AUTO: 8.4 K/UL (ref 4.1–11.1)
WBC # BLD AUTO: 8.4 K/UL (ref 4.1–11.1)
WBC # BLD AUTO: 8.6 K/UL (ref 4.1–11.1)
WBC # BLD AUTO: 8.6 K/UL (ref 4.1–11.1)
WBC # BLD AUTO: 8.7 K/UL (ref 4.1–11.1)
WBC # BLD AUTO: 9.2 K/UL (ref 4.1–11.1)
WBC # BLD AUTO: 9.3 K/UL (ref 4.1–11.1)
WBC # BLD AUTO: 9.4 K/UL (ref 4.1–11.1)
WBC # BLD AUTO: 9.5 K/UL (ref 4.1–11.1)
WBC # BLD AUTO: 9.6 K/UL (ref 4.1–11.1)
WBC # BLD AUTO: 9.7 K/UL (ref 4.1–11.1)
WBC # BLD AUTO: 9.8 K/UL (ref 4.1–11.1)
WBC MORPH BLD: ABNORMAL
WBC MORPH BLD: ABNORMAL
WBC URNS QL MICRO: ABNORMAL /HPF (ref 0–4)
WBC URNS QL MICRO: ABNORMAL /HPF (ref 0–4)

## 2022-01-01 PROCEDURE — 71045 X-RAY EXAM CHEST 1 VIEW: CPT

## 2022-01-01 PROCEDURE — 82272 OCCULT BLD FECES 1-3 TESTS: CPT

## 2022-01-01 PROCEDURE — 74011250636 HC RX REV CODE- 250/636

## 2022-01-01 PROCEDURE — 85025 COMPLETE CBC W/AUTO DIFF WBC: CPT

## 2022-01-01 PROCEDURE — 74011250636 HC RX REV CODE- 250/636: Performed by: STUDENT IN AN ORGANIZED HEALTH CARE EDUCATION/TRAINING PROGRAM

## 2022-01-01 PROCEDURE — 77030012965 HC NDL HUBR BBMI -A

## 2022-01-01 PROCEDURE — 74011000250 HC RX REV CODE- 250: Performed by: STUDENT IN AN ORGANIZED HEALTH CARE EDUCATION/TRAINING PROGRAM

## 2022-01-01 PROCEDURE — 74011000250 HC RX REV CODE- 250: Performed by: NURSE PRACTITIONER

## 2022-01-01 PROCEDURE — G0378 HOSPITAL OBSERVATION PER HR: HCPCS

## 2022-01-01 PROCEDURE — 99285 EMERGENCY DEPT VISIT HI MDM: CPT

## 2022-01-01 PROCEDURE — C9113 INJ PANTOPRAZOLE SODIUM, VIA: HCPCS

## 2022-01-01 PROCEDURE — 74011250637 HC RX REV CODE- 250/637: Performed by: INTERNAL MEDICINE

## 2022-01-01 PROCEDURE — 93306 TTE W/DOPPLER COMPLETE: CPT | Performed by: SPECIALIST

## 2022-01-01 PROCEDURE — 97110 THERAPEUTIC EXERCISES: CPT

## 2022-01-01 PROCEDURE — 97140 MANUAL THERAPY 1/> REGIONS: CPT

## 2022-01-01 PROCEDURE — 74011250636 HC RX REV CODE- 250/636: Performed by: NURSE PRACTITIONER

## 2022-01-01 PROCEDURE — 87804 INFLUENZA ASSAY W/OPTIC: CPT

## 2022-01-01 PROCEDURE — P9016 RBC LEUKOCYTES REDUCED: HCPCS

## 2022-01-01 PROCEDURE — 74011000250 HC RX REV CODE- 250: Performed by: INTERNAL MEDICINE

## 2022-01-01 PROCEDURE — 94640 AIRWAY INHALATION TREATMENT: CPT

## 2022-01-01 PROCEDURE — 74011000250 HC RX REV CODE- 250

## 2022-01-01 PROCEDURE — 96360 HYDRATION IV INFUSION INIT: CPT

## 2022-01-01 PROCEDURE — 76040000008: Performed by: INTERNAL MEDICINE

## 2022-01-01 PROCEDURE — 96413 CHEMO IV INFUSION 1 HR: CPT

## 2022-01-01 PROCEDURE — 86900 BLOOD TYPING SEROLOGIC ABO: CPT

## 2022-01-01 PROCEDURE — 80053 COMPREHEN METABOLIC PANEL: CPT

## 2022-01-01 PROCEDURE — 36591 DRAW BLOOD OFF VENOUS DEVICE: CPT

## 2022-01-01 PROCEDURE — 65610000006 HC RM INTENSIVE CARE

## 2022-01-01 PROCEDURE — 2709999900 HC NON-CHARGEABLE SUPPLY: Performed by: NEUROLOGICAL SURGERY

## 2022-01-01 PROCEDURE — 36600 WITHDRAWAL OF ARTERIAL BLOOD: CPT

## 2022-01-01 PROCEDURE — 74011250636 HC RX REV CODE- 250/636: Performed by: EMERGENCY MEDICINE

## 2022-01-01 PROCEDURE — 74011250636 HC RX REV CODE- 250/636: Performed by: NURSE ANESTHETIST, CERTIFIED REGISTERED

## 2022-01-01 PROCEDURE — 74177 CT ABD & PELVIS W/CONTRAST: CPT

## 2022-01-01 PROCEDURE — 84100 ASSAY OF PHOSPHORUS: CPT

## 2022-01-01 PROCEDURE — 80048 BASIC METABOLIC PNL TOTAL CA: CPT

## 2022-01-01 PROCEDURE — 74011000250 HC RX REV CODE- 250: Performed by: NEUROLOGICAL SURGERY

## 2022-01-01 PROCEDURE — 74011000258 HC RX REV CODE- 258: Performed by: INTERNAL MEDICINE

## 2022-01-01 PROCEDURE — 74011000272 HC RX REV CODE- 272: Performed by: FAMILY MEDICINE

## 2022-01-01 PROCEDURE — 74011250636 HC RX REV CODE- 250/636: Performed by: FAMILY MEDICINE

## 2022-01-01 PROCEDURE — 74011250636 HC RX REV CODE- 250/636: Performed by: INTERNAL MEDICINE

## 2022-01-01 PROCEDURE — 77030021593 HC FCPS BIOP ENDOSC BSC -A: Performed by: INTERNAL MEDICINE

## 2022-01-01 PROCEDURE — 84145 PROCALCITONIN (PCT): CPT

## 2022-01-01 PROCEDURE — 74011000258 HC RX REV CODE- 258: Performed by: NURSE PRACTITIONER

## 2022-01-01 PROCEDURE — 85018 HEMOGLOBIN: CPT

## 2022-01-01 PROCEDURE — 74011250637 HC RX REV CODE- 250/637: Performed by: HOSPITALIST

## 2022-01-01 PROCEDURE — 74011636637 HC RX REV CODE- 636/637: Performed by: NURSE PRACTITIONER

## 2022-01-01 PROCEDURE — 74011000250 HC RX REV CODE- 250: Performed by: NURSE ANESTHETIST, CERTIFIED REGISTERED

## 2022-01-01 PROCEDURE — 36415 COLL VENOUS BLD VENIPUNCTURE: CPT

## 2022-01-01 PROCEDURE — 96375 TX/PRO/DX INJ NEW DRUG ADDON: CPT

## 2022-01-01 PROCEDURE — 86923 COMPATIBILITY TEST ELECTRIC: CPT

## 2022-01-01 PROCEDURE — 97161 PT EVAL LOW COMPLEX 20 MIN: CPT

## 2022-01-01 PROCEDURE — 76060000042 HC ANESTHESIA 5.5 TO 6 HR: Performed by: NEUROLOGICAL SURGERY

## 2022-01-01 PROCEDURE — 76060000031 HC ANESTHESIA FIRST 0.5 HR: Performed by: INTERNAL MEDICINE

## 2022-01-01 PROCEDURE — 93005 ELECTROCARDIOGRAM TRACING: CPT

## 2022-01-01 PROCEDURE — 86738 MYCOPLASMA ANTIBODY: CPT

## 2022-01-01 PROCEDURE — 76040000019: Performed by: INTERNAL MEDICINE

## 2022-01-01 PROCEDURE — 99215 OFFICE O/P EST HI 40 MIN: CPT | Performed by: INTERNAL MEDICINE

## 2022-01-01 PROCEDURE — 70553 MRI BRAIN STEM W/O & W/DYE: CPT

## 2022-01-01 PROCEDURE — 74011250636 HC RX REV CODE- 250/636: Performed by: HOSPITALIST

## 2022-01-01 PROCEDURE — 87449 NOS EACH ORGANISM AG IA: CPT

## 2022-01-01 PROCEDURE — 36430 TRANSFUSION BLD/BLD COMPNT: CPT

## 2022-01-01 PROCEDURE — 51798 US URINE CAPACITY MEASURE: CPT

## 2022-01-01 PROCEDURE — 71260 CT THORAX DX C+: CPT

## 2022-01-01 PROCEDURE — 97165 OT EVAL LOW COMPLEX 30 MIN: CPT

## 2022-01-01 PROCEDURE — 73610000026 HC INO THERAPY EACH HOUR

## 2022-01-01 PROCEDURE — 74011250637 HC RX REV CODE- 250/637: Performed by: NURSE PRACTITIONER

## 2022-01-01 PROCEDURE — 96367 TX/PROPH/DG ADDL SEQ IV INF: CPT

## 2022-01-01 PROCEDURE — 77030016057 HC NDL HUBR APOL -B

## 2022-01-01 PROCEDURE — 96377 APPLICATON ON-BODY INJECTOR: CPT

## 2022-01-01 PROCEDURE — 96361 HYDRATE IV INFUSION ADD-ON: CPT

## 2022-01-01 PROCEDURE — 86037 ANCA TITER EACH ANTIBODY: CPT

## 2022-01-01 PROCEDURE — 85384 FIBRINOGEN ACTIVITY: CPT

## 2022-01-01 PROCEDURE — 74011000258 HC RX REV CODE- 258: Performed by: EMERGENCY MEDICINE

## 2022-01-01 PROCEDURE — 84443 ASSAY THYROID STIM HORMONE: CPT

## 2022-01-01 PROCEDURE — P9045 ALBUMIN (HUMAN), 5%, 250 ML: HCPCS | Performed by: NURSE PRACTITIONER

## 2022-01-01 PROCEDURE — 65270000029 HC RM PRIVATE

## 2022-01-01 PROCEDURE — 96374 THER/PROPH/DIAG INJ IV PUSH: CPT

## 2022-01-01 PROCEDURE — 74011250637 HC RX REV CODE- 250/637: Performed by: STUDENT IN AN ORGANIZED HEALTH CARE EDUCATION/TRAINING PROGRAM

## 2022-01-01 PROCEDURE — 83880 ASSAY OF NATRIURETIC PEPTIDE: CPT

## 2022-01-01 PROCEDURE — 85027 COMPLETE CBC AUTOMATED: CPT

## 2022-01-01 PROCEDURE — 74011000250 HC RX REV CODE- 250: Performed by: HOSPITALIST

## 2022-01-01 PROCEDURE — 83540 ASSAY OF IRON: CPT

## 2022-01-01 PROCEDURE — 94003 VENT MGMT INPAT SUBQ DAY: CPT

## 2022-01-01 PROCEDURE — 90471 IMMUNIZATION ADMIN: CPT

## 2022-01-01 PROCEDURE — 82962 GLUCOSE BLOOD TEST: CPT

## 2022-01-01 PROCEDURE — 74018 RADEX ABDOMEN 1 VIEW: CPT

## 2022-01-01 PROCEDURE — 99231 SBSQ HOSP IP/OBS SF/LOW 25: CPT | Performed by: INTERNAL MEDICINE

## 2022-01-01 PROCEDURE — 86147 CARDIOLIPIN ANTIBODY EA IG: CPT

## 2022-01-01 PROCEDURE — 80202 ASSAY OF VANCOMYCIN: CPT

## 2022-01-01 PROCEDURE — 83615 LACTATE (LD) (LDH) ENZYME: CPT

## 2022-01-01 PROCEDURE — 94664 DEMO&/EVAL PT USE INHALER: CPT

## 2022-01-01 PROCEDURE — 82728 ASSAY OF FERRITIN: CPT

## 2022-01-01 PROCEDURE — 76210000000 HC OR PH I REC 2 TO 2.5 HR: Performed by: NEUROLOGICAL SURGERY

## 2022-01-01 PROCEDURE — 87070 CULTURE OTHR SPECIMN AEROBIC: CPT

## 2022-01-01 PROCEDURE — 74011000250 HC RX REV CODE- 250: Performed by: ANESTHESIOLOGY

## 2022-01-01 PROCEDURE — 77030018798 HC PMP KT ENTRL FED COVD -A

## 2022-01-01 PROCEDURE — 77030014006 HC SPNG HEMSTAT J&J -A: Performed by: NEUROLOGICAL SURGERY

## 2022-01-01 PROCEDURE — 87040 BLOOD CULTURE FOR BACTERIA: CPT

## 2022-01-01 PROCEDURE — 77030034348 HC TIP STR SONOPET DISP STRY -E: Performed by: NEUROLOGICAL SURGERY

## 2022-01-01 PROCEDURE — 84484 ASSAY OF TROPONIN QUANT: CPT

## 2022-01-01 PROCEDURE — 65270000046 HC RM TELEMETRY

## 2022-01-01 PROCEDURE — 74022 RADEX COMPL AQT ABD SERIES: CPT

## 2022-01-01 PROCEDURE — 96376 TX/PRO/DX INJ SAME DRUG ADON: CPT

## 2022-01-01 PROCEDURE — 83735 ASSAY OF MAGNESIUM: CPT

## 2022-01-01 PROCEDURE — 96415 CHEMO IV INFUSION ADDL HR: CPT

## 2022-01-01 PROCEDURE — A9576 INJ PROHANCE MULTIPACK: HCPCS

## 2022-01-01 PROCEDURE — 77030013169 SET IV BLD ICUM -A

## 2022-01-01 PROCEDURE — 81003 URINALYSIS AUTO W/O SCOPE: CPT

## 2022-01-01 PROCEDURE — 82533 TOTAL CORTISOL: CPT

## 2022-01-01 PROCEDURE — 94761 N-INVAS EAR/PLS OXIMETRY MLT: CPT

## 2022-01-01 PROCEDURE — 74011250637 HC RX REV CODE- 250/637: Performed by: NEUROLOGICAL SURGERY

## 2022-01-01 PROCEDURE — 77030037673 HC TBNG VISTA V-LYTE PMP STRY -B: Performed by: NEUROLOGICAL SURGERY

## 2022-01-01 PROCEDURE — 92597 ORAL SPEECH DEVICE EVAL: CPT

## 2022-01-01 PROCEDURE — 02HV33Z INSERTION OF INFUSION DEVICE INTO SUPERIOR VENA CAVA, PERCUTANEOUS APPROACH: ICD-10-PCS | Performed by: INTERNAL MEDICINE

## 2022-01-01 PROCEDURE — C9113 INJ PANTOPRAZOLE SODIUM, VIA: HCPCS | Performed by: HOSPITALIST

## 2022-01-01 PROCEDURE — 73610000005 HC INO THERAPY INITIAL

## 2022-01-01 PROCEDURE — 87635 SARS-COV-2 COVID-19 AMP PRB: CPT

## 2022-01-01 PROCEDURE — 74011250636 HC RX REV CODE- 250/636: Performed by: ANESTHESIOLOGY

## 2022-01-01 PROCEDURE — 96366 THER/PROPH/DIAG IV INF ADDON: CPT

## 2022-01-01 PROCEDURE — 03HY32Z INSERTION OF MONITORING DEVICE INTO UPPER ARTERY, PERCUTANEOUS APPROACH: ICD-10-PCS | Performed by: INTERNAL MEDICINE

## 2022-01-01 PROCEDURE — 71275 CT ANGIOGRAPHY CHEST: CPT

## 2022-01-01 PROCEDURE — 80076 HEPATIC FUNCTION PANEL: CPT

## 2022-01-01 PROCEDURE — 82803 BLOOD GASES ANY COMBINATION: CPT

## 2022-01-01 PROCEDURE — 99213 OFFICE O/P EST LOW 20 MIN: CPT | Performed by: INTERNAL MEDICINE

## 2022-01-01 PROCEDURE — 97530 THERAPEUTIC ACTIVITIES: CPT

## 2022-01-01 PROCEDURE — 86140 C-REACTIVE PROTEIN: CPT

## 2022-01-01 PROCEDURE — 74011000636 HC RX REV CODE- 636: Performed by: HOSPITALIST

## 2022-01-01 PROCEDURE — 00B00ZZ EXCISION OF BRAIN, OPEN APPROACH: ICD-10-PCS | Performed by: NEUROLOGICAL SURGERY

## 2022-01-01 PROCEDURE — 74011250637 HC RX REV CODE- 250/637: Performed by: FAMILY MEDICINE

## 2022-01-01 PROCEDURE — 86431 RHEUMATOID FACTOR QUANT: CPT

## 2022-01-01 PROCEDURE — 2709999900 HC NON-CHARGEABLE SUPPLY: Performed by: INTERNAL MEDICINE

## 2022-01-01 PROCEDURE — 77030031139 HC SUT VCRL2 J&J -A: Performed by: NEUROLOGICAL SURGERY

## 2022-01-01 PROCEDURE — 70491 CT SOFT TISSUE NECK W/DYE: CPT

## 2022-01-01 PROCEDURE — 99214 OFFICE O/P EST MOD 30 MIN: CPT | Performed by: INTERNAL MEDICINE

## 2022-01-01 PROCEDURE — 87641 MR-STAPH DNA AMP PROBE: CPT

## 2022-01-01 PROCEDURE — 83516 IMMUNOASSAY NONANTIBODY: CPT

## 2022-01-01 PROCEDURE — 92523 SPEECH SOUND LANG COMPREHEN: CPT

## 2022-01-01 PROCEDURE — 77030004472 HC BUR TAPR MEDT -B: Performed by: NEUROLOGICAL SURGERY

## 2022-01-01 PROCEDURE — 97162 PT EVAL MOD COMPLEX 30 MIN: CPT

## 2022-01-01 PROCEDURE — 94002 VENT MGMT INPAT INIT DAY: CPT

## 2022-01-01 PROCEDURE — 77030003029 HC SUT VCRL J&J -B: Performed by: NEUROLOGICAL SURGERY

## 2022-01-01 PROCEDURE — 76010000178 HC OR TIME 5.5 TO 6 HR INTENSV-TIER 1: Performed by: NEUROLOGICAL SURGERY

## 2022-01-01 PROCEDURE — 77030014355 HC CVR BUR H TI BIOM -C: Performed by: NEUROLOGICAL SURGERY

## 2022-01-01 PROCEDURE — P9045 ALBUMIN (HUMAN), 5%, 250 ML: HCPCS | Performed by: NURSE ANESTHETIST, CERTIFIED REGISTERED

## 2022-01-01 PROCEDURE — 77030014008 HC SPNG HEMSTAT J&J -C: Performed by: NEUROLOGICAL SURGERY

## 2022-01-01 PROCEDURE — 87103 BLOOD FUNGUS CULTURE: CPT

## 2022-01-01 PROCEDURE — 96416 CHEMO PROLONG INFUSE W/PUMP: CPT

## 2022-01-01 PROCEDURE — 86038 ANTINUCLEAR ANTIBODIES: CPT

## 2022-01-01 PROCEDURE — 77030013797 HC KT TRNSDUC PRSSR EDWD -A: Performed by: ANESTHESIOLOGY

## 2022-01-01 PROCEDURE — 74011000258 HC RX REV CODE- 258: Performed by: ANESTHESIOLOGY

## 2022-01-01 PROCEDURE — 99233 SBSQ HOSP IP/OBS HIGH 50: CPT | Performed by: INTERNAL MEDICINE

## 2022-01-01 PROCEDURE — 74176 CT ABD & PELVIS W/O CONTRAST: CPT

## 2022-01-01 PROCEDURE — 97116 GAIT TRAINING THERAPY: CPT

## 2022-01-01 PROCEDURE — 83605 ASSAY OF LACTIC ACID: CPT

## 2022-01-01 PROCEDURE — 82607 VITAMIN B-12: CPT

## 2022-01-01 PROCEDURE — 74011000250 HC RX REV CODE- 250: Performed by: EMERGENCY MEDICINE

## 2022-01-01 PROCEDURE — 85045 AUTOMATED RETICULOCYTE COUNT: CPT

## 2022-01-01 PROCEDURE — 82550 ASSAY OF CK (CPK): CPT

## 2022-01-01 PROCEDURE — 70450 CT HEAD/BRAIN W/O DYE: CPT

## 2022-01-01 PROCEDURE — 74011000636 HC RX REV CODE- 636: Performed by: NURSE PRACTITIONER

## 2022-01-01 PROCEDURE — 77030013992 HC SNR POLYP ENDOSC BSC -B: Performed by: INTERNAL MEDICINE

## 2022-01-01 PROCEDURE — 85610 PROTHROMBIN TIME: CPT

## 2022-01-01 PROCEDURE — 85730 THROMBOPLASTIN TIME PARTIAL: CPT

## 2022-01-01 PROCEDURE — 36556 INSERT NON-TUNNEL CV CATH: CPT

## 2022-01-01 PROCEDURE — 88307 TISSUE EXAM BY PATHOLOGIST: CPT

## 2022-01-01 PROCEDURE — 99252 IP/OBS CONSLTJ NEW/EST SF 35: CPT | Performed by: SPECIALIST

## 2022-01-01 PROCEDURE — 84439 ASSAY OF FREE THYROXINE: CPT

## 2022-01-01 PROCEDURE — 99213 OFFICE O/P EST LOW 20 MIN: CPT | Performed by: NURSE PRACTITIONER

## 2022-01-01 PROCEDURE — 87305 ASPERGILLUS AG IA: CPT

## 2022-01-01 PROCEDURE — 70460 CT HEAD/BRAIN W/DYE: CPT

## 2022-01-01 PROCEDURE — 74011000636 HC RX REV CODE- 636: Performed by: FAMILY MEDICINE

## 2022-01-01 PROCEDURE — 99283 EMERGENCY DEPT VISIT LOW MDM: CPT

## 2022-01-01 PROCEDURE — 96417 CHEMO IV INFUS EACH ADDL SEQ: CPT

## 2022-01-01 PROCEDURE — 87077 CULTURE AEROBIC IDENTIFY: CPT

## 2022-01-01 PROCEDURE — 84165 PROTEIN E-PHORESIS SERUM: CPT

## 2022-01-01 PROCEDURE — 74011250636 HC RX REV CODE- 250/636: Performed by: NEUROLOGICAL SURGERY

## 2022-01-01 PROCEDURE — 74011000636 HC RX REV CODE- 636: Performed by: RADIOLOGY

## 2022-01-01 PROCEDURE — 82375 ASSAY CARBOXYHB QUANT: CPT

## 2022-01-01 PROCEDURE — 36620 INSERTION CATHETER ARTERY: CPT

## 2022-01-01 PROCEDURE — 85014 HEMATOCRIT: CPT

## 2022-01-01 PROCEDURE — C1713 ANCHOR/SCREW BN/BN,TIS/BN: HCPCS | Performed by: NEUROLOGICAL SURGERY

## 2022-01-01 PROCEDURE — 5A1945Z RESPIRATORY VENTILATION, 24-96 CONSECUTIVE HOURS: ICD-10-PCS | Performed by: INTERNAL MEDICINE

## 2022-01-01 PROCEDURE — 81001 URINALYSIS AUTO W/SCOPE: CPT

## 2022-01-01 PROCEDURE — 87186 SC STD MICRODIL/AGAR DIL: CPT

## 2022-01-01 PROCEDURE — 76040000007: Performed by: INTERNAL MEDICINE

## 2022-01-01 PROCEDURE — 77030005645 HC GRFT SUB DURAGN INLC -F: Performed by: NEUROLOGICAL SURGERY

## 2022-01-01 PROCEDURE — C9113 INJ PANTOPRAZOLE SODIUM, VIA: HCPCS | Performed by: EMERGENCY MEDICINE

## 2022-01-01 PROCEDURE — 0BH17EZ INSERTION OF ENDOTRACHEAL AIRWAY INTO TRACHEA, VIA NATURAL OR ARTIFICIAL OPENING: ICD-10-PCS | Performed by: INTERNAL MEDICINE

## 2022-01-01 PROCEDURE — 85652 RBC SED RATE AUTOMATED: CPT

## 2022-01-01 PROCEDURE — 77030003892 HC BIT DRL TWST MEDT -B: Performed by: NEUROLOGICAL SURGERY

## 2022-01-01 PROCEDURE — 74011000258 HC RX REV CODE- 258: Performed by: NURSE ANESTHETIST, CERTIFIED REGISTERED

## 2022-01-01 PROCEDURE — 97164 PT RE-EVAL EST PLAN CARE: CPT

## 2022-01-01 PROCEDURE — A7525 TRACHEOSTOMY MASK: HCPCS | Performed by: INTERNAL MEDICINE

## 2022-01-01 PROCEDURE — 77030008462 HC STPLR SKN PROX J&J -A: Performed by: NEUROLOGICAL SURGERY

## 2022-01-01 PROCEDURE — 76060000032 HC ANESTHESIA 0.5 TO 1 HR: Performed by: INTERNAL MEDICINE

## 2022-01-01 PROCEDURE — 76060000033 HC ANESTHESIA 1 TO 1.5 HR: Performed by: INTERNAL MEDICINE

## 2022-01-01 PROCEDURE — 71046 X-RAY EXAM CHEST 2 VIEWS: CPT

## 2022-01-01 PROCEDURE — 77030040704 HC NSL TU RETAIN SYS BRDL PRO AMR -B

## 2022-01-01 PROCEDURE — 74011000258 HC RX REV CODE- 258: Performed by: STUDENT IN AN ORGANIZED HEALTH CARE EDUCATION/TRAINING PROGRAM

## 2022-01-01 PROCEDURE — P9045 ALBUMIN (HUMAN), 5%, 250 ML: HCPCS

## 2022-01-01 PROCEDURE — 99204 OFFICE O/P NEW MOD 45 MIN: CPT | Performed by: INTERNAL MEDICINE

## 2022-01-01 PROCEDURE — 77030005513 HC CATH URETH FOL11 MDII -B

## 2022-01-01 PROCEDURE — 77030008684 HC TU ET CUF COVD -B: Performed by: ANESTHESIOLOGY

## 2022-01-01 PROCEDURE — 90686 IIV4 VACC NO PRSV 0.5 ML IM: CPT | Performed by: NURSE PRACTITIONER

## 2022-01-01 PROCEDURE — 77030008771 HC TU NG SALEM SUMP -A

## 2022-01-01 PROCEDURE — 99221 1ST HOSP IP/OBS SF/LOW 40: CPT | Performed by: INTERNAL MEDICINE

## 2022-01-01 PROCEDURE — 77030013137 HC MRK XR CRAN BUSA -A: Performed by: NEUROLOGICAL SURGERY

## 2022-01-01 PROCEDURE — 74011250637 HC RX REV CODE- 250/637

## 2022-01-01 PROCEDURE — 93306 TTE W/DOPPLER COMPLETE: CPT

## 2022-01-01 PROCEDURE — 77030002946 HC SUT NRLN J&J -B: Performed by: NEUROLOGICAL SURGERY

## 2022-01-01 PROCEDURE — 88331 PATH CONSLTJ SURG 1 BLK 1SPC: CPT

## 2022-01-01 PROCEDURE — 77030040361 HC SLV COMPR DVT MDII -B: Performed by: NEUROLOGICAL SURGERY

## 2022-01-01 PROCEDURE — 8E09XBZ COMPUTER ASSISTED PROCEDURE OF HEAD AND NECK REGION: ICD-10-PCS | Performed by: NEUROLOGICAL SURGERY

## 2022-01-01 PROCEDURE — 77030004950 HC CATH ENTRL NG COVD -A

## 2022-01-01 PROCEDURE — 77030014650 HC SEAL MTRX FLOSEL BAXT -C: Performed by: NEUROLOGICAL SURGERY

## 2022-01-01 PROCEDURE — 86920 COMPATIBILITY TEST SPIN: CPT

## 2022-01-01 PROCEDURE — 74011250637 HC RX REV CODE- 250/637: Performed by: PHYSICIAN ASSISTANT

## 2022-01-01 PROCEDURE — C9113 INJ PANTOPRAZOLE SODIUM, VIA: HCPCS | Performed by: STUDENT IN AN ORGANIZED HEALTH CARE EDUCATION/TRAINING PROGRAM

## 2022-01-01 DEVICE — SCREW BNE L4MM DIA1.5MM CORT MAXILLOMANDIBULAR GRN TI SELF: Type: IMPLANTABLE DEVICE | Site: SKULL | Status: FUNCTIONAL

## 2022-01-01 DEVICE — COVER BUR H L DIA18.5MM THK0.5MM 5 H NEURO TI W/O TAB: Type: IMPLANTABLE DEVICE | Site: SKULL | Status: FUNCTIONAL

## 2022-01-01 DEVICE — PLATE BONE SZ 1.5MM REG 6 H SLV DBL Y SHP TRAUMAONE LORENZ: Type: IMPLANTABLE DEVICE | Site: SKULL | Status: FUNCTIONAL

## 2022-01-01 DEVICE — DURAGEN® PLUS DURAL REGENERATION MATRIX, 3 IN X 3 IN (7.5 CM X 7.5 CM)
Type: IMPLANTABLE DEVICE | Site: SKULL | Status: FUNCTIONAL
Brand: DURAGEN® PLUS

## 2022-01-01 RX ORDER — LORATADINE 10 MG/1
10 TABLET ORAL
Status: DISCONTINUED | OUTPATIENT
Start: 2022-01-01 | End: 2022-01-01 | Stop reason: HOSPADM

## 2022-01-01 RX ORDER — ROCURONIUM BROMIDE 10 MG/ML
INJECTION, SOLUTION INTRAVENOUS
Status: DISPENSED
Start: 2022-01-01 | End: 2022-01-01

## 2022-01-01 RX ORDER — ONDANSETRON 2 MG/ML
8 INJECTION INTRAMUSCULAR; INTRAVENOUS AS NEEDED
Status: CANCELLED | OUTPATIENT
Start: 2022-01-01

## 2022-01-01 RX ORDER — POLYETHYLENE GLYCOL 3350 17 G/17G
17 POWDER, FOR SOLUTION ORAL DAILY PRN
Status: DISCONTINUED | OUTPATIENT
Start: 2022-01-01 | End: 2022-01-01

## 2022-01-01 RX ORDER — LANOLIN ALCOHOL/MO/W.PET/CERES
325 CREAM (GRAM) TOPICAL 2 TIMES DAILY WITH MEALS
Status: DISCONTINUED | OUTPATIENT
Start: 2022-01-01 | End: 2022-01-01 | Stop reason: HOSPADM

## 2022-01-01 RX ORDER — HEPARIN 100 UNIT/ML
300-500 SYRINGE INTRAVENOUS AS NEEDED
Status: CANCELLED | OUTPATIENT
Start: 2022-01-01

## 2022-01-01 RX ORDER — LEVETIRACETAM 500 MG/1
500 TABLET ORAL EVERY 12 HOURS
Status: DISCONTINUED | OUTPATIENT
Start: 2022-01-01 | End: 2022-01-01

## 2022-01-01 RX ORDER — HEPARIN 100 UNIT/ML
300-500 SYRINGE INTRAVENOUS AS NEEDED
Status: CANCELLED
Start: 2022-01-01

## 2022-01-01 RX ORDER — BENZONATATE 100 MG/1
100 CAPSULE ORAL
Qty: 30 CAPSULE | Refills: 0 | Status: SHIPPED | OUTPATIENT
Start: 2022-01-01

## 2022-01-01 RX ORDER — SODIUM CHLORIDE 9 MG/ML
10 INJECTION INTRAMUSCULAR; INTRAVENOUS; SUBCUTANEOUS AS NEEDED
Status: CANCELLED | OUTPATIENT
Start: 2022-01-01

## 2022-01-01 RX ORDER — PROPOFOL 10 MG/ML
INJECTION, EMULSION INTRAVENOUS AS NEEDED
Status: DISCONTINUED | OUTPATIENT
Start: 2022-01-01 | End: 2022-01-01 | Stop reason: HOSPADM

## 2022-01-01 RX ORDER — HEPARIN 100 UNIT/ML
300-500 SYRINGE INTRAVENOUS AS NEEDED
Status: ACTIVE | OUTPATIENT
Start: 2022-01-01 | End: 2022-01-01

## 2022-01-01 RX ORDER — DIPHENHYDRAMINE HYDROCHLORIDE 50 MG/ML
25 INJECTION, SOLUTION INTRAMUSCULAR; INTRAVENOUS AS NEEDED
Status: CANCELLED
Start: 2022-01-01

## 2022-01-01 RX ORDER — DIPHENHYDRAMINE HYDROCHLORIDE 50 MG/ML
50 INJECTION, SOLUTION INTRAMUSCULAR; INTRAVENOUS AS NEEDED
Status: CANCELLED
Start: 2022-01-01

## 2022-01-01 RX ORDER — SODIUM CHLORIDE 0.9 % (FLUSH) 0.9 %
10 SYRINGE (ML) INJECTION AS NEEDED
Status: CANCELLED | OUTPATIENT
Start: 2022-01-01

## 2022-01-01 RX ORDER — DEXAMETHASONE SODIUM PHOSPHATE 4 MG/ML
4 INJECTION, SOLUTION INTRA-ARTICULAR; INTRALESIONAL; INTRAMUSCULAR; INTRAVENOUS; SOFT TISSUE EVERY 6 HOURS
Status: DISCONTINUED | OUTPATIENT
Start: 2022-01-01 | End: 2022-01-01

## 2022-01-01 RX ORDER — METOCLOPRAMIDE HYDROCHLORIDE 5 MG/ML
5 INJECTION INTRAMUSCULAR; INTRAVENOUS ONCE
Status: COMPLETED | OUTPATIENT
Start: 2022-01-01 | End: 2022-01-01

## 2022-01-01 RX ORDER — LEVOTHYROXINE SODIUM 25 UG/1
75 TABLET ORAL
Status: DISCONTINUED | OUTPATIENT
Start: 2022-01-01 | End: 2022-01-01 | Stop reason: HOSPADM

## 2022-01-01 RX ORDER — PALONOSETRON 0.05 MG/ML
0.25 INJECTION, SOLUTION INTRAVENOUS ONCE
Status: COMPLETED | OUTPATIENT
Start: 2022-01-01 | End: 2022-01-01

## 2022-01-01 RX ORDER — SODIUM CHLORIDE 0.9 % (FLUSH) 0.9 %
10 SYRINGE (ML) INJECTION AS NEEDED
Status: DISPENSED | OUTPATIENT
Start: 2022-01-01 | End: 2022-01-01

## 2022-01-01 RX ORDER — ONDANSETRON 4 MG/1
4 TABLET, ORALLY DISINTEGRATING ORAL
Status: DISCONTINUED | OUTPATIENT
Start: 2022-01-01 | End: 2022-01-01 | Stop reason: HOSPADM

## 2022-01-01 RX ORDER — SODIUM CHLORIDE 0.9 % (FLUSH) 0.9 %
5-40 SYRINGE (ML) INJECTION AS NEEDED
Status: DISCONTINUED | OUTPATIENT
Start: 2022-01-01 | End: 2022-01-01 | Stop reason: HOSPADM

## 2022-01-01 RX ORDER — SODIUM CHLORIDE 9 MG/ML
10 INJECTION INTRAMUSCULAR; INTRAVENOUS; SUBCUTANEOUS AS NEEDED
Status: DISCONTINUED | OUTPATIENT
Start: 2022-01-01 | End: 2022-01-01 | Stop reason: HOSPADM

## 2022-01-01 RX ORDER — ACETAMINOPHEN 650 MG/1
650 SUPPOSITORY RECTAL
Status: DISCONTINUED | OUTPATIENT
Start: 2022-01-01 | End: 2022-01-01 | Stop reason: HOSPADM

## 2022-01-01 RX ORDER — LEVOTHYROXINE SODIUM 75 UG/1
75 TABLET ORAL
Status: ON HOLD | COMMUNITY
End: 2022-01-01 | Stop reason: SDUPTHER

## 2022-01-01 RX ORDER — SODIUM CHLORIDE 0.9 % (FLUSH) 0.9 %
5-40 SYRINGE (ML) INJECTION EVERY 8 HOURS
Status: DISCONTINUED | OUTPATIENT
Start: 2022-01-01 | End: 2022-01-01 | Stop reason: HOSPADM

## 2022-01-01 RX ORDER — ROCURONIUM BROMIDE 10 MG/ML
INJECTION, SOLUTION INTRAVENOUS AS NEEDED
Status: DISCONTINUED | OUTPATIENT
Start: 2022-01-01 | End: 2022-01-01 | Stop reason: HOSPADM

## 2022-01-01 RX ORDER — ONDANSETRON 2 MG/ML
4 INJECTION INTRAMUSCULAR; INTRAVENOUS
Status: DISCONTINUED | OUTPATIENT
Start: 2022-01-01 | End: 2022-01-01 | Stop reason: HOSPADM

## 2022-01-01 RX ORDER — SODIUM CHLORIDE 9 MG/ML
1000 INJECTION, SOLUTION INTRAVENOUS ONCE
Status: CANCELLED
Start: 2022-01-01 | End: 2022-01-01

## 2022-01-01 RX ORDER — NYSTATIN 100000 [USP'U]/ML
500000 SUSPENSION ORAL 4 TIMES DAILY
Status: DISCONTINUED | OUTPATIENT
Start: 2022-01-01 | End: 2022-01-01 | Stop reason: HOSPADM

## 2022-01-01 RX ORDER — SODIUM CHLORIDE 9 MG/ML
10 INJECTION INTRAMUSCULAR; INTRAVENOUS; SUBCUTANEOUS AS NEEDED
Status: ACTIVE | OUTPATIENT
Start: 2022-01-01 | End: 2022-01-01

## 2022-01-01 RX ORDER — LEVETIRACETAM 500 MG/5ML
1000 INJECTION, SOLUTION, CONCENTRATE INTRAVENOUS EVERY 12 HOURS
Status: DISCONTINUED | OUTPATIENT
Start: 2022-01-01 | End: 2022-01-01

## 2022-01-01 RX ORDER — EPINEPHRINE 1 MG/ML
0.3 INJECTION, SOLUTION, CONCENTRATE INTRAVENOUS AS NEEDED
Status: CANCELLED | OUTPATIENT
Start: 2022-01-01

## 2022-01-01 RX ORDER — LIDOCAINE HYDROCHLORIDE 20 MG/ML
15 SOLUTION OROPHARYNGEAL AS NEEDED
Qty: 400 ML | Refills: 3 | Status: SHIPPED | OUTPATIENT
Start: 2022-01-01 | End: 2022-01-01

## 2022-01-01 RX ORDER — POLYETHYLENE GLYCOL 3350 17 G/17G
17 POWDER, FOR SOLUTION ORAL DAILY PRN
Status: DISCONTINUED | OUTPATIENT
Start: 2022-01-01 | End: 2022-01-01 | Stop reason: HOSPADM

## 2022-01-01 RX ORDER — HYDROXYZINE PAMOATE 25 MG/1
25 CAPSULE ORAL
Status: DISCONTINUED | OUTPATIENT
Start: 2022-01-01 | End: 2022-01-01 | Stop reason: HOSPADM

## 2022-01-01 RX ORDER — LANOLIN ALCOHOL/MO/W.PET/CERES
325 CREAM (GRAM) TOPICAL 2 TIMES DAILY WITH MEALS
Qty: 60 TABLET | Refills: 3 | Status: SHIPPED | OUTPATIENT
Start: 2022-01-01

## 2022-01-01 RX ORDER — OXYCODONE AND ACETAMINOPHEN 5; 325 MG/1; MG/1
1 TABLET ORAL AS NEEDED
Status: DISCONTINUED | OUTPATIENT
Start: 2022-01-01 | End: 2022-01-01 | Stop reason: HOSPADM

## 2022-01-01 RX ORDER — LORATADINE 10 MG/1
10 TABLET ORAL
Qty: 30 TABLET | Refills: 3 | Status: SHIPPED | OUTPATIENT
Start: 2022-01-01 | End: 2022-01-01 | Stop reason: SDUPTHER

## 2022-01-01 RX ORDER — POLYETHYLENE GLYCOL 3350 17 G/17G
17 POWDER, FOR SOLUTION ORAL DAILY
Status: DISCONTINUED | OUTPATIENT
Start: 2022-01-01 | End: 2022-01-01 | Stop reason: HOSPADM

## 2022-01-01 RX ORDER — LANOLIN ALCOHOL/MO/W.PET/CERES
1 CREAM (GRAM) TOPICAL
Status: DISCONTINUED | OUTPATIENT
Start: 2022-01-01 | End: 2022-01-01

## 2022-01-01 RX ORDER — SODIUM CHLORIDE 9 MG/ML
250 INJECTION, SOLUTION INTRAVENOUS AS NEEDED
Status: DISCONTINUED | OUTPATIENT
Start: 2022-01-01 | End: 2022-01-01 | Stop reason: HOSPADM

## 2022-01-01 RX ORDER — HEPARIN 100 UNIT/ML
300-500 SYRINGE INTRAVENOUS AS NEEDED
Status: DISCONTINUED | OUTPATIENT
Start: 2022-01-01 | End: 2022-01-01 | Stop reason: HOSPADM

## 2022-01-01 RX ORDER — ACETAMINOPHEN 325 MG/1
650 TABLET ORAL
Status: DISCONTINUED | OUTPATIENT
Start: 2022-01-01 | End: 2022-01-01 | Stop reason: HOSPADM

## 2022-01-01 RX ORDER — PHENYLEPHRINE HCL IN 0.9% NACL 0.4MG/10ML
SYRINGE (ML) INTRAVENOUS AS NEEDED
Status: DISCONTINUED | OUTPATIENT
Start: 2022-01-01 | End: 2022-01-01 | Stop reason: HOSPADM

## 2022-01-01 RX ORDER — FUROSEMIDE 10 MG/ML
20 INJECTION INTRAMUSCULAR; INTRAVENOUS ONCE
Status: COMPLETED | OUTPATIENT
Start: 2022-01-01 | End: 2022-01-01

## 2022-01-01 RX ORDER — DEXAMETHASONE SODIUM PHOSPHATE 4 MG/ML
8 INJECTION, SOLUTION INTRA-ARTICULAR; INTRALESIONAL; INTRAMUSCULAR; INTRAVENOUS; SOFT TISSUE ONCE
Status: CANCELLED | OUTPATIENT
Start: 2022-01-01 | End: 2022-01-01

## 2022-01-01 RX ORDER — LEVOFLOXACIN 500 MG/1
500 TABLET, FILM COATED ORAL EVERY 24 HOURS
Status: DISCONTINUED | OUTPATIENT
Start: 2022-01-01 | End: 2022-01-01

## 2022-01-01 RX ORDER — PROPOFOL 10 MG/ML
INJECTION, EMULSION INTRAVENOUS
Status: COMPLETED
Start: 2022-01-01 | End: 2022-01-01

## 2022-01-01 RX ORDER — SODIUM CHLORIDE 9 MG/ML
25 INJECTION, SOLUTION INTRAVENOUS CONTINUOUS
Status: CANCELLED | OUTPATIENT
Start: 2022-01-01

## 2022-01-01 RX ORDER — HYDROCORTISONE SODIUM SUCCINATE 100 MG/2ML
100 INJECTION, POWDER, FOR SOLUTION INTRAMUSCULAR; INTRAVENOUS AS NEEDED
Status: CANCELLED | OUTPATIENT
Start: 2022-01-01

## 2022-01-01 RX ORDER — ALBUMIN HUMAN 50 G/1000ML
12.5 SOLUTION INTRAVENOUS ONCE
Status: COMPLETED | OUTPATIENT
Start: 2022-01-01 | End: 2022-01-01

## 2022-01-01 RX ORDER — POLYETHYLENE GLYCOL 3350 17 G/17G
17 POWDER, FOR SOLUTION ORAL 2 TIMES DAILY
Status: DISCONTINUED | OUTPATIENT
Start: 2022-01-01 | End: 2022-01-01

## 2022-01-01 RX ORDER — OXYCODONE AND ACETAMINOPHEN 5; 325 MG/1; MG/1
1 TABLET ORAL ONCE
Status: COMPLETED | OUTPATIENT
Start: 2022-01-01 | End: 2022-01-01

## 2022-01-01 RX ORDER — SUCRALFATE 1 G/1
1 TABLET ORAL
Status: DISCONTINUED | OUTPATIENT
Start: 2022-01-01 | End: 2022-01-01

## 2022-01-01 RX ORDER — SODIUM CHLORIDE 9 MG/ML
250 INJECTION, SOLUTION INTRAVENOUS AS NEEDED
Status: DISPENSED | OUTPATIENT
Start: 2022-01-01 | End: 2022-01-01

## 2022-01-01 RX ORDER — DEXAMETHASONE SODIUM PHOSPHATE 4 MG/ML
6 INJECTION, SOLUTION INTRA-ARTICULAR; INTRALESIONAL; INTRAMUSCULAR; INTRAVENOUS; SOFT TISSUE EVERY 6 HOURS
Status: DISCONTINUED | OUTPATIENT
Start: 2022-01-01 | End: 2022-01-01

## 2022-01-01 RX ORDER — MIDAZOLAM HYDROCHLORIDE 1 MG/ML
1 INJECTION, SOLUTION INTRAMUSCULAR; INTRAVENOUS AS NEEDED
Status: DISCONTINUED | OUTPATIENT
Start: 2022-01-01 | End: 2022-01-01 | Stop reason: HOSPADM

## 2022-01-01 RX ORDER — HYDROMORPHONE HYDROCHLORIDE 1 MG/ML
0.2 INJECTION, SOLUTION INTRAMUSCULAR; INTRAVENOUS; SUBCUTANEOUS
Status: DISCONTINUED | OUTPATIENT
Start: 2022-01-01 | End: 2022-01-01 | Stop reason: HOSPADM

## 2022-01-01 RX ORDER — PALONOSETRON 0.05 MG/ML
0.25 INJECTION, SOLUTION INTRAVENOUS ONCE
Status: CANCELLED | OUTPATIENT
Start: 2022-01-01 | End: 2022-01-01

## 2022-01-01 RX ORDER — ACETAMINOPHEN 325 MG/1
650 TABLET ORAL AS NEEDED
Status: CANCELLED
Start: 2022-01-01

## 2022-01-01 RX ORDER — SODIUM CHLORIDE 9 MG/ML
1000 INJECTION, SOLUTION INTRAVENOUS ONCE
Status: COMPLETED | OUTPATIENT
Start: 2022-01-01 | End: 2022-01-01

## 2022-01-01 RX ORDER — OXYCODONE AND ACETAMINOPHEN 5; 325 MG/1; MG/1
1 TABLET ORAL
Qty: 30 TABLET | Refills: 0 | Status: SHIPPED | OUTPATIENT
Start: 2022-01-01 | End: 2022-01-01

## 2022-01-01 RX ORDER — MIDAZOLAM HYDROCHLORIDE 1 MG/ML
0.5 INJECTION, SOLUTION INTRAMUSCULAR; INTRAVENOUS
Status: DISCONTINUED | OUTPATIENT
Start: 2022-01-01 | End: 2022-01-01 | Stop reason: HOSPADM

## 2022-01-01 RX ORDER — FUROSEMIDE 10 MG/ML
40 INJECTION INTRAMUSCULAR; INTRAVENOUS ONCE
Status: COMPLETED | OUTPATIENT
Start: 2022-01-01 | End: 2022-01-01

## 2022-01-01 RX ORDER — DEXAMETHASONE SODIUM PHOSPHATE 4 MG/ML
2 INJECTION, SOLUTION INTRA-ARTICULAR; INTRALESIONAL; INTRAMUSCULAR; INTRAVENOUS; SOFT TISSUE EVERY 6 HOURS
Status: DISCONTINUED | OUTPATIENT
Start: 2022-01-01 | End: 2022-01-01

## 2022-01-01 RX ORDER — LEVOTHYROXINE SODIUM 50 UG/1
50 TABLET ORAL
Qty: 30 TABLET | Refills: 0 | Status: SHIPPED | OUTPATIENT
Start: 2022-01-01 | End: 2022-01-01 | Stop reason: DRUGHIGH

## 2022-01-01 RX ORDER — LEVOTHYROXINE SODIUM 75 UG/1
75 TABLET ORAL
Qty: 30 TABLET | Refills: 0 | Status: SHIPPED | OUTPATIENT
Start: 2022-01-01 | End: 2022-01-01 | Stop reason: DRUGHIGH

## 2022-01-01 RX ORDER — GLYCOPYRROLATE 0.2 MG/ML
0.2 INJECTION INTRAMUSCULAR; INTRAVENOUS
Status: DISCONTINUED | OUTPATIENT
Start: 2022-01-01 | End: 2022-01-01 | Stop reason: HOSPADM

## 2022-01-01 RX ORDER — LIDOCAINE HYDROCHLORIDE 20 MG/ML
INJECTION, SOLUTION EPIDURAL; INFILTRATION; INTRACAUDAL; PERINEURAL AS NEEDED
Status: DISCONTINUED | OUTPATIENT
Start: 2022-01-01 | End: 2022-01-01 | Stop reason: HOSPADM

## 2022-01-01 RX ORDER — DEXMEDETOMIDINE HYDROCHLORIDE 100 UG/ML
INJECTION, SOLUTION INTRAVENOUS
Status: COMPLETED
Start: 2022-01-01 | End: 2022-01-01

## 2022-01-01 RX ORDER — PANTOPRAZOLE SODIUM 40 MG/1
40 TABLET, DELAYED RELEASE ORAL 2 TIMES DAILY
Qty: 60 TABLET | Refills: 0 | Status: SHIPPED | OUTPATIENT
Start: 2022-01-01 | End: 2022-01-01 | Stop reason: SDUPTHER

## 2022-01-01 RX ORDER — PANTOPRAZOLE SODIUM 40 MG/1
40 TABLET, DELAYED RELEASE ORAL
Status: DISCONTINUED | OUTPATIENT
Start: 2022-01-01 | End: 2022-01-01

## 2022-01-01 RX ORDER — FACIAL-BODY WIPES
10 EACH TOPICAL DAILY PRN
Status: DISCONTINUED | OUTPATIENT
Start: 2022-01-01 | End: 2022-01-01

## 2022-01-01 RX ORDER — POTASSIUM CHLORIDE 20 MEQ/1
20 TABLET, EXTENDED RELEASE ORAL DAILY
Status: DISCONTINUED | OUTPATIENT
Start: 2022-01-01 | End: 2022-01-01 | Stop reason: HOSPADM

## 2022-01-01 RX ORDER — POTASSIUM CHLORIDE 20 MEQ/1
20 TABLET, EXTENDED RELEASE ORAL DAILY
Qty: 30 TABLET | Refills: 1 | Status: SHIPPED | OUTPATIENT
Start: 2022-01-01 | End: 2022-01-01 | Stop reason: SDUPTHER

## 2022-01-01 RX ORDER — HYDROGEN PEROXIDE 3 %
SOLUTION, NON-ORAL MISCELLANEOUS EVERY 12 HOURS
Status: DISCONTINUED | OUTPATIENT
Start: 2022-01-01 | End: 2022-01-01 | Stop reason: HOSPADM

## 2022-01-01 RX ORDER — LANOLIN ALCOHOL/MO/W.PET/CERES
325 CREAM (GRAM) TOPICAL 2 TIMES DAILY WITH MEALS
Status: DISCONTINUED | OUTPATIENT
Start: 2022-01-01 | End: 2022-01-01

## 2022-01-01 RX ORDER — ONDANSETRON 4 MG/1
4 TABLET, ORALLY DISINTEGRATING ORAL
Qty: 30 TABLET | Refills: 0 | Status: SHIPPED | OUTPATIENT
Start: 2022-01-01 | End: 2022-01-01

## 2022-01-01 RX ORDER — SODIUM CHLORIDE, SODIUM LACTATE, POTASSIUM CHLORIDE, CALCIUM CHLORIDE 600; 310; 30; 20 MG/100ML; MG/100ML; MG/100ML; MG/100ML
125 INJECTION, SOLUTION INTRAVENOUS CONTINUOUS
Status: DISCONTINUED | OUTPATIENT
Start: 2022-01-01 | End: 2022-01-01 | Stop reason: HOSPADM

## 2022-01-01 RX ORDER — LANOLIN ALCOHOL/MO/W.PET/CERES
325 CREAM (GRAM) TOPICAL 2 TIMES DAILY WITH MEALS
Qty: 60 TABLET | Refills: 0 | Status: SHIPPED | OUTPATIENT
Start: 2022-01-01 | End: 2022-01-01 | Stop reason: SDUPTHER

## 2022-01-01 RX ORDER — ONDANSETRON HYDROCHLORIDE 8 MG/1
8 TABLET, FILM COATED ORAL
Qty: 30 TABLET | Refills: 3 | Status: SHIPPED | OUTPATIENT
Start: 2022-01-01 | End: 2022-01-01

## 2022-01-01 RX ORDER — SODIUM CHLORIDE 0.9 % (FLUSH) 0.9 %
10 SYRINGE (ML) INJECTION AS NEEDED
Status: DISCONTINUED | OUTPATIENT
Start: 2022-01-01 | End: 2022-01-01 | Stop reason: HOSPADM

## 2022-01-01 RX ORDER — SODIUM CHLORIDE 9 MG/ML
50 INJECTION, SOLUTION INTRAVENOUS CONTINUOUS
Status: DISCONTINUED | OUTPATIENT
Start: 2022-01-01 | End: 2022-01-01 | Stop reason: HOSPADM

## 2022-01-01 RX ORDER — CALCIUM CARBONATE 200(500)MG
400 TABLET,CHEWABLE ORAL
Status: DISCONTINUED | OUTPATIENT
Start: 2022-01-01 | End: 2022-01-01 | Stop reason: HOSPADM

## 2022-01-01 RX ORDER — NOREPINEPHRINE BITARTRATE/D5W 8 MG/250ML
.5-3 PLASTIC BAG, INJECTION (ML) INTRAVENOUS
Status: DISCONTINUED | OUTPATIENT
Start: 2022-01-01 | End: 2022-01-01

## 2022-01-01 RX ORDER — DEXAMETHASONE SODIUM PHOSPHATE 4 MG/ML
3 INJECTION, SOLUTION INTRA-ARTICULAR; INTRALESIONAL; INTRAMUSCULAR; INTRAVENOUS; SOFT TISSUE EVERY 6 HOURS
Status: DISCONTINUED | OUTPATIENT
Start: 2022-01-01 | End: 2022-01-01

## 2022-01-01 RX ORDER — DIPHENHYDRAMINE HYDROCHLORIDE 50 MG/ML
50 INJECTION, SOLUTION INTRAMUSCULAR; INTRAVENOUS AS NEEDED
Status: ACTIVE | OUTPATIENT
Start: 2022-01-01 | End: 2022-01-01

## 2022-01-01 RX ORDER — GUAIFENESIN 100 MG/5ML
400 SOLUTION ORAL EVERY 4 HOURS
Status: DISCONTINUED | OUTPATIENT
Start: 2022-01-01 | End: 2022-01-01

## 2022-01-01 RX ORDER — HYDROCORTISONE SODIUM SUCCINATE 100 MG/2ML
100 INJECTION, POWDER, FOR SOLUTION INTRAMUSCULAR; INTRAVENOUS AS NEEDED
Status: ACTIVE | OUTPATIENT
Start: 2022-01-01 | End: 2022-01-01

## 2022-01-01 RX ORDER — PANTOPRAZOLE SODIUM 40 MG/1
40 TABLET, DELAYED RELEASE ORAL 2 TIMES DAILY
Status: DISCONTINUED | OUTPATIENT
Start: 2022-01-01 | End: 2022-01-01 | Stop reason: HOSPADM

## 2022-01-01 RX ORDER — SODIUM CHLORIDE 9 MG/ML
250 INJECTION, SOLUTION INTRAVENOUS AS NEEDED
Status: DISCONTINUED | OUTPATIENT
Start: 2022-01-01 | End: 2022-01-01

## 2022-01-01 RX ORDER — ALBUTEROL SULFATE 0.83 MG/ML
2.5 SOLUTION RESPIRATORY (INHALATION) AS NEEDED
Status: CANCELLED
Start: 2022-01-01

## 2022-01-01 RX ORDER — SODIUM CHLORIDE 9 MG/ML
75 INJECTION, SOLUTION INTRAVENOUS CONTINUOUS
Status: DISCONTINUED | OUTPATIENT
Start: 2022-01-01 | End: 2022-01-01

## 2022-01-01 RX ORDER — MORPHINE SULFATE 2 MG/ML
2 INJECTION, SOLUTION INTRAMUSCULAR; INTRAVENOUS
Status: DISCONTINUED | OUTPATIENT
Start: 2022-01-01 | End: 2022-01-01

## 2022-01-01 RX ORDER — SODIUM CHLORIDE 9 MG/ML
INJECTION, SOLUTION INTRAVENOUS
Status: DISCONTINUED | OUTPATIENT
Start: 2022-01-01 | End: 2022-01-01 | Stop reason: HOSPADM

## 2022-01-01 RX ORDER — PHENYLEPHRINE HCL IN 0.9% NACL 0.4MG/10ML
SYRINGE (ML) INTRAVENOUS AS NEEDED
Status: DISCONTINUED | OUTPATIENT
Start: 2022-01-01 | End: 2022-01-01

## 2022-01-01 RX ORDER — LEVOFLOXACIN 5 MG/ML
750 INJECTION, SOLUTION INTRAVENOUS EVERY 24 HOURS
Status: DISCONTINUED | OUTPATIENT
Start: 2022-01-01 | End: 2022-01-01

## 2022-01-01 RX ORDER — ACETAMINOPHEN 325 MG/1
650 TABLET ORAL
Status: DISCONTINUED | OUTPATIENT
Start: 2022-01-01 | End: 2022-01-01 | Stop reason: SDUPTHER

## 2022-01-01 RX ORDER — OXYCODONE AND ACETAMINOPHEN 5; 325 MG/1; MG/1
1 TABLET ORAL
Qty: 42 TABLET | Refills: 0 | Status: SHIPPED | OUTPATIENT
Start: 2022-01-01 | End: 2022-01-01 | Stop reason: SDUPTHER

## 2022-01-01 RX ORDER — LEVOTHYROXINE SODIUM 125 UG/1
125 TABLET ORAL
Qty: 30 TABLET | Refills: 5 | Status: SHIPPED | OUTPATIENT
Start: 2022-01-01 | End: 2022-01-01

## 2022-01-01 RX ORDER — LORAZEPAM 1 MG/1
1 TABLET ORAL
Status: DISCONTINUED | OUTPATIENT
Start: 2022-01-01 | End: 2022-01-01

## 2022-01-01 RX ORDER — ACETAMINOPHEN 325 MG/1
650 TABLET ORAL AS NEEDED
Status: ACTIVE | OUTPATIENT
Start: 2022-01-01 | End: 2022-01-01

## 2022-01-01 RX ORDER — FENTANYL CITRATE 50 UG/ML
INJECTION, SOLUTION INTRAMUSCULAR; INTRAVENOUS
Status: DISPENSED
Start: 2022-01-01 | End: 2022-01-01

## 2022-01-01 RX ORDER — LIDOCAINE HYDROCHLORIDE 20 MG/ML
15 SOLUTION OROPHARYNGEAL AS NEEDED
Status: DISCONTINUED | OUTPATIENT
Start: 2022-01-01 | End: 2022-01-01 | Stop reason: HOSPADM

## 2022-01-01 RX ORDER — SODIUM CHLORIDE 9 MG/ML
25 INJECTION, SOLUTION INTRAVENOUS CONTINUOUS
Status: DISPENSED | OUTPATIENT
Start: 2022-01-01 | End: 2022-01-01

## 2022-01-01 RX ORDER — CHOLECALCIFEROL (VITAMIN D3) 125 MCG
5 CAPSULE ORAL
Status: DISCONTINUED | OUTPATIENT
Start: 2022-01-01 | End: 2022-01-01 | Stop reason: HOSPADM

## 2022-01-01 RX ORDER — LIDOCAINE HYDROCHLORIDE AND EPINEPHRINE 15; 5 MG/ML; UG/ML
INJECTION, SOLUTION EPIDURAL
Status: DISCONTINUED | OUTPATIENT
Start: 2022-01-01 | End: 2022-01-01

## 2022-01-01 RX ORDER — ROCURONIUM BROMIDE 10 MG/ML
20 INJECTION, SOLUTION INTRAVENOUS
Status: COMPLETED | OUTPATIENT
Start: 2022-01-01 | End: 2022-01-01

## 2022-01-01 RX ORDER — LEVOFLOXACIN 5 MG/ML
500 INJECTION, SOLUTION INTRAVENOUS EVERY 24 HOURS
Status: DISCONTINUED | OUTPATIENT
Start: 2022-01-01 | End: 2022-01-01

## 2022-01-01 RX ORDER — TRAZODONE HYDROCHLORIDE 50 MG/1
50 TABLET ORAL
Status: DISCONTINUED | OUTPATIENT
Start: 2022-01-01 | End: 2022-01-01

## 2022-01-01 RX ORDER — MAGNESIUM SULFATE 100 %
4 CRYSTALS MISCELLANEOUS AS NEEDED
Status: DISCONTINUED | OUTPATIENT
Start: 2022-01-01 | End: 2022-01-01

## 2022-01-01 RX ORDER — LORATADINE 10 MG/1
10 TABLET ORAL
Qty: 30 TABLET | Refills: 1 | Status: SHIPPED | OUTPATIENT
Start: 2022-01-01 | End: 2022-01-01 | Stop reason: SDUPTHER

## 2022-01-01 RX ORDER — FENTANYL CITRATE 50 UG/ML
50 INJECTION, SOLUTION INTRAMUSCULAR; INTRAVENOUS AS NEEDED
Status: DISCONTINUED | OUTPATIENT
Start: 2022-01-01 | End: 2022-01-01 | Stop reason: HOSPADM

## 2022-01-01 RX ORDER — ALBUMIN HUMAN 50 G/1000ML
SOLUTION INTRAVENOUS AS NEEDED
Status: DISCONTINUED | OUTPATIENT
Start: 2022-01-01 | End: 2022-01-01

## 2022-01-01 RX ORDER — LEVOTHYROXINE SODIUM 75 UG/1
75 TABLET ORAL
Qty: 30 TABLET | Refills: 4 | Status: SHIPPED | OUTPATIENT
Start: 2022-01-01 | End: 2022-01-01

## 2022-01-01 RX ORDER — DEXAMETHASONE SODIUM PHOSPHATE 4 MG/ML
6 INJECTION, SOLUTION INTRA-ARTICULAR; INTRALESIONAL; INTRAMUSCULAR; INTRAVENOUS; SOFT TISSUE ONCE
Status: COMPLETED | OUTPATIENT
Start: 2022-01-01 | End: 2022-01-01

## 2022-01-01 RX ORDER — HYDROMORPHONE HYDROCHLORIDE 2 MG/ML
INJECTION, SOLUTION INTRAMUSCULAR; INTRAVENOUS; SUBCUTANEOUS AS NEEDED
Status: DISCONTINUED | OUTPATIENT
Start: 2022-01-01 | End: 2022-01-01 | Stop reason: HOSPADM

## 2022-01-01 RX ORDER — IPRATROPIUM BROMIDE AND ALBUTEROL SULFATE 2.5; .5 MG/3ML; MG/3ML
3 SOLUTION RESPIRATORY (INHALATION)
Status: ACTIVE | OUTPATIENT
Start: 2022-01-01 | End: 2022-01-01

## 2022-01-01 RX ORDER — LIDOCAINE HYDROCHLORIDE 10 MG/ML
0.1 INJECTION, SOLUTION EPIDURAL; INFILTRATION; INTRACAUDAL; PERINEURAL AS NEEDED
Status: DISCONTINUED | OUTPATIENT
Start: 2022-01-01 | End: 2022-01-01 | Stop reason: HOSPADM

## 2022-01-01 RX ORDER — POLYETHYLENE GLYCOL 3350 17 G/17G
17 POWDER, FOR SOLUTION ORAL DAILY
Status: DISCONTINUED | OUTPATIENT
Start: 2022-01-01 | End: 2022-01-01

## 2022-01-01 RX ORDER — NOREPINEPHRINE BITARTRATE/D5W 8 MG/250ML
.5-3 PLASTIC BAG, INJECTION (ML) INTRAVENOUS
Status: DISPENSED | OUTPATIENT
Start: 2022-01-01 | End: 2022-01-01

## 2022-01-01 RX ORDER — PROCHLORPERAZINE MALEATE 5 MG
10 TABLET ORAL
Status: DISCONTINUED | OUTPATIENT
Start: 2022-01-01 | End: 2022-01-01 | Stop reason: HOSPADM

## 2022-01-01 RX ORDER — LORATADINE 10 MG/1
10 TABLET ORAL
Qty: 30 TABLET | Refills: 3 | Status: SHIPPED | OUTPATIENT
Start: 2022-01-01 | End: 2022-01-01

## 2022-01-01 RX ORDER — DEXAMETHASONE SODIUM PHOSPHATE 4 MG/ML
8 INJECTION, SOLUTION INTRA-ARTICULAR; INTRALESIONAL; INTRAMUSCULAR; INTRAVENOUS; SOFT TISSUE ONCE
Status: COMPLETED | OUTPATIENT
Start: 2022-01-01 | End: 2022-01-01

## 2022-01-01 RX ORDER — PANTOPRAZOLE SODIUM 40 MG/1
40 TABLET, DELAYED RELEASE ORAL 2 TIMES DAILY
Qty: 60 TABLET | Refills: 2 | Status: SHIPPED | OUTPATIENT
Start: 2022-01-01

## 2022-01-01 RX ORDER — ONDANSETRON 2 MG/ML
8 INJECTION INTRAMUSCULAR; INTRAVENOUS AS NEEDED
Status: ACTIVE | OUTPATIENT
Start: 2022-01-01 | End: 2022-01-01

## 2022-01-01 RX ORDER — LEVOTHYROXINE SODIUM 100 UG/1
100 TABLET ORAL
Qty: 30 TABLET | Refills: 4 | Status: SHIPPED | OUTPATIENT
Start: 2022-01-01 | End: 2022-01-01 | Stop reason: DRUGHIGH

## 2022-01-01 RX ORDER — ONDANSETRON 2 MG/ML
8 INJECTION INTRAMUSCULAR; INTRAVENOUS ONCE
Status: COMPLETED | OUTPATIENT
Start: 2022-01-01 | End: 2022-01-01

## 2022-01-01 RX ORDER — NEOSTIGMINE METHYLSULFATE 1 MG/ML
INJECTION, SOLUTION INTRAVENOUS AS NEEDED
Status: DISCONTINUED | OUTPATIENT
Start: 2022-01-01 | End: 2022-01-01 | Stop reason: HOSPADM

## 2022-01-01 RX ORDER — FACIAL-BODY WIPES
10 EACH TOPICAL DAILY
Status: DISCONTINUED | OUTPATIENT
Start: 2022-01-01 | End: 2022-01-01

## 2022-01-01 RX ORDER — ONDANSETRON 2 MG/ML
INJECTION INTRAMUSCULAR; INTRAVENOUS AS NEEDED
Status: DISCONTINUED | OUTPATIENT
Start: 2022-01-01 | End: 2022-01-01 | Stop reason: HOSPADM

## 2022-01-01 RX ORDER — LEVOTHYROXINE SODIUM 20 UG/ML
94 INJECTION, SOLUTION INTRAVENOUS EVERY 24 HOURS
Status: DISCONTINUED | OUTPATIENT
Start: 2022-01-01 | End: 2022-01-01

## 2022-01-01 RX ORDER — FENTANYL CITRATE 50 UG/ML
25 INJECTION, SOLUTION INTRAMUSCULAR; INTRAVENOUS
Status: DISCONTINUED | OUTPATIENT
Start: 2022-01-01 | End: 2022-01-01 | Stop reason: HOSPADM

## 2022-01-01 RX ORDER — SODIUM CHLORIDE 9 MG/ML
75 INJECTION, SOLUTION INTRAVENOUS CONTINUOUS
Status: DISCONTINUED | OUTPATIENT
Start: 2022-01-01 | End: 2022-01-01 | Stop reason: HOSPADM

## 2022-01-01 RX ORDER — SODIUM CHLORIDE 9 MG/ML
25 INJECTION, SOLUTION INTRAVENOUS CONTINUOUS
Status: DISCONTINUED | OUTPATIENT
Start: 2022-01-01 | End: 2022-01-01 | Stop reason: HOSPADM

## 2022-01-01 RX ORDER — SODIUM CHLORIDE 0.9 % (FLUSH) 0.9 %
10 SYRINGE (ML) INJECTION
Status: COMPLETED | OUTPATIENT
Start: 2022-01-01 | End: 2022-01-01

## 2022-01-01 RX ORDER — PROPOFOL 10 MG/ML
0-50 VIAL (ML) INTRAVENOUS
Status: DISCONTINUED | OUTPATIENT
Start: 2022-01-01 | End: 2022-01-01

## 2022-01-01 RX ORDER — POLYETHYLENE GLYCOL 3350 17 G/17G
17 POWDER, FOR SOLUTION ORAL DAILY
Qty: 30 PACKET | Refills: 0 | Status: SHIPPED | OUTPATIENT
Start: 2022-01-01 | End: 2022-01-01

## 2022-01-01 RX ORDER — ACETAMINOPHEN 325 MG/1
650 TABLET ORAL ONCE
Status: DISCONTINUED | OUTPATIENT
Start: 2022-01-01 | End: 2022-01-01 | Stop reason: ALTCHOICE

## 2022-01-01 RX ORDER — HEPARIN 100 UNIT/ML
300 SYRINGE INTRAVENOUS AS NEEDED
Status: DISCONTINUED | OUTPATIENT
Start: 2022-01-01 | End: 2022-01-01 | Stop reason: HOSPADM

## 2022-01-01 RX ORDER — OXYCODONE AND ACETAMINOPHEN 5; 325 MG/1; MG/1
1 TABLET ORAL
Status: DISCONTINUED | OUTPATIENT
Start: 2022-01-01 | End: 2022-01-01 | Stop reason: HOSPADM

## 2022-01-01 RX ORDER — PHENYLEPHRINE HCL IN 0.9% NACL 1 MG/10 ML
SYRINGE (ML) INTRAVENOUS
Status: DISPENSED
Start: 2022-01-01 | End: 2022-01-01

## 2022-01-01 RX ORDER — TRAMADOL HYDROCHLORIDE 50 MG/1
50 TABLET ORAL
Qty: 21 TABLET | Refills: 0 | Status: SHIPPED | OUTPATIENT
Start: 2022-01-01 | End: 2022-01-01

## 2022-01-01 RX ORDER — ALBUMIN HUMAN 50 G/1000ML
SOLUTION INTRAVENOUS
Status: COMPLETED
Start: 2022-01-01 | End: 2022-01-01

## 2022-01-01 RX ORDER — DIPHENHYDRAMINE HYDROCHLORIDE 50 MG/ML
25 INJECTION, SOLUTION INTRAMUSCULAR; INTRAVENOUS AS NEEDED
Status: ACTIVE | OUTPATIENT
Start: 2022-01-01 | End: 2022-01-01

## 2022-01-01 RX ORDER — OXYCODONE AND ACETAMINOPHEN 5; 325 MG/1; MG/1
1 TABLET ORAL
Status: DISCONTINUED | OUTPATIENT
Start: 2022-01-01 | End: 2022-01-01

## 2022-01-01 RX ORDER — OXYCODONE AND ACETAMINOPHEN 5; 325 MG/1; MG/1
1 TABLET ORAL
Qty: 21 TABLET | Refills: 0 | Status: SHIPPED | OUTPATIENT
Start: 2022-01-01 | End: 2022-01-01

## 2022-01-01 RX ORDER — ENOXAPARIN SODIUM 100 MG/ML
40 INJECTION SUBCUTANEOUS DAILY
Status: DISCONTINUED | OUTPATIENT
Start: 2022-01-01 | End: 2022-01-01 | Stop reason: HOSPADM

## 2022-01-01 RX ORDER — POLYETHYLENE GLYCOL 3350 17 G/17G
17 POWDER, FOR SOLUTION ORAL DAILY
Status: DISCONTINUED | OUTPATIENT
Start: 2022-01-01 | End: 2022-01-01 | Stop reason: SDUPTHER

## 2022-01-01 RX ORDER — LANOLIN ALCOHOL/MO/W.PET/CERES
1 CREAM (GRAM) TOPICAL 2 TIMES DAILY WITH MEALS
Status: DISCONTINUED | OUTPATIENT
Start: 2022-01-01 | End: 2022-01-01 | Stop reason: HOSPADM

## 2022-01-01 RX ORDER — IPRATROPIUM BROMIDE AND ALBUTEROL SULFATE 2.5; .5 MG/3ML; MG/3ML
3 SOLUTION RESPIRATORY (INHALATION)
Status: DISCONTINUED | OUTPATIENT
Start: 2022-01-01 | End: 2022-01-01

## 2022-01-01 RX ORDER — IPRATROPIUM BROMIDE AND ALBUTEROL SULFATE 2.5; .5 MG/3ML; MG/3ML
3 SOLUTION RESPIRATORY (INHALATION)
Status: COMPLETED | OUTPATIENT
Start: 2022-01-01 | End: 2022-01-01

## 2022-01-01 RX ORDER — OXYCODONE AND ACETAMINOPHEN 5; 325 MG/1; MG/1
1 TABLET ORAL
Qty: 20 TABLET | Refills: 0 | Status: SHIPPED | OUTPATIENT
Start: 2022-01-01 | End: 2022-01-01 | Stop reason: SDUPTHER

## 2022-01-01 RX ORDER — MIDAZOLAM HYDROCHLORIDE 1 MG/ML
2 INJECTION, SOLUTION INTRAMUSCULAR; INTRAVENOUS ONCE
Status: COMPLETED | OUTPATIENT
Start: 2022-01-01 | End: 2022-01-01

## 2022-01-01 RX ORDER — LIDOCAINE HCL/PF 100 MG/5ML
SYRINGE (ML) INTRAVENOUS
Status: DISCONTINUED
Start: 2022-01-01 | End: 2022-01-01 | Stop reason: WASHOUT

## 2022-01-01 RX ORDER — DEXMEDETOMIDINE HYDROCHLORIDE 100 UG/ML
INJECTION, SOLUTION INTRAVENOUS AS NEEDED
Status: DISCONTINUED | OUTPATIENT
Start: 2022-01-01 | End: 2022-01-01 | Stop reason: HOSPADM

## 2022-01-01 RX ORDER — MAG HYDROX/ALUMINUM HYD/SIMETH 200-200-20
30 SUSPENSION, ORAL (FINAL DOSE FORM) ORAL
Status: DISCONTINUED | OUTPATIENT
Start: 2022-01-01 | End: 2022-01-01 | Stop reason: HOSPADM

## 2022-01-01 RX ORDER — SODIUM CHLORIDE 9 MG/ML
100 INJECTION, SOLUTION INTRAVENOUS CONTINUOUS
Status: DISCONTINUED | OUTPATIENT
Start: 2022-01-01 | End: 2022-01-01 | Stop reason: HOSPADM

## 2022-01-01 RX ORDER — VANCOMYCIN 1.75 GRAM/500 ML IN 0.9 % SODIUM CHLORIDE INTRAVENOUS
1750 ONCE
Status: COMPLETED | OUTPATIENT
Start: 2022-01-01 | End: 2022-01-01

## 2022-01-01 RX ORDER — SODIUM CHLORIDE 9 MG/ML
1000 INJECTION, SOLUTION INTRAVENOUS CONTINUOUS
Status: DISCONTINUED | OUTPATIENT
Start: 2022-01-01 | End: 2022-01-01 | Stop reason: HOSPADM

## 2022-01-01 RX ORDER — EPINEPHRINE 1 MG/ML
0.3 INJECTION, SOLUTION, CONCENTRATE INTRAVENOUS AS NEEDED
Status: ACTIVE | OUTPATIENT
Start: 2022-01-01 | End: 2022-01-01

## 2022-01-01 RX ORDER — MORPHINE SULFATE 2 MG/ML
2 INJECTION, SOLUTION INTRAMUSCULAR; INTRAVENOUS
Status: DISCONTINUED | OUTPATIENT
Start: 2022-01-01 | End: 2022-01-01 | Stop reason: HOSPADM

## 2022-01-01 RX ORDER — MIDAZOLAM IN 0.9 % SOD.CHLORID 1 MG/ML
0-10 PLASTIC BAG, INJECTION (ML) INTRAVENOUS
Status: DISCONTINUED | OUTPATIENT
Start: 2022-01-01 | End: 2022-01-01 | Stop reason: HOSPADM

## 2022-01-01 RX ORDER — LEVOFLOXACIN 5 MG/ML
750 INJECTION, SOLUTION INTRAVENOUS EVERY 24 HOURS
Status: DISCONTINUED | OUTPATIENT
Start: 2022-01-01 | End: 2022-01-01 | Stop reason: SDUPTHER

## 2022-01-01 RX ORDER — FENTANYL CITRATE 50 UG/ML
50 INJECTION, SOLUTION INTRAMUSCULAR; INTRAVENOUS ONCE
Status: COMPLETED | OUTPATIENT
Start: 2022-01-01 | End: 2022-01-01

## 2022-01-01 RX ORDER — MANNITOL 20 G/100ML
INJECTION, SOLUTION INTRAVENOUS
Status: DISCONTINUED | OUTPATIENT
Start: 2022-01-01 | End: 2022-01-01 | Stop reason: HOSPADM

## 2022-01-01 RX ORDER — IPRATROPIUM BROMIDE AND ALBUTEROL SULFATE 2.5; .5 MG/3ML; MG/3ML
3 SOLUTION RESPIRATORY (INHALATION)
Status: DISCONTINUED | OUTPATIENT
Start: 2022-01-01 | End: 2022-01-01 | Stop reason: HOSPADM

## 2022-01-01 RX ORDER — POTASSIUM CHLORIDE 20 MEQ/1
20 TABLET, EXTENDED RELEASE ORAL DAILY
Qty: 30 TABLET | Refills: 1 | Status: SHIPPED | OUTPATIENT
Start: 2022-01-01 | End: 2022-01-01

## 2022-01-01 RX ORDER — BACITRACIN ZINC 500 UNIT/G
OINTMENT (GRAM) TOPICAL AS NEEDED
Status: DISCONTINUED | OUTPATIENT
Start: 2022-01-01 | End: 2022-01-01 | Stop reason: HOSPADM

## 2022-01-01 RX ORDER — SODIUM CHLORIDE 9 MG/ML
500 INJECTION, SOLUTION INTRAVENOUS CONTINUOUS
Status: DISCONTINUED | OUTPATIENT
Start: 2022-01-01 | End: 2022-01-01 | Stop reason: HOSPADM

## 2022-01-01 RX ORDER — AZITHROMYCIN 250 MG/1
250 TABLET, FILM COATED ORAL SEE ADMIN INSTRUCTIONS
Qty: 6 TABLET | Refills: 0 | Status: SHIPPED | OUTPATIENT
Start: 2022-01-01 | End: 2022-01-01

## 2022-01-01 RX ORDER — OXYCODONE AND ACETAMINOPHEN 5; 325 MG/1; MG/1
1 TABLET ORAL
Qty: 20 TABLET | Refills: 0 | Status: SHIPPED | OUTPATIENT
Start: 2022-01-01 | End: 2022-01-01

## 2022-01-01 RX ORDER — PROPOFOL 10 MG/ML
INJECTION, EMULSION INTRAVENOUS
Status: DISCONTINUED | OUTPATIENT
Start: 2022-01-01 | End: 2022-01-01 | Stop reason: HOSPADM

## 2022-01-01 RX ORDER — LIDOCAINE AND PRILOCAINE 25; 25 MG/G; MG/G
CREAM TOPICAL AS NEEDED
Status: DISCONTINUED | OUTPATIENT
Start: 2022-01-01 | End: 2022-01-01 | Stop reason: HOSPADM

## 2022-01-01 RX ORDER — ONDANSETRON 2 MG/ML
4 INJECTION INTRAMUSCULAR; INTRAVENOUS
Status: DISCONTINUED | OUTPATIENT
Start: 2022-01-01 | End: 2022-01-01

## 2022-01-01 RX ORDER — DEXAMETHASONE 4 MG/1
TABLET ORAL
Qty: 24 TABLET | Refills: 1 | Status: SHIPPED | OUTPATIENT
Start: 2022-01-01 | End: 2022-01-01

## 2022-01-01 RX ORDER — CEFAZOLIN SODIUM 1 G/3ML
INJECTION, POWDER, FOR SOLUTION INTRAMUSCULAR; INTRAVENOUS AS NEEDED
Status: DISCONTINUED | OUTPATIENT
Start: 2022-01-01 | End: 2022-01-01 | Stop reason: HOSPADM

## 2022-01-01 RX ORDER — DEXAMETHASONE SODIUM PHOSPHATE 4 MG/ML
INJECTION, SOLUTION INTRA-ARTICULAR; INTRALESIONAL; INTRAMUSCULAR; INTRAVENOUS; SOFT TISSUE AS NEEDED
Status: DISCONTINUED | OUTPATIENT
Start: 2022-01-01 | End: 2022-01-01 | Stop reason: HOSPADM

## 2022-01-01 RX ORDER — ONDANSETRON 4 MG/1
4 TABLET, ORALLY DISINTEGRATING ORAL
Status: DISCONTINUED | OUTPATIENT
Start: 2022-01-01 | End: 2022-01-01

## 2022-01-01 RX ORDER — SENNOSIDES 8.6 MG/1
1 TABLET ORAL DAILY
Status: DISCONTINUED | OUTPATIENT
Start: 2022-01-01 | End: 2022-01-01

## 2022-01-01 RX ORDER — CALCIUM CARBONATE 200(500)MG
400 TABLET,CHEWABLE ORAL ONCE
Status: COMPLETED | OUTPATIENT
Start: 2022-01-01 | End: 2022-01-01

## 2022-01-01 RX ORDER — AMOXICILLIN 250 MG
1 CAPSULE ORAL 2 TIMES DAILY
Status: DISCONTINUED | OUTPATIENT
Start: 2022-01-01 | End: 2022-01-01 | Stop reason: HOSPADM

## 2022-01-01 RX ORDER — VANCOMYCIN/0.9 % SOD CHLORIDE 1.5G/250ML
1500 PLASTIC BAG, INJECTION (ML) INTRAVENOUS ONCE
Status: COMPLETED | OUTPATIENT
Start: 2022-01-01 | End: 2022-01-01

## 2022-01-01 RX ORDER — LEVOTHYROXINE SODIUM 75 UG/1
75 TABLET ORAL
Status: DISCONTINUED | OUTPATIENT
Start: 2022-01-01 | End: 2022-01-01 | Stop reason: HOSPADM

## 2022-01-01 RX ORDER — LIDOCAINE HYDROCHLORIDE AND EPINEPHRINE 10; 10 MG/ML; UG/ML
INJECTION, SOLUTION INFILTRATION; PERINEURAL AS NEEDED
Status: DISCONTINUED | OUTPATIENT
Start: 2022-01-01 | End: 2022-01-01 | Stop reason: HOSPADM

## 2022-01-01 RX ORDER — DIPHENHYDRAMINE HYDROCHLORIDE 50 MG/ML
12.5 INJECTION, SOLUTION INTRAMUSCULAR; INTRAVENOUS AS NEEDED
Status: DISCONTINUED | OUTPATIENT
Start: 2022-01-01 | End: 2022-01-01 | Stop reason: HOSPADM

## 2022-01-01 RX ORDER — ONDANSETRON 2 MG/ML
4 INJECTION INTRAMUSCULAR; INTRAVENOUS AS NEEDED
Status: DISCONTINUED | OUTPATIENT
Start: 2022-01-01 | End: 2022-01-01 | Stop reason: HOSPADM

## 2022-01-01 RX ORDER — DEXAMETHASONE 0.5 MG/5ML
1 ELIXIR ORAL
Qty: 100 ML | Refills: 0 | Status: SHIPPED | OUTPATIENT
Start: 2022-01-01 | End: 2022-01-01

## 2022-01-01 RX ORDER — DEXMEDETOMIDINE HYDROCHLORIDE 4 UG/ML
.1-1.5 INJECTION, SOLUTION INTRAVENOUS
Status: DISCONTINUED | OUTPATIENT
Start: 2022-01-01 | End: 2022-01-01

## 2022-01-01 RX ORDER — FUROSEMIDE 10 MG/ML
40 INJECTION INTRAMUSCULAR; INTRAVENOUS ONCE
Status: ACTIVE | OUTPATIENT
Start: 2022-01-01 | End: 2022-01-01

## 2022-01-01 RX ORDER — AMOXICILLIN 250 MG
1 CAPSULE ORAL 2 TIMES DAILY
Status: DISCONTINUED | OUTPATIENT
Start: 2022-01-01 | End: 2022-01-01

## 2022-01-01 RX ORDER — MELATONIN 5 MG
5 CAPSULE ORAL
Qty: 30 CAPSULE | Refills: 3 | Status: SHIPPED | OUTPATIENT
Start: 2022-01-01

## 2022-01-01 RX ORDER — NYSTATIN 100000 [USP'U]/ML
500000 SUSPENSION ORAL 4 TIMES DAILY
Qty: 200 ML | Refills: 2 | Status: SHIPPED | OUTPATIENT
Start: 2022-01-01 | End: 2022-01-01

## 2022-01-01 RX ORDER — TRAMADOL HYDROCHLORIDE 50 MG/1
50 TABLET ORAL
Status: DISCONTINUED | OUTPATIENT
Start: 2022-01-01 | End: 2022-01-01 | Stop reason: HOSPADM

## 2022-01-01 RX ORDER — DEXAMETHASONE SODIUM PHOSPHATE 4 MG/ML
4 INJECTION, SOLUTION INTRA-ARTICULAR; INTRALESIONAL; INTRAMUSCULAR; INTRAVENOUS; SOFT TISSUE EVERY 6 HOURS
Status: DISCONTINUED | OUTPATIENT
Start: 2022-01-01 | End: 2022-01-01 | Stop reason: HOSPADM

## 2022-01-01 RX ORDER — ALBUTEROL SULFATE 90 UG/1
1-2 AEROSOL, METERED RESPIRATORY (INHALATION) AS NEEDED
Status: ACTIVE | OUTPATIENT
Start: 2022-01-01 | End: 2022-01-01

## 2022-01-01 RX ORDER — ALBUMIN HUMAN 50 G/1000ML
25 SOLUTION INTRAVENOUS ONCE
Status: COMPLETED | OUTPATIENT
Start: 2022-01-01 | End: 2022-01-01

## 2022-01-01 RX ORDER — SODIUM CHLORIDE, SODIUM LACTATE, POTASSIUM CHLORIDE, CALCIUM CHLORIDE 600; 310; 30; 20 MG/100ML; MG/100ML; MG/100ML; MG/100ML
INJECTION, SOLUTION INTRAVENOUS
Status: DISCONTINUED | OUTPATIENT
Start: 2022-01-01 | End: 2022-01-01 | Stop reason: HOSPADM

## 2022-01-01 RX ORDER — ROCURONIUM BROMIDE 10 MG/ML
100 INJECTION, SOLUTION INTRAVENOUS
Status: ACTIVE | OUTPATIENT
Start: 2022-01-01 | End: 2022-01-01

## 2022-01-01 RX ORDER — SODIUM CHLORIDE, SODIUM LACTATE, POTASSIUM CHLORIDE, CALCIUM CHLORIDE 600; 310; 30; 20 MG/100ML; MG/100ML; MG/100ML; MG/100ML
75 INJECTION, SOLUTION INTRAVENOUS CONTINUOUS
Status: DISCONTINUED | OUTPATIENT
Start: 2022-01-01 | End: 2022-01-01

## 2022-01-01 RX ORDER — SUCRALFATE 1 G/1
1 TABLET ORAL
Qty: 120 TABLET | Refills: 0 | Status: SHIPPED | OUTPATIENT
Start: 2022-01-01

## 2022-01-01 RX ORDER — PANTOPRAZOLE SODIUM 40 MG/1
40 TABLET, DELAYED RELEASE ORAL 2 TIMES DAILY
Status: DISCONTINUED | OUTPATIENT
Start: 2022-01-01 | End: 2022-01-01

## 2022-01-01 RX ORDER — HYDROMORPHONE HYDROCHLORIDE 1 MG/ML
0.5 INJECTION, SOLUTION INTRAMUSCULAR; INTRAVENOUS; SUBCUTANEOUS ONCE
Status: COMPLETED | OUTPATIENT
Start: 2022-01-01 | End: 2022-01-01

## 2022-01-01 RX ORDER — MAGNESIUM CITRATE
296 SOLUTION, ORAL ORAL
Qty: 1 EACH | Refills: 1 | Status: SHIPPED | OUTPATIENT
Start: 2022-01-01 | End: 2022-01-01

## 2022-01-01 RX ORDER — SODIUM CHLORIDE 0.9 % (FLUSH) 0.9 %
5-40 SYRINGE (ML) INJECTION EVERY 8 HOURS
Status: DISCONTINUED | OUTPATIENT
Start: 2022-01-01 | End: 2022-01-01

## 2022-01-01 RX ORDER — MIDAZOLAM HYDROCHLORIDE 1 MG/ML
INJECTION, SOLUTION INTRAMUSCULAR; INTRAVENOUS AS NEEDED
Status: DISCONTINUED | OUTPATIENT
Start: 2022-01-01 | End: 2022-01-01 | Stop reason: HOSPADM

## 2022-01-01 RX ORDER — SODIUM CHLORIDE 0.9 % (FLUSH) 0.9 %
5-40 SYRINGE (ML) INJECTION AS NEEDED
Status: DISCONTINUED | OUTPATIENT
Start: 2022-01-01 | End: 2022-01-01

## 2022-01-01 RX ORDER — PROCHLORPERAZINE MALEATE 10 MG
10 TABLET ORAL
Qty: 30 TABLET | Refills: 1 | Status: SHIPPED | OUTPATIENT
Start: 2022-01-01 | End: 2022-01-01

## 2022-01-01 RX ORDER — GLYCOPYRROLATE 0.2 MG/ML
INJECTION INTRAMUSCULAR; INTRAVENOUS AS NEEDED
Status: DISCONTINUED | OUTPATIENT
Start: 2022-01-01 | End: 2022-01-01 | Stop reason: HOSPADM

## 2022-01-01 RX ORDER — ALBUMIN HUMAN 50 G/1000ML
SOLUTION INTRAVENOUS AS NEEDED
Status: DISCONTINUED | OUTPATIENT
Start: 2022-01-01 | End: 2022-01-01 | Stop reason: HOSPADM

## 2022-01-01 RX ORDER — AMOXICILLIN 250 MG
1 CAPSULE ORAL 2 TIMES DAILY
Qty: 60 TABLET | Refills: 2 | Status: SHIPPED | OUTPATIENT
Start: 2022-01-01

## 2022-01-01 RX ORDER — INSULIN LISPRO 100 [IU]/ML
INJECTION, SOLUTION INTRAVENOUS; SUBCUTANEOUS EVERY 6 HOURS
Status: DISCONTINUED | OUTPATIENT
Start: 2022-01-01 | End: 2022-01-01

## 2022-01-01 RX ORDER — AMOXICILLIN AND CLAVULANATE POTASSIUM 875; 125 MG/1; MG/1
1 TABLET, FILM COATED ORAL EVERY 12 HOURS
Status: DISCONTINUED | OUTPATIENT
Start: 2022-01-01 | End: 2022-01-01

## 2022-01-01 RX ORDER — MAG HYDROX/ALUMINUM HYD/SIMETH 200-200-20
15 SUSPENSION, ORAL (FINAL DOSE FORM) ORAL
Status: DISCONTINUED | OUTPATIENT
Start: 2022-01-01 | End: 2022-01-01 | Stop reason: HOSPADM

## 2022-01-01 RX ORDER — OXYCODONE AND ACETAMINOPHEN 5; 325 MG/1; MG/1
1 TABLET ORAL
Qty: 42 TABLET | Refills: 0 | Status: SHIPPED | OUTPATIENT
Start: 2022-01-01 | End: 2022-01-01

## 2022-01-01 RX ADMIN — VANCOMYCIN HYDROCHLORIDE 1750 MG: 10 INJECTION, POWDER, LYOPHILIZED, FOR SOLUTION INTRAVENOUS at 19:20

## 2022-01-01 RX ADMIN — DEXAMETHASONE SODIUM PHOSPHATE 2 MG: 4 INJECTION, SOLUTION INTRAMUSCULAR; INTRAVENOUS at 20:40

## 2022-01-01 RX ADMIN — PIPERACILLIN AND TAZOBACTAM 3.38 G: 3; .375 INJECTION, POWDER, LYOPHILIZED, FOR SOLUTION INTRAVENOUS at 06:34

## 2022-01-01 RX ADMIN — SODIUM CHLORIDE 200 MG: 9 INJECTION, SOLUTION INTRAVENOUS at 11:14

## 2022-01-01 RX ADMIN — IPRATROPIUM BROMIDE AND ALBUTEROL SULFATE 3 ML: .5; 3 SOLUTION RESPIRATORY (INHALATION) at 08:01

## 2022-01-01 RX ADMIN — PANTOPRAZOLE SODIUM 40 MG: 40 TABLET, DELAYED RELEASE ORAL at 22:30

## 2022-01-01 RX ADMIN — PIPERACILLIN AND TAZOBACTAM 3.38 G: 3; .375 INJECTION, POWDER, FOR SOLUTION INTRAVENOUS at 11:31

## 2022-01-01 RX ADMIN — SODIUM CHLORIDE, PRESERVATIVE FREE 10 ML: 5 INJECTION INTRAVENOUS at 21:30

## 2022-01-01 RX ADMIN — DEXMEDETOMIDINE HYDROCHLORIDE 1.2 MCG/KG/HR: 4 INJECTION, SOLUTION INTRAVENOUS at 12:33

## 2022-01-01 RX ADMIN — VASOPRESSIN 0.04 UNITS/MIN: 20 INJECTION PARENTERAL at 14:41

## 2022-01-01 RX ADMIN — LEVOTHYROXINE SODIUM 125 MCG: 0.07 TABLET ORAL at 06:26

## 2022-01-01 RX ADMIN — PROPOFOL 100 MG: 10 INJECTION, EMULSION INTRAVENOUS at 10:32

## 2022-01-01 RX ADMIN — FENTANYL CITRATE 300 MCG/HR: 0.05 INJECTION, SOLUTION INTRAMUSCULAR; INTRAVENOUS at 03:59

## 2022-01-01 RX ADMIN — SODIUM CHLORIDE, PRESERVATIVE FREE 10 ML: 5 INJECTION INTRAVENOUS at 15:05

## 2022-01-01 RX ADMIN — SODIUM CHLORIDE, PRESERVATIVE FREE 10 ML: 5 INJECTION INTRAVENOUS at 06:41

## 2022-01-01 RX ADMIN — Medication 2 UNITS: at 23:49

## 2022-01-01 RX ADMIN — LEVOTHYROXINE SODIUM 125 MCG: 0.07 TABLET ORAL at 06:20

## 2022-01-01 RX ADMIN — OXYCODONE AND ACETAMINOPHEN 1 TABLET: 5; 325 TABLET ORAL at 03:11

## 2022-01-01 RX ADMIN — METHYLPREDNISOLONE SODIUM SUCCINATE 125 MG: 125 INJECTION, POWDER, FOR SOLUTION INTRAMUSCULAR; INTRAVENOUS at 21:11

## 2022-01-01 RX ADMIN — CISPLATIN 186 MG: 1 INJECTION INTRAVENOUS at 12:48

## 2022-01-01 RX ADMIN — MINERAL OIL AND WHITE PETROLATUM: 150; 830 OINTMENT OPHTHALMIC at 20:02

## 2022-01-01 RX ADMIN — NOREPINEPHRINE BITARTRATE 20 MCG/MIN: 1 INJECTION, SOLUTION, CONCENTRATE INTRAVENOUS at 06:50

## 2022-01-01 RX ADMIN — PANTOPRAZOLE SODIUM 40 MG: 40 TABLET, DELAYED RELEASE ORAL at 06:31

## 2022-01-01 RX ADMIN — SODIUM CHLORIDE, PRESERVATIVE FREE 10 ML: 5 INJECTION INTRAVENOUS at 14:42

## 2022-01-01 RX ADMIN — GUAIFENESIN 400 MG: 200 SOLUTION ORAL at 23:26

## 2022-01-01 RX ADMIN — FOSAPREPITANT 150 MG: 150 INJECTION, POWDER, LYOPHILIZED, FOR SOLUTION INTRAVENOUS at 10:56

## 2022-01-01 RX ADMIN — PIPERACILLIN AND TAZOBACTAM 3.38 G: 3; .375 INJECTION, POWDER, FOR SOLUTION INTRAVENOUS at 03:09

## 2022-01-01 RX ADMIN — ONDANSETRON 4 MG: 2 INJECTION INTRAMUSCULAR; INTRAVENOUS at 10:37

## 2022-01-01 RX ADMIN — SODIUM CHLORIDE, PRESERVATIVE FREE 10 ML: 5 INJECTION INTRAVENOUS at 13:09

## 2022-01-01 RX ADMIN — NYSTATIN 500000 UNITS: 100000 SUSPENSION ORAL at 17:07

## 2022-01-01 RX ADMIN — DEXAMETHASONE SODIUM PHOSPHATE 4 MG: 4 INJECTION, SOLUTION INTRA-ARTICULAR; INTRALESIONAL; INTRAMUSCULAR; INTRAVENOUS; SOFT TISSUE at 23:35

## 2022-01-01 RX ADMIN — CISATRACURIUM BESYLATE 3 MCG/KG/MIN: 20 INJECTION INTRAVENOUS at 16:42

## 2022-01-01 RX ADMIN — SODIUM CHLORIDE 1000 ML: 900 INJECTION, SOLUTION INTRAVENOUS at 13:00

## 2022-01-01 RX ADMIN — SODIUM CHLORIDE, PRESERVATIVE FREE 10 ML: 5 INJECTION INTRAVENOUS at 06:23

## 2022-01-01 RX ADMIN — BISACODYL 10 MG: 10 SUPPOSITORY RECTAL at 08:22

## 2022-01-01 RX ADMIN — Medication 3 UNITS: at 18:42

## 2022-01-01 RX ADMIN — SODIUM CHLORIDE, PRESERVATIVE FREE 40 MG: 5 INJECTION INTRAVENOUS at 20:42

## 2022-01-01 RX ADMIN — CISATRACURIUM BESYLATE 2 MCG/KG/MIN: 20 INJECTION INTRAVENOUS at 05:47

## 2022-01-01 RX ADMIN — IPRATROPIUM BROMIDE AND ALBUTEROL SULFATE 3 ML: .5; 3 SOLUTION RESPIRATORY (INHALATION) at 21:22

## 2022-01-01 RX ADMIN — ONDANSETRON HYDROCHLORIDE 4 MG: 2 INJECTION, SOLUTION INTRAMUSCULAR; INTRAVENOUS at 16:56

## 2022-01-01 RX ADMIN — HEPARIN 500 UNITS: 100 SYRINGE at 12:58

## 2022-01-01 RX ADMIN — SODIUM CHLORIDE 40 MG: 9 INJECTION, SOLUTION INTRAMUSCULAR; INTRAVENOUS; SUBCUTANEOUS at 10:03

## 2022-01-01 RX ADMIN — POTASSIUM CHLORIDE 20 MEQ: 20 TABLET, EXTENDED RELEASE ORAL at 12:52

## 2022-01-01 RX ADMIN — FENTANYL CITRATE 200 MCG/HR: 0.05 INJECTION, SOLUTION INTRAMUSCULAR; INTRAVENOUS at 19:33

## 2022-01-01 RX ADMIN — SODIUM CHLORIDE, PRESERVATIVE FREE 10 ML: 5 INJECTION INTRAVENOUS at 08:20

## 2022-01-01 RX ADMIN — FERROUS SULFATE TAB 325 MG (65 MG ELEMENTAL FE) 325 MG: 325 (65 FE) TAB at 18:06

## 2022-01-01 RX ADMIN — IPRATROPIUM BROMIDE AND ALBUTEROL SULFATE 3 ML: .5; 3 SOLUTION RESPIRATORY (INHALATION) at 12:33

## 2022-01-01 RX ADMIN — PIPERACILLIN AND TAZOBACTAM 3.38 G: 3; .375 INJECTION, POWDER, FOR SOLUTION INTRAVENOUS at 20:00

## 2022-01-01 RX ADMIN — SODIUM CHLORIDE, PRESERVATIVE FREE 10 ML: 5 INJECTION INTRAVENOUS at 14:00

## 2022-01-01 RX ADMIN — DEXAMETHASONE SODIUM PHOSPHATE 4 MG: 4 INJECTION, SOLUTION INTRA-ARTICULAR; INTRALESIONAL; INTRAMUSCULAR; INTRAVENOUS; SOFT TISSUE at 17:00

## 2022-01-01 RX ADMIN — ALBUMIN (HUMAN) 250 ML: 12.5 INJECTION, SOLUTION INTRAVENOUS at 13:00

## 2022-01-01 RX ADMIN — GUAIFENESIN 400 MG: 200 SOLUTION ORAL at 20:42

## 2022-01-01 RX ADMIN — FERROUS SULFATE TAB 325 MG (65 MG ELEMENTAL FE) 325 MG: 325 (65 FE) TAB at 17:00

## 2022-01-01 RX ADMIN — METHYLPREDNISOLONE SODIUM SUCCINATE 125 MG: 125 INJECTION, POWDER, FOR SOLUTION INTRAMUSCULAR; INTRAVENOUS at 15:41

## 2022-01-01 RX ADMIN — POTASSIUM CHLORIDE: 2 INJECTION, SOLUTION, CONCENTRATE INTRAVENOUS at 10:22

## 2022-01-01 RX ADMIN — DEXAMETHASONE SODIUM PHOSPHATE 4 MG: 4 INJECTION, SOLUTION INTRA-ARTICULAR; INTRALESIONAL; INTRAMUSCULAR; INTRAVENOUS; SOFT TISSUE at 19:57

## 2022-01-01 RX ADMIN — METHYLPREDNISOLONE SODIUM SUCCINATE 125 MG: 125 INJECTION, POWDER, FOR SOLUTION INTRAMUSCULAR; INTRAVENOUS at 10:20

## 2022-01-01 RX ADMIN — POLYETHYLENE GLYCOL 3350 17 G: 17 POWDER, FOR SOLUTION ORAL at 18:09

## 2022-01-01 RX ADMIN — Medication 2 UNITS: at 12:38

## 2022-01-01 RX ADMIN — IPRATROPIUM BROMIDE AND ALBUTEROL SULFATE 3 ML: .5; 3 SOLUTION RESPIRATORY (INHALATION) at 03:48

## 2022-01-01 RX ADMIN — SODIUM CHLORIDE 200 MG: 9 INJECTION, SOLUTION INTRAVENOUS at 10:34

## 2022-01-01 RX ADMIN — SODIUM CHLORIDE, PRESERVATIVE FREE 10 ML: 5 INJECTION INTRAVENOUS at 15:37

## 2022-01-01 RX ADMIN — METHYLPREDNISOLONE SODIUM SUCCINATE 125 MG: 125 INJECTION, POWDER, FOR SOLUTION INTRAMUSCULAR; INTRAVENOUS at 15:58

## 2022-01-01 RX ADMIN — PHENYLEPHRINE HYDROCHLORIDE 40 MCG/MIN: 10 INJECTION INTRAVENOUS at 12:46

## 2022-01-01 RX ADMIN — ALUMINUM HYDROXIDE, MAGNESIUM HYDROXIDE, AND SIMETHICONE 30 ML: 200; 200; 20 SUSPENSION ORAL at 20:51

## 2022-01-01 RX ADMIN — VANCOMYCIN HYDROCHLORIDE 1500 MG: 10 INJECTION, POWDER, LYOPHILIZED, FOR SOLUTION INTRAVENOUS at 12:35

## 2022-01-01 RX ADMIN — PALONOSETRON 0.25 MG: 0.05 INJECTION, SOLUTION INTRAVENOUS at 11:53

## 2022-01-01 RX ADMIN — ACETAMINOPHEN 650 MG: 325 TABLET ORAL at 06:57

## 2022-01-01 RX ADMIN — OXYCODONE AND ACETAMINOPHEN 1 TABLET: 5; 325 TABLET ORAL at 22:03

## 2022-01-01 RX ADMIN — METHYLPREDNISOLONE SODIUM SUCCINATE 125 MG: 125 INJECTION, POWDER, FOR SOLUTION INTRAMUSCULAR; INTRAVENOUS at 22:23

## 2022-01-01 RX ADMIN — Medication 3 UNITS: at 23:26

## 2022-01-01 RX ADMIN — SENNOSIDES AND DOCUSATE SODIUM 1 TABLET: 50; 8.6 TABLET ORAL at 17:47

## 2022-01-01 RX ADMIN — LEVOTHYROXINE SODIUM 125 MCG: 0.07 TABLET ORAL at 05:08

## 2022-01-01 RX ADMIN — SODIUM CHLORIDE, PRESERVATIVE FREE 40 MG: 5 INJECTION INTRAVENOUS at 08:19

## 2022-01-01 RX ADMIN — SODIUM CHLORIDE 25 ML/HR: 9 INJECTION, SOLUTION INTRAVENOUS at 10:47

## 2022-01-01 RX ADMIN — PIPERACILLIN AND TAZOBACTAM 3.38 G: 3; .375 INJECTION, POWDER, LYOPHILIZED, FOR SOLUTION INTRAVENOUS at 23:05

## 2022-01-01 RX ADMIN — Medication 500 UNITS: at 10:35

## 2022-01-01 RX ADMIN — MANNITOL: 20 INJECTION, SOLUTION INTRAVENOUS at 13:00

## 2022-01-01 RX ADMIN — SODIUM CHLORIDE, PRESERVATIVE FREE 10 ML: 5 INJECTION INTRAVENOUS at 14:56

## 2022-01-01 RX ADMIN — SODIUM CHLORIDE, PRESERVATIVE FREE 10 ML: 5 INJECTION INTRAVENOUS at 09:30

## 2022-01-01 RX ADMIN — DEXAMETHASONE SODIUM PHOSPHATE 2 MG: 4 INJECTION, SOLUTION INTRAMUSCULAR; INTRAVENOUS at 14:56

## 2022-01-01 RX ADMIN — FLUOROURACIL 6696 MG: 50 INJECTION, SOLUTION INTRAVENOUS at 16:10

## 2022-01-01 RX ADMIN — PIPERACILLIN AND TAZOBACTAM 3.38 G: 3; .375 INJECTION, POWDER, FOR SOLUTION INTRAVENOUS at 03:00

## 2022-01-01 RX ADMIN — Medication 2 UNITS: at 12:32

## 2022-01-01 RX ADMIN — FAMOTIDINE 20 MG: 10 INJECTION, SOLUTION INTRAVENOUS at 10:09

## 2022-01-01 RX ADMIN — PIPERACILLIN AND TAZOBACTAM 3.38 G: 3; .375 INJECTION, POWDER, FOR SOLUTION INTRAVENOUS at 20:01

## 2022-01-01 RX ADMIN — SENNOSIDES 8.6 MG: 8.6 TABLET, COATED ORAL at 08:40

## 2022-01-01 RX ADMIN — AMIODARONE HYDROCHLORIDE 1 MG/MIN: 50 INJECTION, SOLUTION INTRAVENOUS at 01:08

## 2022-01-01 RX ADMIN — GUAIFENESIN 400 MG: 200 SOLUTION ORAL at 03:47

## 2022-01-01 RX ADMIN — LEVOFLOXACIN 750 MG: 5 INJECTION, SOLUTION INTRAVENOUS at 18:47

## 2022-01-01 RX ADMIN — LEVETIRACETAM 1000 MG: 100 INJECTION, SOLUTION, CONCENTRATE INTRAVENOUS at 08:29

## 2022-01-01 RX ADMIN — SODIUM CHLORIDE, POTASSIUM CHLORIDE, SODIUM LACTATE AND CALCIUM CHLORIDE 75 ML/HR: 600; 310; 30; 20 INJECTION, SOLUTION INTRAVENOUS at 01:31

## 2022-01-01 RX ADMIN — FENTANYL CITRATE 25 MCG: 50 INJECTION, SOLUTION INTRAMUSCULAR; INTRAVENOUS at 18:15

## 2022-01-01 RX ADMIN — IPRATROPIUM BROMIDE AND ALBUTEROL SULFATE 3 ML: .5; 3 SOLUTION RESPIRATORY (INHALATION) at 00:48

## 2022-01-01 RX ADMIN — MORPHINE SULFATE 2 MG: 2 INJECTION, SOLUTION INTRAMUSCULAR; INTRAVENOUS at 17:31

## 2022-01-01 RX ADMIN — DEXAMETHASONE SODIUM PHOSPHATE 4 MG: 4 INJECTION, SOLUTION INTRAMUSCULAR; INTRAVENOUS at 02:36

## 2022-01-01 RX ADMIN — ONDANSETRON 4 MG: 4 TABLET, ORALLY DISINTEGRATING ORAL at 17:44

## 2022-01-01 RX ADMIN — LEVETIRACETAM 1000 MG: 100 INJECTION, SOLUTION, CONCENTRATE INTRAVENOUS at 20:30

## 2022-01-01 RX ADMIN — SODIUM CHLORIDE 1000 ML: 9 INJECTION, SOLUTION INTRAVENOUS at 14:17

## 2022-01-01 RX ADMIN — LEVETIRACETAM 1000 MG: 100 INJECTION, SOLUTION, CONCENTRATE INTRAVENOUS at 08:44

## 2022-01-01 RX ADMIN — SODIUM CHLORIDE, PRESERVATIVE FREE 10 ML: 5 INJECTION INTRAVENOUS at 10:03

## 2022-01-01 RX ADMIN — SODIUM CHLORIDE, PRESERVATIVE FREE 10 ML: 5 INJECTION INTRAVENOUS at 05:18

## 2022-01-01 RX ADMIN — NYSTATIN 500000 UNITS: 100000 SUSPENSION ORAL at 16:27

## 2022-01-01 RX ADMIN — PHENYLEPHRINE HYDROCHLORIDE 300 MCG: 10 INJECTION INTRAVENOUS at 12:10

## 2022-01-01 RX ADMIN — OXYCODONE AND ACETAMINOPHEN 1 TABLET: 5; 325 TABLET ORAL at 10:14

## 2022-01-01 RX ADMIN — SODIUM CHLORIDE 250 ML: 9 INJECTION, SOLUTION INTRAVENOUS at 12:00

## 2022-01-01 RX ADMIN — SODIUM CHLORIDE, PRESERVATIVE FREE 10 ML: 5 INJECTION INTRAVENOUS at 09:00

## 2022-01-01 RX ADMIN — SODIUM CHLORIDE, PRESERVATIVE FREE 10 ML: 5 INJECTION INTRAVENOUS at 21:16

## 2022-01-01 RX ADMIN — SODIUM CHLORIDE, PRESERVATIVE FREE 10 ML: 5 INJECTION INTRAVENOUS at 21:27

## 2022-01-01 RX ADMIN — PROPOFOL 100 MG: 10 INJECTION, EMULSION INTRAVENOUS at 12:06

## 2022-01-01 RX ADMIN — Medication 10 ML: at 10:35

## 2022-01-01 RX ADMIN — SENNOSIDES AND DOCUSATE SODIUM 1 TABLET: 50; 8.6 TABLET ORAL at 17:59

## 2022-01-01 RX ADMIN — FERROUS SULFATE TAB 325 MG (65 MG ELEMENTAL FE) 325 MG: 325 (65 FE) TAB at 18:05

## 2022-01-01 RX ADMIN — Medication 2 UNITS: at 05:51

## 2022-01-01 RX ADMIN — NYSTATIN 500000 UNITS: 100000 SUSPENSION ORAL at 18:56

## 2022-01-01 RX ADMIN — CEFEPIME 2 G: 2 INJECTION, POWDER, FOR SOLUTION INTRAVENOUS at 10:56

## 2022-01-01 RX ADMIN — LEVETIRACETAM 1000 MG: 100 INJECTION, SOLUTION, CONCENTRATE INTRAVENOUS at 08:18

## 2022-01-01 RX ADMIN — SODIUM CHLORIDE, PRESERVATIVE FREE 10 ML: 5 INJECTION INTRAVENOUS at 16:01

## 2022-01-01 RX ADMIN — GUAIFENESIN 400 MG: 200 SOLUTION ORAL at 15:37

## 2022-01-01 RX ADMIN — MORPHINE SULFATE 2 MG: 2 INJECTION, SOLUTION INTRAMUSCULAR; INTRAVENOUS at 00:15

## 2022-01-01 RX ADMIN — PANTOPRAZOLE SODIUM 40 MG: 40 TABLET, DELAYED RELEASE ORAL at 23:04

## 2022-01-01 RX ADMIN — GUAIFENESIN 400 MG: 200 SOLUTION ORAL at 05:00

## 2022-01-01 RX ADMIN — MORPHINE SULFATE 2 MG: 2 INJECTION, SOLUTION INTRAMUSCULAR; INTRAVENOUS at 16:45

## 2022-01-01 RX ADMIN — SENNOSIDES AND DOCUSATE SODIUM 1 TABLET: 50; 8.6 TABLET ORAL at 19:57

## 2022-01-01 RX ADMIN — PANTOPRAZOLE SODIUM 40 MG: 40 TABLET, DELAYED RELEASE ORAL at 08:29

## 2022-01-01 RX ADMIN — SODIUM CHLORIDE 8 MG/HR: 900 INJECTION INTRAVENOUS at 21:42

## 2022-01-01 RX ADMIN — MELATONIN TAB 5 MG 5 MG: 5 TAB at 21:05

## 2022-01-01 RX ADMIN — ONDANSETRON 4 MG: 2 INJECTION INTRAMUSCULAR; INTRAVENOUS at 16:29

## 2022-01-01 RX ADMIN — Medication 10 ML: at 08:14

## 2022-01-01 RX ADMIN — HEPARIN 300 UNITS: 100 SYRINGE at 13:33

## 2022-01-01 RX ADMIN — GUAIFENESIN 400 MG: 200 SOLUTION ORAL at 23:39

## 2022-01-01 RX ADMIN — FERROUS SULFATE TAB 325 MG (65 MG ELEMENTAL FE) 325 MG: 325 (65 FE) TAB at 22:30

## 2022-01-01 RX ADMIN — SODIUM CHLORIDE, PRESERVATIVE FREE 10 ML: 5 INJECTION INTRAVENOUS at 21:15

## 2022-01-01 RX ADMIN — SODIUM CHLORIDE, PRESERVATIVE FREE 40 MG: 5 INJECTION INTRAVENOUS at 20:46

## 2022-01-01 RX ADMIN — LEVOTHYROXINE SODIUM 75 MCG: 0.07 TABLET ORAL at 07:30

## 2022-01-01 RX ADMIN — SODIUM CHLORIDE, PRESERVATIVE FREE 40 MG: 5 INJECTION INTRAVENOUS at 08:37

## 2022-01-01 RX ADMIN — SODIUM CHLORIDE, PRESERVATIVE FREE 10 ML: 5 INJECTION INTRAVENOUS at 14:04

## 2022-01-01 RX ADMIN — SODIUM CHLORIDE, PRESERVATIVE FREE 40 MG: 5 INJECTION INTRAVENOUS at 21:11

## 2022-01-01 RX ADMIN — IPRATROPIUM BROMIDE AND ALBUTEROL SULFATE 3 ML: .5; 3 SOLUTION RESPIRATORY (INHALATION) at 01:06

## 2022-01-01 RX ADMIN — DEXAMETHASONE SODIUM PHOSPHATE 2 MG: 4 INJECTION, SOLUTION INTRAMUSCULAR; INTRAVENOUS at 20:41

## 2022-01-01 RX ADMIN — MINERAL OIL AND WHITE PETROLATUM: 150; 830 OINTMENT OPHTHALMIC at 09:21

## 2022-01-01 RX ADMIN — FENTANYL CITRATE 150 MCG/HR: 0.05 INJECTION, SOLUTION INTRAMUSCULAR; INTRAVENOUS at 02:44

## 2022-01-01 RX ADMIN — LEVOTHYROXINE SODIUM 75 MCG: 0.03 TABLET ORAL at 08:29

## 2022-01-01 RX ADMIN — GUAIFENESIN 400 MG: 200 SOLUTION ORAL at 08:22

## 2022-01-01 RX ADMIN — MIDAZOLAM 10 MG/HR: 5 INJECTION, SOLUTION INTRAMUSCULAR; INTRAVENOUS at 06:42

## 2022-01-01 RX ADMIN — GUAIFENESIN 400 MG: 200 SOLUTION ORAL at 20:41

## 2022-01-01 RX ADMIN — Medication 2 UNITS: at 05:10

## 2022-01-01 RX ADMIN — SODIUM CHLORIDE, PRESERVATIVE FREE 10 ML: 5 INJECTION INTRAVENOUS at 23:14

## 2022-01-01 RX ADMIN — ACETAMINOPHEN 650 MG: 325 TABLET ORAL at 14:09

## 2022-01-01 RX ADMIN — FENTANYL CITRATE 50 MCG/HR: 0.05 INJECTION, SOLUTION INTRAMUSCULAR; INTRAVENOUS at 17:19

## 2022-01-01 RX ADMIN — SODIUM CHLORIDE, PRESERVATIVE FREE 40 MG: 5 INJECTION INTRAVENOUS at 09:22

## 2022-01-01 RX ADMIN — NYSTATIN 500000 UNITS: 100000 SUSPENSION ORAL at 09:22

## 2022-01-01 RX ADMIN — PIPERACILLIN AND TAZOBACTAM 3.38 G: 3; .375 INJECTION, POWDER, LYOPHILIZED, FOR SOLUTION INTRAVENOUS at 22:36

## 2022-01-01 RX ADMIN — POLYETHYLENE GLYCOL 3350 17 G: 17 POWDER, FOR SOLUTION ORAL at 08:47

## 2022-01-01 RX ADMIN — SENNOSIDES AND DOCUSATE SODIUM 1 TABLET: 50; 8.6 TABLET ORAL at 20:01

## 2022-01-01 RX ADMIN — Medication 2 UNITS: at 00:02

## 2022-01-01 RX ADMIN — PIPERACILLIN AND TAZOBACTAM 3.38 G: 3; .375 INJECTION, POWDER, LYOPHILIZED, FOR SOLUTION INTRAVENOUS at 14:20

## 2022-01-01 RX ADMIN — PROPOFOL 50 MG: 10 INJECTION, EMULSION INTRAVENOUS at 12:01

## 2022-01-01 RX ADMIN — MIDAZOLAM 2 MG/HR: 5 INJECTION, SOLUTION INTRAMUSCULAR; INTRAVENOUS at 10:42

## 2022-01-01 RX ADMIN — IPRATROPIUM BROMIDE AND ALBUTEROL SULFATE 3 ML: .5; 3 SOLUTION RESPIRATORY (INHALATION) at 07:19

## 2022-01-01 RX ADMIN — METHYLPREDNISOLONE SODIUM SUCCINATE 60 MG: 125 INJECTION, POWDER, FOR SOLUTION INTRAMUSCULAR; INTRAVENOUS at 04:18

## 2022-01-01 RX ADMIN — OXYCODONE AND ACETAMINOPHEN 1 TABLET: 5; 325 TABLET ORAL at 15:39

## 2022-01-01 RX ADMIN — POLYETHYLENE GLYCOL 3350 17 G: 17 POWDER, FOR SOLUTION ORAL at 08:40

## 2022-01-01 RX ADMIN — BISACODYL 10 MG: 10 SUPPOSITORY RECTAL at 08:40

## 2022-01-01 RX ADMIN — DEXMEDETOMIDINE HYDROCHLORIDE 1.3 MCG/KG/HR: 4 INJECTION, SOLUTION INTRAVENOUS at 08:18

## 2022-01-01 RX ADMIN — NYSTATIN 500000 UNITS: 100000 SUSPENSION ORAL at 17:44

## 2022-01-01 RX ADMIN — SUCRALFATE 1 G: 1 TABLET ORAL at 08:44

## 2022-01-01 RX ADMIN — LEVOFLOXACIN 750 MG: 5 INJECTION, SOLUTION INTRAVENOUS at 19:15

## 2022-01-01 RX ADMIN — CHLOROTHIAZIDE SODIUM 500 MG: 500 INJECTION, POWDER, LYOPHILIZED, FOR SOLUTION INTRAVENOUS at 15:50

## 2022-01-01 RX ADMIN — LEVOFLOXACIN 750 MG: 5 INJECTION, SOLUTION INTRAVENOUS at 10:17

## 2022-01-01 RX ADMIN — SODIUM CHLORIDE 200 MG: 9 INJECTION, SOLUTION INTRAVENOUS at 11:33

## 2022-01-01 RX ADMIN — FOSAPREPITANT 150 MG: 150 INJECTION, POWDER, LYOPHILIZED, FOR SOLUTION INTRAVENOUS at 11:26

## 2022-01-01 RX ADMIN — SODIUM CHLORIDE 1000 ML: 9 INJECTION, SOLUTION INTRAVENOUS at 11:07

## 2022-01-01 RX ADMIN — SODIUM CHLORIDE, PRESERVATIVE FREE 10 ML: 5 INJECTION INTRAVENOUS at 05:51

## 2022-01-01 RX ADMIN — DEXAMETHASONE SODIUM PHOSPHATE 4 MG: 4 INJECTION, SOLUTION INTRA-ARTICULAR; INTRALESIONAL; INTRAMUSCULAR; INTRAVENOUS; SOFT TISSUE at 17:35

## 2022-01-01 RX ADMIN — SENNOSIDES AND DOCUSATE SODIUM 1 TABLET: 50; 8.6 TABLET ORAL at 18:09

## 2022-01-01 RX ADMIN — MORPHINE SULFATE 2 MG: 2 INJECTION, SOLUTION INTRAMUSCULAR; INTRAVENOUS at 08:30

## 2022-01-01 RX ADMIN — OXYCODONE AND ACETAMINOPHEN 1 TABLET: 5; 325 TABLET ORAL at 22:31

## 2022-01-01 RX ADMIN — FERROUS SULFATE TAB 325 MG (65 MG ELEMENTAL FE) 325 MG: 325 (65 FE) TAB at 08:29

## 2022-01-01 RX ADMIN — PIPERACILLIN AND TAZOBACTAM 3.38 G: 3; .375 INJECTION, POWDER, LYOPHILIZED, FOR SOLUTION INTRAVENOUS at 06:59

## 2022-01-01 RX ADMIN — SODIUM CHLORIDE 1000 ML: 9 INJECTION, SOLUTION INTRAVENOUS at 08:20

## 2022-01-01 RX ADMIN — TRAMADOL HYDROCHLORIDE 50 MG: 50 TABLET, COATED ORAL at 22:54

## 2022-01-01 RX ADMIN — Medication 80 MCG: at 14:09

## 2022-01-01 RX ADMIN — FERROUS SULFATE TAB 325 MG (65 MG ELEMENTAL FE) 325 MG: 325 (65 FE) TAB at 09:37

## 2022-01-01 RX ADMIN — NOREPINEPHRINE BITARTRATE 20 MCG/MIN: 1 INJECTION, SOLUTION, CONCENTRATE INTRAVENOUS at 06:00

## 2022-01-01 RX ADMIN — MELATONIN TAB 5 MG 5 MG: 5 TAB at 21:17

## 2022-01-01 RX ADMIN — SODIUM CHLORIDE, PRESERVATIVE FREE 10 ML: 5 INJECTION INTRAVENOUS at 21:44

## 2022-01-01 RX ADMIN — LEVOTHYROXINE SODIUM 75 MCG: 0.03 TABLET ORAL at 05:56

## 2022-01-01 RX ADMIN — IPRATROPIUM BROMIDE AND ALBUTEROL SULFATE 3 ML: .5; 3 SOLUTION RESPIRATORY (INHALATION) at 15:42

## 2022-01-01 RX ADMIN — SENNOSIDES AND DOCUSATE SODIUM 1 TABLET: 50; 8.6 TABLET ORAL at 08:29

## 2022-01-01 RX ADMIN — SODIUM CHLORIDE, PRESERVATIVE FREE 10 ML: 5 INJECTION INTRAVENOUS at 05:43

## 2022-01-01 RX ADMIN — OXYCODONE AND ACETAMINOPHEN 1 TABLET: 5; 325 TABLET ORAL at 04:37

## 2022-01-01 RX ADMIN — LEVOTHYROXINE SODIUM 125 MCG: 0.07 TABLET ORAL at 05:11

## 2022-01-01 RX ADMIN — PROPOFOL 100 MCG/KG/MIN: 10 INJECTION, EMULSION INTRAVENOUS at 12:13

## 2022-01-01 RX ADMIN — SODIUM CHLORIDE 1000 ML: 9 INJECTION, SOLUTION INTRAVENOUS at 09:30

## 2022-01-01 RX ADMIN — SODIUM CHLORIDE 25 ML/HR: 9 INJECTION, SOLUTION INTRAVENOUS at 11:47

## 2022-01-01 RX ADMIN — LEVOFLOXACIN 750 MG: 5 INJECTION, SOLUTION INTRAVENOUS at 18:57

## 2022-01-01 RX ADMIN — Medication 500 UNITS: at 09:25

## 2022-01-01 RX ADMIN — SODIUM CHLORIDE, PRESERVATIVE FREE 10 ML: 5 INJECTION INTRAVENOUS at 05:42

## 2022-01-01 RX ADMIN — PROPOFOL 100 MG: 10 INJECTION, EMULSION INTRAVENOUS at 14:13

## 2022-01-01 RX ADMIN — LACTULOSE 30 ML: 20 SOLUTION ORAL at 11:40

## 2022-01-01 RX ADMIN — Medication 120 MCG: at 12:24

## 2022-01-01 RX ADMIN — OXYCODONE AND ACETAMINOPHEN 1 TABLET: 5; 325 TABLET ORAL at 17:33

## 2022-01-01 RX ADMIN — SODIUM CHLORIDE, PRESERVATIVE FREE 10 ML: 5 INJECTION INTRAVENOUS at 14:23

## 2022-01-01 RX ADMIN — SODIUM CHLORIDE 1000 ML: 900 INJECTION, SOLUTION INTRAVENOUS at 09:15

## 2022-01-01 RX ADMIN — DEXMEDETOMIDINE HYDROCHLORIDE 1.5 MCG/KG/HR: 4 INJECTION, SOLUTION INTRAVENOUS at 18:48

## 2022-01-01 RX ADMIN — MINERAL OIL AND WHITE PETROLATUM: 150; 830 OINTMENT OPHTHALMIC at 08:22

## 2022-01-01 RX ADMIN — VASOPRESSIN 0.04 UNITS/MIN: 20 INJECTION PARENTERAL at 06:00

## 2022-01-01 RX ADMIN — MIDAZOLAM 2 MG: 1 INJECTION, SOLUTION INTRAMUSCULAR; INTRAVENOUS at 09:46

## 2022-01-01 RX ADMIN — LEVOTHYROXINE SODIUM 94 MCG: 20 INJECTION, SOLUTION INTRAVENOUS at 11:07

## 2022-01-01 RX ADMIN — FENTANYL CITRATE 200 MCG/HR: 0.05 INJECTION, SOLUTION INTRAMUSCULAR; INTRAVENOUS at 22:29

## 2022-01-01 RX ADMIN — CISPLATIN 186 MG: 1 INJECTION INTRAVENOUS at 13:25

## 2022-01-01 RX ADMIN — IOPAMIDOL 100 ML: 612 INJECTION, SOLUTION INTRAVENOUS at 10:27

## 2022-01-01 RX ADMIN — SODIUM CHLORIDE, PRESERVATIVE FREE 10 ML: 5 INJECTION INTRAVENOUS at 06:31

## 2022-01-01 RX ADMIN — NEOSTIGMINE METHYLSULFATE 3 MG: 1 INJECTION, SOLUTION INTRAVENOUS at 17:11

## 2022-01-01 RX ADMIN — OXYCODONE AND ACETAMINOPHEN 1 TABLET: 5; 325 TABLET ORAL at 05:22

## 2022-01-01 RX ADMIN — SODIUM CHLORIDE, PRESERVATIVE FREE 10 ML: 5 INJECTION INTRAVENOUS at 12:52

## 2022-01-01 RX ADMIN — PROPOFOL 35 MCG/KG/MIN: 10 INJECTION, EMULSION INTRAVENOUS at 14:13

## 2022-01-01 RX ADMIN — DEXAMETHASONE SODIUM PHOSPHATE 2 MG: 4 INJECTION, SOLUTION INTRAMUSCULAR; INTRAVENOUS at 01:53

## 2022-01-01 RX ADMIN — POLYETHYLENE GLYCOL 3350 17 G: 17 POWDER, FOR SOLUTION ORAL at 08:18

## 2022-01-01 RX ADMIN — LEVETIRACETAM 1000 MG: 100 INJECTION, SOLUTION, CONCENTRATE INTRAVENOUS at 21:36

## 2022-01-01 RX ADMIN — LEVETIRACETAM 1000 MG: 100 INJECTION, SOLUTION, CONCENTRATE INTRAVENOUS at 20:45

## 2022-01-01 RX ADMIN — ROCURONIUM BROMIDE 20 MG: 10 SOLUTION INTRAVENOUS at 12:45

## 2022-01-01 RX ADMIN — LEVETIRACETAM 1000 MG: 100 INJECTION, SOLUTION, CONCENTRATE INTRAVENOUS at 08:21

## 2022-01-01 RX ADMIN — FAMOTIDINE 20 MG: 10 INJECTION, SOLUTION INTRAVENOUS at 08:51

## 2022-01-01 RX ADMIN — SODIUM CHLORIDE, PRESERVATIVE FREE 10 ML: 5 INJECTION INTRAVENOUS at 14:57

## 2022-01-01 RX ADMIN — Medication 10 ML: at 09:15

## 2022-01-01 RX ADMIN — SODIUM CHLORIDE, PRESERVATIVE FREE 10 ML: 5 INJECTION INTRAVENOUS at 05:37

## 2022-01-01 RX ADMIN — LEVETIRACETAM 1000 MG: 100 INJECTION, SOLUTION, CONCENTRATE INTRAVENOUS at 21:46

## 2022-01-01 RX ADMIN — BISACODYL 10 MG: 10 SUPPOSITORY RECTAL at 12:16

## 2022-01-01 RX ADMIN — DEXAMETHASONE SODIUM PHOSPHATE 2 MG: 4 INJECTION, SOLUTION INTRAMUSCULAR; INTRAVENOUS at 03:14

## 2022-01-01 RX ADMIN — Medication 473 ML: at 21:00

## 2022-01-01 RX ADMIN — SODIUM CHLORIDE, PRESERVATIVE FREE 20 ML: 5 INJECTION INTRAVENOUS at 06:00

## 2022-01-01 RX ADMIN — MORPHINE SULFATE 2 MG: 2 INJECTION, SOLUTION INTRAMUSCULAR; INTRAVENOUS at 13:28

## 2022-01-01 RX ADMIN — PANTOPRAZOLE SODIUM 40 MG: 40 TABLET, DELAYED RELEASE ORAL at 06:43

## 2022-01-01 RX ADMIN — SODIUM CHLORIDE 200 MG: 9 INJECTION, SOLUTION INTRAVENOUS at 11:52

## 2022-01-01 RX ADMIN — DEXAMETHASONE SODIUM PHOSPHATE 3 MG: 4 INJECTION, SOLUTION INTRAMUSCULAR; INTRAVENOUS at 20:48

## 2022-01-01 RX ADMIN — VASOPRESSIN 0.04 UNITS/MIN: 20 INJECTION PARENTERAL at 07:22

## 2022-01-01 RX ADMIN — METHYLPREDNISOLONE SODIUM SUCCINATE 125 MG: 125 INJECTION, POWDER, FOR SOLUTION INTRAMUSCULAR; INTRAVENOUS at 05:07

## 2022-01-01 RX ADMIN — SODIUM CHLORIDE, PRESERVATIVE FREE 10 ML: 5 INJECTION INTRAVENOUS at 05:56

## 2022-01-01 RX ADMIN — OXYCODONE AND ACETAMINOPHEN 1 TABLET: 5; 325 TABLET ORAL at 21:45

## 2022-01-01 RX ADMIN — MELATONIN TAB 5 MG 5 MG: 5 TAB at 23:04

## 2022-01-01 RX ADMIN — FAMOTIDINE 20 MG: 10 INJECTION, SOLUTION INTRAVENOUS at 22:32

## 2022-01-01 RX ADMIN — PROPOFOL 50 MG: 10 INJECTION, EMULSION INTRAVENOUS at 12:08

## 2022-01-01 RX ADMIN — DEXAMETHASONE SODIUM PHOSPHATE 4 MG: 4 INJECTION, SOLUTION INTRAMUSCULAR; INTRAVENOUS at 05:58

## 2022-01-01 RX ADMIN — METHYLPREDNISOLONE SODIUM SUCCINATE 125 MG: 125 INJECTION, POWDER, FOR SOLUTION INTRAMUSCULAR; INTRAVENOUS at 03:53

## 2022-01-01 RX ADMIN — HYDROXYZINE PAMOATE 25 MG: 25 CAPSULE ORAL at 03:22

## 2022-01-01 RX ADMIN — FENTANYL CITRATE 200 MCG/HR: 0.05 INJECTION, SOLUTION INTRAMUSCULAR; INTRAVENOUS at 13:16

## 2022-01-01 RX ADMIN — DEXAMETHASONE SODIUM PHOSPHATE 4 MG: 4 INJECTION, SOLUTION INTRAMUSCULAR; INTRAVENOUS at 15:30

## 2022-01-01 RX ADMIN — SODIUM CHLORIDE 25 ML/HR: 9 INJECTION, SOLUTION INTRAVENOUS at 10:44

## 2022-01-01 RX ADMIN — NYSTATIN 500000 UNITS: 100000 SUSPENSION ORAL at 12:12

## 2022-01-01 RX ADMIN — SODIUM CHLORIDE, PRESERVATIVE FREE 10 ML: 5 INJECTION INTRAVENOUS at 21:50

## 2022-01-01 RX ADMIN — DEXAMETHASONE SODIUM PHOSPHATE 4 MG: 4 INJECTION, SOLUTION INTRA-ARTICULAR; INTRALESIONAL; INTRAMUSCULAR; INTRAVENOUS; SOFT TISSUE at 23:04

## 2022-01-01 RX ADMIN — FUROSEMIDE 40 MG: 10 INJECTION, SOLUTION INTRAVENOUS at 16:04

## 2022-01-01 RX ADMIN — Medication 120 MCG: at 12:55

## 2022-01-01 RX ADMIN — LIDOCAINE HYDROCHLORIDE 50 MG: 20 INJECTION, SOLUTION EPIDURAL; INFILTRATION; INTRACAUDAL; PERINEURAL at 12:06

## 2022-01-01 RX ADMIN — POLYETHYLENE GLYCOL 3350 17 G: 17 POWDER, FOR SOLUTION ORAL at 10:01

## 2022-01-01 RX ADMIN — PROPOFOL 25 MCG/KG/MIN: 10 INJECTION, EMULSION INTRAVENOUS at 05:55

## 2022-01-01 RX ADMIN — NYSTATIN 500000 UNITS: 100000 SUSPENSION ORAL at 08:49

## 2022-01-01 RX ADMIN — ALUMINUM HYDROXIDE, MAGNESIUM HYDROXIDE, AND SIMETHICONE 30 ML: 200; 200; 20 SUSPENSION ORAL at 03:14

## 2022-01-01 RX ADMIN — PHENYLEPHRINE HYDROCHLORIDE 200 MCG: 10 INJECTION INTRAVENOUS at 12:21

## 2022-01-01 RX ADMIN — SODIUM CHLORIDE, PRESERVATIVE FREE 40 MG: 5 INJECTION INTRAVENOUS at 08:18

## 2022-01-01 RX ADMIN — GUAIFENESIN 400 MG: 200 SOLUTION ORAL at 12:37

## 2022-01-01 RX ADMIN — SODIUM CHLORIDE, PRESERVATIVE FREE 10 ML: 5 INJECTION INTRAVENOUS at 13:50

## 2022-01-01 RX ADMIN — PROPOFOL 25 MCG/KG/MIN: 10 INJECTION, EMULSION INTRAVENOUS at 21:58

## 2022-01-01 RX ADMIN — SODIUM CHLORIDE 1000 ML: 9 INJECTION, SOLUTION INTRAVENOUS at 08:45

## 2022-01-01 RX ADMIN — SODIUM CHLORIDE, PRESERVATIVE FREE 10 ML: 5 INJECTION INTRAVENOUS at 21:12

## 2022-01-01 RX ADMIN — SODIUM CHLORIDE, PRESERVATIVE FREE 10 ML: 5 INJECTION INTRAVENOUS at 21:11

## 2022-01-01 RX ADMIN — POTASSIUM CHLORIDE: 2 INJECTION, SOLUTION, CONCENTRATE INTRAVENOUS at 12:10

## 2022-01-01 RX ADMIN — MIDAZOLAM 2 MG: 1 INJECTION INTRAMUSCULAR; INTRAVENOUS at 11:46

## 2022-01-01 RX ADMIN — DEXAMETHASONE SODIUM PHOSPHATE 2 MG: 4 INJECTION, SOLUTION INTRAMUSCULAR; INTRAVENOUS at 01:28

## 2022-01-01 RX ADMIN — DEXAMETHASONE SODIUM PHOSPHATE 8 MG: 4 INJECTION INTRA-ARTICULAR; INTRALESIONAL; INTRAMUSCULAR; INTRAVENOUS; SOFT TISSUE at 10:48

## 2022-01-01 RX ADMIN — IPRATROPIUM BROMIDE AND ALBUTEROL SULFATE 3 ML: .5; 3 SOLUTION RESPIRATORY (INHALATION) at 15:13

## 2022-01-01 RX ADMIN — FUROSEMIDE 40 MG: 10 INJECTION, SOLUTION INTRAVENOUS at 16:44

## 2022-01-01 RX ADMIN — ONDANSETRON 4 MG: 2 INJECTION INTRAMUSCULAR; INTRAVENOUS at 00:57

## 2022-01-01 RX ADMIN — PANTOPRAZOLE SODIUM 40 MG: 40 TABLET, DELAYED RELEASE ORAL at 05:55

## 2022-01-01 RX ADMIN — GUAIFENESIN 400 MG: 200 SOLUTION ORAL at 08:40

## 2022-01-01 RX ADMIN — LEVETIRACETAM 1000 MG: 100 INJECTION, SOLUTION, CONCENTRATE INTRAVENOUS at 10:01

## 2022-01-01 RX ADMIN — SODIUM CHLORIDE: 900 INJECTION, SOLUTION INTRAVENOUS at 14:42

## 2022-01-01 RX ADMIN — SODIUM CHLORIDE 500 ML: 9 INJECTION, SOLUTION INTRAVENOUS at 13:56

## 2022-01-01 RX ADMIN — NOREPINEPHRINE BITARTRATE 2 MCG/MIN: 1 INJECTION, SOLUTION, CONCENTRATE INTRAVENOUS at 03:14

## 2022-01-01 RX ADMIN — PROPOFOL 35 MCG/KG/MIN: 10 INJECTION, EMULSION INTRAVENOUS at 02:10

## 2022-01-01 RX ADMIN — SODIUM CHLORIDE, PRESERVATIVE FREE 10 ML: 5 INJECTION INTRAVENOUS at 22:26

## 2022-01-01 RX ADMIN — SODIUM CHLORIDE 1000 ML: 9 INJECTION, SOLUTION INTRAVENOUS at 10:09

## 2022-01-01 RX ADMIN — METHYLPREDNISOLONE SODIUM SUCCINATE 125 MG: 125 INJECTION, POWDER, FOR SOLUTION INTRAMUSCULAR; INTRAVENOUS at 09:16

## 2022-01-01 RX ADMIN — LEVOTHYROXINE SODIUM 75 MCG: 0.07 TABLET ORAL at 05:43

## 2022-01-01 RX ADMIN — POLYETHYLENE GLYCOL 3350, SODIUM SULFATE ANHYDROUS, SODIUM BICARBONATE, SODIUM CHLORIDE, POTASSIUM CHLORIDE 4000 ML: 236; 22.74; 6.74; 5.86; 2.97 POWDER, FOR SOLUTION ORAL at 21:00

## 2022-01-01 RX ADMIN — POTASSIUM CHLORIDE: 2 INJECTION, SOLUTION, CONCENTRATE INTRAVENOUS at 15:39

## 2022-01-01 RX ADMIN — HEPARIN 500 UNITS: 100 SYRINGE at 14:23

## 2022-01-01 RX ADMIN — LEVOFLOXACIN 750 MG: 5 INJECTION, SOLUTION INTRAVENOUS at 18:10

## 2022-01-01 RX ADMIN — Medication 500 UNITS: at 10:39

## 2022-01-01 RX ADMIN — ALBUMIN (HUMAN) 12.5 G: 12.5 INJECTION, SOLUTION INTRAVENOUS at 05:21

## 2022-01-01 RX ADMIN — MORPHINE SULFATE 2 MG: 2 INJECTION, SOLUTION INTRAMUSCULAR; INTRAVENOUS at 21:36

## 2022-01-01 RX ADMIN — OXYCODONE AND ACETAMINOPHEN 1 TABLET: 5; 325 TABLET ORAL at 02:15

## 2022-01-01 RX ADMIN — VASOPRESSIN 0.04 UNITS/MIN: 20 INJECTION PARENTERAL at 23:34

## 2022-01-01 RX ADMIN — SODIUM CHLORIDE, PRESERVATIVE FREE 10 ML: 5 INJECTION INTRAVENOUS at 13:07

## 2022-01-01 RX ADMIN — GUAIFENESIN 400 MG: 200 SOLUTION ORAL at 00:03

## 2022-01-01 RX ADMIN — VANCOMYCIN HYDROCHLORIDE 1250 MG: 1.25 INJECTION, POWDER, LYOPHILIZED, FOR SOLUTION INTRAVENOUS at 20:47

## 2022-01-01 RX ADMIN — PIPERACILLIN AND TAZOBACTAM 3.38 G: 3; .375 INJECTION, POWDER, LYOPHILIZED, FOR SOLUTION INTRAVENOUS at 06:54

## 2022-01-01 RX ADMIN — SODIUM CHLORIDE: 900 INJECTION, SOLUTION INTRAVENOUS at 12:21

## 2022-01-01 RX ADMIN — SODIUM CHLORIDE 75 ML/HR: 9 INJECTION, SOLUTION INTRAVENOUS at 18:05

## 2022-01-01 RX ADMIN — POLYETHYLENE GLYCOL 3350 17 G: 17 POWDER, FOR SOLUTION ORAL at 06:33

## 2022-01-01 RX ADMIN — POTASSIUM CHLORIDE 20 MEQ: 20 TABLET, EXTENDED RELEASE ORAL at 08:48

## 2022-01-01 RX ADMIN — FENTANYL CITRATE 25 MCG: 50 INJECTION, SOLUTION INTRAMUSCULAR; INTRAVENOUS at 19:39

## 2022-01-01 RX ADMIN — MINERAL OIL AND WHITE PETROLATUM: 150; 830 OINTMENT OPHTHALMIC at 20:43

## 2022-01-01 RX ADMIN — SODIUM CHLORIDE, PRESERVATIVE FREE 10 ML: 5 INJECTION INTRAVENOUS at 14:44

## 2022-01-01 RX ADMIN — FERROUS SULFATE TAB 325 MG (65 MG ELEMENTAL FE) 325 MG: 325 (65 FE) TAB at 16:24

## 2022-01-01 RX ADMIN — DEXAMETHASONE SODIUM PHOSPHATE 8 MG: 4 INJECTION INTRA-ARTICULAR; INTRALESIONAL; INTRAMUSCULAR; INTRAVENOUS; SOFT TISSUE at 11:26

## 2022-01-01 RX ADMIN — IPRATROPIUM BROMIDE AND ALBUTEROL SULFATE 3 ML: .5; 3 SOLUTION RESPIRATORY (INHALATION) at 20:58

## 2022-01-01 RX ADMIN — NYSTATIN 500000 UNITS: 100000 SUSPENSION ORAL at 21:46

## 2022-01-01 RX ADMIN — DEXMEDETOMIDINE HYDROCHLORIDE 1.5 MCG/KG/HR: 4 INJECTION, SOLUTION INTRAVENOUS at 14:20

## 2022-01-01 RX ADMIN — GUAIFENESIN 400 MG: 200 SOLUTION ORAL at 21:29

## 2022-01-01 RX ADMIN — GUAIFENESIN 400 MG: 200 SOLUTION ORAL at 05:07

## 2022-01-01 RX ADMIN — VASOPRESSIN 0.04 UNITS/MIN: 20 INJECTION PARENTERAL at 12:40

## 2022-01-01 RX ADMIN — SODIUM CHLORIDE, PRESERVATIVE FREE 80 MG: 5 INJECTION INTRAVENOUS at 20:45

## 2022-01-01 RX ADMIN — SODIUM CHLORIDE 150 MG: 900 INJECTION, SOLUTION INTRAVENOUS at 10:53

## 2022-01-01 RX ADMIN — SODIUM CHLORIDE, PRESERVATIVE FREE 10 ML: 5 INJECTION INTRAVENOUS at 21:13

## 2022-01-01 RX ADMIN — MORPHINE SULFATE 2 MG: 2 INJECTION, SOLUTION INTRAMUSCULAR; INTRAVENOUS at 11:53

## 2022-01-01 RX ADMIN — LEVETIRACETAM 1000 MG: 100 INJECTION, SOLUTION, CONCENTRATE INTRAVENOUS at 21:11

## 2022-01-01 RX ADMIN — PROPOFOL 50 MG: 10 INJECTION, EMULSION INTRAVENOUS at 11:57

## 2022-01-01 RX ADMIN — DEXAMETHASONE SODIUM PHOSPHATE 4 MG: 4 INJECTION, SOLUTION INTRAMUSCULAR; INTRAVENOUS at 21:12

## 2022-01-01 RX ADMIN — FERROUS SULFATE TAB 325 MG (65 MG ELEMENTAL FE) 325 MG: 325 (65 FE) TAB at 18:15

## 2022-01-01 RX ADMIN — IPRATROPIUM BROMIDE AND ALBUTEROL SULFATE 3 ML: .5; 3 SOLUTION RESPIRATORY (INHALATION) at 07:22

## 2022-01-01 RX ADMIN — LEVETIRACETAM 1000 MG: 100 INJECTION, SOLUTION, CONCENTRATE INTRAVENOUS at 08:26

## 2022-01-01 RX ADMIN — DEXAMETHASONE SODIUM PHOSPHATE 4 MG: 4 INJECTION, SOLUTION INTRAMUSCULAR; INTRAVENOUS at 21:00

## 2022-01-01 RX ADMIN — PANTOPRAZOLE SODIUM 40 MG: 40 TABLET, DELAYED RELEASE ORAL at 13:04

## 2022-01-01 RX ADMIN — SODIUM CHLORIDE, PRESERVATIVE FREE 10 ML: 5 INJECTION INTRAVENOUS at 06:00

## 2022-01-01 RX ADMIN — POTASSIUM CHLORIDE: 2 INJECTION, SOLUTION, CONCENTRATE INTRAVENOUS at 14:28

## 2022-01-01 RX ADMIN — GUAIFENESIN 400 MG: 200 SOLUTION ORAL at 12:28

## 2022-01-01 RX ADMIN — SODIUM CHLORIDE, PRESERVATIVE FREE 10 ML: 5 INJECTION INTRAVENOUS at 15:16

## 2022-01-01 RX ADMIN — ALUMINUM HYDROXIDE, MAGNESIUM HYDROXIDE, AND SIMETHICONE 15 ML: 200; 200; 20 SUSPENSION ORAL at 21:46

## 2022-01-01 RX ADMIN — IOPAMIDOL 100 ML: 755 INJECTION, SOLUTION INTRAVENOUS at 14:51

## 2022-01-01 RX ADMIN — POTASSIUM CHLORIDE 20 MEQ: 20 TABLET, EXTENDED RELEASE ORAL at 08:02

## 2022-01-01 RX ADMIN — PIPERACILLIN AND TAZOBACTAM 3.38 G: 3; .375 INJECTION, POWDER, LYOPHILIZED, FOR SOLUTION INTRAVENOUS at 14:43

## 2022-01-01 RX ADMIN — DEXAMETHASONE SODIUM PHOSPHATE 4 MG: 4 INJECTION, SOLUTION INTRAMUSCULAR; INTRAVENOUS at 03:02

## 2022-01-01 RX ADMIN — Medication 2 UNITS: at 18:09

## 2022-01-01 RX ADMIN — LEVOFLOXACIN 500 MG: 500 INJECTION, SOLUTION INTRAVENOUS at 15:38

## 2022-01-01 RX ADMIN — SENNOSIDES AND DOCUSATE SODIUM 1 TABLET: 50; 8.6 TABLET ORAL at 18:43

## 2022-01-01 RX ADMIN — METHYLPREDNISOLONE SODIUM SUCCINATE 125 MG: 125 INJECTION, POWDER, FOR SOLUTION INTRAMUSCULAR; INTRAVENOUS at 21:14

## 2022-01-01 RX ADMIN — SODIUM CHLORIDE 1000 ML: 9 INJECTION, SOLUTION INTRAVENOUS at 13:35

## 2022-01-01 RX ADMIN — IPRATROPIUM BROMIDE AND ALBUTEROL SULFATE 3 ML: .5; 3 SOLUTION RESPIRATORY (INHALATION) at 22:52

## 2022-01-01 RX ADMIN — DEXMEDETOMIDINE HYDROCHLORIDE 10 MCG: 100 INJECTION, SOLUTION INTRAVENOUS at 14:05

## 2022-01-01 RX ADMIN — LACTULOSE 30 ML: 20 SOLUTION ORAL at 16:27

## 2022-01-01 RX ADMIN — MORPHINE SULFATE 2 MG: 2 INJECTION, SOLUTION INTRAMUSCULAR; INTRAVENOUS at 05:25

## 2022-01-01 RX ADMIN — LEVOTHYROXINE SODIUM 125 MCG: 0.03 TABLET ORAL at 10:17

## 2022-01-01 RX ADMIN — SODIUM CHLORIDE 200 MG: 9 INJECTION, SOLUTION INTRAVENOUS at 11:49

## 2022-01-01 RX ADMIN — PIPERACILLIN AND TAZOBACTAM 3.38 G: 3; .375 INJECTION, POWDER, LYOPHILIZED, FOR SOLUTION INTRAVENOUS at 14:30

## 2022-01-01 RX ADMIN — HEPARIN 500 UNITS: 100 SYRINGE at 09:46

## 2022-01-01 RX ADMIN — HYDROMORPHONE HYDROCHLORIDE 0.6 MG: 2 INJECTION, SOLUTION INTRAMUSCULAR; INTRAVENOUS; SUBCUTANEOUS at 16:50

## 2022-01-01 RX ADMIN — SODIUM CHLORIDE, PRESERVATIVE FREE 10 ML: 5 INJECTION INTRAVENOUS at 08:57

## 2022-01-01 RX ADMIN — DEXAMETHASONE SODIUM PHOSPHATE 3 MG: 4 INJECTION, SOLUTION INTRAMUSCULAR; INTRAVENOUS at 02:11

## 2022-01-01 RX ADMIN — DEXAMETHASONE SODIUM PHOSPHATE 4 MG: 4 INJECTION, SOLUTION INTRAMUSCULAR; INTRAVENOUS at 14:08

## 2022-01-01 RX ADMIN — ENOXAPARIN SODIUM 40 MG: 100 INJECTION SUBCUTANEOUS at 11:27

## 2022-01-01 RX ADMIN — PIPERACILLIN AND TAZOBACTAM 3.38 G: 3; .375 INJECTION, POWDER, LYOPHILIZED, FOR SOLUTION INTRAVENOUS at 22:32

## 2022-01-01 RX ADMIN — PEGFILGRASTIM 6 MG: KIT SUBCUTANEOUS at 14:42

## 2022-01-01 RX ADMIN — CISPLATIN 186 MG: 1 INJECTION INTRAVENOUS at 13:13

## 2022-01-01 RX ADMIN — PIPERACILLIN AND TAZOBACTAM 3.38 G: 3; .375 INJECTION, POWDER, LYOPHILIZED, FOR SOLUTION INTRAVENOUS at 14:50

## 2022-01-01 RX ADMIN — SODIUM CHLORIDE, PRESERVATIVE FREE 10 ML: 5 INJECTION INTRAVENOUS at 12:58

## 2022-01-01 RX ADMIN — IRON SUCROSE 100 MG: 20 INJECTION, SOLUTION INTRAVENOUS at 08:49

## 2022-01-01 RX ADMIN — METHYLPREDNISOLONE SODIUM SUCCINATE 125 MG: 125 INJECTION, POWDER, FOR SOLUTION INTRAMUSCULAR; INTRAVENOUS at 03:47

## 2022-01-01 RX ADMIN — SODIUM CHLORIDE, PRESERVATIVE FREE 10 ML: 5 INJECTION INTRAVENOUS at 21:38

## 2022-01-01 RX ADMIN — MORPHINE SULFATE 2 MG: 2 INJECTION, SOLUTION INTRAMUSCULAR; INTRAVENOUS at 01:14

## 2022-01-01 RX ADMIN — VASOPRESSIN 0.04 UNITS/MIN: 20 INJECTION PARENTERAL at 22:46

## 2022-01-01 RX ADMIN — FLUOROURACIL 6696 MG: 50 INJECTION, SOLUTION INTRAVENOUS at 16:50

## 2022-01-01 RX ADMIN — ONDANSETRON 4 MG: 2 INJECTION INTRAMUSCULAR; INTRAVENOUS at 12:06

## 2022-01-01 RX ADMIN — SODIUM CHLORIDE 1000 ML: 9 INJECTION, SOLUTION INTRAVENOUS at 13:20

## 2022-01-01 RX ADMIN — NOREPINEPHRINE BITARTRATE 15 MCG/MIN: 1 INJECTION, SOLUTION, CONCENTRATE INTRAVENOUS at 14:31

## 2022-01-01 RX ADMIN — Medication 2 UNITS: at 17:46

## 2022-01-01 RX ADMIN — MORPHINE SULFATE 2 MG: 2 INJECTION, SOLUTION INTRAMUSCULAR; INTRAVENOUS at 12:30

## 2022-01-01 RX ADMIN — FUROSEMIDE 40 MG: 10 INJECTION, SOLUTION INTRAVENOUS at 23:31

## 2022-01-01 RX ADMIN — SODIUM CHLORIDE 2 G: 9 INJECTION INTRAMUSCULAR; INTRAVENOUS; SUBCUTANEOUS at 10:59

## 2022-01-01 RX ADMIN — DEXAMETHASONE SODIUM PHOSPHATE 4 MG: 4 INJECTION, SOLUTION INTRAMUSCULAR; INTRAVENOUS at 21:47

## 2022-01-01 RX ADMIN — PROPOFOL 50 MG: 10 INJECTION, EMULSION INTRAVENOUS at 11:54

## 2022-01-01 RX ADMIN — SODIUM CHLORIDE, PRESERVATIVE FREE 40 MG: 5 INJECTION INTRAVENOUS at 20:02

## 2022-01-01 RX ADMIN — MIDAZOLAM 10 MG/HR: 5 INJECTION, SOLUTION INTRAMUSCULAR; INTRAVENOUS at 08:27

## 2022-01-01 RX ADMIN — MIDAZOLAM 2 MG: 1 INJECTION, SOLUTION INTRAMUSCULAR; INTRAVENOUS at 11:13

## 2022-01-01 RX ADMIN — REMIFENTANIL HYDROCHLORIDE 0.2 MCG/KG/MIN: 1 INJECTION, POWDER, LYOPHILIZED, FOR SOLUTION INTRAVENOUS at 12:27

## 2022-01-01 RX ADMIN — SODIUM CHLORIDE, PRESERVATIVE FREE 10 ML: 5 INJECTION INTRAVENOUS at 05:29

## 2022-01-01 RX ADMIN — SODIUM CHLORIDE, PRESERVATIVE FREE 40 MG: 5 INJECTION INTRAVENOUS at 20:45

## 2022-01-01 RX ADMIN — METHYLPREDNISOLONE SODIUM SUCCINATE 125 MG: 125 INJECTION, POWDER, FOR SOLUTION INTRAMUSCULAR; INTRAVENOUS at 15:54

## 2022-01-01 RX ADMIN — METHYLPREDNISOLONE SODIUM SUCCINATE 125 MG: 125 INJECTION, POWDER, FOR SOLUTION INTRAMUSCULAR; INTRAVENOUS at 10:00

## 2022-01-01 RX ADMIN — POLYETHYLENE GLYCOL 3350 17 G: 17 POWDER, FOR SOLUTION ORAL at 08:31

## 2022-01-01 RX ADMIN — Medication 10 ML: at 10:30

## 2022-01-01 RX ADMIN — FENTANYL CITRATE 200 MCG/HR: 0.05 INJECTION, SOLUTION INTRAMUSCULAR; INTRAVENOUS at 13:05

## 2022-01-01 RX ADMIN — GUAIFENESIN 400 MG: 200 SOLUTION ORAL at 04:18

## 2022-01-01 RX ADMIN — DEXAMETHASONE SODIUM PHOSPHATE 6 MG: 4 INJECTION, SOLUTION INTRAMUSCULAR; INTRAVENOUS at 08:45

## 2022-01-01 RX ADMIN — SODIUM CHLORIDE 200 MG: 9 INJECTION, SOLUTION INTRAVENOUS at 12:26

## 2022-01-01 RX ADMIN — PROPOFOL 100 MG: 10 INJECTION, EMULSION INTRAVENOUS at 14:10

## 2022-01-01 RX ADMIN — Medication 10 ML: at 08:06

## 2022-01-01 RX ADMIN — SODIUM CHLORIDE, PRESERVATIVE FREE 10 ML: 5 INJECTION INTRAVENOUS at 12:22

## 2022-01-01 RX ADMIN — SODIUM CHLORIDE, PRESERVATIVE FREE 10 ML: 5 INJECTION INTRAVENOUS at 14:29

## 2022-01-01 RX ADMIN — Medication 500 UNITS: at 09:29

## 2022-01-01 RX ADMIN — DEXMEDETOMIDINE HYDROCHLORIDE 1.5 MCG/KG/HR: 4 INJECTION, SOLUTION INTRAVENOUS at 11:54

## 2022-01-01 RX ADMIN — ONDANSETRON 4 MG: 2 INJECTION INTRAMUSCULAR; INTRAVENOUS at 18:44

## 2022-01-01 RX ADMIN — SODIUM CHLORIDE 1000 ML: 9 INJECTION, SOLUTION INTRAVENOUS at 09:00

## 2022-01-01 RX ADMIN — GUAIFENESIN 400 MG: 200 SOLUTION ORAL at 00:07

## 2022-01-01 RX ADMIN — GLYCOPYRROLATE 0.4 MG: 0.2 INJECTION, SOLUTION INTRAMUSCULAR; INTRAVENOUS at 17:11

## 2022-01-01 RX ADMIN — MORPHINE SULFATE 2 MG: 2 INJECTION, SOLUTION INTRAMUSCULAR; INTRAVENOUS at 20:42

## 2022-01-01 RX ADMIN — SODIUM CHLORIDE 500 ML: 9 INJECTION, SOLUTION INTRAVENOUS at 10:30

## 2022-01-01 RX ADMIN — SODIUM CHLORIDE, PRESERVATIVE FREE 10 ML: 5 INJECTION INTRAVENOUS at 08:02

## 2022-01-01 RX ADMIN — LACTULOSE 30 ML: 20 SOLUTION ORAL at 20:23

## 2022-01-01 RX ADMIN — SODIUM CHLORIDE, PRESERVATIVE FREE 40 MG: 5 INJECTION INTRAVENOUS at 11:27

## 2022-01-01 RX ADMIN — Medication 3 UNITS: at 12:53

## 2022-01-01 RX ADMIN — FERROUS SULFATE TAB 325 MG (65 MG ELEMENTAL FE) 325 MG: 325 (65 FE) TAB at 19:57

## 2022-01-01 RX ADMIN — LEVETIRACETAM 1000 MG: 100 INJECTION, SOLUTION, CONCENTRATE INTRAVENOUS at 10:09

## 2022-01-01 RX ADMIN — SODIUM CHLORIDE, PRESERVATIVE FREE 40 MG: 5 INJECTION INTRAVENOUS at 20:40

## 2022-01-01 RX ADMIN — ROCURONIUM BROMIDE 10 MG: 10 SOLUTION INTRAVENOUS at 15:47

## 2022-01-01 RX ADMIN — SODIUM CHLORIDE, PRESERVATIVE FREE 10 ML: 5 INJECTION INTRAVENOUS at 21:01

## 2022-01-01 RX ADMIN — Medication 2 UNITS: at 11:17

## 2022-01-01 RX ADMIN — SODIUM CHLORIDE, PRESERVATIVE FREE 10 ML: 5 INJECTION INTRAVENOUS at 14:08

## 2022-01-01 RX ADMIN — NYSTATIN 500000 UNITS: 100000 SUSPENSION ORAL at 08:02

## 2022-01-01 RX ADMIN — MINERAL OIL AND WHITE PETROLATUM: 150; 830 OINTMENT OPHTHALMIC at 08:08

## 2022-01-01 RX ADMIN — LACTULOSE 30 ML: 20 SOLUTION ORAL at 15:58

## 2022-01-01 RX ADMIN — DEXAMETHASONE SODIUM PHOSPHATE 4 MG: 4 INJECTION, SOLUTION INTRA-ARTICULAR; INTRALESIONAL; INTRAMUSCULAR; INTRAVENOUS; SOFT TISSUE at 00:01

## 2022-01-01 RX ADMIN — PROPOFOL 25 MCG/KG/MIN: 10 INJECTION, EMULSION INTRAVENOUS at 14:53

## 2022-01-01 RX ADMIN — OXYCODONE AND ACETAMINOPHEN 1 TABLET: 5; 325 TABLET ORAL at 08:32

## 2022-01-01 RX ADMIN — OXYCODONE AND ACETAMINOPHEN 1 TABLET: 5; 325 TABLET ORAL at 05:30

## 2022-01-01 RX ADMIN — IPRATROPIUM BROMIDE AND ALBUTEROL SULFATE 3 ML: .5; 3 SOLUTION RESPIRATORY (INHALATION) at 09:00

## 2022-01-01 RX ADMIN — PROPOFOL 30 MG: 10 INJECTION, EMULSION INTRAVENOUS at 13:26

## 2022-01-01 RX ADMIN — PHENYLEPHRINE HYDROCHLORIDE 200 MCG: 10 INJECTION INTRAVENOUS at 12:16

## 2022-01-01 RX ADMIN — FENTANYL CITRATE 50 MCG: 50 INJECTION, SOLUTION INTRAMUSCULAR; INTRAVENOUS at 16:47

## 2022-01-01 RX ADMIN — NOREPINEPHRINE BITARTRATE 4 MCG/MIN: 1 INJECTION, SOLUTION, CONCENTRATE INTRAVENOUS at 10:18

## 2022-01-01 RX ADMIN — POLYETHYLENE GLYCOL 3350 17 G: 17 POWDER, FOR SOLUTION ORAL at 15:36

## 2022-01-01 RX ADMIN — LEVETIRACETAM 1 G: 100 INJECTION, SOLUTION INTRAVENOUS at 13:00

## 2022-01-01 RX ADMIN — Medication 500 UNITS: at 12:23

## 2022-01-01 RX ADMIN — GUAIFENESIN 400 MG: 200 SOLUTION ORAL at 20:45

## 2022-01-01 RX ADMIN — SENNOSIDES AND DOCUSATE SODIUM 1 TABLET: 50; 8.6 TABLET ORAL at 10:00

## 2022-01-01 RX ADMIN — PIPERACILLIN AND TAZOBACTAM 4.5 G: 4; .5 INJECTION, POWDER, LYOPHILIZED, FOR SOLUTION INTRAVENOUS at 08:43

## 2022-01-01 RX ADMIN — SODIUM CHLORIDE 100 MG: 9 INJECTION, SOLUTION INTRAVENOUS at 11:25

## 2022-01-01 RX ADMIN — METHYLPREDNISOLONE SODIUM SUCCINATE 125 MG: 125 INJECTION, POWDER, FOR SOLUTION INTRAMUSCULAR; INTRAVENOUS at 21:29

## 2022-01-01 RX ADMIN — LEVETIRACETAM 1000 MG: 100 INJECTION, SOLUTION, CONCENTRATE INTRAVENOUS at 20:02

## 2022-01-01 RX ADMIN — DEXAMETHASONE SODIUM PHOSPHATE 4 MG: 4 INJECTION, SOLUTION INTRAMUSCULAR; INTRAVENOUS at 08:00

## 2022-01-01 RX ADMIN — LEVETIRACETAM 1000 MG: 100 INJECTION, SOLUTION, CONCENTRATE INTRAVENOUS at 20:40

## 2022-01-01 RX ADMIN — PIPERACILLIN AND TAZOBACTAM 3.38 G: 3; .375 INJECTION, POWDER, LYOPHILIZED, FOR SOLUTION INTRAVENOUS at 06:26

## 2022-01-01 RX ADMIN — POLYETHYLENE GLYCOL 3350 17 G: 17 POWDER, FOR SOLUTION ORAL at 17:47

## 2022-01-01 RX ADMIN — IPRATROPIUM BROMIDE AND ALBUTEROL SULFATE 3 ML: .5; 3 SOLUTION RESPIRATORY (INHALATION) at 07:20

## 2022-01-01 RX ADMIN — PROPOFOL 50 MG: 10 INJECTION, EMULSION INTRAVENOUS at 10:34

## 2022-01-01 RX ADMIN — SODIUM CHLORIDE 25 ML/HR: 9 INJECTION, SOLUTION INTRAVENOUS at 09:48

## 2022-01-01 RX ADMIN — PANTOPRAZOLE SODIUM 40 MG: 40 TABLET, DELAYED RELEASE ORAL at 20:54

## 2022-01-01 RX ADMIN — OXYCODONE AND ACETAMINOPHEN 1 TABLET: 5; 325 TABLET ORAL at 08:44

## 2022-01-01 RX ADMIN — PIPERACILLIN AND TAZOBACTAM 3.38 G: 3; .375 INJECTION, POWDER, LYOPHILIZED, FOR SOLUTION INTRAVENOUS at 22:34

## 2022-01-01 RX ADMIN — LEVOTHYROXINE SODIUM 125 MCG: 0.03 TABLET ORAL at 08:05

## 2022-01-01 RX ADMIN — SODIUM CHLORIDE 200 MG: 9 INJECTION, SOLUTION INTRAVENOUS at 12:03

## 2022-01-01 RX ADMIN — ALUMINUM HYDROXIDE, MAGNESIUM HYDROXIDE, AND SIMETHICONE 30 ML: 200; 200; 20 SUSPENSION ORAL at 01:31

## 2022-01-01 RX ADMIN — METHYLPREDNISOLONE SODIUM SUCCINATE 125 MG: 125 INJECTION, POWDER, FOR SOLUTION INTRAMUSCULAR; INTRAVENOUS at 15:46

## 2022-01-01 RX ADMIN — NOREPINEPHRINE BITARTRATE 10 MCG/MIN: 1 INJECTION, SOLUTION, CONCENTRATE INTRAVENOUS at 15:06

## 2022-01-01 RX ADMIN — ALUMINUM HYDROXIDE, MAGNESIUM HYDROXIDE, AND SIMETHICONE 15 ML: 200; 200; 20 SUSPENSION ORAL at 20:54

## 2022-01-01 RX ADMIN — LORAZEPAM 1 MG: 1 TABLET ORAL at 15:49

## 2022-01-01 RX ADMIN — GUAIFENESIN 400 MG: 200 SOLUTION ORAL at 16:04

## 2022-01-01 RX ADMIN — DEXAMETHASONE SODIUM PHOSPHATE 8 MG: 4 INJECTION INTRA-ARTICULAR; INTRALESIONAL; INTRAMUSCULAR; INTRAVENOUS; SOFT TISSUE at 11:56

## 2022-01-01 RX ADMIN — SODIUM CHLORIDE, PRESERVATIVE FREE 10 ML: 5 INJECTION INTRAVENOUS at 09:29

## 2022-01-01 RX ADMIN — OXYCODONE AND ACETAMINOPHEN 1 TABLET: 5; 325 TABLET ORAL at 22:52

## 2022-01-01 RX ADMIN — CALCIUM CARBONATE 400 MG: 500 TABLET, CHEWABLE ORAL at 17:00

## 2022-01-01 RX ADMIN — SODIUM ZIRCONIUM CYCLOSILICATE 10 G: 10 POWDER, FOR SUSPENSION ORAL at 03:14

## 2022-01-01 RX ADMIN — SODIUM CHLORIDE, PRESERVATIVE FREE 10 ML: 5 INJECTION INTRAVENOUS at 09:02

## 2022-01-01 RX ADMIN — SODIUM CHLORIDE, PRESERVATIVE FREE 10 ML: 5 INJECTION INTRAVENOUS at 05:08

## 2022-01-01 RX ADMIN — MORPHINE SULFATE 2 MG: 2 INJECTION, SOLUTION INTRAMUSCULAR; INTRAVENOUS at 05:41

## 2022-01-01 RX ADMIN — Medication 2 UNITS: at 17:50

## 2022-01-01 RX ADMIN — SODIUM CHLORIDE, PRESERVATIVE FREE 40 MG: 5 INJECTION INTRAVENOUS at 08:22

## 2022-01-01 RX ADMIN — SODIUM CHLORIDE, PRESERVATIVE FREE 10 ML: 5 INJECTION INTRAVENOUS at 10:39

## 2022-01-01 RX ADMIN — SODIUM CHLORIDE 1000 ML: 9 INJECTION, SOLUTION INTRAVENOUS at 09:38

## 2022-01-01 RX ADMIN — DEXAMETHASONE SODIUM PHOSPHATE 2 MG: 4 INJECTION, SOLUTION INTRAMUSCULAR; INTRAVENOUS at 20:46

## 2022-01-01 RX ADMIN — PROPOFOL 30 MCG/KG/MIN: 10 INJECTION, EMULSION INTRAVENOUS at 05:40

## 2022-01-01 RX ADMIN — GUAIFENESIN 400 MG: 200 SOLUTION ORAL at 15:41

## 2022-01-01 RX ADMIN — PHENYLEPHRINE HYDROCHLORIDE 400 MCG: 10 INJECTION INTRAVENOUS at 12:05

## 2022-01-01 RX ADMIN — LEVOFLOXACIN 500 MG: 500 TABLET, FILM COATED ORAL at 17:19

## 2022-01-01 RX ADMIN — MIDAZOLAM 6 MG/HR: 5 INJECTION, SOLUTION INTRAMUSCULAR; INTRAVENOUS at 02:19

## 2022-01-01 RX ADMIN — SODIUM CHLORIDE, PRESERVATIVE FREE 40 MG: 5 INJECTION INTRAVENOUS at 08:26

## 2022-01-01 RX ADMIN — ROCURONIUM BROMIDE 20 MG: 10 INJECTION, SOLUTION INTRAVENOUS at 13:54

## 2022-01-01 RX ADMIN — IPRATROPIUM BROMIDE AND ALBUTEROL SULFATE 3 ML: .5; 3 SOLUTION RESPIRATORY (INHALATION) at 12:26

## 2022-01-01 RX ADMIN — NYSTATIN 500000 UNITS: 100000 SUSPENSION ORAL at 21:38

## 2022-01-01 RX ADMIN — DEXMEDETOMIDINE HYDROCHLORIDE 1.3 MCG/KG/HR: 4 INJECTION, SOLUTION INTRAVENOUS at 03:43

## 2022-01-01 RX ADMIN — Medication 80 MCG: at 12:13

## 2022-01-01 RX ADMIN — IPRATROPIUM BROMIDE AND ALBUTEROL SULFATE 3 ML: .5; 3 SOLUTION RESPIRATORY (INHALATION) at 04:25

## 2022-01-01 RX ADMIN — OXYCODONE AND ACETAMINOPHEN 1 TABLET: 5; 325 TABLET ORAL at 21:54

## 2022-01-01 RX ADMIN — POTASSIUM CHLORIDE 20 MEQ: 20 TABLET, EXTENDED RELEASE ORAL at 17:44

## 2022-01-01 RX ADMIN — IOPAMIDOL 100 ML: 755 INJECTION, SOLUTION INTRAVENOUS at 17:56

## 2022-01-01 RX ADMIN — IPRATROPIUM BROMIDE AND ALBUTEROL SULFATE 3 ML: .5; 3 SOLUTION RESPIRATORY (INHALATION) at 05:04

## 2022-01-01 RX ADMIN — FERROUS SULFATE TAB 325 MG (65 MG ELEMENTAL FE) 325 MG: 325 (65 FE) TAB at 16:27

## 2022-01-01 RX ADMIN — FERROUS SULFATE TAB 325 MG (65 MG ELEMENTAL FE) 325 MG: 325 (65 FE) TAB at 08:30

## 2022-01-01 RX ADMIN — DEXAMETHASONE SODIUM PHOSPHATE 3 MG: 4 INJECTION, SOLUTION INTRAMUSCULAR; INTRAVENOUS at 13:33

## 2022-01-01 RX ADMIN — SODIUM CHLORIDE 200 MG: 9 INJECTION, SOLUTION INTRAVENOUS at 10:03

## 2022-01-01 RX ADMIN — SENNOSIDES AND DOCUSATE SODIUM 1 TABLET: 50; 8.6 TABLET ORAL at 23:18

## 2022-01-01 RX ADMIN — FAMOTIDINE 20 MG: 10 INJECTION, SOLUTION INTRAVENOUS at 21:46

## 2022-01-01 RX ADMIN — LEVOTHYROXINE SODIUM 94 MCG: 20 INJECTION, SOLUTION INTRAVENOUS at 12:28

## 2022-01-01 RX ADMIN — DEXTROSE MONOHYDRATE 150 MG: 50 INJECTION, SOLUTION INTRAVENOUS at 00:51

## 2022-01-01 RX ADMIN — PANTOPRAZOLE SODIUM 40 MG: 40 TABLET, DELAYED RELEASE ORAL at 21:16

## 2022-01-01 RX ADMIN — NOREPINEPHRINE BITARTRATE 7 MCG/MIN: 1 INJECTION, SOLUTION, CONCENTRATE INTRAVENOUS at 07:07

## 2022-01-01 RX ADMIN — SODIUM CHLORIDE, PRESERVATIVE FREE 40 MG: 5 INJECTION INTRAVENOUS at 21:37

## 2022-01-01 RX ADMIN — MINERAL OIL AND WHITE PETROLATUM: 150; 830 OINTMENT OPHTHALMIC at 12:22

## 2022-01-01 RX ADMIN — LEVOFLOXACIN 500 MG: 500 INJECTION, SOLUTION INTRAVENOUS at 15:30

## 2022-01-01 RX ADMIN — VANCOMYCIN HYDROCHLORIDE 1250 MG: 1.25 INJECTION, POWDER, LYOPHILIZED, FOR SOLUTION INTRAVENOUS at 06:34

## 2022-01-01 RX ADMIN — TRAMADOL HYDROCHLORIDE 50 MG: 50 TABLET, COATED ORAL at 20:51

## 2022-01-01 RX ADMIN — SODIUM CHLORIDE, PRESERVATIVE FREE 10 ML: 5 INJECTION INTRAVENOUS at 14:46

## 2022-01-01 RX ADMIN — ROCURONIUM BROMIDE 10 MG: 10 SOLUTION INTRAVENOUS at 13:31

## 2022-01-01 RX ADMIN — MIDAZOLAM 10 MG/HR: 5 INJECTION, SOLUTION INTRAMUSCULAR; INTRAVENOUS at 08:06

## 2022-01-01 RX ADMIN — SODIUM CHLORIDE, POTASSIUM CHLORIDE, SODIUM LACTATE AND CALCIUM CHLORIDE 75 ML/HR: 600; 310; 30; 20 INJECTION, SOLUTION INTRAVENOUS at 06:30

## 2022-01-01 RX ADMIN — SODIUM CHLORIDE, PRESERVATIVE FREE 10 ML: 5 INJECTION INTRAVENOUS at 15:39

## 2022-01-01 RX ADMIN — DEXAMETHASONE SODIUM PHOSPHATE 4 MG: 4 INJECTION, SOLUTION INTRAMUSCULAR; INTRAVENOUS at 08:18

## 2022-01-01 RX ADMIN — DEXAMETHASONE SODIUM PHOSPHATE 4 MG: 4 INJECTION, SOLUTION INTRAMUSCULAR; INTRAVENOUS at 21:37

## 2022-01-01 RX ADMIN — LEVETIRACETAM 1000 MG: 100 INJECTION, SOLUTION, CONCENTRATE INTRAVENOUS at 20:46

## 2022-01-01 RX ADMIN — FENTANYL CITRATE 300 MCG/HR: 0.05 INJECTION, SOLUTION INTRAMUSCULAR; INTRAVENOUS at 13:37

## 2022-01-01 RX ADMIN — Medication 200 MCG: at 13:13

## 2022-01-01 RX ADMIN — GUAIFENESIN 400 MG: 200 SOLUTION ORAL at 03:53

## 2022-01-01 RX ADMIN — FENTANYL CITRATE 200 MCG/HR: 0.05 INJECTION, SOLUTION INTRAMUSCULAR; INTRAVENOUS at 14:54

## 2022-01-01 RX ADMIN — TRAMADOL HYDROCHLORIDE 50 MG: 50 TABLET, COATED ORAL at 09:18

## 2022-01-01 RX ADMIN — Medication 2 UNITS: at 06:40

## 2022-01-01 RX ADMIN — SODIUM CHLORIDE, PRESERVATIVE FREE 40 ML: 5 INJECTION INTRAVENOUS at 06:06

## 2022-01-01 RX ADMIN — SODIUM CHLORIDE 100 MG: 9 INJECTION, SOLUTION INTRAVENOUS at 11:40

## 2022-01-01 RX ADMIN — POTASSIUM CHLORIDE: 2 INJECTION, SOLUTION, CONCENTRATE INTRAVENOUS at 11:17

## 2022-01-01 RX ADMIN — FOSAPREPITANT 150 MG: 150 INJECTION, POWDER, LYOPHILIZED, FOR SOLUTION INTRAVENOUS at 11:58

## 2022-01-01 RX ADMIN — PEGFILGRASTIM 6 MG: KIT SUBCUTANEOUS at 15:55

## 2022-01-01 RX ADMIN — Medication 2 UNITS: at 06:10

## 2022-01-01 RX ADMIN — SODIUM CHLORIDE 200 MG: 9 INJECTION, SOLUTION INTRAVENOUS at 10:02

## 2022-01-01 RX ADMIN — SENNOSIDES 8.6 MG: 8.6 TABLET, COATED ORAL at 08:28

## 2022-01-01 RX ADMIN — DEXAMETHASONE SODIUM PHOSPHATE 4 MG: 4 INJECTION, SOLUTION INTRA-ARTICULAR; INTRALESIONAL; INTRAMUSCULAR; INTRAVENOUS; SOFT TISSUE at 05:23

## 2022-01-01 RX ADMIN — SODIUM CHLORIDE, PRESERVATIVE FREE 10 ML: 5 INJECTION INTRAVENOUS at 22:34

## 2022-01-01 RX ADMIN — ROCURONIUM BROMIDE 10 MG: 10 SOLUTION INTRAVENOUS at 14:47

## 2022-01-01 RX ADMIN — ONDANSETRON 4 MG: 4 TABLET, ORALLY DISINTEGRATING ORAL at 09:18

## 2022-01-01 RX ADMIN — SODIUM CHLORIDE, PRESERVATIVE FREE 10 ML: 5 INJECTION INTRAVENOUS at 09:25

## 2022-01-01 RX ADMIN — SODIUM CHLORIDE 50 ML/HR: 9 INJECTION, SOLUTION INTRAVENOUS at 16:32

## 2022-01-01 RX ADMIN — MIDAZOLAM 10 MG/HR: 5 INJECTION, SOLUTION INTRAMUSCULAR; INTRAVENOUS at 15:54

## 2022-01-01 RX ADMIN — PROPOFOL 100 MG: 10 INJECTION, EMULSION INTRAVENOUS at 14:08

## 2022-01-01 RX ADMIN — ACETAMINOPHEN 650 MG: 325 TABLET ORAL at 22:31

## 2022-01-01 RX ADMIN — GUAIFENESIN 400 MG: 200 SOLUTION ORAL at 20:23

## 2022-01-01 RX ADMIN — FERROUS SULFATE TAB 325 MG (65 MG ELEMENTAL FE) 325 MG: 325 (65 FE) TAB at 11:26

## 2022-01-01 RX ADMIN — TRAMADOL HYDROCHLORIDE 50 MG: 50 TABLET, COATED ORAL at 12:06

## 2022-01-01 RX ADMIN — OXYCODONE AND ACETAMINOPHEN 1 TABLET: 5; 325 TABLET ORAL at 03:12

## 2022-01-01 RX ADMIN — TRAMADOL HYDROCHLORIDE 50 MG: 50 TABLET, COATED ORAL at 15:25

## 2022-01-01 RX ADMIN — LEVETIRACETAM 1000 MG: 100 INJECTION, SOLUTION, CONCENTRATE INTRAVENOUS at 08:43

## 2022-01-01 RX ADMIN — PANTOPRAZOLE SODIUM 40 MG: 40 TABLET, DELAYED RELEASE ORAL at 10:17

## 2022-01-01 RX ADMIN — GUAIFENESIN 400 MG: 200 SOLUTION ORAL at 11:17

## 2022-01-01 RX ADMIN — PROPOFOL 50 MG: 10 INJECTION, EMULSION INTRAVENOUS at 12:14

## 2022-01-01 RX ADMIN — NYSTATIN 500000 UNITS: 100000 SUSPENSION ORAL at 14:04

## 2022-01-01 RX ADMIN — SENNOSIDES AND DOCUSATE SODIUM 1 TABLET: 50; 8.6 TABLET ORAL at 08:21

## 2022-01-01 RX ADMIN — IRON SUCROSE 100 MG: 20 INJECTION, SOLUTION INTRAVENOUS at 21:37

## 2022-01-01 RX ADMIN — SENNOSIDES AND DOCUSATE SODIUM 1 TABLET: 50; 8.6 TABLET ORAL at 08:22

## 2022-01-01 RX ADMIN — SODIUM CHLORIDE 1000 ML: 900 INJECTION, SOLUTION INTRAVENOUS at 09:35

## 2022-01-01 RX ADMIN — SODIUM CHLORIDE, PRESERVATIVE FREE 30 ML: 5 INJECTION INTRAVENOUS at 15:10

## 2022-01-01 RX ADMIN — FLUOROURACIL 5952 MG: 50 INJECTION, SOLUTION INTRAVENOUS at 16:55

## 2022-01-01 RX ADMIN — SODIUM CHLORIDE, PRESERVATIVE FREE 10 ML: 5 INJECTION INTRAVENOUS at 13:55

## 2022-01-01 RX ADMIN — SODIUM CHLORIDE, PRESERVATIVE FREE 10 ML: 5 INJECTION INTRAVENOUS at 09:24

## 2022-01-01 RX ADMIN — ALBUMIN (HUMAN) 250 ML: 12.5 INJECTION, SOLUTION INTRAVENOUS at 14:26

## 2022-01-01 RX ADMIN — DEXAMETHASONE SODIUM PHOSPHATE 3 MG: 4 INJECTION, SOLUTION INTRAMUSCULAR; INTRAVENOUS at 08:26

## 2022-01-01 RX ADMIN — PIPERACILLIN AND TAZOBACTAM 3.38 G: 3; .375 INJECTION, POWDER, LYOPHILIZED, FOR SOLUTION INTRAVENOUS at 06:40

## 2022-01-01 RX ADMIN — FERROUS SULFATE TAB 325 MG (65 MG ELEMENTAL FE) 325 MG: 325 (65 FE) TAB at 10:17

## 2022-01-01 RX ADMIN — HEPARIN 500 UNITS: 100 SYRINGE at 10:35

## 2022-01-01 RX ADMIN — Medication 500 UNITS: at 09:24

## 2022-01-01 RX ADMIN — SODIUM CHLORIDE 50 ML/HR: 9 INJECTION, SOLUTION INTRAVENOUS at 05:28

## 2022-01-01 RX ADMIN — CISPLATIN 186 MG: 1 INJECTION INTRAVENOUS at 12:23

## 2022-01-01 RX ADMIN — GUAIFENESIN 400 MG: 200 SOLUTION ORAL at 15:48

## 2022-01-01 RX ADMIN — LEVOFLOXACIN 750 MG: 5 INJECTION, SOLUTION INTRAVENOUS at 10:49

## 2022-01-01 RX ADMIN — IPRATROPIUM BROMIDE AND ALBUTEROL SULFATE 3 ML: .5; 3 SOLUTION RESPIRATORY (INHALATION) at 04:38

## 2022-01-01 RX ADMIN — SODIUM CHLORIDE, PRESERVATIVE FREE 10 ML: 5 INJECTION INTRAVENOUS at 05:59

## 2022-01-01 RX ADMIN — GUAIFENESIN 400 MG: 200 SOLUTION ORAL at 09:02

## 2022-01-01 RX ADMIN — IOPAMIDOL 100 ML: 755 INJECTION, SOLUTION INTRAVENOUS at 13:28

## 2022-01-01 RX ADMIN — PROPOFOL 35 MCG/KG/MIN: 10 INJECTION, EMULSION INTRAVENOUS at 07:29

## 2022-01-01 RX ADMIN — POLYETHYLENE GLYCOL 3350 17 G: 17 POWDER, FOR SOLUTION ORAL at 17:07

## 2022-01-01 RX ADMIN — MINERAL OIL AND WHITE PETROLATUM: 150; 830 OINTMENT OPHTHALMIC at 20:28

## 2022-01-01 RX ADMIN — LEVETIRACETAM 1000 MG: 100 INJECTION, SOLUTION, CONCENTRATE INTRAVENOUS at 08:07

## 2022-01-01 RX ADMIN — ALUMINUM HYDROXIDE, MAGNESIUM HYDROXIDE, AND SIMETHICONE 30 ML: 200; 200; 20 SUSPENSION ORAL at 09:37

## 2022-01-01 RX ADMIN — SODIUM CHLORIDE 250 ML: 9 INJECTION, SOLUTION INTRAVENOUS at 11:53

## 2022-01-01 RX ADMIN — SODIUM CHLORIDE, PRESERVATIVE FREE 10 ML: 5 INJECTION INTRAVENOUS at 05:12

## 2022-01-01 RX ADMIN — LEVOFLOXACIN 750 MG: 5 INJECTION, SOLUTION INTRAVENOUS at 11:31

## 2022-01-01 RX ADMIN — MIDAZOLAM 10 MG/HR: 5 INJECTION, SOLUTION INTRAMUSCULAR; INTRAVENOUS at 14:13

## 2022-01-01 RX ADMIN — GUAIFENESIN 400 MG: 200 SOLUTION ORAL at 08:21

## 2022-01-01 RX ADMIN — IOPAMIDOL 100 ML: 755 INJECTION, SOLUTION INTRAVENOUS at 11:19

## 2022-01-01 RX ADMIN — Medication 120 MCG: at 12:49

## 2022-01-01 RX ADMIN — DIATRIZOATE MEGLUMINE AND DIATRIZOATE SODIUM 30 ML: 660; 100 LIQUID ORAL; RECTAL at 09:37

## 2022-01-01 RX ADMIN — SODIUM CHLORIDE 75 ML/HR: 9 INJECTION, SOLUTION INTRAVENOUS at 20:51

## 2022-01-01 RX ADMIN — TRAMADOL HYDROCHLORIDE 50 MG: 50 TABLET, COATED ORAL at 21:16

## 2022-01-01 RX ADMIN — ALBUMIN HUMAN 12.5 G: 50 SOLUTION INTRAVENOUS at 05:21

## 2022-01-01 RX ADMIN — MIDAZOLAM 6 MG/HR: 5 INJECTION, SOLUTION INTRAMUSCULAR; INTRAVENOUS at 18:32

## 2022-01-01 RX ADMIN — DEXAMETHASONE SODIUM PHOSPHATE 2 MG: 4 INJECTION, SOLUTION INTRAMUSCULAR; INTRAVENOUS at 08:18

## 2022-01-01 RX ADMIN — Medication 500 UNITS: at 14:42

## 2022-01-01 RX ADMIN — SODIUM CHLORIDE, PRESERVATIVE FREE 10 ML: 5 INJECTION INTRAVENOUS at 22:06

## 2022-01-01 RX ADMIN — DEXAMETHASONE SODIUM PHOSPHATE 10 MG: 4 INJECTION, SOLUTION INTRAMUSCULAR; INTRAVENOUS at 13:00

## 2022-01-01 RX ADMIN — FLUOROURACIL 7440 MG: 50 INJECTION, SOLUTION INTRAVENOUS at 15:36

## 2022-01-01 RX ADMIN — POLYETHYLENE GLYCOL 3350 17 G: 17 POWDER, FOR SOLUTION ORAL at 17:59

## 2022-01-01 RX ADMIN — FERROUS SULFATE TAB 325 MG (65 MG ELEMENTAL FE) 325 MG: 325 (65 FE) TAB at 16:26

## 2022-01-01 RX ADMIN — NOREPINEPHRINE BITARTRATE 30 MCG/MIN: 1 INJECTION, SOLUTION, CONCENTRATE INTRAVENOUS at 20:37

## 2022-01-01 RX ADMIN — ROCURONIUM BROMIDE 30 MG: 10 SOLUTION INTRAVENOUS at 12:12

## 2022-01-01 RX ADMIN — GUAIFENESIN 400 MG: 200 SOLUTION ORAL at 17:11

## 2022-01-01 RX ADMIN — Medication: at 09:20

## 2022-01-01 RX ADMIN — PIPERACILLIN AND TAZOBACTAM 3.38 G: 3; .375 INJECTION, POWDER, LYOPHILIZED, FOR SOLUTION INTRAVENOUS at 14:31

## 2022-01-01 RX ADMIN — FERROUS SULFATE TAB 325 MG (65 MG ELEMENTAL FE) 325 MG: 325 (65 FE) TAB at 08:35

## 2022-01-01 RX ADMIN — SENNOSIDES AND DOCUSATE SODIUM 1 TABLET: 50; 8.6 TABLET ORAL at 10:17

## 2022-01-01 RX ADMIN — PIPERACILLIN AND TAZOBACTAM 3.38 G: 3; .375 INJECTION, POWDER, LYOPHILIZED, FOR SOLUTION INTRAVENOUS at 06:11

## 2022-01-01 RX ADMIN — ACETAMINOPHEN 650 MG: 325 TABLET ORAL at 01:30

## 2022-01-01 RX ADMIN — AMOXICILLIN AND CLAVULANATE POTASSIUM 1 TABLET: 875; 125 TABLET, FILM COATED ORAL at 22:52

## 2022-01-01 RX ADMIN — SODIUM CHLORIDE, PRESERVATIVE FREE 40 MG: 5 INJECTION INTRAVENOUS at 08:08

## 2022-01-01 RX ADMIN — PROPOFOL 50 MG: 10 INJECTION, EMULSION INTRAVENOUS at 13:21

## 2022-01-01 RX ADMIN — FENTANYL CITRATE 300 MCG/HR: 0.05 INJECTION, SOLUTION INTRAMUSCULAR; INTRAVENOUS at 08:29

## 2022-01-01 RX ADMIN — LEVOFLOXACIN 500 MG: 500 INJECTION, SOLUTION INTRAVENOUS at 15:13

## 2022-01-01 RX ADMIN — PIPERACILLIN AND TAZOBACTAM 3.38 G: 3; .375 INJECTION, POWDER, LYOPHILIZED, FOR SOLUTION INTRAVENOUS at 23:27

## 2022-01-01 RX ADMIN — DEXAMETHASONE SODIUM PHOSPHATE 4 MG: 4 INJECTION, SOLUTION INTRAMUSCULAR; INTRAVENOUS at 02:11

## 2022-01-01 RX ADMIN — SODIUM CHLORIDE, PRESERVATIVE FREE 40 MG: 5 INJECTION INTRAVENOUS at 08:29

## 2022-01-01 RX ADMIN — SODIUM CHLORIDE, PRESERVATIVE FREE 2 G: 5 INJECTION INTRAVENOUS at 10:47

## 2022-01-01 RX ADMIN — SODIUM CHLORIDE 75 ML/HR: 9 INJECTION, SOLUTION INTRAVENOUS at 17:36

## 2022-01-01 RX ADMIN — IPRATROPIUM BROMIDE AND ALBUTEROL SULFATE 3 ML: .5; 3 SOLUTION RESPIRATORY (INHALATION) at 02:19

## 2022-01-01 RX ADMIN — SODIUM CHLORIDE, PRESERVATIVE FREE 10 ML: 5 INJECTION INTRAVENOUS at 15:38

## 2022-01-01 RX ADMIN — SODIUM CHLORIDE, PRESERVATIVE FREE 10 ML: 5 INJECTION INTRAVENOUS at 15:20

## 2022-01-01 RX ADMIN — GUAIFENESIN 400 MG: 200 SOLUTION ORAL at 11:40

## 2022-01-01 RX ADMIN — PHENYLEPHRINE HYDROCHLORIDE 400 MCG: 10 INJECTION INTRAVENOUS at 12:01

## 2022-01-01 RX ADMIN — NOREPINEPHRINE BITARTRATE 21 MCG/MIN: 1 INJECTION, SOLUTION, CONCENTRATE INTRAVENOUS at 22:45

## 2022-01-01 RX ADMIN — SODIUM CHLORIDE 500 ML: 9 INJECTION, SOLUTION INTRAVENOUS at 14:23

## 2022-01-01 RX ADMIN — CEFAZOLIN 2 G: 330 INJECTION, POWDER, FOR SOLUTION INTRAMUSCULAR; INTRAVENOUS at 13:00

## 2022-01-01 RX ADMIN — HEPARIN 500 UNITS: 100 SYRINGE at 14:57

## 2022-01-01 RX ADMIN — DEXAMETHASONE SODIUM PHOSPHATE 2 MG: 4 INJECTION, SOLUTION INTRAMUSCULAR; INTRAVENOUS at 13:51

## 2022-01-01 RX ADMIN — FUROSEMIDE 40 MG: 10 INJECTION, SOLUTION INTRAVENOUS at 10:26

## 2022-01-01 RX ADMIN — FENTANYL CITRATE 200 MCG/HR: 0.05 INJECTION, SOLUTION INTRAMUSCULAR; INTRAVENOUS at 20:38

## 2022-01-01 RX ADMIN — FUROSEMIDE 20 MG: 10 INJECTION, SOLUTION INTRAMUSCULAR; INTRAVENOUS at 13:51

## 2022-01-01 RX ADMIN — HEPARIN 500 UNITS: 100 SYRINGE at 10:30

## 2022-01-01 RX ADMIN — Medication 80 MCG: at 12:53

## 2022-01-01 RX ADMIN — LEVETIRACETAM 1000 MG: 100 INJECTION, SOLUTION, CONCENTRATE INTRAVENOUS at 20:48

## 2022-01-01 RX ADMIN — CEFEPIME 2 G: 2 INJECTION, POWDER, FOR SOLUTION INTRAVENOUS at 23:01

## 2022-01-01 RX ADMIN — SODIUM CHLORIDE, PRESERVATIVE FREE 40 MG: 5 INJECTION INTRAVENOUS at 20:48

## 2022-01-01 RX ADMIN — NYSTATIN 500000 UNITS: 100000 SUSPENSION ORAL at 08:47

## 2022-01-01 RX ADMIN — PALONOSETRON HYDROCHLORIDE 0.25 MG: 0.25 INJECTION INTRAVENOUS at 10:50

## 2022-01-01 RX ADMIN — SODIUM CHLORIDE, PRESERVATIVE FREE 10 ML: 5 INJECTION INTRAVENOUS at 16:29

## 2022-01-01 RX ADMIN — ALBUMIN (HUMAN) 25 G: 12.5 INJECTION, SOLUTION INTRAVENOUS at 20:45

## 2022-01-01 RX ADMIN — LEVOTHYROXINE SODIUM 75 MCG: 0.03 TABLET ORAL at 11:26

## 2022-01-01 RX ADMIN — METOCLOPRAMIDE 5 MG: 5 INJECTION, SOLUTION INTRAMUSCULAR; INTRAVENOUS at 22:01

## 2022-01-01 RX ADMIN — LEVOTHYROXINE SODIUM 125 MCG: 0.07 TABLET ORAL at 06:59

## 2022-01-01 RX ADMIN — POLYETHYLENE GLYCOL 3350 17 G: 17 POWDER, FOR SOLUTION ORAL at 08:22

## 2022-01-01 RX ADMIN — NYSTATIN 500000 UNITS: 100000 SUSPENSION ORAL at 17:51

## 2022-01-01 RX ADMIN — PIPERACILLIN AND TAZOBACTAM 3.38 G: 3; .375 INJECTION, POWDER, LYOPHILIZED, FOR SOLUTION INTRAVENOUS at 14:56

## 2022-01-01 RX ADMIN — PANTOPRAZOLE SODIUM 40 MG: 40 TABLET, DELAYED RELEASE ORAL at 19:57

## 2022-01-01 RX ADMIN — SODIUM CHLORIDE 25 ML/HR: 9 INJECTION, SOLUTION INTRAVENOUS at 09:51

## 2022-01-01 RX ADMIN — GUAIFENESIN 400 MG: 200 SOLUTION ORAL at 12:27

## 2022-01-01 RX ADMIN — GUAIFENESIN 400 MG: 200 SOLUTION ORAL at 19:07

## 2022-01-01 RX ADMIN — MIDAZOLAM 9 MG/HR: 5 INJECTION, SOLUTION INTRAMUSCULAR; INTRAVENOUS at 19:04

## 2022-01-01 RX ADMIN — GUAIFENESIN 400 MG: 200 SOLUTION ORAL at 12:40

## 2022-01-01 RX ADMIN — SODIUM CHLORIDE, PRESERVATIVE FREE 40 MG: 5 INJECTION INTRAVENOUS at 20:41

## 2022-01-01 RX ADMIN — SODIUM CHLORIDE, PRESERVATIVE FREE 10 ML: 5 INJECTION INTRAVENOUS at 13:51

## 2022-01-01 RX ADMIN — DEXAMETHASONE SODIUM PHOSPHATE 4 MG: 4 INJECTION, SOLUTION INTRA-ARTICULAR; INTRALESIONAL; INTRAMUSCULAR; INTRAVENOUS; SOFT TISSUE at 13:42

## 2022-01-01 RX ADMIN — FENTANYL CITRATE 200 MCG/HR: 0.05 INJECTION, SOLUTION INTRAMUSCULAR; INTRAVENOUS at 07:30

## 2022-01-01 RX ADMIN — SODIUM CHLORIDE 75 ML/HR: 9 INJECTION, SOLUTION INTRAVENOUS at 19:24

## 2022-01-01 RX ADMIN — SODIUM CHLORIDE, PRESERVATIVE FREE 10 ML: 5 INJECTION INTRAVENOUS at 21:00

## 2022-01-01 RX ADMIN — SODIUM PHOSPHATE, DIBASIC AND SODIUM PHOSPHATE, MONOBASIC 1 ENEMA: 7; 19 ENEMA RECTAL at 18:44

## 2022-01-01 RX ADMIN — LEVOTHYROXINE SODIUM 75 MCG: 0.03 TABLET ORAL at 08:36

## 2022-01-01 RX ADMIN — SODIUM CHLORIDE, PRESERVATIVE FREE 40 MG: 5 INJECTION INTRAVENOUS at 10:00

## 2022-01-01 RX ADMIN — IPRATROPIUM BROMIDE AND ALBUTEROL SULFATE 3 ML: .5; 3 SOLUTION RESPIRATORY (INHALATION) at 11:06

## 2022-01-01 RX ADMIN — DEXMEDETOMIDINE HYDROCHLORIDE 10 MCG: 100 INJECTION, SOLUTION INTRAVENOUS at 14:09

## 2022-01-01 RX ADMIN — BISACODYL 10 MG: 10 SUPPOSITORY RECTAL at 08:21

## 2022-01-01 RX ADMIN — SODIUM CHLORIDE, PRESERVATIVE FREE 10 ML: 5 INJECTION INTRAVENOUS at 08:00

## 2022-01-01 RX ADMIN — LEVETIRACETAM 1000 MG: 100 INJECTION, SOLUTION, CONCENTRATE INTRAVENOUS at 20:42

## 2022-01-01 RX ADMIN — NYSTATIN 500000 UNITS: 100000 SUSPENSION ORAL at 12:51

## 2022-01-01 RX ADMIN — PIPERACILLIN AND TAZOBACTAM 3.38 G: 3; .375 INJECTION, POWDER, LYOPHILIZED, FOR SOLUTION INTRAVENOUS at 23:25

## 2022-01-01 RX ADMIN — DEXAMETHASONE SODIUM PHOSPHATE 4 MG: 4 INJECTION, SOLUTION INTRAMUSCULAR; INTRAVENOUS at 10:10

## 2022-01-01 RX ADMIN — SODIUM CHLORIDE 75 ML/HR: 9 INJECTION, SOLUTION INTRAVENOUS at 08:38

## 2022-01-01 RX ADMIN — DEXMEDETOMIDINE HYDROCHLORIDE 1.3 MCG/KG/HR: 4 INJECTION, SOLUTION INTRAVENOUS at 23:15

## 2022-01-01 RX ADMIN — SODIUM CHLORIDE, PRESERVATIVE FREE 20 ML: 5 INJECTION INTRAVENOUS at 22:00

## 2022-01-01 RX ADMIN — LEVOTHYROXINE SODIUM 75 MCG: 0.07 TABLET ORAL at 06:43

## 2022-01-01 RX ADMIN — Medication 80 MCG: at 14:10

## 2022-01-01 RX ADMIN — POLYETHYLENE GLYCOL 3350 17 G: 17 POWDER, FOR SOLUTION ORAL at 08:21

## 2022-01-01 RX ADMIN — FERROUS SULFATE TAB 325 MG (65 MG ELEMENTAL FE) 325 MG: 325 (65 FE) TAB at 06:43

## 2022-01-01 RX ADMIN — DEXAMETHASONE SODIUM PHOSPHATE 4 MG: 4 INJECTION, SOLUTION INTRA-ARTICULAR; INTRALESIONAL; INTRAMUSCULAR; INTRAVENOUS; SOFT TISSUE at 11:30

## 2022-01-01 RX ADMIN — SODIUM CHLORIDE, PRESERVATIVE FREE 40 MG: 5 INJECTION INTRAVENOUS at 20:23

## 2022-01-01 RX ADMIN — PHENYLEPHRINE HYDROCHLORIDE 100 MCG: 10 INJECTION INTRAVENOUS at 13:29

## 2022-01-01 RX ADMIN — SODIUM CHLORIDE, PRESERVATIVE FREE 10 ML: 5 INJECTION INTRAVENOUS at 20:57

## 2022-01-01 RX ADMIN — GUAIFENESIN 400 MG: 200 SOLUTION ORAL at 23:25

## 2022-01-01 RX ADMIN — PANTOPRAZOLE SODIUM 40 MG: 40 TABLET, DELAYED RELEASE ORAL at 20:51

## 2022-01-01 RX ADMIN — VASOPRESSIN 0.04 UNITS/MIN: 20 INJECTION PARENTERAL at 02:38

## 2022-01-01 RX ADMIN — LEVETIRACETAM 1000 MG: 100 INJECTION, SOLUTION, CONCENTRATE INTRAVENOUS at 20:32

## 2022-01-01 RX ADMIN — PHENYLEPHRINE HYDROCHLORIDE 100 MCG: 10 INJECTION INTRAVENOUS at 14:21

## 2022-01-01 RX ADMIN — POLYETHYLENE GLYCOL 3350 17 G: 17 POWDER, FOR SOLUTION ORAL at 08:49

## 2022-01-01 RX ADMIN — METHYLPREDNISOLONE SODIUM SUCCINATE 125 MG: 125 INJECTION, POWDER, FOR SOLUTION INTRAMUSCULAR; INTRAVENOUS at 10:42

## 2022-01-01 RX ADMIN — AMIODARONE HYDROCHLORIDE 0.5 MG/MIN: 50 INJECTION, SOLUTION INTRAVENOUS at 06:58

## 2022-01-01 RX ADMIN — PHENYLEPHRINE HYDROCHLORIDE 50 MCG/MIN: 10 INJECTION INTRAVENOUS at 12:13

## 2022-01-01 RX ADMIN — SODIUM CHLORIDE 25 ML/HR: 9 INJECTION, SOLUTION INTRAVENOUS at 09:56

## 2022-01-01 RX ADMIN — SODIUM CHLORIDE, PRESERVATIVE FREE 40 MG: 5 INJECTION INTRAVENOUS at 08:21

## 2022-01-01 RX ADMIN — FENTANYL CITRATE 200 MCG/HR: 0.05 INJECTION, SOLUTION INTRAMUSCULAR; INTRAVENOUS at 03:11

## 2022-01-01 RX ADMIN — Medication 10 ML: at 08:15

## 2022-01-01 RX ADMIN — VANCOMYCIN HYDROCHLORIDE 1000 MG: 1 INJECTION, POWDER, LYOPHILIZED, FOR SOLUTION INTRAVENOUS at 06:54

## 2022-01-01 RX ADMIN — FENTANYL CITRATE 200 MCG/HR: 0.05 INJECTION, SOLUTION INTRAMUSCULAR; INTRAVENOUS at 05:51

## 2022-01-01 RX ADMIN — METHYLPREDNISOLONE SODIUM SUCCINATE 125 MG: 125 INJECTION, POWDER, FOR SOLUTION INTRAMUSCULAR; INTRAVENOUS at 15:56

## 2022-01-01 RX ADMIN — PHENYLEPHRINE HYDROCHLORIDE 200 MCG: 10 INJECTION INTRAVENOUS at 11:57

## 2022-01-01 RX ADMIN — SODIUM CHLORIDE 1000 ML: 9 INJECTION, SOLUTION INTRAVENOUS at 15:46

## 2022-01-01 RX ADMIN — MIDAZOLAM 10 MG/HR: 5 INJECTION, SOLUTION INTRAMUSCULAR; INTRAVENOUS at 22:32

## 2022-01-01 RX ADMIN — ZOLEDRONIC ACID 3.5 MG: 4 INJECTION, SOLUTION, CONCENTRATE INTRAVENOUS at 12:06

## 2022-01-01 RX ADMIN — SODIUM CHLORIDE, PRESERVATIVE FREE 40 MG: 5 INJECTION INTRAVENOUS at 08:36

## 2022-01-01 RX ADMIN — GUAIFENESIN 400 MG: 200 SOLUTION ORAL at 08:05

## 2022-01-01 RX ADMIN — MORPHINE SULFATE 2 MG: 2 INJECTION, SOLUTION INTRAMUSCULAR; INTRAVENOUS at 20:58

## 2022-01-01 RX ADMIN — POTASSIUM CHLORIDE 20 MEQ: 20 TABLET, EXTENDED RELEASE ORAL at 08:49

## 2022-01-01 RX ADMIN — VANCOMYCIN HYDROCHLORIDE 1250 MG: 1.25 INJECTION, POWDER, LYOPHILIZED, FOR SOLUTION INTRAVENOUS at 12:33

## 2022-01-01 RX ADMIN — ONDANSETRON 4 MG: 2 INJECTION INTRAMUSCULAR; INTRAVENOUS at 15:25

## 2022-01-01 RX ADMIN — VASOPRESSIN 0.04 UNITS/MIN: 20 INJECTION PARENTERAL at 17:43

## 2022-01-01 RX ADMIN — ACETAMINOPHEN 650 MG: 325 TABLET ORAL at 04:00

## 2022-01-01 RX ADMIN — METHYLPREDNISOLONE SODIUM SUCCINATE 125 MG: 125 INJECTION, POWDER, FOR SOLUTION INTRAMUSCULAR; INTRAVENOUS at 21:01

## 2022-01-01 RX ADMIN — GUAIFENESIN 400 MG: 200 SOLUTION ORAL at 15:58

## 2022-01-01 RX ADMIN — CISATRACURIUM BESYLATE 3 MCG/KG/MIN: 20 INJECTION INTRAVENOUS at 15:48

## 2022-01-01 RX ADMIN — DEXAMETHASONE SODIUM PHOSPHATE 2 MG: 4 INJECTION, SOLUTION INTRAMUSCULAR; INTRAVENOUS at 15:09

## 2022-01-01 RX ADMIN — Medication 80 MCG: at 12:22

## 2022-01-01 RX ADMIN — ALUMINUM HYDROXIDE, MAGNESIUM HYDROXIDE, AND SIMETHICONE 15 ML: 200; 200; 20 SUSPENSION ORAL at 05:55

## 2022-01-01 RX ADMIN — PIPERACILLIN AND TAZOBACTAM 3.38 G: 3; .375 INJECTION, POWDER, FOR SOLUTION INTRAVENOUS at 23:04

## 2022-01-01 RX ADMIN — PEGFILGRASTIM 6 MG: KIT SUBCUTANEOUS at 10:22

## 2022-01-01 RX ADMIN — CISATRACURIUM BESYLATE 2.5 MCG/KG/MIN: 20 INJECTION INTRAVENOUS at 06:41

## 2022-01-01 RX ADMIN — MIDAZOLAM 10 MG/HR: 5 INJECTION, SOLUTION INTRAMUSCULAR; INTRAVENOUS at 02:04

## 2022-01-01 RX ADMIN — SODIUM CHLORIDE, PRESERVATIVE FREE 10 ML: 5 INJECTION INTRAVENOUS at 08:27

## 2022-01-01 RX ADMIN — IPRATROPIUM BROMIDE AND ALBUTEROL SULFATE 3 ML: .5; 3 SOLUTION RESPIRATORY (INHALATION) at 21:29

## 2022-01-01 RX ADMIN — PALONOSETRON HYDROCHLORIDE 0.25 MG: 0.25 INJECTION, SOLUTION INTRAVENOUS at 11:26

## 2022-01-01 RX ADMIN — NOREPINEPHRINE BITARTRATE 25 MCG/MIN: 1 INJECTION, SOLUTION, CONCENTRATE INTRAVENOUS at 01:20

## 2022-01-01 RX ADMIN — SODIUM CHLORIDE, PRESERVATIVE FREE 40 MG: 5 INJECTION INTRAVENOUS at 21:26

## 2022-01-01 RX ADMIN — MINERAL OIL AND WHITE PETROLATUM: 150; 830 OINTMENT OPHTHALMIC at 20:47

## 2022-01-01 RX ADMIN — FENTANYL CITRATE 200 MCG/HR: 0.05 INJECTION, SOLUTION INTRAMUSCULAR; INTRAVENOUS at 05:48

## 2022-01-01 RX ADMIN — PIPERACILLIN AND TAZOBACTAM 3.38 G: 3; .375 INJECTION, POWDER, LYOPHILIZED, FOR SOLUTION INTRAVENOUS at 22:42

## 2022-01-01 RX ADMIN — MORPHINE SULFATE 2 MG: 2 INJECTION, SOLUTION INTRAMUSCULAR; INTRAVENOUS at 14:18

## 2022-01-01 RX ADMIN — SODIUM CHLORIDE, PRESERVATIVE FREE 10 ML: 5 INJECTION INTRAVENOUS at 21:45

## 2022-01-01 RX ADMIN — SODIUM CHLORIDE 1000 ML: 900 INJECTION, SOLUTION INTRAVENOUS at 08:30

## 2022-01-01 RX ADMIN — PANTOPRAZOLE SODIUM 40 MG: 40 TABLET, DELAYED RELEASE ORAL at 20:01

## 2022-01-01 RX ADMIN — FERROUS SULFATE TAB 325 MG (65 MG ELEMENTAL FE) 325 MG: 325 (65 FE) TAB at 08:48

## 2022-01-01 RX ADMIN — SODIUM CHLORIDE, PRESERVATIVE FREE 40 ML: 5 INJECTION INTRAVENOUS at 15:10

## 2022-01-01 RX ADMIN — ONDANSETRON 8 MG: 2 INJECTION INTRAMUSCULAR; INTRAVENOUS at 10:20

## 2022-01-01 RX ADMIN — PANTOPRAZOLE SODIUM 40 MG: 40 TABLET, DELAYED RELEASE ORAL at 05:43

## 2022-01-01 RX ADMIN — SODIUM CHLORIDE, PRESERVATIVE FREE 40 MG: 5 INJECTION INTRAVENOUS at 20:29

## 2022-01-01 RX ADMIN — PIPERACILLIN AND TAZOBACTAM 3.38 G: 3; .375 INJECTION, POWDER, FOR SOLUTION INTRAVENOUS at 13:42

## 2022-01-01 RX ADMIN — CISATRACURIUM BESYLATE 2.5 MCG/KG/MIN: 20 INJECTION INTRAVENOUS at 05:38

## 2022-01-01 RX ADMIN — MIDAZOLAM 2 MG: 1 INJECTION, SOLUTION INTRAMUSCULAR; INTRAVENOUS at 11:25

## 2022-01-01 RX ADMIN — PROCHLORPERAZINE MALEATE 10 MG: 5 TABLET ORAL at 11:29

## 2022-01-01 RX ADMIN — GUAIFENESIN 400 MG: 200 SOLUTION ORAL at 15:03

## 2022-01-01 RX ADMIN — Medication 80 MCG: at 12:48

## 2022-01-01 RX ADMIN — MORPHINE SULFATE 2 MG: 2 INJECTION, SOLUTION INTRAMUSCULAR; INTRAVENOUS at 08:29

## 2022-01-01 RX ADMIN — FENTANYL CITRATE 200 MCG/HR: 0.05 INJECTION, SOLUTION INTRAMUSCULAR; INTRAVENOUS at 22:38

## 2022-01-01 RX ADMIN — POTASSIUM CHLORIDE: 2 INJECTION, SOLUTION, CONCENTRATE INTRAVENOUS at 14:53

## 2022-01-01 RX ADMIN — SODIUM CHLORIDE, PRESERVATIVE FREE 10 ML: 5 INJECTION INTRAVENOUS at 21:43

## 2022-01-01 RX ADMIN — Medication 120 MCG: at 14:12

## 2022-01-01 RX ADMIN — PIPERACILLIN AND TAZOBACTAM 4.5 G: 4; .5 INJECTION, POWDER, FOR SOLUTION INTRAVENOUS at 15:25

## 2022-01-01 RX ADMIN — PROPOFOL 35 MCG/KG/MIN: 10 INJECTION, EMULSION INTRAVENOUS at 19:32

## 2022-01-01 RX ADMIN — MINERAL OIL AND WHITE PETROLATUM: 150; 830 OINTMENT OPHTHALMIC at 10:18

## 2022-01-01 RX ADMIN — IPRATROPIUM BROMIDE AND ALBUTEROL SULFATE 3 ML: .5; 3 SOLUTION RESPIRATORY (INHALATION) at 11:34

## 2022-01-01 RX ADMIN — METHYLPREDNISOLONE SODIUM SUCCINATE 125 MG: 125 INJECTION, POWDER, FOR SOLUTION INTRAMUSCULAR; INTRAVENOUS at 09:08

## 2022-01-01 RX ADMIN — IPRATROPIUM BROMIDE AND ALBUTEROL SULFATE 3 ML: .5; 3 SOLUTION RESPIRATORY (INHALATION) at 07:53

## 2022-01-01 RX ADMIN — SODIUM CHLORIDE, POTASSIUM CHLORIDE, SODIUM LACTATE AND CALCIUM CHLORIDE 75 ML/HR: 600; 310; 30; 20 INJECTION, SOLUTION INTRAVENOUS at 12:53

## 2022-01-01 RX ADMIN — PALONOSETRON 0.25 MG: 0.05 INJECTION, SOLUTION INTRAVENOUS at 10:45

## 2022-01-01 RX ADMIN — ONDANSETRON 4 MG: 2 INJECTION INTRAMUSCULAR; INTRAVENOUS at 02:36

## 2022-01-01 RX ADMIN — DEXAMETHASONE SODIUM PHOSPHATE 4 MG: 4 INJECTION, SOLUTION INTRAMUSCULAR; INTRAVENOUS at 08:31

## 2022-01-01 RX ADMIN — FAMOTIDINE 20 MG: 10 INJECTION, SOLUTION INTRAVENOUS at 08:31

## 2022-01-01 RX ADMIN — Medication 80 MCG: at 12:12

## 2022-01-01 RX ADMIN — SODIUM CHLORIDE, PRESERVATIVE FREE 40 MG: 5 INJECTION INTRAVENOUS at 09:02

## 2022-01-01 RX ADMIN — LORAZEPAM 1 MG: 1 TABLET ORAL at 15:03

## 2022-01-01 RX ADMIN — IPRATROPIUM BROMIDE AND ALBUTEROL SULFATE 3 ML: .5; 3 SOLUTION RESPIRATORY (INHALATION) at 19:41

## 2022-01-01 RX ADMIN — NOREPINEPHRINE BITARTRATE 17 MCG/MIN: 1 INJECTION, SOLUTION, CONCENTRATE INTRAVENOUS at 14:41

## 2022-01-01 RX ADMIN — BISACODYL 10 MG: 10 SUPPOSITORY RECTAL at 08:18

## 2022-01-01 RX ADMIN — DEXAMETHASONE SODIUM PHOSPHATE 4 MG: 4 INJECTION, SOLUTION INTRA-ARTICULAR; INTRALESIONAL; INTRAMUSCULAR; INTRAVENOUS; SOFT TISSUE at 05:30

## 2022-01-01 RX ADMIN — SODIUM CHLORIDE 25 ML/HR: 9 INJECTION, SOLUTION INTRAVENOUS at 10:17

## 2022-01-01 RX ADMIN — FUROSEMIDE 40 MG: 10 INJECTION, SOLUTION INTRAVENOUS at 10:41

## 2022-01-01 RX ADMIN — GADOTERIDOL 14 ML: 279.3 INJECTION, SOLUTION INTRAVENOUS at 08:26

## 2022-01-01 RX ADMIN — SODIUM CHLORIDE, POTASSIUM CHLORIDE, SODIUM LACTATE AND CALCIUM CHLORIDE: 600; 310; 30; 20 INJECTION, SOLUTION INTRAVENOUS at 10:23

## 2022-01-01 RX ADMIN — VASOPRESSIN 0.04 UNITS/MIN: 20 INJECTION PARENTERAL at 06:31

## 2022-01-01 RX ADMIN — INFLUENZA VIRUS VACCINE 0.5 ML: 15; 15; 15; 15 SUSPENSION INTRAMUSCULAR at 09:48

## 2022-01-01 RX ADMIN — SODIUM CHLORIDE, PRESERVATIVE FREE 10 ML: 5 INJECTION INTRAVENOUS at 05:25

## 2022-01-01 RX ADMIN — HYDROMORPHONE HYDROCHLORIDE 0.5 MG: 1 INJECTION, SOLUTION INTRAMUSCULAR; INTRAVENOUS; SUBCUTANEOUS at 05:58

## 2022-01-01 RX ADMIN — LEVETIRACETAM 1000 MG: 100 INJECTION, SOLUTION, CONCENTRATE INTRAVENOUS at 09:02

## 2022-01-01 RX ADMIN — SENNOSIDES 8.6 MG: 8.6 TABLET, COATED ORAL at 08:18

## 2022-01-01 RX ADMIN — ONDANSETRON 4 MG: 2 INJECTION INTRAMUSCULAR; INTRAVENOUS at 19:06

## 2022-01-01 RX ADMIN — GUAIFENESIN 400 MG: 200 SOLUTION ORAL at 20:46

## 2022-01-01 RX ADMIN — LEVETIRACETAM 1000 MG: 100 INJECTION, SOLUTION, CONCENTRATE INTRAVENOUS at 08:39

## 2022-01-01 RX ADMIN — OXYCODONE AND ACETAMINOPHEN 1 TABLET: 5; 325 TABLET ORAL at 20:06

## 2022-01-01 RX ADMIN — Medication 2 UNITS: at 23:26

## 2022-01-01 RX ADMIN — DEXMEDETOMIDINE HYDROCHLORIDE 1.5 MCG/KG/HR: 4 INJECTION, SOLUTION INTRAVENOUS at 15:03

## 2022-01-01 RX ADMIN — SODIUM CHLORIDE: 9 INJECTION, SOLUTION INTRAVENOUS at 11:44

## 2022-01-01 RX ADMIN — CHLOROTHIAZIDE SODIUM 500 MG: 500 INJECTION, POWDER, LYOPHILIZED, FOR SOLUTION INTRAVENOUS at 15:04

## 2022-01-01 RX ADMIN — GUAIFENESIN 400 MG: 200 SOLUTION ORAL at 11:07

## 2022-01-01 RX ADMIN — SODIUM CHLORIDE, PRESERVATIVE FREE 10 ML: 5 INJECTION INTRAVENOUS at 05:57

## 2022-01-01 RX ADMIN — VASOPRESSIN 0.04 UNITS/MIN: 20 INJECTION PARENTERAL at 08:36

## 2022-01-01 RX ADMIN — MORPHINE SULFATE 2 MG: 2 INJECTION, SOLUTION INTRAMUSCULAR; INTRAVENOUS at 04:25

## 2022-01-01 RX ADMIN — SODIUM CHLORIDE, PRESERVATIVE FREE 10 ML: 5 INJECTION INTRAVENOUS at 17:49

## 2022-01-01 RX ADMIN — HEPARIN 500 UNITS: 100 SYRINGE at 15:05

## 2022-01-01 RX ADMIN — ONDANSETRON 4 MG: 2 INJECTION INTRAMUSCULAR; INTRAVENOUS at 01:31

## 2022-01-01 RX ADMIN — DEXAMETHASONE SODIUM PHOSPHATE 2 MG: 4 INJECTION, SOLUTION INTRAMUSCULAR; INTRAVENOUS at 08:38

## 2022-01-01 RX ADMIN — CALCIUM CARBONATE 400 MG: 500 TABLET, CHEWABLE ORAL at 11:50

## 2022-01-01 RX ADMIN — PHENYLEPHRINE HYDROCHLORIDE 200 MCG: 10 INJECTION INTRAVENOUS at 12:07

## 2022-01-01 RX ADMIN — LIDOCAINE HYDROCHLORIDE 80 MG: 20 INJECTION, SOLUTION EPIDURAL; INFILTRATION; INTRACAUDAL; PERINEURAL at 11:54

## 2022-01-01 RX ADMIN — OXYCODONE AND ACETAMINOPHEN 1 TABLET: 5; 325 TABLET ORAL at 17:36

## 2022-01-01 RX ADMIN — PIPERACILLIN AND TAZOBACTAM 3.38 G: 3; .375 INJECTION, POWDER, LYOPHILIZED, FOR SOLUTION INTRAVENOUS at 14:54

## 2022-01-01 RX ADMIN — FENTANYL CITRATE 200 MCG/HR: 0.05 INJECTION, SOLUTION INTRAMUSCULAR; INTRAVENOUS at 20:58

## 2022-01-01 RX ADMIN — DEXAMETHASONE SODIUM PHOSPHATE 6 MG: 4 INJECTION, SOLUTION INTRAMUSCULAR; INTRAVENOUS at 19:56

## 2022-01-01 RX ADMIN — SODIUM CHLORIDE, PRESERVATIVE FREE 10 ML: 5 INJECTION INTRAVENOUS at 13:52

## 2022-01-01 RX ADMIN — NYSTATIN 500000 UNITS: 100000 SUSPENSION ORAL at 20:54

## 2022-01-01 RX ADMIN — Medication: at 10:29

## 2022-01-01 RX ADMIN — DEXAMETHASONE SODIUM PHOSPHATE 8 MG: 4 INJECTION INTRA-ARTICULAR; INTRALESIONAL; INTRAMUSCULAR; INTRAVENOUS; SOFT TISSUE at 10:52

## 2022-01-01 RX ADMIN — LEVETIRACETAM 1000 MG: 100 INJECTION, SOLUTION, CONCENTRATE INTRAVENOUS at 20:39

## 2022-01-01 RX ADMIN — SODIUM CHLORIDE, PRESERVATIVE FREE 10 ML: 5 INJECTION INTRAVENOUS at 21:47

## 2022-01-01 RX ADMIN — Medication 500 UNITS: at 14:29

## 2022-01-01 RX ADMIN — FERROUS SULFATE TAB 325 MG (65 MG ELEMENTAL FE) 325 MG: 325 (65 FE) TAB at 17:10

## 2022-01-01 RX ADMIN — FENTANYL CITRATE 175 MCG/HR: 0.05 INJECTION, SOLUTION INTRAMUSCULAR; INTRAVENOUS at 12:28

## 2022-01-01 RX ADMIN — LEVOTHYROXINE SODIUM 125 MCG: 0.07 TABLET ORAL at 05:56

## 2022-01-01 RX ADMIN — SODIUM CHLORIDE, PRESERVATIVE FREE 10 ML: 5 INJECTION INTRAVENOUS at 10:35

## 2022-01-01 RX ADMIN — SODIUM CHLORIDE, PRESERVATIVE FREE 40 MG: 5 INJECTION INTRAVENOUS at 08:43

## 2022-01-01 RX ADMIN — IPRATROPIUM BROMIDE AND ALBUTEROL SULFATE 3 ML: .5; 3 SOLUTION RESPIRATORY (INHALATION) at 15:57

## 2022-01-01 RX ADMIN — NOREPINEPHRINE BITARTRATE 6 MCG/MIN: 1 INJECTION, SOLUTION, CONCENTRATE INTRAVENOUS at 04:33

## 2022-01-01 RX ADMIN — LEVETIRACETAM 1000 MG: 100 INJECTION, SOLUTION, CONCENTRATE INTRAVENOUS at 08:31

## 2022-01-01 RX ADMIN — DEXMEDETOMIDINE HYDROCHLORIDE 0.5 MCG/KG/HR: 4 INJECTION, SOLUTION INTRAVENOUS at 09:36

## 2022-01-01 RX ADMIN — SODIUM CHLORIDE, POTASSIUM CHLORIDE, SODIUM LACTATE AND CALCIUM CHLORIDE 125 ML/HR: 600; 310; 30; 20 INJECTION, SOLUTION INTRAVENOUS at 09:37

## 2022-01-01 RX ADMIN — POTASSIUM CHLORIDE: 2 INJECTION, SOLUTION, CONCENTRATE INTRAVENOUS at 10:47

## 2022-01-01 RX ADMIN — BISACODYL 10 MG: 10 SUPPOSITORY RECTAL at 09:38

## 2022-01-01 RX ADMIN — IRON SUCROSE 100 MG: 20 INJECTION, SOLUTION INTRAVENOUS at 16:32

## 2022-01-01 RX ADMIN — PANTOPRAZOLE SODIUM 40 MG: 40 TABLET, DELAYED RELEASE ORAL at 08:48

## 2022-01-01 RX ADMIN — MORPHINE SULFATE 2 MG: 2 INJECTION, SOLUTION INTRAMUSCULAR; INTRAVENOUS at 01:15

## 2022-01-01 RX ADMIN — DEXAMETHASONE SODIUM PHOSPHATE 4 MG: 4 INJECTION, SOLUTION INTRAMUSCULAR; INTRAVENOUS at 14:42

## 2022-01-01 RX ADMIN — SENNOSIDES 8.6 MG: 8.6 TABLET, COATED ORAL at 08:32

## 2022-01-01 RX ADMIN — GUAIFENESIN 400 MG: 200 SOLUTION ORAL at 23:49

## 2022-01-01 RX ADMIN — ACETAMINOPHEN 650 MG: 325 TABLET ORAL at 20:48

## 2022-01-01 RX ADMIN — VASOPRESSIN 0.04 UNITS/MIN: 20 INJECTION PARENTERAL at 15:42

## 2022-01-01 RX ADMIN — LEVETIRACETAM 1000 MG: 100 INJECTION, SOLUTION, CONCENTRATE INTRAVENOUS at 22:33

## 2022-01-01 RX ADMIN — SODIUM CHLORIDE 1000 ML: 9 INJECTION, SOLUTION INTRAVENOUS at 11:15

## 2022-01-01 RX ADMIN — SODIUM CHLORIDE, PRESERVATIVE FREE 10 ML: 5 INJECTION INTRAVENOUS at 06:01

## 2022-01-01 RX ADMIN — FENTANYL CITRATE 300 MCG/HR: 0.05 INJECTION, SOLUTION INTRAMUSCULAR; INTRAVENOUS at 02:43

## 2022-01-01 RX ADMIN — POTASSIUM CHLORIDE: 2 INJECTION, SOLUTION, CONCENTRATE INTRAVENOUS at 15:41

## 2022-01-03 NOTE — PROGRESS NOTES
Called patient's brother, Chi Lord, and advised of EKG results per Erik Pereyra NP. He voiced understanding. No further questions or concerns at this time.

## 2022-01-05 NOTE — PROGRESS NOTES
[address] [address] [facility] [address] Suite [facility], [address] [place] [address]  Ph: [phone]    Fax: [phone]  Therapy Progress Report  Name: Marcia Goldberg  : 1970   MD: Kathi Man NP     Medical Diagnosis: Neck pain [M54.2]  Start of Care: 21    Visits from Start of Care: 4   Missed Visits: 0  Certification Period: 21 - 22    Frequency/Duration: 2 times a week for 16-24 treatments       Summary of Care/Goals:  SUBJECTIVE  Pain Level (0-10 scale) pre treatment: 0/10           Pain Level (0-10 scale) post treatment: 0/10  Any medication changes, allergies to medications, adverse drug reactions, diagnosis change, or new procedure performed?:   [x]? No    []? Yes (see summary sheet for update)  Subjective functional status/changes:   [x]? No changes reported  No new complaints. Pt stating he is 50% improved since starting therapy. He has not had any pain since the last session. Pt states he is also doing better with his eating.      OBJECTIVE                                         Other Objective/Functional Measures:   Physical Findings   Ortho:   Posture:  slightly forward head and rounding of shoulders  Palpation: Slight TTP anterior neck musculature including SCM bilaterally, anterior scalenes. Increased tissue turgor with fibrotic scar tissue and skin discoloration bilaterally along the anterior neck from radiation treatment.   Gait/Functional Mobility:  Independent  Gross findings:  Complete laryngotomy with stoma and larytube     Spine: *normal values in ()  CERVICAL SPINE                          AROM       PROM  Flexion 36     Extension 36                                         AROM                   PROM    R L R L   Lat Flexion 30 28       Rotation 48 54       Additional Comments: tightness reported but no pain      Specific joints:   SHOULDER                                         AROM                        PROM                       MMT    R L R L R L   Flexion (180) WFL WFL     5 5   Extension (60)               Abduction (180) WFL WFL           Adduction (0)               IR (70) WFL WFL     5 5   ER (90) WFL WFL     4 5   Additional comments: no pain or difficulty                                                      Ampac Score:   0%        ASSESSMENT/Changes in Function:    Pt has completed one month of PT services but has only been seen 4 times. Pt reporting improvement in pain and ability to eat since starting therapy. He continues to have limited cervical ROM due to soft tissue and scar tissue immobilization. He will benefit from continued PT services to assist in more cervical mobility. Patient will continue to benefit from skilled PT services to modify and progress therapeutic interventions, address ROM deficits and analyze and address soft tissue restrictions to attain remaining goals.         GOALS/Progress towards goals:  Patient Goal (s): reduce pain    [x]? ? Met []? ? Not met []? ? Partially met      Short Term Goals: To be accomplished in 6-8 treatments. 1. Patient will be compliant with HEP to assist in pain reduction. [x]? Met []? Not met []? Partially met  12/29  2. Patient will demo 5-10 degree improvement in cervical ROM in all planes. []? Met [x]? Not met []? Partially met 1/5      Long Term Goals: To be accomplished in 16-24 treatments. 1. Patient will report decrease in pain and difficulty with eating due to neck pain/tightness. [x]? Met []? Not met []? Partially met 1/5/22  2. Patient will report decrease in neck pain to max of 4/10 per VAS. [x]? Met []? Not met []? Partially met 1/5/22     Ampac Score:  0%  Recommendations: continue current POC   [x]  Plan of care has been reviewed with PTA. Penny Hartman, PT, DPT 1/5/2022     ________________________________________________________________________     Please retain this original for your records.

## 2022-01-05 NOTE — PROGRESS NOTES
PT DAILY TREATMENT NOTE - [facility]     Patient Name: Chanda Puga  Date:2022  : 1970  [x]  Patient  Verified  Payor: [facility] MEDICAID / Plan: KAITLIN NICHOLAS [facility] CCCP / Product Type: Managed Care Medicaid /    Treatment Area: Neck pain [M54.2]   Next MD APPT: 22  In time:9:05am  Out time: 9:45am  Total Treatment Time (min): 40  Total Timed Codes (min): 40  1:1 Treatment Time ( only): 40  Visit #: 4    SUBJECTIVE  Pain Level (0-10 scale) pre treatment: 0/10 Pain Level (0-10 scale) post treatment: 0/10  Any medication changes, allergies to medications, adverse drug reactions, diagnosis change, or new procedure performed?:   [x] No    [] Yes (see summary sheet for update)  Subjective functional status/changes:   [x] No changes reported  No new complaints. Pt stating he is 50% improved since starting therapy. He has not had any pain since the last session. Pt states he is also doing better with his eating.      OBJECTIVE  Declined modalities   Modality rationale: decrease pain and increase tissue extensibility to improve the patients ability to eat with less discomfort   Min Type Additional Details    [] Estim: []UnAtt   []Att       []TENS instruct                  []IFC  []Premod   []NMES                     []Other:  []w/US   []w/ice   []w/heat  Position:  Location:    []  Ice     [x]  Heat  []  Ice massage Position: seated  Location: B cervical    []  Traction: [] Cervical       []Lumbar                       [] Prone          []Supine                       []Intermittent   []Continuous Lbs:  []w/heat  []W/heat and Estim    []  Ultrasound: []Continuous   [] Pulsed at:                           []1MHz   []3MHz Location:  W/cm2:      [x] Skin assessment post-treatment:   [x]intact  []redness- no adverse reaction   []redness  adverse reaction:   30 min Therapeutic Exercise:  [x] See flow sheet : reassessment    Rationale: increase ROM and increase strength to improve the patients ability to eat with less discomfort  10 min Manual Therapy: scar massage anterior neck    Rationale: increase ROM, increase tissue extensibility and decrease trigger points to improve the patients ability to eat with less discomfort    With   [x] TE   [] TA   [] Neuro   [] SC   [] other: Patient Education: [x] Review HEP    [] Progressed/Changed HEP based on:   [] positioning   [] body mechanics   [] transfers   [] Use of heat/ice    [] other:          Other Objective/Functional Measures:   Physical Findings   Ortho:   Posture:  slightly forward head and rounding of shoulders  Palpation: Slight TTP anterior neck musculature including SCM bilaterally, anterior scalenes. Increased tissue turgor with fibrotic scar tissue and skin discoloration bilaterally along the anterior neck from radiation treatment. Gait/Functional Mobility:  Independent  Gross findings:  Complete laryngotomy with stoma and larytube     Spine: *normal values in ()  CERVICAL SPINE                          AROM       PROM  Flexion 36     Extension 36                                         AROM                   PROM    R L R L   Lat Flexion 30 28       Rotation 48 54       Additional Comments: tightness reported but no pain      Specific joints:   SHOULDER                                         AROM                        PROM                       MMT    R L R L R L   Flexion (180) WFL WFL     5 5   Extension (60)               Abduction (180) WFL WFL           Adduction (0)               IR (70) WFL WFL     5 5   ER (90) WFL WFL     4 5   Additional comments: no pain or difficulty                                                      Ampac Score:   0%      ASSESSMENT/Changes in Function:    Pt has completed one month of PT services but has only been seen 4 times. Pt reporting improvement in pain and ability to eat since starting therapy. He continues to have limited cervical ROM due to soft tissue and scar tissue immobilization.   He will benefit from continued PT services to assist in more cervical mobility. Patient will continue to benefit from skilled PT services to modify and progress therapeutic interventions, address ROM deficits and analyze and address soft tissue restrictions to attain remaining goals. GOALS/Progress towards goals:  Patient Goal (s): reduce pain    [x]? Met []? Not met []? Partially met     Short Term Goals: To be accomplished in 6-8 treatments. 1. Patient will be compliant with HEP to assist in pain reduction. [x] Met [] Not met [] Partially met  12/29  2. Patient will demo 5-10 degree improvement in cervical ROM in all planes. [] Met [x] Not met [] Partially met 1/5     Long Term Goals: To be accomplished in 16-24 treatments. 1. Patient will report decrease in pain and difficulty with eating due to neck pain/tightness. [x] Met [] Not met [] Partially met 1/5/22  2. Patient will report decrease in neck pain to max of 4/10 per VAS. [x] Met [] Not met [] Partially met 1/5/22    PLAN  Frequency / Duration: Patient to be seen 2-3 times per week for 16-24 treatments.   Certification Period: 11/30/21 - 2/28/22  [x]  Upgrade activities as tolerated     [x]  Continue plan of care  [x]  Update interventions per flow sheet       []  Discharge due to:_  []  Other:_      Braxton Balderrama, PT, DPT 1/5/2022

## 2022-01-06 NOTE — TELEPHONE ENCOUNTER
[employer]  Social Work Navigator Encounter     Patient Name:  Angelo Soler    Medical History: h&n cancer    Advance Directives: none on file; conversation deferred    Narrative: Patient requested assistance scheduling transportation for his PT appointments. Called Medicaid/ #[id number] and they confirmed trips had already been booked for the following dates:    1/10 at [facility] # [id number]    1/12 at [facility] # [id number]    1/17 at [facility] # [id number]    1/19 at [facility] # [id number]    Pickup time is 8am.     Patient advised of the above. Plan:  1.  Transportation booked    Referral:   Transportation referral      Thank you,   Grant Arevalo LCSW

## 2022-01-06 NOTE — PROGRESS NOTES
Outpatient Infusion Center Short Visit Progress Note     Patient admitted to [facility] ambulatory in stable condition. Assessment completed. No new concerns voiced. Vital Signs:  Visit Vitals  /76   Pulse 79   Temp 98.4 °F (36.9 °C)   Resp 16   SpO2 98%         R chest port with positive blood return. Lab Results:  Recent Results (from the past 12 hour(s))   CBC WITH AUTOMATED DIFF    Collection Time: 01/06/22 10:07 AM   Result Value Ref Range    WBC 3.5 (L) 4.1 - 11.1 K/uL    RBC 2.63 (L) 4.10 - 5.70 M/uL    HGB 8.4 (L) 12.1 - 17.0 g/dL    HCT 26.1 (L) 36.6 - 50.3 %    MCV 99.2 (H) 80.0 - 99.0 FL    MCH 31.9 26.0 - 34.0 PG    MCHC 32.2 30.0 - 36.5 g/dL    RDW 17.4 (H) 11.5 - 14.5 %    PLATELET 555 157 - 480 K/uL    MPV 9.7 8.9 - 12.9 FL    NRBC 0.0 0  WBC    ABSOLUTE NRBC 0.00 0.00 - 0.01 K/uL    NEUTROPHILS 74 32 - 75 %    LYMPHOCYTES 8 (L) 12 - 49 %    MONOCYTES 17 (H) 5 - 13 %    EOSINOPHILS 1 0 - 7 %    BASOPHILS 0 0 - 1 %    IMMATURE GRANULOCYTES 0 0.0 - 0.5 %    ABS. NEUTROPHILS 2.6 1.8 - 8.0 K/UL    ABS. LYMPHOCYTES 0.3 (L) 0.8 - 3.5 K/UL    ABS. MONOCYTES 0.6 0.0 - 1.0 K/UL    ABS. EOSINOPHILS 0.0 0.0 - 0.4 K/UL    ABS. BASOPHILS 0.0 0.0 - 0.1 K/UL    ABS. IMM.  GRANS. 0.0 0.00 - 0.04 K/UL    DF SMEAR SCANNED      PLATELET COMMENTS Large Platelets      RBC COMMENTS ANISOCYTOSIS  1+        RBC COMMENTS MACROCYTOSIS  1+       METABOLIC PANEL, COMPREHENSIVE    Collection Time: 01/06/22 10:07 AM   Result Value Ref Range    Sodium 140 136 - 145 mmol/L    Potassium 3.5 3.5 - 5.1 mmol/L    Chloride 105 97 - 108 mmol/L    CO2 28 21 - 32 mmol/L    Anion gap 7 5 - 15 mmol/L    Glucose 93 65 - 100 mg/dL    BUN 9 6 - 20 MG/DL    Creatinine 0.83 0.70 - 1.30 MG/DL    BUN/Creatinine ratio 11 (L) 12 - 20      GFR est AA >60 >60 ml/min/1.73m2    GFR est non-AA >60 >60 ml/min/1.73m2    Calcium 8.8 8.5 - 10.1 MG/DL    Bilirubin, total 0.3 0.2 - 1.0 MG/DL    ALT (SGPT) 10 (L) 12 - 78 U/L    AST (SGOT) 26 15 - 37 U/L    Alk. phosphatase 49 45 - 117 U/L    Protein, total 7.4 6.4 - 8.2 g/dL    Albumin 3.1 (L) 3.5 - 5.0 g/dL    Globulin 4.3 (H) 2.0 - 4.0 g/dL    A-G Ratio 0.7 (L) 1.1 - 2.2             Medications:  Medications Administered     0.9% sodium chloride infusion     Admin Date  01/06/2022 Action  New Bag Dose  25 mL/hr Rate  25 mL/hr Route  IntraVENous Administered By  Urmila King          pembrolizumab [facility]) 200 mg in 0.9% sodium chloride 100 mL, overfill volume 10 mL IVPB     Admin Date  01/06/2022 Action  New Bag Dose  200 mg Rate  236 mL/hr Route  IntraVENous Administered By  Urmila King                Patient tolerated treatment well. Patient discharged from [place] [address] ambulatory in no distress. Patient aware of next appointment.     Shantel Avalos RN    Future Appointments   Date Time Provider Nathanael Haas   1/10/2022  9:15 AM Anika Kaba [facility]   [facility]12[facility]2022  [facility]   1/17/2022  9:00 AM Hailee Nogueira, PT, DPT [facility]   1/19/2022  9:00 AM Hailee Nogueira, PT, DPT [facility]   1/24/2022  9:00 AM Hailee Nogueira, PT, DPT [facility]   1/26/2022  9:00 AM Hailee Nogueira, PT, DPT [facility]   1/28/2022  8:00 AM [facility]   1/28/2022  8:15 AM Maurice Mascorro NP [facility] AMB   1/31/2022  9:00 AM Hailee Nogueira PT, DPT [facility]   2/1/2022  1:00 PM [facility]7 [facility]2 [facility]   2/2/2022  9:00 AM Hailee Nogueira, PT, DPT [facility]   2/18/2022  8:00 AM SS INF1 CH1 >4H [facility]   2/22/2022  1:00 PM SS INF5 CH4 <1H [facility]   3/9/2022  3:30 PM Patti Parnell MD [facility] BS AMB   3/11/2022  8:00 AM [facility]   3/15/2022  1:00 PM [facility]5 [facility]4 [facility]   4/1/2022  8:00 AM SS INF2 [facility]1 [facility]H [facility]   4/5/2022  1:00 PM SS INF5 CH4 <1H [facility] Daysi King

## 2022-01-07 NOTE — TELEPHONE ENCOUNTER
Debra called (not list on patient PHI) patient gave a verbal okay for me to speak with her. Debra stated that patient needs a call in for medication HME to help his throat and to help patient breathe. I do not see this medication on his med list I asked her to spell it but all she gave me was HME and the number to call to get this refilled [phone].  Please advise      # Catherine Lopes - [address] [place] [address] [phone]

## 2022-01-07 NOTE — TELEPHONE ENCOUNTER
[address] at [facility]  [phone]        01/07/22 1:22 PM Attempted to call Renea Marcos via contact number provided. She states patient is in need of his trach supplies. Advised this nurse would clarify for process for this and keep Sania updated of status. She voiced understanding. 2:52 PM Called patient's brother, Nikki Aceves. He states he placed order for supplies yesterday; however, was advised insurance approval was needed. He has since received correspondence today that insurance has approved supplies. He has not received confirmation that trach supplies have shipped, but states it usually takes a couple days for them to arrive. Patient now out of trach supplies. Nikki Aceves shared that patient had communicated that since recent disease progression he has been coughing more and using supplies more quickly. Advised this nurse would update Dr. Ramirez Gerard and  in case of any other recommendations in the meantime. Nikki Aceves voiced understanding. 3:36 PM Called [facility]. Per Kalin Spence NP, advised that patient keep the same trach collar and supplies that they have and if the trach develops issues or he has respiratory distress they should go to the ED for evaluation. Also suggested that Nikki Aceves could call [employer] to attempt to request STAT overnight shipment. Nikki Aceves voiced understanding and expressed appreciation of call. No further questions or concerns at this time.

## 2022-01-11 NOTE — TELEPHONE ENCOUNTER
[address]  Social Work Navigator Encounter     Patient Name:  Audi Coto    Medical History:  H&n cancer    Narrative: Assisted patient with scheduling transportation. Called Medicaid/ #[id number] and booked the following dates:    1/28 Floridalma Bhatia 6:30  #[id number]    2/1 - Shaun Georgie 11:30am  #[id number]    Called patient to inform about transportation.      Referral:   Transportation referral    Thank you,  Gavino Rogers, CHINTANW

## 2022-01-12 NOTE — PROGRESS NOTES
PT DAILY TREATMENT NOTE - [facility]     Patient Name: Oral Voss  Date:2022  : 1970  [x]  Patient  Verified  Payor: [facility] MEDICAID / Plan: KAITLIN NICHOLAS [facility] CCCP / Product Type: Managed Care Medicaid /    Treatment Area: Neck pain [M54.2]   Next MD APPT: 22  In time:09:15 am  Out time: 0950 am  Total Treatment Time (min):30 min  Total Timed Codes (min): 30 min  1:1 Treatment Time ( only):30 min  Visit #:    SUBJECTIVE  Pain Level (0-10 scale) pre treatment: 3-4/10  Pain Level (0-10 scale) post treatment: 0/10  Any medication changes, allergies to medications, adverse drug reactions, diagnosis change, or new procedure performed?:   [x] No    [] Yes (see summary sheet for update)  Subjective functional status/changes:   [x] No changes reported    Patient wrote a note today requesting only massage due to having to go to [facility] for another appointment.     OBJECTIVE  Declined modalities   Modality rationale: decrease pain and increase tissue extensibility to improve the patients ability to eat with less discomfort   Min Type Additional Details    [] Estim: []UnAtt   []Att       []TENS instruct                  []IFC  []Premod   []NMES                     []Other:  []w/US   []w/ice   []w/heat  Position:  Location:    []  Ice     [x]  Heat  []  Ice massage Position: seated  Location: B cervical    []  Traction: [] Cervical       []Lumbar                       [] Prone          []Supine                       []Intermittent   []Continuous Lbs:  []w/heat  []W/heat and Estim    []  Ultrasound: []Continuous   [] Pulsed at:                           []1MHz   []3MHz Location:  W/cm2:      [x] Skin assessment post-treatment:   []intact  []redness- no adverse reaction   []redness  adverse reaction:    min Therapeutic Exercise:  [x] See flow sheet : reassessment    Rationale: increase ROM and increase strength to improve the patients ability to eat with less discomfort  30 min Manual Therapy: scar massage anterior neck    Rationale: increase ROM, increase tissue extensibility and decrease trigger points to improve the patients ability to eat with less discomfort    With   [x] TE   [] TA   [] Neuro   [] SC   [] other: Patient Education: [x] Review HEP    [] Progressed/Changed HEP based on:   [] positioning   [] body mechanics   [] transfers   [] Use of heat/ice    [] other:          Other Objective/Functional Measures: STM only      ASSESSMENT/Changes in Function:   Patient with severe swelling to L side of neck/face . Worked on STM to R/L side with good results. Patient reporting no pain post treatment session. Patient has another appointment following therapy in [place] this morning and requested only STM due to time. He will benefit from continued PT services to assist in more cervical mobility. Patient will continue to benefit from skilled PT services to modify and progress therapeutic interventions, address ROM deficits and analyze and address soft tissue restrictions to attain remaining goals. GOALS/Progress towards goals:  Patient Goal (s): reduce pain    [x]? Met []? Not met []? Partially met     Short Term Goals: To be accomplished in 6-8 treatments. 1. Patient will be compliant with HEP to assist in pain reduction. [x] Met [] Not met [] Partially met  12/29  2. Patient will demo 5-10 degree improvement in cervical ROM in all planes. [] Met [x] Not met [] Partially met 1/5     Long Term Goals: To be accomplished in 16-24 treatments. 1. Patient will report decrease in pain and difficulty with eating due to neck pain/tightness. [x] Met [] Not met [] Partially met 1/5/22  2. Patient will report decrease in neck pain to max of 4/10 per VAS. [x] Met [] Not met [] Partially met 1/5/22    PLAN  Frequency / Duration: Patient to be seen 2-3 times per week for 16-24 treatments.   Certification Period: 11/30/21 - 2/28/22  [x]  Upgrade activities as tolerated     [x]  Continue plan of care  [x] Update interventions per flow sheet       []  Discharge due to:_  []  Other:_      Faviola Capps, PTA 1/12/2022

## 2022-01-13 NOTE — PROGRESS NOTES
PT DAILY TREATMENT NOTE - [facility]     Patient Name: Sonia Bah  Date:2022  : 1970  [x]  Patient  Verified  Payor: [facility] MEDICAID / Plan: KAITLIN NICHOLAS [facility] CCCP / Product Type: Managed Care Medicaid /    Treatment Area: Neck pain [M54.2]   Next MD APPT: 22  In time:07:28 am  Out time: 0808 am  Total Treatment Time (min):40 min  Total Timed Codes (min):40 min  1:1 Treatment Time ( only):40 min  Visit #:-    SUBJECTIVE  Pain Level (0-10 scale) pre treatment:2/10  Pain Level (0-10 scale) post treatment: 0/10  Any medication changes, allergies to medications, adverse drug reactions, diagnosis change, or new procedure performed?:   [x] No    [] Yes (see summary sheet for update)  Subjective functional status/changes:   [x] No changes reported    Patient can not speak but gave a thumbs up that he was fine and pointed to pain scale.     OBJECTIVE  Declined modalities   Modality rationale: decrease pain and increase tissue extensibility to improve the patients ability to eat with less discomfort   Min Type Additional Details    [] Estim: []UnAtt   []Att       []TENS instruct                  []IFC  []Premod   []NMES                     []Other:  []w/US   []w/ice   []w/heat  Position:  Location:    []  Ice     []  Heat  []  Ice massage Position: seated  Location: B cervical    []  Traction: [] Cervical       []Lumbar                       [] Prone          []Supine                       []Intermittent   []Continuous Lbs:  []w/heat  []W/heat and Estim    []  Ultrasound: []Continuous   [] Pulsed at:                           []1MHz   []3MHz Location:  W/cm2:      [] Skin assessment post-treatment:   []intact  []redness- no adverse reaction   []redness  adverse reaction:   25 min Therapeutic Exercise:  [x] See flow sheet : reassessment    Rationale: increase ROM and increase strength to improve the patients ability to eat with less discomfort  15 min Manual Therapy: scar massage anterior neck Rationale: increase ROM, increase tissue extensibility and decrease trigger points to improve the patients ability to eat with less discomfort    With   [x] TE   [] TA   [] Neuro   [] SC   [] other: Patient Education: [x] Review HEP    [] Progressed/Changed HEP based on:   [] positioning   [] body mechanics   [] transfers   [] Use of heat/ice    [] other:          Other Objective/Functional Measures: STM and resumed exercises      ASSESSMENT/Changes in Function:   Patient complaining of pain on L side with swelling but R side swollen as well. Patient did not report any difficulty with exercises and required min cues. He reported no pain post STM . He will benefit from continued PT services to assist in more cervical mobility. Patient will continue to benefit from skilled PT services to modify and progress therapeutic interventions, address ROM deficits and analyze and address soft tissue restrictions to attain remaining goals. GOALS/Progress towards goals:  Patient Goal (s): reduce pain    [x]? Met []? Not met []? Partially met     Short Term Goals: To be accomplished in 6-8 treatments. 1. Patient will be compliant with HEP to assist in pain reduction. [x] Met [] Not met [] Partially met  12/29  2. Patient will demo 5-10 degree improvement in cervical ROM in all planes. [] Met [x] Not met [] Partially met 1/5     Long Term Goals: To be accomplished in 16-24 treatments. 1. Patient will report decrease in pain and difficulty with eating due to neck pain/tightness. [x] Met [] Not met [] Partially met 1/5/22  2. Patient will report decrease in neck pain to max of 4/10 per VAS. [x] Met [] Not met [] Partially met 1/5/22    PLAN  Frequency / Duration: Patient to be seen 2-3 times per week for 16-24 treatments.   Certification Period: 11/30/21 - 2/28/22  [x]  Upgrade activities as tolerated     [x]  Continue plan of care  [x]  Update interventions per flow sheet       []  Discharge due to:_  []  Other:_ Andrea Abraham, PTA 1/13/2022

## 2022-01-19 NOTE — PROGRESS NOTES
[address] [facility] Oncology at [address] [place] [address]  [phone]    Hematology / Oncology Established Visit    Reason for Visit:   Marcia Goldberg is a 49 y.o. male who is seen in follow up of laryngeal cancer. Referred by Dr. 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 Stage: CATARINO [[id number]] at diagnosis, regional recurrence in 4/2021    Pathology:   2/8/21 Total laryngectomy: Invasive squamous cell carcinoma  Tumor size 3.5cm, moderately differentiated (G2), PNI present, LVI not identified, margins negative  [id number]  -PDL1 (tested 12/28/21 on above specimen) positive with CPS 10. Prior Treatment:   1. 2/8/21 total laryngectomy/cricopharyngeal myotomy  2. Cisplatin 100mg/m2 every 3 weeks x 3 cycles, 5/24/21-7/13/21  3. Radiation 5/24/21-7/19/21  4. Pembrolizumab x 2 cycles, 1/6/22    Current Treatment:  [Cisplatin (100 mg/m2 intravenous, day 1) and fluorouracil (1000 mg/m2 per day, continuous infusion, for four days) and Pembrolizumab, day 1 given every 21 days x 6 cycles followed by Pembrolizumab maintenance x 2yr. Start 1/28/22 - current. History of Present Illness:   Marcia Goldberg is a 49 y.o. male who with COPD who is referred for Head and neck cancer. He presented with throat pain and hoarseness in Sept 2020. Was evaluated by Dr. Alison Fernandez in ENT who performed laryngoscopy and diagnosed patient with squamous cell carcinoma involving the larynx and subglottis. In late Dec 2020, neck CT showed a 3 cm mass in Right supraglottic larynx with extension to thyroid cartilage, but no cervical LNs. PET 1/4/21 showed uptake in the laryngeal mass at right vocal fold, extending to thyroid cartilage. He was scheduled for surgery, but he required emergent tracheostomy prior to that; done late Jan 2021. Also had PEG placed 1/30/21. On 2/8/21, he underwent total laryngectomy/cricopharyngeal myotomy. He quit smoking in Feb 2021.  He was evaluated by  Valentino Sings in [address] [place] [address] in March 2021 who recommended post-op radiation. There were multiple delays given difficult getting in for dental evaluation prior to RT. [facility] simulation scan was notable for a necrotic appearing tumor in Right neck  . PET 5/7/21 confirmed hypermetabolic uptake in right neck. PEG removed in March 2021 due to problems with it. Per Dr. Valentino Sings, pt had 7 teeth extracted by dentist. He returns to the dentist again for dental fillings on 5/26/21. Pt states his neck feel tight, causing pulling sensation but no current pain. Interval History:  Patient here for follow up of throat cancer and C1 of Cis-5FU- Keytruda. Taking levothyroxine daily and taking Eliquis twice a day. Is eating 3 meals a day and hydrating with at least 40 oz water daily. He still has bleeding from stoma at times. No CP, SOB, fevers, chills. Continues working with PT twice weekly - feels neck is not as tight. No pain currently. No CP or SOB - no longer having persistent episodes of elevated HR. PMHx: COPD, throat cancer, anxiety  PSurgHx: Left arm plate placement, tracheostomy, total laryngectomy 2/8/21  SHx: Quit smoking 10/28/20 after 45 pack years. No EtOH  FHx: Mother with DM; Father with DM, CKD; Sister with DM. No h/o malignancy. Review of Systems: A complete review of systems was obtained, negative except as described above. Physical Exam:   Blood pressure 121/78, pulse (!) 107, temperature 98.2 °F (36.8 °C), height 6' (1.829 m), weight 157 lb (71.2 kg), SpO2 97 %. ECOG PS: 0  General: Well developed, no acute distress  Eyes: Anicteric sclerae  HENT: Atraumatic  Neck: Supple, Tracheostomy noted  Lymphatic: No supraclavicular, axillary adenopathy. No bilateral cervical LAD, but firm skin at neck bilaterally. Respiratory: CTAB, normal respiratory effort  CV: tachycardia, regular rhythm, no murmurs, no peripheral edema  GI: Soft, nontender, nondistended, no masses  MS: Normal gait and station.  Digits without clubbing or cyanosis. 1cm firm nodule on left wrist.  Skin: No rashes. Hyperpigmented skin at neck bilaterally. Neuro/Psych: Alert. Communicates by writing given tracheostomy. Results:     Lab Results   Component Value Date/Time    WBC 5.5 2022 08:12 AM    HGB 8.3 (L) 2022 08:12 AM    HCT 25.7 (L) 2022 08:12 AM    PLATELET 186 (H)  08:12 AM    .8 (H) 2022 08:12 AM    ABS. NEUTROPHILS 4.2 2022 08:12 AM     Lab Results   Component Value Date/Time    Sodium 137 2022 08:12 AM    Potassium 4.1 2022 08:12 AM    Chloride 102 2022 08:12 AM    CO2 32 2022 08:12 AM    Glucose 132 (H) 2022 08:12 AM    BUN 23 (H) 2022 08:12 AM    Creatinine 1.05 2022 08:12 AM    GFR est AA >60 2022 08:12 AM    GFR est non-AA >60 2022 08:12 AM    Calcium 9.6 2022 08:12 AM    Glucose (POC) 113 10/04/2021 01:04 PM     Lab Results   Component Value Date/Time    Bilirubin, total 0.4 2022 08:12 AM    ALT (SGPT) 11 (L) 2022 08:12 AM    Alk. phosphatase 62 2022 08:12 AM    Protein, total 7.7 2022 08:12 AM    Albumin 3.3 (L) 2022 08:12 AM    Globulin 4.4 (H) 2022 08:12 AM     Lab Results   Component Value Date/Time    Iron 34 (L) 2021 12:17 PM    TIBC 216 (L) 2021 12:17 PM    Iron % saturation 16 (L) 2021 12:17 PM    Ferritin 939 (H) 2021 12:17 PM      Lab Results   Component Value Date/Time    Vitamin B12 664 2021 11:23 AM    Folate 24.0 (H) 2021 11:23 AM         Imagin/28/21 Neck CT: IMPRESSION  Irregular soft tissue mass involving the aryepiglottic and the larynx. There is significant narrowing of the airway at the level of the larynx  and the supraglottic region. Poorly defined cervical adenopathy is noted. 21 Chest CTA:  IMPRESSION  Minimal scarring or subsegmental atelectasis in the left lung base.  No evidence of pulmonary embolism or pneumonia. 5/7/21 PET:  IMPRESSION  2 large necrotic appearing mass lesions are noted in the right neck (SUV 6.4). Small hypermetabolic right posterior triangle lymph node. No PET avid evidence  of distant metastatic disease. 10/4/21 PET:  FINDINGS:  HEAD/NECK: There is physiologic tracer activity in the oral cavity and piriform  sinuses. Physiologic tracer activity is also seen in the neck musculature,  particularly the right sternocleidomastoid muscle. 2 enlarged right level 2  cervical lymph nodes are slightly decreased in size; these demonstrate no  significant residual FDG activity. Previously seen small lymph node in the right  posterior cervical triangle also demonstrates no residual FDG activity. CHEST: There is a new 3.0 x 2.6 cm focus of nodular airspace disease in the left  upper lobe which demonstrates increased FDG activity, maximal SUV 2.8. New foci  of nodular airspace disease in the left lower lobe, measuring 1.2 cm and 1.0 cm,  in the right lower lobe, measuring 0.8 cm, and in the right middle lobe,  measuring 1.6 cm, demonstrate no appreciable FDG activity. ABDOMEN/PELVIS: No foci of abnormal hypermetabolism. SKELETON: No foci of abnormal hypermetabolism in the axial and visualized  appendicular skeleton. IMPRESSION  1. Previously seen enlarged cervical lymph nodes demonstrate no residual  abnormal FDG activity. 2. New foci of nodular airspace disease in both lungs, including a 3 cm area in  the left upper lobe which demonstrates low-grade increased FDG activity. Findings may be infectious etiology, although neoplasm is not excluded;  attention on follow-up examinations is recommended. 11/22/21 CT neck:  IMPRESSION  No significant interval change since the previous CT PET  examination. Necrotic right level 2A lymph nodes unchanged in size. No definite  new pathologic lymph nodes by CT criteria. Postradiation changes as described  above.   Narrowed nasopharyngeal and oropharyngeal airway unchanged. No new pathologic lymphadenopathy by CT criteria is demonstrated. 11/22/21 CT chest:   There is a 15 mm node in the right hilum, station 10 R, series 201 image 39. There is increasing multifocal multi lobar inflammatory appearing airspace  disease in the lungs most suggestive of multifocal pneumonia. Dominant focus of  airspace disease in the lingula on series 204 image 41 measures 3.3 x 3.2 cm,  previously 3.0 x 2.6 cm. Likewise other foci have increased in size with  dominant lesion on the right measuring 2.8 x 1.9 cm on image 50, previously 16  mm. Suspicious right upper lobe nodules are present including a dominant 6 x 9 mm  nodule on series 204 image 25. There are no suspicious lytic or blastic skeletal lesions in the thorax. Incidental imaging of the upper abdomen demonstrates liver steatosis with  probable focal steatosis adjacent to the fissure for the falciform ligament. There may be a small retrocardiac hiatal hernia. IMPRESSION  Increasing multifocal airspace disease with new suspicious solid appearing  nodules in the right upper lobe. Assessment & Plan:   Bakari Goldstein is a 49 y.o. male is seen for laryngeal cancer. 1. Squamous cell carcinoma of larynx, Stage CATARINO [pT4a cN2 Mx]:  S/p total laryngectomy and tracheostomy in Jan 2021. Afterwards, he developed disease recurrence in Right neck confirmed by PET scan. I recommended concurrent chemoradiation using Cisplatin to treat his disease. Pt completed concurrent chemoradiation radiation 7/19/21. Evidence of local treatment response per physical exam and 10/4/21 PET. However repeat imaging with chest CT 11/22/21 shows increasing size in lung nodules, suspicious for disease progression outside of prior radiation field. Per discussion with Thoracic Tumor Board, these nodules would be amenable to biopsy by navigation bronchoscopy if desired.  However, RadOnc and I feel this is obvious disease progression given extent of disease at diagnosis. Discussed with patient about disease progression. Discussed pros/con of biopsy to confirm metastatic disease (pro: confirmation rather than assumption of metastatic disease; ability to send metastatic tissue for NGS; con: will lead to treatment delay of clinically likely metastatic disease. ) He elected to proceed with treatment without repeating biopsy. Started Pembrolizumab monotherapy, then switched to Cis-5FU-Pembrolizumab, followed by Pembro maintenance x 2 y given PDL1 CPS < 20. [30% RR in phase III trials.] Supportive medications: zofran, compazine, emla, decadron. 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 -- Proceed with C1 of Cis-5-FU-Pembrolizumab with pump d/c on Tue, 2/1/22.   -- Return in 3 weeks for C2 of Cis-5-FU-Pembrolizumab, MD/NP visit on 2/18/22 with pump d/c on Tue, 2/21/22. -- Per Dr Swain patient needs another PET in middle of Feb or early March (prefer early March given change in treatment regimen.)    2. Supportive care / Survivorship:  Discussed dental evaluation for oral cavity and sites exposed to significant intraoral radiation treatment and abstaining from alcohol. 3. COPD / H/o tobacco abuse:   Quit in 2/2021. Uses Albuterol inhaler prn.    4. FEN/GI:   Odynophagia 2/2 RT continues to improve. Senna-plus BID prn constipation. 5. Macrocytic anemia:   2/2 prior chemotherapy and worsening due to recent cancer progression. B12, folate normal; ferritin high and iron sat mildly low. 6. Hypothyroidism:   Due to prior radiation and worsening. Checking TSH/free T4 every other cycle during treatment. Recent labs show continued increase in TSH.   -- Increase levothyroxine to 50mcg/d. Stressed the importance of taking it on an empty stomach every morning at the same time at least an hour before other medications or eating.    -- Appointment with Dr. Vipul Red, Endocrinology on 3/9/22 at 330 pm.      7. Pulmonary embolism:  Provoked by #1. 11/2021 CT chest. On eliquis. Hold for PLT <50.     8. Tenderness at stoma site:   Improved today. Evaluated by Dr. Adrian Jernigan and told bleeding is due to prior radiation. 9. Intermittent Tachycardia:   Prior EKGs, trop negative. Encouraged hydration with 64 oz fluids daily. Could be related to recent PE. Recommend cardiology follow up but missed appointment. Episodes are less frequent so will put referral on hold for now. 10. Tracheostomy care:   Pt reports he will run out of HMEs in the next 4 days and reports [employer] is no longer supplying them through his insurance. -- Dr. Cristel Lambert team is working on this but will follow up to ensure this is the case. Emotional well being: Pt is coping well with his/her disease and has excellent support. I personally saw and evaluated the patient and performed the key components of medical decision making. The history, physical exam, and documentation were performed by Daniele Morris NP. I reviewed and verified the above documentation and modified it as needed. Proceed with C1 of Cis-5FU-Pembro today. Tachycardia present, but palpitations improved.      Signed By: Macy Aranda MD     01/28/22

## 2022-01-26 NOTE — PROGRESS NOTES
PT DAILY TREATMENT NOTE - [facility]     Patient Name: Pa Phan  Date:2022  : 1970  [x]  Patient  Verified  Payor: [facility] MEDICAID / Plan: KAITLIN NICHOLAS [facility] CCCP / Product Type: Managed Care Medicaid /    Treatment Area: Neck pain [M54.2]   Next MD APPT: 22  In time: 9:12am   Out time: 9:50am  Total Treatment Time (min):42  Total Timed Codes (min):42  1:1 Treatment Time [facility] only):42  Visit # -    SUBJECTIVE  Pain Level (0-10 scale) pre treatment:2/10  Pain Level (0-10 scale) post treatment: 0/10  Any medication changes, allergies to medications, adverse drug reactions, diagnosis change, or new procedure performed?:   [x] No    [] Yes (see summary sheet for update)  Subjective functional status/changes:   [x] No changes reported  Pt reporting mild pain along the front of his neck this morning. Pt requesting for us to contact MD to let them know that he needs more HME's for trach. OBJECTIVE  Declined modalities     30 min Therapeutic Exercise:  [x] See flow sheet :    Rationale: increase ROM and increase strength to improve the patients ability to eat with less discomfort  12 min Manual Therapy: scar massage anterior neck    Rationale: increase ROM, increase tissue extensibility and decrease trigger points to improve the patients ability to eat with less discomfort    With   [x] TE   [] TA   [] Neuro   [] SC   [] other: Patient Education: [x] Review HEP    [] Progressed/Changed HEP based on:   [] positioning   [] body mechanics   [] transfers   [] Use of heat/ice    [] other:          Other Objective/Functional Measures: continued current POC     ASSESSMENT/Changes in Function:   Pt continues to get good relief with tx. No pain at end of session. Noted less tautness in the musculature along the anterior neck. Noted more tightness on L side during passive stretching. Will continue current POC to improve cervical mobility.   Patient will continue to benefit from skilled PT services to modify and progress therapeutic interventions, address ROM deficits and analyze and address soft tissue restrictions to attain remaining goals. GOALS/Progress towards goals:  Patient Goal (s): reduce pain    [x]? Met []? Not met []? Partially met     Short Term Goals: To be accomplished in 6-8 treatments. 1. Patient will be compliant with HEP to assist in pain reduction. [x] Met [] Not met [] Partially met  12/29  2. Patient will demo 5-10 degree improvement in cervical ROM in all planes. [] Met [x] Not met [] Partially met 1/5     Long Term Goals: To be accomplished in 16-24 treatments. 1. Patient will report decrease in pain and difficulty with eating due to neck pain/tightness. [x] Met [] Not met [] Partially met 1/5/22  2. Patient will report decrease in neck pain to max of 4/10 per VAS. [x] Met [] Not met [] Partially met 1/5/22    PLAN  Frequency / Duration: Patient to be seen 2-3 times per week for 16-24 treatments.   Certification Period: 11/30/21 - 2/28/22    [x]  Upgrade activities as tolerated     [x]  Continue plan of care  [x]  Update interventions per flow sheet       []  Discharge due to:_  []  Other:_      Ashu Montoya, PT, DPT 1/26/2022

## 2022-01-28 NOTE — TELEPHONE ENCOUNTER
[address] at [facility]  [phone]        01/28/22 10:03 Alysa Mejía office to speak with nurse. Patient seen in clinic today and reports he is almost out of HME supplies. Patient has 4 day supply left and shared that [employer] is no longer able to supply these. Patient also shared that Dr. Joanna Mejía office was already working on obtaining additional supplies for patient. Left message for Dr. Joanna Mejía nurse, Bennett López, to confirm above and inquire about status. Provided office phone number for call back. 11:18 AM Received return call from nurse, Bennett López. She states patient's insurance is denying coverage, stating that patient does not meet medical necessity. Bennett López faxed medical records earlier this week to obtain approval and is awaiting response from insurance. She also provided patient with 6 HME samples while he was in office, he had 2 remaining HMEs of his own as well. Bennett López states she has also been keeping patient's brother updated of status. Xochilt Romero for her assistance. Will update Dr. Ike Last and Margot Blakely of above.

## 2022-01-28 NOTE — PROGRESS NOTES
[facility] Progress Note    Date: 2022    Name: William Lara    MRN: [id number]         : 1970    Mr. Peterson Arrived ambulatory and in no distress for C1D1 of Keytruda + Cisplatin Regimen. Assessment & port access completed by STEPHEN Starks RN. Patient educated regarding [employer] pump- watched video. Patient proceed to appointment with Dr. Paula Henderson. Mr. Ifeoma Palacios vitals were reviewed. Patient Vitals for the past 12 hrs:   Temp Pulse Resp BP   22 1529    134/89   22 0809 98.2 °F (36.8 °C) (!) 107 18 121/78       Lab results were obtained and reviewed. NP aware of patient's elevated TSH - patient instructed to increase synthroid dose to 50 mcg. Patient has apt with endocrinologist in March. Recent Results (from the past 12 hour(s))   CBC WITH AUTOMATED DIFF    Collection Time: 22  8:12 AM   Result Value Ref Range    WBC 5.5 4.1 - 11.1 K/uL    RBC 2.50 (L) 4.10 - 5.70 M/uL    HGB 8.3 (L) 12.1 - 17.0 g/dL    HCT 25.7 (L) 36.6 - 50.3 %    .8 (H) 80.0 - 99.0 FL    MCH 33.2 26.0 - 34.0 PG    MCHC 32.3 30.0 - 36.5 g/dL    RDW 17.1 (H) 11.5 - 14.5 %    PLATELET 342 (H) 848 - 400 K/uL    MPV 9.6 8.9 - 12.9 FL    NRBC 0.0 0  WBC    ABSOLUTE NRBC 0.00 0.00 - 0.01 K/uL    NEUTROPHILS 77 (H) 32 - 75 %    LYMPHOCYTES 6 (L) 12 - 49 %    MONOCYTES 15 (H) 5 - 13 %    EOSINOPHILS 1 0 - 7 %    BASOPHILS 1 0 - 1 %    IMMATURE GRANULOCYTES 0 0.0 - 0.5 %    ABS. NEUTROPHILS 4.2 1.8 - 8.0 K/UL    ABS. LYMPHOCYTES 0.3 (L) 0.8 - 3.5 K/UL    ABS. MONOCYTES 0.8 0.0 - 1.0 K/UL    ABS. EOSINOPHILS 0.1 0.0 - 0.4 K/UL    ABS. BASOPHILS 0.1 0.0 - 0.1 K/UL    ABS. IMM.  GRANS. 0.0 0.00 - 0.04 K/UL    DF SMEAR SCANNED      RBC COMMENTS ANISOCYTOSIS  1+       METABOLIC PANEL, COMPREHENSIVE    Collection Time: 22  8:12 AM   Result Value Ref Range    Sodium 137 136 - 145 mmol/L    Potassium 4.1 3.5 - 5.1 mmol/L    Chloride 102 97 - 108 mmol/L    CO2 32 21 - 32 mmol/L    Anion gap 3 (L) 5 - 15 mmol/L    Glucose 132 (H) 65 - 100 mg/dL    BUN 23 (H) 6 - 20 MG/DL    Creatinine 1.05 0.70 - 1.30 MG/DL    BUN/Creatinine ratio 22 (H) 12 - 20      GFR est AA >60 >60 ml/min/1.73m2    GFR est non-AA >60 >60 ml/min/1.73m2    Calcium 9.6 8.5 - 10.1 MG/DL    Bilirubin, total 0.4 0.2 - 1.0 MG/DL    ALT (SGPT) 11 (L) 12 - 78 U/L    AST (SGOT) 19 15 - 37 U/L    Alk.  phosphatase 62 45 - 117 U/L    Protein, total 7.7 6.4 - 8.2 g/dL    Albumin 3.3 (L) 3.5 - 5.0 g/dL    Globulin 4.4 (H) 2.0 - 4.0 g/dL    A-G Ratio 0.8 (L) 1.1 - 2.2     MAGNESIUM    Collection Time: 01/28/22  8:12 AM   Result Value Ref Range    Magnesium 2.1 1.6 - 2.4 mg/dL   TSH 3RD GENERATION    Collection Time: 01/28/22  8:12 AM   Result Value Ref Range    TSH 42.20 (H) 0.36 - 3.74 uIU/mL       Medications:    Medications Administered     0.9% sodium chloride 1,000 mL with potassium chloride 10 mEq, magnesium sulfate 2 g infusion     Admin Date  01/28/2022 Action  New Bag Dose   Rate  1,000 mL/hr Route  IntraVENous Administered By  Lovella Libman, RN           Admin Date  01/28/2022 Action  New Bag Dose   Rate  1,000 mL/hr Route  IntraVENous Administered By  Lovella Libman, RN          0.9% sodium chloride infusion     Admin Date  01/28/2022 Action  New Bag Dose  25 mL/hr Rate  25 mL/hr Route  IntraVENous Administered By  Lovella Libman, RN          0.9% sodium chloride injection 10 mL     Admin Date  01/28/2022 Action  Given Dose  10 mL Route  IntraVENous Administered By  Med Nettles RN          CISplatin (PLATINOL) 186 mg in 0.9% sodium chloride 500 mL, overfill volume 50 mL chemo infusion     Admin Date  01/28/2022 Action  New Bag Dose  186 mg Rate  368 mL/hr Route  IntraVENous Administered By  Lovella Libman, RN          dexamethasone (DECADRON) 4 mg/mL injection 8 mg     Admin Date  01/28/2022 Action  Given Dose  8 mg Route  IntraVENous Administered By  Lovella Libman, RN          fluorouraciL (ADRUCIL) 7,440 mg in 0.9% sodium chloride 250 mL CADD Cassette     Admin Date  01/28/2022 Action  New Bag Dose  7,440 mg Rate  2.6 mL/hr Route  IntraVENous Administered By  Francesca Epperson RN          fosaprepitant (EMEND) 150 mg in 0.9% sodium chloride 150 mL IVPB     Admin Date  01/28/2022 Action  New Bag Dose  150 mg Rate  450 mL/hr Route  IntraVENous Administered By  Francesca Epperson RN          influenza vaccine [id number] (6 mos+)(PF) (FLUARIX/FLULAVAL/FLUZONE QUAD) injection 0.5 mL     Admin Date  01/28/2022 Action  Given Dose  0.5 mL Route  IntraMUSCular Administered By  Francesca Epperson RN          palonosetron HCl (ALOXI) injection 0.25 mg     Admin Date  01/28/2022 Action  Given Dose  0.25 mg Route  IntraVENous Administered By  Francesca Epperson RN          pembrolizumab [facility]) 200 mg in 0.9% sodium chloride 100 mL, overfill volume 10 mL IVPB     Admin Date  01/28/2022 Action  New Bag Dose  200 mg Rate  236 mL/hr Route  IntraVENous Administered By  Francesca Epperson RN          sodium chloride (NS) flush 10 mL     Admin Date  01/28/2022 Action  Given Dose  10 mL Route  IntraVENous Administered By  Mayra Germain RN                  Mr. Indiana Benz tolerated treatment well and was discharged from [place] [address] in stable condition. Pump connected to patient & infusion per order. He is to return on 2/1/22 for his next appointment.     Alpa Gonzalez RN  January 28, 2022

## 2022-01-28 NOTE — PATIENT INSTRUCTIONS
1. Take zofran (ondansetron) 8mg in the morning on a scheduled basis for 2 days after treatment (sat and sun), then take zofran 8mg every 8 hours as needed for nausea    2. Take dexamethasone 4mg in the AM and afternoon for 2 days after treatment to prevent nausea    3. Take compazine every 6 hours as needed for nausea    4.  I refilled your eliquis

## 2022-01-28 NOTE — PROGRESS NOTES
Monika Long is a 49 y.o. male here for follow up of squamous cell carcinoma of larynx. Patient with no complaints of pain at this time.

## 2022-01-31 NOTE — PROGRESS NOTES
PT DAILY TREATMENT NOTE - [facility]     Patient Name: Antonio Gonzalez  Date:2022  : 1970  [x]  Patient  Verified  Payor: [facility] MEDICAID / Plan: KAITLIN NICHOLAS [facility] CCCP / Product Type: Managed Care Medicaid /    Treatment Area: Neck pain [M54.2]   Next MD APPT: 22  In time: 9:06am   Out time: 9:40am  Total Treatment Time (min):34  Total Timed Codes (min):34  1:1 Treatment Time [facility] only):34   Visit # -    SUBJECTIVE  Pain Level (0-10 scale) pre treatment: 2/10  Pain Level (0-10 scale) post treatment: 0/10  Any medication changes, allergies to medications, adverse drug reactions, diagnosis change, or new procedure performed?:   [x] No    [] Yes (see summary sheet for update)  Subjective functional status/changes:   [x] No changes reported  Pt states he can not do exercises today because he is on a chemo pump until tomorrow. Would like to just do massage this morning. OBJECTIVE  Declined modalities      min Therapeutic Exercise:  [x] See flow sheet :    Rationale: increase ROM and increase strength to improve the patients ability to eat with less discomfort  34 min Manual Therapy: scar massage anterior neck, STM and passive stretching ,   Rationale: increase ROM, increase tissue extensibility and decrease trigger points to improve the patients ability to eat with less discomfort    With   [x] TE   [] TA   [] Neuro   [] SC   [] other: Patient Education: [x] Review HEP    [] Progressed/Changed HEP based on:   [] positioning   [] body mechanics   [] transfers   [] Use of heat/ice    [] other:          Other Objective/Functional Measures: tx modified - MT performed only     ASSESSMENT/Changes in Function:   Tx modified due to chemo tx restrictions. Pt reporting no pain at end of session. Noting less soft tissue tightness but scar tissue is still prevalent throughout throat area. Will resume exercise next visit.     Patient will continue to benefit from skilled PT services to modify and progress therapeutic interventions, address ROM deficits and analyze and address soft tissue restrictions to attain remaining goals. GOALS/Progress towards goals:  Patient Goal (s): reduce pain    [x]? Met []? Not met []? Partially met     Short Term Goals: To be accomplished in 6-8 treatments. 1. Patient will be compliant with HEP to assist in pain reduction. [x] Met [] Not met [] Partially met  12/29  2. Patient will demo 5-10 degree improvement in cervical ROM in all planes. [] Met [x] Not met [] Partially met 1/5     Long Term Goals: To be accomplished in 16-24 treatments. 1. Patient will report decrease in pain and difficulty with eating due to neck pain/tightness. [x] Met [] Not met [] Partially met 1/5/22  2. Patient will report decrease in neck pain to max of 4/10 per VAS. [x] Met [] Not met [] Partially met 1/5/22    PLAN  Frequency / Duration: Patient to be seen 2-3 times per week for 16-24 treatments.   Certification Period: 11/30/21 - 2/28/22    [x]  Upgrade activities as tolerated     [x]  Continue plan of care  [x]  Update interventions per flow sheet       []  Discharge due to:_  []  Other:_      Wero Wang, PT, DPT 1/31/2022

## 2022-02-01 NOTE — PROGRESS NOTES
[facility] Progress Note    Date: 2022    Name: Jazmine Simon    MRN: [id number]         : 1970    Mr. Peterson Arrived ambulatory and in no distress for Hydration Regimen. Assessment was completed, no acute issues at this time, no new complaints voiced. Port already accessed and infusing 5FU. Pt arrived early and pt still has 7.3 ml left to infuse. RN explained to pt medication would not complete for about 2-3 hours. Pt would like a PIV for hydration and would like RN to call Jourdan Pimentel NP to see when is the earliest he can be disconnected. RN spoke with MD office and Jourdan Pimentel stated to keep pt as long as he able to stay and disconnect him at the last possible second when he has to leave. Pt stated he has to be home by 5:30 and lives over an hour away. PIV placed and labs drawn, please see [facility]. Covid questionnaire completed. 1. Do you have any symptoms of COVID-19? SOB, coughing, fever, or generally not feeling well - no    2. Have you been exposed to COVID-19 recently? - no    3. Have you had any recent contact with family/friend that has a pending COVID test? - no      Mr. Peterson's vitals were reviewed. Patient Vitals for the past 12 hrs:   Temp Pulse Resp BP SpO2   22 1305 98.6 °F (37 °C) 94 17 97/63 100 %       Medications:  Medications Administered     0.9% sodium chloride infusion 1,000 mL     Admin Date  2022 Action  New Bag Dose  1,000 mL Rate  1,000 mL/hr Route  IntraVENous Administered By  Nunu Carney RN              At 1500: pt request RN call cab because he has to leave. Exactly 2.0 ml left of 5FU left in cassette. Jourdan Pimentel NP notified. Mr. Georges Carmona tolerated treatment well and was discharged from [place] [address] in stable condition. PIV flushed and removed. Port de-accessed, flushed & heparinized per protocol. He is to return on 22 for his next appointment.     Kareen Dobbs RN  2022

## 2022-02-02 NOTE — PROGRESS NOTES
[address] [address] [facility] [address] Suite [facility], [address] [place] [address]  Ph: [phone]    Fax: [phone]  Therapy Progress Report  Name: Pedrito García  : 1970   MD: Prachi Parks NP     Medical Diagnosis: Neck pain [M54.2]  Start of Care: 21   Visits from Start of Care: 9     Missed Visits: 0  Certification Period: 21 - 22    Frequency/Duration: 2 times a week for 16-24 treatments   Summary of Care/Goals:  SUBJECTIVE  Pain Level (0-10 scale) pre treatment: 2/10  Pain Level (0-10 scale) post treatment: 0/10  Any medication changes, allergies to medications, adverse drug reactions, diagnosis change, or new procedure performed?:   [x]? No    []? Yes (see summary sheet for update)  Subjective functional status/changes:   [x]? No changes reported  Pt states his eating is about the same. Max pain rating 3/10.       OBJECTIVE  Other Objective/Functional Measures:     Physical Findings   Ortho:   Posture:  slightly forward head and rounding of shoulders  Palpation: Slight TTP anterior neck musculature including more prevalent on the R side.   Increased tissue turgor with fibrotic scar tissue and skin discoloration bilaterally along the anterior neck from radiation treatment.     Spine: *normal values in ()  CERVICAL SPINE                          AROM       PROM  Flexion 66     Extension 35                                         AROM                   PROM    R L R L   Lat Flexion 22 32       Rotation 64 58       Additional Comments:improvement in flexion, L SB and león rotation noted, no pain reported with cervical ROM   Good cervical strength  Cervical retraction two finger lengths    Slight pain with use of deep anterior neck muscles                                              Ampac Score:   0%        ASSESSMENT/Changes in Function:   Pt has completed another month of PT services.   He is displaying some improvement in cervical ROM but still limited with extension due to soft tissue/scar tissue restrictions. Pt reporting no further changes in eating ability. He will benefit from continued PT services to assist in maximizing his cervical mobility. Anticipate DC at end of POC on 2/28/22. Patient will continue to benefit from skilled PT services to modify and progress therapeutic interventions, address ROM deficits and analyze and address soft tissue restrictions to attain remaining goals.         GOALS/Progress towards goals:  Patient Goal (s): reduce pain    [x]? ? Met []? ? Not met []? ? Partially met      Short Term Goals: To be accomplished in 6-8 treatments. 1. Patient will be compliant with HEP to assist in pain reduction. [x]? Met []? Not met []? Partially met  12/29  2. Patient will demo 5-10 degree improvement in cervical ROM in all planes. []? Met [x]? Not met [x]? Partially met 2/2      Long Term Goals: To be accomplished in 16-24 treatments. 1. Patient will report decrease in pain and difficulty with eating due to neck pain/tightness. [x]? Met []? Not met []? Partially met 1/5/22  2. Patient will report decrease in neck pain to max of 4/10 per VAS. [x]? Met []? Not met []? Partially met 1/5/22    Recommendations: Continue current POC   [x]  Plan of care has been reviewed with PTA. Penny Ruelas, PT, DPT 2/2/2022     ________________________________________________________________________     Please retain this original for your records.

## 2022-02-02 NOTE — PROGRESS NOTES
PT DAILY TREATMENT NOTE - [facility]     Patient Name: Oral Voss  Date:2022  : 1970  [x]  Patient  Verified  Payor: [facility] MEDICAID / Plan: KAITLIN NICHOLAS [facility] CCCP / Product Type: Managed Care Medicaid /    Treatment Area: Neck pain [M54.2]   Next MD APPT: 22  In time: 9:05am   Out time: 9:52am  Total Treatment Time (min):47  Total Timed Codes (min):47  1:1 Treatment Time [facility] only):47  Visit # -    SUBJECTIVE  Pain Level (0-10 scale) pre treatment: 2/10  Pain Level (0-10 scale) post treatment: 0/10  Any medication changes, allergies to medications, adverse drug reactions, diagnosis change, or new procedure performed?:   [x] No    [] Yes (see summary sheet for update)  Subjective functional status/changes:   [x] No changes reported  Pt states his eating is about the same. Max pain rating 3/10.       OBJECTIVE  Declined modalities     37 min Therapeutic Exercise:  [x] See flow sheet : reassessment    Rationale: increase ROM and increase strength to improve the patients ability to eat with less discomfort  10 min Manual Therapy: scar massage anterior neck, STM SCM/scalenes    Rationale: increase ROM, increase tissue extensibility and decrease trigger points to improve the patients ability to eat with less discomfort    With   [x] TE   [] TA   [] Neuro   [] SC   [] other: Patient Education: [x] Review HEP    [] Progressed/Changed HEP based on:   [] positioning   [] body mechanics   [] transfers   [] Use of heat/ice    [] other:          Other Objective/Functional Measures:    Physical Findings   Ortho:   Posture:  slightly forward head and rounding of shoulders  Palpation: Slight TTP anterior neck musculature including more prevalent on the R side.   Increased tissue turgor with fibrotic scar tissue and skin discoloration bilaterally along the anterior neck from radiation treatment.     Spine: *normal values in ()  CERVICAL SPINE                          AROM       PROM  Flexion 66     Extension 35                                         AROM                   PROM    R L R L   Lat Flexion 22 32       Rotation 64 58       Additional Comments:improvement in flexion, L SB and león rotation noted, no pain reported with cervical ROM   Good cervical strength  Cervical retraction two finger lengths    Slight pain with use of deep anterior neck muscles                                              Ampac Score:   0%      ASSESSMENT/Changes in Function:   Pt has completed another month of PT services. He is displaying some improvement in cervical ROM but still limited with extension due to soft tissue/scar tissue restrictions. Pt reporting no further changes in eating ability. He will benefit from continued PT services to assist in maximizing his cervical mobility. Anticipate DC at end of POC on 2/28/22. Patient will continue to benefit from skilled PT services to modify and progress therapeutic interventions, address ROM deficits and analyze and address soft tissue restrictions to attain remaining goals. GOALS/Progress towards goals:  Patient Goal (s): reduce pain    [x]? Met []? Not met []? Partially met     Short Term Goals: To be accomplished in 6-8 treatments. 1. Patient will be compliant with HEP to assist in pain reduction. [x] Met [] Not met [] Partially met  12/29  2. Patient will demo 5-10 degree improvement in cervical ROM in all planes. [] Met [x] Not met [x] Partially met 2/2     Long Term Goals: To be accomplished in 16-24 treatments. 1. Patient will report decrease in pain and difficulty with eating due to neck pain/tightness. [x] Met [] Not met [] Partially met 1/5/22  2. Patient will report decrease in neck pain to max of 4/10 per VAS. [x] Met [] Not met [] Partially met 1/5/22    PLAN  Frequency / Duration: Patient to be seen 2-3 times per week for 16-24 treatments.   Certification Period: 11/30/21 - 2/28/22    [x]  Upgrade activities as tolerated     [x]  Continue plan of care  [x] Update interventions per flow sheet       []  Discharge due to:_  []  Other:_      Griselda Martins, PT, DPT 2/2/2022

## 2022-02-04 NOTE — PROGRESS NOTES
PT DAILY TREATMENT NOTE - [facility]     Patient Name: William Lara  RSIC:[id number]  : 1970  [x]  Patient  Verified  Payor: [facility] MEDICAID / Plan: KAITLIN NICHOLAS [facility] CCCP / Product Type: Managed Care Medicaid /    Treatment Area: Neck pain [M54.2]   Next MD APPT: 22  In time: 9:15am   Out time: 9:45 am  Total Treatment Time (min):30 min  Total Timed Codes (min):30 min  1:1 Treatment Time [facility] only):30 min  Visit # 10/16-    SUBJECTIVE  Pain Level (0-10 scale) pre treatment: 2/10  Pain Level (0-10 scale) post treatment: 2/10  Any medication changes, allergies to medications, adverse drug reactions, diagnosis change, or new procedure performed?:   [x] No    [] Yes (see summary sheet for update)  Subjective functional status/changes:   [x] No changes reported  Patient had a note already prepared for today: \"I only want to do a massage gotta go get my HME's and other medical supplies from [address] [place] [address] and Throat MD. \"  He indicates pain on the L side 2/10. OBJECTIVE  Declined modalities      min Therapeutic Exercise:  [x] See flow sheet : reassessment    Rationale: increase ROM and increase strength to improve the patients ability to eat with less discomfort  30 min Manual Therapy: scar massage anterior neck, STM SCM/scalenes and PROM. Rationale: increase ROM, increase tissue extensibility and decrease trigger points to improve the patients ability to eat with less discomfort    With   [x] TE   [] TA   [] Neuro   [] SC   [] other: Patient Education: [x] Review HEP    [] Progressed/Changed HEP based on:   [] positioning   [] body mechanics   [] transfers   [] Use of heat/ice    [] other:          Other Objective/Functional Measures:        ASSESSMENT/Changes in Function:   Patient requested decrease in treatment session due to having to  medical supplies. Patient complains of pain L>R but tight on both sides. Also performed PROM with MT today. Will resume exercises on next visit.  No change in pain levels post treatment session. Patient will continue to benefit from skilled PT services to modify and progress therapeutic interventions, address ROM deficits and analyze and address soft tissue restrictions to attain remaining goals. GOALS/Progress towards goals:  Patient Goal (s): reduce pain    [x]? Met []? Not met []? Partially met     Short Term Goals: To be accomplished in 6-8 treatments. 1. Patient will be compliant with HEP to assist in pain reduction. [x] Met [] Not met [] Partially met  12/29  2. Patient will demo 5-10 degree improvement in cervical ROM in all planes. [] Met [x] Not met [x] Partially met 2/2     Long Term Goals: To be accomplished in 16-24 treatments. 1. Patient will report decrease in pain and difficulty with eating due to neck pain/tightness. [x] Met [] Not met [] Partially met 1/5/22  2. Patient will report decrease in neck pain to max of 4/10 per VAS. [x] Met [] Not met [] Partially met 1/5/22    PLAN  Frequency / Duration: Patient to be seen 2-3 times per week for 16-24 treatments.   Certification Period: 11/30/21 - 2/28/22    [x]  Upgrade activities as tolerated     [x]  Continue plan of care  [x]  Update interventions per flow sheet       []  Discharge due to:_  []  Other:_      Gabrielle Lino, PTA 2/4/2022

## 2022-02-07 NOTE — PROGRESS NOTES
PT DAILY TREATMENT NOTE - [facility]     Patient Name: Gwenn Romberg  NSUC:[id number]  : 1970  [x]  Patient  Verified  Payor: [facility] MEDICAID / Plan: KAITLIN NICHOLAS [facility] CCCP / Product Type: Managed Care Medicaid /    Treatment Area: Neck pain [M54.2]   Next MD APPT: 22  In time: 9:05am   Out time: 9:46am  Total Treatment Time (min):40  Total Timed Codes (min):40  1:1 Treatment Time [facility] only):40  Visit # -24    SUBJECTIVE  Pain Level (0-10 scale) pre treatment: 2/10  Pain Level (0-10 scale) post treatment: 0/10  Any medication changes, allergies to medications, adverse drug reactions, diagnosis change, or new procedure performed?:   [x] No    [] Yes (see summary sheet for update)  Subjective functional status/changes:   [x] No changes reported  Pt states he only received 4 HMEs which only last 4 days each. Pt reporting 2/10 neck pain this morning. OBJECTIVE  Declined modalities     25 min Therapeutic Exercise:  [x] See flow sheet :    Rationale: increase ROM and increase strength to improve the patients ability to eat with less discomfort  15 min Manual Therapy: scar massage anterior neck, STM SCM/scalenes and passive stretching (UT and scalenes)    Rationale: increase ROM, increase tissue extensibility and decrease trigger points to improve the patients ability to eat with less discomfort    With   [x] TE   [] TA   [] Neuro   [] SC   [] other: Patient Education: [x] Review HEP    [] Progressed/Changed HEP based on:   [] positioning   [] body mechanics   [] transfers   [] Use of heat/ice    [] other:          Other Objective/Functional Measures:  Worked on ROM/stretching and soft tissue mobilization. ASSESSMENT/Changes in Function:   Pt continues to get some relief from MT and stretching. He consistently reports 2/10 pain when coming to therapy. Noted less tautness in soft tissue during MT.  L side tighter during passive stretching. Will continue current POC.    Patient will continue to benefit from skilled PT services to modify and progress therapeutic interventions, address ROM deficits and analyze and address soft tissue restrictions to attain remaining goals. GOALS/Progress towards goals:  Patient Goal (s): reduce pain    [x]? Met []? Not met []? Partially met     Short Term Goals: To be accomplished in 6-8 treatments. 1. Patient will be compliant with HEP to assist in pain reduction. [x] Met [] Not met [] Partially met  12/29  2. Patient will demo 5-10 degree improvement in cervical ROM in all planes. [] Met [] Not met [x] Partially met 2/2     Long Term Goals: To be accomplished in 16-24 treatments. 1. Patient will report decrease in pain and difficulty with eating due to neck pain/tightness. [x] Met [] Not met [] Partially met 1/5/22  2. Patient will report decrease in neck pain to max of 4/10 per VAS. [x] Met [] Not met [] Partially met 1/5/22    PLAN  Frequency / Duration: Patient to be seen 2-3 times per week for 16-24 treatments.   Certification Period: 11/30/21 - 2/28/22    [x]  Upgrade activities as tolerated     [x]  Continue plan of care  [x]  Update interventions per flow sheet       []  Discharge due to:_  []  Other:_      Evelyne Huber, PT, DPT 2/7/2022

## 2022-02-08 NOTE — TELEPHONE ENCOUNTER
[address] at [facility]  [phone]        02/08/22 10:46 AM Return call placed to Giovanna Hicks. She states that patient has complaints of tongue being sore and he is requesting that Dr. Sheila Jackson visually assess. Symptoms started yesterday. Denies any mouth soreness elsewhere or throat being sore. Patient with decreased PO intake but is drinking water and Boost/Ensure. Denies lightheadedness/dizziness. Noted magic mouthwash and nystatin suspension listed in patient's medication list. Giovanna Hicks states patient is not using any kind of mouthwash. He is en route to pharmacy to  medications, but Giovanna Hicks unsure of what medications patient is specifically getting. Advised this nurse would forward above to Dr. Sheila Jackson and Caio Victor and call patient back. Giovanna Hicks voiced understanding. 1:45 PM Attempted to call patient's brother, Jesus Anthony. No answer and voicemail is presently full. Will attempt to call again later if no return call received. Per [facility] , can offer IV fluids/labs at 9:30 AM on 02/11.

## 2022-02-08 NOTE — TELEPHONE ENCOUNTER
Patient called and stated he would like to know if he can come in for an appointment this Friday 2/11/22 at [address] [place] [address]. Please advise and call patient back.

## 2022-02-09 NOTE — TELEPHONE ENCOUNTER
02/09/22 9:20 AM Opal Maloney, on PHI. Advised I would like patient to use nystatin four times daily swish and spit in case the tongue soreness is due to oral thrush. I also called in magic mw to use PRN tongue pain to allow him to eat. Advised he is scheduled for [facility] appt and MD visit at 80. Asked Kylah Cortez to please call if patient has any other symptoms or questions. He verbalized understanding.

## 2022-02-09 NOTE — PROGRESS NOTES
[id number] [facility] Oncology at [facility]  [phone]    Hematology / Oncology Established Visit    Reason for Visit:   Sonia Bah is a 49 y.o. male who is seen in follow up of laryngeal cancer. Referred by Dr. 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 Stage: CATARINO [[id number]] at diagnosis, regional recurrence in 4/2021    Pathology:   2/8/21 Total laryngectomy: Invasive squamous cell carcinoma  Tumor size 3.5cm, moderately differentiated (G2), PNI present, LVI not identified, margins negative  [id number]  -PDL1 (tested 12/28/21 on above specimen) positive with CPS 10. Prior Treatment:   1. 2/8/21 total laryngectomy/cricopharyngeal myotomy  2. Cisplatin 100mg/m2 every 3 weeks x 3 cycles, 5/24/21-7/13/21  3. Radiation 5/24/21-7/19/21  4. Pembrolizumab x 2 cycles, 1/6/22    Current Treatment:  [Cisplatin (100 mg/m2 intravenous, day 1) and fluorouracil (1000 mg/m2 per day, continuous infusion, for four days) and Pembrolizumab, day 1 given every 21 days x 6 cycles followed by Pembrolizumab maintenance x 2yr. Start 1/28/22 - current. History of Present Illness:   Sonia Bah is a 49 y.o. male who with COPD who is referred for Head and neck cancer. He presented with throat pain and hoarseness in Sept 2020. Was evaluated by Dr. Tejinder Almonte in ENT who performed laryngoscopy and diagnosed patient with squamous cell carcinoma involving the larynx and subglottis. In late Dec 2020, neck CT showed a 3 cm mass in Right supraglottic larynx with extension to thyroid cartilage, but no cervical LNs. PET 1/4/21 showed uptake in the laryngeal mass at right vocal fold, extending to thyroid cartilage. He was scheduled for surgery, but he required emergent tracheostomy prior to that; done late Jan 2021. Also had PEG placed 1/30/21. On 2/8/21, he underwent total laryngectomy/cricopharyngeal myotomy. He quit smoking in Feb 2021.  He was evaluated by  Bryn Hendrickson in [address] [place] [address] in March 2021 who recommended post-op radiation. There were multiple delays given difficult getting in for dental evaluation prior to RT. [facility] simulation scan was notable for a necrotic appearing tumor in Right neck  . PET 5/7/21 confirmed hypermetabolic uptake in right neck. PEG removed in March 2021 due to problems with it. Per Dr. Bryn Hendrickson, pt had 7 teeth extracted by dentist. He returns to the dentist again for dental fillings on 5/26/21. Pt states his neck feel tight, causing pulling sensation but no current pain. Interval History:  Patient here for urgent follow up of throat cancer. Completed C1 of Cis-5FU- Keytruda on 1/28/22. Reports last week he developed mucositis lesions on his tongue that are painful and preventing him from eating solid food. He has been drinking 6 boost per day. He was prescribed chlorhexidine and nystatin by PCP. Taking tylenol. Constipation managed with senna-plus. No nausea, diarrhea, fevers, CP or rapid HR. Increase levothyroxine dose to 50mcg at last visit, taking every day on an empty stomach. Requesting fluids every Tuesday morning. PMHx: COPD, throat cancer, anxiety  PSurgHx: Left arm plate placement, tracheostomy, total laryngectomy 2/8/21  SHx: Quit smoking 10/28/20 after 45 pack years. No EtOH  FHx: Mother with DM; Father with DM, CKD; Sister with DM. No h/o malignancy. Review of Systems: A complete review of systems was obtained, negative except as described above. Physical Exam:   Blood pressure 101/68, pulse 86, temperature 97.1 °F (36.2 °C), height 6' (1.829 m), weight 156 lb 12.8 oz (71.1 kg), SpO2 100 %. ECOG PS: 0  General: Well developed, no acute distress  Eyes: Anicteric sclerae  HENT: Atraumatic, two large sores present underneath tongue, fine white coating on tongue due to boost.   Neck: Supple, Tracheostomy noted  Lymphatic: No supraclavicular, axillary adenopathy.  No bilateral cervical LAD, but firm skin at neck bilaterally. Respiratory: CTAB, normal respiratory effort  CV: regular rate, regular rhythm, no murmurs, no peripheral edema  GI: Soft, nontender, nondistended, no masses  MS: Normal gait and station. Digits without clubbing or cyanosis. 1cm firm nodule on left wrist.  Skin: No rashes. Hyperpigmented skin at neck bilaterally. Neuro/Psych: Alert. Communicates by writing given tracheostomy. Results:     Lab Results   Component Value Date/Time    WBC 3.3 (L) 2022 09:25 AM    HGB 7.8 (L) 2022 09:25 AM    HCT 24.7 (L) 2022 09:25 AM    PLATELET 815  09:25 AM    .9 (H) 2022 09:25 AM    ABS. NEUTROPHILS 4.2 2022 08:12 AM     Lab Results   Component Value Date/Time    Sodium 139 2022 09:25 AM    Potassium 4.2 2022 09:25 AM    Chloride 102 2022 09:25 AM    CO2 32 2022 09:25 AM    Glucose 102 (H) 2022 09:25 AM    BUN 25 (H) 2022 09:25 AM    Creatinine 1.01 2022 09:25 AM    GFR est AA >60 2022 09:25 AM    GFR est non-AA >60 2022 09:25 AM    Calcium 9.3 2022 09:25 AM    Glucose (POC) 113 10/04/2021 01:04 PM     Lab Results   Component Value Date/Time    Bilirubin, total 0.4 2022 08:12 AM    ALT (SGPT) 11 (L) 2022 08:12 AM    Alk. phosphatase 62 2022 08:12 AM    Protein, total 7.7 2022 08:12 AM    Albumin 3.3 (L) 2022 08:12 AM    Globulin 4.4 (H) 2022 08:12 AM     Lab Results   Component Value Date/Time    Iron 34 (L) 2021 12:17 PM    TIBC 216 (L) 2021 12:17 PM    Iron % saturation 16 (L) 2021 12:17 PM    Ferritin 939 (H) 2021 12:17 PM      Lab Results   Component Value Date/Time    Vitamin B12 664 2021 11:23 AM    Folate 24.0 (H) 2021 11:23 AM         Imagin/28/21 Neck CT: IMPRESSION  Irregular soft tissue mass involving the aryepiglottic and the larynx.  There is significant narrowing of the airway at the level of the larynx  and the supraglottic region. Poorly defined cervical adenopathy is noted. 2/25/21 Chest CTA:  IMPRESSION  Minimal scarring or subsegmental atelectasis in the left lung base. No evidence of pulmonary embolism or pneumonia. 5/7/21 PET:  IMPRESSION  2 large necrotic appearing mass lesions are noted in the right neck (SUV 6.4). Small hypermetabolic right posterior triangle lymph node. No PET avid evidence  of distant metastatic disease. 10/4/21 PET:  FINDINGS:  HEAD/NECK: There is physiologic tracer activity in the oral cavity and piriform  sinuses. Physiologic tracer activity is also seen in the neck musculature,  particularly the right sternocleidomastoid muscle. 2 enlarged right level 2  cervical lymph nodes are slightly decreased in size; these demonstrate no  significant residual FDG activity. Previously seen small lymph node in the right  posterior cervical triangle also demonstrates no residual FDG activity. CHEST: There is a new 3.0 x 2.6 cm focus of nodular airspace disease in the left  upper lobe which demonstrates increased FDG activity, maximal SUV 2.8. New foci  of nodular airspace disease in the left lower lobe, measuring 1.2 cm and 1.0 cm,  in the right lower lobe, measuring 0.8 cm, and in the right middle lobe,  measuring 1.6 cm, demonstrate no appreciable FDG activity. ABDOMEN/PELVIS: No foci of abnormal hypermetabolism. SKELETON: No foci of abnormal hypermetabolism in the axial and visualized  appendicular skeleton. IMPRESSION  1. Previously seen enlarged cervical lymph nodes demonstrate no residual  abnormal FDG activity. 2. New foci of nodular airspace disease in both lungs, including a 3 cm area in  the left upper lobe which demonstrates low-grade increased FDG activity. Findings may be infectious etiology, although neoplasm is not excluded;  attention on follow-up examinations is recommended.     11/22/21 CT neck:  IMPRESSION  No significant interval change since the previous CT PET  examination. Necrotic right level 2A lymph nodes unchanged in size. No definite  new pathologic lymph nodes by CT criteria. Postradiation changes as described  above. Narrowed nasopharyngeal and oropharyngeal airway unchanged. No new pathologic lymphadenopathy by CT criteria is demonstrated. 11/22/21 CT chest:   There is a 15 mm node in the right hilum, station 10 R, series 201 image 39. There is increasing multifocal multi lobar inflammatory appearing airspace  disease in the lungs most suggestive of multifocal pneumonia. Dominant focus of  airspace disease in the lingula on series 204 image 41 measures 3.3 x 3.2 cm,  previously 3.0 x 2.6 cm. Likewise other foci have increased in size with  dominant lesion on the right measuring 2.8 x 1.9 cm on image 50, previously 16  mm. Suspicious right upper lobe nodules are present including a dominant 6 x 9 mm  nodule on series 204 image 25. There are no suspicious lytic or blastic skeletal lesions in the thorax. Incidental imaging of the upper abdomen demonstrates liver steatosis with  probable focal steatosis adjacent to the fissure for the falciform ligament. There may be a small retrocardiac hiatal hernia. IMPRESSION  Increasing multifocal airspace disease with new suspicious solid appearing  nodules in the right upper lobe. Assessment & Plan:   Ronny Adame is a 49 y.o. male is seen for laryngeal cancer. 1. Squamous cell carcinoma of larynx, Stage CATARINO [pT4a cN2 Mx]:  S/p total laryngectomy and tracheostomy in Jan 2021. Afterwards, he developed disease recurrence in Right neck confirmed by PET scan. I recommended concurrent chemoradiation using Cisplatin to treat his disease. Pt completed concurrent chemoradiation radiation 7/19/21. Evidence of local treatment response per physical exam and 10/4/21 PET.  However repeat imaging with chest CT 11/22/21 shows increasing size in lung nodules, suspicious for disease progression outside of prior radiation field. Per discussion with Thoracic Tumor Board, these nodules would be amenable to biopsy by navigation bronchoscopy if desired. However, RadOnc and I feel this is obvious disease progression given extent of disease at diagnosis. Discussed with patient about disease progression. Discussed pros/con of biopsy to confirm metastatic disease (pro: confirmation rather than assumption of metastatic disease; ability to send metastatic tissue for NGS; con: will lead to treatment delay of clinically likely metastatic disease. ) He elected to proceed with treatment without repeating biopsy. Started Pembrolizumab monotherapy, then switched to Cis-5FU-Pembrolizumab, followed by Pembro maintenance x 2 y given PDL1 CPS < 20. [30% RR in phase III trials.] Supportive medications: zofran, compazine, emla, decadron. 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 -- Completed C1 of Cis-5-FU-Pembrolizumab on 2/1/22.   -- Return in 1 week for C2 of Cis-5-FU-Pembrolizumab (10% DR BRO MD/NP visit on 2/18/22 with pump d/c on Tue, 2/21/22. -- Planning on PET in early March 2022, will send results to Dr. Swain.     2. COPD / H/o tobacco abuse:   Quit in 2/2021. Uses Albuterol inhaler prn.    4. Constipation:    Senna-plus BID prn constipation. 5. Macrocytic anemia:   2/2 chemotherapy. B12, folate normal; ferritin high and iron sat mildly low. 6. Hypothyroidism:   Due to prior radiation and worsening. Checking TSH/free T4 every other cycle during treatment. Recent labs show continued increase in TSH.   -- On levothyroxine to 50mcg/d. Stressed the importance of taking it on an empty stomach every morning at the same time at least an hour before other medications or eating. Dose increase on  1/28/22. -- Appointment with Dr. Dimas Castellano, Endocrinology on 3/9/22 at 330 pm.      7. Pulmonary embolism:  Provoked by #1. 11/2021 CT chest. On eliquis.  Hold for PLT <50.     8. Tracheostomy care:   ENT providing HMEs samples because [facility] is no longer supplying them through his insurance. Dr. Liz Pollack team is working on an appeal.    9. Chemotherapy induced mucositis:   Start dexamethasone rinse TID prn mouth sores. Tramadol 1-2 times daily prn. Magic mouthwash QID prn. Stop chlorhexidine and nystatin. -- Will DR 5-fu by 10%. -- Add weekly Tuesday fluids in [facility]. -- Adarsh Wahl RD on qty of boost.     Emotional well being: Pt is coping well with his/her disease and has excellent support. I personally saw and evaluated the patient and performed the key components of medical decision making. The history, physical exam, and documentation were performed by Marah Menchaca NP. I reviewed and verified the above documentation and modified it as needed. Mucositis is 2/2 5-FU. Will dose-reduce by 10% going forward. No fevers. Maintaining weight.     Signed By: Riana Maldonado MD     02/11/22

## 2022-02-09 NOTE — PROGRESS NOTES
PT DAILY TREATMENT NOTE - MCR     Patient Name: Messi George  Date:2022  : 1970  [x]  Patient  Verified  Payor: [facility] MEDICAID / [facility] / Product Type: Managed Care Medicaid /    Treatment Area: Neck pain [M54.2]   Next MD APPT: 22  In time: 9:05am   Out time: 9:50am  Total Treatment Time (min): 45  Total Timed Codes (min):45  1:1 Treatment Time [facility] only):45  Visit # 16-24    SUBJECTIVE  Pain Level (0-10 scale) pre treatment: 2/10  Pain Level (0-10 scale) post treatment: 0/10  Any medication changes, allergies to medications, adverse drug reactions, diagnosis change, or new procedure performed?:   [x] No    [] Yes (see summary sheet for update)  Subjective functional status/changes:   [x] No changes reported  No new complaints. Still reporting minimal pain in the front of the neck. OBJECTIVE  Declined modalities     25 min Therapeutic Exercise:  [x] See flow sheet :    Rationale: increase ROM and increase strength to improve the patients ability to eat with less discomfort  15 min Manual Therapy: scar massage anterior neck, STM SCM/scalenes and passive stretching (UT and scalenes)    Rationale: increase ROM, increase tissue extensibility and decrease trigger points to improve the patients ability to eat with less discomfort    With   [x] TE   [] TA   [] Neuro   [] SC   [] other: Patient Education: [x] Review HEP    [] Progressed/Changed HEP based on:   [] positioning   [] body mechanics   [] transfers   [] Use of heat/ice    [] other:          Other Objective/Functional Measures:  Worked on ROM/stretching and soft tissue mobilization. Added resisted scap retraction and horiz abd. Cervical ext 35 deg    ASSESSMENT/Changes in Function:   No changes in cervical extension although pt is not reporting increase pain with the movement currently. Able to palpate deeper during MT at this time. Pt responded well to scapula stabilization exercises.   Will continue current POC.   Patient will continue to benefit from skilled PT services to modify and progress therapeutic interventions, address ROM deficits and analyze and address soft tissue restrictions to attain remaining goals. GOALS/Progress towards goals:  Patient Goal (s): reduce pain    [x]? Met []? Not met []? Partially met     Short Term Goals: To be accomplished in 6-8 treatments. 1. Patient will be compliant with HEP to assist in pain reduction. [x] Met [] Not met [] Partially met  12/29  2. Patient will demo 5-10 degree improvement in cervical ROM in all planes. [] Met [] Not met [x] Partially met 2/2     Long Term Goals: To be accomplished in 16-24 treatments. 1. Patient will report decrease in pain and difficulty with eating due to neck pain/tightness. [x] Met [] Not met [] Partially met 1/5/22  2. Patient will report decrease in neck pain to max of 4/10 per VAS. [x] Met [] Not met [] Partially met 1/5/22    PLAN  Frequency / Duration: Patient to be seen 2-3 times per week for 16-24 treatments.   Certification Period: 11/30/21 - 2/28/22    [x]  Upgrade activities as tolerated     [x]  Continue plan of care  [x]  Update interventions per flow sheet       []  Discharge due to:_  []  Other:_      Ruth Paz, PT, DPT 2/9/2022

## 2022-02-10 NOTE — TELEPHONE ENCOUNTER
[facility] at [facility]  [phone]        02/10/22 10:27 AM Called in verbal order for Magic Mouthwash per written order of aSm Ferreira NP.

## 2022-02-11 NOTE — PROGRESS NOTES
[facility] Progress Note    Date: 2022    Name: Jay Weber    MRN: [id number]         : 1970    Mr. Peterson Arrived ambulatory and in no distress for Hydration. Assessment was completed, no acute issues at this time, no new complaints voiced. Right chest wall port accessed without difficulty, labs drawn & sent for processing. Mr. Rona Razo vitals were reviewed. Patient Vitals for the past 12 hrs:   Temp Pulse Resp BP   22 1031  86 16 101/68   22 0922 97.1 °F (36.2 °C) 89 16 111/73     Lab results were obtained and reviewed.   Recent Results (from the past 12 hour(s))   CBC W/O DIFF    Collection Time: 22  9:25 AM   Result Value Ref Range    WBC 3.3 (L) 4.1 - 11.1 K/uL    RBC 2.40 (L) 4.10 - 5.70 M/uL    HGB 7.8 (L) 12.1 - 17.0 g/dL    HCT 24.7 (L) 36.6 - 50.3 %    .9 (H) 80.0 - 99.0 FL    MCH 32.5 26.0 - 34.0 PG    MCHC 31.6 30.0 - 36.5 g/dL    RDW 15.8 (H) 11.5 - 14.5 %    PLATELET 179 480 - 245 K/uL    MPV 10.4 8.9 - 12.9 FL    NRBC 0.0 0  WBC    ABSOLUTE NRBC 0.00 0.00 - 7.94 K/uL   METABOLIC PANEL, BASIC    Collection Time: 22  9:25 AM   Result Value Ref Range    Sodium 139 136 - 145 mmol/L    Potassium 4.2 3.5 - 5.1 mmol/L    Chloride 102 97 - 108 mmol/L    CO2 32 21 - 32 mmol/L    Anion gap 5 5 - 15 mmol/L    Glucose 102 (H) 65 - 100 mg/dL    BUN 25 (H) 6 - 20 MG/DL    Creatinine 1.01 0.70 - 1.30 MG/DL    BUN/Creatinine ratio 25 (H) 12 - 20      GFR est AA >60 >60 ml/min/1.73m2    GFR est non-AA >60 >60 ml/min/1.73m2    Calcium 9.3 8.5 - 10.1 MG/DL       Medications:  Medications Administered     0.9% sodium chloride injection 10 mL     Admin Date  2022 Action  Given Dose  10 mL Route  IntraVENous Administered By  Ziyad Bolton RN          heparin (porcine) pf 300-500 Units     Admin Date  2022 Action  Given Dose  500 Units Route  InterCATHeter Administered By  Ziyad Bolton RN          sodium chloride 0.9 % bolus infusion 1,000 mL Admin Date  02/11/2022 Action  New Bag Dose  1,000 mL Rate  1,000 mL/hr Route  IntraVENous Administered By  Tosha Leija RN                Mr. Lora Alexis tolerated treatment well and was discharged from [place] [address] in stable condition at 1035. Port de-accessed, flushed & heparinized per protocol. He is to return on February 18  for his next appointment.     Margarita Ibarra RN  February 11, 2022

## 2022-02-11 NOTE — PATIENT INSTRUCTIONS
For the mucositis lesions in your mouth:     1.  I advise you also take tylenol 500 OR 650mg when you take the oxycodone. Do not exceed more than 2000-3000mg tylenol in 24 hours. You can take tramadol 1-2 times daily in addition to the tylenol. 2. Make sure you start on a good bowel regimen if you are taking an tramadol. Continue senna-plus twice daily. Can add miralax powder if senna-plus stops working. 3. Mix 1 teaspoon baking soda and 1 teaspoon of salt in 8 oz of water;  Swish around in your mouth, gargle and then spit out. Can do this rinse 3-4 times daily with warm water. 4. Continue using magic mouthwash 4 times daily, swish and spit as needed. 5. I am also prescribing a steroid mouth rinse which you can use 2-3 times daily. Swish and spit. This will help decrease inflammation.       6. Stop chlorhexidine and nystatin

## 2022-02-11 NOTE — PROGRESS NOTES
Janette Beatty is a 49 y.o. male here for follow up of laryngeal cancer. Patient with no complaints of pain at this time.

## 2022-02-15 NOTE — TELEPHONE ENCOUNTER
[address] at [place]  [phone]        02/15/22 8:57 AM Received incoming call from patient's friend, Belia Antonio. Patient unable to attend hydration appointment today due to transportation not showing up. Per Belia Antonio, patient reports that mouth sores are a little better and he is eating a little solid foods. Patient hydrating well and drinking Ensure/Boost. Beila Antonio states patient does not feel he needs hydration prior to existing appointment scheduled on 02/18. Advised this nurse would forward above to Dr. Meme Rubin and Daniel Callejas. Will call back if any additional changes or recommendations. Belia Antonio voiced understanding.

## 2022-02-16 NOTE — PROGRESS NOTES
[id number] [facility] Oncology at [facility]  [phone]    Hematology / Oncology Established Visit    Reason for Visit:   Pedrito García is a 49 y.o. male who is seen in follow up of laryngeal cancer. Referred by Dr. 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 Stage: CATARINO [[id number]] at diagnosis, regional recurrence in 4/2021, now with metastatic disease. Pathology:   2/8/21 Total laryngectomy: Invasive squamous cell carcinoma  Tumor size 3.5cm, moderately differentiated (G2), PNI present, LVI not identified, margins negative  [id number]  -PDL1 (tested 12/28/21 on above specimen) positive with CPS 10. Prior Treatment:   1. 2/8/21 total laryngectomy/cricopharyngeal myotomy  2. Cisplatin 100mg/m2 every 3 weeks x 3 cycles, 5/24/21-7/13/21  3. Radiation 5/24/21-7/19/21  4. Pembrolizumab x 2 cycles, 1/6/22    Current Treatment:  [Cisplatin (100 mg/m2 intravenous, day 1) and fluorouracil (1000 mg/m2 per day, continuous infusion, for four days) and Pembrolizumab, day 1 given every 21 days x 6 cycles followed by Pembrolizumab maintenance x 2yr. Start 1/28/22 - current. History of Present Illness:   Pedrito García is a 49 y.o. male who with COPD who is referred for Head and neck cancer. He presented with throat pain and hoarseness in Sept 2020. Was evaluated by Dr. Capri Jones in ENT who performed laryngoscopy and diagnosed patient with squamous cell carcinoma involving the larynx and subglottis. In late Dec 2020, neck CT showed a 3 cm mass in Right supraglottic larynx with extension to thyroid cartilage, but no cervical LNs. PET 1/4/21 showed uptake in the laryngeal mass at right vocal fold, extending to thyroid cartilage. He was scheduled for surgery, but he required emergent tracheostomy prior to that; done late Jan 2021. Also had PEG placed 1/30/21. On 2/8/21, he underwent total laryngectomy/cricopharyngeal myotomy.  He quit smoking in Feb 2021. He was evaluated by Dr. Aileen Molina in [address] [place] [address] in March 2021 who recommended post-op radiation. There were multiple delays given difficult getting in for dental evaluation prior to RT. [facility] simulation scan was notable for a necrotic appearing tumor in Right neck  . PET 5/7/21 confirmed hypermetabolic uptake in right neck. PEG removed in March 2021 due to problems with it. Per Dr. Aileen Molina, pt had 7 teeth extracted by dentist. He returns to the dentist again for dental fillings on 5/26/21. Pt states his neck feel tight, causing pulling sensation but no current pain. Interval History:  Patient here for follow up throat cancer and C2 of Cis-5FU- Keytruda. Reports mouth sores have resolved. Eating solid food and supplementing with boost. Reports MW and dex rinse was too expensive. Hydrating well. No fevers, chills, SOB, constipation or diarrhea. PMHx: COPD, throat cancer, anxiety  PSurgHx: Left arm plate placement, tracheostomy, total laryngectomy 2/8/21  SHx: Quit smoking 10/28/20 after 45 pack years. No EtOH  FHx: Mother with DM; Father with DM, CKD; Sister with DM. No h/o malignancy. Review of Systems: A complete review of systems was obtained, negative except as described above. Physical Exam:   Blood pressure 134/76, pulse 99, temperature 97.8 °F (36.6 °C), resp. rate 16, height 6' (1.829 m), weight 154 lb 5.2 oz (70 kg), SpO2 99 %. ECOG PS: 0  General: Well developed, no acute distress  Eyes: Anicteric sclerae  HENT: Atraumatic, fine white coating on tongue due to boost.   Neck: Supple, Tracheostomy noted  Lymphatic: No supraclavicular, axillary adenopathy. No bilateral cervical LAD, but firm skin at neck bilaterally. Respiratory: CTAB, normal respiratory effort  CV: regular rate, regular rhythm, no murmurs, no peripheral edema  GI: Soft, nontender, nondistended, no masses  MS: Normal gait and station. Digits without clubbing or cyanosis.  1cm firm nodule on left wrist.  Skin: No rashes. Hyperpigmented skin at neck bilaterally. Neuro/Psych: Alert. Communicates by writing given tracheostomy. Results:     Lab Results   Component Value Date/Time    WBC 3.3 (L) 2022 09:25 AM    HGB 7.8 (L) 2022 09:25 AM    HCT 24.7 (L) 2022 09:25 AM    PLATELET 641  09:25 AM    .9 (H) 2022 09:25 AM    ABS. NEUTROPHILS 4.2 2022 08:12 AM     Lab Results   Component Value Date/Time    Sodium 139 2022 09:25 AM    Potassium 4.2 2022 09:25 AM    Chloride 102 2022 09:25 AM    CO2 32 2022 09:25 AM    Glucose 102 (H) 2022 09:25 AM    BUN 25 (H) 2022 09:25 AM    Creatinine 1.01 2022 09:25 AM    GFR est AA >60 2022 09:25 AM    GFR est non-AA >60 2022 09:25 AM    Calcium 9.3 2022 09:25 AM    Glucose (POC) 113 10/04/2021 01:04 PM     Lab Results   Component Value Date/Time    Bilirubin, total 0.4 2022 08:12 AM    ALT (SGPT) 11 (L) 2022 08:12 AM    Alk. phosphatase 62 2022 08:12 AM    Protein, total 7.7 2022 08:12 AM    Albumin 3.3 (L) 2022 08:12 AM    Globulin 4.4 (H) 2022 08:12 AM     Lab Results   Component Value Date/Time    Iron 34 (L) 2021 12:17 PM    TIBC 216 (L) 2021 12:17 PM    Iron % saturation 16 (L) 2021 12:17 PM    Ferritin 939 (H) 2021 12:17 PM      Lab Results   Component Value Date/Time    Vitamin B12 664 2021 11:23 AM    Folate 24.0 (H) 2021 11:23 AM         Imagin/28/21 Neck CT: IMPRESSION  Irregular soft tissue mass involving the aryepiglottic and the larynx. There is significant narrowing of the airway at the level of the larynx  and the supraglottic region. Poorly defined cervical adenopathy is noted. 21 Chest CTA:  IMPRESSION  Minimal scarring or subsegmental atelectasis in the left lung base. No evidence of pulmonary embolism or pneumonia.     21 PET:  IMPRESSION  2 large necrotic appearing mass lesions are noted in the right neck (SUV 6.4). Small hypermetabolic right posterior triangle lymph node. No PET avid evidence  of distant metastatic disease. 10/4/21 PET:  FINDINGS:  HEAD/NECK: There is physiologic tracer activity in the oral cavity and piriform  sinuses. Physiologic tracer activity is also seen in the neck musculature,  particularly the right sternocleidomastoid muscle. 2 enlarged right level 2  cervical lymph nodes are slightly decreased in size; these demonstrate no  significant residual FDG activity. Previously seen small lymph node in the right  posterior cervical triangle also demonstrates no residual FDG activity. CHEST: There is a new 3.0 x 2.6 cm focus of nodular airspace disease in the left  upper lobe which demonstrates increased FDG activity, maximal SUV 2.8. New foci  of nodular airspace disease in the left lower lobe, measuring 1.2 cm and 1.0 cm,  in the right lower lobe, measuring 0.8 cm, and in the right middle lobe,  measuring 1.6 cm, demonstrate no appreciable FDG activity. ABDOMEN/PELVIS: No foci of abnormal hypermetabolism. SKELETON: No foci of abnormal hypermetabolism in the axial and visualized  appendicular skeleton. IMPRESSION  1. Previously seen enlarged cervical lymph nodes demonstrate no residual  abnormal FDG activity. 2. New foci of nodular airspace disease in both lungs, including a 3 cm area in  the left upper lobe which demonstrates low-grade increased FDG activity. Findings may be infectious etiology, although neoplasm is not excluded;  attention on follow-up examinations is recommended. 11/22/21 CT neck:  IMPRESSION  No significant interval change since the previous CT PET  examination. Necrotic right level 2A lymph nodes unchanged in size. No definite  new pathologic lymph nodes by CT criteria. Postradiation changes as described  above. Narrowed nasopharyngeal and oropharyngeal airway unchanged.   No new pathologic lymphadenopathy by CT criteria is demonstrated. 11/22/21 CT chest:   There is a 15 mm node in the right hilum, station 10 R, series 201 image 39. There is increasing multifocal multi lobar inflammatory appearing airspace  disease in the lungs most suggestive of multifocal pneumonia. Dominant focus of  airspace disease in the lingula on series 204 image 41 measures 3.3 x 3.2 cm,  previously 3.0 x 2.6 cm. Likewise other foci have increased in size with  dominant lesion on the right measuring 2.8 x 1.9 cm on image 50, previously 16  mm. Suspicious right upper lobe nodules are present including a dominant 6 x 9 mm  nodule on series 204 image 25. There are no suspicious lytic or blastic skeletal lesions in the thorax. Incidental imaging of the upper abdomen demonstrates liver steatosis with  probable focal steatosis adjacent to the fissure for the falciform ligament. There may be a small retrocardiac hiatal hernia. IMPRESSION  Increasing multifocal airspace disease with new suspicious solid appearing  nodules in the right upper lobe. Assessment & Plan:   Janette Beatty is a 49 y.o. male is seen for laryngeal cancer. 1. Squamous cell carcinoma of larynx, Stage CATARINO [pT4a cN2 Mx]:  S/p total laryngectomy and tracheostomy in Jan 2021. Afterwards, he developed disease recurrence in Right neck confirmed by PET scan. I recommended concurrent chemoradiation using Cisplatin to treat his disease. Pt completed concurrent chemoradiation radiation 7/19/21. Evidence of local treatment response per physical exam and 10/4/21 PET. However disease progression noted on chest CT 11/22/21 with increasing size in lung nodules, outside of prior radiation field. Per discussion with Thoracic Tumor Board, these nodules would be amenable to biopsy by navigation bronchoscopy if desired. However, RadOnc and I feel this is obvious disease progression given extent of disease at diagnosis. Discussed with patient about disease progression.  Discussed pros/con of biopsy to confirm metastatic disease (pro: confirmation rather than assumption of metastatic disease; ability to send metastatic tissue for NGS; con: will lead to treatment delay of clinically likely metastatic disease. ) He elected to proceed with treatment without repeating biopsy. Started Pembrolizumab monotherapy, then switched to Cis-5FU-Pembrolizumab, followed by Pembro maintenance x 2 y given PDL1 CPS < 20. [30% RR in phase III trials.] Supportive medications: zofran, compazine, emla, decadron. 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 -- Hold C2 of Cis-5-FU-Pembrolizumab (10% DR 5-FU) with pump d/c on Tuesday due to neutropenia. -- Additional fluids scheduled on Tuesdays. -- Planning on CT in early March 2022, will send results to Dr. Swain.   -- Follow up in 1 week for retry C2, MD/NP visit. 2. COPD / H/o tobacco abuse:   Quit in 2/2021. Uses Albuterol inhaler prn.    4. Constipation:    Senna-plus BID prn constipation. 5. Macrocytic anemia:   2/2 chemotherapy. B12, folate normal; ferritin high and iron sat mildly low. 6. Hypothyroidism:   Due to prior radiation and worsening. Checking TSH/free T4 every other cycle during treatment. Recent labs show continued increase in TSH.   -- On levothyroxine to 50mcg/d. Stressed the importance of taking it on an empty stomach every morning at the same time at least an hour before other medications or eating. Dose increase on  1/28/22. -- Appointment with Dr. Kareen Franco, Endocrinology on 3/9/22 at 330 pm.      7. Pulmonary embolism:  Provoked by #1. 11/2021 CT chest. On eliquis. Hold for PLT <50.     8. Tracheostomy care:   ENT providing HMEs samples because [facility] is no longer supplying them through his insurance. Dr. Veena Reyes team is working on an appeal.    9. H/o chemotherapy induced mucositis:   Resolved today. Dex rinse and MW not covered by insurance- will reach out to financial liaison, Carl Stallings. TID prn mouth sores.  Tramadol 1-2 times daily prn. Advised preventative care with baking soda rinses TID and good oral hygiene. DR LOBO for future cycles. Emotional well being: Pt is coping well with his/her disease and has excellent support. I personally saw and evaluated the patient and performed the key components of medical decision making. The history, physical exam, and documentation were performed by Jose Brown NP. I reviewed and verified the above documentation and modified it as needed. Holding chemo today given neutropenia. Will reschedule for 1 week later. No infections. Mucositis resolved.     Signed By: Melodie Barrientos MD     02/18/22

## 2022-02-18 NOTE — TELEPHONE ENCOUNTER
[address]  Oncology Social Work  Encounter     Patient Name: Boy Sapp     Medical History: h&n cancer    Advance Directives: none on file; conversation deferred    Narrative: Assisted patient with scheduling transportation.  Called Medicaid/ #[id number] and booked the following dates:     2/25Alicia Ruiz 6:30  #[id number]     3/1 - 9:30am  Andrae April  #[id number]     Called patient to inform about transportation.      Referral/Handouts:   Transportation referral    Thank you,  Flory Brannon LCSW

## 2022-02-18 NOTE — PROGRESS NOTES
Chief Complaint   Patient presents with   Saima Coelho is a pleasant 49year old male who presents as a follow up. He reports pain under his right armpit when coughing.

## 2022-02-18 NOTE — PROGRESS NOTES
[facility] Progress Note    Date: 2022    Name: Bakari Goldstein    MRN: [id number]         : 1970    Mr. Peterson Arrived ambulatory and in no distress for C2D1 of Keytruda/Cisplatin/5FU Regimen. Assessment was completed, patient reports pain with mouth sores and inability to eat, also pain in arm pits when coughing. Right chest wall port accessed without difficulty, labs drawn & sent for processing. Chemotherapy Flowsheet 2022   Cycle C2D1   Date 2022   Drug / Regimen Keytruda/Cisplatin/5FU   Pre Hydration -   Post Hydration -   Pre Meds -   Notes HELD        Patient proceed to appointment with Dr. Keyla Gallardo. Treatment to be held today. Mr. Alex Orr vitals were reviewed. Visit Vitals  /76   Pulse 99   Temp 97.8 °F (36.6 °C)   Resp 16   Ht 6' (1.829 m)   Wt 70 kg (154 lb 4.8 oz)   SpO2 99%   BMI 20.93 kg/m²       Lab results were obtained and reviewed. Treatment to be held today. Recent Results (from the past 12 hour(s))   MAGNESIUM    Collection Time: 22  8:26 AM   Result Value Ref Range    Magnesium 2.5 (H) 1.6 - 2.4 mg/dL   CBC WITH AUTOMATED DIFF    Collection Time: 22  8:26 AM   Result Value Ref Range    WBC 1.2 (L) 4.1 - 11.1 K/uL    RBC 2.41 (L) 4.10 - 5.70 M/uL    HGB 7.9 (L) 12.1 - 17.0 g/dL    HCT 24.4 (L) 36.6 - 50.3 %    .2 (H) 80.0 - 99.0 FL    MCH 32.8 26.0 - 34.0 PG    MCHC 32.4 30.0 - 36.5 g/dL    RDW 15.6 (H) 11.5 - 14.5 %    PLATELET 656 330 - 597 K/uL    MPV 11.0 8.9 - 12.9 FL    NRBC 0.0 0  WBC    ABSOLUTE NRBC 0.00 0.00 - 0.01 K/uL    NEUTROPHILS 45 32 - 75 %    LYMPHOCYTES 21 12 - 49 %    MONOCYTES 32 (H) 5 - 13 %    EOSINOPHILS 1 0 - 7 %    BASOPHILS 1 0 - 1 %    IMMATURE GRANULOCYTES 0 0.0 - 0.5 %    ABS. NEUTROPHILS 0.5 (L) 1.8 - 8.0 K/UL    ABS. LYMPHOCYTES 0.3 (L) 0.8 - 3.5 K/UL    ABS. MONOCYTES 0.4 0.0 - 1.0 K/UL    ABS. EOSINOPHILS 0.0 0.0 - 0.4 K/UL    ABS. BASOPHILS 0.0 0.0 - 0.1 K/UL    ABS. IMM.  GRANS. 0.0 0.00 - 0.04 K/UL DF SMEAR SCANNED      PLATELET COMMENTS Large Platelets      RBC COMMENTS ANISOCYTOSIS  1+        RBC COMMENTS MACROCYTOSIS  1+       METABOLIC PANEL, COMPREHENSIVE    Collection Time: 02/18/22  8:26 AM   Result Value Ref Range    Sodium 136 136 - 145 mmol/L    Potassium 4.0 3.5 - 5.1 mmol/L    Chloride 99 97 - 108 mmol/L    CO2 31 21 - 32 mmol/L    Anion gap 6 5 - 15 mmol/L    Glucose 133 (H) 65 - 100 mg/dL    BUN 28 (H) 6 - 20 MG/DL    Creatinine 1.10 0.70 - 1.30 MG/DL    BUN/Creatinine ratio 25 (H) 12 - 20      GFR est AA >60 >60 ml/min/1.73m2    GFR est non-AA >60 >60 ml/min/1.73m2    Calcium 9.1 8.5 - 10.1 MG/DL    Bilirubin, total 0.2 0.2 - 1.0 MG/DL    ALT (SGPT) 19 12 - 78 U/L    AST (SGOT) 27 15 - 37 U/L    Alk. phosphatase 73 45 - 117 U/L    Protein, total 7.9 6.4 - 8.2 g/dL    Albumin 3.6 3.5 - 5.0 g/dL    Globulin 4.3 (H) 2.0 - 4.0 g/dL    A-G Ratio 0.8 (L) 1.1 - 2.2         Medications:  Medications Administered     heparin (porcine) pf 300-500 Units     Admin Date  02/18/2022 Action  Given Dose  500 Units Route  InterCATHeter Administered By  [place], [place]          sodium chloride (NS) flush 10 mL     Admin Date  02/18/2022 Action  Given Dose  10 mL Route  IntraVENous Administered By  Aubrie Marin                  Mr. Baldomero Guerrero tolerated treatment well and was discharged from [place] [address] in stable condition at 0930. Port de-accessed, flushed & heparinized per protocol. He is to return on February 22 at 1400 for his next appointment.     [facility]  February 18, 2022

## 2022-02-21 NOTE — PROGRESS NOTES
PT DAILY TREATMENT NOTE - [facility]     Patient Name: Nieves Godfrey  Date:2022  : 1970  [x]  Patient  Verified  Payor: [facility] MEDICAID / Plan: KAITLIN NICHOLAS [facility] CCCP / Product Type: Managed Care Medicaid /    Treatment Area: Neck pain [M54.2]   Next MD APPT:   In time: 9:06am  Out time: 9:50am  Total Treatment Time (min):44  Total Timed Codes (min):44  1:1 Treatment Time [facility] only):44  Visit # 16-24    SUBJECTIVE  Pain Level (0-10 scale) pre treatment: 0/10  Pain Level (0-10 scale) post treatment: 0/10  Any medication changes, allergies to medications, adverse drug reactions, diagnosis change, or new procedure performed?:   [x] No    [] Yes (see summary sheet for update)  Subjective functional status/changes:   [x] No changes reported  No pain this morning. OBJECTIVE  Declined modalities     32 min Therapeutic Exercise:  [x] See flow sheet :    Rationale: increase ROM and increase strength to improve the patients ability to eat with less discomfort  12 min Manual Therapy: scar massage anterior neck, STM SCM/scalenes and passive stretching (UT and scalenes)    Rationale: increase ROM, increase tissue extensibility and decrease trigger points to improve the patients ability to eat with less discomfort    With   [x] TE   [] TA   [] Neuro   [] SC   [] other: Patient Education: [x] Review HEP    [] Progressed/Changed HEP based on:   [] positioning   [] body mechanics   [] transfers   [] Use of heat/ice    [] other:          Other Objective/Functional Measures: Added prone cervical extension and prone scap retraction. ASSESSMENT/Changes in Function:   Pt was able to perform new exercises without any difficulty. He continues to have cervical extension limitation. Some TTP along the L side of the neck. Pt has 3 more sessions then DC.   Patient will continue to benefit from skilled PT services to modify and progress therapeutic interventions, address ROM deficits and analyze and address soft tissue restrictions to attain remaining goals. GOALS/Progress towards goals:  Patient Goal (s): reduce pain    [x]? Met []? Not met []? Partially met     Short Term Goals: To be accomplished in 6-8 treatments. 1. Patient will be compliant with HEP to assist in pain reduction. [x] Met [] Not met [] Partially met  12/29  2. Patient will demo 5-10 degree improvement in cervical ROM in all planes. [] Met [] Not met [x] Partially met 2/2     Long Term Goals: To be accomplished in 16-24 treatments. 1. Patient will report decrease in pain and difficulty with eating due to neck pain/tightness. [x] Met [] Not met [] Partially met 1/5/22  2. Patient will report decrease in neck pain to max of 4/10 per VAS. [x] Met [] Not met [] Partially met 1/5/22    PLAN  Frequency / Duration: Patient to be seen 2-3 times per week for 16-24 treatments.   Certification Period: 11/30/21 - 2/28/22    [x]  Upgrade activities as tolerated     [x]  Continue plan of care  [x]  Update interventions per flow sheet       []  Discharge due to:_  []  Other:_      Brea Yung, PT, DPT 2/21/2022

## 2022-02-22 NOTE — PROGRESS NOTES
[facility] Progress Note    Date: 2022    Name: Ronny Adame    MRN: [id number]         : 1970    Mr. Peterson Arrived ambulatory and in no distress for Hydration. Assessment was completed, no acute issues at this time, no new complaints voiced. Right chest wall port accessed without difficulty. Mr. Don Gonzalez vitals were reviewed. Visit Vitals  /78   Pulse 94   Temp 98.2 °F (36.8 °C)   Resp 18   Ht 6' (1.829 m)   Wt 70.2 kg (154 lb 12.8 oz)   SpO2 99%   BMI 20.99 kg/m²       Medications:  1 L Bolus     Mr. Naeem Navarrete tolerated treatment well and was discharged from [place] [address] in stable condition. Port de-accessed, flushed & heparinized per protocol. He is aware of future appointments.     Future Appointments   Date Time Provider Nathanael Gamai   2022  9:00 AM Macy Grissom, PT, DPT [facility]   2022  8:00 AM [facility]   2022  8:00 AM SS INF2 CH1 >4H [facility]   2022  8:45 AM Gustavo Mascorro NP [facility] BS AMB   2022 11:15 AM [facility]   3/1/2022  9:30 AM SS INF7 CH2 <1H [facility]   3/8/2022  9:00 AM SS INF7 CH2 <1H [facility]   3/9/2022  3:30 PM Linnette Bautista MD BSEP BS AMB   3/11/2022  8:00 AM SS INF2 CH1 >4H [facility]   3/15/2022  2:00 PM SS INF7 CH2 <1H [facility]   3/22/2022  9:00 AM SS INF7 CH2 <1H [facility]   2022  8:00 AM SS INF2 CH1 >4H [facility]   2022  1:00 PM SS INF5 CH4 <1H [facility]leon RN  2022

## 2022-02-23 PROBLEM — Z76.89 PREVENTION OF CHEMOTHERAPY-INDUCED NEUTROPENIA: Status: ACTIVE | Noted: 2022-01-01

## 2022-02-23 NOTE — PROGRESS NOTES
[id number] [facility] Oncology at [facility]  [phone]    Hematology / Oncology Established Visit    Reason for Visit:   Larisa Thompson is a 49 y.o. male who is seen in follow up of laryngeal cancer. Referred by Dr. 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 Stage: CATARINO [[id number]] at diagnosis, regional recurrence in 4/2021, now with metastatic disease. Pathology:   2/8/21 Total laryngectomy: Invasive squamous cell carcinoma  Tumor size 3.5cm, moderately differentiated (G2), PNI present, LVI not identified, margins negative  [id number]  -PDL1 (tested 12/28/21 on above specimen) positive with CPS 10. Prior Treatment:   1. 2/8/21 total laryngectomy/cricopharyngeal myotomy  2. Cisplatin 100mg/m2 every 3 weeks x 3 cycles, 5/24/21-7/13/21  3. Radiation 5/24/21-7/19/21  4. Pembrolizumab x 2 cycles, 1/6/22    Current Treatment:  [Cisplatin (100 mg/m2 intravenous, day 1) and fluorouracil (1000 mg/m2 per day, continuous infusion, for four days) and Pembrolizumab, day 1 given every 21 days x 6 cycles followed by Pembrolizumab maintenance x 2yr. Start 1/28/22 - current. History of Present Illness:   Larisa Thompson is a 49 y.o. male who with COPD who is referred for Head and neck cancer. He presented with throat pain and hoarseness in Sept 2020. Was evaluated by Dr. Adrian Jernigan in ENT who performed laryngoscopy and diagnosed patient with squamous cell carcinoma involving the larynx and subglottis. In late Dec 2020, neck CT showed a 3 cm mass in Right supraglottic larynx with extension to thyroid cartilage, but no cervical LNs. PET 1/4/21 showed uptake in the laryngeal mass at right vocal fold, extending to thyroid cartilage. He was scheduled for surgery, but he required emergent tracheostomy prior to that; done late Jan 2021. Also had PEG placed 1/30/21. On 2/8/21, he underwent total laryngectomy/cricopharyngeal myotomy.  He quit smoking in Feb 2021. He was evaluated by Dr. Jacky Cheney in [address] [place] [address] in March 2021 who recommended post-op radiation. There were multiple delays given difficult getting in for dental evaluation prior to RT. [facility] simulation scan was notable for a necrotic appearing tumor in Right neck  . PET 5/7/21 confirmed hypermetabolic uptake in right neck. PEG removed in March 2021 due to problems with it. Per Dr. Jacky Cheney, pt had 7 teeth extracted by dentist. He returns to the dentist again for dental fillings on 5/26/21. Pt states his neck feel tight, causing pulling sensation but no current pain. Interval History:  Patient here for follow up throat cancer and C2 of Cis-5FU- Keytruda. Reports mouth sores have resolved. Eating solid food and supplementing with boost. Reports MW and dex rinse was too expensive. Hydrating well. No fevers, chills, SOB, constipation or diarrhea. PMHx: COPD, throat cancer, anxiety  PSurgHx: Left arm plate placement, tracheostomy, total laryngectomy 2/8/21  SHx: Quit smoking 10/28/20 after 45 pack years. No EtOH  FHx: Mother with DM; Father with DM, CKD; Sister with DM. No h/o malignancy. Review of Systems: A complete review of systems was obtained, negative except as described above. Physical Exam:   Blood pressure 123/74, pulse 100, temperature 98 °F (36.7 °C), height 6' (1.829 m), weight 157 lb (71.2 kg), SpO2 98 %. ECOG PS: 0  General: Well developed, no acute distress  Eyes: Anicteric sclerae  HENT: Atraumatic, no mouth sores  Neck: Supple, Tracheostomy noted  Lymphatic: No supraclavicular, axillary adenopathy. No bilateral cervical LAD, but firm skin at neck bilaterally. Respiratory: CTAB, normal respiratory effort  CV: regular rate, regular rhythm, no murmurs, no peripheral edema  GI: Soft, nontender, nondistended, no masses  MS: Normal gait and station. Digits without clubbing or cyanosis. 1cm firm nodule on left wrist.  Skin: No rashes.  Hyperpigmented skin at neck bilaterally. Neuro/Psych: Alert. Communicates by writing given tracheostomy. Results:     Lab Results   Component Value Date/Time    WBC 3.5 (L) 2022 08:01 AM    HGB 8.0 (L) 2022 08:01 AM    HCT 25.3 (L) 2022 08:01 AM    PLATELET 599 2481 08:01 AM    .0 (H) 2022 08:01 AM    ABS. NEUTROPHILS 2.6 2022 08:01 AM     Lab Results   Component Value Date/Time    Sodium 136 2022 08:01 AM    Potassium 4.4 2022 08:01 AM    Chloride 102 2022 08:01 AM    CO2 28 2022 08:01 AM    Glucose 125 (H) 2022 08:01 AM    BUN 23 (H) 2022 08:01 AM    Creatinine 1.00 2022 08:01 AM    GFR est AA >60 2022 08:01 AM    GFR est non-AA >60 2022 08:01 AM    Calcium 9.4 2022 08:01 AM    Glucose (POC) 113 10/04/2021 01:04 PM     Lab Results   Component Value Date/Time    Bilirubin, total 0.2 2022 08:26 AM    ALT (SGPT) 19 2022 08:26 AM    Alk. phosphatase 73 2022 08:26 AM    Protein, total 7.9 2022 08:26 AM    Albumin 3.6 2022 08:26 AM    Globulin 4.3 (H) 2022 08:26 AM     Lab Results   Component Value Date/Time    Iron 34 (L) 2021 12:17 PM    TIBC 216 (L) 2021 12:17 PM    Iron % saturation 16 (L) 2021 12:17 PM    Ferritin 939 (H) 2021 12:17 PM      Lab Results   Component Value Date/Time    Vitamin B12 664 2021 11:23 AM    Folate 24.0 (H) 2021 11:23 AM         Imagin/28/21 Neck CT: IMPRESSION  Irregular soft tissue mass involving the aryepiglottic and the larynx. There is significant narrowing of the airway at the level of the larynx  and the supraglottic region. Poorly defined cervical adenopathy is noted. 21 Chest CTA:  IMPRESSION  Minimal scarring or subsegmental atelectasis in the left lung base. No evidence of pulmonary embolism or pneumonia.     21 PET:  IMPRESSION  2 large necrotic appearing mass lesions are noted in the right neck (SUV 6.4).  Small hypermetabolic right posterior triangle lymph node. No PET avid evidence  of distant metastatic disease. 10/4/21 PET:  FINDINGS:  HEAD/NECK: There is physiologic tracer activity in the oral cavity and piriform  sinuses. Physiologic tracer activity is also seen in the neck musculature,  particularly the right sternocleidomastoid muscle. 2 enlarged right level 2  cervical lymph nodes are slightly decreased in size; these demonstrate no  significant residual FDG activity. Previously seen small lymph node in the right  posterior cervical triangle also demonstrates no residual FDG activity. CHEST: There is a new 3.0 x 2.6 cm focus of nodular airspace disease in the left  upper lobe which demonstrates increased FDG activity, maximal SUV 2.8. New foci  of nodular airspace disease in the left lower lobe, measuring 1.2 cm and 1.0 cm,  in the right lower lobe, measuring 0.8 cm, and in the right middle lobe,  measuring 1.6 cm, demonstrate no appreciable FDG activity. ABDOMEN/PELVIS: No foci of abnormal hypermetabolism. SKELETON: No foci of abnormal hypermetabolism in the axial and visualized  appendicular skeleton. IMPRESSION  1. Previously seen enlarged cervical lymph nodes demonstrate no residual  abnormal FDG activity. 2. New foci of nodular airspace disease in both lungs, including a 3 cm area in  the left upper lobe which demonstrates low-grade increased FDG activity. Findings may be infectious etiology, although neoplasm is not excluded;  attention on follow-up examinations is recommended. 11/22/21 CT neck:  IMPRESSION  No significant interval change since the previous CT PET  examination. Necrotic right level 2A lymph nodes unchanged in size. No definite  new pathologic lymph nodes by CT criteria. Postradiation changes as described  above. Narrowed nasopharyngeal and oropharyngeal airway unchanged. No new pathologic lymphadenopathy by CT criteria is demonstrated.     11/22/21 CT chest:   There is a 15 mm node in the right hilum, station 10 R, series 201 image 39. There is increasing multifocal multi lobar inflammatory appearing airspace  disease in the lungs most suggestive of multifocal pneumonia. Dominant focus of  airspace disease in the lingula on series 204 image 41 measures 3.3 x 3.2 cm,  previously 3.0 x 2.6 cm. Likewise other foci have increased in size with  dominant lesion on the right measuring 2.8 x 1.9 cm on image 50, previously 16  mm. Suspicious right upper lobe nodules are present including a dominant 6 x 9 mm  nodule on series 204 image 25. There are no suspicious lytic or blastic skeletal lesions in the thorax. Incidental imaging of the upper abdomen demonstrates liver steatosis with  probable focal steatosis adjacent to the fissure for the falciform ligament. There may be a small retrocardiac hiatal hernia. IMPRESSION  Increasing multifocal airspace disease with new suspicious solid appearing  nodules in the right upper lobe. Assessment & Plan:   Nasim Mendoza is a 49 y.o. male is seen for laryngeal cancer. 1. Squamous cell carcinoma of larynx, Stage CATARINO [pT4a cN2 Mx]:  S/p total laryngectomy and tracheostomy in Jan 2021. Afterwards, he developed disease recurrence in Right neck confirmed by PET scan. I recommended concurrent chemoradiation using Cisplatin to treat his disease. Pt completed concurrent chemoradiation radiation 7/19/21. Evidence of local treatment response per physical exam and 10/4/21 PET. However disease progression noted on chest CT 11/22/21 with increasing size in lung nodules, outside of prior radiation field. Per discussion with Thoracic Tumor Board, these nodules would be amenable to biopsy by navigation bronchoscopy if desired. However, RadOnc and I feel this is obvious disease progression given extent of disease at diagnosis. Discussed with patient about disease progression.  Discussed pros/con of biopsy to confirm metastatic disease (pro: confirmation rather than assumption of metastatic disease; ability to send metastatic tissue for NGS; con: will lead to treatment delay of clinically likely metastatic disease. ) He elected to proceed with treatment without repeating biopsy. Started Pembrolizumab monotherapy, then switched to Cis-5FU-Pembrolizumab, followed by Pembro maintenance x 2 y given PDL1 CPS < 20. [30% RR in phase III trials.] Supportive medications: zofran, compazine, emla, decadron. 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 -- Proceed with C2 of Cis-5-FU-Pembrolizumab (10% DR 5-FU) with pump d/c on Tuesday. Neulasta added with pump removal.   -- Additional fluids scheduled on Tuesdays. -- Planning on CT in two weeks, will send results to Dr. Swain.   -- Follow up in 3 weeks for C3, MD/NP visit. 2. COPD / H/o tobacco abuse:   Quit in 2/2021. Uses Albuterol inhaler prn.    4. Constipation:    Senna-plus BID prn constipation. 5. Macrocytic anemia:   2/2 chemotherapy. B12, folate normal; ferritin high and iron sat mildly low. 6. Hypothyroidism:   Due to prior radiation and worsening. Checking TSH/free T4 every other cycle during treatment. Recent labs show continued increase in TSH.   -- On levothyroxine to 50mcg/d. Stressed the importance of taking it on an empty stomach every morning at the same time at least an hour before other medications or eating. Dose increase on 1/28/22. -- Appointment with Dr. Garrett Garcia, Endocrinology on 3/9/22 at 330 pm.      7. Pulmonary embolism:  Provoked by #1. 11/2021 CT chest. On eliquis. Hold for PLT <50.     8. Tracheostomy care:   ENT providing HMEs samples because [facility] is no longer supplying them through his insurance. Dr. Milo Neely team is working on an appeal.    9. H/o chemotherapy induced mucositis:   Resolved today. Dex rinse and MW not covered by insurance- will reach out to financial liaison, Alina Linton. TID prn mouth sores.  Will send in viscous lidocaine prescription and provide instructions for adding maalox and benadryl. -- Tramadol 1-2 times daily prn. Advised preventative care with baking soda rinses TID and good oral hygiene. DR LOBO for future cycles. Emotional well being: Pt is coping well with his/her disease and has excellent support. I personally saw and evaluated the patient and performed the key components of medical decision making. The history, physical exam, and documentation were performed by Tania Silva NP. I reviewed and verified the above documentation and modified it as needed. Proceed with C2 of Cis-5FU-Keytruda today now that mouth sores have resolved. Prior chemo induced neutropenia resolved.     Signed By: Zandra Wagner MD     02/25/22

## 2022-02-23 NOTE — PROGRESS NOTES
PT DAILY TREATMENT NOTE - MCR     Patient Name: Derrick Jones  Date:2022  : 1970  [x]  Patient  Verified  Payor: [facility] MEDICAID / Plan: KAITLIN NICHOLAS [facility] CCCP / Product Type: Managed Care Medicaid /    Treatment Area: Neck pain [M54.2]   Next MD APPT:   In time: 9:05am  Out time:9:52am  Total Treatment Time (min):47  Total Timed Codes (min):47  1:1 Treatment Time [facility] only):47  Visit # 14/16-24    SUBJECTIVE  Pain Level (0-10 scale) pre treatment: 0/10  Pain Level (0-10 scale) post treatment: 0/10  Any medication changes, allergies to medications, adverse drug reactions, diagnosis change, or new procedure performed?:   [x] No    [] Yes (see summary sheet for update)  Subjective functional status/changes:   [x] No changes reported  No new complaints. No pain upon arrival.       OBJECTIVE  Declined modalities     32 min Therapeutic Exercise:  [x] See flow sheet :    Rationale: increase ROM and increase strength to improve the patients ability to eat with less discomfort  15 min Manual Therapy: scar massage anterior neck, STM SCM/scalenes and passive stretching (UT and scalenes)    Rationale: increase ROM, increase tissue extensibility and decrease trigger points to improve the patients ability to eat with less discomfort    With   [] TE   [] TA   [] Neuro   [] SC   [] other: Patient Education: [] Review HEP    [] Progressed/Changed HEP based on:   [] positioning   [] body mechanics   [] transfers   [] Use of heat/ice    [] other:          Other Objective/Functional Measures:  Continued scapula stabilization/extensor strengthening. ASSESSMENT/Changes in Function:   TTP along the L side of the neck but noted improvement in soft tissue mobility. Two more sessions then DC to HEP. Patient will continue to benefit from skilled PT services to modify and progress therapeutic interventions, address ROM deficits and analyze and address soft tissue restrictions to attain remaining goals. GOALS/Progress towards goals:  Patient Goal (s): reduce pain    [x]? Met []? Not met []? Partially met     Short Term Goals: To be accomplished in 6-8 treatments. 1. Patient will be compliant with HEP to assist in pain reduction. [x] Met [] Not met [] Partially met  12/29  2. Patient will demo 5-10 degree improvement in cervical ROM in all planes. [] Met [] Not met [x] Partially met 2/2     Long Term Goals: To be accomplished in 16-24 treatments. 1. Patient will report decrease in pain and difficulty with eating due to neck pain/tightness. [x] Met [] Not met [] Partially met 1/5/22  2. Patient will report decrease in neck pain to max of 4/10 per VAS. [x] Met [] Not met [] Partially met 1/5/22    PLAN  Frequency / Duration: Patient to be seen 2-3 times per week for 16-24 treatments.   Certification Period: 11/30/21 - 2/28/22    [x]  Upgrade activities as tolerated     [x]  Continue plan of care  [x]  Update interventions per flow sheet       []  Discharge due to:_  []  Other:_      Griselda Martins, PT, DPT 2/23/2022

## 2022-02-24 NOTE — PROGRESS NOTES
PT DAILY TREATMENT NOTE - MCR     Patient Name: Chanda Puga  Date:2022  : 1970  [x]  Patient  Verified  Payor: [facility] MEDICAID / Plan: KAITLIN NICHOLAS [facility] CCCP / Product Type: Managed Care Medicaid /    Treatment Area: Neck pain [M54.2]   Next MD APPT:   In time: 0804am  Out time: 0847am  Total Treatment Time (min): 43  Total Timed Codes (min): 43  1:1 Treatment Time ( only): 43  Visit # 15/16-24    SUBJECTIVE  Pain Level (0-10 scale) pre treatment: 2/10  Pain Level (0-10 scale) post treatment: 0/10  Any medication changes, allergies to medications, adverse drug reactions, diagnosis change, or new procedure performed?:   [x] No    [] Yes (see summary sheet for update)  Subjective functional status/changes:   [x] No changes reported  Mild pain. Overall doing well without complaint. OBJECTIVE  Declined modalities     30 min Therapeutic Exercise:  [x] See flow sheet :    Rationale: increase ROM and increase strength to improve the patients ability to eat with less discomfort  13 min Manual Therapy: scar massage anterior neck, STM SCM/scalenes and passive stretching (UT and scalenes)    Rationale: increase ROM, increase tissue extensibility and decrease trigger points to improve the patients ability to eat with less discomfort    With   [] TE   [] TA   [] Neuro   [] SC   [] other: Patient Education: [] Review HEP    [] Progressed/Changed HEP based on:   [] positioning   [] body mechanics   [] transfers   [] Use of heat/ice    [] other:          Other Objective/Functional Measures:  Continued scapula stabilization/extensor strengthening. ASSESSMENT/Changes in Function:   Pt has one more visit per POC on Monday. He has progressed well with exercises, requires only min cueing for techniques. Increased myofascial tightness on the L anterior neck vs right.  No pain post-session  Patient will continue to benefit from skilled PT services to modify and progress therapeutic interventions, address ROM deficits and analyze and address soft tissue restrictions to attain remaining goals. GOALS/Progress towards goals:  Patient Goal (s): reduce pain    [x]? Met []? Not met []? Partially met     Short Term Goals: To be accomplished in 6-8 treatments. 1. Patient will be compliant with HEP to assist in pain reduction. [x] Met [] Not met [] Partially met  12/29  2. Patient will demo 5-10 degree improvement in cervical ROM in all planes. [] Met [] Not met [x] Partially met 2/2     Long Term Goals: To be accomplished in 16-24 treatments. 1. Patient will report decrease in pain and difficulty with eating due to neck pain/tightness. [x] Met [] Not met [] Partially met 1/5/22  2. Patient will report decrease in neck pain to max of 4/10 per VAS. [x] Met [] Not met [] Partially met 1/5/22    PLAN  Frequency / Duration: Patient to be seen 2-3 times per week for 16-24 treatments.   Certification Period: 11/30/21 - 2/28/22    [x]  Upgrade activities as tolerated     [x]  Continue plan of care  [x]  Update interventions per flow sheet       []  Discharge due to:_  []  Other:_      Torsten Thrasher, PT, DPT 2/24/2022

## 2022-02-25 NOTE — PROGRESS NOTES
[facility] VISIT NOTE  Date: 2022    Name: Sonia Bah    MRN: [id number]         : 1970    Mr. Peterson Arrived ambulatory and in no distress for C2D1 of Keytruda/ Cisplatin/ 5FU Regimen. Assessment was completed by Diomedes Doll RN, no acute issues at this time, no new complaints voiced. Chest wall port accessed by assessment nurse without difficulty, labs drawn & sent for processing. 1. Do you have any symptoms of COVID-19? SOB, coughing, fever, or generally not feeling well NO    2. Have you been exposed to COVID-19 recently? NO    3. Have you had any recent contact with family/friend that has a pending COVID test? NO       Chemotherapy Flowsheet 2022   Cycle C2D1   Date 2022   Drug / Regimen Keytruda/Cisplatin/5FU   Pre Hydration given   Post Hydration given   Pre Meds given   Notes given           Mr. Peterson's vitals were reviewed. Patient Vitals for the past 12 hrs:   Temp Pulse Resp BP SpO2   22 1611 97.7 °F (36.5 °C) 88  113/77 96 %   22 0752 98 °F (36.7 °C) 100 16 123/74 98 %         Lab results were obtained and reviewed. Recent Results (from the past 12 hour(s))   MAGNESIUM    Collection Time: 22  8:01 AM   Result Value Ref Range    Magnesium 2.3 1.6 - 2.4 mg/dL   CBC WITH AUTOMATED DIFF    Collection Time: 22  8:01 AM   Result Value Ref Range    WBC 3.5 (L) 4.1 - 11.1 K/uL    RBC 2.48 (L) 4.10 - 5.70 M/uL    HGB 8.0 (L) 12.1 - 17.0 g/dL    HCT 25.3 (L) 36.6 - 50.3 %    .0 (H) 80.0 - 99.0 FL    MCH 32.3 26.0 - 34.0 PG    MCHC 31.6 30.0 - 36.5 g/dL    RDW 15.6 (H) 11.5 - 14.5 %    PLATELET 556 668 - 704 K/uL    MPV 10.6 8.9 - 12.9 FL    NRBC 0.0 0  WBC    ABSOLUTE NRBC 0.00 0.00 - 0.01 K/uL    NEUTROPHILS 74 32 - 75 %    LYMPHOCYTES 11 (L) 12 - 49 %    MONOCYTES 14 (H) 5 - 13 %    EOSINOPHILS 0 0 - 7 %    BASOPHILS 0 0 - 1 %    IMMATURE GRANULOCYTES 1 (H) 0.0 - 0.5 %    ABS. NEUTROPHILS 2.6 1.8 - 8.0 K/UL    ABS.  LYMPHOCYTES 0.4 (L) 0.8 - 3.5 K/UL    ABS. MONOCYTES 0.5 0.0 - 1.0 K/UL    ABS. EOSINOPHILS 0.0 0.0 - 0.4 K/UL    ABS. BASOPHILS 0.0 0.0 - 0.1 K/UL    ABS. IMM. GRANS. 0.0 0.00 - 0.04 K/UL    DF SMEAR SCANNED      RBC COMMENTS MACROCYTOSIS  1+        RBC COMMENTS ANISOCYTOSIS  1+        RBC COMMENTS STOMATOCYTES  PRESENT       METABOLIC PANEL, COMPREHENSIVE    Collection Time: 02/25/22  8:01 AM   Result Value Ref Range    Sodium 136 136 - 145 mmol/L    Potassium 4.4 3.5 - 5.1 mmol/L    Chloride 102 97 - 108 mmol/L    CO2 28 21 - 32 mmol/L    Anion gap 6 5 - 15 mmol/L    Glucose 125 (H) 65 - 100 mg/dL    BUN 23 (H) 6 - 20 MG/DL    Creatinine 1.00 0.70 - 1.30 MG/DL    BUN/Creatinine ratio 23 (H) 12 - 20      GFR est AA >60 >60 ml/min/1.73m2    GFR est non-AA >60 >60 ml/min/1.73m2    Calcium 9.4 8.5 - 10.1 MG/DL    Bilirubin, total 0.2 0.2 - 1.0 MG/DL    ALT (SGPT) 14 12 - 78 U/L    AST (SGOT) 19 15 - 37 U/L    Alk.  phosphatase 70 45 - 117 U/L    Protein, total 7.6 6.4 - 8.2 g/dL    Albumin 3.4 (L) 3.5 - 5.0 g/dL    Globulin 4.2 (H) 2.0 - 4.0 g/dL    A-G Ratio 0.8 (L) 1.1 - 2.2         Medications received:  Medications Administered     0.9% sodium chloride 1,000 mL with potassium chloride 10 mEq, magnesium sulfate 2 g infusion     Admin Date  02/25/2022 Action  New Bag Dose   Rate  1,000 mL/hr Route  IntraVENous Administered By  Dandre Mauro RN           Admin Date  02/25/2022 Action  New Bag Dose   Rate  1,000 mL/hr Route  IntraVENous Administered By  Dandre Mauro RN          0.9% sodium chloride infusion     Admin Date  02/25/2022 Action  New Bag Dose  25 mL/hr Rate  25 mL/hr Route  IntraVENous Administered By  Dandre Mauro RN          CISplatin (PLATINOL) 186 mg in 0.9% sodium chloride 500 mL, overfill volume 50 mL chemo infusion     Admin Date  02/25/2022 Action  New Bag Dose  186 mg Rate  368 mL/hr Route  IntraVENous Administered By  Dandre Mauro RN          dexamethasone (DECADRON) 4 mg/mL injection 8 mg     Admin Date  02/25/2022 Action  Given Dose  8 mg Route  IntraVENous Administered By  Joanna Guaman RN          fluorouraciL (ADRUCIL) 6,696 mg in 0.9% sodium chloride 250 mL CADD Cassette     Admin Date  02/25/2022 Action  New Bag Dose  6,696 mg Rate  2.6 mL/hr Route  IntraVENous Administered By  Joanna Guaman RN          fosaprepitant (EMEND) 150 mg in 0.9% sodium chloride 150 mL IVPB     Admin Date  02/25/2022 Action  New Bag Dose  150 mg Rate  450 mL/hr Route  IntraVENous Administered By  Joanna Guaman RN          palonosetron HCl (ALOXI) injection 0.25 mg     Admin Date  02/25/2022 Action  Given Dose  0.25 mg Route  IntraVENous Administered By  Joanna Guaman RN          pembrolizumab (KEYTRUDA) 200 mg in 0.9% sodium chloride 100 mL, overfill volume 10 mL IVPB     Admin Date  02/25/2022 Action  New Bag Dose  200 mg Rate  236 mL/hr Route  IntraVENous Administered By  Joanna Guaman RN                 Mr. Severo Reasoner tolerated treatment well and was discharged from [place] [address] in stable condition at 1610. Port  flushed per protocol and connected to infusing CADD pump. He is to return on  March 1, 2022 at 1500 for his next appointment.     Alfonzo Fowler RN  February 25, 2022    Future Appointments:  Future Appointments   Date Time Provider Nathanael Gamai   2/28/2022 11:15 AM Sherie Tran PTA [facility]   3/1/2022  3:00 PM SS INF4 CH4 <1H [facility]   3/8/2022  9:00 AM [facility]   3/9/2022  3:30 PM Cherelle Gamez MD [facility] AMB   3/15/2022  2:00 PM SS INF7 CH2 <1H [facility]   3/18/2022  8:00 AM [facility]   3/18/2022  8:15 AM Vipul Mascorro NP [facility] AMB   3/22/2022  2:00 PM SS INF4 CH4 <1H [facility] [address] [place] [address]

## 2022-02-25 NOTE — PATIENT INSTRUCTIONS
Mix 5mL liquid benadryl, 5mL liquid Maalox and 5mL viscous lidocaine, rinse in your mouth for 2 minutes then spit. Repeat as needed for mouth pain.

## 2022-02-25 NOTE — TELEPHONE ENCOUNTER
[address] at [facility]  [phone]        02/25/22 11:16 AM Faxed most recent office note to Dr. Zandra Delvalle, endocrinology.

## 2022-02-25 NOTE — PROGRESS NOTES
[employer]  Oncology Social Work  Encounter     Patient Name:      Medical History:     Advance Directives:    Narrative: Assisted patient with scheduling transportation.  Called Medicaid/ #[id number] and booked the following dates:     3/8 9am -Med Once  #[id number]    3/9 3:15pm - Endocrinology  #[id number]    3/11 8am - Med Onc  #[id number]    3/15 2pm - Med Onc  #[id number]    3/22 9am - Med Onc  #[id number]     Called patient to inform about transportation.     Referral/Handouts:   Transportation referral    Thank you,  CHINTAN TempletonW

## 2022-02-25 NOTE — PROGRESS NOTES
Jazmine Simon is a 49 y.o. male here for follow up of laryngeal cancer. Patient with no complaints of pain at this time.

## 2022-03-01 NOTE — TELEPHONE ENCOUNTER
[employer]  Oncology Social Work  Encounter     Patient Name:  Karol Borges     Medical History: h&n cancer    Advance Directives: none on file; conversation deferred    Narrative: Received letter from patient requesting assistance changing medical insurance as current insurance is out of network with his HME supplier, [employer]. Called patient's brother and explained the above. Advised Dr. Swain is managing HME supplies. Suggested he follow up with his office ([phone]) and ask if they were able to find an in-network supplier as that might be easier then changing insurance. If patient needs to change insurance plans referred him back to his [facility]  as I will not be able to change insurance on patient's behalf. He voiced understanding and is able to assist his brother with these calls. Plan:  1. Referred to Dr. Swain regarding HME supplies  2. Referred to [facility] regarding insurance change    Referral/Handouts:      Thank you,   Isaac Welch LCSW

## 2022-03-01 NOTE — PROGRESS NOTES
[facility] Progress Note    Date: 2022    Name: Gwenn Romberg    MRN: [id number]         : 1970    Mr. Peterson Arrived ambulatory and in no distress for Pump Removal + Hydration. Assessment was completed, no acute issues at this time, no new complaints voiced. Patient pump is not completed but he has politely refused the remainder 15.3mL. Np notified. Mr. Maryse Mike vitals were reviewed. Patient Vitals for the past 12 hrs:   Temp Pulse Resp BP SpO2   22 1031    101/63    22 0908 98 °F (36.7 °C) 88 18 (!) 93/57 98 %         Medications Administered     0.9% sodium chloride infusion 1,000 mL     Admin Date  2022 Action  New Bag Dose  1,000 mL Rate  1,000 mL/hr Route  IntraVENous Administered By  Loretta Guillaume RN          0.9% sodium chloride injection 10 mL     Admin Date  2022 Action  Given Dose  10 mL Route  IntraVENous Administered By  Loretta Guillaume RN          heparin (porcine) pf 300-500 Units     Admin Date  2022 Action  Given Dose  500 Units Route  InterCATHeter Administered By  Loretta Guillaume RN          pegfilgrastim (NEULASTA) wearable SQ injector 6 mg     Admin Date  2022 Action  Given Dose  6 mg Route  SubCUTAneous Administered By  Loretta Guillaume RN          sodium chloride (NS) flush 10 mL     Admin Date  2022 Action  Given Dose  10 mL Route  IntraVENous Administered By  Loretta Guillaume RN                Education provided to patient about Neulasta On Body Injector including: side effects, how/when to remove the device, as well as what to do in the event of device malfunction. Opportunity for questions was provided and all questions were answered. Patient verbalized understanding. Mr. Anastasia Tello tolerated treatment well and was discharged from [place] [address] in stable condition at 1030. Port de-accessed, flushed & heparinized per protocol. He is to return on  for his next appointment.     Geo Dyer Pattie Pompa  March 1, 2022

## 2022-03-08 NOTE — PROGRESS NOTES
[facility] Progress Note        Date: 2022    Name: Janette Beatty    MRN: [id number]         : 1970    Mr. Peterson Arrived ambulatory and in no distress for Hydration. Assessment was completed, patient complains of pain in the neck and coughing up small blood clots two days ago. Patient requested to see Dr. Lilian Gan about concerns. Patient proceeded upstairs to appointment. Right port accessed and labs drawn and sent for processing. Mr. Cortney Carrera vitals were reviewed. Patient Vitals for the past 12 hrs:   Temp Pulse Resp BP SpO2   22 1223  76 18 105/75 99 %   22 0916 98.1 °F (36.7 °C) 80 18 94/73          Lab results were obtained and reviewed. Recent Results (from the past 12 hour(s))   CBC WITH AUTOMATED DIFF    Collection Time: 22 11:08 AM   Result Value Ref Range    WBC 5.1 4.1 - 11.1 K/uL    RBC 2.41 (L) 4.10 - 5.70 M/uL    HGB 7.6 (L) 12.1 - 17.0 g/dL    HCT 24.2 (L) 36.6 - 50.3 %    .4 (H) 80.0 - 99.0 FL    MCH 31.5 26.0 - 34.0 PG    MCHC 31.4 30.0 - 36.5 g/dL    RDW 15.6 (H) 11.5 - 14.5 %    PLATELET 059 340 - 375 K/uL    MPV 10.3 8.9 - 12.9 FL    NRBC 0.0 0  WBC    ABSOLUTE NRBC 0.00 0.00 - 0.01 K/uL    NEUTROPHILS 59 32 - 75 %    BAND NEUTROPHILS 5 0 - 6 %    LYMPHOCYTES 12 12 - 49 %    MONOCYTES 22 (H) 5 - 13 %    EOSINOPHILS 0 0 - 7 %    BASOPHILS 0 0 - 1 %    METAMYELOCYTES 2 (H) 0 %    IMMATURE GRANULOCYTES 0 %    ABS. NEUTROPHILS 3.3 1.8 - 8.0 K/UL    ABS. LYMPHOCYTES 0.6 (L) 0.8 - 3.5 K/UL    ABS. MONOCYTES 1.1 (H) 0.0 - 1.0 K/UL    ABS. EOSINOPHILS 0.0 0.0 - 0.4 K/UL    ABS. BASOPHILS 0.0 0.0 - 0.1 K/UL    ABS. IMM.  GRANS. 0.0 K/UL    DF MANUAL      RBC COMMENTS ANISOCYTOSIS  1+        RBC COMMENTS MACROCYTOSIS  1+        RBC COMMENTS STOMATOCYTES  PRESENT           Medications received:  Medications Administered     0.9% sodium chloride injection 10 mL     Admin Date  2022 Action  Given Dose  10 mL Route  IntraVENous Administered By  Theoplis Check          heparin (porcine) pf 300-500 Units     Admin Date  03/08/2022 Action  Given Dose  500 Units Route  InterCATHeter Administered By  Theoplis Check          sodium chloride (NS) flush 10 mL     Admin Date  03/08/2022 Action  Given Dose  10 mL Route  IntraVENous Administered By  Theoplis Check          sodium chloride 0.9 % bolus infusion 1,000 mL     Admin Date  03/08/2022 Action  New Bag Dose  1,000 mL Rate  1,000 mL/hr Route  IntraVENous Administered By  Theoplis Check                 Mr. Baldomero Guerrero tolerated treatment well and was discharged from [place] [address] in stable condition at 1225. He is to return on March 15, 2022 at 1400 for his next appointment. Port de accessed, flushed, and heparinized per protocol.     Sreekanth Howard  March 8, 2022    Future Appointments:  Future Appointments   Date Time Provider Nathanael Haas   3/9/2022  3:30 PM Philip Saunders MD [facility] AMB   3/15/2022  2:00 PM SS INF7 CH2 <1H [facility]   3/18/2022  8:00 AM SS INF3 CH1 >4H [facility]   3/18/2022  8:15 AM Dennise Mascorro NP [facility] AMB   3/22/2022  2:00 PM SS INF4 CH4 <1H [facility]   4/8/2022  8:00 AM SS INF3 CH1 >4H [facility]   4/12/2022  2:30 PM SS INF4 CH4 <1H [facility]   4/29/2022  8:00 AM SS INF3 CH1 >4H [facility]   5/3/2022  3:00 PM SS INF4 CH4 <1H [facility] [address] [place] [address]

## 2022-03-08 NOTE — PROGRESS NOTES
[id number] [facility] Oncology at [facility]  [phone]    Hematology / Oncology Established Visit    Reason for Visit:   Derrick Jones is a 49 y.o. male who is seen in follow up of laryngeal cancer. Referred by Dr. 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 Stage: CATARINO [[id number]] at diagnosis, regional recurrence in 4/2021, now with metastatic disease. Pathology:   2/8/21 Total laryngectomy: Invasive squamous cell carcinoma  Tumor size 3.5cm, moderately differentiated (G2), PNI present, LVI not identified, margins negative  dT4bbVm  -PDL1 (tested 12/28/21 on above specimen) positive with CPS 10. Prior Treatment:   1. 2/8/21 total laryngectomy/cricopharyngeal myotomy   2. Cisplatin 100mg/m2 every 3 weeks x 3 cycles, 5/24/21-7/13/21  3. Radiation 5/24/21-7/19/21  4. Pembrolizumab x 2 cycles, 1/6/22    Current Treatment:  [Cisplatin (100 mg/m2 intravenous, day 1) and fluorouracil (1000 mg/m2 per day, continuous infusion, for four days) and Pembrolizumab, day 1 given every 21 days x 6 cycles followed by Pembrolizumab maintenance x 2yr. Start 1/28/22 - current. History of Present Illness:   Derrick Jones is a 49 y.o. male who with COPD who is referred for Head and neck cancer. He presented with throat pain and hoarseness in Sept 2020. Was evaluated by Dr. Dick Dance in ENT who performed laryngoscopy and diagnosed patient with squamous cell carcinoma involving the larynx and subglottis. In late Dec 2020, neck CT showed a 3 cm mass in Right supraglottic larynx with extension to thyroid cartilage, but no cervical LNs. PET 1/4/21 showed uptake in the laryngeal mass at right vocal fold, extending to thyroid cartilage. He was scheduled for surgery, but he required emergent tracheostomy prior to that; done late Jan 2021. Also had PEG placed 1/30/21. On 2/8/21, he underwent total laryngectomy/cricopharyngeal myotomy.  He quit smoking in Feb 2021. He was evaluated by Dr. Bj Holman in [address] [place] [address] in March 2021 who recommended post-op radiation. There were multiple delays given difficult getting in for dental evaluation prior to RT. [facility] simulation scan was notable for a necrotic appearing tumor in Right neck. PET 5/7/21 confirmed hypermetabolic uptake in right neck. PEG removed in March 2021 due to problems with it. Per Dr. Bj Holman, pt had 7 teeth extracted by dentist. He returns to the dentist again for dental fillings on 5/26/21. Pt states his neck feel tight, causing pulling sensation but no current pain. Interval History:  Patient here for urgent follow up. Reports for four days he was coughing up bright red blood clots (about a tablespoon full each episode) mixed with scant mucus averaging 3 episodes in the morning. He has not taken his Eliquis or Levothyroxine for 2-3 days. Has not coughed up blood for two days. Reports mildly sore tongue. No throat pain when swallowing. Reports moderate neck pain with movement. Completed speech and PT. Pain not managed with tylenol or tramadol. No SOB. Reports constipation, last stool 3 days ago. No dark stools or blood in stools. Having nausea despite taking zofran twice daily. PMHx: COPD, throat cancer, anxiety  PSurgHx: Left arm plate placement, tracheostomy, total laryngectomy 2/8/21  SHx: Quit smoking 10/28/20 after 45 pack years. No EtOH  FHx: Mother with DM; Father with DM, CKD; Sister with DM. No h/o malignancy. Review of Systems: A complete review of systems was obtained, negative except as described above. Physical Exam:   Blood pressure 94/73, pulse 80, temperature 98.1 °F (36.7 °C), resp. rate 18, height 6' (1.829 m), weight 154 lb 8.7 oz (70.1 kg), SpO2 98 %. ECOG PS: 0  General: Well developed, no acute distress  Eyes: Anicteric sclerae  HENT: Atraumatic  Neck: Supple, Tracheostomy noted  Lymphatic: No supraclavicular, axillary adenopathy.  No bilateral cervical LAD, but firm skin at neck bilaterally. Respiratory: CTAB, normal respiratory effort  CV: Normal rate, regular rhythm, no murmurs, no peripheral edema  GI: Soft, nontender, nondistended, no masses  MS: Normal gait and station. Digits without clubbing or cyanosis. 1cm firm nodule on left wrist.  Skin: No rashes. Hyperpigmented skin at neck bilaterally. Neuro/Psych: Alert. Communicates by writing given tracheostomy. Results:     Lab Results   Component Value Date/Time    WBC 5.1 2022 11:08 AM    HGB 7.6 (L) 2022 11:08 AM    HCT 24.2 (L) 2022 11:08 AM    PLATELET 180  11:08 AM    .4 (H) 2022 11:08 AM    ABS. NEUTROPHILS PENDING 2022 11:08 AM     Lab Results   Component Value Date/Time    Sodium 136 2022 08:01 AM    Potassium 4.4 2022 08:01 AM    Chloride 102 2022 08:01 AM    CO2 28 2022 08:01 AM    Glucose 125 (H) 2022 08:01 AM    BUN 23 (H) 2022 08:01 AM    Creatinine 1.00 2022 08:01 AM    GFR est AA >60 2022 08:01 AM    GFR est non-AA >60 2022 08:01 AM    Calcium 9.4 2022 08:01 AM    Glucose (POC) 113 10/04/2021 01:04 PM     Lab Results   Component Value Date/Time    Bilirubin, total 0.2 2022 08:01 AM    ALT (SGPT) 14 2022 08:01 AM    Alk. phosphatase 70 2022 08:01 AM    Protein, total 7.6 2022 08:01 AM    Albumin 3.4 (L) 2022 08:01 AM    Globulin 4.2 (H) 2022 08:01 AM     Lab Results   Component Value Date/Time    Iron 34 (L) 2021 12:17 PM    TIBC 216 (L) 2021 12:17 PM    Iron % saturation 16 (L) 2021 12:17 PM    Ferritin 939 (H) 2021 12:17 PM      Lab Results   Component Value Date/Time    Vitamin B12 664 2021 11:23 AM    Folate 24.0 (H) 2021 11:23 AM         Imagin/28/21 Neck CT: IMPRESSION  Irregular soft tissue mass involving the aryepiglottic and the larynx.  There is significant narrowing of the airway at the level of the larynx  and the supraglottic region. Poorly defined cervical adenopathy is noted. 2/25/21 Chest CTA:  IMPRESSION  Minimal scarring or subsegmental atelectasis in the left lung base. No evidence of pulmonary embolism or pneumonia. 5/7/21 PET:  IMPRESSION  2 large necrotic appearing mass lesions are noted in the right neck (SUV 6.4). Small hypermetabolic right posterior triangle lymph node. No PET avid evidence  of distant metastatic disease. 10/4/21 PET:  FINDINGS:  HEAD/NECK: There is physiologic tracer activity in the oral cavity and piriform  sinuses. Physiologic tracer activity is also seen in the neck musculature,  particularly the right sternocleidomastoid muscle. 2 enlarged right level 2  cervical lymph nodes are slightly decreased in size; these demonstrate no  significant residual FDG activity. Previously seen small lymph node in the right  posterior cervical triangle also demonstrates no residual FDG activity. CHEST: There is a new 3.0 x 2.6 cm focus of nodular airspace disease in the left  upper lobe which demonstrates increased FDG activity, maximal SUV 2.8. New foci  of nodular airspace disease in the left lower lobe, measuring 1.2 cm and 1.0 cm,  in the right lower lobe, measuring 0.8 cm, and in the right middle lobe,  measuring 1.6 cm, demonstrate no appreciable FDG activity. ABDOMEN/PELVIS: No foci of abnormal hypermetabolism. SKELETON: No foci of abnormal hypermetabolism in the axial and visualized  appendicular skeleton. IMPRESSION  1. Previously seen enlarged cervical lymph nodes demonstrate no residual  abnormal FDG activity. 2. New foci of nodular airspace disease in both lungs, including a 3 cm area in  the left upper lobe which demonstrates low-grade increased FDG activity. Findings may be infectious etiology, although neoplasm is not excluded;  attention on follow-up examinations is recommended.     11/22/21 CT neck:  IMPRESSION  No significant interval change since the previous CT PET  examination. Necrotic right level 2A lymph nodes unchanged in size. No definite  new pathologic lymph nodes by CT criteria. Postradiation changes as described  above. Narrowed nasopharyngeal and oropharyngeal airway unchanged. No new pathologic lymphadenopathy by CT criteria is demonstrated. 11/22/21 CT chest:   There is a 15 mm node in the right hilum, station 10 R, series 201 image 39. There is increasing multifocal multi lobar inflammatory appearing airspace  disease in the lungs most suggestive of multifocal pneumonia. Dominant focus of  airspace disease in the lingula on series 204 image 41 measures 3.3 x 3.2 cm,  previously 3.0 x 2.6 cm. Likewise other foci have increased in size with  dominant lesion on the right measuring 2.8 x 1.9 cm on image 50, previously 16  mm. Suspicious right upper lobe nodules are present including a dominant 6 x 9 mm  nodule on series 204 image 25. There are no suspicious lytic or blastic skeletal lesions in the thorax. Incidental imaging of the upper abdomen demonstrates liver steatosis with  probable focal steatosis adjacent to the fissure for the falciform ligament. There may be a small retrocardiac hiatal hernia. IMPRESSION  Increasing multifocal airspace disease with new suspicious solid appearing  nodules in the right upper lobe. Assessment & Plan:   Ronny Adame is a 49 y.o. male is seen for laryngeal cancer. 1. Squamous cell carcinoma of larynx, Stage CATARINO [pT4a cN2 Mx]:  S/p total laryngectomy and tracheostomy in Jan 2021. Afterwards, he developed disease recurrence in Right neck confirmed by PET scan. I recommended concurrent chemoradiation using Cisplatin to treat his disease. Pt completed concurrent chemoradiation radiation 7/19/21. Evidence of local treatment response per physical exam and 10/4/21 PET. However disease progression noted on chest CT 11/22/21 with increasing size in lung nodules, outside of prior radiation field.  Per discussion with Thoracic Tumor Board, these nodules would be amenable to biopsy by navigation bronchoscopy if desired. However, RadOnc and I feel this is obvious disease progression given extent of disease at diagnosis. Discussed with patient about disease progression. Discussed pros/con of biopsy to confirm metastatic disease (pro: confirmation rather than assumption of metastatic disease; ability to send metastatic tissue for NGS; con: will lead to treatment delay of clinically likely metastatic disease. ) He elected to proceed with treatment without repeating biopsy. Started Pembrolizumab monotherapy, then switched to Cis-5FU-Pembrolizumab, followed by Pembro maintenance x 2 y given PDL1 CPS < 20. [30% RR in phase III trials.] Supportive medications: zofran, compazine, emla, decadron. 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 -- Completed C2 of Cis-5-FU-Pembrolizumab (10% DR LASHELL) with pump d/c on Tuesday on 3/1/22. Neulasta added with pump removal.   -- Additional fluids scheduled on Tuesdays. -- Due for CT now. Will send results to Dr. Swain.   -- Follow up in 3/18/22 for C3, MD/NP visit. 2. COPD / H/o tobacco abuse:   Quit in 2/2021. Uses Albuterol inhaler prn.    4. FEN/GI / Constipation:    Senna-plus BID prn constipation. Increased nausea after C2 not managed with zofran. -- Advised taking zofran in AM/PM and compazine in afternoon for nausea prevention. 5. Macrocytic anemia / anemia of chronic disease:   2/2 chemotherapy. B12, folate normal; ferritin high and iron sat mildly low. 6. Hypothyroidism:   Due to prior radiation and worsening. Checking TSH/free T4 every other cycle during treatment. Recent labs show continued increase in TSH.   -- On levothyroxine to 50mcg/d. Stressed the importance of taking it on an empty stomach every morning at the same time at least an hour before other medications or eating. Dose increase on 1/28/22.    -- Consultation with Dr. Isra Goff, Endocrinology on 3/9/22 at 330 pm.      7. Pulmonary embolism:  Provoked by #1. 11/2021 CT chest.  Hold for PLT <50.   -- Hold eliquis given history of hemoptysis. 8. Tracheostomy care:   ENT providing HMEs samples because [employer] is no longer supplying them through his insurance. Dr. Magdaleno Perrin team is working on an appeal.    9. H/o chemotherapy induced mucositis:   Resolved today. Dex rinse and MW not covered by insurance. Sent viscous lidocaine prescription and provided instructions for adding maalox and benadryl. 10. Hemoptysis:  Holding Eliquis. Checking CBC today. Will restart Eliquis next week if no further hemoptysis. Will obtain imaging and decide whether pt needs bronchoscopy by ENT or Pulmonary in future. Urged pt to report to ER if hemoptysis recurs. 11. Neck pain:  Present with movement. Referral to lymphedema to evaluate if he is a candidate. Advised prn percocet for severe pain and continue PT exercises at home. Emotional well being: Pt is coping well with his/her disease and has excellent support. I personally saw and evaluated the patient and performed the key components of medical decision making. The history, physical exam, and documentation were performed by Precious Rios NP. I reviewed and verified the above documentation and modified it as needed. Pt seen for urgent visit given hemoptysis. Stopped Eliquis and improved. Will obtain CT imaging to determine whether any endobronchial mass seen. Pt may need bronchoscopy by ENT or Pulmonary.     Signed By: Renuka Petersen MD     03/08/22

## 2022-03-08 NOTE — PROGRESS NOTES
Chief Complaint   Patient presents with   Gia Hull is a pleasant 49year old male who presents as a follow up.  He reports neck pain

## 2022-03-08 NOTE — PATIENT INSTRUCTIONS
1. I am referring to lymphedema for your neck pain. You can take 1 tab of percocet every 8 hours as needed for severe neck pain. This has tylenol 325mg and oxycodone 5mg per tab. 2. Please call if you see more than a teaspoon full of blood    3. Please restart your levothyroxine and follow up with endocrinology tomorrow.      4. Hold eliquis - blood thinner

## 2022-03-08 NOTE — PROGRESS NOTES
[employer]  Oncology Social Work  Encounter     Patient Name: Teressa Schulz     Medical History: h&n cancer    Advance Directives: none on file     Narrative: Met with patient and he requested update on insurance change. Explained I spoke with his brother, Salas Walker, on 3/1 and advised I am unable to change insurance provider and referred them to patient's  at [address] [place] [address]. Patient still has not received HME supplies. Patient asked that I contact his brother regarding these concerns. 3/11 10:08 am - Spoke with patient's brother Salas Walker ([phone]) to inquire about update regarding HEM supplies. He reports he reached out to pt's case manger for assistance in finding another [employer] plan. Recommended her also follow-up with Dr. Sulma Servin office to ask if they have been able to find a new supplier. Advised patient asked that he send him a text message with and update. He voiced understanding. Barriers to Care: HME coverage     Plan:  1. Referred to Dr. Sulma Servin    Referral/Handouts:      Thank you,  Carline Florence LCSW

## 2022-03-09 NOTE — PROGRESS NOTES
1. \"Have you been to the ER, urgent care clinic since your last visit? Hospitalized since your last visit? \" No    2. \"Have you seen or consulted any other health care providers outside of the [address] [place] [address] since your last visit? \" No     3. For patients aged 39-70: Has the patient had a colonoscopy / FIT/ Cologuard? No      If the patient is female:    4. For patients aged 41-77: Has the patient had a mammogram within the past 2 years? No      5. For patients aged 21-65: Has the patient had a pap smear?  No

## 2022-03-09 NOTE — LETTER
3/9/2022    Patient: Nasim Mendoza   YOB: 1970   Date of Visit: 3/9/2022     Dameon Stoddard NP  [address] [facility] [address]  Via Fax: [address] [facility], NP  [address] [address]  Via In Basket    Dear DAT Larsen NP,      Thank you for referring Mr. Nasim Mendoza to [address] [place] [address] for evaluation. My notes for this consultation are attached. If you have questions, please do not hesitate to call me. I look forward to following your patient along with you.       Sincerely,    Farzad Bustamante MD

## 2022-03-09 NOTE — PROGRESS NOTES
History and Physical    Patient: Brannon Leon MRN: [id number]  SSN: [id number]    YOB: 1970  Age: 49 y.o. Sex: male      Subjective:      Brannon Leon is a 49 y.o. male with past medical history of laryngeal carcinoma, status post surgery, chemo, radiation now on chemotherapy again, anxiety, tracheostomy, COPD is sent to me by hematologist oncologist Regina Valerio NP for hypothyroidism. Patient is unable to talk. He communicated by writing. She tells me that he was diagnosed with hypothyroidism 1-1/2-year back, even before he had surgery for laryngeal carcinoma. He is not entirely sure but he thinks he was on levothyroxine 25 mcg daily for a long time, in January 2022 his dose was increased to 50 mcg. He takes his medications orally, he is taking it every day for 10 in the morning on empty stomach and waits at least 30 minutes before he eats or drinks anything else. He tells me that his energy level is good, bowel movements sometimes regular, sometimes not, sleep is poor because of neck discomfort, weight has been up and down, denies any heat or cold intolerance. He did not have labs repeated after levothyroxine dose was increased.     Past Medical History:   Diagnosis Date    Chronic pain     THROAT, EARS, NECK R/T CANCER    COPD (chronic obstructive pulmonary disease) (HCC)     EMPHASEMA    Psychiatric disorder     ANXIETY R/T CANCER    Throat cancer (HCC)     Tracheostomy present ([facility] [address]      Past Surgical History:   Procedure Laterality Date    HX ORTHOPAEDIC      LEFT ARM- \" PUT PLATE IN\"    HX TRACHEOSTOMY  02/08/2021    IR INSERT TUNL CVC W PORT OVER 5 YEARS  5/21/2021    IR PLACE CVAD FLUORO GUIDE  5/21/2021      Family History   Problem Relation Age of Onset    Diabetes Mother     Diabetes Father     Kidney Disease Father     Diabetes Sister     Heart Disease Daughter     Anesth Problems Neg Hx      Social History     Tobacco Use    Smoking status: Former Smoker     Packs/day: 1.50     Years: 30.00     Pack years: 37.1     Quit date: 10/28/2020     Years since quittin.3    Smokeless tobacco: Never Used   Substance Use Topics    Alcohol use: Not Currently      Prior to Admission medications    Medication Sig Start Date End Date Taking? Authorizing Provider   oxyCODONE-acetaminophen (PERCOCET) 5-325 mg per tablet Take 1 Tablet by mouth every eight (8) hours as needed for Pain for up to 7 days. Max Daily Amount: 3 Tablets. 3/8/22 3/15/22 Yes Dominic Mascorro NP   lidocaine (XYLOCAINE) 2 % solution Take 15 mL by mouth as needed for Pain. 22  Yes Karol Christianson NP   potassium chloride (Klor-Con M20) 20 mEq tablet Take 1 Tablet by mouth daily. 22  Yes Dominic Mascorro NP   apixaban (ELIQUIS) 5 mg tablet Take 1 Tablet by mouth two (2) times a day. 22  Yes Dominic Mascorro NP   magic mouthwash (FIRST-MOUTHWASH BLM) 430--40 mg/30 mL mwsh oral suspension Take 10 mL by mouth every four (4) hours as needed (mucositis). 22  Yes Dominic Mascorro NP   nystatin (MYCOSTATIN) 100,000 unit/mL suspension Take 5 mL by mouth four (4) times daily. swish and spit 22  Yes Dominic Mascorro NP   ondansetron hcl (ZOFRAN) 8 mg tablet Take 1 Tablet by mouth every eight (8) hours as needed for Nausea or Vomiting. 22  Yes Dominic Mascorro NP   prochlorperazine (Compazine) 10 mg tablet Take 1 Tablet by mouth every six (6) hours as needed for Nausea or Vomiting. 22  Yes Dominic Mascorro NP   levothyroxine (SYNTHROID) 50 mcg tablet Take 1 Tablet by mouth Daily (before breakfast). Take on an empty stomach in the morning. 22  Yes Dominic Mascorro NP   acetaminophen (TYLENOL) 325 mg tablet Take 2 Tablets by mouth every six (6) hours as needed for Pain. 21  Yes Seth Novoa MD   methocarbamoL (Robaxin-750) 750 mg tablet Take 1 Tablet by mouth three (3) times daily as needed for Muscle Spasm(s).  21  Yes John Mcdonald MD   nut tx, lact-reduced, iron (Boost VHC) 0.09-2.25 gram-kcal/mL liqd Take 4 Cans by mouth daily. 6/22/21  Yes Love Mascorro NP   guaiFENesin ER (Mucinex) 600 mg ER tablet Take 1 Tablet by mouth two (2) times a day. 6/22/21  Yes Love Mascorro, NP   lidocaine-prilocaine (EMLA) topical cream Apply a dime size amount to port site 30-60 minutes before chemotherapy to prevent pain with access. 5/13/21  Yes Love Mascorro NP   metFORMIN (GLUCOPHAGE) 500 mg tablet Take  by mouth two (2) times daily (with meals). Yes Provider, Historical   aluminum-magnesium hydroxide 200-200 mg/5 mL susp 5 mL, diphenhydrAMINE 12.5 mg/5 mL liqd 5 mL, lidocaine 2 % soln 5 mL 5 mL by Swish and Spit route two (2) times a day. Magic mouth wash   Maalox  Lidocaine 2% viscous   Diphenhydramine oral solution     Pharmacy to mix equal portions of ingredients to a total volume as indicated in the dispense amount. Yes Provider, Historical        No Known Allergies    Review of Systems:  ROS    A comprehensive review of systems was preformed and it is negative except mentioned in HPI    Objective:     Vitals:    03/09/22 1542   BP: 114/70   Pulse: 93   Resp: 20   Temp: 98.5 °F (36.9 °C)   TempSrc: Oral   SpO2: 99%   Weight: 159 lb 1.6 oz (72.2 kg)   Height: 6' (1.829 m)        Physical Exam:    Physical Exam  Vitals and nursing note reviewed. Constitutional:       Appearance: Normal appearance. HENT:      Head: Normocephalic and atraumatic. Cardiovascular:      Rate and Rhythm: Normal rate and regular rhythm. Pulmonary:      Effort: Pulmonary effort is normal.      Breath sounds: Normal breath sounds. Neurological:      Mental Status: He is alert.           Labs and Imaging:    Last 3 Recorded Weights in this Encounter    03/09/22 1542   Weight: 159 lb 1.6 oz (72.2 kg)        Lab Results   Component Value Date/Time    Hemoglobin A1c 6.0 (H) 02/09/2021 04:20 AM        Assessment:     Patient Active Problem List   Diagnosis Code    Throat cancer ([facility] [address] C14.0    COPD exacerbation (HCC) J44.1    Acute and chronic respiratory failure with hypoxia (HCC) J96.21    Chronic pain due to neoplasm G89.3    Anxiety F41.9    Insomnia G47.00    Respiratory failure with hypoxia (HCC) J96.91    Laryngeal carcinoma (HCC) C32.9    Severe protein-calorie malnutrition (HCC) E43    Hypokalemia E87.6    Prevention of chemotherapy-induced neutropenia Z76.89           Plan:     Hypothyroidism:  I reviewed labs and notes from the referring provider's office. 1-:  TSH elevated at 42.2  No free T4 available  8 AM cortisol 8.1  Levothyroxine dose was increased to 50 mcg daily. Review of old labs, TSH has always been high and free T4 low at least since August 2021. Per patient, he is taking levothyroxine 50 mcg regularly and appropriately. Plan:  Check TSH today. Based on the result I will adjust levothyroxine dose. Educated patient about taking levothyroxine regularly and appropriately. I will see him back in 2 months with labs prior to the visit. Laryngeal carcinoma:  Stage IV, status post surgery, chemo, radiation, immunotherapy, with recurrence in 2021, now on chemotherapy again. Closely following with oncology.     Orders Placed This Encounter    TSH AND FREE T4    TSH AND FREE T4     Standing Status:   Future     Standing Expiration Date:   9/9/2022        Signed By: Edward Cano MD     March 9, 2022      Return to clinic 2 months

## 2022-03-10 NOTE — PROGRESS NOTES
Please call patient's brother Darion Vasquez to inform that we need to increase levothyroxine from 50 mcg to 75 mcg daily. I am sending a prescription to his pharmacy. Very important to take this medication every day first thing in the morning on empty stomach and wait at least 30 minutes before eating or drinking anything else or taking other medications. Patient needs to get labs repeated 2 to 3 days before next appointment. Lab order was given to him.

## 2022-03-22 NOTE — PROGRESS NOTES
[facility] Progress Note    Date: 2022    Name: Lynda Sharpe    MRN: [id number]         : 1970    Mr. Peterson Arrived ambulatory and in no distress for Hydration Regimen. Assessment was completed, no acute issues at this time, no new complaints voiced. Right chest wall port accessed without difficulty, + blood. Covid questionnaire completed. 1. Do you have any symptoms of COVID-19? SOB, coughing, fever, or generally not feeling well - no    2. Have you been exposed to COVID-19 recently? - no    3. Have you had any recent contact with family/friend that has a pending COVID test? - no      Mr. Peterson's vitals were reviewed. Visit Vitals  /61   Pulse 100   Temp 98.1 °F (36.7 °C)   Resp 18   Ht 6' (1.829 m)   Wt 70.4 kg (155 lb 4.8 oz)   SpO2 99%   BMI 21.06 kg/m²       Medications:  Medications Administered     0.9% sodium chloride injection 10 mL     Admin Date  2022 Action  Given Dose  10 mL Route  IntraVENous Administered By  Ariana Barrientos RN          sodium chloride 0.9 % bolus infusion 1,000 mL     Admin Date  2022 Action  New Bag Dose  1,000 mL Rate  1,000 mL/hr Route  IntraVENous Administered By  Ariana Barrientos RN                  Mr. Baldomero Guerrero tolerated treatment well and was discharged from [place] [address] in stable condition. Port de-accessed, flushed & heparinized per protocol. He is to return on 3/25/22 for his next appointment.     Adela Barbosa RN  2022

## 2022-03-23 NOTE — PROGRESS NOTES
[id number] [facility] Oncology at [facility]  [phone]    Hematology / Oncology Established Visit    Reason for Visit:   Lakesha Christianson is a 49 y.o. male who is seen in follow up of laryngeal cancer. Referred by Dr. 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 Stage: CATARINO [[id number]] at diagnosis, regional recurrence in 4/2021, now with metastatic disease. Pathology:   2/8/21 Total laryngectomy: Invasive squamous cell carcinoma  Tumor size 3.5cm, moderately differentiated (G2), PNI present, LVI not identified, margins negative  [id number]  -PDL1 (tested 12/28/21 on above specimen) positive with CPS 10. Prior Treatment:   1. 2/8/21 total laryngectomy/cricopharyngeal myotomy   2. Cisplatin 100mg/m2 every 3 weeks x 3 cycles, 5/24/21-7/13/21  3. Radiation 5/24/21-7/19/21  4. Pembrolizumab x 2 cycles, 1/6/22    Current Treatment:  [Cisplatin (100 mg/m2 intravenous, day 1) and fluorouracil (1000 mg/m2 per day, continuous infusion, for four days) and Pembrolizumab, day 1 given every 21 days x 6 cycles followed by Pembrolizumab maintenance x 2yr. Start 1/28/22 - current. History of Present Illness:   Lakesha Christianson is a 49 y.o. male who with COPD who is referred for Head and neck cancer. He presented with throat pain and hoarseness in Sept 2020. Was evaluated by Dr. Abebe Sim in ENT who performed laryngoscopy and diagnosed patient with squamous cell carcinoma involving the larynx and subglottis. In late Dec 2020, neck CT showed a 3 cm mass in Right supraglottic larynx with extension to thyroid cartilage, but no cervical LNs. PET 1/4/21 showed uptake in the laryngeal mass at right vocal fold, extending to thyroid cartilage. He was scheduled for surgery, but he required emergent tracheostomy prior to that; done late Jan 2021. Also had PEG placed 1/30/21. On 2/8/21, he underwent total laryngectomy/cricopharyngeal myotomy.  He quit smoking in Feb 2021. He was evaluated by Dr. Shukri Stevens in [address] [place] [address] in March 2021 who recommended post-op radiation. There were multiple delays given difficult getting in for dental evaluation prior to RT. [facility] simulation scan was notable for a necrotic appearing tumor in Right neck. PET 5/7/21 confirmed hypermetabolic uptake in right neck. PEG removed in March 2021 due to problems with it. Per Dr. Shukri Stevens, pt had 7 teeth extracted by dentist. He returns to the dentist again for dental fillings on 5/26/21. Pt states his neck feel tight, causing pulling sensation but no current pain. Interval History:  Patient here for follow up and C3 of treatment. He is late by one week due to transportation issues. He completed CT imaging. Restarted eliquis - no more episodes of hemoptysis. Reports dizziness when standing, only drinking 32 oz water. Reports rhinorrhea. Drinking 4 boost a day and eating soft solid food again. No dysphagia. No SOB, CP, constipation or diarrhea. Reports a productive cough with clear mucus. Having normal BM. Intermittent nausea managed with antiemetics. PMHx: COPD, throat cancer, anxiety  PSurgHx: Left arm plate placement, tracheostomy, total laryngectomy 2/8/21  SHx: Quit smoking 10/28/20 after 45 pack years. No EtOH  FHx: Mother with DM; Father with DM, CKD; Sister with DM. No h/o malignancy. Review of Systems: A complete review of systems was obtained, negative except as described above. Physical Exam:   Blood pressure 100/60, pulse 100, temperature 98.8 °F (37.1 °C), height 6' (1.829 m), weight 156 lb 1.6 oz (70.8 kg), SpO2 99 %. ECOG PS: 0  General: Well developed, no acute distress  Eyes: Anicteric sclerae  HENT: Atraumatic  Neck: Supple, Tracheostomy noted  Lymphatic: No supraclavicular, axillary adenopathy. No bilateral cervical LAD, but firm skin at neck bilaterally.    Respiratory: Clear but diminished throughout, normal respiratory effort  CV: Normal rate, regular rhythm, no murmurs, no peripheral edema  GI: Soft, nontender, nondistended, no masses  MS: Normal gait and station. Digits without clubbing or cyanosis. 1cm firm nodule on left wrist.  Skin: No rashes. Hyperpigmented skin at neck bilaterally. Neuro/Psych: Alert. Communicates by writing given tracheostomy. Results:     Lab Results   Component Value Date/Time    WBC 5.1 2022 11:08 AM    HGB 7.6 (L) 2022 11:08 AM    HCT 24.2 (L) 2022 11:08 AM    PLATELET 326  11:08 AM    .4 (H) 2022 11:08 AM    ABS. NEUTROPHILS 3.3 2022 11:08 AM     Lab Results   Component Value Date/Time    Sodium 136 2022 08:01 AM    Potassium 4.4 2022 08:01 AM    Chloride 102 2022 08:01 AM    CO2 28 2022 08:01 AM    Glucose 125 (H) 2022 08:01 AM    BUN 23 (H) 2022 08:01 AM    Creatinine 1.00 2022 08:01 AM    GFR est AA >60 2022 08:01 AM    GFR est non-AA >60 2022 08:01 AM    Calcium 9.4 2022 08:01 AM    Glucose (POC) 113 10/04/2021 01:04 PM     Lab Results   Component Value Date/Time    Bilirubin, total 0.2 2022 08:01 AM    ALT (SGPT) 14 2022 08:01 AM    Alk. phosphatase 70 2022 08:01 AM    Protein, total 7.6 2022 08:01 AM    Albumin 3.4 (L) 2022 08:01 AM    Globulin 4.2 (H) 2022 08:01 AM     Lab Results   Component Value Date/Time    Iron 34 (L) 2021 12:17 PM    TIBC 216 (L) 2021 12:17 PM    Iron % saturation 16 (L) 2021 12:17 PM    Ferritin 939 (H) 2021 12:17 PM      Lab Results   Component Value Date/Time    Vitamin B12 664 2021 11:23 AM    Folate 24.0 (H) 2021 11:23 AM         Imagin/28/21 Neck CT: IMPRESSION  Irregular soft tissue mass involving the aryepiglottic and the larynx. There is significant narrowing of the airway at the level of the larynx  and the supraglottic region. Poorly defined cervical adenopathy is noted.     21 Chest CTA:  IMPRESSION  Minimal scarring or subsegmental atelectasis in the left lung base. No evidence of pulmonary embolism or pneumonia. 5/7/21 PET:  IMPRESSION  2 large necrotic appearing mass lesions are noted in the right neck (SUV 6.4). Small hypermetabolic right posterior triangle lymph node. No PET avid evidence  of distant metastatic disease. 10/4/21 PET:  FINDINGS:  HEAD/NECK: There is physiologic tracer activity in the oral cavity and piriform  sinuses. Physiologic tracer activity is also seen in the neck musculature,  particularly the right sternocleidomastoid muscle. 2 enlarged right level 2  cervical lymph nodes are slightly decreased in size; these demonstrate no  significant residual FDG activity. Previously seen small lymph node in the right  posterior cervical triangle also demonstrates no residual FDG activity. CHEST: There is a new 3.0 x 2.6 cm focus of nodular airspace disease in the left  upper lobe which demonstrates increased FDG activity, maximal SUV 2.8. New foci  of nodular airspace disease in the left lower lobe, measuring 1.2 cm and 1.0 cm,  in the right lower lobe, measuring 0.8 cm, and in the right middle lobe,  measuring 1.6 cm, demonstrate no appreciable FDG activity. ABDOMEN/PELVIS: No foci of abnormal hypermetabolism. SKELETON: No foci of abnormal hypermetabolism in the axial and visualized  appendicular skeleton. IMPRESSION  1. Previously seen enlarged cervical lymph nodes demonstrate no residual  abnormal FDG activity. 2. New foci of nodular airspace disease in both lungs, including a 3 cm area in  the left upper lobe which demonstrates low-grade increased FDG activity. Findings may be infectious etiology, although neoplasm is not excluded;  attention on follow-up examinations is recommended. 11/22/21 CT neck:  IMPRESSION  No significant interval change since the previous CT PET  examination. Necrotic right level 2A lymph nodes unchanged in size.  No definite  new pathologic lymph nodes by CT criteria. Postradiation changes as described  above. Narrowed nasopharyngeal and oropharyngeal airway unchanged. No new pathologic lymphadenopathy by CT criteria is demonstrated. 11/22/21 CT chest:   There is a 15 mm node in the right hilum, station 10 R, series 201 image 39. There is increasing multifocal multi lobar inflammatory appearing airspace  disease in the lungs most suggestive of multifocal pneumonia. Dominant focus of  airspace disease in the lingula on series 204 image 41 measures 3.3 x 3.2 cm,  previously 3.0 x 2.6 cm. Likewise other foci have increased in size with  dominant lesion on the right measuring 2.8 x 1.9 cm on image 50, previously 16  mm. Suspicious right upper lobe nodules are present including a dominant 6 x 9 mm  nodule on series 204 image 25. There are no suspicious lytic or blastic skeletal lesions in the thorax. Incidental imaging of the upper abdomen demonstrates liver steatosis with  probable focal steatosis adjacent to the fissure for the falciform ligament. There may be a small retrocardiac hiatal hernia. IMPRESSION  Increasing multifocal airspace disease with new suspicious solid appearing  nodules in the right upper lobe. 3/16/22 CT abd/pelv:   FINDINGS:  LUNG BASES: Bilateral lung consolidations. No pleural effusion. Cloteal Glover LIVER: Unremarkable. GALLBLADDER AND BILIARY TREE: No evident gallstones, gallbladder wall  thickening, or biliary ductal dilation. PANCREAS: Unremarkable. SPLEEN: Unremarkable. ADRENALS: Unremarkable. KIDNEYS AND URETERS: Symmetric renal size and enhancement. No hydronephrosis or  hydroureter. BLADDER: Unremarkable. REPRODUCTIVE ORGANS: No pelvic masses. BOWEL: Oral contrast has achieved the distal small bowel. The bowel is not  dilated. Small hiatal hernia. PEG tube has been removed. LYMPH NODES:  No lymphadenopathy. PERITONEUM: No free air, ascites, walled off fluid collection.   VESSELS: Abdominal aorta without aneurysm or dissection. ABDOMINAL WALL: Intact. BONES: Unremarkable for age. Small disc bulges at L4-L5 and L5-S1 with mild  canal impingement, unchanged. IMPRESSION  1. No evidence of metastatic disease abdomen pelvis. 2. CT chest performed separately. 3/16/22 CT chest:  FINDINGS:  LINES: Right port distal tip SVC/RA junction. HEART: Normal heart size. No pericardial effusion. THORACIC AORTA: No aneurysm or dissection. PULMONARY ARTERIES: No large central pulmonary arterial filling defect, non-CTA  technique. ADENOPATHY: Developing left hilar adenopathy, see below. THORACIC ESOPHAGUS: Collapsed. LUNG PARENCHYMA: Multiple bilateral pulmonary consolidations. The largest: Right  middle lobe along the fissure 4.8 x 2.3 cm, previously 2.8 x 1.9 cm; lingula 3.2  x 3.3 cm unchanged. Developing consolidation lateral lingula extending from the  hilum 4.8 x 2.5 cm including lymphadenopathy, previously small patchy. CENTRAL AIRWAYS: Tracheostomy tubing in place. Left hilar adenopathy, narrows  the lingular bronchus. PLEURA: No pleural effusion. No pneumothorax. CHEST WALL: No axillary adenopathy. Right chest wall port. UPPER ABDOMEN:  Unremarkable. BONES: Unremarkable for age. IMPRESSION  1. Increasing pulmonary consolidations, and confluent left hilar adenopathy,  concerning for advancing metastatic disease. 2. CT abdomen pelvis reported separately. Assessment & Plan:   Derrick Jones is a 49 y.o. male is seen for laryngeal cancer. 1. Squamous cell carcinoma of larynx, Stage CATARINO [pT4a cN2 Mx]:  S/p total laryngectomy and tracheostomy in Jan 2021. Afterwards, he developed disease recurrence in Right neck confirmed by PET scan. I recommended concurrent chemoradiation using Cisplatin to treat his disease. Pt completed concurrent chemoradiation radiation 7/19/21. Evidence of local treatment response per physical exam and 10/4/21 PET.  However disease progression noted on chest CT 11/22/21 with increasing size in lung nodules, outside of prior radiation field. Per discussion with Thoracic Tumor Board, these nodules would be amenable to biopsy by navigation bronchoscopy if desired. However, RadOnc and I feel this is obvious disease progression given extent of disease at diagnosis. Discussed with patient about disease progression. Discussed pros/con of biopsy to confirm metastatic disease (pro: confirmation rather than assumption of metastatic disease; ability to send metastatic tissue for NGS; con: will lead to treatment delay of clinically likely metastatic disease. ) He elected to proceed with treatment without repeating biopsy. Started Pembrolizumab monotherapy, then switched to Cis-5FU-Pembrolizumab, followed by Pembro maintenance x 2 y given PDL1 CPS < 20. [30% RR in phase III trials.] Repeat imaging showed lung consolidation which could be mild disease progression or infection. Will continue with treatment for now and repeat scans after 2 more cycles. Supportive medications: zofran, compazine, emla, decadron. 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 -- Proceed with C3 of Cis-5-FU-Pembrolizumab (10% DR LOBO) with pump d/c on Tuesday on 3/29/22. Neulasta added with pump removal.   -- Additional fluids scheduled on Tuesdays. -- Due for repeat CT after C4  -- Faxed recent CT results to Dr. Swain on 3/23/22. -- Follow up in 3 weeks for C4, MD/NP visit. 2. COPD / H/o tobacco abuse:   Quit in 2/2021. Uses Albuterol inhaler prn.    4. FEN/GI / Constipation:    Senna-plus BID prn constipation. Increased nausea after C2 not managed with zofran. -- Advised taking zofran in AM/PM and compazine in afternoon for nausea prevention. 5. Macrocytic anemia / anemia of chronic disease:   2/2 chemotherapy. B12, folate normal; ferritin high and iron sat mildly low. -- Recheck cbc with pump removal. Blood consent signed.      6. Hypothyroidism:   Due to prior radiation and improving on levothyroxine to 75mcg/d. Stressed the importance of taking it on an empty stomach every morning at the same time at least an hour before other medications or eating. Managing with Dr. Lisa Condon, Endocrinology   -- Checking TSH/free T4 every other cycle during treatment. Will send updates to Dr. Lisa Condon. 7. Pulmonary embolism:  Provoked by #1. 11/2021 CT chest. On eliquis. Hold for PLT <50.     8. Tracheostomy care:   ENT providing HMEs samples because [facility] is no longer supplying them through his insurance. Dr. Magdaleno Perrin team is working on an appeal.    9. H/o chemotherapy induced mucositis:   Resolved today. Dex rinse and MW not covered by insurance. Sent viscous lidocaine prescription and provided instructions for adding maalox and benadryl. 10. H/o Hemoptysis:  Resolved. Urged pt to report to ER and hold eliquis if hemoptysis recurs. 11. Neck pain:  Present with movement. Advised prn percocet for severe pain and continue PT exercises at home. -- Consultation with lymphedema on 4/4/22. 12. Dizziness:  Occurs with standing. Likely due to hypovolemia. --  Advised increasing hydration to 60 oz daily and will receive 1 L NS with treatment today. Fluids on Tuesdays. 13. Rhinorrhea/productive cough:  Likely multifactorial due to allergies, dryness from chemo and lung consolidation seen on imaging. Continue mucinex for secretions. -- Advised loratadine 10mg daily and nasal saline spray twice daily. Emotional well being: Pt is coping well with his/her disease and has excellent support. I personally saw and evaluated the patient and performed the key components of medical decision making. The history, physical exam, and documentation were performed by Precious Rios NP. I reviewed and verified the above documentation and modified it as needed. Scans show some level of mild disease progression and infectious consolidation. However, pt has only received 2 cycles of chemotherapy and is clinically doing well.  I recommend 2 more cycles of treatment and repeat imaging afterwards. Also advised increasing water intake.      Signed By: Jose King MD     03/25/22

## 2022-03-25 NOTE — PROGRESS NOTES
Jesus Alberto Cruz is a 49 y.o. male here for follow up of laryngeal cancer. Patient with no complaints of pain at this time.

## 2022-03-25 NOTE — TELEPHONE ENCOUNTER
[address] at [place]  [phone]        03/25/22 9:51 AM Called lymphedema clinic and spoke with Felicia Sheehan. Requested appointment on 03/30 at 10 AM per patient request. Felciia Sheehan explained that Danny Perez is the only head and neck therapist at clinic. Soonest available appointment was Monday, 04/04, at 8:30 AM. Proceeded with scheduling since next available was at the end of April. 9:55 AM Called patient's brother, Opal Irwin. Advised of above. He stated he would update patient of appointment as well. No further questions or concerns at this time.

## 2022-03-25 NOTE — PROGRESS NOTES
[facility] VISIT NOTE  Date: 2022    Name: Yobany Freeman    MRN: [id number]         : 1970    0800  Mr. Peterson Arrived ambulatory and in no distress for C3D1 of Keytruda, Cisplatin, and 5FU Regimen. Assessment was completed by Shelly Fenton RN, no acute issues at this time, no new complaints voiced. Chest wall port accessed without difficulty, labs drawn & sent for processing. 1. Do you have any symptoms of COVID-19? SOB, coughing, fever, or generally not feeling well NO    2. Have you been exposed to COVID-19 recently? NO    3. Have you had any recent contact with family/friend that has a pending COVID test? NO       Chemotherapy Flowsheet 3/25/2022   Cycle C3D1   Date 3/25/2022   Drug / Regimen Keytruda/Cisplatin/5FU   Pre Hydration given   Post Hydration given   Pre Meds given   Notes given           Mr. Peterson's vitals were reviewed. Patient Vitals for the past 12 hrs:   Temp Pulse Resp BP SpO2   22 1655  94  107/64 100 %   22 0804 98.8 °F (37.1 °C) 100 18 100/60 99 %         Lab results were obtained and reviewed. Recent Results (from the past 12 hour(s))   CBC WITH AUTOMATED DIFF    Collection Time: 22  8:12 AM   Result Value Ref Range    WBC 6.1 4.1 - 11.1 K/uL    RBC 2.31 (L) 4.10 - 5.70 M/uL    HGB 7.4 (L) 12.1 - 17.0 g/dL    HCT 23.4 (L) 36.6 - 50.3 %    .3 (H) 80.0 - 99.0 FL    MCH 32.0 26.0 - 34.0 PG    MCHC 31.6 30.0 - 36.5 g/dL    RDW 17.2 (H) 11.5 - 14.5 %    PLATELET 544 269 - 802 K/uL    MPV 10.6 8.9 - 12.9 FL    NRBC 0.3 (H) 0  WBC    ABSOLUTE NRBC 0.02 (H) 0.00 - 0.01 K/uL    NEUTROPHILS 81 (H) 32 - 75 %    LYMPHOCYTES 5 (L) 12 - 49 %    MONOCYTES 14 (H) 5 - 13 %    EOSINOPHILS 0 0 - 7 %    BASOPHILS 0 0 - 1 %    IMMATURE GRANULOCYTES 0 0.0 - 0.5 %    ABS. NEUTROPHILS 4.9 1.8 - 8.0 K/UL    ABS. LYMPHOCYTES 0.3 (L) 0.8 - 3.5 K/UL    ABS. MONOCYTES 0.9 0.0 - 1.0 K/UL    ABS. EOSINOPHILS 0.0 0.0 - 0.4 K/UL    ABS.  BASOPHILS 0.0 0.0 - 0.1 K/UL ABS. IMM. GRANS. 0.0 0.00 - 0.04 K/UL    DF SMEAR SCANNED      RBC COMMENTS ANISOCYTOSIS  1+        RBC COMMENTS MACROCYTOSIS  1+       METABOLIC PANEL, COMPREHENSIVE    Collection Time: 03/25/22  8:12 AM   Result Value Ref Range    Sodium 138 136 - 145 mmol/L    Potassium 4.2 3.5 - 5.1 mmol/L    Chloride 103 97 - 108 mmol/L    CO2 28 21 - 32 mmol/L    Anion gap 7 5 - 15 mmol/L    Glucose 139 (H) 65 - 100 mg/dL    BUN 22 (H) 6 - 20 MG/DL    Creatinine 1.03 0.70 - 1.30 MG/DL    BUN/Creatinine ratio 21 (H) 12 - 20      GFR est AA >60 >60 ml/min/1.73m2    GFR est non-AA >60 >60 ml/min/1.73m2    Calcium 9.1 8.5 - 10.1 MG/DL    Bilirubin, total 0.6 0.2 - 1.0 MG/DL    ALT (SGPT) 14 12 - 78 U/L    AST (SGOT) 16 15 - 37 U/L    Alk.  phosphatase 60 45 - 117 U/L    Protein, total 7.4 6.4 - 8.2 g/dL    Albumin 3.6 3.5 - 5.0 g/dL    Globulin 3.8 2.0 - 4.0 g/dL    A-G Ratio 0.9 (L) 1.1 - 2.2     MAGNESIUM    Collection Time: 03/25/22  8:12 AM   Result Value Ref Range    Magnesium 2.1 1.6 - 2.4 mg/dL       Medications received:  Medications Administered     0.9% sodium chloride 1,000 mL with potassium chloride 10 mEq, magnesium sulfate 2 g infusion     Admin Date  03/25/2022 Action  New Bag Dose   Rate  1,000 mL/hr Route  IntraVENous Administered By  Brant Leger RN           Admin Date  03/25/2022 Action  New Bag Dose   Rate  1,000 mL/hr Route  IntraVENous Administered By  Brant Leger RN          0.9% sodium chloride infusion     Admin Date  03/25/2022 Action  New Bag Dose  25 mL/hr Rate  25 mL/hr Route  IntraVENous Administered By  Brant Leger RN          0.9% sodium chloride injection 10 mL     Admin Date  03/25/2022 Action  Given Dose  10 mL Route  IntraVENous Administered By  David Leslie RN          CISplatin (PLATINOL) 186 mg in 0.9% sodium chloride 500 mL, overfill volume 50 mL chemo infusion     Admin Date  03/25/2022 Action  New Bag Dose  186 mg Rate  368 mL/hr Route  IntraVENous Administered By  Shannon Randall, Hitesh Fitzgerald RN          dexamethasone (DECADRON) 4 mg/mL injection 8 mg     Admin Date  03/25/2022 Action  Given Dose  8 mg Route  IntraVENous Administered By  Dylan Bill RN          fluorouraciL (ADRUCIL) 6,696 mg in 0.9% sodium chloride 250 mL CADD Cassette     Admin Date  03/25/2022 Action  New Bag Dose  6,696 mg Rate  2.6 mL/hr Route  IntraVENous Administered By  Fariha Ivey RN          fosaprepitant (EMEND) 150 mg in 0.9% sodium chloride 150 mL IVPB     Admin Date  03/25/2022 Action  New Bag Dose  150 mg Rate  450 mL/hr Route  IntraVENous Administered By  Dylan Bill RN          palonosetron HCl (ALOXI) injection 0.25 mg     Admin Date  03/25/2022 Action  Given Dose  0.25 mg Route  IntraVENous Administered By  Dylan Bill RN          pembrolizumab [facility]) 200 mg in 0.9% sodium chloride 100 mL, overfill volume 10 mL IVPB     Admin Date  03/25/2022 Action  New Bag Dose  200 mg Rate  236 mL/hr Route  IntraVENous Administered By  Dylan Bill RN                 Mr. Arti Lou tolerated treatment well and was discharged from [place] [address] in stable condition at [phone]. Port flushed and connected to infusing CADD pump. He is to return on  March 29, 2022 at 1400 for his next appointment.     Giovanna Littlejohn RN  March 25, 2022    Future Appointments:  Future Appointments   Date Time Provider Nathanael Samina   3/29/2022  2:00 PM SS INF4 CH4 <1H [facility]   4/4/2022  8:30 AM Timothy Beckett [address]   4/15/2022  8:00 AM [facility]   4/15/2022  8:15 AM Ihsan Mascorro NP [facility] AMB   4/19/2022  3:00 PM SS INF4 CH4 <1H [facility]   5/6/2022  8:00 AM SS INF1 CH1 >4H [facility]   5/9/2022  1:45 PM Sara Garcia MD [facility] BS AMB   5/10/2022  2:00 PM SS INF7 CH2 <1H [facility] [address] [place] [address]

## 2022-03-29 NOTE — PROGRESS NOTES
[facility] Progress Note    Date: 2022    Name: Naren Mujica    MRN: [id number]         : 1970    Mr. Peterson Arrived ambulatory and in no distress for Hydration/pump removal/neulasta OBI. Assessment was completed, no acute issues at this time, no new complaints voiced. Upon arrival patient's cadd pump was not complete. Pump had 9.9 ml remaining. Patient stated that he would need to leave in 1.5 hour when his cab would return. MD office notified and RN instructed to run hydration through a PIV while chemo continues to infuse for additional hour. Labs drawn with PIV start and sent for processing. Mr. Janine Strickland vitals were reviewed. Visit Vitals  BP (!) 95/58   Pulse 86   Temp 97.9 °F (36.6 °C)   Resp 18   Ht 6' (1.829 m)   Wt 72.5 kg (159 lb 14.4 oz)   BMI 21.69 kg/m²             Medications:  Medications Administered     0.9% sodium chloride infusion 1,000 mL     Admin Date  2022 Action  New Bag Dose  1,000 mL Rate  1,000 mL/hr Route  IntraVENous Administered By  [facility] [place], [place]          heparin (porcine) pf 300-500 Units     Admin Date  2022 Action  Given Dose  500 Units Route  InterCATHeter Administered By  [facility] [place], [place]          pegfilgrastim (NEULASTA) wearable SQ injector 6 mg     Admin Date  2022 Action  Given Dose  6 mg Route  SubCUTAneous Administered By  [facility] [place], [place]          sodium chloride (NS) flush 10 mL     Admin Date  2022 Action  Given Dose  10 mL Route  IntraVENous Administered By  [facility] 63 hydration Mr. Peterson's cadd pump was removed with 6.1ml remaining in cartridge, 243.9 ml given. Mr. Arti Lou tolerated treatment well and was discharged from [place] [address] in stable condition at 1450. Port de-accessed, flushed & heparinized per protocol. He is to return on April 15 at 0800 for his next appointment.     [facility]  2022

## 2022-03-30 NOTE — TELEPHONE ENCOUNTER
[address] [facility]   Oncology Social Work  Encounter      Patient Name:  Luz Peterson History: h&n cancer      Advance Directives: none on file; conversation deferred     Narrative: Assisted patient with scheduling transportation.  Called Medicaid/ #[id number] and booked the following dates:     April 4th at [address] [facility] pickup time  Trip # [id number]     April 5th at [address] [facility] pickup time  Trip # [id number]    April 12th at [employer] [facility] [employer] pickup time  Trip # [id number]    April 15th at [employer] [employer] pickup time  Trip # [id number]    April 19th at 2pm - Oncology  1pm pickup time  Trip # [id number]    Called patient to inform about transportation.      Referral/Handouts:   Transportation referral     Thank you,  Farhad Huddleston LCSW

## 2022-04-04 NOTE — PROGRESS NOTES
Chief Complaint   Patient presents with   Sonam Goff is a pleasant 49year old male who presents as a follow up.  He reports tongue pain

## 2022-04-04 NOTE — PROGRESS NOTES
[id number] [facility] at [address] [place] [address]  [phone]    Hematology / Oncology Established Visit    Reason for Visit:   Gerald Pires is a 49 y.o. male who is seen in follow up of laryngeal cancer. Referred by Dr. 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 Stage: CATARINO [[id number]] at diagnosis, regional recurrence in 4/2021, now with metastatic disease. Pathology:   2/8/21 Total laryngectomy: Invasive squamous cell carcinoma  Tumor size 3.5cm, moderately differentiated (G2), PNI present, LVI not identified, margins negative  [id number]  -PDL1 (tested 12/28/21 on above specimen) positive with CPS 10. Prior Treatment:   1. 2/8/21 total laryngectomy/cricopharyngeal myotomy   2. Cisplatin 100mg/m2 every 3 weeks x 3 cycles, 5/24/21-7/13/21  3. Radiation 5/24/21-7/19/21  4. Pembrolizumab x 2 cycles, 1/6/22    Current Treatment:  [Cisplatin (100 mg/m2 intravenous, day 1) and fluorouracil (1000 mg/m2 per day, continuous infusion, for four days) and Pembrolizumab, day 1 given every 21 days x 6 cycles followed by Pembrolizumab maintenance x 2yr. Start 1/28/22 - current. History of Present Illness:   Gerald Pires is a 49 y.o. male who with COPD who is referred for Head and neck cancer. He presented with throat pain and hoarseness in Sept 2020. Was evaluated by Dr. Itz Motta in ENT who performed laryngoscopy and diagnosed patient with squamous cell carcinoma involving the larynx and subglottis. In late Dec 2020, neck CT showed a 3 cm mass in Right supraglottic larynx with extension to thyroid cartilage, but no cervical LNs. PET 1/4/21 showed uptake in the laryngeal mass at right vocal fold, extending to thyroid cartilage. He was scheduled for surgery, but he required emergent tracheostomy prior to that; done late Jan 2021. Also had PEG placed 1/30/21. On 2/8/21, he underwent total laryngectomy/cricopharyngeal myotomy.  He quit smoking in Feb 2021. He was evaluated by Dr. Christopher Majano in [address] [place] [address] in March 2021 who recommended post-op radiation. There were multiple delays given difficult getting in for dental evaluation prior to RT. [facility] simulation scan was notable for a necrotic appearing tumor in Right neck. PET 5/7/21 confirmed hypermetabolic uptake in right neck. PEG removed in March 2021 due to problems with it. Per Dr. Christopher Majano, pt had 7 teeth extracted by dentist. He returns to the dentist again for dental fillings on 5/26/21. Pt states his neck feel tight, causing pulling sensation but no current pain. Interval History:  Patient here for urgent follow up of mucositis. Received C3 treatment on 3/25/22 with pump removal on 3/29/22. Developed mucositis several days after pump removal. Sores present along left side of tongue and one along back gumline. He has not been able to eat solid food due to pain - he is able to drink water and drink high calorie boost. Denies throat pain, lip tingling. He is using baking soda rinses and lidocaine. Taking tramadol with minimal relief of pain. PMHx: COPD, throat cancer, anxiety  PSurgHx: Left arm plate placement, tracheostomy, total laryngectomy 2/8/21  SHx: Quit smoking 10/28/20 after 45 pack years. No EtOH  FHx: Mother with DM; Father with DM, CKD; Sister with DM. No h/o malignancy. Review of Systems: A complete review of systems was obtained, negative except as described above. Physical Exam:   Blood pressure 116/70, pulse 85, resp. rate 16, height 6' (1.829 m), weight 153 lb 3.2 oz (69.5 kg), SpO2 96 %. ECOG PS: 0  General: Well developed, no acute distress  Eyes: Anicteric sclerae  HENT: Atraumatic, several mucositis lesions present on right lateral side of tongue and one present along gumline. Neck: Supple, Tracheostomy noted  Lymphatic: No supraclavicular, axillary adenopathy. No bilateral cervical LAD, but firm skin at neck bilaterally.    Respiratory: Clear but diminished throughout, normal respiratory effort  CV: Normal rate, regular rhythm, no murmurs, no peripheral edema  GI: Soft, nontender, nondistended, no masses  MS: Normal gait and station. Digits without clubbing or cyanosis. 1cm firm nodule on left wrist.  Skin: No rashes. Hyperpigmented skin at neck bilaterally. Neuro/Psych: Alert. Communicates by writing given tracheostomy. Results:     Lab Results   Component Value Date/Time    WBC 7.3 2022 01:35 PM    HGB 7.1 (L) 2022 01:35 PM    HCT 22.5 (L) 2022 01:35 PM    PLATELET 166 (H)  01:35 PM    .4 (H) 2022 01:35 PM    ABS. NEUTROPHILS 4.9 2022 08:12 AM     Lab Results   Component Value Date/Time    Sodium 138 2022 08:12 AM    Potassium 4.2 2022 08:12 AM    Chloride 103 2022 08:12 AM    CO2 28 2022 08:12 AM    Glucose 139 (H) 2022 08:12 AM    BUN 22 (H) 2022 08:12 AM    Creatinine 1.03 2022 08:12 AM    GFR est AA >60 2022 08:12 AM    GFR est non-AA >60 2022 08:12 AM    Calcium 9.1 2022 08:12 AM    Glucose (POC) 113 10/04/2021 01:04 PM     Lab Results   Component Value Date/Time    Bilirubin, total 0.6 2022 08:12 AM    ALT (SGPT) 14 2022 08:12 AM    Alk. phosphatase 60 2022 08:12 AM    Protein, total 7.4 2022 08:12 AM    Albumin 3.6 2022 08:12 AM    Globulin 3.8 2022 08:12 AM     Lab Results   Component Value Date/Time    Iron 34 (L) 2021 12:17 PM    TIBC 216 (L) 2021 12:17 PM    Iron % saturation 16 (L) 2021 12:17 PM    Ferritin 939 (H) 2021 12:17 PM      Lab Results   Component Value Date/Time    Vitamin B12 664 2021 11:23 AM    Folate 24.0 (H) 2021 11:23 AM         Imagin/28/21 Neck CT: IMPRESSION  Irregular soft tissue mass involving the aryepiglottic and the larynx. There is significant narrowing of the airway at the level of the larynx  and the supraglottic region.  Poorly defined cervical adenopathy is noted. 2/25/21 Chest CTA:  IMPRESSION  Minimal scarring or subsegmental atelectasis in the left lung base. No evidence of pulmonary embolism or pneumonia. 5/7/21 PET:  IMPRESSION  2 large necrotic appearing mass lesions are noted in the right neck (SUV 6.4). Small hypermetabolic right posterior triangle lymph node. No PET avid evidence  of distant metastatic disease. 10/4/21 PET:  FINDINGS:  HEAD/NECK: There is physiologic tracer activity in the oral cavity and piriform  sinuses. Physiologic tracer activity is also seen in the neck musculature,  particularly the right sternocleidomastoid muscle. 2 enlarged right level 2  cervical lymph nodes are slightly decreased in size; these demonstrate no  significant residual FDG activity. Previously seen small lymph node in the right  posterior cervical triangle also demonstrates no residual FDG activity. CHEST: There is a new 3.0 x 2.6 cm focus of nodular airspace disease in the left  upper lobe which demonstrates increased FDG activity, maximal SUV 2.8. New foci  of nodular airspace disease in the left lower lobe, measuring 1.2 cm and 1.0 cm,  in the right lower lobe, measuring 0.8 cm, and in the right middle lobe,  measuring 1.6 cm, demonstrate no appreciable FDG activity. ABDOMEN/PELVIS: No foci of abnormal hypermetabolism. SKELETON: No foci of abnormal hypermetabolism in the axial and visualized  appendicular skeleton. IMPRESSION  1. Previously seen enlarged cervical lymph nodes demonstrate no residual  abnormal FDG activity. 2. New foci of nodular airspace disease in both lungs, including a 3 cm area in  the left upper lobe which demonstrates low-grade increased FDG activity. Findings may be infectious etiology, although neoplasm is not excluded;  attention on follow-up examinations is recommended. 11/22/21 CT neck:  IMPRESSION  No significant interval change since the previous CT PET  examination.  Necrotic right level 2A lymph nodes unchanged in size. No definite  new pathologic lymph nodes by CT criteria. Postradiation changes as described  above. Narrowed nasopharyngeal and oropharyngeal airway unchanged. No new pathologic lymphadenopathy by CT criteria is demonstrated. 11/22/21 CT chest:   There is a 15 mm node in the right hilum, station 10 R, series 201 image 39. There is increasing multifocal multi lobar inflammatory appearing airspace  disease in the lungs most suggestive of multifocal pneumonia. Dominant focus of  airspace disease in the lingula on series 204 image 41 measures 3.3 x 3.2 cm,  previously 3.0 x 2.6 cm. Likewise other foci have increased in size with  dominant lesion on the right measuring 2.8 x 1.9 cm on image 50, previously 16  mm. Suspicious right upper lobe nodules are present including a dominant 6 x 9 mm  nodule on series 204 image 25. There are no suspicious lytic or blastic skeletal lesions in the thorax. Incidental imaging of the upper abdomen demonstrates liver steatosis with  probable focal steatosis adjacent to the fissure for the falciform ligament. There may be a small retrocardiac hiatal hernia. IMPRESSION  Increasing multifocal airspace disease with new suspicious solid appearing  nodules in the right upper lobe. 3/16/22 CT abd/pelv:   FINDINGS:  LUNG BASES: Bilateral lung consolidations. No pleural effusion. Marnell Records LIVER: Unremarkable. GALLBLADDER AND BILIARY TREE: No evident gallstones, gallbladder wall  thickening, or biliary ductal dilation. PANCREAS: Unremarkable. SPLEEN: Unremarkable. ADRENALS: Unremarkable. KIDNEYS AND URETERS: Symmetric renal size and enhancement. No hydronephrosis or  hydroureter. BLADDER: Unremarkable. REPRODUCTIVE ORGANS: No pelvic masses. BOWEL: Oral contrast has achieved the distal small bowel. The bowel is not  dilated. Small hiatal hernia. PEG tube has been removed. LYMPH NODES:  No lymphadenopathy.   PERITONEUM: No free air, ascites, walled off fluid collection. VESSELS: Abdominal aorta without aneurysm or dissection. ABDOMINAL WALL: Intact. BONES: Unremarkable for age. Small disc bulges at L4-L5 and L5-S1 with mild  canal impingement, unchanged. IMPRESSION  1. No evidence of metastatic disease abdomen pelvis. 2. CT chest performed separately. 3/16/22 CT chest:  FINDINGS:  LINES: Right port distal tip SVC/RA junction. HEART: Normal heart size. No pericardial effusion. THORACIC AORTA: No aneurysm or dissection. PULMONARY ARTERIES: No large central pulmonary arterial filling defect, non-CTA  technique. ADENOPATHY: Developing left hilar adenopathy, see below. THORACIC ESOPHAGUS: Collapsed. LUNG PARENCHYMA: Multiple bilateral pulmonary consolidations. The largest: Right  middle lobe along the fissure 4.8 x 2.3 cm, previously 2.8 x 1.9 cm; lingula 3.2  x 3.3 cm unchanged. Developing consolidation lateral lingula extending from the  hilum 4.8 x 2.5 cm including lymphadenopathy, previously small patchy. CENTRAL AIRWAYS: Tracheostomy tubing in place. Left hilar adenopathy, narrows  the lingular bronchus. PLEURA: No pleural effusion. No pneumothorax. CHEST WALL: No axillary adenopathy. Right chest wall port. UPPER ABDOMEN:  Unremarkable. BONES: Unremarkable for age. IMPRESSION  1. Increasing pulmonary consolidations, and confluent left hilar adenopathy,  concerning for advancing metastatic disease. 2. CT abdomen pelvis reported separately. Assessment & Plan:   Jesi White is a 49 y.o. male is seen for laryngeal cancer. 1. Squamous cell carcinoma of larynx, Stage CATARINO [pT4a cN2 Mx]:  S/p total laryngectomy and tracheostomy in Jan 2021. Afterwards, he developed disease recurrence in Right neck confirmed by PET scan. I recommended concurrent chemoradiation using Cisplatin to treat his disease. Pt completed concurrent chemoradiation radiation 7/19/21. Evidence of local treatment response per physical exam and 10/4/21 PET.  However disease progression noted on chest CT 11/22/21 with increasing size in lung nodules, outside of prior radiation field. Per discussion with Thoracic Tumor Board, these nodules would be amenable to biopsy by navigation bronchoscopy if desired. However, [place] and I feel this is obvious disease progression given extent of disease at diagnosis. Discussed with patient about disease progression. Discussed pros/con of biopsy to confirm metastatic disease (pro: confirmation rather than assumption of metastatic disease; ability to send metastatic tissue for NGS; con: will lead to treatment delay of clinically likely metastatic disease. ) He elected to proceed with treatment without repeating biopsy. Started Pembrolizumab monotherapy, then switched to Cis-5FU-Pembrolizumab, followed by Pembro maintenance x 2 y given PDL1 CPS < 20. [30% RR in phase III trials.] Repeat imaging showed lung consolidation which could be mild disease progression or infection. Will continue with treatment for now and repeat scans after 2 more cycles. Supportive medications: zofran, compazine, emla, decadron. 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 -- Completed C3 of Cis-5-FU-Pembrolizumab (10% DR 5-FU) on 3/29/22. Neulasta added with pump removal.   -- Additional fluids scheduled on Tuesdays. -- Due for repeat CT after C4  -- Faxed recent CT results to Dr. Swain on 3/23/22. -- Follow up on 4/15/22 weeks for C4, MD/NP visit. Will discuss reducing 5-FU further with C4 given Grade 2 mucositis. 2. COPD / H/o tobacco abuse:   Quit in 2/2021. Uses Albuterol inhaler prn.    4. FEN/GI / Constipation:    Senna-plus BID prn constipation. Increased nausea after C2 not managed with zofran. -- Advised taking zofran in AM/PM and compazine in afternoon for nausea prevention. 5. Macrocytic anemia / anemia of chronic disease:   2/2 chemotherapy. B12, folate normal; ferritin high and iron sat mildly low.     6. Hypothyroidism:   Due to prior radiation and improving on levothyroxine to 75mcg/d. Stressed the importance of taking it on an empty stomach every morning at the same time at least an hour before other medications or eating. Managing with Dr. Baxter Ganser, Endocrinology   -- Checking TSH/free T4 every other cycle during treatment. Will send updates to Dr. Baxter Ganser. 7. Pulmonary embolism:  Provoked by #1. 11/2021 CT chest. On eliquis. Hold for PLT <50.     8. Tracheostomy care:   ENT providing HMEs samples because [facility] is no longer supplying them through his insurance. Dr. Palomino Ros team is working on an appeal.    9. Chemotherapy induced mucositis:   Grade 2/3 interfering with eating solid food. Worse today, present on tongue and jawline. Dex rinse and MW not covered by insurance. Using baking soda rinses and viscous lidocaine prescription and provided instructions for adding maalox and benadryl. -- Advised percocet every 8 hours prn severe pain #20 tabs rx today. Reviewed side effects sedation, constipation. He is on a bowel regimen. Stop tramadol. -- Advised taking afternoon tylenol 500 or 1000mg if needed. Reviewed max dose of tylenol 3 grams per day. -- Will consider further dose reduction of 5-FU with C4.    10. H/o Hemoptysis:  Resolved. Urged pt to report to ER and hold eliquis if hemoptysis recurs. 11. Neck pain:  Present with movement. Advised prn percocet for severe pain and continue PT exercises at home. -- Consultation with lymphedema on 4/4/22. 12. Dizziness:  Occurs with standing. Likely due to hypovolemia. Unable to stay for fluids today. -- Advised increasing hydration to 60 oz daily and will receive 1 L NS tomorrow. 13. Rhinorrhea/productive cough:  Likely multifactorial due to allergies, dryness from chemo and lung consolidation seen on imaging. Continue mucinex for secretions. -- Advised loratadine 10mg daily and nasal saline spray twice daily.     Emotional well being: Pt is coping well with his/her disease and has excellent support.   .     Signed By: Robin Salgado NP     04/04/22

## 2022-04-04 NOTE — PROGRESS NOTES
[facility]  Lymphedema Clinic and Cancer Rehabilitation  [address] [place] [address].  [address]  [address]      INITIAL EVALUATION    NAME: Yobany Freeman AGE: 49 y.o. GENDER: male  DATE: 4/6/2022  REFERRING PHYSICIAN: Naun Flowers NP  HISTORY AND BACKGROUND: Patient referred to clinic for evaluation and treatment of HNL and cancer associated pain. Communicates via writing. Reports L neck pain, describing as tightness. States manual therapy previously helpful with PT at [facility]. 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      Stage: CATARINO [[id number]] at diagnosis, regional recurrence in 4/2021, now with metastatic disease.      Pathology:   2/8/21 Total laryngectomy: Invasive squamous cell carcinoma  Tumor size 3.5cm, moderately differentiated (G2), PNI present, LVI not identified, margins negative  [id number]  -PDL1 (tested 12/28/21 on above specimen) positive with CPS 10.      Prior Treatment:   1. 2/8/21 total laryngectomy/cricopharyngeal myotomy   2. Cisplatin 100mg/m2 every 3 weeks x 3 cycles, 5/24/21-7/13/21  3. Radiation 5/24/21-7/19/21  4. Pembrolizumab x 2 cycles, 1/6/22     Current Treatment:  [Cisplatin (100 mg/m2 intravenous, day 1) and fluorouracil (1000 mg/m2 per day, continuous infusion, for four days) and Pembrolizumab, day 1 given every 21 days x 6 cycles followed by Pembrolizumab maintenance x 2yr. Start 1/28/22 - current.    Primary Diagnosis:  Lymphedema, not elsewhere classified (I89.0)  Cancer associated pain (G89.3)  Other Treatment Diagnoses:   Swelling not relieved by elevation (R60.9)   Malignant neoplasm of larynx (C32.9)  · Malignant neoplasm of breast with lumpectomy or mastectomy (C50.919)  · Lack of coordination (R27.9)  Date of Onset: 2/8/2021  Present Symptoms and Functional Limitations: neck pain, tightness, swelling impacting optimal performance of self care, routine, leisure activity performance. Reports negative impact of sleep. Lymphedema Life Impact Scale (LLIS): 16/68; 24% impairment  Past Medical History:   Past Medical History:   Diagnosis Date    Chronic pain     THROAT, EARS, NECK R/T CANCER    COPD (chronic obstructive pulmonary disease) ([facility] [address]     EMPHASEMA    Psychiatric disorder     ANXIETY R/T CANCER    Throat cancer (HCC)     Tracheostomy present ([facility] [address]      Past Surgical History:   Procedure Laterality Date    HX ORTHOPAEDIC      LEFT ARM- \" PUT PLATE IN\"    HX TRACHEOSTOMY  02/08/2021    IR INSERT TUNL CVC W PORT OVER 5 YEARS  5/21/2021    IR PLACE CVAD FLUORO GUIDE  5/21/2021     Current Medications:    Current Outpatient Medications   Medication Sig    oxyCODONE-acetaminophen (PERCOCET) 5-325 mg per tablet Take 1 Tablet by mouth every eight (8) hours as needed for Pain for up to 10 days. Max Daily Amount: 3 Tablets.  potassium chloride (Klor-Con M20) 20 mEq tablet Take 1 Tablet by mouth daily.  loratadine (CLARITIN) 10 mg tablet Take 1 Tablet by mouth daily as needed for Allergies.  levothyroxine (SYNTHROID) 75 mcg tablet Take 1 Tablet by mouth Daily (before breakfast) for 30 days.  lidocaine (XYLOCAINE) 2 % solution Take 15 mL by mouth as needed for Pain.  apixaban (ELIQUIS) 5 mg tablet Take 1 Tablet by mouth two (2) times a day.  magic mouthwash (FIRST-MOUTHWASH BLM) 672--40 mg/30 mL mwsh oral suspension Take 10 mL by mouth every four (4) hours as needed (mucositis).  nystatin (MYCOSTATIN) 100,000 unit/mL suspension Take 5 mL by mouth four (4) times daily. swish and spit    ondansetron hcl (ZOFRAN) 8 mg tablet Take 1 Tablet by mouth every eight (8) hours as needed for Nausea or Vomiting.  prochlorperazine (Compazine) 10 mg tablet Take 1 Tablet by mouth every six (6) hours as needed for Nausea or Vomiting.     acetaminophen (TYLENOL) 325 mg tablet Take 2 Tablets by mouth every six (6) hours as needed for Pain.  nut tx, lact-reduced, iron (Boost VHC) 0.09-2.25 gram-kcal/mL liqd Take 4 Cans by mouth daily.  guaiFENesin ER (Mucinex) 600 mg ER tablet Take 1 Tablet by mouth two (2) times a day.  lidocaine-prilocaine (EMLA) topical cream Apply a dime size amount to port site 30-60 minutes before chemotherapy to prevent pain with access.  metFORMIN (GLUCOPHAGE) 500 mg tablet Take  by mouth two (2) times daily (with meals). (Patient not taking: Reported on 3/25/2022)    aluminum-magnesium hydroxide 200-200 mg/5 mL susp 5 mL, diphenhydrAMINE 12.5 mg/5 mL liqd 5 mL, lidocaine 2 % soln 5 mL 5 mL by Swish and Spit route two (2) times a day. Magic mouth wash   Maalox  Lidocaine 2% viscous   Diphenhydramine oral solution     Pharmacy to mix equal portions of ingredients to a total volume as indicated in the dispense amount. No current facility-administered medications for this encounter. Allergies: No Known Allergies     Prior Level of Function/Social/Work History/Personal factors and/or comorbidities impacting plan of care:  No neck pain, swelling prior to cancer treatment. Able to speak to communicate prior to cancer of larynx. . Lives with friend, Yovani Victor. Non-verbal, prefers to communicate in writing. Neck pain/swelling impacting performance of leisure, routine, activities. Living Situation: private residence  Trainable Caregiver?: Yes  Mobility: Level of assistance:  Independent  Sleeping Arrangement:  in bed  Adaptive Equipment Owned: none  Other: Patient is anticipated to be able to don compression garment independently when introduced into care. Enjoys fishing. Community Resources Addressed (if applicable): Yes    Previous Therapy:  PT [facility] manual therapy, exercise 11/2021 to 2/2022. Compression/Lymphedema Equipment: none    SUBJECTIVE: Patient arrives with pen and paper to use for communication. Patient reports he responds well to written and verbal instruction. Reports he is tolerating soft diet, continues with Boost supplement. Patients goals for therapy: decrease swelling, to be measured for new compression garments and decrease pain. .   OBJECTIVE DATA SUMMARY:   EXAMINATION/PRESENTATION/DECISION MAKING:   Pain: 5/10, neck, L lateral greater than R lateral aspect. Self-Care and ADLs: indep    Skin and Tissue Assessment:  Dermal Status: Dry and poor skin turgor lateral neck R/L     Texture/Consistency: Brawny and Fibrotic/Woody R/L lateral neck    Pigmentation/Color Change: Hyperpigmented, Hyperlipodermatosclerosis, neck    Anomalies: N/A      Photos:      Height:  Height: 6' (182.9 cm)  Weight:  Weight: 69.2 kg (152 lb 9.6 oz)    BMI:  BMI (calculated): 20.7  (36 or greater: adversely affecting lymphedema)    Girth measurements:  Base of neck: 37.7 cm  Under chin: 39.5 cm  Earlobe to earlobe: 26 cm       ROM: bilateral LE/UE WFL. Cervical ROM as noted:                               AROM       PROM  Flexion 40     Extension 30                                         AROM                   PROM    R L R L   Lat Flexion 30 40       Rotation 50 40         Strength: WFL bilateral LE/UE      Mobility:    Bed/Chair Mobility: Independent  Transfers: Independent  Gait: Independent  Endurance: intact for gait community distances.   Other:     Safety:  Patient is alert and oriented:  X 4  Safety awareness: intact  Fall risk?: low  Patient given written fall prevention handout: Yes       Physical Therapy Evaluation Charge Determination   History Examination Presentation Decision-Making   MEDIUM  Complexity : 1-2 comorbidities / personal factors will impact the outcome/ POC  MEDIUM Complexity : 3 Standardized tests and measures addressing body structure, function, activity limitation and / or participation in recreation  MEDIUM Complexity : Evolving with changing characteristics  Other outcome measures LLIS  LOW       Based on the above components, the patient evaluation is determined to be of the following complexity level: LOW   :  Evaluation Time: 8:40-9:00 am       TREATMENT PROVIDED:   1. Treatment description:  Manual Therapy: Patient instructed in skin care principles and anatomy of the lymphatic system. Therapist able to demonstrate manual lymphatic drainage techniques for the drainage of head and neck, sequence focus L and skin care protocol including standard lymphedema precautions to include avoiding procedures/acts that could lead to broken skin on affected area, and avoiding excessive heat, resistive activity or altitude without compression garment. STM scalene/SCM/upper trap L with patient tolerating well. AROM cervical lateral flex R/L, cervical rotation R/L, scapular elevation/protraction/retraction with written instructions to continue 10 reps daily. Treatment time:  9:00 am-9:38 am  Minutes: 38 minutes    Manual therapy 3  Pain Level: 3/10 reported by patient  ASSESSMENT:   Abel Avila is a 49 y.o. male who presents with secondary lymphedema of his head and neck region. Patient is very motivated to improve his condition and is seeking to expand his compression to include custom flat knit facemask/neck and chin wrap as indicated. Patient's condition is complicated by pain and comorbidity of stage IV laryngeal cancer impacting ability to progress diet beyond liquids and soft foods . Patient will benefit from complete decongestive therapy (CDT) including: Manual lymphatic drainage (MLD) technique and Kinesiotaping, manual therapy techniques, fit of appropriate compression garments as appropriate. Due to patient's cancer status, the lymphedema has the potential to significantly worsen over time if it is not managed well.    Patient would benefit from an advanced vasopneumatic device to address swelling in the genital and abdominal region as wells as the legs.  A basic vasopneumatic device that only addresses the legs would be contraindicated and has a high risk of increasing the swelling in the genital region and lower abdomen and this would be detrimental to the patient.      This care is medically necessary due to the infection risk with lymphedema and to improve functional activities. CDT is necessary to resolve swelling to allow patient to return to wearing normal clothes/footwear and prevent worsening of symptoms, such as infections and/or hospitalizations. Patient will be independent with home program strategies to allow improved ADL ability and mobility and to allow patient to return to greatest functional independence. Rehabilitation potential is considered to be Fair. Factors which may influence rehabilitation potential include stage IV cancer of larynx. Patient will benefit from 10-12 physical therapy visits over 12 weeks to optimize improvement in these areas. PLAN OF CARE:   Recommendations and Planned Interventions: Manual lymph drainage/decongestive therapy, Multi-layer compression bandaging (short-stretch), Compression garment fitting/provision, Lymphedema therapeutic exercise, AROM/PROM/Strength/Coordination, Education in skin care and lymphedema precautions, Self-MLD education per home program and Caregiver education as needed    GOALS  Short term goals  Time frame: 6 weeks  1. Patient will demonstrate knowledge of signs/symptoms of infections/cellulitis and be independent in skin care to prevent cellulitis. 2.  Patient will demonstrate independence in lymphedema home program of therapeutic exercises to improve circulation and decongest head and neck region to improve ADLs. 3.  Patient will exhibit 10-15 degree improvement cervical lateral flexion/rotation to improve functional activities, including routine/leisure activities with 50% less pain. Long term goals  Time frame: 12 weeks  1. Pt will show improvement in the LLIS by decreasing the score to 5 and thus allow improvement in patient's quality of life.   2.  Patient will be independent with don/doff of compression system and use in order to prevent reaccumulation of fluid at discharge. 3.  Pt will be independent in self-MLD and show stable limb volumes showing decongestion and pt. ready for transition to independent restorative phase of lymphedema therapy. Patient has participated in goal setting and agrees to work toward plan of care. Patient was instructed to call if questions or concerns arise. Thank you for this referral.  Sharyn Matos, PT, CLT-ABIGAIL   Time Calculation: 58 mins    TREATMENT PLAN EFFECTIVE DATES:   4/4/2022 TO 7/1/2022  I have read the above plan of care for [facility]. I certify the above prescribed services are required by this patient and are medically necessary.   The above plan of care has been developed in conjunction with the lymphedema/physical therapist.       Physician Signature: ____________________________________Date:______________

## 2022-04-05 NOTE — PROGRESS NOTES
[facility] Progress Note    Date: 2022    Name: Jesús Amin    MRN: [id number]         : 1970    Mr. Peterson Arrived ambulatory and in no distress for Hydration Regimen. Assessment was completed, no acute issues at this time, no new complaints voiced. Right chest wall port accessed without difficulty, labs drawn & sent for processing. Pleas see [facility]. Covid questionnaire completed. 1. Do you have any symptoms of COVID-19? SOB, coughing, fever, or generally not feeling well - no    2. Have you been exposed to COVID-19 recently? - no    3. Have you had any recent contact with family/friend that has a pending COVID test? - no      Mr. Peterson's vitals were reviewed. Visit Vitals  /74   Pulse 86   Temp (!) 96.2 °F (35.7 °C)   Resp 17   SpO2 98%         Medications:  Medications Administered     sodium chloride 0.9 % bolus infusion 1,000 mL     Admin Date  2022 Action  New Bag Dose  1,000 mL Rate  1,000 mL/hr Route  IntraVENous Administered By  Mavis Plummer RN              Notified NP that HGB is 6.9. She would like pt to get a unit of blood. Pt stated he can only come in 8am Friday. NP is agreeable as long as pt is not symptomatic. RN educated pt on symptoms of low HGB and stated that pt needs to go to ER if symptoms develop. Pt confirmed understanding and denies symptoms at this time. Mr. Liat Woods tolerated treatment well and was discharged from [place] [address] in stable condition. Port de-accessed, flushed & heparinized per protocol. He is to return on 22 for his next appointment.     Charisse Denver, RN  2022

## 2022-04-05 NOTE — PROGRESS NOTES
04/05/22 11:07 AM Patient arrived for fluids. Hgb level 6.9. Advised patient needs a blood transfusion today or tomorrow. Patient unable to come in until Friday 4/8/22 at 8 am. He is currently asymptomatic without dizziness, SOB, fatigue. Advised if he develops any of these symptoms prior to Friday, then he needs to go to the ED for a transfusion. He verbalized understanding to infusion nurse.

## 2022-04-06 NOTE — PROGRESS NOTES
Statement of Medical Necessity  Page 1 of 2      Brice Stewart 1970 Today's Date: 4/6/2022 DEVONTE: [employer]   Payor: [employer] MEDICAID / [facility] Product Type: Managed Care Medicaid /  ME: TBD  Refills: 2               Diagnosis  []   I97.2 Post-Mastectomy Lymphedema []   I87.2 Venous Insufficiency   [x]   I89.0 Lymphedema, other secondary  []   I83.019 Venous Stasis Ulcer LE, Right   []   I89.9 Unspecified Lymphatic Disorder []   I83.029 Venous Stasis Ulcer LE, Left   []   R60.9 Swelling not relieved by elevation []   Q82.0 Hereditary/ Congenital Lymphedema   []   C50.211 Malignant neoplasm of breast, Right [x]   G89.3  Cancer associated pain   []   C50.212 Malignant neoplasm of breast, Left []   L03.115 LE Cellulitis, Right   []  C32.9 Malignant neoplasm of larynx []   L03.116 LE Cellulitis, Left                                                           Brice Stewart    1970  Page 2 of 2    Physician Order for DME for Diagnosis of secondary lymphedema, I89.0 as Listed on Statement of Medical Necessity, Page 1        Recommended Product:  Units Head and neck   Units Lower Extremity Rt Lt   xx Gradiant Compression garments:  Facemask  Neck and chin wrap    Inelastic binders (Michelle Arriaga)  []Foot   []Below Knee   []Knee   []Thigh     xx Vasopneumatic pump    Alon Chris, night use []Full Leg  []Lower Leg          Tribute, night use  []Full Leg  []Lower Leg          Leonard Sleeve Leg/ Foot, night use          Gradient Compression Stockings   []Custom  []RM Lower Extremity:   []CCL1       []CCL2         []CCL3   []Knee       []Thigh        []Waist Length          Tribute Wrap, night use                         The above patient was referred for treatment of Lymphedema due to the diagnosis indicated above. Lymphedema is a progressive and chronic condition.   It may cause acute infections, muscle atrophy, discomfort, and connective tissue fibrosis with irreversible tissue damage, decreased ROM in the affected limb and diminished functional mobility possibly interfering with independence and ability to work. Recurrent infections and wound complications that commonly occur with Lymphedema often require hospitalization and extensive wound care, thus increasing medical costs. The patient has received complete decongestive therapy which includes manual lymphatic drainage, lymphedema specific exercises, compression bandaging, and hygiene/skin care. Goals of therapy are to reduce the edema and prevent re-accumulation of fluid with its complications. It is medically necessary for this patient to have daytime garments to control this condition. They will need 2 sets (one set to wear and one set to wash for adequate skin care and wearing time). Garments must be replaced every 4-6 months for effectiveness. There are no substitutes available that offer acceptable compression treatment for this Lymphedema patient. If further information is requested, please contact our certified lymphedema therapists at [phone].     [] Darvin Price, PT, DPT, LEONARDO  [x] Neeraj Atkinson PT, [facility]  [] Vera Grimaldo, PT, DPT, [facility]      Printed  Provider Name Obdulia Day NP Provider Signature  Date    Provider NPI [id number]

## 2022-04-08 NOTE — TELEPHONE ENCOUNTER
04/08/22 2:52 PM Spoke with patient in the infusion center. He asks if he needs to continue eliquis - advised yes. Asks for refills of levothyroxine - advised he has several refills waiting at the pharmacy. He reports mucositis symptoms are improving.

## 2022-04-08 NOTE — PROGRESS NOTES
[facility] Progress Note    Date: 2022    Name: Carmen Sims    MRN: [id number]         : 1970    Mr. Peterson Arrived ambulatory and in no distress for Blood Transfusion. Assessment was completed, no acute issues at this time, no new complaints voiced. R chest wall port accessed without difficulty, labs already completed. Signs/symptoms of adverse blood reaction discussed with pt, voiced understanding. Mr. Syed Alcazar vitals were reviewed. Visit Vitals  /78   Pulse 77   Temp 97.8 °F (36.6 °C)   Resp 16   SpO2 100%       Lab results were obtained and reviewed. No results found for this or any previous visit (from the past 12 hour(s)). 3236:  1st unit PRBCs started and infusing without difficulty, observed x 15 minutes  1127 1st unit completed without adverse reaction noted, NS flushing line. Mr. Da Perez tolerated treatment well and was discharged from [place] [address] in stable condition. Port de-accessed, flushed & heparinized per protocol. He is to return on  at 1130 for his next appointment. D/C instructions reviewed, copy to pt, voiced understanding. Patient declined 1 hour post transfusion observation.     Leif Meza RN  2022

## 2022-04-12 NOTE — PROGRESS NOTES
[id number] [facility]  [phone]    Hematology / Oncology Established Visit    Reason for Visit:   Bhavik Loera is a 49 y.o. male who is seen in follow up of laryngeal cancer. Referred by Dr. 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 Stage: CATARINO [[id number]] at diagnosis, regional recurrence in 4/2021, now with metastatic disease. Pathology:   2/8/21 Total laryngectomy: Invasive squamous cell carcinoma  Tumor size 3.5cm, moderately differentiated (G2), PNI present, LVI not identified, margins negative  rV2mbBx  -PDL1 (tested 12/28/21 on above specimen) positive with CPS 10. Prior Treatment:   1. 2/8/21 total laryngectomy/cricopharyngeal myotomy   2. Cisplatin 100mg/m2 every 3 weeks x 3 cycles, 5/24/21-7/13/21  3. Radiation 5/24/21-7/19/21  4. Pembrolizumab x 2 cycles, 1/6/22    Current Treatment:  [Cisplatin (100 mg/m2 intravenous, day 1) and fluorouracil (1000 mg/m2 per day, continuous infusion, for four days) and Pembrolizumab, day 1 given every 21 days x 6 cycles followed by Pembrolizumab maintenance x 2yr. Start 1/28/22 - current. History of Present Illness:   Bhavik Loera is a 49 y.o. male who with COPD who is referred for Head and neck cancer. He presented with throat pain and hoarseness in Sept 2020. Was evaluated by Dr. Ponce Arriaga in ENT who performed laryngoscopy and diagnosed patient with squamous cell carcinoma involving the larynx and subglottis. In late Dec 2020, neck CT showed a 3 cm mass in Right supraglottic larynx with extension to thyroid cartilage, but no cervical LNs. PET 1/4/21 showed uptake in the laryngeal mass at right vocal fold, extending to thyroid cartilage. He was scheduled for surgery, but he required emergent tracheostomy prior to that; done late Jan 2021. Also had PEG placed 1/30/21. On 2/8/21, he underwent total laryngectomy/cricopharyngeal myotomy.  He quit smoking in Feb 2021. He was evaluated by Dr. Tc Nair in [address] [place] [address] in March 2021 who recommended post-op radiation. There were multiple delays given difficult getting in for dental evaluation prior to RT. [facility] simulation scan was notable for a necrotic appearing tumor in Right neck. PET 5/7/21 confirmed hypermetabolic uptake in right neck. PEG removed in March 2021 due to problems with it. Per Dr. Tc Nair, pt had 7 teeth extracted by dentist. He returns to the dentist again for dental fillings on 5/26/21. Pt states his neck feel tight, causing pulling sensation but no current pain. Interval History:  Patient here for urgent follow up of mucositis. Received C3 treatment on 3/25/22 with pump removal on 3/29/22. Developed grade 3 mucositis several days after pump removal that required oxycodone. Lesions have resolved today. Now eating all solid foods. No nausea/vomiting, diarrhea or constipation. Reports neck pain resolved - getting lymphedema once per week. Reports he will run out of his trach supplies soon, [employer] not taking his insurance anymore. He said he has not heard anything from Dr. Fermín Nair office. PMHx: COPD, throat cancer, anxiety  PSurgHx: Left arm plate placement, tracheostomy, total laryngectomy 2/8/21  SHx: Quit smoking 10/28/20 after 45 pack years. No EtOH  FHx: Mother with DM; Father with DM, CKD; Sister with DM. No h/o malignancy. Review of Systems: A complete review of systems was obtained, negative except as described above. Physical Exam:   Blood pressure 101/63, pulse 89, temperature 97.5 °F (36.4 °C), resp. rate 16, height 6' (1.829 m), weight 157 lb (71.2 kg), SpO2 100 %. ECOG PS: 0  General: Well developed, no acute distress  Eyes: Anicteric sclerae  HENT: Atraumatic, no mouth sores   Neck: Supple, Tracheostomy noted  Lymphatic: No supraclavicular, axillary adenopathy. No bilateral cervical LAD, but firm skin at neck bilaterally.    Respiratory: Clear but diminished throughout, normal respiratory effort  CV: Normal rate, regular rhythm, no murmurs, no peripheral edema  GI: Soft, nontender, nondistended, no masses  MS: Normal gait and station. Digits without clubbing or cyanosis. 1cm firm nodule on left wrist.  Skin: No rashes. Hyperpigmented skin at neck bilaterally. Neuro/Psych: Alert. Communicates by writing given tracheostomy. Results:     Lab Results   Component Value Date/Time    WBC 5.9 04/15/2022 08:16 AM    HGB 7.3 (L) 04/15/2022 08:16 AM    HCT 23.1 (L) 04/15/2022 08:16 AM    PLATELET 236  08:16 AM    MCV 99.6 (H) 04/15/2022 08:16 AM    ABS. NEUTROPHILS 4.9 04/15/2022 08:16 AM     Lab Results   Component Value Date/Time    Sodium 137 04/15/2022 08:16 AM    Potassium 4.4 04/15/2022 08:16 AM    Chloride 104 04/15/2022 08:16 AM    CO2 31 04/15/2022 08:16 AM    Glucose 113 (H) 04/15/2022 08:16 AM    BUN 31 (H) 04/15/2022 08:16 AM    Creatinine 1.19 04/15/2022 08:16 AM    GFR est AA >60 04/15/2022 08:16 AM    GFR est non-AA >60 04/15/2022 08:16 AM    Calcium 9.3 04/15/2022 08:16 AM    Glucose (POC) 113 10/04/2021 01:04 PM     Lab Results   Component Value Date/Time    Bilirubin, total 0.6 2022 08:12 AM    ALT (SGPT) 14 2022 08:12 AM    Alk. phosphatase 60 2022 08:12 AM    Protein, total 7.4 2022 08:12 AM    Albumin 3.6 2022 08:12 AM    Globulin 3.8 2022 08:12 AM     Lab Results   Component Value Date/Time    Iron 34 (L) 2021 12:17 PM    TIBC 216 (L) 2021 12:17 PM    Iron % saturation 16 (L) 2021 12:17 PM    Ferritin 939 (H) 2021 12:17 PM      Lab Results   Component Value Date/Time    Vitamin B12 664 2021 11:23 AM    Folate 24.0 (H) 2021 11:23 AM         Imagin/28/21 Neck CT: IMPRESSION  Irregular soft tissue mass involving the aryepiglottic and the larynx. There is significant narrowing of the airway at the level of the larynx  and the supraglottic region.  Poorly defined cervical adenopathy is noted.    2/25/21 Chest CTA:  IMPRESSION  Minimal scarring or subsegmental atelectasis in the left lung base. No evidence of pulmonary embolism or pneumonia. 5/7/21 PET:  IMPRESSION  2 large necrotic appearing mass lesions are noted in the right neck (SUV 6.4). Small hypermetabolic right posterior triangle lymph node. No PET avid evidence  of distant metastatic disease. 10/4/21 PET:  FINDINGS:  HEAD/NECK: There is physiologic tracer activity in the oral cavity and piriform  sinuses. Physiologic tracer activity is also seen in the neck musculature,  particularly the right sternocleidomastoid muscle. 2 enlarged right level 2  cervical lymph nodes are slightly decreased in size; these demonstrate no  significant residual FDG activity. Previously seen small lymph node in the right  posterior cervical triangle also demonstrates no residual FDG activity. CHEST: There is a new 3.0 x 2.6 cm focus of nodular airspace disease in the left  upper lobe which demonstrates increased FDG activity, maximal SUV 2.8. New foci  of nodular airspace disease in the left lower lobe, measuring 1.2 cm and 1.0 cm,  in the right lower lobe, measuring 0.8 cm, and in the right middle lobe,  measuring 1.6 cm, demonstrate no appreciable FDG activity. ABDOMEN/PELVIS: No foci of abnormal hypermetabolism. SKELETON: No foci of abnormal hypermetabolism in the axial and visualized  appendicular skeleton. IMPRESSION  1. Previously seen enlarged cervical lymph nodes demonstrate no residual  abnormal FDG activity. 2. New foci of nodular airspace disease in both lungs, including a 3 cm area in  the left upper lobe which demonstrates low-grade increased FDG activity. Findings may be infectious etiology, although neoplasm is not excluded;  attention on follow-up examinations is recommended. 11/22/21 CT neck:  IMPRESSION  No significant interval change since the previous CT PET  examination.  Necrotic right level 2A lymph nodes unchanged in size. No definite  new pathologic lymph nodes by CT criteria. Postradiation changes as described  above. Narrowed nasopharyngeal and oropharyngeal airway unchanged. No new pathologic lymphadenopathy by CT criteria is demonstrated. 11/22/21 CT chest:   There is a 15 mm node in the right hilum, station 10 R, series 201 image 39. There is increasing multifocal multi lobar inflammatory appearing airspace  disease in the lungs most suggestive of multifocal pneumonia. Dominant focus of  airspace disease in the lingula on series 204 image 41 measures 3.3 x 3.2 cm,  previously 3.0 x 2.6 cm. Likewise other foci have increased in size with  dominant lesion on the right measuring 2.8 x 1.9 cm on image 50, previously 16  mm. Suspicious right upper lobe nodules are present including a dominant 6 x 9 mm  nodule on series 204 image 25. There are no suspicious lytic or blastic skeletal lesions in the thorax. Incidental imaging of the upper abdomen demonstrates liver steatosis with  probable focal steatosis adjacent to the fissure for the falciform ligament. There may be a small retrocardiac hiatal hernia. IMPRESSION  Increasing multifocal airspace disease with new suspicious solid appearing  nodules in the right upper lobe. 3/16/22 CT abd/pelv:   FINDINGS:  LUNG BASES: Bilateral lung consolidations. No pleural effusion. Vonne Dach LIVER: Unremarkable. GALLBLADDER AND BILIARY TREE: No evident gallstones, gallbladder wall  thickening, or biliary ductal dilation. PANCREAS: Unremarkable. SPLEEN: Unremarkable. ADRENALS: Unremarkable. KIDNEYS AND URETERS: Symmetric renal size and enhancement. No hydronephrosis or  hydroureter. BLADDER: Unremarkable. REPRODUCTIVE ORGANS: No pelvic masses. BOWEL: Oral contrast has achieved the distal small bowel. The bowel is not  dilated. Small hiatal hernia. PEG tube has been removed. LYMPH NODES:  No lymphadenopathy. PERITONEUM: No free air, ascites, walled off fluid collection.   VESSELS: Abdominal aorta without aneurysm or dissection. ABDOMINAL WALL: Intact. BONES: Unremarkable for age. Small disc bulges at L4-L5 and L5-S1 with mild  canal impingement, unchanged. IMPRESSION  1. No evidence of metastatic disease abdomen pelvis. 2. CT chest performed separately. 3/16/22 CT chest:  FINDINGS:  LINES: Right port distal tip SVC/RA junction. HEART: Normal heart size. No pericardial effusion. THORACIC AORTA: No aneurysm or dissection. PULMONARY ARTERIES: No large central pulmonary arterial filling defect, non-CTA  technique. ADENOPATHY: Developing left hilar adenopathy, see below. THORACIC ESOPHAGUS: Collapsed. LUNG PARENCHYMA: Multiple bilateral pulmonary consolidations. The largest: Right  middle lobe along the fissure 4.8 x 2.3 cm, previously 2.8 x 1.9 cm; lingula 3.2  x 3.3 cm unchanged. Developing consolidation lateral lingula extending from the  hilum 4.8 x 2.5 cm including lymphadenopathy, previously small patchy. CENTRAL AIRWAYS: Tracheostomy tubing in place. Left hilar adenopathy, narrows  the lingular bronchus. PLEURA: No pleural effusion. No pneumothorax. CHEST WALL: No axillary adenopathy. Right chest wall port. UPPER ABDOMEN:  Unremarkable. BONES: Unremarkable for age. IMPRESSION  1. Increasing pulmonary consolidations, and confluent left hilar adenopathy,  concerning for advancing metastatic disease. 2. CT abdomen pelvis reported separately. Assessment & Plan:   Kristina Mattson is a 49 y.o. male is seen for laryngeal cancer. 1. Squamous cell carcinoma of larynx, Stage CATARINO [pT4a cN2 Mx]:  S/p total laryngectomy and tracheostomy in Jan 2021. Afterwards, he developed disease recurrence in Right neck confirmed by PET scan. I recommended concurrent chemoradiation using Cisplatin to treat his disease. Pt completed concurrent chemoradiation radiation 7/19/21. Evidence of local treatment response per physical exam and 10/4/21 PET.  However disease progression noted on chest CT 11/22/21 with increasing size in lung nodules, outside of prior radiation field. Per discussion with Thoracic Tumor Board, these nodules would be amenable to biopsy by navigation bronchoscopy if desired. However, [place] and I feel this is obvious disease progression given extent of disease at diagnosis. Discussed with patient about disease progression. Discussed pros/con of biopsy to confirm metastatic disease (pro: confirmation rather than assumption of metastatic disease; ability to send metastatic tissue for NGS; con: will lead to treatment delay of clinically likely metastatic disease. ) He elected to proceed with treatment without repeating biopsy. Started Pembrolizumab monotherapy, then switched to Cis-5FU-Pembrolizumab, followed by Pembro maintenance x 2 y given PDL1 CPS < 20. [30% RR in phase III trials.] Repeat imaging showed lung consolidation which could be mild disease progression or infection. Will continue with treatment for now and repeat scans after 2 more cycles. Supportive medications: zofran, compazine, emla, decadron. 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 -- Proceed with C4 of Cis-5-FU-Pembrolizumab (20% DR 5-FU due to mucositis) with pump removal on 4/19/22. Neulasta added with pump removal.   -- Additional fluids scheduled on pump removal day and off week Tuesdays. -- Due for repeat CT after C4.   -- Faxed recent CT results to Dr. Swain on 3/23/22. -- Follow up in 3 weeks for C5, MD/NP visit. 2. COPD / H/o tobacco abuse:   Quit in 2/2021. Uses Albuterol inhaler prn.    4. FEN/GI / Constipation:    Senna-plus BID prn constipation. Increased nausea after C2 not managed with zofran. -- Advised taking zofran in AM/PM and compazine in afternoon for nausea prevention. 5. Macrocytic anemia / anemia of chronic disease:   2/2 chemotherapy. B12, folate normal; ferritin high and iron sat mildly low.  Required 1 unit of blood after C3.   -- Check cbc and type and screen with pump removal.     6. Hypothyroidism:   Due to prior radiation and improving on levothyroxine to 75mcg/d. Stressed the importance of taking it on an empty stomach every morning at the same time at least an hour before other medications or eating. Managing with Dr. Margarita Weber, Endocrinology   -- Checking TSH/free T4 every other cycle during treatment. Will send updates to Dr. Margarita Weber. 7. Pulmonary embolism:  Provoked by #1. 11/2021 CT chest. On eliquis. Hold for PLT <50.     8. Tracheostomy care:   ENT providing HMEs samples because [facility] is no longer supplying them through his insurance. Dr. Max Rogers team is working on an appeal.  -- Reports he is almost out of supplies and has not heard from Dr. Max Rogers team - advised we will look into this issue. 9. Chemotherapy induced mucositis:   Resolved today. Dex rinse and MW not covered by insurance. Using baking soda rinses and viscous lidocaine prescription and provided instructions for adding maalox and benadryl. -- Advised percocet every 8 hours prn severe pain #20 tabs rx today. Reviewed side effects sedation, constipation. He is on a bowel regimen. Stop tramadol. -- Decreased of 5-FU by another 10% with C4.    10. Neck pain:  Present with movement. Advised prn percocet for severe pain and continue PT exercises at home. Established with lymphedema with improvement in symptoms. 11. Rhinorrhea/productive cough:  Likely multifactorial due to allergies, dryness from chemo and lung consolidation seen on imaging. Continue mucinex for secretions. Managing with loratadine 10mg daily and nasal saline spray twice daily. Emotional well being: Pt is coping well with his/her disease and has excellent support. .   I personally saw and evaluated the patient and performed the key components of medical decision making. The history, physical exam, and documentation were performed by Monika Homans, NP.   I reviewed and verified the above documentation and modified it as needed. Proceed with  Cis-5-FU-Pembrolizumab with 20% total dose reduction in 5-FU given mucositis.     Signed By: Yakov Long MD     04/15/22

## 2022-04-12 NOTE — PROGRESS NOTES
[facility] Progress Note    Date: 2022    Name: Carmela Ayon    MRN: [id number]         : 1970    Mr. Peterson Arrived ambulatory and in no distress for Hydration Regimen. Assessment was completed, no acute issues at this time, no new complaints voiced. right chest wall port accessed without difficulty, labs drawn & sent for processing. Covid questionnaire completed. 1. Do you have any symptoms of COVID-19? SOB, coughing, fever, or generally not feeling well - no    2. Have you been exposed to COVID-19 recently? - no    3. Have you had any recent contact with family/friend that has a pending COVID test? - no      Mr. Peterson's vitals were reviewed. Visit Vitals  BP 98/65   Pulse 84   Temp 97.6 °F (36.4 °C)   Resp 17   Wt 71.7 kg (158 lb)   BMI 21.43 kg/m²       Lab results were obtained and reviewed. Recent Results (from the past 12 hour(s))   CBC WITH AUTOMATED DIFF    Collection Time: 22  8:21 AM   Result Value Ref Range    WBC 4.9 4.1 - 11.1 K/uL    RBC 2.37 (L) 4.10 - 5.70 M/uL    HGB 7.4 (L) 12.1 - 17.0 g/dL    HCT 23.2 (L) 36.6 - 50.3 %    MCV 97.9 80.0 - 99.0 FL    MCH 31.2 26.0 - 34.0 PG    MCHC 31.9 30.0 - 36.5 g/dL    RDW 18.4 (H) 11.5 - 14.5 %    PLATELET 597 926 - 969 K/uL    MPV 10.4 8.9 - 12.9 FL    NRBC 0.0 0  WBC    ABSOLUTE NRBC 0.00 0.00 - 0.01 K/uL    NEUTROPHILS PENDING %    LYMPHOCYTES PENDING %    MONOCYTES PENDING %    EOSINOPHILS PENDING %    BASOPHILS PENDING %    IMMATURE GRANULOCYTES PENDING %    ABS. NEUTROPHILS PENDING K/UL    ABS. LYMPHOCYTES PENDING K/UL    ABS. MONOCYTES PENDING K/UL    ABS. EOSINOPHILS PENDING K/UL    ABS. BASOPHILS PENDING K/UL    ABS. IMM. GRANS.  PENDING K/UL    DF PENDING        Medications:  Medications Administered     sodium chloride 0.9 % bolus infusion 1,000 mL     Admin Date  2022 Action  New Bag Dose  1,000 mL Rate  1,000 mL/hr Route  IntraVENous Administered By  MADYSON Leyva treatment well and was discharged from [place] [address] in stable condition. Port de-accessed, flushed & heparinized per protocol. He is to return on 4/15/22 for his next appointment.     Alo Ugalde RN  April 12, 2022

## 2022-04-13 NOTE — TELEPHONE ENCOUNTER
[address] at [place]  [phone]        04/13/22 10:15 AM Attempted to call patient's brother, Kassy Littlejohn, via contact number listed. No answer and voicemail box is full. [facility] nurse had contacted NP updating her that patient was requesting refill of Tramadol and Eliquis. Per Migue Nina, patient should have one refill left for Eliquis. Also need to clarify if patient is out of Percocet and Tramadol and if medication is being requested to manage mucositis or neck pain. Migue Nina had previously prescribed Percocet for mucositis pain. Upon clarifying, will forward request to Dr. Dwain Stein for refill.     04/14/22 3:35 PM Attempted to call patient's brother, Kassy Littlejohn. No answer and voicemail box is full. Will notify Migue Nina NP. Patient currently scheduled for MD follow up tomorrow.

## 2022-04-15 NOTE — TELEPHONE ENCOUNTER
Called patients brother to inform him of the apt time change for patient. No answer. Mailbox full.           [facility] has also tried to call to inform patient but unable to leave vm

## 2022-04-15 NOTE — TELEPHONE ENCOUNTER
[address]  [phone]        04/15/22 9:50 AM Attempted to call Dr. Jessy Baumgarten office to inquire about status of obtaining trach supplies with new or current supplier. Was transferred to nurse Lesly Howell (ext. [phone]). No answer, left message requesting return call. Provided office phone number as well.      1:27 PM Received return call from Lesly Howell with Dr. Jessy Baumgarten office. She states they submitted an appeal to [employer] last month regarding patient's trach supplies. Per Lesly Howell, no other supply company carries specific supplies that patient needs. Lesly Howell states she will also follow up with rep to inquire about status. In the meantime, she will set aside a few more samples. Requested that Lesly Howell also update patient's brother so that patient could be notified. No further questions or concerns at this time. 04/19/22 2:07 PM Relayed above to [facility] charge nurse so patient can be updated. No further questions or concerns at this time.

## 2022-04-15 NOTE — PROGRESS NOTES
[facility] Progress Note    Date: April 15, 2022    Name: Abel Avila    MRN: [id number]         : 1970    Mr. Peterson Arrived ambulatory and in no distress for C4 D1 of Keytruda/Cisplatin/5FU Regimen. Assessment was completed by Leigh Slater, no acute issues at this time, no new complaints voiced. Right chest wall port accessed without difficulty by Leigh Slater, labs drawn & sent for processing. Chemotherapy Flowsheet 4/15/2022   Cycle C4D1   Date 4/15/2022   Drug / Regimen Keytruda/Cisplatin   Pre Hydration given   Post Hydration given   Pre Meds given   Notes given     Patient proceed to appointment with Dr. Hazel Crespo. Mr. Thomas Hobson vitals were reviewed. Patient Vitals for the past 12 hrs:   Temp Pulse Resp BP SpO2   04/15/22 1657 (!) 96.4 °F (35.8 °C) 86 18 115/77 97 %   04/15/22 0811 97.5 °F (36.4 °C)  16 101/63          Lab results were obtained and reviewed. Recent Results (from the past 12 hour(s))   CBC WITH AUTOMATED DIFF    Collection Time: 04/15/22  8:16 AM   Result Value Ref Range    WBC 5.9 4.1 - 11.1 K/uL    RBC 2.32 (L) 4.10 - 5.70 M/uL    HGB 7.3 (L) 12.1 - 17.0 g/dL    HCT 23.1 (L) 36.6 - 50.3 %    MCV 99.6 (H) 80.0 - 99.0 FL    MCH 31.5 26.0 - 34.0 PG    MCHC 31.6 30.0 - 36.5 g/dL    RDW 17.9 (H) 11.5 - 14.5 %    PLATELET 954 316 - 380 K/uL    MPV 10.3 8.9 - 12.9 FL    NRBC 0.0 0  WBC    ABSOLUTE NRBC 0.00 0.00 - 0.01 K/uL    NEUTROPHILS 68 32 - 75 %    BAND NEUTROPHILS 16 (H) 0 - 6 %    LYMPHOCYTES 3 (L) 12 - 49 %    MONOCYTES 13 5 - 13 %    EOSINOPHILS 0 0 - 7 %    BASOPHILS 0 0 - 1 %    IMMATURE GRANULOCYTES 0 %    ABS. NEUTROPHILS 4.9 1.8 - 8.0 K/UL    ABS. LYMPHOCYTES 0.2 (L) 0.8 - 3.5 K/UL    ABS. MONOCYTES 0.8 0.0 - 1.0 K/UL    ABS. EOSINOPHILS 0.0 0.0 - 0.4 K/UL    ABS. BASOPHILS 0.0 0.0 - 0.1 K/UL    ABS. IMM.  GRANS. 0.0 K/UL    DF MANUAL      RBC COMMENTS ANISOCYTOSIS  1+        RBC COMMENTS MACROCYTOSIS  1+       METABOLIC PANEL, COMPREHENSIVE    Collection Time: 04/15/22  8:16 AM Result Value Ref Range    Sodium 137 136 - 145 mmol/L    Potassium 4.4 3.5 - 5.1 mmol/L    Chloride 104 97 - 108 mmol/L    CO2 31 21 - 32 mmol/L    Anion gap 2 (L) 5 - 15 mmol/L    Glucose 113 (H) 65 - 100 mg/dL    BUN 31 (H) 6 - 20 MG/DL    Creatinine 1.19 0.70 - 1.30 MG/DL    BUN/Creatinine ratio 26 (H) 12 - 20      GFR est AA >60 >60 ml/min/1.73m2    GFR est non-AA >60 >60 ml/min/1.73m2    Calcium 9.3 8.5 - 10.1 MG/DL    Bilirubin, total 0.2 0.2 - 1.0 MG/DL    ALT (SGPT) 12 12 - 78 U/L    AST (SGOT) 12 (L) 15 - 37 U/L    Alk. phosphatase 77 45 - 117 U/L    Protein, total 7.1 6.4 - 8.2 g/dL    Albumin 3.7 3.5 - 5.0 g/dL    Globulin 3.4 2.0 - 4.0 g/dL    A-G Ratio 1.1 1.1 - 2.2     TSH 3RD GENERATION    Collection Time: 04/15/22  8:16 AM   Result Value Ref Range    TSH 11.10 (H) 0.36 - 3.74 uIU/mL   SAMPLES BEING HELD    Collection Time: 04/15/22  8:16 AM   Result Value Ref Range    SAMPLES BEING HELD 1SST     COMMENT        Add-on orders for these samples will be processed based on acceptable specimen integrity and analyte stability, which may vary by analyte. MAGNESIUM    Collection Time: 04/15/22  8:16 AM   Result Value Ref Range    Magnesium 2.2 1.6 - 2.4 mg/dL       Medications:      Mr. Brett West tolerated treatment well and was discharged from [place] [address] in stable condition at 1700. CADD pump attached to port, 5FU infusing. He is to return on 04/19/2022 for his next appointment.     Dulce Tomlin RN  April 15, 2022

## 2022-04-15 NOTE — PROGRESS NOTES
Chief Complaint   Patient presents with   Yefri Mail is a pleasant 49year old male who presents as a follow up.  He denies pain

## 2022-04-19 NOTE — PROGRESS NOTES
[facility] Progress Note    Date: 2022    Name: Elder Centeno    MRN: [id number]         : 1970    Mr. Peterson Arrived ambulatory and in no distress for 5FU Pump disconnect, Hydration, and Neulasta. Assessment was completed, no acute issues at this time, no new complaints voiced. 5FU infusing via CADD via right chest wall port; 7.6 ML remaining. 24 gauge IV started in right arm for hydration; labs also drawn. Chemotherapy Flowsheet 4/15/2022   Cycle C4D1   Date 4/15/2022   Drug / Regimen Keytruda/Cisplatin   Pre Hydration given   Post Hydration given   Pre Meds given   Notes given       Mr. Pati Garrett vitals were reviewed. Visit Vitals  /76   Pulse 75   Temp 97.6 °F (36.4 °C)   Resp 18   Ht 6' (1.829 m)   Wt 71.3 kg (157 lb 1.6 oz)   SpO2 100%   BMI 21.31 kg/m²       Lab results were obtained and reviewed. Recent Results (from the past 12 hour(s))   CBC WITH AUTOMATED DIFF    Collection Time: 22  2:03 PM   Result Value Ref Range    WBC 9.8 4.1 - 11.1 K/uL    RBC 2.44 (L) 4.10 - 5.70 M/uL    HGB 7.7 (L) 12.1 - 17.0 g/dL    HCT 24.4 (L) 36.6 - 50.3 %    .0 (H) 80.0 - 99.0 FL    MCH 31.6 26.0 - 34.0 PG    MCHC 31.6 30.0 - 36.5 g/dL    RDW 17.6 (H) 11.5 - 14.5 %    PLATELET 975 334 - 856 K/uL    MPV 11.1 8.9 - 12.9 FL    NRBC 0.0 0  WBC    ABSOLUTE NRBC 0.00 0.00 - 0.01 K/uL    NEUTROPHILS 95 (H) 32 - 75 %    LYMPHOCYTES 2 (L) 12 - 49 %    MONOCYTES 2 (L) 5 - 13 %    EOSINOPHILS 0 0 - 7 %    BASOPHILS 0 0 - 1 %    IMMATURE GRANULOCYTES 1 (H) 0.0 - 0.5 %    ABS. NEUTROPHILS 9.3 (H) 1.8 - 8.0 K/UL    ABS. LYMPHOCYTES 0.2 (L) 0.8 - 3.5 K/UL    ABS. MONOCYTES 0.2 0.0 - 1.0 K/UL    ABS. EOSINOPHILS 0.0 0.0 - 0.4 K/UL    ABS. BASOPHILS 0.0 0.0 - 0.1 K/UL    ABS. IMM.  GRANS. 0.1 (H) 0.00 - 0.04 K/UL    DF SMEAR SCANNED      PLATELET COMMENTS Large Platelets      RBC COMMENTS ANISOCYTOSIS  1+        RBC COMMENTS MACROCYTOSIS  1+       TYPE & SCREEN    Collection Time: 22  2:03 PM Result Value Ref Range    Crossmatch Expiration 04/22/2022,2359     ABO/Rh(D) Alfredo Lopez POSITIVE     Antibody screen NEG        Medications:  Medications Administered     0.9% sodium chloride infusion 1,000 mL     Admin Date  04/19/2022 Action  New Bag Dose  1,000 mL Rate  1,000 mL/hr Route  IntraVENous Administered By  Tashi Li, MADYSON          pegfilgrastim (NEULASTA) wearable SQ injector 6 mg     Admin Date  04/19/2022 Action  Given Dose  6 mg Route  SubCUTAneous Administered By  Tashi Li, RN                  Mr. Amara Wasserman tolerated treatment well and was discharged from [place] [address] in stable condition at 1600. Neulasta OBI attached to left upper arm. CADD pump stopped at 1555, 3 mL remaining. Pt had to leave due to transportation. Port de-accessed, flushed & heparinized per protocol. He is to return on 04/26/2022 for his next appointment.     Regino Rodriguez RN  April 19, 2022

## 2022-04-20 NOTE — PROGRESS NOTES
LYMPHEDEMA PT DAILY TREATMENT NOTE - [facility] 2-15    Patient Name: Tatum Gamboa  Date:2022  : 1970  [x]  Patient  Verified  Payor: [facility] MEDICAID / [facility] / Product Type: Managed Care Medicaid /    In time:8:40 am  Out time:9:40 am  Total Treatment Time (min): 60  Total Timed Codes (min): 60  1:1 Treatment Time ( only): 60   Visit #: 2   Treatment Area: Lymphedema, not elsewhere classified [I89.0]    SUBJECTIVE  Pain Level (0-10 scale): 3/10, neck pain, L>R  Any medication changes, allergies to medications, adverse drug reactions, diagnosis change, or new procedure performed?: [x] No    [] Yes (see summary sheet for update)  LLIS:   Subjective functional status/changes:   [] No changes reported  Patient indicates L/R neck pain. Indicates pain level 2-3/10, improved after previous treatment, returned to baseline. Agreeable to proceeding with treatment today. OBJECTIVE     min Therapeutic Exercise: [x] Remedial Lymphedema Exercise Program: reviewed scapular elevation/depression/retraction/protraction, cervical lateral flex/rotation x 10 reps, to continue 2x/day     [] Shoulder ROM Exercises   Rationale: Activate muscle pump to improve lymphatic fluid movement and decrease swelling to improve the patients ability to perform ADL and IADL skills and prevent worsening of swelling over time. 60 min Manual Therapy    Kinesiotaping: na       Manual Lymphatic Drainage (MLD):  Area to decongest: head and neck   Sequence used and effectiveness: HNL MLD sequence, L>R. STM bilateral upper trap, mid trap, cervical paraspinals, L SCM. Trigger points noted, with good response to manual therapy. Skin/wound care/debridement: Reviewed skin care principles:   Performed skin care with low pH lotion following manual lymph principles.    Skin care products          Rationale: decrease pain and swelling to improve the patients ability to perform routine activities to improve quality of life.       Vasopneumatic Device:  Deferred    Body Part: [] R [] L [] Bilateral  Pressure: [] Decreased [] Normal [] Increased  Treatment Cycles: [] 1  [] 2  [] 3   Rationale: To improve lymphatic fluid movement and decrease swelling to improve the patients ability to perform ADL and IADL skills and prevent worsening of swelling over time. With   [] TE   [] TA   [x] MT   [] SC   [] other: Patient Education: [x] Review HEP    [x] MLD Patient Education Reviewed with patient, as well as demonstration and instruction during MLD portion of the session. Continued education in self MLD technique with bathing and skin care. [] Progressed/Changed HEP based on:   [] positioning   [] Kinesiotape   [] Skin care   [] wound care   [] other:   [] na  Patient / caregiver re-demonstrated bandaging. [] Yes  [x] No  Compression bandaging/garment precautions reviewed: [] Yes  [x] No     Other Objective/Functional Measures:  Height:     Weight:      BMI:           Pain Level (0-10 scale) post treatment: 0/10      ASSESSMENT/Changes in Function:     Note soft tissue restrictions cervical paraspinals, SCM, upper traps. Fibrotic tissue changes submental region, with fibrosis techniques performed as tolerated. Coughing intermittently during treatment, with trach care performed by patient. Custom flat knit compression garment/vasopneumatic pump for home use medically necessary to provide effective home management of lymphedema, improving quality of life. Patient will continue to benefit from skilled PT services to  address ROM deficits, address swelling, analyze compression product fit and use and measure for compression products to attain remaining goals. [x]  See Plan of Care  []  See progress note/recertification  []  See Discharge Summary    Progress towards goals / Updated goals: Ongoing  GOALS  Short term goals  Time frame: 6 weeks  1.   Patient will demonstrate knowledge of signs/symptoms of infections/cellulitis and be independent in skin care to prevent cellulitis. 2.  Patient will demonstrate independence in lymphedema home program of therapeutic exercises to improve circulation and decongest head and neck region to improve ADLs. 3.  Patient will exhibit 10-15 degree improvement cervical lateral flexion/rotation to improve functional activities, including routine/leisure activities with 50% less pain.     Long term goals  Time frame: 12 weeks  1. Pt will show improvement in the LLIS by decreasing the score to 5 and thus allow improvement in patient's quality of life. 2.  Patient will be independent with don/doff of compression system and use in order to prevent reaccumulation of fluid at discharge. 3.  Pt will be independent in self-MLD and show stable limb volumes showing decongestion and pt. ready for transition to independent restorative phase of lymphedema therapy. PLAN  []  Upgrade activities as tolerated     [x]  Continue plan of care  []  Update interventions per flow sheet       []  Discharge due to:_  [x]  Other: order custom flat knit facemask/neck and chin wrap; vasopneumatic pump for home use.     Yovani Bowen, PT, [facility] 4/20/2022

## 2022-04-20 NOTE — TELEPHONE ENCOUNTER
Patient dropped by office this morning to let the team know his insurance has approved trach supplies and wanted our team to contact Dr. Jessy Baumgarten team with [facility] at [phone]. Left message with nurse Radha Brasher advising supplies were approved.  This is FYI

## 2022-04-20 NOTE — TELEPHONE ENCOUNTER
[address]  Oncology Social Work  Encounter      Patient Name:  Luz Peterson Lomelijustin Glass History: h&n cancer      Advance Directives: none on file; conversation deferred     Narrative: Assisted patient with scheduling transportation.  Called Medicaid/ #[phone] and booked the following dates:     May 3rd at 10:30am - Oncology  [facility] time - 9:30am  Trip # [id number]     May 4th at [address] time - 7 am  Trip # [id number]     May 6th at [address] time - 7 am   Trip # [id number]     May 9th at Sofia Alvarado [facility] - Endocrinology  12 pm pickup time  Trip # [id number]     May 10th at [facility] pickup time  Trip # [id number]     Called patient to inform about transportation.      Referral/Handouts:   Transportation referral     Thank you,  Earl Jean LCSW

## 2022-04-26 NOTE — PROGRESS NOTES
[facility] Progress Note    Date: 2022    Name: Katie Prakash    MRN: [id number]         : 1970    Mr. Peterson Arrived ambulatory and in no distress for Hydration. Assessment was completed, no acute issues at this time, no new complaints voiced. Right chest wall port accessed without difficulty. Mr. Kennard Fothergill vitals were reviewed. Visit Vitals  BP (!) 106/51   Pulse 87   Temp 97.7 °F (36.5 °C)   Resp 18   Ht 6' (1.829 m)   Wt 68.9 kg (152 lb)   SpO2 100%   BMI 20.61 kg/m²     Medications:  1 L Bolus     Mr. Keyla Oh tolerated treatment well and was discharged from [place] [address] in stable condition. Port de-accessed, flushed & heparinized per protocol. He is aware of future appointments.   Future Appointments   Date Time Provider Nathanael Samina   2022  8:30 AM Rebekah Saldanas [address]   2022  1:00 PM SECC CT 1 SECCCT [facility]   2022  1:30 PM SECC CT 1 SECCCT SE[facility]   5/3/2022  8:00 AM SS INF7 CH3 <1H [facility]   2022  8:30 AM Rebekah Cyphers [address]   2022  8:00 AM SS INF1 CH1 >4H [facility]   2022  8:15 AM Robinett, Duane Burows, NP [facility] BS AMB   2022  1:45 PM Inder Doherty MD [facility] BS AMB   5/10/2022 11:00 AM SS INF7 CH2 <1H [facility]   2022  8:30 AM Rebekah Cyphers [address]   2022  8:00 AM SS INF7 CH2 <1H [facility]   2022  8:30 AM Rebekah Cyphers [address]   2022  8:00 AM SS INF4 CH4 <1H [facility]   2022  8:00 AM SS INF1 CH1 >4H [facility]   2022 11:30 AM SS INF4 CH4 <1H [facility]   2022  8:30 AM Rebekah Saldanas [address] [facility]   2022  9:00 AM SS INF7 CH2 <1H [facility]   2022  9:30 AM SS INF7 CH3 <1H [facility]   2022  8:00 AM SS INF1 CH1 >4H [facility]   2022 11:00 AM SS INF7 CH3 <1H [facility]   2022  9:00 AM SS INF7 CH2 <1H [facility]   7/5/2022 11:00 AM SS INF4 CH4 <1H [facility]   7/8/2022  8:00 [facility]   7/12/2022 11:00 AM SS INF7 CH3 <1H [facility] Raz Davidson RN  April 26, 2022

## 2022-04-26 NOTE — PROGRESS NOTES
04/26/22 4:52 PM Refilled patient percocet q8h prn severe mucositis pain #30 tabs. Checked , no other providers or early refills.

## 2022-04-27 NOTE — PROGRESS NOTES
LYMPHEDEMA PT DAILY TREATMENT NOTE - [facility] 2-15    Patient Name: Dilcia Epstein  Date:2022  : 1970  [x]  Patient  Verified  Payor: [facility]ICAID [facility] / Product Type: Managed Care Medicaid /    In time:8:45 am  Out time:9:46 am  Total Treatment Time (min): 61  Total Timed Codes (min): 61  1:1 Treatment Time ( [address] only): 61   Visit #: 3   Treatment Area: Lymphedema, not elsewhere classified [I89.0]    SUBJECTIVE  Pain Level (0-10 scale): 2/10, neck pain, L>R  Any medication changes, allergies to medications, adverse drug reactions, diagnosis change, or new procedure performed?: [x] No    [] Yes (see summary sheet for update)  LLIS:   Subjective functional status/changes:   [] No changes reported  Patient indicates L/R neck pain. Indicates pain level 2/10, improved after previous treatment, returned to baseline. Agreeable to proceeding with treatment today. OBJECTIVE     min Therapeutic Exercise: [x] Remedial Lymphedema Exercise Program: reviewed scapular elevation/depression/retraction/protraction, cervical lateral flex/rotation x 10 reps, to continue 2x/day     [] Shoulder ROM Exercises   Rationale: Activate muscle pump to improve lymphatic fluid movement and decrease swelling to improve the patients ability to perform ADL and IADL skills and prevent worsening of swelling over time. 61 min Manual Therapy    Kinesiotaping: na       Manual Lymphatic Drainage (MLD):  Area to decongest: head and neck   Sequence used and effectiveness: HNL MLD sequence, L>R. STM bilateral upper trap, mid trap, cervical paraspinals, L SCM. Trigger points noted, with good response to manual therapy. Skin/wound care/debridement: Reviewed skin care principles:   Performed skin care with low pH lotion following manual lymph principles. Skin care products       Completed measurements for custom flat knit neck and chin compression garment, Kilo 3021, black.   Patient advised order will be sent to [facility] for benefits authorization. Sofia Goins 1942 may call him regarding coverage. Rationale: decrease pain and swelling to improve the patients ability to perform routine activities to improve quality of life. Vasopneumatic Device:  Deferred    Body Part: [] R [] L [] Bilateral  Pressure: [] Decreased [] Normal [] Increased  Treatment Cycles: [] 1  [] 2  [] 3   Rationale: To improve lymphatic fluid movement and decrease swelling to improve the patients ability to perform ADL and IADL skills and prevent worsening of swelling over time. With   [] TE   [] TA   [x] MT   [] SC   [] other: Patient Education: [x] Review HEP    [x] MLD Patient Education Reviewed with patient, as well as demonstration and instruction during MLD portion of the session. Continued education in self MLD technique with bathing and skin care. [] Progressed/Changed HEP based on:   [] positioning   [] Kinesiotape   [] Skin care   [] wound care   [] other:   [] na  Patient / caregiver re-demonstrated bandaging. [] Yes  [x] No  Compression bandaging/garment precautions reviewed: [] Yes  [x] No     Other Objective/Functional Measures:  Height:     Weight:      BMI:           Pain Level (0-10 scale) post treatment: 0/10      ASSESSMENT/Changes in Function:     Note soft tissue restrictions cervical paraspinals, SCM, upper traps. Fibrotic tissue changes submental region, with fibrosis techniques performed as tolerated. Custom flat knit compression garment measurements completed. Flexitouch vasopneumatic pump for home use medically necessary to provide effective home management of lymphedema, improving quality of life. Patient will continue to benefit from skilled PT services to  address ROM deficits, address swelling, analyze compression product fit and use and measure for compression products to attain remaining goals.      [x]  See Plan of Care  []  See progress note/recertification  []  See Discharge Summary    Progress towards goals / Updated goals: Ongoing  GOALS  Short term goals  Time frame: 6 weeks  1. Patient will demonstrate knowledge of signs/symptoms of infections/cellulitis and be independent in skin care to prevent cellulitis. 2.  Patient will demonstrate independence in lymphedema home program of therapeutic exercises to improve circulation and decongest head and neck region to improve ADLs. 3.  Patient will exhibit 10-15 degree improvement cervical lateral flexion/rotation to improve functional activities, including routine/leisure activities with 50% less pain.     Long term goals  Time frame: 12 weeks  1. Pt will show improvement in the LLIS by decreasing the score to 5 and thus allow improvement in patient's quality of life. 2.  Patient will be independent with don/doff of compression system and use in order to prevent reaccumulation of fluid at discharge. 3.  Pt will be independent in self-MLD and show stable limb volumes showing decongestion and pt. ready for transition to independent restorative phase of lymphedema therapy. PLAN  []  Upgrade activities as tolerated     [x]  Continue plan of care  []  Update interventions per flow sheet       []  Discharge due to:_  [x]  Other: fitcustom flat knit facemask/neck and chin wrap; vasopneumatic pump for home use.     Haritha Vera, PT, [facility] 4/27/2022

## 2022-04-28 NOTE — TELEPHONE ENCOUNTER
auth team called and stated that they need a per to per  Cpt code 29622 ct chest is still pending per to per needs to be done with aim  Phone #[phone]  Order number [id number]

## 2022-04-28 NOTE — TELEPHONE ENCOUNTER
[address] at [place]  [phone]    04/28/22 2:09 PM - Called the authorization department. Provided the authorization information. No further questions or concerns.

## 2022-05-03 NOTE — PROGRESS NOTES
[facility] Progress Note    Date: May 3, 2022    Name: Db Garcia    MRN: [id number]         : 1970    Mr. Peterson Arrived ambulatory and in no distress for Hydration. Assessment was completed, no acute issues at this time, no new complaints voiced. R chest wall port accessed without difficulty, no labs ordered. Mr. Kassie Chris vitals were reviewed. Visit Vitals  /73   Pulse 91   Temp 97.8 °F (36.6 °C)   Resp 16   Ht 6' (1.829 m)   Wt 69.9 kg (154 lb)   SpO2 98%   BMI 20.89 kg/m²         Medications:  Medications Administered     sodium chloride 0.9 % bolus infusion 1,000 mL     Admin Date  2022 Action  New Bag Dose  1,000 mL Rate  1,000 mL/hr Route  IntraVENous Administered By  Luz Heath RN                  Mr. Mahendra Barrios tolerated treatment well and was discharged from [place] [address] in stable condition. Port de-accessed, flushed & heparinized per protocol. He is to return on May 6 at 0800 for his next appointment.     Vladimir Gallagher RN  May 3, 2022

## 2022-05-04 NOTE — PROGRESS NOTES
LYMPHEDEMA PT DAILY TREATMENT NOTE - [facility] 2-15    Patient Name: Elder Centeno  Date:2022  : 1970  [x]  Patient  Verified  Payor: [facility] MEDICAID / Plan: KAITLIN NICHOLAS [facility] / Product Type: Managed Care Medicaid /    In time:8:38 am  Out time:9:40 am  Total Treatment Time (min): 62  Total Timed Codes (min): 62  1:1 Treatment Time ([facility] only): 62   Visit #: 4   Treatment Area: Lymphedema, not elsewhere classified [I89.0]    SUBJECTIVE  Pain Level (0-10 scale): 2/10, neck pain, L>R  Any medication changes, allergies to medications, adverse drug reactions, diagnosis change, or new procedure performed?: [x] No    [] Yes (see summary sheet for update)  LLIS:   Subjective functional status/changes:   [] No changes reported  Patient indicates L/R neck pain. Indicates pain level 2/10, improved after previous treatment, returned to baseline. States carryover pain relief about 2 hours after treatment, then pain returns. Agreeable to proceeding with treatment today. OBJECTIVE     min Therapeutic Exercise: [x] Remedial Lymphedema Exercise Program: reviewed scapular elevation/depression/retraction/protraction, cervical lateral flex/rotation x 10 reps, to continue 2x/day     [] Shoulder ROM Exercises   Rationale: Activate muscle pump to improve lymphatic fluid movement and decrease swelling to improve the patients ability to perform ADL and IADL skills and prevent worsening of swelling over time. 62 min Manual Therapy    Kinesiotaping: na       Manual Lymphatic Drainage (MLD):  Area to decongest: head and neck   Sequence used and effectiveness: HNL MLD sequence, L>R. STM bilateral upper trap, mid trap, cervical paraspinals, L SCM. Trigger points noted, with good response to manual therapy. Gentle PROM cervical rotation/lateral flexion bilateral, patient tolerated well.  Lateral flex 45, rotation 50 bilateral.     Skin/wound care/debridement: Reviewed skin care principles:   Performed skin care with low pH lotion following manual lymph principles. Skin care products       Previous visit: Completed measurements for custom flat knit neck and chin compression garment, Kilo 3021, black. Patient advised order will be sent to DME for benefits authorization. Sofia Goins 1942 may call him regarding coverage. Rationale: decrease pain and swelling to improve the patients ability to perform routine activities to improve quality of life. Vasopneumatic Device:  Deferred    Body Part: [] R [] L [] Bilateral  Pressure: [] Decreased [] Normal [] Increased  Treatment Cycles: [] 1  [] 2  [] 3   Rationale: To improve lymphatic fluid movement and decrease swelling to improve the patients ability to perform ADL and IADL skills and prevent worsening of swelling over time. With   [] TE   [] TA   [x] MT   [] SC   [] other: Patient Education: [x] Review HEP    [x] MLD Patient Education Reviewed with patient, as well as demonstration and instruction during MLD portion of the session. Continued education in self MLD technique with bathing and skin care. [] Progressed/Changed HEP based on:   [] positioning   [] Kinesiotape   [] Skin care   [] wound care   [] other:   [] na  Patient / caregiver re-demonstrated bandaging. [] Yes  [x] No  Compression bandaging/garment precautions reviewed: [] Yes  [x] No     Other Objective/Functional Measures:  Height:     Weight:      BMI:           Pain Level (0-10 scale) post treatment: 0/10      ASSESSMENT/Changes in Function:     Note soft tissue restrictions cervical paraspinals, SCM, upper traps. Fibrotic tissue changes submental region, with fibrosis techniques performed as tolerated. Increased fullness R lower jaw, good response to treatment. Custom flat knit compression garment measurements completed previous visit. Flexitouch vasopneumatic pump for home use medically necessary to provide effective home management of lymphedema, improving quality of life.     Patient will continue to benefit from skilled PT services to  address ROM deficits, address swelling, analyze compression product fit and use and measure for compression products to attain remaining goals. [x]  See Plan of Care  []  See progress note/recertification  []  See Discharge Summary    Progress towards goals / Updated goals: Ongoing  GOALS  Short term goals  Time frame: 6 weeks  1. Patient will demonstrate knowledge of signs/symptoms of infections/cellulitis and be independent in skin care to prevent cellulitis. 5/4/2022 ongoing  2. Patient will demonstrate independence in lymphedema home program of therapeutic exercises to improve circulation and decongest head and neck region to improve ADLs. 5/4/2022 ongoing  3. Patient will exhibit 10-15 degree improvement cervical lateral flexion/rotation to improve functional activities, including routine/leisure activities with 50% less pain. 5/4/2022 ongoing   Long term goals  Time frame: 12 weeks  1. Pt will show improvement in the LLIS by decreasing the score to 5 and thus allow improvement in patient's quality of life. 2.  Patient will be independent with don/doff of compression system and use in order to prevent reaccumulation of fluid at discharge. 3.  Pt will be independent in self-MLD and show stable limb volumes showing decongestion and pt. ready for transition to independent restorative phase of lymphedema therapy. PLAN  []  Upgrade activities as tolerated     [x]  Continue plan of care  []  Update interventions per flow sheet       []  Discharge due to:_  [x]  Other: fit custom flat knit facemask/neck and chin wrap; vasopneumatic pump for home use.     Bren Quevedo, PT, ABIDA-ABIGAIL 5/4/2022

## 2022-05-05 NOTE — TELEPHONE ENCOUNTER
Patient called to move his appointments one week out do to his stomach was hurting all night and did not get much sleep.   #[phone]

## 2022-05-09 PROBLEM — D50.0 ANEMIA DUE TO GI BLOOD LOSS: Status: ACTIVE | Noted: 2022-01-01

## 2022-05-09 NOTE — H&P
History and Physical    Patient: Brian Figueroa MRN: [id number]  SSN: [id number]    YOB: 1970  Age: 49 y.o. Sex: male      Subjective:      Brian Figueroa is a 49 y.o. male with a PMH of throat cancer and COPD who presented with worsening shortness of breath productive cough for the past 4 days. Associated with this patient has also had chills, black stools, palpitations, and bloody sputum. Of note patient has been receiving chemotherapy for the past 2 weeks. Patient denies abdominal pain, nausea, vomiting, fever, or hematic emesis. Patient denies recent sick contacts or travel. Patient denies history of tobacco, drug, or alcohol use. In ED patient afebrile, tachycardic and hypertensive 149/70. Patient to be admitted for further work-up. CXR showed consolidation of the right middle lobe (previously noted) with nodular and bandlike opacities in the lungs not significantly changed. Initial lab significant for hemoglobin of 4.4, hematocrit of 14, creatinine of 1.36, and . Patient to be transfused with 3 units of packed red blood cells, IV protonix, and home medications restarted. Oncology and GI consulted.      Past Medical History:   Diagnosis Date    Chronic pain     THROAT, EARS, NECK R/T CANCER    COPD (chronic obstructive pulmonary disease) (HCC)     EMPHASEMA    Psychiatric disorder     ANXIETY R/T CANCER    Throat cancer (HCC)     Tracheostomy present ([facility] [address]      Past Surgical History:   Procedure Laterality Date    HX ORTHOPAEDIC      LEFT ARM- \" PUT PLATE IN\"    HX TRACHEOSTOMY  02/08/2021    IR INSERT TUNL CVC W PORT OVER 5 YEARS  5/21/2021    IR PLACE CVAD FLUORO GUIDE  5/21/2021      Family History   Problem Relation Age of Onset    Diabetes Mother     Diabetes Father     Kidney Disease Father     Diabetes Sister     Heart Disease Daughter     Anesth Problems Neg Hx      Social History     Tobacco Use    Smoking status: Former Smoker     Packs/day: 1.50     Years: 30.00 Pack years: 45.00     Quit date: 10/28/2020     Years since quittin.5    Smokeless tobacco: Never Used   Substance Use Topics    Alcohol use: Not Currently      Prior to Admission medications    Medication Sig Start Date End Date Taking? Authorizing Provider   loratadine (CLARITIN) 10 mg tablet Take 1 Tablet by mouth daily as needed for Allergies. 22  Yes Bryson Mascorro NP   apixaban (ELIQUIS) 5 mg tablet Take 1 Tablet by mouth two (2) times a day. 4/15/22  Yes Bryson Mascorro NP   potassium chloride (Klor-Con M20) 20 mEq tablet Take 1 Tablet by mouth daily. 22  Yes Bryson Mascorro NP   lidocaine (XYLOCAINE) 2 % solution Take 15 mL by mouth as needed for Pain. 22  Yes Karol Christianson NP   nystatin (MYCOSTATIN) 100,000 unit/mL suspension Take 5 mL by mouth four (4) times daily. swish and spit 22  Yes Bryson Mascorro NP   lidocaine-prilocaine (EMLA) topical cream Apply a dime size amount to port site 30-60 minutes before chemotherapy to prevent pain with access. 21  Yes Bryson Mascorro NP   oxyCODONE-acetaminophen (PERCOCET) 5-325 mg per tablet Take 1 Tablet by mouth every eight (8) hours as needed for Pain for up to 14 days. Max Daily Amount: 3 Tablets. Patient not taking: Reported on 2022 4/26/22 5/10/22  Bryson Mascorro NP   ondansetron hcl (ZOFRAN) 8 mg tablet Take 1 Tablet by mouth every eight (8) hours as needed for Nausea or Vomiting. Patient not taking: Reported on 2022   Bryson Mascorro NP   prochlorperazine (Compazine) 10 mg tablet Take 1 Tablet by mouth every six (6) hours as needed for Nausea or Vomiting. Patient not taking: Reported on 2022   Bryson Mascorro NP   nut tx, lact-reduced, iron (Boost VHC) 0.09-2.25 gram-kcal/mL liqd Take 4 Cans by mouth daily.   Patient not taking: Reported on 2022   Bryson Mascorro, NP   guaiFENesin ER (Mucinex) 600 mg ER tablet Take 1 Tablet by mouth two (2) times a day.  Patient not taking: Reported on 5/9/2022 6/22/21   Adrian Mascorro NP   metFORMIN (GLUCOPHAGE) 500 mg tablet Take  by mouth two (2) times daily (with meals). Patient not taking: Reported on 3/25/2022    Provider, Historical   aluminum-magnesium hydroxide 200-200 mg/5 mL susp 5 mL, diphenhydrAMINE 12.5 mg/5 mL liqd 5 mL, lidocaine 2 % soln 5 mL 5 mL by Swish and Spit route two (2) times a day. Magic mouth wash   Maalox  Lidocaine 2% viscous   Diphenhydramine oral solution     Pharmacy to mix equal portions of ingredients to a total volume as indicated in the dispense amount. Patient not taking: Reported on 5/9/2022    Provider, Historical        No Known Allergies    Review of Systems:  Review of Systems   Constitutional: Positive for chills and weight loss. Respiratory: Positive for cough, hemoptysis and shortness of breath. Cardiovascular: Positive for chest pain and palpitations. Gastrointestinal: Positive for melena. Negative for abdominal pain, constipation, nausea and vomiting. Genitourinary: Negative for dysuria and hematuria. All other systems reviewed and are negative. Objective:     Recent Results (from the past 24 hour(s))   CBC WITH AUTOMATED DIFF    Collection Time: 05/09/22  9:00 AM   Result Value Ref Range    WBC 9.8 4.1 - 11.1 K/uL    RBC 1.36 (L) 4.10 - 5.70 M/uL    HGB 4.4 (LL) 12.1 - 17.0 g/dL    HCT 14.0 (LL) 36.6 - 50.3 %    .9 (H) 80.0 - 99.0 FL    MCH 32.4 26.0 - 34.0 PG    MCHC 31.4 30.0 - 36.5 g/dL    RDW 19.5 (H) 11.5 - 14.5 %    PLATELET 985 160 - 070 K/uL    MPV 10.9 8.9 - 12.9 FL    NRBC 0.6 (H) 0.0  WBC    ABSOLUTE NRBC 0.06 (H) 0.00 - 0.01 K/uL    NEUTROPHILS PENDING %    LYMPHOCYTES PENDING %    MONOCYTES PENDING %    EOSINOPHILS PENDING %    BASOPHILS PENDING %    IMMATURE GRANULOCYTES PENDING %    ABS. NEUTROPHILS PENDING K/UL    ABS. LYMPHOCYTES PENDING K/UL    ABS. MONOCYTES PENDING K/UL    ABS. EOSINOPHILS PENDING K/UL    ABS.  BASOPHILS PENDING K/UL    ABS. IMM. GRANS. PENDING K/UL    DF PENDING    METABOLIC PANEL, COMPREHENSIVE    Collection Time: 05/09/22  9:00 AM   Result Value Ref Range    Sodium 135 (L) 136 - 145 mmol/L    Potassium 4.4 3.5 - 5.1 mmol/L    Chloride 102 97 - 108 mmol/L    CO2 27 21 - 32 mmol/L    Anion gap 6 5 - 15 mmol/L    Glucose 127 (H) 65 - 100 mg/dL    BUN 23 (H) 6 - 20 mg/dL    Creatinine 1.36 (H) 0.70 - 1.30 mg/dL    BUN/Creatinine ratio 17 12 - 20      GFR est AA >60 >60 ml/min/1.73m2    GFR est non-AA 55 (L) >60 ml/min/1.73m2    Calcium 9.0 8.5 - 10.1 mg/dL    Bilirubin, total 0.2 0.2 - 1.0 mg/dL    AST (SGOT) 16 15 - 37 U/L    ALT (SGPT) 10 (L) 12 - 78 U/L    Alk.  phosphatase 81 45 - 117 U/L    Protein, total 6.6 6.4 - 8.2 g/dL    Albumin 3.2 (L) 3.5 - 5.0 g/dL    Globulin 3.4 2.0 - 4.0 g/dL    A-G Ratio 0.9 (L) 1.1 - 2.2     TROPONIN-HIGH SENSITIVITY    Collection Time: 05/09/22  9:00 AM   Result Value Ref Range    Troponin-High Sensitivity 4 0 - 76 ng/L   NT-PRO BNP    Collection Time: 05/09/22  9:00 AM   Result Value Ref Range    NT pro- (H) <125 pg/mL   MAGNESIUM    Collection Time: 05/09/22  9:00 AM   Result Value Ref Range    Magnesium 1.9 1.6 - 2.4 mg/dL   TYPE & SCREEN    Collection Time: 05/09/22 10:13 AM   Result Value Ref Range    Crossmatch Expiration 05/12/2022,2359     ABO/Rh(D) O Positive     Antibody screen Negative     Unit number [id number]     Blood component type RC LR     Unit division 00     Status of unit [place] to transfuse     Crossmatch result Compatible     Unit number [id number]     Blood component type RC LR,2     Unit division 00     Status of unit [place] to transfuse     Crossmatch result Compatible     Unit number [id number]     Blood component type RC LR     Unit division 00     Status of unit [place] to transfuse     Crossmatch result Compatible     Unit number [id number] Blood component type RC LR,2     Unit division 00     Status of unit Allocated     TRANSFUSION STATUS Ok to transfuse     Crossmatch result Compatible         XR CHEST PORT   Final Result   Masslike consolidation at the right middle lobe. Nodular and   bandlike opacities in the lungs. Findings concerning for malignancy without   significant interval change. CTA CHEST W OR W WO CONT    (Results Pending)        Vitals:    05/09/22 0837 05/09/22 1010 05/09/22 1130   BP: (!) 149/70 (P) 101/71 113/75   Pulse: (!) 106 (P) 97 93   Resp: 19 (P) 20 14   Temp: 98.1 °F (36.7 °C)     SpO2: 100% (P) 99% 100%        Physical Exam:  Physical Exam  Vitals and nursing note reviewed. Constitutional:       Comments: Thinly built   HENT:      Head: Normocephalic and atraumatic. Nose: No congestion or rhinorrhea. Mouth/Throat:      Mouth: Mucous membranes are moist.      Pharynx: No oropharyngeal exudate. Neck:      Vascular: No carotid bruit. Comments: Tracheostomy without exudates or bloody sputum  Cardiovascular:      Rate and Rhythm: Regular rhythm. Tachycardia present. Comments: Port-A-Cath on right chest  Pulmonary:      Effort: No respiratory distress. Breath sounds: No wheezing. Abdominal:      General: Bowel sounds are normal. There is no distension. Palpations: Abdomen is soft. Tenderness: There is no abdominal tenderness. Genitourinary:     Comments: No Laboy in place recent urine output yellow without hematuria  Musculoskeletal:      Cervical back: Neck supple. No rigidity or tenderness. Right lower leg: No edema. Left lower leg: No edema. Skin:     General: Skin is warm. Capillary Refill: Capillary refill takes less than 2 seconds. Neurological:      Mental Status: He is alert and oriented to person, place, and time.    Psychiatric:         Mood and Affect: Mood normal.          Assessment:     Hospital Problems  Date Reviewed: 3/9/2022          Codes Class Noted POA    Anemia due to GI blood loss ICD-10-CM: D50.0  ICD-9-CM: 280.0  5/9/2022 Unknown              Plan:     Anemia, acute on chronic  Hemoglobin 4.4, will transfuse 3 units of packed red blood cells, H/H every 4 hours  Continue on IV protonix  Iron panel pending  FOBT pending  GI consulted    Tachycardia  Likely secondary to anemia   EKG pending  Troponin negative,   Will monitor    TASHI   Creatinine 1.36, will trend  Will start IVF, gentle    COPD without exacerbation  Currently on RA   CTA of the chest pending  Nebulizer as needed    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  S/p total laryngectomy/cricopharyngeal myotomy and multiple rounds of chemo/radiation  Diagnosed in 1/2021  Currently receiving chemotherapy  Oncology consulted    Abnormal CXR with SOB  CXR appears without significant change  Afebrile without elevated WBC, will obtain procalcitonin  CTA of the chest pending  UA pending    DVT Prophylaxis: contraindicated in anemia  GI Prophylaxis: prontonix IV  CODE STATUS: FULL    Maggitrevor Esquivel, brother - [phone] - updated on patient condition  Girl-friend Sania [phone]    Total Time spent in direct and indirect care including assessment review of labs and coordination of services and consultations: Greater than 75 minutes.     Signed By: CATALINA Hale     May 9, 2022

## 2022-05-09 NOTE — Clinical Note
Status[de-identified] INPATIENT [101]   Type of Bed: Medical [8]   Cardiac Monitoring Required?: No   Inpatient Hospitalization Certified Necessary for the Following Reasons: 3.  Patient receiving treatment that can only be provided in an inpatient setting (further clarification in H&P documentation)   Admitting Diagnosis: Anemia due to GI blood loss [[id number]]   Admitting Physician: Kathryn Colon [[id number]]   Attending Physician: Kathryn Colon [[id number]]   Estimated Length of Stay: 2 Midnights   Discharge Plan[de-identified] Home with Office Follow-up

## 2022-05-09 NOTE — CONSULTS
Consult    Subjective:     Kenn Bro is a 49 y.o.  male who is being seen for head and neck cancer. Patient follows with Dr. Edward Arroyo. Patient getting 5-FU, cisplatin, pembrolizumab. Last treatment 3 weeks ago. Patient admitted with shortness of breath and palpitations. Patient having black stools. Denies any abdominal pain. Patient feeling tired.     Past Medical History:   Diagnosis Date    Chronic pain     THROAT, EARS, NECK R/T CANCER    COPD (chronic obstructive pulmonary disease) (HCC)     EMPHASEMA    Psychiatric disorder     ANXIETY R/T CANCER    Throat cancer (HCC)     Tracheostomy present (HCC)       Past Surgical History:   Procedure Laterality Date    HX ORTHOPAEDIC      LEFT ARM- \" PUT PLATE IN\"    HX TRACHEOSTOMY  2021    IR INSERT TUNL CVC W PORT OVER 5 YEARS  2021    IR PLACE CVAD FLUORO GUIDE  2021     Family History   Problem Relation Age of Onset    Diabetes Mother     Diabetes Father     Kidney Disease Father     Diabetes Sister     Heart Disease Daughter     Anesth Problems Neg Hx       Social History     Tobacco Use    Smoking status: Former Smoker     Packs/day: 1.50     Years: 30.00     Pack years: 45.00     Quit date: 10/28/2020     Years since quittin.5    Smokeless tobacco: Never Used   Substance Use Topics    Alcohol use: Not Currently       Current Facility-Administered Medications   Medication Dose Route Frequency Provider Last Rate Last Admin    0.9% sodium chloride infusion 250 mL  250 mL IntraVENous PRN Urvashi Dowell PA        sodium chloride (NS) flush 5-40 mL  5-40 mL IntraVENous Q8H [facility]ell [facility]        sodium chloride (NS) flush 5-40 mL  5-40 mL IntraVENous PRN CATALINA Pope   10 mL at 22 1252    acetaminophen (TYLENOL) tablet 650 mg  650 mg Oral Q6H PRN Bradley Dowell PA        Or    acetaminophen (TYLENOL) suppository 650 mg  650 mg Rectal Q6H PRN Bradley Dowell PA        polyethylene glycol (MIRALAX) packet 17 g  17 g Oral DAILY PRN Michael Dowell PA        ondansetron (ZOFRAN ODT) tablet 4 mg  4 mg Oral Q8H PRN Urvashi Dowell PA        Or    ondansetron (ZOFRAN) injection 4 mg  4 mg IntraVENous Q6H PRN Urvashi Dowell PA        lidocaine-prilocaine (EMLA) 2.5-2.5 % cream   Topical PRN Urvashi Dowell PA        prochlorperazine (COMPAZINE) tablet 10 mg  10 mg Oral Q6H PRN Urvashi Dowell PA        potassium chloride (K-DUR, KLOR-CON M20) SR tablet 20 mEq  20 mEq Oral DAILY CATALINA Acosta   20 mEq at 05/09/22 1252    nystatin (MYCOSTATIN) 100,000 unit/mL oral suspension 500,000 Units  500,000 Units Oral QID CATALINA Acosta   500,000 Units at 05/09/22 1251    . PHARMACY TO SUBSTITUTE PER PROTOCOL (Reordered from: nut tx, lact-reduced, iron (Boost VHC) 0.09-2.25 gram-kcal/mL liqd)    Per Protocol Michael Dowell PA        lidocaine (XYLOCAINE) 2 % viscous solution 15 mL  15 mL Mouth/Throat PRN Urvashi Dowell PA        [START ON 5/10/2022] pantoprazole (PROTONIX) 40 mg in 0.9% sodium chloride 10 mL injection  40 mg IntraVENous Q24H Urvashi Dowell [place]        loratadine (CLARITIN) tablet 10 mg  10 mg Oral DAILY PRN Urvashi Dowell PA        albuterol-ipratropium (DUO-NEB) 2.5 MG-0.5 MG/3 ML  3 mL Nebulization Q4H PRN Urvashi Dowell PA        lactated Ringers infusion  75 mL/hr IntraVENous CONTINUOUS Jonah Acosta 75 mL/hr at 05/09/22 1253 75 mL/hr at 05/09/22 1253     Current Outpatient Medications   Medication Sig Dispense Refill    loratadine (CLARITIN) 10 mg tablet Take 1 Tablet by mouth daily as needed for Allergies. 30 Tablet 3    apixaban (ELIQUIS) 5 mg tablet Take 1 Tablet by mouth two (2) times a day. 60 Tablet 4    potassium chloride (Klor-Con M20) 20 mEq tablet Take 1 Tablet by mouth daily. 30 Tablet 1    lidocaine (XYLOCAINE) 2 % solution Take 15 mL by mouth as needed for Pain.  400 mL 3    nystatin (MYCOSTATIN) 100,000 unit/mL suspension Take 5 mL by mouth four (4) times daily. swish and spit 200 mL 2    lidocaine-prilocaine (EMLA) topical cream Apply a dime size amount to port site 30-60 minutes before chemotherapy to prevent pain with access. 30 g 0    oxyCODONE-acetaminophen (PERCOCET) 5-325 mg per tablet Take 1 Tablet by mouth every eight (8) hours as needed for Pain for up to 14 days. Max Daily Amount: 3 Tablets. (Patient not taking: Reported on 5/9/2022) 30 Tablet 0    ondansetron hcl (ZOFRAN) 8 mg tablet Take 1 Tablet by mouth every eight (8) hours as needed for Nausea or Vomiting. (Patient not taking: Reported on 5/9/2022) 30 Tablet 3    prochlorperazine (Compazine) 10 mg tablet Take 1 Tablet by mouth every six (6) hours as needed for Nausea or Vomiting. (Patient not taking: Reported on 5/9/2022) 30 Tablet 1    nut tx, lact-reduced, iron (Boost VHC) 0.09-2.25 gram-kcal/mL liqd Take 4 Cans by mouth daily. (Patient not taking: Reported on 5/9/2022) 30 Bottle 3    guaiFENesin ER (Mucinex) 600 mg ER tablet Take 1 Tablet by mouth two (2) times a day. (Patient not taking: Reported on 5/9/2022) 60 Tablet 1    metFORMIN (GLUCOPHAGE) 500 mg tablet Take  by mouth two (2) times daily (with meals). (Patient not taking: Reported on 3/25/2022)      aluminum-magnesium hydroxide 200-200 mg/5 mL susp 5 mL, diphenhydrAMINE 12.5 mg/5 mL liqd 5 mL, lidocaine 2 % soln 5 mL 5 mL by Swish and Spit route two (2) times a day. Magic mouth wash   Maalox  Lidocaine 2% viscous   Diphenhydramine oral solution     Pharmacy to mix equal portions of ingredients to a total volume as indicated in the dispense amount. (Patient not taking: Reported on 5/9/2022)          No Known Allergies     Review of Systems:  Other than mentioned in HPI 12 point review of systems negative    Objective: Intake and Output:    No intake/output data recorded. No intake/output data recorded.   Blood pressure 115/81, pulse 96, temperature 98.1 °F (36.7 °C), resp. rate 16, SpO2 97 %. Physical Exam:   General:  Alert, cooperative, no distress, appears stated age. Status post tracheostomy. Pale mucosa noted. Lungs:   Clear to auscultation bilaterally. Chest wall:  No tenderness or deformity. Heart:  Regular rate and rhythm, S1, S2 normal   Abdomen:   Soft, non-tender. Bowel sounds normal. No masses,  No organomegaly. Extremities: Extremities normal, atraumatic, no cyanosis or edema. Pulses: 2+ and symmetric all extremities. Skin: Skin color, texture, turgor normal. No rashes or lesions   Neurologic: CNII-XII intact. No gross sensory or motor deficits       Images:   XR CHEST PORT   Final Result   Masslike consolidation at the right middle lobe. Nodular and   bandlike opacities in the lungs. Findings concerning for malignancy without   significant interval change.       CTA CHEST W OR W WO CONT    (Results Pending)            Data Review:   Recent Results (from the past 24 hour(s))   EKG, 12 LEAD, INITIAL    Collection Time: 05/09/22  8:41 AM   Result Value Ref Range    Ventricular Rate 100 BPM    Atrial Rate 100 BPM    P-R Interval 160 ms    QRS Duration 88 ms    Q-T Interval 332 ms    QTC Calculation (Bezet) 428 ms    Calculated P Axis 42 degrees    Calculated R Axis 47 degrees    Calculated T Axis 78 degrees    Diagnosis       Sinus rhythm with Premature atrial complexes  Nonspecific T wave abnormality  Abnormal ECG  When compared with ECG of 20-DEC-2021 13:14,  Nonspecific T wave abnormality now evident in Anterior leads  Confirmed by Lori Loja ([id number]) on 5/9/2022 1:16:57 PM     CBC WITH AUTOMATED DIFF    Collection Time: 05/09/22  9:00 AM   Result Value Ref Range    WBC 9.7 4.1 - 11.1 K/uL    RBC 1.36 (L) 4.10 - 5.70 M/uL    HGB 4.4 (LL) 12.1 - 17.0 g/dL    HCT 14.0 (LL) 36.6 - 50.3 %    .9 (H) 80.0 - 99.0 FL    MCH 32.4 26.0 - 34.0 PG    MCHC 31.4 30.0 - 36.5 g/dL    RDW 19.5 (H) 11.5 - 14.5 %    PLATELET 295 150 - 400 K/uL    MPV 10.9 8.9 - 12.9 FL    NRBC 0.6 (H) 0.0  WBC    ABSOLUTE NRBC 0.06 (H) 0.00 - 0.01 K/uL    NEUTROPHILS 86 (H) 32 - 75 %    LYMPHOCYTES 8 (L) 12 - 49 %    MONOCYTES 5 5 - 13 %    EOSINOPHILS 0 0 - 7 %    BASOPHILS 1 0 - 1 %    NRBC 1.0  WBC    IMMATURE GRANULOCYTES 0 %    ABS. NEUTROPHILS 8.3 (H) 1.8 - 8.0 K/UL    ABS. LYMPHOCYTES 0.8 0.8 - 3.5 K/UL    ABS. MONOCYTES 0.5 0.0 - 1.0 K/UL    ABS. EOSINOPHILS 0.0 0.0 - 0.4 K/UL    ABS. BASOPHILS 0.1 0.0 - 0.1 K/UL    ABSOLUTE NRBC 0.10 K/uL    ABS. IMM. GRANS. 0.0 K/UL    DF Manual      RBC COMMENTS Macrocytosis  1+        RBC COMMENTS Hypochromia  2+        RBC COMMENTS Anisocytosis  1+       METABOLIC PANEL, COMPREHENSIVE    Collection Time: 05/09/22  9:00 AM   Result Value Ref Range    Sodium 135 (L) 136 - 145 mmol/L    Potassium 4.4 3.5 - 5.1 mmol/L    Chloride 102 97 - 108 mmol/L    CO2 27 21 - 32 mmol/L    Anion gap 6 5 - 15 mmol/L    Glucose 127 (H) 65 - 100 mg/dL    BUN 23 (H) 6 - 20 mg/dL    Creatinine 1.36 (H) 0.70 - 1.30 mg/dL    BUN/Creatinine ratio 17 12 - 20      GFR est AA >60 >60 ml/min/1.73m2    GFR est non-AA 55 (L) >60 ml/min/1.73m2    Calcium 9.0 8.5 - 10.1 mg/dL    Bilirubin, total 0.2 0.2 - 1.0 mg/dL    AST (SGOT) 16 15 - 37 U/L    ALT (SGPT) 10 (L) 12 - 78 U/L    Alk.  phosphatase 81 45 - 117 U/L    Protein, total 6.6 6.4 - 8.2 g/dL    Albumin 3.2 (L) 3.5 - 5.0 g/dL    Globulin 3.4 2.0 - 4.0 g/dL    A-G Ratio 0.9 (L) 1.1 - 2.2     TROPONIN-HIGH SENSITIVITY    Collection Time: 05/09/22  9:00 AM   Result Value Ref Range    Troponin-High Sensitivity 4 0 - 76 ng/L   NT-PRO BNP    Collection Time: 05/09/22  9:00 AM   Result Value Ref Range    NT pro- (H) <125 pg/mL   MAGNESIUM    Collection Time: 05/09/22  9:00 AM   Result Value Ref Range    Magnesium 1.9 1.6 - 2.4 mg/dL   TYPE & SCREEN    Collection Time: 05/09/22 10:13 AM   Result Value Ref Range    Crossmatch Expiration 05/12/2022,2359     ABO/Rh(D) Allen Spatz Positive     Antibody screen Negative     Unit number [id number]     Blood component type RC LR     Unit division 00     Status of unit Iva to transfuse     Crossmatch result Compatible     Unit number [id number]     Blood component type RC LR,2     Unit division 00     Status of unit [place]uc[place] to transfuse     Crossmatch result Compatible     Unit number [id number]     Blood component type RC LR     Unit division 00     Status of unit Iva to transfuse     Crossmatch result Compatible     Unit number [id number]     Blood component type RC LR,2     Unit division 00     Status of unit 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 S/p concurrent chemoradiation radiation 7/19/21. Pt has recurrent disease with possible lung metastases. S/p  C4 of Cis-5-FU-Pembrolizumab 3wks ago. F/u with  next week. Acute on chronic anemia: Due to chemotherapy and possible GI bleeding. GI consult pending. Patient getting 3 units of PRBC. Check stool for occult blood, iron studies, B12 folate. Monitor H&H. Transfuse PRBC if hemoglobin less than 7 or symptomatic. History of PE: Most likely shortness of breath due to anemia ,COPD,lung metastases. Repeat CTA chest pending. Hold Eliquis due to possible GI bleeding. TASHI:continue IV fluids. Thank you for the consult.

## 2022-05-09 NOTE — PROGRESS NOTES
Reason for Admission:  Anemia                     RUR Score:   21%                  Plan for utilizing home health:   Not at this time       PCP: First and Last name:  Sebastian Kingston NP     Name of Practice:    Are you a current patient: Yes/No:    Approximate date of last visit: 3 months ago   Can you participate in a virtual visit with your PCP:                     Current Advanced Directive/Advance Care Plan: Full Code      Healthcare Decision Maker:   Click here to complete [employer] including selection of the Healthcare Decision Maker Relationship (ie \"Primary\")           Padmini Mayo, brother, [phone]                  Transition of Care Plan:     Met f/f with Pt, he has a trach, Pt was able to answer CM questions with a yes or no. Pt confirmed that the information on the face sheet is correct. Pt stated that he lives with his girlfriend. Pt stated no HH, no DME, and is independent with ADL. Pt stated that he uses [facility] in [place]. Pt stated that family will give him a ride home when he is D/C. CM Dispo: Home with no needs at this time.

## 2022-05-09 NOTE — ED PROVIDER NOTES
EMERGENCY DEPARTMENT HISTORY AND PHYSICAL EXAM      Date: 5/9/2022  Patient Name: Kristina Mattson    History of Presenting Illness     Chief Complaint   Patient presents with    Shortness of Breath    Palpitations       History Provided By: Patient    HPI: Kristina Mattson, 49 y.o. male with a past medical history significant No significant past medical history presents to the ED with chief complaint of Shortness of Breath and Palpitations  . -year-old male with known throat and lung cancer presents with shortness of breath palpitations. Feeling fatigued and dizzy. No coughing or fevers. No change in sputum. No black or bloody stool. There are no other complaints, changes, or physical findings at this time. PCP: Levi Levy NP    Current Facility-Administered Medications   Medication Dose Route Frequency Provider Last Rate Last Admin    0.9% sodium chloride infusion 250 mL  250 mL IntraVENous PRN Yanelis Watkins MD         Current Outpatient Medications   Medication Sig Dispense Refill    loratadine (CLARITIN) 10 mg tablet Take 1 Tablet by mouth daily as needed for Allergies. 30 Tablet 3    oxyCODONE-acetaminophen (PERCOCET) 5-325 mg per tablet Take 1 Tablet by mouth every eight (8) hours as needed for Pain for up to 14 days. Max Daily Amount: 3 Tablets. 30 Tablet 0    apixaban (ELIQUIS) 5 mg tablet Take 1 Tablet by mouth two (2) times a day. 60 Tablet 4    potassium chloride (Klor-Con M20) 20 mEq tablet Take 1 Tablet by mouth daily. 30 Tablet 1    lidocaine (XYLOCAINE) 2 % solution Take 15 mL by mouth as needed for Pain. 400 mL 3    magic mouthwash (FIRST-MOUTHWASH BLM) 042--40 mg/30 mL mwsh oral suspension Take 10 mL by mouth every four (4) hours as needed (mucositis). 420 mL 1    nystatin (MYCOSTATIN) 100,000 unit/mL suspension Take 5 mL by mouth four (4) times daily.  swish and spit 200 mL 2    ondansetron hcl (ZOFRAN) 8 mg tablet Take 1 Tablet by mouth every eight (8) hours as needed for Nausea or Vomiting. 30 Tablet 3    prochlorperazine (Compazine) 10 mg tablet Take 1 Tablet by mouth every six (6) hours as needed for Nausea or Vomiting. 30 Tablet 1    nut tx, lact-reduced, iron (Boost VHC) 0.09-2.25 gram-kcal/mL liqd Take 4 Cans by mouth daily. 30 Bottle 3    guaiFENesin ER (Mucinex) 600 mg ER tablet Take 1 Tablet by mouth two (2) times a day. 60 Tablet 1    lidocaine-prilocaine (EMLA) topical cream Apply a dime size amount to port site 30-60 minutes before chemotherapy to prevent pain with access. 30 g 0    metFORMIN (GLUCOPHAGE) 500 mg tablet Take  by mouth two (2) times daily (with meals). (Patient not taking: Reported on 3/25/2022)      aluminum-magnesium hydroxide 200-200 mg/5 mL susp 5 mL, diphenhydrAMINE 12.5 mg/5 mL liqd 5 mL, lidocaine 2 % soln 5 mL 5 mL by Swish and Spit route two (2) times a day. Magic mouth wash   Maalox  Lidocaine 2% viscous   Diphenhydramine oral solution     Pharmacy to mix equal portions of ingredients to a total volume as indicated in the dispense amount.          Past History     Past Medical History:  Past Medical History:   Diagnosis Date    Chronic pain     THROAT, EARS, NECK R/T CANCER    COPD (chronic obstructive pulmonary disease) (HCC)     EMPHASEMA    Psychiatric disorder     ANXIETY R/T CANCER    Throat cancer (HCC)     Tracheostomy present ([facility] [address]        Past Surgical History:  Past Surgical History:   Procedure Laterality Date    HX ORTHOPAEDIC      LEFT ARM- \" PUT PLATE IN\"    HX TRACHEOSTOMY  02/08/2021    IR INSERT TUNL CVC W PORT OVER 5 YEARS  5/21/2021    IR PLACE CVAD FLUORO GUIDE  5/21/2021       Family History:  Family History   Problem Relation Age of Onset    Diabetes Mother     Diabetes Father     Kidney Disease Father     Diabetes Sister     Heart Disease Daughter     Anesth Problems Neg Hx        Social History:  Social History     Tobacco Use    Smoking status: Former Smoker     Packs/day: 1.50     Years: 30.00     Pack years: 45.00     Quit date: 10/28/2020     Years since quittin.5    Smokeless tobacco: Never Used   Vaping Use    Vaping Use: Never used   Substance Use Topics    Alcohol use: Not Currently    Drug use: Never       Allergies:  No Known Allergies      Review of Systems   Review of Systems   Constitutional: Negative. Negative for chills, fatigue and fever. HENT: Negative. Negative for congestion, ear pain, nosebleeds and sore throat. Eyes: Negative. Negative for pain, discharge and visual disturbance. Respiratory: Positive for shortness of breath. Negative for cough and chest tightness. Cardiovascular: Positive for palpitations. Negative for chest pain and leg swelling. Gastrointestinal: Negative. Negative for abdominal pain, blood in stool, constipation, diarrhea, nausea and vomiting. Endocrine: Negative. Genitourinary: Negative. Negative for difficulty urinating, dysuria and flank pain. Musculoskeletal: Negative. Negative for back pain and myalgias. Skin: Negative. Negative for rash and wound. Allergic/Immunologic: Negative. Neurological: Negative. Negative for dizziness, syncope, weakness, numbness and headaches. Hematological: Negative. Does not bruise/bleed easily. Psychiatric/Behavioral: Negative. Negative for agitation, confusion, hallucinations and suicidal ideas. All other systems reviewed and are negative. Physical Exam   Physical Exam  Vitals and nursing note reviewed. Constitutional:       General: He is not in acute distress. Appearance: He is normal weight. He is not ill-appearing. HENT:      Head: Normocephalic and atraumatic. Right Ear: External ear normal.      Left Ear: External ear normal.      Nose: Nose normal. No rhinorrhea. Mouth/Throat:      Mouth: Mucous membranes are moist.      Pharynx: Oropharynx is clear. Eyes:      Extraocular Movements: Extraocular movements intact.       Conjunctiva/sclera: Conjunctivae normal.      Pupils: Pupils are equal, round, and reactive to light. Cardiovascular:      Rate and Rhythm: Normal rate and regular rhythm. Pulses: Normal pulses. Heart sounds: Normal heart sounds. Pulmonary:      Effort: Pulmonary effort is normal. No respiratory distress. Breath sounds: Normal breath sounds. Abdominal:      General: Abdomen is flat. Bowel sounds are normal.      Palpations: Abdomen is soft. Musculoskeletal:         General: No tenderness or deformity. Normal range of motion. Cervical back: Normal range of motion and neck supple. Skin:     General: Skin is warm and dry. Capillary Refill: Capillary refill takes less than 2 seconds. Findings: No bruising, lesion or rash. Neurological:      General: No focal deficit present. Mental Status: He is alert and oriented to person, place, and time. Mental status is at baseline. Psychiatric:         Mood and Affect: Mood normal.         Behavior: Behavior normal.         Thought Content: Thought content normal.         Judgment: Judgment normal.         Diagnostic Study Results     Labs -     Recent Results (from the past 12 hour(s))   CBC WITH AUTOMATED DIFF    Collection Time: 05/09/22  9:00 AM   Result Value Ref Range    WBC 9.8 4.1 - 11.1 K/uL    RBC 1.36 (L) 4.10 - 5.70 M/uL    HGB 4.4 (LL) 12.1 - 17.0 g/dL    HCT 14.0 (LL) 36.6 - 50.3 %    .9 (H) 80.0 - 99.0 FL    MCH 32.4 26.0 - 34.0 PG    MCHC 31.4 30.0 - 36.5 g/dL    RDW 19.5 (H) 11.5 - 14.5 %    PLATELET 370 975 - 943 K/uL    MPV 10.9 8.9 - 12.9 FL    NRBC 0.6 (H) 0.0  WBC    ABSOLUTE NRBC 0.06 (H) 0.00 - 0.01 K/uL    NEUTROPHILS PENDING %    LYMPHOCYTES PENDING %    MONOCYTES PENDING %    EOSINOPHILS PENDING %    BASOPHILS PENDING %    IMMATURE GRANULOCYTES PENDING %    ABS. NEUTROPHILS PENDING K/UL    ABS. LYMPHOCYTES PENDING K/UL    ABS. MONOCYTES PENDING K/UL    ABS. EOSINOPHILS PENDING K/UL    ABS.  BASOPHILS PENDING K/UL ABS. IMM. GRANS. PENDING K/UL    DF PENDING    METABOLIC PANEL, COMPREHENSIVE    Collection Time: 05/09/22  9:00 AM   Result Value Ref Range    Sodium 135 (L) 136 - 145 mmol/L    Potassium 4.4 3.5 - 5.1 mmol/L    Chloride 102 97 - 108 mmol/L    CO2 27 21 - 32 mmol/L    Anion gap 6 5 - 15 mmol/L    Glucose 127 (H) 65 - 100 mg/dL    BUN 23 (H) 6 - 20 mg/dL    Creatinine 1.36 (H) 0.70 - 1.30 mg/dL    BUN/Creatinine ratio 17 12 - 20      GFR est AA >60 >60 ml/min/1.73m2    GFR est non-AA 55 (L) >60 ml/min/1.73m2    Calcium 9.0 8.5 - 10.1 mg/dL    Bilirubin, total 0.2 0.2 - 1.0 mg/dL    AST (SGOT) 16 15 - 37 U/L    ALT (SGPT) 10 (L) 12 - 78 U/L    Alk. phosphatase 81 45 - 117 U/L    Protein, total 6.6 6.4 - 8.2 g/dL    Albumin 3.2 (L) 3.5 - 5.0 g/dL    Globulin 3.4 2.0 - 4.0 g/dL    A-G Ratio 0.9 (L) 1.1 - 2.2     TROPONIN-HIGH SENSITIVITY    Collection Time: 05/09/22  9:00 AM   Result Value Ref Range    Troponin-High Sensitivity 4 0 - 76 ng/L   NT-PRO BNP    Collection Time: 05/09/22  9:00 AM   Result Value Ref Range    NT pro- (H) <125 pg/mL   MAGNESIUM    Collection Time: 05/09/22  9:00 AM   Result Value Ref Range    Magnesium 1.9 1.6 - 2.4 mg/dL         Radiologic Studies -   XR CHEST PORT   Final Result   Masslike consolidation at the right middle lobe. Nodular and   bandlike opacities in the lungs. Findings concerning for malignancy without   significant interval change. CTA CHEST W OR W WO CONT    (Results Pending)     CT Results  (Last 48 hours)    None        CXR Results  (Last 48 hours)               05/09/22 0912  XR CHEST PORT Final result    Impression:  Masslike consolidation at the right middle lobe. Nodular and   bandlike opacities in the lungs. Findings concerning for malignancy without   significant interval change. Narrative:  Chest, frontal view, 5/9/2022       History: Shortness of breath. Comparison: Including CT chest 4/29/2022.        Findings: Surgical clips are seen at the neck. Right-sided Port-A-Cath tip is   at the mid SVC. The cardiac silhouette is stable. Lung volumes are low. Masslike consolidation at the right middle lobe is again noted. Nodular and   bandlike opacities at the right upper lung zone and left perihilar region are   not significantly changed. No hydrostatic edema, pleural effusion or   pneumothorax is evident. The osseous structures are stable. Medical Decision Making and ED Course   I am the first provider for this patient. I reviewed the vital signs, available nursing notes, past medical history, past surgical history, family history and social history. Vital Signs-Reviewed the patient's vital signs. Patient Vitals for the past 12 hrs:   Temp Pulse Resp BP SpO2   05/09/22 0837 98.1 °F (36.7 °C) (!) 106 19 (!) 149/70 100 %       EKG interpretation:         Records Reviewed: Previous Hospital chart. EMS run report      ED Course:   Initial assessment performed. The patients presenting problems have been discussed, and they are in agreement with the care plan formulated and outlined with them. I have encouraged them to ask questions as they arise throughout their visit. Orders Placed This Encounter    XR CHEST PORT     Standing Status:   Standing     Number of Occurrences:   1     Order Specific Question:   Reason for Exam     Answer:   sob    CTA CHEST W OR W WO CONT     Standing Status:   Standing     Number of Occurrences:   1     Order Specific Question:   Transport     Answer:   BED [2]     Order Specific Question:   Reason for Exam     Answer:   sob     Order Specific Question:   Does patient have history of Renal Disease?      Answer:   No     Order Specific Question:   Decision Support Exception     Answer:   Emergency Medical Condition (MA) [1]    CBC WITH AUTOMATED DIFF     Standing Status:   Standing     Number of Occurrences:   1    COMPREHENSIVE METABOLIC PANEL     Standing Status:   Standing     Number of Occurrences:   1    TROPONIN-HIGH SENSITIVITY     Standing Status:   Standing     Number of Occurrences:   1    PRO-BNP     Standing Status:   Standing     Number of Occurrences:   1    MAGNESIUM     Standing Status:   Standing     Number of Occurrences:   1    URINALYSIS W/ RFLX MICROSCOPIC     Standing Status:   Standing     Number of Occurrences:   1    OCCULT BLOOD, STOOL     Standing Status:   Standing     Number of Occurrences:   1    VITAL SIGNS     Standing Status:   Standing     Number of Occurrences:   1    TRANSFUSE PACKED RBC'S, 3 Units     Standing Status:   Standing     Number of Occurrences:   3     Order Specific Question:   Reason for transfusion     Answer:   Hgb LESS THAN 7 g/dL with signs or symptoms of anemia.  EKG, 12 LEAD, INITIAL     Standing Status:   Standing     Number of Occurrences:   1     Order Specific Question:   Reason for Exam:     Answer:   sob    TYPE & SCREEN     ENTER SURGERY DATE IF FOR PRE-OP TESTING. Standing Status:   Standing     Number of Occurrences:   1    TYPE & SCREEN     ENTER SURGERY DATE IF FOR PRE-OP TESTING. Standing Status:   Standing     Number of Occurrences:   1     Order Specific Question:   Has patient been transfused or pregnant in the last 3 mos. ? Answer:   Unknown    RBC, ALLOCATE, 4 Units Date needed for transfusion: 5/9/2022     Standing Status:   Standing     Number of Occurrences:   1     Order Specific Question:   Date needed for transfusion     Answer:   5/9/2022    SALINE LOCK IV ONE TIME STAT     Standing Status:   Standing     Number of Occurrences:   1    0.9% sodium chloride infusion 250 mL                 Provider Notes (Medical Decision Making):   64-year-old male with palpitation shortness of breath significant anemia requiring a blood transfusion. Will admit for serial troponins. Reassessment of why he is anemic.       Consults  kira admit      ED Course as of 05/09/22 1013   Mon May 09, 2022   0949 EKG at 841 normal sinus rhythm with PAC rate of 100. No ST changes. No T wave inversions. Normal intervals. Reason rule out ACS. Interpreted by ER physician. [HP]      ED Course User Index  [HP] Phani Leos MD         Admitted    Procedures           CRITICAL CARE NOTE :  10:13 AM  Amount of Critical Care Time: 45 minutes    IMPENDING DETERIORATION -Airway, Respiratory, Cardiovascular and CNS  ASSOCIATED RISK FACTORS - Shock and Dysrhythmia  MANAGEMENT- Bedside Assessment and Supervision of Care  INTERPRETATION -  Xrays and Blood Pressure  INTERVENTIONS - hemodynamic mngmt  CASE REVIEW - Hospitalist/Intensivist  TREATMENT RESPONSE -Stable  PERFORMED BY - Self    NOTES   :  I have spent critical care time involved in lab review, consultations with specialist, family decision- making, bedside attention and documentation. This time excludes time spent in any separate billed procedures. During this entire length of time I was immediately available to the patient . Kelly Olivas MD                Disposition       Emergency Department Disposition:  Admitted      Diagnosis     Clinical Impression:   1. Anemia, unspecified type    2. Palpitations        Attestations:    Kelly Olivas MD    Please note that this dictation was completed with [email or web address], the computer voice recognition software. Quite often unanticipated grammatical, syntax, homophones, and other interpretive errors are inadvertently transcribed by the computer software. Please disregard these errors. Please excuse any errors that have escaped final proofreading. Thank you.

## 2022-05-10 NOTE — PROGRESS NOTES
PHYSICAL THERAPY EVALUATION/DISCHARGE  Patient: Gerald Pires [de-identified]49 y.o. male)  Date: 5/10/2022  Primary Diagnosis: Anemia due to GI blood loss [D50.0]  Procedure(s) (LRB):  ESOPHAGOGASTRODUODENOSCOPY (EGD) (N/A) Day of Surgery   Precautions: Falls       ASSESSMENT  Pt is a 50 yo male admitted on 5/9/2022 for worsening shortness of breath with productive cough lasting the last 4 days associated with chills, black stools, palpitations and blood sputum; and is currently being treated for Anemia and TASHI. PMH: Chronic pain, COPD, Throat cancer, tracheostomy present, anxiety. Due to pt being nonverbal, communication was performed by yes/no questions. Pt A&O x 4. Per pt report,  pt resides with significant other in a apartment with 2 HOLLY, bilateral handrails, pt was independent for ADLS/IADLS, independent without need of AD with mobility prior to admission. DME owned: None. Based on the objective data described below, the patient presents at functional baseline for mobility and amb. Pt semi-supine upon PT arrival, agreeable to evaluation. Pt required SBA for bed mobility, supine <> sit and sit <> stand transfers. Pt amb 150 in hallway with gt belt and CGA-SBA; demonstrates step through gt pattern with no LOB or knee buckling noted. Pt did well with session today, with no assistance needed for any functional mobility; CGA and SBA only for safety. Pt has no skilled acute PT needs at this time noted by PT or reported by pt, will DC skilled PT following evaluation; pt verbalized understanding and agreement.      Current Level of Function Impacting Discharge (mobility/balance): CGA/SBA    Other factors to consider for discharge: acute medical status, PMH     PLAN :  Recommendations and Planned Interventions: DC from skilled PT services following evaluation    Frequency/Duration: Patient will be followed by physical therapy: DC from services following evaluation due to pt being at functional baseline; no skilled therapy required during admission, please reorder if needed     Recommendation for discharge: (in order for the patient to meet his/her long term goals)  Home with Family Care    This discharge recommendation:  Has been made in collaboration with the attending provider and/or case management    IF patient discharges home will need the following DME: to be determined (TBD)       SUBJECTIVE:   Patient was nonverbal    OBJECTIVE DATA SUMMARY:   HISTORY:    Past Medical History:   Diagnosis Date    Chronic pain     THROAT, EARS, NECK R/T CANCER    COPD (chronic obstructive pulmonary disease) [address] [address]     [address]    Psychiatric disorder     ANXIETY R/T CANCER    Throat cancer ([facility] [address]     [facility]      Past Surgical History:   Procedure Laterality Date    HX ORTHOPAEDIC      LEFT ARM- \" PUT PLATE IN\"    HX TRACHEOSTOMY  02/08/2021    IR INSERT TUNL CVC W PORT OVER 5 YEARS  5/21/2021    IR PLACE CVAD FLUORO GUIDE  5/21/2021       Home Situation  Home Environment: Apartment  # Steps to Enter: 2  Rails to Enter: Yes  Hand Rails : Bilateral  Wheelchair Ramp: No  One/Two Story Residence: Two story  Living Alone: No  Support Systems: Spouse/Significant Other  Patient Expects to be Discharged to[de-identified] Home  Current DME Used/Available at Home: Oxygen, portable  Tub or Shower Type: Tub/Shower combination    EXAMINATION/PRESENTATION/DECISION MAKING:   Critical Behavior:  Neurologic State: Alert  Orientation Level: Oriented X4  Cognition: Follows commands,Appropriate decision making  Safety/Judgement: Awareness of environment  Hearing: Auditory  Auditory Impairment: None  Skin:  intact  Edema: no edema noted  Range Of Motion:  AROM: Within functional limits           PROM: Within functional limits           Strength:    Strength:  Within functional limits                    Tone & Sensation:                  Sensation: Intact        Functional Mobility:  Bed Mobility:  Rolling: Stand-by assistance  Supine to Sit: Stand-by assistance  Sit to Supine: Stand-by assistance  Scooting: Stand-by assistance  Transfers:  Sit to Stand: Stand-by assistance  Stand to Sit: Stand-by assistance                       Balance:   Sitting: Intact  Standing: Intact; Without support  Ambulation/Gait Training:  Distance (ft): 150 Feet (ft) (bed>bathroom>hallway>bed)  Assistive Device: Gait belt  Ambulation - Level of Assistance: Contact guard assistance;Stand-by assistance     Gait Description (WDL): Exceptions to WDL        Therapeutic Exercises:   Not completed during this session    Functional Measure:  [facility] Form  How much difficulty does the patient currently have. .. Unable A Lot A Little None   1. Turning over in bed (including adjusting bedclothes, sheets and blankets)? [] 1   [] 2   [] 3   [x] 4   2. Sitting down on and standing up from a chair with arms ( e.g., wheelchair, bedside commode, etc.)   [] 1   [] 2   [] 3   [x] 4   3. Moving from lying on back to sitting on the side of the bed? [] 1   [] 2   [] 3   [x] 4          How much help from another person does the patient currently need. .. Total A Lot A Little None   4. Moving to and from a bed to a chair (including a wheelchair)? [] 1   [] 2   [] 3   [x] 4   5. Need to walk in hospital room? [] 1   [] 2   [x] 3   [] 4   6. Climbing 3-5 steps with a railing? [] 1   [] 2   [x] 3   [] 4   © 2007, Trustees of [facility] [employer], under license to [employer]. All rights reserved     Score:  Initial: 22/24 Most Recent: X (Date: 5/10/22)   Interpretation of Tool:  Represents activities that are increasingly more difficult (i.e. Bed mobility, Transfers, Gait).   Score 24 23 22-20 19-15 14-10 9-7 6   Modifier CH CI CJ CK CL CM CN          Physical Therapy Evaluation Charge Determination   History Examination Presentation Decision-Making   HIGH Complexity :3+ comorbidities / personal factors will impact the outcome/ POC  HIGH Complexity : 4+ Standardized tests and measures addressing body structure, function, activity limitation and / or participation in recreation  LOW Complexity : Stable, uncomplicated  Other outcome measures [facility] 6  Low      Based on the above components, the patient evaluation is determined to be of the following complexity level: LOW     Pain Ratin/10    Activity Tolerance:   Fair    After treatment patient left in no apparent distress:   Sitting at EOB with needs in reach including call bell and nursing updated      COMMUNICATION/EDUCATION:   The patients plan of care was discussed with: Occupational therapist, Registered nurse and Case management. Fall prevention education was provided and the patient/caregiver indicated understanding., Patient/family have participated as able in goal setting and plan of care. and Patient/family agree to work toward stated goals and plan of care. FORTINO Steward, under direct supervision of Erika Huizar, PT, DPT provided care and treatment of pt with verbal consent from pt received.      Thank you for this referral.  FORTINO Steward, PT, DPT   Time Calculation: 18 mins

## 2022-05-10 NOTE — ANESTHESIA POSTPROCEDURE EVALUATION
Procedure(s):  ESOPHAGOGASTRODUODENOSCOPY (EGD).     MAC, total IV anesthesia    Anesthesia Post Evaluation        Patient location during evaluation: PACU  Patient participation: complete - patient participated  Level of consciousness: awake  Pain score: 0  Pain management: adequate  Airway patency: patent  Anesthetic complications: no  Cardiovascular status: acceptable  Respiratory status: acceptable  Hydration status: acceptable  Post anesthesia nausea and vomiting:  controlled  Final Post Anesthesia Temperature Assessment:  Normothermia (36.0-37.5 degrees C)      INITIAL Post-op Vital signs:   Vitals Value Taken Time   BP 96/65 05/10/22 1440   Temp     Pulse 80 05/10/22 1440   Resp 14 05/10/22 1440   SpO2 93 % 05/10/22 1440

## 2022-05-10 NOTE — H&P
PROGRESS NOTE    Patient: Brian Figueroa MRN: [id number]  SSN: [id number]    YOB: 1970  Age: 49 y.o. Sex: male      Admission Hx/Delmer Figueroa is a 49 y.o. male with a PMH of throat cancer and COPD who presented with worsening shortness of breath productive cough for the past 4 days. Associated with this patient has also had chills, black stools, palpitations, and bloody sputum. Of note patient has been receiving chemotherapy for the past 2 weeks. Patient denies abdominal pain, nausea, vomiting, fever, or hematic emesis. Patient denies recent sick contacts or travel. Patient denies history of tobacco, drug, or alcohol use. In ED patient afebrile, tachycardic and hypertensive 149/70. Patient to be admitted for further work-up. CXR showed consolidation of the right middle lobe (previously noted) with nodular and bandlike opacities in the lungs not significantly changed. Initial lab significant for hemoglobin of 4.4, hematocrit of 14, creatinine of 1.36, and . Patient to be transfused with 3 units of packed red blood cells, IV protonix, and home medications restarted. Oncology and GI consulted.   --------------------    S: Abd pain. S/p EGD/Arredondo: Mild distal esophagitis noted, for reflux. Mild/moderate to erosive gastritis in the body of stomach, no ulcers, AVM, mass. First and second part duodenum unremarkable. Prior to Admission medications    Medication Sig Start Date End Date Taking? Authorizing Provider   levothyroxine (SYNTHROID) 75 mcg tablet Take 75 mcg by mouth Daily (before breakfast). Yes Provider, Historical   apixaban (ELIQUIS) 5 mg tablet Take 1 Tablet by mouth two (2) times a day. 4/15/22  Yes Soila Mascorro NP   potassium chloride (Klor-Con M20) 20 mEq tablet Take 1 Tablet by mouth daily. 4/4/22  Yes Soila Mascorro NP   lidocaine (XYLOCAINE) 2 % solution Take 15 mL by mouth as needed for Pain.  2/25/22  Yes Wicho Christianson NP   nystatin (MYCOSTATIN) 100,000 unit/mL suspension Take 5 mL by mouth four (4) times daily. swish and spit 2/8/22  Yes Siena Mascorro, NP        No Known Allergies    Review of Systems:  Review of Systems   Constitutional: Positive for chills and weight loss. Respiratory: Positive for cough, hemoptysis and shortness of breath. Cardiovascular: Positive for chest pain and palpitations. Gastrointestinal: Positive for melena. Negative for abdominal pain, constipation, nausea and vomiting. Genitourinary: Negative for dysuria and hematuria. All other systems reviewed and are negative. Objective:     Recent Results (from the past 24 hour(s))   HGB & HCT    Collection Time: 05/09/22 11:08 PM   Result Value Ref Range    HGB 7.3 (L) 12.1 - 17.0 g/dL    HCT 22.4 (L) 36.6 - 50.3 %   MRSA SCREEN - PCR (NASAL)    Collection Time: 05/10/22  1:36 AM   Result Value Ref Range    MRSA by PCR, Nasal Not Detected Not Detected     METABOLIC PANEL, COMPREHENSIVE    Collection Time: 05/10/22  3:28 AM   Result Value Ref Range    Sodium 134 (L) 136 - 145 mmol/L    Potassium 5.0 3.5 - 5.1 mmol/L    Chloride 103 97 - 108 mmol/L    CO2 27 21 - 32 mmol/L    Anion gap 4 (L) 5 - 15 mmol/L    Glucose 86 65 - 100 mg/dL    BUN 21 (H) 6 - 20 mg/dL    Creatinine 1.62 (H) 0.70 - 1.30 mg/dL    BUN/Creatinine ratio 13 12 - 20      GFR est AA 55 (L) >60 ml/min/1.73m2    GFR est non-AA 45 (L) >60 ml/min/1.73m2    Calcium 9.2 8.5 - 10.1 mg/dL    Bilirubin, total 0.5 0.2 - 1.0 mg/dL    AST (SGOT) 17 15 - 37 U/L    ALT (SGPT) 8 (L) 12 - 78 U/L    Alk.  phosphatase 74 45 - 117 U/L    Protein, total 6.6 6.4 - 8.2 g/dL    Albumin 2.7 (L) 3.5 - 5.0 g/dL    Globulin 3.9 2.0 - 4.0 g/dL    A-G Ratio 0.7 (L) 1.1 - 2.2     CBC WITH AUTOMATED DIFF    Collection Time: 05/10/22  3:28 AM   Result Value Ref Range    WBC 11.1 4.1 - 11.1 K/uL    RBC 2.59 (L) 4.10 - 5.70 M/uL    HGB 7.6 (L) 12.1 - 17.0 g/dL    HCT 23.6 (L) 36.6 - 50.3 %    MCV 91.1 80.0 - 99.0 FL MCH 29.3 26.0 - 34.0 PG    MCHC 32.2 30.0 - 36.5 g/dL    RDW 21.2 (H) 11.5 - 14.5 %    PLATELET 477 418 - 360 K/uL    MPV 10.8 8.9 - 12.9 FL    NRBC 1.0 (H) 0.0  WBC    ABSOLUTE NRBC 0.11 (H) 0.00 - 0.01 K/uL    NEUTROPHILS 83 (H) 32 - 75 %    LYMPHOCYTES 7 (L) 12 - 49 %    MONOCYTES 7 5 - 13 %    EOSINOPHILS 0 0 - 7 %    BASOPHILS 1 0 - 1 %    MYELOCYTES 2 (H) 0 %    IMMATURE GRANULOCYTES 0 %    ABS. NEUTROPHILS 9.2 (H) 1.8 - 8.0 K/UL    ABS. LYMPHOCYTES 0.8 0.8 - 3.5 K/UL    ABS. MONOCYTES 0.8 0.0 - 1.0 K/UL    ABS. EOSINOPHILS 0.0 0.0 - 0.4 K/UL    ABS. BASOPHILS 0.1 0.0 - 0.1 K/UL    ABS. IMM.  GRANS. 0.0 K/UL    DF Manual      PLATELET COMMENTS Large Platelets      RBC COMMENTS Anisocytosis  2+        RBC COMMENTS Teardrop cells  1+        RBC COMMENTS Polychromasia  1+        RBC COMMENTS Macrocytosis  1+       GLUCOSE, POC    Collection Time: 05/10/22  7:30 AM   Result Value Ref Range    Glucose (POC) 85 65 - 117 mg/dL    Performed by Abelardo LIMA    HGB & HCT    Collection Time: 05/10/22  8:01 AM   Result Value Ref Range    HGB 6.6 (L) 12.1 - 17.0 g/dL    HCT 20.2 (L) 36.6 - 50.3 %   FERRITIN    Collection Time: 05/10/22  8:05 AM   Result Value Ref Range    Ferritin 328 26 - 388 ng/mL   IRON PROFILE    Collection Time: 05/10/22  8:05 AM   Result Value Ref Range    Iron 35 35 - 150 ug/dL    TIBC 285 250 - 450 ug/dL    Iron % saturation 12 (L) 20 - 50 %   VITAMIN B12 & FOLATE    Collection Time: 05/10/22  8:05 AM   Result Value Ref Range    Vitamin B12 >2,000 (H) 193 - 986 pg/mL    Folate 13.7 5.0 - 21.0 ng/mL   PROCALCITONIN    Collection Time: 05/10/22  8:05 AM   Result Value Ref Range    Procalcitonin 0.19 (H) 0 ng/mL   GLUCOSE, POC    Collection Time: 05/10/22 11:06 AM   Result Value Ref Range    Glucose (POC) 87 65 - 117 mg/dL    Performed by Abelardo LIMA    HGB & HCT    Collection Time: 05/10/22 12:22 PM   Result Value Ref Range    HGB 7.9 (L) 12.1 - 17.0 g/dL    HCT 24.2 (L) 36.6 - 50.3 % RETICULOCYTE COUNT    Collection Time: 05/10/22 12:23 PM   Result Value Ref Range    Reticulocyte count 2.9 (H) 0.7 - 2.1 %    Absolute Retic Cnt. 0.0752 0.0260 - 0.0950 M/ul   LD    Collection Time: 05/10/22 12:23 PM   Result Value Ref Range     85 - 241 U/L        CTA CHEST W OR W WO CONT   Final Result   Advanced ROSENDO subsegmental atelectasis. Similar pulmonary masses/nodules. Similar left hilar lymphadenopathy. No CT evidence for PE. Small volume ascites. XR CHEST PORT   Final Result   Masslike consolidation at the right middle lobe. Nodular and   bandlike opacities in the lungs. Findings concerning for malignancy without   significant interval change. Vitals:    05/10/22 1427 05/10/22 1432 05/10/22 1436 05/10/22 1440   BP: 130/82 105/80 103/70 96/65   Pulse: 79 85 81 80   Resp: 14 16 18 14   Temp:       SpO2: 96% 95% 94% 93%   Weight:       Height:            Physical Exam:  Physical Exam  Vitals and nursing note reviewed. Constitutional:       Comments: Thinly built   HENT:      Head: Normocephalic and atraumatic. Nose: No congestion or rhinorrhea. Mouth/Throat:      Mouth: Mucous membranes are moist.      Pharynx: No oropharyngeal exudate. Neck:      Vascular: No carotid bruit. Comments: Tracheostomy without exudates or bloody sputum  Cardiovascular:      Rate and Rhythm: Regular rhythm. Tachycardia present. Comments: Port-A-Cath on right chest  Pulmonary:      Effort: No respiratory distress. Breath sounds: No wheezing. Abdominal:      General: Bowel sounds are normal. There is no distension. Palpations: Abdomen is soft. Tenderness: There is no abdominal tenderness. Genitourinary:     Comments: No Laboy in place recent urine output yellow without hematuria  Musculoskeletal:      Cervical back: Neck supple. No rigidity or tenderness. Right lower leg: No edema. Left lower leg: No edema. Skin:     General: Skin is warm. Capillary Refill: Capillary refill takes less than 2 seconds. Neurological:      Mental Status: He is alert and oriented to person, place, and time.    Psychiatric:         Mood and Affect: Mood normal.          Assessment:     Hospital Problems  Date Reviewed: 3/9/2022          Codes Class Noted POA    Anemia due to GI blood loss ICD-10-CM: D50.0  ICD-9-CM: 280.0  5/9/2022 Unknown              Plan:     Anemia, acute on chronic  Hemoglobin 4.4, will transfuse 3 units of packed red blood cells, H/H every 4 hours  Continue on IV protonix  GI consulted/GAO; S/p EGD 5/10/22    Tachycardia  Likely secondary to anemia   Troponin negative,   Will monitor    TASHI   Creatinine 1.36, will trend  Will start IVF, gentle    COPD without exacerbation  Currently on RA   CTA of the chest : No PE  Nebulizer as needed    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

## 2022-05-10 NOTE — PROGRESS NOTES
Alert and oriented denies pain NPO for EDG. Patient can mouth words or uses a pad and pen to communicate. No s/s of pain discomfort or distress noted.

## 2022-05-10 NOTE — PROGRESS NOTES
Problem: Nausea/Vomiting (Adult)  Goal: *Absence of nausea/vomiting  Outcome: Not Met  Goal: *Palliation of nausea/vomiting (Palliative Care)  Outcome: Not Met     Problem: Patient Education: Go to Patient Education Activity  Goal: Patient/Family Education  Outcome: Not Met     Problem: Anemia Care Plan (Adult and Pediatric)  Goal: *Labs within defined limits  Outcome: Not Met  Goal: *Tolerates increased activity  Outcome: Not Met     Problem: Patient Education: Go to Patient Education Activity  Goal: Patient/Family Education  Outcome: Not Met

## 2022-05-10 NOTE — PROCEDURES
Summary of EGD report    Please see full report at chart review procedurescan EGD report      Mild distal esophagitis noted, for reflux  Mild/moderate to erosive gastritis in the body of stomach, no ulcers, AVM, mass,  First and second part duodenum unremarkable      Recommendations:  Continue on PPI by mouth  Iron replacement   Void  NSAIDs, antiplatelet drugs, anticoagulations  Colonoscopy recommended, likely as outpatient

## 2022-05-10 NOTE — PROGRESS NOTES
OT eval order received and acknowledged. Per discussion with PT and pt, pt is functioning at baseline IND. Pt reports no need for skilled OT services at this time. OT evaluation order will therefore be discontinued as pt has no acute OT needs. Please reorder OT if pt functional status changes. Thank you.

## 2022-05-10 NOTE — PROGRESS NOTES
Subjective   Subjective:      HPI :pt sleepy. Pt just came from EGD.     {Objective     Current Facility-Administered Medications:     iron sucrose (VENOFER) injection 100 mg, 100 mg, IntraVENous, ONCE, Danny Trejo MD    PHENYLephrine (AXEL-SYNEPHRINE) 1 mg/10 mL (100 mcg/mL) 100 mcg/mL in NS syringe, , , ,     [START ON 5/11/2022] pantoprazole (PROTONIX) tablet 40 mg, 40 mg, Oral, ACB, Ingrid Foster MD    0.9% sodium chloride infusion 250 mL, 250 mL, IntraVENous, PRN, Urvashi Dowell PA    sodium chloride (NS) flush 5-40 mL, 5-40 mL, IntraVENous, Q8H, Urvashi Dowell PA, 10 mL at 05/10/22 1749    sodium chloride (NS) flush 5-40 mL, 5-40 mL, IntraVENous, PRN, Urvashi Dowell PA, 10 mL at 05/09/22 1252    acetaminophen (TYLENOL) tablet 650 mg, 650 mg, Oral, Q6H PRN, 650 mg at 05/10/22 0130 **OR** acetaminophen (TYLENOL) suppository 650 mg, 650 mg, Rectal, Q6H PRN, Urvashi Dowell PA    polyethylene glycol (MIRALAX) packet 17 g, 17 g, Oral, DAILY PRN, Urvashi Dowell PA    ondansetron (ZOFRAN ODT) tablet 4 mg, 4 mg, Oral, Q8H PRN, 4 mg at 05/10/22 1744 **OR** ondansetron (ZOFRAN) injection 4 mg, 4 mg, IntraVENous, Q6H PRN, Urvashi Dowell PA, 4 mg at 05/10/22 0131    lidocaine-prilocaine (EMLA) 2.5-2.5 % cream, , Topical, PRN, Urvashi Dowell PA    prochlorperazine (COMPAZINE) tablet 10 mg, 10 mg, Oral, Q6H PRN, Urvashi Dowell PA    potassium chloride (K-DUR, KLOR-CON M20) SR tablet 20 mEq, 20 mEq, Oral, DAILY, Urvashi Dowell PA, 20 mEq at 05/10/22 1744    nystatin (MYCOSTATIN) 100,000 unit/mL oral suspension 500,000 Units, 500,000 Units, Oral, QID, Urvashi Dowell PA, 500,000 Units at 05/10/22 1744    lidocaine (XYLOCAINE) 2 % viscous solution 15 mL, 15 mL, Mouth/Throat, PRN, Urvashi Dowell PA    loratadine (CLARITIN) tablet 10 mg, 10 mg, Oral, DAILY PRN, Urvashi Dowell PA    albuterol-ipratropium (DUO-NEB) 2.5 MG-0.5 MG/3 ML, 3 mL, Nebulization, Q4H PRN, Urvashi Dowell PA    lactated Ringers infusion, 75 mL/hr, IntraVENous, CONTINUOUS, Jonah Acosta, Last Rate: 75 mL/hr at 05/10/22 0131, Restarted at 05/10/22 1402    Objective:     Visit Vitals  BP 96/65   Pulse 80   Temp 98.1 °F (36.7 °C)   Resp 14   Ht 6' (1.829 m)   Wt 69.9 kg (154 lb)   SpO2 93%   BMI 20.89 kg/m²      Physical Exam   Physical Exam    HEENT:  S/p  Tracheostomy   HEART:Rate and Rhythm: Regular rhythm. LUNGS:CTA  Abdomen:NT,soft  EXT:no edema     @Objective    Lectio@[employer]     Data Review:   Recent Results (from the past 24 hour(s))   HGB & HCT    Collection Time: 05/09/22 11:08 PM   Result Value Ref Range    HGB 7.3 (L) 12.1 - 17.0 g/dL    HCT 22.4 (L) 36.6 - 50.3 %   MRSA SCREEN - PCR (NASAL)    Collection Time: 05/10/22  1:36 AM   Result Value Ref Range    MRSA by PCR, Nasal Not Detected Not Detected     METABOLIC PANEL, COMPREHENSIVE    Collection Time: 05/10/22  3:28 AM   Result Value Ref Range    Sodium 134 (L) 136 - 145 mmol/L    Potassium 5.0 3.5 - 5.1 mmol/L    Chloride 103 97 - 108 mmol/L    CO2 27 21 - 32 mmol/L    Anion gap 4 (L) 5 - 15 mmol/L    Glucose 86 65 - 100 mg/dL    BUN 21 (H) 6 - 20 mg/dL    Creatinine 1.62 (H) 0.70 - 1.30 mg/dL    BUN/Creatinine ratio 13 12 - 20      GFR est AA 55 (L) >60 ml/min/1.73m2    GFR est non-AA 45 (L) >60 ml/min/1.73m2    Calcium 9.2 8.5 - 10.1 mg/dL    Bilirubin, total 0.5 0.2 - 1.0 mg/dL    AST (SGOT) 17 15 - 37 U/L    ALT (SGPT) 8 (L) 12 - 78 U/L    Alk.  phosphatase 74 45 - 117 U/L    Protein, total 6.6 6.4 - 8.2 g/dL    Albumin 2.7 (L) 3.5 - 5.0 g/dL    Globulin 3.9 2.0 - 4.0 g/dL    A-G Ratio 0.7 (L) 1.1 - 2.2     CBC WITH AUTOMATED DIFF    Collection Time: 05/10/22  3:28 AM   Result Value Ref Range    WBC 11.1 4.1 - 11.1 K/uL    RBC 2.59 (L) 4.10 - 5.70 M/uL    HGB 7.6 (L) 12.1 - 17.0 g/dL    HCT 23.6 (L) 36.6 - 50.3 %    MCV 91.1 80.0 - 99.0 FL    MCH 29.3 26.0 - 34.0 PG    MCHC 32.2 30.0 - 36.5 g/dL RDW 21.2 (H) 11.5 - 14.5 %    PLATELET 996 655 - 752 K/uL    MPV 10.8 8.9 - 12.9 FL    NRBC 1.0 (H) 0.0  WBC    ABSOLUTE NRBC 0.11 (H) 0.00 - 0.01 K/uL    NEUTROPHILS 83 (H) 32 - 75 %    LYMPHOCYTES 7 (L) 12 - 49 %    MONOCYTES 7 5 - 13 %    EOSINOPHILS 0 0 - 7 %    BASOPHILS 1 0 - 1 %    MYELOCYTES 2 (H) 0 %    IMMATURE GRANULOCYTES 0 %    ABS. NEUTROPHILS 9.2 (H) 1.8 - 8.0 K/UL    ABS. LYMPHOCYTES 0.8 0.8 - 3.5 K/UL    ABS. MONOCYTES 0.8 0.0 - 1.0 K/UL    ABS. EOSINOPHILS 0.0 0.0 - 0.4 K/UL    ABS. BASOPHILS 0.1 0.0 - 0.1 K/UL    ABS. IMM.  GRANS. 0.0 K/UL    DF Manual      PLATELET COMMENTS Large Platelets      RBC COMMENTS Anisocytosis  2+        RBC COMMENTS Teardrop cells  1+        RBC COMMENTS Polychromasia  1+        RBC COMMENTS Macrocytosis  1+       GLUCOSE, POC    Collection Time: 05/10/22  7:30 AM   Result Value Ref Range    Glucose (POC) 85 65 - 117 mg/dL    Performed by Jacky LIMA    HGB & HCT    Collection Time: 05/10/22  8:01 AM   Result Value Ref Range    HGB 6.6 (L) 12.1 - 17.0 g/dL    HCT 20.2 (L) 36.6 - 50.3 %   FERRITIN    Collection Time: 05/10/22  8:05 AM   Result Value Ref Range    Ferritin 328 26 - 388 ng/mL   IRON PROFILE    Collection Time: 05/10/22  8:05 AM   Result Value Ref Range    Iron 35 35 - 150 ug/dL    TIBC 285 250 - 450 ug/dL    Iron % saturation 12 (L) 20 - 50 %   VITAMIN B12 & FOLATE    Collection Time: 05/10/22  8:05 AM   Result Value Ref Range    Vitamin B12 >2,000 (H) 193 - 986 pg/mL    Folate 13.7 5.0 - 21.0 ng/mL   PROCALCITONIN    Collection Time: 05/10/22  8:05 AM   Result Value Ref Range    Procalcitonin 0.19 (H) 0 ng/mL   GLUCOSE, POC    Collection Time: 05/10/22 11:06 AM   Result Value Ref Range    Glucose (POC) 87 65 - 117 mg/dL    Performed by Jacky LIMA    HGB & HCT    Collection Time: 05/10/22 12:22 PM   Result Value Ref Range    HGB 7.9 (L) 12.1 - 17.0 g/dL    HCT 24.2 (L) 36.6 - 50.3 %   RETICULOCYTE COUNT    Collection Time: 05/10/22 12:23 PM   Result Value Ref Range    Reticulocyte count 2.9 (H) 0.7 - 2.1 %    Absolute Retic Cnt. 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 S/p concurrent chemoradiation radiation 7/19/21. Pt has recurrent disease with possible lung metastases. S/p  C4 of Cis-5-FU-Pembrolizumab 3wks ago. F/u with  next week. CTA chest showed Similar pulmonary masses/nodules and left hilar lymphadenopathy. No CT evidence for PE. Acute on chronic anemia: Due to chemotherapy and GI bleeding. GI following. S/p 4 units of PRBC. Hb better. Transfuse PRBC if hemoglobin less than 7 or symptomatic. Iron saturation low. Start IV iron. PO iron as out pt. EGD showed Mild distal esophagitis and   Mild to moderate to erosive gastritis.     History of PE: Most likely shortness of breath due to anemia ,COPD,lung metastases. Repeat CTA chest no PE. Eliquis held  due to GI bleeding.     TASHI:continue IV fluids. Monitor.

## 2022-05-10 NOTE — CONSULTS
Consult    Patient: Kashif Mackenzie MRN: [id number]  SSN: [id number]    YOB: 1970  Age: 49 y.o. Sex: male      Subjective:      Kashif Mackenzie is a 49 y.o. male who is being seen for anemia GI bleeding,    The patient with no history of tobacco use, with throat cancer and COPD who presented with worsening shortness of breath, also had chills, black stools,  multiple times, with intermittent palpitations, and has some coughing  bloody sputum. He also recently receiving chemotherapy for the past 2 weeks. In ED patient's CXR showed consolidation of the right middle lobe  bandlike opacities in the lungs,  hemoglobin of 4.4, hematocrit of 14, stool test was sent. overnight patient was emergency room, he received. 3 units of packed red blood cells, IV protonix, today the breathing is better, patient denied any blood in stool, no nausea no vomiting no hematemesis,  Patient denies abdominal pain, nausea, vomiting, fever, or hematic emesis. Patient denies recent sick contacts or travel.     GI consultation placed for the anemia, stool test pending    Past Medical History:   Diagnosis Date    Chronic pain     THROAT, EARS, NECK R/T CANCER    COPD (chronic obstructive pulmonary disease) (HCC)     EMPHASEMA    Psychiatric disorder     ANXIETY R/T CANCER    Throat cancer (HCC)     Tracheostomy present ([facility] [address]      Past Surgical History:   Procedure Laterality Date    HX ORTHOPAEDIC      LEFT ARM- \" PUT PLATE IN\"    HX TRACHEOSTOMY  02/08/2021    IR INSERT TUNL CVC W PORT OVER 5 YEARS  5/21/2021    IR PLACE CVAD FLUORO GUIDE  5/21/2021      Family History   Problem Relation Age of Onset    Diabetes Mother     Diabetes Father     Kidney Disease Father     Diabetes Sister     Heart Disease Daughter     Anesth Problems Neg Hx      Social History     Tobacco Use    Smoking status: Former Smoker     Packs/day: 1.50     Years: 30.00     Pack years: 45.00     Quit date: 10/28/2020     Years since quittin.5    Smokeless tobacco: Never Used   Substance Use Topics    Alcohol use: Not Currently      Current Facility-Administered Medications   Medication Dose Route Frequency Provider Last Rate Last Admin    0.9% sodium chloride infusion 250 mL  250 mL IntraVENous PRN Urvashi Dowell PA        sodium chloride (NS) flush 5-40 mL  5-40 mL IntraVENous Q8H Urvashi Dowell PA   10 mL at 22 1629    sodium chloride (NS) flush 5-40 mL  5-40 mL IntraVENous PRN CATALINA Lutz   10 mL at 22 1252    acetaminophen (TYLENOL) tablet 650 mg  650 mg Oral Q6H PRN Kevin Dowell PA        Or    acetaminophen (TYLENOL) suppository 650 mg  650 mg Rectal Q6H PRN Urvashi Dowell PA        polyethylene glycol (MIRALAX) packet 17 g  17 g Oral DAILY PRN Urvashi Dowell PA        ondansetron (ZOFRAN ODT) tablet 4 mg  4 mg Oral Q8H PRN Urvashi Dowell PA        Or    ondansetron (ZOFRAN) injection 4 mg  4 mg IntraVENous Q6H PRN CATALINA Lutz   4 mg at 22 1906    lidocaine-prilocaine (EMLA) 2.5-2.5 % cream   Topical PRN Urvashi Dowell PA        prochlorperazine (COMPAZINE) tablet 10 mg  10 mg Oral Q6H PRN Urvashi Dowell PA        potassium chloride (K-DUR, KLOR-CON M20) SR tablet 20 mEq  20 mEq Oral DAILY CATALINA Lutz   20 mEq at 22 1252    nystatin (MYCOSTATIN) 100,000 unit/mL oral suspension 500,000 Units  500,000 Units Oral QID CATALINA Lutz   500,000 Units at 22 1751    lidocaine (XYLOCAINE) 2 % viscous solution 15 mL  15 mL Mouth/Throat PRN Urvashi Dowell PA        [START ON 5/10/2022] pantoprazole (PROTONIX) 40 mg in 0.9% sodium chloride 10 mL injection  40 mg IntraVENous Q24H Urvashi Dowell PA        loratadine (CLARITIN) tablet 10 mg  10 mg Oral DAILY PRN Urvashi Dowell, PA        albuterol-ipratropium (DUO-NEB) 2.5 MG-0.5 MG/3 ML  3 mL Nebulization Q4H PRN Kevin Dowell, PA  lactated Ringers infusion  75 mL/hr IntraVENous CONTINUOUS Navdeeptrevor Bergeron, [address] 75 mL/hr at 05/09/22 1253 75 mL/hr at 05/09/22 1253     Current Outpatient Medications   Medication Sig Dispense Refill    levothyroxine (SYNTHROID) 75 mcg tablet Take 75 mcg by mouth Daily (before breakfast).  apixaban (ELIQUIS) 5 mg tablet Take 1 Tablet by mouth two (2) times a day. 60 Tablet 4    potassium chloride (Klor-Con M20) 20 mEq tablet Take 1 Tablet by mouth daily. 30 Tablet 1    lidocaine (XYLOCAINE) 2 % solution Take 15 mL by mouth as needed for Pain. 400 mL 3    nystatin (MYCOSTATIN) 100,000 unit/mL suspension Take 5 mL by mouth four (4) times daily. swish and spit 200 mL 2        No Known Allergies    Review of Systems:  Review of Systems   HENT: Negative. Eyes: Negative. Respiratory: Positive for cough. Cardiovascular: Positive for palpitations. Gastrointestinal: Positive for heartburn. Genitourinary: Negative. Musculoskeletal: Negative. Skin: Negative. Neurological: Negative. Endo/Heme/Allergies: Negative for environmental allergies. Does not bruise/bleed easily. Psychiatric/Behavioral: Negative. Objective:     Vitals:    05/09/22 2000 05/09/22 2015 05/09/22 2030 05/09/22 2100   BP: 116/82 119/81 127/81 109/76   Pulse: 92 92 91 92   Resp: 16 14 12 14   Temp: 98.7 °F (37.1 °C) 98.6 °F (37 °C)     SpO2: 100% 100% 100% 100%   Weight:       Height:            Physical Exam:  Physical Exam  Constitutional:       Appearance: He is ill-appearing. HENT:      Head: Atraumatic. Left Ear: Tympanic membrane normal.      Mouth/Throat:      Mouth: Mucous membranes are dry. Eyes:      General: No scleral icterus. Extraocular Movements: Extraocular movements intact. Cardiovascular:      Rate and Rhythm: Regular rhythm. Heart sounds: Normal heart sounds. Pulmonary:      Effort: Pulmonary effort is normal.   Musculoskeletal:         General: Normal range of motion. Cervical back: Neck supple. Neurological:      General: No focal deficit present. Mental Status: Mental status is at baseline. Recent Results (from the past 24 hour(s))   EKG, 12 LEAD, INITIAL    Collection Time: 05/09/22  8:41 AM   Result Value Ref Range    Ventricular Rate 100 BPM    Atrial Rate 100 BPM    P-R Interval 160 ms    QRS Duration 88 ms    Q-T Interval 332 ms    QTC Calculation (Bezet) 428 ms    Calculated P Axis 42 degrees    Calculated R Axis 47 degrees    Calculated T Axis 78 degrees    Diagnosis       Sinus rhythm with Premature atrial complexes  Nonspecific T wave abnormality  Abnormal ECG  When compared with ECG of 20-DEC-2021 13:14,  Nonspecific T wave abnormality now evident in Anterior leads  Confirmed by Virgil Yao ([id number]) on 5/9/2022 1:16:57 PM     CBC WITH AUTOMATED DIFF    Collection Time: 05/09/22  9:00 AM   Result Value Ref Range    WBC 9.7 4.1 - 11.1 K/uL    RBC 1.36 (L) 4.10 - 5.70 M/uL    HGB 4.4 (LL) 12.1 - 17.0 g/dL    HCT 14.0 (LL) 36.6 - 50.3 %    .9 (H) 80.0 - 99.0 FL    MCH 32.4 26.0 - 34.0 PG    MCHC 31.4 30.0 - 36.5 g/dL    RDW 19.5 (H) 11.5 - 14.5 %    PLATELET 950 149 - 982 K/uL    MPV 10.9 8.9 - 12.9 FL    NRBC 0.6 (H) 0.0  WBC    ABSOLUTE NRBC 0.06 (H) 0.00 - 0.01 K/uL    NEUTROPHILS 86 (H) 32 - 75 %    LYMPHOCYTES 8 (L) 12 - 49 %    MONOCYTES 5 5 - 13 %    EOSINOPHILS 0 0 - 7 %    BASOPHILS 1 0 - 1 %    NRBC 1.0  WBC    IMMATURE GRANULOCYTES 0 %    ABS. NEUTROPHILS 8.3 (H) 1.8 - 8.0 K/UL    ABS. LYMPHOCYTES 0.8 0.8 - 3.5 K/UL    ABS. MONOCYTES 0.5 0.0 - 1.0 K/UL    ABS. EOSINOPHILS 0.0 0.0 - 0.4 K/UL    ABS. BASOPHILS 0.1 0.0 - 0.1 K/UL    ABSOLUTE NRBC 0.10 K/uL    ABS. IMM.  GRANS. 0.0 K/UL    DF Manual      RBC COMMENTS Macrocytosis  1+        RBC COMMENTS Hypochromia  2+        RBC COMMENTS Anisocytosis  1+       METABOLIC PANEL, COMPREHENSIVE    Collection Time: 05/09/22  9:00 AM   Result Value Ref Range    Sodium 135 (L) 136 - 145 mmol/L    Potassium 4.4 3.5 - 5.1 mmol/L    Chloride 102 97 - 108 mmol/L    CO2 27 21 - 32 mmol/L    Anion gap 6 5 - 15 mmol/L    Glucose 127 (H) 65 - 100 mg/dL    BUN 23 (H) 6 - 20 mg/dL    Creatinine 1.36 (H) 0.70 - 1.30 mg/dL    BUN/Creatinine ratio 17 12 - 20      GFR est AA >60 >60 ml/min/1.73m2    GFR est non-AA 55 (L) >60 ml/min/1.73m2    Calcium 9.0 8.5 - 10.1 mg/dL    Bilirubin, total 0.2 0.2 - 1.0 mg/dL    AST (SGOT) 16 15 - 37 U/L    ALT (SGPT) 10 (L) 12 - 78 U/L    Alk.  phosphatase 81 45 - 117 U/L    Protein, total 6.6 6.4 - 8.2 g/dL    Albumin 3.2 (L) 3.5 - 5.0 g/dL    Globulin 3.4 2.0 - 4.0 g/dL    A-G Ratio 0.9 (L) 1.1 - 2.2     TROPONIN-HIGH SENSITIVITY    Collection Time: 05/09/22  9:00 AM   Result Value Ref Range    Troponin-High Sensitivity 4 0 - 76 ng/L   NT-PRO BNP    Collection Time: 05/09/22  9:00 AM   Result Value Ref Range    NT pro- (H) <125 pg/mL   MAGNESIUM    Collection Time: 05/09/22  9:00 AM   Result Value Ref Range    Magnesium 1.9 1.6 - 2.4 mg/dL   TYPE & SCREEN    Collection Time: 05/09/22 10:13 AM   Result Value Ref Range    Crossmatch Expiration 05/12/2022,2359     ABO/Rh(D) O Positive     Antibody screen Negative     Unit number [id number]     Blood component type  LR     Unit division 00     Status of unit Issued     [address] to transfuse     Crossmatch result Compatible     Unit number [id number]     Blood component type  LR,2     Unit division 00     Status of unit [address] to transfuse     Crossmatch result Compatible     Unit number [id number]     Blood component type  LR     Unit division 00     Status of unit [address] to transfuse     Crossmatch result Compatible     Unit number [id number]     Blood component type RC LR,2     Unit division 00     Status of unit [address] to transfuse     Crossmatch result Compatible    URINALYSIS W/ REFLEX CULTURE Collection Time: 05/09/22  2:25 PM    Specimen: Urine   Result Value Ref Range    Color Yellow/Straw      Appearance Clear Clear      Specific gravity 1.012 1.003 - 1.030      pH (UA) 6.0 5.0 - 8.0      Protein Negative Negative mg/dL    Glucose 50 (A) Negative mg/dL    Ketone 5 (A) Negative mg/dL    Bilirubin Negative Negative      Blood Negative Negative      Urobilinogen 0.1 0.1 - 1.0 EU/dL    Nitrites Negative Negative      Leukocyte Esterase Negative Negative      UA:UC IF INDICATED Culture not indicated by UA result Culture not indicated by UA result      WBC 0-4 0 - 4 /hpf    RBC 0-5 0 - 5 /hpf    Bacteria Negative Negative /hpf    Mucus Trace /lpf        CTA CHEST W OR W WO CONT   Final Result   Advanced ROSENDO subsegmental atelectasis. Similar pulmonary masses/nodules. Similar left hilar lymphadenopathy. No CT evidence for PE. Small volume ascites. XR CHEST PORT   Final Result   Masslike consolidation at the right middle lobe. Nodular and   bandlike opacities in the lungs. Findings concerning for malignancy without   significant interval change. Assessment:     Hospital Problems  Date Reviewed: 3/9/2022          Codes Class Noted POA    Anemia due to GI blood loss ICD-10-CM: D50.0  ICD-9-CM: 280.0  5/9/2022 Unknown            Anemia, acute on chronic  History  head and neck cancer.  had 5-FU, cisplatin, pembrolizumab. Last treatment 3 weeks ago. Hemoglobin 4.4,   Said he had black stool multiple times   FOBT pending  Iron study pending     Tachycardia, troponin negative, repeat  Renal sufficiency resolving,   Likely secondary to anemia       Plan:    Will recheck hemoglobin the morning   continue on IV protonix  Iron panel pending  Monitor hemoglobin    Schedule patient for urgent endoscopy in the morning for evaluation  Indications, risks, options discussed with patient  Signed By: Pee Burgos MD     May 9, 2022         Thank you for allowing me to participate in this patients care  Cc Referring Physician   Filomena Silva NP

## 2022-05-10 NOTE — ANESTHESIA PREPROCEDURE EVALUATION
Relevant Problems   RESPIRATORY SYSTEM   (+) COPD exacerbation (HCC)      HEMATOLOGY   (+) Anemia due to GI blood loss      PERSONAL HX & FAMILY HX OF CANCER   (+) Laryngeal carcinoma (HCC)   (+) Throat cancer [facility])       Anesthetic History   No history of anesthetic complications            Review of Systems / Medical History  Patient summary reviewed, nursing notes reviewed and pertinent labs reviewed    Pulmonary    COPD (exacerbation)            Comments: S/p trach 2/1/2021. Hx OF RESP. FAILURE. FORMER SMOKER. Neuro/Psych         Psychiatric history    Comments: Weakness. Finger numbness. Cardiovascular  Within defined limits                  Comments: EKG (5-9-22) Sinus rhythm with Premature atrial complexes   Nonspecific T wave abnormality   Abnormal ECG   When compared with ECG of 20-DEC-2021 13:14,   Nonspecific T wave abnormality now evident in Anterior leads. GI/Hepatic/Renal               Comments: Weight loss. SEVERE PROTEIN CALORIE MALNUTRITION. GI BLOOD LOSS. \"Black stools\" Endo/Other        Cancer (CA LARYNX, S/P LARYNGECTOMY. ) and anemia (Hb/Hct 6.6/20. 2)    Comments: Chronic pain Other Findings   Comments: CT CHEST (5-9-22) IMPRESSION  Advanced ROSENDO subsegmental atelectasis. Similar pulmonary masses/nodules. Similar left hilar lymphadenopathy. No CT evidence for PE.    ELIQUIS: lAST DOSE ON ?           Physical Exam    Airway    TM Distance: > 6 cm  Neck ROM: decreased range of motion     Tracheostomy present   Cardiovascular    Rhythm: regular  Rate: normal         Dental    Dentition: Poor dentition     Pulmonary  Breath sounds clear to auscultation               Abdominal  GI exam deferred       Other Findings            Anesthetic Plan    ASA: 3  Anesthesia type: MAC          Induction: Intravenous  Anesthetic plan and risks discussed with: Patient

## 2022-05-10 NOTE — ED NOTES
TRANSFER - OUT REPORT:    Verbal report given to MADYSON Draper on Kristina Hunger  being transferred to [address] [place] [address]. Report consisted of patients Situation, Background, Assessment and   Recommendations(SBAR). Information from the following report(s) SBAR, ED Summary, [facility] and Recent Results was reviewed with the receiving nurse. Lines:   Venous Access Device PowerPort (trimmed length 22.5 cm) 05/21/21 (Active)        Opportunity for questions and clarification was provided.

## 2022-05-11 NOTE — PROGRESS NOTES
Subjective   Subjective:      HPI :Pt having epigastric pain and nausea. Denies bleeding.   {Objective     Current Facility-Administered Medications:     [START ON 5/12/2022] iron sucrose (VENOFER) injection 100 mg, 100 mg, IntraVENous, ONCE, Nel Arredondo MD    polyethylene glycol (MIRALAX) packet 17 g, 17 g, Oral, DAILY, Malik Ingram MD, 17 g at 05/11/22 1707    pantoprazole (PROTONIX) tablet 40 mg, 40 mg, Oral, ACB, Xavier Fuentes MD, 40 mg at 05/11/22 0631    0.9% sodium chloride infusion 250 mL, 250 mL, IntraVENous, PRN, Urvashi Dowell PA    sodium chloride (NS) flush 5-40 mL, 5-40 mL, IntraVENous, Q8H, Urvashi Dowell PA, 10 mL at 05/11/22 1404    sodium chloride (NS) flush 5-40 mL, 5-40 mL, IntraVENous, PRN, Urvashi Dowell PA, 10 mL at 05/09/22 1252    acetaminophen (TYLENOL) tablet 650 mg, 650 mg, Oral, Q6H PRN, 650 mg at 05/11/22 1409 **OR** acetaminophen (TYLENOL) suppository 650 mg, 650 mg, Rectal, Q6H PRN, Urvashi Dowell PA    ondansetron (ZOFRAN ODT) tablet 4 mg, 4 mg, Oral, Q8H PRN, 4 mg at 05/10/22 1744 **OR** ondansetron (ZOFRAN) injection 4 mg, 4 mg, IntraVENous, Q6H PRN, Urvashi Dowell PA, 4 mg at 05/11/22 0057    lidocaine-prilocaine (EMLA) 2.5-2.5 % cream, , Topical, PRN, Urvashi Dowell PA    prochlorperazine (COMPAZINE) tablet 10 mg, 10 mg, Oral, Q6H PRN, Urvashi Dowell PA    potassium chloride (K-DUR, KLOR-CON M20) SR tablet 20 mEq, 20 mEq, Oral, DAILY, Urvashi Dowell PA, 20 mEq at 05/11/22 0802    nystatin (MYCOSTATIN) 100,000 unit/mL oral suspension 500,000 Units, 500,000 Units, Oral, QID, Urvashi Dowell PA, 500,000 Units at 05/11/22 1707    lidocaine (XYLOCAINE) 2 % viscous solution 15 mL, 15 mL, Mouth/Throat, PRN, Urvashi Dowell PA    loratadine (CLARITIN) tablet 10 mg, 10 mg, Oral, DAILY PRN, Urvashi Dowell PA    albuterol-ipratropium (DUO-NEB) 2.5 MG-0.5 MG/3 ML, 3 mL, Nebulization, Q4H PRN, Delmer CATALINA Mckinnon    lactated Ringers infusion, 75 mL/hr, IntraVENous, CONTINUOUS, Urvashi Dowell [place], Last Rate: 75 mL/hr at 05/11/22 0630, 75 mL/hr at 05/11/22 0630    Objective:     Visit Vitals  /71 (BP 1 Location: Right upper arm, BP Patient Position: At rest)   Pulse 91   Temp 99 °F (37.2 °C)   Resp 16   Ht 6' (1.829 m)   Wt 69.9 kg (154 lb)   SpO2 100%   BMI 20.89 kg/m²      Physical Exam   Physical Exam    HEENT:  S/p  Tracheostomy   HEART:Rate and Rhythm: Regular rhythm. LUNGS:CTA  Abdomen:NT,soft  EXT:no edema     @Objective    Patrice@[employer]     Data Review:   Recent Results (from the past 24 hour(s))   HGB & HCT    Collection Time: 05/10/22  8:21 PM   Result Value Ref Range    HGB 7.3 (L) 12.1 - 17.0 g/dL    HCT 22.5 (L) 36.6 - 50.3 %   HGB & HCT    Collection Time: 05/10/22 11:36 PM   Result Value Ref Range    HGB 7.5 (L) 12.1 - 17.0 g/dL    HCT 23.2 (L) 36.6 - 52.1 %   METABOLIC PANEL, COMPREHENSIVE    Collection Time: 05/11/22  3:02 AM   Result Value Ref Range    Sodium 136 136 - 145 mmol/L    Potassium 5.0 3.5 - 5.1 mmol/L    Chloride 103 97 - 108 mmol/L    CO2 26 21 - 32 mmol/L    Anion gap 7 5 - 15 mmol/L    Glucose 80 65 - 100 mg/dL    BUN 17 6 - 20 mg/dL    Creatinine 1.53 (H) 0.70 - 1.30 mg/dL    BUN/Creatinine ratio 11 (L) 12 - 20      GFR est AA 59 (L) >60 ml/min/1.73m2    GFR est non-AA 48 (L) >60 ml/min/1.73m2    Calcium 8.9 8.5 - 10.1 mg/dL    Bilirubin, total 0.3 0.2 - 1.0 mg/dL    AST (SGOT) 14 (L) 15 - 37 U/L    ALT (SGPT) 8 (L) 12 - 78 U/L    Alk.  phosphatase 71 45 - 117 U/L    Protein, total 5.7 (L) 6.4 - 8.2 g/dL    Albumin 2.7 (L) 3.5 - 5.0 g/dL    Globulin 3.0 2.0 - 4.0 g/dL    A-G Ratio 0.9 (L) 1.1 - 2.2     CBC WITH AUTOMATED DIFF    Collection Time: 05/11/22  3:02 AM   Result Value Ref Range    WBC 9.5 4.1 - 11.1 K/uL    RBC 2.46 (L) 4.10 - 5.70 M/uL    HGB 7.4 (L) 12.1 - 17.0 g/dL    HCT 23.1 (L) 36.6 - 50.3 %    MCV 93.9 80.0 - 99.0 FL    MCH 30.1 26.0 - 34.0 PG    MCHC 32.0 30.0 - 36.5 g/dL    RDW 21.5 (H) 11.5 - 14.5 %    PLATELET 882 152 - 760 K/uL    MPV 10.8 8.9 - 12.9 FL    NRBC 0.2 (H) 0.0  WBC    ABSOLUTE NRBC 0.02 (H) 0.00 - 0.01 K/uL    NEUTROPHILS 80 (H) 32 - 75 %    LYMPHOCYTES 5 (L) 12 - 49 %    MONOCYTES 12 5 - 13 %    EOSINOPHILS 1 0 - 7 %    BASOPHILS 0 0 - 1 %    IMMATURE GRANULOCYTES 2 (H) 0 - 0.5 %    ABS. NEUTROPHILS 7.7 1.8 - 8.0 K/UL    ABS. LYMPHOCYTES 0.4 (L) 0.8 - 3.5 K/UL    ABS. MONOCYTES 1.1 (H) 0.0 - 1.0 K/UL    ABS. EOSINOPHILS 0.1 0.0 - 0.4 K/UL    ABS. BASOPHILS 0.0 0.0 - 0.1 K/UL    ABS. IMM. GRANS. 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 S/p concurrent chemoradiation radiation . Pt has recurrent disease with possible lung metastases. S/p  C4 of Cis-5-FU-Pembrolizumab  more than 3wks ago. F/u with  next week. CTA chest showed Similar pulmonary masses/nodules and left hilar lymphadenopathy. No CT evidence for PE. Acute on chronic anemia: Due to chemotherapy and GI bleeding. pt seen by  GI . S/p 4 units of PRBC. Hb better. Transfuse PRBC if hemoglobin less than 7 or symptomatic. Iron saturation low. Continue IV iron. PO iron as out pt. EGD showed Mild distal esophagitis and Mild to moderate to erosive gastritis.     History of PE: Most likely shortness of breath due to anemia ,COPD,lung metastases. Repeat CTA chest no PE. Eliquis held  due to GI bleeding. Will discuss with GI  about  anticoagulation. Check venous Doppler of bilateral lower extremities.   Abdominal pain: Most likely due to gastritis and constipation. Continue PPI. Laxative. If not better check CT abdomen pelvis.     TASHI:continue IV fluids. Monitor.

## 2022-05-11 NOTE — H&P
PROGRESS NOTE    Patient: Delores Calle MRN: [id number]  SSN: [id number]    YOB: 1970  Age: 49 y.o. Sex: male      Admission /Delmer Calle is a 49 y.o. male with a PMH of throat cancer and COPD who presented with worsening shortness of breath productive cough for the past 4 days. Associated with this patient has also had chills, black stools, palpitations, and bloody sputum. Of note patient has been receiving chemotherapy for the past 2 weeks. Patient denies abdominal pain, nausea, vomiting, fever, or hematic emesis. Patient denies recent sick contacts or travel. Patient denies history of tobacco, drug, or alcohol use. In ED patient afebrile, tachycardic and hypertensive 149/70. Patient to be admitted for further work-up. CXR showed consolidation of the right middle lobe (previously noted) with nodular and bandlike opacities in the lungs not significantly changed. Initial lab significant for hemoglobin of 4.4, hematocrit of 14, creatinine of 1.36, and . Patient to be transfused with 3 units of packed red blood cells, IV protonix, and home medications restarted. Oncology and GI consulted.   --------------------    S: Abd pain. Constipated    S/p EGD/Arredondo: Mild distal esophagitis noted, for reflux. Mild/moderate to erosive gastritis in the body of stomach, no ulcers, AVM, mass. First and second part duodenum unremarkable. Prior to Admission medications    Medication Sig Start Date End Date Taking? Authorizing Provider   levothyroxine (SYNTHROID) 75 mcg tablet Take 75 mcg by mouth Daily (before breakfast). Yes Provider, Historical   apixaban (ELIQUIS) 5 mg tablet Take 1 Tablet by mouth two (2) times a day. 4/15/22  Yes Magda Mascorro NP   potassium chloride (Klor-Con M20) 20 mEq tablet Take 1 Tablet by mouth daily. 4/4/22  Yes Magda Mascorro NP   lidocaine (XYLOCAINE) 2 % solution Take 15 mL by mouth as needed for Pain.  2/25/22  Yes Mandeep Pineda NP nystatin (MYCOSTATIN) 100,000 unit/mL suspension Take 5 mL by mouth four (4) times daily. swish and spit 2/8/22  Yes Brent Mascorro, NP        No Known Allergies    Review of Systems:  Review of Systems   Constitutional: Positive for chills and weight loss. Respiratory: Positive for cough, hemoptysis and shortness of breath. Cardiovascular: Positive for chest pain and palpitations. Gastrointestinal: Positive for melena. Negative for abdominal pain, constipation, nausea and vomiting. Genitourinary: Negative for dysuria and hematuria. All other systems reviewed and are negative. Objective:     Recent Results (from the past 24 hour(s))   HGB & HCT    Collection Time: 05/10/22  4:59 PM   Result Value Ref Range    HGB 7.3 (L) 12.1 - 17.0 g/dL    HCT 21.8 (L) 36.6 - 50.3 %   HGB & HCT    Collection Time: 05/10/22  8:21 PM   Result Value Ref Range    HGB 7.3 (L) 12.1 - 17.0 g/dL    HCT 22.5 (L) 36.6 - 50.3 %   HGB & HCT    Collection Time: 05/10/22 11:36 PM   Result Value Ref Range    HGB 7.5 (L) 12.1 - 17.0 g/dL    HCT 23.2 (L) 36.6 - 56.1 %   METABOLIC PANEL, COMPREHENSIVE    Collection Time: 05/11/22  3:02 AM   Result Value Ref Range    Sodium 136 136 - 145 mmol/L    Potassium 5.0 3.5 - 5.1 mmol/L    Chloride 103 97 - 108 mmol/L    CO2 26 21 - 32 mmol/L    Anion gap 7 5 - 15 mmol/L    Glucose 80 65 - 100 mg/dL    BUN 17 6 - 20 mg/dL    Creatinine 1.53 (H) 0.70 - 1.30 mg/dL    BUN/Creatinine ratio 11 (L) 12 - 20      GFR est AA 59 (L) >60 ml/min/1.73m2    GFR est non-AA 48 (L) >60 ml/min/1.73m2    Calcium 8.9 8.5 - 10.1 mg/dL    Bilirubin, total 0.3 0.2 - 1.0 mg/dL    AST (SGOT) 14 (L) 15 - 37 U/L    ALT (SGPT) 8 (L) 12 - 78 U/L    Alk.  phosphatase 71 45 - 117 U/L    Protein, total 5.7 (L) 6.4 - 8.2 g/dL    Albumin 2.7 (L) 3.5 - 5.0 g/dL    Globulin 3.0 2.0 - 4.0 g/dL    A-G Ratio 0.9 (L) 1.1 - 2.2     CBC WITH AUTOMATED DIFF    Collection Time: 05/11/22  3:02 AM   Result Value Ref Range    WBC 9.5 4.1 - 11.1 K/uL    RBC 2.46 (L) 4.10 - 5.70 M/uL    HGB 7.4 (L) 12.1 - 17.0 g/dL    HCT 23.1 (L) 36.6 - 50.3 %    MCV 93.9 80.0 - 99.0 FL    MCH 30.1 26.0 - 34.0 PG    MCHC 32.0 30.0 - 36.5 g/dL    RDW 21.5 (H) 11.5 - 14.5 %    PLATELET 361 476 - 137 K/uL    MPV 10.8 8.9 - 12.9 FL    NRBC 0.2 (H) 0.0  WBC    ABSOLUTE NRBC 0.02 (H) 0.00 - 0.01 K/uL    NEUTROPHILS 80 (H) 32 - 75 %    LYMPHOCYTES 5 (L) 12 - 49 %    MONOCYTES 12 5 - 13 %    EOSINOPHILS 1 0 - 7 %    BASOPHILS 0 0 - 1 %    IMMATURE GRANULOCYTES 2 (H) 0 - 0.5 %    ABS. NEUTROPHILS 7.7 1.8 - 8.0 K/UL    ABS. LYMPHOCYTES 0.4 (L) 0.8 - 3.5 K/UL    ABS. MONOCYTES 1.1 (H) 0.0 - 1.0 K/UL    ABS. EOSINOPHILS 0.1 0.0 - 0.4 K/UL    ABS. BASOPHILS 0.0 0.0 - 0.1 K/UL    ABS. IMM. GRANS. 0.2 (H) 0.00 - 0.04 K/UL    DF AUTOMATED     HGB & HCT    Collection Time: 05/11/22  5:37 AM   Result Value Ref Range    HGB 7.5 (L) 12.1 - 17.0 g/dL    HCT 23.5 (L) 36.6 - 50.3 %   HGB & HCT    Collection Time: 05/11/22 12:03 PM   Result Value Ref Range    HGB 8.1 (L) 12.1 - 17.0 g/dL    HCT 25.3 (L) 36.6 - 50.3 %        CTA CHEST W OR W WO CONT   Final Result   Advanced ROSENDO subsegmental atelectasis. Similar pulmonary masses/nodules. Similar left hilar lymphadenopathy. No CT evidence for PE. Small volume ascites. XR CHEST PORT   Final Result   Masslike consolidation at the right middle lobe. Nodular and   bandlike opacities in the lungs. Findings concerning for malignancy without   significant interval change. Vitals:    05/10/22 1944 05/11/22 0304 05/11/22 0758 05/11/22 1456   BP: 108/68 109/72 116/78 109/71   Pulse: 94 87 77 91   Resp: 16 18 16 16   Temp: 98.6 °F (37 °C) 98.1 °F (36.7 °C) 98.6 °F (37 °C) 99 °F (37.2 °C)   SpO2: 98% 100% 98% 100%   Weight:       Height:            Physical Exam:  Physical Exam  Vitals and nursing note reviewed. Constitutional:       Comments: Thinly built   HENT:      Head: Normocephalic and atraumatic. Nose: No congestion or rhinorrhea. Mouth/Throat:      Mouth: Mucous membranes are moist.      Pharynx: No oropharyngeal exudate. Neck:      Vascular: No carotid bruit. Comments: Tracheostomy without exudates or bloody sputum  Cardiovascular:      Rate and Rhythm: Regular rhythm. Tachycardia present. Comments: Port-A-Cath on right chest  Pulmonary:      Effort: No respiratory distress. Breath sounds: No wheezing. Abdominal:      General: Bowel sounds are normal. There is no distension. Palpations: Abdomen is soft. Tenderness: There is no abdominal tenderness. Genitourinary:     Comments: No Laboy in place recent urine output yellow without hematuria  Musculoskeletal:      Cervical back: Neck supple. No rigidity or tenderness. Right lower leg: No edema. Left lower leg: No edema. Skin:     General: Skin is warm. Capillary Refill: Capillary refill takes less than 2 seconds. Neurological:      Mental Status: He is alert and oriented to person, place, and time.    Psychiatric:         Mood and Affect: Mood normal.          Assessment:     Hospital Problems  Date Reviewed: 3/9/2022          Codes Class Noted POA    Anemia due to GI blood loss ICD-10-CM: D50.0  ICD-9-CM: 280.0  5/9/2022 Unknown              Plan:     Anemia, acute on chronic  Hemoglobin 4.4, will transfuse 3 units of packed red blood cells, H/H every 4 hours  Continue on IV protonix  GI consulted/GAO; S/p EGD 5/10/22    Tachycardia  Likely secondary to anemia   Troponin negative,   Will monitor    TASHI   Creatinine 1.36, will trend  Will start IVF, gentle    COPD without exacerbation  Currently on RA   CTA of the chest : No PE  Nebulizer as needed    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

## 2022-05-11 NOTE — PROGRESS NOTES
Problem: Nausea/Vomiting (Adult)  Goal: *Absence of nausea/vomiting  Outcome: Progressing Towards Goal  Goal: *Palliation of nausea/vomiting (Palliative Care)  Outcome: Progressing Towards Goal     Problem: Patient Education: Go to Patient Education Activity  Goal: Patient/Family Education  Outcome: Progressing Towards Goal     Problem: Anemia Care Plan (Adult and Pediatric)  Goal: *Labs within defined limits  Outcome: Progressing Towards Goal  Goal: *Tolerates increased activity  Outcome: Progressing Towards Goal     Problem: Patient Education: Go to Patient Education Activity  Goal: Patient/Family Education  Outcome: Progressing Towards Goal

## 2022-05-11 NOTE — PROGRESS NOTES
Alert and oriented ambulates in room uses bathroom. Appetite good denies N/V or pain at this time. No s/s of pain discomfort or distress educated to use call bell for assistance and for pain management.   Patient verbalized understanding

## 2022-05-11 NOTE — PROGRESS NOTES
Progress Note    Patient: Naren Mujica MRN: [id number]  SSN: [id number]    YOB: 1970  Age: 49 y.o.   Sex: male      Admit Date: 5/9/2022    LOS: 2 days     Subjective:   Some cough, no nausea vomiting no GI bleeding    Past Medical History:   Diagnosis Date    Chronic pain     THROAT, EARS, NECK R/T CANCER    COPD (chronic obstructive pulmonary disease) ([facility]     EMPHASEMA    Psychiatric disorder     ANXIETY R/T CANCER    Throat cancer (HCC)     Tracheostomy present ([facility]         Current Facility-Administered Medications:     [START ON 5/12/2022] iron sucrose (VENOFER) injection 100 mg, 100 mg, IntraVENous, ONCE, Dereje Fofana MD    pantoprazole (PROTONIX) tablet 40 mg, 40 mg, Oral, ACB, Eladio Velasquez MD, 40 mg at 05/11/22 0631    0.9% sodium chloride infusion 250 mL, 250 mL, IntraVENous, PRN, Urvashi Dowell PA    sodium chloride (NS) flush 5-40 mL, 5-40 mL, IntraVENous, Q8H, Urvashi Dowell PA, 10 mL at 05/11/22 1404    sodium chloride (NS) flush 5-40 mL, 5-40 mL, IntraVENous, PRN, Urvashi Dowell PA, 10 mL at 05/09/22 1252    acetaminophen (TYLENOL) tablet 650 mg, 650 mg, Oral, Q6H PRN, 650 mg at 05/11/22 1409 **OR** acetaminophen (TYLENOL) suppository 650 mg, 650 mg, Rectal, Q6H PRN, Urvashi Dowell PA    polyethylene glycol (MIRALAX) packet 17 g, 17 g, Oral, DAILY PRN, Urvashi Dowell PA, 17 g at 05/11/22 2117    ondansetron (ZOFRAN ODT) tablet 4 mg, 4 mg, Oral, Q8H PRN, 4 mg at 05/10/22 4024 **OR** ondansetron (ZOFRAN) injection 4 mg, 4 mg, IntraVENous, Q6H PRN, Urvashi Dowell PA, 4 mg at 05/11/22 0057    lidocaine-prilocaine (EMLA) 2.5-2.5 % cream, , Topical, PRN, Urvashi Dowell PA    prochlorperazine (COMPAZINE) tablet 10 mg, 10 mg, Oral, Q6H PRN, Urvashi Dowell PA    potassium chloride (K-DUR, KLOR-CON M20) SR tablet 20 mEq, 20 mEq, Oral, DAILY, Urvashi Dowell PA, 20 mEq at 05/11/22 0802    nystatin (MYCOSTATIN) 100,000 unit/mL oral suspension 500,000 Units, 500,000 Units, Oral, QID, Urvashi Dowell PA, 500,000 Units at 05/11/22 1404    lidocaine (XYLOCAINE) 2 % viscous solution 15 mL, 15 mL, Mouth/Throat, PRN, Urvashi Dowell PA    loratadine (CLARITIN) tablet 10 mg, 10 mg, Oral, DAILY PRN, Urvashi Dowell PA    albuterol-ipratropium (DUO-NEB) 2.5 MG-0.5 MG/3 ML, 3 mL, Nebulization, Q4H PRN, Urvashi Dowell PA    lactated Ringers infusion, 75 mL/hr, IntraVENous, CONTINUOUS, Jonah Del Real, Last Rate: 75 mL/hr at 05/11/22 0630, 75 mL/hr at 05/11/22 0630    Objective:     Vitals:    05/10/22 1944 05/11/22 0304 05/11/22 0758 05/11/22 1456   BP: 108/68 109/72 116/78 109/71   Pulse: 94 87 77 91   Resp: 16 18 16 16   Temp: 98.6 °F (37 °C) 98.1 °F (36.7 °C) 98.6 °F (37 °C) 99 °F (37.2 °C)   SpO2: 98% 100% 98% 100%   Weight:       Height:            Intake and Output:  Current Shift: 05/11 0701 - 05/11 1900  In: 200 [P.O.:200]  Out: 900 [Urine:900]  Last three shifts: 05/09 1901 - 05/11 0700  In: 1086 [I.V.:466]  Out: 850 [Urine:850]    Physical Exam:   Physical Exam  Constitutional:       Appearance: He is ill-appearing. HENT:      Mouth/Throat:      Mouth: Mucous membranes are dry. Eyes:      Extraocular Movements: Extraocular movements intact. Cardiovascular:      Rate and Rhythm: Regular rhythm. Pulses: Normal pulses. Pulmonary:      Effort: Respiratory distress present. Abdominal:      General: Bowel sounds are normal.   Genitourinary:     Prostate: Normal.   Musculoskeletal:      Cervical back: Neck supple. Skin:     General: Skin is warm.           Lab/Data Review:  Recent Results (from the past 24 hour(s))   HGB & HCT    Collection Time: 05/10/22  4:59 PM   Result Value Ref Range    HGB 7.3 (L) 12.1 - 17.0 g/dL    HCT 21.8 (L) 36.6 - 50.3 %   HGB & HCT    Collection Time: 05/10/22  8:21 PM   Result Value Ref Range    HGB 7.3 (L) 12.1 - 17.0 g/dL    HCT 22.5 (L) 36.6 - 50.3 % HGB & HCT    Collection Time: 05/10/22 11:36 PM   Result Value Ref Range    HGB 7.5 (L) 12.1 - 17.0 g/dL    HCT 23.2 (L) 36.6 - 48.7 %   METABOLIC PANEL, COMPREHENSIVE    Collection Time: 05/11/22  3:02 AM   Result Value Ref Range    Sodium 136 136 - 145 mmol/L    Potassium 5.0 3.5 - 5.1 mmol/L    Chloride 103 97 - 108 mmol/L    CO2 26 21 - 32 mmol/L    Anion gap 7 5 - 15 mmol/L    Glucose 80 65 - 100 mg/dL    BUN 17 6 - 20 mg/dL    Creatinine 1.53 (H) 0.70 - 1.30 mg/dL    BUN/Creatinine ratio 11 (L) 12 - 20      GFR est AA 59 (L) >60 ml/min/1.73m2    GFR est non-AA 48 (L) >60 ml/min/1.73m2    Calcium 8.9 8.5 - 10.1 mg/dL    Bilirubin, total 0.3 0.2 - 1.0 mg/dL    AST (SGOT) 14 (L) 15 - 37 U/L    ALT (SGPT) 8 (L) 12 - 78 U/L    Alk. phosphatase 71 45 - 117 U/L    Protein, total 5.7 (L) 6.4 - 8.2 g/dL    Albumin 2.7 (L) 3.5 - 5.0 g/dL    Globulin 3.0 2.0 - 4.0 g/dL    A-G Ratio 0.9 (L) 1.1 - 2.2     CBC WITH AUTOMATED DIFF    Collection Time: 05/11/22  3:02 AM   Result Value Ref Range    WBC 9.5 4.1 - 11.1 K/uL    RBC 2.46 (L) 4.10 - 5.70 M/uL    HGB 7.4 (L) 12.1 - 17.0 g/dL    HCT 23.1 (L) 36.6 - 50.3 %    MCV 93.9 80.0 - 99.0 FL    MCH 30.1 26.0 - 34.0 PG    MCHC 32.0 30.0 - 36.5 g/dL    RDW 21.5 (H) 11.5 - 14.5 %    PLATELET 047 687 - 797 K/uL    MPV 10.8 8.9 - 12.9 FL    NRBC 0.2 (H) 0.0  WBC    ABSOLUTE NRBC 0.02 (H) 0.00 - 0.01 K/uL    NEUTROPHILS 80 (H) 32 - 75 %    LYMPHOCYTES 5 (L) 12 - 49 %    MONOCYTES 12 5 - 13 %    EOSINOPHILS 1 0 - 7 %    BASOPHILS 0 0 - 1 %    IMMATURE GRANULOCYTES 2 (H) 0 - 0.5 %    ABS. NEUTROPHILS 7.7 1.8 - 8.0 K/UL    ABS. LYMPHOCYTES 0.4 (L) 0.8 - 3.5 K/UL    ABS. MONOCYTES 1.1 (H) 0.0 - 1.0 K/UL    ABS. EOSINOPHILS 0.1 0.0 - 0.4 K/UL    ABS. BASOPHILS 0.0 0.0 - 0.1 K/UL    ABS. IMM.  GRANS. 0.2 (H) 0.00 - 0.04 K/UL    DF AUTOMATED     HGB & HCT    Collection Time: 05/11/22  5:37 AM   Result Value Ref Range    HGB 7.5 (L) 12.1 - 17.0 g/dL    HCT 23.5 (L) 36.6 - 50.3 % HGB & HCT    Collection Time: 05/11/22 12:03 PM   Result Value Ref Range    HGB 8.1 (L) 12.1 - 17.0 g/dL    HCT 25.3 (L) 36.6 - 50.3 %        CTA CHEST W OR W WO CONT   Final Result   Advanced ROSENDO subsegmental atelectasis. Similar pulmonary masses/nodules. Similar left hilar lymphadenopathy. No CT evidence for PE. Small volume ascites. XR CHEST PORT   Final Result   Masslike consolidation at the right middle lobe. Nodular and   bandlike opacities in the lungs. Findings concerning for malignancy without   significant interval change. Assessment:     Active Problems:    Anemia due to GI blood loss (5/9/2022)    Anemia, acute on chronic  History  head and neck cancer.  had 5-FU, cisplatin, pembrolizumab.  Last treatment 3 weeks ago.     Yesterday EGD showed gastroduodenitis, esophagitis, no active bleeding,    Plan:   Continue the PPIs  Iron replacement  Outpatient colonoscopy once patient finished chemotherapy    Sign off  Discussed with patient, phone number given to patient to call    Signed By: Aida Prescott MD     May 11, 2022        Thank you for allowing me to participate in this patients care  Cc Referring Physician   Emi Spangler NP

## 2022-05-11 NOTE — PROGRESS NOTES
CM reviewed clinical record. No dc order noted. Discharge dispo: home selfcare. CM will continue to monitor for provider recommendations at [facility].      [address], [address]

## 2022-05-12 NOTE — PROGRESS NOTES
Problem: Nausea/Vomiting (Adult)  Goal: *Absence of nausea/vomiting  Outcome: Progressing Towards Goal     Problem: Falls - Risk of  Goal: *Absence of Falls  Description: Document Josey Locket Fall Risk and appropriate interventions in the flowsheet.   Outcome: Progressing Towards Goal  Note: Fall Risk Interventions:

## 2022-05-12 NOTE — H&P
PROGRESS NOTE    Patient: Pa Brito MRN: [id number]  SSN: [id number]    YOB: 1970  Age: 49 y.o. Sex: male      Admission Hx/Delmer Brito is a 49 y.o. male with a PMH of throat cancer and COPD who presented with worsening shortness of breath productive cough for the past 4 days. Associated with this patient has also had chills, black stools, palpitations, and bloody sputum. Of note patient has been receiving chemotherapy for the past 2 weeks. Patient denies abdominal pain, nausea, vomiting, fever, or hematic emesis. Patient denies recent sick contacts or travel. Patient denies history of tobacco, drug, or alcohol use. In ED patient afebrile, tachycardic and hypertensive 149/70. Patient to be admitted for further work-up. CXR showed consolidation of the right middle lobe (previously noted) with nodular and bandlike opacities in the lungs not significantly changed. Initial lab significant for hemoglobin of 4.4, hematocrit of 14, creatinine of 1.36, and . Patient to be transfused with 3 units of packed red blood cells, IV protonix, and home medications restarted. Oncology and GI consulted.   --------------------    S: Pain better. Constipated still. H/H relatively stable today. Received blood last night. S/p EGD/Arredondo: Mild distal esophagitis noted, for reflux. Mild/moderate to erosive gastritis in the body of stomach, no ulcers, AVM, mass. First and second part duodenum unremarkable. Prior to Admission medications    Medication Sig Start Date End Date Taking? Authorizing Provider   levothyroxine (SYNTHROID) 75 mcg tablet Take 75 mcg by mouth Daily (before breakfast). Yes Provider, Historical   apixaban (ELIQUIS) 5 mg tablet Take 1 Tablet by mouth two (2) times a day. 4/15/22  Yes Guy Mascorro, DAT   potassium chloride (Klor-Con M20) 20 mEq tablet Take 1 Tablet by mouth daily.  4/4/22  Yes Guy Mascorro, NP   lidocaine (XYLOCAINE) 2 % solution Take 15 mL by mouth as needed for Pain. 2/25/22  Yes Karol Christianson NP   nystatin (MYCOSTATIN) 100,000 unit/mL suspension Take 5 mL by mouth four (4) times daily. swish and spit 2/8/22  Yes Lucina Mascorro NP        No Known Allergies    Review of Systems:  Review of Systems   Gastrointestinal: Positive for constipation. Negative for abdominal pain, nausea and vomiting. Genitourinary: Negative for dysuria and hematuria. All other systems reviewed and are negative. Objective:     Recent Results (from the past 24 hour(s))   HGB & HCT    Collection Time: 05/11/22  8:20 PM   Result Value Ref Range    HGB 6.9 (L) 12.1 - 17.0 g/dL    HCT 21.0 (L) 36.6 - 26.2 %   METABOLIC PANEL, COMPREHENSIVE    Collection Time: 05/12/22  4:17 AM   Result Value Ref Range    Sodium 135 (L) 136 - 145 mmol/L    Potassium 4.8 3.5 - 5.1 mmol/L    Chloride 102 97 - 108 mmol/L    CO2 27 21 - 32 mmol/L    Anion gap 6 5 - 15 mmol/L    Glucose 85 65 - 100 mg/dL    BUN 14 6 - 20 mg/dL    Creatinine 1.49 (H) 0.70 - 1.30 mg/dL    BUN/Creatinine ratio 9 (L) 12 - 20      GFR est AA >60 >60 ml/min/1.73m2    GFR est non-AA 50 (L) >60 ml/min/1.73m2    Calcium 9.2 8.5 - 10.1 mg/dL    Bilirubin, total 0.3 0.2 - 1.0 mg/dL    AST (SGOT) 14 (L) 15 - 37 U/L    ALT (SGPT) 8 (L) 12 - 78 U/L    Alk.  phosphatase 72 45 - 117 U/L    Protein, total 6.4 6.4 - 8.2 g/dL    Albumin 2.6 (L) 3.5 - 5.0 g/dL    Globulin 3.8 2.0 - 4.0 g/dL    A-G Ratio 0.7 (L) 1.1 - 2.2     CBC WITH AUTOMATED DIFF    Collection Time: 05/12/22  4:17 AM   Result Value Ref Range    WBC 10.6 4.1 - 11.1 K/uL    RBC 2.83 (L) 4.10 - 5.70 M/uL    HGB 8.5 (L) 12.1 - 17.0 g/dL    HCT 26.6 (L) 36.6 - 50.3 %    MCV 94.0 80.0 - 99.0 FL    MCH 30.0 26.0 - 34.0 PG    MCHC 32.0 30.0 - 36.5 g/dL    RDW 19.8 (H) 11.5 - 14.5 %    PLATELET 187 118 - 584 K/uL    MPV 10.8 8.9 - 12.9 FL    NRBC 0.0 0.0  WBC    ABSOLUTE NRBC 0.00 0.00 - 0.01 K/uL    NEUTROPHILS 82 (H) 32 - 75 %    LYMPHOCYTES 4 (L) 12 - 49 %    MONOCYTES 11 5 - 13 %    EOSINOPHILS 1 0 - 7 %    BASOPHILS 0 0 - 1 %    IMMATURE GRANULOCYTES 2 (H) 0 - 0.5 %    ABS. NEUTROPHILS 8.8 (H) 1.8 - 8.0 K/UL    ABS. LYMPHOCYTES 0.5 (L) 0.8 - 3.5 K/UL    ABS. MONOCYTES 1.1 (H) 0.0 - 1.0 K/UL    ABS. EOSINOPHILS 0.1 0.0 - 0.4 K/UL    ABS. BASOPHILS 0.0 0.0 - 0.1 K/UL    ABS. IMM. GRANS. 0.2 (H) 0.00 - 0.04 K/UL    DF AUTOMATED     HGB & HCT    Collection Time: 05/12/22  8:18 AM   Result Value Ref Range    HGB 8.3 (L) 12.1 - 17.0 g/dL    HCT 25.5 (L) 36.6 - 50.3 %   HGB & HCT    Collection Time: 05/12/22 12:55 PM   Result Value Ref Range    HGB 8.8 (L) 12.1 - 17.0 g/dL    HCT 26.7 (L) 36.6 - 50.3 %        CTA CHEST W OR W WO CONT   Final Result   Advanced ROSENDO subsegmental atelectasis. Similar pulmonary masses/nodules. Similar left hilar lymphadenopathy. No CT evidence for PE. Small volume ascites. XR CHEST PORT   Final Result   Masslike consolidation at the right middle lobe. Nodular and   bandlike opacities in the lungs. Findings concerning for malignancy without   significant interval change. Vitals:    05/12/22 0000 05/12/22 0213 05/12/22 0346 05/12/22 0804   BP:  117/79  120/76   Pulse: 85 89 90 66   Resp:  18  16   Temp:  98.4 °F (36.9 °C)  98.8 °F (37.1 °C)   SpO2:  99%  96%   Weight:       Height:            Physical Exam:  Physical Exam  Vitals and nursing note reviewed. Constitutional:       Comments: Thinly built   HENT:      Head: Normocephalic and atraumatic. Nose: No congestion or rhinorrhea. Mouth/Throat:      Mouth: Mucous membranes are moist.      Pharynx: No oropharyngeal exudate. Neck:      Vascular: No carotid bruit. Comments: Tracheostomy without exudates or bloody sputum  Cardiovascular:      Rate and Rhythm: Regular rhythm. Comments: Port-A-Cath on right chest  Pulmonary:      Effort: No respiratory distress. Breath sounds: No wheezing.    Abdominal:      General: Bowel sounds are normal. There is no distension. Palpations: Abdomen is soft. Tenderness: There is no abdominal tenderness. Musculoskeletal:      Cervical back: Neck supple. No rigidity or tenderness. Right lower leg: No edema. Left lower leg: No edema. Skin:     General: Skin is warm. Capillary Refill: Capillary refill takes less than 2 seconds. Neurological:      Mental Status: He is alert and oriented to person, place, and time.    Psychiatric:         Mood and Affect: Mood normal.          Assessment:     Hospital Problems  Date Reviewed: 3/9/2022          Codes Class Noted POA    Anemia due to GI blood loss ICD-10-CM: D50.0  ICD-9-CM: 280.0  5/9/2022 Unknown              Plan:     Anemia, acute on chronic  Hemoglobin 4.4, will transfuse 3 units of packed red blood cells, H/H every 4 hours  Continue on IV protonix  GI consulted/GAO; S/p EGD 5/10/22  Re-transfused    Tachycardia  Likely secondary to anemia   Troponin negative,   Will monitor    TASHI   Creatinine 1.36, will trend  Will start IVF, gentle    COPD without exacerbation  Currently on RA   CTA of the chest : No PE  Nebulizer as needed    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

## 2022-05-12 NOTE — TELEPHONE ENCOUNTER
Patients nurse called and stated this patient wanted to let us know that he will not be able to make his appointments tomorrow because he is in the hospital at [facility]. I have cancelled his appointments for tomorrow.     Just an FYI for the team.

## 2022-05-13 NOTE — PROGRESS NOTES
Problem: Nausea/Vomiting (Adult)  Goal: *Absence of nausea/vomiting  Outcome: Progressing Towards Goal     Problem: Falls - Risk of  Goal: *Absence of Falls  Description: Document Stiven Stevens Fall Risk and appropriate interventions in the flowsheet.   Outcome: Progressing Towards Goal  Note: Fall Risk Interventions:            Medication Interventions: Patient to call before getting OOB,Teach patient to arise slowly

## 2022-05-13 NOTE — DISCHARGE SUMMARY
Hospitalist Discharge Summary     Patient ID:    Fox Miner  [id number]  73 y.o.  1970    Admit date: 5/9/2022    Discharge date : 5/13/2022      Final Diagnoses: Active Problems:    Anemia due to GI blood loss (5/9/2022)        Reason for Hospitalization:    Admission Hx/Delmer Miner is a 49 y.o. male with a PMH of throat cancer and COPD who presented with worsening shortness of breath productive cough for the past 4 days. Associated with this patient has also had chills, black stools, palpitations, and bloody sputum. Of note patient has been receiving chemotherapy for the past 2 weeks. Patient denies abdominal pain, nausea, vomiting, fever, or hematic emesis. Patient denies recent sick contacts or travel. Patient denies history of tobacco, drug, or alcohol use. In ED patient afebrile, tachycardic and hypertensive 149/70. Patient to be admitted for further work-up. CXR showed consolidation of the right middle lobe (previously noted) with nodular and bandlike opacities in the lungs not significantly changed. Initial lab significant for hemoglobin of 4.4, hematocrit of 14, creatinine of 1.36, and . Patient to be transfused with 3 units of packed red blood cells, IV protonix, and home medications restarted. Oncology and GI consulted. Hospital Course:     Feels well. Ana Cristina Crimes to go home. Received Transfusion with PRBC and IV Protonix. Underwent GI & Heme Eval.    Has been clinically stable last 48h. No evidence for ongoing bleeding. Eliquis has been held due to [facility]. Will need to be resumed/reassessed upon f/up with PCP/Oncologist. Patient does have hx of clots but CTA shows no active PE. May need to consider checking Venous Duplex outpatient. Has been placed on Protonix, Ferrous Sulfate and Miralax. TASHI due to ABLA; improved    Constipation; received miralax    S/p EGD/Arredondo: Mild distal esophagitis noted, for reflux.  Mild/moderate to erosive gastritis in the body of stomach, no ulcers, AVM, mass. First and second part duodenum unremarkable. Discharge Medications:     HOLD ELIQUIS until f/up with PCP or Oncologist.     Current Discharge Medication List      START taking these medications    Details   ferrous sulfate 325 mg (65 mg iron) tablet Take 1 Tablet by mouth two (2) times daily (with meals). Qty: 60 Tablet, Refills: 0  Start date: 5/13/2022      ondansetron (ZOFRAN ODT) 4 mg disintegrating tablet Take 1 Tablet by mouth every eight (8) hours as needed for Nausea or Vomiting. Qty: 30 Tablet, Refills: 0  Start date: 5/13/2022      pantoprazole (PROTONIX) 40 mg tablet Take 1 Tablet by mouth two (2) times a day. Indications: bleeding from stomach, esophagus or duodenum  Qty: 60 Tablet, Refills: 0  Start date: 5/13/2022      polyethylene glycol (MIRALAX) 17 gram packet Take 1 Packet by mouth daily. Qty: 30 Packet, Refills: 0  Start date: 5/14/2022         CONTINUE these medications which have CHANGED    Details   levothyroxine (SYNTHROID) 75 mcg tablet Take 1 Tablet by mouth Daily (before breakfast). Qty: 30 Tablet, Refills: 0  Start date: 5/13/2022         CONTINUE these medications which have NOT CHANGED    Details   apixaban (ELIQUIS) 5 mg tablet Take 1 Tablet by mouth two (2) times a day. Qty: 60 Tablet, Refills: 4    Associated Diagnoses: Other acute pulmonary embolism without acute cor pulmonale ([facility] [address] Squamous cell carcinoma of larynx (HCC)      potassium chloride (Klor-Con M20) 20 mEq tablet Take 1 Tablet by mouth daily. Qty: 30 Tablet, Refills: 1    Associated Diagnoses: Hypokalemia      lidocaine (XYLOCAINE) 2 % solution Take 15 mL by mouth as needed for Pain. Qty: 400 mL, Refills: 3    Associated Diagnoses: Squamous cell carcinoma of larynx ([facility] [address] Mucositis      nystatin (MYCOSTATIN) 100,000 unit/mL suspension Take 5 mL by mouth four (4) times daily.  swish and spit  Qty: 200 mL, Refills: 2    Associated Diagnoses: Squamous cell carcinoma of larynx ([address] Throat pain in adult; Oral thrush               Follow up Care:    1. Derrick Soler NP in 3-5 days  2. Dr. Gretchen Denton < 1 week (upon f/up with PCP)    HOLD Priscilla Rossi F/UP with PCP or Oncologist. Can resume in 1 week IF NO BLEEDING      Follow-up Information     Follow up With Specialties Details Why Contact Info    Derrick Soler NP Nurse Practitioner On 5/16/2022 time at [address] [facility]  [phone]              Patient Follow Up Instructions: Activity: Activity as tolerated  Diet:  Regular Diet    Condition at Discharge:  Stable  __________________________________________________________________    Disposition  Home or Self Care  ____________________________________________________________________    Code Status:  Full Code  ___________________________________________________________________    Discharge Exam:  Patient seen and examined by me on discharge day. Pertinent Findings:    Gen:    Not in distress  Chest: Clear lungs  CVS:   Regular rhythm.   No edema  Abd:  Soft, not distended, not tender  Neuro:  Alert    CONSULTATIONS: GI and Hematology/Oncology    Significant Diagnostic Studies:   Recent Results (from the past 24 hour(s))   HGB & HCT    Collection Time: 05/12/22 12:55 PM   Result Value Ref Range    HGB 8.8 (L) 12.1 - 17.0 g/dL    HCT 26.7 (L) 36.6 - 38.2 %   METABOLIC PANEL, COMPREHENSIVE    Collection Time: 05/13/22  3:32 AM   Result Value Ref Range    Sodium 136 136 - 145 mmol/L    Potassium 4.7 3.5 - 5.1 mmol/L    Chloride 104 97 - 108 mmol/L    CO2 28 21 - 32 mmol/L    Anion gap 4 (L) 5 - 15 mmol/L    Glucose 94 65 - 100 mg/dL    BUN 13 6 - 20 mg/dL    Creatinine 1.39 (H) 0.70 - 1.30 mg/dL    BUN/Creatinine ratio 9 (L) 12 - 20      GFR est AA >60 >60 ml/min/1.73m2    GFR est non-AA 54 (L) >60 ml/min/1.73m2    Calcium 9.1 8.5 - 10.1 mg/dL    Bilirubin, total 0.2 0.2 - 1.0 mg/dL    AST (SGOT) 17 15 - 37 U/L    ALT (SGPT) 9 (L) 12 - 78 U/L    Alk. phosphatase 76 45 - 117 U/L    Protein, total 6.6 6.4 - 8.2 g/dL    Albumin 2.6 (L) 3.5 - 5.0 g/dL    Globulin 4.0 2.0 - 4.0 g/dL    A-G Ratio 0.7 (L) 1.1 - 2.2     CBC WITH AUTOMATED DIFF    Collection Time: 05/13/22  3:32 AM   Result Value Ref Range    WBC 10.1 4.1 - 11.1 K/uL    RBC 2.76 (L) 4.10 - 5.70 M/uL    HGB 8.3 (L) 12.1 - 17.0 g/dL    HCT 25.5 (L) 36.6 - 50.3 %    MCV 92.4 80.0 - 99.0 FL    MCH 30.1 26.0 - 34.0 PG    MCHC 32.5 30.0 - 36.5 g/dL    RDW 19.5 (H) 11.5 - 14.5 %    PLATELET 978 471 - 476 K/uL    MPV 10.2 8.9 - 12.9 FL    NRBC 0.0 0.0  WBC    ABSOLUTE NRBC 0.00 0.00 - 0.01 K/uL    NEUTROPHILS 82 (H) 32 - 75 %    LYMPHOCYTES 3 (L) 12 - 49 %    MONOCYTES 13 5 - 13 %    EOSINOPHILS 1 0 - 7 %    BASOPHILS 0 0 - 1 %    IMMATURE GRANULOCYTES 1 (H) 0 - 0.5 %    ABS. NEUTROPHILS 8.3 (H) 1.8 - 8.0 K/UL    ABS. LYMPHOCYTES 0.3 (L) 0.8 - 3.5 K/UL    ABS. MONOCYTES 1.3 (H) 0.0 - 1.0 K/UL    ABS. EOSINOPHILS 0.1 0.0 - 0.4 K/UL    ABS. BASOPHILS 0.0 0.0 - 0.1 K/UL    ABS. IMM. GRANS. 0.1 (H) 0.00 - 0.04 K/UL    DF AUTOMATED       CTA CHEST W OR W WO CONT   Final Result   Advanced ROSENDO subsegmental atelectasis. Similar pulmonary masses/nodules. Similar left hilar lymphadenopathy. No CT evidence for PE. Small volume ascites. XR CHEST PORT   Final Result   Masslike consolidation at the right middle lobe. Nodular and   bandlike opacities in the lungs. Findings concerning for malignancy without   significant interval change.       DUPLEX LOWER EXT VENOUS BILAT    (Results Pending)           Signed:  Leora Donato MD  5/13/2022  10:48 AM

## 2022-05-13 NOTE — PROGRESS NOTES
I have reviewed discharge instructions with the patient including follow up appointment and med list.  The patient verbalized understanding. Tele Box removed. Patient had no IV access.

## 2022-05-13 NOTE — PROGRESS NOTES
CM reviewed the clinical record and the patient has a dc order. Patient is to dc home. CM met with patient at the bedside to f/u on DCP. Patient states he has no CM needs and is going home self/care.      [address], [address]

## 2022-05-13 NOTE — PROGRESS NOTES
Patient asked this RN to take his port access. This RN refused to take his port access and asked the patient to let the chemotherapy center to deal with it. Patient refused and took his port access himself. Patient refused to be wheeled out of the hospital with a wheelchair and stated he wants to walk. Patient was educated about the risk for falls and he still insisted on walking out of the hospital with no assistance.

## 2022-05-16 NOTE — PROGRESS NOTES
[address] [facility] at [address] [place] [address]  [phone]    Hematology / Oncology Established Visit    Reason for Visit:   Alexei Hodge is a 49 y.o. male who is seen in follow up of laryngeal cancer. Referred by Dr. 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 Stage: CATARINO [[id number]] at diagnosis, regional recurrence in 4/2021, now with metastatic disease. Pathology:   2/8/21 Total laryngectomy: Invasive squamous cell carcinoma  Tumor size 3.5cm, moderately differentiated (G2), PNI present, LVI not identified, margins negative  [id number]  -PDL1 (tested 12/28/21 on above specimen) positive with CPS 10. Prior Treatment:   1. 2/8/21 total laryngectomy/cricopharyngeal myotomy   2. Cisplatin 100mg/m2 every 3 weeks x 3 cycles, 5/24/21-7/13/21  3. Radiation 5/24/21-7/19/21  4. Pembrolizumab x 2 cycles, 1/6/22    Current Treatment:  [Cisplatin (100 mg/m2 intravenous, day 1) and fluorouracil (1000 mg/m2 per day, continuous infusion, for four days) and Pembrolizumab, day 1 given every 21 days x 6 cycles followed by Pembrolizumab maintenance x 2yr. Start 1/28/22 - current. History of Present Illness:   Alexei Hodge is a 49 y.o. male who with COPD who is referred for Head and neck cancer. He presented with throat pain and hoarseness in Sept 2020. Was evaluated by Dr. Dilcia Glover in ENT who performed laryngoscopy and diagnosed patient with squamous cell carcinoma involving the larynx and subglottis. In late Dec 2020, neck CT showed a 3 cm mass in Right supraglottic larynx with extension to thyroid cartilage, but no cervical LNs. PET 1/4/21 showed uptake in the laryngeal mass at right vocal fold, extending to thyroid cartilage. He was scheduled for surgery, but he required emergent tracheostomy prior to that; done late Jan 2021. Also had PEG placed 1/30/21. On 2/8/21, he underwent total laryngectomy/cricopharyngeal myotomy.  He quit smoking in Feb 2021. He was evaluated by Dr. Angelica Stewart in [address] [place] [address] in March 2021 who recommended post-op radiation. There were multiple delays given difficult getting in for dental evaluation prior to RT. [facility] simulation scan was notable for a necrotic appearing tumor in Right neck. PET 5/7/21 confirmed hypermetabolic uptake in right neck. PEG removed in March 2021 due to problems with it. Per Dr. Angelica Stewart, pt had 7 teeth extracted by dentist. He returns to the dentist again for dental fillings on 5/26/21. Pt states his neck feel tight, causing pulling sensation but no current pain. Interval History:  Patient here for follow up and treatment. Completed CT imaging. Was hospitalized 5/9-5/13/22 for severe anemia and black stools. EGD revealed erosive gastritis. Eliquis was placed on hold and pt was started on PPI and iron supplements. Having a solid stool every 2-3 days. Not currently taking miralax. Reports black stools - says black stools were present before starting on iron supplements. Reports intermittent umbilical stomach pain - improved since hospital visit. Reports dry cough - managing with allergy medication. No mucus production. Has exertional SOB with long distances/stairs. Tolerating all PO - supplementing with 2 boost per day. No dysphagia. No fevers, chills, rashes. Mucositis lesions have resolved. Followed up with Dr. Isabel Alas yesterday, was told neck/throat look good. PMHx: COPD, throat cancer, anxiety  PSurgHx: Left arm plate placement, tracheostomy, total laryngectomy 2/8/21  SHx: Quit smoking 10/28/20 after 45 pack years. No EtOH  FHx: Mother with DM; Father with DM, CKD; Sister with DM. No h/o malignancy. Review of Systems: A complete review of systems was obtained, negative except as described above. Physical Exam:   Blood pressure 112/65, pulse 93, temperature 97.9 °F (36.6 °C), resp. rate 16, weight 154 lb (69.9 kg), SpO2 93 %.     ECOG PS: 0  General: Well developed, no acute distress  Eyes: Anicteric sclerae  HENT: Atraumatic, no mouth sores   Neck: Supple, Tracheostomy noted  Lymphatic: No supraclavicular, axillary adenopathy. No bilateral cervical LAD, but firm skin at neck bilaterally. Respiratory: Clear but diminished throughout, normal respiratory effort  CV: Normal rate, regular rhythm, no murmurs, no peripheral edema  GI: Soft, nontender, nondistended, no masses  MS: Normal gait and station. Digits without clubbing or cyanosis. 1cm firm nodule on left wrist.  Skin: No rashes. Hyperpigmented skin at neck bilaterally. Neuro/Psych: Alert. Communicates by writing given tracheostomy. Results:     Lab Results   Component Value Date/Time    WBC 8.4 05/20/2022 07:55 AM    HGB 6.0 (L) 05/20/2022 07:55 AM    HCT 19.0 (L) 05/20/2022 07:55 AM    PLATELET 568 (H) 96/73/9398 07:55 AM    MCV 96.0 05/20/2022 07:55 AM    ABS. NEUTROPHILS 7.2 05/20/2022 07:55 AM     Lab Results   Component Value Date/Time    Sodium 138 05/20/2022 07:55 AM    Potassium 4.2 05/20/2022 07:55 AM    Chloride 105 05/20/2022 07:55 AM    CO2 28 05/20/2022 07:55 AM    Glucose 123 (H) 05/20/2022 07:55 AM    BUN 26 (H) 05/20/2022 07:55 AM    Creatinine 1.81 (H) 05/20/2022 07:55 AM    GFR est AA 48 (L) 05/20/2022 07:55 AM    GFR est non-AA 40 (L) 05/20/2022 07:55 AM    Calcium 8.7 05/20/2022 07:55 AM    Glucose (POC) 87 05/10/2022 11:06 AM     Lab Results   Component Value Date/Time    Bilirubin, total 0.2 05/13/2022 03:32 AM    ALT (SGPT) 9 (L) 05/13/2022 03:32 AM    Alk.  phosphatase 76 05/13/2022 03:32 AM    Protein, total 6.6 05/13/2022 03:32 AM    Albumin 2.6 (L) 05/13/2022 03:32 AM    Globulin 4.0 05/13/2022 03:32 AM     Lab Results   Component Value Date/Time    Iron 25 (L) 05/10/2022 12:23 PM    TIBC 302 05/10/2022 12:23 PM    Iron % saturation 8 (L) 05/10/2022 12:23 PM    Ferritin 349 05/10/2022 12:23 PM      Lab Results   Component Value Date/Time    Vitamin B12 >2,000 (H) 05/10/2022 12:23 PM    Folate 13.9 05/10/2022 12:23 PM         Imagin/28/21 Neck CT: IMPRESSION  Irregular soft tissue mass involving the aryepiglottic and the larynx. There is significant narrowing of the airway at the level of the larynx  and the supraglottic region. Poorly defined cervical adenopathy is noted. 21 Chest CTA:  IMPRESSION  Minimal scarring or subsegmental atelectasis in the left lung base. No evidence of pulmonary embolism or pneumonia. 21 PET:  IMPRESSION  2 large necrotic appearing mass lesions are noted in the right neck (SUV 6.4). Small hypermetabolic right posterior triangle lymph node. No PET avid evidence  of distant metastatic disease. 10/4/21 PET:  FINDINGS:  HEAD/NECK: There is physiologic tracer activity in the oral cavity and piriform  sinuses. Physiologic tracer activity is also seen in the neck musculature,  particularly the right sternocleidomastoid muscle. 2 enlarged right level 2  cervical lymph nodes are slightly decreased in size; these demonstrate no  significant residual FDG activity. Previously seen small lymph node in the right  posterior cervical triangle also demonstrates no residual FDG activity. CHEST: There is a new 3.0 x 2.6 cm focus of nodular airspace disease in the left  upper lobe which demonstrates increased FDG activity, maximal SUV 2.8. New foci  of nodular airspace disease in the left lower lobe, measuring 1.2 cm and 1.0 cm,  in the right lower lobe, measuring 0.8 cm, and in the right middle lobe,  measuring 1.6 cm, demonstrate no appreciable FDG activity. ABDOMEN/PELVIS: No foci of abnormal hypermetabolism. SKELETON: No foci of abnormal hypermetabolism in the axial and visualized  appendicular skeleton. IMPRESSION  1. Previously seen enlarged cervical lymph nodes demonstrate no residual  abnormal FDG activity.   2. New foci of nodular airspace disease in both lungs, including a 3 cm area in  the left upper lobe which demonstrates low-grade increased FDG activity. Findings may be infectious etiology, although neoplasm is not excluded;  attention on follow-up examinations is recommended. 11/22/21 CT neck:  IMPRESSION  No significant interval change since the previous CT PET  examination. Necrotic right level 2A lymph nodes unchanged in size. No definite  new pathologic lymph nodes by CT criteria. Postradiation changes as described  above. Narrowed nasopharyngeal and oropharyngeal airway unchanged. No new pathologic lymphadenopathy by CT criteria is demonstrated. 11/22/21 CT chest:   There is a 15 mm node in the right hilum, station 10 R, series 201 image 39. There is increasing multifocal multi lobar inflammatory appearing airspace  disease in the lungs most suggestive of multifocal pneumonia. Dominant focus of  airspace disease in the lingula on series 204 image 41 measures 3.3 x 3.2 cm,  previously 3.0 x 2.6 cm. Likewise other foci have increased in size with  dominant lesion on the right measuring 2.8 x 1.9 cm on image 50, previously 16  mm. Suspicious right upper lobe nodules are present including a dominant 6 x 9 mm  nodule on series 204 image 25. There are no suspicious lytic or blastic skeletal lesions in the thorax. Incidental imaging of the upper abdomen demonstrates liver steatosis with  probable focal steatosis adjacent to the fissure for the falciform ligament. There may be a small retrocardiac hiatal hernia. IMPRESSION  Increasing multifocal airspace disease with new suspicious solid appearing  nodules in the right upper lobe. 3/16/22 CT abd/pelv:   FINDINGS:  LUNG BASES: Bilateral lung consolidations. No pleural effusion. Clement Fly LIVER: Unremarkable. GALLBLADDER AND BILIARY TREE: No evident gallstones, gallbladder wall  thickening, or biliary ductal dilation. PANCREAS: Unremarkable. SPLEEN: Unremarkable. ADRENALS: Unremarkable. KIDNEYS AND URETERS: Symmetric renal size and enhancement. No hydronephrosis or  hydroureter.   BLADDER: Unremarkable. REPRODUCTIVE ORGANS: No pelvic masses. BOWEL: Oral contrast has achieved the distal small bowel. The bowel is not  dilated. Small hiatal hernia. PEG tube has been removed. LYMPH NODES:  No lymphadenopathy. PERITONEUM: No free air, ascites, walled off fluid collection. VESSELS: Abdominal aorta without aneurysm or dissection. ABDOMINAL WALL: Intact. BONES: Unremarkable for age. Small disc bulges at L4-L5 and L5-S1 with mild  canal impingement, unchanged. IMPRESSION  1. No evidence of metastatic disease abdomen pelvis. 2. CT chest performed separately. 3/16/22 CT chest:  FINDINGS:  LINES: Right port distal tip SVC/RA junction. HEART: Normal heart size. No pericardial effusion. THORACIC AORTA: No aneurysm or dissection. PULMONARY ARTERIES: No large central pulmonary arterial filling defect, non-CTA  technique. ADENOPATHY: Developing left hilar adenopathy, see below. THORACIC ESOPHAGUS: Collapsed. LUNG PARENCHYMA: Multiple bilateral pulmonary consolidations. The largest: Right  middle lobe along the fissure 4.8 x 2.3 cm, previously 2.8 x 1.9 cm; lingula 3.2  x 3.3 cm unchanged. Developing consolidation lateral lingula extending from the  hilum 4.8 x 2.5 cm including lymphadenopathy, previously small patchy. CENTRAL AIRWAYS: Tracheostomy tubing in place. Left hilar adenopathy, narrows  the lingular bronchus. PLEURA: No pleural effusion. No pneumothorax. CHEST WALL: No axillary adenopathy. Right chest wall port. UPPER ABDOMEN:  Unremarkable. BONES: Unremarkable for age. IMPRESSION  1. Increasing pulmonary consolidations, and confluent left hilar adenopathy,  concerning for advancing metastatic disease. 2. CT abdomen pelvis reported separately. 4/29/22 CT neck: IMPRESSION  1. Stable appearance of neck compared with previous examination of 11/22/2021. No developing mass or adenopathy evident. Slight decrease in size of necrotic  nodes compared with prior examination.     4/29/22 CT chest/abd/pelv:  CT CHEST:  There are focal nodular like areas of density in the right upper lobe measuring  10 mm, 7 mm and 5 mm also present on prior study. Confluent density is seen in  the right middle lobe lateral to the right hilum measuring 4.0 x 1.7 cm without  significant change from prior study. There is focal infiltrative density in the  left upper lobe similar to prior study. There is left hilar adenopathy, 4.0 x  2.5 cm, with horizontal infiltrative atelectatic stranding lateral to the left  hilum in the left upper lobe and involving the lingula and similar to prior  study. The heart is borderline size, no pericardial thickening or fluid. Tracheostomy identified. Calcified plaque involving the aortic arch, no aneurysm  or dissection. Thoracic spondylosis. No bone lesion. IMPRESSION:   Findings are similar to study dated 3/16/2022 without significant  progression. Left hilar adenopathy. Focal areas of airspace disease in both  lungs compatible with atelectatic changes. Nodular shaped opacities in the right  upper lobe, cannot exclude metastatic sites. CT abdomen: The liver gallbladder spleen pancreas and adrenal glands and kidneys image as  normal structures. There is calcific plaque involving the aorta and iliac  arteries, no evidence of aneurysm. There is multiple stool and gas throughout  the large bowel. No small bowel distention. No free fluid or induration of the  mesentery. CT pelvis: The urinary bladder is mostly empty. No free fluid or induration of the  mesentery in the pelvis. Lower thoracic and lumbar vertebra show normal  alignment, no vertebral compression or subluxation. No bone lesion. IMPRESSION:  No evidence of metastatic disease in the abdomen or pelvis.     5/10/22 EGD:  Mild distal esophagitis noted, for reflux  Mild/moderate to erosive gastritis in the body of stomach, no ulcers, AVM, mass,  First and second part duodenum unremarkable    Assessment & Plan:   Katie Prakash is a 49 y.o. male is seen for laryngeal cancer. 1. Squamous cell carcinoma of larynx, Stage CATARINO [pT4a cN2 Mx]:  S/p total laryngectomy and tracheostomy in Jan 2021. Afterwards, he developed disease recurrence in Right neck confirmed by PET scan. I recommended concurrent chemoradiation using Cisplatin to treat his disease. Pt completed concurrent chemoradiation radiation 7/19/21. Evidence of local treatment response per physical exam and 10/4/21 PET. However disease progression noted on chest CT 11/22/21 with increasing size in lung nodules, outside of prior radiation field. Per discussion with Thoracic Tumor Board, these nodules would be amenable to biopsy by navigation bronchoscopy if desired. However, RadOnc and I feel this is obvious disease progression given extent of disease at diagnosis. Discussed with patient about disease progression. Discussed pros/con of biopsy to confirm metastatic disease (pro: confirmation rather than assumption of metastatic disease; ability to send metastatic tissue for NGS; con: will lead to treatment delay of clinically likely metastatic disease. ) He elected to proceed with treatment without repeating biopsy. Started Pembrolizumab monotherapy, then switched to Cis-5FU-Pembrolizumab, followed by Pembro maintenance x 2 y given PDL1 CPS < 20. [30% RR in phase III trials.] Repeat imaging on 4/29/22 showed stable left hilar adenopathy and stable nodular shaped opacities in right lung. Focal areas of airspace disease in both lungs compatible with atelectatic changes. Supportive medications: zofran, compazine, emla, decadron. 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 -- Hold C5 of Cis-5-FU (20% DR 5-FU due to mucositis) due to severe anemia. Neulasta added with pump removal. Will proceed with Keytruda today. -- Additional fluids scheduled on pump removal day and off week Tuesdays.     -- Due for repeat CT after C7   -- Follow up in 3 weeks for retry C5 [id number], MD/NP visit. Maintenance Keytruda will start with C7.   -- Request Dr. Swain progress note. -- Fax note and labs to PCP Kendrick Ware. 2. FEN/GI / constipation / distal esophagitis / erosive gastritis / GI bleed:    Hgb declined to 6 today with associated black stools. Recently started on pantoprazole 40mg BID.   -- GI recommended outpatient colonoscopy. Will assist with scheduling at the [address] [place] [address] in [address] [place] [address] starting daily miralax to prevent constipation on iron supplements. 3. Macrocytic anemia / anemia of chronic disease:   Hgb declined to 6 today. 2/2 chemotherapy, erosive gastritis. B12, folate normal; ferritin high and iron sat mildly low. Required 1 unit of blood after C3 and 3 units of blood after C4 given GIB from erosive gastritis (Hgb 4.4). Recently started on twice daily iron supplement. -- 1 unit of blood today, urgent referral to GI for colonoscopy. 4. Hypothyroidism:   Due to prior radiation and improving on levothyroxine to 75mcg/d. Stressed the importance of taking it on an empty stomach every morning at the same time at least an hour before other medications or eating. Managing with Dr. Smitha Rosario, Endocrinology   -- Checking TSH/free T4 every other cycle during treatment. Will send updates to Dr. Smitha Rosario. 5. Pulmonary embolism:  Provoked by #1. 11/2021 CT chest. Recent CTA chest negative for PE. Eliquis on hold given recent GI bleed. Will continue holding for 2-4 weeks and restart at preventive dose in future. 6. Tracheostomy care: Insurance approved HME supplies through [employer]. Dr. Orion Grijalva team provided samples in the past.     7. Chemotherapy induced mucositis:   Resolved today. Due to 5-FU. Using baking soda rinses and viscous lidocaine prescription and provided instructions for adding maalox and benadryl. Percocet prn.     8. Neck pain:  Established with lymphedema with improvement in symptoms.      9. Rhinorrhea/productive cough: Likely multifactorial due to allergies, dryness from chemo and lung consolidation seen on imaging. Continue mucinex for secretions. Managing with loratadine 10mg daily and nasal saline spray twice daily. 10. TASHI:  Likely due to severe anemia. Additional 500mL NS today and 1 unit of PRBC. Emotional well being: Pt is coping well with his/her disease and has excellent support. I personally saw and evaluated the patient and performed the key components of medical decision making. The history, physical exam, and documentation were performed by Mata Elizabeth NP. I reviewed and verified the above documentation and modified it as needed. Will proceed with Nellene Bring today, but hold cytotoxic chemotherapy given severe anemia likely related to GI blood loss. Recent EGD showed gastritis, but I suspect pt may also have bleeding lesion in small bowel. I recommend colonoscopy and capsule endoscopy by GI as soon as possible. Transfusing 1 Unit of PRBCs today. Continue holding Eliquis. Adding IVF for TASHI likely related to hypovolemia.       Signed By: Obi Andrews MD     05/20/22

## 2022-05-18 NOTE — TELEPHONE ENCOUNTER
[facility] at [facility]  [phone]        05/18/22 11:16 AM Attempted to call patent's brother, Andrew Van, to check on patient. No answer, left message requesting return call. Provided office phone number for call back. Clinical team had received letter from patient stating his BP was low, 87/52, and had complaints of black stools. Would like to inquire if patient is taking iron supplements and if he is symptomatic, such as experiencing dizziness. Juanita Juarez, DAT, also refilled patient's potassium supplements but he can hold this medication for now since his potassium level is normal. Potassium tablets could also cause more GI upset. 05/19/22 11:33 AM Called patient's brother, Andrew Van. He stated he picked up several medications from pharmacy recently, but unsure if this included iron. He stated he would verify with patient if he is taking iron or not. Patient has noted black stools for at least a week. Andrew Carlota also mentioned that patient has not been scheduled for colonoscopy yet. Per GI note from 05/11, gastro recommended outpatient colonoscopy once patient finished chemotherapy. Andrew Van denies patient having any lightheadedness/dizziness and SOB. He reports that patient feels well and has a good appetite. Also advised of DAT Tony, recommendations regarding potassium. Will forward above update to Dr. Myah Heaton and Juanita Juarez. Discussed that this nurse will call back if any additional changes/recommendations, otherwise will follow up with patient tomorrow as scheduled. Andrew Carlota voiced understanding.

## 2022-05-20 NOTE — PROGRESS NOTES
[facility] VISIT NOTE  Date: May 20, 2022    Name: Valeri Turner    MRN: [id number]         : 1970    Mr. Peterson Arrived ambulatory and in no distress for C5D1 of Keytruda (Given)+ Cisplatin (Hold)+ Fluorouracil (HOLD) Regimen. Assessment was completed by Arian Tierney RN , no acute issues at this time, pt stated that he was in hospital for 5 days and got 4U of blood, complained of increased in weakness and fatigue. Chest wall port accessed by assessment nurse without difficulty, labs drawn & sent for processing. 1. Do you have any symptoms of COVID-19? SOB, coughing, fever, or generally not feeling well NO    2. Have you been exposed to COVID-19 recently? NO    3. Have you had any recent contact with family/friend that has a pending COVID test? NO       Chemotherapy Flowsheet 2022   Cycle C5D1   Date 2022   Drug / Regimen Keytruda/Cisplatin/Fluorouracil   Pre Hydration -   Post Hydration given 500ml   Pre Meds -   Notes given           Mr. Peterson's vitals were reviewed. Patient Vitals for the past 12 hrs:   Temp Pulse Resp BP SpO2   22 1350 98.3 °F (36.8 °C) 92 16 115/78 97 %   22 1150 97.7 °F (36.5 °C) 95 16 115/77 97 %   22 1130 98.7 °F (37.1 °C) 91 16 110/71    22 0749 97.9 °F (36.6 °C) 93 16 112/65 93 %         Lab results were obtained and reviewed.   Recent Results (from the past 12 hour(s))   CBC WITH AUTOMATED DIFF    Collection Time: 22  7:55 AM   Result Value Ref Range    WBC 8.4 4.1 - 11.1 K/uL    RBC 1.98 (L) 4.10 - 5.70 M/uL    HGB 6.0 (L) 12.1 - 17.0 g/dL    HCT 19.0 (L) 36.6 - 50.3 %    MCV 96.0 80.0 - 99.0 FL    MCH 30.3 26.0 - 34.0 PG    MCHC 31.6 30.0 - 36.5 g/dL    RDW 17.9 (H) 11.5 - 14.5 %    PLATELET 373 (H) 815 - 400 K/uL    MPV 9.6 8.9 - 12.9 FL    NRBC 0.0 0  WBC    ABSOLUTE NRBC 0.00 0.00 - 0.01 K/uL    NEUTROPHILS 86 (H) 32 - 75 %    LYMPHOCYTES 4 (L) 12 - 49 %    MONOCYTES 9 5 - 13 %    EOSINOPHILS 0 0 - 7 %    BASOPHILS 0 0 - 1 % IMMATURE GRANULOCYTES 1 (H) 0.0 - 0.5 %    ABS. NEUTROPHILS 7.2 1.8 - 8.0 K/UL    ABS. LYMPHOCYTES 0.3 (L) 0.8 - 3.5 K/UL    ABS. MONOCYTES 0.8 0.0 - 1.0 K/UL    ABS. EOSINOPHILS 0.0 0.0 - 0.4 K/UL    ABS. BASOPHILS 0.0 0.0 - 0.1 K/UL    ABS. IMM. GRANS. 0.1 (H) 0.00 - 0.04 K/UL    DF SMEAR SCANNED      RBC COMMENTS HYPOCHROMIA  1+        RBC COMMENTS ANISOCYTOSIS  1+       METABOLIC PANEL, COMPREHENSIVE    Collection Time: 05/20/22  7:55 AM   Result Value Ref Range    Sodium 138 136 - 145 mmol/L    Potassium 4.2 3.5 - 5.1 mmol/L    Chloride 105 97 - 108 mmol/L    CO2 28 21 - 32 mmol/L    Anion gap 5 5 - 15 mmol/L    Glucose 123 (H) 65 - 100 mg/dL    BUN 26 (H) 6 - 20 MG/DL    Creatinine 1.81 (H) 0.70 - 1.30 MG/DL    BUN/Creatinine ratio 14 12 - 20      GFR est AA 48 (L) >60 ml/min/1.73m2    GFR est non-AA 40 (L) >60 ml/min/1.73m2    Calcium 8.7 8.5 - 10.1 MG/DL    Bilirubin, total 0.2 0.2 - 1.0 MG/DL    ALT (SGPT) 12 12 - 78 U/L    AST (SGOT) 15 15 - 37 U/L    Alk. phosphatase 67 45 - 117 U/L    Protein, total 7.0 6.4 - 8.2 g/dL    Albumin 2.7 (L) 3.5 - 5.0 g/dL    Globulin 4.3 (H) 2.0 - 4.0 g/dL    A-G Ratio 0.6 (L) 1.1 - 2.2     MAGNESIUM    Collection Time: 05/20/22  7:55 AM   Result Value Ref Range    Magnesium 2.2 1.6 - 2.4 mg/dL   TYPE & SCREEN    Collection Time: 05/20/22  9:07 AM   Result Value Ref Range    Crossmatch Expiration 05/23/2022,2359     ABO/Rh(D) [place] POSITIVE     Antibody screen NEG     Unit number [id number]     Blood component type RC LR,1     Unit division 00     Status of unit ISSUED     Crossmatch result Compatible    RBC, ALLOCATE    Collection Time: 05/20/22 10:15 AM   Result Value Ref Range    HISTORY CHECKED?  Historical check performed        Medications received:  Medications Administered     0.9% sodium chloride infusion     Admin Date  05/20/2022 Action  New Bag Dose  25 mL/hr Rate  25 mL/hr Route  IntraVENous Administered By  Sarbjit Davis RN          0.9% sodium chloride injection 10 mL     Admin Date  05/20/2022 Action  Given Dose  10 mL Route  IntraVENous Administered By  Chandrika Anne Date  05/20/2022 Action  Given Dose  10 mL Route  IntraVENous Administered By  Kashif Gonzales RN          heparin (porcine) pf 300-500 Units     Admin Date  05/20/2022 Action  Given Dose  500 Units Route  InterCATHeter Administered By  Kashif Gonzales RN          pembrolizumab [facility]) 200 mg in 0.9% sodium chloride 100 mL, overfill volume 10 mL IVPB     Admin Date  05/20/2022 Action  New Bag Dose  200 mg Rate  236 mL/hr Route  IntraVENous Administered By  Kashif Gonzales RN          sodium chloride 0.9 % bolus infusion 500 mL     Admin Date  05/20/2022 Action  New Bag Dose  500 mL Rate  1,000 mL/hr Route  IntraVENous Administered By  Kashif Gonzales RN                       7050 First unit initiated. 1350 First unit completed. Pt monitored post transfusion with no s/s of reaction. Educated and provided with written material regarding s/s of delayed reaction to watch for at home. Mr. Dameon Toussaint tolerated treatment well and was discharged from [place] [address] in stable condition at 1440. Port de-accessed, flushed & heparinized per protocol. He is to return on  May 24, 2022 at 0800 for his next appointment.     Rusty Avalos RN  May 20, 2022    Future Appointments:  Future Appointments   Date Time Provider Nathanael Haas   5/24/2022  8:00 AM SS INF4 CH4 <1H [facility]   5/25/2022  2:00 PM Emmett Gomez MD [facility] BS AMB   5/31/2022 11:30 AM SS INF4 CH4 <1H [facility]   6/1/2022  8:30 AM Monica Najera [address]   6/7/2022  9:00 AM SS INF7 CH2 <1H [facility]   6/10/2022  8:00 AM SS INF2 CH1 >4H [facility]   6/10/2022  8:45 AM Mary Mascorro NP [facility] BS AMB   6/14/2022  9:30 AM SS INF7 CH3 <1H [facility]   6/21/2022 11:00 AM SS INF7 CH3 <1H [facility]   6/28/2022  9:00 [facility] 7/1/2022  8:00 AM [facility]   7/5/2022 11:00 AM SS INF4 CH4 <1H [facility]   7/12/2022 11:00 AM SS INF7 CH3 <1H [facility]

## 2022-05-20 NOTE — TELEPHONE ENCOUNTER
[address] at [facility]  [phone]        05/20/22 1:56 PM Assisted in arranging transportation for the following appointments:     [address] [address] (he put [id number])   [phone]   Appt is 5/25/22 at 1 pm   Trip # [id number]    [address] [place] [address]   [phone]   5/26/22 @ 2 pm   Trip # [id number]    Representative advised that transportation service will call patient to confirm above and provide approximate arrival time to pick patient up. No further questions or concerns at this time.

## 2022-05-20 NOTE — TELEPHONE ENCOUNTER
[employer] at [facility]  [phone]        05/20/22 9:18 AM Attempted to call [address] ([phone]) to coordinate a colonoscopy ASAP, per Dipika Borrero NP. Patient's hemoglobin in clinic today 6. No answer, left message requesting a call back. Provided office phone number as well. 1:25 PM Attempted to call Mayco Glass again. No answer, left message requesting a call back as soon as possible regarding status of above. Provided office phone number as well for call back. 05/23/22 8:59 AM Attempted to call Mayco Glass. No answer, left message requesting return call. Provided office phone number as well. Will also notify NP that this nurse has still been unable to reach GI office. 2:36 PM Spoke with patient's brother, Gordo Rendon, and advised of message per Dr. Dafne Stockton. He voiced understanding and is agreeable to proceed with referral. Discussed that this office will initiate referral and keep brother/patient updated of status. He voiced understanding. 05/25/22 12:36 PM Called Medicaid to schedule transportation to/from GI appointment on 06/03 at 2:15 PM. patient should be ready about 1.5 hours beforehand, but transportation will also contact patient within 24-48 hours to confirm  time. Trip # [id number]. Called patient's brother, Gordo Rendon, and notified him of above. He voiced understanding. No further questions or concerns at this time.

## 2022-05-24 NOTE — PROGRESS NOTES
\[facility] [place] [facility]\"" Progress Note    Date: May 24, 2022    Name: Fox Miner    MRN: [id number]         : 1970    Mr. Peterson Arrived ambulatory and in no distress for Hydration. Assessment was completed, no acute issues at this time, no new complaints voiced. Right chest wall port accessed without difficulty. Mr. Ozzy Jacobs vitals were reviewed. Visit Vitals  /79   Pulse 95   Temp 98 °F (36.7 °C)   Resp 18   Ht 6' (1.829 m)   Wt 70.8 kg (156 lb)   SpO2 97%   BMI 21.16 kg/m²         Medications:  1 L Bolus     Mr. Yessenia Suarez tolerated treatment well and was discharged from [place] [address] in stable condition at 0930. Port de-accessed, flushed & heparinized per protocol. He is to return on May 31, 2022 at 1130 for his next appointment.     Stephani Joya  May 24, 2022

## 2022-05-24 NOTE — TELEPHONE ENCOUNTER
[facility]   Oncology Social Work  Encounter      Patient Name:  Luz Peterson History: h&n cancer      Advance Directives: none on file; conversation deferred     Narrative: Assisted patient with scheduling transportation.  Called Medicaid/ #[id number] and booked the following dates:     May 31st at [facility] time - 7am  Trip # [id number]     June 1st at [facility] time Robert [facility] Los [facility]  Trip # [id number]    Informed patient transportation has been scheduled.      Referral/Handouts:   Transportation referral     Thank you,  Syd Sutton LCSW

## 2022-05-30 PROBLEM — D64.9 ANEMIA: Status: ACTIVE | Noted: 2022-01-01

## 2022-05-30 PROBLEM — R07.9 CHEST PAIN: Status: ACTIVE | Noted: 2022-01-01

## 2022-05-30 PROBLEM — R00.2 PALPITATIONS: Status: ACTIVE | Noted: 2022-01-01

## 2022-05-30 PROBLEM — D64.9 SYMPTOMATIC ANEMIA: Status: ACTIVE | Noted: 2022-01-01

## 2022-05-30 NOTE — Clinical Note
Status[de-identified] INPATIENT [101]   Type of Bed: Remote Telemetry [29]   Cardiac Monitoring Required?: Yes   Inpatient Hospitalization Certified Necessary for the Following Reasons: 3. Patient receiving treatment that can only be provided in an inpatient setting (further clarification in H&P documentation)   Admitting Diagnosis: Anemia [464430]   Admitting Diagnosis: Palpitations Leontius.Dasen. 1. ICD-9-CM]   Admitting Diagnosis: Chest pain [319915]   Admitting Physician: Azalea Pérez [[id number]]   Attending Physician: Stanley Greenwood [[id number]]   Estimated Length of Stay: 2 Midnights   Discharge Plan[de-identified] Home with Office Follow-up

## 2022-05-30 NOTE — H&P
History and Physical    NAME: Tato Epps   :  1970   MRN:  [id number]     Date/Time:  2022 9:42 AM    Patient PCP: Lisa Miranda, NP  ______________________________________________________________________             Subjective:     CHIEF COMPLAINT: dark stools, fatigue, chest pain  HISTORY OF PRESENT ILLNESS:       Patient is a 49y.o. year old male with a PMHx significant for COPD, throat and lung cancer s/p tracheostomy in 21, who presents for evaluation of dark tarry stools for the past week. Patient is on Eliquis but states he stopped taking it two weeks ago. He began having dark tarry stools at home one week ago. He reports other symptoms of fatigue, dizziness, lightheadedness, right sided chest pain, nausea and lower abdominal pain started two days ago. Patient reports he was diagnosed with cancer two years ago and is currently on chemotherapy. He denies any sick contacts, recent travel, fever, chills, cough, shortness of breath, vomiting, diarrhea, headache, numbness/tingling or any other acute symptoms. Patient was seen by ER provider and workup revealed a Hgb of 5.0 with Hct of 15.9, patient was recommended to be admitted for further evaluation and was transfused 2 units of RBC. Labs notable for  Hgb 5.0/ Hct 15.9  Creatinine 1.85  CXR notable for opacities suggesting mass, infiltrate or worsening atelectasis.      Past Medical History:   Diagnosis Date    Chronic pain     THROAT, EARS, NECK R/T CANCER    COPD (chronic obstructive pulmonary disease) (HCC)     EMPHASEMA    Psychiatric disorder     ANXIETY R/T CANCER    Throat cancer ([address]     Tracheostomy present [facility])         Past Surgical History:   Procedure Laterality Date    HX ORTHOPAEDIC      LEFT ARM- \" PUT PLATE IN\"    HX TRACHEOSTOMY  2021    IR INSERT TUNL CVC W PORT OVER 5 YEARS  2021    IR PLACE CVAD FLUORO GUIDE  2021       Social History     Tobacco Use    Smoking status: Former Smoker Packs/day: 1.50     Years: 30.00     Pack years: 37.1     Quit date: 10/28/2020     Years since quittin.5    Smokeless tobacco: Never Used   Substance Use Topics    Alcohol use: Not Currently        Family History   Problem Relation Age of Onset    Diabetes Mother     Diabetes Father     Kidney Disease Father     Diabetes Sister     Heart Disease Daughter     Anesth Problems Neg Hx        No Known Allergies     Prior to Admission medications    Medication Sig Start Date End Date Taking? Authorizing Provider   potassium chloride (Klor-Con M20) 20 mEq tablet Take 1 Tablet by mouth daily. Patient not taking: Reported on 2022   Sera Mascorro NP   ferrous sulfate 325 mg (65 mg iron) tablet Take 1 Tablet by mouth two (2) times daily (with meals). 22   Jamila Mendes MD   ondansetron (ZOFRAN ODT) 4 mg disintegrating tablet Take 1 Tablet by mouth every eight (8) hours as needed for Nausea or Vomiting. 22   Jamila Mendes MD   pantoprazole (PROTONIX) 40 mg tablet Take 1 Tablet by mouth two (2) times a day. Indications: bleeding from stomach, esophagus or duodenum 22   Jamila Mendes MD   polyethylene glycol (MIRALAX) 17 gram packet Take 1 Packet by mouth daily. Patient not taking: Reported on 2022   Jamila Mendes MD   levothyroxine (SYNTHROID) 75 mcg tablet Take 1 Tablet by mouth Daily (before breakfast). 22   Jamila Mendes MD   apixaban (ELIQUIS) 5 mg tablet Take 1 Tablet by mouth two (2) times a day. Patient not taking: Reported on 2022 4/15/22   Sera Mascorro NP   lidocaine (XYLOCAINE) 2 % solution Take 15 mL by mouth as needed for Pain. 22   Patience Christianson NP   nystatin (MYCOSTATIN) 100,000 unit/mL suspension Take 5 mL by mouth four (4) times daily.  swish and spit 22   Sera Mascorro NP         Current Facility-Administered Medications:     0.9% sodium chloride infusion 250 mL, 250 mL, IntraVENous, PRN, Huber Bullard [facility], MD    ferrous sulfate tablet 325 mg, 325 mg, Oral, BID WITH MEALS, Kaylee Bullard MD  Irene Bajwa  [START ON 5/31/2022] levothyroxine (SYNTHROID) tablet 75 mcg, 75 mcg, Oral, ACB, Kaylee Bullard MD    pantoprazole (PROTONIX) tablet 40 mg, 40 mg, Oral, BID, Kaylee Bullard MD    0.9% sodium chloride infusion, 75 mL/hr, IntraVENous, CONTINUOUS, Kaylee Bullard MD    acetaminophen (TYLENOL) tablet 650 mg, 650 mg, Oral, Q6H PRN **OR** acetaminophen (TYLENOL) suppository 650 mg, 650 mg, Rectal, Q6H PRN, Kaylee Bullard MD    polyethylene glycol (MIRALAX) packet 17 g, 17 g, Oral, DAILY PRN, Kaylee Bullard MD    ondansetron (ZOFRAN ODT) tablet 4 mg, 4 mg, Oral, Q8H PRN **OR** ondansetron (ZOFRAN) injection 4 mg, 4 mg, IntraVENous, Q6H PRN, Mere Bullard MD    Current Outpatient Medications:     potassium chloride (Klor-Con M20) 20 mEq tablet, Take 1 Tablet by mouth daily. (Patient not taking: Reported on 5/20/2022), Disp: 30 Tablet, Rfl: 1    ferrous sulfate 325 mg (65 mg iron) tablet, Take 1 Tablet by mouth two (2) times daily (with meals). , Disp: 60 Tablet, Rfl: 0    ondansetron (ZOFRAN ODT) 4 mg disintegrating tablet, Take 1 Tablet by mouth every eight (8) hours as needed for Nausea or Vomiting., Disp: 30 Tablet, Rfl: 0    pantoprazole (PROTONIX) 40 mg tablet, Take 1 Tablet by mouth two (2) times a day. Indications: bleeding from stomach, esophagus or duodenum, Disp: 60 Tablet, Rfl: 0    polyethylene glycol (MIRALAX) 17 gram packet, Take 1 Packet by mouth daily. (Patient not taking: Reported on 5/20/2022), Disp: 30 Packet, Rfl: 0    levothyroxine (SYNTHROID) 75 mcg tablet, Take 1 Tablet by mouth Daily (before breakfast). , Disp: 30 Tablet, Rfl: 0    apixaban (ELIQUIS) 5 mg tablet, Take 1 Tablet by mouth two (2) times a day.  (Patient not taking: Reported on 5/20/2022), Disp: 60 Tablet, Rfl: 4    lidocaine (XYLOCAINE) 2 % solution, Take 15 mL by mouth as needed for Pain., Disp: 400 mL, Rfl: 3   nystatin (MYCOSTATIN) 100,000 unit/mL suspension, Take 5 mL by mouth four (4) times daily. swish and spit, Disp: 200 mL, Rfl: 2    LAB DATA REVIEWED:    Recent Results (from the past 24 hour(s))   METABOLIC PANEL, COMPREHENSIVE    Collection Time: 05/30/22  8:21 AM   Result Value Ref Range    Sodium 138 136 - 145 mmol/L    Potassium 4.6 3.5 - 5.1 mmol/L    Chloride 104 97 - 108 mmol/L    CO2 28 21 - 32 mmol/L    Anion gap 6 5 - 15 mmol/L    Glucose 111 (H) 65 - 100 mg/dL    BUN 35 (H) 6 - 20 mg/dL    Creatinine 1.85 (H) 0.70 - 1.30 mg/dL    BUN/Creatinine ratio 19 12 - 20      GFR est AA 47 (L) >60 ml/min/1.73m2    GFR est non-AA 39 (L) >60 ml/min/1.73m2    Calcium 9.3 8.5 - 10.1 mg/dL    Bilirubin, total 0.2 0.2 - 1.0 mg/dL    AST (SGOT) 12 (L) 15 - 37 U/L    ALT (SGPT) 7 (L) 12 - 78 U/L    Alk. phosphatase 69 45 - 117 U/L    Protein, total 7.6 6.4 - 8.2 g/dL    Albumin 2.9 (L) 3.5 - 5.0 g/dL    Globulin 4.7 (H) 2.0 - 4.0 g/dL    A-G Ratio 0.6 (L) 1.1 - 2.2     CBC WITH AUTOMATED DIFF    Collection Time: 05/30/22  8:21 AM   Result Value Ref Range    WBC 9.2 4.1 - 11.1 K/uL    RBC 1.70 (L) 4.10 - 5.70 M/uL    HGB 5.0 (LL) 12.1 - 17.0 g/dL    HCT 15.9 (LL) 36.6 - 50.3 %    MCV 93.5 80.0 - 99.0 FL    MCH 29.4 26.0 - 34.0 PG    MCHC 31.4 30.0 - 36.5 g/dL    RDW 16.7 (H) 11.5 - 14.5 %    PLATELET 203 (H) 125 - 400 K/uL    MPV 9.6 8.9 - 12.9 FL    NRBC 0.0 0.0  WBC    ABSOLUTE NRBC 0.00 0.00 - 0.01 K/uL    NEUTROPHILS 85 (H) 32 - 75 %    LYMPHOCYTES 4 (L) 12 - 49 %    MONOCYTES 11 5 - 13 %    EOSINOPHILS 0 0 - 7 %    BASOPHILS 0 0 - 1 %    IMMATURE GRANULOCYTES 0 0 - 0.5 %    ABS. NEUTROPHILS 7.8 1.8 - 8.0 K/UL    ABS. LYMPHOCYTES 0.4 (L) 0.8 - 3.5 K/UL    ABS. MONOCYTES 1.0 0.0 - 1.0 K/UL    ABS. EOSINOPHILS 0.0 0.0 - 0.4 K/UL    ABS. BASOPHILS 0.0 0.0 - 0.1 K/UL    ABS. IMM.  GRANS. 0.0 0.00 - 0.04 K/UL    DF AUTOMATED     TYPE & SCREEN    Collection Time: 05/30/22  8:21 AM   Result Value Ref Range    Crossmatch Expiration 06/02/2022,2359     ABO/Rh(D) O Positive     Antibody screen Negative     Unit number [id number]     Blood component type  LR     Unit division 00     Status of unit [address] to transfuse     Crossmatch result Compatible     Unit number [id number]     Blood component type  LR     Unit division 00     Status of unit [place] to transfuse     Crossmatch result Compatible    TROPONIN-HIGH SENSITIVITY    Collection Time: 05/30/22  8:21 AM   Result Value Ref Range    Troponin-High Sensitivity 3 0 - 76 ng/L   NT-PRO BNP    Collection Time: 05/30/22  8:21 AM   Result Value Ref Range    NT pro- <125 pg/mL   OCCULT BLOOD, STOOL    Collection Time: 05/30/22  8:57 AM   Result Value Ref Range    Occult Blood,day 1 Negative Negative      Day 1 date: 5,302,022         XR Results (most recent):  Results from Hospital Encounter encounter on 05/30/22    XR CHEST PORT    Narrative  XR CHEST PORT    Comparison: May 9, 2022. Tracheostomy tube unchanged. Right-sided Port-A-Cath again noted. Confluent  perihilar opacities again demonstrated slightly more prominent in size. Nodular  density right upper lobe slightly more apparent. No pleural effusion or  pneumothorax. The heart and mediastinal contours are stable. Bony structures are  intact. Impression  Perihilar confluent opacities slightly more prominent suggesting  worsening atelectasis, mass, infiltrate, or perhaps a combination of each. XR CHEST PORT   Final Result   Perihilar confluent opacities slightly more prominent suggesting   worsening atelectasis, mass, infiltrate, or perhaps a combination of each. Review of Systems:  Constitutional: +fatigue Negative for chills and fever. HENT: Negative. Eyes: Negative. Respiratory: Negative. Cardiovascular: +right sided chest pain. Gastrointestinal: +abdominal pain, nausea, dark stools.  Denies: vomiting, diarrhea   Skin: Negative. Neurological: +dizziness, lightheadedness. Negative for headache, syncope, numbness/tingling . Objective:   VITALS:    Visit Vitals  /76   Pulse (!) 110   Temp 98.8 °F (37.1 °C)   Resp 18   Ht 6' (1.829 m)   Wt 70.8 kg (156 lb)   SpO2 100%   BMI 21.16 kg/m²       Physical Exam:   Constitutional: pt is oriented to person, place, and time. HENT: s/p tracheostomy, unable to speak loudly   Head: Normocephalic and atraumatic. Eyes: Pupils are equal, round, and reactive to light. EOM are normal.   Cardiovascular: tachycardic regular rhythm and normal heart sounds. Pulmonary/Chest: Breath sounds normal. No wheezes. No rales. Exhibits no tenderness. Abdominal: Soft. Bowel sounds are normal. There is mild LLQ abdominal tenderness to palpation. There is no rebound and no guarding. Musculoskeletal: Normal range of motion. Neurological: pt is alert and oriented to person, place, and time. Alert. No cranial nerve deficit or sensory deficit. Displays a negative Romberg sign.         ASSESSMENT:  Symptomatic anemia  Throat and lung cancer   s/p tracheostomy in 02/08/21  COPD    PLAN:  2 units of packed RBCs were transfused  Monitor H&H  IV fluids  Zofran 4mg ODT prn  Protonix 40mg po BID  Synthroid 75mcg po every day  Ferrous sulfate 325mg po BID          Current Facility-Administered Medications:     0.9% sodium chloride infusion 250 mL, 250 mL, IntraVENous, PRN, Shasta Bullard MD    ferrous sulfate tablet 325 mg, 325 mg, Oral, BID WITH MEALS, Shasta Bullard MD  [facility]  [START ON 5/31/2022] levothyroxine (SYNTHROID) tablet 75 mcg, 75 mcg, Oral, ACB, Shasta Bullard MD    pantoprazole (PROTONIX) tablet 40 mg, 40 mg, Oral, BID, Shasta Bullard MD    0.9% sodium chloride infusion, 75 mL/hr, IntraVENous, CONTINUOUS, Shasta Bullard MD    acetaminophen (TYLENOL) tablet 650 mg, 650 mg, Oral, Q6H PRN **OR** acetaminophen (TYLENOL) suppository 650 mg, 650 mg, Rectal, Q6H PRN, Aleah, Georgia Peralta MD    polyethylene glycol [facility]) packet 17 g, 17 g, Oral, DAILY PRN, Georgia Bullard MD    ondansetron (ZOFRAN ODT) tablet 4 mg, 4 mg, Oral, Q8H PRN **OR** ondansetron (ZOFRAN) injection 4 mg, 4 mg, IntraVENous, Q6H PRN, Mere Bullard MD    Current Outpatient Medications:     potassium chloride (Klor-Con M20) 20 mEq tablet, Take 1 Tablet by mouth daily. (Patient not taking: Reported on 5/20/2022), Disp: 30 Tablet, Rfl: 1    ferrous sulfate 325 mg (65 mg iron) tablet, Take 1 Tablet by mouth two (2) times daily (with meals). , Disp: 60 Tablet, Rfl: 0    ondansetron (ZOFRAN ODT) 4 mg disintegrating tablet, Take 1 Tablet by mouth every eight (8) hours as needed for Nausea or Vomiting., Disp: 30 Tablet, Rfl: 0    pantoprazole (PROTONIX) 40 mg tablet, Take 1 Tablet by mouth two (2) times a day. Indications: bleeding from stomach, esophagus or duodenum, Disp: 60 Tablet, Rfl: 0    polyethylene glycol (MIRALAX) 17 gram packet, Take 1 Packet by mouth daily. (Patient not taking: Reported on 5/20/2022), Disp: 30 Packet, Rfl: 0    levothyroxine (SYNTHROID) 75 mcg tablet, Take 1 Tablet by mouth Daily (before breakfast). , Disp: 30 Tablet, Rfl: 0    apixaban (ELIQUIS) 5 mg tablet, Take 1 Tablet by mouth two (2) times a day. (Patient not taking: Reported on 5/20/2022), Disp: 60 Tablet, Rfl: 4    lidocaine (XYLOCAINE) 2 % solution, Take 15 mL by mouth as needed for Pain., Disp: 400 mL, Rfl: 3    nystatin (MYCOSTATIN) 100,000 unit/mL suspension, Take 5 mL by mouth four (4) times daily.  swish and spit, Disp: 200 mL, Rfl: 2    ________________________________________________________________________    Signed: Junior Celis MD

## 2022-05-30 NOTE — PROGRESS NOTES
Two person skin assessment performed by Joshua Webber RN and Alcira Neal RN. Patient skin is clean, dry, and intact. Patient has a trach #10 in his neck. No pressure related injuries are noted at this time.

## 2022-05-30 NOTE — H&P
History and Physical    NAME: Adriane Dotson   :  1970   MRN:  [id number]     Date/Time:  2022 9:42 AM    Patient PCP: Chantal Odom NP  ______________________________________________________________________             Subjective:     CHIEF COMPLAINT: dark stools, fatigue, chest pain  HISTORY OF PRESENT ILLNESS:       Patient is a 49y.o. year old male with a PMHx significant for COPD, throat and lung cancer s/p tracheostomy in 21, who presents for evaluation of dark tarry stools for the past week. Patient is on Eliquis but states he stopped taking it two weeks ago. He began having dark tarry stools at home one week ago. He reports other symptoms of fatigue, dizziness, lightheadedness, right sided chest pain, nausea and lower abdominal pain started two days ago. Patient reports he was diagnosed with cancer two years ago and is currently on chemotherapy. He denies any sick contacts, recent travel, fever, chills, cough, shortness of breath, vomiting, diarrhea, headache, numbness/tingling or any other acute symptoms. Patient was seen by ER provider and workup revealed a Hgb of 5.0 with Hct of 15.9, patient was recommended to be admitted for further evaluation and was transfused 2 units of RBC. Labs notable for  Hgb 5.0/ Hct 15.9  Creatinine 1.85  CXR notable for opacities suggesting mass, infiltrate or worsening atelectasis.      Past Medical History:   Diagnosis Date    Chronic pain     THROAT, EARS, NECK R/T CANCER    COPD (chronic obstructive pulmonary disease) (HCC)     EMPHASEMA    Psychiatric disorder     ANXIETY R/T CANCER    Throat cancer ([address]     Tracheostomy present [facility])         Past Surgical History:   Procedure Laterality Date    HX ORTHOPAEDIC      LEFT ARM- \" PUT PLATE IN\"    HX TRACHEOSTOMY  2021    IR INSERT TUNL CVC W PORT OVER 5 YEARS  2021    IR PLACE CVAD FLUORO GUIDE  2021       Social History     Tobacco Use    Smoking status: Former Smoker Packs/day: 1.50     Years: 30.00     Pack years: 37.1     Quit date: 10/28/2020     Years since quittin.5    Smokeless tobacco: Never Used   Substance Use Topics    Alcohol use: Not Currently        Family History   Problem Relation Age of Onset    Diabetes Mother     Diabetes Father     Kidney Disease Father     Diabetes Sister     Heart Disease Daughter     Anesth Problems Neg Hx        No Known Allergies     Prior to Admission medications    Medication Sig Start Date End Date Taking? Authorizing Provider   potassium chloride (Klor-Con M20) 20 mEq tablet Take 1 Tablet by mouth daily. Patient not taking: Reported on 2022   Andrade Mascorro NP   ferrous sulfate 325 mg (65 mg iron) tablet Take 1 Tablet by mouth two (2) times daily (with meals). 22   Leena Sim MD   ondansetron (ZOFRAN ODT) 4 mg disintegrating tablet Take 1 Tablet by mouth every eight (8) hours as needed for Nausea or Vomiting. 22   Leena Sim MD   pantoprazole (PROTONIX) 40 mg tablet Take 1 Tablet by mouth two (2) times a day. Indications: bleeding from stomach, esophagus or duodenum 22   Leena Sim MD   polyethylene glycol (MIRALAX) 17 gram packet Take 1 Packet by mouth daily. Patient not taking: Reported on 2022   Leena Sim MD   levothyroxine (SYNTHROID) 75 mcg tablet Take 1 Tablet by mouth Daily (before breakfast). 22   Leena Sim MD   apixaban (ELIQUIS) 5 mg tablet Take 1 Tablet by mouth two (2) times a day. Patient not taking: Reported on 2022 4/15/22   Andrade Mascorro NP   lidocaine (XYLOCAINE) 2 % solution Take 15 mL by mouth as needed for Pain. 22   Ewelina Christianson NP   nystatin (MYCOSTATIN) 100,000 unit/mL suspension Take 5 mL by mouth four (4) times daily.  swish and spit 22   Andrade Mascorro NP         Current Facility-Administered Medications:     0.9% sodium chloride infusion 250 mL, 250 mL, IntraVENous, PRN, Judyth Reusing, NP    Current Outpatient Medications:     potassium chloride (Klor-Con M20) 20 mEq tablet, Take 1 Tablet by mouth daily. (Patient not taking: Reported on 5/20/2022), Disp: 30 Tablet, Rfl: 1    ferrous sulfate 325 mg (65 mg iron) tablet, Take 1 Tablet by mouth two (2) times daily (with meals). , Disp: 60 Tablet, Rfl: 0    ondansetron (ZOFRAN ODT) 4 mg disintegrating tablet, Take 1 Tablet by mouth every eight (8) hours as needed for Nausea or Vomiting., Disp: 30 Tablet, Rfl: 0    pantoprazole (PROTONIX) 40 mg tablet, Take 1 Tablet by mouth two (2) times a day. Indications: bleeding from stomach, esophagus or duodenum, Disp: 60 Tablet, Rfl: 0    polyethylene glycol (MIRALAX) 17 gram packet, Take 1 Packet by mouth daily. (Patient not taking: Reported on 5/20/2022), Disp: 30 Packet, Rfl: 0    levothyroxine (SYNTHROID) 75 mcg tablet, Take 1 Tablet by mouth Daily (before breakfast). , Disp: 30 Tablet, Rfl: 0    apixaban (ELIQUIS) 5 mg tablet, Take 1 Tablet by mouth two (2) times a day. (Patient not taking: Reported on 5/20/2022), Disp: 60 Tablet, Rfl: 4    lidocaine (XYLOCAINE) 2 % solution, Take 15 mL by mouth as needed for Pain., Disp: 400 mL, Rfl: 3    nystatin (MYCOSTATIN) 100,000 unit/mL suspension, Take 5 mL by mouth four (4) times daily.  swish and spit, Disp: 200 mL, Rfl: 2    LAB DATA REVIEWED:    Recent Results (from the past 24 hour(s))   METABOLIC PANEL, COMPREHENSIVE    Collection Time: 05/30/22  8:21 AM   Result Value Ref Range    Sodium 138 136 - 145 mmol/L    Potassium 4.6 3.5 - 5.1 mmol/L    Chloride 104 97 - 108 mmol/L    CO2 28 21 - 32 mmol/L    Anion gap 6 5 - 15 mmol/L    Glucose 111 (H) 65 - 100 mg/dL    BUN 35 (H) 6 - 20 mg/dL    Creatinine 1.85 (H) 0.70 - 1.30 mg/dL    BUN/Creatinine ratio 19 12 - 20      GFR est AA 47 (L) >60 ml/min/1.73m2    GFR est non-AA 39 (L) >60 ml/min/1.73m2    Calcium 9.3 8.5 - 10.1 mg/dL    Bilirubin, total 0.2 0.2 - 1.0 mg/dL    AST (SGOT) 12 (L) 15 - 37 U/L    ALT (SGPT) 7 (L) 12 - 78 U/L    Alk. phosphatase 69 45 - 117 U/L    Protein, total 7.6 6.4 - 8.2 g/dL    Albumin 2.9 (L) 3.5 - 5.0 g/dL    Globulin 4.7 (H) 2.0 - 4.0 g/dL    A-G Ratio 0.6 (L) 1.1 - 2.2     CBC WITH AUTOMATED DIFF    Collection Time: 05/30/22  8:21 AM   Result Value Ref Range    WBC 9.2 4.1 - 11.1 K/uL    RBC 1.70 (L) 4.10 - 5.70 M/uL    HGB 5.0 (LL) 12.1 - 17.0 g/dL    HCT 15.9 (LL) 36.6 - 50.3 %    MCV 93.5 80.0 - 99.0 FL    MCH 29.4 26.0 - 34.0 PG    MCHC 31.4 30.0 - 36.5 g/dL    RDW 16.7 (H) 11.5 - 14.5 %    PLATELET 159 (H) 767 - 400 K/uL    MPV 9.6 8.9 - 12.9 FL    NRBC 0.0 0.0  WBC    ABSOLUTE NRBC 0.00 0.00 - 0.01 K/uL    NEUTROPHILS 85 (H) 32 - 75 %    LYMPHOCYTES 4 (L) 12 - 49 %    MONOCYTES 11 5 - 13 %    EOSINOPHILS 0 0 - 7 %    BASOPHILS 0 0 - 1 %    IMMATURE GRANULOCYTES 0 0 - 0.5 %    ABS. NEUTROPHILS 7.8 1.8 - 8.0 K/UL    ABS. LYMPHOCYTES 0.4 (L) 0.8 - 3.5 K/UL    ABS. MONOCYTES 1.0 0.0 - 1.0 K/UL    ABS. EOSINOPHILS 0.0 0.0 - 0.4 K/UL    ABS. BASOPHILS 0.0 0.0 - 0.1 K/UL    ABS. IMM.  GRANS. 0.0 0.00 - 0.04 K/UL    DF AUTOMATED     TYPE & SCREEN    Collection Time: 05/30/22  8:21 AM   Result Value Ref Range    Crossmatch Expiration 06/02/2022,2359     ABO/Rh(D) O Positive     Antibody screen Negative     Unit number [id number]     Blood component type  LR     Unit division 00     Status of unit Allocated     TRANSFUSION STATUS Ok to transfuse     Crossmatch result Compatible     Unit number [id number]     Blood component type RC LR     Unit division 00     Status of unit [place] to transfuse     Crossmatch result Compatible    TROPONIN-HIGH SENSITIVITY    Collection Time: 05/30/22  8:21 AM   Result Value Ref Range    Troponin-High Sensitivity 3 0 - 76 ng/L   NT-PRO BNP    Collection Time: 05/30/22  8:21 AM   Result Value Ref Range    NT pro- <125 pg/mL   OCCULT BLOOD, STOOL    Collection Time: 05/30/22  8:57 AM   Result Value Ref Range Occult Blood,day 1 Negative Negative      Day 1 date: 5,302,[id number]         XR Results (most recent):  Results from Hospital Encounter encounter on 05/30/22    XR CHEST PORT    Narrative  XR CHEST PORT    Comparison: May 9, 2022. Tracheostomy tube unchanged. Right-sided Port-A-Cath again noted. Confluent  perihilar opacities again demonstrated slightly more prominent in size. Nodular  density right upper lobe slightly more apparent. No pleural effusion or  pneumothorax. The heart and mediastinal contours are stable. Bony structures are  intact. Impression  Perihilar confluent opacities slightly more prominent suggesting  worsening atelectasis, mass, infiltrate, or perhaps a combination of each. XR CHEST PORT   Final Result   Perihilar confluent opacities slightly more prominent suggesting   worsening atelectasis, mass, infiltrate, or perhaps a combination of each. Review of Systems:  Constitutional: +fatigue Negative for chills and fever. HENT: Negative. Eyes: Negative. Respiratory: Negative. Cardiovascular: +right sided chest pain. Gastrointestinal: +abdominal pain, nausea, dark stools. Denies: vomiting, diarrhea   Skin: Negative. Neurological: +dizziness, lightheadedness. Negative for headache, syncope, numbness/tingling . Objective:   VITALS:    Visit Vitals  /76   Pulse (!) 110   Temp 98.8 °F (37.1 °C)   Resp 18   Ht 6' (1.829 m)   Wt 156 lb (70.8 kg)   SpO2 100%   BMI 21.16 kg/m²       Physical Exam:   Constitutional: pt is oriented to person, place, and time. HENT: s/p tracheostomy, unable to speak loudly   Head: Normocephalic and atraumatic. Eyes: Pupils are equal, round, and reactive to light. EOM are normal.   Cardiovascular: tachycardic regular rhythm and normal heart sounds. Pulmonary/Chest: Breath sounds normal. No wheezes. No rales. Exhibits no tenderness. Abdominal: Soft.  Bowel sounds are normal. There is mild LLQ abdominal tenderness to palpation. There is no rebound and no guarding. Musculoskeletal: Normal range of motion. Neurological: pt is alert and oriented to person, place, and time. Alert. No cranial nerve deficit or sensory deficit. Displays a negative Romberg sign. ASSESSMENT:  Anemia   Throat and lung cancer   s/p tracheostomy in 02/08/21  COPD    PLAN:  2 units of packed RBCs were transfused  Monitor H&H  IV fluids  Zofran 4mg ODT prn  Protonix 40mg po BID  IV PPI  Synthroid 75mcg po every day  Ferrous sulfate 325mg po BID      GI consult  Hematology/Oncology consult    Current Facility-Administered Medications:     0.9% sodium chloride infusion 250 mL, 250 mL, IntraVENous, PRN, Brandon DAT Garcia    Current Outpatient Medications:     potassium chloride (Klor-Con M20) 20 mEq tablet, Take 1 Tablet by mouth daily. (Patient not taking: Reported on 5/20/2022), Disp: 30 Tablet, Rfl: 1    ferrous sulfate 325 mg (65 mg iron) tablet, Take 1 Tablet by mouth two (2) times daily (with meals). , Disp: 60 Tablet, Rfl: 0    ondansetron (ZOFRAN ODT) 4 mg disintegrating tablet, Take 1 Tablet by mouth every eight (8) hours as needed for Nausea or Vomiting., Disp: 30 Tablet, Rfl: 0    pantoprazole (PROTONIX) 40 mg tablet, Take 1 Tablet by mouth two (2) times a day. Indications: bleeding from stomach, esophagus or duodenum, Disp: 60 Tablet, Rfl: 0    polyethylene glycol (MIRALAX) 17 gram packet, Take 1 Packet by mouth daily. (Patient not taking: Reported on 5/20/2022), Disp: 30 Packet, Rfl: 0    levothyroxine (SYNTHROID) 75 mcg tablet, Take 1 Tablet by mouth Daily (before breakfast). , Disp: 30 Tablet, Rfl: 0    apixaban (ELIQUIS) 5 mg tablet, Take 1 Tablet by mouth two (2) times a day.  (Patient not taking: Reported on 5/20/2022), Disp: 60 Tablet, Rfl: 4    lidocaine (XYLOCAINE) 2 % solution, Take 15 mL by mouth as needed for Pain., Disp: 400 mL, Rfl: 3    nystatin (MYCOSTATIN) 100,000 unit/mL suspension, Take 5 mL by mouth four (4) times daily.  swish and spit, Disp: 200 mL, Rfl: 2    ________________________________________________________________________    Signed: Jim Kelly

## 2022-05-30 NOTE — ROUTINE PROCESS
TRANSFER - OUT REPORT:    Verbal report given to yelena rn(name) on Camila Carl  being transferred to 222(unit) for routine progression of care       Report consisted of patients Situation, Background, Assessment and   Recommendations(SBAR). Information from the following report(s) SBAR, ED Summary, Intake/Output, MAR and Recent Results was reviewed with the receiving nurse. Lines:   Venous Access Device 05/20/22 Upper chest (subclavicular area, right (Active)   Central Line Being Utilized Yes 05/30/22 0817   Site Assessment Clean, dry, & intact 05/30/22 0817   Date of Last Dressing Change 05/30/22 05/30/22 0817   Dressing Status Clean, dry, & intact 05/30/22 0817   Dressing Type Transparent;Tape 05/30/22 0817   Positive Blood Return (Medial Site) Yes 05/30/22 0817        Opportunity for questions and clarification was provided.       Patient transported with:   Monitor  Tech

## 2022-05-30 NOTE — ED TRIAGE NOTES
Pt arrives by EMS, c/o dizziness, CP on right side of chest, palpitations    Hx lung Cx, has a trach, unable to speak verbally.

## 2022-05-30 NOTE — ED PROVIDER NOTES
EMERGENCY DEPARTMENT HISTORY AND PHYSICAL EXAM      Date: 5/30/2022  Patient Name: Deepak Mustafa    History of Presenting Illness     Chief Complaint   Patient presents with    Chest Pain       History Provided By: Patient    HPI: Deepak Mustafa, 49 y.o. male with a past medical history significant malignancy and COPD presents to the ED with cc of right sided chest pain and feeling of anemia. Patient has a history of throat and lung cancer. Patient was recently admitted for anemia. Patient feels like he is anemic. Patient states he is because of right-sided chest pain and some shortness of breath and feeling of palpitations. Patient had stopped taking his blood thinner. Patient still feels like he is losing blood from bleeding. Moderate severity, no known exacerbating or relieving factors, no other associated signs and symptoms    There are no other complaints, changes, or physical findings at this time. PCP: Koki Mcnulty NP    No current facility-administered medications on file prior to encounter. Current Outpatient Medications on File Prior to Encounter   Medication Sig Dispense Refill    potassium chloride (Klor-Con M20) 20 mEq tablet Take 1 Tablet by mouth daily. (Patient not taking: Reported on 5/20/2022) 30 Tablet 1    ferrous sulfate 325 mg (65 mg iron) tablet Take 1 Tablet by mouth two (2) times daily (with meals). 60 Tablet 0    ondansetron (ZOFRAN ODT) 4 mg disintegrating tablet Take 1 Tablet by mouth every eight (8) hours as needed for Nausea or Vomiting. 30 Tablet 0    pantoprazole (PROTONIX) 40 mg tablet Take 1 Tablet by mouth two (2) times a day. Indications: bleeding from stomach, esophagus or duodenum 60 Tablet 0    polyethylene glycol (MIRALAX) 17 gram packet Take 1 Packet by mouth daily. (Patient not taking: Reported on 5/20/2022) 30 Packet 0    levothyroxine (SYNTHROID) 75 mcg tablet Take 1 Tablet by mouth Daily (before breakfast).  30 Tablet 0    apixaban (ELIQUIS) 5 mg tablet Take 1 Tablet by mouth two (2) times a day. (Patient not taking: Reported on 2022) 60 Tablet 4    lidocaine (XYLOCAINE) 2 % solution Take 15 mL by mouth as needed for Pain. 400 mL 3    nystatin (MYCOSTATIN) 100,000 unit/mL suspension Take 5 mL by mouth four (4) times daily. swish and spit 200 mL 2       Past History     Past Medical History:  Past Medical History:   Diagnosis Date    Chronic pain     THROAT, EARS, NECK R/T CANCER    COPD [facility]chronic obstructive pulmonary disease) [facility])     EMPHASEMA    Psychiatric disorder     ANXIETY R/T CANCER    Throat cancer ([facility])     Tracheostomy present ([facility])        Past Surgical History:  Past Surgical History:   Procedure Laterality Date    HX ORTHOPAEDIC      LEFT ARM- \" PUT PLATE IN\"    HX TRACHEOSTOMY  2021    IR INSERT TUNL CVC W PORT OVER 5 YEARS  2021    IR PLACE CVAD FLUORO GUIDE  2021       Family History:  Family History   Problem Relation Age of Onset    Diabetes Mother     Diabetes Father     Kidney Disease Father     Diabetes Sister     Heart Disease Daughter     Anesth Problems Neg Hx        Social History:  Social History     Tobacco Use    Smoking status: Former Smoker     Packs/day: 1.50     Years: 30.00     Pack years: 45.00     Quit date: 10/28/2020     Years since quittin.5    Smokeless tobacco: Never Used   Vaping Use    Vaping Use: Never used   Substance Use Topics    Alcohol use: Not Currently    Drug use: Never       Allergies:  No Known Allergies      Review of Systems     Review of Systems   Constitutional: Negative for chills and fever. HENT: Negative for dental problem and sore throat. Eyes: Negative for pain and visual disturbance. Respiratory: Negative for cough and chest tightness. Cardiovascular: Positive for chest pain and palpitations. Gastrointestinal: Negative for diarrhea and nausea. Genitourinary: Negative for difficulty urinating and frequency.    Musculoskeletal: Negative for gait problem and joint swelling. Skin: Positive for pallor. Neurological: Negative for numbness and headaches. Hematological: Negative for adenopathy. Does not bruise/bleed easily. Psychiatric/Behavioral: Negative for behavioral problems and suicidal ideas. Physical Exam     Physical Exam  Constitutional:       General: He is not in acute distress. Appearance: Normal appearance. He is not ill-appearing or toxic-appearing. HENT:      Head: Normocephalic and atraumatic. Nose: Nose normal.      Mouth/Throat:      Mouth: Mucous membranes are moist.   Eyes:      Extraocular Movements: Extraocular movements intact. Pupils: Pupils are equal, round, and reactive to light. Cardiovascular:      Rate and Rhythm: Regular rhythm. Tachycardia present. Heart sounds: Normal heart sounds. Pulmonary:      Effort: Pulmonary effort is normal.      Breath sounds: Normal breath sounds. Abdominal:      General: Bowel sounds are normal.   Musculoskeletal:         General: Normal range of motion. Cervical back: Normal range of motion and neck supple. Skin:     General: Skin is warm and dry. Capillary Refill: Capillary refill takes less than 2 seconds. Neurological:      General: No focal deficit present. Mental Status: He is alert and oriented to person, place, and time.    Psychiatric:         Mood and Affect: Mood normal.         Behavior: Behavior normal.         Lab and Diagnostic Study Results     Labs -     Recent Results (from the past 12 hour(s))   METABOLIC PANEL, COMPREHENSIVE    Collection Time: 05/30/22  8:21 AM   Result Value Ref Range    Sodium 138 136 - 145 mmol/L    Potassium 4.6 3.5 - 5.1 mmol/L    Chloride 104 97 - 108 mmol/L    CO2 28 21 - 32 mmol/L    Anion gap 6 5 - 15 mmol/L    Glucose 111 (H) 65 - 100 mg/dL    BUN 35 (H) 6 - 20 mg/dL    Creatinine 1.85 (H) 0.70 - 1.30 mg/dL    BUN/Creatinine ratio 19 12 - 20      GFR est AA 47 (L) >60 ml/min/1.73m2    GFR est non-AA 39 (L) >60 ml/min/1.73m2    Calcium 9.3 8.5 - 10.1 mg/dL    Bilirubin, total 0.2 0.2 - 1.0 mg/dL    AST (SGOT) 12 (L) 15 - 37 U/L    ALT (SGPT) 7 (L) 12 - 78 U/L    Alk. phosphatase 69 45 - 117 U/L    Protein, total 7.6 6.4 - 8.2 g/dL    Albumin 2.9 (L) 3.5 - 5.0 g/dL    Globulin 4.7 (H) 2.0 - 4.0 g/dL    A-G Ratio 0.6 (L) 1.1 - 2.2     CBC WITH AUTOMATED DIFF    Collection Time: 05/30/22  8:21 AM   Result Value Ref Range    WBC 9.2 4.1 - 11.1 K/uL    RBC 1.70 (L) 4.10 - 5.70 M/uL    HGB 5.0 (LL) 12.1 - 17.0 g/dL    HCT 15.9 (LL) 36.6 - 50.3 %    MCV 93.5 80.0 - 99.0 FL    MCH 29.4 26.0 - 34.0 PG    MCHC 31.4 30.0 - 36.5 g/dL    RDW 16.7 (H) 11.5 - 14.5 %    PLATELET 805 (H) 696 - 400 K/uL    MPV 9.6 8.9 - 12.9 FL    NRBC 0.0 0.0  WBC    ABSOLUTE NRBC 0.00 0.00 - 0.01 K/uL    NEUTROPHILS 85 (H) 32 - 75 %    LYMPHOCYTES 4 (L) 12 - 49 %    MONOCYTES 11 5 - 13 %    EOSINOPHILS 0 0 - 7 %    BASOPHILS 0 0 - 1 %    IMMATURE GRANULOCYTES 0 0 - 0.5 %    ABS. NEUTROPHILS 7.8 1.8 - 8.0 K/UL    ABS. LYMPHOCYTES 0.4 (L) 0.8 - 3.5 K/UL    ABS. MONOCYTES 1.0 0.0 - 1.0 K/UL    ABS. EOSINOPHILS 0.0 0.0 - 0.4 K/UL    ABS. BASOPHILS 0.0 0.0 - 0.1 K/UL    ABS. IMM. GRANS. 0.0 0.00 - 0.04 K/UL    DF AUTOMATED     TROPONIN-HIGH SENSITIVITY    Collection Time: 05/30/22  8:21 AM   Result Value Ref Range    Troponin-High Sensitivity 3 0 - 76 ng/L   NT-PRO BNP    Collection Time: 05/30/22  8:21 AM   Result Value Ref Range    NT pro- <125 pg/mL       Radiologic Studies -   @lastxrresult@  CT Results  (Last 48 hours)    None        CXR Results  (Last 48 hours)               05/30/22 0847  XR CHEST PORT Final result    Impression:  Perihilar confluent opacities slightly more prominent suggesting   worsening atelectasis, mass, infiltrate, or perhaps a combination of each. Narrative:  XR CHEST PORT       Comparison: May 9, 2022. Tracheostomy tube unchanged. Right-sided Port-A-Cath again noted.  Confluent perihilar opacities again demonstrated slightly more prominent in size. Nodular   density right upper lobe slightly more apparent. No pleural effusion or   pneumothorax. The heart and mediastinal contours are stable. Bony structures are   intact. Medical Decision Making   - I am the first provider for this patient. - I reviewed the vital signs, available nursing notes, past medical history, past surgical history, family history and social history. - Initial assessment performed. The patients presenting problems have been discussed, and they are in agreement with the care plan formulated and outlined with them. I have encouraged them to ask questions as they arise throughout their visit. Vital Signs-Reviewed the patient's vital signs. Patient Vitals for the past 12 hrs:   Temp Pulse Resp BP SpO2   05/30/22 0813 98.8 °F (37.1 °C) (!) 110 18 112/76 100 %       Records Reviewed: Nursing Notes and Old Medical Records          ED Course:     ED Course as of 05/30/22 0916   Mon May 30, 2022   0847 EKG time 811. EKG interpreted by Dr. Gary Landry. No STEMI. Sinus tachycardia with a ventricular rate of 110 bpm, OK interval of 144 ms, QRS duration of 74 ms [CB]      ED Course User Index  [CB] Ana Lilia Shrestha NP       Provider Notes (Medical Decision Making):   DDx includes STEMI, NSTEMI, Angina, PE, Aortic Pathology, Chest Wall Pain, Pleurisy, Pneumonia, GERD/esophagitis, Anxiety. No cough/fever or focal lung findings to suggest pneumonia. No tachycardia, hypoxia or pleuritic component to suggest PE. Pulses symmetric and no extremely elevated BP/asymmetry or classic tearing sensation to suggest Aortic Dissection. Also, no neuro findings. No wretching/forceful vomiting to suggest esophageal disaster. Denies IV drug abuse, has native valves, no fevers/murmurs or skin lesions to suggest endocarditis. Will evaluate with EKG, labs, cardiac enzymes, chest x-ray.   Will provide pain control and reassess. [facility]       Procedures   Medical Decision Makingedical Decision Making  Performed by: Vickie Haddad NP  PROCEDURES:  Procedures       Disposition   Disposition: Admitted to Floor Stepdown Unit the case was discussed with the admitting physician         DISCHARGE PLAN:  1. Current Discharge Medication List      CONTINUE these medications which have NOT CHANGED    Details   potassium chloride (Klor-Con M20) 20 mEq tablet Take 1 Tablet by mouth daily. Qty: 30 Tablet, Refills: 1    Associated Diagnoses: Hypokalemia      ferrous sulfate 325 mg (65 mg iron) tablet Take 1 Tablet by mouth two (2) times daily (with meals). Qty: 60 Tablet, Refills: 0      ondansetron (ZOFRAN ODT) 4 mg disintegrating tablet Take 1 Tablet by mouth every eight (8) hours as needed for Nausea or Vomiting. Qty: 30 Tablet, Refills: 0      pantoprazole (PROTONIX) 40 mg tablet Take 1 Tablet by mouth two (2) times a day. Indications: bleeding from stomach, esophagus or duodenum  Qty: 60 Tablet, Refills: 0      polyethylene glycol (MIRALAX) 17 gram packet Take 1 Packet by mouth daily. Qty: 30 Packet, Refills: 0      levothyroxine (SYNTHROID) 75 mcg tablet Take 1 Tablet by mouth Daily (before breakfast). Qty: 30 Tablet, Refills: 0      apixaban (ELIQUIS) 5 mg tablet Take 1 Tablet by mouth two (2) times a day. Qty: 60 Tablet, Refills: 4    Associated Diagnoses: Other acute pulmonary embolism without acute cor pulmonale ([address] Squamous cell carcinoma of larynx (HCC)      lidocaine (XYLOCAINE) 2 % solution Take 15 mL by mouth as needed for Pain. Qty: 400 mL, Refills: 3    Associated Diagnoses: Squamous cell carcinoma of larynx ([address] Mucositis      nystatin (MYCOSTATIN) 100,000 unit/mL suspension Take 5 mL by mouth four (4) times daily. swish and spit  Qty: 200 mL, Refills: 2    Associated Diagnoses: Squamous cell carcinoma of larynx ([address] Throat pain in adult; Oral thrush           2. Follow-up Information    None       3.   Return to ED if worse   4. Current Discharge Medication List            Diagnosis     Clinical Impression:   1. Anemia, unspecified type    2. Palpitations    3. Chest pain, unspecified type        Attestations:    Bhumika Mckeon NP    Please note that this dictation was completed with [employer], the computer voice recognition software. Quite often unanticipated grammatical, syntax, homophones, and other interpretive errors are inadvertently transcribed by the computer software. Please disregard these errors. Please excuse any errors that have escaped final proofreading. Thank you.

## 2022-05-31 NOTE — TELEPHONE ENCOUNTER
Dina Vasquez from [facility] called and stated this patient is in the hospital so they were cancelling his hydration for today.  Just an FYI for the team.    Cb # [phone]

## 2022-05-31 NOTE — PROGRESS NOTES
General Daily Progress Note          Patient Name:   Jyoti Mosquera       YOB: 1970       Age:  49 y.o. Admit Date: 5/30/2022      Subjective:     Patient is a 49y.o. year old male with a PMHx significant for COPD, throat and lung cancer s/p tracheostomy in 02/08/21, who presents for evaluation of dark tarry stools for the past week. Patient is on Eliquis but states he stopped taking it two weeks ago. He began having dark tarry stools at home one week ago. He reports other symptoms of fatigue, dizziness, lightheadedness, right sided chest pain, nausea and lower abdominal pain started two days ago. Patient reports he was diagnosed with cancer two years ago and is currently on chemotherapy. He denies any sick contacts, recent travel, fever, chills, cough, shortness of breath, vomiting, diarrhea, headache, numbness/tingling or any other acute symptoms. Patient was seen by ER provider and workup revealed a Hgb of 5.0 with Hct of 15.9, patient was recommended to be admitted for further evaluation and was transfused 2 units of RBC. Labs notable for  Hgb 5.0/ Hct 15.9  Creatinine 1.85  CXR notable for opacities suggesting mass, infiltrate or worsening atelectasis.        Patient had blood transfusion hemoglobin improved  Complaining of abdominal pain      Objective:     Visit Vitals  /81   Pulse 96   Temp 98.3 °F (36.8 °C)   Resp 18   Ht 6' (1.829 m)   Wt 70.8 kg (156 lb)   SpO2 99%   BMI 21.16 kg/m²        Recent Results (from the past 24 hour(s))   EKG, 12 LEAD, INITIAL    Collection Time: 05/30/22  8:11 AM   Result Value Ref Range    Ventricular Rate 110 BPM    Atrial Rate 113 BPM    P-R Interval 144 ms    QRS Duration 74 ms    Q-T Interval 312 ms    QTC Calculation (Bezet) 422 ms    Calculated R Axis 87 degrees    Calculated T Axis 46 degrees    Diagnosis       Sinus tachycardia  Nonspecific ST and T wave abnormality  Abnormal ECG  When compared with ECG of 09-MAY-2022 08:41,  Premature atrial complexes are no longer Present  Nonspecific T wave abnormality, improved in Lateral leads  Confirmed by Kodi Sanchez MD, [facility] [id number]) on 5/30/2022 09:50:91 AM     METABOLIC PANEL, COMPREHENSIVE    Collection Time: 05/30/22  8:21 AM   Result Value Ref Range    Sodium 138 136 - 145 mmol/L    Potassium 4.6 3.5 - 5.1 mmol/L    Chloride 104 97 - 108 mmol/L    CO2 28 21 - 32 mmol/L    Anion gap 6 5 - 15 mmol/L    Glucose 111 (H) 65 - 100 mg/dL    BUN 35 (H) 6 - 20 mg/dL    Creatinine 1.85 (H) 0.70 - 1.30 mg/dL    BUN/Creatinine ratio 19 12 - 20      GFR est AA 47 (L) >60 ml/min/1.73m2    GFR est non-AA 39 (L) >60 ml/min/1.73m2    Calcium 9.3 8.5 - 10.1 mg/dL    Bilirubin, total 0.2 0.2 - 1.0 mg/dL    AST (SGOT) 12 (L) 15 - 37 U/L    ALT (SGPT) 7 (L) 12 - 78 U/L    Alk. phosphatase 69 45 - 117 U/L    Protein, total 7.6 6.4 - 8.2 g/dL    Albumin 2.9 (L) 3.5 - 5.0 g/dL    Globulin 4.7 (H) 2.0 - 4.0 g/dL    A-G Ratio 0.6 (L) 1.1 - 2.2     CBC WITH AUTOMATED DIFF    Collection Time: 05/30/22  8:21 AM   Result Value Ref Range    WBC 9.2 4.1 - 11.1 K/uL    RBC 1.70 (L) 4.10 - 5.70 M/uL    HGB 5.0 (LL) 12.1 - 17.0 g/dL    HCT 15.9 (LL) 36.6 - 50.3 %    MCV 93.5 80.0 - 99.0 FL    MCH 29.4 26.0 - 34.0 PG    MCHC 31.4 30.0 - 36.5 g/dL    RDW 16.7 (H) 11.5 - 14.5 %    PLATELET 795 (H) 511 - 400 K/uL    MPV 9.6 8.9 - 12.9 FL    NRBC 0.0 0.0  WBC    ABSOLUTE NRBC 0.00 0.00 - 0.01 K/uL    NEUTROPHILS 85 (H) 32 - 75 %    LYMPHOCYTES 4 (L) 12 - 49 %    MONOCYTES 11 5 - 13 %    EOSINOPHILS 0 0 - 7 %    BASOPHILS 0 0 - 1 %    IMMATURE GRANULOCYTES 0 0 - 0.5 %    ABS. NEUTROPHILS 7.8 1.8 - 8.0 K/UL    ABS. LYMPHOCYTES 0.4 (L) 0.8 - 3.5 K/UL    ABS. MONOCYTES 1.0 0.0 - 1.0 K/UL    ABS. EOSINOPHILS 0.0 0.0 - 0.4 K/UL    ABS. BASOPHILS 0.0 0.0 - 0.1 K/UL    ABS. IMM.  GRANS. 0.0 0.00 - 0.04 K/UL    DF AUTOMATED     TYPE & SCREEN    Collection Time: 05/30/22  8:21 AM   Result Value Ref Range    Crossmatch Expiration 06/02/2022,0110 ABO/Rh(D) O Positive     Antibody screen Negative     Unit number [id number]     Blood component type RC LR     Unit division 00     Status of unit [address] to transfuse     Crossmatch result Compatible     Unit number [id number]     Blood component type RC LR     Unit division 00     Status of unit [address] to transfuse     Crossmatch result Compatible     Unit number [id number]     Blood component type RC LR     Unit division 00     Status of unit [place] to transfuse     Crossmatch result Compatible     Unit number [id number]     Blood component type RC LR,1     Unit division 00     Status of unit [address] to transfuse     Crossmatch result Compatible    TROPONIN-HIGH SENSITIVITY    Collection Time: 05/30/22  8:21 AM   Result Value Ref Range    Troponin-High Sensitivity 3 0 - 76 ng/L   NT-PRO BNP    Collection Time: 05/30/22  8:21 AM   Result Value Ref Range    NT pro- <125 pg/mL   TSH 3RD GENERATION    Collection Time: 05/30/22  8:21 AM   Result Value Ref Range    TSH 18.60 (H) 0.36 - 3.74 uIU/mL   OCCULT BLOOD, STOOL    Collection Time: 05/30/22  8:57 AM   Result Value Ref Range    Occult Blood,day 1 Negative Negative      Day 1 date: 5,302,022     CBC WITH AUTOMATED DIFF    Collection Time: 05/30/22  5:13 PM   Result Value Ref Range    WBC 8.3 4.1 - 11.1 K/uL    RBC 2.31 (L) 4.10 - 5.70 M/uL    HGB 6.9 (L) 12.1 - 17.0 g/dL    HCT 21.3 (L) 36.6 - 50.3 %    MCV 92.2 80.0 - 99.0 FL    MCH 29.9 26.0 - 34.0 PG    MCHC 32.4 30.0 - 36.5 g/dL    RDW 16.3 (H) 11.5 - 14.5 %    PLATELET 833 (H) 716 - 400 K/uL    MPV 9.8 8.9 - 12.9 FL    NRBC 0.2 (H) 0.0  WBC    ABSOLUTE NRBC 0.02 (H) 0.00 - 0.01 K/uL    NEUTROPHILS 80 (H) 32 - 75 %    LYMPHOCYTES 5 (L) 12 - 49 %    MONOCYTES 14 (H) 5 - 13 %    EOSINOPHILS 1 0 - 7 %    BASOPHILS 0 0 - 1 %    IMMATURE GRANULOCYTES 0 0 - 0.5 %    ABS.  NEUTROPHILS 6.6 1.8 - 8.0 K/UL    ABS. LYMPHOCYTES 0.4 (L) 0.8 - 3.5 K/UL    ABS. MONOCYTES 1.1 (H) 0.0 - 1.0 K/UL    ABS. EOSINOPHILS 0.1 0.0 - 0.4 K/UL    ABS. BASOPHILS 0.0 0.0 - 0.1 K/UL    ABS. IMM. GRANS. 0.0 0.00 - 0.04 K/UL    DF AUTOMATED     METABOLIC PANEL, COMPREHENSIVE    Collection Time: 05/31/22  6:01 AM   Result Value Ref Range    Sodium 137 136 - 145 mmol/L    Potassium 4.3 3.5 - 5.1 mmol/L    Chloride 104 97 - 108 mmol/L    CO2 30 21 - 32 mmol/L    Anion gap 3 (L) 5 - 15 mmol/L    Glucose 83 65 - 100 mg/dL    BUN 31 (H) 6 - 20 mg/dL    Creatinine 1.65 (H) 0.70 - 1.30 mg/dL    BUN/Creatinine ratio 19 12 - 20      GFR est AA 54 (L) >60 ml/min/1.73m2    GFR est non-AA 44 (L) >60 ml/min/1.73m2    Calcium 9.2 8.5 - 10.1 mg/dL    Bilirubin, total 0.4 0.2 - 1.0 mg/dL    AST (SGOT) 13 (L) 15 - 37 U/L    ALT (SGPT) 9 (L) 12 - 78 U/L    Alk. phosphatase 77 45 - 117 U/L    Protein, total 7.6 6.4 - 8.2 g/dL    Albumin 3.0 (L) 3.5 - 5.0 g/dL    Globulin 4.6 (H) 2.0 - 4.0 g/dL    A-G Ratio 0.7 (L) 1.1 - 2.2     CBC WITH AUTOMATED DIFF    Collection Time: 05/31/22  6:01 AM   Result Value Ref Range    WBC 8.1 4.1 - 11.1 K/uL    RBC 2.89 (L) 4.10 - 5.70 M/uL    HGB 8.5 (L) 12.1 - 17.0 g/dL    HCT 26.2 (L) 36.6 - 50.3 %    MCV 90.7 80.0 - 99.0 FL    MCH 29.4 26.0 - 34.0 PG    MCHC 32.4 30.0 - 36.5 g/dL    RDW 16.8 (H) 11.5 - 14.5 %    PLATELET 405 (H) 537 - 400 K/uL    MPV 9.6 8.9 - 12.9 FL    NRBC 0.0 0.0  WBC    ABSOLUTE NRBC 0.00 0.00 - 0.01 K/uL    NEUTROPHILS 81 (H) 32 - 75 %    LYMPHOCYTES 5 (L) 12 - 49 %    MONOCYTES 13 5 - 13 %    EOSINOPHILS 1 0 - 7 %    BASOPHILS 0 0 - 1 %    IMMATURE GRANULOCYTES 0 0 - 0.5 %    ABS. NEUTROPHILS 6.5 1.8 - 8.0 K/UL    ABS. LYMPHOCYTES 0.4 (L) 0.8 - 3.5 K/UL    ABS. MONOCYTES 1.0 0.0 - 1.0 K/UL    ABS. EOSINOPHILS 0.1 0.0 - 0.4 K/UL    ABS. BASOPHILS 0.0 0.0 - 0.1 K/UL    ABS. IMM.  GRANS. 0.0 0.00 - 0.04 K/UL    DF AUTOMATED       [unfilled]      Review of Systems    Constitutional: Negative for chills and fever. HENT: Negative. Eyes: Negative. Respiratory: Negative. Cardiovascular: Negative. Gastrointestinal: Negative for abdominal pain and nausea. Skin: Negative. Neurological: Negative. Physical Exam:      Constitutional: pt is oriented to person, place, and time. HENT:   Head: Normocephalic and atraumatic. Eyes: Pupils are equal, round, and reactive to light. EOM are normal.   Cardiovascular: Normal rate, regular rhythm and normal heart sounds. Pulmonary/Chest: Breath sounds normal. No wheezes. No rales. Exhibits no tenderness. Abdominal: Soft. Bowel sounds are normal. There is no abdominal tenderness. There is no rebound and no guarding. Musculoskeletal: Normal range of motion. Neurological: pt is alert and oriented to person, place, and time. XR CHEST PORT   Final Result   Perihilar confluent opacities slightly more prominent suggesting   worsening atelectasis, mass, infiltrate, or perhaps a combination of each.            Recent Results (from the past 24 hour(s))   EKG, 12 LEAD, INITIAL    Collection Time: 05/30/22  8:11 AM   Result Value Ref Range    Ventricular Rate 110 BPM    Atrial Rate 113 BPM    P-R Interval 144 ms    QRS Duration 74 ms    Q-T Interval 312 ms    QTC Calculation (Bezet) 422 ms    Calculated R Axis 87 degrees    Calculated T Axis 46 degrees    Diagnosis       Sinus tachycardia  Nonspecific ST and T wave abnormality  Abnormal ECG  When compared with ECG of 09-MAY-2022 08:41,  Premature atrial complexes are no longer Present  Nonspecific T wave abnormality, improved in Lateral leads  Confirmed by Suri Jamison MD, [facility] ([id number]) on 5/30/2022 79:39:63 AM     METABOLIC PANEL, COMPREHENSIVE    Collection Time: 05/30/22  8:21 AM   Result Value Ref Range    Sodium 138 136 - 145 mmol/L    Potassium 4.6 3.5 - 5.1 mmol/L    Chloride 104 97 - 108 mmol/L    CO2 28 21 - 32 mmol/L    Anion gap 6 5 - 15 mmol/L    Glucose 111 (H) 65 - 100 mg/dL    BUN 35 (H) 6 - 20 mg/dL    Creatinine 1.85 (H) 0.70 - 1.30 mg/dL    BUN/Creatinine ratio 19 12 - 20      GFR est AA 47 (L) >60 ml/min/1.73m2    GFR est non-AA 39 (L) >60 ml/min/1.73m2    Calcium 9.3 8.5 - 10.1 mg/dL    Bilirubin, total 0.2 0.2 - 1.0 mg/dL    AST (SGOT) 12 (L) 15 - 37 U/L    ALT (SGPT) 7 (L) 12 - 78 U/L    Alk. phosphatase 69 45 - 117 U/L    Protein, total 7.6 6.4 - 8.2 g/dL    Albumin 2.9 (L) 3.5 - 5.0 g/dL    Globulin 4.7 (H) 2.0 - 4.0 g/dL    A-G Ratio 0.6 (L) 1.1 - 2.2     CBC WITH AUTOMATED DIFF    Collection Time: 05/30/22  8:21 AM   Result Value Ref Range    WBC 9.2 4.1 - 11.1 K/uL    RBC 1.70 (L) 4.10 - 5.70 M/uL    HGB 5.0 (LL) 12.1 - 17.0 g/dL    HCT 15.9 (LL) 36.6 - 50.3 %    MCV 93.5 80.0 - 99.0 FL    MCH 29.4 26.0 - 34.0 PG    MCHC 31.4 30.0 - 36.5 g/dL    RDW 16.7 (H) 11.5 - 14.5 %    PLATELET 581 (H) 807 - 400 K/uL    MPV 9.6 8.9 - 12.9 FL    NRBC 0.0 0.0  WBC    ABSOLUTE NRBC 0.00 0.00 - 0.01 K/uL    NEUTROPHILS 85 (H) 32 - 75 %    LYMPHOCYTES 4 (L) 12 - 49 %    MONOCYTES 11 5 - 13 %    EOSINOPHILS 0 0 - 7 %    BASOPHILS 0 0 - 1 %    IMMATURE GRANULOCYTES 0 0 - 0.5 %    ABS. NEUTROPHILS 7.8 1.8 - 8.0 K/UL    ABS. LYMPHOCYTES 0.4 (L) 0.8 - 3.5 K/UL    ABS. MONOCYTES 1.0 0.0 - 1.0 K/UL    ABS. EOSINOPHILS 0.0 0.0 - 0.4 K/UL    ABS. BASOPHILS 0.0 0.0 - 0.1 K/UL    ABS. IMM.  GRANS. 0.0 0.00 - 0.04 K/UL    DF AUTOMATED     TYPE & SCREEN    Collection Time: 05/30/22  8:21 AM   Result Value Ref Range    Crossmatch Expiration 06/02/2022,2359     ABO/Rh(D) [place] St[place] Positive     Antibody screen Negative     Unit number [id number]     Blood component type  LR     Unit division 00     Status of unit [address] to transfuse     Crossmatch result Compatible     Unit number [id number]     Blood component type  LR     Unit division 00     Status of unit [address] to transfuse     Crossmatch result Compatible     Unit number [id number]     Blood component type  LR     Unit division 00     Status of unit Allocated     TRANSFUSION STATUS Ok to transfuse     Crossmatch result Compatible     Unit number [id number]     Blood component type  LR,1     Unit division 00     Status of unit [address] to transfuse     Crossmatch result Compatible    TROPONIN-HIGH SENSITIVITY    Collection Time: 05/30/22  8:21 AM   Result Value Ref Range    Troponin-High Sensitivity 3 0 - 76 ng/L   NT-PRO BNP    Collection Time: 05/30/22  8:21 AM   Result Value Ref Range    NT pro- <125 pg/mL   TSH 3RD GENERATION    Collection Time: 05/30/22  8:21 AM   Result Value Ref Range    TSH 18.60 (H) 0.36 - 3.74 uIU/mL   OCCULT BLOOD, STOOL    Collection Time: 05/30/22  8:57 AM   Result Value Ref Range    Occult Blood,day 1 Negative Negative      Day 1 date: 5,302,022     CBC WITH AUTOMATED DIFF    Collection Time: 05/30/22  5:13 PM   Result Value Ref Range    WBC 8.3 4.1 - 11.1 K/uL    RBC 2.31 (L) 4.10 - 5.70 M/uL    HGB 6.9 (L) 12.1 - 17.0 g/dL    HCT 21.3 (L) 36.6 - 50.3 %    MCV 92.2 80.0 - 99.0 FL    MCH 29.9 26.0 - 34.0 PG    MCHC 32.4 30.0 - 36.5 g/dL    RDW 16.3 (H) 11.5 - 14.5 %    PLATELET 540 (H) 904 - 400 K/uL    MPV 9.8 8.9 - 12.9 FL    NRBC 0.2 (H) 0.0  WBC    ABSOLUTE NRBC 0.02 (H) 0.00 - 0.01 K/uL    NEUTROPHILS 80 (H) 32 - 75 %    LYMPHOCYTES 5 (L) 12 - 49 %    MONOCYTES 14 (H) 5 - 13 %    EOSINOPHILS 1 0 - 7 %    BASOPHILS 0 0 - 1 %    IMMATURE GRANULOCYTES 0 0 - 0.5 %    ABS. NEUTROPHILS 6.6 1.8 - 8.0 K/UL    ABS. LYMPHOCYTES 0.4 (L) 0.8 - 3.5 K/UL    ABS. MONOCYTES 1.1 (H) 0.0 - 1.0 K/UL    ABS. EOSINOPHILS 0.1 0.0 - 0.4 K/UL    ABS. BASOPHILS 0.0 0.0 - 0.1 K/UL    ABS. IMM.  GRANS. 0.0 0.00 - 0.04 K/UL    DF AUTOMATED     METABOLIC PANEL, COMPREHENSIVE    Collection Time: 05/31/22  6:01 AM   Result Value Ref Range    Sodium 137 136 - 145 mmol/L    Potassium 4.3 3.5 - 5.1 mmol/L    Chloride 104 97 - 108 mmol/L    CO2 30 21 - 32 mmol/L    Anion gap 3 (L) 5 - 15 mmol/L    Glucose 83 65 - 100 mg/dL    BUN 31 (H) 6 - 20 mg/dL    Creatinine 1.65 (H) 0.70 - 1.30 mg/dL    BUN/Creatinine ratio 19 12 - 20      GFR est AA 54 (L) >60 ml/min/1.73m2    GFR est non-AA 44 (L) >60 ml/min/1.73m2    Calcium 9.2 8.5 - 10.1 mg/dL    Bilirubin, total 0.4 0.2 - 1.0 mg/dL    AST (SGOT) 13 (L) 15 - 37 U/L    ALT (SGPT) 9 (L) 12 - 78 U/L    Alk. phosphatase 77 45 - 117 U/L    Protein, total 7.6 6.4 - 8.2 g/dL    Albumin 3.0 (L) 3.5 - 5.0 g/dL    Globulin 4.6 (H) 2.0 - 4.0 g/dL    A-G Ratio 0.7 (L) 1.1 - 2.2     CBC WITH AUTOMATED DIFF    Collection Time: 05/31/22  6:01 AM   Result Value Ref Range    WBC 8.1 4.1 - 11.1 K/uL    RBC 2.89 (L) 4.10 - 5.70 M/uL    HGB 8.5 (L) 12.1 - 17.0 g/dL    HCT 26.2 (L) 36.6 - 50.3 %    MCV 90.7 80.0 - 99.0 FL    MCH 29.4 26.0 - 34.0 PG    MCHC 32.4 30.0 - 36.5 g/dL    RDW 16.8 (H) 11.5 - 14.5 %    PLATELET 094 (H) 357 - 400 K/uL    MPV 9.6 8.9 - 12.9 FL    NRBC 0.0 0.0  WBC    ABSOLUTE NRBC 0.00 0.00 - 0.01 K/uL    NEUTROPHILS 81 (H) 32 - 75 %    LYMPHOCYTES 5 (L) 12 - 49 %    MONOCYTES 13 5 - 13 %    EOSINOPHILS 1 0 - 7 %    BASOPHILS 0 0 - 1 %    IMMATURE GRANULOCYTES 0 0 - 0.5 %    ABS. NEUTROPHILS 6.5 1.8 - 8.0 K/UL    ABS. LYMPHOCYTES 0.4 (L) 0.8 - 3.5 K/UL    ABS. MONOCYTES 1.0 0.0 - 1.0 K/UL    ABS. EOSINOPHILS 0.1 0.0 - 0.4 K/UL    ABS. BASOPHILS 0.0 0.0 - 0.1 K/UL    ABS. IMM. GRANS. 0.0 0.00 - 0.04 K/UL    DF AUTOMATED         Results     ** No results found for the last 336 hours.  **           Labs:     Recent Labs     05/31/22  0601 05/30/22  1713   WBC 8.1 8.3   HGB 8.5* 6.9*   HCT 26.2* 21.3*   * 426*     Recent Labs     05/31/22  0601 05/30/22  0821    138   K 4.3 4.6    104   CO2 30 28   BUN 31* 35*   CREA 1.65* 1.85*   GLU 83 111*   CA 9.2 9.3     Recent Labs     05/31/22  0601 05/30/22  0821   ALT 9* 7*   AP 77 69   TBILI 0.4 0.2   TP 7.6 7.6   ALB 3.0* 2.9* GLOB 4.6* 4.7*     No results for input(s): INR, PTP, APTT, INREXT in the last 72 hours. No results for input(s): FE, TIBC, PSAT, FERR in the last 72 hours. Lab Results   Component Value Date/Time    Folate 13.9 05/10/2022 12:23 PM      No results for input(s): PH, PCO2, PO2 in the last 72 hours. No results for input(s): CPK, CKNDX, TROIQ in the last 72 hours.     No lab exists for component: CPKMB  Lab Results   Component Value Date/Time    Cholesterol, total 200 (H) 06/29/2021 12:05 PM    HDL Cholesterol 85 06/29/2021 12:05 PM    LDL, calculated 105.4 (H) 06/29/2021 12:05 PM    Triglyceride 48 06/29/2021 12:05 PM    CHOL/HDL Ratio 2.4 06/29/2021 12:05 PM     Lab Results   Component Value Date/Time    Glucose (POC) 87 05/10/2022 11:06 AM    Glucose (POC) 85 05/10/2022 07:30 AM    Glucose (POC) 113 10/04/2021 01:04 PM    Glucose (POC) 97 05/07/2021 08:15 AM    Glucose (POC) 72 02/16/2021 11:17 AM     Lab Results   Component Value Date/Time    Color Yellow/Straw 05/09/2022 02:25 PM    Appearance Clear 05/09/2022 02:25 PM    Specific gravity 1.012 05/09/2022 02:25 PM    pH (UA) 6.0 05/09/2022 02:25 PM    Protein Negative 05/09/2022 02:25 PM    Glucose 50 (A) 05/09/2022 02:25 PM    Ketone 5 (A) 05/09/2022 02:25 PM    Bilirubin Negative 05/09/2022 02:25 PM    Urobilinogen 0.1 05/09/2022 02:25 PM    Nitrites Negative 05/09/2022 02:25 PM    Leukocyte Esterase Negative 05/09/2022 02:25 PM    Bacteria Negative 05/09/2022 02:25 PM    WBC 0-4 05/09/2022 02:25 PM    RBC 0-5 05/09/2022 02:25 PM         Assessment:     Symptomatic anemia  Throat and lung cancer   s/p tracheostomy in 02/08/21  COPD  Abdominal pain  Chronic kidney disease stage III    Plan:       Medications order CT scan of the abdomen the pelvis    Continue current medication  Monitor H&H      Current Facility-Administered Medications:     hydrogen peroxide 3 %, , Topical, Q12H, Mere Bullard MD    ferrous sulfate tablet 325 mg, 325 mg, Oral, BID WITH MEALS, Hugo Phoenix, MD, 325 mg at 05/30/22 1805    levothyroxine (SYNTHROID) tablet 75 mcg, 75 mcg, Oral, ACB, Mere Bullard MD, 75 mcg at 05/31/22 0543    pantoprazole (PROTONIX) tablet 40 mg, 40 mg, Oral, BID, Enrique Bullard MD, 40 mg at 05/31/22 0543    0.9% sodium chloride infusion, 75 mL/hr, IntraVENous, CONTINUOUS, Mere Bullard MD, Last Rate: 75 mL/hr at 05/30/22 2255, 75 mL/hr at 05/30/22 2255    acetaminophen (TYLENOL) tablet 650 mg, 650 mg, Oral, Q6H PRN **OR** acetaminophen (TYLENOL) suppository 650 mg, 650 mg, Rectal, Q6H PRN, Enrique Bullard MD    polyethylene glycol (MIRALAX) packet 17 g, 17 g, Oral, DAILY PRN, Enrique Bullard MD    ondansetron (ZOFRAN ODT) tablet 4 mg, 4 mg, Oral, Q8H PRN **OR** ondansetron (ZOFRAN) injection 4 mg, 4 mg, IntraVENous, Q6H PRN, Mere Bullard MD    alum-mag hydroxide-simeth (MYLANTA) oral suspension 30 mL, 30 mL, Oral, Q6H PRN, Mere Bullard MD, 30 mL at 05/31/22 0131    traMADoL (ULTRAM) tablet 50 mg, 50 mg, Oral, Q6H PRN, Mere Bullard MD, 50 mg at 05/30/22 2254

## 2022-05-31 NOTE — PROGRESS NOTES
Reason for Admission:   ANEMIA, CP, PALPITATIONS                 RUR Score:    22%       PCP: First and Last name:  Blaine Bowens NP     Name of Practice:   Are you a current patient: Yes/No: YES   Approximate date of last visit:  ABOUT MONTH AGO   Can you do a virtual visit with your PCP:              Resources/supports as identified by patient/family:                   Top Challenges facing patient (as identified by patient/family and CM): Finances/Medication cost?    NO ISSUES                Transportation? NO ISSUES              Support system or lack thereof? NO ISSUES, FAMILY AND S/O SUPPORT                     Living arrangements? WITH S/O           Self-care/ADLs/Cognition? NO ISSUES          Current Advanced Directive/Advance Care Plan:  Full Code      Healthcare Decision Maker:   Click here to complete HealthCare Decision Makers including selection of the Healthcare Decision Maker Relationship (ie \"Primary\")      Primary Decision Maker: Juwan Peterson -  - [phone]    Payor Source Payor: Johnny Harmon / Plan: [facility] CCCP / Product Type: Managed Care Medicaid /                             Plan for utilizing home health:    DECLINE                 Transition of Care Plan:                  CM met with patient at the bedside to discuss DCP. Patient verified demo's. Patient is nonverbal r/t having a trach. Writer confirmed no changes since his last CM assessment, patient confirmed nodding yes/no appropriately and giving the thumbs up. Patient resides lives with his Sky Rivera @ [phone]. And she will transport him home at [place]. Patient denies having HH, DME, IRD, SNF currently and in the past and declines these services at this time. Scripts obtain from [facility] in [place].     Discharge dispo: home with selfcare    [address], [address]

## 2022-06-01 NOTE — PROGRESS NOTES
CM reviewed clinical record. Patient has dc order and is to dc home with no CM needs.      [address], [address] [place] [address]

## 2022-06-01 NOTE — DISCHARGE SUMMARY
Discharge Summary       PATIENT ID: Randal Ferrell  MRN: [id number]   YOB: 1970    DATE OF ADMISSION: 5/30/2022  8:09 AM    DATE OF DISCHARGE:   PRIMARY CARE PROVIDER: Maria Del Carmen Reina NP     ATTENDING PHYSICIAN: Junior Celis  DISCHARGING PROVIDER: Georgia Bullard      CONSULTATIONS: None    PROCEDURES/SURGERIES: * No surgery found *    ADMITTING DIAGNOSES:    Patient Active Problem List    Diagnosis Date Noted    Palpitations 05/30/2022    Chest pain 05/30/2022    Anemia 05/30/2022    Symptomatic anemia 05/30/2022    Anemia due to GI blood loss 05/09/2022    Prevention of chemotherapy-induced neutropenia 02/23/2022    Hypokalemia 05/24/2021    Severe protein-calorie malnutrition ([address] 02/15/2021    Laryngeal carcinoma ([address] 02/07/2021    Throat cancer ([address] 01/31/2021    COPD exacerbation ([address] 01/31/2021    Acute and chronic respiratory failure with hypoxia ([address] 01/31/2021    Chronic pain due to neoplasm 01/31/2021    Anxiety 01/31/2021    Insomnia 01/31/2021    Respiratory failure with hypoxia ([address] 01/31/2021       DISCHARGE DIAGNOSES / PLAN:      Symptomatic anemia  Throat and lung cancer   s/p tracheostomy in 02/08/21  COPD  Abdominal pain  Chronic kidney disease stage III         DISCHARGE MEDICATIONS:  Current Discharge Medication List      CONTINUE these medications which have NOT CHANGED    Details   ferrous sulfate 325 mg (65 mg iron) tablet Take 1 Tablet by mouth two (2) times daily (with meals). Qty: 60 Tablet, Refills: 0      pantoprazole (PROTONIX) 40 mg tablet Take 1 Tablet by mouth two (2) times a day. Indications: bleeding from stomach, esophagus or duodenum  Qty: 60 Tablet, Refills: 0      polyethylene glycol (MIRALAX) 17 gram packet Take 1 Packet by mouth daily. Qty: 30 Packet, Refills: 0      levothyroxine (SYNTHROID) 75 mcg tablet Take 1 Tablet by mouth Daily (before breakfast).   Qty: 30 Tablet, Refills: 0         STOP taking these medications potassium chloride (Klor-Con M20) 20 mEq tablet Comments:   Reason for Stopping:         ondansetron (ZOFRAN ODT) 4 mg disintegrating tablet Comments:   Reason for Stopping:         apixaban (ELIQUIS) 5 mg tablet Comments:   Reason for Stopping:         lidocaine (XYLOCAINE) 2 % solution Comments:   Reason for Stopping:         nystatin (MYCOSTATIN) 100,000 unit/mL suspension Comments:   Reason for Stopping:                 NOTIFY YOUR PHYSICIAN FOR ANY OF THE FOLLOWING:   Fever over 101 degrees for 24 hours. Chest pain, shortness of breath, fever, chills, nausea, vomiting, diarrhea, change in mentation, falling, weakness, bleeding. Severe pain or pain not relieved by medications. Or, any other signs or symptoms that you may have questions about. DISPOSITION:  x  Home With:   OT  PT  HH  RN       Long term SNF/Inpatient Rehab    Independent/assisted living    Hospice    Other:       PATIENT CONDITION AT DISCHARGE: Stable      PHYSICAL EXAMINATION AT DISCHARGE:  General:          Alert, cooperative, no distress, appears stated age. HEENT:           Atraumatic, anicteric sclerae, pink conjunctivae                          No oral ulcers, mucosa moist, throat clear, dentition fair  Neck:               Supple, symmetrical  Lungs:             Clear to auscultation bilaterally. No Wheezing or Rhonchi. No rales. Chest wall:      No tenderness  No Accessory muscle use. Heart:              Regular  rhythm,  No  murmur   No edema  Abdomen:        Soft, non-tender. Not distended. Bowel sounds normal  Extremities:     No cyanosis. No clubbing,                            Skin turgor normal, Capillary refill normal  Skin:                Not pale. Not Jaundiced  No rashes   Psych:             Not anxious or agitated.   Neurologic:      Alert, moves all extremities, answers questions appropriately and responds to commands     CT ABD PELV WO CONT   Final Result   Minimal ascites is a nonspecific finding by itself XR CHEST PORT   Final Result   Perihilar confluent opacities slightly more prominent suggesting   worsening atelectasis, mass, infiltrate, or perhaps a combination of each. Recent Results (from the past 24 hour(s))   CBC W/O DIFF    Collection Time: 06/01/22  6:44 AM   Result Value Ref Range    WBC 11.1 4.1 - 11.1 K/uL    RBC 2.97 (L) 4.10 - 5.70 M/uL    HGB 8.8 (L) 12.1 - 17.0 g/dL    HCT 27.7 (L) 36.6 - 50.3 %    MCV 93.3 80.0 - 99.0 FL    MCH 29.6 26.0 - 34.0 PG    MCHC 31.8 30.0 - 36.5 g/dL    RDW 16.2 (H) 11.5 - 14.5 %    PLATELET 690 (H) 980 - 400 K/uL    MPV 9.9 8.9 - 12.9 FL    NRBC 0.0 0.0  WBC    ABSOLUTE NRBC 0.00 0.00 - 4.50 K/uL   METABOLIC PANEL, COMPREHENSIVE    Collection Time: 06/01/22  6:44 AM   Result Value Ref Range    Sodium 134 (L) 136 - 145 mmol/L    Potassium 5.2 (H) 3.5 - 5.1 mmol/L    Chloride 100 97 - 108 mmol/L    CO2 28 21 - 32 mmol/L    Anion gap 6 5 - 15 mmol/L    Glucose 70 65 - 100 mg/dL    BUN 25 (H) 6 - 20 mg/dL    Creatinine 1.47 (H) 0.70 - 1.30 mg/dL    BUN/Creatinine ratio 17 12 - 20      GFR est AA >60 >60 ml/min/1.73m2    GFR est non-AA 51 (L) >60 ml/min/1.73m2    Calcium 9.2 8.5 - 10.1 mg/dL    Bilirubin, total 0.4 0.2 - 1.0 mg/dL    AST (SGOT) 12 (L) 15 - 37 U/L    ALT (SGPT) 8 (L) 12 - 78 U/L    Alk. phosphatase 86 45 - 117 U/L    Protein, total 7.3 6.4 - 8.2 g/dL    Albumin 3.2 (L) 3.5 - 5.0 g/dL    Globulin 4.1 (H) 2.0 - 4.0 g/dL    A-G Ratio 0.8 (L) 1.1 - 2.2            HOSPITAL COURSE:  Patient is a 53 y.o. year old male with a PMHx significant for COPD, throat and lung cancer s/p tracheostomy in 02/08/21, who presents for evaluation of dark tarry stools for the past week. Patient is on Eliquis but states he stopped taking it two weeks ago. He began having dark tarry stools at home one week ago. He reports other symptoms of fatigue, dizziness, lightheadedness, right sided chest pain, nausea and lower abdominal pain started two days ago.  Patient reports he was diagnosed with cancer two years ago and is currently on chemotherapy. He denies any sick contacts, recent travel, fever, chills, cough, shortness of breath, vomiting, diarrhea, headache, numbness/tingling or any other acute symptoms.  Patient was seen by ER provider and workup revealed a Hgb of 5.0 with Hct of 15.9, patient was recommended to be admitted for further evaluation and was transfused 2 units of RBC.   Labs notable for  Hgb 5.0/ Hct 15.9  Creatinine 1.85  CXR notable for opacities suggesting mass, infiltrate or worsening atelectasis.        Patient had blood transfusion hemoglobin improved  Complaining of abdominal pain    CT scan of the abdomen was negative  After blood transfusion patient hemoglobin 8.8  After hydration renal function slightly improved    This morning patient alert awake denies any chest pain shortness of breath nausea vomiting diarrhea constipation    Patient need to follow-up with oncology for further treatment    Medication reconciliation done time discharge patient 35 minutes 50% time spent counseling on coordination of care    Follow-up with PCP in 1 week      Signed:   Hernandez Gong MD  6/1/2022  9:00 AM

## 2022-06-01 NOTE — PROGRESS NOTES
Problem: Falls - Risk of  Goal: *Absence of Falls  Description: Document Rebeca Henry Fall Risk and appropriate interventions in the flowsheet.   Outcome: Progressing Towards Goal  Note: Fall Risk Interventions:            Medication Interventions: Patient to call before getting OOB                   Problem: Patient Education: Go to Patient Education Activity  Goal: Patient/Family Education  Outcome: Progressing Towards Goal     Problem: Pain  Goal: *Control of Pain  Outcome: Progressing Towards Goal     Problem: Patient Education: Go to Patient Education Activity  Goal: Patient/Family Education  Outcome: Progressing Towards Goal     Problem: Discharge Planning  Goal: *Discharge to safe environment  Outcome: Progressing Towards Goal  Goal: *Knowledge of medication management  Outcome: Progressing Towards Goal  Goal: *Knowledge of discharge instructions  Outcome: Progressing Towards Goal     Problem: Patient Education: Go to Patient Education Activity  Goal: Patient/Family Education  Outcome: Progressing Towards Goal

## 2022-06-01 NOTE — DISCHARGE INSTRUCTIONS
Discharge Instructions       PATIENT ID: Sendy Freeman  MRN: [id number]   YOB: 1970    DATE OF ADMISSION: 5/30/2022  8:09 AM    DATE OF DISCHARGE: 6/1/2022    PRIMARY CARE PROVIDER: Jeronimo Woods NP     ATTENDING PHYSICIAN: Lavonne Frankel, MD  DISCHARGING PROVIDER: Hernandez Gong MD    To contact this individual call [phone] and ask the  to page. If unavailable ask to be transferred the Adult Hospitalist Department. DISCHARGE DIAGNOSES symptomatic anemia  CONSULTATIONS: None    PROCEDURES/SURGERIES: * No surgery found *    PENDING TEST RESULTS:   At the time of discharge the following test results are still pending: None    FOLLOW UP APPOINTMENTS:   Follow-up Information     Follow up With Specialties Details Why Contact Info    Jeronimo Woods NP Nurse Practitioner In 1 week  [address]  [phone]             ADDITIONAL CARE RECOMMENDATIONS: Follow-up with pcp oncology    DIET: {diet:90085}      ACTIVITY: Activity as tolerated    Wound care: Wound Care Order: submitted to [facility] Please view [email or web address]    EQUIPMENT needed: ***      DISCHARGE MEDICATIONS:   See Medication Reconciliation Form    · It is important that you take the medication exactly as they are prescribed. · Keep your medication in the bottles provided by the pharmacist and keep a list of the medication names, dosages, and times to be taken in your wallet. · Do not take other medications without consulting your doctor. NOTIFY YOUR PHYSICIAN FOR ANY OF THE FOLLOWING:   Fever over 101 degrees for 24 hours. Chest pain, shortness of breath, fever, chills, nausea, vomiting, diarrhea, change in mentation, falling, weakness, bleeding. Severe pain or pain not relieved by medications. Or, any other signs or symptoms that you may have questions about.       DISPOSITION:    Home With:   OT  PT  [facility]  RN       SNF/Inpatient Rehab/LTAC Independent/assisted living    Hospice    Other:         PROBLEM LIST Updated:  Yes ***       Signed:   Chacho Hernandez MD  6/1/2022  8:59 AM

## 2022-06-01 NOTE — PROGRESS NOTES
Patient alert and oriented tolerated all meds as ordered, medicated for pain, trach care performed. Educated to use the call bell for assistance and pain management. Verbalized understanding. Will cont to monitor.

## 2022-06-01 NOTE — PROGRESS NOTES
Patient discharged at approx. 1315. Reviewed all discharge orders and instructions with patient no questions/concerns voiced. Escorted patient downstairs left facility with family friend by private car.

## 2022-06-03 NOTE — PROGRESS NOTES
Physician Progress Note      Cody Olivares  CSN #:                  [id number]  :                       1970  ADMIT DATE:       2022 8:09 AM  DISCH DATE:        2022 1:15 PM  RESPONDING  PROVIDER #:        Mony Reich MD          QUERY TEXT:    Dear Attending,    Pt admitted with dark tarry stools, fatigue, dizziness  and has anemia documented. If possible, please document in progress notes and discharge summary further specificity regarding the acuity and type of anemia:      The medical record reflects the following:  Risk Factors: 49year old male, dizziness, lightheadedness, fatigue, hx of throat and lung cancer currently on chemotherapy  Clinical Indicators:  H&P - dark tarry stools for the past week. Patient is on Eliquis but states he stopped taking it two weeks ago. He began having dark tarry stools at home one week ago. He reports other symptoms of fatigue, dizziness, lightheadedness, right sided chest pain, nausea and lower abdominal pain started two days ago. Patient reports he was diagnosed with cancer two years ago and is currently on chemotherapy. HGB: 5.0-6.9-8.5-8.8  Treatment: 2 units PRBC, PO Iron    Please email [email or web address] with any questions  Options provided:  -- Anemia due to acute blood loss  -- Anemia due to chronic blood loss  -- Anemia due to throat and lung cancer  -- Anemia due to antineoplastic chemotherapy  -- Other - I will add my own diagnosis  -- Disagree - Not applicable / Not valid  -- Disagree - Clinically unable to determine / Unknown  -- Refer to Clinical Documentation Reviewer    PROVIDER RESPONSE TEXT:    This patient has acute blood loss anemia. Query created by:  Lorena Greenwood on 2022 11:52 AM      Electronically signed by:  Mony Reich MD 6/3/2022 11:05 AM

## 2022-06-04 PROBLEM — K92.2 UGIB (UPPER GASTROINTESTINAL BLEED): Status: ACTIVE | Noted: 2022-01-01

## 2022-06-04 NOTE — PROGRESS NOTES
Skin Assesment: Pt.'s skin is warm to the touch  With positive pedal pulses. Pt. Has a trach and no pressure ulcers noted upon admission.

## 2022-06-04 NOTE — ED PROVIDER NOTES
Well-developed EMERGENCY DEPARTMENT HISTORY AND PHYSICAL EXAM        Date: 6/4/2022  Patient Name: Severo Pippins    History of Presenting Illness     Chief Complaint   Patient presents with    Shortness of Breath       10:54 AM    History Provided By: Patient    HPI: Severo Pippins, 49 y.o. male with PMH sig for laryngeal cancer with tracheostomy and currently undergoing chemotherapy, anemia of unclear source although suspected lower GI bleed, hypothyroidism presents to the ED with increasing weakness and frequency of lower GI bleeding in the form of multiple black and tarry stools per day. Patient states that he feels as weak as he did the last time he needed a blood transfusion 3 to 4 weeks ago. At that time patient states he received believes 3 to 4 units of blood had GI work-up and no immediate source of bleeding was found. Review of systems is positive for increased weakness, increased fatigue, markedly decreased appetite, weight loss, near syncope, increasing dyspnea on exertion  Review of systems is negative for syncope, chest pain, abdominal pain, excessive bruising, shortness of breath sweats fever known COVID-19 exposures, orthopnea, PND    PCP: Kendall Bourne NP    Current Outpatient Medications   Medication Sig Dispense Refill    levothyroxine (SYNTHROID) 100 mcg tablet Take 1 Tablet by mouth Daily (before breakfast) for 30 days. 30 Tablet 4    pantoprazole (PROTONIX) 40 mg tablet Take 1 Tablet by mouth two (2) times a day. Indications: bleeding from stomach, esophagus or duodenum 60 Tablet 2    ferrous sulfate 325 mg (65 mg iron) tablet Take 1 Tablet by mouth two (2) times daily (with meals). 60 Tablet 3    oxyCODONE-acetaminophen (PERCOCET) 5-325 mg per tablet Take 1 Tablet by mouth every twelve (12) hours as needed for Pain for up to 30 days. Max Daily Amount: 2 Tablets. 20 Tablet 0    polyethylene glycol (MIRALAX) 17 gram packet Take 1 Packet by mouth daily.  (Patient not taking: Reported on 2022) 30 Packet 0    levothyroxine (SYNTHROID) 75 mcg tablet Take 1 Tablet by mouth Daily (before breakfast). 30 Tablet 0       Past History     Past Medical History:  Past Medical History:   Diagnosis Date    Chronic pain     THROAT, EARS, NECK R/T CANCER    COPD (chronic obstructive pulmonary disease) (HCC)     EMPHASEMA    Psychiatric disorder     ANXIETY R/T CANCER    Throat cancer (HCC)     Tracheostomy present [facility])        Past Surgical History:  Past Surgical History:   Procedure Laterality Date    COLONOSCOPY N/A 2022    COLONOSCOPY performed by Andres Boyer MD at [address] [place] [address] COLONOSCOPY N/A 2022    COLONOSCOPY performed by Andres Boyer MD at [facility]- \" PUT PLATE IN\"    HX TRACHEOSTOMY  2021    IR INSERT TUNL CVC W PORT OVER 5 YEARS  2021    IR PLACE CVAD FLUORO GUIDE  2021       Family History:  Family History   Problem Relation Age of Onset    Diabetes Mother     Diabetes Father     Kidney Disease Father     Diabetes Sister     Heart Disease Daughter     Anesth Problems Neg Hx        Social History:  Social History     Tobacco Use    Smoking status: Former Smoker     Packs/day: 1.50     Years: 30.00     Pack years: 45.00     Quit date: 10/28/2020     Years since quittin.6    Smokeless tobacco: Never Used   Vaping Use    Vaping Use: Never used   Substance Use Topics    Alcohol use: Not Currently    Drug use: Never       Allergies:  No Known Allergies    Review of Systems   Review of Systems   Constitutional: Positive for fatigue. Negative for chills, diaphoresis and fever. HENT: Negative for congestion, sore throat and trouble swallowing. Eyes: Negative for visual disturbance. Respiratory: Negative for cough and shortness of breath. Cardiovascular: Negative for chest pain and palpitations. Gastrointestinal: Positive for blood in stool. Negative for abdominal pain, diarrhea, nausea and vomiting. Endocrine: Negative for polydipsia, polyphagia and polyuria. Genitourinary: Negative for dysuria, frequency, hematuria and urgency. Musculoskeletal: Negative for gait problem and neck pain. Skin: Negative for rash. Neurological: Positive for weakness and light-headedness. Negative for dizziness, syncope and headaches. Physical Exam   Physical Exam  Vitals and nursing note reviewed. Constitutional:       General: He is not in acute distress. Appearance: He is well-developed. He is not ill-appearing. Comments: Very thin -American male, pale in appearance. Tracheostomy in place GCS 15 patient communicating with writing notes with his physician   HENT:      Head: Normocephalic and atraumatic. Nose: Nose normal.      Mouth/Throat:      Mouth: Mucous membranes are moist.      Pharynx: No posterior oropharyngeal erythema. Eyes:      General: Vision grossly intact. Extraocular Movements: Extraocular movements intact. Conjunctiva/sclera: Conjunctivae normal.      Pupils: Pupils are equal, round, and reactive to light. Comments: Pale conjunctiva   Neck:      Vascular: No JVD. Trachea: No tracheal deviation. Cardiovascular:      Rate and Rhythm: Normal rate and regular rhythm. Pulses: Normal pulses. Carotid pulses are 2+ on the right side and 2+ on the left side. Radial pulses are 2+ on the right side and 2+ on the left side. Femoral pulses are 2+ on the right side and 2+ on the left side. Popliteal pulses are 2+ on the right side and 2+ on the left side. Dorsalis pedis pulses are 2+ on the right side and 2+ on the left side. Posterior tibial pulses are 2+ on the right side and 2+ on the left side. Heart sounds: Normal heart sounds. Pulmonary:      Effort: Pulmonary effort is normal. No tachypnea, accessory muscle usage or respiratory distress. Breath sounds: Normal air entry.  Examination of the right-middle field reveals wheezing. Examination of the left-middle field reveals wheezing. Examination of the right-lower field reveals wheezing. Examination of the left-lower field reveals wheezing. Wheezing present. No rhonchi. Comments: Vikram Posey site clean and dry; no evidence of drainage or skin irritation. Abdominal:      General: Bowel sounds are normal.      Palpations: Abdomen is soft. Tenderness: There is no abdominal tenderness. There is no guarding or rebound. Comments: Scaphoid   Musculoskeletal:         General: No swelling or deformity. Normal range of motion. Right shoulder: No swelling. Normal range of motion. Cervical back: Neck supple. Right lower leg: No edema. Left lower leg: No edema. Skin:     General: Skin is warm and dry. Capillary Refill: Capillary refill takes less than 2 seconds. Coloration: Skin is pale. Findings: No signs of injury or rash. Neurological:      General: No focal deficit present. Mental Status: He is alert and oriented to person, place, and time. Psychiatric:         Mood and Affect: Mood normal.         Behavior: Behavior normal. Behavior is cooperative. Diagnostic Study Results     Labs -     No results found for this or any previous visit (from the past 48 hour(s)). Radiologic Studies -     CXR Results  (Last 48 hours)    None          Medical Decision Making and ED Course     I have also reviewed the vital signs, available nursing notes, past medical history, past surgical history, family history and social history as made available. Vital Signs - Reviewed the patient's vital signs. No data found. EKG interpretation: (Preliminary): Performed at 1748  Rhythm: sinus tachycardia; and regular . Rate (approx.): 100;  Axis: normal;     Medical Decision Making/Diff Dx:  Differential diagnoses: Symptomatic anemia, lower GI bleed, dehydration, electrolyte abnormalities, STEMI/NSTEMI UTI, pneumonia      MDM: Unfortunate 61-year-old presents with current diagnosis of laryngeal cancer undergoing chemotherapy and recurrence of anemia that appears to be GI in in source but exact location unclear based on previous GI work-ups. We will do screening labs chest x-ray IV fluids, go ahead and type and cross for 2 units of blood, and planned admission. ED course/Re-evaluation/Consultations/Other   Patient feeling a bit better after first transfusion of blood and work-up finds a hemoglobin of 7.7 with azotemia. Resting comfortably       Procedures     PROCEDURES:  Procedures  Papa Guzman MD        Disposition     Admitted        Diagnosis     Clinical impression:   1. Generalized weakness    2. Gastrointestinal hemorrhage, unspecified gastrointestinal hemorrhage type    3. Dehydration determined by examination    4. Azotemia    5.  Laryngeal cancer ([facility] [address]       :

## 2022-06-04 NOTE — H&P
History and Physical    Subjective:   Chief Complaint : shortness of breath and dizziness since few weeks  Source of information : patient     History of present illness:       51M, H/o COPD and throat cancer currently undergoing chemo, with generalized weakness with dizziness and shortness of breath since few weeks    He followed up with his oncologist lately, and was found to have low Hb, and was transfused, and again the same scenario. States after transfusion he starts feeling better in terms of dizziness and shortness of breath. This time he comes in with similar symptom- hb 7.7,     He denies any blood - black or red in stool, chest pain, syncope,     ER: 1 PRBC,     Past Medical History:   Diagnosis Date    Chronic pain     THROAT, EARS, NECK R/T CANCER    COPD [facility]ch[facility]) ([facility])     [facility]    Psychiatric disorder     ANXIETY R/T CANCER    [facility]     Tracheostomy present ([facility]      Past Surgical History:   Procedure Laterality Date    HX ORTHOPAEDIC      LEFT ARM- \" PUT PLATE IN\"    HX TRACHEOSTOMY  2021    IR INSERT TUNL CVC W PORT OVER 5 YEARS  2021    IR PLACE CVAD FLUORO GUIDE  2021     Family History   Problem Relation Age of Onset    Diabetes Mother     Diabetes Father     Kidney Disease Father     Diabetes Sister     Heart Disease Daughter     Anesth Problems Neg Hx       Social History     Tobacco Use    Smoking status: Former Smoker     Packs/day: 1.50     Years: 30.00     Pack years: 45.00     Quit date: 10/28/2020     Years since quittin.6    Smokeless tobacco: Never Used   Substance Use Topics    Alcohol use: Not Currently       Prior to Admission medications    Medication Sig Start Date End Date Taking? Authorizing Provider   ferrous sulfate 325 mg (65 mg iron) tablet Take 1 Tablet by mouth two (2) times daily (with meals).  22   Mychal Griffin MD   pantoprazole (PROTONIX) 40 mg tablet Take 1 Tablet by mouth two (2) times a day. Indications: bleeding from stomach, esophagus or duodenum 5/13/22   Isrrael Ibrahim MD   polyethylene glycol (MIRALAX) 17 gram packet Take 1 Packet by mouth daily. Patient not taking: Reported on 5/20/2022 5/14/22   Isrrael Ibrahim MD   levothyroxine (SYNTHROID) 75 mcg tablet Take 1 Tablet by mouth Daily (before breakfast). 5/13/22   Isrrael Ibrahim MD     No Known Allergies          Review of Systems:  Constitutional: Appetite is good, denies weight loss, no fever, no chills, no night sweats. +++ generalized weakness ++++ dizzy   Eye: No recent visual disturbances, no discharge, no double vision  Ear/nose/mouth/throat : No hearing disturbance, no ear pain, no nasal congestion, no sore throat, no trouble swallowing. Respiratory : +++  trouble breathing when walking , no cough, ++++ shortness of breath when walking , no hemoptysis, no wheezing  Cardiovascular : No chest pain, no palpitation, no racing of heart, no orthopnea, no paroxysmal nocturnal dyspnea, no peripheral edema  Gastrointestinal : No nausea, no vomiting, no diarrhea, constipation, heartburn, abdominal pain  Genitourinary : No dysuria, no hematuria, no increased frequency, incontinence,  Lymphatics : No swollen glands -Neck, axillary, inguinal  Endocrine : No excessive thirst, no polyuria no cold intolerance, no heat intolerance.   Immunologic : No hives, urticaria, no seasonal allergies,   Musculoskeletal : No joint swelling, pain, effusion,  no back pain, no neck pain,   Integumentary : No rash, no pruritus, no ecchymosis  Hematology : No petechiae, No easy bruising,  No tendency to bleed easy  Neurology : Denies change in mental status, no abnormal balance, no headache, no confusion, numbness, tingling,  Psychiatric : No mood swings, no anxiety, depression    Vitals:     Visit Vitals  /86 (BP Patient Position: At rest)   Pulse (!) 108   Temp 98.8 °F (37.1 °C)   Resp 16   Ht 6' (1.829 m)   Wt 72.6 kg (160 lb)   SpO2 98%   BMI 21.70 kg/m²       Physical Exam:   General : not in distress, very composed and organized, undergoing neb treatemtn  HEENT : PERRLA, normal oral mucosa, atraumatic normocephalic, Normal ear and nose. oropharynx  Neck : track   Lungs : good air entry, no wheeze  CVS : tachcardia  Abdomen : Soft, nontender, no organomegaly, bowel sounds active  Extremities : No edema noted,  pedal pulses palpable  Musculoskeletal : Fair range of motion, no joint swelling or effusion, muscle tone and power appears normal,   Skin : Moist, warm, skin turgor, no pathological rash  Lymphatic : No cervical lymphadenopathy. Neurological : Awake, alert, oriented to time place person. Cranial nerves intact, deep tendon reflexes active, no sensory disturbance, no cerebellar deficits. Psychiatric : appears appropriate        Data Review:   Recent Results (from the past 24 hour(s))   CBC WITH AUTOMATED DIFF    Collection Time: 06/04/22  7:52 AM   Result Value Ref Range    WBC 8.4 4.1 - 11.1 K/uL    RBC 2.58 (L) 4.10 - 5.70 M/uL    HGB 7.7 (L) 12.1 - 17.0 g/dL    HCT 23.6 (L) 36.6 - 50.3 %    MCV 91.5 80.0 - 99.0 FL    MCH 29.8 26.0 - 34.0 PG    MCHC 32.6 30.0 - 36.5 g/dL    RDW 14.8 (H) 11.5 - 14.5 %    PLATELET 951 (H) 262 - 400 K/uL    MPV 9.4 8.9 - 12.9 FL    NRBC 0.0 0.0  WBC    ABSOLUTE NRBC 0.00 0.00 - 0.01 K/uL    NEUTROPHILS 85 (H) 32 - 75 %    LYMPHOCYTES 4 (L) 12 - 49 %    MONOCYTES 10 5 - 13 %    EOSINOPHILS 1 0 - 7 %    BASOPHILS 0 0 - 1 %    IMMATURE GRANULOCYTES 0 0 - 0.5 %    ABS. NEUTROPHILS 7.1 1.8 - 8.0 K/UL    ABS. LYMPHOCYTES 0.3 (L) 0.8 - 3.5 K/UL    ABS. MONOCYTES 0.8 0.0 - 1.0 K/UL    ABS. EOSINOPHILS 0.1 0.0 - 0.4 K/UL    ABS. BASOPHILS 0.0 0.0 - 0.1 K/UL    ABS. IMM.  GRANS. 0.0 0.00 - 0.04 K/UL    DF AUTOMATED     METABOLIC PANEL, COMPREHENSIVE    Collection Time: 06/04/22  7:52 AM   Result Value Ref Range    Sodium 135 (L) 136 - 145 mmol/L    Potassium 4.4 3.5 - 5.1 mmol/L    Chloride 101 97 - 108 mmol/L    CO2 27 21 - 32 mmol/L    Anion gap 7 5 - 15 mmol/L    Glucose 100 65 - 100 mg/dL    BUN 25 (H) 6 - 20 mg/dL    Creatinine 1.50 (H) 0.70 - 1.30 mg/dL    BUN/Creatinine ratio 17 12 - 20      GFR est AA >60 >60 ml/min/1.73m2    GFR est non-AA 50 (L) >60 ml/min/1.73m2    Calcium 9.1 8.5 - 10.1 mg/dL    Bilirubin, total 0.2 0.2 - 1.0 mg/dL    AST (SGOT) 16 15 - 37 U/L    ALT (SGPT) 8 (L) 12 - 78 U/L    Alk.  phosphatase 78 45 - 117 U/L    Protein, total 7.0 6.4 - 8.2 g/dL    Albumin 3.0 (L) 3.5 - 5.0 g/dL    Globulin 4.0 2.0 - 4.0 g/dL    A-G Ratio 0.8 (L) 1.1 - 2.2     TROPONIN-HIGH SENSITIVITY    Collection Time: 06/04/22  8:01 AM   Result Value Ref Range    Troponin-High Sensitivity <3 0 - 76 ng/L   TYPE & SCREEN    Collection Time: 06/04/22  8:30 AM   Result Value Ref Range    Crossmatch Expiration 06/07/2022,2359     ABO/Rh(D) O Positive     Antibody screen Negative     Unit number [id number]     Blood component type [facility]     Unit division 00     Status of unit Issued     [facility] 99 to transfuse     Crossmatch result Compatible     Unit number [id number]     Blood component type [facility]     Unit division 00     Status of unit Allocated     TRANSFUSION STATUS Ok to transfuse     Crossmatch result Compatible    MAGNESIUM    Collection Time: 06/04/22  9:00 AM   Result Value Ref Range    Magnesium 2.2 1.6 - 2.4 mg/dL   PHOSPHORUS    Collection Time: 06/04/22  9:00 AM   Result Value Ref Range    Phosphorus 3.8 2.6 - 4.7 mg/dL   URINALYSIS W/ RFLX MICROSCOPIC    Collection Time: 06/04/22  9:44 AM   Result Value Ref Range    Color Yellow/Straw      Appearance Clear Clear      Specific gravity 1.014 1.003 - 1.030      pH (UA) 5.0 5.0 - 8.0      Protein Negative Negative mg/dL    Glucose Negative Negative mg/dL    Ketone 5 (A) Negative mg/dL    Bilirubin Negative Negative      Blood Negative Negative      Urobilinogen 0.1 0.1 - 1.0 EU/dL    Nitrites Negative Negative      Leukocyte Esterase Negative Negative      WBC 0-4 0 - 4 /hpf    RBC 0-5 0 - 5 /hpf    Bacteria Negative Negative /hpf             Assessment and Plan :     (1) anemia : 1 PRBC,  He denies any blood in stool, hemoptysis,. Order stool for occult blood, and retic count, consult GI for input. (2) oropharyngeal cancer: undergoing chemo, , last chemo last month, has tracheostomy    (3) TASHI: dehydrated, 1 PRBC.      (4) hypothyroidism : TSH ordered,     (5) GERD: protonix BID    DVT ppx: SCD    DISPO: home after GI eval        Signed By: Abebe Faustin MD     June 4, 2022

## 2022-06-05 NOTE — PROGRESS NOTES
Reason for Admission:                       RUR Score:                  PCP: First and Last name:   Marc Gallardo NP     Name of Practice:    Are you a current patient: Yes/No:    Approximate date of last visit:    Can you participate in a virtual visit if needed: Could not recall    Do you (patient/family) have any concerns for transition/discharge? Plan for utilizing home health:       Current Advanced Directive/Advance Care Plan:  Full Code      Healthcare Decision Maker:   Click here to complete [place] including selection of the Healthcare Decision Maker Relationship (ie \"Primary\")            Primary Decision Maker: Juwan Peterson - Brother - [phone]    Transition of Care Plan:       Attempted to meet with patient at bedside. Patient is unable to speak. CM called his brother for an assessment. Patient lives with his girlfriend. Patient is independent with ADL's. No hx of DME, HH, or SNF. Patients brother stated that someone will pick him up at discharge. CM will continue to follow.

## 2022-06-05 NOTE — ANESTHESIA PREPROCEDURE EVALUATION
Relevant Problems   RESPIRATORY SYSTEM   (+) COPD exacerbation (HCC)      HEMATOLOGY   (+) Anemia   (+) Anemia due to GI blood loss   (+) Symptomatic anemia      PERSONAL HX & FAMILY HX OF CANCER   (+) Laryngeal carcinoma (HCC)   (+) Throat cancer (HCC)       Anesthetic History   No history of anesthetic complications            Review of Systems / Medical History  Patient summary reviewed, nursing notes reviewed and pertinent labs reviewed    Pulmonary    COPD (exacerbation)            Comments: S/p trach size 10 done on 2/1/2021. Hx OF RESP. FAILURE. FORMER SMOKER. Neuro/Psych         Psychiatric history    Comments: Weakness. Finger numbness. Cardiovascular  Within defined limits                  Comments: Hx of PE.   GI/Hepatic/Renal     GERD    Renal disease: ARF      Comments: Weight loss. SEVERE PROTEIN CALORIE MALNUTRITION. GI BLOOD LOSS. Melena.   Endo/Other      Hypothyroidism  Cancer and anemia    Comments: Chronic pain Other Findings   Comments: ELIQUIS: LAST DOSE \"a while ago\"       Past Medical History:   Diagnosis Date    Chronic pain     THROAT, EARS, NECK R/T CANCER    COPD (chronic obstructive [facility] disease) ([facility] [address]     EMPHASEMA    Psychiatric disorder     ANXIETY R/T CANCER    Thro[facility] cancer ([facility] [address]     Tracheostomy present ([facility] [address]        Past Surgical History:   Procedure Laterality Date    HX ORTHOPAEDIC      LEFT ARM- \" PUT PLATE IN\"    HX TRACHEOSTOMY  02/08/2021    IR INSERT TUNL CVC W PORT OVER 5 YEARS  5/21/2021    IR PLACE CVAD FLUORO GUIDE  5/21/2021       Current Outpatient Medications   Medication Instructions    ferrous sulfate 325 mg, Oral, 2 TIMES DAILY WITH MEALS    levothyroxine (SYNTHROID) 75 mcg, Oral, DAILY BEFORE BREAKFAST    pantoprazole (PROTONIX) 40 mg, Oral, 2 TIMES DAILY    polyethylene glycol (MIRALAX) 17 g, Oral, DAILY       Current Facility-Administered Medications   Medication Dose Route Frequency    pantoprazole (PROTONIX) 40 mg in 0.9% sodium chloride 10 mL injection  40 mg IntraVENous Q12H    alum-mag hydroxide-simeth (MYLANTA) oral suspension 30 mL  30 mL Oral QID PRN    sodium chloride (NS) flush 5-40 mL  5-40 mL IntraVENous Q8H    sodium chloride (NS) flush 5-40 mL  5-40 mL IntraVENous PRN    acetaminophen (TYLENOL) tablet 650 mg  650 mg Oral Q6H PRN    Or    acetaminophen (TYLENOL) suppository 650 mg  650 mg Rectal Q6H PRN    polyethylene glycol (MIRALAX) packet 17 g  17 g Oral DAILY PRN    ondansetron (ZOFRAN ODT) tablet 4 mg  4 mg Oral Q8H PRN    Or    ondansetron (ZOFRAN) injection 4 mg  4 mg IntraVENous Q6H PRN    enoxaparin (LOVENOX) injection 40 mg  40 mg SubCUTAneous DAILY    traMADoL (ULTRAM) tablet 50 mg  50 mg Oral Q8H PRN    0.9% sodium chloride infusion 250 mL  250 mL IntraVENous PRN    0.9% sodium chloride infusion 250 mL  250 mL IntraVENous PRN    levothyroxine (SYNTHROID) tablet 75 mcg  75 mcg Oral ACB    ferrous sulfate tablet 325 mg  325 mg Oral BID WITH MEALS       Patient Vitals for the past 24 hrs:   Temp Pulse Resp BP SpO2   06/06/22 0733 36.3 °C (97.4 °F) 95 18 119/79 97 %   06/06/22 0124 37.3 °C (99.1 °F) 97 18 126/88 97 %   06/05/22 2022 37.1 °C (98.8 °F) 90 20 125/82 99 %       Lab Results   Component Value Date/Time    WBC 8.7 06/05/2022 06:52 AM    HGB 8.7 (L) 06/05/2022 06:52 AM    HCT 26.9 (L) 06/05/2022 06:52 AM    PLATELET 655 (H) 25/76/9368 06:52 AM    MCV 90.3 06/05/2022 06:52 AM     Lab Results   Component Value Date/Time    Sodium 136 06/05/2022 06:52 AM    Potassium 4.6 06/05/2022 06:52 AM    Chloride 99 06/05/2022 06:52 AM    CO2 30 06/05/2022 06:52 AM    Anion gap 7 06/05/2022 06:52 AM    Glucose 89 06/05/2022 06:52 AM    BUN 25 (H) 06/05/2022 06:52 AM    Creatinine 1.61 (H) 06/05/2022 06:52 AM    BUN/Creatinine ratio 16 06/05/2022 06:52 AM    GFR est AA 55 (L) 06/05/2022 06:52 AM    GFR est non-AA 46 (L) 06/05/2022 06:52 AM    Calcium 9.5 06/05/2022 06:52 AM     No results found for: APTT, PTP, INR, INREXT  Lab Results   Component Value Date/Time    Glucose 89 06/05/2022 06:52 AM    Glucose (POC) 87 05/10/2022 11:06 AM     Physical Exam    Airway          Tracheostomy present   Cardiovascular    Rhythm: regular  Rate: normal         Dental         Pulmonary  Breath sounds clear to auscultation               Abdominal  GI exam deferred      Comments: Central line in place.   Other Findings          Anesthetic Plan    ASA: 3  Anesthesia type: total IV anesthesia and MAC          Induction: Intravenous  Anesthetic plan and risks discussed with: Patient and Family

## 2022-06-05 NOTE — PROGRESS NOTES
Progress Note    Patient: Ana Nugent MRN: [id number]  SSN: [id number]    YOB: 1970  Age: 49 y.o.   Sex: male      Admit Date: 6/4/2022    LOS: 1 day     Subjective:   Patient examined, weakness,  No nausea vomiting,  Had melena last couple days,  Past Medical History:   Diagnosis Date    Chronic pain     THROAT, EARS, NECK R/T CANCER    COPD (chronic obstructive [facility] disease) ([facility]     [facility]    Psychiatric disorder     ANXIETY R/T CANCER    Throat cancer ([facility]     Tracheostomy present ([facility]         Current Facility-Administered Medications:     alum-mag hydroxide-simeth (MYLANTA) oral suspension 30 mL, 30 mL, Oral, QID PRN, Constantin Salazar PA-C, 30 mL at 06/05/22 0314    sodium chloride (NS) flush 5-40 mL, 5-40 mL, IntraVENous, Q8H, Radha Bernard MD    sodium chloride (NS) flush 5-40 mL, 5-40 mL, IntraVENous, PRN, Dane Sewell MD    acetaminophen (TYLENOL) tablet 650 mg, 650 mg, Oral, Q6H PRN **OR** acetaminophen (TYLENOL) suppository 650 mg, 650 mg, Rectal, Q6H PRN, Dane Sewell MD    polyethylene glycol (MIRALAX) packet 17 g, 17 g, Oral, DAILY PRN, Dane Sewell MD    ondansetron (ZOFRAN ODT) tablet 4 mg, 4 mg, Oral, Q8H PRN **OR** ondansetron (ZOFRAN) injection 4 mg, 4 mg, IntraVENous, Q6H PRN, Dane Sewell MD    enoxaparin (LOVENOX) injection 40 mg, 40 mg, SubCUTAneous, DAILY, Radha Bernard MD    0.9% sodium chloride infusion 250 mL, 250 mL, IntraVENous, PRN, Demetra Severance, MD    0.9% sodium chloride infusion 250 mL, 250 mL, IntraVENous, PRN, Demetra Severance, MD    levothyroxine (SYNTHROID) tablet 75 mcg, 75 mcg, Oral, ACB, Dane Sewell MD, 75 mcg at 06/05/22 0829    pantoprazole (PROTONIX) tablet 40 mg, 40 mg, Oral, BID, Dane Sewell MD, 40 mg at 06/05/22 3895    ferrous sulfate tablet 325 mg, 325 mg, Oral, BID WITH MEALS, Dane Sewell MD, 325 mg at 06/05/22 0830    Objective:     Vitals:    06/04/22 1455 06/04/22 2040 06/04/22 2358 06/05/22 0734   BP: 123/79 131/86 129/80 128/80   Pulse: 99 (!) 106 99 99   Resp: 18 20 20 17   Temp: 97.9 °F (36.6 °C) 99.1 °F (37.3 °C) 98.9 °F (37.2 °C) 98.5 °F (36.9 °C)   SpO2: 98% 100% 100% 99%   Weight:       Height:            Intake and Output:  Current Shift: No intake/output data recorded. Last three shifts: 06/03 1901 - 06/05 0700  In: 141.7   Out: -     Physical Exam:   Physical Exam  Constitutional:       Appearance: He is ill-appearing. HENT:      Head:      Comments: Tracheostomy     Mouth/Throat:      Mouth: Mucous membranes are dry. Eyes:      Extraocular Movements: Extraocular movements intact. Cardiovascular:      Rate and Rhythm: Regular rhythm. Heart sounds: Normal heart sounds. Abdominal:      General: Abdomen is flat. Palpations: Abdomen is soft. Musculoskeletal:         General: Normal range of motion. Cervical back: Neck supple. Skin:     General: Skin is warm. Neurological:      General: No focal deficit present. Psychiatric:         Mood and Affect: Mood normal.          Lab/Data Review:  Recent Results (from the past 24 hour(s))   CBC WITH AUTOMATED DIFF    Collection Time: 06/05/22  6:52 AM   Result Value Ref Range    WBC 8.7 4.1 - 11.1 K/uL    RBC 2.98 (L) 4.10 - 5.70 M/uL    HGB 8.7 (L) 12.1 - 17.0 g/dL    HCT 26.9 (L) 36.6 - 50.3 %    MCV 90.3 80.0 - 99.0 FL    MCH 29.2 26.0 - 34.0 PG    MCHC 32.3 30.0 - 36.5 g/dL    RDW 14.8 (H) 11.5 - 14.5 %    PLATELET 349 (H) 194 - 400 K/uL    MPV 9.4 8.9 - 12.9 FL    NRBC 0.0 0.0  WBC    ABSOLUTE NRBC 0.00 0.00 - 0.01 K/uL    NEUTROPHILS 85 (H) 32 - 75 %    LYMPHOCYTES 4 (L) 12 - 49 %    MONOCYTES 10 5 - 13 %    EOSINOPHILS 1 0 - 7 %    BASOPHILS 0 0 - 1 %    IMMATURE GRANULOCYTES 0 0 - 0.5 %    ABS. NEUTROPHILS 7.4 1.8 - 8.0 K/UL    ABS. LYMPHOCYTES 0.3 (L) 0.8 - 3.5 K/UL    ABS. MONOCYTES 0.9 0.0 - 1.0 K/UL    ABS. EOSINOPHILS 0.1 0.0 - 0.4 K/UL    ABS.  BASOPHILS 0.0 0.0 - 0.1 K/UL    ABS. IMM.  GRANS. 0.0 0.00 - 0.04 K/UL    DF AUTOMATED     METABOLIC PANEL, BASIC    Collection Time: 06/05/22  6:52 AM   Result Value Ref Range    Sodium 136 136 - 145 mmol/L    Potassium 4.6 3.5 - 5.1 mmol/L    Chloride 99 97 - 108 mmol/L    CO2 30 21 - 32 mmol/L    Anion gap 7 5 - 15 mmol/L    Glucose 89 65 - 100 mg/dL    BUN 25 (H) 6 - 20 mg/dL    Creatinine 1.61 (H) 0.70 - 1.30 mg/dL    BUN/Creatinine ratio 16 12 - 20      GFR est AA 55 (L) >60 ml/min/1.73m2    GFR est non-AA 46 (L) >60 ml/min/1.73m2    Calcium 9.5 8.5 - 10.1 mg/dL        XR CHEST PORT   Final Result   Stable chest.                 Assessment:     Active Problems:    UGIB (upper gastrointestinal bleed) (6/4/2022)    GI bleeding, melena, nausea, no hematemesis,   Anemia,  Had blood transfusion yesterday hemoglobin back to 8  Had multiple upper endoscopy, no significant etiology finding       Plan:   Blood transfusion as ordered  Continue PPIs  Iron replacement  Repeat EGD in the morning   patient may need a small bowel capsule study if egd unremarkable              Signed By: Malcolm Trinidad MD     June 5, 2022        Thank you for allowing me to participate in this patients care  Cc Referring Physician   Jovani Rao NP

## 2022-06-06 NOTE — PROGRESS NOTES
Progress Note    Patient: Campos Pathak MRN: [id number]  SSN: [id number]    YOB: 1970  Age: 49 y.o. Sex: male      Admit Date: 6/4/2022    LOS: 2 days     Subjective:     51M, H/o COPD and throat cancer currently undergoing chemo, with generalized weakness with dizziness and shortness of breath s/t ABLA s/t UGIB     He followed up with his oncologist lately, and was found to have low Hb, and was transfused, and again the same scenario. States after transfusion he starts feeling better in terms of dizziness and shortness of breath. This time he comes in with similar symptom- hb 7.7,     ED and hospital course:  1 PRBC, IV protonix s/t black stools. EGD showed acute gastritis and hiatal hernia no bleed. Plan for colonoscopy in am        Review of Systems:  Constitutional: Appetite is good, denies weight loss, no fever, no chills, no night sweats. +++ generalized weakness ++++ dizzy   Eye: No recent visual disturbances, no discharge, no double vision  Ear/nose/mouth/throat : No hearing disturbance, no ear pain, no nasal congestion, no sore throat, no trouble swallowing. Respiratory : +++  trouble breathing when walking , no cough, ++++ shortness of breath when walking , no hemoptysis, no wheezing  Cardiovascular : No chest pain, no palpitation, no racing of heart, no orthopnea, no paroxysmal nocturnal dyspnea, no peripheral edema  Gastrointestinal : No nausea, no vomiting, no diarrhea, constipation, heartburn, abdominal pain  Genitourinary : No dysuria, no hematuria, no increased frequency, incontinence,  Lymphatics : No swollen glands -Neck, axillary, inguinal  Endocrine : No excessive thirst, no polyuria no cold intolerance, no heat intolerance.   Immunologic : No hives, urticaria, no seasonal allergies,   Musculoskeletal : No joint swelling, pain, effusion,  no back pain, no neck pain,   Integumentary : No rash, no pruritus, no ecchymosis  Hematology : No petechiae, No easy bruising,  No tendency to bleed easy  Neurology : Denies change in mental status, no abnormal balance, no headache, no confusion, numbness, tingling,  Psychiatric : No mood swings, no anxiety, depression  Objective:     Vitals:    06/06/22 1048 06/06/22 1052 06/06/22 1057 06/06/22 1458   BP: (!) 132/110 116/79 123/87 114/77   Pulse: (!) 108 (!) 108 (!) 108 98   Resp: 12 13 13 14   Temp: 97.2 °F (36.2 °C)   98 °F (36.7 °C)   SpO2: 100% 100% 100% 99%   Weight:       Height:            Intake and Output:  Current Shift: 06/06 0701 - 06/06 1900  In: 100 [I.V.:100]  Out: -   Last three shifts: No intake/output data recorded.         Physical Exam:   General : not in distress, very composed and organized, undergoing neb treatemtn  HEENT : PERRLA, normal oral mucosa, atraumatic normocephalic, Normal ear and nose. oropharynx  Neck : track   Lungs : good air entry, no wheeze  CVS : tachcardia  Abdomen : Soft, nontender, no organomegaly, bowel sounds active  Extremities : No edema noted,  pedal pulses palpable  Musculoskeletal : Fair range of motion, no joint swelling or effusion, muscle tone and power appears normal,   Skin : Moist, warm, skin turgor, no pathological rash  Lymphatic : No cervical lymphadenopathy. Neurological : Awake, alert, oriented to time place person. Cranial nerves intact, deep tendon reflexes active, no sensory disturbance, no cerebellar deficits. Psychiatric : appears appropriate   .       Lab/Data Review:  No results found for this or any previous visit (from the past 24 hour(s)). Assessment and plan:         (1) anemia : 1 PRBC,  He denies any blood in stool, hemoptysis,. Order stool for occult blood,      (2) oropharyngeal cancer: undergoing chemo, , last chemo last month, has tracheostomy     (3) TASHI: dehydrated, 1 PRBC.      (4) hypothyroidism : TSH ordered,      (5) GERD: protonix BID     DVT ppx: SCD     DISPO: . EGD showed acute gastritis and hiatal hernia no bleed.  Plan for colonoscopy in AM    Signed By: Lexi Mcclelland MD     June 6, 2022

## 2022-06-06 NOTE — PROGRESS NOTES
CM discussed discharge planning with patient at bedside. Patient declined SNF and Home Health services. Patient will go home selfcare when medically stable. Patient has family support if needed. Family will transport at discharge.

## 2022-06-06 NOTE — DISCHARGE SUMMARY
Physician Discharge Summary     Patient ID:    Sendy Freeman  [id number]  21 y.o.  1970    Admit date: 6/4/2022    Discharge date : 6/6/2022    Chronic Diagnoses:    Problem List as of 6/6/2022 Date Reviewed: 3/9/2022          Codes Class Noted - Resolved    UGIB (upper gastrointestinal bleed) ICD-10-CM: K92.2  ICD-9-CM: 578.9  6/4/2022 - Present        Palpitations ICD-10-CM: R00.2  ICD-9-CM: 785.1  5/30/2022 - Present        Chest pain ICD-10-CM: R07.9  ICD-9-CM: 786.50  5/30/2022 - Present        Anemia ICD-10-CM: D64.9  ICD-9-CM: 285.9  5/30/2022 - Present        Symptomatic anemia ICD-10-CM: D64.9  ICD-9-CM: 285.9  5/30/2022 - Present        Anemia due to GI blood loss ICD-10-CM: D50.0  ICD-9-CM: 280.0  5/9/2022 - Present        Prevention of chemotherapy-induced neutropenia ICD-10-CM: Z76.89  ICD-9-CM: V72.85  2/23/2022 - Present        Hypokalemia ICD-10-CM: E87.6  ICD-9-CM: 276.8  5/24/2021 - Present        Severe protein-calorie malnutrition ([facility] ICD-10-CM: E43  ICD-9-CM: 262  2/15/2021 - Present        Laryngeal carcinoma ([facility] ICD-10-CM: C32.9  ICD-9-CM: 161.9  2/7/2021 - Present        Throat cancer ([facility] ICD-10-CM: C14.0  ICD-9-CM: 149.0  1/31/2021 - Present        COPD exacerbation ([facility] ICD-10-CM: J44.1  ICD-9-CM: 491.21  1/31/2021 - Present        Acute and chronic respiratory failure with hypoxia (HCC) ICD-10-CM: J96.21  ICD-9-CM: 518.84, 799.02  1/31/2021 - Present        Chronic pain due to neoplasm ICD-10-CM: G89.3  ICD-9-CM: 338.3  1/31/2021 - Present        Anxiety ICD-10-CM: F41.9  ICD-9-CM: 300.00  1/31/2021 - Present        Insomnia ICD-10-CM: G47.00  ICD-9-CM: 780.52  1/31/2021 - Present        Respiratory failure with hypoxia [facility]) ICD-10-CM: J96.91  ICD-9-CM: 518.81  1/31/2021 - Present          22    Final Diagnoses:   UGIB (upper gastrointestinal bleed) [K92.2]    Reason for Hospitalization:  (1) anemia : 1 PRBC,      (2) oropharyngeal cancer: undergoing chemo, , last chemo last month, has tracheostomy     (3) TASHI:      (4) hypothyroidism : TSH ordered,      (5) GERD: protonix BID      Hospital Course:     51M, H/o COPD and throat cancer currently undergoing chemo, with generalized weakness with dizziness and shortness of breath since few weeks     He followed up with his oncologist lately, and was found to have low Hb, and was transfused, and again the same scenario. States after transfusion he starts feeling better in terms of dizziness and shortness of breath. This time he comes in with similar symptom- hb 7.7,      He denies any blood - black or red in stool, chest pain, syncope,      HOSPITAL COURSE:  He was given 1 PRBC, hb 8.7, he underwent EGD. INSTRUCTIONS ON DISCHARGE    (1) please take the medication as advised:               Continue to take Protonix    (2) please follow-up with gastroenterology in 1 week      Discharge Medications:   Current Discharge Medication List      CONTINUE these medications which have NOT CHANGED    Details   ferrous sulfate 325 mg (65 mg iron) tablet Take 1 Tablet by mouth two (2) times daily (with meals). Qty: 60 Tablet, Refills: 0      pantoprazole (PROTONIX) 40 mg tablet Take 1 Tablet by mouth two (2) times a day. Indications: bleeding from stomach, esophagus or duodenum  Qty: 60 Tablet, Refills: 0      polyethylene glycol (MIRALAX) 17 gram packet Take 1 Packet by mouth daily. Qty: 30 Packet, Refills: 0      levothyroxine (SYNTHROID) 75 mcg tablet Take 1 Tablet by mouth Daily (before breakfast). Qty: 30 Tablet, Refills: 0               Follow up Care:    1. Rebecca Rodriguez NP in 1-2 weeks. Please call to set up an appointment shortly after discharge.       Diet:  regular    Disposition:  home    Advanced Directive:   FULL x   DNR      Discharge Exam:  Physical Exam:   General : not in distress, very composed and organized, undergoing neb treatemtn  HEENT : PERRLA, normal oral mucosa, atraumatic normocephalic, Normal ear and nose. oropharynx  Neck : track   Lungs : good air entry, no wheeze  CVS : tachcardia  Abdomen : Soft, nontender, no organomegaly, bowel sounds active  Extremities : No edema noted,  pedal pulses palpable  Musculoskeletal : Fair range of motion, no joint swelling or effusion, muscle tone and power appears normal,   Skin : Moist, warm, skin turgor, no pathological rash  Lymphatic : No cervical lymphadenopathy. Neurological : Awake, alert, oriented to time place person.  Cranial nerves intact, deep tendon reflexes active, no sensory disturbance, no cerebellar deficits. Psychiatric : appears appropriate   .           CONSULTATIONS:GI    Significant Diagnostic Studies:     Radiologic Studies -   Results from Hospital Encounter encounter on 06/04/22    XR CHEST PORT    Narrative  Chest one view. AP upright portable at 0812 dated 6/4/2022. Comparison 5/30/2022. A tracheostomy tube and central line remain in place. The heart is normal in  size. Bilateral perihilar opacities are unchanged from before as seen with  atelectasis versus pneumonia. There is no pneumothorax or pleural effusion.     Impression  Stable chest.     CT Results  (Last 48 hours)    None              Discharge time spent 35 minutes    Signed:  Artur Souza MD  6/6/2022  8:04 AM

## 2022-06-06 NOTE — DISCHARGE INSTRUCTIONS
INSTRUCTIONS ON DISCHARGE    (1) please take the medication as advised:               Continue to take Protonix    (2) please follow-up with gastroenterology in 1 week

## 2022-06-06 NOTE — PROGRESS NOTES
Progress Note    Patient: Jyoti Mosquera MRN: [id number]  SSN: [id number]    YOB: 1970  Age: 49 y.o. Sex: male      Admit Date: 6/4/2022    LOS: 2 days     Subjective:     51M, H/o COPD and throat cancer currently undergoing chemo, with generalized weakness with dizziness and shortness of breath since few weeks     He followed up with his oncologist lately, and was found to have low Hb, and was transfused, and again the same scenario. States after transfusion he starts feeling better in terms of dizziness and shortness of breath. This time he comes in with similar symptom- hb 7.7,      He denies any blood - black or red in stool, chest pain, syncope,      ER: 1 PRBC,     Plan for EGD today      Review of Systems:  Constitutional: Appetite is good, denies weight loss, no fever, no chills, no night sweats. +++ generalized weakness ++++ dizzy   Eye: No recent visual disturbances, no discharge, no double vision  Ear/nose/mouth/throat : No hearing disturbance, no ear pain, no nasal congestion, no sore throat, no trouble swallowing. Respiratory : +++  trouble breathing when walking , no cough, ++++ shortness of breath when walking , no hemoptysis, no wheezing  Cardiovascular : No chest pain, no palpitation, no racing of heart, no orthopnea, no paroxysmal nocturnal dyspnea, no peripheral edema  Gastrointestinal : No nausea, no vomiting, no diarrhea, constipation, heartburn, abdominal pain  Genitourinary : No dysuria, no hematuria, no increased frequency, incontinence,  Lymphatics : No swollen glands -Neck, axillary, inguinal  Endocrine : No excessive thirst, no polyuria no cold intolerance, no heat intolerance.   Immunologic : No hives, urticaria, no seasonal allergies,   Musculoskeletal : No joint swelling, pain, effusion,  no back pain, no neck pain,   Integumentary : No rash, no pruritus, no ecchymosis  Hematology : No petechiae, No easy bruising,  No tendency to bleed easy  Neurology : Denies change in mental status, no abnormal balance, no headache, no confusion, numbness, tingling,  Psychiatric : No mood swings, no anxiety, depression  Objective:     Vitals:    06/05/22 0734 06/05/22 2022 06/06/22 0124 06/06/22 0733   BP: 128/80 125/82 126/88 119/79   Pulse: 99 90 97 95   Resp: 17 20 18 18   Temp: 98.5 °F (36.9 °C) 98.8 °F (37.1 °C) 99.1 °F (37.3 °C) 97.4 °F (36.3 °C)   SpO2: 99% 99% 97% 97%   Weight:       Height:            Intake and Output:  Current Shift: No intake/output data recorded. Last three shifts: No intake/output data recorded.         Physical Exam:   General : not in distress, very composed and organized, undergoing neb treatemtn  HEENT : PERRLA, normal oral mucosa, atraumatic normocephalic, Normal ear and nose. oropharynx  Neck : track   Lungs : good air entry, no wheeze  CVS : tachcardia  Abdomen : Soft, nontender, no organomegaly, bowel sounds active  Extremities : No edema noted,  pedal pulses palpable  Musculoskeletal : Fair range of motion, no joint swelling or effusion, muscle tone and power appears normal,   Skin : Moist, warm, skin turgor, no pathological rash  Lymphatic : No cervical lymphadenopathy. Neurological : Awake, alert, oriented to time place person. Cranial nerves intact, deep tendon reflexes active, no sensory disturbance, no cerebellar deficits. Psychiatric : appears appropriate   .       Lab/Data Review:  No results found for this or any previous visit (from the past 24 hour(s)). Assessment and plan:         (1) anemia : 1 PRBC,  He denies any blood in stool, hemoptysis,.  Order stool for occult blood, and retic count, consult GI for input.      (2) oropharyngeal cancer: undergoing chemo, , last chemo last month, has tracheostomy     (3) TASHI: dehydrated, 1 PRBC.      (4) hypothyroidism : TSH ordered,      (5) GERD: protonix BID     DVT ppx: SCD     DISPO: home after GI eval, likely today    Signed By: Artur Souza MD     June 6, 2022

## 2022-06-06 NOTE — PERIOP NOTES
TRANSFER - OUT REPORT:    Verbal report given to [facility], RN(name) on [place]  being transferred to (unit) for routine post - op       Report consisted of patients Situation, Background, Assessment and   Recommendations(SBAR). Information from the following report(s) SBAR, Procedure Summary, Intake/Output, Recent Results and Cardiac Rhythm ST was reviewed with the receiving nurse. Opportunity for questions and clarification was provided.       Patient transported with:   [employer]

## 2022-06-06 NOTE — ANESTHESIA PREPROCEDURE EVALUATION
Relevant Problems   RESPIRATORY SYSTEM   (+) COPD exacerbation (HCC)      HEMATOLOGY   (+) Anemia   (+) Anemia due to GI blood loss   (+) Symptomatic anemia      PERSONAL HX & FAMILY HX OF CANCER   (+) Laryngeal carcinoma (HCC)   (+) Throat cancer (HCC)       Anesthetic History   No history of anesthetic complications            Review of Systems / Medical History  Patient summary reviewed, nursing notes reviewed and pertinent labs reviewed    Pulmonary    COPD (exacerbation)            Comments: S/p trach size 10 done on 2/1/2021. Hx OF RESP. FAILURE. FORMER SMOKER. Neuro/Psych         Psychiatric history    Comments: Weakness. Finger numbness. Cardiovascular  Within defined limits                  Comments: Hx of PE.   GI/Hepatic/Renal     GERD    Renal disease: ARF      Comments: Weight loss. SEVERE PROTEIN CALORIE MALNUTRITION. GI BLOOD LOSS. Melena. Endo/Other      Hypothyroidism  Cancer and anemia    Comments: Chronic pain Other Findings   Comments: ELIQUIS: LAST DOSE \"a while ago\"         Past Medical History:   Diagnosis Date    Chronic pain     THROAT, EARS, NECK R/T CANCER    COPD (chronic obstructive pulmonary disease) ([facility]     EMPHASEMA    Psychiatric disorder     ANXIETY R/T CANCER    Throat cancer ([facility]     Tracheostomy present ([facility]        Past Surgical History:   Procedure Laterality Date    HX ORTHOPAEDIC      LEFT ARM- \" PUT PLATE IN\"    HX TRACHEOSTOMY  02/08/2021    IR INSERT TUNL CVC W PORT OVER 5 YEARS  5/21/2021    IR PLACE CVAD FLUORO GUIDE  5/21/2021       Current Outpatient Medications   Medication Instructions    ferrous sulfate 325 mg, Oral, 2 TIMES DAILY WITH MEALS    levothyroxine (SYNTHROID) 75 mcg, Oral, DAILY BEFORE BREAKFAST    pantoprazole (PROTONIX) 40 mg, Oral, 2 TIMES DAILY    polyethylene glycol (MIRALAX) 17 g, Oral, DAILY       No current facility-administered medications for this visit. No current outpatient medications on file. Facility-Administered Medications Ordered in Other Visits   Medication Dose Route Frequency    pantoprazole (PROTONIX) 40 mg in 0.9% sodium chloride 10 mL injection  40 mg IntraVENous Q12H    PHENYLephrine (AXEL-SYNEPHRINE) 1 mg/10 mL (100 mcg/mL) 100 mcg/mL in NS syringe        peg 3350-electrolytes (COLYTE) 4000 mL  4,000 mL Oral BID    alum-mag hydroxide-simeth (MYLANTA) oral suspension 30 mL  30 mL Oral QID PRN    sodium chloride (NS) flush 5-40 mL  5-40 mL IntraVENous Q8H    sodium chloride (NS) flush 5-40 mL  5-40 mL IntraVENous PRN    acetaminophen (TYLENOL) tablet 650 mg  650 mg Oral Q6H PRN    Or    acetaminophen (TYLENOL) suppository 650 mg  650 mg Rectal Q6H PRN    polyethylene glycol (MIRALAX) packet 17 g  17 g Oral DAILY PRN    ondansetron (ZOFRAN ODT) tablet 4 mg  4 mg Oral Q8H PRN    Or    ondansetron (ZOFRAN) injection 4 mg  4 mg IntraVENous Q6H PRN    enoxaparin (LOVENOX) injection 40 mg  40 mg SubCUTAneous DAILY    traMADoL (ULTRAM) tablet 50 mg  50 mg Oral Q8H PRN    0.9% sodium chloride infusion 250 mL  250 mL IntraVENous PRN    0.9% sodium chloride infusion 250 mL  250 mL IntraVENous PRN    levothyroxine (SYNTHROID) tablet 75 mcg  75 mcg Oral ACB    ferrous sulfate tablet 325 mg  325 mg Oral BID WITH MEALS       No data found.     Lab Results   Component Value Date/Time    WBC 8.7 06/05/2022 06:52 AM    HGB 8.7 (L) 06/05/2022 06:52 AM    HCT 26.9 (L) 06/05/2022 06:52 AM    PLATELET 720 (H) 43/17/6363 06:52 AM    MCV 90.3 06/05/2022 06:52 AM     Lab Results   Component Value Date/Time    Sodium 136 06/05/2022 06:52 AM    Potassium 4.6 06/05/2022 06:52 AM    Chloride 99 06/05/2022 06:52 AM    CO2 30 06/05/2022 06:52 AM    Anion gap 7 06/05/2022 06:52 AM    Glucose 89 06/05/2022 06:52 AM    BUN 25 (H) 06/05/2022 06:52 AM    Creatinine 1.61 (H) 06/05/2022 06:52 AM    BUN/Creatinine ratio 16 06/05/2022 06:52 AM    GFR est AA 55 (L) 06/05/2022 06:52 AM    GFR est non-AA 46 (L) 06/05/2022 06:52 AM    Calcium 9.5 06/05/2022 06:52 AM     No results found for: APTT, PTP, INR, INREXT, INREXT  Lab Results   Component Value Date/Time    Glucose 89 06/05/2022 06:52 AM    Glucose (POC) 87 05/10/2022 11:06 AM     Physical Exam    Airway          Tracheostomy present   Cardiovascular    Rhythm: regular  Rate: normal         Dental         Pulmonary  Breath sounds clear to auscultation               Abdominal  GI exam deferred      Comments: Central line in place.   Other Findings          Anesthetic Plan    ASA: 3  Anesthesia type: MAC          Induction: Intravenous  Anesthetic plan and risks discussed with: Patient

## 2022-06-06 NOTE — ANESTHESIA POSTPROCEDURE EVALUATION
Procedure(s):  ESOPHAGOGASTRODUODENAL (EGD) BIOPSY. MAC    Anesthesia Post Evaluation      Multimodal analgesia: multimodal analgesia not used between 6 hours prior to anesthesia start to PACU discharge  Patient location during evaluation: bedside  Patient participation: complete - patient participated  Level of consciousness: lethargic  Pain score: 0  Airway patency: patent  Anesthetic complications: no  Cardiovascular status: acceptable  Respiratory status: acceptable  Hydration status: acceptable  Comments: report to rnJuarez valdezians on bed  Post anesthesia nausea and vomiting:  none  Final Post Anesthesia Temperature Assessment:  Normothermia (36.0-37.5 degrees C)      INITIAL Post-op Vital signs: No vitals data found for the desired time range.

## 2022-06-06 NOTE — TELEPHONE ENCOUNTER
Karyna Gates called (not listed on PHI) and stated that patient is in the hospital and has been there since Saturday. Karyna Gates stated he is at [facility] in [place].  Please advise       # [phone]

## 2022-06-07 NOTE — TELEPHONE ENCOUNTER
[facility] called and stated this patient is in the hospital and will not make his hydration appointment for today.     I have went ahead and cancelled this appointment, just an FYI for the team.

## 2022-06-07 NOTE — DISCHARGE SUMMARY
Physician Discharge Summary     Patient ID:    Robert Harrington  [id number]  89 y.o.  1970    Admit date: 6/4/2022    Discharge date : 6/7/2022    Chronic Diagnoses:    Problem List as of 6/7/2022 Date Reviewed: 3/9/2022          Codes Class Noted - Resolved    UGIB (upper gastrointestinal bleed) ICD-10-CM: K92.2  ICD-9-CM: 578.9  6/4/2022 - Present        Palpitations ICD-10-CM: R00.2  ICD-9-CM: 785.1  5/30/2022 - Present        Chest pain ICD-10-CM: R07.9  ICD-9-CM: 786.50  5/30/2022 - Present        Anemia ICD-10-CM: D64.9  ICD-9-CM: 285.9  5/30/2022 - Present        Symptomatic anemia ICD-10-CM: D64.9  ICD-9-CM: 285.9  5/30/2022 - Present        Anemia due to GI blood loss ICD-10-CM: D50.0  ICD-9-CM: 280.0  5/9/2022 - Present        Prevention of chemotherapy-induced neutropenia ICD-10-CM: Z76.89  ICD-9-CM: V72.85  2/23/2022 - Present        Hypokalemia ICD-10-CM: E87.6  ICD-9-CM: 276.8  5/24/2021 - Present        Severe protein-calorie malnutrition ([facility] ICD-10-CM: E43  ICD-9-CM: 262  2/15/2021 - Present        Laryngeal carcinoma ([facility] ICD-10-CM: C32.9  ICD-9-CM: 161.9  2/7/2021 - Present        Throat cancer ([facility] ICD-10-CM: C14.0  ICD-9-CM: 149.0  1/31/2021 - Present        COPD exacerbation ([facility] ICD-10-CM: J44.1  ICD-9-CM: 491.21  1/31/2021 - Present        Acute and chronic respiratory failure with hypoxia (HCC) ICD-10-CM: J96.21  ICD-9-CM: 518.84, 799.02  1/31/2021 - Present        Chronic pain due to neoplasm ICD-10-CM: G89.3  ICD-9-CM: 338.3  1/31/2021 - Present        Anxiety ICD-10-CM: F41.9  ICD-9-CM: 300.00  1/31/2021 - Present        Insomnia ICD-10-CM: G47.00  ICD-9-CM: 780.52  1/31/2021 - Present        Respiratory failure with hypoxia [facility]) ICD-10-CM: J96.91  ICD-9-CM: 518.81  1/31/2021 - Present          22    Final Diagnoses:   Anemia of chronic disease, likely due to malignancy, CKD and chemotherapy   -No evidence for GI bleed  Chronic kidney disease stage III  COPD without exacerbation  Cancer of the pharynx    Reason for Hospitalization:  67-year-old male with a history of COPD, throat cancer currently undergoing chemotherapy who was admitted on 6/4 with generalized weakness and shortness of breath. Was found to have a hemoglobin of 7.7. He denied any bloody stool or black stool. He apparently has required frequent transfusions recently. Stool was negative for occult blood on 5/30    He was seen by GI and underwent EGD which was unremarkable. Hospital Course:   Patient was admitted, transfused with 1 unit of packed cells. Patient was seen by GI. He underwent EGD which was unremarkable other than some mild nonbleeding gastritis    Hemoglobin improved 8.7 following transfusion    Patient underwent colonoscopy on 6/7 which showed    He was felt appropriate for discharge home on 6/7      Discharge Medications:   Current Discharge Medication List      CONTINUE these medications which have NOT CHANGED    Details   ferrous sulfate 325 mg (65 mg iron) tablet Take 1 Tablet by mouth two (2) times daily (with meals). Qty: 60 Tablet, Refills: 0      pantoprazole (PROTONIX) 40 mg tablet Take 1 Tablet by mouth two (2) times a day. Indications: bleeding from stomach, esophagus or duodenum  Qty: 60 Tablet, Refills: 0      polyethylene glycol (MIRALAX) 17 gram packet Take 1 Packet by mouth daily. Qty: 30 Packet, Refills: 0      levothyroxine (SYNTHROID) 75 mcg tablet Take 1 Tablet by mouth Daily (before breakfast). Qty: 30 Tablet, Refills: 0               Follow up Care:    1. Marc Gallardo NP in 1-2 weeks. Please call to set up an appointment shortly after discharge. Diet:  Regular Diet    Disposition:  Home. Advanced Directive:   FULL    DNR      Discharge Exam:  General:  Alert, cooperative, no distress, appears stated age. Lungs:   Clear to auscultation bilaterally. Chest wall:  No tenderness or deformity.    Heart:  Regular rate and rhythm, S1, S2 normal, no murmur, click, rub or gallop. Abdomen:   Soft, non-tender. Bowel sounds normal. No masses,  No organomegaly. Extremities: Extremities normal, atraumatic, no cyanosis or edema. Pulses: 2+ and symmetric all extremities. Skin: Skin color, texture, turgor normal. No rashes or lesions   Neurologic: CNII-XII intact. No gross sensory or motor deficits        CONSULTATIONS: GI    Significant Diagnostic Studies:   6/4/2022: BUN 25 mg/dL (H; Ref range: 6 - 20 mg/dL); Calcium 9.1 mg/dL (Ref range: 8.5 - 10.1 mg/dL); CO2 27 mmol/L (Ref range: 21 - 32 mmol/L); Creatinine 1.50 mg/dL (H; Ref range: 0.70 - 1.30 mg/dL); Glucose 100 mg/dL (Ref range: 65 - 100 mg/dL); HCT 23.6 % (L; Ref range: 36.6 - 50.3 %); HGB 7.7 g/dL (L; Ref range: 12.1 - 17.0 g/dL); Potassium 4.4 mmol/L (Ref range: 3.5 - 5.1 mmol/L); Sodium 135 mmol/L (L; Ref range: 136 - 145 mmol/L)  6/5/2022: BUN 25 mg/dL (H; Ref range: 6 - 20 mg/dL); Calcium 9.5 mg/dL (Ref range: 8.5 - 10.1 mg/dL); CO2 30 mmol/L (Ref range: 21 - 32 mmol/L); Creatinine 1.61 mg/dL (H; Ref range: 0.70 - 1.30 mg/dL); Glucose 89 mg/dL (Ref range: 65 - 100 mg/dL); HCT 26.9 % (L; Ref range: 36.6 - 50.3 %); HGB 8.7 g/dL (L; Ref range: 12.1 - 17.0 g/dL); Potassium 4.6 mmol/L (Ref range: 3.5 - 5.1 mmol/L); Sodium 136 mmol/L (Ref range: 136 - 145 mmol/L)  Recent Labs     06/05/22  0652   WBC 8.7   HGB 8.7*   HCT 26.9*   *     Recent Labs     06/05/22  0652      K 4.6   CL 99   CO2 30   BUN 25*   CREA 1.61*   GLU 89   CA 9.5     No results for input(s): ALT, AP, TBIL, TBILI, TP, ALB, GLOB, GGT, AML, LPSE in the last 72 hours. No lab exists for component: SGOT, GPT, AMYP, HLPSE  No results for input(s): INR, PTP, APTT, INREXT in the last 72 hours. No results for input(s): FE, TIBC, PSAT, FERR in the last 72 hours. No results for input(s): PH, PCO2, PO2 in the last 72 hours. No results for input(s): CPK, CKMB in the last 72 hours.     No lab exists for component: TROPONINI  Lab Results   Component Value Date/Time    Glucose (POC) 87 05/10/2022 11:06 AM    Glucose (POC) 85 05/10/2022 07:30 AM    Glucose (POC) 113 10/04/2021 01:04 PM    Glucose (POC) 97 05/07/2021 08:15 AM    Glucose (POC) 72 02/16/2021 11:17 AM       Discharge time spent 35 minutes    Signed:  Kevin Mahan MD  6/7/2022  12:48 PM

## 2022-06-07 NOTE — PROGRESS NOTES
Physician Progress Note      PATIENTEvelene Schaumann  CSN #:                  [id number]  :                       1970  ADMIT DATE:       2022 7:43 AM  DISCH DATE:  RESPONDING  PROVIDER #:        Jose Maria Castellanos MD          QUERY TEXT:    Dear Attending,    Pt admitted with abdominal pain and dizziness and has anemia documented. If possible, please document in progress notes and discharge summary further specificity regarding the acuity and type of anemia:      The medical record reflects the following:  Risk Factors: 49year old male, dizziness, currently undergoing chemo, generalized weakness  Clinical Indicators:  H&P -  throat cancer currently undergoing chemo, with generalized weakness with dizziness and shortness of breath since few weeks. followed up with his oncologist lately, and was found to have low Hb, and was transfused, and again the same scenario. States after transfusion he starts feeling better in terms of dizziness and shortness of breath. This time he comes in with similar symptom- hb 7.7  anemia : 1 PRBC  oropharyngeal cancer: undergoing chemo, , last chemo last month   Attending PN - EGD showed acute gastritis and hiatal hernia no bleed  Treatment: 1 unit PRBC, PO Ferrous Sulfate 325mg, monitor H/H    Please email [email or web address] with any questions  Options provided:  -- Anemia due to acute blood loss  -- Anemia due to acute on chronic blood loss  -- Anemia due to oropharyngeal cancer  -- Anemia due to antineoplastic chemotherapy  -- Other - I will add my own diagnosis  -- Disagree - Not applicable / Not valid  -- Disagree - Clinically unable to determine / Unknown  -- Refer to Clinical Documentation Reviewer    PROVIDER RESPONSE TEXT:    This patient has anemia due to oropharyngeal cancer. Query created by:  Monika Manning on 2022 1:10 PM      Electronically signed by:  Jose Maria Castellanos MD 2022 1:49 PM

## 2022-06-07 NOTE — ROUTINE PROCESS
Bedside shift change report given to [id number] (oncoming nurse) by Ginger Morales RN (offgoing nurse). Report included the following information SBAR, Intake/Output, MAR and Recent Results.

## 2022-06-07 NOTE — PROGRESS NOTES
Hospitalist Progress Note               Daily Progress Note: 6/7/2022      Subjective:   Hospital course to date:  28-year-old male with a history of COPD, throat cancer currently undergoing chemotherapy who was admitted on 6/4 with generalized weakness and shortness of breath. Was found to have a hemoglobin of 7.7. He denied any bloody stool or black stool. He apparently has required frequent transfusions recently.     Stool was negative for occult blood on 5/30     He was seen by GI and underwent EGD which was unremarkable.    -----  Patient seen today for follow-up. He was only able to drink of very small amount of his bowel prep. Colonoscopy was attempted today but he had solid stool in the colon. GI plans on repeating bowel prep today with colonoscopy in a.m.     We would like to complete his work-up given his recurrent presentations with drop in his hemoglobin, although there is still no clear evidence for GI bleeding      Problem List:  Problem List as of 6/7/2022 Date Reviewed: 3/9/2022          Codes Class Noted - Resolved    UGIB (upper gastrointestinal bleed) ICD-10-CM: K92.2  ICD-9-CM: 578.9  6/4/2022 - Present        Palpitations ICD-10-CM: R00.2  ICD-9-CM: 785.1  5/30/2022 - Present        Chest pain ICD-10-CM: R07.9  ICD-9-CM: 786.50  5/30/2022 - Present        Anemia ICD-10-CM: D64.9  ICD-9-CM: 285.9  5/30/2022 - Present        Symptomatic anemia ICD-10-CM: D64.9  ICD-9-CM: 285.9  5/30/2022 - Present        Anemia due to GI blood loss ICD-10-CM: D50.0  ICD-9-CM: 280.0  5/9/2022 - Present        Prevention of chemotherapy-induced neutropenia ICD-10-CM: Z76.89  ICD-9-CM: V72.85  2/23/2022 - Present        Hypokalemia ICD-10-CM: E87.6  ICD-9-CM: 276.8  5/24/2021 - Present        Severe protein-calorie malnutrition ([facility] ICD-10-CM: E43  ICD-9-CM: 655  2/15/2021 - Present        Laryngeal carcinoma ([facility] ICD-10-CM: C32.9  ICD-9-CM: 161.9  2/7/2021 - Present        Throat cancer ([facility] ICD-10-CM: C14.0  ICD-9-CM: 149.0  1/31/2021 - Present        COPD exacerbation ([facility] ICD-10-CM: J44.1  ICD-9-CM: 491.21  1/31/2021 - Present        Acute and chronic respiratory failure with hypoxia (HCC) ICD-10-CM: J96.21  ICD-9-CM: 518.84, 799.02  1/31/2021 - Present        Chronic pain due to neoplasm ICD-10-CM: G89.3  ICD-9-CM: 338.3  1/31/2021 - Present        Anxiety ICD-10-CM: F41.9  ICD-9-CM: 300.00  1/31/2021 - Present        Insomnia ICD-10-CM: G47.00  ICD-9-CM: 780.52  1/31/2021 - Present        Respiratory failure with hypoxia [facility]) ICD-10-CM: J96.91  ICD-9-CM: 518.81  1/31/2021 - Present              Medications reviewed  Current Facility-Administered Medications   Medication Dose Route Frequency    pantoprazole (PROTONIX) 40 mg in 0.9% sodium chloride 10 mL injection  40 mg IntraVENous Q12H    alum-mag hydroxide-simeth (MYLANTA) oral suspension 30 mL  30 mL Oral QID PRN    sodium chloride (NS) flush 5-40 mL  5-40 mL IntraVENous Q8H    sodium chloride (NS) flush 5-40 mL  5-40 mL IntraVENous PRN    acetaminophen (TYLENOL) tablet 650 mg  650 mg Oral Q6H PRN    Or    acetaminophen (TYLENOL) suppository 650 mg  650 mg Rectal Q6H PRN    polyethylene glycol (MIRALAX) packet 17 g  17 g Oral DAILY PRN    ondansetron (ZOFRAN ODT) tablet 4 mg  4 mg Oral Q8H PRN    Or    ondansetron (ZOFRAN) injection 4 mg  4 mg IntraVENous Q6H PRN    enoxaparin (LOVENOX) injection 40 mg  40 mg SubCUTAneous DAILY    traMADoL (ULTRAM) tablet 50 mg  50 mg Oral Q8H PRN    0.9% sodium chloride infusion 250 mL  250 mL IntraVENous PRN    0.9% sodium chloride infusion 250 mL  250 mL IntraVENous PRN    levothyroxine (SYNTHROID) tablet 75 mcg  75 mcg Oral ACB    ferrous sulfate tablet 325 mg  325 mg Oral BID WITH MEALS       Review of Systems:   A comprehensive review of systems was negative except for that written in the HPI.     Objective:   Physical Exam:     Visit Vitals  /75   Pulse 91   Temp 97 °F (36.1 °C)   Resp 16   Ht 6' (1.829 m)   Wt 72.6 kg (160 lb)   SpO2 100%   BMI 21.70 kg/m²      O2 Device: Other (comment) (trach collar )    Temp (24hrs), Av.5 °F (36.9 °C), Min:97 °F (36.1 °C), Max:99.2 °F (37.3 °C)    No intake/output data recorded.  1901 -  0700  In: 100 [I.V.:100]  Out: -     General:   Awake and alert   Lungs:   Clear to auscultation bilaterally. Chest wall:  No tenderness or deformity. Heart:  Regular rate and rhythm, S1, S2 normal, no murmur, click, rub or gallop. Abdomen:   Soft, non-tender. Bowel sounds normal. No masses,  No organomegaly. Extremities: Extremities normal, atraumatic, no cyanosis or edema. Pulses: 2+ and symmetric all extremities. Skin: Skin color, texture, turgor normal. No rashes or lesions   Neurologic: CNII-XII intact. No gross focal deficits         Data Review:       Recent Days:  Recent Labs     22  0652   WBC 8.7   HGB 8.7*   HCT 26.9*   *     Recent Labs     22  0652      K 4.6   CL 99   CO2 30   GLU 89   BUN 25*   CREA 1.61*   CA 9.5     No results for input(s): PH, PCO2, PO2, HCO3, FIO2 in the last 72 hours. 24 Hour Results:  No results found for this or any previous visit (from the past 24 hour(s)). XR CHEST PORT   Final Result   Stable chest.           Assessment:  Anemia of chronic disease, likely due to malignancy, CKD and chemotherapy   -No clear evidence for GI bleed  Chronic kidney disease stage III  COPD without exacerbation  Cancer of the pharynx      Plan:  Patient will be kept inpatient again tonight, repeat bowel prep with plans for colonoscopy tomorrow      Care Plan discussed with: Patient/Family    Disposition:     Total time spent with patient: 30 minutes.     Jason Barker MD

## 2022-06-07 NOTE — ANESTHESIA PREPROCEDURE EVALUATION
Relevant Problems   RESPIRATORY SYSTEM   (+) COPD exacerbation (HCC)      HEMATOLOGY   (+) Anemia   (+) Anemia due to GI blood loss   (+) Symptomatic anemia      PERSONAL HX & FAMILY HX OF CANCER   (+) Laryngeal carcinoma (HCC)   (+) Throat cancer (HCC)       Anesthetic History   No history of anesthetic complications            Review of Systems / Medical History  Patient summary reviewed, nursing notes reviewed and pertinent labs reviewed    Pulmonary    COPD (exacerbation)            Comments: S/p trach size 10 done on 2/1/2021. Hx OF RESP. FAILURE. FORMER SMOKER. Neuro/Psych         Psychiatric history    Comments: Weakness. Finger numbness. Cardiovascular  Within defined limits                  Comments: Hx of PE.   GI/Hepatic/Renal     GERD    Renal disease: ARF      Comments: Weight loss. SEVERE PROTEIN CALORIE MALNUTRITION. GI BLOOD LOSS. Melena. Endo/Other      Hypothyroidism  Cancer and anemia    Comments: Chronic pain Other Findings   Comments: ELIQUIS: LAST DOSE \"a while ago\"         Past Medical History:   Diagnosis Date    Chronic pain     THROAT, EARS, NECK R/T CANCER    COPD (chronic obstructive pulmonary disease) ([facility]     EMPHASEMA    Psychiatric disorder     ANXIETY R/T CANCER    Throat cancer ([facility]     Tracheostomy present ([facility]        Past Surgical History:   Procedure Laterality Date    HX ORTHOPAEDIC      LEFT ARM- \" PUT PLATE IN\"    HX TRACHEOSTOMY  02/08/2021    IR INSERT TUNL CVC W PORT OVER 5 YEARS  5/21/2021    IR PLACE CVAD FLUORO GUIDE  5/21/2021       Current Outpatient Medications   Medication Instructions    ferrous sulfate 325 mg, Oral, 2 TIMES DAILY WITH MEALS    levothyroxine (SYNTHROID) 75 mcg, Oral, DAILY BEFORE BREAKFAST    pantoprazole (PROTONIX) 40 mg, Oral, 2 TIMES DAILY    polyethylene glycol (MIRALAX) 17 g, Oral, DAILY       No current facility-administered medications for this visit. No current outpatient medications on file. Facility-Administered Medications Ordered in Other Visits   Medication Dose Route Frequency    pantoprazole (PROTONIX) 40 mg in 0.9% sodium chloride 10 mL injection  40 mg IntraVENous Q12H    alum-mag hydroxide-simeth (MYLANTA) oral suspension 30 mL  30 mL Oral QID PRN    sodium chloride (NS) flush 5-40 mL  5-40 mL IntraVENous Q8H    sodium chloride (NS) flush 5-40 mL  5-40 mL IntraVENous PRN    acetaminophen (TYLENOL) tablet 650 mg  650 mg Oral Q6H PRN    Or    acetaminophen (TYLENOL) suppository 650 mg  650 mg Rectal Q6H PRN    polyethylene glycol (MIRALAX) packet 17 g  17 g Oral DAILY PRN    ondansetron (ZOFRAN ODT) tablet 4 mg  4 mg Oral Q8H PRN    Or    ondansetron (ZOFRAN) injection 4 mg  4 mg IntraVENous Q6H PRN    enoxaparin (LOVENOX) injection 40 mg  40 mg SubCUTAneous DAILY    traMADoL (ULTRAM) tablet 50 mg  50 mg Oral Q8H PRN    0.9% sodium chloride infusion 250 mL  250 mL IntraVENous PRN    0.9% sodium chloride infusion 250 mL  250 mL IntraVENous PRN    levothyroxine (SYNTHROID) tablet 75 mcg  75 mcg Oral ACB    ferrous sulfate tablet 325 mg  325 mg Oral BID WITH MEALS       No data found.     Lab Results   Component Value Date/Time    WBC 8.7 06/05/2022 06:52 AM    HGB 8.7 (L) 06/05/2022 06:52 AM    HCT 26.9 (L) 06/05/2022 06:52 AM    PLATELET 326 (H) 09/32/1477 06:52 AM    MCV 90.3 06/05/2022 06:52 AM     Lab Results   Component Value Date/Time    Sodium 136 06/05/2022 06:52 AM    Potassium 4.6 06/05/2022 06:52 AM    Chloride 99 06/05/2022 06:52 AM    CO2 30 06/05/2022 06:52 AM    Anion gap 7 06/05/2022 06:52 AM    Glucose 89 06/05/2022 06:52 AM    BUN 25 (H) 06/05/2022 06:52 AM    Creatinine 1.61 (H) 06/05/2022 06:52 AM    BUN/Creatinine ratio 16 06/05/2022 06:52 AM    GFR est AA 55 (L) 06/05/2022 06:52 AM    GFR est non-AA 46 (L) 06/05/2022 06:52 AM    Calcium 9.5 06/05/2022 06:52 AM     No results found for: APTT, PTP, INR, INREXT, INREXT  Lab Results   Component Value Date/Time Glucose 89 06/05/2022 06:52 AM    Glucose (POC) 87 05/10/2022 11:06 AM     Physical Exam    Airway          Tracheostomy present   Cardiovascular    Rhythm: regular  Rate: normal         Dental         Pulmonary  Breath sounds clear to auscultation               Abdominal  GI exam deferred      Comments: Central line in place.   Other Findings          Anesthetic Plan    ASA: 3  Anesthesia type: MAC          Induction: Intravenous  Anesthetic plan and risks discussed with: Patient

## 2022-06-07 NOTE — ANESTHESIA POSTPROCEDURE EVALUATION
Procedure(s):  COLONOSCOPY. MAC    Anesthesia Post Evaluation      Multimodal analgesia: multimodal analgesia not used between 6 hours prior to anesthesia start to PACU discharge  Patient location during evaluation: bedside (Endoscopy suite)  Patient participation: complete - patient cannot participate  Level of consciousness: sleepy but conscious  Pain score: 0  Pain management: adequate  Airway patency: patent  Anesthetic complications: no  Cardiovascular status: acceptable  Respiratory status: acceptable and nasal cannula  Hydration status: acceptable  Comments: This patient remained on the stretcher. The patient was handed off to the endoscopy nursing team.  All questions regarding pre-, intra-, and postoperative care were answered. Post anesthesia nausea and vomiting:  none      INITIAL Post-op Vital signs: No vitals data found for the desired time range.

## 2022-06-08 NOTE — PROGRESS NOTES
Problem: Falls - Risk of  Goal: *Absence of Falls  Description: Document Max Paz Fall Risk and appropriate interventions in the flowsheet.   Outcome: Progressing Towards Goal  Note: Fall Risk Interventions:  Mobility Interventions: Patient to call before getting OOB         Medication Interventions: Patient to call before getting OOB

## 2022-06-08 NOTE — PROGRESS NOTES
Patient down for colonoscopy today. He returned to room. Has a discharge order but concerned about hemoglobin. Primary nurse obtain order for CBC. Awaiting results.

## 2022-06-08 NOTE — ROUTINE PROCESS
Bedside shift change report given to [address] [place] [address] Line Allen S (oncoming nurse) by Debora Barros RN (offgoing nurse). Report included the following information SBAR, MAR and Recent Results.

## 2022-06-08 NOTE — ANESTHESIA POSTPROCEDURE EVALUATION
Procedure(s):  COLONOSCOPY  ENDOSCOPIC POLYPECTOMY. MAC    Anesthesia Post Evaluation      Multimodal analgesia: multimodal analgesia not used between 6 hours prior to anesthesia start to PACU discharge  Patient location during evaluation: bedside (Endoscopy suite)  Patient participation: complete - patient cannot participate  Level of consciousness: sleepy but conscious  Pain score: 0  Pain management: adequate  Airway patency: patent  Anesthetic complications: no  Cardiovascular status: acceptable  Respiratory status: acceptable and nasal cannula  Hydration status: acceptable  Comments: This patient remained on the stretcher. The patient was handed off to the endoscopy nursing team.  All questions regarding pre-, intra-, and postoperative care were answered.   Post anesthesia nausea and vomiting:  none      INITIAL Post-op Vital signs:   Vitals Value Taken Time   /63 06/08/22 1245   Temp 36.2 °C (97.1 °F) 06/08/22 1233   Pulse 102 06/08/22 1245   Resp 19 06/08/22 1245   SpO2 99 % 06/08/22 1245

## 2022-06-08 NOTE — DISCHARGE SUMMARY
Physician Discharge Summary     Patient ID:    Sendy Freeman  [id number]  89 y.o.  1970    Admit date: 6/4/2022    Discharge date : 6/8/2022    Chronic Diagnoses:    Problem List as of 6/8/2022 Date Reviewed: 3/9/2022          Codes Class Noted - Resolved    UGIB (upper gastrointestinal bleed) ICD-10-CM: K92.2  ICD-9-CM: 578.9  6/4/2022 - Present        Palpitations ICD-10-CM: R00.2  ICD-9-CM: 785.1  5/30/2022 - Present        Chest pain ICD-10-CM: R07.9  ICD-9-CM: 786.50  5/30/2022 - Present        Anemia ICD-10-CM: D64.9  ICD-9-CM: 285.9  5/30/2022 - Present        Symptomatic anemia ICD-10-CM: D64.9  ICD-9-CM: 285.9  5/30/2022 - Present        Anemia due to GI blood loss ICD-10-CM: D50.0  ICD-9-CM: 280.0  5/9/2022 - Present        Prevention of chemotherapy-induced neutropenia ICD-10-CM: Z76.89  ICD-9-CM: V72.85  2/23/2022 - Present        Hypokalemia ICD-10-CM: E87.6  ICD-9-CM: 276.8  5/24/2021 - Present        Severe protein-calorie malnutrition ([facility] ICD-10-CM: E43  ICD-9-CM: 262  2/15/2021 - Present        Laryngeal carcinoma ([facility] ICD-10-CM: C32.9  ICD-9-CM: 161.9  2/7/2021 - Present        Throat cancer ([facility] ICD-10-CM: C14.0  ICD-9-CM: 149.0  1/31/2021 - Present        COPD exacerbation ([facility] ICD-10-CM: J44.1  ICD-9-CM: 491.21  1/31/2021 - Present        Acute and chronic respiratory failure with hypoxia (HCC) ICD-10-CM: J96.21  ICD-9-CM: 518.84, 799.02  1/31/2021 - Present        Chronic pain due to neoplasm ICD-10-CM: G89.3  ICD-9-CM: 338.3  1/31/2021 - Present        Anxiety ICD-10-CM: F41.9  ICD-9-CM: 300.00  1/31/2021 - Present        Insomnia ICD-10-CM: G47.00  ICD-9-CM: 780.52  1/31/2021 - Present        Respiratory failure with hypoxia [facility]) ICD-10-CM: J96.91  ICD-9-CM: 518.81  1/31/2021 - Present          22    Final Diagnoses:   Anemia of chronic disease, likely due to malignancy, CKD and chemotherapy   -No evidence for GI bleed  Chronic kidney disease stage III  COPD without exacerbation  Cancer of the pharynx    Reason for Hospitalization:  27-year-old male with a history of COPD, throat cancer currently undergoing chemotherapy who was admitted on 6/4 with generalized weakness and shortness of breath. Was found to have a hemoglobin of 7.7. He denied any bloody stool or black stool. He apparently has required frequent transfusions recently. Stool was negative for occult blood on 5/30    He was seen by GI and underwent EGD which was unremarkable. Hospital Course:   Patient was admitted, transfused with 1 unit of packed cells. Patient was seen by GI. He underwent EGD which was unremarkable other than some mild nonbleeding gastritis    Hemoglobin improved 8.7 following transfusion    Patient underwent colonoscopy on 6/7 which showed an inadequate prep with solid stool in the colon. GoLytely prep was repeated and colonoscopy was repeated on 6/8 which showed stable suboptimal prep with scattered liquid stool throughout the colon. No gross lesions were identified. He was recommended to have another colonoscopy in 1 year    At this time there is no clear evidence for acute GI bleeding. He was felt stable for discharge home           Discharge Medications:   Current Discharge Medication List      CONTINUE these medications which have NOT CHANGED    Details   ferrous sulfate 325 mg (65 mg iron) tablet Take 1 Tablet by mouth two (2) times daily (with meals). Qty: 60 Tablet, Refills: 0      pantoprazole (PROTONIX) 40 mg tablet Take 1 Tablet by mouth two (2) times a day. Indications: bleeding from stomach, esophagus or duodenum  Qty: 60 Tablet, Refills: 0      polyethylene glycol (MIRALAX) 17 gram packet Take 1 Packet by mouth daily. Qty: 30 Packet, Refills: 0      levothyroxine (SYNTHROID) 75 mcg tablet Take 1 Tablet by mouth Daily (before breakfast). Qty: 30 Tablet, Refills: 0               Follow up Care:    1. Alok Ríos NP in 1-2 weeks.   Please call to set up an appointment shortly after discharge. Diet:  Regular Diet    Disposition:  Home. Advanced Directive:   FULL    DNR      Discharge Exam:  General:  Alert, cooperative, no distress, appears stated age. Lungs:   Clear to auscultation bilaterally. Chest wall:  No tenderness or deformity. Heart:  Regular rate and rhythm, S1, S2 normal, no murmur, click, rub or gallop. Abdomen:   Soft, non-tender. Bowel sounds normal. No masses,  No organomegaly. Extremities: Extremities normal, atraumatic, no cyanosis or edema. Pulses: 2+ and symmetric all extremities. Skin: Skin color, texture, turgor normal. No rashes or lesions   Neurologic: CNII-XII intact. No gross sensory or motor deficits        CONSULTATIONS: GI    Significant Diagnostic Studies:   6/4/2022: BUN 25 mg/dL (H; Ref range: 6 - 20 mg/dL); Calcium 9.1 mg/dL (Ref range: 8.5 - 10.1 mg/dL); CO2 27 mmol/L (Ref range: 21 - 32 mmol/L); Creatinine 1.50 mg/dL (H; Ref range: 0.70 - 1.30 mg/dL); Glucose 100 mg/dL (Ref range: 65 - 100 mg/dL); HCT 23.6 % (L; Ref range: 36.6 - 50.3 %); HGB 7.7 g/dL (L; Ref range: 12.1 - 17.0 g/dL); Potassium 4.4 mmol/L (Ref range: 3.5 - 5.1 mmol/L); Sodium 135 mmol/L (L; Ref range: 136 - 145 mmol/L)  6/5/2022: BUN 25 mg/dL (H; Ref range: 6 - 20 mg/dL); Calcium 9.5 mg/dL (Ref range: 8.5 - 10.1 mg/dL); CO2 30 mmol/L (Ref range: 21 - 32 mmol/L); Creatinine 1.61 mg/dL (H; Ref range: 0.70 - 1.30 mg/dL); Glucose 89 mg/dL (Ref range: 65 - 100 mg/dL); HCT 26.9 % (L; Ref range: 36.6 - 50.3 %); HGB 8.7 g/dL (L; Ref range: 12.1 - 17.0 g/dL); Potassium 4.6 mmol/L (Ref range: 3.5 - 5.1 mmol/L); Sodium 136 mmol/L (Ref range: 136 - 145 mmol/L)  No results for input(s): WBC, HGB, HCT, PLT, HGBEXT, HCTEXT, PLTEXT, HGBEXT, HCTEXT, PLTEXT in the last 72 hours. No results for input(s): NA, K, CL, CO2, BUN, CREA, GLU, CA, MG, PHOS, URICA in the last 72 hours.   No results for input(s): ALT, AP, TBIL, TBILI, TP, ALB, GLOB, GGT, AML, LPSE in the last 72 hours. No lab exists for component: SGOT, GPT, AMYP, HLPSE  No results for input(s): INR, PTP, APTT, INREXT, INREXT in the last 72 hours. No results for input(s): FE, TIBC, PSAT, FERR in the last 72 hours. No results for input(s): PH, PCO2, PO2 in the last 72 hours. No results for input(s): CPK, CKMB in the last 72 hours.     No lab exists for component: TROPONINI  Lab Results   Component Value Date/Time    Glucose (POC) 87 05/10/2022 11:06 AM    Glucose (POC) 85 05/10/2022 07:30 AM    Glucose (POC) 113 10/04/2021 01:04 PM    Glucose (POC) 97 05/07/2021 08:15 AM    Glucose (POC) 72 02/16/2021 11:17 AM       Discharge time spent 35 minutes    Signed:  Court Mcrae MD  6/8/2022  12:48 PM

## 2022-06-08 NOTE — ROUTINE PROCESS
Patient drink almost all the bowel prep, <10% left of the prep left in the container. Patient stated he has not had a BM since he has been drinking the prep. Patient was asked if he has have problems with his bowels before and he nodded yes. Patient started vomiting and primary nurse advised him to not drink any more of the prep at this time. Will continue to monitor to see if he will be having any BM tonight.

## 2022-06-13 NOTE — PROGRESS NOTES
06/13/22 3:15 PM Refilled percocet 1 tab every 12 hours prn severe pain #20 tabs.  checked, no other prescribers or early refills.

## 2022-06-13 NOTE — TELEPHONE ENCOUNTER
[employer] at [facility]  [phone]        06/13/22 2:59 PM Called patient's brother, Chris Whitfield. Advised that Keyla Evans, DAT, will be calling in small supply of pain medication for patient. Also discussed upcoming appointment. Per chart review, it does not appear that transportation was arranged for [facility] appointment tomorrow. Chris Whitfield stated he has not received a call from transportation service either to confirm appointment. Per Keyla Evans, advised that patient keep appointment as scheduled for 06/21. Explained patient will have labs drawn, see Dr. Emmie Sal, and then proceed to [facility] for infusion. Discussed that patient will only receive Keytruda that day, rather than chemotherapy. Chris Whitfield voiced understanding of above and will relay update to patient. No further questions or concerns at this time. 06/14/22 3:31 PM Scheduled transportation with [employer] for patient's 06/21 appointment. Trip # [id number]. No further questions or concerns at this time.

## 2022-06-16 NOTE — PROGRESS NOTES
[address]  Ph: [phone]  Fax: [phone]    Medicaid Discharge Summary 2-15    Patient name: Danielle Shaver  : 1970  Provider#: [id number]  Referral source: Shay Garza NP      Medical/Treatment Diagnosis: Neck pain [M54.2]     Prior Hospitalization: see medical history     Comorbidities: See Plan of Care  Prior Level of Function: See Plan of Care  Medications: Verified on Patient Summary List  Start of Care: 22   Onset Date:(see initial plan of care)  Visits from Start of Care: 15  Missed Visits: 1  Certification Period : 21 to 22    Assessment/Summary of care/GOALS: Pt was seen for 15 treatment sessions with focus on soft tissue mobilization, scar tissue massage, postural education, stretching, and strengthening therex. Pt reported significant benefit with therapy, reporting reduced pain levels, improved mobility, and improved function including ability to eat without pain. Discharged at end of POC, max benefit from PT services achieved. GOALS/Progress towards goals:  Patient Goal (s): reduce pain    [x]? ? Met []? ? Not met []? ? Partially met   Short Term Goals: To be accomplished in 6-8 treatments. 1. Patient will be compliant with HEP to assist in pain reduction. [x]? Met []? Not met []? Partially met    2. Patient will demo 5-10 degree improvement in cervical ROM in all planes. []? Met []? Not met [x]? Partially met    Long Term Goals: To be accomplished in 16-24 treatments. 1. Patient will report decrease in pain and difficulty with eating due to neck pain/tightness. [x]? Met []? Not met []? Partially met 22  2. Patient will report decrease in neck pain to max of 4/10 per VAS. [x]? Met []? Not met []?  Partially met 22  [facility] Score:  0%    RECOMMENDATIONS:  [x]Discontinue therapy:   [x]Patient has reached or is progressing toward set goals and is independent with HEP   []Patient is non-compliant or has abdicated   []Due to lack of appreciable progress towards set goals   []Patient was hospitalized or suffered illness that impacted ability to continue therapy   []Other:    Maximus Corrales PT, DPT 6/16/2022   Retain this original for your records. If you are unable to process this request in 24 hours, please contact our office.   ________________________________________________________________________  NOTE TO PHYSICIAN:  Please complete the following and FAX to: Eleanor Singh:  Fax: [phone]   ____ I have read the above report and request that my patient be discharged from therapy.     Physician's Signature:_____________________________________________________ Date:_____________Time:_____________     Beverly Castro NP

## 2022-06-17 NOTE — ED PROVIDER NOTES
EMERGENCY DEPARTMENT HISTORY AND PHYSICAL EXAM        Date: 6/17/2022  Patient Name: Olga Arias    History of Presenting Illness     Chief Complaint   Patient presents with    Shortness of Breath       History Provided By: Patient    HPI: Olga Arias, 49 y.o. male anemia, laryngeal carcinoma, and COPD who presents with difficulty breathing and nausea that started today. He also has noticed dark-colored stools over the last couple of days. States that he feels he may be anemic. He has had blood transfusion before. Patient has had recent upper endoscopy and colonoscopy without significant findings of GI bleed. At that time he was not having any dark stools and had a negative stool hemoccult. PCP: Aryan Montero NP    Current Facility-Administered Medications   Medication Dose Route Frequency Provider Last Rate Last Admin    0.9% sodium chloride infusion 250 mL  250 mL IntraVENous PRN Alfa Garrison DO        pantoprazole (PROTONIX) 40 mg in 0.9% sodium chloride (MBP/ADV) 50 mL MBP  8 mg/hr IntraVENous CONTINUOUS Fely Baptiste DO         Current Outpatient Medications   Medication Sig Dispense Refill    oxyCODONE-acetaminophen (PERCOCET) 5-325 mg per tablet Take 1 Tablet by mouth every twelve (12) hours as needed for Pain for up to 30 days. Max Daily Amount: 2 Tablets. 20 Tablet 0    ferrous sulfate 325 mg (65 mg iron) tablet Take 1 Tablet by mouth two (2) times daily (with meals). 60 Tablet 0    pantoprazole (PROTONIX) 40 mg tablet Take 1 Tablet by mouth two (2) times a day. Indications: bleeding from stomach, esophagus or duodenum 60 Tablet 0    polyethylene glycol (MIRALAX) 17 gram packet Take 1 Packet by mouth daily. (Patient not taking: Reported on 5/20/2022) 30 Packet 0    levothyroxine (SYNTHROID) 75 mcg tablet Take 1 Tablet by mouth Daily (before breakfast).  30 Tablet 0       Past History     Past Medical History:  Past Medical History:   Diagnosis Date    Chronic pain     THROAT, EARS, NECK R/T CANCER    COPD (chronic obstructive pulmonary disease) (HCC)     EMPHASEMA    Psychiatric disorder     ANXIETY R/T CANCER    Throat cancer (HCC)     Tracheostomy present [facility])        Past Surgical History:  Past Surgical History:   Procedure Laterality Date    COLONOSCOPY N/A 2022    COLONOSCOPY performed by Karen Joy MD at [address] COLONOSCOPY N/A 2022    COLONOSCOPY performed by Karen Joy MD at [address] HX ORTHOPAEDIC      LEFT ARM- \" PUT PLATE IN\"    HX TRACHEOSTOMY  2021    IR INSERT TUNL CVC W PORT OVER 5 YEARS  2021    IR PLACE CVAD FLUORO GUIDE  2021       Family History:  Family History   Problem Relation Age of Onset    Diabetes Mother     Diabetes Father     Kidney Disease Father     Diabetes Sister     Heart Disease Daughter     Anesth Problems Neg Hx        Social History:  Social History     Tobacco Use    Smoking status: Former Smoker     Packs/day: 1.50     Years: 30.00     Pack years: 45.00     Quit date: 10/28/2020     Years since quittin.6    Smokeless tobacco: Never Used   Vaping Use    Vaping Use: Never used   Substance Use Topics    Alcohol use: Not Currently    Drug use: Never       Allergies:  No Known Allergies    Review of Systems   Review of Systems   Constitutional: Positive for fatigue. Negative for fever. HENT: Negative for congestion. Eyes: Negative for visual disturbance. Respiratory: Positive for shortness of breath. Cardiovascular: Negative for chest pain. Gastrointestinal: Positive for abdominal pain and nausea. Genitourinary: Negative for dysuria. Musculoskeletal: Negative for arthralgias. Skin: Negative for rash. Neurological: Positive for dizziness. Negative for headaches. Physical Exam   Constitutional: No acute distress. Well-nourished. Skin: No rash. ENT: No rhinorrhea. No cough. Head is normocephalic and atraumatic. Tracheostomy in place.   Eye: No proptosis or conjunctival injections. Respiratory: No apparent respiratory distress. Lung sounds are clear. Gastrointestinal: Nondistended. Nontender. Rectal exam reveals no hemorrhoids. There is dark-colored stool. Musculoskeletal: No obvious bony deformities. Cardio: Tachycardic with regular rhythm. 2+ radial pulses. Diagnostic Study Results     Labs -     Recent Results (from the past 24 hour(s))   EKG, 12 LEAD, INITIAL    Collection Time: 06/17/22  3:46 PM   Result Value Ref Range    Ventricular Rate 107 BPM    Atrial Rate 107 BPM    P-R Interval 130 ms    QRS Duration 86 ms    Q-T Interval 326 ms    QTC Calculation (Bezet) 435 ms    Calculated R Axis 74 degrees    Calculated T Axis 8 degrees    Diagnosis       Sinus tachycardia  Nonspecific T wave abnormality  Abnormal ECG  When compared with ECG of 04-JUN-2022 07:45,  No significant change was found  Confirmed by Bonnie Manriquez ([id number]) on 6/17/2022 4:37:00 PM     CBC WITH AUTOMATED DIFF    Collection Time: 06/17/22  3:48 PM   Result Value Ref Range    WBC 7.3 4.1 - 11.1 K/uL    RBC 1.99 (L) 4.10 - 5.70 M/uL    HGB 5.7 (LL) 12.1 - 17.0 g/dL    HCT 18.0 (L) 36.6 - 50.3 %    MCV 90.5 80.0 - 99.0 FL    MCH 28.6 26.0 - 34.0 PG    MCHC 31.7 30.0 - 36.5 g/dL    RDW 14.8 (H) 11.5 - 14.5 %    PLATELET 280 052 - 549 K/uL    MPV 9.5 8.9 - 12.9 FL    NRBC 0.0 0.0  WBC    ABSOLUTE NRBC 0.00 0.00 - 0.01 K/uL    NEUTROPHILS 82 (H) 32 - 75 %    LYMPHOCYTES 6 (L) 12 - 49 %    MONOCYTES 10 5 - 13 %    EOSINOPHILS 2 0 - 7 %    BASOPHILS 0 0 - 1 %    IMMATURE GRANULOCYTES 0 0 - 0.5 %    ABS. NEUTROPHILS 6.0 1.8 - 8.0 K/UL    ABS. LYMPHOCYTES 0.4 (L) 0.8 - 3.5 K/UL    ABS. MONOCYTES 0.7 0.0 - 1.0 K/UL    ABS. EOSINOPHILS 0.1 0.0 - 0.4 K/UL    ABS. BASOPHILS 0.0 0.0 - 0.1 K/UL    ABS. IMM.  GRANS. 0.0 0.00 - 0.04 K/UL    DF AUTOMATED     METABOLIC PANEL, COMPREHENSIVE    Collection Time: 06/17/22  3:48 PM   Result Value Ref Range    Sodium 138 136 - 145 mmol/L    Potassium 3.8 3.5 - 5.1 mmol/L    Chloride 101 97 - 108 mmol/L    CO2 31 21 - 32 mmol/L    Anion gap 6 5 - 15 mmol/L    Glucose 121 (H) 65 - 100 mg/dL    BUN 29 (H) 6 - 20 mg/dL    Creatinine 1.64 (H) 0.70 - 1.30 mg/dL    BUN/Creatinine ratio 18 12 - 20      GFR est AA 54 (L) >60 ml/min/1.73m2    GFR est non-AA 45 (L) >60 ml/min/1.73m2    Calcium 9.0 8.5 - 10.1 mg/dL    Bilirubin, total 0.2 0.2 - 1.0 mg/dL    AST (SGOT) 18 15 - 37 U/L    ALT (SGPT) 10 (L) 12 - 78 U/L    Alk. phosphatase 74 45 - 117 U/L    Protein, total 7.0 6.4 - 8.2 g/dL    Albumin 3.0 (L) 3.5 - 5.0 g/dL    Globulin 4.0 2.0 - 4.0 g/dL    A-G Ratio 0.8 (L) 1.1 - 2.2     TROPONIN-HIGH SENSITIVITY    Collection Time: 06/17/22  3:48 PM   Result Value Ref Range    Troponin-High Sensitivity 3 0 - 76 ng/L   TYPE & SCREEN    Collection Time: 06/17/22  5:31 PM   Result Value Ref Range    Crossmatch Expiration 06/20/2022,2359     ABO/Rh(D) O Positive     Antibody screen Negative     Unit number [id number]     Blood component type  LR     Unit division 00     Status of unit [address] to transfuse     Crossmatch result Compatible     Unit number [id number]     Blood component type  LR,1     Unit division 00     Status of unit [place] to transfuse     Crossmatch result Compatible    OCCULT BLOOD, STOOL    Collection Time: 06/17/22  7:36 PM   Result Value Ref Range    Occult Blood,day 1 Positive (A) Negative      Day 1 date: 6,172,022         Radiologic Studies -   XR CHEST SNGL V   Final Result        CT Results  (Last 48 hours)    None        CXR Results  (Last 48 hours)               06/17/22 1615  XR CHEST SNGL V Final result    Narrative:  1 view comparison June 4       Intrafissural effusion on the right may be somewhat worse. Masslike appearance   to the left hilum, needing follow-up. There may be patchy disease in the right   upper lobe, most of the superimposition artifact on fibrosis. No effusion.    Normal heart and stable mediastinum             Medical Decision Making and ED Course     I reviewed the available vital signs, nursing notes, past medical history, past surgical history, family history, and social history. Vital Signs - Reviewed the patient's vital signs. Patient Vitals for the past 12 hrs:   Temp Pulse Resp BP SpO2   06/17/22 2115 98.2 °F (36.8 °C) 96 18 116/78 98 %   06/17/22 2100 98 °F (36.7 °C) 99 17 120/82 99 %   06/17/22 2045 98.2 °F (36.8 °C) 98 18 117/83 100 %   06/17/22 2030 97.8 °F (36.6 °C) 95 19 113/85 100 %   06/17/22 1543 97.9 °F (36.6 °C) (!) 107 -- 103/69 100 %   06/17/22 1541 -- -- 18 -- --       EKG interpretation: Obtained on 6/17/2022 at 1546. Sinus tachycardia rate of 107 bpm.  Normal OH interval, QRS duration, QTc interval.  No ST segment abnormalities. Normal axis. Records Reviewed: I did review recent discharge summary. Medical Decision Making:   Presented with shortness breath, abdominal pain, nausea, dizziness, and fatigue. He is reporting darker colored stools. The differential diagnosis is anemia, GI bleed, arrhythmia, ACS. Work-up shows hemoglobin of 5.7. I did a stool test and that showed blood in the stool. It is dark-colored and melanotic. Patient given 80 mg IV Protonix and started on Protonix infusion. He is receiving 2 units of packed red blood cells as well. Discussed with [facility]. Maya Sanchez at [facility] who accept for admission. I also discussed this with the gastroenterologist who agreed to consult at that facility. Patient appears to be stable. We will go by ALS.          Procedures     Critical Care Note:   3:42 PM  Amount of critical care time: 40 minutes  Impending deterioration: Cardiovascular, GI  Associated risk factors: GI Bleed, anemia  Management: Bedside Assessment and Supervision of Care  Interpretation: ECG  Interventions: Blood transfusion  Case review: Gastroenterologist, hospitalist  Treatment response: Guarded, stable  Performed by: Self  Notes: I have spent critical care time involved in lab review, decision making, bedside attention, and documentation. This time excludes time spent in any separate billed procedures. During this entire length of time I was immediately available to the patient. Val Elias DO    Disposition     Transfer to [facility]    Diagnosis     Clinical impression:   1. Gastrointestinal hemorrhage with melena    2. Anemia, unspecified type       Attestation:  Please note that this dictation was completed with [employer], the computer voice recognition software. Quite often unanticipated grammatical, syntax, homophones, and other interpretive errors are inadvertently transcribed by the computer software. Please disregard these errors. Please excuse any errors that have escaped final proofreading. Thank you.   Val Elias DO

## 2022-06-17 NOTE — ED TRIAGE NOTES
Pt arrives via EMS from home c/o shortness of breath earlier today. Hx of throat cancer. He reports nausea and states when his HGB is low he feels short of breath like this.

## 2022-06-17 NOTE — PROGRESS NOTES
[id number] [facility] at [address] [place] [address]  [phone]    Hematology / Oncology Established Visit    Reason for Visit:   Jesús Amin is a 49 y.o. male who is seen in follow up of laryngeal cancer. Referred by Dr. 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 Stage: CATARINO [[id number]] at diagnosis, regional recurrence in 4/2021, now with metastatic disease. Pathology:   2/8/21 Total laryngectomy: Invasive squamous cell carcinoma  Tumor size 3.5cm, moderately differentiated (G2), PNI present, LVI not identified, margins negative  [id number]  -PDL1 (tested 12/28/21 on above specimen) positive with CPS 10. Prior Treatment:   1. 2/8/21 total laryngectomy/cricopharyngeal myotomy   2. Cisplatin 100mg/m2 every 3 weeks x 3 cycles, 5/24/21-7/13/21  3. Radiation 5/24/21-7/19/21  4. Pembrolizumab x 2 cycles, 1/6/22  5. [Cisplatin (100 mg/m2 intravenous, day 1) and fluorouracil (1000 mg/m2 per day, continuous infusion, for four days) and Pembrolizumab, day 1 given every 21 days x 4 cycles. Current Treatment:  Pembrolizumab maintenance every 3 weeks, 1/28/22 - current. History of Present Illness:   Jesús Amin is a 49 y.o. male who with COPD who is referred for Head and neck cancer. He presented with throat pain and hoarseness in Sept 2020. Was evaluated by Dr. Debbie Fields in ENT who performed laryngoscopy and diagnosed patient with squamous cell carcinoma involving the larynx and subglottis. In late Dec 2020, neck CT showed a 3 cm mass in Right supraglottic larynx with extension to thyroid cartilage, but no cervical LNs. PET 1/4/21 showed uptake in the laryngeal mass at right vocal fold, extending to thyroid cartilage. He was scheduled for surgery, but he required emergent tracheostomy prior to that; done late Jan 2021. Also had PEG placed 1/30/21. On 2/8/21, he underwent total laryngectomy/cricopharyngeal myotomy.  He quit smoking in Feb 2021. He was evaluated by Dr. Andreea Browning in [address] [place] [address] in March 2021 who recommended post-op radiation. There were multiple delays given difficult getting in for dental evaluation prior to RT. [facility] simulation scan was notable for a necrotic appearing tumor in Right neck. PET 5/7/21 confirmed hypermetabolic uptake in right neck. PEG removed in March 2021 due to problems with it. Per Dr. Andreea Browning, pt had 7 teeth extracted by dentist. He returns to the dentist again for dental fillings on 5/26/21. Pt states his neck feel tight, causing pulling sensation but no current pain. Interval History:  Patient here for follow up and treatment. Last Ozell Lade 5/20/22 and last chemotherapy 4/15/22. Hospitalized multiple times and received 10 blood transfusions the past 2 months for recurrent anemia and black stools. Most recent hospitalization completed colonoscopy, EGD, which were unremarkable aside from gastritis. Reports 3 daily soft black BM, no straining. Taking iron twice daily. Reports daily epigastric pain - on PPI and taking belkis seltzer. Reports neck pain when moving neck from side to side. Plans to restart physical therapy in July. Taking percocet every 6 hours, averaging 2-3 tabs daily. Pain 4/10 - percocet brings pain to 0/10. Reports tylenol along not working. Reports he is eating 3 meals daily, hydrating well. 3 boosts daily - lost 10 lbs. No fevers, chills, SOB. PMHx: COPD, throat cancer, anxiety  PSurgHx: Left arm plate placement, tracheostomy, total laryngectomy 2/8/21  SHx: Quit smoking 10/28/20 after 45 pack years. No EtOH  FHx: Mother with DM; Father with DM, CKD; Sister with DM. No h/o malignancy. Review of Systems: A complete review of systems was obtained, negative except as described above. Physical Exam:   Blood pressure 117/76, pulse (!) 110, temperature 97.9 °F (36.6 °C), height 6' (1.829 m), weight 150 lb 9.6 oz (68.3 kg), SpO2 100 %.     ECOG PS: 0  General: thin, no acute distress  Eyes: Anicteric sclerae  HENT: Atraumatic   Neck: Supple, Tracheostomy noted  Lymphatic: No supraclavicular, axillary adenopathy. No bilateral cervical LAD, but firm skin at neck bilaterally. Respiratory: Clear but diminished throughout, normal respiratory effort  CV: Normal rate, regular rhythm, no murmurs, no peripheral edema  GI: Soft, nontender, nondistended, no masses  MS: Normal gait and station. Digits without clubbing or cyanosis. 1cm firm nodule on left wrist.  Skin: No rashes. Hyperpigmented skin at neck bilaterally. Neuro/Psych: Alert. Communicates by writing given tracheostomy. Results:     Lab Results   Component Value Date/Time    WBC 8.1 06/21/2022 11:09 AM    HGB 7.2 (L) 06/21/2022 11:09 AM    HCT 22.9 (L) 06/21/2022 11:09 AM    PLATELET 584 (H) 63/88/9960 11:09 AM    MCV 90.9 06/21/2022 11:09 AM    ABS. NEUTROPHILS 6.7 06/21/2022 11:09 AM     Lab Results   Component Value Date/Time    Sodium 137 06/21/2022 11:09 AM    Potassium 4.1 06/21/2022 11:09 AM    Chloride 101 06/21/2022 11:09 AM    CO2 30 06/21/2022 11:09 AM    Glucose 109 (H) 06/21/2022 11:09 AM    BUN 31 (H) 06/21/2022 11:09 AM    Creatinine 1.68 (H) 06/21/2022 11:09 AM    GFR est AA 53 (L) 06/21/2022 11:09 AM    GFR est non-AA 43 (L) 06/21/2022 11:09 AM    Calcium 9.1 06/21/2022 11:09 AM    Glucose (POC) 87 05/10/2022 11:06 AM     Lab Results   Component Value Date/Time    Bilirubin, total 0.2 06/21/2022 11:09 AM    ALT (SGPT) 12 06/21/2022 11:09 AM    Alk.  phosphatase 86 06/21/2022 11:09 AM    Protein, total 7.8 06/21/2022 11:09 AM    Albumin 3.0 (L) 06/21/2022 11:09 AM    Globulin 4.8 (H) 06/21/2022 11:09 AM     Lab Results   Component Value Date/Time    Iron 25 (L) 05/10/2022 12:23 PM    TIBC 302 05/10/2022 12:23 PM    Iron % saturation 8 (L) 05/10/2022 12:23 PM    Ferritin 349 05/10/2022 12:23 PM      Lab Results   Component Value Date/Time    Vitamin B12 >2,000 (H) 05/10/2022 12:23 PM    Folate 13.9 05/10/2022 12:23 PM         Imagin/28/21 Neck CT: IMPRESSION  Irregular soft tissue mass involving the aryepiglottic and the larynx. There is significant narrowing of the airway at the level of the larynx  and the supraglottic region. Poorly defined cervical adenopathy is noted. 21 Chest CTA:  IMPRESSION  Minimal scarring or subsegmental atelectasis in the left lung base. No evidence of pulmonary embolism or pneumonia. 21 PET:  IMPRESSION  2 large necrotic appearing mass lesions are noted in the right neck (SUV 6.4). Small hypermetabolic right posterior triangle lymph node. No PET avid evidence  of distant metastatic disease. 10/4/21 PET:  FINDINGS:  HEAD/NECK: There is physiologic tracer activity in the oral cavity and piriform  sinuses. Physiologic tracer activity is also seen in the neck musculature,  particularly the right sternocleidomastoid muscle. 2 enlarged right level 2  cervical lymph nodes are slightly decreased in size; these demonstrate no  significant residual FDG activity. Previously seen small lymph node in the right  posterior cervical triangle also demonstrates no residual FDG activity. CHEST: There is a new 3.0 x 2.6 cm focus of nodular airspace disease in the left  upper lobe which demonstrates increased FDG activity, maximal SUV 2.8. New foci  of nodular airspace disease in the left lower lobe, measuring 1.2 cm and 1.0 cm,  in the right lower lobe, measuring 0.8 cm, and in the right middle lobe,  measuring 1.6 cm, demonstrate no appreciable FDG activity. ABDOMEN/PELVIS: No foci of abnormal hypermetabolism. SKELETON: No foci of abnormal hypermetabolism in the axial and visualized  appendicular skeleton. IMPRESSION  1. Previously seen enlarged cervical lymph nodes demonstrate no residual  abnormal FDG activity. 2. New foci of nodular airspace disease in both lungs, including a 3 cm area in  the left upper lobe which demonstrates low-grade increased FDG activity.   Findings may be infectious etiology, although neoplasm is not excluded;  attention on follow-up examinations is recommended. 11/22/21 CT neck:  IMPRESSION  No significant interval change since the previous CT PET  examination. Necrotic right level 2A lymph nodes unchanged in size. No definite  new pathologic lymph nodes by CT criteria. Postradiation changes as described  above. Narrowed nasopharyngeal and oropharyngeal airway unchanged. No new pathologic lymphadenopathy by CT criteria is demonstrated. 11/22/21 CT chest:   There is a 15 mm node in the right hilum, station 10 R, series 201 image 39. There is increasing multifocal multi lobar inflammatory appearing airspace  disease in the lungs most suggestive of multifocal pneumonia. Dominant focus of  airspace disease in the lingula on series 204 image 41 measures 3.3 x 3.2 cm,  previously 3.0 x 2.6 cm. Likewise other foci have increased in size with  dominant lesion on the right measuring 2.8 x 1.9 cm on image 50, previously 16  mm. Suspicious right upper lobe nodules are present including a dominant 6 x 9 mm  nodule on series 204 image 25. There are no suspicious lytic or blastic skeletal lesions in the thorax. Incidental imaging of the upper abdomen demonstrates liver steatosis with  probable focal steatosis adjacent to the fissure for the falciform ligament. There may be a small retrocardiac hiatal hernia. IMPRESSION  Increasing multifocal airspace disease with new suspicious solid appearing  nodules in the right upper lobe. 3/16/22 CT abd/pelv:   FINDINGS:  LUNG BASES: Bilateral lung consolidations. No pleural effusion. Meryle Sandman LIVER: Unremarkable. GALLBLADDER AND BILIARY TREE: No evident gallstones, gallbladder wall  thickening, or biliary ductal dilation. PANCREAS: Unremarkable. SPLEEN: Unremarkable. ADRENALS: Unremarkable. KIDNEYS AND URETERS: Symmetric renal size and enhancement. No hydronephrosis or  hydroureter. BLADDER: Unremarkable.   REPRODUCTIVE ORGANS: No pelvic masses. BOWEL: Oral contrast has achieved the distal small bowel. The bowel is not  dilated. Small hiatal hernia. PEG tube has been removed. LYMPH NODES:  No lymphadenopathy. PERITONEUM: No free air, ascites, walled off fluid collection. VESSELS: Abdominal aorta without aneurysm or dissection. ABDOMINAL WALL: Intact. BONES: Unremarkable for age. Small disc bulges at L4-L5 and L5-S1 with mild  canal impingement, unchanged. IMPRESSION  1. No evidence of metastatic disease abdomen pelvis. 2. CT chest performed separately. 3/16/22 CT chest:  FINDINGS:  LINES: Right port distal tip SVC/RA junction. HEART: Normal heart size. No pericardial effusion. THORACIC AORTA: No aneurysm or dissection. PULMONARY ARTERIES: No large central pulmonary arterial filling defect, non-CTA  technique. ADENOPATHY: Developing left hilar adenopathy, see below. THORACIC ESOPHAGUS: Collapsed. LUNG PARENCHYMA: Multiple bilateral pulmonary consolidations. The largest: Right  middle lobe along the fissure 4.8 x 2.3 cm, previously 2.8 x 1.9 cm; lingula 3.2  x 3.3 cm unchanged. Developing consolidation lateral lingula extending from the  hilum 4.8 x 2.5 cm including lymphadenopathy, previously small patchy. CENTRAL AIRWAYS: Tracheostomy tubing in place. Left hilar adenopathy, narrows  the lingular bronchus. PLEURA: No pleural effusion. No pneumothorax. CHEST WALL: No axillary adenopathy. Right chest wall port. UPPER ABDOMEN:  Unremarkable. BONES: Unremarkable for age. IMPRESSION  1. Increasing pulmonary consolidations, and confluent left hilar adenopathy,  concerning for advancing metastatic disease. 2. CT abdomen pelvis reported separately. 4/29/22 CT neck: IMPRESSION  1. Stable appearance of neck compared with previous examination of 11/22/2021. No developing mass or adenopathy evident. Slight decrease in size of necrotic  nodes compared with prior examination.     4/29/22 CT chest/abd/pelv:  CT CHEST:  There are focal nodular like areas of density in the right upper lobe measuring  10 mm, 7 mm and 5 mm also present on prior study. Confluent density is seen in  the right middle lobe lateral to the right hilum measuring 4.0 x 1.7 cm without  significant change from prior study. There is focal infiltrative density in the  left upper lobe similar to prior study. There is left hilar adenopathy, 4.0 x  2.5 cm, with horizontal infiltrative atelectatic stranding lateral to the left  hilum in the left upper lobe and involving the lingula and similar to prior  study. The heart is borderline size, no pericardial thickening or fluid. Tracheostomy identified. Calcified plaque involving the aortic arch, no aneurysm  or dissection. Thoracic spondylosis. No bone lesion. IMPRESSION:   Findings are similar to study dated 3/16/2022 without significant  progression. Left hilar adenopathy. Focal areas of airspace disease in both  lungs compatible with atelectatic changes. Nodular shaped opacities in the right  upper lobe, cannot exclude metastatic sites. CT abdomen: The liver gallbladder spleen pancreas and adrenal glands and kidneys image as  normal structures. There is calcific plaque involving the aorta and iliac  arteries, no evidence of aneurysm. There is multiple stool and gas throughout  the large bowel. No small bowel distention. No free fluid or induration of the  mesentery. CT pelvis: The urinary bladder is mostly empty. No free fluid or induration of the  mesentery in the pelvis. Lower thoracic and lumbar vertebra show normal  alignment, no vertebral compression or subluxation. No bone lesion. IMPRESSION:  No evidence of metastatic disease in the abdomen or pelvis.     5/10/22 EGD:  Mild distal esophagitis noted, for reflux  Mild/moderate to erosive gastritis in the body of stomach, no ulcers, AVM, mass,  First and second part duodenum unremarkable    Assessment & Plan:   Marissa aWller is a 49 y.o. male is seen for laryngeal cancer. 1. Squamous cell carcinoma of larynx, Stage CATARINO [pT4a cN2 Mx]:  S/p total laryngectomy and tracheostomy in Jan 2021. Afterwards, he developed disease recurrence in Right neck confirmed by PET scan. I recommended concurrent chemoradiation using Cisplatin to treat his disease. Pt completed concurrent chemoradiation radiation 7/19/21. Evidence of local treatment response per physical exam and 10/4/21 PET. However disease progression noted on chest CT 11/22/21 with increasing size in lung nodules, outside of prior radiation field. Per discussion with Thoracic Tumor Board, these nodules would be amenable to biopsy by navigation bronchoscopy if desired. However, RadOnc and I feel this is obvious disease progression given extent of disease at diagnosis. Discussed with patient about disease progression. Discussed pros/con of biopsy to confirm metastatic disease (pro: confirmation rather than assumption of metastatic disease; ability to send metastatic tissue for NGS; con: will lead to treatment delay of clinically likely metastatic disease. ) He elected to proceed with treatment without repeating biopsy. Started Pembrolizumab monotherapy, then switched to Cis-5FU-Pembrolizumab, followed by Pembro maintenance x 2 y given PDL1 CPS < 20. [30% RR in phase III trials.] Repeat imaging on 4/29/22 showed stable left hilar adenopathy and stable nodular shaped opacities in right lung. Focal areas of airspace disease in both lungs compatible with atelectatic changes. Supportive medications: zofran, compazine, emla, decadron. 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 -- Will now switch to maintenance Keytruda given concern chemotherapy contributing to recurrent anemia. -- Proceed with C5 Keytruda   -- cbc, TS, prn blood transfusions on Tues/Friday off weeks. -- Follow up in 3 weeks for C6 Keytruda, MD/NP visit. -- Fax note and labs to PCP Reema Miranda.      2. FEN/GI / constipation:  Advised miralax prn for constipation prevention. 3. Macrocytic anemia / anemia of chronic disease:   2/2 chemotherapy, erosive gastritis, CKD. Colonoscopy and EGD unreavling aside from erosive gastritis. Hemoccult negative. B12, folate normal; ferritin high, low iron sat. On iron supplements BID. Received 1 unit of blood after C3   Received 3 units of blood after C4 for Hgb 4.4 on 5/9/22  Received 1 unit of blood for Hgb 6 on 5/20/22   Received 2 units of blood for Hgb 5 on 5/30/22  Received 1 unit of blood for Hgb 7.7 on 6/4/22  Received 2 units of blood for Hgb 5.7 on 6/17/22  -- Referral to [facility]  for capsule endoscopy. -- AVOID NSAIDS, stop belkis seltzer given aspirin component. 4. Hypothyroidism:   Due to prior radiation and improving on levothyroxine to 75mcg/d. Managing with Dr. Stewart Dalton, Endocrinology   -- Checking TSH/free T4 every other cycle during treatment. Will send updates to Dr. Stewart Dalton. 5. Pulmonary embolism:  Provoked by #1. 11/2021 CT chest. Recent CTA chest negative for PE. Eliquis on hold given recent GI bleed. Will continue holding for recurrent anemia and restart at preventive dose in future. 6. Tracheostomy care: Insurance approved HME supplies through [employer]. Dr. Anitra Gutierrez team provided samples in the past.     7. Neck pain:  Restart PT in July. -- Refilled percocet 1 tab q6h prn #20 tabs. 8. Productive cough:  Likely multifactorial due to allergies, dryness from chemo and lung consolidation seen on imaging. -- Advised trial of holding Claritin given se gastritis. 9. CKD:   Cr 1.0 range 2/2022 with recent increase to 1.4-1.8 range likely due to recurrent anemia and prior Cisplatin. Emotional well being: Pt is coping well with his/her disease and has excellent support. I personally saw and evaluated the patient and performed the key components of medical decision making.   The history, physical exam, and documentation were performed by Valencia Rubio DAT Mascorro. I reviewed and verified the above documentation and modified it as needed. Proceed with Keytruda alone today. Recent CT imaging from hospitalization shows stable disease. Cannot give cytotoxic chemo currently with ongoing GI blood loss and severe anemia.      Signed By: Ana Khan MD     06/21/22

## 2022-06-18 PROBLEM — D64.9 CHRONIC ANEMIA: Status: ACTIVE | Noted: 2022-01-01

## 2022-06-18 PROBLEM — N18.9 CKD (CHRONIC KIDNEY DISEASE): Status: ACTIVE | Noted: 2022-01-01

## 2022-06-18 PROBLEM — K92.2 GI BLEED: Status: ACTIVE | Noted: 2022-01-01

## 2022-06-18 NOTE — PROGRESS NOTES
[id number] Perfect served Angie Mcdowell MD \"During morning report I was told by nurse and patient that pt is NPO. That nothing was going to take place till Monday. Patient is requesting a diet order. Also stated he has not consumed any food since yesterday morning.  He is asking for relief please He is also asking for an order for chocolate ensure as a supplement to his diet with every meal. TIll he has to go back NPO\"    Waiting for a response   Saw updated diet order and disregarded perfect serve message to MD Alpesh Anne was instructed to contact Nilsa Sprague NP for communication today

## 2022-06-18 NOTE — H&P
Hospitalist Admission Note    NAME: Anette Garvey   :  1970   MRN:  [id number]     Date/Time:  2022 9:41 PM    Patient PCP: Charlynn Schlatter, NP  _____________________________________________________________________  Given the patient's current clinical presentation, I have a high level of concern for decompensation if discharged from the emergency department. Complex decision making was performed, which includes reviewing the patient's available past medical records, laboratory results, and x-ray films. My assessment of this patient's clinical condition and my plan of care is as follows. Assessment / Plan:  Gastrointestinal hemorrhage with melena   Positive fecal occult blood  Symptomatic anemia  Acute Blood loss anemia Hg 5.7, previously hemoglobin was 8.7  S/P 2 units PRBCs in OSH ED  Chronic kidney disease creatinine 1.6 which is around baseline      Admit to hospitalist service  Closely monitor H&H every 6 hours  IV PPI  N.p.o.  GI consult  Closely monitor renal function  IV fluid  Avoid NSAID and nephrotoxic medication and antiplatelet and anticoagulant  Adjust medication to GFR        Hypothyroidism on levothyroxine  Iron deficiency anemia on iron supplement  GERD on PPI  History of COPD not on home oxygen. Stable    Code Status: Full code    DVT Prophylaxis: SCD  GI Prophylaxis: not indicated    Baseline: Dependent        Subjective:   CHIEF COMPLAINT:   shortness of breath,dizziness, Fatigue ,abd pain and Dark stool  He reports nausea and states when his HGB is low he feels short of breath like this. HISTORY OF PRESENT ILLNESS:     Anette Garvey, 49 y.o. male anemia, laryngeal carcinoma, chronic kidney disease, and  COPD patient who presents with difficulty breathing and nausea that started today. He also has noticed dark-colored stools over the last couple of days. States that he feels he may be anemic. He has had blood transfusion before.   Patient has had recent upper endoscopy and colonoscopy without significant findings of GI bleed. At that time he was not having any dark stools and had a negative stool hemoccult. OSH ED Course:   Work-up shows hemoglobin of 5.7. ED MD did a stool test and that showed blood in the stool. It is dark-colored and melanotic. Patient given 80 mg IV Protonix and started on Protonix infusion. He is receiving 2 units of packed red blood cells as well. Vital Signs - Reviewed the patient's vital signs. Patient Vitals for the past 12 hrs:    Temp Pulse Resp BP SpO2   22 2115 98.2 °F (36.8 °C) 96 18 116/78 98 %   22 2100 98 °F (36.7 °C) 99 17 120/82 99 %   22 2045 98.2 °F (36.8 °C) 98 18 117/83 100 %   22 2030 97.8 °F (36.6 °C) 95 19 113/85 100 %   22 1543 97.9 °F (36.6 °C) (!) 107 -- 103/69 100 %   22 1541 -- -- 18 -- --        EKG ion 2022 Sinus tachycardia rate of 107 bpm.  Normal RI interval, QRS duration, QTc interval.  No ST segment abnormalities. Normal axis.        Past Medical History:   Diagnosis Date    Chronic pain     THROAT, EARS, NECK R/T CANCER    COPD (chronic obstructive pulmonary disease) ([facility]     EMPHASEMA    Psychiatric disorder     ANXIETY R/T CANCER    [facility]     Tracheostomy present [facility])         Past Surgical History:   Procedure Laterality Date    COLONOSCOPY N/A 2022    COLONOSCOPY performed by Bessie Molina MD at [address] N/A 2022    COLONOSCOPY performed by Bessie Molina MD at [address] [place] [address]- \" PUT PLATE IN\"    HX TRACHEOSTOMY  2021    IR INSERT TUNL CVC W PORT OVER 5 YEARS  2021    IR PLACE CVAD FLUORO GUIDE  2021       Social History     Tobacco Use    Smoking status: Former Smoker     Packs/day: 1.50     Years: 30.00     Pack years: 45.00     Quit date: 10/28/2020     Years since quittin.6    Smokeless tobacco: Never Used   Substance Use Topics    Alcohol use: Not Currently Family History   Problem Relation Age of Onset    Diabetes Mother     Diabetes Father     Kidney Disease Father     Diabetes Sister     Heart Disease Daughter     Anesth Problems Neg Hx      No Known Allergies     Prior to Admission medications    Medication Sig Start Date End Date Taking? Authorizing Provider   oxyCODONE-acetaminophen (PERCOCET) 5-325 mg per tablet Take 1 Tablet by mouth every twelve (12) hours as needed for Pain for up to 30 days. Max Daily Amount: 2 Tablets. 6/13/22 7/13/22  Jackie Mascorro, NP   ferrous sulfate 325 mg (65 mg iron) tablet Take 1 Tablet by mouth two (2) times daily (with meals). 5/13/22   Keagan Lambert MD   pantoprazole (PROTONIX) 40 mg tablet Take 1 Tablet by mouth two (2) times a day. Indications: bleeding from stomach, esophagus or duodenum 5/13/22   Keagan Lambert MD   polyethylene glycol (MIRALAX) 17 gram packet Take 1 Packet by mouth daily. Patient not taking: Reported on 5/20/2022 5/14/22   Keagan Lambert MD   levothyroxine (SYNTHROID) 75 mcg tablet Take 1 Tablet by mouth Daily (before breakfast). 5/13/22   Keagan Lambert MD       REVIEW OF SYSTEMS:     I am not able to complete the review of systems because: The patient is intubated and sedated    The patient has altered mental status due to his acute medical problems    The patient has baseline aphasia from prior stroke(s)    The patient has baseline dementia and is not reliable historian    The patient is in acute medical distress and unable to provide information         Constitutional: Positive for fatigue. Negative for fever. HENT: Negative for congestion. Eyes: Negative for visual disturbance. Respiratory: Positive for shortness of breath. Cardiovascular: Negative for chest pain. Gastrointestinal: Positive for abdominal pain and nausea. Genitourinary: Negative for dysuria. Musculoskeletal: Negative for arthralgias. Skin: Negative for rash. Neurological: Positive for dizziness. Negative for headaches. Objective:   VITALS:    Visit Vitals  /77   Pulse 81   Temp 98.9 °F (37.2 °C)   Resp 16   SpO2 100%       PHYSICAL EXAM:    General:    Alert, cooperative, no distress, appears stated age. HEENT: Atraumatic, anicteric sclerae, pink conjunctivae     No oral ulcers, mucosa moist, throat clear, dentition fair  Neck:  Supple, symmetrical,  thyroid: non tender  Lungs:   Clear to auscultation bilaterally. No Wheezing or Rhonchi. No rales. Chest wall:  No tenderness  No Accessory muscle use. Heart:   Regular  rhythm,  No  murmur   No edema  Abdomen:   Soft, non-tender. Not distended. Bowel sounds normal  Extremities: No cyanosis. No clubbing,      Skin turgor normal, Capillary refill normal, Radial dial pulse 2+  Skin:     Not pale. Not Jaundiced  No rashes   Psych:  Good insight. Not depressed. Not anxious or agitated. Neurologic: EOMs intact. No facial asymmetry. No aphasia or slurred speech. Symmetrical strength, Sensation grossly intact. Alert and oriented X 4.     _______________________________________  Gastrointestinal: Nondistended. Nontender. Rectal exam per ED MD at OSH reveals no hemorrhoids.   There is dark-colored stool.     ________________________________  Care Plan discussed with:    Comments   Patient y    Family      RN y    Care Manager                    Consultant:  zander Ed md   _______________________________________________________________________  Expected  Disposition:   Home with Family y   HH/PT/OT/RN    SNF/LTC    MANPREET    ________________________________________________________________________  TOTAL TIME: 54   Minutes    Critical Care Provided     Minutes non procedure based      Comments    x Reviewed previous records   >50% of visit spent in counseling and coordination of care x Discussion with patient and/or family and questions answered       Given the patient's current clinical presentation, I have a high level of concern for decompensation if discharged from the ED. Complex decision making was performed which includes reviewing the patient's available past medical records, laboratory results, and Xray films. I have also directly communicated my plan and discussed this case with the involved ED physician.     ____________________________________________________________________  Lesley Mueller MD    Procedures: see electronic medical records for all procedures/Xrays and details which were not copied into this note but were reviewed prior to creation of Plan. LAB DATA REVIEWED:    Recent Results (from the past 24 hour(s))   EKG, 12 LEAD, INITIAL    Collection Time: 06/17/22  3:46 PM   Result Value Ref Range    Ventricular Rate 107 BPM    Atrial Rate 107 BPM    P-R Interval 130 ms    QRS Duration 86 ms    Q-T Interval 326 ms    QTC Calculation (Bezet) 435 ms    Calculated R Axis 74 degrees    Calculated T Axis 8 degrees    Diagnosis       Sinus tachycardia  Nonspecific T wave abnormality  Abnormal ECG  When compared with ECG of 04-JUN-2022 07:45,  No significant change was found  Confirmed by Malika Mike ([id number]) on 6/17/2022 4:37:00 PM     CBC WITH AUTOMATED DIFF    Collection Time: 06/17/22  3:48 PM   Result Value Ref Range    WBC 7.3 4.1 - 11.1 K/uL    RBC 1.99 (L) 4.10 - 5.70 M/uL    HGB 5.7 (LL) 12.1 - 17.0 g/dL    HCT 18.0 (L) 36.6 - 50.3 %    MCV 90.5 80.0 - 99.0 FL    MCH 28.6 26.0 - 34.0 PG    MCHC 31.7 30.0 - 36.5 g/dL    RDW 14.8 (H) 11.5 - 14.5 %    PLATELET 207 066 - 680 K/uL    MPV 9.5 8.9 - 12.9 FL    NRBC 0.0 0.0  WBC    ABSOLUTE NRBC 0.00 0.00 - 0.01 K/uL    NEUTROPHILS 82 (H) 32 - 75 %    LYMPHOCYTES 6 (L) 12 - 49 %    MONOCYTES 10 5 - 13 %    EOSINOPHILS 2 0 - 7 %    BASOPHILS 0 0 - 1 %    IMMATURE GRANULOCYTES 0 0 - 0.5 %    ABS. NEUTROPHILS 6.0 1.8 - 8.0 K/UL    ABS. LYMPHOCYTES 0.4 (L) 0.8 - 3.5 K/UL    ABS. MONOCYTES 0.7 0.0 - 1.0 K/UL    ABS. EOSINOPHILS 0.1 0.0 - 0.4 K/UL    ABS. BASOPHILS 0.0 0.0 - 0.1 K/UL    ABS. IMM. GRANS. 0.0 0.00 - 0.04 K/UL    DF AUTOMATED     METABOLIC PANEL, COMPREHENSIVE    Collection Time: 06/17/22  3:48 PM   Result Value Ref Range    Sodium 138 136 - 145 mmol/L    Potassium 3.8 3.5 - 5.1 mmol/L    Chloride 101 97 - 108 mmol/L    CO2 31 21 - 32 mmol/L    Anion gap 6 5 - 15 mmol/L    Glucose 121 (H) 65 - 100 mg/dL    BUN 29 (H) 6 - 20 mg/dL    Creatinine 1.64 (H) 0.70 - 1.30 mg/dL    BUN/Creatinine ratio 18 12 - 20      GFR est AA 54 (L) >60 ml/min/1.73m2    GFR est non-AA 45 (L) >60 ml/min/1.73m2    Calcium 9.0 8.5 - 10.1 mg/dL    Bilirubin, total 0.2 0.2 - 1.0 mg/dL    AST (SGOT) 18 15 - 37 U/L    ALT (SGPT) 10 (L) 12 - 78 U/L    Alk.  phosphatase 74 45 - 117 U/L    Protein, total 7.0 6.4 - 8.2 g/dL    Albumin 3.0 (L) 3.5 - 5.0 g/dL    Globulin 4.0 2.0 - 4.0 g/dL    A-G Ratio 0.8 (L) 1.1 - 2.2     TROPONIN-HIGH SENSITIVITY    Collection Time: 06/17/22  3:48 PM   Result Value Ref Range    Troponin-High Sensitivity 3 0 - 76 ng/L   TYPE & SCREEN    Collection Time: 06/17/22  5:31 PM   Result Value Ref Range    Crossmatch Expiration 06/20/2022,2359     ABO/Rh(D) O Positive     Antibody screen Negative     Unit number [id number]     Blood component type  LR     Unit division 00     Status of unit [address] to transfuse     Crossmatch result Compatible     Unit number [id number]     Blood component type  LR,1     Unit division 00     Status of unit [address] to transfuse     Crossmatch result Compatible    OCCULT BLOOD, STOOL    Collection Time: 06/17/22  7:36 PM   Result Value Ref Range    Occult Blood,day 1 Positive (A) Negative      Day 1 date: [phone]

## 2022-06-18 NOTE — PROGRESS NOTES
Problem: General Infection Care Plan (Adult and Pediatric)  Goal: Improvement in signs and symptoms of infection  Outcome: Progressing Towards Goal     Problem: Anemia Care Plan (Adult and Pediatric)  Goal: *Labs within defined limits  Outcome: Progressing Towards Goal  Goal: *Tolerates increased activity  Outcome: Progressing Towards Goal     Problem: Patient Education: Go to Patient Education Activity  Goal: Patient/Family Education  Outcome: Progressing Towards Goal

## 2022-06-18 NOTE — CONSULTS
GI Consultation Note Mark Enrique for [facility])    NAME: Dionna Sahu : 1970 MRN: [id number]   PCP: Tonya Sánchez NP  Date/Time:  2022 10:28 AM  Subjective:   REASON FOR CONSULT:    Anemia/+FOBT    Asher Campa is a 49 y.o. } male who I was asked to see for above. Pt w/ h/o H&N cancer s/p tracheostomy presented with recurrent anemia. Notably just had EGD/COlon on  and  at [facility]. EGD was unremarkable Colon x 2 with suboptimal prep but no gross lesions      Past Medical History:   Diagnosis Date    Chronic pain     THROAT, EARS, NECK R/T CANCER    COPD (chronic obstructive pulmonary disease) (HCC)     EMPHASEMA    Psychiatric disorder     ANXIETY R/T CANCER    Throat cancer (HCC)     Tracheostomy present [facility])       Past Surgical History:   Procedure Laterality Date    COLONOSCOPY N/A 2022    COLONOSCOPY performed by Efrain Mead MD at [address] [place] [address] COLONOSCOPY N/A 2022    COLONOSCOPY performed by Efrain Mead MD at [address] [place] [address] HX ORTHOPAEDIC      LEFT ARM- \" PUT PLATE IN\"    HX TRACHEOSTOMY  2021    IR INSERT TUNL CVC W PORT OVER 5 YEARS  2021    IR PLACE CVAD FLUORO GUIDE  2021     Social History     Tobacco Use    Smoking status: Former Smoker     Packs/day: 1.50     Years: 30.00     Pack years: 45.00     Quit date: 10/28/2020     Years since quittin.6    Smokeless tobacco: Never Used   Substance Use Topics    Alcohol use: Not Currently      Family History   Problem Relation Age of Onset    Diabetes Mother     Diabetes Father     Kidney Disease Father     Diabetes Sister     Heart Disease Daughter     Anesth Problems Neg Hx       No Known Allergies   Home Medications:  Prior to Admission Medications   Prescriptions Last Dose Informant Patient Reported? Taking?   ferrous sulfate 325 mg (65 mg iron) tablet   No No   Sig: Take 1 Tablet by mouth two (2) times daily (with meals).    levothyroxine (SYNTHROID) 75 mcg tablet   No No   Sig: Take 1 Tablet by mouth Daily (before breakfast). oxyCODONE-acetaminophen (PERCOCET) 5-325 mg per tablet   No No   Sig: Take 1 Tablet by mouth every twelve (12) hours as needed for Pain for up to 30 days. Max Daily Amount: 2 Tablets. pantoprazole (PROTONIX) 40 mg tablet   No No   Sig: Take 1 Tablet by mouth two (2) times a day. Indications: bleeding from stomach, esophagus or duodenum   polyethylene glycol (MIRALAX) 17 gram packet   No No   Sig: Take 1 Packet by mouth daily.    Patient not taking: Reported on 5/20/2022      Facility-Administered Medications: None     Hospital medications:  Current Facility-Administered Medications   Medication Dose Route Frequency    levothyroxine (SYNTHROID) tablet 75 mcg  75 mcg Oral ACB    sodium chloride (NS) flush 5-40 mL  5-40 mL IntraVENous Q8H    sodium chloride (NS) flush 5-40 mL  5-40 mL IntraVENous PRN    acetaminophen (TYLENOL) tablet 650 mg  650 mg Oral Q6H PRN    Or    acetaminophen (TYLENOL) suppository 650 mg  650 mg Rectal Q6H PRN    polyethylene glycol (MIRALAX) packet 17 g  17 g Oral DAILY PRN    ondansetron (ZOFRAN ODT) tablet 4 mg  4 mg Oral Q8H PRN    Or    ondansetron (ZOFRAN) injection 4 mg  4 mg IntraVENous Q6H PRN    pantoprazole (PROTONIX) 40 mg in 0.9% sodium chloride 10 mL injection  40 mg IntraVENous Q12H     REVIEW OF SYSTEMS:     []     Unable to obtain  ROS due to  []    mental status change  []    sedated   []    intubated   [x]    Total of 11 systems reviewed as follows:  Const:  negative fever, negative chills, negative weight loss  Eyes:   negative diplopia or visual changes, negative eye pain  ENT:   negative coryza, negative sore throat  Resp:   negative cough, hemoptysis, dyspnea  Cards:  negative for chest pain, palpitations, lower extremity edema  :  negative for frequency, dysuria and hematuria  Skin:   negative for rash and pruritus  Heme:  negative for easy bruising and gum/nose bleeding  MS:  negative for myalgias, arthralgias, back pain and muscle weakness  Neurolo:  negative for headaches, dizziness, vertigo, memory problems   Psych:  negative for feelings of anxiety, depression     Pertinent Positives include :    Objective:   VITALS:    Visit Vitals  /73   Pulse (!) 113   Temp 99.1 °F (37.3 °C)   Resp 17   SpO2 100%     Temp (24hrs), Av.5 °F (36.9 °C), Min:97.8 °F (36.6 °C), Max:99.1 °F (37.3 °C)    PHYSICAL EXAM:   General:    Alert, cooperative, no distress, appears stated age. Head:   Normocephalic, without obvious abnormality, atraumatic. Eyes:   Conjunctivae clear, anicteric sclerae. Pupils are equal  Nose:  Nares normal. No drainage or sinus tenderness. Throat:    Lips, mucosa, and tongue normal.  No Thrush  Neck:  +Tracheostomy  Back:    Symmetric,  No CVA tenderness. Lungs:   CTA bilaterally. No wheezing/rhonchi/rales. Chest wall:  No tenderness or deformity. No Accessory muscle use. Heart:   Regular rate and rhythm,  no murmur, rub or gallop. Abdomen:   Soft, non-tender. Not distended. Bowel sounds normal. No masses  Extremities: Atraumatic, No cyanosis. No edema. No clubbing  Skin:     Texture, turgor normal. No rashes/lesions/jaundice  Lymph: Cervical, supraclavicular normal.  Psych:  Good insight. Not depressed. Not anxious or agitated. Neurologic: EOMs intact. No facial asymmetry. No aphasia or slurred speech. Normal   strength, A/O X 3.      LAB DATA REVIEWED:    Recent Results (from the past 48 hour(s))   EKG, 12 LEAD, INITIAL    Collection Time: 22  3:46 PM   Result Value Ref Range    Ventricular Rate 107 BPM    Atrial Rate 107 BPM    P-R Interval 130 ms    QRS Duration 86 ms    Q-T Interval 326 ms    QTC Calculation (Bezet) 435 ms    Calculated R Axis 74 degrees    Calculated T Axis 8 degrees    Diagnosis       Sinus tachycardia  Nonspecific T wave abnormality  Abnormal ECG  When compared with ECG of 2022 07:45,  No significant change was found  Confirmed by Malika Mike ([id number]) on 2022 4:37:00 PM     CBC WITH AUTOMATED DIFF    Collection Time: 06/17/22  3:48 PM   Result Value Ref Range    WBC 7.3 4.1 - 11.1 K/uL    RBC 1.99 (L) 4.10 - 5.70 M/uL    HGB 5.7 (LL) 12.1 - 17.0 g/dL    HCT 18.0 (L) 36.6 - 50.3 %    MCV 90.5 80.0 - 99.0 FL    MCH 28.6 26.0 - 34.0 PG    MCHC 31.7 30.0 - 36.5 g/dL    RDW 14.8 (H) 11.5 - 14.5 %    PLATELET 529 521 - 598 K/uL    MPV 9.5 8.9 - 12.9 FL    NRBC 0.0 0.0  WBC    ABSOLUTE NRBC 0.00 0.00 - 0.01 K/uL    NEUTROPHILS 82 (H) 32 - 75 %    LYMPHOCYTES 6 (L) 12 - 49 %    MONOCYTES 10 5 - 13 %    EOSINOPHILS 2 0 - 7 %    BASOPHILS 0 0 - 1 %    IMMATURE GRANULOCYTES 0 0 - 0.5 %    ABS. NEUTROPHILS 6.0 1.8 - 8.0 K/UL    ABS. LYMPHOCYTES 0.4 (L) 0.8 - 3.5 K/UL    ABS. MONOCYTES 0.7 0.0 - 1.0 K/UL    ABS. EOSINOPHILS 0.1 0.0 - 0.4 K/UL    ABS. BASOPHILS 0.0 0.0 - 0.1 K/UL    ABS. IMM. GRANS. 0.0 0.00 - 0.04 K/UL    DF AUTOMATED     METABOLIC PANEL, COMPREHENSIVE    Collection Time: 06/17/22  3:48 PM   Result Value Ref Range    Sodium 138 136 - 145 mmol/L    Potassium 3.8 3.5 - 5.1 mmol/L    Chloride 101 97 - 108 mmol/L    CO2 31 21 - 32 mmol/L    Anion gap 6 5 - 15 mmol/L    Glucose 121 (H) 65 - 100 mg/dL    BUN 29 (H) 6 - 20 mg/dL    Creatinine 1.64 (H) 0.70 - 1.30 mg/dL    BUN/Creatinine ratio 18 12 - 20      GFR est AA 54 (L) >60 ml/min/1.73m2    GFR est non-AA 45 (L) >60 ml/min/1.73m2    Calcium 9.0 8.5 - 10.1 mg/dL    Bilirubin, total 0.2 0.2 - 1.0 mg/dL    AST (SGOT) 18 15 - 37 U/L    ALT (SGPT) 10 (L) 12 - 78 U/L    Alk.  phosphatase 74 45 - 117 U/L    Protein, total 7.0 6.4 - 8.2 g/dL    Albumin 3.0 (L) 3.5 - 5.0 g/dL    Globulin 4.0 2.0 - 4.0 g/dL    A-G Ratio 0.8 (L) 1.1 - 2.2     TROPONIN-HIGH SENSITIVITY    Collection Time: 06/17/22  3:48 PM   Result Value Ref Range    Troponin-High Sensitivity 3 0 - 76 ng/L   TYPE & SCREEN    Collection Time: 06/17/22  5:31 PM   Result Value Ref Range    Crossmatch Expiration 06/20/2022,2359     ABO/Rh(D) Alyssia Locks Positive Antibody screen Negative     Unit number [id number]     Blood component type RC LR     Unit division 00     Status of unit [address] to transfuse     Crossmatch result Compatible     Unit number [id number]     Blood component type RC LR,1     Unit division 00     Status of unit [address] to transfuse     Crossmatch result Compatible    OCCULT BLOOD, STOOL    Collection Time: 06/17/22  7:36 PM   Result Value Ref Range    Occult Blood,day 1 Positive (A) Negative      Day 1 date: 7,502,955     METABOLIC PANEL, BASIC    Collection Time: 06/18/22  4:08 AM   Result Value Ref Range    Sodium 137 136 - 145 mmol/L    Potassium 4.7 3.5 - 5.1 mmol/L    Chloride 104 97 - 108 mmol/L    CO2 28 21 - 32 mmol/L    Anion gap 5 5 - 15 mmol/L    Glucose 90 65 - 100 mg/dL    BUN 24 (H) 6 - 20 MG/DL    Creatinine 1.65 (H) 0.70 - 1.30 MG/DL    BUN/Creatinine ratio 15 12 - 20      GFR est AA 54 (L) >60 ml/min/1.73m2    GFR est non-AA 44 (L) >60 ml/min/1.73m2    Calcium 9.1 8.5 - 10.1 MG/DL   CBC W/O DIFF    Collection Time: 06/18/22  4:08 AM   Result Value Ref Range    WBC 9.4 4.1 - 11.1 K/uL    RBC 2.93 (L) 4.10 - 5.70 M/uL    HGB 8.3 (L) 12.1 - 17.0 g/dL    HCT 26.3 (L) 36.6 - 50.3 %    MCV 89.8 80.0 - 99.0 FL    MCH 28.3 26.0 - 34.0 PG    MCHC 31.6 30.0 - 36.5 g/dL    RDW 15.0 (H) 11.5 - 14.5 %    PLATELET 462 (H) 788 - 400 K/uL    MPV 9.8 8.9 - 12.9 FL    NRBC 0.0 0  WBC    ABSOLUTE NRBC 0.00 0.00 - 0.01 K/uL     IMAGING RESULTS:   []      I have personally reviewed the actual   []    CXR  []    CT  []     US    Assessment/Plan:      Active Problems:    Symptomatic anemia (5/30/2022)      GI bleed (6/18/2022)      CKD (chronic kidney disease) (6/18/2022)      Chronic anemia (6/18/2022)    1. Dark stools/+FOBT  2. Acute on chronic anemia  3. CKD  4. Normal EGD on 6/7  5.  Colonoscopy x 2 on 6/7 and 6/8 with suboptimal preparation but no gross lesions; repeat colonoscopy recommended in 1 year  ___________________________________________________  RECOMMENDATIONS:    - I explained to detail with pt that best way to proceed would be an extended bowel preparation to achieve adequate visualization of colon with repeat colonoscopy, he was adamantly opposed to this  - ok for GI Lite diet today, will give CLD tomorrow and NPO after Midnight for POSSIBLE capsule study on Monday, though will defer to Dr. Deanna Ann for this    GI will see on request tomorrow, otherwise Dr. Deanna Ann will assume GI care on 6/20    ___________________________________________________  Care Plan discussed with:    [x]    Patient   []    Family   []    Nursing   []    Attending   ___________________________________________________  GI: Marquis Lou MD

## 2022-06-18 NOTE — PROGRESS NOTES
CM was alerted that the patient is requesting assistance with d/c transportation today, 6/18/22, for transportation home. The patient is ambulatory and can be transported via a car. [facility] has arranged for [facility] [employer] to transport the patient home at 3:30 PM. CM to provide updates to the patient's nurse once the [employer]  is found.      Sofia Suarez [address], [address] [employer]

## 2022-06-18 NOTE — PROGRESS NOTES
Pt transfusion ended without any observed or reported transfusion reactions. Vitals are within normal range. See flowsheet for vitals.

## 2022-06-18 NOTE — PROGRESS NOTES
DISCHARGE NOTE FROM [facility] NURSE    Patient determined to be stable for discharge by attending provider. I have reviewed the discharge instructions and follow-up appointments with the patient. They verbalized understanding and all questions were answered to their satisfaction. No complaints or further questions were expressed. No new medication scripts. Appropriate educational materials and medication side effect teaching were provided. PIV were removed prior to discharge. Patient did not discharge with any line, liu, or drain. All personal items collected during admission were returned to the patient prior to discharge.     Post-op patient: No      Cheryle Trejo

## 2022-06-18 NOTE — DISCHARGE SUMMARY
Hospitalist Discharge Summary     Patient ID:    Brice Stewart  [id number]  17 y.o.  1970    Admit date: 6/18/2022    Discharge date : 6/18/2022      Final Diagnoses: Active Problems:    Symptomatic anemia (5/30/2022)      GI bleed (6/18/2022)      CKD (chronic kidney disease) (6/18/2022)      Chronic anemia (6/18/2022)        Reason for Hospitalization/Hospital Course:   Sofy Peterson, 51 y. o. male anemia, laryngeal carcinoma, chronic kidney disease, and  COPD patient who presents to outside hospital with difficulty breathing and nausea that started the day of admission. [facility] also has noticed dark-colored stools over the last couple of days.  States that he feels he may be anemic.  He has had blood transfusion before. Dior Avelar has had recent upper endoscopy and colonoscopy without significant findings of GI bleed.  At that time he was not having any dark stools and had a negative stool hemoccult. Significant lab findings hemoglobin 5.7, BUN 29, creatinine 1.64. He was transferred to [facility] for further evaluation and management. GI has been consulted. He received 2 units PRBCs, IV Protonix, IV fluids. He is FOBT positive. Today his hemoglobin is 8.3. He has contacted his primary oncologist.  He has an appointment arranged this Tuesday. He is requesting to be discharged. Will resume his home medications. Advised follow-up with his appointment. If he has any further issues to come back to the emergency room. Discharge Medications:   Current Discharge Medication List      CONTINUE these medications which have NOT CHANGED    Details   oxyCODONE-acetaminophen (PERCOCET) 5-325 mg per tablet Take 1 Tablet by mouth every twelve (12) hours as needed for Pain for up to 30 days. Max Daily Amount: 2 Tablets. Qty: 20 Tablet, Refills: 0    Associated Diagnoses: Squamous cell carcinoma of larynx ([address]  Neoplasm related pain      ferrous sulfate 325 mg (65 mg iron) tablet Take 1 Tablet by mouth two (2) times daily (with meals). Qty: 60 Tablet, Refills: 0      pantoprazole (PROTONIX) 40 mg tablet Take 1 Tablet by mouth two (2) times a day. Indications: bleeding from stomach, esophagus or duodenum  Qty: 60 Tablet, Refills: 0      polyethylene glycol (MIRALAX) 17 gram packet Take 1 Packet by mouth daily. Qty: 30 Packet, Refills: 0      levothyroxine (SYNTHROID) 75 mcg tablet Take 1 Tablet by mouth Daily (before breakfast). Qty: 30 Tablet, Refills: 0               Follow up Care:    1. Jeronimo Badillo NP in 1-2 weeks. Follow-up Information     Follow up With Specialties Details Why Contact Info    Jeronimo Badillo NP Nurse Practitioner In 1 week  [address] [facility] [phone]      Josh Coto MD Hematology and Oncology, Internal Medicine Physician, Hematology Physician, Oncology In 3 days  [address] [place] [address] Jonas Epstein  [phone]              Patient Follow Up Instructions: Activity: Activity as tolerated  Diet:  Cardiac Diet  Wound Care: None needed     Condition at Discharge:  Stable  __________________________________________________________________    Disposition  Home or Self Care  ____________________________________________________________________    Code Status:  Full Code  ___________________________________________________________________    Discharge Exam:  Patient seen and examined by me on discharge day. Pertinent Findings:  Gen:    Not in distress  Chest: Clear lungs  CVS:   Regular rhythm.   No edema  Abd:  Soft, not distended, not tender  Neuro:  Alert        CONSULTATIONS: GI    Significant Diagnostic Studies:   Recent Results (from the past 24 hour(s))   EKG, 12 LEAD, INITIAL    Collection Time: 06/17/22  3:46 PM   Result Value Ref Range    Ventricular Rate 107 BPM    Atrial Rate 107 BPM    P-R Interval 130 ms    QRS Duration 86 ms    Q-T Interval 326 ms    QTC Calculation (Bezet) 435 ms    Calculated R Axis 74 degrees    Calculated T Axis 8 degrees    Diagnosis       Sinus tachycardia  Nonspecific T wave abnormality  Abnormal ECG  When compared with ECG of 04-JUN-2022 07:45,  No significant change was found  Confirmed by Gina Wynn ([id number]) on 6/17/2022 4:37:00 PM     CBC WITH AUTOMATED DIFF    Collection Time: 06/17/22  3:48 PM   Result Value Ref Range    WBC 7.3 4.1 - 11.1 K/uL    RBC 1.99 (L) 4.10 - 5.70 M/uL    HGB 5.7 (LL) 12.1 - 17.0 g/dL    HCT 18.0 (L) 36.6 - 50.3 %    MCV 90.5 80.0 - 99.0 FL    MCH 28.6 26.0 - 34.0 PG    MCHC 31.7 30.0 - 36.5 g/dL    RDW 14.8 (H) 11.5 - 14.5 %    PLATELET 580 957 - 940 K/uL    MPV 9.5 8.9 - 12.9 FL    NRBC 0.0 0.0  WBC    ABSOLUTE NRBC 0.00 0.00 - 0.01 K/uL    NEUTROPHILS 82 (H) 32 - 75 %    LYMPHOCYTES 6 (L) 12 - 49 %    MONOCYTES 10 5 - 13 %    EOSINOPHILS 2 0 - 7 %    BASOPHILS 0 0 - 1 %    IMMATURE GRANULOCYTES 0 0 - 0.5 %    ABS. NEUTROPHILS 6.0 1.8 - 8.0 K/UL    ABS. LYMPHOCYTES 0.4 (L) 0.8 - 3.5 K/UL    ABS. MONOCYTES 0.7 0.0 - 1.0 K/UL    ABS. EOSINOPHILS 0.1 0.0 - 0.4 K/UL    ABS. BASOPHILS 0.0 0.0 - 0.1 K/UL    ABS. IMM. GRANS. 0.0 0.00 - 0.04 K/UL    DF AUTOMATED     METABOLIC PANEL, COMPREHENSIVE    Collection Time: 06/17/22  3:48 PM   Result Value Ref Range    Sodium 138 136 - 145 mmol/L    Potassium 3.8 3.5 - 5.1 mmol/L    Chloride 101 97 - 108 mmol/L    CO2 31 21 - 32 mmol/L    Anion gap 6 5 - 15 mmol/L    Glucose 121 (H) 65 - 100 mg/dL    BUN 29 (H) 6 - 20 mg/dL    Creatinine 1.64 (H) 0.70 - 1.30 mg/dL    BUN/Creatinine ratio 18 12 - 20      GFR est AA 54 (L) >60 ml/min/1.73m2    GFR est non-AA 45 (L) >60 ml/min/1.73m2    Calcium 9.0 8.5 - 10.1 mg/dL    Bilirubin, total 0.2 0.2 - 1.0 mg/dL    AST (SGOT) 18 15 - 37 U/L    ALT (SGPT) 10 (L) 12 - 78 U/L    Alk.  phosphatase 74 45 - 117 U/L    Protein, total 7.0 6.4 - 8.2 g/dL    Albumin 3.0 (L) 3.5 - 5.0 g/dL    Globulin 4.0 2.0 - 4.0 g/dL    A-G Ratio 0.8 (L) 1.1 - 2.2     TROPONIN-HIGH SENSITIVITY Collection Time: 06/17/22  3:48 PM   Result Value Ref Range    Troponin-High Sensitivity 3 0 - 76 ng/L   TYPE & SCREEN    Collection Time: 06/17/22  5:31 PM   Result Value Ref Range    Crossmatch Expiration 06/20/2022,2359     ABO/Rh(D) Terrie Perkins Positive     Antibody screen Negative     Unit number [id number]     Blood component type RC LR     Unit division 00     Status of unit [address] to transfuse     Crossmatch result Compatible     Unit number [id number]     Blood component type RC LR,1     Unit division 00     Status of unit [address] to transfuse     Crossmatch result Compatible    OCCULT BLOOD, STOOL    Collection Time: 06/17/22  7:36 PM   Result Value Ref Range    Occult Blood,day 1 Positive (A) Negative      Day 1 date: 1,377,903     METABOLIC PANEL, BASIC    Collection Time: 06/18/22  4:08 AM   Result Value Ref Range    Sodium 137 136 - 145 mmol/L    Potassium 4.7 3.5 - 5.1 mmol/L    Chloride 104 97 - 108 mmol/L    CO2 28 21 - 32 mmol/L    Anion gap 5 5 - 15 mmol/L    Glucose 90 65 - 100 mg/dL    BUN 24 (H) 6 - 20 MG/DL    Creatinine 1.65 (H) 0.70 - 1.30 MG/DL    BUN/Creatinine ratio 15 12 - 20      GFR est AA 54 (L) >60 ml/min/1.73m2    GFR est non-AA 44 (L) >60 ml/min/1.73m2    Calcium 9.1 8.5 - 10.1 MG/DL   CBC W/O DIFF    Collection Time: 06/18/22  4:08 AM   Result Value Ref Range    WBC 9.4 4.1 - 11.1 K/uL    RBC 2.93 (L) 4.10 - 5.70 M/uL    HGB 8.3 (L) 12.1 - 17.0 g/dL    HCT 26.3 (L) 36.6 - 50.3 %    MCV 89.8 80.0 - 99.0 FL    MCH 28.3 26.0 - 34.0 PG    MCHC 31.6 30.0 - 36.5 g/dL    RDW 15.0 (H) 11.5 - 14.5 %    PLATELET 436 (H) 246 - 400 K/uL    MPV 9.8 8.9 - 12.9 FL    NRBC 0.0 0  WBC    ABSOLUTE NRBC 0.00 0.00 - 0.01 K/uL     No orders to display       Time spent in direct and indirect care including coordination of services: Greater than 35 minutes    Signed:  Lily Lane NP  6/18/2022  12:31 PM

## 2022-06-18 NOTE — PROGRESS NOTES
Problem: General Infection Care Plan (Adult and Pediatric)  Goal: Improvement in signs and symptoms of infection  6/18/2022 1407 by Cynthia RIVERS  Outcome: Resolved/Not Met  6/18/2022 1228 by Edmund Mims  Outcome: Progressing Towards Goal     Problem: Anemia Care Plan (Adult and Pediatric)  Goal: *Labs within defined limits  6/18/2022 1407 by Edmund Mims  Outcome: Resolved/Not Met  6/18/2022 1228 by Edmund Mims  Outcome: Progressing Towards Goal  Goal: *Tolerates increased activity  6/18/2022 1407 by Edmund Mims  Outcome: Resolved/Not Met  6/18/2022 1228 by Edmund Mims  Outcome: Progressing Towards Goal     Problem: Patient Education: Go to Patient Education Activity  Goal: Patient/Family Education  6/18/2022 1407 by Edmund Mims  Outcome: Resolved/Not Met  6/18/2022 1228 by Edmund Mims  Outcome: Progressing Towards Goal

## 2022-06-18 NOTE — PROGRESS NOTES
Hospitalist Progress Note            Daily Progress Note: 6/18/2022 10:39 AM  Hospital course:     Richi Peterson, 51 y. o. male anemia, laryngeal carcinoma, chronic kidney disease, and  COPD patient who presents to outside hospital with difficulty breathing and nausea that started the day of admission. Justin Tillman also has noticed dark-colored stools over the last couple of days.  States that he feels he may be anemic. Justin Tillman has had blood transfusion before. Juan Pablo Oneil has had recent upper endoscopy and colonoscopy without significant findings of GI bleed.  At that time he was not having any dark stools and had a negative stool hemoccult. Significant lab findings hemoglobin 5.7, BUN 29, creatinine 1.64. He was transferred to [facility] for further evaluation and management. GI has been consulted. He received 2 units PRBCs, IV Protonix, IV fluids. He is FOBT positive. Subjective:     Examined patient at the bedside. Status post cancer treatment with tracheostomy stoma capped. Patient has written out his request for something to eat. No pending procedures today or tomorrow we will start diet along with diet supplements. Assessment/Plan:   Active Problems:    Symptomatic anemia (5/30/2022)      GI bleed (6/18/2022)      CKD (chronic kidney disease) (6/18/2022)      Chronic anemia (6/18/2022)    Gastrointestinal hemorrhage with melena   Symptomatic anemia  Acute Blood loss anemia   Hg 5.7, previously hemoglobin was 8.7  S/P 2 units PRBCs in OSH ED  Positive fecal occult blood  Closely monitor H&H every 6 hours  IV PPI  N.p.o. after midnight on Sunday  GI consult  Avoid and antiplatelet and anticoagulant  IV fluids  Recent EGD and colonoscopy findings unremarkable for bleeding source. Anemia contributed chronic disease cancer.     Chronic kidney disease-   creatinine 1.6 which is around baseline  Continue to monitor   Avoid NSAID and nephrotoxic medication Adjust medication to GFR        Hypothyroidism-  on levothyroxine    Iron deficiency anemia-  on iron supplement  Iron panel pending    GERD-  on PPI        DVT Prophylaxis: SCDs  Code Status: Full Code  POA/NOK:    Disposition and discharge barriers:    GI consult   Monitor hemoglobin  Care Plan discussed with:     Current Facility-Administered Medications   Medication Dose Route Frequency    levothyroxine (SYNTHROID) tablet 75 mcg  75 mcg Oral ACB    sodium chloride (NS) flush 5-40 mL  5-40 mL IntraVENous Q8H    sodium chloride (NS) flush 5-40 mL  5-40 mL IntraVENous PRN    acetaminophen (TYLENOL) tablet 650 mg  650 mg Oral Q6H PRN    Or    acetaminophen (TYLENOL) suppository 650 mg  650 mg Rectal Q6H PRN    polyethylene glycol (MIRALAX) packet 17 g  17 g Oral DAILY PRN    ondansetron (ZOFRAN ODT) tablet 4 mg  4 mg Oral Q8H PRN    Or    ondansetron (ZOFRAN) injection 4 mg  4 mg IntraVENous Q6H PRN    pantoprazole (PROTONIX) 40 mg in 0.9% sodium chloride 10 mL injection  40 mg IntraVENous Q12H    0.9% sodium chloride infusion  100 mL/hr IntraVENous CONTINUOUS        REVIEW OF SYSTEMS    Review of Systems   Constitutional: Positive for malaise/fatigue. HENT: Negative for congestion. Respiratory: Negative for shortness of breath. Gastrointestinal: Positive for blood in stool. Musculoskeletal: Positive for myalgias. Neurological: Positive for weakness. Psychiatric/Behavioral: The patient is nervous/anxious. Objective:     Visit Vitals  /73   Pulse (!) 113   Temp 99.1 °F (37.3 °C)   Resp 17   SpO2 100%      O2 Device: None (Room air)    Temp (24hrs), Av.5 °F (36.9 °C), Min:97.8 °F (36.6 °C), Max:99.1 °F (37.3 °C)       0701 -  1900  In: 240 [P.O.:240]  Out: 300 [Urine:300]  No intake/output data recorded. PHYSICAL EXAM:    Physical Exam  Constitutional:       Appearance: He is ill-appearing. HENT:      Nose: No congestion. Cardiovascular:      Rate and Rhythm: Regular rhythm. Tachycardia present.    Pulmonary: Effort: No respiratory distress. Musculoskeletal:      Right lower leg: No edema. Left lower leg: No edema. Skin:     General: Skin is warm. Neurological:      Motor: Weakness present. Comments: Nonverbal status post tracheostomy which is currently capped. Data Review    Recent Results (from the past 24 hour(s))   EKG, 12 LEAD, INITIAL    Collection Time: 06/17/22  3:46 PM   Result Value Ref Range    Ventricular Rate 107 BPM    Atrial Rate 107 BPM    P-R Interval 130 ms    QRS Duration 86 ms    Q-T Interval 326 ms    QTC Calculation (Bezet) 435 ms    Calculated R Axis 74 degrees    Calculated T Axis 8 degrees    Diagnosis       Sinus tachycardia  Nonspecific T wave abnormality  Abnormal ECG  When compared with ECG of 04-JUN-2022 07:45,  No significant change was found  Confirmed by Renetta Robbins ([id number]) on 6/17/2022 4:37:00 PM     CBC WITH AUTOMATED DIFF    Collection Time: 06/17/22  3:48 PM   Result Value Ref Range    WBC 7.3 4.1 - 11.1 K/uL    RBC 1.99 (L) 4.10 - 5.70 M/uL    HGB 5.7 (LL) 12.1 - 17.0 g/dL    HCT 18.0 (L) 36.6 - 50.3 %    MCV 90.5 80.0 - 99.0 FL    MCH 28.6 26.0 - 34.0 PG    MCHC 31.7 30.0 - 36.5 g/dL    RDW 14.8 (H) 11.5 - 14.5 %    PLATELET 938 300 - 438 K/uL    MPV 9.5 8.9 - 12.9 FL    NRBC 0.0 0.0  WBC    ABSOLUTE NRBC 0.00 0.00 - 0.01 K/uL    NEUTROPHILS 82 (H) 32 - 75 %    LYMPHOCYTES 6 (L) 12 - 49 %    MONOCYTES 10 5 - 13 %    EOSINOPHILS 2 0 - 7 %    BASOPHILS 0 0 - 1 %    IMMATURE GRANULOCYTES 0 0 - 0.5 %    ABS. NEUTROPHILS 6.0 1.8 - 8.0 K/UL    ABS. LYMPHOCYTES 0.4 (L) 0.8 - 3.5 K/UL    ABS. MONOCYTES 0.7 0.0 - 1.0 K/UL    ABS. EOSINOPHILS 0.1 0.0 - 0.4 K/UL    ABS. BASOPHILS 0.0 0.0 - 0.1 K/UL    ABS. IMM.  GRANS. 0.0 0.00 - 0.04 K/UL    DF AUTOMATED     METABOLIC PANEL, COMPREHENSIVE    Collection Time: 06/17/22  3:48 PM   Result Value Ref Range    Sodium 138 136 - 145 mmol/L    Potassium 3.8 3.5 - 5.1 mmol/L    Chloride 101 97 - 108 mmol/L    CO2 31 21 - 32 mmol/L    Anion gap 6 5 - 15 mmol/L    Glucose 121 (H) 65 - 100 mg/dL    BUN 29 (H) 6 - 20 mg/dL    Creatinine 1.64 (H) 0.70 - 1.30 mg/dL    BUN/Creatinine ratio 18 12 - 20      GFR est AA 54 (L) >60 ml/min/1.73m2    GFR est non-AA 45 (L) >60 ml/min/1.73m2    Calcium 9.0 8.5 - 10.1 mg/dL    Bilirubin, total 0.2 0.2 - 1.0 mg/dL    AST (SGOT) 18 15 - 37 U/L    ALT (SGPT) 10 (L) 12 - 78 U/L    Alk.  phosphatase 74 45 - 117 U/L    Protein, total 7.0 6.4 - 8.2 g/dL    Albumin 3.0 (L) 3.5 - 5.0 g/dL    Globulin 4.0 2.0 - 4.0 g/dL    A-G Ratio 0.8 (L) 1.1 - 2.2     TROPONIN-HIGH SENSITIVITY    Collection Time: 06/17/22  3:48 PM   Result Value Ref Range    Troponin-High Sensitivity 3 0 - 76 ng/L   TYPE & SCREEN    Collection Time: 06/17/22  5:31 PM   Result Value Ref Range    Crossmatch Expiration 06/20/2022,2359     ABO/Rh(D) Flonnie Gala Positive     Antibody screen Negative     Unit number [id number]     Blood component type [facility]     Unit division 00     Status of unit [address] to transfuse     Crossmatch result Compatible     Unit number [id number]     Blood component type  LR,1     Unit division 00     Status of unit [address] to transfuse     Crossmatch result Compatible    OCCULT BLOOD, STOOL    Collection Time: 06/17/22  7:36 PM   Result Value Ref Range    Occult Blood,day 1 Positive (A) Negative      Day 1 date: 4,571,210     METABOLIC PANEL, BASIC    Collection Time: 06/18/22  4:08 AM   Result Value Ref Range    Sodium 137 136 - 145 mmol/L    Potassium 4.7 3.5 - 5.1 mmol/L    Chloride 104 97 - 108 mmol/L    CO2 28 21 - 32 mmol/L    Anion gap 5 5 - 15 mmol/L    Glucose 90 65 - 100 mg/dL    BUN 24 (H) 6 - 20 MG/DL    Creatinine 1.65 (H) 0.70 - 1.30 MG/DL    BUN/Creatinine ratio 15 12 - 20      GFR est AA 54 (L) >60 ml/min/1.73m2    GFR est non-AA 44 (L) >60 ml/min/1.73m2    Calcium 9.1 8.5 - 10.1 MG/DL   CBC W/O DIFF    Collection Time: 06/18/22  4:08 AM   Result Value Ref Range    WBC 9.4 4.1 - 11.1 K/uL    RBC 2.93 (L) 4.10 - 5.70 M/uL    HGB 8.3 (L) 12.1 - 17.0 g/dL    HCT 26.3 (L) 36.6 - 50.3 %    MCV 89.8 80.0 - 99.0 FL    MCH 28.3 26.0 - 34.0 PG    MCHC 31.6 30.0 - 36.5 g/dL    RDW 15.0 (H) 11.5 - 14.5 %    PLATELET 816 (H) 513 - 400 K/uL    MPV 9.8 8.9 - 12.9 FL    NRBC 0.0 0  WBC    ABSOLUTE NRBC 0.00 0.00 - 0.01 K/uL       No orders to display             _____________________________________________________________________________  Time spent in direct care including coordination of service, review of data and examination: > 35 minutes    ______________________________________________________________________________    Conchita Sosa NP    This is dictation was done by dragon, computer voice recognition software. Quite often unanticipated grammatical, syntax, homophones and other interpretive errors or inadvertently transcribed by the computer software. Please excuse errors that have escaped final proofreading. Thank you.

## 2022-06-18 NOTE — PROGRESS NOTES
[address] served Jesusita Khan MD \"During morning report I was told by nurse and patient that pt is NPO. That nothing was going to take place till Monday. Patient is requesting a diet order. Also stated he has not consumed any food since yesterday morning.  He is asking for relief please He is also asking for an order for chocolate ensure as a supplement to his diet with every meal. TIll he has to go back NPO\"    Waiting for a response

## 2022-06-18 NOTE — PROGRESS NOTES
Pt arrived on the floor on stretcher escorted by EMS. Blood type O+ transfusion in progress running at the rate of 75ml/hr. Volume of 77ml was remaining to be transfused. Upon arrival, vital signs were in normal range.  See flowsheet for VS.

## 2022-06-18 NOTE — ED NOTES
Assumed care of pt. [facility] Pt in bed resting, no acute distress noted. Currently waiting on blood bank to send RBC's.

## 2022-06-18 NOTE — ROUTINE PROCESS
TRANSFER - OUT REPORT:    Verbal report given to Suzy ZAMORA(name) on Db Garcia  being transferred to [facility]) for urgent transfer       Report consisted of patients Situation, Background, Assessment and   Recommendations(SBAR). Information from the following report(s) SBAR, ED Summary, Intake/Output, MAR and Recent Results was reviewed with the receiving nurse. Lines:   Venous Access Device 05/20/22 Upper chest (subclavicular area, right (Active)       Venous Access Device port a cath 05/30/22 (Active)       Peripheral IV 06/17/22 Anterior;Proximal;Right Forearm (Active)        Opportunity for questions and clarification was provided.       Patient transported with:   [facility]

## 2022-06-21 NOTE — PROGRESS NOTES
[facility] Progress Note    Date: 2022    Name: Kenn Bro    MRN: [id number]         : 1970    Mr. Peterson Arrived ambulatory and in no distress for C5D22 of Keytruda (HELD) Regimen. Assessment was completed and Port accessed by Harvey Britton Please see [facility] for assessment details. Chemotherapy Flowsheet 2022   Cycle C5D22   Date 2022   Drug / Regimen Keytruda   Pre Hydration -   Post Hydration -   Pre Meds -   Notes HELD       Mr. Peterson's vitals were reviewed. Visit Vitals  /76   Pulse (!) 110   Temp 97.9 °F (36.6 °C)   Resp 18   Ht 6' (1.829 m)   SpO2 100%   BMI 21.70 kg/m²       Lab results were obtained and reviewed. Recent Results (from the past 12 hour(s))   TYPE & SCREEN    Collection Time: 22 11:09 AM   Result Value Ref Range    Crossmatch Expiration 2022,2359     ABO/Rh(D) Monroe Sicard POSITIVE     Antibody screen NEG    CBC WITH AUTOMATED DIFF    Collection Time: 22 11:09 AM   Result Value Ref Range    WBC 8.1 4.1 - 11.1 K/uL    RBC 2.52 (L) 4.10 - 5.70 M/uL    HGB 7.2 (L) 12.1 - 17.0 g/dL    HCT 22.9 (L) 36.6 - 50.3 %    MCV 90.9 80.0 - 99.0 FL    MCH 28.6 26.0 - 34.0 PG    MCHC 31.4 30.0 - 36.5 g/dL    RDW 14.8 (H) 11.5 - 14.5 %    PLATELET 166 (H) 453 - 400 K/uL    MPV 9.8 8.9 - 12.9 FL    NRBC 0.0 0  WBC    ABSOLUTE NRBC 0.00 0.00 - 0.01 K/uL    NEUTROPHILS 83 (H) 32 - 75 %    LYMPHOCYTES 5 (L) 12 - 49 %    MONOCYTES 10 5 - 13 %    EOSINOPHILS 2 0 - 7 %    BASOPHILS 0 0 - 1 %    IMMATURE GRANULOCYTES 0 0.0 - 0.5 %    ABS. NEUTROPHILS 6.7 1.8 - 8.0 K/UL    ABS. LYMPHOCYTES 0.4 (L) 0.8 - 3.5 K/UL    ABS. MONOCYTES 0.8 0.0 - 1.0 K/UL    ABS. EOSINOPHILS 0.2 0.0 - 0.4 K/UL    ABS. BASOPHILS 0.0 0.0 - 0.1 K/UL    ABS. IMM.  GRANS. 0.0 0.00 - 0.04 K/UL    DF SMEAR SCANNED      RBC COMMENTS HYPOCHROMIA  1+        RBC COMMENTS ANISOCYTOSIS  1+        WBC COMMENTS ATYPICAL LYMPHOCYTES PRESENT     METABOLIC PANEL, COMPREHENSIVE    Collection Time: 22 11:09 AM   Result Value Ref Range    Sodium 137 136 - 145 mmol/L    Potassium 4.1 3.5 - 5.1 mmol/L    Chloride 101 97 - 108 mmol/L    CO2 30 21 - 32 mmol/L    Anion gap 6 5 - 15 mmol/L    Glucose 109 (H) 65 - 100 mg/dL    BUN 31 (H) 6 - 20 MG/DL    Creatinine 1.68 (H) 0.70 - 1.30 MG/DL    BUN/Creatinine ratio 18 12 - 20      GFR est AA 53 (L) >60 ml/min/1.73m2    GFR est non-AA 43 (L) >60 ml/min/1.73m2    Calcium 9.1 8.5 - 10.1 MG/DL    Bilirubin, total 0.2 0.2 - 1.0 MG/DL    ALT (SGPT) 12 12 - 78 U/L    AST (SGOT) 14 (L) 15 - 37 U/L    Alk. phosphatase 86 45 - 117 U/L    Protein, total 7.8 6.4 - 8.2 g/dL    Albumin 3.0 (L) 3.5 - 5.0 g/dL    Globulin 4.8 (H) 2.0 - 4.0 g/dL    A-G Ratio 0.6 (L) 1.1 - 2.2     MAGNESIUM    Collection Time: 06/21/22 11:09 AM   Result Value Ref Range    Magnesium 2.1 1.6 - 2.4 mg/dL       Mr. Dameon Toussaint was discharged from [place] [address] in stable condition at 1255. Port de-accessed, flushed & heparinized per protocol. He is to return on June 24 for his next appointment.     Devyn Shaw RN  June 21, 2022

## 2022-06-21 NOTE — TELEPHONE ENCOUNTER
Please call patient's brother Blake Walker to inform that I am sending refill on levothyroxine, based on his recent labs I am increasing his dose from 75 mcg to 100 mcg daily. I would like to see him back in 4 weeks with labs prior to the visit.

## 2022-06-21 NOTE — TELEPHONE ENCOUNTER
[address] at [facility]  [phone]    06/21/22 12:15 PM - Awa Diaz and spoke with a representative at [facility] to schedule the patient an appointment. They stated they are unable to see this patient, due to him being a patient of [facility]. The patient has seen Dr. Bryon Trammell, per representative. The representative provided their phone number to call ([phone]). No further questions or concerns. [address] at [facility]  [phone]    06/21/22 12:34 PM - Awa Diaz and spoke with Liss Hobbs at [facility]. Advised this patient needs to be seen for a capsule endoscopy as he has recurrent GI bleed and dark stools. The earliest appointment available is 8/22/22. The patient is scheduled for an appointment on Monday, 8/22 at 2:00 PM with Dr. Aram Almaguer at the [place] office. Advised this nurse will fax the referral and records to fax #[phone]. Advised the patient is unable to speak, so the person to contact is his brother Chris Whitfield. Provided his contact information. Liss Hobbs voiced understanding and gratitude for the call. No further questions or concerns. [address] at [facility]  [phone]    06/21/22 4:36 PM - Referral and records have been faxed to [facility] at fax #[phone].

## 2022-06-21 NOTE — PROGRESS NOTES
Kenn Bro is a 49 y.o. male here for follow up of laryngeal cancer. Patient with complaints of neck pain, rates as a 4 out of 10.

## 2022-06-21 NOTE — LETTER
6/21/2022    Patient: Carmela Ayon   YOB: 1970   Date of Visit: 6/21/2022     Xin Johnson NP  [address] [address]  Via Fax: [phone]    Dear Xin Johnson NP,      Thank you for referring Mr. Carmela Ayon to [facility] for evaluation. My notes for this consultation are attached. If you have questions, please do not hesitate to call me. I look forward to following your patient along with you.       Sincerely,    Sterling Crum MD

## 2022-06-22 NOTE — TELEPHONE ENCOUNTER
[employer]  Oncology Social Work  Encounter      Patient Name:  Luz Peterson N Lomeli [place] History: h&n cancer      Advance Directives: none on file; conversation deferred     Narrative: Assisted patient with scheduling transportation.  Called Medicaid/ #[id number] and booked the following dates:     June 28th at [facility] time - 8am  Trip # [id number]     July 1st at [facility] time Reyna Payan  Trip # [id number]    July 7th at [address] [place] [address] Endocrinology  Pickup time [facility]  Trip # [id number]    July 13th at 10 am - Oncology  [place] time Toney Miramontes   Trip # [id number]    July 14th at [facility] time Toney Miramontes   Trip # [id number]     Informed patient transportation has been scheduled.      Referral/Handouts:   Transportation referral     Thank you,  Eliud Velez LCSW

## 2022-06-23 NOTE — TELEPHONE ENCOUNTER
[address]   Oncology Social Work  Encounter      Patient Name:  Luz Peterson History: h&n cancer      Advance Directives: none on file; conversation deferred     Narrative: Assisted patient with scheduling transportation.  Called Medicaid/ #[phone] and booked the following dates:     July 6th at [place] [address] time - 8:30am  Trip # [id number]     July 8th at 9am - Oncology  [place] time [facility]  Trip # [id number]      July 12th at 9:30 - Oncology  [place] time - 8:30pm  Trip # [id number]     July 15th at 9:30 am - Oncology  [place] time - 8:30am   Trip # [id number]     July 19th at [facility] time [facility]   Trip # [id number]    July 22nd at [facility] time [facility]  Trip # [id number]     Informed patient transportation has been scheduled.      Referral/Handouts:   Transportation referral     Thank you,  Janeth Matos LCSW

## 2022-06-24 NOTE — PROGRESS NOTES
Outpatient Infusion Center Progress Note     Pt admit to [facility] for pf/labs ambulatory in stable condition. Assessment completed. Patient feeling dizzy today and thinks he might need blood. R chest port accessed without difficulty, labs drawn and sent for processing. Visit Vitals  /70   Pulse 87   Temp 98.1 °F (36.7 °C)   Resp 16   Ht 6' (1.829 m)   Wt 67.4 kg (148 lb 8 oz)   SpO2 100%   BMI 20.14 kg/m²       Medications:  Sterile saline  Saline flush  Heparin flush    Recent Results (from the past 12 hour(s))   CBC WITH AUTOMATED DIFF    Collection Time: 06/24/22  8:29 AM   Result Value Ref Range    WBC 7.8 4.1 - 11.1 K/uL    RBC 2.52 (L) 4.10 - 5.70 M/uL    HGB 7.2 (L) 12.1 - 17.0 g/dL    HCT 22.8 (L) 36.6 - 50.3 %    MCV 90.5 80.0 - 99.0 FL    MCH 28.6 26.0 - 34.0 PG    MCHC 31.6 30.0 - 36.5 g/dL    RDW 14.6 (H) 11.5 - 14.5 %    PLATELET 689 (H) 938 - 400 K/uL    MPV 9.8 8.9 - 12.9 FL    NRBC 0.0 0  WBC    ABSOLUTE NRBC 0.00 0.00 - 0.01 K/uL    NEUTROPHILS PENDING %    LYMPHOCYTES PENDING %    MONOCYTES PENDING %    EOSINOPHILS PENDING %    BASOPHILS PENDING %    IMMATURE GRANULOCYTES PENDING %    ABS. NEUTROPHILS PENDING K/UL    ABS. LYMPHOCYTES PENDING K/UL    ABS. MONOCYTES PENDING K/UL    ABS. EOSINOPHILS PENDING K/UL    ABS. BASOPHILS PENDING K/UL    ABS. IMM. GRANS. PENDING K/UL    DF PENDING          Pt tolerated treatment well. Patient did not require blood transfusion today. Chest port flushed and heparinized and deaccessed per protocol. D/c home ambulatory in no distress. Pt aware of next appointment scheduled for 06/28 0930.

## 2022-06-24 NOTE — TELEPHONE ENCOUNTER
[employer]  Oncology Social Work  Encounter      Patient Name:  Luz Peterson Lomeli [place] History: h&n cancer      Advance Directives: none on file; conversation deferred     Narrative: Assisted patient with scheduling transportation.  Called Medicaid/ #[phone] and booked the following dates:     June 30th at 2:00pm - Gastroenterology   Pickup time - 1:00 am  Trip # [id number]     Informed patient transportation has been scheduled.      Referral/Handouts:   Transportation referral     Thank you,  Bill Hough LCSW

## 2022-06-24 NOTE — TELEPHONE ENCOUNTER
06/24/22 9:34 AM Met with patient in the infusion center. He is requesting refill of percocet. Reports he is averaging 2 percocet pills daily. I advised I will refill for 42 tabs which should last him for 3 weeks. Advised he should try to cut back on the percocet and only take on tab daily, replace 2nd dose with tylenol 1000mg. He will start physical therapy for his neck in July, so I am hopeful this will help with neck pain and decrease frequency of opioid use. Checked . NO early refills or other prescribers. Percocet 1 tab every 12 hours #42 tabs for 21 day supply. Would not plan to repeat pap smear until at least 1 year from the colposcopic biopsy, which was performed on 1/11/2021.      Next pap due 1/11/2022

## 2022-06-24 NOTE — TELEPHONE ENCOUNTER
[employer] at [place]  [phone]        06/24/22 10:42 AM Patient updated Madeline Jain, , that he is now scheduled with [facility] on 06/30 at 2 PM. Will update Dr. Nick Gore of above. No further questions or concerns at this time.

## 2022-06-28 NOTE — PROGRESS NOTES
Outpatient Infusion Center Progress Note    0840 Pt admit to [facility] for Blood Transfusion ambulatory in stable condition. Assessment completed by Nayana Mai. No new concerns voiced. Right chest port accessed without difficulty by the assessment nurse, labs drawn and sent for processing. Visit Vitals  /72 (BP 1 Location: Left arm) Comment: End of Transfusion   Pulse 88 Comment: End of Transfusion   Temp 97.3 °F (36.3 °C) Comment: End of Transfusion   Resp 16 Comment: End of Transfusion   Ht 6' (1.829 m)   Wt 65.7 kg (144 lb 12.8 oz)   SpO2 100%   BMI 19.64 kg/m²       Medications:  N/A    1140 First unit initiated. 1355 First unit completed. Patient Vitals for the past 12 hrs:   Temp Pulse Resp BP SpO2   06/28/22 1359 97.3 °F (36.3 °C) 88 16 107/72 100 %   06/28/22 1155 (!) 96.2 °F (35.7 °C) 87 16 109/77 100 %   06/28/22 1129 (!) 96.3 °F (35.7 °C) 99 16 99/72 99 %   06/28/22 0842 97.8 °F (36.6 °C) (!) 106 16 105/67 99 %         Pt monitored declined to stay for post infusion monitoring, patient educated and provided with written material regarding s/s of delayed reaction to watch for at home. 1415 Pt tolerated treatment well. D/c home ambulatory in no distress.     Signed By: Alesasndra Florence RN     June 28, 2022      Future Appointments   Date Time Provider Nathanael Haas   7/1/2022  9:00 AM SS INF2 CH3 <4H [facility]   7/6/2022  9:30 AM SS INF7 CH4 <4H [facility]   7/8/2022  9:00 AM SS INF4 CH3 <4H [facility]   7/12/2022  9:30 AM SS INF3 CH4 <2H [facility]   7/12/2022  9:45 AM Gates Angle, Stacia Crigler, MD [facility]   7/13/2022 11:00 AM Helga Medina [address]   7/15/2022  9:30 AM SS INF4 CH3 <4H [facility]   7/19/2022  9:00 AM SS INF7 CH4 <4H [facility]   7/22/2022  9:00 AM SS INF4 CH3 <4H [facility]   8/2/2022  9:30 AM SS INF2 CH4 <2H [facility]   8/23/2022 10:00 AM SS INF1 CH4 <2H [facility]

## 2022-06-28 NOTE — PROGRESS NOTES
Patient in infusion center today for labs/possible transfusion. Hgb found to be down to 6.8. Recommend transfusion as patient is being worked up for concern of GI bleed. Seeing GI. Patient has received transfusions in the recent past, tolerated well. Patient denies further questions or concerns. Denies need for provider visit in infusion center today. Will proceed with 1unit PRBC today as ordered. I have discussed with the patient the rationale for blood component transfusion; its benefits in treating or preventing fatigue, organ damage, or death; and its risk which includes mild transfusion reactions, rare risk of blood borne infection, or more serious but rare reactions. I have discussed the alternatives to transfusion, including the risk and consequences of not receiving transfusion. The patient had an opportunity to ask questions and had agreed to proceed with transfusion of blood components.

## 2022-06-30 NOTE — PROGRESS NOTES
Received call from Dr. Joan Dlegado in GI. He saw patient and saw results of recent EGD, colonoscopy. He states he is going to set patient up for capsule endoscopy by Dr. Desi Brizuela at [facility]. Will discuss timing with Dr. Marquise Wise as patient continues to require almost weekly blood transfusions.
5

## 2022-07-01 NOTE — PROGRESS NOTES
Outpatient Infusion Center Progress Note    0900 Pt admit to [facility] for Blood Transfusion ambulatory in stable condition. Assessment completed. No new concerns voiced. Labs drawn by GURWINDER Pedraza, and sent for processing. with positive blood return. 1010 Call to Gail Mishra RN/Dr. Debbi Sal to report Hgb result 7. Order to transfuse one unit blood received. [address] accessed by Marine Jacobsen RN and first unit hung. Visit Vitals  /78   Pulse 88   Temp 98.8 °F (37.1 °C)   Resp 16   SpO2 100%       Medications:  NA    1225 First unit initiated  97.3-90-/76  1455 First unit completed  98.8-88-/78    Pt declined monitoring post transfusion. 1500 Pt tolerated treatment well. Port flushed and deaccessed per protocol. D/c home ambulatory in no distress. Pt aware of next appointment scheduled for 7/6/2022 0930.

## 2022-07-05 NOTE — ED TRIAGE NOTES
EMS called for shortness of breath, patient being treated for throat and lung cancer currently, frequently needs blood transfusions.  Patient feels lethargic similar to how he feels when he needs blood transfusions

## 2022-07-05 NOTE — DISCHARGE INSTRUCTIONS
Be sure to go to clinic in the morning for your blood transfusion. Your blood count is low but improved from last visit, today 7.6   Thank you! Thank you for allowing me to care for you in the emergency department. It is my goal to provide you with excellent care. If you have not received excellent quality care, please ask to speak to the nurse manager. Please fill out the survey that will come to you by mail or email since we listen to your feedback! Below you will find a list of your tests from today's visit. Should you have any questions, please do not hesitate to call the emergency department. Labs  Recent Results (from the past 12 hour(s))   CBC WITH AUTOMATED DIFF    Collection Time: 07/05/22 12:39 AM   Result Value Ref Range    WBC 8.6 4.1 - 11.1 K/uL    RBC 2.63 (L) 4.10 - 5.70 M/uL    HGB 7.6 (L) 12.1 - 17.0 g/dL    HCT 24.1 (L) 36.6 - 50.3 %    MCV 91.6 80.0 - 99.0 FL    MCH 28.9 26.0 - 34.0 PG    MCHC 31.5 30.0 - 36.5 g/dL    RDW 15.2 (H) 11.5 - 14.5 %    PLATELET 716 (H) 579 - 400 K/uL    MPV 9.2 8.9 - 12.9 FL    NRBC 0.0 0.0  WBC    ABSOLUTE NRBC 0.00 0.00 - 0.01 K/uL    NEUTROPHILS 83 (H) 32 - 75 %    LYMPHOCYTES 4 (L) 12 - 49 %    MONOCYTES 11 5 - 13 %    EOSINOPHILS 1 0 - 7 %    BASOPHILS 0 0 - 1 %    IMMATURE GRANULOCYTES 1 (H) 0 - 0.5 %    ABS. NEUTROPHILS 7.1 1.8 - 8.0 K/UL    ABS. LYMPHOCYTES 0.4 (L) 0.8 - 3.5 K/UL    ABS. MONOCYTES 1.0 0.0 - 1.0 K/UL    ABS. EOSINOPHILS 0.1 0.0 - 0.4 K/UL    ABS. BASOPHILS 0.0 0.0 - 0.1 K/UL    ABS. IMM.  GRANS. 0.0 0.00 - 0.04 K/UL    DF AUTOMATED     METABOLIC PANEL, COMPREHENSIVE    Collection Time: 07/05/22 12:39 AM   Result Value Ref Range    Sodium 136 136 - 145 mmol/L    Potassium 4.3 3.5 - 5.1 mmol/L    Chloride 99 97 - 108 mmol/L    CO2 33 (H) 21 - 32 mmol/L    Anion gap 4 (L) 5 - 15 mmol/L    Glucose 90 65 - 100 mg/dL    BUN 27 (H) 6 - 20 mg/dL    Creatinine 1.76 (H) 0.70 - 1.30 mg/dL    BUN/Creatinine ratio 15 12 - 20      GFR est AA 50 (L) >60 ml/min/1.73m2    GFR est non-AA 41 (L) >60 ml/min/1.73m2    Calcium 9.2 8.5 - 10.1 mg/dL    Bilirubin, total 0.2 0.2 - 1.0 mg/dL    AST (SGOT) 14 (L) 15 - 37 U/L    ALT (SGPT) 10 (L) 12 - 78 U/L    Alk. phosphatase 74 45 - 117 U/L    Protein, total 7.2 6.4 - 8.2 g/dL    Albumin 2.9 (L) 3.5 - 5.0 g/dL    Globulin 4.3 (H) 2.0 - 4.0 g/dL    A-G Ratio 0.7 (L) 1.1 - 2.2     TYPE & SCREEN    Collection Time: 07/05/22 12:39 AM   Result Value Ref Range    Crossmatch Expiration 07/08/2022,2359     ABO/Rh(D) Mariel Purdy Positive     Antibody screen Negative        Radiologic Studies  No orders to display     CT Results  (Last 48 hours)      None          CXR Results  (Last 48 hours)      None          ------------------------------------------------------------------------------------------------------------  The exam and treatment you received in the Emergency Department were for an urgent problem and are not intended as complete care. It is important that you follow-up with a doctor, nurse practitioner, or physician assistant to:  (1) confirm your diagnosis,  (2) re-evaluation of changes in your illness and treatment, and  (3) for ongoing care. Please take your discharge instructions with you when you go to your follow-up appointment. If you have any problem arranging a follow-up appointment, contact the Emergency Department. If your symptoms become worse or you do not improve as expected and you are unable to reach your health care provider, please return to the Emergency Department. We are available 24 hours a day. If a prescription has been provided, please have it filled as soon as possible to prevent a delay in treatment. If you have any questions or reservations about taking the medication due to side effects or interactions with other medications, please call your primary care provider or contact the ER.

## 2022-07-06 NOTE — TELEPHONE ENCOUNTER
07/06/22 4:20 PM Called brother, on PHI and advised pt will receive keytruda on Friday. He will update patient.

## 2022-07-06 NOTE — PROGRESS NOTES
[facility] Lab Visit:     6137 Pt arrived ambulatory and in no distress, labs drawn 1 sticks in  left ac per KK . Departed [facility] ambulatory and in no distress. Visit Vitals  /71   Pulse (!) 106   Temp 97.6 °F (36.4 °C)   Resp 16   Wt 66.6 kg (146 lb 12.8 oz)   SpO2 100%   BMI 19.91 kg/m²       Labs available in CC once resulted.

## 2022-07-07 NOTE — LETTER
7/7/2022    Patient: Ted Ríos   YOB: 1970   Date of Visit: 7/7/2022     Munira Levine NP  [address] [id number]  Via Fax: [phone]    Dear Munira Levine NP,      Thank you for referring Mr. Ted Ríos to [address] [place] [address] for evaluation. My notes for this consultation are attached. If you have questions, please do not hesitate to call me. I look forward to following your patient along with you.       Sincerely,    Nithya Estrada MD

## 2022-07-07 NOTE — PROGRESS NOTES
History and Physical    Patient: Campos Pathak MRN: [id number]  SSN: [id number]    YOB: 1970  Age: 49 y.o. Sex: male      Subjective:      Campos Pathak is a 49 y.o. male with past medical history of laryngeal carcinoma, status post surgery, chemo, radiation now on chemotherapy again, anxiety, tracheostomy, COPD is here for follow-up of hypothyroidism. He was sent to me by hematologist oncologist Carlita Rivers NP. Patient is unable to talk. He communicated by writing. Patient was seen in March 2022. Labs done after that visit showed TSH was elevated. Her levothyroxine was increased to 75 mcg daily from 50 mcg daily. He is getting TSH checked frequently by oncologist.  He is on Keytruda infusion once in 2 weeks. TSH was elevated in June 2022, so I increase levothyroxine to 100 mcg daily. He has been taking this for the past 2 weeks or so. Energy level is so-so. Denies any change in his symptoms otherwise. He is taking levothyroxine appropriately. Initial history:  She tells me that he was diagnosed with hypothyroidism 1-1/2-year back, even before he had surgery for laryngeal carcinoma. He is not entirely sure but he thinks he was on levothyroxine 25 mcg daily for a long time, in January 2022 his dose was increased to 50 mcg. He takes his medications orally, he is taking it every day for 10 in the morning on empty stomach and waits at least 30 minutes before he eats or drinks anything else. He tells me that his energy level is good, bowel movements sometimes regular, sometimes not, sleep is poor because of neck discomfort, weight has been up and down, denies any heat or cold intolerance. He did not have labs repeated after levothyroxine dose was increased.     Past Medical History:   Diagnosis Date    Chronic pain     THROAT, EARS, NECK R/T CANCER    COPD (chronic obstructive pulmonary disease) (HCC)     EMPHASEMA    Psychiatric disorder     ANXIETY R/T CANCER    Throat cancer ([address]     Tracheostomy present [facility])      Past Surgical History:   Procedure Laterality Date    COLONOSCOPY N/A 2022    COLONOSCOPY performed by Hernandez Todd MD at [facility] COLONOSCOPY N/A 2022    COLONOSCOPY performed by Hernandez Todd MD at [facility] HX ORTHOPAEDIC      LEFT ARM- \" PUT PLATE IN\"    HX TRACHEOSTOMY  2021    IR INSERT TUNL CVC W PORT OVER 5 YEARS  2021    IR PLACE CVAD FLUORO GUIDE  2021      Family History   Problem Relation Age of Onset    Diabetes Mother     Diabetes Father     Kidney Disease Father     Diabetes Sister     Heart Disease Daughter     Anesth Problems Neg Hx      Social History     Tobacco Use    Smoking status: Former Smoker     Packs/day: 1.50     Years: 30.00     Pack years: 45.00     Quit date: 10/28/2020     Years since quittin.6    Smokeless tobacco: Never Used   Substance Use Topics    Alcohol use: Not Currently      Prior to Admission medications    Medication Sig Start Date End Date Taking? Authorizing Provider   oxyCODONE-acetaminophen (PERCOCET) 5-325 mg per tablet Take 1 Tablet by mouth every twelve (12) hours as needed for Pain for up to 21 days. Max Daily Amount: 2 Tablets. 6/24/22 7/15/22 Yes Will Mascorro NP   levothyroxine (SYNTHROID) 100 mcg tablet Take 1 Tablet by mouth Daily (before breakfast) for 30 days. 22 Yes Danniel Leventhal, MD   pantoprazole (PROTONIX) 40 mg tablet Take 1 Tablet by mouth two (2) times a day. Indications: bleeding from stomach, esophagus or duodenum 22  Yes Will Mascorro NP   ferrous sulfate 325 mg (65 mg iron) tablet Take 1 Tablet by mouth two (2) times daily (with meals). 22  Yes Will Mascorro NP   levothyroxine (SYNTHROID) 75 mcg tablet Take 1 Tablet by mouth Daily (before breakfast).  22  Yes Cassie Ballard MD        No Known Allergies    Review of Systems:  ROS    A comprehensive review of systems was preformed and it is negative except mentioned in HPI    Objective:     Vitals:    07/07/22 1603   BP: 108/70   Pulse: (!) 119   Temp: 98.7 °F (37.1 °C)   TempSrc: Temporal   SpO2: 97%   Weight: 146 lb 14.4 oz (66.6 kg)   Height: 6' (1.829 m)        Physical Exam:    Physical Exam  Vitals and nursing note reviewed. Constitutional:       Appearance: Normal appearance. HENT:      Head: Normocephalic and atraumatic. Cardiovascular:      Rate and Rhythm: Normal rate and regular rhythm. Pulmonary:      Effort: Pulmonary effort is normal.      Breath sounds: Normal breath sounds. Neurological:      Mental Status: He is alert. Labs and Imaging:    Last 3 Recorded Weights in this Encounter    07/07/22 1603   Weight: 146 lb 14.4 oz (66.6 kg)        Lab Results   Component Value Date/Time    Hemoglobin A1c 6.0 (H) 02/09/2021 04:20 AM        Assessment:     Patient Active Problem List   Diagnosis Code    Throat cancer [address] C14.0    COPD exacerbation ([facility] J44.1    Acute and chronic respiratory failure with hypoxia (HCC) J96.21    Chronic pain due to neoplasm G89.3    Anxiety F41.9    Insomnia G47.00    Respiratory failure with hypoxia (HCC) J96.91    Laryngeal carcinoma (HCC) C32.9    Severe protein-calorie malnutrition (HCC) E43    Hypokalemia E87.6    Prevention of chemotherapy-induced neutropenia Z76.89    Anemia due to GI blood loss D50.0    Palpitations R00.2    Chest pain R07.9    Anemia D64.9    Symptomatic anemia D64.9    UGIB (upper gastrointestinal bleed) K92.2    GI bleed K92.2    CKD (chronic kidney disease) N18.9    Chronic anemia D64.9           Plan:     Hypothyroidism:  1-:  TSH elevated at 42.2  No free T4 available  8 AM cortisol 8.1  Levothyroxine dose was increased to 50 mcg daily. Review of old labs, TSH has always been high and free T4 low at least since August 2021. Per patient, he is taking levothyroxine 50 mcg regularly and appropriately.     3-9-2022:  TSH elevated at 37.5  Levothyroxine was increased to 75 mcg daily    6-4-2022:  TSH elevated at 24.6  Levothyroxine was increased to 100 mcg daily  He has been taking it for the past 2 weeks  Plan:  Check TSH in 2 weeks. Based on the result I will adjust levothyroxine dose. Encourage patient to continue to take it regularly and appropriately. Laryngeal carcinoma:  Stage IV, status post surgery, chemo, radiation, immunotherapy, with recurrence in 2021, now on chemotherapy again. Closely following with oncology. Getting Keytruda infusion every 2 weeks.     Orders Placed This Encounter    TSH AND FREE T4 ([facility] Default)     Standing Status:   Future     Standing Expiration Date:   1/7/2023        Signed By: Raisa Morrison MD     July 7, 2022      Return to clinic

## 2022-07-08 NOTE — PROGRESS NOTES
[facility] Progress Note    Date: 2022    Name: Stanley    MRN: [id number]         : 1970    Mr. Peterson Arrived ambulatory and in no distress for C6D1 of Keytruda + Blood Transfusion Regimen. Assessment was completed, no acute issues at this time, no new complaints voiced. Right chest wall port accessed without difficulty, labs drawn & sent for processing. Chemotherapy Flowsheet 2022   Cycle C6D1   Date 2022   Drug / Regimen Keytruda   Pre Hydration -   Post Hydration -   Pre Meds -   Notes given       Mr. Peterson's vitals were reviewed. Visit Vitals  /69   Pulse (!) 102   Temp 98.9 °F (37.2 °C)   Resp 17   Ht 6' (1.829 m)   Wt 66.2 kg (145 lb 14.4 oz)   SpO2 99%   BMI 19.79 kg/m²       Lab results were obtained and reviewed. Recent Results (from the past 12 hour(s))   CBC WITH AUTOMATED DIFF    Collection Time: 22  9:16 AM   Result Value Ref Range    WBC 9.3 4.1 - 11.1 K/uL    RBC 2.49 (L) 4.10 - 5.70 M/uL    HGB 7.2 (L) 12.1 - 17.0 g/dL    HCT 22.6 (L) 36.6 - 50.3 %    MCV 90.8 80.0 - 99.0 FL    MCH 28.9 26.0 - 34.0 PG    MCHC 31.9 30.0 - 36.5 g/dL    RDW 15.1 (H) 11.5 - 14.5 %    PLATELET 122 (H) 369 - 400 K/uL    MPV 9.2 8.9 - 12.9 FL    NRBC 0.0 0  WBC    ABSOLUTE NRBC 0.00 0.00 - 0.01 K/uL    NEUTROPHILS 84 (H) 32 - 75 %    LYMPHOCYTES 4 (L) 12 - 49 %    MONOCYTES 11 5 - 13 %    EOSINOPHILS 0 0 - 7 %    BASOPHILS 0 0 - 1 %    IMMATURE GRANULOCYTES 1 (H) 0.0 - 0.5 %    ABS. NEUTROPHILS 7.8 1.8 - 8.0 K/UL    ABS. LYMPHOCYTES 0.4 (L) 0.8 - 3.5 K/UL    ABS. MONOCYTES 1.0 0.0 - 1.0 K/UL    ABS. EOSINOPHILS 0.0 0.0 - 0.4 K/UL    ABS. BASOPHILS 0.0 0.0 - 0.1 K/UL    ABS. IMM.  GRANS. 0.1 (H) 0.00 - 0.04 K/UL    DF SMEAR SCANNED      RBC COMMENTS ANISOCYTOSIS  1+       METABOLIC PANEL, BASIC    Collection Time: 22  9:16 AM   Result Value Ref Range    Sodium 136 136 - 145 mmol/L    Potassium 4.1 3.5 - 5.1 mmol/L    Chloride 97 97 - 108 mmol/L    CO2 29 21 - 32 mmol/L Anion gap 10 5 - 15 mmol/L    Glucose 103 (H) 65 - 100 mg/dL    BUN 29 (H) 6 - 20 MG/DL    Creatinine 1.66 (H) 0.70 - 1.30 MG/DL    BUN/Creatinine ratio 17 12 - 20      GFR est AA 53 (L) >60 ml/min/1.73m2    GFR est non-AA 44 (L) >60 ml/min/1.73m2    Calcium 9.5 8.5 - 10.1 MG/DL   TSH 3RD GENERATION    Collection Time: 07/08/22  9:16 AM   Result Value Ref Range    TSH 26.70 (H) 0.36 - 3.74 uIU/mL   FERRITIN    Collection Time: 07/08/22  9:16 AM   Result Value Ref Range    Ferritin 400 (H) 26 - 388 NG/ML   IRON PROFILE    Collection Time: 07/08/22  9:16 AM   Result Value Ref Range    Iron 12 (L) 35 - 150 ug/dL    TIBC 220 (L) 250 - 450 ug/dL    Iron % saturation 5 (L) 20 - 50 %   RBC, ALLOCATE    Collection Time: 07/08/22 11:00 AM   Result Value Ref Range    HISTORY CHECKED?  Historical check performed        Medications:  Medications Administered     0.9% sodium chloride infusion     Admin Date  07/08/2022 Action  New Bag Dose  25 mL/hr Rate  25 mL/hr Route  IntraVENous Administered By  [facility]          0.9% sodium chloride infusion 250 mL     Admin Date  07/08/2022 Action  New Bag Dose  250 mL Rate  15 mL/hr Route  IntraVENous Administered By  [facility]          0.9% sodium chloride infusion 500 mL     Admin Date  07/08/2022 Action  New Bag Dose  500 mL Rate  1,000 mL/hr Route  IntraVENous Administered By  [facility]          heparin (porcine) pf 300-500 Units     Admin Date  07/08/2022 Action  Given Dose  500 Units Route  InterCATHeter Administered By  [facility]          pembrolizumab [facility]) 200 mg in 0.9% sodium chloride 100 mL, overfill volume 10 mL IVPB     Admin Date  07/08/2022 Action  New Bag Dose  200 mg Rate  236 mL/hr Route  IntraVENous Administered By  [facility]          sodium chloride (NS) flush 10 mL     Admin Date  07/08/2022 Action  Given Dose  10 mL Route  IntraVENous Administered By  [facility]              Two nurses verified prior to administering:  Drug name  Drug dose  Infusion volume or drug volume when prepared in a syringe  Rate of administration  Route of administration  Expiration dates and/or times  Appearance and physical integrity of the drugs  Rate set on infusion pump, when used  Sequencing of drug administration      1200:  1st unit PRBCs started and infusing without difficulty, observed x 15 minutes  1420:  1st unit completed without adverse reaction noted, NS flushing line. Mr. Jada Love tolerated treatment well and was discharged from [place] [address] in stable condition at 1505. Port de-accessed, flushed & heparinized per protocol. He is to return on July 12, 2022 at 0930 for his next appointment. D/C instructions reviewed, copy to pt, voiced understanding. Patient declined 1 hour post transfusion observation.     Kat Desir  July 8, 2022

## 2022-07-08 NOTE — PROGRESS NOTES
Please inform patient's brother that I received patient's thyroid labs from his oncologist.  There is no improvement since increasing levothyroxine to 100 mcg. So I am going to increase levothyroxine to 125 mcg daily. Patient was given a lab order. I would like him to get labs repeated in the last week of July.

## 2022-07-08 NOTE — TELEPHONE ENCOUNTER
07/08/22   Evaluated patient in the infusion center. Advise when he was here on 6/21/22 he did not receive Keytruda. I advised want him to receive [facility] today to prevent future delays. He is in agreement. He reports 1 month history of intermittent RLQ pain, none currently. Denies pattern, rebound tenderness, constipation, fevers, chills, not exacerbated by eating. Reports mild nausea, decreased appetite and ongoing dark stools. Reports mild fatigue and exertional SOB. He continues taking Kaela seltzer plus. He requests a blood transfusion today because he has a follow up with GI July 12th and he worries his Hgb will drop and he won't make the appointment due to need for transfusion. Advised I will give him one unit of blood today given he is symptomatic with continued dark stools. Advised he stop Kaela seltzer plus given aspirin component and advised he start on carafate every 6 hours, dissolve in 10mL of water. He verbalized understanding. Will also give 500mL NS for TASHI. He verbalized understanding.

## 2022-07-08 NOTE — TELEPHONE ENCOUNTER
pt's brother has been informed  ----- Message from Humble Lujan MD sent at 7/8/2022 11:42 AM EDT -----  Please inform patient's brother that I received patient's thyroid labs from his oncologist.  There is no improvement since increasing levothyroxine to 100 mcg. So I am going to increase levothyroxine to 125 mcg daily. Patient was given a lab order. I would like him to get labs repeated in the last week of July.

## 2022-07-09 NOTE — ED PROVIDER NOTES
EMERGENCY DEPARTMENT HISTORY AND PHYSICAL EXAM      Date: 7/5/2022  Patient Name: Fayetta Boeck      History of Presenting Illness     Chief Complaint   Patient presents with    Shortness of Breath       History Provided By: Patient    HPI: Fayetta Boeck, 49 y.o. male with a past medical history significant for COPD, chronic pain, anemia, throat cancer with tracheostomy presents to the ED with cc of weakness. Patient presents with a handwritten note requesting a blood transfusion. He states he frequently gets blood transfusions secondary to chronic anemia. He feels he is in need of another transfusion, he reoprts shortness of breath, palpitations and dizziness worse than normal.  He also reports ongoing black stools and states he has a pending appointment with GI. He admits to abdominal pain which has been ongoing in addition, mild in nature 4/10 presently. He denies any associated N/V, diarrhea, constipation, dysuria or fever/chills. He denies any chest pain. There are no other complaints, changes, or physical findings at this time. PCP: Cedric Khoury NP    Current Outpatient Medications   Medication Sig Dispense Refill    levothyroxine (SYNTHROID) 125 mcg tablet Take 1 Tablet by mouth Daily (before breakfast) for 30 days. 30 Tablet 5    sucralfate (Carafate) 1 gram tablet Take 1 Tablet by mouth four (4) times daily as needed (abdominal pain). Dissolve in 10 mL of water before takign. 120 Tablet 0    oxyCODONE-acetaminophen (PERCOCET) 5-325 mg per tablet Take 1 Tablet by mouth every twelve (12) hours as needed for Pain for up to 21 days. Max Daily Amount: 2 Tablets. 42 Tablet 0    pantoprazole (PROTONIX) 40 mg tablet Take 1 Tablet by mouth two (2) times a day. Indications: bleeding from stomach, esophagus or duodenum 60 Tablet 2    ferrous sulfate 325 mg (65 mg iron) tablet Take 1 Tablet by mouth two (2) times daily (with meals).  60 Tablet 3       Past History   Past Medical History:  Past Medical History:   Diagnosis Date    Chronic pain     THROAT, EARS, NECK R/T CANCER    COPD (chronic obstructive pulmonary disease) (HCC)     EMPHASEMA    Psychiatric disorder     ANXIETY R/T CANCER    Throat cancer (HCC)     Tracheostomy present [facility])        Past Surgical History:  Past Surgical History:   Procedure Laterality Date    COLONOSCOPY N/A 2022    COLONOSCOPY performed by Dandre Brown MD at [address] [place] [address] COLONOSCOPY N/A 2022    COLONOSCOPY performed by Dandre Brown MD at [address] [place] [address] HX [address]- \" PUT PLATE IN\"    HX TRACHEOSTOMY  2021    IR INSERT TUNL CVC W PORT OVER 5 YEARS  2021    IR PLACE CVAD FLUORO GUIDE  2021       Family History:  Family History   Problem Relation Age of Onset    Diabetes Mother     Diabetes Father     Kidney Disease Father     Diabetes Sister     Heart Disease Daughter     Anesth Problems Neg Hx        Social History:  Social History     Tobacco Use    Smoking status: Former Smoker     Packs/day: 1.50     Years: 30.00     Pack years: 45.00     Quit date: 10/28/2020     Years since quittin.7    Smokeless tobacco: Never Used   Vaping Use    Vaping Use: Never used   Substance Use Topics    Alcohol use: Not Currently    Drug use: Never       Allergies:  No Known Allergies  Review of Systems   Review of Systems   Constitutional: Positive for fatigue. Negative for chills and fever. Respiratory: Positive for shortness of breath. Cardiovascular: Positive for palpitations. Gastrointestinal: Positive for abdominal pain and blood in stool. Negative for diarrhea, nausea and vomiting. Genitourinary: Negative. Negative for dysuria. Neurological: Positive for dizziness. All other systems reviewed and are negative. Physical Exam   Physical Exam  Vitals and nursing note reviewed. Constitutional:       General: He is not in acute distress. Appearance: He is underweight.       Comments: Chronically ill-appearing   HENT:      Head: Normocephalic and atraumatic. Mouth/Throat:      Comments: trach  Eyes:      Extraocular Movements: Extraocular movements intact. Conjunctiva/sclera: Conjunctivae normal.   Cardiovascular:      Rate and Rhythm: Normal rate and regular rhythm. Heart sounds: Normal heart sounds. Pulmonary:      Effort: Pulmonary effort is normal.      Breath sounds: Normal breath sounds. No wheezing or rales. Abdominal:      General: Bowel sounds are normal.      Palpations: Abdomen is soft. Tenderness: There is no abdominal tenderness. Musculoskeletal:         General: Normal range of motion. Cervical back: Normal range of motion and neck supple. Skin:     General: Skin is warm and dry. Neurological:      General: No focal deficit present. Mental Status: He is alert. Psychiatric:         Mood and Affect: Mood normal.         Behavior: Behavior normal. Behavior is cooperative. Lab and Diagnostic Study Results   Labs -     Admission on 07/05/2022, Discharged on 07/05/2022   Component Date Value    WBC 07/05/2022 8.6     RBC 07/05/2022 2.63*    HGB 07/05/2022 7.6*    HCT 07/05/2022 24.1*    MCV 07/05/2022 91.6     MCH 07/05/2022 28.9     MCHC 07/05/2022 31.5     RDW 07/05/2022 15.2*    PLATELET 39/67/9128 727*    MPV 07/05/2022 9.2     NRBC 07/05/2022 0.0     ABSOLUTE NRBC 07/05/2022 0.00     NEUTROPHILS 07/05/2022 83*    LYMPHOCYTES 07/05/2022 4*    MONOCYTES 07/05/2022 11     EOSINOPHILS 07/05/2022 1     BASOPHILS 07/05/2022 0     IMMATURE GRANULOCYTES 07/05/2022 1*    ABS. NEUTROPHILS 07/05/2022 7.1     ABS. LYMPHOCYTES 07/05/2022 0.4*    ABS. MONOCYTES 07/05/2022 1.0     ABS. EOSINOPHILS 07/05/2022 0.1     ABS. BASOPHILS 07/05/2022 0.0     ABS. IMM.  GRANS. 07/05/2022 0.0     DF 07/05/2022 AUTOMATED     Sodium 07/05/2022 136     Potassium 07/05/2022 4.3     Chloride 07/05/2022 99     CO2 07/05/2022 33*    Anion gap 07/05/2022 4*    Glucose 07/05/2022 90     BUN 07/05/2022 27*    Creatinine 07/05/2022 1.76*    BUN/Creatinine ratio 07/05/2022 15     GFR est AA 07/05/2022 50*    GFR est non-AA 07/05/2022 41*    Calcium 07/05/2022 9.2     Bilirubin, total 07/05/2022 0.2     AST (SGOT) 07/05/2022 14*    ALT (SGPT) 07/05/2022 10*    Alk. phosphatase 07/05/2022 74     Protein, total 07/05/2022 7.2     Albumin 07/05/2022 2.9*    Globulin 07/05/2022 4.3*    A-G Ratio 07/05/2022 0.7*    Crossmatch Expiration 07/05/2022 07/08/2022,2359     ABO/Rh(D) 07/05/2022 O Positive     Antibody screen 07/05/2022 Negative          Radiologic Studies -   XR Results (most recent):  Results from [place] encounter on 06/17/22    XR CHEST SNGL V    Narrative  1 view comparison June 4    Intrafissural effusion on the right may be somewhat worse. Masslike appearance  to the left hilum, needing follow-up. There may be patchy disease in the right  upper lobe, most of the superimposition artifact on fibrosis. No effusion. Normal heart and stable mediastinum        Medical Decision Making and ED Course   - I am the first and primary provider for this patient AND AM THE PRIMARY PROVIDER OF RECORD. I reviewed the vital signs, available nursing notes, past medical history, past surgical history, family history and social history. - Initial assessment performed. The patients presenting problems have been discussed, and the staff are in agreement with the care plan formulated and outlined with them. I have encouraged them to ask questions as they arise throughout their visit.     Differential Diagnosis & Medical Decision Making Provider Note:     MDM  Number of Diagnoses or Management Options  Acute on chronic anemia: established, worsening     Amount and/or Complexity of Data Reviewed  Clinical lab tests: ordered and reviewed  Tests in the radiology section of CPT®: ordered and reviewed  Review and summarize past medical records: yes    Risk of Complications, Morbidity, and/or Mortality  Presenting problems: moderate  Diagnostic procedures: moderate  Management options: moderate    Patient Progress  Patient progress: stable       Vital Signs-Reviewed the patient's vital signs. See chart    ED Course:   ED Course as of 07/12/22 0314 Tue Jul 05, 2022   0130 Patient presents with fatigue and dyspnea, requesting a blood transfusion. Patient is nontoxic-appearing with normal vital signs-afebrile, no tachycardia, normotensive, no hypoxia. Will obtain labs and treat symptomatically; will review records to determine if additional work-up is needed. [LP]   0200 Records reviewed noting patient has chronic anemia and has as needed blood transfusion scheduled Tuesdays and Thursdays. Patient received 1 unit PRBC 4 days ago here in the emergency department. Records also note patient has chronic abdominal pain/GERD. [LP]   0230 Patient states he mainly came because his transportation is not set up for him to go to clinic tomorrow for a blood transfusion. He denies any new or worsening symptoms. He specifically denies any chest pain or shortness of breath. [LP]   [id number] Patient advises he is scheduled for a routine blood transfusion today at the oncology clinic. He states he has transportation already set up and will be able to get there. He denies any worsening symptoms. He denies fevers or URI symptoms. VS stable -normotensive, no hypoxia or tachycardia. Will discharge with follow-up as scheduled. Red flags and return precautions discussed. [LP]      ED Course User Index  [LP] Daiva Kanner, NP           Disposition   Disposition: Condition stable  DC- Adult Discharges: All of the diagnostic tests were reviewed and questions answered. Diagnosis, care plan and treatment options were discussed. The patient understands the instructions and will follow up as directed. The patients results have been reviewed with them.   They have been counseled regarding their diagnosis. The patient verbally convey understanding and agreement of the signs, symptoms, diagnosis, treatment and prognosis and additionally agrees to follow up as recommended with their PCP in 24 - 48 hours. They also agree with the care-plan and convey that all of their questions have been answered. I have also put together some discharge instructions for them that include: 1) educational information regarding their diagnosis, 2) how to care for their diagnosis at home, as well a 3) list of reasons why they would want to return to the ED prior to their follow-up appointment, should their condition change. DC-The patient was given verbal follow-up instructions  DC- Pain Control DC Home plan: Referral Family Medicine/PCP and oncology, GI    Discharged    DISCHARGE PLAN:  1. Cannot display discharge medications since this patient is not currently admitted. 2.   Follow-up Information     Follow up With Specialties Details Why Contact Info    Alok Ríos NP Nurse Practitioner Schedule an appointment as soon as possible for a visit  for ER follow up [address]  [phone]      Your oncologist  Schedule an appointment as soon as possible for a visit  for ER follow up     [address] [place] [address] EMERGENCY DEPT Emergency Medicine  If symptoms worsen [address] [facility] [address] [phone]        3. Return to ED if worse   4. Discharge Medication List as of 7/5/2022  2:55 AM          Diagnosis/Clinical Impression     Clinical Impression:   1. Acute on chronic anemia        Attestations:  Sandy Delatorre NP    Please note that this dictation was completed with ReVera, the computer voice recognition software. Quite often unanticipated grammatical, syntax, homophones, and other interpretive errors are inadvertently transcribed by the computer software. Please disregard these errors. Please excuse any errors that have escaped final proofreading.   Thank you.

## 2022-07-13 NOTE — PROGRESS NOTES
[address] [facility]  [address] [place] [address]      EVALUATION    NAME: Binnie Gottron AGE: 49 y.o. GENDER: male  DATE: 7/13/2022  REFERRING PHYSICIAN: Archana Pepe NP  HISTORY AND BACKGROUND: Patient returns to clinic for evaluation and treatment of HNL and cancer associated pain. Communicates via writing. Reports L neck pain, describing as tightness. States manual therapy in this clinic previously effective in reducing pain. Patient last seen in this clinic on 5/4/2022, with good response to treatment. Measurements have been completed for head and neck compression garment. Patient presents with productive cough, stating he has a doctors appointment tomorrow and will discuss this. 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      Stage: CATARINO [[id number]] at diagnosis, regional recurrence in 4/2021, now with metastatic disease.      Pathology:   2/8/21 Total laryngectomy: Invasive squamous cell carcinoma  Tumor size 3.5cm, moderately differentiated (G2), PNI present, LVI not identified, margins negative  [id number]  -PDL1 (tested 12/28/21 on above specimen) positive with CPS 10.      Prior Treatment:   1. 2/8/21 total laryngectomy/cricopharyngeal myotomy   2. Cisplatin 100mg/m2 every 3 weeks x 3 cycles, 5/24/21-7/13/21  3. Radiation 5/24/21-7/19/21  4. Pembrolizumab x 2 cycles, 1/6/22     Current Treatment:  Pembrolizumab, day 1 given every 21 days x 6 cycles followed by Pembrolizumab maintenance x 2yr. Start 1/28/22 - current.    Primary Diagnosis:  Lymphedema, not elsewhere classified (I89.0)  Cancer associated pain (G89.3)  Other Treatment Diagnoses:   Swelling not relieved by elevation (R60.9)   Malignant neoplasm of larynx (C32.9)  · Lack of coordination (R27.9)  Date of Onset: 2/8/2021  Present Symptoms and Functional Limitations: neck pain, tightness, swelling impacting optimal performance of self care, routine, leisure activity performance. Reports lymphedema has negative impact on sleep. Lymphedema Life Impact Scale (LLIS): 29/68; 43% impairment  Past Medical History:   Past Medical History:   Diagnosis Date    Chronic pain     THROAT, EARS, NECK R/T CANCER    COPD (chronic obstructive pulmonary disease) ([facility] [address]     EMPHASEMA    Psychiatric disorder     ANXIETY R/T CANCER    Throat cancer ([facility] [address]     [facility]ache[facility] present [facility])      Past Surgical History:   Procedure Laterality Date    COLONOSCOPY N/A 6/7/2022    COLONOSCOPY performed by Beatriz Goldmann, MD at [address] [place] [address] COLONOSCOPY N/A 6/8/2022    COLONOSCOPY performed by Beatriz Goldmann, MD at [address] [place] [address] HX ORTHOPAEDIC      LEFT ARM- \" PUT PLATE IN\"    HX TRACHEOSTOMY  02/08/2021    IR INSERT TUNL CVC W PORT OVER 5 YEARS  5/21/2021    IR PLACE CVAD FLUORO GUIDE  5/21/2021     Current Medications:    Current Outpatient Medications   Medication Sig    levothyroxine (SYNTHROID) 125 mcg tablet Take 1 Tablet by mouth Daily (before breakfast) for 30 days.  sucralfate (Carafate) 1 gram tablet Take 1 Tablet by mouth four (4) times daily as needed (abdominal pain). Dissolve in 10 mL of water before takign.  oxyCODONE-acetaminophen (PERCOCET) 5-325 mg per tablet Take 1 Tablet by mouth every twelve (12) hours as needed for Pain for up to 21 days. Max Daily Amount: 2 Tablets.  pantoprazole (PROTONIX) 40 mg tablet Take 1 Tablet by mouth two (2) times a day. Indications: bleeding from stomach, esophagus or duodenum    ferrous sulfate 325 mg (65 mg iron) tablet Take 1 Tablet by mouth two (2) times daily (with meals). No current facility-administered medications for this encounter.      Allergies: No Known Allergies     Prior Level of Function/Social/Work History/Personal factors and/or comorbidities impacting plan of care:  No neck pain, swelling prior to cancer treatment. Able to speak to communicate prior to cancer of larynx. Lives with friend, Nick Andrew. Non-verbal, prefers to communicate in writing. Neck pain/swelling impacting performance of leisure, routine, activities. Living Situation: private residence  Trainable Caregiver?: Yes  Mobility: Level of assistance:  Independent  Sleeping Arrangement:  in bed  Adaptive Equipment Owned: none  Other: Patient is anticipated to be able to don compression garment independently when introduced into care. Enjoys fishing. Community Resources Addressed (if applicable): Yes    Previous Therapy:  PT [facility] manual therapy, exercise 11/2021 to 2/2022; 4/4/2022 to 5/4/2022 at this clinic. Compression/Lymphedema Equipment: none    SUBJECTIVE: Patient arrives with pen and paper to use for communication. Patient reports he responds well to written and verbal instruction. Reports he is tolerating soft diet, continues with Boost supplement. Continues with complaint of anterior lateral neck pain, R>L. Patients goals for therapy: \"reduce pain\"  OBJECTIVE DATA SUMMARY:   EXAMINATION/PRESENTATION/DECISION MAKING:   Pain: 6/10, neck, L lateral greater than R lateral aspect. Self-Care and ADLs: indep    Skin and Tissue Assessment:  Dermal Status: Dry and poor skin turgor lateral neck R/L     Texture/Consistency: Brawny and Fibrotic/Woody R/L lateral neck    Pigmentation/Color Change: Hyperpigmented, Hyperlipodermatosclerosis, neck    Anomalies: N/A      Photos  Height:     Weight:       BMI:     (36 or greater: adversely affecting lymphedema)    Girth measurements:  Base of neck: 38 cm  Under chin: 39 cm  Earlobe to earlobe: 26.5 cm       ROM: bilateral LE/UE WFL.   Cervical ROM as noted:                               AROM       PROM  Flexion 40     Extension 30                                         AROM                   PROM    R L R L   Lat Flexion 35 40       Rotation 45 40         Strength: WFL bilateral LE/UE      Mobility:    Bed/Chair Mobility: Independent  Transfers: Independent  Gait: Independent  Endurance: intact for gait community distances. Other:     Safety:  Patient is alert and oriented:  X 4  Safety awareness: intact  Fall risk?: low  Patient given written fall prevention handout: Yes       In compliance with CMSs Claims Based Outcome Reporting, the following G-code set was chosen for this patient based on their primary functional limitation being treated: The outcome measure chosen to determine the severity of the functional limitation was the LLIS with a score of 29/68 which was correlated with the impairment scale. ? Other PT/OT Primary Functional Limitations:     - CURRENT STATUS: CJ - 20%-39% impaired, limited or restricted    - GOAL STATUS: CI - 1%-19% impaired, limited or restricted    - D/C STATUS:  ---------------To be determined---------------     Physical Therapy Evaluation Charge Determination   History Examination Presentation Decision-Making   MEDIUM  Complexity : 1-2 comorbidities / personal factors will impact the outcome/ POC  LOW Complexity : 1-2 Standardized tests and measures addressing body structure, function, activity limitation and / or participation in recreation  MEDIUM Complexity : Evolving with changing characteristics  Other outcome measures LLIS  MEDIUM      Based on the above components, the patient evaluation is determined to be of the following complexity level: LOW   :  Evaluation Time: 11:12-11:30 am    18 minutes    TREATMENT PROVIDED:   1. Treatment description:  Manual Therapy: Patient instructed in skin care principles and anatomy of the lymphatic system.   Therapist able to demonstrate manual lymphatic drainage techniques for the drainage of head and neck, and skin care protocol including standard lymphedema precautions to include avoiding procedures/acts that could lead to broken skin on affected area, and avoiding excessive heat, resistive activity or altitude without compression garment. STM scalene/SCM/upper trap, with patient requesting to stop treatment due to productive cough interrupting treatment today. Patient performing stoma care in clinic, with sputum to be white/clear, no odor noted. Patient reports he will speak with physician regarding cough at follow up appointment. Treatment time:  5201-6636   Minutes: 30 minutes    Manual therapy 2  Pain Level: 5/10 reported by patient  ASSESSMENT:   Alok Allen is a 49 y.o. male who presents with secondary lymphedema of his head and neck region. Patient is very motivated to improve his condition and is seeking to expand his compression to include custom flat knit facemask/neck and chin wrap as indicated. Patient's condition is complicated by pain and comorbidity of stage IV laryngeal cancer impacting ability to progress diet beyond liquids and soft foods . Patient will benefit from complete decongestive therapy (CDT) including: Manual lymphatic drainage (MLD) technique and Kinesiotaping, manual therapy techniques, fit of appropriate compression garments as appropriate. Due to patient's cancer status, the lymphedema has the potential to significantly worsen over time if it is not managed well. Patient would benefit from an advanced vasopneumatic device to address swelling in the head an neck region medically necessary for effective management of lymphedema. Patient presents with poor skin turgor, hyperpigmented, hyperlipodermatosclerosis resultant of XRT/lymphedema. Patient presents with significant pain neck region, with musculature tightness in XRT field. This care is medically necessary due to the infection risk with lymphedema and to improve functional activities. CDT is necessary to resolve swelling to allow patient to return to wearing normal clothes/footwear and prevent worsening of symptoms, such as infections and/or hospitalizations.  Patient will be independent with home program strategies to allow improved ADL ability and mobility and to allow patient to return to greatest functional independence. Rehabilitation potential is considered to be Fair. Factors which may influence rehabilitation potential include stage IV cancer of larynx. Patient will benefit from 10-12 physical therapy visits over 12 weeks to optimize improvement in these areas. PLAN OF CARE:   Recommendations and Planned Interventions: Manual lymph drainage/decongestive therapy, Multi-layer compression bandaging (short-stretch), Compression garment fitting/provision, Lymphedema therapeutic exercise, AROM/PROM/Strength/Coordination, Education in skin care and lymphedema precautions, Self-MLD education per home program and Caregiver education as needed    GOALS  Short term goals  Time frame: 6 weeks  1. Patient will demonstrate knowledge of signs/symptoms of infections/cellulitis and be independent in skin care to prevent cellulitis. 2.  Patient will demonstrate independence in lymphedema home program of therapeutic exercises to improve circulation and decongest head and neck region to improve ADLs. 3.  Patient will exhibit 10-15 degree improvement cervical lateral flexion/rotation to improve functional activities, including routine/leisure activities with 50% less pain. Long term goals  Time frame: 12 weeks  1. Pt will show improvement in the LLIS by decreasing the score to 5 and thus allow improvement in patient's quality of life. 2.  Patient will be independent with don/doff of compression system and use in order to prevent reaccumulation of fluid at discharge. 3.  Pt will be independent in self-MLD and show stable limb volumes showing decongestion and pt. ready for transition to independent restorative phase of lymphedema therapy. Patient has participated in goal setting and agrees to work toward plan of care. Patient was instructed to call if questions or concerns arise.     Thank you for this referral.  Arthur Giordano, PT, CLERNESTINA-ABIGAIL   Time Calculation: 48 mins    TREATMENT PLAN EFFECTIVE DATES:   7/13/2022 to 10/10/2022  I have read the above plan of care for [facility]. I certify the above prescribed services are required by this patient and are medically necessary.   The above plan of care has been developed in conjunction with the lymphedema/physical therapist.       Physician Signature: ____________________________________Date:______________

## 2022-07-15 NOTE — PROGRESS NOTES
Statement of Medical Necessity  Page 1 of 2      Tato Epps 1970 Today's Date: 7/15/2022 DEVONTE: [employer]   Payor: [place] MEDICAID / Plan: KAITLIN NICHOLAS [facility] CCCP / Product Type: Managed Care Medicaid /  ME: TBD  Refills: 2               Diagnosis  []   I97.2 Post-Mastectomy Lymphedema []   I87.2 Venous Insufficiency   [x]   I89.0 Lymphedema, other secondary  []   I83.019 Venous Stasis Ulcer LE, Right   []   I89.9 Unspecified Lymphatic Disorder []   I83.029 Venous Stasis Ulcer LE, Left   []   R60.9 Swelling not relieved by elevation []   Q82.0 Hereditary/ Congenital Lymphedema   []   C50.211 Malignant neoplasm of breast, Right [x]   G89.3  Cancer associated pain   []   C50.212 Malignant neoplasm of breast, Left []   L03.115 LE Cellulitis, Right   [x]  C32.9 Malignant neoplasm of larynx []   L03.116 LE Cellulitis, Left                                                           Tato Epps    1970  Page 2 of 2    Physician Order for DME for Diagnosis of I89.0 HNL, secondary, as Listed on Statement of Medical Necessity, Page 1        Recommended Product:  Units Head and neck Rt Lt Units Lower Extremity Rt Lt    Circ-Aid, Ready Wrap, Sigvaris Arm    Inelastic binders (Circ-Aid, Farrow)  []Foot   []Below Knee   []Knee   []Thigh      Circ-Aid Ready Wrap, Sigvaris hand    Alon Chris, night use []Full Leg  []Lower Leg      Tribute Arm, night use    Tribute, night use  []Full Leg  []Lower Leg      Alon Chris Arm, night use    Leonard Sleeve Leg/ Foot, night use      Gradiant Compression Sleeves & Gloves  []Custom [] RM Arm:  []CCL1 []CCL2 []CCL3  []Custom [] RM Glove: []CCL1 []CCL2 []CCL3      Gradient Compression Stockings   []Custom  []RM Lower Extremity:   []CCL1       []CCL2         []CCL3   []Knee       []Thigh        []Waist Length      Leonard sleeve arm w/ hand, night use    Tribute Wrap, night use     2 Face mask, CCL 1 garment x x       1 Vasopneumatic pump x x       The above patient was referred for treatment of Lymphedema due to the diagnosis indicated above. Lymphedema is a progressive and chronic condition. It may cause acute infections, muscle atrophy, discomfort, and connective tissue fibrosis with irreversible tissue damage, decreased ROM in the affected limb and diminished functional mobility possibly interfering with independence and ability to work. Recurrent infections and wound complications that commonly occur with Lymphedema often require hospitalization and extensive wound care, thus increasing medical costs. The patient has received complete decongestive therapy which includes manual lymphatic drainage, lymphedema specific exercises, compression bandaging, and hygiene/skin care. Goals of therapy are to reduce the edema and prevent re-accumulation of fluid with its complications. It is medically necessary for this patient to have daytime garments to control this condition. They will need 2 sets (one set to wear and one set to wash for adequate skin care and wearing time). Garments must be replaced every 4-6 months for effectiveness. There are no substitutes available that offer acceptable compression treatment for this Lymphedema patient. If further information is requested, please contact our certified lymphedema therapists at [phone].     [] Emily Trotter, PT, DPT, CLT-ABIGAIL  [x] Kristian Patterson, PT, CLT-ABIGAIL  [] Vanessa Mosquera, PT, DPT, CLT-ABIGAIL  [] Lucille Severs, PT, DPT, CLT    Printed  Provider Name Tala Owens NP Provider Signature  Date    Provider NPI [id number]

## 2022-07-15 NOTE — PROGRESS NOTES
Outpatient Infusion [facility]   Visit Progress Note    4614 Patient admitted to [address] [place] [address] for port flush/labs/possible transfusion (not needed today - hgb=7.7) in stable condition. Assessment completed. No new concerns reported. Pt requested to meet with Moises Vance NP. She was notified and came down to the [address] [place] [address] to meet with patient. VS  Patient Vitals for the past 12 hrs:   Temp Pulse Resp BP SpO2   07/15/22 1004 98.7 °F (37.1 °C) (!) 106 18 110/73 97 %         PAC with positive blood return. Medications:  Medications Administered     0.9% sodium chloride injection 10 mL     Admin Date  07/15/2022 Action  Given Dose  10 mL Route  IntraVENous Administered By  Roger Vo RN           Admin Date  07/15/2022 Action  Given Dose  10 mL Route  IntraVENous Administered By  Roger Vo RN          heparin (porcine) pf 300-500 Units     Admin Date  07/15/2022 Action  Given Dose  500 Units Route  InterCATHeter Administered By  Roger Vo RN          sodium chloride (NS) flush 10 mL     Admin Date  07/15/2022 Action  Given Dose  10 mL Route  IntraVENous Administered By  BrandoChaparro[address] Patient tolerated visit well. Patient discharged from [place] [address] in no distress. Patient aware of next appointment.     Labs  Recent Results (from the past 12 hour(s))   CBC WITH AUTOMATED DIFF    Collection Time: 07/15/22 10:00 AM   Result Value Ref Range    WBC 9.6 4.1 - 11.1 K/uL    RBC 2.78 (L) 4.10 - 5.70 M/uL    HGB 7.7 (L) 12.1 - 17.0 g/dL    HCT 24.3 (L) 36.6 - 50.3 %    MCV 87.4 80.0 - 99.0 FL    MCH 27.7 26.0 - 34.0 PG    MCHC 31.7 30.0 - 36.5 g/dL    RDW 15.2 (H) 11.5 - 14.5 %    PLATELET 281 (H) 790 - 400 K/uL    MPV 9.1 8.9 - 12.9 FL    NRBC 0.0 0  WBC    ABSOLUTE NRBC 0.00 0.00 - 0.01 K/uL    NEUTROPHILS 86 (H) 32 - 75 %    LYMPHOCYTES 4 (L) 12 - 49 %    MONOCYTES 10 5 - 13 %    EOSINOPHILS 0 0 - 7 %    BASOPHILS 0 0 - 1 %    IMMATURE GRANULOCYTES 0 0.0 - 0.5 %    ABS. NEUTROPHILS 8.2 (H) 1.8 - 8.0 K/UL    ABS. LYMPHOCYTES 0.4 (L) 0.8 - 3.5 K/UL    ABS. MONOCYTES 1.0 0.0 - 1.0 K/UL    ABS. EOSINOPHILS 0.0 0.0 - 0.4 K/UL    ABS. BASOPHILS 0.0 0.0 - 0.1 K/UL    ABS. IMM.  GRANS. 0.0 0.00 - 0.04 K/UL    DF SMEAR SCANNED      RBC COMMENTS ANISOCYTOSIS  1+        RBC COMMENTS HYPOCHROMIA  PRESENT

## 2022-07-18 NOTE — PROGRESS NOTES
[id number] [facility] at [address] [place] [address]  [phone]    Hematology / Oncology Established Visit    Reason for Visit:   Campos Pathak is a 49 y.o. male who is seen in follow up of laryngeal cancer. Referred by Dr. 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 Stage: CATARINO [[id number]] at diagnosis, regional recurrence in 4/2021, now with metastatic disease. Pathology:   2/8/21 Total laryngectomy: Invasive squamous cell carcinoma  Tumor size 3.5cm, moderately differentiated (G2), PNI present, LVI not identified, margins negative  [id number]  -PDL1 (tested 12/28/21 on above specimen) positive with CPS 10. Prior Treatment:   1. 2/8/21 total laryngectomy/cricopharyngeal myotomy   2. Cisplatin 100mg/m2 every 3 weeks x 3 cycles, 5/24/21-7/13/21  3. Radiation 5/24/21-7/19/21  4. Pembrolizumab x 2 cycles, 1/6/22  5. [Cisplatin (100 mg/m2 intravenous, day 1) and fluorouracil (1000 mg/m2 per day, continuous infusion, for four days) and Pembrolizumab, day 1 given every 21 days x 4 cycles. Current Treatment:  Pembrolizumab maintenance every 3 weeks, 1/28/22 - current. History of Present Illness:   Campos Pathak is a 49 y.o. male who with COPD who is referred for Head and neck cancer. He presented with throat pain and hoarseness in Sept 2020. Was evaluated by Dr. Jonas Rose in ENT who performed laryngoscopy and diagnosed patient with squamous cell carcinoma involving the larynx and subglottis. In late Dec 2020, neck CT showed a 3 cm mass in Right supraglottic larynx with extension to thyroid cartilage, but no cervical LNs. PET 1/4/21 showed uptake in the laryngeal mass at right vocal fold, extending to thyroid cartilage. He was scheduled for surgery, but he required emergent tracheostomy prior to that; done late Jan 2021. Also had PEG placed 1/30/21. On 2/8/21, he underwent total laryngectomy/cricopharyngeal myotomy.  He quit smoking in Feb 2021. He was evaluated by Dr. Christopher King in [address] [place] [address] in March 2021 who recommended post-op radiation. There were multiple delays given difficult getting in for dental evaluation prior to RT. [facility] simulation scan was notable for a necrotic appearing tumor in Right neck. PET 5/7/21 confirmed hypermetabolic uptake in right neck. PEG removed in March 2021 due to problems with it. Per Dr. Christopher King, pt had 7 teeth extracted by dentist. He returns to the dentist again for dental fillings on 5/26/21. Pt states his neck feel tight, causing pulling sensation but no current pain. Interval History:  Patient here for follow up and Daly Gupta. He followed up with Dr. Blanco Vicente and had a flexible laryngoscopy completed. He was also evaluated by GI and completed capsule endoscopy on 7/19/22. He states his cough has not improved and continues with yellow/brown sputum despite antibiotics. Continues with black stools, but no bowel movement in 5 days. He has severe intermittent stomach pain that he states started about 3 months ago but has worsened in the last week. He reports vomiting the last two nights but denies hematemesis. Is not currently taking stool softeners. He states he is able to eat and drink, denies dehydration. No fevers. Pt tells me he is not taking Protonix because it is not helping his abdominal pain. PMHx: COPD, throat cancer, anxiety  PSurgHx: Left arm plate placement, tracheostomy, total laryngectomy 2/8/21  SHx: Quit smoking 10/28/20 after 45 pack years. No EtOH  FHx: Mother with DM; Father with DM, CKD; Sister with DM. No h/o malignancy. Review of Systems: A complete review of systems was obtained, negative except as described above. Physical Exam:   Blood pressure 115/75, pulse (!) 110, temperature 98.6 °F (37 °C), resp. rate 16, height 6' (1.829 m), weight 148 lb (67.1 kg), SpO2 (!) 89 %.     ECOG PS: 0  General: thin, no acute distress  Eyes: Anicteric sclerae  HENT: Atraumatic   Neck: Supple, Tracheostomy noted  Lymphatic: No supraclavicular, axillary adenopathy. No bilateral cervical LAD, but firm skin at neck bilaterally. Respiratory: RUL inspiratory wheezing otherwise clear throughout, normal respiratory effort  CV: Normal rate, regular rhythm, no murmurs, no peripheral edema  GI: severe tenderness to touch with guarding throughout abdomen  MS: Normal gait and station. Digits without clubbing or cyanosis. 1cm firm nodule on left wrist.  Skin: No rashes. Hyperpigmented skin at neck bilaterally. Neuro/Psych: Alert. Communicates by writing given tracheostomy. Results:     Lab Results   Component Value Date/Time    WBC 12.1 (H) 07/29/2022 09:03 AM    HGB 7.9 (L) 07/29/2022 09:03 AM    HCT 24.6 (L) 07/29/2022 09:03 AM    PLATELET 850 (H) 82/60/5490 09:03 AM    MCV 87.2 07/29/2022 09:03 AM    ABS. NEUTROPHILS 10.5 (H) 07/29/2022 09:03 AM     Lab Results   Component Value Date/Time    Sodium 134 (L) 07/29/2022 09:03 AM    Potassium 4.2 07/29/2022 09:03 AM    Chloride 98 07/29/2022 09:03 AM    CO2 29 07/29/2022 09:03 AM    Glucose 85 07/29/2022 09:03 AM    BUN 23 (H) 07/29/2022 09:03 AM    Creatinine 1.48 (H) 07/29/2022 09:03 AM    GFR est AA >60 07/29/2022 09:03 AM    GFR est non-AA 50 (L) 07/29/2022 09:03 AM    Calcium 10.8 (H) 07/29/2022 09:03 AM    Glucose (POC) 87 05/10/2022 11:06 AM     Lab Results   Component Value Date/Time    Bilirubin, total 0.2 07/29/2022 09:03 AM    ALT (SGPT) 11 (L) 07/29/2022 09:03 AM    Alk.  phosphatase 73 07/29/2022 09:03 AM    Protein, total 7.5 07/29/2022 09:03 AM    Albumin 2.7 (L) 07/29/2022 09:03 AM    Globulin 4.8 (H) 07/29/2022 09:03 AM     Lab Results   Component Value Date/Time    Iron 12 (L) 07/08/2022 09:16 AM    TIBC 220 (L) 07/08/2022 09:16 AM    Iron % saturation 5 (L) 07/08/2022 09:16 AM    Ferritin 400 (H) 07/08/2022 09:16 AM      Lab Results   Component Value Date/Time    Vitamin B12 >2,000 (H) 05/10/2022 12:23 PM    Folate 13.9 05/10/2022 12:23 PM Imagin/28/21 Neck CT: IMPRESSION  Irregular soft tissue mass involving the aryepiglottic and the larynx. There is significant narrowing of the airway at the level of the larynx  and the supraglottic region. Poorly defined cervical adenopathy is noted. 21 Chest CTA:  IMPRESSION  Minimal scarring or subsegmental atelectasis in the left lung base. No evidence of pulmonary embolism or pneumonia. 21 PET:  IMPRESSION  2 large necrotic appearing mass lesions are noted in the right neck (SUV 6.4). Small hypermetabolic right posterior triangle lymph node. No PET avid evidence  of distant metastatic disease. 10/4/21 PET:  FINDINGS:  HEAD/NECK: There is physiologic tracer activity in the oral cavity and piriform  sinuses. Physiologic tracer activity is also seen in the neck musculature,  particularly the right sternocleidomastoid muscle. 2 enlarged right level 2  cervical lymph nodes are slightly decreased in size; these demonstrate no  significant residual FDG activity. Previously seen small lymph node in the right  posterior cervical triangle also demonstrates no residual FDG activity. CHEST: There is a new 3.0 x 2.6 cm focus of nodular airspace disease in the left  upper lobe which demonstrates increased FDG activity, maximal SUV 2.8. New foci  of nodular airspace disease in the left lower lobe, measuring 1.2 cm and 1.0 cm,  in the right lower lobe, measuring 0.8 cm, and in the right middle lobe,  measuring 1.6 cm, demonstrate no appreciable FDG activity. ABDOMEN/PELVIS: No foci of abnormal hypermetabolism. SKELETON: No foci of abnormal hypermetabolism in the axial and visualized  appendicular skeleton. IMPRESSION  1. Previously seen enlarged cervical lymph nodes demonstrate no residual  abnormal FDG activity. 2. New foci of nodular airspace disease in both lungs, including a 3 cm area in  the left upper lobe which demonstrates low-grade increased FDG activity.   Findings may be infectious etiology, although neoplasm is not excluded;  attention on follow-up examinations is recommended. 11/22/21 CT neck:  IMPRESSION  No significant interval change since the previous CT PET  examination. Necrotic right level 2A lymph nodes unchanged in size. No definite  new pathologic lymph nodes by CT criteria. Postradiation changes as described  above. Narrowed nasopharyngeal and oropharyngeal airway unchanged. No new pathologic lymphadenopathy by CT criteria is demonstrated. 11/22/21 CT chest:   There is a 15 mm node in the right hilum, station 10 R, series 201 image 39. There is increasing multifocal multi lobar inflammatory appearing airspace  disease in the lungs most suggestive of multifocal pneumonia. Dominant focus of  airspace disease in the lingula on series 204 image 41 measures 3.3 x 3.2 cm,  previously 3.0 x 2.6 cm. Likewise other foci have increased in size with  dominant lesion on the right measuring 2.8 x 1.9 cm on image 50, previously 16  mm. Suspicious right upper lobe nodules are present including a dominant 6 x 9 mm  nodule on series 204 image 25. There are no suspicious lytic or blastic skeletal lesions in the thorax. Incidental imaging of the upper abdomen demonstrates liver steatosis with  probable focal steatosis adjacent to the fissure for the falciform ligament. There may be a small retrocardiac hiatal hernia. IMPRESSION  Increasing multifocal airspace disease with new suspicious solid appearing  nodules in the right upper lobe. 3/16/22 CT abd/pelv:   FINDINGS:  LUNG BASES: Bilateral lung consolidations. No pleural effusion. Cherl Spinner LIVER: Unremarkable. GALLBLADDER AND BILIARY TREE: No evident gallstones, gallbladder wall  thickening, or biliary ductal dilation. PANCREAS: Unremarkable. SPLEEN: Unremarkable. ADRENALS: Unremarkable. KIDNEYS AND URETERS: Symmetric renal size and enhancement. No hydronephrosis or  hydroureter. BLADDER: Unremarkable.   REPRODUCTIVE ORGANS: No pelvic masses. BOWEL: Oral contrast has achieved the distal small bowel. The bowel is not  dilated. Small hiatal hernia. PEG tube has been removed. LYMPH NODES:  No lymphadenopathy. PERITONEUM: No free air, ascites, walled off fluid collection. VESSELS: Abdominal aorta without aneurysm or dissection. ABDOMINAL WALL: Intact. BONES: Unremarkable for age. Small disc bulges at L4-L5 and L5-S1 with mild  canal impingement, unchanged. IMPRESSION  1. No evidence of metastatic disease abdomen pelvis. 2. CT chest performed separately. 3/16/22 CT chest:  FINDINGS:  LINES: Right port distal tip SVC/RA junction. HEART: Normal heart size. No pericardial effusion. THORACIC AORTA: No aneurysm or dissection. PULMONARY ARTERIES: No large central pulmonary arterial filling defect, non-CTA  technique. ADENOPATHY: Developing left hilar adenopathy, see below. THORACIC ESOPHAGUS: Collapsed. LUNG PARENCHYMA: Multiple bilateral pulmonary consolidations. The largest: Right  middle lobe along the fissure 4.8 x 2.3 cm, previously 2.8 x 1.9 cm; lingula 3.2  x 3.3 cm unchanged. Developing consolidation lateral lingula extending from the  hilum 4.8 x 2.5 cm including lymphadenopathy, previously small patchy. CENTRAL AIRWAYS: Tracheostomy tubing in place. Left hilar adenopathy, narrows  the lingular bronchus. PLEURA: No pleural effusion. No pneumothorax. CHEST WALL: No axillary adenopathy. Right chest wall port. UPPER ABDOMEN:  Unremarkable. BONES: Unremarkable for age. IMPRESSION  1. Increasing pulmonary consolidations, and confluent left hilar adenopathy,  concerning for advancing metastatic disease. 2. CT abdomen pelvis reported separately. 4/29/22 CT neck: IMPRESSION  1. Stable appearance of neck compared with previous examination of 11/22/2021. No developing mass or adenopathy evident. Slight decrease in size of necrotic  nodes compared with prior examination.     4/29/22 CT chest/abd/pelv:  CT CHEST:  There are focal nodular like areas of density in the right upper lobe measuring  10 mm, 7 mm and 5 mm also present on prior study. Confluent density is seen in  the right middle lobe lateral to the right hilum measuring 4.0 x 1.7 cm without  significant change from prior study. There is focal infiltrative density in the  left upper lobe similar to prior study. There is left hilar adenopathy, 4.0 x  2.5 cm, with horizontal infiltrative atelectatic stranding lateral to the left  hilum in the left upper lobe and involving the lingula and similar to prior  study. The heart is borderline size, no pericardial thickening or fluid. Tracheostomy identified. Calcified plaque involving the aortic arch, no aneurysm  or dissection. Thoracic spondylosis. No bone lesion. IMPRESSION:   Findings are similar to study dated 3/16/2022 without significant  progression. Left hilar adenopathy. Focal areas of airspace disease in both  lungs compatible with atelectatic changes. Nodular shaped opacities in the right  upper lobe, cannot exclude metastatic sites. CT abdomen: The liver gallbladder spleen pancreas and adrenal glands and kidneys image as  normal structures. There is calcific plaque involving the aorta and iliac  arteries, no evidence of aneurysm. There is multiple stool and gas throughout  the large bowel. No small bowel distention. No free fluid or induration of the  mesentery. CT pelvis: The urinary bladder is mostly empty. No free fluid or induration of the  mesentery in the pelvis. Lower thoracic and lumbar vertebra show normal  alignment, no vertebral compression or subluxation. No bone lesion. IMPRESSION:  No evidence of metastatic disease in the abdomen or pelvis.     5/10/22 EGD:  Mild distal esophagitis noted, for reflux  Mild/moderate to erosive gastritis in the body of stomach, no ulcers, AVM, mass,  First and second part duodenum unremarkable    Assessment & Plan:   Shara Abdi is a 49 y.o. male is seen for laryngeal cancer. 1. Squamous cell carcinoma of larynx, Stage CATARINO [pT4a cN2 Mx]:  S/p total laryngectomy and tracheostomy in Jan 2021. Afterwards, he developed disease recurrence in Right neck confirmed by PET scan. I recommended concurrent chemoradiation using Cisplatin to treat his disease. Pt completed concurrent chemoradiation radiation 7/19/21. Evidence of local treatment response per physical exam and 10/4/21 PET. However disease progression noted on chest CT 11/22/21 with increasing size in lung nodules, outside of prior radiation field. Per discussion with Thoracic Tumor Board, these nodules would be amenable to biopsy by navigation bronchoscopy if desired. However, RadOnc and I feel this is obvious disease progression given extent of disease at diagnosis. Discussed with patient about disease progression. Discussed pros/con of biopsy to confirm metastatic disease (pro: confirmation rather than assumption of metastatic disease; ability to send metastatic tissue for NGS; con: will lead to treatment delay of clinically likely metastatic disease. ) He elected to proceed with treatment without repeating biopsy. Started Pembrolizumab monotherapy, then switched to Cis-5FU-Pembrolizumab, followed by Pembro maintenance x 2 y given PDL1 CPS < 20. [30% RR in phase III trials.] Repeat imaging on 4/29/22 showed stable left hilar adenopathy and stable nodular shaped opacities in right lung. Focal areas of airspace disease in both lungs compatible with atelectatic changes. Supportive medications: zofran, compazine, emla, decadron. 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 -- Proceed with C2 maintenance Keytruda (C6 overall)  -- Repeat CT next Thursday at [facility] per patient request.  -- Follow up in 3 weeks for C3 Keytruda, MD/NP visit. -- Fax note and labs to PCP Guanakito Geronimo.      2. 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 Colonoscopy unrevealing, but EGD showed erosive gastritis. Hemoccult negative. B12, folate normal; ferritin high, low iron sat. Pt stopped iron supplements due to constipation. Received 1 unit of blood after C3   Received 3 units of blood after C4 for Hgb 4.4 on 5/9/22  Received 1 unit of blood for Hgb 6 on 5/20/22   Received 2 units of blood for Hgb 5 on 5/30/22  Received 1 unit of blood for Hgb 7.7 on 6/4/22  Received 2 units of blood for Hgb 5.7 on 6/17/22  Received 1 unit blood for Hgb 6.7 on 7/26/22  -- Capsule endoscopy completed with RGA on 7/19/22 - results pending and will take 4 weeks to result  -- AVOID NSAIDS, stop belkis seltzer given aspirin component. -- Restart Protonix, emphasized importance of this. -- Switch to weekly or every 2 week labs with type & screen. 3. Hypothyroidism:   Due to prior radiation and improving on levothyroxine to 75mcg/d. Managing with Dr. Sean Butts, Endocrinology   -- Checking TSH/free T4 every other cycle during treatment. Will send updates to Dr. Sean Butts. 4. Pulmonary embolism:  Provoked by #1. 11/2021 CT chest. Recent CTA chest negative for PE. Eliquis on hold given recent GI bleed. Will continue holding for recurrent anemia and restart at preventive dose in future. 5. Tracheostomy care: Insurance approved HME supplies through [employer]. Dr. De Los Santos Franco team provided samples in the past.     6. Neck pain:  Restart PT in July. On percocet 1 tab q12h prn #42 tabs, 6/24/22    7. Productive cough:  Likely multifactorial due to allergies, dryness from chemo and lung consolidation seen on imaging. Recently completed Z-theron with no improvement. Now with some decrease in oxygenation. Chest xray performed 7/28/22 but not read by radiology. -- Advised trial of holding Claritin given gastritis. 8. CKD:   Cr 1.0 range 2/2022 with recent increase to 1.4-1.8 range likely due to recurrent anemia and prior Cisplatin. 9. Hypercalcemia:  Corrected ca 11.84 today.  Unclear etiology. Will treat with Zometa today and give IVF. 10. LLQ abdom pain / Vomiting:  May be related to constipation. Advised MgCitrate today, followed by Docusate/Senna daily at home. Emotional well being: Pt is coping well with his/her disease and has excellent support. I personally saw and evaluated the patient and performed the key components of medical decision making. The history, physical exam, and documentation were performed by Christy Garcia NP. I reviewed and verified the above documentation and modified it as needed. Proceed with Keytruda. Advised cutting back to weekly labs. Advised restarting Protonix and treating constipation as detailed above.      Signed By: Shirin Nunez MD     07/29/22

## 2022-07-22 NOTE — TELEPHONE ENCOUNTER
07/22/22 11:20 AM Patient presented to [facility] center for cbc to evaluate anemia. Hgb stable 7.1. Pt reported to the RN he had endoscopy and is awaiting results. Reports coughing up yellow and brown mucus. Stomach continues to hurt, managing with Tums. I advised nurse inform patient I would like him to get CXR, [facility] [place]. Advised calling if he develops worsening SOB, fevers, chills. Advise he add Carafate QID to help manage abdominal pain.  I will recheck cbc next week, and see patient on Friday for immunotherapy and e

## 2022-07-22 NOTE — PROGRESS NOTES
Outpatient Infusion Center Progress Note     Pt admit to [facility] for pf/labs ambulatory in stable condition. Assessment completed. Patient coughing with phlegm and havin worsening stomach pains. Communicated information to Shaneka Nunez NP. Per Carlos Abdi NP the patient does not require a blood transfusion today and is able to be discharged. Visit Vitals  /74   Pulse 98   Temp (!) 96.7 °F (35.9 °C)   Resp 18   SpO2 98%       Medications:  Medications Administered       0.9% sodium chloride injection 10 mL       Admin Date  07/22/2022 Action  Given Dose  10 mL Route  IntraVENous Administered By  Zia Hays RN              heparin (porcine) pf 300-500 Units       Admin Date  07/22/2022 Action  Given Dose  500 Units Route  InterCATHeter Administered By  Zia Hays RN              sodium chloride (NS) flush 10 mL       Admin Date  07/22/2022 Action  Given Dose  10 mL Route  IntraVENous Administered By  Zia Hays RN                      Port flushed, heparinized and deaccessed per protocol. Pt tolerated treatment well. D/c home ambulatory in no distress. Pt aware of next appointment scheduled for 07/29. Recent Results (from the past 12 hour(s))   CBC WITH AUTOMATED DIFF    Collection Time: 07/22/22  8:59 AM   Result Value Ref Range    WBC 10.9 4.1 - 11.1 K/uL    RBC 2.58 (L) 4.10 - 5.70 M/uL    HGB 7.1 (L) 12.1 - 17.0 g/dL    HCT 22.6 (L) 36.6 - 50.3 %    MCV 87.6 80.0 - 99.0 FL    MCH 27.5 26.0 - 34.0 PG    MCHC 31.4 30.0 - 36.5 g/dL    RDW 15.3 (H) 11.5 - 14.5 %    PLATELET 784 (H) 129 - 400 K/uL    MPV 9.1 8.9 - 12.9 FL    NRBC 0.0 0  WBC    ABSOLUTE NRBC 0.00 0.00 - 0.01 K/uL    NEUTROPHILS 86 (H) 32 - 75 %    LYMPHOCYTES 4 (L) 12 - 49 %    MONOCYTES 9 5 - 13 %    EOSINOPHILS 1 0 - 7 %    BASOPHILS 0 0 - 1 %    IMMATURE GRANULOCYTES 0 0.0 - 0.5 %    ABS. NEUTROPHILS 9.4 (H) 1.8 - 8.0 K/UL    ABS. LYMPHOCYTES 0.4 (L) 0.8 - 3.5 K/UL    ABS. MONOCYTES 1.0 0.0 - 1.0 K/UL    ABS. EOSINOPHILS 0.1 0.0 - 0.4 K/UL    ABS. BASOPHILS 0.0 0.0 - 0.1 K/UL    ABS. IMM.  GRANS. 0.0 0.00 - 0.04 K/UL    DF SMEAR SCANNED      RBC COMMENTS ANISOCYTOSIS  1+        RBC COMMENTS HYPOCHROMIA  2+       TYPE & SCREEN    Collection Time: 07/22/22  8:59 AM   Result Value Ref Range    Crossmatch Expiration 07/25/2022,2359     ABO/Rh(D) O POSITIVE     Antibody screen NEG

## 2022-07-26 NOTE — PROGRESS NOTES
[facility] VISIT NOTE  Date: 2022    Name: Almarie Severs    MRN: [id number]         : 1970    Mr. Peterson Arrived ambulatory and in no distress for Hydration and Blood transfusion of Regimen. Assessment was completed by Nancy Salguero RN, no acute issues at this time, pt c/o coughing up yellowish and brown gunk, dark stool and stomach pain. MD notified by assessment nurse. Chest wall port accessed by assessment nurse without difficulty, labs drawn & sent for processing. Do you have any symptoms of COVID-19? SOB, coughing, fever, or generally not feeling well NO    2. Have you been exposed to COVID-19 recently? NO    3. Have you had any recent contact with family/friend that has a pending COVID test? NO             Mr. Peterson's vitals were reviewed. Patient Vitals for the past 12 hrs:   Temp Pulse Resp BP SpO2   22 1420 98.8 °F (37.1 °C) 99 18 109/75 98 %   22 1225 98.5 °F (36.9 °C) 98 18 100/80 97 %   22 1200 97.8 °F (36.6 °C) 97 18 105/71 95 %   22 0909 97.8 °F (36.6 °C) (!) 102 18 (!) 92/59 96 %         Lab results were obtained and reviewed. Recent Results (from the past 12 hour(s))   CBC WITH AUTOMATED DIFF    Collection Time: 22  9:17 AM   Result Value Ref Range    WBC 11.1 4.1 - 11.1 K/uL    RBC 2.45 (L) 4.10 - 5.70 M/uL    HGB 6.7 (L) 12.1 - 17.0 g/dL    HCT 21.7 (L) 36.6 - 50.3 %    MCV 88.6 80.0 - 99.0 FL    MCH 27.3 26.0 - 34.0 PG    MCHC 30.9 30.0 - 36.5 g/dL    RDW 15.9 (H) 11.5 - 14.5 %    PLATELET 630 (H) 746 - 400 K/uL    MPV 9.0 8.9 - 12.9 FL    NRBC 0.0 0  WBC    ABSOLUTE NRBC 0.00 0.00 - 0.01 K/uL    NEUTROPHILS 88 (H) 32 - 75 %    LYMPHOCYTES 3 (L) 12 - 49 %    MONOCYTES 8 5 - 13 %    EOSINOPHILS 0 0 - 7 %    BASOPHILS 0 0 - 1 %    IMMATURE GRANULOCYTES 1 (H) 0.0 - 0.5 %    ABS. NEUTROPHILS 9.8 (H) 1.8 - 8.0 K/UL    ABS. LYMPHOCYTES 0.3 (L) 0.8 - 3.5 K/UL    ABS. MONOCYTES 0.9 0.0 - 1.0 K/UL    ABS. EOSINOPHILS 0.0 0.0 - 0.4 K/UL    ABS. BASOPHILS 0.0 0.0 - 0.1 K/UL    ABS. IMM. GRANS. 0.1 (H) 0.00 - 0.04 K/UL    DF SMEAR SCANNED      RBC COMMENTS ANISOCYTOSIS  1+        RBC COMMENTS HYPOCHROMIA  1+       TYPE & SCREEN    Collection Time: 07/26/22  9:17 AM   Result Value Ref Range    Crossmatch Expiration 07/29/2022,2359     ABO/Rh(D) Santiago Thurman POSITIVE     Antibody screen NEG     Unit number [id number]     Blood component type RC LR,2     Unit division 00     Status of unit ISSUED     Crossmatch result Compatible    RBC, ALLOCATE    Collection Time: 07/26/22 10:30 AM   Result Value Ref Range    HISTORY CHECKED? Historical check performed        Medications received:  Medications Administered       0.9% sodium chloride infusion 1,000 mL       Admin Date  07/26/2022 Action  New Bag Dose  1,000 mL Rate  1,000 mL/hr Route  IntraVENous Administered By  Adamaris Stoddard, [place]              0.9% sodium chloride infusion 250 mL       Admin Date  07/26/2022 Action  New Bag Dose  250 mL Rate  15 mL/hr Route  IntraVENous Administered By  Ag Bacon RN              0.9% sodium chloride injection 10 mL       Admin Date  07/26/2022 Action  Given Dose  10 mL Route  IntraVENous Administered By  Ag Bacon RN              heparin (porcine) pf 300-500 Units       Admin Date  07/26/2022 Action  Given Dose  500 Units Route  InterCATHeter Administered By  Ag Bacon RN              ondansetron [facility]) injection 8 mg       Admin Date  07/26/2022 Action  Given Dose  8 mg Route  IntraVENous Administered By  [address] First unit initiated. 1420 First unit completed. AVS refused. Mr. Colletta Chris tolerated treatment well and politely declined post transfusion monitor. Educated and provided with written material regarding s/s of delayed reaction to watch for at home. Pt was discharged from [place] [address] in stable condition at 1425. Port de-accessed, flushed & heparinized per protocol.  He is to return on  July 29, 2022 at 0930 for his next appointment.     Luis A Mora RN  July 26, 2022    Future Appointments:  Future Appointments   Date Time Provider Nathanael Gamai   7/29/2022  9:30 AM SS INF3 CH4 <2H [facility]   7/29/2022  9:45 AM Pipo Puckett MD [facility] BS AMB   8/2/2022  9:30 AM SS INF7 CH2 <1H [facility]   8/3/2022  8:30 AM [facility]   8/9/2022  9:30 AM SS INF7 CH2 <1H [facility]   8/10/2022  8:30 AM [facility] [address]   8/16/2022  9:30 AM SS INF7 CH2 <1H [facility]   8/17/2022 11:00 AM Nestora [facility] [address]   8/19/2022 10:00 AM SS INF3 CH4 <2H [facility]   8/23/2022  9:30 AM SS INF7 CH2 <1H [facility]   8/24/2022  8:30 AM Nestora [facility] [address]   8/31/2022  8:30 AM Nest[facility] [address]   9/7/2022  8:30 AM Nest[facility] [address]   9/9/2022 10:00 AM SS INF3 CH4 <2H [facility] Delbert Naranjo

## 2022-07-29 NOTE — PROGRESS NOTES
1. Have you been to the ER, urgent care clinic since your last visit? Hospitalized since your last visit? No    2. Have you seen or consulted any other health care providers outside of the [address] [place] [address] since your last visit? Include any pap smears or colon screening. No        Visit Vitals  /75   Pulse (!) 110   Temp 98.6 °F (37 °C)   Resp 16   Ht 6' (1.829 m)   Wt 148 lb (67.1 kg)   SpO2 (!) 89%   BMI 20.07 kg/m²        Patient vomited clear liquids last night. Patient has been vomiting all week. He has stomach pain at 6/10.    Will update MD.    Chief Complaint   Patient presents with    Follow-up     Follow up for throat cancer

## 2022-07-29 NOTE — PATIENT INSTRUCTIONS
For constipation and abdominal pain:    Please drink magnesium citrate, 1 bottle and then start Docusate/Senna twice a day

## 2022-07-29 NOTE — PROGRESS NOTES
[facility] VISIT NOTE  Date: 2022    Name: Jennifer Orlando    MRN: [id number]         : 1970    Mr. Peterson Arrived ambulatory and in no distress for C7D1 of Keytruda+ Zometa+ Bolus Regimen. Assessment was completed by Lynn Olson RN, no acute issues at this time, no new complaints voiced. Chest wall port accessed by assessment nurse without difficulty, labs drawn & sent for processing. Do you have any symptoms of COVID-19? SOB, coughing, fever, or generally not feeling well NO    2. Have you been exposed to COVID-19 recently? NO    3. Have you had any recent contact with family/friend that has a pending COVID test? NO       Chemotherapy Flowsheet 2022   Cycle C7D1   Date 2022   Drug / Regimen Keytruda   Pre Hydration -   Post Hydration -   Pre Meds -   Notes given+ [id number]           Mr. Peterson's vitals were reviewed. Patient Vitals for the past 12 hrs:   Temp Pulse Resp BP SpO2   22 1251 -- (!) 102 -- 117/77 95 %   22 0853 98.6 °F (37 °C) (!) 105 16 115/75 97 %         Lab results were obtained and reviewed. Recent Results (from the past 12 hour(s))   TYPE & SCREEN    Collection Time: 22  9:03 AM   Result Value Ref Range    Crossmatch Expiration 2022,2359     ABO/Rh(D) Caren Doyne POSITIVE     Antibody screen NEG    CBC WITH AUTOMATED DIFF    Collection Time: 22  9:03 AM   Result Value Ref Range    WBC 12.1 (H) 4.1 - 11.1 K/uL    RBC 2.82 (L) 4.10 - 5.70 M/uL    HGB 7.9 (L) 12.1 - 17.0 g/dL    HCT 24.6 (L) 36.6 - 50.3 %    MCV 87.2 80.0 - 99.0 FL    MCH 28.0 26.0 - 34.0 PG    MCHC 32.1 30.0 - 36.5 g/dL    RDW 16.0 (H) 11.5 - 14.5 %    PLATELET 702 (H) 126 - 400 K/uL    MPV 8.9 8.9 - 12.9 FL    NRBC 0.0 0  WBC    ABSOLUTE NRBC 0.00 0.00 - 0.01 K/uL    NEUTROPHILS 87 (H) 32 - 75 %    LYMPHOCYTES 4 (L) 12 - 49 %    MONOCYTES 8 5 - 13 %    EOSINOPHILS 0 0 - 7 %    BASOPHILS 0 0 - 1 %    IMMATURE GRANULOCYTES 1 (H) 0.0 - 0.5 %    ABS.  NEUTROPHILS 10.5 (H) 1.8 - 8.0 K/UL    ABS. LYMPHOCYTES 0.5 (L) 0.8 - 3.5 K/UL    ABS. MONOCYTES 1.0 0.0 - 1.0 K/UL    ABS. EOSINOPHILS 0.0 0.0 - 0.4 K/UL    ABS. BASOPHILS 0.0 0.0 - 0.1 K/UL    ABS. IMM. GRANS. 0.1 (H) 0.00 - 0.04 K/UL    DF SMEAR SCANNED      RBC COMMENTS ANISOCYTOSIS  1+        RBC COMMENTS HYPOCHROMIA  1+       METABOLIC PANEL, COMPREHENSIVE    Collection Time: 07/29/22  9:03 AM   Result Value Ref Range    Sodium 134 (L) 136 - 145 mmol/L    Potassium 4.2 3.5 - 5.1 mmol/L    Chloride 98 97 - 108 mmol/L    CO2 29 21 - 32 mmol/L    Anion gap 7 5 - 15 mmol/L    Glucose 85 65 - 100 mg/dL    BUN 23 (H) 6 - 20 MG/DL    Creatinine 1.48 (H) 0.70 - 1.30 MG/DL    BUN/Creatinine ratio 16 12 - 20      GFR est AA >60 >60 ml/min/1.73m2    GFR est non-AA 50 (L) >60 ml/min/1.73m2    Calcium 10.8 (H) 8.5 - 10.1 MG/DL    Bilirubin, total 0.2 0.2 - 1.0 MG/DL    ALT (SGPT) 11 (L) 12 - 78 U/L    AST (SGOT) 18 15 - 37 U/L    Alk.  phosphatase 73 45 - 117 U/L    Protein, total 7.5 6.4 - 8.2 g/dL    Albumin 2.7 (L) 3.5 - 5.0 g/dL    Globulin 4.8 (H) 2.0 - 4.0 g/dL    A-G Ratio 0.6 (L) 1.1 - 2.2         Medications received:  Medications Administered       0.9% sodium chloride injection 10 mL       Admin Date  07/29/2022 Action  Given Dose  10 mL Route  IntraVENous Administered By  Andi Hicks RN              heparin (porcine) pf 300-500 Units       Admin Date  07/29/2022 Action  Given Dose  500 Units Route  InterCATHeter Administered By  Andi Hicks RN              pembrolizumab (KEYTRUDA) 200 mg in 0.9% sodium chloride 100 mL, overfill volume 10 mL IVPB       Admin Date  07/29/2022 Action  New Bag Dose  200 mg Rate  236 mL/hr Route  IntraVENous Administered By  Andi Hicks RN              sodium chloride 0.9 % bolus infusion 1,000 mL       Admin Date  07/29/2022 Action  New Bag Dose  1,000 mL Rate  1,000 mL/hr Route  IntraVENous Administered By  Andi Hicks RN              zoledronic acid (ZOMETA) 3.5 mg in 0.9% sodium chloride 100 mL IVPB Admin Date  07/29/2022 Action  New Bag Dose  3.5 mg Rate  400 mL/hr Route  IntraVENous Administered By  Josiah Machuca RN                     Two nurses verified prior to administering:    Drug name  Drug dose  Infusion volume or drug volume when prepared in a syringe  Rate of administration  Route of administration  Expiration dates and/or times  Appearance and physical integrity of the drugs  Rate set on infusion pump, when used  Sequencing of drug administration    AVS refused. Mr. Maria Ines Aguilera tolerated treatment well and was discharged from [place] [address] in stable condition at . Port de-accessed, flushed & heparinized per protocol. He is to return on  August 2, 2022 at 0930 for his next appointment.     Jesse Akers RN  July 29, 2022    Future Appointments:  Future Appointments   Date Time Provider Nathanael Haas   8/2/2022  9:30 AM SS INF7 CH2 <1H [facility]   8/3/2022  8:30 AM Lucian Pretzel [address]   8/4/2022  1:00 PM [facility] CT 1 [facility]   8/9/2022  9:30 AM SS INF7 CH2 <1H [facility]   8/10/2022  8:30 AM Lucian Pretzel [address]   8/16/2022  9:30 AM SS INF7 CH2 <1H [facility]   8/19/2022 10:00 AM [facility] INF3 [facility]H [facility]   8/19/2022 10:15 AM Alen Anderson MD [facility] BS AMB   8/23/2022  9:30 AM SS INF7 CH2 <1H [facility]   8/24/2022  8:30 AM Lucian Pretzel [address]   8/31/2022  8:30 AM Lucian Pretzel [address]   9/7/2022  8:30 AM Lucian Pretzel [address]   9/9/2022 10:00 AM SS INF3 CH4 <2H [facility]

## 2022-07-30 PROBLEM — J18.9 PNEUMONIA: Status: ACTIVE | Noted: 2022-01-01

## 2022-07-30 NOTE — H&P
History and Physical (Inpatient)    Patient:    Nirali Anthony   49 y.o. 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 Kajal Stauffer, history of hypothyroidism follows endocrinologist presents with nausea vomiting. Because of his laryngeal cancer and tracheostomy and feeling lethargic there is minimal conversation regarding events that led to hospitalization. He said his been having some nausea vomiting for about a week not eating well but denies any headache chest pain shortness of breath at rest.  He said he is lost weight and he has had to go down 4 bed belt hole sizes. ROS: He denies chronic headache blurry vision by nodding. He has occasional cough without any chest pain. He denies orthopnea. He reports nausea vomiting for about a week. He denies any hematemesis or melena hematochezia. He reports weight loss diminished appetite he reports tingling numbness in his hands and feet. History:  Past Medical History:   Diagnosis Date    Chronic pain     THROAT, EARS, NECK R/T CANCER    COPD (chronic obstructive pulmonary disease) ([facility] [address]     EMPHASEMA    Psychiatric disorder     ANXIETY R/T CANCER    Throat cancer ([facility] [address]     Tracheostomy present ([facility])    Hypothyroidism follows Dr. Dot Jiang  GERD  Anemia status post recent upper and lower endoscopy in June 2022  History of blood transfusion  History of chronic kidney disease  History of COPD  History of tobacco use    Past surgical history  Status post Port-A-Cath    Social history   He is single, has a girlfriend  Ex-smoker does not drink alcohol.     Family History   Problem Relation Age of Onset    Diabetes Mother     Diabetes Father     Kidney Disease Father Diabetes Sister     Heart Disease Daughter     Anesth Problems Neg Hx        Allergies:  No Known Allergies    Current Medications:    Current Facility-Administered Medications:     levoFLOXacin (LEVAQUIN) 750 mg in D5W IVPB, 750 mg, IntraVENous, Q24H, Tulio Caceres MD, Last Rate: 100 mL/hr at 07/30/22 1049, 750 mg at 07/30/22 1049    cefepime (MAXIPIME) 2 g in 0.9% sodium chloride (MBP/ADV) 100 mL MBP, 2 g, IntraVENous, Q8H, Daniele Cee MD    [COMPLETED] piperacillin-tazobactam (ZOSYN) 4.5 g in 0.9% sodium chloride (MBP/ADV) 100 mL MBP, 4.5 g, IntraVENous, ONCE, Last Rate: 200 mL/hr at 07/30/22 1525, 4.5 g at 07/30/22 1525 **FOLLOWED BY** piperacillin-tazobactam (ZOSYN) 3.375 g in 0.9% sodium chloride (MBP/ADV) 100 mL MBP, 3.375 g, IntraVENous, Q8H, Yarely Cee MD       Physical Exam:  Visit Vitals  BP (!) 131/93 (BP Patient Position: Lying right side)   Pulse (!) 123   Temp 99.6 °F (37.6 °C)   Resp 16   Ht 6' (1.829 m)   Wt 68.2 kg (150 lb 5.7 oz)   SpO2 94%   BMI 20.39 kg/m²     On examination is a 55-year-old black male looks emaciated tracheostomy tube in place dysphonia lying in bed comfortably no respiratory distress. HEENT normocephalic atraumatic anicteric sclera  Neck with tracheostomy tube in place  Lungs without any coarse crackles or wheeze  Heart is regular without any murmur gallop  Abdomen soft flat nontender nondistended, positive bowel sounds no appreciable organomegaly  Lower extremities without any cyanosis or edema there is evidence of muscle wasting. .  CNS awake alert oriented. Follows command. Psych. He easily irritated. Impression. This is a 55-year-old black male with history of laryngeal cancer status post laryngectomy now with tracheostomy history of anemia status post blood transfusion status post upper and lower endoscopy presents with nausea vomiting and feeling unwell found to have fever and possible pneumonia. Findings/Diagnosis: #1. Community-acquired pneumonia versus aspiration pneumonia  #2. Laryngeal cancer status post laryngectomy and tracheostomy tube  #3. Cancer cachexia  #4. Chronic anemia status post work-up  #5. Nausea vomiting probably from brain mets with brain swelling  #6. Hypothyroidism      Laboratory:      Recent Results (from the past 24 hour(s))   CBC WITH AUTOMATED DIFF    Collection Time: 07/30/22 10:30 AM   Result Value Ref Range    WBC 14.2 (H) 4.1 - 11.1 K/uL    RBC 3.09 (L) 4.10 - 5.70 M/uL    HGB 8.5 (L) 12.1 - 17.0 g/dL    HCT 26.8 (L) 36.6 - 50.3 %    MCV 86.7 80.0 - 99.0 FL    MCH 27.5 26.0 - 34.0 PG    MCHC 31.7 30.0 - 36.5 g/dL    RDW 16.0 (H) 11.5 - 14.5 %    PLATELET 311 (H) 588 - 400 K/uL    MPV 8.9 8.9 - 12.9 FL    NRBC 0.0 0.0  WBC    ABSOLUTE NRBC 0.00 0.00 - 0.01 K/uL    NEUTROPHILS 97 (H) 32 - 75 %    LYMPHOCYTES 1 (L) 12 - 49 %    MONOCYTES 2 (L) 5 - 13 %    EOSINOPHILS 0 0 - 7 %    BASOPHILS 0 0 - 1 %    IMMATURE GRANULOCYTES 0 0 - 0.5 %    ABS. NEUTROPHILS 13.8 (H) 1.8 - 8.0 K/UL    ABS. LYMPHOCYTES 0.1 (L) 0.8 - 3.5 K/UL    ABS. MONOCYTES 0.2 0.0 - 1.0 K/UL    ABS. EOSINOPHILS 0.0 0.0 - 0.4 K/UL    ABS. BASOPHILS 0.0 0.0 - 0.1 K/UL    ABS. IMM. GRANS. 0.1 (H) 0.00 - 0.04 K/UL    DF AUTOMATED     METABOLIC PANEL, COMPREHENSIVE    Collection Time: 07/30/22 10:30 AM   Result Value Ref Range    Sodium 134 (L) 136 - 145 mmol/L    Potassium 4.2 3.5 - 5.1 mmol/L    Chloride 99 97 - 108 mmol/L    CO2 27 21 - 32 mmol/L    Anion gap 8 5 - 15 mmol/L    Glucose 87 65 - 100 mg/dL    BUN 21 (H) 6 - 20 mg/dL    Creatinine 1.37 (H) 0.70 - 1.30 mg/dL    BUN/Creatinine ratio 15 12 - 20      GFR est AA >60 >60 ml/min/1.73m2    GFR est non-AA 55 (L) >60 ml/min/1.73m2    Calcium 9.4 8.5 - 10.1 mg/dL    Bilirubin, total 0.3 0.2 - 1.0 mg/dL    AST (SGOT) 19 15 - 37 U/L    ALT (SGPT) 8 (L) 12 - 78 U/L    Alk.  phosphatase 76 45 - 117 U/L    Protein, total 7.3 6.4 - 8.2 g/dL    Albumin 2.8 (L) 3.5 - 5.0 g/dL Globulin 4.5 (H) 2.0 - 4.0 g/dL    A-G Ratio 0.6 (L) 1.1 - 2.2     LACTIC ACID    Collection Time: 07/30/22 10:30 AM   Result Value Ref Range    Lactic acid 0.7 0.4 - 2.0 mmol/L   TROPONIN-HIGH SENSITIVITY    Collection Time: 07/30/22 10:30 AM   Result Value Ref Range    Troponin-High Sensitivity 44 0 - 76 ng/L   NT-PRO BNP    Collection Time: 07/30/22 10:30 AM   Result Value Ref Range    NT pro-BNP 1,315 (H) <125 pg/mL   COVID-19 RAPID TEST    Collection Time: 07/30/22 10:30 AM   Result Value Ref Range    COVID-19 rapid test Not Detected Not Detected     INFLUENZA A & B AG (RAPID TEST)    Collection Time: 07/30/22 10:30 AM   Result Value Ref Range    Influenza A Antigen Negative Negative      Influenza B Antigen Negative Negative     URINALYSIS W/ RFLX MICROSCOPIC    Collection Time: 07/30/22 11:30 AM   Result Value Ref Range    Color Yellow      Appearance Clear Clear      Specific gravity 1.010 1.003 - 1.030      pH (UA) 8.0 5.0 - 8.0      Protein Negative Negative mg/dL    Glucose Negative Negative mg/dL    Ketone 10 (A) Negative mg/dL    Bilirubin Negative Negative      Blood Negative Negative      Urobilinogen 0.1 0.1 - 1.0 EU/dL    Nitrites Negative Negative      Leukocyte Esterase Negative Negative         XR CHEST PORT   Final Result      Interval slightly worsening appearance of patchy consolidative opacities in the   bilateral lungs. Stable small left pleural effusion. New small right pleural   effusion. CT HEAD WO CONT   Final Result   Left occipital lobe mass with edema, not present on 10/4/2021 PET/CT. Recommend   MRI without and with contrast of the brain for further characterization. I discussed this impression with Maggie Jolly MD at 1206 hours on   7/30/2022. PET/CT TUMOR IMAGE SKULL THIGH (SUB)    (Results Pending)       Plan of Care/Planned Procedure: #1. Admit to the hospital.  #2.  Empiric antibiotics. #3.   We will give 1 dose of IV Decadron now and antiemetic. Consult oncology here. #4.  DVT prophylaxis  #5. Continue his routine medications. #6.  Further imaging regarding this brain mass  Discussed with patient.   Overall poor prognosis

## 2022-07-30 NOTE — ED NOTES
TRANSFER - OUT REPORT:    Verbal report given to [facility] on Stanley  being transferred to [facility] for routine progression of care       Report consisted of patients Situation, Background, Assessment and   Recommendations(SBAR). Information from the following report(s) SBAR, ED Summary, MAR, Med Rec Status, and Cardiac Rhythm ST  was reviewed with the receiving nurse. Lines:   Venous Access Device 05/20/22 Upper chest (subclavicular area, right (Active)       Peripheral IV 07/30/22 Left Antecubital (Active)        Opportunity for questions and clarification was provided.       Patient transported with:   Monitor  Tech

## 2022-07-30 NOTE — ED PROVIDER NOTES
EMERGENCY DEPARTMENT HISTORY AND PHYSICAL EXAM      Date: 7/30/2022  Patient Name: Jyoti Mosquera      History of Presenting Illness     Chief Complaint   Patient presents with    Vomiting    Fever       History Provided By: Patient    HPI: Jyoti Mosquera, 49 y.o. male with a past medical history significant  cancer with tracheostomy  presents to the ED with cc of  patient conveys that he is also had chills. He has not treated with anything and is failed to improve. Nursing triage reports the patient is febrile upon arrival.  Patient is nonverbal because of his trach. There are no other complaints, changes, or physical findings at this time. PCP: Pedro Pablo Douglas NP    Current Outpatient Medications   Medication Sig Dispense Refill    senna-docusate (PERICOLACE) 8.6-50 mg per tablet Take 1 Tablet by mouth two (2) times a day. 60 Tablet 2    oxyCODONE-acetaminophen (PERCOCET) 5-325 mg per tablet Take 1 Tablet by mouth every twelve (12) hours as needed for Pain for up to 21 days. Max Daily Amount: 2 Tablets. 42 Tablet 0    guaiFENesin-dextromethorphan SR (HUMIBID DM) 600-30 mg per tablet Take 1 Tablet by mouth every twelve (12) hours as needed for Cough. 30 Tablet 0    melatonin 5 mg cap capsule Take 1 Capsule by mouth nightly. 30 Capsule 3    benzonatate (TESSALON) 100 mg capsule Take 1 Capsule by mouth three (3) times daily as needed for Cough. 30 Capsule 0    levothyroxine (SYNTHROID) 125 mcg tablet Take 1 Tablet by mouth Daily (before breakfast) for 30 days. 30 Tablet 5    sucralfate (Carafate) 1 gram tablet Take 1 Tablet by mouth four (4) times daily as needed (abdominal pain). Dissolve in 10 mL of water before takign. 120 Tablet 0    pantoprazole (PROTONIX) 40 mg tablet Take 1 Tablet by mouth two (2) times a day. Indications: bleeding from stomach, esophagus or duodenum 60 Tablet 2    ferrous sulfate 325 mg (65 mg iron) tablet Take 1 Tablet by mouth two (2) times daily (with meals).  60 Tablet 3       Past History   Past Medical History:  Past Medical History:   Diagnosis Date    Chronic pain     THROAT, EARS, NECK R/T CANCER    COPD (chronic obstructive pulmonary disease) ([facility])     EMPHASEMA    Psychiatric disorder     ANXIETY R/T CANCER    Throat cancer ([address]     Tracheostomy present [facility])        Past Surgical History:  Past Surgical History:   Procedure Laterality Date    COLONOSCOPY N/A 2022    COLONOSCOPY performed by Anibal Machado MD at [facility] ENDOSCOPY    COLONOSCOPY N/A 2022    COLONOSCOPY performed by Anibal Machado MD at [address] [place] [address]- \" PUT PLATE IN\"    HX TRACHEOSTOMY  2021    IR INSERT TUNL CVC W PORT OVER 5 YEARS  2021    IR PLACE CVAD FLUORO GUIDE  2021       Family History:  Family History   Problem Relation Age of Onset    Diabetes Mother     Diabetes Father     Kidney Disease Father     Diabetes Sister     Heart Disease Daughter     Anesth Problems Neg Hx        Social History:  Social History     Tobacco Use    Smoking status: Former     Packs/day: 1.50     Years: 30.00     Pack years: 45.00     Types: Cigarettes     Quit date: 10/28/2020     Years since quittin.7    Smokeless tobacco: Never   Vaping Use    Vaping Use: Never used   Substance Use Topics    Alcohol use: Not Currently    Drug use: Never       Allergies:  No Known Allergies  Review of Systems   Review of Systems   Unable to perform ROS: Patient nonverbal   Constitutional:  Positive for chills, fatigue and fever. Physical Exam     Constitutionally well-appearing in no acute distress. HEENT head is atraumatic and normocephalic neck is supple with trach site clean dry and intact heart regular rate and rhythm no murmurs or gallops or rubs lungs clear to auscultation bilaterally no wheezes no rales no rhonchi abdomen soft nontender nondistended with normal active bowel sounds in all 4 quadrants. Moves all extremities without appreciable neuromotor vascular sensor embarrassment. Neurologically cranial nerves II through XII intact. Lab and Diagnostic Study Results   Labs -   No results found for this or any previous visit (from the past 12 hour(s)). Radiologic Studies -   [unfilled]  CT Results  (Last 48 hours)      None          CXR Results  (Last 48 hours)                 07/28/22 1135  XR CHEST PA LAT Final result    Impression:  Unchanged bilateral pulmonary nodules, compatible with metastatic   disease. No evidence of new airspace disease. Small left pleural effusion. Narrative:  INDICATION: Productive cough       COMPARISON: 6/17/2022       FINDINGS: PA and lateral views of the chest demonstrate a stable   cardiomediastinal silhouette. Right internal jugular Port-A-Cath is stable in   position. Multiple bilateral pulmonary nodules are not significantly changed. No   new airspace disease or pleural effusion is evident. A small left pleural   effusion is present. The visualized osseous structures are unremarkable. Medical Decision Making and ED Course   - I am the first and primary provider for this patient AND AM THE PRIMARY PROVIDER OF RECORD. I reviewed the vital signs, available nursing notes, past medical history, past surgical history, family history and social history. - Initial assessment performed. The patients presenting problems have been discussed, and the staff are in agreement with the care plan formulated and outlined with them. I have encouraged them to ask questions as they arise throughout their visit. Differential Diagnosis & Medical Decision Making Provider Note:   Patient does appear septic at this time. Suspect respiratory versus urinary course. We will also test for COVID. We will begin sepsis work-up. [facility]       Vital Signs-Reviewed the patient's vital signs.   Patient Vitals for the past 12 hrs:   Temp Pulse Resp BP   07/30/22 1034 (!) 100.9 °F (38.3 °C) -- -- --   07/30/22 1019 98.9 °F (37.2 °C) (!) 127 23 126/84 EKG interpretation: (Preliminary): Performed at 80. EKG Interpreted by me. Shows ventricular rate 129 bpm, P interval 104 ms, QRS duration 72 ms,  ms. Interpretation: Sinus tach with short HI. Septal infarct age undetermined. T wave abnormality. Abnormal EKG. ED Course:        Patient apparently has bilateral pneumonia. Given his elevated white blood cell count early oxygen requirements and tachycardia as well as compromised immune state I have great concern for this patient decompensating if he were to leave the hospital.  We will be admitting to the hospital for further evaluation and treatment. 12:55 PM   Nursing staff conveys patient is refusing to wear oxygen at this time. 1:08 PM  Discussed the case with Letty Casas, who has accepted the patient    Procedures and Critical Care     Disposition   Disposition: Condition stable      Diagnosis/Clinical Impression     Clinical Impression:   1. Squamous cell carcinoma of larynx (HCC)    2. 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 Please note that this dictation was completed with [employer], the computer voice recognition software. Quite often unanticipated grammatical, syntax, homophones, and other interpretive errors are inadvertently transcribed by the computer software. Please disregard these errors. Please excuse any errors that have escaped final proofreading. Thank you.

## 2022-07-30 NOTE — ED TRIAGE NOTES
Coming from home, called EMS today, reports nausea, vomiting, weakness all weak. Pt SOB, 86% on RA. Pt has trach. Fever of 101.6 at home. Started yesterday at unk time with numbness in hands and feet. EMS reports weak  in lt arm. Taking latruda for throat cancer. EMS reports.

## 2022-07-31 NOTE — CONSULTS
Consult    Subjective:     Jennifer Orlando is a 49 y.o. male who is being seen for brain metastases. CT head without contrast showed left occipital mass with edema. Pt on decadron. Pt with metastatic laryngeal cancer with lung metastases. pt initially diagnosed in  with squamous cell carcinoma of larynx, Stage CATARINO [pT4a cN2 Mx] initially. S/p total laryngectomy and tracheostomy . Pt completed concurrent chemoradiation with cisplatin and radiation the patient developed lung metastases the patient started started Pembrolizumab monotherapy, then switched to Cis-5FU-Pembrolizumab, followed by Pembrolizumab maintenance Denies diarrhea or increased SOB. Appetite low. Nausea better. Denies any neuro symptoms.       Past Medical History:   Diagnosis Date    Chronic pain     THROAT, EARS, NECK R/T CANCER    COPD (chronic obstructive pulmonary disease) (HCC)     EMPHASEMA    Psychiatric disorder     ANXIETY R/T CANCER    Throat cancer ([facility] [address]     Tracheostomy present [facility])       Past Surgical History:   Procedure Laterality Date    COLONOSCOPY N/A 2022    COLONOSCOPY performed by Jacey Glover MD at [facility] ENDOSCOPY    COLONOSCOPY N/A 2022    COLONOSCOPY performed by Jacey Glover MD at [address] [place] [address]- \" PUT PLATE IN\"    HX TRACHEOSTOMY  2021    IR INSERT TUNL CVC W PORT OVER 5 YEARS  2021    IR PLACE CVAD FLUORO GUIDE  2021     Family History   Problem Relation Age of Onset    Diabetes Mother     Diabetes Father     Kidney Disease Father     Diabetes Sister     Heart Disease Daughter     Anesth Problems Neg Hx       Social History     Tobacco Use    Smoking status: Former     Packs/day: 1.50     Years: 30.00     Pack years: 45.00     Types: Cigarettes     Quit date: 10/28/2020     Years since quittin.7    Smokeless tobacco: Never   Substance Use Topics    Alcohol use: Not Currently       Current Facility-Administered Medications   Medication Dose Route Frequency Provider Last Rate Last Admin    acetaminophen (TYLENOL) tablet 650 mg  650 mg Oral Q4H PRN Jeri ANGEL MD   650 mg at 07/31/22 0400    levoFLOXacin (LEVAQUIN) 750 mg in D5W IVPB  750 mg IntraVENous Q24H Alirio GRAHAM  mL/hr at 07/31/22 1017 750 mg at 07/31/22 1017    piperacillin-tazobactam (ZOSYN) 3.375 g in 0.9% sodium chloride (MBP/ADV) 100 mL MBP  3.375 g IntraVENous Q8H Alirio GRAHAM MD 25 mL/hr at 07/31/22 0309 3.375 g at 07/31/22 0309    levothyroxine (SYNTHROID) tablet 125 mcg  125 mcg Oral ACB Jeri ANGEL MD   125 mcg at 07/31/22 1017    melatonin tablet 5 mg  5 mg Oral QHS Jeri ANGEL MD   5 mg at 07/30/22 2105    senna-docusate (PERICOLACE) 8.6-50 mg per tablet 1 Tablet  1 Tablet Oral BID Jeri ANGEL MD   1 Tablet at 07/31/22 1017    pantoprazole (PROTONIX) tablet 40 mg  40 mg Oral BID Jeri ANGEL MD   40 mg at 07/31/22 1017    ferrous sulfate tablet 325 mg  325 mg Oral BID WITH MEALS Jeri ANGEL MD   325 mg at 07/31/22 1017    dexamethasone (DECADRON) 4 mg/mL injection 4 mg  4 mg IntraVENous Q6H Romy Casas MD   4 mg at 07/31/22 6630        No Known Allergies     Review of Systems:     Other than mentioned in HPI 12 point review of systems negative  Objective: Intake and Output:    No intake/output data recorded. 07/29 1901 - 07/31 0700  In: 500 [I.V.:500]  Out: -   Blood pressure 103/69, pulse (!) 102, temperature 97.8 °F (36.6 °C), resp. rate 18, height 6' (1.829 m), weight 68.2 kg (150 lb 5.7 oz), SpO2 94 %. Physical Exam:   General:  Alert, cooperative, no distress,chronically ill looking  S/p tracheostomy   Lungs:   Clear to auscultation bilaterally. Chest wall:  No tenderness or deformity. Heart:  Regular rate and rhythm, S1, S2 normal.   Abdomen:   Soft, non-tender. Bowel sounds normal. No masses,  No organomegaly. Extremities: Extremities normal, atraumatic, no cyanosis or edema.    Pulses: 2+ and symmetric all extremities. Skin: Skin color, texture, turgor normal. No rashes or lesions   Neurologic: CNII-XII intact. No gross sensory or motor deficits       Images:   XR CHEST PORT   Final Result      Interval slightly worsening appearance of patchy consolidative opacities in the   bilateral lungs. Stable small left pleural effusion. New small right pleural   effusion. CT HEAD WO CONT   Final Result   Left occipital lobe mass with edema, not present on 10/4/2021 PET/CT. Recommend   MRI without and with contrast of the brain for further characterization. I discussed this impression with Kevin Gorman MD at 1206 hours on   7/30/2022. PET/CT TUMOR IMAGE SKULL THIGH (SUB)    (Results Pending)            Data Review:   No results found for this or any previous visit (from the past 24 hour(s)). Assessment/plan:   Solitary Left occipital brain metastases:Continue decadron 4 mg IV q 6 hrs. Pt has mp in left wrist and not able to get MRI head. Consult Radiation oncology. Will discuss with  and dr.Sahni Amador neurosurgeon about possible surgical resection,gammaknife. Pt creatinine high. If pt creatinine gets better will do CT head with IV contrast.     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 S/p Cis-5FU-Pembrolizumab. Pt on maintenance Keytruda. Last rx Friday. Pt follows with . Will notify . Anemia:H/o iron deff and GI bleeding. Monitor. Check iron studies. Transfuse pRBC if hb less than 7 or symptomatic. PE:Anticoagulation recently held due to GI bleeding. Leukocytosis:possible aspiration pneumonia. Continue IV antibiotics. Thrombocytosis. Possible reactive. Monitor. TASHI :Repeat CMP. Thank you for the consult.

## 2022-07-31 NOTE — CONSULTS
Full consult dictated. Left occipital brain metastases:Continue decadron 4 mg IV q 6 hrs. Pt has mp in fore arm and not able to get MRI head. Will check with radiology. Radiation oncology consult. Pt creatinine high but improving. If pt creatinine gets better will do CT head with IV contrast.    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 S/p Cis-5FU-Pembrolizumab. Pt on maintenance Keytruda. Last rx Friday. Pt follows with . Will discuss with . Anemia:H/o iron deff and GI bleeding. Monitor. Check iron studies. Transfuse pRBC if hb less than 7 or symptomatic. Leukocytosis:possible reactive. R/o infection. Monitor. Thrombocytosis. Possible reactive. Monitor. TASHI on CKD . improving. Repeat CMP. Thank you for the consult.

## 2022-07-31 NOTE — PROGRESS NOTES
Reason for Admission:                    RUR Score:           PCP: First and Last name:  Aleksey Hernández NP     Name of Practice:   Are you a current patient: Yes/No:   Approximate date of last visit:    Can you do a virtual visit with your PCP:              Resources/supports as identified by patient/family:                   Top Challenges facing patient (as identified by patient/family and CM): Finances/Medication cost?                    Transportation? Support system or lack thereof? Living arrangements? Self-care/ADLs/Cognition? Current Advanced Directive/Advance Care Plan:  Full Code      Healthcare Decision Maker:   Click here to complete HealthCare Decision Makers including selection of the Healthcare Decision Maker Relationship (ie \"Primary\")      Primary Decision Maker: Juwan Peterson   - [phone]    Payor Source Payor: Melburn Riedel / Plan: [facility] / Product Type: Managed Care Medicaid /                             Plan for utilizing home health:                     Transition of Care Plan:                 CM met with patient at bedside. Patient lives with family in a home. Patient is alert and oriented, independent with ADL's. No hx of HH, SNF, or rehab. No hx of DME. A family member will transport at discharge. CM will continue to follow.

## 2022-07-31 NOTE — PROGRESS NOTES
Pt complaining of numbness in fingers and feet. Asked if pt has ever had this or taken gabapentin before and he said no. Will pass this on to day shift and have MD evaluate on rounds. This RN will continue to monitor pt closely.

## 2022-07-31 NOTE — PROGRESS NOTES
PROGRESS NOTE      Subjective: Patient request for his Percocet. He has pain all over. Poor appetite. No fever or chills. Occasional cough.      Visit Vitals  /73 (BP 1 Location: Left upper arm, BP Patient Position: At rest)   Pulse (!) 57   Temp 97.6 °F (36.4 °C)   Resp 18   Ht 6' (1.829 m)   Wt 68.2 kg (150 lb 5.7 oz)   SpO2 98%   BMI 20.39 kg/m²       Current Facility-Administered Medications:     acetaminophen (TYLENOL) tablet 650 mg, 650 mg, Oral, Q4H PRN, Yovanny ANGEL MD, 650 mg at 07/31/22 0400    oxyCODONE-acetaminophen (PERCOCET) 5-325 mg per tablet 1 Tablet, 1 Tablet, Oral, Q12H PRN, Yovanny ANGEL MD    levoFLOXacin (LEVAQUIN) 750 mg in D5W IVPB, 750 mg, IntraVENous, Q24H, Sherry GRAHAM MD, Last Rate: 100 mL/hr at 07/31/22 1017, 750 mg at 07/31/22 1017    [COMPLETED] piperacillin-tazobactam (ZOSYN) 4.5 g in 0.9% sodium chloride (MBP/ADV) 100 mL MBP, 4.5 g, IntraVENous, ONCE, Stopped at 07/31/22 0732 **FOLLOWED BY** piperacillin-tazobactam (ZOSYN) 3.375 g in 0.9% sodium chloride (MBP/ADV) 100 mL MBP, 3.375 g, IntraVENous, Q8H, Daniele Cee MD, Last Rate: 25 mL/hr at 07/31/22 1342, 3.375 g at 07/31/22 1342    levothyroxine (SYNTHROID) tablet 125 mcg, 125 mcg, Oral, ACB, Hdoa Durhma MD, 125 mcg at 07/31/22 1017    melatonin tablet 5 mg, 5 mg, Oral, QHS, Hoda Geiger MD, 5 mg at 07/30/22 2105    senna-docusate (PERICOLACE) 8.6-50 mg per tablet 1 Tablet, 1 Tablet, Oral, BID, Yovannymukul ANGEL MD, 1 Tablet at 07/31/22 1017    pantoprazole (PROTONIX) tablet 40 mg, 40 mg, Oral, BID, Hoda Durham MD, 40 mg at 07/31/22 1017    ferrous sulfate tablet 325 mg, 325 mg, Oral, BID WITH MEALS, Hoda Durham MD, 325 mg at 07/31/22 1626    dexamethasone (DECADRON) 4 mg/mL injection 4 mg, 4 mg, IntraVENous, Q6H, Bárbara Carolina MD, 4 mg at 07/31/22 1342  Recent Results (from the past 24 hour(s))   METABOLIC PANEL, COMPREHENSIVE Collection Time: 07/31/22  3:35 PM   Result Value Ref Range    Sodium 133 (L) 136 - 145 mmol/L    Potassium 4.7 3.5 - 5.1 mmol/L    Chloride 97 97 - 108 mmol/L    CO2 28 21 - 32 mmol/L    Anion gap 8 5 - 15 mmol/L    Glucose 107 (H) 65 - 100 mg/dL    BUN 33 (H) 6 - 20 mg/dL    Creatinine 1.84 (H) 0.70 - 1.30 mg/dL    BUN/Creatinine ratio 18 12 - 20      GFR est AA 47 (L) >60 ml/min/1.73m2    GFR est non-AA 39 (L) >60 ml/min/1.73m2    Calcium 8.3 (L) 8.5 - 10.1 mg/dL    Bilirubin, total 0.2 0.2 - 1.0 mg/dL    AST (SGOT) 17 15 - 37 U/L    ALT (SGPT) 8 (L) 12 - 78 U/L    Alk. phosphatase 60 45 - 117 U/L    Protein, total 6.4 6.4 - 8.2 g/dL    Albumin 2.4 (L) 3.5 - 5.0 g/dL    Globulin 4.0 2.0 - 4.0 g/dL    A-G Ratio 0.6 (L) 1.1 - 2.2       XR CHEST PORT   Final Result      Interval slightly worsening appearance of patchy consolidative opacities in the   bilateral lungs. Stable small left pleural effusion. New small right pleural   effusion. CT HEAD WO CONT   Final Result   Left occipital lobe mass with edema, not present on 10/4/2021 PET/CT. Recommend   MRI without and with contrast of the brain for further characterization. I discussed this impression with Kevin Gorman MD at 1206 hours on   7/30/2022. PET/CT TUMOR IMAGE SKULL THIGH (SUB)    (Results Pending)   CT HEAD W CONT    (Results Pending)             HEENT: Normocephalic atraumatic. Anicteric sclera. Neck: Tracheostomy tube in place. Neck is scaphoid. Lungs: Scanty rhonchi. Heart: Regular. Abdomen: Soft positive bowel sounds nontender nondistended. Lower Extremities: No cyanosis or edema. CNS: Alert and oriented follows command moves extremities. Psych: Cooperative. Assessment:#1. Community-acquired pneumonia versus aspiration pneumonia    #2. Laryngeal cancer status post laryngectomy and tracheostomy tube    #3. Cancer cachexia    #4. Chronic anemia status post work-up    #5.   Nausea vomiting probably from brain mets with brain swelling    #6. Hypothyroidism     #7. TASHI due to possibly poor p.o. intake dehydration     Plan: Reinstate Percocet. IV fluids. Close observation. Discussed with brother over the phone. Discussed with oncologist regarding further testing as regards to brain mets. Unfortunately kidney function would not not permit CT with contrast and he does have a mp in his left wrist that prohibits MRI. Decadron can also aggravate renal insufficiency follow-up labs.

## 2022-08-01 NOTE — NURSE NAVIGATOR
[address] [facility]              Medical Oncology and Radiation Oncology consults noted. I am available to assist as needed. Dr. Dejon Kinsey notified by [employer] message of patient's admission.        Ronna GAVIRIAN RN  [employer] Oncology Nurse Navigator  [phone]  *Can also be reached via [employer]

## 2022-08-01 NOTE — CONSULTS
Case discussed with Dr. Hamilton Kramer in Neurosurgery. If SRS required (e.g. postop treatment), Dr. Hamilton Kramer  and I will coordinate/facilitate. Radiation Oncology Consult    Patient: Camila Carl MRN: [id number]  SSN: [id number]    YOB: 1970  Age: 49 y.o. Sex: male      Subjective:      Camila Carl is a 49 y.o. 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 .Pt completed concurrent chemoradiation with cisplatin and radiation the patient developed lung metastases the patient later started Pembrolizumab monotherapy, then switched to Cis-5FU-Pembrolizumab, followed by Pembrolizumab maintenance under the direction of Dr. Castillo Gamboa. He described nausea and vomiting and weight loss (down 4 belt hole sizes), with mild headache. Head CT 7/30/22 revealed a left occipital lobe mass not present on 10/4/21 PET/CT. He is now on steroids and symptoms have improved. He is now referred for consideration of radiotherapy. He denies any neuro symptoms.     Past Medical History:   Diagnosis Date    Chronic pain     THROAT, EARS, NECK R/T CANCER    COPD (chronic obstructive pulmonary disease) ([facility] [address]     EMPHASEMA    Psychiatric disorder     ANXIETY R/T CANCER    Throat cancer ([facility] [address]     Tracheostomy present [facility])      Past Surgical History:   Procedure Laterality Date    COLONOSCOPY N/A 6/7/2022    COLONOSCOPY performed by Radha Johnson MD at [address] N/A 6/8/2022    COLONOSCOPY performed by Radha Johnson MD at [address] [facility]- \" PUT PLATE IN\"    HX TRACHEOSTOMY  02/08/2021    IR INSERT TUNL CVC W PORT OVER 5 YEARS  5/21/2021    IR PLACE CVAD FLUORO GUIDE  5/21/2021      Social History     Tobacco Use    Smoking status: Former     Packs/day: 1.50     Years: 30.00     Pack years: 45.00     Types: Cigarettes     Quit date: 10/28/2020     Years since quittin.7    Smokeless tobacco: Never   Substance Use Topics    Alcohol use: Not Currently     Family History   Problem Relation Age of Onset    Diabetes Mother     Diabetes Father     Kidney Disease Father     Diabetes Sister     Heart Disease Daughter     Anesth Problems Neg Hx      Prior to Admission medications    Medication Sig Start Date End Date Taking? Authorizing Provider   oxyCODONE-acetaminophen (PERCOCET) 5-325 mg per tablet Take 1 Tablet by mouth every twelve (12) hours as needed for Pain for up to 21 days. Max Daily Amount: 2 Tablets. 7/15/22 8/5/22 Yes Whit Mascorro NP   sucralfate (Carafate) 1 gram tablet Take 1 Tablet by mouth four (4) times daily as needed (abdominal pain). Dissolve in 10 mL of water before takign. 22  Yes Whit Mascorro NP   senna-docusate (PERICOLACE) 8.6-50 mg per tablet Take 1 Tablet by mouth two (2) times a day. Patient not taking: Reported on 2022   Mahesh Ferguson MD   guaiFENesin-dextromethorphan SR (HUMIBID DM) 600-30 mg per tablet Take 1 Tablet by mouth every twelve (12) hours as needed for Cough. Patient not taking: Reported on 2022 7/15/22   Whit Mascorro NP   melatonin 5 mg cap capsule Take 1 Capsule by mouth nightly. Patient not taking: Reported on 2022 7/15/22   Whit Mascorro NP   benzonatate (TESSALON) 100 mg capsule Take 1 Capsule by mouth three (3) times daily as needed for Cough. Patient not taking: Reported on 2022 7/15/22   Whit Mascorro NP   levothyroxine (SYNTHROID) 125 mcg tablet Take 1 Tablet by mouth Daily (before breakfast) for 30 days. Patient not taking: Reported on 2022  Christopher Parks MD   pantoprazole (PROTONIX) 40 mg tablet Take 1 Tablet by mouth two (2) times a day.  Indications: bleeding from stomach, esophagus or duodenum  Patient not taking: Reported on 2022   Whit Mascorro NP   ferrous sulfate 325 mg (65 mg iron) tablet Take 1 Tablet by mouth two (2) times daily (with meals). Patient not taking: Reported on 7/30/2022 6/21/22   Maryjo Mascorro, NP        No Known Allergies    Review of Systems:  Constitutional: No fevers, No chills, +fatigue, +weakness, +weight loss  Eyes: No visual disturbance  Ears, Nose, Mouth, Throat, and Face: No nasal congestion, No sore throat  Respiratory: No cough, No sputum, No wheezing, No SOB  Cardiovascular: No chest pain, No lower extremity edema, No Palpitations   Gastrointestinal: No nausea, No vomiting, No diarrhea, No constipation, No abdominal pain  Genitourinary: No frequency, No dysuria, No hematuria  Integument/Breast: No rash, No skin lesion(s), No dryness  Musculoskeletal: No arthralgias, No neck pain, No back pain  Neurological: No headaches, No dizziness, No confusion,  No seizures  Behavioral/Psychiatric: No anxiety, No depression      Objective:     Vitals:    07/31/22 1600 07/31/22 2127 08/01/22 0300 08/01/22 0736   BP:  94/64 103/71 114/75   Pulse: (!) 57 92 (!) 104 78   Resp:  19 20 18   Temp:  97.7 °F (36.5 °C) 97.6 °F (36.4 °C) 97.2 °F (36.2 °C)   SpO2:  98% 100% 99%   Weight:       Height:            Physical Exam:  General: alert, cooperative, no distress  Eye: conjunctivae/corneas clear. PERRL, EOM's intact. Throat and Neck: s/p laryngectomy with tracheostomy. Hyperpigmented skin of neck c/w history of radiotherapy treatment. No erythema or exudates noted. No mass   Lung: clear to auscultation bilaterally  Heart: regular rate and rhythm,   Abdomen: soft, non-tender. Bowel sounds normal. No masses,  Extremities:  able to move all extremities normal, atraumatic  Skin: Normal.  Neurologic: AOx3. Cranial nerves 2-12 and sensation grossly intact.   Psychiatric: non focal    CBC:   Lab Results   Component Value Date/Time    WBC 18.0 (H) 08/01/2022 07:22 AM    RBC 2.90 (L) 08/01/2022 07:22 AM    HGB 8.0 (L) 08/01/2022 07:22 AM    HCT 25.2 (L) 08/01/2022 07:22 AM    PLATELET 662 (H) 24/64/8326 07:22 AM BMP:   Lab Results   Component Value Date/Time    Glucose 98 08/01/2022 07:22 AM    Sodium 132 (L) 08/01/2022 07:22 AM    Potassium 4.6 08/01/2022 07:22 AM    Chloride 98 08/01/2022 07:22 AM    CO2 27 08/01/2022 07:22 AM    BUN 36 (H) 08/01/2022 07:22 AM    Creatinine 1.68 (H) 08/01/2022 07:22 AM    Calcium 8.1 (L) 08/01/2022 07:22 AM     CMP:   Lab Results   Component Value Date/Time    Glucose 98 08/01/2022 07:22 AM    Sodium 132 (L) 08/01/2022 07:22 AM    Potassium 4.6 08/01/2022 07:22 AM    Chloride 98 08/01/2022 07:22 AM    CO2 27 08/01/2022 07:22 AM    BUN 36 (H) 08/01/2022 07:22 AM    Creatinine 1.68 (H) 08/01/2022 07:22 AM    Calcium 8.1 (L) 08/01/2022 07:22 AM    Anion gap 7 08/01/2022 07:22 AM    BUN/Creatinine ratio 21 (H) 08/01/2022 07:22 AM    Alk. phosphatase 58 08/01/2022 07:22 AM    Protein, total 6.7 08/01/2022 07:22 AM    Albumin 2.4 (L) 08/01/2022 07:22 AM    Globulin 4.3 (H) 08/01/2022 07:22 AM    A-G Ratio 0.6 (L) 08/01/2022 07:22 AM     Coagulation:   Lab Results   Component Value Date/Time    Prothrombin time 12.9 05/21/2021 07:15 AM    INR 1.0 05/21/2021 07:15 AM     ABGs:   Lab Results   Component Value Date/Time    pH 7.52 (H) 02/02/2021 05:05 AM    PO2 272 (H) 02/02/2021 05:05 AM    PCO2 43 02/02/2021 05:05 AM    BICARBONATE 34 (H) 02/02/2021 05:05 AM    BASE EXCESS 10.2 (H) 02/02/2021 05:05 AM     Radiology review:   CT head 7/30/22 IMPRESSION  3.1 cm mixed density left occipital lobe mass with edema, not present on 10/4/2021 PET/CT. Recommend MRI without and with contrast of the brain for further characterization. CTA chest 5/9/22  IMPRESSION  Advanced ROSENDO subsegmental atelectasis. Similar pulmonary masses/nodules. Similar left hilar lymphadenopathy. No CT evidence for PE. Small volume ascites.     Assessment:   50 yo male with history of head and neck cancer s/p laryngectomy and postop radiotherapy (7000 cGy) who later developed lung metastases, now with evidence of left occipital brain metastasis. Plan:     Suggest neurosurgical consult for consideration of surgical resection. Alternative treatments include SRS or whole brain radiotherapy. I look forward to discussing this pleasant gentleman's case with his team of physicians in an effort to optimize his overall therapy.     Signed By: Yazmin Hathaway MD     August 1, 2022

## 2022-08-01 NOTE — PROGRESS NOTES
Problem: General Medical Care Plan  Goal: *Labs within defined limits  Outcome: Progressing Towards Goal     Problem: General Medical Care Plan  Goal: *Skin integrity maintained  Outcome: Progressing Towards Goal

## 2022-08-01 NOTE — PROGRESS NOTES
Comprehensive Nutrition Assessment    Type and Reason for Visit: Positive nutrition screen (MST3)    Nutrition Recommendations/Plan:   Continue current diet  Continue ensure HP 6x/day  Obtain updated measured weight as able  Monitor and record PO intakes, supplement acceptance, and Bms in I/Os     Malnutrition Assessment:  Malnutrition Status: Moderate malnutrition (08/01/22 1418)    Context:  Chronic illness     Findings of the 6 clinical characteristics of malnutrition:   Energy Intake:  75% or less est energy requirements for 1 month or longer  Weight Loss:  No significant weight loss     Body Fat Loss:  Mild body fat loss,     Muscle Mass Loss:  Mild muscle mass loss,    Fluid Accumulation:  No significant fluid accumulation,     Strength:  Not performed     Nutrition Assessment:    (P) Admitted for PNA. Hx of throat Ca, hx of drinking 6x ensure VHC. CT head 7/30 showing met to brain. Current order for ensure HP 6x/day providing 960kcal, 96g protein. Appropriate in setting of poor intakes in hypermetabolic disease. Noted hx of weight loss- no recent significant weight loss per EMAR. Nutrition Related Findings:    (P) NFPE with muscle and fat wasting. +emesis, constipation. No problems chewing/swallowing. No edema. BM PTA. Wound Type: (P) None    Current Nutrition Intake & Therapies:  Average Meal Intake: (P) 51-75%  Average Supplement Intake: (P) %  ADULT DIET Easy to Chew; 3 carb choices (45 gm/meal)  DIET ONE TIME MESSAGE  ADULT ORAL NUTRITION SUPPLEMENT Breakfast, Lunch, Dinner, AM Snack, PM Snack, HS Snack; Standard High Calorie/High Protein    Anthropometric Measures:  Height: (P) 6' 0.01\" (182.9 cm)  Ideal Body Weight (IBW): (P) 178 lbs ((P) 81 kg)  Current Body Wt:  (P) 68.2 kg (150 lb 5.7 oz), (P) 84.5 % IBW. (P) Stated  Current BMI (kg/m2): (P) 20.4  BMI Category: (P) Normal weight (BMI 18.5-24. 9)    Estimated Daily Nutrient Needs:  Energy Requirements Based On: (P) Kcal/kg  Weight Used for Energy Requirements: (P) Current  Energy (kcal/day): (P) 2387kcal (35kcal/kg)  Weight Used for Protein Requirements: (P) Current  Protein (g/day): (P) 137g (2g/kg)  Method Used for Fluid Requirements: (P) 1 ml/kcal  Fluid (ml/day): (P) 2387mL (1mL/kcal)    Nutrition Diagnosis:   Moderate malnutrition, In context of chronic illness related to catabolic illness as evidenced by poor intake prior to admission, mild muscle loss, mild loss of subcutaneous fat    Nutrition Interventions:   Food and/or Nutrient Delivery: Continue current diet, Continue oral nutrition supplement  Nutrition Education/Counseling: Education not indicated  Coordination of Nutrition Care: Continue to monitor while inpatient    Goals:  Goals: Meet at least 75% of estimated needs, within 7 days    Nutrition Monitoring and Evaluation:   Behavioral-Environmental Outcomes: None identified  Food/Nutrient Intake Outcomes: Diet advancement/tolerance, Food and nutrient intake, Supplement intake  Physical Signs/Symptoms Outcomes: Meal time behavior, Weight    Discharge Planning:     Too soon to determine    Maryjo Dunham RD  Contact: [phone]

## 2022-08-01 NOTE — TELEPHONE ENCOUNTER
08/01/22 10:46 AM     Received incoming call from Dr. David Rose from [facility]. Patient has singular solitary brain MET. Dr. eRta Fernandez is aware and plan to transfer to [facility]. I advised patient is due for repeat CT ch/ab/pelv. Dr. David Rose said she would order it without contrast given renal dysfunction.

## 2022-08-01 NOTE — PROGRESS NOTES
PROGRESS NOTE      Subjective: Patient denies any chest pain but he has some indigestion improved with Tums. Feels it is getting better. No headaches no nausea vomiting. No fever chills.      Visit Vitals  /75 (BP 1 Location: Left upper arm, BP Patient Position: At rest;Supine)   Pulse 78   Temp 97.2 °F (36.2 °C)   Resp 18   Ht 6' (1.829 m)   Wt 68.2 kg (150 lb 5.7 oz)   SpO2 99%   BMI 20.39 kg/m²       Current Facility-Administered Medications:     hydrOXYzine pamoate (VISTARIL) capsule 25 mg, 25 mg, Oral, TID PRN, Marissa ANGEL MD, 25 mg at 08/01/22 3486    calcium carbonate (TUMS) chewable tablet 400 mg [elemental], 400 mg, Oral, ONCE, Hoda Al MD    acetaminophen (TYLENOL) tablet 650 mg, 650 mg, Oral, Q4H PRN, Marissa ANGEL MD, 650 mg at 07/31/22 0400    oxyCODONE-acetaminophen (PERCOCET) 5-325 mg per tablet 1 Tablet, 1 Tablet, Oral, Q12H PRN, Marissa ANGEL MD, 1 Tablet at 08/01/22 0530    0.9% sodium chloride infusion, 75 mL/hr, IntraVENous, CONTINUOUS, Marissa ANGEL MD, Last Rate: 75 mL/hr at 07/31/22 1736, 75 mL/hr at 07/31/22 1736    levoFLOXacin (LEVAQUIN) 750 mg in D5W IVPB, 750 mg, IntraVENous, Q24H, Pasquale GRAHAM MD, Last Rate: 100 mL/hr at 08/01/22 1131, 750 mg at 08/01/22 1131    [COMPLETED] piperacillin-tazobactam (ZOSYN) 4.5 g in 0.9% sodium chloride (MBP/ADV) 100 mL MBP, 4.5 g, IntraVENous, ONCE, Stopped at 07/31/22 0732 **FOLLOWED BY** piperacillin-tazobactam (ZOSYN) 3.375 g in 0.9% sodium chloride (MBP/ADV) 100 mL MBP, 3.375 g, IntraVENous, Q8H, Daniele Cee MD, Last Rate: 25 mL/hr at 08/01/22 1131, 3.375 g at 08/01/22 1131    levothyroxine (SYNTHROID) tablet 125 mcg, 125 mcg, Oral, ACB, Hoda Al MD, 125 mcg at 08/01/22 0805    melatonin tablet 5 mg, 5 mg, Oral, QHS, Hoda Geiger MD, 5 mg at 07/31/22 2117    senna-docusate (PERICOLACE) 8.6-50 mg per tablet 1 Tablet, 1 Tablet, Oral, BID, Jennifer Lugo, Sloan Almodovar MD, 1 Tablet at 08/01/22 0829    pantoprazole (PROTONIX) tablet 40 mg, 40 mg, Oral, BID, Monique ANGEL MD, 40 mg at 08/01/22 0829    ferrous sulfate tablet 325 mg, 325 mg, Oral, BID WITH MEALS, Monique ANGEL MD, 325 mg at 08/01/22 0829    dexamethasone (DECADRON) 4 mg/mL injection 4 mg, 4 mg, IntraVENous, Q6H, Alexa Valdivia MD, 4 mg at 08/01/22 1130  Recent Results (from the past 24 hour(s))   METABOLIC PANEL, COMPREHENSIVE    Collection Time: 07/31/22  3:35 PM   Result Value Ref Range    Sodium 133 (L) 136 - 145 mmol/L    Potassium 4.7 3.5 - 5.1 mmol/L    Chloride 97 97 - 108 mmol/L    CO2 28 21 - 32 mmol/L    Anion gap 8 5 - 15 mmol/L    Glucose 107 (H) 65 - 100 mg/dL    BUN 33 (H) 6 - 20 mg/dL    Creatinine 1.84 (H) 0.70 - 1.30 mg/dL    BUN/Creatinine ratio 18 12 - 20      GFR est AA 47 (L) >60 ml/min/1.73m2    GFR est non-AA 39 (L) >60 ml/min/1.73m2    Calcium 8.3 (L) 8.5 - 10.1 mg/dL    Bilirubin, total 0.2 0.2 - 1.0 mg/dL    AST (SGOT) 17 15 - 37 U/L    ALT (SGPT) 8 (L) 12 - 78 U/L    Alk. phosphatase 60 45 - 117 U/L    Protein, total 6.4 6.4 - 8.2 g/dL    Albumin 2.4 (L) 3.5 - 5.0 g/dL    Globulin 4.0 2.0 - 4.0 g/dL    A-G Ratio 0.6 (L) 1.1 - 2.2     METABOLIC PANEL, COMPREHENSIVE    Collection Time: 08/01/22  7:22 AM   Result Value Ref Range    Sodium 132 (L) 136 - 145 mmol/L    Potassium 4.6 3.5 - 5.1 mmol/L    Chloride 98 97 - 108 mmol/L    CO2 27 21 - 32 mmol/L    Anion gap 7 5 - 15 mmol/L    Glucose 98 65 - 100 mg/dL    BUN 36 (H) 6 - 20 mg/dL    Creatinine 1.68 (H) 0.70 - 1.30 mg/dL    BUN/Creatinine ratio 21 (H) 12 - 20      GFR est AA 53 (L) >60 ml/min/1.73m2    GFR est non-AA 43 (L) >60 ml/min/1.73m2    Calcium 8.1 (L) 8.5 - 10.1 mg/dL    Bilirubin, total 0.2 0.2 - 1.0 mg/dL    AST (SGOT) 17 15 - 37 U/L    ALT (SGPT) 9 (L) 12 - 78 U/L    Alk.  phosphatase 58 45 - 117 U/L    Protein, total 6.7 6.4 - 8.2 g/dL    Albumin 2.4 (L) 3.5 - 5.0 g/dL    Globulin 4.3 (H) 2.0 - 4.0 g/dL    A-G Ratio 0.6 (L) 1.1 - 2.2     CBC WITH AUTOMATED DIFF    Collection Time: 08/01/22  7:22 AM   Result Value Ref Range    WBC 18.0 (H) 4.1 - 11.1 K/uL    RBC 2.90 (L) 4.10 - 5.70 M/uL    HGB 8.0 (L) 12.1 - 17.0 g/dL    HCT 25.2 (L) 36.6 - 50.3 %    MCV 86.9 80.0 - 99.0 FL    MCH 27.6 26.0 - 34.0 PG    MCHC 31.7 30.0 - 36.5 g/dL    RDW 16.0 (H) 11.5 - 14.5 %    PLATELET 175 (H) 187 - 400 K/uL    MPV 9.2 8.9 - 12.9 FL    NRBC 0.0 0.0  WBC    ABSOLUTE NRBC 0.00 0.00 - 0.01 K/uL    NEUTROPHILS 94 (H) 32 - 75 %    LYMPHOCYTES 1 (L) 12 - 49 %    MONOCYTES 4 (L) 5 - 13 %    EOSINOPHILS 0 0 - 7 %    BASOPHILS 0 0 - 1 %    IMMATURE GRANULOCYTES 1 (H) 0 - 0.5 %    ABS. NEUTROPHILS 17.0 (H) 1.8 - 8.0 K/UL    ABS. LYMPHOCYTES 0.1 (L) 0.8 - 3.5 K/UL    ABS. MONOCYTES 0.7 0.0 - 1.0 K/UL    ABS. EOSINOPHILS 0.0 0.0 - 0.4 K/UL    ABS. BASOPHILS 0.0 0.0 - 0.1 K/UL    ABS. IMM. GRANS. 0.1 (H) 0.00 - 0.04 K/UL    DF AUTOMATED       XR CHEST PORT   Final Result      Interval slightly worsening appearance of patchy consolidative opacities in the   bilateral lungs. Stable small left pleural effusion. New small right pleural   effusion. CT HEAD WO CONT   Final Result   Left occipital lobe mass with edema, not present on 10/4/2021 PET/CT. Recommend   MRI without and with contrast of the brain for further characterization. I discussed this impression with Polo Baker MD at 1206 hours on   7/30/2022. PET/CT TUMOR IMAGE SKULL THIGH (SUB)    (Results Pending)   CT HEAD W CONT    (Results Pending)             HEENT: Normocephalic atraumatic. Anicteric sclera. Neck: No distended neck vein. Lungs: Improved air entry no coarse crackles or wheeze  Heart: Regular  Abdomen: Soft flat nontender nondistended positive bowel sounds  Lower Extremities: No cyanosis or edema  CNS: Alert and oriented follows command x3    Assessment:   :#1.   Community-acquired pneumonia versus aspiration pneumonia  #2. Laryngeal cancer status post laryngectomy and tracheostomy tube  #3. Cancer cachexia  #4. Chronic anemia status post work-up  #5. Nausea vomiting probably from brain mets with brain swelling  #6. Hypothyroidism  #7. TASHI due to possibly poor p.o. intake dehydration       Plan: Discussed with Dr. Aida Sanchez regarding care. She has talked to neurosurgeon who advised patient to be transferred to [facility].  I have reached out to the transfer center and Dr. Dayday Virgen hospitalist is expected to call me back accepting for Dr Antno Romero group. Once there is a bed patient will be transferred.

## 2022-08-01 NOTE — PROGRESS NOTES
Hematology and Oncology Progress Note    Patient: Missy Beverly MRN: [id number]  SSN: [id number]    YOB: 1970  Age: 49 y.o. Sex: male      Admit Date: 7/30/2022    LOS: 2 days     Chief Complaint: Patient was admitted with brain mets    Subjective:   Patient has no headache or dizziness. No seizure. No focal weakness. No nausea or vomiting      Objective:     Vitals:    07/31/22 1600 07/31/22 2127 08/01/22 0300 08/01/22 0736   BP:  94/64 103/71 114/75   Pulse: (!) 57 92 (!) 104 78   Resp:  19 20 18   Temp:  97.7 °F (36.5 °C) 97.6 °F (36.4 °C) 97.2 °F (36.2 °C)   SpO2:  98% 100% 99%   Weight:       Height:              Physical Exam:   Constitutional: Middle-age man not in any acute distress or pain  Eyes: Sclerae anicteric. Conjunctivae shows pallor. ENMT: Oral mucosa is moist, no thrush, mucositis, or petechiae. Neck: Has tracheostomy. No adenopathy. Respiratory: Lungs are clear bilaterally. Cardiovascular: Normal sinus rhythm. Abdomen: Soft. Nontender. No hepatosplenomegaly. No guarding or rigidity. Bowel sounds present. Extremities: No edema. Skin: No petechiae; no skin rash. Neurologic: Alert/oriented x 3. Lab/Data Review:    Recent Results (from the past 24 hour(s))   METABOLIC PANEL, COMPREHENSIVE    Collection Time: 07/31/22  3:35 PM   Result Value Ref Range    Sodium 133 (L) 136 - 145 mmol/L    Potassium 4.7 3.5 - 5.1 mmol/L    Chloride 97 97 - 108 mmol/L    CO2 28 21 - 32 mmol/L    Anion gap 8 5 - 15 mmol/L    Glucose 107 (H) 65 - 100 mg/dL    BUN 33 (H) 6 - 20 mg/dL    Creatinine 1.84 (H) 0.70 - 1.30 mg/dL    BUN/Creatinine ratio 18 12 - 20      GFR est AA 47 (L) >60 ml/min/1.73m2    GFR est non-AA 39 (L) >60 ml/min/1.73m2    Calcium 8.3 (L) 8.5 - 10.1 mg/dL    Bilirubin, total 0.2 0.2 - 1.0 mg/dL    AST (SGOT) 17 15 - 37 U/L    ALT (SGPT) 8 (L) 12 - 78 U/L    Alk.  phosphatase 60 45 - 117 U/L    Protein, total 6.4 6.4 - 8.2 g/dL    Albumin 2.4 (L) 3.5 - 5.0 g/dL Globulin 4.0 2.0 - 4.0 g/dL    A-G Ratio 0.6 (L) 1.1 - 2.2     METABOLIC PANEL, COMPREHENSIVE    Collection Time: 08/01/22  7:22 AM   Result Value Ref Range    Sodium 132 (L) 136 - 145 mmol/L    Potassium 4.6 3.5 - 5.1 mmol/L    Chloride 98 97 - 108 mmol/L    CO2 27 21 - 32 mmol/L    Anion gap 7 5 - 15 mmol/L    Glucose 98 65 - 100 mg/dL    BUN 36 (H) 6 - 20 mg/dL    Creatinine 1.68 (H) 0.70 - 1.30 mg/dL    BUN/Creatinine ratio 21 (H) 12 - 20      GFR est AA 53 (L) >60 ml/min/1.73m2    GFR est non-AA 43 (L) >60 ml/min/1.73m2    Calcium 8.1 (L) 8.5 - 10.1 mg/dL    Bilirubin, total 0.2 0.2 - 1.0 mg/dL    AST (SGOT) 17 15 - 37 U/L    ALT (SGPT) 9 (L) 12 - 78 U/L    Alk. phosphatase 58 45 - 117 U/L    Protein, total 6.7 6.4 - 8.2 g/dL    Albumin 2.4 (L) 3.5 - 5.0 g/dL    Globulin 4.3 (H) 2.0 - 4.0 g/dL    A-G Ratio 0.6 (L) 1.1 - 2.2     CBC WITH AUTOMATED DIFF    Collection Time: 08/01/22  7:22 AM   Result Value Ref Range    WBC 18.0 (H) 4.1 - 11.1 K/uL    RBC 2.90 (L) 4.10 - 5.70 M/uL    HGB 8.0 (L) 12.1 - 17.0 g/dL    HCT 25.2 (L) 36.6 - 50.3 %    MCV 86.9 80.0 - 99.0 FL    MCH 27.6 26.0 - 34.0 PG    MCHC 31.7 30.0 - 36.5 g/dL    RDW 16.0 (H) 11.5 - 14.5 %    PLATELET 882 (H) 790 - 400 K/uL    MPV 9.2 8.9 - 12.9 FL    NRBC 0.0 0.0  WBC    ABSOLUTE NRBC 0.00 0.00 - 0.01 K/uL    NEUTROPHILS 94 (H) 32 - 75 %    LYMPHOCYTES 1 (L) 12 - 49 %    MONOCYTES 4 (L) 5 - 13 %    EOSINOPHILS 0 0 - 7 %    BASOPHILS 0 0 - 1 %    IMMATURE GRANULOCYTES 1 (H) 0 - 0.5 %    ABS. NEUTROPHILS 17.0 (H) 1.8 - 8.0 K/UL    ABS. LYMPHOCYTES 0.1 (L) 0.8 - 3.5 K/UL    ABS. MONOCYTES 0.7 0.0 - 1.0 K/UL    ABS. EOSINOPHILS 0.0 0.0 - 0.4 K/UL    ABS. BASOPHILS 0.0 0.0 - 0.1 K/UL    ABS. IMM. GRANS. 0.1 (H) 0.00 - 0.04 K/UL    DF AUTOMATED             Assessment and plan:   1) Solitary Left occipital brain metastases:Continue decadron 4 mg IV q 6 hrs. Pt has mp in left wrist and not able to get MRI head. Consult Radiation oncology.  - Merle Cunningham has discussed case with radiation oncology, attending physician and neurosurgeon Dr. Sivan Elder. After back-and-forth discussion it was decided to transfer patient to [facility] [place] for further neurosurgical evaluation and further radiation oncology management based on neurosurgery recommendations. I have discussed this case with Dr. Merle Cunningham. And also discussed with patient. Patient to continue follow-up by his primary oncologist once he is discharged home. 2) Squamous cell carcinoma of larynx with lung metastases. S/p Cis-5FU-Pembrolizumab. Pt on maintenance Keytruda. Last rx Friday. Pt follows with . 3) Anemia:H/o iron deff and GI bleeding. Monitor. Check iron studies. Transfuse pRBC if hb less than 7 or symptomatic.  -Patient's hemoglobin is 8 today. 4) PE: Anticoagulation recently held due to GI bleeding. 5) Leukocytosis:possible aspiration pneumonia. may have been contributed by his steroids also. 6) Thrombocytosis. Possible reactive. Monitor. 7) TASHI : Creatinine has improved to 1.68 today. Still not good enough to do CT scan with contrast.    This dictation was done by dragon, computer voice recognition software. Often unanticipated grammatical, syntax, phones and other interpretive errors are inadvertently transcribed. Please excuse errors that have escaped final proofreading.        Signed By: Robert Avalos MD     August 1, 2022

## 2022-08-02 PROBLEM — G93.89 BRAIN MASS: Status: ACTIVE | Noted: 2022-01-01

## 2022-08-02 NOTE — PROGRESS NOTES
Problem: Falls - Risk of  Goal: *Absence of Falls  Description: Document Jason Sol Fall Risk and appropriate interventions in the flowsheet.   Outcome: Progressing Towards Goal  Note: Fall Risk Interventions:            Medication Interventions: Teach patient to arise slowly         History of Falls Interventions: Consult care management for discharge planning         Problem: Patient Education: Go to Patient Education Activity  Goal: Patient/Family Education  Outcome: Progressing Towards Goal     Problem: Discharge Planning  Goal: *Discharge to safe environment  Outcome: Progressing Towards Goal  Goal: *Knowledge of medication management  Outcome: Progressing Towards Goal  Goal: *Knowledge of discharge instructions  Outcome: Progressing Towards Goal     Problem: Patient Education: Go to Patient Education Activity  Goal: Patient/Family Education  Outcome: Progressing Towards Goal

## 2022-08-02 NOTE — PROGRESS NOTES
0215 Patient c/o abd pain and requesting something more for pain as the percocet at 10p hasn't helped much. Dr. Lane Alarcon called and  orders received. 0230 Stat portable x-ray done.

## 2022-08-02 NOTE — ACP (ADVANCE CARE PLANNING)
Advance Care Planning     Advance Care Planning (ACP) Physician/NP/PA Conversation      Date of Conversation: 8/1/2022  Conducted with: Patient with Decision Making Capacity    Healthcare Decision Maker:     Primary Decision Maker: Angelica Duggan - [phone]  Click here to complete [address] including selection of the Healthcare Decision Maker Relationship (ie \"Primary\")    Today we documented Decision Maker(s) consistent with Legal Next of Kin hierarchy. Care Preferences:    Hospitalization: \"If your health worsens and it becomes clear that your chance of recovery is unlikely, what would be your preference regarding hospitalization? \"  The patient is unsure. Ventilation: \"If you were unable to breathe on your own and your chance of recovery was unlikely, what would be your preference about the use of a ventilator (breathing machine) if it was available to you? \"   The patient would desire the use of a ventilator. Resuscitation: \"In the event your heart stopped as a result of an underlying serious health condition, would you want attempts to be made to restart your heart, or would you prefer a natural death? \"   Yes, attempt to resuscitate.     Additional topics discussed: treatment goals, benefit/burden of treatment options, ventilation preferences, hospitalization preferences, and resuscitation preferences    Conversation Outcomes / Follow-Up Plan:   ACP complete - no further action today  Reviewed DNR/DNI and patient elects Full Code (Attempt Resuscitation)     Length of Voluntary ACP Conversation in minutes:  16 minutes    Alba Teixeira MD

## 2022-08-02 NOTE — PROGRESS NOTES
Pharmacy Note     Cefepime 1 gm IV q8h ordered for treatment of Pneumonia. Per [address] Extended Infusion B Lactam Policy, Cefepime will be changed to 2 gm IV q12h. Estimated Creatinine Clearance: Estimated Creatinine Clearance: 50.7 mL/min (A) (based on SCr of 1.68 mg/dL (H)). Dialysis Status, TASHI, CKD: n/a   BMI:  There is no height or weight on file to calculate BMI. Pharmacy will continue to monitor and adjust dose as necessary. Please call with any questions.     Thank you,  Carol Ann David, PHARMD

## 2022-08-02 NOTE — PROGRESS NOTES
SLP Contact Note    Contacted about this patient regarding laryngectomy. Please note patient has a larytube and HME and NOT a trach at this time. This patient is well-known to this SLP from his initial laryngectomy placement. Pt does not have laryngectomy supplies at bedside. Received supplies from the OR and given to patient. Additionally, pt cannot aspirate as he does not have a TEP that could leak and pt's anatomy precludes him from aspirating unless he has a fistula. For practitioners, please refer to following for information on laryngectomy:     Educational Resources for Providers re: Laryngectomy    Please take note on cleaning larytube and changing HME. Patients with a Laryngectomy  There will be an increase in patients after a laryngectomy at [facility] equipment is also being implemented. Here is what you need to know:  What is a laryngectomy? It is a procedure completed on patients with head/neck cancer. Procedure removes the larynx including the vocal folds. It is becoming more popular as survival rates can significantly increase after a laryngectomy in comparison to chemo/radiation only. How is it different from a trach? A trach is used for additional respiratory support, but patients still have their vocal folds/larynx intact. You should see a larytube and not a trach, on a patient's stoma. Note that patient's only way of breathing is through their stoma. That means you cannot occlude their stoma (including a speaking valve). What happens with communication? Patients after a laryngectomy no longer can communicate by using their vocal folds for vocalizing. There are four options:  Electrolarynx: A device that is placed flush to patient's neck and vibrates to recreate the vibrations that your vocal folds provide. There is also an option for an oral adapter that is placed directly into oral cavity.   Esophageal speech: Air is injected into the upper esophagus and then released in a controlled manner to create sound used to produce speech. Augmentative/Alternative Communication: Communication boards, iPads with apps, dry erase boards for writing. Tracheoesophageal Prosthesis (TEP): Surgeon specific. Some surgeons will place a tracheoesophageal puncture (a hole in the tissue between the trachea and esophagus). Trained SLPs can then place a prosthesis that patients can use to speak easier utilizing esophageal speech. What about swallowing? Good news for our patients with dysphagia pre-laryngectomy! It is very difficult to aspirate after a laryngectomy. The only way a patient can aspirate is if: they have a tracheoesophageal fistula (hole between trachea and esophagus), if they directly put food/drink into stoma, or if they have a TEP and it leaks. Otherwise, laryngectomy patients cannot aspirate. Larytubes Heat-Moisture Exchange (HMEs)        A soft, silicone tube that maintains the opening of the stoma. Must be taken out and cleaned once a day. The HMEs connect to the larytube. Reduces coughing, mucus production, and chest infections. It works as Goodwall by providing moisture to stoma. It cannot get wet. It must be changed at least daily. SLP to sign off but please reach out to this SLP with any questions.     Thank you,  FABRICE SorianoEd, [address] [facility]  Speech-Language Pathologist

## 2022-08-02 NOTE — H&P
[facility] Adult  Hospitalist Group  History and Physical    Date of Service:  8/2/2022  Primary Care Provider: Marc Gallardo NP  Source of information: The patient and Chart review    Chief Complaint: No chief complaint on file. History of Presenting Illness:   Natividad Santiago is a 49 y.o. 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 He had  total laryngectomy on 02/08/21 and had regional recurrence in 04/2021. He received chemotherapy from 05/24/21-07/13/21 with concurrent radiation from 05/24/21-07/19/21 at [facility]. He was started on Keytruda in 01/2022 followed by the addition of cisplatin and fluoruracil for 4 cycles. He is now on maintenance Keytruda every 3 weeks with his last dose received last Friday. He has had multiple blood transfusions in the past due to recurrent anemia. He is followed by Dr. Roland Goncalves for onc  He presented to the ER at [facility] on 07/30/22 due to nausea, vomiting, generalized weakness and a fever of 101. 6. He was admitted to the hospital and treated for pneumonia. He did have some numbness and tingling and weakness in his left . His head CT revealed a left occipital brain mass with cerebral edema; therefore, neurosurgery was consulted. The patient was transferred to [facility] for neurosurgical evaluation. \"     Of note, patient has a larytube and HME, not a tracheostomy at this time. REVIEW OF SYSTEMS:  A comprehensive review of systems was negative except for that written in the History of Present Illness.      Past Medical History:   Diagnosis Date    Chronic pain     THROAT, EARS, NECK R/T CANCER    COPD (chronic obstructive pulmonary disease) ([facility] [address]     EMPHASEMA    Psychiatric disorder     ANXIETY R/T CANCER    Throat cancer ([facility] [address]     Tracheostomy present [facility])       Past Surgical History:   Procedure Laterality Date    COLONOSCOPY N/A 6/7/2022    COLONOSCOPY performed by Dayday Cardona MD at [facility] ENDOSCOPY    COLONOSCOPY N/A 6/8/2022    COLONOSCOPY performed by Renata Henderson MD at [address]- \" PUT PLATE IN\"    HX TRACHEOSTOMY  02/08/2021    IR INSERT TUNL CVC W PORT OVER 5 YEARS  5/21/2021    IR PLACE CVAD FLUORO GUIDE  5/21/2021     Prior to Admission medications    Medication Sig Start Date End Date Taking? Authorizing Provider   senna-docusate (PERICOLACE) 8.6-50 mg per tablet Take 1 Tablet by mouth two (2) times a day. Patient not taking: Reported on 7/30/2022 7/29/22   Cole Yoon MD   oxyCODONE-acetaminophen (PERCOCET) 5-325 mg per tablet Take 1 Tablet by mouth every twelve (12) hours as needed for Pain for up to 21 days. Max Daily Amount: 2 Tablets. 7/15/22 8/5/22  Marquis Mascorro NP   guaiFENesin-dextromethorphan SR (HUMIBID DM) 600-30 mg per tablet Take 1 Tablet by mouth every twelve (12) hours as needed for Cough. Patient not taking: Reported on 7/30/2022 7/15/22   Marquis Mascorro NP   melatonin 5 mg cap capsule Take 1 Capsule by mouth nightly. Patient not taking: Reported on 7/30/2022 7/15/22   Marquis Mascorro NP   benzonatate (TESSALON) 100 mg capsule Take 1 Capsule by mouth three (3) times daily as needed for Cough. Patient not taking: Reported on 7/30/2022 7/15/22   Marquis Mascorro NP   levothyroxine (SYNTHROID) 125 mcg tablet Take 1 Tablet by mouth Daily (before breakfast) for 30 days. Patient not taking: Reported on 7/30/2022 7/8/22 8/7/22  Yanet Biswas MD   sucralfate (Carafate) 1 gram tablet Take 1 Tablet by mouth four (4) times daily as needed (abdominal pain). Dissolve in 10 mL of water before takign. 7/8/22   Marquis Mascorro NP   pantoprazole (PROTONIX) 40 mg tablet Take 1 Tablet by mouth two (2) times a day.  Indications: bleeding from stomach, esophagus or duodenum  Patient not taking: Reported on 7/30/2022 6/21/22   Marquis Mascorro, NP   ferrous sulfate 325 mg (65 mg iron) tablet Take 1 Tablet by mouth two (2) times daily (with meals). Patient not taking: Reported on 7/30/2022 6/21/22   LudwinYari hall Jonathan, NP     No Known Allergies   Family History   Problem Relation Age of Onset    Diabetes Mother     Diabetes Father     Kidney Disease Father     Diabetes Sister     Heart Disease Daughter     Anesth Problems Neg Hx       Social History:  reports that he quit smoking about 21 months ago. His smoking use included cigarettes. He has a 45.00 pack-year smoking history. He has never used smokeless tobacco. He reports that he does not currently use alcohol. He reports that he does not use drugs. Family and social history were personally reviewed, all pertinent and relevant details are outlined as above. Objective:   Visit Vitals  BP 98/73 (BP 1 Location: Left upper arm, BP Patient Position: At rest)   Pulse (!) 101   Temp 97.5 °F (36.4 °C)   Resp 16   SpO2 100%           PHYSICAL EXAM:   General: Alert x oriented x 3, awake, no acute distress, resting in bed, pleasant male, appears to be stated age, with laytube   HEENT: PEERL, EOMI, moist mucus membranes  Neck: Supple, no JVD, no meningeal signs  Chest: Clear to auscultation bilaterally   CVS: RRR, S1 S2 heard, no murmurs/rubs/gallops  Abd: Soft, non-tender, non-distended, +bowel sounds   Ext: No clubbing, no cyanosis, no edema  Neuro/Psych: Pleasant mood and affect, CN 2-12 grossly intact, sensory grossly within normal limit, Strength 5/5 in all extremities, DTR 1+ x 4  Cap refill: Brisk, less than 3 seconds  Pulses: 2+, symmetric in all extremities  Skin: Warm, dry, without rashes or lesions    Data Review: All diagnostic labs and studies have been reviewed.     Abnormal Labs Reviewed - No abnormal labs to display    All Micro Results       Procedure Component Value Units Date/Time    CULTURE, MRSA [[id number]]     Order Status: Sent Specimen: Nasal from Nares     MYCOPLASMA AB, IGM [[id number]] Collected: 08/02/22 1058    Order Status: Completed Specimen: Serum Updated: 08/02/22 1350     Mycoplasma Ab, IgM NONREACTIVE       CULTURE, RESPIRATORY/SPUTUM/BRONCH Roxi Theodore [[id number]] Collected: 08/02/22 1301    Order Status: Sent Specimen: Sputum Updated: 08/02/22 1308    LEGIONELLA PNEUMOPHILA AG, URINE [[id number]] Collected: 08/02/22 1058    Order Status: Sent Specimen: Urine Updated: 08/02/22 1125            IMAGING:   MRI BRAIN W WO CONT    (Results Pending)        ECG/ECHO:    Results for orders placed or performed during the hospital encounter of 07/30/22   EKG, 12 LEAD, INITIAL   Result Value Ref Range    Ventricular Rate 129 BPM    Atrial Rate 129 BPM    P-R Interval 104 ms    QRS Duration 72 ms    Q-T Interval 396 ms    QTC Calculation (Bezet) 580 ms    Calculated R Axis 72 degrees    Calculated T Axis 66 degrees    Diagnosis       Sinus tachycardia with short CT  Septal infarct , age undetermined  T wave abnormality, consider lateral ischemia  Abnormal ECG  When compared with ECG of 17-JUN-2022 15:46,  No significant change was found  Confirmed by Michaela Thompson MD, Melia Jones ([id number]) on 7/31/2022 1:13:24 PM          Assessment + Plan    Given the patient's current clinical presentation, there is a high level of concern for decompensation if discharged from the emergency department. Complex decision making was performed, which includes reviewing the patient's available past medical records, laboratory results, and imaging studies.     Brain mass  Metastatic squamous cell carcinoma with reoccurence on Keytruda  -Appreciate neurosurgery recommendations  - have ordered MRI brain with and without con patient has prior history of ORIF to the wrist but nsgy has evaled and can still undergo MRI per them   -Continue Keppra, Decadron  -Oncology and radiation oncology consulted for further evaluation and input    Pneumonia  - Chest x-ray noting bilateral patchy opacities, recent transfer from [address] [place] [address] will empirically treat for hospital-acquired pneumonia  - Continue vancomycin, cefepime plan for 7 days total treatment   - Obtain Pro-Papo, mycoplasma, Legionella, sputum cx  - COVID-negative 7/30    Anemia  - Likely anemia of chronic disease in the setting of metastatic squamous cell carcinoma and CKD  - Continue monitor  - Transfuse for hemoglobin less than 7    Constipation  - X-ray of the abdomen shows moderate to severe fecal stasis  - We will continue standing bowel regimen    CKD stage IIIb  - Baseline creatinine 1.6 -1.7  -Currently at baseline continue monitor          DIET: ADULT DIET Regular   ISOLATION PRECAUTIONS: There are currently no Active Isolations  CODE STATUS: Full Code   DVT PROPHYLAXIS: SCDs  FUNCTIONAL STATUS PRIOR TO HOSPITALIZATION: Fully active and ambulatory; able to carry on all self-care without restriction. EARLY MOBILITY ASSESSMENT: Recommend an assessment from physical therapy and/or occupational therapy  ANTICIPATED DISCHARGE: Greater than 48 hours. EMERGENCY CONTACT/SURROGATE DECISION MAKER: Brother Jaqueline Moyer     CRITICAL CARE WAS PERFORMED FOR THIS ENCOUNTER: NO.      Signed By: Amado Ward MD     August 2, 2022         Please note that this dictation may have been completed with Dragon, the computer voice recognition software. Quite often unanticipated grammatical, syntax, homophones, and other interpretive errors are inadvertently transcribed by the computer software. Please disregard these errors. Please excuse any errors that have escaped final proofreading.

## 2022-08-02 NOTE — CONSULTS
I was called yesterday noting that patient was being transferred to [facility] for neurosurgical consult regarding left occipital metastatic lesion likely cause of his nausea.  I will see pt later today  No immediate surgery needed but typically solitary lesion should be operated upon

## 2022-08-02 NOTE — PROGRESS NOTES
PHYSICAL THERAPY EVALUATION/DISCHARGE  Patient: Missy Beverly [de-identified]49 y.o. male)  Date: 8/2/2022  Primary Diagnosis: Brain mass [G93.89]       Precautions: n/a         ASSESSMENT  Based on the objective data described below, the patient presents with little to no functional deficits as compared to PLOF. This is a 49year old male with a recent L occipital brain mass. Neuro consulted, pending decision on surgical intervention at this time. Unable to obtain MRI due to metal in L wrist from old injury. Patient with history of laryngectomy and is unable to vocalize. Use of pen and paper, writing responses for communication. Consulted SLP who assisted in locating electrolarynx to more easily communicate. Patient is mod I for all mobility without AD including gait and stairs. No LOB noted during any mobility. Patient scores well on VELASCO outcome indicating very low risk for falls. Patient reports that he feels his  strength is the worst concern, noted to be weaker L vs R (unclear if this is baseline due to old injury). Patient is R hand dominant. Noted to have decreased coordination on LUE as well. Patient reports some difficulty with peripheral vision testing. Lower quadrant deficits for both eyes. Tracking is normal. Due to patient's high functional status on evaluation, PT will sign off. Pending surgical outcome (if this is plan) please reconsult. No further PT needs at this time. Functional Outcome Measure: The patient scored 52/56 on the VELASCO outcome measure which is indicative of low risk for falls. Other factors to consider for discharge: high PLOF, motivated     Further skilled acute physical therapy is not indicated at this time. PLAN :  Recommendation for discharge: (in order for the patient to meet his/her long term goals)  No skilled physical therapy/ follow up rehabilitation needs identified at this time.     This discharge recommendation:  Has been made in collaboration with the attending provider and/or case management    IF patient discharges home will need the following DME: none       SUBJECTIVE:   Patient stated I feel good. \" While ambulating    OBJECTIVE DATA SUMMARY:   HISTORY:    Past Medical History:   Diagnosis Date    Chronic pain     THROAT, EARS, NECK R/T CANCER    COPD (chronic obstructive pulmonary disease) ([facility] [address]     [address]    Psychiatric disorder     ANXIETY R/T CANCER    Throat cancer ([facility] [address]     Tracheostomy present [facility])      Past Surgical History:   Procedure Laterality Date    COLONOSCOPY N/A 6/7/2022    COLONOSCOPY performed by Marcos Cain MD at [address] N/A 6/8/2022    COLONOSCOPY performed by Marcos Cain MD at [address] [facility]- \" PUT PLATE IN\"    HX TRACHEOSTOMY  02/08/2021    IR INSERT TUNL CVC W PORT OVER 5 YEARS  5/21/2021    IR PLACE CVAD FLUORO GUIDE  5/21/2021       Prior level of function: independent without use of AD, did not rely on his girlfriend for anything in the home.   Personal factors and/or comorbidities impacting plan of care: alert and oriented, motivated    Home Situation  Home Environment: Apartment  # Steps to Enter: 14  Rails to Enter: Yes  Hand Rails : Bilateral  Wheelchair Ramp: No  Living Alone: No  Support Systems: Spouse/Significant Other (lives with girlfriend)  Patient Expects to be Discharged to[de-identified] Home  Current DME Used/Available at Home: None  Tub or Shower Type: Shower    EXAMINATION/PRESENTATION/DECISION MAKING:   Critical Behavior:  Neurologic State: Alert  Orientation Level: Oriented X4  Cognition: Appropriate decision making, Appropriate safety awareness  Safety/Judgement: Insight into deficits  Hearing:     Skin:  unremarkable  Edema: unremarkable  Range Of Motion:  AROM: Generally decreased, functional     PROM: Within functional limits     Strength:    Strength: Generally decreased, functional       Tone & Sensation:   Tone: Normal     Sensation: Intact     Coordination:  Coordination: Generally decreased, functional (LUE coordination decreased)    Functional Mobility:  Bed Mobility:  Rolling: Modified independent  Supine to Sit: Modified independent  Sit to Supine: Modified independent  Scooting: Modified independent  Transfers:  Sit to Stand: Modified independent  Stand to Sit: Modified independent  Stand Pivot Transfers: Modified independent        Balance:   Sitting: Intact; Without support  Standing: Impaired  Standing - Static: Good  Standing - Dynamic : Fair  Ambulation/Gait Training:  Distance (ft): 300 Feet (ft)  Assistive Device: Gait belt  Ambulation - Level of Assistance: Modified independent        Gait Abnormalities: Decreased step clearance        Base of Support: Widened      Stairs:  Number of Stairs Trained: 14  Stairs - Level of Assistance: Supervision   Rail Use: Both    Functional Measure:  [place] Balance Test:    Sitting to Standin  Standing Unsupported: 4  Sitting with Back Unsupported: 4  Standing to Sittin  Transfers: 4  Standing Unsupported with Eyes Closed: 4  Standing Unsupported with Feet Together: 4  Reach Forward with Outstretched Arm: 4   Object: 4  Turn to Look Over Shoulders: 4  Turn 360 Degrees: 3  Alternate Foot on Step/Stool: 3  Standing Unsupported One Foot in Front: 3  Stand on One Leg: 3  Total: 52/56         56=Maximum possible score;   0-20=High fall risk  21-40=Moderate fall risk   41-56=Low fall risk            Physical Therapy Evaluation Charge Determination   History Examination Presentation Decision-Making   MEDIUM  Complexity : 1-2 comorbidities / personal factors will impact the outcome/ POC  LOW Complexity : 1-2 Standardized tests and measures addressing body structure, function, activity limitation and / or participation in recreation  LOW Complexity : Stable, uncomplicated  LOW Complexity : FOTO score of       Based on the above components, the patient evaluation is determined to be of the following complexity level: LOW Pain Rating:  No pain reported     Activity Tolerance:   WNL and Good      After treatment patient left in no apparent distress:   Supine in bed, Call bell within reach, and Side rails x 3    COMMUNICATION/EDUCATION:   The patients plan of care was discussed with: Occupational therapist, Speech therapist, Registered nurse, and Case management. Fall prevention education was provided and the patient/caregiver indicated understanding. and Patient/family have participated as able in goal setting and plan of care.     Thank you for this referral.  Adam Chandra PT   Time Calculation: 25 mins

## 2022-08-02 NOTE — PROGRESS NOTES
Bedside and Verbal shift change report given to Yanet Rincon  (oncoming nurse) by Chris Mendoza RN  (offgoing nurse). Report included the following information SBAR.

## 2022-08-02 NOTE — PROGRESS NOTES
Neurosurgery Progress Note  Marvin Thompson, ACNP-BC          Admit Date: 2022   LOS: 0 days        Daily Progress Note: 2022      Subjective: The patient has a history of squamous cell carcinoma of the larynx/glottis diagnosed in 2021. He had  total laryngectomy on 21 and had regional recurrence in 2021. He received chemotherapy from 21-21 with concurrent radiation from 21-21 at [facility]. He was started on Keytruda in 2022 followed by the addition of cisplatin and fluoruracil for 4 cycles. He is now on maintenance Keytruda every 3 weeks with his last dose received last Friday. He has had multiple blood transfusions in the past due to recurrent anemia. He is followed by Dr. Catie Velasquez for medical oncology. He presented to the ER at [facility] on 22 due to nausea, vomiting, generalized weakness and a fever of 101. 6. He was admitted to the hospital and treated for pneumonia. He did have some numbness and tingling and weakness in his left . His head CT revealed a left occipital brain mass with cerebral edema; therefore, neurosurgery was consulted. The patient was transferred to [facility] for neurosurgical evaluation. Of note, patient has a larytube and HME, not a tracheostomy at this time. Denies chest pain, leg pain, and dyspnea. Objective:     Vital signs  Temp (24hrs), Av.3 °F (36.8 °C), Min:97.8 °F (36.6 °C), Max:98.7 °F (37.1 °C)   No intake/output data recorded. No intake/output data recorded.     Visit Vitals  /85   Pulse 96   Temp 98.1 °F (36.7 °C)   Resp 16   SpO2 100%            Pain control  Pain Assessment  Pain Scale 1: Numeric (0 - 10)  Pain Intensity 1: 10  Pain Location 1: Abdomen  Pain Orientation 1: Anterior  Pain Description 1: Aching  Pain Intervention(s) 1: Medication (see MAR)    PT/OT  Gait     Gait  Base of Support: Widened  Gait Abnormalities: Decreased step clearance  Ambulation - Level of Assistance: Modified independent  Distance (ft): 300 Feet (ft)  Assistive Device: Gait belt  Rail Use: Both  Stairs - Level of Assistance: Supervision  Number of Stairs Trained: 14           Physical Exam:  Gen:NAD. Neuro: A&Ox3. Follows commands. Speech non-verbal. Affect normal.  PERRL. EOMI. Face symmetric. Tongue midline. WELLS. Strength RUE/RLE 5-/5, LUE 4+/5 distally, 5-/5 proximally. LLE 5-/5  Negative drift  Gait deferred. CT head without contrast on 07/30/22 shows left occipital lobe mass with edema, not present on 10/4/2021 PET/CT. 24 hour results:    No results found for this or any previous visit (from the past 24 hour(s)). Assessment:     Active Problems:    Brain mass (8/2/2022)        Plan:   Left occipital brain mass   - MRI brain with and without contrast. Pt had prior ORIF wrist but should be able to have MRI as long as he can push the button to tell the tech if his arm feels funny during the scan. - cont decadron   - cont keppra   - ok to eat today   - Dr. Ad Bailey will be by to later this afternoon to discuss possible surgery. Will be able to make better recs after contrasted study completed. 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 Ana Barbour   - currently on maintenance keytruda  Hypothyroidism   - cont levothyroxine from home  CKD stage 3a  - creatinine 1.68  - avoid nephrotoxic agents  7. Pneumonia   - on cefepime and vancomycin   - hospitalist following    Activity: up with assist  DVT ppx: SCDs  Dispo: tbd    Plan d/w Dr. Ad Bailey, nurse, radiology.       Campos Stewart NP

## 2022-08-02 NOTE — PROGRESS NOTES
Pharmacist Note - Vancomycin Dosing    Consult provided for this 49 y.o. male for indication of HAP. Antibiotic regimen(s): vancomycin + cefepime   Patient on vancomycin PTA? NO     Recent Labs     22  0722 22  1535   WBC 18.0*  --    CREA 1.68* 1.84*   BUN 36* 33*     Frequency of BMP: daily x 3   Height: 182.9 cm  Weight: 68.2 kg  Est CrCl: 51 ml/min  Temp (24hrs), Av.9 °F (36.6 °C), Min:97.4 °F (36.3 °C), Max:98.7 °F (37.1 °C)    Cultures:   blood - NG, preliminary    sputum - in process     MRSA Swab ordered (if applicable)? YES    The plan below is expected to result in a target range of AUC/DANNY 400-600    Therapy will be initiated with a loading dose of 1500 mg IV x 1 to be followed by a maintenance dose of 1250 mg IV every 24 hours. Pharmacy to follow patient daily and order levels / make dose adjustments as appropriate. *Vancomycin has been dosed used Bayesian kinetics software to target an AUC/DANNY of 400-600, which provides adequate exposure for an assumed infection due to MRSA with an DANNY of 1 or less while reducing the risk of nephrotoxicity as seen with traditional trough based dosing goals.

## 2022-08-02 NOTE — PROGRESS NOTES
Consult received for Mr. Jada Love. He is well-known to our department for a history of [id number] squamous cell carcinoma of the larynx status post total laryngectomy, postop chemoradiation, and then subsequently found to have metastatic disease and status post pembrolizumab->Pembro/Cis/5FU->Pembro alone. He saw our colleague Dr. Elgin Walls yesterday at [facility] where he presented with nausea, vomiting, and weight loss as well as a headache. CT scan on 7/30/2022 showed a left occipital lobe mass which is new. He has been transferred to [facility] for neurosurgical consult. Dr. Kyung Sutherland has been made aware of his case. I agree with previous notes that for a solitary lesion, surgical resection is typically the treatment modality of choice initially. This would typically be followed with postoperative radiosurgery. I would recommend he get an MRI of the brain, although it appears he may not be a candidate due to metal in his left wrist.  If he is truly unable to get an MRI then radiosurgery is typically not a viable option.

## 2022-08-02 NOTE — PROGRESS NOTES
Occupational Therapy  08/02/22    Orders received, chart reviewed and patient evaluated by occupational therapy. Pending progression with skilled acute occupational therapy, recommend:  No skilled occupational therapy/ follow up rehabilitation needs identified at this time. --please re-consult post-op if indicated    Recommend with nursing ADLs with supervision/setup, OOB to chair 3x/day and toileting via functional mobility to and from bathroom. Thank you for completing as able in order to maintain patient strength, endurance and independence. Full evaluation to follow.      Thank you,   FELIX Pearce, OTR/L

## 2022-08-02 NOTE — PROGRESS NOTES
1930 Assumed care of patient after receiving report from previous shift. 2000 Assessment done. Up ad russell without issue. Eating small amts slowly. 2100 PC regarding possible transfer to [facility] in am for neuro followup.

## 2022-08-02 NOTE — PROGRESS NOTES
OCCUPATIONAL THERAPY EVALUATION/DISCHARGE  Patient: Binnie Gottron [de-identified]49 y.o. male)  Date: 8/2/2022  Primary Diagnosis: Brain mass [G93.89]       Precautions:        ASSESSMENT  Based on the objective data described below, the patient presents with slightly decreased LUE AROM & strength, however functional, s/p admission for L occipital brain mass. At baseline, he is IND-Mod I for ADLs and mobility, hx of total laryngectomy & L wrist ORIF with some residual weakness, living with his significant other. He now presents near his baseline, slight decreased LUE proximal and wrist ROM & strength noted however not limiting with feeding, toileting, lower body dressing, and mobility tasks with Mod I and IV pole support. Noted plans for possible neurosurgery this week, no current acute OT needs therefore will sign off, please re-consult post-op if indicated. Current Level of Function (ADLs/self-care): Mod I    Functional Outcome Measure: The patient scored 100/100 on the Barthel Index indicating he is independent in basic self care & mobility. Other factors to consider for discharge: PMH, PLOF, pending neurosx     PLAN :  Recommendation for discharge: (in order for the patient to meet his/her long term goals)  No skilled occupational therapy/ follow up rehabilitation needs identified at this time.     This discharge recommendation:  Has been made in collaboration with the attending provider and/or case management    IF patient discharges home will need the following DME: none       SUBJECTIVE:   Patient nonverbal d/t total laryngectomy, electrolarynx at bedside however not utilized, appropriate with yes/no questions or options    OBJECTIVE DATA SUMMARY:   HISTORY:   Past Medical History:   Diagnosis Date    Chronic pain     THROAT, EARS, NECK R/T CANCER    COPD (chronic obstructive pulmonary disease) ([facility]     [facility]    Psychiatric disorder     ANXIETY R/T CANCER    Throat cancer ([facility]     [facility] Past Surgical History:   Procedure Laterality Date    COLONOSCOPY N/A 6/7/2022    COLONOSCOPY performed by Radha Su MD at [address] N/A 6/8/2022    COLONOSCOPY performed by Radha Su MD at [address]- \" PUT PLATE IN\"    HX TRACHEOSTOMY  02/08/2021    IR INSERT TUNL CVC W PORT OVER 5 YEARS  5/21/2021    IR PLACE CVAD FLUORO GUIDE  5/21/2021       Prior Level of Function/Environment/Context:   Expanded or extensive additional review of patient history:     Home Situation  Home Environment: Apartment  # Steps to Enter: 14  Rails to Enter: Yes  Hand Rails : Bilateral  Wheelchair Ramp: No  Living Alone: No  Support Systems: Spouse/Significant Other  Patient Expects to be Discharged to[de-identified] Home  Current DME Used/Available at Home: None  Tub or Shower Type: Tub/Shower combination    Hand dominance: Right    EXAMINATION OF PERFORMANCE DEFICITS:  Cognitive/Behavioral Status:  Neurologic State: Alert  Orientation Level: Oriented X4  Cognition: Appropriate decision making; Appropriate for age attention/concentration; Appropriate safety awareness; Follows commands  Perception: Appears intact  Perseveration: No perseveration noted  Safety/Judgement: Awareness of environment; Fall prevention    Skin: Appears intact    Edema: None    Hearing:       Vision/Perceptual:    Tracking: Able to track stimulus in all quadrants w/o difficulty                 Diplopia: No    Acuity: Within Defined Limits         Range of Motion:  AROM: Generally decreased, functional (LUE decreased proximally)  PROM: Within functional limits                      Strength:  Strength: Generally decreased, functional (LUE shoulder & grasp decreased)                Coordination:  Coordination: Generally decreased, functional (LUE slightly decreased)  Fine Motor Skills-Upper: Left Impaired;Right Intact    Gross Motor Skills-Upper: Left Impaired;Right Intact    Tone & Sensation:  Tone: Normal  Sensation: Intact                      Balance:  Sitting: Intact  Standing: Impaired; With support (IV pole)  Standing - Static: Good  Standing - Dynamic : Good    Functional Mobility and Transfers for ADLs:  Bed Mobility:  Rolling: Modified independent  Supine to Sit: Modified independent  Sit to Supine: Modified independent  Scooting: Modified independent    Transfers:  Sit to Stand: Modified independent  Stand to Sit: Modified independent  Bathroom Mobility: Modified independent  Toilet Transfer : Modified independent    ADL Assessment:  Feeding: Modified independent    Oral Facial Hygiene/Grooming: Modified Independent    Bathing: Modified independent         Upper Body Dressing: Modified independent    Lower Body Dressing: Modified independent    Toileting: Modified independent                ADL Intervention and task modifications:       Grooming  Grooming Assistance: Modified independent  Position Performed: Standing  Washing Hands: Modified independent    Lower Body Dressing Assistance  Dressing Assistance: Modified independent  Underpants: Modified independent  Socks: Modified independent  Leg Crossed Method Used: Yes  Position Performed: Seated in chair;Standing    Toileting  Toileting Assistance: Modified independent  Bladder Hygiene: Modified independent  Clothing Management: Modified independent    Cognitive Retraining  Safety/Judgement: Awareness of environment; Fall prevention    Functional Measure:    Barthel Index:  Bathin  Bladder: 10  Bowels: 10  Groomin  Dressing: 10  Feeding: 10  Mobility: 15  Stairs: 10  Toilet Use: 10  Transfer (Bed to Chair and Back): 15  Total: 100/100      The Barthel ADL Index: Guidelines  1. The index should be used as a record of what a patient does, not as a record of what a patient could do. 2. The main aim is to establish degree of independence from any help, physical or verbal, however minor and for whatever reason.   3. The need for supervision renders the patient not independent. 4. A patient's performance should be established using the best available evidence. Asking the patient, friends/relatives and nurses are the usual sources, but direct observation and common sense are also important. However direct testing is not needed. 5. Usually the patient's performance over the preceding 24-48 hours is important, but occasionally longer periods will be relevant. 6. Middle categories imply that the patient supplies over 50 per cent of the effort. 7. Use of aids to be independent is allowed. Score Interpretation (from [address] [address])    Independent   60-79 Minimally independent   40-59 Partially dependent   20-39 Very dependent   <20 Totally dependent     -April Vasquez., Barthel, D.W. (1965). Functional evaluation: the Barthel Index. [address] ([address] [facility] (1997). The Barthel activities of daily living index: self-reporting versus actual performance in the old (> or = 75 years). Journal of [address] [place] [address] 45(7), [facility], TAHIR, Patricia Ahn., Rolando Chavez. (1999). Measuring the change in disability after inpatient rehabilitation; comparison of the responsiveness of the Barthel Index and Functional [place] Measure. Journal of Neurology, Neurosurgery, and Psychiatry, 66(4), 902-314. LAURA Smiley.JEANNE, LOVELY Bowen, & Lizz Gupta M.A. (2004) Assessment of post-stroke quality of life in cost-effectiveness studies: The usefulness of the Barthel Index and the EuroQoL-5D.  Quality of Life Research, 15, 297-56         Occupational Therapy Evaluation Charge Determination   History Examination Decision-Making   LOW Complexity : Brief history review  LOW Complexity : 1-3 performance deficits relating to physical, cognitive , or psychosocial skils that result in activity limitations and / or participation restrictions  LOW Complexity : No comorbidities that affect functional and no verbal or physical assistance needed to complete eval tasks       Based on the above components, the patient evaluation is determined to be of the following complexity level: LOW   Pain Rating:  None    Activity Tolerance: WNL    After treatment patient left in no apparent distress:    Supine in bed, Call bell within reach, and Side rails x 3    COMMUNICATION/EDUCATION:   The patients plan of care was discussed with: Physical therapist and Registered nurse. Patient was educated regarding his deficit(s) stated above as this relates to his diagnosis of brain mass. He demonstrated Good understanding as evidenced by verbal discussion & carryover. Patient and/or family was verbally educated on the BE FAST acronym for signs/symptoms of CVA and TIA. BE FAST was written on patient's communication board  for visual education and reinforcement. All questions answered with patient indicating good understanding.      Thank you for this referral.  FELIX Robles, OTR/L  Time Calculation: 10 mins

## 2022-08-02 NOTE — PROGRESS NOTES
Transition of Care Plan: home pending medical/therapy recommendations  *Patient has brain mass. Plan pending    RUR: 25% high    PCP F/U: Mosie Snellen, NP    Disposition: home pending medical/therapy recommendations    Transportation: D-medicaid transport    Main Contact: Brother: Pepe Chavis    [id number]: Met with patient at the bedside. Introduced self and role of CM. Patient A&Ox4, but uses a pen and paper to communicate d/t s/p total laryngectomy. Patient confirmed demographics. Independent and lives with his girlfriend. SLP working on getting patient an electrolarynx and other supplies. Patient was at [facility] where CT on 07/30 showed left occipital lobe mass. Plan pending. PT/OT worked with therapy. No needs currently, but may re-evaluate if patient has surgery. Will continue to follow. Catarina Mccartney RN, CRM    Residency:  lives in an apartment. No issues with stairs  Lives With: girlfriend   Prior functioning: independent   Prior DME used: laryngectomy supplies at home  Rehab history: none  Pharmacy: [address] [place] [address]     Reason for Admission:   brain mass               RUR Score:  25% high       PCP: First and Last name:  Wong Muse, NP     Name of Practice:   Are you a current patient: Yes/No: yes   Approximate date of last visit: 3 months ago   Can you do a virtual visit with your PCP:              Resources/supports as identified by patient/family:  girlfriend, siblings                  Top Challenges facing patient (as identified by patient/family and CM):  unable to verbally communicate needs-uses pen and paper                     Finances/Medication cost?  Covered by [employer]? Medicaid or roundtr              Support system or lack thereof? Girlfriend and siblings                     Living arrangements? Lives with girlfriend             Self-care/ADLs/Cognition?   Independent, No DME, A&Ox4 but unable to verbally communicate          Current Advanced Directive/Advance Care Plan:  Full Code      Healthcare Decision Maker:   Click here to complete HealthCare Decision Makers including selection of the Healthcare Decision Maker Relationship (ie \"Primary\")      Primary Decision Maker: Juwan Peterson -  - [phone]    Payor Source Payor: Hesham Lopes / [facility] / Product Type: Managed Care Medicaid /                             Plan for utilizing home health:   not indicated at this time. Therapy to re-evaluate if patient has surgery                  Transition of Care Plan:   home pending medical/therapy recommendations             Care Management Interventions  PCP Verified by CM: Yes  Mode of Transport at Discharge:  Other (see comment) (medicaid transport)  Physical Therapy Consult: Yes  Occupational Therapy Consult: Yes  Speech Therapy Consult: Yes  Support Systems: Spouse/Significant Other  Confirm Follow Up Transport: Other (see comment) (medicaid transport)  Discharge Location  Patient Expects to be Discharged to[de-identified] Home

## 2022-08-02 NOTE — PROGRESS NOTES
2150 C/O generalized head and body pain. Percocet given. 2345 Resting quietly. Sat up to void and stated pain somewhat improved and was a 5 on a scale of 1-10.  Is aware of possible transfer to [facility] in the am.

## 2022-08-03 NOTE — PROGRESS NOTES
Bedside shift change report given to Rinku (oncoming nurse) by Magdaleno Armstrong (offgoing nurse). Report included the following information SBAR, MAR, Recent Results, and Med Rec Status.

## 2022-08-03 NOTE — DISCHARGE SUMMARY
Discharge Summary     Nirali Anthony     Admit Date:   7/30/2022 10:10 AM  Discharge Date:   8/1/2022  Discharge Condition:   Fair  Discharge Diagnosis    :#1. Community-acquired pneumonia versus aspiration pneumonia  #2. Laryngeal cancer status post laryngectomy and tracheostomy tube  #3. Cancer cachexia  #4. Chronic anemia status post work-up  #5. Nausea vomiting probably from brain mets with brain swelling  #6. Hypothyroidism  #7. TASHI due to possibly poor p.o. intake dehydration  #8 chronic anemia multifactorial     Hospital Stay  Narrative of Hospital Course: See H&P for full details. Briefly this is a 52-year-old unfortunate black male with history of laryngeal cancer status post laryngectomy with permanent tracheostomy presented to the hospital with nausea and vomiting, feeling unwell. He had CT scan of the head and chest that shows new mass in the occipital area with edema and CT chest suggestive of possible pneumonia. Patient was admitted to the hospital and started on IV antibiotic. Oncologist was consulted and patient was started on Decadron. Discussion was made with radiation oncology who suggested and advised patient have neurosurgical evaluation. Oncologist discussed with neurosurgeon who accepted patient and asked the patient be transferred to the hospitalist service at [facility]. I discussed with hospitalist Dr. Paddy Sykes patient was accepted and eventually transferred. During the hospitalization he had some indigestion improved with Tums he had elevated BUN/creatinine and he was started on IV fluids. He continues his pain medication Percocet. There was no further vomiting in the hospital.  He started to eat a little bit. There was no diarrhea. I discussed with his brother via telephone regarding status and progress.              Consultants:    Oncologist     Surgeries/procedures Performed:  CT head, chest  IV fluids  IV Decadron  IV antibiotic  Pain medication         Discharge Plan:    Acute Facility         Patient Instructions:  Transfer to [facility]     Diet: None     Activity: As tolerated       Provider Follow-Up:     No orders of the defined types were placed in this encounter. Significant Diagnostic Studies:     XR ABD ACUTE W 1 V CHEST   Final Result   No significant interval change. Fecal stasis is moderate to severe. XR CHEST PORT   Final Result      Interval slightly worsening appearance of patchy consolidative opacities in the   bilateral lungs. Stable small left pleural effusion. New small right pleural   effusion. CT HEAD WO CONT   Final Result   Left occipital lobe mass with edema, not present on 10/4/2021 PET/CT. Recommend   MRI without and with contrast of the brain for further characterization. I discussed this impression with Sherald Spurling, MD at 1206 hours on   7/30/2022.                 PET/CT TUMOR IMAGE SKULL THIGH (SUB)    (Results Pending)       Recent Results (from the past 24 hour(s))   METABOLIC PANEL, BASIC    Collection Time: 08/03/22  1:12 AM   Result Value Ref Range    Sodium 133 (L) 136 - 145 mmol/L    Potassium 4.4 3.5 - 5.1 mmol/L    Chloride 101 97 - 108 mmol/L    CO2 22 21 - 32 mmol/L    Anion gap 10 5 - 15 mmol/L    Glucose 107 (H) 65 - 100 mg/dL    BUN 33 (H) 6 - 20 MG/DL    Creatinine 1.41 (H) 0.70 - 1.30 MG/DL    BUN/Creatinine ratio 23 (H) 12 - 20      GFR est AA >60 >60 ml/min/1.73m2    GFR est non-AA 53 (L) >60 ml/min/1.73m2    Calcium 7.9 (L) 8.5 - 10.1 MG/DL   CBC WITH AUTOMATED DIFF    Collection Time: 08/03/22  1:12 AM   Result Value Ref Range    WBC 16.3 (H) 4.1 - 11.1 K/uL    RBC 2.95 (L) 4.10 - 5.70 M/uL    HGB 8.2 (L) 12.1 - 17.0 g/dL    HCT 26.4 (L) 36.6 - 50.3 %    MCV 89.5 80.0 - 99.0 FL    MCH 27.8 26.0 - 34.0 PG    MCHC 31.1 30.0 - 36.5 g/dL    RDW 16.6 (H) 11.5 - 14.5 %    PLATELET 691 (H) 896 - 400 K/uL    NRBC 0.0 0  WBC    ABSOLUTE NRBC 0.00 0.00 - 0.01 K/uL    NEUTROPHILS 94 (H) 32 - 75 %    LYMPHOCYTES 1 (L) 12 - 49 %    MONOCYTES 4 (L) 5 - 13 %    EOSINOPHILS 0 0 - 7 %    BASOPHILS 0 0 - 1 %    IMMATURE GRANULOCYTES 1 (H) 0.0 - 0.5 %    ABS. NEUTROPHILS 15.2 (H) 1.8 - 8.0 K/UL    ABS. LYMPHOCYTES 0.2 (L) 0.8 - 3.5 K/UL    ABS. MONOCYTES 0.7 0.0 - 1.0 K/UL    ABS. EOSINOPHILS 0.0 0.0 - 0.4 K/UL    ABS. BASOPHILS 0.0 0.0 - 0.1 K/UL    ABS. IMM. GRANS. 0.2 (H) 0.00 - 0.04 K/UL    DF SMEAR SCANNED      PLATELET COMMENTS Large Platelets      RBC COMMENTS ANISOCYTOSIS  1+        RBC COMMENTS SCHISTOCYTES  PRESENT           Discharge Medications:  Discharge Medication List as of 8/2/2022  2:16 AM           Time Spent on Discharge: More than 30 minutes, discussed with patient and with transfer center.

## 2022-08-03 NOTE — CONSULTS
Palliative Medicine Consult  Ja: [phone] ([phone]    Patient Name: Binnie Gottron  YOB: 1970    Date of Initial Consult: 8/3/2022  Reason for Consult: end stage disease  Requesting Provider: Dr. Viridiana Martinez   Primary Care Physician: Wong Muse NP     SUMMARY:   Binnie Gottron is a 49 y.o. with a past history of stage IV squamous cell cancer (s/p total laryngectomy 2/8/2021/ larytube in place, regional recurrence 4/2021, s/p chemo+XRT May to July 2021, Keytruda + CIS/ 5FU (4) and now on maintenance Keytruda, followed by Dr. Dejon Kinsey, hx COPD/ emphysema, hx anxiety, who was admitted on 8/2/2022 from [facility]/ transfer with a diagnosis of N/v/ fever, tx for pneumonia and found to have new L occipital brain mass with surrounding edema, transferred here for neurosurgical evaluation. Current medical issues leading to Palliative Medicine involvement include: we are asked to see patient for end stage disease    Patient is independent at home, lives with his girlfriend, Pallavi Hernandez. In [place], [place]  Family includes his brother Chet Olszewski and sister Xavier Duverney. PALLIATIVE DIAGNOSES:   New brain mass, anticipated neurosurgery later this week. MRI pending  Stage IV squamous cell cancer  pneumonia  COPD/ emphysema  Anemia of chronic disease  hypoalbuminemia  Palliative medicine encounter       PLAN:   No distressing symptoms at time of our visit. (Notes numbness in left fingertips)    Goals clear to pursue surgical resection of possible brain met later this week. We completed AMD with patient today - see ACP note. He appoints brother, Marii Garcia and alternate is sister Kathy Joseph  We did not discuss code status at our visit today as it was discussed already this admission and patient was clear for FULL CODE.     Our team will follow peripherally, check in after surgery next week   Please call as needed [phone]   GOALS OF CARE / TREATMENT PREFERENCES:     GOALS OF CARE:  Patient/Health Care Proxy Stated Goals: Prolong life    TREATMENT PREFERENCES:   Code Status: Full Code    Patient and family's personal goals include: continue cancer directed treatments    Important upcoming milestones or family events: not discussed     The patient identifies the following as important for living well: not discussed today      Advance Care Planning:  [] [facility] Interdisciplinary Team has updated the ACP Navigator with Health Care Decision Maker and Patient Capacity      Primary Decision MakerKatherin Flores - [phone]  Advance Care Planning 7/30/2022   Patient's Healthcare Decision Maker is: -   Confirm Advance Directive None   Patient Would Like to Complete Advance Directive No       Medical Interventions: Full interventions       Other:    As far as possible, the palliative care team has discussed with patient / health care proxy about goals of care / treatment preferences for patient.      HISTORY:     History obtained from: chart , patient    CHIEF COMPLAINT: nausea, vomiting    HPI/SUBJECTIVE:    The patient is:   [x] Verbal and participatory  [] Non-participatory due to:     49year old male with stage IV layrngeal cancer  Now with new brain lesion, awaiting possible surgical resection     Clinical Pain Assessment (nonverbal scale for severity on nonverbal patients):   Clinical Pain Assessment  Severity: 0          Duration: for how long has pt been experiencing pain (e.g., 2 days, 1 month, years)  Frequency: how often pain is an issue (e.g., several times per day, once every few days, constant)     FUNCTIONAL ASSESSMENT:     Palliative Performance Scale (PPS):  PPS: 50       PSYCHOSOCIAL/SPIRITUAL SCREENING:     Palliative IDT has assessed this patient for cultural preferences / practices and a referral made as appropriate to needs (Cultural Services, Patient Advocacy, Ethics, etc.)    Any spiritual / Moravian concerns:  [] Yes /  [x] No   If \"Yes\" to discuss with pastoral care during IDT     Does caregiver feel burdened by caring for their loved one:   [] Yes /  [x] No /  [] No Caregiver Present/Available [] No Caregiver [] Pt Lives at Facility  If \"Yes\" to discuss with social work during IDT    Anticipatory grief assessment:   [x] Normal  / [] Maladaptive     If \"Maladaptive\" to discuss with social work during IDT    ESAS Anxiety: Anxiety: 0    ESAS Depression: Depression: 0        REVIEW OF SYSTEMS:     Positive and pertinent negative findings in ROS are noted above in HPI. The following systems were [x] reviewed / [] unable to be reviewed as noted in HPI  Other findings are noted below. Systems: constitutional, ears/nose/mouth/throat, respiratory, gastrointestinal, genitourinary, musculoskeletal, integumentary, neurologic, psychiatric, endocrine. Positive findings noted below. Modified ESAS Completed by: provider   Fatigue: 5 Drowsiness: 0   Depression: 0 Pain: 0   Anxiety: 0 Nausea: 0   Anorexia: 0 Dyspnea: 0     Constipation: Yes              PHYSICAL EXAM:     From RN flowsheet:  Wt Readings from Last 3 Encounters:   07/30/22 68.2 kg (150 lb 5.7 oz)   07/29/22 67.5 kg (148 lb 12.8 oz)   07/29/22 67.1 kg (148 lb)     Blood pressure 94/75, pulse 98, temperature 98 °F (36.7 °C), resp. rate 16, SpO2 96 %.     Pain Scale 1: Numeric (0 - 10)  Pain Intensity 1: 0  Pain Onset 1: Acute  Pain Location 1: Abdomen  Pain Orientation 1: Anterior  Pain Description 1: Constant, Aching  Pain Intervention(s) 1: Medication (see MAR)  Last bowel movement, if known:     Constitutional: awake, alert, thin, male NAD   Eyes: pupils equal, anicteric  Larytube in place  No respiratory distress  Patient mouths words or writes to communicate  Insight intact  Affect normal      HISTORY:     Active Problems:    Brain mass (8/2/2022)    Past Medical History:   Diagnosis Date    Chronic pain     THROAT, EARS, NECK R/T CANCER    COPD (chronic obstructive pulmonary disease) ([facility] [address]     EMPHASEMA    Psychiatric disorder ANXIETY R/T CANCER    Throat cancer ([facility] [address]     Tracheostomy present [facility])       Past Surgical History:   Procedure Laterality Date    COLONOSCOPY N/A 2022    COLONOSCOPY performed by Radha Su MD at [address] N/A 2022    [facility] [address] \" PUT [facility]ATE IN\"    HX TRACHEOSTOMY  2021    IR INSERT TUNL CVC W PORT OVER 5 YEARS  2021    IR PLACE CVAD FLUORO GUIDE  2021      Family History   Problem Relation Age of Onset    Diabetes Mother     Diabetes Father     Kidney Disease Father     Diabetes Sister     Heart Disease Daughter     Anesth Problems Neg Hx       History reviewed, no pertinent family history.   Social History     Tobacco Use    Smoking status: Former     Packs/day: 1.50     Years: 30.00     Pack years: 45.00     Types: Cigarettes     Quit date: 10/28/2020     Years since quittin.7    Smokeless tobacco: Never   Substance Use Topics    Alcohol use: Not Currently     No Known Allergies   Current Facility-Administered Medications   Medication Dose Route Frequency    [START ON 2022] Vanc random level due  @ 04:00   Other ONCE    sodium chloride (NS) flush 5-40 mL  5-40 mL IntraVENous Q8H    sodium chloride (NS) flush 5-40 mL  5-40 mL IntraVENous PRN    acetaminophen (TYLENOL) tablet 650 mg  650 mg Oral Q6H PRN    Or    acetaminophen (TYLENOL) suppository 650 mg  650 mg Rectal Q6H PRN    polyethylene glycol (MIRALAX) packet 17 g  17 g Oral DAILY PRN    ondansetron (ZOFRAN ODT) tablet 4 mg  4 mg Oral Q8H PRN    Or    ondansetron (ZOFRAN) injection 4 mg  4 mg IntraVENous Q6H PRN    oxyCODONE-acetaminophen (PERCOCET) 5-325 mg per tablet 1 Tablet  1 Tablet Oral Q4H PRN    sucralfate (CARAFATE) tablet 1 g  1 g Oral QID PRN    levETIRAcetam (KEPPRA) injection 1,000 mg  1,000 mg IntraVENous Q12H    famotidine (PF) (PEPCID) 20 mg in 0.9% sodium chloride 10 mL injection  20 mg IntraVENous Q12H cefepime (MAXIPIME) 2 g in 0.9% sodium chloride (MBP/ADV) 100 mL MBP  2 g IntraVENous Q12H    dexamethasone (DECADRON) 4 mg/mL injection 4 mg  4 mg IntraVENous Q6H    vancomycin - pharmacy to dose    Other Rx Dosing/Monitoring    vancomycin (VANCOCIN) 1,250 mg in 0.9% sodium chloride 250 mL ([id number])  1,250 mg IntraVENous Q24H    polyethylene glycol (MIRALAX) packet 17 g  17 g Oral DAILY    senna (SENOKOT) tablet 8.6 mg  1 Tablet Oral DAILY          LAB AND IMAGING FINDINGS:     Lab Results   Component Value Date/Time    WBC 16.3 (H) 08/03/2022 01:12 AM    HGB 8.2 (L) 08/03/2022 01:12 AM    PLATELET 995 (H) 22/11/7924 01:12 AM     Lab Results   Component Value Date/Time    Sodium 133 (L) 08/03/2022 01:12 AM    Potassium 4.4 08/03/2022 01:12 AM    Chloride 101 08/03/2022 01:12 AM    CO2 22 08/03/2022 01:12 AM    BUN 33 (H) 08/03/2022 01:12 AM    Creatinine 1.41 (H) 08/03/2022 01:12 AM    Calcium 7.9 (L) 08/03/2022 01:12 AM    Magnesium 2.1 06/21/2022 11:09 AM    Phosphorus 3.8 06/04/2022 09:00 AM      Lab Results   Component Value Date/Time    Alk.  phosphatase 58 08/01/2022 07:22 AM    Protein, total 6.7 08/01/2022 07:22 AM    Albumin 2.4 (L) 08/01/2022 07:22 AM    Globulin 4.3 (H) 08/01/2022 07:22 AM     Lab Results   Component Value Date/Time    INR 1.0 05/21/2021 07:15 AM    Prothrombin time 12.9 05/21/2021 07:15 AM      Lab Results   Component Value Date/Time    Iron 32 (L) 08/01/2022 07:22 AM    TIBC 204 (L) 08/01/2022 07:22 AM    Iron % saturation 16 (L) 08/01/2022 07:22 AM    Ferritin 597 (H) 08/01/2022 07:22 AM      Lab Results   Component Value Date/Time    pH 7.52 (H) 02/02/2021 05:05 AM    PCO2 43 02/02/2021 05:05 AM    PO2 272 (H) 02/02/2021 05:05 AM     No components found for: [place]   Lab Results   Component Value Date/Time     (H) 12/04/2021 02:15 PM                Total time: 30min  Counseling / coordination time, spent as noted above: 20  > 50% counseling / coordination?:   yes  Prolonged service was provided for  []30 min   []75 min in face to face time in the presence of the patient, spent as noted above. Time Start: 11;50am   Time End: 12:20pm  Note: this can only be billed with 34940 (initial) or 47 216  (follow up). If multiple start / stop times, list each separately.

## 2022-08-03 NOTE — PROGRESS NOTES
Palliative Medicine  [place]: [phone]  [facility]: [phone])      The Palliative Medicine SW met with the patient at bedside along with Dr. Shasta Friend in order to introduce the role of Palliative Medicine. The patient is awake, alert and oriented- he communicates by mouthing words and also writes down his questions. The patient shares that he typically lives at home with his significant other Zahira Johnson- lives in [place], [place]. The patient is typically independent. He shares that he has been getting Keytruda, following closely with his Oncologist prior to admission. He confirms with us that he spoke with neurosurgery- he is hoping for surgical intervention later this week, MRI pending, but patient confirms that he is wanting surgical intervention. He denies any pain right now, his only complaint is numbness in his left hand which is new for him- he is right handed, so he is able to still write to communicate. Palliative Medicine discussed AMD- assisted with completing- see ACP note, only completed healthcare agent portion of document today. Palliative Medicine will follow along with you in the patient's care- plan to follow-up post op for needs as they arise.        Thank you for including Palliative Medicine in the care of [address] [place] [address]  [phone]

## 2022-08-03 NOTE — PROGRESS NOTES
[facility]ospitalist Group                                                                                          Hospitalist Progress Note  Rebecca Watts MD  Answering service: [phone] or [phone] from in house phone        Date of Service:  8/3/2022  NAME:  Steven Maki  :  1970  MRN:  [id number]      Admission Summary:   Steven Maki is a 49 y.o. 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 He had  total laryngectomy on 21 and had regional recurrence in 2021. He received chemotherapy from 21-21 with concurrent radiation from 21-21 at [address] [place] [address]. He was started on Keytruda in 2022 followed by the addition of cisplatin and fluoruracil for 4 cycles. He is now on maintenance Keytruda every 3 weeks with his last dose received last Friday. He has had multiple blood transfusions in the past due to recurrent anemia. He is followed by Dr. Michael Sharif for onc  He presented to the ER at [facility] on 22 due to nausea, vomiting, generalized weakness and a fever of 101. 6. He was admitted to the hospital and treated for pneumonia. He did have some numbness and tingling and weakness in his left . His head CT revealed a left occipital brain mass with cerebral edema; therefore, neurosurgery was consulted. The patient was transferred to [facility] for neurosurgical evaluation       Interval history / Subjective:   Pt seen and examined. Awaiting MRI brain to eval metastasis. Possible resection as soon as Friday. Pt complaining of some numbness in the hands, but otherwise no complaints.       Assessment & Plan:     Brain mass  Metastatic squamous cell carcinoma with reoccurence on Keytruda  -Appreciate neurosurgery recommendations, possible resection on Friday   - MRI pending   -Continue Keppra, Decadron  -Oncology and radiation oncology consulted for further evaluation and input  - Palliative care consulted      Pneumonia  - Chest x-ray noting bilateral patchy opacities, recent transfer from [address] [place] [address] started empiric abx. Procal is low, but patient has been on abx for 5 days. Will complete course with 2 additional days of levofloxacin PO   - Obtain Pro-Papo, mycoplasma, Legionella, sputum cx  - COVID-negative 7/30  - Remains on RA      Anemia   - Likely anemia of chronic disease in the setting of metastatic squamous cell carcinoma and CKD  - Continue monitor  - Transfuse for hemoglobin less than 7     Constipation  - X-ray of the abdomen shows moderate to severe fecal stasis  - We will continue standing bowel regimen     CKD stage IIIb  - Baseline creatinine 1.6 -1.7  -Currently at baseline continue monitor       Code status: FULL  Prophylaxis: [address] discussed with: pt, RN, and CM   Anticipated Disposition: Home vs. IPR post op     Hospital Problems  Date Reviewed: 7/7/2022            Codes Class Noted POA    Brain mass ICD-10-CM: G93.89  ICD-9-CM: 348.89  8/2/2022 Unknown             Review of Systems:   A comprehensive review of systems was negative except for that written in the HPI. Vital Signs:    Last 24hrs VS reviewed since prior progress note. Most recent are:  Visit Vitals  BP (!) 114/94 (BP 1 Location: Left arm)   Pulse (!) 109   Temp 97.3 °F (36.3 °C)   Resp 19   SpO2 96%         Intake/Output Summary (Last 24 hours) at 8/3/2022 1552  Last data filed at 8/3/2022 0000  Gross per 24 hour   Intake 100 ml   Output --   Net 100 ml        Physical Examination:     I had a face to face encounter with this patient and independently examined them on 8/3/2022 as outlined below:          Constitutional:  No acute distress, cooperative, pleasant    ENT:  Oral mucosa moist, oropharynx benign. Trach in place    Resp:  CTA bilaterally. No wheezing/rhonchi/rales. No accessory muscle use. CV:  Regular rhythm, normal rate, no murmurs, gallops, rubs    GI:  Soft, non distended, non tender. normoactive bowel sounds, no hepatosplenomegaly     Musculoskeletal:  No edema, warm, 2+ pulses throughout     Neurologic:  Moves all extremities. AAOx3, CN II-XII reviewed. Does not speak due to laryngectomy            Data Review:    Review and/or order of clinical lab test  Review and/or order of tests in the radiology section of CPT  Review and/or order of tests in the medicine section of CPT      Labs:     Recent Labs     08/03/22  0112 08/01/22 0722   WBC 16.3* 18.0*   HGB 8.2* 8.0*   HCT 26.4* 25.2*   * 647*     Recent Labs     08/03/22  0112 08/01/22 0722   * 132*   K 4.4 4.6    98   CO2 22 27   BUN 33* 36*   CREA 1.41* 1.68*   * 98   CA 7.9* 8.1*     Recent Labs     08/01/22 0722   ALT 9*   AP 58   TBILI 0.2   TP 6.7   ALB 2.4*   GLOB 4.3*     No results for input(s): INR, PTP, APTT, INREXT in the last 72 hours. Recent Labs     08/01/22 0722   TIBC 204*   PSAT 16*   FERR 597*      Lab Results   Component Value Date/Time    Folate 13.9 05/10/2022 12:23 PM      No results for input(s): PH, PCO2, PO2 in the last 72 hours. No results for input(s): CPK, CKNDX, TROIQ in the last 72 hours.     No lab exists for component: CPKMB  Lab Results   Component Value Date/Time    Cholesterol, total 200 (H) 06/29/2021 12:05 PM    HDL Cholesterol 85 06/29/2021 12:05 PM    LDL, calculated 105.4 (H) 06/29/2021 12:05 PM    Triglyceride 48 06/29/2021 12:05 PM    CHOL/HDL Ratio 2.4 06/29/2021 12:05 PM     Lab Results   Component Value Date/Time    Glucose (POC) 87 05/10/2022 11:06 AM    Glucose (POC) 85 05/10/2022 07:30 AM    Glucose (POC) 113 10/04/2021 01:04 PM    Glucose (POC) 97 05/07/2021 08:15 AM    Glucose (POC) 72 02/16/2021 11:17 AM     Lab Results   Component Value Date/Time    Color Yellow 07/30/2022 11:30 AM    Appearance Clear 07/30/2022 11:30 AM    Specific gravity 1.010 07/30/2022 11:30 AM    Specific gravity 1.014 06/04/2022 09:44 AM    pH (UA) 8.0 07/30/2022 11:30 AM    Protein Negative 07/30/2022 11:30 AM    Glucose Negative 07/30/2022 11:30 AM    Ketone 10 (A) 07/30/2022 11:30 AM    Bilirubin Negative 07/30/2022 11:30 AM    Urobilinogen 0.1 07/30/2022 11:30 AM    Nitrites Negative 07/30/2022 11:30 AM    Leukocyte Esterase Negative 07/30/2022 11:30 AM    Bacteria Negative 06/04/2022 09:44 AM    WBC 0-4 06/04/2022 09:44 AM    RBC 0-5 06/04/2022 09:44 AM         Medications Reviewed:     Current Facility-Administered Medications   Medication Dose Route Frequency    [START ON 8/4/2022] Vanc random level due 8/4 @ 04:00   Other ONCE    sodium chloride (NS) flush 5-40 mL  5-40 mL IntraVENous Q8H    sodium chloride (NS) flush 5-40 mL  5-40 mL IntraVENous PRN    acetaminophen (TYLENOL) tablet 650 mg  650 mg Oral Q6H PRN    Or    acetaminophen (TYLENOL) suppository 650 mg  650 mg Rectal Q6H PRN    polyethylene glycol (MIRALAX) packet 17 g  17 g Oral DAILY PRN    ondansetron (ZOFRAN ODT) tablet 4 mg  4 mg Oral Q8H PRN    Or    ondansetron (ZOFRAN) injection 4 mg  4 mg IntraVENous Q6H PRN    oxyCODONE-acetaminophen (PERCOCET) 5-325 mg per tablet 1 Tablet  1 Tablet Oral Q4H PRN    sucralfate (CARAFATE) tablet 1 g  1 g Oral QID PRN    levETIRAcetam (KEPPRA) injection 1,000 mg  1,000 mg IntraVENous Q12H    famotidine (PF) (PEPCID) 20 mg in 0.9% sodium chloride 10 mL injection  20 mg IntraVENous Q12H    cefepime (MAXIPIME) 2 g in 0.9% sodium chloride (MBP/ADV) 100 mL MBP  2 g IntraVENous Q12H    dexamethasone (DECADRON) 4 mg/mL injection 4 mg  4 mg IntraVENous Q6H    vancomycin - pharmacy to dose    Other Rx Dosing/Monitoring    vancomycin (VANCOCIN) 1,250 mg in 0.9% sodium chloride 250 mL (Srvy6Hkg)  1,250 mg IntraVENous Q24H    polyethylene glycol (MIRALAX) packet 17 g  17 g Oral DAILY    senna (SENOKOT) tablet 8.6 mg  1 Tablet Oral DAILY     ______________________________________________________________________  EXPECTED LENGTH OF STAY: - - -  ACTUAL LENGTH OF STAY:          1 Seng Kruse MD

## 2022-08-03 NOTE — PROGRESS NOTES
Problem: Falls - Risk of  Goal: *Absence of Falls  Description: Document Johny Fields Fall Risk and appropriate interventions in the flowsheet.   Outcome: Progressing Towards Goal  Note: Fall Risk Interventions:            Medication Interventions: Evaluate medications/consider consulting pharmacy         History of Falls Interventions: Evaluate medications/consider consulting pharmacy, Door open when patient unattended         Problem: Patient Education: Go to Patient Education Activity  Goal: Patient/Family Education  Outcome: Progressing Towards Goal     Problem: Discharge Planning  Goal: *Discharge to safe environment  Outcome: Progressing Towards Goal  Goal: *Knowledge of medication management  Outcome: Progressing Towards Goal  Goal: *Knowledge of discharge instructions  Outcome: Progressing Towards Goal     Problem: Patient Education: Go to Patient Education Activity  Goal: Patient/Family Education  Outcome: Progressing Towards Goal

## 2022-08-03 NOTE — CONSULTS
[address]    Name:  Adalid Broussard  MR#:  [id number]  :  1970  ACCOUNT #:  [de-identified]  DATE OF SERVICE:  2022      REQUESTING PHYSICIAN:  Dr. Kerwin Mendoza. CHIEF COMPLAINT:  Newly diagnosed solitary metastatic nodule left occipital lobe with a history of laryngeal cancer. HISTORY OF PRESENT ILLNESS:  A 61-year-old male with a history of squamous cell carcinoma of the larynx, total laryngectomy in 2021 with a recurrence 2 months later. He received chemotherapy and concurrent [facility] at [facility].  Currently maintained on [facility]. Presented to the emergency room 2 days ago at [facility] with nausea, vomiting, generalized weakness, and temperature of 101. CT scan revealed a left occipital mass with surrounding edema. PAST MEDICAL HISTORY:  Significant for COPD in addition to the laryngeal cancer. MEDICATIONS PRIOR TO ADMISSION:  1. Natasha-Colace. 2.  Oxycodone. 3.  Guaifenesin. 4.  Melatonin. 5.  Tessalon Perles. 6.  Synthroid. 7.  Carafate. 8.  Protonix. SOCIAL HISTORY:  The patient quit smoking about 2 years ago with a 45-pack-year smoking history. No alcohol use. REVIEW OF SYSTEMS:  Otherwise noncontributory. PHYSICAL EXAMINATION:  GENERAL:  He is awake and alert, oriented to person, place, and time. VITAL SIGNS:  Admitting blood pressure was 98/73, pulse rate was 101. NEUROLOGIC:  He appears frail. Moves all four extremities. Slightly weaker on the right. Visual fields appear full to confrontation. He has a tracheostomy in place. He was able to use some hand motions and lip-reading that I was able to understand. Gait and station are deferred. IMAGING:  I reviewed the CT scan without contrast that shows the solitary nodule. He needs an MRI that he is still waiting for. He remains interested in surgical intervention to take out the solitary nodule.   I will see what kind of scheduling we can get done at the end of the week. He remains on Keppra and steroids. Pending the findings of the MRI, we will go ahead and schedule him probably later this week for surgery. Thank you for asking me to see this patient.       Bufford Burkitt, MD [id number]  D:  08/02/2022 19:12  T:  08/02/2022 20:10  JOB #:  [id number]  CC:  Jeff Clemente MD

## 2022-08-03 NOTE — PROGRESS NOTES
1930: Bedside and Verbal shift change report given to Taz Samaniego RN (oncoming nurse) by Yefri Candelaria RN (offgoing nurse). Report included the following information SBAR, Intake/Output, MAR, Recent Results, and Cardiac Rhythm SB-NSR . Shift Summary: Pt remained stable overnight     0745: Bedside and Verbal shift change report given to Ced Wilson RN (oncoming nurse) by Taz Samaniego RN (offgoing nurse). Report included the following information SBAR, Intake/Output, MAR, Recent Results, and Cardiac Rhythm NSR-SA .

## 2022-08-03 NOTE — PROGRESS NOTES
Neurosurgery Progress Note  Remyros Sanchez, [facility]          Admit Date: 2022   LOS: 1 day        Daily Progress Note: 8/3/2022    HPI:The patient has a history of squamous cell carcinoma of the larynx/glottis diagnosed in 2021. He had  total laryngectomy on 21 and had regional recurrence in 2021. He received chemotherapy from 21-21 with concurrent radiation from 21-21 at [facility]. He was started on Keytruda in 2022 followed by the addition of cisplatin and fluoruracil for 4 cycles. He is now on maintenance Keytruda every 3 weeks with his last dose received last Friday. He has had multiple blood transfusions in the past due to recurrent anemia. He is followed by Dr. Tariq Armenta for medical oncology. He presented to the ER at [facility] on 22 due to nausea, vomiting, generalized weakness and a fever of 101. 6. He was admitted to the hospital and treated for pneumonia. He did have some numbness and tingling and weakness in his left . His head CT revealed a left occipital brain mass with cerebral edema; therefore, neurosurgery was consulted. The patient was transferred to [facility] for neurosurgical evaluation. Of note, patient has a larytube and HME, not a tracheostomy at this time. Subjective:   No acute events overnight. Still waiting on MRI brain study ordered yesterday. Denies chest pain, leg pain, and dyspnea. Objective:     Vital signs  Temp (24hrs), Av.7 °F (36.5 °C), Min:97.3 °F (36.3 °C), Max:98 °F (36.7 °C)   No intake/output data recorded.    1901 -  0700  In: 100 [I.V.:100]  Out: 200 [Urine:200]    Visit Vitals  BP (!) 114/94 (BP 1 Location: Left arm)   Pulse (!) 109   Temp 97.3 °F (36.3 °C)   Resp 19   SpO2 96%      O2 Device: None (Room air)     Pain control  Pain Assessment  Pain Scale 1: Numeric (0 - 10)  Pain Intensity 1: 6  Pain Onset 1: Acute  Pain Location 1: Abdomen  Pain Orientation 1: Anterior  Pain Description 1: Constant, Aching  Pain Intervention(s) 1: Medication (see MAR)    PT/OT  Gait     Gait  Base of Support: Widened  Gait Abnormalities: Decreased step clearance  Ambulation - Level of Assistance: Modified independent  Distance (ft): 300 Feet (ft)  Assistive Device: Gait belt  Rail Use: Both  Stairs - Level of Assistance: Supervision  Number of Stairs Trained: 14           Physical Exam:  Gen:NAD. Larytube in place. Neuro: A&Ox3. Follows commands. Non-verbal. Uses mouth to mouth words or write words down. . Affect normal.  PERRL. EOMI. Face symmetric. Tongue midline. WELLS. Strength RUE/RLE 5-/5, LUE 4+/5 distally, 5-/5 proximally. LLE 5-/5  Negative drift  Gait deferred. CT head without contrast on 07/30/22 shows left occipital lobe mass with edema, not present on 10/4/2021 PET/CT.     24 hour results:    Recent Results (from the past 24 hour(s))   METABOLIC PANEL, BASIC    Collection Time: 08/03/22  1:12 AM   Result Value Ref Range    Sodium 133 (L) 136 - 145 mmol/L    Potassium 4.4 3.5 - 5.1 mmol/L    Chloride 101 97 - 108 mmol/L    CO2 22 21 - 32 mmol/L    Anion gap 10 5 - 15 mmol/L    Glucose 107 (H) 65 - 100 mg/dL    BUN 33 (H) 6 - 20 MG/DL    Creatinine 1.41 (H) 0.70 - 1.30 MG/DL    BUN/Creatinine ratio 23 (H) 12 - 20      GFR est AA >60 >60 ml/min/1.73m2    GFR est non-AA 53 (L) >60 ml/min/1.73m2    Calcium 7.9 (L) 8.5 - 10.1 MG/DL   CBC WITH AUTOMATED DIFF    Collection Time: 08/03/22  1:12 AM   Result Value Ref Range    WBC 16.3 (H) 4.1 - 11.1 K/uL    RBC 2.95 (L) 4.10 - 5.70 M/uL    HGB 8.2 (L) 12.1 - 17.0 g/dL    HCT 26.4 (L) 36.6 - 50.3 %    MCV 89.5 80.0 - 99.0 FL    MCH 27.8 26.0 - 34.0 PG    MCHC 31.1 30.0 - 36.5 g/dL    RDW 16.6 (H) 11.5 - 14.5 %    PLATELET 910 (H) 176 - 400 K/uL    NRBC 0.0 0  WBC    ABSOLUTE NRBC 0.00 0.00 - 0.01 K/uL    NEUTROPHILS 94 (H) 32 - 75 %    LYMPHOCYTES 1 (L) 12 - 49 %    MONOCYTES 4 (L) 5 - 13 %    EOSINOPHILS 0 0 - 7 %    BASOPHILS 0 0 - 1 %    IMMATURE GRANULOCYTES 1 (H) 0.0 - 0.5 %    ABS. NEUTROPHILS 15.2 (H) 1.8 - 8.0 K/UL    ABS. LYMPHOCYTES 0.2 (L) 0.8 - 3.5 K/UL    ABS. MONOCYTES 0.7 0.0 - 1.0 K/UL    ABS. EOSINOPHILS 0.0 0.0 - 0.4 K/UL    ABS. BASOPHILS 0.0 0.0 - 0.1 K/UL    ABS. IMM. GRANS. 0.2 (H) 0.00 - 0.04 K/UL    DF SMEAR SCANNED      PLATELET COMMENTS Large Platelets      RBC COMMENTS ANISOCYTOSIS  1+        RBC COMMENTS SCHISTOCYTES  PRESENT              Assessment:     Active Problems:    Brain mass (8/2/2022)      Plan:   Left occipital brain mass   - MRI brain with and without contrast. Pt had prior ORIF wrist but should be able to have MRI as long as he can push the button to tell the tech if his arm feels funny during the scan. 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 Roland Goncalves   - currently on maintenance keytruda  Hypothyroidism   - cont levothyroxine from home  CKD stage 3a  - creatinine 1.41  - avoid nephrotoxic agents  7. Pneumonia   - on cefepime and vancomycin   - hospitalist following    Activity: up with assist  DVT ppx: SCDs  Dispo: tbd    Plan d/w Dr. Cecilia Washington, nurse.       Kae Teran NP

## 2022-08-03 NOTE — PROGRESS NOTES
Problem: Falls - Risk of  Goal: *Absence of Falls  Description: Document Basilio Cease Fall Risk and appropriate interventions in the flowsheet.   Outcome: Progressing Towards Goal  Note: Fall Risk Interventions:            Medication Interventions: Patient to call before getting OOB         History of Falls Interventions: Door open when patient unattended         Problem: Patient Education: Go to Patient Education Activity  Goal: Patient/Family Education  Outcome: Progressing Towards Goal     Problem: Discharge Planning  Goal: *Discharge to safe environment  Outcome: Progressing Towards Goal  Goal: *Knowledge of discharge instructions  Outcome: Progressing Towards Goal

## 2022-08-03 NOTE — PROGRESS NOTES
1930: Bedside and Verbal shift change report given to Sejal Ann RN (oncoming nurse) by Sangeeta Jacobs RN (offgoing nurse). Report included the following information SBAR, Intake/Output, MAR, Recent Results, and Cardiac Rhythm SB-NSR .

## 2022-08-03 NOTE — ACP (ADVANCE CARE PLANNING)
Advance Care Planning   Healthcare Decision Maker:      Primary Decision Maker: Meryle Pleasant Brother - [phone]  Secondary Decision Maker: Severo Doshi -Sister - [phone]     The Palliative Medicine team assisted with completing AMD- only completed Healthcare Agent portion of AMD today. The patient is very clear in verbalizing that he trusts his brother, Raeann Muniz, to be his primary Healthcare Agent and his sister Sean Gray as secondary agent. Patient was given original and copy of document, document also placed on the patient's hard chart to be scanned into the system.        CHINTAN VilledaW

## 2022-08-04 NOTE — CONSULTS
[address], [facility] Marti Franklin  [phone]        Thank you for requesting this consultation but this patient has been seen by [facility] in the past.  We will inform them of this request.    Sincerely,  CATALINA Echols

## 2022-08-04 NOTE — PROGRESS NOTES
[address] [facility]  Hospitalist Group                                                                                          Hospitalist Progress Note  Luly Aguilera MD  Answering service: [phone] from in house phone        Date of Service:  2022  NAME:  Bossman Bush  :  1970  MRN:  [id number]      Admission Summary:   Bossman Bush is a 49 y.o. 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 He had  total laryngectomy on 21 and had regional recurrence in 2021. He received chemotherapy from 21-21 with concurrent radiation from 21-21 at [facility]. He was started on Keytruda in 2022 followed by the addition of cisplatin and fluoruracil for 4 cycles. He is now on maintenance Keytruda every 3 weeks with his last dose received last Friday. He has had multiple blood transfusions in the past due to recurrent anemia. He is followed by Dr. Prachi Aguilar for onc  He presented to the ER at [facility] on 22 due to nausea, vomiting, generalized weakness and a fever of 101. 6. He was admitted to the hospital and treated for pneumonia. He did have some numbness and tingling and weakness in his left . His head CT revealed a left occipital brain mass with cerebral edema; therefore, neurosurgery was consulted. The patient was transferred to [facility] for neurosurgical evaluation       Interval history / Subjective:   NAD  Unable to tolerate PO due to neck pain and dysphagia  + cough ? Blood per RN  GI, Ent consulted  NS input appreciated,   Plan for brain mass resection on Friday. No BM for 3 days  Unable to eat  Pt complaining of some numbness in the hands, but otherwise no complaints. Assessment & Plan:     Brain mass  Metastatic squamous cell carcinoma with reoccurence on Keytruda  -Appreciate neurosurgery recommendations, plan to OR on Friday   - MRI brain:  1.   Left occipital mass as noted previously with surrounding vasogenic edema. 2.  Focus of diffusion restriction in the right precentral gyrus extending to  the right centrum semiovale consistent with acute ischemia.  -Continue Keppra, Decadron  -Oncology and radiation oncology consulted for further evaluation and input  - Palliative care consulted      Pneumonia  - Chest x-ray noting bilateral patchy opacities, recent transfer from [facility] started empiric abx. Procal is low, but patient has been on abx for 5 days. Will complete course with 2 additional days of levofloxacin PO   - Obtain Pro-Papo, mycoplasma, Legionella, sputum cx: GS GPC  - COVID-negative 7/30  - Remains on RA   Change Levaquin to IV     Anemia   - Likely anemia of chronic disease in the setting of metastatic squamous cell carcinoma and CKD  - Continue monitor  - Transfuse for hemoglobin less than 7     Constipation  - X-ray of the abdomen shows moderate to severe fecal stasis  - We will continue standing bowel regimen  Add dulcolax CA     CKD stage IIIb  - Baseline creatinine 1.6 -1.7  -Currently at baseline continue monitor       Code status: FULL  Prophylaxis: [address] discussed with: pt, RN, and CM   Anticipated Disposition: TBD, to OR tomorrow, f/up GI and ENT       Hospital Problems  Date Reviewed: 7/7/2022            Codes Class Noted POA    Brain mass ICD-10-CM: G93.89  ICD-9-CM: 348.89  8/2/2022 Unknown           Review of Systems:   A comprehensive review of systems was negative except for that written in the HPI. Vital Signs:    Last 24hrs VS reviewed since prior progress note.  Most recent are:  Visit Vitals  BP 93/61   Pulse (!) 104   Temp 98 °F (36.7 °C)   Resp 16   SpO2 99%       No intake or output data in the 24 hours ending 08/04/22 1452       Physical Examination:     I had a face to face encounter with this patient and independently examined them on 8/4/2022 as outlined below:          Constitutional:  No acute distress, cooperative, pleasant, + dysphagia, odinophagia   ENT:  Oral mucosa moist, oropharynx benign. Trach in place    Resp:  CTA bilaterally. No wheezing/rhonchi/rales. No accessory muscle use. CV:  Regular rhythm, normal rate, no murmurs, gallops, rubs    GI:  Soft, non distended, non tender. normoactive bowel sounds, no hepatosplenomegaly     Musculoskeletal:  No edema, warm, 2+ pulses throughout     Neurologic:  Moves all extremities. AAOx3, CN II-XII reviewed. Does not speak due to laryngectomy            Data Review:    Review and/or order of clinical lab test  Review and/or order of tests in the radiology section of CPT  Review and/or order of tests in the medicine section of CPT      Labs:     Recent Labs     08/04/22 0537 08/03/22  0112   WBC 14.2* 16.3*   HGB 7.1* 8.2*   HCT 22.2* 26.4*   * 681*       Recent Labs     08/04/22 0537 08/03/22  0112   * 133*   K 4.8 4.4    101   CO2 24 22   BUN 31* 33*   CREA 1.23 1.41*   * 107*   CA 7.8* 7.9*   MG 2.6*  --    PHOS 1.8*  --        No results for input(s): ALT, AP, TBIL, TBILI, TP, ALB, GLOB, GGT, AML, LPSE in the last 72 hours. No lab exists for component: SGOT, GPT, AMYP, HLPSE    No results for input(s): INR, PTP, APTT, INREXT, INREXT in the last 72 hours. No results for input(s): FE, TIBC, PSAT, FERR in the last 72 hours. Lab Results   Component Value Date/Time    Folate 13.9 05/10/2022 12:23 PM        No results for input(s): PH, PCO2, PO2 in the last 72 hours. No results for input(s): CPK, CKNDX, TROIQ in the last 72 hours.     No lab exists for component: CPKMB  Lab Results   Component Value Date/Time    Cholesterol, total 200 (H) 06/29/2021 12:05 PM    HDL Cholesterol 85 06/29/2021 12:05 PM    LDL, calculated 105.4 (H) 06/29/2021 12:05 PM    Triglyceride 48 06/29/2021 12:05 PM    CHOL/HDL Ratio 2.4 06/29/2021 12:05 PM     Lab Results   Component Value Date/Time    Glucose (POC) 87 05/10/2022 11:06 AM    Glucose (POC) 85 05/10/2022 07:30 AM    Glucose (POC) 113 10/04/2021 01:04 PM    Glucose (POC) 97 05/07/2021 08:15 AM    Glucose (POC) 72 02/16/2021 11:17 AM     Lab Results   Component Value Date/Time    Color Yellow 07/30/2022 11:30 AM    Appearance Clear 07/30/2022 11:30 AM    Specific gravity 1.010 07/30/2022 11:30 AM    Specific gravity 1.014 06/04/2022 09:44 AM    pH (UA) 8.0 07/30/2022 11:30 AM    Protein Negative 07/30/2022 11:30 AM    Glucose Negative 07/30/2022 11:30 AM    Ketone 10 (A) 07/30/2022 11:30 AM    Bilirubin Negative 07/30/2022 11:30 AM    Urobilinogen 0.1 07/30/2022 11:30 AM    Nitrites Negative 07/30/2022 11:30 AM    Leukocyte Esterase Negative 07/30/2022 11:30 AM    Bacteria Negative 06/04/2022 09:44 AM    WBC 0-4 06/04/2022 09:44 AM    RBC 0-5 06/04/2022 09:44 AM         Medications Reviewed:     Current Facility-Administered Medications   Medication Dose Route Frequency    bisacodyL (DULCOLAX) suppository 10 mg  10 mg Rectal DAILY PRN    levoFLOXacin (LEVAQUIN) 500 mg in D5W IVPB  500 mg IntraVENous Q24H    [Held by provider] levoFLOXacin (LEVAQUIN) tablet 500 mg  500 mg Oral Q24H    sodium chloride (NS) flush 5-40 mL  5-40 mL IntraVENous Q8H    sodium chloride (NS) flush 5-40 mL  5-40 mL IntraVENous PRN    acetaminophen (TYLENOL) tablet 650 mg  650 mg Oral Q6H PRN    Or    acetaminophen (TYLENOL) suppository 650 mg  650 mg Rectal Q6H PRN    polyethylene glycol (MIRALAX) packet 17 g  17 g Oral DAILY PRN    ondansetron (ZOFRAN ODT) tablet 4 mg  4 mg Oral Q8H PRN    Or    ondansetron (ZOFRAN) injection 4 mg  4 mg IntraVENous Q6H PRN    oxyCODONE-acetaminophen (PERCOCET) 5-325 mg per tablet 1 Tablet  1 Tablet Oral Q4H PRN    sucralfate (CARAFATE) tablet 1 g  1 g Oral QID PRN    levETIRAcetam (KEPPRA) injection 1,000 mg  1,000 mg IntraVENous Q12H    famotidine (PF) (PEPCID) 20 mg in 0.9% sodium chloride 10 mL injection  20 mg IntraVENous Q12H    dexamethasone (DECADRON) 4 mg/mL injection 4 mg  4 mg IntraVENous Q6H polyethylene glycol (MIRALAX) packet 17 g  17 g Oral DAILY    senna (SENOKOT) tablet 8.6 mg  1 Tablet Oral DAILY     ______________________________________________________________________  EXPECTED LENGTH OF STAY: - - -  ACTUAL LENGTH OF STAY:          2                 Alka Fenton MD

## 2022-08-04 NOTE — H&P
[facility].  [address] [address] [address], [address]  [phone]                     GI CONSULTATION NOTE      NAME:  Romel Khan   :   1970   MRN:   [id number]     Consult Date: 2022     Chief Complaint: abd pain, vomiting, throat tightness    History of Present Illness:  Patient is a 49 y.o. who is seen in consultation at the request of Dr. Erlinda Glass for the above mentioned problem. 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 Re: abd pain: located in lower abdomen bilaterally 7/10, nonradiating, cramping pain with dark stools. Reports soft 1 BM EOD. Passing gas. Refusing bowel regimen 2/2 throat tightness. Nursing planning to administer suppository. Re: vomiting: started having nocturnal vomiting three weeks ago. 1-2 episodes of emesis per night. No associated food, medicine, or activity triggers. Last episode of vomiting was last night and was orange. He has had 1 ensure to drink today, but no other food. Has had some nausea with dry heaving today. Re throat tightness: States this is new and proximal. Refusing some PO medications and food 2/2 tightness. Denies odynophagia.     Last EGD 22 - small HH, mild gastritis, no evidence of stricture  Last colonoscopy 22 - terminated in distal sigmoid 2/2 poor prep  Capsule study 22 - results pending    ABD Xray 8/2 - moderate to severe fecal stasis  CTAP 8/4 - extensive fecal stasis, moderate intraperitoneal ascites      PMH:  Past Medical History:   Diagnosis Date    Chronic pain     THROAT, EARS, NECK R/T CANCER    COPD (chronic obstructive pulmonary disease) ([address]     EMPHASEMA    Psychiatric disorder     ANXIETY R/T CANCER    Throat cancer ([address]     Tracheostomy present ([facility]        PSH:  Past Surgical History:   Procedure Laterality Date    COLONOSCOPY N/A 2022    COLONOSCOPY performed by Deisy Quispe, Ilya Dietrich MD at [address] N/A 6/8/2022    COLONOSCOPY performed by Olga Alvarez MD at [address] [facility]- \" PUT PLATE IN\"    HX TRACHEOSTOMY  02/08/2021    IR INSERT TUNL CVC W PORT OVER 5 YEARS  5/21/2021    IR PLACE CVAD FLUORO GUIDE  5/21/2021       Allergies:  No Known Allergies    Home Medications:  Prior to Admission Medications   Prescriptions Last Dose Informant Patient Reported? Taking?   benzonatate (TESSALON) 100 mg capsule   No No   Sig: Take 1 Capsule by mouth three (3) times daily as needed for Cough. Patient not taking: Reported on 7/30/2022   ferrous sulfate 325 mg (65 mg iron) tablet   No No   Sig: Take 1 Tablet by mouth two (2) times daily (with meals). Patient not taking: Reported on 7/30/2022   guaiFENesin-dextromethorphan SR (HUMIBID DM) 600-30 mg per tablet   No No   Sig: Take 1 Tablet by mouth every twelve (12) hours as needed for Cough. Patient not taking: Reported on 7/30/2022   levothyroxine (SYNTHROID) 125 mcg tablet   No No   Sig: Take 1 Tablet by mouth Daily (before breakfast) for 30 days. Patient not taking: Reported on 7/30/2022   melatonin 5 mg cap capsule   No No   Sig: Take 1 Capsule by mouth nightly. Patient not taking: Reported on 7/30/2022   oxyCODONE-acetaminophen (PERCOCET) 5-325 mg per tablet   No No   Sig: Take 1 Tablet by mouth every twelve (12) hours as needed for Pain for up to 21 days. Max Daily Amount: 2 Tablets. pantoprazole (PROTONIX) 40 mg tablet   No No   Sig: Take 1 Tablet by mouth two (2) times a day. Indications: bleeding from stomach, esophagus or duodenum   Patient not taking: Reported on 7/30/2022   senna-docusate (PERICOLACE) 8.6-50 mg per tablet   No No   Sig: Take 1 Tablet by mouth two (2) times a day. Patient not taking: Reported on 7/30/2022   sucralfate (Carafate) 1 gram tablet   No No   Sig: Take 1 Tablet by mouth four (4) times daily as needed (abdominal pain).  Dissolve in 10 mL of water before gagandeep.       Facility-Administered Medications: None       Hospital Medications:  Current Facility-Administered Medications   Medication Dose Route Frequency    bisacodyL (DULCOLAX) suppository 10 mg  10 mg Rectal DAILY PRN    levoFLOXacin (LEVAQUIN) tablet 500 mg  500 mg Oral Q24H    sodium chloride (NS) flush 5-40 mL  5-40 mL IntraVENous Q8H    sodium chloride (NS) flush 5-40 mL  5-40 mL IntraVENous PRN    acetaminophen (TYLENOL) tablet 650 mg  650 mg Oral Q6H PRN    Or    acetaminophen (TYLENOL) suppository 650 mg  650 mg Rectal Q6H PRN    polyethylene glycol (MIRALAX) packet 17 g  17 g Oral DAILY PRN    ondansetron (ZOFRAN ODT) tablet 4 mg  4 mg Oral Q8H PRN    Or    ondansetron (ZOFRAN) injection 4 mg  4 mg IntraVENous Q6H PRN    oxyCODONE-acetaminophen (PERCOCET) 5-325 mg per tablet 1 Tablet  1 Tablet Oral Q4H PRN    sucralfate (CARAFATE) tablet 1 g  1 g Oral QID PRN    levETIRAcetam (KEPPRA) injection 1,000 mg  1,000 mg IntraVENous Q12H    famotidine (PF) (PEPCID) 20 mg in 0.9% sodium chloride 10 mL injection  20 mg IntraVENous Q12H    dexamethasone (DECADRON) 4 mg/mL injection 4 mg  4 mg IntraVENous Q6H    polyethylene glycol (MIRALAX) packet 17 g  17 g Oral DAILY    senna (SENOKOT) tablet 8.6 mg  1 Tablet Oral DAILY       Social History:  Social History     Tobacco Use    Smoking status: Former     Packs/day: 1.50     Years: 30.00     Pack years: 45.00     Types: Cigarettes     Quit date: 10/28/2020     Years since quittin.7    Smokeless tobacco: Never   Substance Use Topics    Alcohol use: Not Currently       Family History:  Family History   Problem Relation Age of Onset    Diabetes Mother     Diabetes Father     Kidney Disease Father     Diabetes Sister     Heart Disease Daughter     Anesth Problems Neg Hx        Review of Systems:    Constitutional: negative fever, negative chills, +weight loss  Eyes:   negative visual changes  ENT:   negative sore throat, tongue or lip swelling  Respiratory: negative cough, negative dyspnea  Cards:  negative for chest pain, palpitations, lower extremity edema  GI:   See HPI  :  negative for frequency, dysuria  Integument:  negative for rash and pruritus  Heme:  negative for easy bruising and gum/nose bleeding  Musculoskel: negative for myalgias,  back pain and muscle weakness      Objective:   Patient Vitals for the past 8 hrs:   BP Temp Pulse Resp SpO2   08/04/22 1200 -- -- 73 -- --   08/04/22 1003 97/82 97.8 °F (36.6 °C) 99 15 --   08/04/22 1001 -- -- 100 -- --   08/04/22 0601 108/73 97.6 °F (36.4 °C) 87 13 98 %   08/04/22 0556 -- -- 84 -- --     No intake/output data recorded. 08/02 1901 - 08/04 0700  In: 100 [I.V.:100]  Out: -       PHYSICAL EXAM:  General appearance: 49 y.o. thin AAM, +liu  Skin: no rashes  HEENT: anicteric, larytube  Cardiovascular: PVCs  Respiratory: CTA, no w/r/r  GI: mild distention, soft, mild tenderness throughout, +BS   Rectal: Deferred   Musculoskeletal: no edema  Neurological: alert and oriented     Data Review     Recent Labs     08/04/22  0537 08/03/22  0112   WBC 14.2* 16.3*   HGB 7.1* 8.2*   HCT 22.2* 26.4*   * 681*     Recent Labs     08/04/22  0537 08/03/22  0112   * 133*   K 4.8 4.4    101   CO2 24 22   BUN 31* 33*   CREA 1.23 1.41*   * 107*   PHOS 1.8*  --    CA 7.8* 7.9*     No results for input(s): AP, TBIL, TP, ALB, GLOB, GGT, AML, LPSE in the last 72 hours. No lab exists for component: SGOT, GPT, AMYP, HLPSE  No results for input(s): INR, PTP, APTT, INREXT in the last 72 hours. 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 Patient is scheduled for resection with neurosurgery tomorrow. Recent EGD without rings, strictures, webs, or masses.      Etiology of symptoms complicated by brain met in setting of benign endoscopic procedure and extensive fecal statis on abd xray and CT. Plan to optimize bowel regimen and pursue outpatient endoscopy.      - optimize bowel regimen - fleets enemas/suppositories  - continue IV zofran  - encourage PO intake  - will need colonoscopy as outpatient   - await capsule study results       Patient Active Hospital Problem List:   Active Problems:    Brain mass (8/2/2022)       Edin Sanchez PA-C  08/04/22  2:23 PM

## 2022-08-04 NOTE — PROGRESS NOTES
SLP Contact Note    Reconsulted on this patient for dysphagia. Patient cannot aspirate unless there's concern for a fistula given his laryngectomy. He has no tracheoesophageal prosthesis that could leak. If pt has difficulty with solids, can certainly downgrade diet consistency, however, would confer with Lucia Gutiérrez as he has excellent insight into his deficits. Will again include the handouts regarding laryngectomy below. But otherwise no SLP indicated. Educational Resources for Providers re: Laryngectomy     Please take note on cleaning larytube and changing HME. Patients with a Laryngectomy  There will be an increase in patients after a laryngectomy at [facility] equipment is also being implemented. Here is what you need to know:  What is a laryngectomy? It is a procedure completed on patients with head/neck cancer. Procedure removes the larynx including the vocal folds. It is becoming more popular as survival rates can significantly increase after a laryngectomy in comparison to chemo/radiation only. How is it different from a trach? A trach is used for additional respiratory support, but patients still have their vocal folds/larynx intact. You should see a larytube and not a trach, on a patient's stoma. Note that patient's only way of breathing is through their stoma. That means you cannot occlude their stoma (including a speaking valve). What happens with communication? Patients after a laryngectomy no longer can communicate by using their vocal folds for vocalizing. There are four options:  Electrolarynx: A device that is placed flush to patient's neck and vibrates to recreate the vibrations that your vocal folds provide. There is also an option for an oral adapter that is placed directly into oral cavity. Esophageal speech: Air is injected into the upper esophagus and then released in a controlled manner to create sound used to produce speech.   Augmentative/Alternative Communication: Communication boards, iPads with apps, dry erase boards for writing. Tracheoesophageal Prosthesis (TEP): Surgeon specific. Some surgeons will place a tracheoesophageal puncture (a hole in the tissue between the trachea and esophagus). Trained SLPs can then place a prosthesis that patients can use to speak easier utilizing esophageal speech. What about swallowing? Good news for our patients with dysphagia pre-laryngectomy! It is very difficult to aspirate after a laryngectomy. The only way a patient can aspirate is if: they have a tracheoesophageal fistula (hole between trachea and esophagus), if they directly put food/drink into stoma, or if they have a TEP and it leaks. Otherwise, laryngectomy patients cannot aspirate. Larytubes Heat-Moisture Exchange (HMEs)        A soft, silicone tube that maintains the opening of the stoma. Must be taken out and cleaned once a day. The HMEs connect to the larytube. Reduces coughing, mucus production, and chest infections. It works as the TextHog by providing moisture to stoma. It cannot get wet. It must be changed at least daily.        Thank you,  FABRICE RoblesEd, [id number] [facility]  Speech-Language Pathologist

## 2022-08-04 NOTE — PROGRESS NOTES
Transition of Care Plan: home pending medical/therapy recommendations  *Patient has brain mass. Surgery tomorrow     RUR: 25% high     PCP F/U: Suzie Iniguez NP     Disposition: home pending medical/therapy recommendations     Transportation: D-medicaid transport     Main Contact: Brother: Sandi Kelly    [phone]: Patient to have surgery tomorrow.  Will continue to follow    Gustavo Womack RN, CRM

## 2022-08-04 NOTE — CONSULTS
[address]  [address]  [address] [place] Epstein, [address]  [phone]                     GI CONSULTATION NOTE      NAME:  Sendy Freeman   :   1970   MRN:   [id number]     Consult Date: 2022     Chief Complaint: abd pain, vomiting, throat tightness    History of Present Illness:  Patient is a 49 y.o. who is seen in consultation at the request of Dr. Tonya Marcelo for the above mentioned problem. 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 Re: abd pain: located in lower abdomen bilaterally 7/10, nonradiating, cramping pain with dark stools. Reports soft 1 BM EOD. Passing gas. Refusing bowel regimen 2/2 throat tightness. Nursing planning to administer suppository. Re: vomiting: started having nocturnal vomiting three weeks ago. 1-2 episodes of emesis per night. No associated food, medicine, or activity triggers. Last episode of vomiting was last night and was orange. He has had 1 ensure to drink today, but no other food. Has had some nausea with dry heaving today. Re throat tightness: States this is new and proximal. Refusing some PO medications and food 2/2 tightness. Denies odynophagia.     Last EGD 22 - small HH, mild gastritis, no evidence of stricture  Last colonoscopy 22 - terminated in distal sigmoid 2/2 poor prep  Capsule study 22 - results pending    ABD Xray 8/2 - moderate to severe fecal stasis  CTAP 8/4 - extensive fecal stasis, moderate intraperitoneal ascites      PMH:  Past Medical History:   Diagnosis Date    Chronic pain     THROAT, EARS, NECK R/T CANCER    COPD (chronic obstructive pulmonary disease) ([facility] [address]     EMPHASEMA    Psychiatric disorder     ANXIETY R/T CANCER    Throat cancer ([facility] [address]     Tracheostomy present ([facility] [address]        PSH:  Past Surgical History:   Procedure Laterality Date    COLONOSCOPY N/A 2022    COLONOSCOPY performed by Ginger Montano, Roberto Baltazar MD at [address] N/A 6/8/2022    COLONOSCOPY performed by Gretchen Warner MD at [address] [facility]- \" PUT PLATE IN\"    HX TRACHEOSTOMY  02/08/2021    IR INSERT TUNL CVC W PORT OVER 5 YEARS  5/21/2021    IR PLACE CVAD FLUORO GUIDE  5/21/2021       Allergies:  No Known Allergies    Home Medications:  Prior to Admission Medications   Prescriptions Last Dose Informant Patient Reported? Taking?   benzonatate (TESSALON) 100 mg capsule   No No   Sig: Take 1 Capsule by mouth three (3) times daily as needed for Cough. Patient not taking: Reported on 7/30/2022   ferrous sulfate 325 mg (65 mg iron) tablet   No No   Sig: Take 1 Tablet by mouth two (2) times daily (with meals). Patient not taking: Reported on 7/30/2022   guaiFENesin-dextromethorphan SR (HUMIBID DM) 600-30 mg per tablet   No No   Sig: Take 1 Tablet by mouth every twelve (12) hours as needed for Cough. Patient not taking: Reported on 7/30/2022   levothyroxine (SYNTHROID) 125 mcg tablet   No No   Sig: Take 1 Tablet by mouth Daily (before breakfast) for 30 days. Patient not taking: Reported on 7/30/2022   melatonin 5 mg cap capsule   No No   Sig: Take 1 Capsule by mouth nightly. Patient not taking: Reported on 7/30/2022   oxyCODONE-acetaminophen (PERCOCET) 5-325 mg per tablet   No No   Sig: Take 1 Tablet by mouth every twelve (12) hours as needed for Pain for up to 21 days. Max Daily Amount: 2 Tablets. pantoprazole (PROTONIX) 40 mg tablet   No No   Sig: Take 1 Tablet by mouth two (2) times a day. Indications: bleeding from stomach, esophagus or duodenum   Patient not taking: Reported on 7/30/2022   senna-docusate (PERICOLACE) 8.6-50 mg per tablet   No No   Sig: Take 1 Tablet by mouth two (2) times a day. Patient not taking: Reported on 7/30/2022   sucralfate (Carafate) 1 gram tablet   No No   Sig: Take 1 Tablet by mouth four (4) times daily as needed (abdominal pain).  Dissolve in 10 mL of water before gagandeep.       Facility-Administered Medications: None       Hospital Medications:  Current Facility-Administered Medications   Medication Dose Route Frequency    bisacodyL (DULCOLAX) suppository 10 mg  10 mg Rectal DAILY PRN    levoFLOXacin (LEVAQUIN) tablet 500 mg  500 mg Oral Q24H    sodium chloride (NS) flush 5-40 mL  5-40 mL IntraVENous Q8H    sodium chloride (NS) flush 5-40 mL  5-40 mL IntraVENous PRN    acetaminophen (TYLENOL) tablet 650 mg  650 mg Oral Q6H PRN    Or    acetaminophen (TYLENOL) suppository 650 mg  650 mg Rectal Q6H PRN    polyethylene glycol (MIRALAX) packet 17 g  17 g Oral DAILY PRN    ondansetron (ZOFRAN ODT) tablet 4 mg  4 mg Oral Q8H PRN    Or    ondansetron (ZOFRAN) injection 4 mg  4 mg IntraVENous Q6H PRN    oxyCODONE-acetaminophen (PERCOCET) 5-325 mg per tablet 1 Tablet  1 Tablet Oral Q4H PRN    sucralfate (CARAFATE) tablet 1 g  1 g Oral QID PRN    levETIRAcetam (KEPPRA) injection 1,000 mg  1,000 mg IntraVENous Q12H    famotidine (PF) (PEPCID) 20 mg in 0.9% sodium chloride 10 mL injection  20 mg IntraVENous Q12H    dexamethasone (DECADRON) 4 mg/mL injection 4 mg  4 mg IntraVENous Q6H    polyethylene glycol (MIRALAX) packet 17 g  17 g Oral DAILY    senna (SENOKOT) tablet 8.6 mg  1 Tablet Oral DAILY       Social History:  Social History     Tobacco Use    Smoking status: Former     Packs/day: 1.50     Years: 30.00     Pack years: 45.00     Types: Cigarettes     Quit date: 10/28/2020     Years since quittin.7    Smokeless tobacco: Never   Substance Use Topics    Alcohol use: Not Currently       Family History:  Family History   Problem Relation Age of Onset    Diabetes Mother     Diabetes Father     Kidney Disease Father     Diabetes Sister     Heart Disease Daughter     Anesth Problems Neg Hx        Review of Systems:    Constitutional: negative fever, negative chills, +weight loss  Eyes:   negative visual changes  ENT:   negative sore throat, tongue or lip swelling  Respiratory: negative cough, negative dyspnea  Cards:  negative for chest pain, palpitations, lower extremity edema  GI:   See HPI  :  negative for frequency, dysuria  Integument:  negative for rash and pruritus  Heme:  negative for easy bruising and gum/nose bleeding  Musculoskel: negative for myalgias,  back pain and muscle weakness      Objective:   Patient Vitals for the past 8 hrs:   BP Temp Pulse Resp SpO2   08/04/22 1200 -- -- 73 -- --   08/04/22 1003 97/82 97.8 °F (36.6 °C) 99 15 --   08/04/22 1001 -- -- 100 -- --   08/04/22 0601 108/73 97.6 °F (36.4 °C) 87 13 98 %   08/04/22 0556 -- -- 84 -- --     No intake/output data recorded. 08/02 1901 - 08/04 0700  In: 100 [I.V.:100]  Out: -       PHYSICAL EXAM:  General appearance: 49 y.o. thin AAM, +liu  Skin: no rashes  HEENT: anicteric, larytube  Cardiovascular: PVCs  Respiratory: CTA, no w/r/r  GI: mild distention, soft, mild tenderness throughout, +BS   Rectal: Deferred   Musculoskeletal: no edema  Neurological: alert and oriented     Data Review     Recent Labs     08/04/22  0537 08/03/22  0112   WBC 14.2* 16.3*   HGB 7.1* 8.2*   HCT 22.2* 26.4*   * 681*     Recent Labs     08/04/22  0537 08/03/22  0112   * 133*   K 4.8 4.4    101   CO2 24 22   BUN 31* 33*   CREA 1.23 1.41*   * 107*   PHOS 1.8*  --    CA 7.8* 7.9*     No results for input(s): AP, TBIL, TP, ALB, GLOB, GGT, AML, LPSE in the last 72 hours. No lab exists for component: SGOT, GPT, AMYP, HLPSE  No results for input(s): INR, PTP, APTT, INREXT in the last 72 hours. 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 Patient is scheduled for resection with neurosurgery tomorrow. Recent EGD without rings, strictures, webs, or masses.      Etiology of symptoms complicated by brain met in setting of benign endoscopic procedure and extensive fecal statis on abd xray and CT. Plan to optimize bowel regimen and pursue outpatient endoscopy.      - optimize bowel regimen - fleets enemas/suppositories  - continue IV zofran  - IV PPI  - encourage PO intake  - will need colonoscopy as outpatient   - await capsule study results       Patient Active Hospital Problem List:   Active Problems:    Brain mass (8/2/2022)       Radha Dixon PA-C  08/04/22  2:23 PM

## 2022-08-04 NOTE — PROGRESS NOTES
Awake alert neuro stable  plan left occipital crani and metastatic tumor removal tomorrow  risks and benefits explained and he agrees to proceed

## 2022-08-04 NOTE — PROGRESS NOTES
Problem: Falls - Risk of  Goal: *Absence of Falls  Description: Document Lion Eli Fall Risk and appropriate interventions in the flowsheet.   8/4/2022 1522 by Coreen Langley RN  Outcome: Progressing Towards Goal  Note: Fall Risk Interventions:            Medication Interventions: Patient to call before getting OOB, Teach patient to arise slowly         History of Falls Interventions: Door open when patient unattended, Consult care management for discharge planning      8/4/2022 1522 by Coreen Langley RN  Outcome: Progressing Towards Goal  Note: Fall Risk Interventions:            Medication Interventions: Patient to call before getting OOB, Teach patient to arise slowly         History of Falls Interventions: Door open when patient unattended, Consult care management for discharge planning         Problem: Patient Education: Go to Patient Education Activity  Goal: Patient/Family Education  8/4/2022 1522 by Coreen Langley RN  Outcome: Progressing Towards Goal  8/4/2022 1522 by Coreen Langley RN  Outcome: Progressing Towards Goal     Problem: Discharge Planning  Goal: *Discharge to safe environment  8/4/2022 1522 by Coreen Langlye RN  Outcome: Progressing Towards Goal  8/4/2022 1522 by Coreen Langley RN  Outcome: Progressing Towards Goal  Goal: *Knowledge of medication management  8/4/2022 1522 by Coreen Langley RN  Outcome: Progressing Towards Goal  8/4/2022 1522 by Coreen Langley RN  Outcome: Progressing Towards Goal  Goal: *Knowledge of discharge instructions  8/4/2022 1522 by Coreen Langley RN  Outcome: Progressing Towards Goal  8/4/2022 1522 by Coreen Langley RN  Outcome: Progressing Towards Goal     Problem: Patient Education: Go to Patient Education Activity  Goal: Patient/Family Education  8/4/2022 1522 by Coreen Langley RN  Outcome: Progressing Towards Goal  8/4/2022 1522 by Coreen Langley RN  Outcome: Progressing Towards Goal     Problem: Patient Education:  Go to Education Activity  Goal: Patient/Family Education  Outcome: Progressing Towards Goal     Problem: Brain Tumor Day 1  Goal: Activity/Safety  Outcome: Progressing Towards Goal  Goal: Consults, if ordered  Outcome: Progressing Towards Goal  Goal: Diagnostic Test/Procedures  Outcome: Progressing Towards Goal  Goal: Nutrition/Diet  Outcome: Progressing Towards Goal  Goal: Discharge Planning  Outcome: Progressing Towards Goal  Goal: Medications  Outcome: Progressing Towards Goal  Goal: Respiratory  Outcome: Progressing Towards Goal  Goal: Treatments/Interventions/Procedures  Outcome: Progressing Towards Goal  Goal: *Neurologic stability  Outcome: Progressing Towards Goal  Goal: *Progress independence mobility/activities (eg: Mobility precautions)  Outcome: Progressing Towards Goal  Goal: *Adequate oxygenation  Outcome: Progressing Towards Goal  Goal: *Hemodynamically stable without vasoactive medications  Outcome: Progressing Towards Goal     Problem: Brain Tumor Day 2  Goal: Activity/Safety  Outcome: Progressing Towards Goal  Goal: Consults, if ordered  Outcome: Progressing Towards Goal  Goal: Diagnostic Test/Procedures  Outcome: Progressing Towards Goal  Goal: Nutrition/Diet  Outcome: Progressing Towards Goal  Goal: Medications  Outcome: Progressing Towards Goal  Goal: Respiratory  Outcome: Progressing Towards Goal  Goal: Treatments/Interventions/Procedures  Outcome: Progressing Towards Goal  Goal: *Hemodynamically stable without vasoactive medications  Outcome: Progressing Towards Goal  Goal: *Neurologic stability  Outcome: Progressing Towards Goal  Goal: *Progress independence mobility/activities (eg: Mobility precautions)  Outcome: Progressing Towards Goal  Goal: *Adequate oxygenation  Outcome: Progressing Towards Goal  Goal: *Tolerating diet  Outcome: Progressing Towards Goal     Problem: Brain Tumor Discharge Outcomes  Goal: *Neurologic stability  Outcome: Progressing Towards Goal  Goal: *Progress independence mobility/activities (eg: Mobility precautions)  Outcome: Progressing Towards Goal  Goal: *Adequate oxygenation  Outcome: Progressing Towards Goal  Goal: *Tolerates nutrition therapy  Outcome: Progressing Towards Goal  Goal: *Verbalizes understanding and describes medication purposes and frequencies  Outcome: Progressing Towards Goal  Goal: *Verbalizes understanding of type and use of pain medication  Outcome: Progressing Towards Goal  Goal: *Describes home care/support arrangements established based on need  Outcome: Progressing Towards Goal  Goal: *Describes available resources and support systems  Outcome: Progressing Towards Goal

## 2022-08-05 NOTE — PROGRESS NOTES
[address] [facility] [address], [address]  [phone]                    Reviewed chart and spoke with nursing staff. Patient had one bowel movement last night after enema. No vomiting. Patient had surgery today and will be admitted to ICU for observation. Recommend continuing bowel regimen as appropriate in post - operative period. Abd pain is chronic and may be re-evaluated as needed. GI will sign off for now.     Elizabeth John PA-C  08/05/22  4:34 PM

## 2022-08-05 NOTE — ANESTHESIA PROCEDURE NOTES
Arterial Line Placement    Start time: 8/5/2022 11:17 AM  End time: 8/5/2022 11:29 AM  Performed by: Viky Mota MD  Authorized by: Viky Mota MD     Pre-Procedure  Indications:  Arterial pressure monitoring  Preanesthetic Checklist: patient identified, risks and benefits discussed, anesthesia consent, site marked, patient being monitored, timeout performed and patient being monitored    Timeout Time: 11:17 EDT      Procedure:   Prep:  Alcohol and ChloraPrep  Seldinger Technique?: Yes    Orientation:  Right  Location:  Radial artery  Catheter size:  20 G  Number of attempts:  2  Cont Cardiac Output Sensor: No      Assessment:   Post-procedure:  Line secured and sterile dressing applied  Patient Tolerance:  Patient tolerated the procedure well with no immediate complications

## 2022-08-05 NOTE — ROUTINE PROCESS
Patient: Severo Pippins MRN: [id number]  SSN: [id number]   YOB: 1970  Age: 49 y.o. Sex: male     Patient is status post Procedure(s):  BRAIN LAB GUIDED LEFT OCCIPITAL CRANIOTOMY AND TUMOR REMOVAL.     Surgeon(s) and Role:     * Ella Monsivais MD - Primary    Local/Dose/Irrigation:  see [facility]                Venous Access Device 05/20/22 Upper chest (subclavicular area, right (Active)      Peripheral IV 08/05/22 Left;Posterior Wrist (Active)       Peripheral IV 08/05/22 Left Antecubital (Active)       Peripheral IV 08/05/22 Right Antecubital (Active)      Arterial Line Right Radial artery (Active)                         Dressing/Packing:  Incision 02/08/21 Neck-Dressing/Treatment: Open to air (08/04/22 0800)  Incision 08/05/22 Head Left-Dressing/Treatment: Other (Comment);Gauze dressing/dressing sponge;Skin glue;Tape/Soft cloth adhesive tape (adaptic) (08/05/22 1652)    Splint/Cast:  ]    Other:

## 2022-08-05 NOTE — PROGRESS NOTES
Despite his inability to talk he can communicate, understands risks and benefits of surgery, agrees to proceed with left occipital craniotomy for tumor removal and hopefully nausea will improve once tumor is removed

## 2022-08-05 NOTE — PROGRESS NOTES
Patient was in the OR all day today and hence a brief note left in the chart  To follow OP with Dr. Roland Goncalves

## 2022-08-05 NOTE — PROGRESS NOTES
Neurosurgery    Pt currently in OR for left occipital craniotomy with resection of tumor. Discussed patient with intensivist Dr. India Torres as patient will be admitted to ICU post-op. Pre-op neuro exam as below:  Gen:NAD. Larytube in place. Neuro: A&Ox3. Follows commands. Non-verbal. Mouths words or write words down. Affect normal.  PERRL. EOMI. Face symmetric. Tongue midline. WELLS. Strength RUE/RLE 5-/5, LUE 4+/5 distally, 5-/5 proximally. LLE 5-/5  Negative drift  Gait deferred.     Paola Castaneda, NP

## 2022-08-05 NOTE — CONSULTS
[facility]    ICU TEAM Progress Note    Name: Adriane Dotson   : 1970   MRN: [id number]   Date: 2022           ICU Assessment     LEFT OCCIPITAL MASS (+/- necrotic lymphadenopathy in neck)  S/p LEFT OCCIPITAL Craniotomy (Dr. Pearce Self, 22)  Acute Postoperative Pain  Atelectasis  Anemia  Staph aureus on sputum (?colonization/?contaminate)  Hx of TOTAL LARYNGECTOMY (21)  Hx Squamous Cell Carcinoma (invasive) of Larynx  Chemo/Radiation/Keytruda          ICU Comprehensive Plan of Care:     Neurological System  Decadron  Keppra  Spontaneous Awakening Trial: Yes  Sedation: None  Analgesia: Fentanyl and Oxycodone    Cardiovascular System  SBP Goal of: < 140 mmHg  MAP Goal of: > 65 mmHg  Transfusion Trigger (Hgb): <7 g/dL  Keep K > 4; Mg > 2     Respiratory System  Chlorhexidine   Head of bed > 30 degrees  Spontaneous Breathing Trial: Yes  Pulmonary toilet: Incentive Spirometry   SpO2 Goal: > 92%  DVT Prophylaxis: SCD's or Sequential Compression Device     Renal/GI/Endocrine System  IVFs: NS  Ulcer Prophylaxis: Protonix (pantoprazole)   Bowel Regimen: Senna  Feeding:  Pending   Blood Sugar Goal 120-180 - Glycemic Control: Insulin    Infectious Disease  Indwelling Catheter:  Tubes:  Laryngeal Tube  (Shiley 10 cuffed at bedside)  Lines: Peripheral IV and Arterial Line  Drains: Laboy Catheter  D - De-escalation of Antibiotics:   Levofloxacin    PT/OT: PT consulted and on board, OT consulted and on board, and Speech therapy consulted and on board     Goals of Care Discussion with family Pending     Plan of Care/Code Status: Full Code    Discussed Care Plan with Bedside RN    Documentation of Current Medications    Subjective:   Progress Note: 2022      Reason for ICU Admission: S/p Crani, neurochecks & BP management    HPI:  From H/P    Adriane Dotson is a 49 y.o. 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  He had  total laryngectomy on 02/08/21 and had regional recurrence in 04/2021. He received chemotherapy from 05/24/21-07/13/21 with concurrent radiation from 05/24/21-07/19/21 at [address] [place] [address]. He was started on Keytruda in 01/2022 followed by the addition of cisplatin and fluoruracil for 4 cycles. He is now on maintenance Keytruda every 3 weeks with his last dose received last Friday. He has had multiple blood transfusions in the past due to recurrent anemia. He is followed by Dr. Lianna Calvin for onc  He presented to the ER at [facility] on 07/30/22 due to nausea, vomiting, generalized weakness and a fever of 101. 6. He was admitted to the hospital and treated for pneumonia. He did have some numbness and tingling and weakness in his left . His head CT revealed a left occipital brain mass with cerebral edema; therefore, neurosurgery was consulted. The patient was transferred to [facility] for neurosurgical evaluation. \"     Overnight Events:   8/5/2022  Admitted to [facility] ICU, vitals stable. NAD    POD:  Day of Surgery    S/P:   Procedure(s):  BRAIN LAB GUIDED LEFT OCCIPITAL CRANIOTOMY AND TUMOR REMOVAL    Active Problem List:     Problem List  Date Reviewed: 8/5/2022            Codes Class    Brain mass ICD-10-CM: G93.89  ICD-9-CM: 348.89         Pneumonia ICD-10-CM: J18.9  ICD-9-CM: 860         GI bleed ICD-10-CM: K92.2  ICD-9-CM: 578.9         CKD (chronic kidney disease) ICD-10-CM: N18.9  ICD-9-CM: 330. 9         Chronic anemia ICD-10-CM: D64.9  ICD-9-CM: 285.9         UGIB (upper gastrointestinal bleed) ICD-10-CM: K92.2  ICD-9-CM: 578.9         Palpitations ICD-10-CM: R00.2  ICD-9-CM: 785.1         Chest pain ICD-10-CM: R07.9  ICD-9-CM: 786.50         Anemia ICD-10-CM: D64.9  ICD-9-CM: 285. 9         Symptomatic anemia ICD-10-CM: D64.9  ICD-9-CM: 285. 9         Anemia due to GI blood loss ICD-10-CM: D50.0  ICD-9-CM: 280.0         Prevention of chemotherapy-induced neutropenia ICD-10-CM: Z76.89  ICD-9-CM: V72.85         Hypokalemia ICD-10-CM: E87.6  ICD-9-CM: 276.8 Severe protein-calorie malnutrition ([facility] ICD-10-CM: E43  ICD-9-CM: 262         Laryngeal carcinoma (HCC) ICD-10-CM: C32.9  ICD-9-CM: 161.9         Throat cancer (HCC) ICD-10-CM: C14.0  ICD-9-CM: 149.0         COPD exacerbation (HCC) ICD-10-CM: J44.1  ICD-9-CM: 491.21         Acute and chronic respiratory failure with hypoxia (HCC) ICD-10-CM: J96.21  ICD-9-CM: 518.84, 799.02         Chronic pain due to neoplasm ICD-10-CM: G89.3  ICD-9-CM: 338. 3         Anxiety ICD-10-CM: F41.9  ICD-9-CM: 300.00         Insomnia ICD-10-CM: G47.00  ICD-9-CM: 780.52         Respiratory failure with hypoxia (HCC) ICD-10-CM: J96.91  ICD-9-CM: 518.81            Past Medical History:      has a past medical history of Chronic pain, COPD (chronic obstructive pulmonary disease) ([facility] [address] Psychiatric disorder, Throat cancer ([facility] [address] and [facility] Past Surgical History:      has a past surgical history that includes hx orthopaedic; hx tracheostomy (02/08/2021); ir place cvad fluoro guide (05/21/2021); ir insert tunl cvc w port over 5 years (05/21/2021); colonoscopy (N/A, 06/07/2022); colonoscopy (N/A, 06/08/2022); and hx other surgical (02/2021). Home Medications:     Prior to Admission medications    Medication Sig Start Date End Date Taking? Authorizing Provider   senna-docusate (PERICOLACE) 8.6-50 mg per tablet Take 1 Tablet by mouth two (2) times a day. Patient not taking: Reported on 7/30/2022 7/29/22   Alok Cook MD   oxyCODONE-acetaminophen (PERCOCET) 5-325 mg per tablet Take 1 Tablet by mouth every twelve (12) hours as needed for Pain for up to 21 days. Max Daily Amount: 2 Tablets. 7/15/22 8/5/22  Temo Mascorro NP   guaiFENesin-dextromethorphan SR (HUMIBID DM) 600-30 mg per tablet Take 1 Tablet by mouth every twelve (12) hours as needed for Cough. Patient not taking: Reported on 7/30/2022 7/15/22   Temo Mascorro NP   melatonin 5 mg cap capsule Take 1 Capsule by mouth nightly.   Patient not taking: Reported on 2022 7/15/22   Yari Mascorro NP   benzonatate (TESSALON) 100 mg capsule Take 1 Capsule by mouth three (3) times daily as needed for Cough. Patient not taking: Reported on 2022 7/15/22   Yari Mascorro NP   levothyroxine (SYNTHROID) 125 mcg tablet Take 1 Tablet by mouth Daily (before breakfast) for 30 days. Patient not taking: Reported on 2022  Bharathi Olson MD   sucralfate (Carafate) 1 gram tablet Take 1 Tablet by mouth four (4) times daily as needed (abdominal pain). Dissolve in 10 mL of water before takign. 22   Yari Mascorro NP   pantoprazole (PROTONIX) 40 mg tablet Take 1 Tablet by mouth two (2) times a day. Indications: bleeding from stomach, esophagus or duodenum  Patient not taking: Reported on 2022   Yari Mascorro NP   ferrous sulfate 325 mg (65 mg iron) tablet Take 1 Tablet by mouth two (2) times daily (with meals). Patient not taking: Reported on 2022   Yari Mascorro NP       Allergies/Social/Family History:     No Known Allergies   Social History     Tobacco Use    Smoking status: Former     Packs/day: 1.50     Years: 30.00     Pack years: 45.00     Types: Cigarettes     Quit date: 10/28/2020     Years since quittin.7    Smokeless tobacco: Never   Substance Use Topics    Alcohol use: Not Currently      Family History   Problem Relation Age of Onset    Diabetes Mother     Diabetes Father     Kidney Disease Father     Diabetes Sister     Heart Disease Daughter     Anesth Problems Neg Hx        Review of Systems:     A comprehensive review of systems was negative except for that written in the HPI.     Objective:   Vital Signs:  Visit Vitals  /79 (BP 1 Location: Right upper arm, BP Patient Position: At rest;Semi fowlers)   Pulse 80   Temp 98.1 °F (36.7 °C)   Resp 14   SpO2 95%      O2 Device: None (Room air) Temp (24hrs), Av.1 °F (36.7 °C), Min:98 °F (36.7 °C), Max:98.2 °F (36.8 °C) Intake/Output:     Intake/Output Summary (Last 24 hours) at 8/5/2022 1349  Last data filed at 8/5/2022 1304  Gross per 24 hour   Intake 450 ml   Output 1575 ml   Net -1125 ml       Physical Exam:    General appearance: alert, cooperative, no distress, appears stated age  Head: Normocephalic, without obvious abnormality, atraumatic, dry/intact/clean  Eyes: conjunctivae/corneas clear. PERRL, EOM's intact. Fundi benign  Neck: supple, symmetrical, trachea midline, no adenopathy, thyroid: not enlarged, symmetric, no tenderness/mass/nodules, no carotid bruit, no JVD, and midline stoma with laryngeal device/ plug present  Lungs: wheezes inspiratory throughout  Heart: regular rate and rhythm  Abdomen: soft, non-tender. Bowel sounds normal. No masses,  no organomegaly  Extremities: extremities normal, atraumatic, no cyanosis or edema, no edema, redness or tenderness in the calves or thighs, no ulcers, gangrene or trophic changes  Pulses:   L brachial 2+ R brachial 2+   L radial 2+ R radial 2+   L inguinal 2+ R inguinal 2+   L popliteal 2+ R popliteal 2+   L posterior tibial 2+ R posterior tibial 2+   L dorsalis pedis 2+ R dorsalis pedis 2+     Neurologic: Grossly normal, gait not assessed    LABS AND  DATA: Personally reviewed  Recent Labs     08/05/22  0020 08/04/22  0537   WBC 16.9* 14.2*   HGB 7.5* 7.1*   HCT 23.8* 22.2*   * 567*     Recent Labs     08/05/22  0020 08/04/22  0537    135*   K 5.2* 4.8    104   CO2 22 24   BUN 28* 31*   CREA 1.18 1.23   * 114*   CA 7.9* 7.8*   MG  --  2.6*   PHOS  --  1.8*     No results for input(s): AP, TBIL, TP, ALB, GLOB, AML, LPSE in the last 72 hours. No lab exists for component: SGOT, GPT, AMYP  No results for input(s): INR, PTP, APTT, INREXT in the last 72 hours. No results for input(s): PHI, PCO2I, PO2I, FIO2I in the last 72 hours. No results for input(s): CPK, CKMB, TROIQ, BNPP in the last 72 hours.     MEDS: Reviewed    Chest X-Ray:  CXR Results (Last 48 hours)                 08/03/22 2116  XR CHEST PORT Final result    Impression:  Stable disease. Narrative:  EXAM: Portable CXR. 2114 hours. COMPARISON: 8/2/2022. INDICATION: Shortness of breath       FINDINGS:   There are unchanged bilateral pulmonary nodules/masses and atelectasis. Heart   size is normal. There is no pulmonary edema, pneumothorax, midline shift or   sizable pleural effusion. Port catheter and tracheostomy remain satisfactory. Multidisciplinary Rounds Completed: Yes    ABCDEF Bundle/Checklist Completed:  Yes    SPECIAL EQUIPMENT  None    DISPOSITION  Stay in ICU    CRITICAL CARE CONSULTANT NOTE  I had a face to face encounter with the patient, reviewed and interpreted patient data including clinical events, labs, images, vital signs, I/O's, and examined patient. I have discussed the case and the plan and management of the patient's care with the consulting services, the bedside nurses and the respiratory therapist.      NOTE OF PERSONAL INVOLVEMENT IN CARE   This patient has a high probability of imminent, clinically significant deterioration, which requires the highest level of preparedness to intervene urgently. I participated in the decision-making and personally managed or directed the management of the following life and organ supporting interventions that required my frequent assessment to treat or prevent imminent deterioration. I personally spent 55 minutes of critical care time. This is time spent at this critically ill patient's bedside actively involved in patient care as well as the coordination of care. This does not include any procedural time which has been billed separately.     Bharti Parada, [facility]  Staff Intensivist ACNP  [facility]  8/5/2022

## 2022-08-05 NOTE — ANESTHESIA PREPROCEDURE EVALUATION
Relevant Problems   RESPIRATORY SYSTEM   (+) COPD exacerbation (HCC)   (+) Pneumonia      RENAL FAILURE   (+) CKD (chronic kidney disease)      HEMATOLOGY   (+) Anemia   (+) Anemia due to GI blood loss   (+) Chronic anemia   (+) Symptomatic anemia      PERSONAL HX & FAMILY HX OF CANCER   (+) Laryngeal carcinoma (HCC)   (+) Throat cancer (HCC)       Anesthetic History   No history of anesthetic complications            Review of Systems / Medical History  Patient summary reviewed, nursing notes reviewed and pertinent labs reviewed    Pulmonary    COPD      Pneumonia         Neuro/Psych         Psychiatric history     Cardiovascular  Within defined limits                Exercise tolerance: <4 METS     GI/Hepatic/Renal  Within defined limits       Renal disease: CRI       Endo/Other  Within defined limits      Anemia     Other Findings   Comments: Metastatic squamous cell carcinoma                         Physical Exam    Airway  Mallampati: II  TM Distance: > 6 cm  Neck ROM: normal range of motion   Mouth opening: Normal  Tracheostomy present   Cardiovascular  Regular rate and rhythm,  S1 and S2 normal,  no murmur, click, rub, or gallop             Dental  No notable dental hx       Pulmonary    Rhonchi:bilateral             Abdominal  GI exam deferred       Other Findings            Anesthetic Plan    ASA: 3  Anesthesia type: general    Monitoring Plan: Arterial line      Induction: Intravenous  Anesthetic plan and risks discussed with: Patient

## 2022-08-05 NOTE — PROGRESS NOTES
ICU progress note-brief update    Spoke with Prasanth Storey, with neurosurgery. Patient scheduled today for LEFT occipital craniotomy for brain mass removal.  Dr. Aayush Szymanski will be the neurosurgeon. Patient with a history of squamous cell cancer of the larynx and has a laryngeal tube. Preoperative exam he follows commands and moves all extremities. Other comorbidities include anemia, CKD, previous pneumonias. Plan to bring to the ICU after surgery. Anticipate recommendations from neurosurgery.     Jamari Walls,    Staff Intensivist/Anesthesiologist  Critical Care Medicine

## 2022-08-05 NOTE — ANESTHESIA PROCEDURE NOTES
Epidural Block    Patient location during procedure: holding area  Start time: 8/5/2022 10:31 AM  End time: 8/5/2022 10:45 AM  Reason for block: post-op pain management  Staffing  Performed: attending   Anesthesiologist: Caren Cool MD  Preanesthetic Checklist  Completed: patient identified, IV checked, site marked, risks and benefits discussed, surgical consent, monitors and equipment checked, pre-op evaluation and timeout performed  Block Placement  Patient position: sitting  Prep: alcohol swabs and ChloraPrep  Sterility prep: cap, drape, gloves and mask  Sedation level: moderate sedation  Patient monitoring: continuous pulse oximetry, heart rate and frequent blood pressure checks  Approach: midline  Location: thoracic  Thoracic location: T7-T8  Epidural  Loss of resistance technique: saline and air  Guidance: landmark technique  Needle  Needle type: Tuohy   Needle gauge: 17 G  Needle length: 9 cm  Needle insertion depth: 7 cm  Catheter type: end hole  Catheter size: 19 G  Catheter at skin depth: 15 cm  Catheter securement method: clear occlusive dressing, liquid medical adhesive and surgical tape  Test dose: negative  Medications Administered  lidocaine-EPINEPHrine (XYLOCAINE) 1.5 %-1:200,000 Epidural - Epidural   3.5 mL - 8/5/2022 10:31:00 AM  Assessment  Sensory level: T10  Block outcome: block to be assessed in the OR  Number of attempts: 2  Procedure assessment: patient tolerated procedure well with no immediate complications

## 2022-08-05 NOTE — CONSULTS
[address]  Medical Oncology at [facility]    Hematology / Oncology consult note    Reason for consultation   Shara Abdi is a 49 y.o. male who is seen spittle consultation for stage IV head and neck cancer and brain metastases    This is an EMR consult     Hematology Oncology Treatment History:     Diagnosis: Squamous cell carcinoma of larynx / glottis. Stage: CATARINO [[id number]] at diagnosis, regional recurrence in 4/2021, now with metastatic disease. Pathology:   2/8/21 Total laryngectomy: Invasive squamous cell carcinoma  Tumor size 3.5cm, moderately differentiated (G2), PNI present, LVI not identified, margins negative  aA6mgGf  -PDL1 (tested 12/28/21 on above specimen) positive with CPS 10. Prior Treatment:   1. 2/8/21 total laryngectomy/cricopharyngeal myotomy   2. Cisplatin 100mg/m2 every 3 weeks x 3 cycles, 5/24/21-7/13/21  3. Radiation 5/24/21-7/19/21  4. Pembrolizumab x 2 cycles, 1/6/22  5. [Cisplatin (100 mg/m2 intravenous, day 1) and fluorouracil (1000 mg/m2 per day, continuous infusion, for four days) and Pembrolizumab, day 1 given every 21 days x 4 cycles. Current Treatment:  Pembrolizumab maintenance every 3 weeks, 1/28/22 - current. History of Present Illness:   Shara Abdi is a 49 y.o. male who with COPD and stage IV head and neck squamous cell carcinoma who sees Dr. Everette Harding at [facility], currently on palliative single agent [place] was admitted to [facility] on 8/2/2022 for management of brain mass. He was initially admitted on 7/30/2022 at [facility] due to nausea, vomiting, weakness, fevers. He was treated for pneumonia. He had weakness of left  when he had a CT scan done that showed a left occipital mass and hence was transferred to [facility].  MRI brain on 8/4/2022 showed a left occipital lobe mass measuring 2.9 x 3 x 3.8 cm with surrounding vasogenic edema, acute ischemia.   CT abdomen and pelvis without contrast showed increase in intraperitoneal ascites, small pleural effusions. Labs were notable for leukocytosis, chronic stable anemia with a hemoglobin of 7.5 g/dL, thrombocytosis with a platelet count of 417I. After discussions he elected to proceed with a craniotomy with resection of solitary brain metastases and is seen status post surgery. PMHx: COPD, throat cancer, anxiety  PSurgHx: Left arm plate placement, tracheostomy, total laryngectomy 2/8/21  SHx: Quit smoking 10/28/20 after 45 pack years. No EtOH  FHx: Mother with DM; Father with DM, CKD; Sister with DM. No h/o malignancy. Review of Systems: A complete review of systems was obtained, negative except as described above. Physical Exam:   Blood pressure 107/79, pulse 80, temperature 98.1 °F (36.7 °C), resp. rate 14, SpO2 95 %. Not examined      Results:     Lab Results   Component Value Date/Time    WBC 16.9 (H) 08/05/2022 12:20 AM    HGB 7.5 (L) 08/05/2022 12:20 AM    HCT 23.8 (L) 08/05/2022 12:20 AM    PLATELET 062 (H) 90/29/6985 12:20 AM    MCV 87.8 08/05/2022 12:20 AM    ABS. NEUTROPHILS 15.7 (H) 08/05/2022 12:20 AM     Lab Results   Component Value Date/Time    Sodium 137 08/05/2022 12:20 AM    Potassium 5.2 (H) 08/05/2022 12:20 AM    Chloride 106 08/05/2022 12:20 AM    CO2 22 08/05/2022 12:20 AM    Glucose 120 (H) 08/05/2022 12:20 AM    BUN 28 (H) 08/05/2022 12:20 AM    Creatinine 1.18 08/05/2022 12:20 AM    GFR est AA >60 08/05/2022 12:20 AM    GFR est non-AA >60 08/05/2022 12:20 AM    Calcium 7.9 (L) 08/05/2022 12:20 AM    Glucose (POC) 87 05/10/2022 11:06 AM     Lab Results   Component Value Date/Time    Bilirubin, total 0.2 08/01/2022 07:22 AM    ALT (SGPT) 9 (L) 08/01/2022 07:22 AM    Alk.  phosphatase 58 08/01/2022 07:22 AM    Protein, total 6.7 08/01/2022 07:22 AM    Albumin 2.4 (L) 08/01/2022 07:22 AM    Globulin 4.3 (H) 08/01/2022 07:22 AM     Lab Results   Component Value Date/Time    Iron 32 (L) 08/01/2022 07:22 AM    TIBC 204 (L) 08/01/2022 07:22 AM    Iron % saturation 16 (L) 08/01/2022 07:22 AM    Ferritin 597 (H) 08/01/2022 07:22 AM      Lab Results   Component Value Date/Time    Vitamin B12 >2,000 (H) 05/10/2022 12:23 PM    Folate 13.9 05/10/2022 12:23 PM         Imaging:     CT abdomen and pelvis without contrast 8/2022  IMPRESSION  Interval increased now moderate intraperitoneal ascites. Cardiomegaly and anemia. Interval bibasilar atelectasis with small effusions. Parenchymal opacities  subpleural on the right and on the left are nonspecific. Follow-up CT scan of  chest in 8-12 weeks to evaluate for resolution recommended. Brain 8/2022  IMPRESSION  1. Left occipital mass as noted previously with surrounding vasogenic edema. 2.  Focus of diffusion restriction in the right precentral gyrus extending to  the right centrum semiovale consistent with acute ischemia      Assessment & Plan:       1. Squamous cell carcinoma of larynx, Stage CATAIRNO [pT4a cN2 Mx]: With lung metastases diagnosed around 11/2021. Initially received cisplatin with 5-FU and Keytruda and then transition to maintenance Keytruda. He had stable scans 4/2022. He has a solitary new brain met  Undergoing resection as of 8/5/2022  He would need proper systemic staging scans with CT chest abdomen pelvis with and without contrast    Systemic disease is stable and low-volume likely to continue with Keytruda post resection. If there is progressive systemic disease palliative systemic options that meaningfully improve survival in a few and not evidence-based. In general single agent such as gemcitabine, docetaxel can be considered based on performance status. He will discuss these options with Dr. Lorena Verma after discharge    2. 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 Essentially all prior work-up has been negative.   Capsule endoscopy completed with [facility] on 7/19/22 -I do not see the results of this. He is on Protonix  Transfuse for hemoglobin if less than 7  GI is consulted    3. Anasarca  Due to hypoalbuminemia     4. Pulmonary embolism:  Provoked by #1. 11/2021 CT chest.  Rina and the patient had discussed holding blood thinners.   Due to recurrent transfusion dependent anemia and concerns for GI blood losses      > 31 mins of total physician time spent  Signed By: Jordi Lozano MD     08/05/22

## 2022-08-05 NOTE — ROUTINE PROCESS
Bedside and Verbal shift change report given to Yissel Becker RN (oncoming nurse) by Pat Caceres RN (offgoing nurse). Report included the following information SBAR, Kardex, MAR, Med Rec Status, and Dual Neuro Assessment.

## 2022-08-06 NOTE — PROGRESS NOTES
CRITICAL CARE NOTE    Name: Steven Maki   : 1970   MRN: [id number]   Date: 2022      REASON FOR ICU ADMISSION: Brain Mass s/p crani with resection     PRINCIPLE ICU DIAGNOSIS   Left Occipital Mass (+/- necrotic lymphadenopathy in neck)  S/p left occipital Craniotomy (Dr. Trinidad Piña, 22)  Acute Postoperative Pain  Atelectasis  Anemia  Staph aureus on sputum (?colonization/?contaminate)  Hx of TOTAL LARYNGECTOMY (21) d/t Squamous Cell Carcinoma (invasive) of Larynx s/p Chemo/Radiation/Keytruda      PATIENT SUMMARY   History per chart review:   Steven Maki is a 49 y.o. 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 He had total laryngectomy on 21 and had regional recurrence in 2021. He received chemotherapy from 21-21 with concurrent radiation from 21-21 at [facility]. He was started on Keytruda in 2022 followed by the addition of cisplatin and fluoruracil for 4 cycles. He is now on maintenance Keytruda every 3 weeks with his last dose received last Friday. He has had multiple blood transfusions in the past due to recurrent anemia. He is followed by Dr. Michael Sharif for onc  He presented to the ER at [facility] on 22 due to nausea, vomiting, generalized weakness and a fever of 101. 6. He was admitted to the hospital and treated for pneumonia. He did have some numbness and tingling and weakness in his left . His head CT revealed a left occipital brain mass with cerebral edema; therefore, neurosurgery was consulted. The patient was transferred to [facility] for neurosurgical evaluation. \"      COMPREHENSIVE ASSESSMENT & PLAN:SYSTEM BASED     24 HOUR EVENTS: Post op crani    NEUROLOGICAL:   Left Occipital Mass s/p left occipital Craniotomy (Dr. Trinidad Piña, 22)  Analgesia: Fentanyl and Oxycodone  Sedation: None  Neuro check Frequency: per neuro surgery, q 1hr   Decadron  Keppra  Neurosurgery following    PULMONOLOGY:   Laryngeal Tube  (Shiley 10 cuffed at bedside)  Respiratory Goals: Aggressive bronchopulmonary hygiene  Trach care q shift per unit protocol     CARDIOVASCULAR:     SBP Goal of: > 90 mmHg and < 140 mmHg  MAP Goal of: > 65 mmHg  None - For above SBP/MAP goals  IVF: SN @ 75 ml/hr    GASTROINTESTINAL   Diet/Feeding:  Yes     RENAL/ELECTROLYTE/FLUIDS:   Keep K>4; Mg>2    Trend renal indices/ UOP  Trend Chemistry  DC liu once ok with surgery    ENDOCRINE:   Glycemic Control: Goal 120-180: SSI PRN  Prevent hypoglycemia    HEMATOLOGY/ONCOLOGY:   Hx of TOTAL LARYNGECTOMY (2/8/21)  Hx Squamous Cell Carcinoma (invasive) of Larynx s/p Chemo/Radiation/Keytruda   Transfusion Trigger (Hgb): <7 g/dL  Trend CBC    INFECTIOUS DISEASE:   Staph aureus on sputum (?colonization/?contaminate)  ANTIBIOTICS TO DATE:   8 /3- present Levaquin    CULTURES TO DATE:  8/2  + light staph aureus   8/2 Legionella urine negative  8/2 Mycoplasma AB negative     ICU DAILY CHECKLIST   Code Status: Full Code  DVT Prophylaxis:SCDs  T/L/D: Tubes: Tracheostomy  Lines: Peripheral IV  Drains: Liu Catheter  SUP: Protonix   Diet: clear liquid advance as tolerated. Activity Level:OOB to chair with assistance  ABCDEF Bundle/Checklist Completed:Yes  Disposition: Stay in ICU  Multidisciplinary Rounds Completed:  Yes  Patient/Family Updated: Yes  Goals of Care Discussion/Palliative: Maxine Aguilar   8/2 - Admitted to hospital   8/5 - Admitted to ICU post op crani     SUBJECTIVE   ROS:  A comprehensive review of systems was negative except for: Ears, nose, mouth, throat, and face: positive for tracheal stoma  Neurological: positive for headaches and paresthesia    OBJECTIVE   Labs and Data: Reviewed 08/06/22  Medications: Reviewed 08/06/22  Imaging: Reviewed 08/06/22    Physical Exam:  General:  alert, cooperative, no distress, appears stated age  Eye:  conjunctivae/corneas clear. PERRL, EOM's intact.    Neurologic:  WELLS, follows commands, mild decrease sensation on left side. Lungs:  clear to auscultation bilaterally  Heart:  regular rate and rhythm, S1, S2 normal, no murmur, click, rub or gallop  Abdomen:  soft, non-tender. Bowel sounds normal. No masses,  no organomegaly  Cardiovascular:  Regular rate and rhythm, S1S2 present, without murmur or extra heart sounds, pedal pulses normal, and no edema  Extremity: No edema, + pulses  Skin:  no rash or abnormalities    Visit Vitals  /84   Pulse 63   Temp 97.9 °F (36.6 °C)   Resp 16   Wt 73.7 kg (162 lb 7.7 oz)   SpO2 100%   BMI 22.03 kg/m²    O2 Flow Rate (L/min): 12 l/min O2 Device: Tracheal collar, Other (comment) (laryngeal tube) Temp (24hrs), Av.8 °F (36.6 °C), Min:97.5 °F (36.4 °C), Max:98.1 °F (36.7 °C)           Intake/Output:     Intake/Output Summary (Last 24 hours) at 2022 0818  Last data filed at 2022 0700  Gross per 24 hour   Intake 3430 ml   Output 2040 ml   Net 1390 ml       Imaging:  CT Results  (Last 48 hours)                 22 0600  CT HEAD WO CONT Final result    Impression:  Status post left parietal occipital craniectomy with resection of left parietal   mass lesion. Postprocedural changes in the left parietal left occipital lobe. No new midline   shift or mass effect. Narrative:  EXAM: CT HEAD WO CONT   Clinical history: Postop   INDICATION: post op       COMPARISON: 2022. CONTRAST: None. TECHNIQUE: Unenhanced CT of the head was performed using 5 mm images. Brain and   bone windows were generated. Coronal and sagittal reformats. CT dose reduction   was achieved through use of a standardized protocol tailored for this   examination and automatic exposure control for dose modulation. FINDINGS:   Left parieto-occipital craniectomy with resection of left parietal mass lesion. There is minimal postprocedural hemorrhage. Minimal postprocedural   pneumocephalus. There is no new midline shift or mass effect. . Right superior frontal encephalomalacia. . . The basilar cisterns are open. No CT evidence of   acute infarct. . The visualized portions of the paranasal sinuses and mastoid air cells are   clear. 08/05/22 1044  CT HEAD W CONT Final result    Impression:  CT for preoperative planning. Possible necrotic lymphadenopathy in the neck. Enhancing left occipital mass is again noted       Narrative:  EXAM: CT HEAD W CONT       INDICATION: surgery--Brain lab protocol please       COMPARISON: MRI dated 422. CONTRAST: 100 mL of Isovue-300. TECHNIQUE:  CT of the head was performed following uneventful rapid bolus   intravenous administration of contrast.  Brain and bone windows were generated. Coronal and sagittal reformats. CT dose reduction was achieved through use of a   standardized protocol tailored for this examination and automatic exposure   control for dose modulation. FINDINGS:   CT was performed for preoperative planning. Enhancing mass in the left occipital   lobe is again noted. There are 2 hypodense lesions in the right neck measuring   1.8 and 2.1 cm respectively which may represent necrotic lymphadenopathy. Echo:  None     CRITICAL CARE DOCUMENTATION  I had a face to face encounter with the patient, reviewed and interpreted patient data including clinical events, labs, images, vital signs, I/O's, and examined patient. I have discussed the case and the plan and management of the patient's care with the consulting services, the bedside nurses and the respiratory therapist.      NOTE OF PERSONAL INVOLVEMENT IN CARE   This patient has a high probability of imminent, clinically significant deterioration, which requires the highest level of preparedness to intervene urgently.  I participated in the decision-making and personally managed or directed the management of the following life and organ supporting interventions that required my frequent assessment to treat or prevent imminent deterioration. I personally spent 32 minutes of critical care time. This is time spent at this critically ill patient's bedside actively involved in patient care as well as the coordination of care. This does not include any procedural time which has been billed separately.     Renee Cheney, NP-C    Critical Care Medicine  [facility]

## 2022-08-06 NOTE — PROGRESS NOTES
1930: Bedside and Verbal shift change report given to Tahmina RN (oncoming nurse) by Sherrell Andrews RN (offgoing nurse). Report included the following information SBAR, Kardex, ED Summary, OR Summary, Procedure Summary, Intake/Output, MAR, Accordion, Recent Results, Cardiac Rhythm NSR, and Alarm Parameters . 2000: Patient arrives to unit. 4825: Patient transported down to CT with this RN and PCT on a monitor and 10L trach collar.    5367: Patient back up on unit. 0730: Bedside and Verbal shift change report given to Laurie Cornejo RN (oncoming nurse) by Joe Dior RN (offgoing nurse). Report included the following information SBAR, Kardex, ED Summary, OR Summary, Procedure Summary, Intake/Output, MAR, Accordion, Recent Results, Cardiac Rhythm NSR, and Alarm Parameters .

## 2022-08-06 NOTE — PROGRESS NOTES
Spiritual Care Assessment/Progress Note  [address]      NAME: Rowena Elliott      MRN: [id number]  AGE: 49 y.o. SEX: male  Tenriism Affiliation: Protestant   Language: English     8/6/2022     Total Time (in minutes): 16     Spiritual Assessment begun in [address] [facility] through conversation with:         []Patient        [] Family    [] Friend(s)        Reason for Consult: Initial/Spiritual assessment, critical care     Spiritual beliefs: (Please include comment if needed)     [] Identifies with a angelique tradition:         [] Supported by a angelique community:            [] Claims no spiritual orientation:           [] Seeking spiritual identity:                [] Adheres to an individual form of spirituality:           [x] Not able to assess:                           Identified resources for coping:      [] Prayer                               [] Music                  [] Guided Imagery     [] Family/friends                 [] Pet visits     [] Devotional reading                         [x] Unknown     [] Other:                                               Interventions offered during this visit: (See comments for more details)                Plan of Care:     [] Support spiritual and/or cultural needs    [] Support AMD and/or advance care planning process      [] Support grieving process   [] Coordinate Rites and/or Rituals    [] Coordination with community clergy   [] No spiritual needs identified at this time   [] Detailed Plan of Care below (See Comments)  [] Make referral to Music Therapy  [] Make referral to Pet Therapy     [] Make referral to Addiction services  [] Make referral to [facility]  [] Make referral to Spiritual Care Partner  [] No future visits requested        [x] Contact Spiritual Care for further referrals     Comments:  for initial visit. I talked with pt's RN for update. Pt's brother is to visit today. Please contact [phone] Brandan Singh for further support. [address] [place], [facility]   [address] ([phone])

## 2022-08-06 NOTE — PROGRESS NOTES
Awake alert WELLS well  CT shows only typical post op findings, gross total removal of large metastatic tumor  d/c liu and a line today  may be OOB

## 2022-08-06 NOTE — OP NOTES
[address]  OPERATIVE REPORT    Name:  Baljit Muir  MR#:  [id number]  :  1970  ACCOUNT #:  [de-identified]  DATE OF SERVICE:  2022      PREOPERATIVE DIAGNOSIS:  Metastatic tumor, primary laryngeal cancer. POSTOPERATIVE DIAGNOSIS:  Metastatic tumor, primary laryngeal cancer. PROCEDURES PERFORMED:  Left occipital craniotomy, use of operating microscope, and use of neuronavigation BrainLAB for resection of metastatic tumor. SURGEON:  Annalise Shen MD    ASSISTANT:  [facility].    ANESTHESIA:  General.    COMPLICATIONS:  None. SPECIMENS REMOVED:  Tumor. IMPLANTS:  None. ESTIMATED BLOOD LOSS:  Minimal.    OPERATIVE PROCEDURE:  Review of imaging studies revealed a large left occipital mass consistent with a metastatic tumor in this patient with a history of laryngeal cancer. He went to the CT suite for contrast-enhanced CT scan of the brain just prior to coming back to the operating room for use with the [address] [place] [address]. He already had a tracheostomy in place. He was induced under general endotracheal anesthesia, placed on the operating table in the lateral position right side down with the head secured in a 3-point [place] head virk. The left occipital region was then shaved, scrubbed, prepped and draped in the usual sterile fashion. A time-out performed to identify the correct patient and procedure. Appropriate antibiotics administered and sequential stockings applied for DVT prophylaxis. A lazy S incision centered directly over the tumor after navigating the site of the tumor was made and Zahra clips were attached to the skin edges for hemostasis. Two self-retaining cerebellar retractors were placed into the edges of the incision. The transverse sinus was marked out on the skull and then a free bone flap was elevated just above that centered directly over the tumor.   The dura was opened under loupe magnification in a cruciate fashion with one based to the superior sagittal sinus and another to the transverse sinus. The brain was a little swollen, but the tumor was found immediately beneath the surface. There was a large vein crossing over it that was incorporated into the tumor since the tumor was immediately below the surface of the cortex. This was cauterized and divided sharply so as to get to the surface of the tumor and then using the Sonopet ultrasonic aspirator, I began to remove the central portion of the tumor to decompress it and then was able to circumferentially surround the tumor with cottonoids in the usual fashion. Eventually, we brought in the operating microscope as the tumor dissection proceeded anteriorly which was quite deep. The tumor was stuck to the undersurface of the cortex posteriorly but the remaining portion of the tumor was able to be removed without difficulty from the surrounding normal brain. I then was able to resect the portio on  the undersurface of the cortex without difficulty and a gross total removal of the tumor was achieved. The brain was noted to decompress and was pulsating much more normally at the end of the procedure compared to the beginning when it was quite swollen. Surgicel was used to align the resection bed. Bleeding was easily controlled with bipolar electrocautery. The patient started with a hemoglobin of 6, so Anesthesia elected to give him a unit of blood in the middle of the procedure even though blood loss was minimal.  The dura was closed with 4-0 Nurolon sutures and a piece of DuraGen was used to cover the remaining portion of the dura. The bone flap was replaced and secured with mini plates from the [facility] system. The galea was closed with 2-0 interrupted and inverted Vicryl sutures and a running Vicryl Rapide suture was used to close the skin. Sterile dressing was applied. The needle, sponge, and instrument counts were correct at the end of the procedure.         [facility] Candido Coughlin MD      [id number]  D:  08/05/2022 18:20  T:  08/06/2022 0:50  JOB #:  [id number]  CC:  Radha Montoya MD

## 2022-08-07 NOTE — ANESTHESIA POSTPROCEDURE EVALUATION
Post-Anesthesia Evaluation and Assessment    Patient: Kenneth Bell MRN: [id number]  SSN: [id number]    YOB: 1970  Age: 49 y.o. Sex: male      I have evaluated the patient and they are stable and ready for discharge from the PACU. Cardiovascular Function/Vital Signs  Visit Vitals  /81   Pulse 98   Temp 36.8 °C (98.2 °F)   Resp 14   Wt 71.5 kg (157 lb 10.1 oz)   SpO2 100%   BMI 21.37 kg/m²       Patient is status post General anesthesia for Procedure(s):  BRAIN LAB GUIDED LEFT OCCIPITAL CRANIOTOMY AND TUMOR REMOVAL. Nausea/Vomiting: None    Postoperative hydration reviewed and adequate. Pain:  Pain Scale 1: Numeric (0 - 10) (08/07/22 1600)  Pain Intensity 1: 7 (08/07/22 1600)   Managed    Neurological Status:   Neuro (WDL): Exceptions to WDL (08/05/22 1845)  Neuro  Neurologic State: Alert;Eyes open spontaneously (08/07/22 0800)  Orientation Level: Oriented X4;Unable to verbalize (08/07/22 0800)  Cognition: Follows commands; Appropriate decision making; Appropriate for age attention/concentration; Appropriate safety awareness (08/07/22 0800)  Speech: Mouths words (08/07/22 0800)  Assessment L Pupil: Brisk;Round (08/07/22 0800)  Size L Pupil (mm): 3 (08/07/22 0800)  Assessment R Pupil: Brisk;Round (08/07/22 0800)  Size R Pupil (mm): 3 (08/07/22 0800)  LUE Motor Response:  unequal R greater than L;Purposeful;Spontaneous ;Numbness;Tingling;Weak (08/07/22 0800)  LLE Motor Response: Purposeful;Spontaneous  (08/07/22 0800)  RUE Motor Response:  unequal R greater than L;Numbness;Tingling;Spontaneous ; Purposeful;Weak (08/07/22 0800)  RLE Motor Response: Purposeful;Spontaneous  (08/07/22 0800)   At baseline    Mental Status, Level of Consciousness: Alert and  oriented to person, place, and time    Pulmonary Status:   O2 Device: Tracheal collar (08/07/22 1600)   Adequate oxygenation and airway patent    Complications related to anesthesia: None    Post-anesthesia assessment completed.  No concerns    Signed By: Bhargav Hilton MD     August 7, 2022              Procedure(s):  BRAIN LAB GUIDED LEFT OCCIPITAL CRANIOTOMY AND TUMOR REMOVAL. general    <BSHSIANPOST>    INITIAL Post-op Vital signs:   Vitals Value Taken Time   /81 08/05/22 1945   Temp 36.6 °C (97.8 °F) 08/05/22 1845   Pulse 74 08/05/22 1950   Resp 14 08/05/22 1950   SpO2 97 % 08/05/22 1950   Vitals shown include unvalidated device data.

## 2022-08-07 NOTE — PROGRESS NOTES
CRITICAL CARE NOTE    Name: Kenneth Bell   : 1970   MRN: [id number]   Date: 2022      REASON FOR ICU ADMISSION: Brain Mass s/p crani with resection     PRINCIPLE ICU DIAGNOSIS   Left Occipital Mass (+/- necrotic lymphadenopathy in neck)  S/p left occipital Craniotomy (Dr. Yuki Guo, 22)  Acute Postoperative Pain  Atelectasis  Anemia  Staph aureus on sputum (?colonization/?contaminate)  Hx of TOTAL LARYNGECTOMY (21) d/t Squamous Cell Carcinoma (invasive) of Larynx s/p Chemo/Radiation/Keytruda      PATIENT SUMMARY   History per chart review:   Kenneth Bell is a 49 y.o. 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 He had total laryngectomy on 21 and had regional recurrence in 2021. He received chemotherapy from 21-21 with concurrent radiation from 21-21 at [address] [place] [address]. He was started on Keytruda in 2022 followed by the addition of cisplatin and fluoruracil for 4 cycles. He is now on maintenance Keytruda every 3 weeks with his last dose received last Friday. He has had multiple blood transfusions in the past due to recurrent anemia. He is followed by Dr. Olena Yan for onc  He presented to the ER at [facility] on 22 due to nausea, vomiting, generalized weakness and a fever of 101. 6. He was admitted to the hospital and treated for pneumonia. He did have some numbness and tingling and weakness in his left . His head CT revealed a left occipital brain mass with cerebral edema; therefore, neurosurgery was consulted. The patient was transferred to [address] [place] [address] for neurosurgical evaluation. \"      COMPREHENSIVE ASSESSMENT & PLAN:SYSTEM BASED     24 HOUR EVENTS: No acute events overnight    NEUROLOGICAL:   Left Occipital Mass s/p left occipital Craniotomy (Dr. Yuki Guo, 22)  Analgesia: Dilaudid and percocet  Sedation: None  Neuro check Frequency: per neuro surgery   Decadron  Keppra  Neurosurgery following    PULMONOLOGY: Laryngeal Tube  (Shiley 10 cuffed at bedside)  Respiratory Goals: Aggressive bronchopulmonary hygiene  Trach care q shift per unit protocol     CARDIOVASCULAR:     SBP Goal of: > 90 mmHg and < 140 mmHg  MAP Goal of: > 65 mmHg  None - For above SBP/MAP goals  IVF: none    GASTROINTESTINAL   Diet/Feeding:  Yes     RENAL/ELECTROLYTE/FLUIDS:   Keep K>4; Mg>2    Trend renal indices/ UOP  Trend Chemistry    ENDOCRINE:   Glycemic Control: Goal 120-180: SSI PRN  Prevent hypoglycemia    HEMATOLOGY/ONCOLOGY:   Hx of TOTAL LARYNGECTOMY (2/8/21)  Hx Squamous Cell Carcinoma (invasive) of Larynx s/p Chemo/Radiation/Keytruda   Oncology following  Follow CT chest abd pelvis for 8/8 ordered by oncology   Transfusion Trigger (Hgb): <7 g/dL  Trend CBC    INFECTIOUS DISEASE:   Staph aureus on sputum (?colonization/?contaminate)  ANTIBIOTICS TO DATE:   8 /3- present Levaquin    CULTURES TO DATE:  8/2  + light staph aureus   8/2 Legionella urine negative  8/2 Mycoplasma AB negative     ICU DAILY CHECKLIST   Code Status: Full Code  DVT Prophylaxis:SCDs  T/L/D: Tubes: Tracheostomy  Lines: Peripheral IV  Drains: None  SUP: Protonix   Diet: Full liquid advance as tolerated. Activity Level:OOB to chair with assistance  ABCDEF Bundle/Checklist Completed:Yes  Disposition: Transfer to non-ICU bed  Multidisciplinary Rounds Completed:  Yes  Patient/Family Updated: Yes  Goals of Care Discussion/Palliative: Maxine Aguilar   8/2 - Admitted to hospital   8/5 - Admitted to ICU post op crani   8/6 - Laboy and A line DC.   8/7 - PT ordered.      SUBJECTIVE   ROS:  A comprehensive review of systems was negative except for: Ears, nose, mouth, throat, and face: positive for tracheal stoma  Neurological: positive for headaches and paresthesia    OBJECTIVE   Labs and Data: Reviewed 08/07/22  Medications: Reviewed 08/07/22  Imaging: Reviewed 08/07/22    Physical Exam:  General:  alert, cooperative, no distress, appears stated age  Eye:  conjunctivae/corneas clear. PERRL, EOM's intact. Neurologic:  WELLS, follows commands, mild decrease sensation on left side. Neck: Trach stoma with laryngeal tube. Lungs:  clear to auscultation bilaterally  Heart:  regular rate and rhythm, S1, S2 normal, no murmur, click, rub or gallop  Abdomen:  soft, non-tender. Bowel sounds normal. No masses,  no organomegaly  Cardiovascular:  Regular rate and rhythm, S1S2 present, without murmur or extra heart sounds, pedal pulses normal, and no edema  Extremity: No edema, + pulses  Skin:  no rash or abnormalities    Visit Vitals  /85   Pulse 76   Temp 98.2 °F (36.8 °C)   Resp 18   Wt 71.5 kg (157 lb 10.1 oz)   SpO2 100%   BMI 21.37 kg/m²    O2 Flow Rate (L/min): 12 l/min O2 Device: Tracheal collar Temp (24hrs), Av.2 °F (36.8 °C), Min:98 °F (36.7 °C), Max:98.7 °F (37.1 °C)           Intake/Output:     Intake/Output Summary (Last 24 hours) at 2022 0913  Last data filed at 2022 0700  Gross per 24 hour   Intake 1173.75 ml   Output 1375 ml   Net -201.25 ml       Imaging:  CT Results  (Last 48 hours)                 22 0600  CT HEAD WO CONT Final result    Impression:  Status post left parietal occipital craniectomy with resection of left parietal   mass lesion. Postprocedural changes in the left parietal left occipital lobe. No new midline   shift or mass effect. Narrative:  EXAM: CT HEAD WO CONT   Clinical history: Postop   INDICATION: post op       COMPARISON: 2022. CONTRAST: None. TECHNIQUE: Unenhanced CT of the head was performed using 5 mm images. Brain and   bone windows were generated. Coronal and sagittal reformats. CT dose reduction   was achieved through use of a standardized protocol tailored for this   examination and automatic exposure control for dose modulation. FINDINGS:   Left parieto-occipital craniectomy with resection of left parietal mass lesion.    There is minimal postprocedural hemorrhage. Minimal postprocedural   pneumocephalus. There is no new midline shift or mass effect. . Right superior   frontal encephalomalacia. . . The basilar cisterns are open. No CT evidence of   acute infarct. . The visualized portions of the paranasal sinuses and mastoid air cells are   clear. 08/05/22 1044  CT HEAD W CONT Final result    Impression:  CT for preoperative planning. Possible necrotic lymphadenopathy in the neck. Enhancing left occipital mass is again noted       Narrative:  EXAM: CT HEAD W CONT       INDICATION: surgery--Brain lab protocol please       COMPARISON: MRI dated 422. CONTRAST: 100 mL of Isovue-300. TECHNIQUE:  CT of the head was performed following uneventful rapid bolus   intravenous administration of contrast.  Brain and bone windows were generated. Coronal and sagittal reformats. CT dose reduction was achieved through use of a   standardized protocol tailored for this examination and automatic exposure   control for dose modulation. FINDINGS:   CT was performed for preoperative planning. Enhancing mass in the left occipital   lobe is again noted. There are 2 hypodense lesions in the right neck measuring   1.8 and 2.1 cm respectively which may represent necrotic lymphadenopathy. Echo:  None     CRITICAL CARE DOCUMENTATION  I had a face to face encounter with the patient, reviewed and interpreted patient data including clinical events, labs, images, vital signs, I/O's, and examined patient.   I have discussed the case and the plan and management of the patient's care with the consulting services, the bedside nurses and the respiratory therapist.      Level 3 consult     Donald Lundberg NP-C    Critical Care Medicine  [facility]

## 2022-08-07 NOTE — PROGRESS NOTES
Problem: Mobility Impaired (Adult and Pediatric)  Goal: *Acute Goals and Plan of Care (Insert Text)  Description: FUNCTIONAL STATUS PRIOR TO ADMISSION: Patient was independent and active without use of DME.    HOME SUPPORT PRIOR TO ADMISSION: The patient lived with girlfriend but did not require assist.    Physical Therapy Goals  Initiated 8/7/2022  1. Patient will move from supine to sit and sit to supine  in bed with modified independence within 7 day(s). 2.  Patient will transfer from bed to chair and chair to bed with modified independence using the least restrictive device within 7 day(s). 3.  Patient will perform sit to stand with modified independence within 7 day(s). 4.  Patient will ambulate with modified independence for 200 feet with the least restrictive device within 7 day(s). 5.  Patient will ascend/descend 4 stairs with 1 handrail(s) with modified independence within 7 day(s). Outcome: Progressing Towards Goal   PHYSICAL THERAPY REEVALUATION  Patient: Ana Nugent (49 y.o. male)  Date: 8/7/2022  Primary Diagnosis: Brain mass [G93.89]  Procedure(s) (LRB):  BRAIN LAB GUIDED LEFT OCCIPITAL CRANIOTOMY AND TUMOR REMOVAL (Left) 2 Days Post-Op   Precautions:          ASSESSMENT  Based on the objective data described below, the patient presents with decreased mobility and balance compared to baseline after L occipital craniotomy with resection of tumor, POD2. He was seen by PT initially during admission and demonstrated independent mobility with some baseline L UE/hand weakness. Post op, he is moving slowly overall and will fair balance. He transferred to the chair with min assist and VSS, but he was unable to remain in the chair due to neck pain. He also reports that he has been having difficulty with managing the styrofoam cups to feed himself. Rigid, handled cup provided until OT can assess.   He does have history of laryngectomy and is able to write with fairly good precision using his R hand (he is R hand dominant). Expect that he will progress steadily with his mobility and be ambulating with assist tomorrow, at which point we can do more extensive balance testing. Current Level of Function Impacting Discharge (mobility/balance): min assist for transfers    Functional Outcome Measure: The patient scored 11 on the Moreno Balance Scale outcome measure which is indicative of high fall risk. Other factors to consider for discharge: lives with girlfriend     Patient will benefit from skilled therapy intervention to address the above noted impairments. PLAN :  Recommendations and Planned Interventions: transfer training, gait training, therapeutic exercises, neuromuscular re-education, patient and family training/education, and therapeutic activities      Frequency/Duration: Patient will be followed by physical therapy:  5 times a week to address goals. Recommendation for discharge: (in order for the patient to meet his/her long term goals)  To be determined: pending progress, but likely home with family assist and possibly HHPT or outpatient if needed    This discharge recommendation:  Has not yet been discussed the attending provider and/or case management    Equipment recommendations for successful discharge (if) home: to be determined         SUBJECTIVE:   Patient stated I was more comfortable in the bed with my neck.     OBJECTIVE DATA SUMMARY:   HISTORY:    Past Medical History:   Diagnosis Date    Chronic pain     THROAT, EARS, NECK R/T CANCER    COPD (chronic obstructive pulmonary disease) ([facility] [address]     [facility]    Psychiatric disorder     ANXIETY R/T CANCER    Throat cancer ([facility]     Tracheostomy present [facility])      Past Surgical History:   Procedure Laterality Date    COLONOSCOPY N/A 06/07/2022    COLONOSCOPY performed by Orion Rendon MD at [facility] N/A 06/08/2022    COLONOSCOPY performed by Orion Rendon MD at [address] [facility]- \" PUT PLATE IN\"    HX OTHER SURGICAL  02/2021    Total Laryngectomy    HX TRACHEOSTOMY  02/08/2021    IR INSERT TUNL CVC W PORT OVER 5 YEARS  05/21/2021    IR PLACE CVAD FLUORO GUIDE  05/21/2021     Hospital course since last seen and reason for reevaluation: L occipital craniotomy with tumor resection    Personal factors and/or comorbidities impacting plan of care: as noted above    Home Situation  Home Environment: Apartment  # Steps to Enter: 14  Rails to Enter: Yes  Hand Rails : Bilateral  Wheelchair Ramp: No  Living Alone: No  Support Systems: Spouse/Significant Other  Patient Expects to be Discharged to[de-identified] Home  Current DME Used/Available at Home: None  Tub or Shower Type: Tub/Shower combination    EXAMINATION/PRESENTATION/DECISION MAKING:   Critical Behavior:  Neurologic State: Alert, Eyes open spontaneously  Orientation Level: Oriented X4, Unable to verbalize  Cognition: Follows commands, Appropriate decision making, Appropriate for age attention/concentration, Appropriate safety awareness  Safety/Judgement: Awareness of environment, Fall prevention  Hearing:     Skin:  dressing in place occipital region  Edema: none  Range Of Motion:  AROM: Generally decreased, functional                       Strength:    Strength:  (L hand weakness, POA, otherwise 4/5)                    Tone & Sensation:   Tone: Normal              Sensation: Impaired (numbness in bilateral hands)               Coordination:  Coordination: Generally decreased, functional (decr L UE and difficulty with fine motor precision bilat)  Vision:      Functional Mobility:  Bed Mobility:     Supine to Sit: Minimum assistance; Additional time;Bed Modified (HOB elevated)  Sit to Supine: Minimum assistance; Additional time     Transfers:  Sit to Stand: Minimum assistance; Additional time  Stand to Sit: Minimum assistance; Additional time        Bed to Chair: Minimum assistance; Additional time              Balance:   Sitting: Intact; Without support  Standing: Impaired; With support (hand held assist)  Standing - Static: Constant support; Fair  Standing - Dynamic : Constant support; Fair  Ambulation/Gait Training:                                                         Stairs: Therapeutic Exercises:       Functional Measure:  Moreno Balance Test:    Sitting to Standin  Standing Unsupported: 2  Sitting with Back Unsupported: 4  Standing to Sitting: 3  Transfers: 1  Standing Unsupported with Eyes Closed: 0  Standing Unsupported with Feet Together: 0  Reach Forward with Outstretched Arm: 0   Object: 0  Turn to Look Over Shoulders: 0  Turn 360 Degrees: 0  Alternate Foot on Step/Stool: 0  Standing Unsupported One Foot in Front: 0  Stand on One Le  Total:          56=Maximum possible score;   0-20=High fall risk  21-40=Moderate fall risk   41-56=Low fall risk                 Pain Rating:  Mild neck pain, but resolved with pillow placement    Activity Tolerance:   Fair    After treatment patient left in no apparent distress:   Supine in bed, Call bell within reach, and Side rails x 3    COMMUNICATION/EDUCATION:   The patients plan of care was discussed with: Registered nurse. Fall prevention education was provided and the patient/caregiver indicated understanding., Patient/family have participated as able in goal setting and plan of care. , and Patient/family agree to work toward stated goals and plan of care.     Thank you for this referral.  Len Haywood, PT   Time Calculation: 44 mins

## 2022-08-07 NOTE — PROGRESS NOTES
1930: Bedside and Verbal shift change report given to Susie Thornton RN (oncoming nurse) by Dee Dee Angulo RN (offgoing nurse). Report included the following information SBAR, Kardex, Intake/Output, MAR, Accordion, Recent Results, Med Rec Status, Cardiac Rhythm NSR, Alarm Parameters , and Dual Neuro Assessment. 0730: Bedside and Verbal shift change report given to Lewis Avila RN (oncoming nurse) by Susie Thornton RN (offgoing nurse). Report included the following information SBAR, Kardex, Intake/Output, MAR, Accordion, Recent Results, Med Rec Status, Cardiac Rhythm NSR, Alarm Parameters , and Dual Neuro Assessment.

## 2022-08-07 NOTE — PROGRESS NOTES
Problem: Falls - Risk of  Goal: *Absence of Falls  Description: Document Clydene Bone Fall Risk and appropriate interventions in the flowsheet.   Outcome: Progressing Towards Goal  Note: Fall Risk Interventions:            Medication Interventions: Evaluate medications/consider consulting pharmacy, Patient to call before getting OOB    Elimination Interventions: Patient to call for help with toileting needs, Stay With Me (per policy), Urinal in reach, Toileting schedule/hourly rounds    History of Falls Interventions: Bed/chair exit alarm, Consult care management for discharge planning, Door open when patient unattended, Evaluate medications/consider consulting pharmacy, Vital signs minimum Q4HRs X 24 hrs (comment for end date)         Problem: Patient Education:  Go to Education Activity  Goal: Patient/Family Education  Outcome: Progressing Towards Goal     Problem: Brain Tumor Day 1  Goal: Activity/Safety  Outcome: Progressing Towards Goal  Goal: Diagnostic Test/Procedures  Outcome: Progressing Towards Goal  Goal: Nutrition/Diet  Outcome: Progressing Towards Goal  Goal: Respiratory  Outcome: Progressing Towards Goal  Goal: Treatments/Interventions/Procedures  Outcome: Progressing Towards Goal  Goal: *Neurologic stability  Outcome: Progressing Towards Goal  Goal: *Adequate oxygenation  Outcome: Progressing Towards Goal  Goal: *Hemodynamically stable without vasoactive medications  Outcome: Progressing Towards Goal

## 2022-08-08 NOTE — RT PROTOCOL NOTE
O2 Sats 74%,  on 100% TC. Sxn frothy and thin, bloody secretions. Unable to clear, pt saying he can't breathe. Replaced Fela Tube with #8 Trach in order to have connection for ambu bag. Placed pt on Vent AC mode, RR 20, Vt 450, 100% and PEEP 8. Copious secretions remain.

## 2022-08-08 NOTE — PROGRESS NOTES
visit as a result of Viky Peterson length of stay. I reviewed the patient's chart prior to this encounter. I initiated a relationship of care and concern; explored Almarie Severs spiritual needs, distress, and coping strategies. Assessment: Mr. Anjel Asher reports that he is well supported by his family/friends. He acknowledged that angelique is important to his healing. Mr. Anjel Asher has a trach collar and was able to articulate his responses by nodding his head or with a thumbs up gesture. He thanked me for this visit and requested prayer outside of the room. No further spiritual needs were identified. Please contact Spiritual Care services for any additional needs ([phone])    _________________________________________  Rev.  Harpreet Sndyer, Staff Kylie Benavides, MS, [facility]

## 2022-08-08 NOTE — PROGRESS NOTES
He had to be re-intubated for respiratory problems last night  He is awake WELLS so not a neurosurgical complication  discussed with Hospitalist this AM will continue to follow with ICU team as well

## 2022-08-08 NOTE — PROGRESS NOTES
Bedside shift change report received from Marcus, formerly [facility]. Report included the following information SBAR, Kardex, Procedure Summary, Intake/Output, MAR, and Recent Results. 0745: MD messaged regarding low sats, tachycardia. RT called, larygeal tube changed to #8 trach. Placed on vent. Frothy pink/red secretions.

## 2022-08-08 NOTE — PROGRESS NOTES
Transition of Care Plan  RUR- 25%  2. DISPOSITION: TBD/subject to change pending review and recommendations; pending medical progression   -PT/OT/SLP consulted and following   -Palliative, Otolaryngology, Neurosurgery, and Radiation Oncology Consulted and Following  3. F/U with PCP/Specialist    4. Transport: TBD what's medically appropriate at discharge. Patient re-intubated for respiratory problems. ENT following. CXR pending. CM will continue to follow and assist with MAZIN needs as they arise.     ELIEZER Mclaughlin/ROSIE  Care Management  11:23 AM

## 2022-08-08 NOTE — PROGRESS NOTES
CRITICAL CARE NOTE    Name: Kim Moise   : 1970   MRN: [id number]   Date: 2022      REASON FOR ICU ADMISSION: Brain Mass s/p crani with resection     PRINCIPLE ICU DIAGNOSIS   Left Occipital Mass (+/- necrotic lymphadenopathy in neck)  S/p left occipital Craniotomy (Dr. Deepthi Xiao, 22)  Acute Postoperative Pain  Atelectasis  Anemia  Staph aureus on sputum (?colonization/?contaminate)  Hx of TOTAL LARYNGECTOMY (21) d/t Squamous Cell Carcinoma (invasive) of Larynx s/p Chemo/Radiation/Keytruda      PATIENT SUMMARY   History per chart review:   Kim Moise is a 49 y.o. 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 He had total laryngectomy on 21 and had regional recurrence in 2021. He received chemotherapy from 21-21 with concurrent radiation from 21-21 at [address] [place] [address]. He was started on Keytruda in 2022 followed by the addition of cisplatin and fluoruracil for 4 cycles. He is now on maintenance Keytruda every 3 weeks with his last dose received last Friday. He has had multiple blood transfusions in the past due to recurrent anemia. He is followed by Dr. Gabrielle Johnson for onc  He presented to the ER at [facility] on 22 due to nausea, vomiting, generalized weakness and a fever of 101. 6. He was admitted to the hospital and treated for pneumonia. He did have some numbness and tingling and weakness in his left . His head CT revealed a left occipital brain mass with cerebral edema; therefore, neurosurgery was consulted. The patient was transferred to [facility] for neurosurgical evaluation. \"      COMPREHENSIVE ASSESSMENT & PLAN:SYSTEM BASED     24 HOUR EVENTS: placed back on vent overnight    NEUROLOGICAL:   Left Occipital Mass s/p left occipital Craniotomy (Dr. Deepthi Xiao, 22)  Analgesia: Dilaudid and percocet  Sedation: None  Neuro check Frequency: per neuro surgery   Decadanamaria Car  Neurosurgery following    PULMONOLOGY: Laryngeal Tube  (Shiley 10 cuffed at bedside)  Acute hypoxic respiratory failure,? Mucous plug  HAP  Respiratory Goals: Aggressive bronchopulmonary hygiene  Trach care q shift per unit protocol   Pulmonary toileting, guaifenesin  Zosyn as below    CARDIOVASCULAR:   SBP Goal of: > 90 mmHg and < 140 mmHg  MAP Goal of: > 65 mmHg  None - For above SBP/MAP goals  IVF: none    GASTROINTESTINAL   Diet/Feeding:  Yes   Started tube feed    RENAL/ELECTROLYTE/FLUIDS:   Keep K>4; Mg>2    Trend renal indices/ UOP  Trend Chemistry    ENDOCRINE:   Glycemic Control: Goal 120-180: SSI PRN  Prevent hypoglycemia    HEMATOLOGY/ONCOLOGY:   Hx of TOTAL LARYNGECTOMY (2/8/21)  Hx Squamous Cell Carcinoma (invasive) of Larynx s/p Chemo/Radiation/Keytruda   Oncology following  Follow CT chest abd pelvis for 8/8 ordered by oncology   Transfusion Trigger (Hgb): <7 g/dL  Trend CBC    INFECTIOUS DISEASE:   Staph aureus on sputum (?colonization/?contaminate)  ANTIBIOTICS TO DATE:   8/3-8/8 Levaquin  8/8-present Zosyn    CULTURES TO DATE:  8/2  + light staph aureus   8/2 Legionella urine negative  8/2 Mycoplasma AB negative     ICU DAILY CHECKLIST   Code Status: Full Code  DVT Prophylaxis:SCDs  T/L/D: Tubes: Tracheostomy and Nasogastric Tube  Lines: Peripheral IV  Drains: None  SUP: Protonix   Diet: Full liquid advance as tolerated. Activity Level:OOB to chair with assistance  ABCDEF Bundle/Checklist Completed:Yes  Disposition: Stay in ICU  Multidisciplinary Rounds Completed:  Yes  Patient/Family Updated: Yes  Goals of Care Discussion/Palliative: Maxine Aguilar   8/2 - Admitted to hospital   8/5 - Admitted to ICU post op crani   8/6 - Laboy and A line DC.   8/7 - PT ordered. 8/8: placed back on vent. Broaden to Zosyn. Aggressive pulmonary toileting.   Started on Precedex    SUBJECTIVE   Review of Systems   Unable to perform ROS: Patient nonverbal     OBJECTIVE   Labs and Data: Reviewed 08/08/22  Medications: Reviewed 22  Imaging: Reviewed 22    Physical Exam  Vitals and nursing note reviewed. Constitutional:       General: He is not in acute distress. Appearance: He is ill-appearing. HENT:      Mouth/Throat:      Mouth: Mucous membranes are dry. Eyes:      Pupils: Pupils are equal, round, and reactive to light. Neck:      Trachea: Tracheostomy present. Cardiovascular:      Rate and Rhythm: Regular rhythm. Tachycardia present. Pulmonary:      Effort: Tachypnea present. Breath sounds: Wheezing present. Abdominal:      General: Bowel sounds are normal.      Palpations: Abdomen is soft. Tenderness: There is no abdominal tenderness. Musculoskeletal:         General: Normal range of motion. Skin:     General: Skin is warm and dry. Capillary Refill: Capillary refill takes less than 2 seconds. Neurological:      General: No focal deficit present. Mental Status: He is alert. Psychiatric:         Mood and Affect: Mood normal.         Judgment: Judgment normal.       Visit Vitals  BP (!) 165/98   Pulse (!) 142   Temp 97.5 °F (36.4 °C)   Resp 26   Wt 71.5 kg (157 lb 10.1 oz)   SpO2 95%   BMI 21.37 kg/m²    O2 Flow Rate (L/min): 12 l/min O2 Device: Ventilator Temp (24hrs), Av.1 °F (36.7 °C), Min:97.5 °F (36.4 °C), Max:98.6 °F (37 °C)           Intake/Output:     Intake/Output Summary (Last 24 hours) at 2022 0817  Last data filed at 2022 6436  Gross per 24 hour   Intake 340 ml   Output 1025 ml   Net -685 ml         Imaging:  CT Results  (Last 48 hours)      None            Echo:  None     CRITICAL CARE DOCUMENTATION  I had a face to face encounter with the patient, reviewed and interpreted patient data including clinical events, labs, images, vital signs, I/O's, and examined patient.   I have discussed the case and the plan and management of the patient's care with the consulting services, the bedside nurses and the respiratory therapist.      NOTE OF PERSONAL INVOLVEMENT IN CARE   This patient has a high probability of imminent, clinically significant deterioration, which requires the highest level of preparedness to intervene urgently. I participated in the decision-making and personally managed or directed the management of the following life and organ supporting interventions that required my frequent assessment to treat or prevent imminent deterioration. I personally spent 50 minutes of critical care time. This is time spent at this critically ill patient's bedside actively involved in patient care as well as the coordination of care. This does not include any procedural time which has been billed separately.       Lesly Turner NP    Critical Care Medicine  [employer]

## 2022-08-08 NOTE — PROGRESS NOTES
Occupational therapy  08/08/22     OT eval order received and acknowledged. Chart reviewed and discussed with nursing, noted that patient was seen by PT yesterday and able to transfer into chair, concerns for self-feeding noted. Per nursing, patient was placed back on vent last night and HR into 130s at rest, requesting therapy to hold at this time, will continue to follow patient.      Thank you,  Dottie Manzano, OTR/L

## 2022-08-08 NOTE — PROGRESS NOTES
Problem: Communication Impaired (Adult)  Goal: *Acute Goals and Plan of Care (Insert Text)  Description: Speech Therapy Goals  Initiated 8/8/22    1. Patient will participate in further education about swallowing/communication and laryngectomy. Outcome: Progressing Towards Goal      SPEECH LANGUAGE PATHOLOGY EVALUATION  Patient: Marvel Meeks (49 y.o. male)  Date: 8/8/2022  Primary Diagnosis: Brain mass [G93.89]  Procedure(s) (LRB):  BRAIN LAB GUIDED LEFT OCCIPITAL CRANIOTOMY AND TUMOR REMOVAL (Left) 3 Days Post-Op   Precautions:        ASSESSMENT :  Noted pt now requiring the ventilator. Not a candidate for PMV at any time given laryngectomy. Furthermore, pt is fine to eat/drink whenever he feels comfortable/mentation is appropriate, as he cannot aspirate unless the medical team has concern for a tracheoesophageal fistula. Spent time talking to patient about this. Educated that, while eating on the ventilator is usually frowned upon, given that he cannot aspirate as long as he is comfortable swallowing he can. Discussed communication strategies and reminded pt that he cannot utilize a PMV. Pt expressed understanding. Continue with standard trach care. When pt no longer requiring the ventilator, can certainly consider replacing with larytube and HME as indicated. SLP will follow peripherally. Patient will benefit from skilled intervention to address the above impairments. Patients rehabilitation potential is considered to be Guarded     PLAN :  Recommendations and Planned Interventions:  --pt can eat/drink whenever he feels comfortable; has required softer foods that pt self-selects  --good oral care      Frequency/Duration: Patient will be followed by speech-language pathology PRN to address goals. Discharge Recommendations: To Be Determined     SUBJECTIVE:   Patient stated, \".     OBJECTIVE:     Past Medical History:   Diagnosis Date    Chronic pain     THROAT, EARS, NECK R/T CANCER    COPD (chronic obstructive pulmonary disease) ([facility] [address]     [facility]    Psychiatric disorder     ANXIETY R/T CANCER    Throat cancer ([facility]     Laryngectomy- larytube and HME present at baseline      Past Surgical History:   Procedure Laterality Date    COLONOSCOPY N/A 06/07/2022    COLONOSCOPY performed by Ethel Thompson MD at [address] N/A 06/08/2022    COLONOSCOPY performed by Ethel Thompson MD at [address] [facility]- \" PUT PLATE IN\"    HX OTHER SURGICAL  02/2021    Total Laryngectomy    HX TRACHEOSTOMY  02/08/2021    IR INSERT TUNL CVC W PORT OVER 5 YEARS  05/21/2021    IR PLACE CVAD FLUORO GUIDE  05/21/2021     Prior Level of Function/Home Situation:   Home Situation  Home Environment: Apartment  # Steps to Enter: 15  Rails to Enter: Yes  Hand Rails : Bilateral  Wheelchair Ramp: No  Living Alone: No  Support Systems: Spouse/Significant Other  Patient Expects to be Discharged to[de-identified] Home  Current DME Used/Available at Home: None  Tub or Shower Type: Tub/Shower combination  Mental Status:  Neurologic State: (P) Alert  Orientation Level: (P) Oriented X4  Cognition: (P) Follows commands  Perception: Appears intact  Perseveration: No perseveration noted  Safety/Judgement: Awareness of environment, Fall prevention          Pain:  Pain Scale 1: Numeric (0 - 10)  Pain Intensity 1: 0  Pain Location 1: Head    After treatment:   Call bell within reach and Nursing notified    COMMUNICATION/EDUCATION:     The patient's plan of care including recommendations, planned interventions, and recommended diet changes were discussed with: Registered nurse. Patient/family have participated as able in goal setting and plan of care.     Thank you for this referral.  Judith Woods, SLP

## 2022-08-08 NOTE — PROGRESS NOTES
RN texted me about patient desaturation and tachycardia. Placed on vent. Ordered stat ABG and CXR. Spoke to intensivist for transfer back to ICU 7.55 am

## 2022-08-08 NOTE — PROGRESS NOTES
[address] [facility]  Hospitalist Group                                                                                          Hospitalist Progress Note  Leonel Neff MD  Answering service: [phone] OR [phone] from in house phone        Date of Service:  2022  NAME:  Daniel Pineda  :  1970  MRN:  [id number]      Admission Summary:   Daniel Pineda is a 49 y.o. 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 He had  total laryngectomy on 21 and had regional recurrence in 2021. He received chemotherapy from 21-21 with concurrent radiation from 21-21 at [facility]. He was started on Keytruda in 2022 followed by the addition of cisplatin and fluoruracil for 4 cycles. He is now on maintenance Keytruda every 3 weeks with his last dose received last Friday. He has had multiple blood transfusions in the past due to recurrent anemia. He is followed by Dr. Deepthi Mullins for onc  He presented to the ER at [address] [place] [address] on 22 due to nausea, vomiting, generalized weakness and a fever of 101. 6. He was admitted to the hospital and treated for pneumonia. He did have some numbness and tingling and weakness in his left . His head CT revealed a left occipital brain mass with cerebral edema; therefore, neurosurgery was consulted. The patient was transferred to [facility] for neurosurgical evaluation     He underwent Left occipital craniotomy, use of operating microscope, and use of neuronavigation BrainLAB for resection of metastatic tumor on . He is transferred out of ICU on . Interval history / Subjective:   Patient seen and examined. Complained of some abdominal pain. Per nursing staff,he refused miralax      Assessment & Plan:     Brain mass-left occipital mass  Metastatic squamous cell carcinoma with reoccurence on Keytruda  -Appreciate neurosurgery recommendations,    - MRI brain:  1.   Left occipital mass as noted previously with surrounding vasogenic edema. 2.  Focus of diffusion restriction in the right precentral gyrus extending to  the right centrum semiovale consistent with acute ischemia.  -Continue Keppra, Decadron  -Oncology,radiation oncology, Palliative care following  -s/p Left occipital craniotomy, use of operating microscope, and use of neuronavigation BrainLAB for resection of metastatic tumor on 8/5  Unclear if ENT saw him  CT chest abd pelvis pending     Pneumonia  - Chest x-ray noting bilateral patchy opacities, recent transfer from [facility] started empiric abx  -sputum cultures staph aureus  - COVID-negative 7/30  - Remains on RA   Change Levaquin to augmentin     Anemia   - Likely anemia of chronic disease in the setting of metastatic squamous cell carcinoma and CKD  - Continue monitor  - Transfuse for hemoglobin less than 7     Constipation  - daily miralax,dulcolex       Code status: FULL  Prophylaxis: [address] discussed with: pt, RN  Anticipated Disposition: TBD,        Hospital Problems  Date Reviewed: 8/5/2022            Codes Class Noted POA    Brain mass ICD-10-CM: G93.89  ICD-9-CM: 348.89  8/2/2022 Unknown         Review of Systems:   As per HPI      Vital Signs:    Last 24hrs VS reviewed since prior progress note. Most recent are:  Visit Vitals  /84   Pulse 100   Temp 97.5 °F (36.4 °C)   Resp 15   Wt 71.5 kg (157 lb 10.1 oz)   SpO2 100%   BMI 21.37 kg/m²         Intake/Output Summary (Last 24 hours) at 8/7/2022 2152  Last data filed at 8/7/2022 2028  Gross per 24 hour   Intake 248.75 ml   Output 1525 ml   Net -1276.25 ml          Physical Examination:     I had a face to face encounter with this patient and independently examined them on 8/7/2022 as outlined below:          Constitutional:  No acute distress, cooperative, pleasant   ENT:  Oral mucosa moist, oropharynx benign.  Trach in place    Resp:  Clear to auscultatio    CV:  Regular rhythm, normal rate, S1,S2 wnl GI:  Soft, non distended, non tender     Musculoskeletal:  No LE edema     Neurologic:  Awake, alert,does not speak due to laryngectomy,follows commands            Data Review:    Review and/or order of clinical lab test  Review and/or order of tests in the radiology section of CPT  Review and/or order of tests in the medicine section of CPT      Labs:     Recent Labs     08/07/22 0427 08/06/22  0434   WBC 20.7* 19.7*   HGB 8.5* 9.3*   HCT 26.8* 29.3*    510*       Recent Labs     08/07/22  0427 08/06/22  0434 08/05/22  0020    138 137   K 4.8 5.0 5.2*   * 108 106   CO2 24 23 22   BUN 25* 25* 28*   CREA 1.03 1.10 1.18   * 102* 120*   CA 7.5* 7.4* 7.9*       No results for input(s): ALT, AP, TBIL, TBILI, TP, ALB, GLOB, GGT, AML, LPSE in the last 72 hours. No lab exists for component: SGOT, GPT, AMYP, HLPSE    No results for input(s): INR, PTP, APTT, INREXT, INREXT in the last 72 hours. No results for input(s): FE, TIBC, PSAT, FERR in the last 72 hours. Lab Results   Component Value Date/Time    Folate 13.9 05/10/2022 12:23 PM        No results for input(s): PH, PCO2, PO2 in the last 72 hours. No results for input(s): CPK, CKNDX, TROIQ in the last 72 hours.     No lab exists for component: CPKMB  Lab Results   Component Value Date/Time    Cholesterol, total 200 (H) 06/29/2021 12:05 PM    HDL Cholesterol 85 06/29/2021 12:05 PM    LDL, calculated 105.4 (H) 06/29/2021 12:05 PM    Triglyceride 48 06/29/2021 12:05 PM    CHOL/HDL Ratio 2.4 06/29/2021 12:05 PM     Lab Results   Component Value Date/Time    Glucose (POC) 87 05/10/2022 11:06 AM    Glucose (POC) 85 05/10/2022 07:30 AM    Glucose (POC) 113 10/04/2021 01:04 PM    Glucose (POC) 97 05/07/2021 08:15 AM    Glucose (POC) 72 02/16/2021 11:17 AM     Lab Results   Component Value Date/Time    Color Yellow 07/30/2022 11:30 AM    Appearance Clear 07/30/2022 11:30 AM    Specific gravity 1.010 07/30/2022 11:30 AM    Specific gravity 1.014 06/04/2022 09:44 AM    pH (UA) 8.0 07/30/2022 11:30 AM    Protein Negative 07/30/2022 11:30 AM    Glucose Negative 07/30/2022 11:30 AM    Ketone 10 (A) 07/30/2022 11:30 AM    Bilirubin Negative 07/30/2022 11:30 AM    Urobilinogen 0.1 07/30/2022 11:30 AM    Nitrites Negative 07/30/2022 11:30 AM    Leukocyte Esterase Negative 07/30/2022 11:30 AM    Bacteria Negative 06/04/2022 09:44 AM    WBC 0-4 06/04/2022 09:44 AM    RBC 0-5 06/04/2022 09:44 AM         Medications Reviewed:     Current Facility-Administered Medications   Medication Dose Route Frequency    morphine injection 2 mg  2 mg IntraVENous Q4H PRN    dexamethasone (DECADRON) 4 mg/mL Oral 2 mg  2 mg Oral Q6H    0.9% sodium chloride infusion 250 mL  250 mL IntraVENous PRN    sodium chloride (NS) flush 5-40 mL  5-40 mL IntraVENous Q8H    sodium chloride (NS) flush 5-40 mL  5-40 mL IntraVENous PRN    acetaminophen (TYLENOL) tablet 650 mg  650 mg Oral Q4H PRN    ondansetron (ZOFRAN) injection 4 mg  4 mg IntraVENous Q4H PRN    albuterol-ipratropium (DUO-NEB) 2.5 MG-0.5 MG/3 ML  3 mL Nebulization Q4H PRN    bisacodyL (DULCOLAX) suppository 10 mg  10 mg Rectal DAILY PRN    levoFLOXacin (LEVAQUIN) 500 mg in D5W IVPB  500 mg IntraVENous Q24H    pantoprazole (PROTONIX) 40 mg in 0.9% sodium chloride 10 mL injection  40 mg IntraVENous Q12H    [Held by provider] levoFLOXacin (LEVAQUIN) tablet 500 mg  500 mg Oral Q24H    sodium chloride (NS) flush 5-40 mL  5-40 mL IntraVENous Q8H    sodium chloride (NS) flush 5-40 mL  5-40 mL IntraVENous PRN    acetaminophen (TYLENOL) tablet 650 mg  650 mg Oral Q6H PRN    Or    acetaminophen (TYLENOL) suppository 650 mg  650 mg Rectal Q6H PRN    polyethylene glycol (MIRALAX) packet 17 g  17 g Oral DAILY PRN    ondansetron (ZOFRAN ODT) tablet 4 mg  4 mg Oral Q8H PRN    Or    ondansetron (ZOFRAN) injection 4 mg  4 mg IntraVENous Q6H PRN    oxyCODONE-acetaminophen (PERCOCET) 5-325 mg per tablet 1 Tablet  1 Tablet Oral Q4H PRN sucralfate (CARAFATE) tablet 1 g  1 g Oral QID PRN    levETIRAcetam (KEPPRA) injection 1,000 mg  1,000 mg IntraVENous Q12H    polyethylene glycol (MIRALAX) packet 17 g  17 g Oral DAILY    senna (SENOKOT) tablet 8.6 mg  1 Tablet Oral DAILY     ______________________________________________________________________  EXPECTED LENGTH OF STAY: 3d 19h  ACTUAL LENGTH OF STAY:          5                 Nery Rodrigues MD

## 2022-08-09 NOTE — PROGRESS NOTES
Occupational Therapy   08/09/22     OT re-eval attempted at [facility]. Chart reviewed in prep and discussed with nursing. Nursing reports overall decline in patient's status overnight and patient medically unstable at this time. Patient is on vent with PEEP 10 and FIO2 80%. Per ABCDEF protocol, will work with patient when PEEP is 10.0 or less, FIO2 60% or less, and patient is following basic commands. Will follow patient peripherally.      Recommend nursing to complete with patient, as able and per protocol, in order to promote cardiopulmonary systems, maintain strength, endurance and independence:   -bed in chair position or maximize full reverse Trendelenburg position to facilitate upright activity with foot board and non-skid footwear on 3x/day ~30-60 mins each   -ROM during bathing B UEs and LEs  -positioning to prevent contractures and edema  RASS -1/0/+1 (during SAT) Active ROM  Sitting (bed in chair position)  Standing (reverse Trendelenburg)  ADLs   RASS -3/2 Passive ROM  Sit (bed in chair position)   RASS -5/-4 Passive ROM       Thank you,   Darius Horton, OTS

## 2022-08-09 NOTE — PROGRESS NOTES
CRITICAL CARE NOTE    Name: Tato Epps   : 1970   MRN: [id number]   Date: 2022      REASON FOR ICU ADMISSION: Brain Mass s/p crani with resection     PRINCIPLE ICU DIAGNOSIS   Left Occipital Mass (+/- necrotic lymphadenopathy in neck)  S/p left occipital Craniotomy (Dr. Jc Cross, 22)  Acute hypoxic respiratory failure, aspiration pneumonia versus pulmonary edema  Acute Postoperative Pain  Atelectasis  Anemia  Staph aureus on sputum (?colonization/?contaminate)  Hx of TOTAL LARYNGECTOMY (21) d/t Squamous Cell Carcinoma (invasive) of Larynx s/p Chemo/Radiation/Keytruda      PATIENT SUMMARY   History per chart review:   Tato Epps is a 49 y.o. 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 He had total laryngectomy on 21 and had regional recurrence in 2021. He received chemotherapy from 21-21 with concurrent radiation from 21-21 at [address] [place] [address]. He was started on Keytruda in 2022 followed by the addition of cisplatin and fluoruracil for 4 cycles. He is now on maintenance Keytruda every 3 weeks with his last dose received last Friday. He has had multiple blood transfusions in the past due to recurrent anemia. He is followed by Dr. Fermín Sawyer for onc  He presented to the ER at [facility] on 22 due to nausea, vomiting, generalized weakness and a fever of 101. 6. He was admitted to the hospital and treated for pneumonia. He did have some numbness and tingling and weakness in his left . His head CT revealed a left occipital brain mass with cerebral edema; therefore, neurosurgery was consulted. The patient was transferred to [facility] for neurosurgical evaluation. \"      COMPREHENSIVE ASSESSMENT & PLAN:SYSTEM BASED     24 HOUR EVENTS: Started on levo and vaso overnight. Attempted diuresis which was unsuccessful. Vent requirements increased.       NEUROLOGICAL:   Left Occipital Mass s/p left occipital Craniotomy (Dr. Jc Cross, 8/5/22)  Analgesia: Dilaudid and percocet  Sedation: None  Neuro check Frequency: per neuro surgery   Decadron  Keppra  Neurosurgery following    PULMONOLOGY:   Laryngeal Tube  (Shiley 10 cuffed at bedside)  Acute hypoxic respiratory failure,  PNA vs pulmonary edema  HAP  Respiratory Goals: Aggressive bronchopulmonary hygiene  Trach care q shift per unit protocol   Pulmonary toileting, guaifenesin  Zosyn, Vanco, Levaquin, will add fungal coverage today with Eraxis  Aspergillus and fungal culture pending  Became hypotensive after diuresis overnight  CTA chest negative for PE on 8/8, showing aspiration pneumonia  CXR this morning showing worsening disease  Pulmonary consulted, appreciate recs    CARDIOVASCULAR:   SBP Goal of: > 90 mmHg and < 140 mmHg  MAP Goal of: > 65 mmHg  Norepinephrine - For above SBP/MAP goals, vaso weaned off  IVF: none  TTE pending    GASTROINTESTINAL   Diet/Feeding:  Yes   Started tube feed    RENAL/ELECTROLYTE/FLUIDS:   Keep K>4; Mg>2    Trend renal indices/ UOP  Trend Chemistry    ENDOCRINE:   Glycemic Control: Goal 120-180: SSI PRN  Prevent hypoglycemia    HEMATOLOGY/ONCOLOGY:   Hx of TOTAL LARYNGECTOMY (2/8/21)  Hx Squamous Cell Carcinoma (invasive) of Larynx s/p Chemo/Radiation/Keytruda   Oncology following  Follow CT chest abd pelvis for 8/8 ordered by oncology   Transfusion Trigger (Hgb): <7 g/dL  Trend CBC    INFECTIOUS DISEASE:   Staph aureus on sputum (?colonization/?contaminate)  ANTIBIOTICS TO DATE:   8/3-present Levaquin  8/8-present Zosyn  8/8-present Vanco  8/9-present Eraxis    CULTURES TO DATE:  8-9 fungal culture pending  8/8 respiratory culture pending  8/9 blood culture pending  8/9 Aspergillus pending  8/2  + light staph aureus   8/2 Legionella urine negative  8/2 Mycoplasma AB negative     ICU DAILY CHECKLIST   Code Status: Full Code  DVT Prophylaxis:SCDs  T/L/D: Tubes: Tracheostomy and Nasogastric Tube  Lines: Peripheral IV, Arterial Line, and Central Line  Drains: None  SUP: Protonix   Diet: Full liquid advance as tolerated. Activity Level:OOB to chair with assistance  ABCDEF Bundle/Checklist Completed:Yes  Disposition: Stay in ICU  Multidisciplinary Rounds Completed:  Yes  Patient/Family Updated: Yes  Goals of Care Discussion/Palliative: Maxine    Lauren   8/2 - Admitted to hospital   8/5 - Admitted to ICU post op crani   8/6 - Laboy and A line DC.   8/7 - PT ordered. 8/8: placed back on vent. Broaden to Zosyn, Vanco, Levaquin. Aggressive pulmonary toileting. Started on Precedex. CTA chest negative for PE showing aspiration pneumonia  8/9: Started on levo and vaso early in the morning. Vaso now off. Increased vent requirements. Added Eraxis    SUBJECTIVE   Review of Systems   Unable to perform ROS: Patient nonverbal     OBJECTIVE   Labs and Data: Reviewed 08/09/22  Medications: Reviewed 08/09/22  Imaging: Reviewed 08/09/22    Physical Exam  Vitals and nursing note reviewed. Constitutional:       General: He is not in acute distress. Appearance: He is ill-appearing. HENT:      Mouth/Throat:      Mouth: Mucous membranes are dry. Eyes:      Pupils: Pupils are equal, round, and reactive to light. Neck:      Trachea: Tracheostomy present. Cardiovascular:      Rate and Rhythm: Regular rhythm. Tachycardia present. Pulmonary:      Effort: Tachypnea present. Breath sounds: Wheezing and rhonchi present. Comments: Rhonchi on right, wheezes on left  Abdominal:      General: Bowel sounds are normal.      Palpations: Abdomen is soft. Tenderness: There is no abdominal tenderness. Musculoskeletal:         General: Normal range of motion. Skin:     General: Skin is warm and dry. Capillary Refill: Capillary refill takes less than 2 seconds. Neurological:      General: No focal deficit present. Mental Status: He is alert.    Psychiatric:         Mood and Affect: Mood normal.         Judgment: Judgment normal. Visit Vitals  /86   Pulse 80   Temp 98 °F (36.7 °C)   Resp (!) 32   Wt 71.5 kg (157 lb 10.1 oz)   SpO2 99%   BMI 21.37 kg/m²    O2 Flow Rate (L/min): 12 l/min O2 Device: Tracheostomy, Ventilator Temp (24hrs), Av.8 °F (36.6 °C), Min:97.3 °F (36.3 °C), Max:98.1 °F (36.7 °C)           Intake/Output:     Intake/Output Summary (Last 24 hours) at 2022 0735  Last data filed at 2022 0606  Gross per 24 hour   Intake 2374.75 ml   Output 1605 ml   Net 769.75 ml         Imaging:  CT Results  (Last 48 hours)                 22 1758  CTA CHEST W OR W WO CONT Final result    Impression:      Imaging findings consistent with progressive neoplastic process with likely   superimposed aspiration pneumonia. Interval increased pulmonary masses and nodules compared to the May 2022   examination. Cavitary lesion in the left upper lobe is increased. Hilar adenopathy is increased. Attenuation of pulmonary arteries without pulmonary embolism. Atelectasis/consolidation at the lung bases suggest an aspiration pneumonia. Right middle lobe atelectasis is significantly increased compared to the prior   exam.   Increased intraperitoneal ascites. Narrative:      CLINICAL HISTORY: Hypoxia       INDICATION:  Hypoxia worsening pulmonary function   COMPARISON:  2022       TECHNIQUE:    Following the uneventful intravenous administration of 100 cc Isovue-370, thin   axial images were obtained through the chest, abdomen and pelvis. Coronal and   sagittal reconstructions were generated. Oral contrast was not administered. CT dose reduction was achieved through use of a standardized protocol tailored   for this examination and automatic exposure control for dose modulation. Adaptive statistical iterative reconstruction (ASIR) was utilized. For the chest portion of the examination only;    3-D MIP reconstructed imaging was obtained.    FINDINGS:    VASCULATURE: Minimal aortic atherosclerotic change. Attenuation of pulmonary   vasculature related to hilar or mediastinal adenopathy. No aortic aneurysm or dissection. No proximal pulmonary embolism is identified. MEDIASTINUM/HEART: Hilar greater than mediastinal lymphadenopathy. Attenuation   of pulmonary vessels without pulmonary embolism. Tracheostomy tube in place. No   esophageal mass. No endotracheal or endobronchial mass. PLEURA/LUNGS: Dense atelectasis/consolidation with scattered areas of   groundglass opacity as well. Cavitary lesion in the left upper lobe. Creased   indicating parenchymal opacity in the right upper lobe. Minimal pericardial   effusion. Right middle lobe atelectasis is significantly increased. SOFT TISSUE/ AXILLA: No axillary adenopathy. No chest wall mass. LIVER/GALLBLADDER: Atrophic gallbladder. Cholelithiasis versus sludge. There is   no intrahepatic duct dilatation. The CBD is not dilated. There is no hepatic   parenchymal mass. Hepatic enhancement pattern is within normal limits. The   portal vein is patent. SPLEEN/PANCREAS: No mass. . There is no pancreatic mass. There is no pancreatic   duct dilatation. There is no evidence of splenomegaly. ADRENALS/KIDNEYS: No mass, calculus, or hydronephrosis. . There is no adrenal   mass. There is no hydroureter or hydronephrosis. There is no perinephric mass. No ureteral or bladder calculus. STOMACH, COLON AND SMALL BOWEL: There is an NG tube in place. There is fecal   stasis. There is no obstruction or free intraperitoneal air. There is no   evidence of incarceration or obstruction. No mesenteric adenopathy. APPENDIX: Unremarkable. PERITONEUM/RETROPERITONEUM: There is no abdominal aortic aneurysm. No   significant lymphadenopathy. There is ascites. Moderate. URINARY BLADDER: Air within the urinary bladder   PELVIS: There is no pelvic adenopathy. There is no pelvic sidewall mass. There   is no inguinal adenopathy.    BONES: No destructive bone lesion. . No lytic or blastic lesions. No evidence of   fracture or dislocation. 08/08/22 1758  CT ABD PELV W CONT Final result    Impression:      Imaging findings consistent with progressive neoplastic process with likely   superimposed aspiration pneumonia. Interval increased pulmonary masses and nodules compared to the May 2022   examination. Cavitary lesion in the left upper lobe is increased. Hilar adenopathy is increased. Attenuation of pulmonary arteries without pulmonary embolism. Atelectasis/consolidation at the lung bases suggest an aspiration pneumonia. Right middle lobe atelectasis is significantly increased compared to the prior   exam.   Increased intraperitoneal ascites. Narrative:      CLINICAL HISTORY: Hypoxia       INDICATION:  Hypoxia worsening pulmonary function   COMPARISON:  5/9/2022       TECHNIQUE:    Following the uneventful intravenous administration of 100 cc Isovue-370, thin   axial images were obtained through the chest, abdomen and pelvis. Coronal and   sagittal reconstructions were generated. Oral contrast was not administered. CT dose reduction was achieved through use of a standardized protocol tailored   for this examination and automatic exposure control for dose modulation. Adaptive statistical iterative reconstruction (ASIR) was utilized. For the chest portion of the examination only;    3-D MIP reconstructed imaging was obtained. FINDINGS:    VASCULATURE: Minimal aortic atherosclerotic change. Attenuation of pulmonary   vasculature related to hilar or mediastinal adenopathy. No aortic aneurysm or dissection. No proximal pulmonary embolism is identified. MEDIASTINUM/HEART: Hilar greater than mediastinal lymphadenopathy. Attenuation   of pulmonary vessels without pulmonary embolism. Tracheostomy tube in place. No   esophageal mass. No endotracheal or endobronchial mass.    PLEURA/LUNGS: Dense atelectasis/consolidation with scattered areas of   groundglass opacity as well. Cavitary lesion in the left upper lobe. Creased   indicating parenchymal opacity in the right upper lobe. Minimal pericardial   effusion. Right middle lobe atelectasis is significantly increased. SOFT TISSUE/ AXILLA: No axillary adenopathy. No chest wall mass. LIVER/GALLBLADDER: Atrophic gallbladder. Cholelithiasis versus sludge. There is   no intrahepatic duct dilatation. The CBD is not dilated. There is no hepatic   parenchymal mass. Hepatic enhancement pattern is within normal limits. The   portal vein is patent. SPLEEN/PANCREAS: No mass. . There is no pancreatic mass. There is no pancreatic   duct dilatation. There is no evidence of splenomegaly. ADRENALS/KIDNEYS: No mass, calculus, or hydronephrosis. . There is no adrenal   mass. There is no hydroureter or hydronephrosis. There is no perinephric mass. No ureteral or bladder calculus. STOMACH, COLON AND SMALL BOWEL: There is an NG tube in place. There is fecal   stasis. There is no obstruction or free intraperitoneal air. There is no   evidence of incarceration or obstruction. No mesenteric adenopathy. APPENDIX: Unremarkable. PERITONEUM/RETROPERITONEUM: There is no abdominal aortic aneurysm. No   significant lymphadenopathy. There is ascites. Moderate. URINARY BLADDER: Air within the urinary bladder   PELVIS: There is no pelvic adenopathy. There is no pelvic sidewall mass. There   is no inguinal adenopathy. BONES: No destructive bone lesion. . No lytic or blastic lesions. No evidence of   fracture or dislocation. Echo:  None     CRITICAL CARE DOCUMENTATION  I had a face to face encounter with the patient, reviewed and interpreted patient data including clinical events, labs, images, vital signs, I/O's, and examined patient.   I have discussed the case and the plan and management of the patient's care with the consulting services, the bedside nurses and the respiratory therapist.      NOTE OF PERSONAL INVOLVEMENT IN CARE   This patient has a high probability of imminent, clinically significant deterioration, which requires the highest level of preparedness to intervene urgently. I participated in the decision-making and personally managed or directed the management of the following life and organ supporting interventions that required my frequent assessment to treat or prevent imminent deterioration. I personally spent 70 minutes of critical care time. This is time spent at this critically ill patient's bedside actively involved in patient care as well as the coordination of care. This does not include any procedural time which has been billed separately.       Tanmay Anthony, NP    Critical Care Medicine  [facility]

## 2022-08-09 NOTE — PROGRESS NOTES
ABG obtained per order on new settings. Results given to Leah Woods & NP. FIO2 increased to 90% after ABG.      Latest Reference Range & Units 8/9/22 03:00   pH (POC) 7.35 - 7.45   7.34 (L)   pCO2 (POC) 35.0 - 45.0 MMHG 39.0   pO2 (POC) 80 - 100 MMHG 60 (L)   HCO3 (POC) 22 - 26 MMOL/L 20.9 (L)   sO2 (POC) 92 - 97 % 89.0 (L)   Base deficit (POC) mmol/L 4.6   FIO2 (POC) % 80   Specimen type (POC) -   ARTERIAL   Set Rate bpm 24   Site -   RIGHT RADIAL   Device: -   ADULT VENT   Mode -   PRESSURE CONTROL   PIP (POC) -   14   PEEP/CPAP (POC) cmH2O 14   Allens test (POC) -   Positive   Inspiratory Time sec 0.63   Mean Airway Pressure cmH2O 21   (L): Data is abnormally low

## 2022-08-09 NOTE — CONSULTS
Palliative Medicine Consult  Ja: [phone] ([phone])    Patient Name: Olu  YOB: 1970    Date of Initial Consult: 8/3/2022  Reason for Consult: end stage disease  Requesting Provider: Dr. Quinton Palacios   Primary Care Physician: Emory Rodrigues, DAT     SUMMARY:   Olu is a 49 y.o. with a past history of stage IV squamous cell cancer (s/p total laryngectomy 2/8/2021/ larytube in place, regional recurrence 4/2021, s/p chemo+XRT May to July 2021, Keytruda + CIS/ 5FU (4) and now on maintenance Keytruda, followed by Dr. Arely Sow, hx COPD/ emphysema, hx anxiety, who was admitted on 8/2/2022 from [facility]/ transfer with a diagnosis of N/v/ fever, tx for pneumonia and found to have new L occipital brain mass with surrounding edema, transferred here for neurosurgical evaluation. Current medical issues leading to Palliative Medicine involvement include: we are asked to see patient for end stage disease  Craniotomy 8/5/22 with removal of metastatic lesion  8/9/22 Post op respiratory failure, worsening oxygen requirements, despite diuresis and now requiring pressors. Abx broadened including antifungal therapy    Patient is independent at home, lives with his girlfriend, Megha Ugalde. In [place], [place]  Family includes his brother Khalif Castañeda and sister Amara Fox. MPOA is Brother Urmila Mahan      PALLIATIVE DIAGNOSES:     Acute respiratory failure, multifactorial   Stage IV squamous cell cancer  COPD/ emphysema  Anemia of chronic disease  hypoalbuminemia  Palliative medicine encounter       PLAN:   AMD completed before surgery . He appointed brother, Walt Brito and alternate is sister Braulio Lang  Discussed with bedside nurse.   Oncology and ICU teams talking with brother today for update  Our team is available as needed to assistance w care goals  [phone]       GOALS OF CARE / TREATMENT PREFERENCES:     GOALS OF CARE:  Patient/Health Care Proxy Stated Goals: Prolong life    TREATMENT PREFERENCES:   Code Status: Full Code    Patient and family's personal goals include: continue cancer directed treatments    Important upcoming milestones or family events: not discussed     The patient identifies the following as important for living well: not discussed today      Advance Care Planning:  [] [facility] Interdisciplinary Team has updated the ACP Navigator with Health Care Decision Maker and Patient Capacity      Primary Decision Maker: Norris Hernandez - [phone]    Secondary Decision Maker: Angelique Castañeda -  - [phone]  Advance Care Planning 7/30/2022   Patient's Healthcare Decision Maker is: -   Confirm Advance Directive None   Patient Would Like to Complete Advance Directive No       Medical Interventions: Full interventions       Other:    As far as possible, the palliative care team has discussed with patient / health care proxy about goals of care / treatment preferences for patient.      HISTORY:     History obtained from: chart , patient    CHIEF COMPLAINT: nausea, vomiting    HPI/SUBJECTIVE:    The patient is:   [x] Verbal and participatory  [] Non-participatory due to:     49year old male with stage IV layrngeal cancer  Now with new brain lesion, awaiting possible surgical resection     Clinical Pain Assessment (nonverbal scale for severity on nonverbal patients):   Clinical Pain Assessment  Severity: 0          Duration: for how long has pt been experiencing pain (e.g., 2 days, 1 month, years)  Frequency: how often pain is an issue (e.g., several times per day, once every few days, constant)     FUNCTIONAL ASSESSMENT:     Palliative Performance Scale (PPS):  PPS: 50       PSYCHOSOCIAL/SPIRITUAL SCREENING:     Palliative IDT has assessed this patient for cultural preferences / practices and a referral made as appropriate to needs (Cultural Services, Patient Advocacy, Ethics, etc.)    Any spiritual / Latter day concerns:  [] Yes /  [x] No   If \"Yes\" to discuss with pastoral care during IDT     Does caregiver feel burdened by caring for their loved one:   [] Yes /  [x] No /  [] No Caregiver Present/Available [] No Caregiver [] Pt [facility]  If \"Yes\" to discuss with social work during IDT    Anticipatory grief assessment:   [x] Normal  / [] Maladaptive     If \"Maladaptive\" to discuss with social work during IDT    ESAS Anxiety: Anxiety: 0    ESAS Depression: Depression: 0        REVIEW OF SYSTEMS:     Positive and pertinent negative findings in ROS are noted above in HPI. The following systems were [x] reviewed / [] unable to be reviewed as noted in HPI  Other findings are noted below. Systems: constitutional, ears/nose/mouth/throat, respiratory, gastrointestinal, genitourinary, musculoskeletal, integumentary, neurologic, psychiatric, endocrine. Positive findings noted below. Modified ESAS Completed by: provider   Fatigue: 5 Drowsiness: 0   Depression: 0 Pain: 0   Anxiety: 0 Nausea: 0   Anorexia: 0 Dyspnea: 0     Constipation: Yes     Stool Occurrence(s): 1 (after enema)        PHYSICAL EXAM:     From RN flowsheet:  Wt Readings from Last 3 Encounters:   08/07/22 71.5 kg (157 lb 10.1 oz)   07/30/22 68.2 kg (150 lb 5.7 oz)   07/29/22 67.5 kg (148 lb 12.8 oz)     Blood pressure 106/79, pulse (!) 102, temperature 97.5 °F (36.4 °C), resp. rate (!) 35, weight 71.5 kg (157 lb 10.1 oz), SpO2 99 %.     Pain Scale 1: Numeric (0 - 10)  Pain Intensity 1: 0  Pain Onset 1: acute  Pain Location 1: Head  Pain Orientation 1: Left, Posterior  Pain Description 1: Aching  Pain Intervention(s) 1: Medication (see MAR)  Last bowel movement, if known:     Constitutional: awake, alert, thin, male NAD   Eyes: pupils equal, anicteric  Larytube in place  No respiratory distress  Patient mouths words or writes to communicate  Insight intact  Affect normal      HISTORY:     Active Problems:    Brain mass (8/2/2022)    Past Medical History:   Diagnosis Date    Chronic pain     THROAT, EARS, NECK R/T CANCER    COPD (chronic obstructive pulmonary disease) ([facility] [address]     EMPHASEMA    Psychiatric disorder     ANXIETY R/T CANCER    Throat cancer ([facility] [address]     Tracheostomy present [facility])       Past Surgical History:   Procedure Laterality Date    COLONOSCOPY N/A 2022    COLONOSCOPY performed by Mars Foster MD at [address] N/A 2022    COLONOSCOPY performed by Mars Foster MD at [address] [facility]- \" PUT PLATE IN\"    HX OTHER SURGICAL  2021    Total Laryngectomy    HX TRACHEOSTOMY  2021    IR INSERT TUNL CVC W PORT OVER 5 YEARS  2021    IR PLACE CVAD FLUORO GUIDE  2021      Family History   Problem Relation Age of Onset    Diabetes Mother     Diabetes Father     Kidney Disease Father     Diabetes Sister     Heart Disease Daughter     Anesth Problems Neg Hx       History reviewed, no pertinent family history.   Social History     Tobacco Use    Smoking status: Former     Packs/day: 1.50     Years: 30.00     Pack years: 45.00     Types: Cigarettes     Quit date: 10/28/2020     Years since quittin.7    Smokeless tobacco: Never   Substance Use Topics    Alcohol use: Not Currently     No Known Allergies   Current Facility-Administered Medications   Medication Dose Route Frequency    amiodarone (CORDARONE) 375 mg/250 mL D5W infusion  0.5-1 mg/min IntraVENous TITRATE    NOREPINephrine (LEVOPHED) 8 mg in 5% dextrose 250mL (32 mcg/mL) infusion  0.5-20 mcg/min IntraVENous TITRATE    vasopressin (VASOSTRICT) 20 Units in 0.9% sodium chloride 100 mL infusion  0-0.04 Units/min IntraVENous TITRATE    anidulafungin (ERAXIS) 200 mg in 0.9% sodium chloride 260 mL IVPB  200 mg IntraVENous ONCE    [START ON 8/10/2022] anidulafungin (ERAXIS) 100 mg in 0.9% sodium chloride 130 mL IVPB  100 mg IntraVENous Q24H    piperacillin-tazobactam (ZOSYN) 3.375 g in 0.9% sodium chloride (MBP/ADV) 100 mL MBP  3.375 g IntraVENous Q8H    dexmedeTOMidine in 0.9 % NaCl (PRECEDEX) 400 mcg/100 mL (4 mcg/mL) infusion soln  0.1-1.5 mcg/kg/hr IntraVENous TITRATE    levothyroxine (SYNTHROID) injection 94 mcg  94 mcg IntraVENous Q24H    guaiFENesin (ROBITUSSIN) 100 mg/5 mL oral liquid 400 mg  400 mg Per NG tube Q4H    LORazepam (ATIVAN) tablet 1 mg  1 mg Oral Q6H PRN    levoFLOXacin (LEVAQUIN) 750 mg in D5W IVPB  750 mg IntraVENous Q24H    Vancomycin - pharmacy to dose   Other Rx Dosing/Monitoring    vancomycin (VANCOCIN) 1,000 mg in 0.9% sodium chloride 250 mL ([id number])  1,000 mg IntraVENous Q12H    morphine injection 2 mg  2 mg IntraVENous Q4H PRN    dexamethasone (DECADRON) 4 mg/mL Oral 2 mg  2 mg Oral Q6H    bisacodyL (DULCOLAX) suppository 10 mg  10 mg Rectal DAILY    0.9% sodium chloride infusion 250 mL  250 mL IntraVENous PRN    sodium chloride (NS) flush 5-40 mL  5-40 mL IntraVENous Q8H    sodium chloride (NS) flush 5-40 mL  5-40 mL IntraVENous PRN    acetaminophen (TYLENOL) tablet 650 mg  650 mg Oral Q4H PRN    ondansetron (ZOFRAN) injection 4 mg  4 mg IntraVENous Q4H PRN    albuterol-ipratropium (DUO-NEB) 2.5 MG-0.5 MG/3 ML  3 mL Nebulization Q4H PRN    bisacodyL (DULCOLAX) suppository 10 mg  10 mg Rectal DAILY PRN    pantoprazole (PROTONIX) 40 mg in 0.9% sodium chloride 10 mL injection  40 mg IntraVENous Q12H    sodium chloride (NS) flush 5-40 mL  5-40 mL IntraVENous Q8H    sodium chloride (NS) flush 5-40 mL  5-40 mL IntraVENous PRN    acetaminophen (TYLENOL) tablet 650 mg  650 mg Oral Q6H PRN    Or    acetaminophen (TYLENOL) suppository 650 mg  650 mg Rectal Q6H PRN    ondansetron (ZOFRAN ODT) tablet 4 mg  4 mg Oral Q8H PRN    Or    ondansetron (ZOFRAN) injection 4 mg  4 mg IntraVENous Q6H PRN    oxyCODONE-acetaminophen (PERCOCET) 5-325 mg per tablet 1 Tablet  1 Tablet Oral Q4H PRN    sucralfate (CARAFATE) tablet 1 g  1 g Oral QID PRN    levETIRAcetam (KEPPRA) injection 1,000 mg  1,000 mg IntraVENous Q12H    polyethylene glycol (MIRALAX) packet 17 g  17 g Oral DAILY    senna (SENOKOT) tablet 8.6 mg  1 Tablet Oral DAILY          LAB AND IMAGING FINDINGS:     Lab Results   Component Value Date/Time    WBC 24.1 (H) 08/09/2022 01:29 AM    HGB 9.5 (L) 08/09/2022 01:29 AM    PLATELET 872 03/85/7255 01:29 AM     Lab Results   Component Value Date/Time    Sodium 138 08/09/2022 01:29 AM    Potassium 5.3 (H) 08/09/2022 01:29 AM    Chloride 107 08/09/2022 01:29 AM    CO2 22 08/09/2022 01:29 AM    BUN 41 (H) 08/09/2022 01:29 AM    Creatinine 1.39 (H) 08/09/2022 01:29 AM    Calcium 7.5 (L) 08/09/2022 01:29 AM    Magnesium 2.6 (H) 08/04/2022 05:37 AM    Phosphorus 1.8 (L) 08/04/2022 05:37 AM      Lab Results   Component Value Date/Time    Alk. phosphatase 58 08/01/2022 07:22 AM    Protein, total 6.7 08/01/2022 07:22 AM    Albumin 2.4 (L) 08/01/2022 07:22 AM    Globulin 4.3 (H) 08/01/2022 07:22 AM     Lab Results   Component Value Date/Time    INR 1.0 05/21/2021 07:15 AM    Prothrombin time 12.9 05/21/2021 07:15 AM      Lab Results   Component Value Date/Time    Iron 32 (L) 08/01/2022 07:22 AM    TIBC 204 (L) 08/01/2022 07:22 AM    Iron % saturation 16 (L) 08/01/2022 07:22 AM    Ferritin 597 (H) 08/01/2022 07:22 AM      Lab Results   Component Value Date/Time    pH 7.25 (L) 08/08/2022 08:14 AM    PCO2 55 (H) 08/08/2022 08:14 AM    PO2 88 08/08/2022 08:14 AM     No components found for: [place]   Lab Results   Component Value Date/Time     (H) 12/04/2021 02:15 PM                Total time:   Counseling / coordination time, spent as noted above:  > 50% counseling / coordination?:     Prolonged service was provided for  []30 min   []75 min in face to face time in the presence of the patient, spent as noted above. Time Start:   Time End:   Note: this can only be billed with 47131 (initial) or 19266 (follow up). If multiple start / stop times, list each separately.

## 2022-08-09 NOTE — PROGRESS NOTES
Patient converted to atrial fibrillation with RVR rate 140-160. He is not a candidate for anticoagulation due to recent craniotomy. Will give amiodarone bolus followed by infusion. POCUS TTE at bedside shows likely mitral regurgitation.  Will get formal TTE in the AM.       Toby Knight [facility]     [address] [facility]

## 2022-08-09 NOTE — PROGRESS NOTES
Bedside and Verbal shift change report given to Adriane (oncoming nurse) by Guanaco Shaw (offgoing nurse). Report included the following information SBAR, Kardex, Intake/Output, MAR, Accordion, and Recent Results. 1615  around this time, pt o2 sats reading in 60s. A-line BP reading 17Y/17H, with dialstolic dropping rapidly. Levo increased and vaso restarted per Dr. Bia Sierra. Precedex turned off. Started bagging per Dr. Bia Sierra. Pt was also losing consciousness, not responding to sternal rub or opening his eyes. Vent settings adjusted, and fentanyl given to assist with vent synchrony. ABG drawn. Chest xray ordered. Sats eventually came back up to mid 90s. Fentanyl gtt started. Patient placed on his left side.

## 2022-08-09 NOTE — PROGRESS NOTES
Otolaryngology, Head and Neck Surgery      Denies complaints. Put on vent this AM.       Recent Results (from the past 24 hour(s))   CBC WITH AUTOMATED DIFF    Collection Time: 08/08/22  5:37 AM   Result Value Ref Range    WBC 22.1 (H) 4.1 - 11.1 K/uL    RBC 3.25 (L) 4.10 - 5.70 M/uL    HGB 9.1 (L) 12.1 - 17.0 g/dL    HCT 29.8 (L) 36.6 - 50.3 %    MCV 91.7 80.0 - 99.0 FL    MCH 28.0 26.0 - 34.0 PG    MCHC 30.5 30.0 - 36.5 g/dL    RDW 16.6 (H) 11.5 - 14.5 %    PLATELET 582 292 - 462 K/uL    MPV 9.3 8.9 - 12.9 FL    NRBC 0.0 0  WBC    ABSOLUTE NRBC 0.00 0.00 - 0.01 K/uL    NEUTROPHILS 92 (H) 32 - 75 %    LYMPHOCYTES 1 (L) 12 - 49 %    MONOCYTES 5 5 - 13 %    EOSINOPHILS 0 0 - 7 %    BASOPHILS 0 0 - 1 %    IMMATURE GRANULOCYTES 2 (H) 0.0 - 0.5 %    ABS. NEUTROPHILS 20.4 (H) 1.8 - 8.0 K/UL    ABS. LYMPHOCYTES 0.2 (L) 0.8 - 3.5 K/UL    ABS. MONOCYTES 1.1 (H) 0.0 - 1.0 K/UL    ABS. EOSINOPHILS 0.0 0.0 - 0.4 K/UL    ABS. BASOPHILS 0.0 0.0 - 0.1 K/UL    ABS. IMM.  GRANS. 0.4 (H) 0.00 - 0.04 K/UL    DF SMEAR SCANNED      RBC COMMENTS ANISOCYTOSIS  1+       METABOLIC PANEL, BASIC    Collection Time: 08/08/22  5:37 AM   Result Value Ref Range    Sodium 137 136 - 145 mmol/L    Potassium 4.8 3.5 - 5.1 mmol/L    Chloride 110 (H) 97 - 108 mmol/L    CO2 20 (L) 21 - 32 mmol/L    Anion gap 7 5 - 15 mmol/L    Glucose 106 (H) 65 - 100 mg/dL    BUN 27 (H) 6 - 20 MG/DL    Creatinine 0.99 0.70 - 1.30 MG/DL    BUN/Creatinine ratio 27 (H) 12 - 20      GFR est AA >60 >60 ml/min/1.73m2    GFR est non-AA >60 >60 ml/min/1.73m2    Calcium 7.6 (L) 8.5 - 10.1 MG/DL   BLOOD GAS, ARTERIAL    Collection Time: 08/08/22  8:14 AM   Result Value Ref Range    pH 7.25 (L) 7.35 - 7.45      PCO2 55 (H) 35 - 45 mmHg    PO2 88 80 - 100 mmHg    O2 SAT 95 92 - 97 %    BICARBONATE 23 22 - 26 mmol/L    BASE DEFICIT 4.8 mmol/L    O2 METHOD VENT      FIO2 100 %    MODE ASSIST CONTROL      Tidal volume 450.0      SET RATE 20      PEEP/CPAP 8.0      Sample source ARTERIAL      SITE RIGHT BRACHIAL      JEN'S TEST NOT APPLICABLE      Critical value read back Called to MD Rachelle on 08/08/2022 at 08:18    TSH 3RD GENERATION    Collection Time: 08/08/22  8:57 AM   Result Value Ref Range    TSH 68.20 (H) 0.36 - 3.74 uIU/mL   T4, FREE    Collection Time: 08/08/22  8:57 AM   Result Value Ref Range    T4, Free 0.6 (L) 0.8 - 1.5 NG/DL   EKG, 12 LEAD, INITIAL    Collection Time: 08/08/22  9:36 AM   Result Value Ref Range    Ventricular Rate 137 BPM    Atrial Rate 137 BPM    P-R Interval 140 ms    QRS Duration 88 ms    Q-T Interval 292 ms    QTC Calculation (Bezet) 440 ms    Calculated P Axis 57 degrees    Calculated R Axis 88 degrees    Calculated T Axis 39 degrees    Diagnosis       Sinus tachycardia  T wave abnormality, consider inferior ischemia  When compared with ECG of 30-DEC-2021 11:08,  Vent.  rate has increased BY  58 BPM  T wave inversion now evident in Inferior leads  Confirmed by Dafne Wilson ([id number]) on 8/8/2022 1:00:19 PM     PROCALCITONIN    Collection Time: 08/08/22  6:42 PM   Result Value Ref Range    Procalcitonin 2.26 ng/mL           Visit Vitals  BP (!) 82/57   Pulse 96   Temp 98 °F (36.7 °C)   Resp (!) 35   Wt 71.5 kg (157 lb 10.1 oz)   SpO2 99%   BMI 21.37 kg/m²       Intake/Output Summary (Last 24 hours) at 8/8/2022 2128  Last data filed at 8/8/2022 1900  Gross per 24 hour   Intake 794.39 ml   Output 1200 ml   Net -405.61 ml       The patient is a well developed, well nourished adult in no distress  Neck: incision well healed, stoma patent, copious thin secretions     A:   Hospital Problems  Date Reviewed: 8/5/2022            Codes Class Noted POA    Brain mass ICD-10-CM: G93.89  ICD-9-CM: 348.89  8/2/2022 Unknown       Larynx cancer s/p total laryngectomy, now with lung and brain mets    Plan:   -cxr with worsening infiltrates  -aggressive pulm toilet  -ok for PO from ENT standpoint

## 2022-08-09 NOTE — PROGRESS NOTES
Day #2 of Vancomycin Eddie Garrido, -8/3) - Dosing Update  Indication:  Acute hypoxemic respiratory failure with bilateral infiltrates - suspected HAP  - Stage IV laryngeal cancer with resection of metastatic brain lesion on 22  - Currently being treated with pembrolizumab maintenance every 3 weeks (22 - current)  Current regimen:  1000 mg IV Q 12 hr  Abx regimen:  Eraxis + Levaquin + Zosyn + Vancomycin  ID Following ?: NO  Concomitant nephrotoxic drugs (requires more frequent monitoring): Contrast agents (given ), loop diuretics, vasopressors  Frequency of BMP?: Daily     Recent Labs     22  0129 22  0537 22  0427   WBC 24.1* 22.1* 20.7*   CREA 1.39* 0.99 1.03   BUN 41* 27* 25*     Est CrCl: ~60-70 ml/min; UO: 0.9 ml/kg/hr  Temp (24hrs), Av.9 °F (36.6 °C), Min:97.5 °F (36.4 °C), Max:98.1 °F (36.7 °C)    Cultures:    Blood: NG, final   Sputum: light MSSA, final   Sputum: pending   Blood: NGTD   Blood for fungus: pending    MRSA Swab ordered (if applicable)? NO    Goal target range AUC/DANNY 400-600    Recent level history:  Date/Time Dose & Interval Measured Level (mcg/mL) Associated AUC/DANNY Dose Adjustment                                                 Plan: Given the increase in the patient's Scr, the current predicted AUC/DANNY is 716 (which is above our goal range). Will preemptively adjust the dose of vancomycin to 1250 mg IV Q 24 hr for a predicted AUC/DANNY of 467. Pharmacy will continue to monitor patient daily and will make dosage adjustments based upon changing renal function.

## 2022-08-09 NOTE — PROGRESS NOTES
08/09/22 0228 08/09/22 0301   Vent Settings   FIO2 (%) (S)  80 %  (weaned per protocol) (S)  90 %  (increased after ABG)   SpO2/FIO2 Ratio 123.75  --    CMV Rate Set 24  --    Back-Up Rate 24  --    PC Set 14  --    PEEP/VENT (cm H2O) 14 cm H20  --    I:E Ratio 1:3  --    Insp Time (sec) 0.63 sec  --    Insp Rise Time % 70 %  --    Pressure Trigger 3.0  --    Ventilator Measurements   Resp Rate Observed 38  --    Vt Exhaled (Machine Breath) (ml) 620 ml  --    Ve Observed (l/min) 21.3 l/min  --    PIP Observed (cm H2O) 31 cm H2O  --    MAP (cm H2O) 21  --    I:E Ratio Actual 1:1.7  --    Vent Method/Mode   Ventilator Mode (S)  Assist control;Pressure control  (mode change d/t tachypnea, increased WOB)  --      Patient with increasing tachypnea, WOB. Mode changed to try to alleviate SOB. Change made prior to ordered ABG. Please note, patient is requiring near constant tracheal suctioning. Scant clear to pink tinged sputum obtained with suction but sputum has traveled into the vent circuit. Vent circuit changed earlier in shift.

## 2022-08-09 NOTE — PROGRESS NOTES
Physical Therapy   08/09/22     PT attempted. Chart reviewed in prep and discussed with nursing. Nursing reports overall decline in patient's status overnight and patient medically unstable at this time. Patient is on vent with PEEP 10 and FIO2 80%. Per ABCDEF protocol, will work with patient when PEEP is 10.0 or less, FIO2 60% or less, and patient is following basic commands. Will follow patient peripherally.      Recommend nursing to complete with patient, as able and per protocol, in order to promote cardiopulmonary systems, maintain strength, endurance and independence:   -bed in chair position or maximize full reverse Trendelenburg position to facilitate upright activity with foot board and non-skid footwear on 3x/day ~30-60 mins each   -ROM during bathing B UEs and LEs  -positioning to prevent contractures and edema  RASS -1/0/+1 (during SAT) Active ROM  Sitting (bed in chair position)  Standing (reverse Trendelenburg)  ADLs   RASS -3/2 Passive ROM  Sit (bed in chair position)   RASS -5/-4 Passive ROM       Thank you,   Gardenia Sanchez, PT, DPT

## 2022-08-09 NOTE — PROGRESS NOTES
Pharmacist Note - Vancomycin Dosing - Restart    Consult provided for this 49 y.o. male for indication of VAP. Antibiotic regimen(s): Vanc + Zosyn  Vancomycin  - 8/3    Recent Labs     22  0537 22  0427 22  0434   WBC 22.1* 20.7* 19.7*   CREA 0.99 1.03 1.10   BUN 27* 25* 25*     Frequency of BMP: daily  Height: 182.9 cm  Weight: 71.5 kg  Est CrCl: 90.3 ml/min; UO: 1 ml/kg/hr  Temp (24hrs), Av.6 °F (36.4 °C), Min:97.3 °F (36.3 °C), Max:98.1 °F (36.7 °C)    Cultures:   sputum - light MSSA   sputum - pending    MRSA Swab ordered (if applicable)? YES    The plan below is expected to result in a target range of AUC/DANNY 400-600    Therapy will be initiated with a loading dose of 1750 mg IV x 1 to be followed by a maintenance dose of 1000 mg IV every 12 hours. Pharmacy to follow patient daily and order levels / make dose adjustments as appropriate. *Vancomycin has been dosed used Bayesian kinetics software to target an AUC/DANNY of 400-600, which provides adequate exposure for an assumed infection due to MRSA with an DANNY of 1 or less while reducing the risk of nephrotoxicity as seen with traditional trough based dosing goals.

## 2022-08-09 NOTE — CONSULTS
Pulmonary Consultation    Assessment / Plan:    Acute hypoxemic respiratory failure with bilateral infiltrates and worsening resp  status despite diuresis. Suspect HCAP but other concerns drug toxicity ( has been on Keytruda as well  as  amiodarone)  Stage IV laryngeal cancer  Metastatic brain lesion s/p resection - 8/5/22    --Vent support per primary team  --follow up culture data  --Abx have been broadened including antifungal therapy (vanc,levaquin,zosyn, eraxis)  --Will order ESR / CRP  --previous cx with light staph aureus and also prior legionella and mycoplasma studies negative. Covid negative 7/30/33  --aspergillus galact Ag pending    History / Subjective:  Reason:  Pneumonia  Requesting Provider:  Debora Weber    Chart reviewed / labs and tests reviewed / pertinent images independently viewed    Danika Larsen is a 49 y.o.  male admitted 8/2/2022 with brain mass  Pt with a  h/o squamous cell  carcinoma of  the larynx and has undergone a previous laryngectomy. S/p chemo / radiation 5/2021- 7/2021.   Is now s/p Keytruda for 4 cycles starting in 1/2022    He is  now s/p craniotomy with removal  of  metastatic  lesion (8/5/2022)  He has  been having  increased lung infiltrates and increasing O2 requirements despite diuresis and is now on mechanical  ventilation and pressors    CXR and CT  with patchy bilateral  asdz greatest in RUL and small left  effusion    Pro bnp  and  procalcitonin levels  both elevated      Current Facility-Administered Medications:     amiodarone (CORDARONE) 375 mg/250 mL D5W infusion, 0.5-1 mg/min, IntraVENous, TITRATE, Hetal Kathleen, NP, Last Rate: 20 mL/hr at 08/09/22 0658, 0.5 mg/min at 08/09/22 0658    NOREPINephrine (LEVOPHED) 8 mg in 5% dextrose 250mL (32 mcg/mL) infusion, 0.5-20 mcg/min, IntraVENous, TITRATE, Hetal Kathleen, NP, Last Rate: 9.4 mL/hr at 08/09/22 0950, 5 mcg/min at 08/09/22 0950    vasopressin (VASOSTRICT) 20 Units in 0.9% sodium chloride 100 mL infusion, 0-0.04 Units/min, IntraVENous, TITRATE, Luiz Butts MD, Last Rate: 12 mL/hr at 08/09/22 0600, 0.04 Units/min at 08/09/22 0600    anidulafungin (ERAXIS) 200 mg in 0.9% sodium chloride 260 mL IVPB, 200 mg, IntraVENous, ONCE, Donta Rangel NP    [START ON 8/10/2022] anidulafungin (ERAXIS) 100 mg in 0.9% sodium chloride 130 mL IVPB, 100 mg, IntraVENous, Q24H, Donta Rangel, DAT    piperacillin-tazobactam (ZOSYN) 3.375 g in 0.9% sodium chloride (MBP/ADV) 100 mL MBP, 3.375 g, IntraVENous, Q8H, Donta Rangel NP, Last Rate: 25 mL/hr at 08/09/22 0654, 3.375 g at 08/09/22 0654    dexmedeTOMidine in 0.9 % NaCl (PRECEDEX) 400 mcg/100 mL (4 mcg/mL) infusion soln, 0.1-1.5 mcg/kg/hr, IntraVENous, TITRATE, Cathie Rangel NP, Last Rate: 23.2 mL/hr at 08/09/22 0818, 1.3 mcg/kg/hr at 08/09/22 0818    levothyroxine (SYNTHROID) injection 94 mcg, 94 mcg, IntraVENous, Q24H, Donta Rangel NP, 94 mcg at 08/08/22 1228    guaiFENesin (ROBITUSSIN) 100 mg/5 mL oral liquid 400 mg, 400 mg, Per NG tube, Q4H, Donta Rangel NP, 400 mg at 08/09/22 3421    LORazepam (ATIVAN) tablet 1 mg, 1 mg, Oral, Q6H PRN, Donta Rangel NP, 1 mg at 08/08/22 1549    levoFLOXacin (LEVAQUIN) 750 mg in D5W IVPB, 750 mg, IntraVENous, Q24H, Cathie Rangel NP, Last Rate: 100 mL/hr at 08/08/22 1915, 750 mg at 08/08/22 1915    Vancomycin - pharmacy to dose, , Other, Rx Dosing/Monitoring, Cathie Rangel NP    vancomycin (VANCOCIN) 1,000 mg in 0.9% sodium chloride 250 mL ([id number]), 1,000 mg, IntraVENous, Q12H, Cathie Rangel NP, Last Rate: 250 mL/hr at 08/09/22 0654, 1,000 mg at 08/09/22 0654    morphine injection 2 mg, 2 mg, IntraVENous, Q4H PRN, Renuka RODRIGUEZ, NP-C, 2 mg at 08/08/22 1328    dexamethasone (DECADRON) 4 mg/mL Oral 2 mg, 2 mg, Oral, Q6H, Maki Dolan MD, 2 mg at 08/09/22 0818    bisacodyL (DULCOLAX) suppository 10 mg, 10 mg, Rectal, DAILY, Wilver Dasilva MD, 10 mg at 08/09/22 0818    0.9% sodium chloride infusion 250 mL, 250 mL, IntraVENous, PRN, Steve Chandra MD    sodium chloride (NS) flush 5-40 mL, 5-40 mL, IntraVENous, Q8H, Sophia Mattson MD, 10 mL at 08/08/22 2143    sodium chloride (NS) flush 5-40 mL, 5-40 mL, IntraVENous, PRN, Sophia Mattson MD    acetaminophen (TYLENOL) tablet 650 mg, 650 mg, Oral, Q4H PRN, Sophia Mattson MD    ondansetron [facility]) injection 4 mg, 4 mg, IntraVENous, Q4H PRN, Sophia Mattson MD    albuterol-ipratropium (DUO-NEB) 2.5 MG-0.5 MG/3 ML, 3 mL, Nebulization, Q4H PRN, Eual Gum, ACNP    bisacodyL (DULCOLAX) suppository 10 mg, 10 mg, Rectal, DAILY PRN, Sarah Lopez MD    pantoprazole (PROTONIX) 40 mg in 0.9% sodium chloride 10 mL injection, 40 mg, IntraVENous, Q12H, Gilberto GRAHAM PA-C, 40 mg at 08/09/22 0819    sodium chloride (NS) flush 5-40 mL, 5-40 mL, IntraVENous, Q8H, Rohith Peres MD, 40 mL at 08/09/22 0606    sodium chloride (NS) flush 5-40 mL, 5-40 mL, IntraVENous, PRN, Rohith Peres MD    acetaminophen (TYLENOL) tablet 650 mg, 650 mg, Oral, Q6H PRN, 650 mg at 08/03/22 2231 **OR** acetaminophen (TYLENOL) suppository 650 mg, 650 mg, Rectal, Q6H PRN, Rohith Peres MD    ondansetron (ZOFRAN ODT) tablet 4 mg, 4 mg, Oral, Q8H PRN **OR** ondansetron (ZOFRAN) injection 4 mg, 4 mg, IntraVENous, Q6H PRN, Rohith Peres MD, 4 mg at 08/05/22 0236    oxyCODONE-acetaminophen (PERCOCET) 5-325 mg per tablet 1 Tablet, 1 Tablet, Oral, Q4H PRN, Rohith Peres MD, 1 Tablet at 08/08/22 0312    sucralfate (CARAFATE) tablet 1 g, 1 g, Oral, QID PRN, Rohith Peres MD, 1 g at 08/02/22 0844    levETIRAcetam (KEPPRA) injection 1,000 mg, 1,000 mg, IntraVENous, Q12H, Rohith Peres MD, 1,000 mg at 08/09/22 0818    polyethylene glycol (MIRALAX) packet 17 g, 17 g, Oral, DAILY, Rohith Peres MD, 17 g at 08/09/22 0818    senna (SENOKOT) tablet 8.6 mg, 1 Tablet, Oral, DAILY, Rohith Peres MD, 8.6 mg at 08/09/22 0818     No Known Allergies  Past Medical History:   Diagnosis Date    Chronic pain     THROAT, EARS, NECK R/T CANCER    COPD (chronic obstructive pulmonary disease) (HCC)     EMPHASEMA    Psychiatric disorder     ANXIETY R/T CANCER    Throat cancer ([facility] [address]     Tracheostomy present [facility])       Past Surgical History:   Procedure Laterality Date    COLONOSCOPY N/A 2022    COLONOSCOPY performed by Will Pisano MD at [facility] ENDOSCOPY    COLONOSCOPY N/A 2022    COLONOSCOPY performed by Will Pisano MD at [facility]- \" PUT PLATE IN\"    HX OTHER SURGICAL  2021    Total Laryngectomy    HX TRACHEOSTOMY  2021    IR INSERT TUNL CVC W PORT OVER 5 YEARS  2021    IR PLACE CVAD FLUORO GUIDE  2021      Family History   Problem Relation Age of Onset    Diabetes Mother     Diabetes Father     Kidney Disease Father     Diabetes Sister     Heart Disease Daughter     Anesth Problems Neg Hx      Social History     Tobacco Use    Smoking status: Former     Packs/day: 1.50     Years: 30.00     Pack years: 45.00     Types: Cigarettes     Quit date: 10/28/2020     Years since quittin.7    Smokeless tobacco: Never   Substance Use Topics    Alcohol use: Not Currently      ROS:  Review of systems not obtained due to patient factors.     Objective:  Patient Vitals for the past 4 hrs:   BP Temp Pulse Resp SpO2   22 0900 103/74 -- 88 (!) 37 --   22 0804 -- -- 76 (!) 32 99 %   22 0800 122/84 97.5 °F (36.4 °C) 77 (!) 32 96 %   22 0700 123/84 -- 92 (!) 33 --   22 0630 109/86 -- 80 (!) 32 --   22 0615 103/84 -- 81 (!) 34 --   22 0600 (!) 89/67 -- 85 (!) 35 --   22 0545 (!) 82/62 -- 90 27 --     Temp (24hrs), Av.8 °F (36.6 °C), Min:97.5 °F (36.4 °C), Max:98.1 °F (36.7 °C)      Intake/Output Summary (Last 24 hours) at 2022 0941  Last data filed at 2022 0900  Gross per 24 hour   Intake 3049.72 ml   Output 1730 ml   Net 1319.72 ml       Exam:  Eyes open on Vent via trach / No Respiratory distress / Anciteric / No Accessory muscle use / Symmetrical chest expansion / Scattered rhonchi / RRR / Soft, No guarding, No rebound / Warm and dry / trace edema / RASS -1       Data:     XR Results :  Results from Hospital Encounter encounter on 08/02/22    XR CHEST PORT    Narrative  EXAM:  XR CHEST PORT    INDICATION: Central line placement    COMPARISON: 8/8/2022 at 2149 hours    TECHNIQUE: Portable AP supine chest view at 0547 hours    FINDINGS: A right IJ catheter terminates in profile with the SVC. The  endotracheal tube, enteric tube, and right chest Port-A-Cath are stable. The  cardiomediastinal contours are stable. There is increased right lower lobe atelectasis with bilateral lung opacities  otherwise unchanged. There is no pleural effusion or pneumothorax. The bones and  upper abdomen are stable. Impression  No pneumothorax following right IJ catheter placement. Increased right lower  lobe atelectasis with bilateral lung opacities otherwise unchanged.     Lab:  Recent Labs     08/09/22  0450 08/09/22  0129 08/08/22  0537 08/07/22  0427   WBC  --  24.1* 22.1* 20.7*   HGB  --  9.5* 9.1* 8.5*   PLT  --  357 366 393   NA  --  138 137 140   K  --  5.3* 4.8 4.8   CL  --  107 110* 111*   CO2  --  22 20* 24   BUN  --  41* 27* 25*   CREA  --  1.39* 0.99 1.03   GLU  --  137* 106* 110*   CA  --  7.5* 7.6* 7.5*   BNPNT  --  6,938*  --   --    LAC 1.3  --   --   --      ABG:  Recent Labs     08/09/22  0300   PHI 7.34*   PCO2I 39.0   PO2I 60*   HCO3I 20.9*   SO2I 89.0*   FIO2I 80       Recent Labs     08/08/22  1842   PCT 2.26      Microbiology:  No results found for: SDES  Lab Results   Component Value Date/Time    Culture result: PENDING 08/08/2022 01:57 PM    Culture result: LIGHT STAPHYLOCOCCUS AUREUS (A) 08/02/2022 01:01 PM    Culture result: No growth 6 days 07/30/2022 10:30 AM         Brandy Dodge MD

## 2022-08-09 NOTE — PROGRESS NOTES
1930: Bedside shift change report given to MARK Elias, MADYSON (oncoming nurse) by Mu Duval. Melvin Kam, MADYSON (offgoing nurse). Report included the following information SBAR, Kardex, Intake/Output, MAR, Accordion, Recent Results, Cardiac Rhythm ST, Alarm Parameters , and Dual Neuro Assessment. 2115:  Patient with desaturation event to 70's despite repetitive suctioning. Bradley Palencia MD and YESI Leonardo at bedside. Chest x-ray ordered, plan for BNP with am labs and diuresis. Sats resolved to high 90's. 0000:  Paresh Freeman and Bradley Palencia MD at bedside performing bedside TTE. Likely moderate mitral regurgitation noted. 0013:  Patient in atrial fibrillation with RVR. Zofia Manuel NP aware. Plan for amio bolus and drip. See MAR.    5214-2452: Patient back in sinus rhythm, tachy. 2739:  Discussing plan of care with YESI Leonardo. Plan to start peripheral levophed for sustained hypotension. ABG results reviewed. [id number] Sneed MD at bedside. Right radial arterial line and right quad lumen IJ placed (after some difficulty presumably related to radiation changes). Vasopressin started in central line after confirmation of placement at bedside by Bradley Palencia MD after looking at chest x-ray. 0730: Bedside and Verbal shift change report given to FABRICE Kam, MADYSON (oncoming nurse) by Cat Elias RN (offgoing nurse). Report included the following information SBAR, Kardex, Intake/Output, MAR, Accordion, Recent Results, Cardiac Rhythm atrial fib RVR, conversion to sinus rhythm, Alarm Parameters , and Dual Neuro Assessment.

## 2022-08-09 NOTE — PROGRESS NOTES
Neurosurgery Progress Note  Edison Li PA-C        Admit Date: 2022   LOS: 7 days        Daily Progress Note: 2022    HPI:The patient has a history of squamous cell carcinoma of the larynx/glottis diagnosed in 2021. He had  total laryngectomy on 21 and had regional recurrence in 2021. He received chemotherapy from 21-21 with concurrent radiation from 21-21 at [address] [place] [address]. He was started on Keytruda in 2022 followed by the addition of cisplatin and fluoruracil for 4 cycles. He is now on maintenance Keytruda every 3 weeks with his last dose received last Friday. He has had multiple blood transfusions in the past due to recurrent anemia. He is followed by Dr. Jason Bro for medical oncology. He presented to the ER at [facility] on 22 due to nausea, vomiting, generalized weakness and a fever of 101. 6. He was admitted to the hospital and treated for pneumonia. He did have some numbness and tingling and weakness in his left . His head CT revealed a left occipital brain mass with cerebral edema; therefore, neurosurgery was consulted. The patient was transferred to [facility] for neurosurgical evaluation. Of note, patient has a larytube and HME, not a tracheostomy at this time. Patient underwent left occipital craniotomy for resection of metastatic tumor on 22. Initially he made a good recovery but then had to be placed back on the vent on the night 22. Pt is critically ill with stage IV squamous cell cancer. CXR with worsening infiltrates. Per pulmonary, pt with acute hypoxic respiratory failure due to aspiration pneumonia versus pulmonary edema. He is on broad spectrum abx. Pathology showing metastatic squamous cell carcinoma. Appreciate palliative care consult. Subjective:   Pt converted to afib with RVR overnight.     Objective:     Vital signs  Temp (24hrs), Av.9 °F (36.6 °C), Min:97.5 °F (36.4 °C), Max:98 °F (36.7 °C)   701 -  1900  In: 1569.7 [I.V.:1319.7]  Out: 013 [Urine:875]  08/07 1901 - 08/09 0700  In: 2614.8 [P.O.:240; I.V.:1834.8]  Out: 2105 [Urine:2105]    Visit Vitals  /80   Pulse 94   Temp 98 °F (36.7 °C)   Resp (!) 32   Ht 6' 0.01\" (1.829 m)   Wt 71.5 kg (157 lb 10.1 oz)   SpO2 93%   BMI 21.37 kg/m²    O2 Flow Rate (L/min): 12 l/min O2 Device: Ventilator, Tracheostomy     Pain control  Pain Assessment  Pain Scale 1: Numeric (0 - 10)  Pain Intensity 1: 0  Pain Onset 1: acute  Pain Location 1: Head  Pain Orientation 1: Left, Posterior  Pain Description 1: Aching  Pain Intervention(s) 1: Medication (see MAR)    PT/OT  Gait     Gait  Base of Support: Widened  Gait Abnormalities: Decreased step clearance  Ambulation - Level of Assistance: Modified independent  Distance (ft): 300 Feet (ft)  Assistive Device: Gait belt  Rail Use: Both  Stairs - Level of Assistance: Supervision  Number of Stairs Trained: 14           Physical Exam:  Gen:On vent. Ill-appearing  Neuro: A&Ox0. Does not follow commands. Does not respond to voice. Withdraws to pain intermittently.   Incision C/D/I    24 hour results:    Recent Results (from the past 24 hour(s))   PROCALCITONIN    Collection Time: 08/08/22  6:42 PM   Result Value Ref Range    Procalcitonin 2.26 ng/mL   EKG, 12 LEAD, INITIAL    Collection Time: 08/09/22 12:31 AM   Result Value Ref Range    Ventricular Rate 152 BPM    Atrial Rate 170 BPM    QRS Duration 74 ms    Q-T Interval 296 ms    QTC Calculation (Bezet) 470 ms    Calculated R Axis 74 degrees    Calculated T Axis 104 degrees    Diagnosis       Atrial fibrillation with rapid ventricular response with premature   ventricular or aberrantly conducted complexes  Nonspecific T wave abnormality , probably digitalis effect  When compared with ECG of 08-AUG-2022 09:36,  Atrial fibrillation has replaced Sinus rhythm  Nonspecific T wave abnormality, worse in Lateral leads  Confirmed by Isabela Nunez ([id number]) on 8/9/2022 2:40:58 PM     NT-PRO BNP Collection Time: 08/09/22  1:29 AM   Result Value Ref Range    NT pro-BNP 6,938 (H) <125 PG/ML   CBC WITH AUTOMATED DIFF    Collection Time: 08/09/22  1:29 AM   Result Value Ref Range    WBC 24.1 (H) 4.1 - 11.1 K/uL    RBC 3.40 (L) 4.10 - 5.70 M/uL    HGB 9.5 (L) 12.1 - 17.0 g/dL    HCT 31.3 (L) 36.6 - 50.3 %    MCV 92.1 80.0 - 99.0 FL    MCH 27.9 26.0 - 34.0 PG    MCHC 30.4 30.0 - 36.5 g/dL    RDW 16.4 (H) 11.5 - 14.5 %    PLATELET 681 735 - 025 K/uL    MPV 9.3 8.9 - 12.9 FL    NRBC 0.1 (H) 0  WBC    ABSOLUTE NRBC 0.02 (H) 0.00 - 0.01 K/uL    NEUTROPHILS 93 (H) 32 - 75 %    LYMPHOCYTES 2 (L) 12 - 49 %    MONOCYTES 4 (L) 5 - 13 %    EOSINOPHILS 0 0 - 7 %    BASOPHILS 0 0 - 1 %    IMMATURE GRANULOCYTES 1 (H) 0.0 - 0.5 %    ABS. NEUTROPHILS 22.4 (H) 1.8 - 8.0 K/UL    ABS. LYMPHOCYTES 0.5 (L) 0.8 - 3.5 K/UL    ABS. MONOCYTES 1.0 0.0 - 1.0 K/UL    ABS. EOSINOPHILS 0.0 0.0 - 0.4 K/UL    ABS. BASOPHILS 0.0 0.0 - 0.1 K/UL    ABS. IMM.  GRANS. 0.2 (H) 0.00 - 0.04 K/UL    DF SMEAR SCANNED      RBC COMMENTS ANISOCYTOSIS  1+       METABOLIC PANEL, BASIC    Collection Time: 08/09/22  1:29 AM   Result Value Ref Range    Sodium 138 136 - 145 mmol/L    Potassium 5.3 (H) 3.5 - 5.1 mmol/L    Chloride 107 97 - 108 mmol/L    CO2 22 21 - 32 mmol/L    Anion gap 9 5 - 15 mmol/L    Glucose 137 (H) 65 - 100 mg/dL    BUN 41 (H) 6 - 20 MG/DL    Creatinine 1.39 (H) 0.70 - 1.30 MG/DL    BUN/Creatinine ratio 29 (H) 12 - 20      GFR est AA >60 >60 ml/min/1.73m2    GFR est non-AA 54 (L) >60 ml/min/1.73m2    Calcium 7.5 (L) 8.5 - 10.1 MG/DL   POC G3 - PUL    Collection Time: 08/09/22  3:00 AM   Result Value Ref Range    FIO2 (POC) 80 %    pH (POC) 7.34 (L) 7.35 - 7.45      pCO2 (POC) 39.0 35.0 - 45.0 MMHG    pO2 (POC) 60 (L) 80 - 100 MMHG    HCO3 (POC) 20.9 (L) 22 - 26 MMOL/L    sO2 (POC) 89.0 (L) 92 - 97 %    Base deficit (POC) 4.6 mmol/L    Site RIGHT RADIAL      Device: ADULT VENT      Mode PRESSURE CONTROL      Set Rate 24 bpm PEEP/CPAP (POC) 14 cmH2O    Mean Airway Pressure 21 cmH2O    PIP (POC) 14      Allens test (POC) Positive      Inspiratory Time 0.63 sec    Specimen type (POC) ARTERIAL     LACTIC ACID    Collection Time: 08/09/22  4:50 AM   Result Value Ref Range    Lactic acid 1.3 0.4 - 2.0 MMOL/L   SED RATE (ESR)    Collection Time: 08/09/22 10:26 AM   Result Value Ref Range    Sed rate, automated 126 (H) 0 - 20 mm/hr   C REACTIVE PROTEIN, QT    Collection Time: 08/09/22 10:26 AM   Result Value Ref Range    C-Reactive protein 18.60 (H) 0.00 - 0.60 mg/dL   BLOOD GAS, ARTERIAL    Collection Time: 08/09/22  4:44 PM   Result Value Ref Range    pH 7.15 (LL) 7.35 - 7.45      PCO2 68 (H) 35 - 45 mmHg    PO2 44 (LL) 80 - 100 mmHg    O2 SAT 66 (L) 92 - 97 %    BICARBONATE 23 22 - 26 mmol/L    BASE DEFICIT 7.3 mmol/L    O2 METHOD VENT      Sample source ARTERIAL      SITE DRAWN FROM ARTERIAL LINE      JEN'S TEST NOT APPLICABLE      Critical value read back Called to Dr Tom Triana on 08/09/2022 at 16:44    BLOOD GAS, ARTERIAL    Collection Time: 08/09/22  5:33 PM   Result Value Ref Range    pH 7.32 (L) 7.35 - 7.45      PCO2 45 35 - 45 mmHg    PO2 66 (L) 80 - 100 mmHg    O2 SAT 92 92 - 97 %    BICARBONATE 22 22 - 26 mmol/L    BASE DEFICIT 3.8 mmol/L    O2 METHOD TRACH COLLAR      Sample source ARTERIAL      SITE AORTA      JEN'S TEST NOT APPLICABLE            Assessment:     Active Problems:    Brain mass (8/2/2022)      Plan:     POD#4 s/p left occipital craniotomy for resection of metastatic tumor. Craniotomy incision healing well. Nothing to do from a NSGY perspective at this time. He is now in acute respiratory failure. Comorbidities include stage IV squamous cell cancer. Pt is acutely ill. Will defer care to ICU team.    Plan d/w Dr. Carolann Smith, nurse.     CATALINA Cummings

## 2022-08-09 NOTE — PROCEDURES
Procedure Note - Central Venous Access:   Performed by Maksim Willis MD  Diagnosis: Circulatory Shock  Insertion Date: 08/09/22  Time:6:49 AM  Obtained Consent? yes; informed   Procedure Location:  ICU. Immediately prior to the procedure, the patient was reevaluated and found suitable for the planned procedure and any planned medications. Immediately prior to the procedure a time out was called to verify the correct patient, procedure, equipment, staff, and marking as appropriate. Central line Bundle:  Full sterile barrier precautions used. 7-Step Sterility Protocol followed. (cap, mask sterile gown, sterile gloves, large sterile sheet, hand hygiene, 2% chlorhexidine for cutaneous antisepsis)  5 mL 1% Lidocaine placed at insertion site. Patient positioned in Trendelenburg?yes   The site was prepped with ChloraPrep. Catheter inserted into a new site. Using Seldinger technique a Arrow Triple Lumen CVC was placed in the Right, Internal Jugular Vein via direct cannulation with 1 number of attempts for IV Access. Ultrasound Guidance was utilized. There was good dark, non-pulsatile blood return in all ports. Femoral Site? no. If Yes, reason femoral site was chosen: n/a  Catheter secured. Biopatch/CHG bio-occlusive dressing in place? yes. The following complications were encountered: None. A follow-up chest x-ray was ordered post procedure. The procedure was tolerated well.         Maksim Willis MD  Critical Care Medicine  [facility]

## 2022-08-09 NOTE — PROGRESS NOTES
Bedside and Verbal shift change report given to Adriane (oncoming nurse) by Lucius Wagner (offgoing nurse). Report included the following information SBAR, Kardex, Intake/Output, MAR, Accordion, and Recent Results. Pt requiring 100% fio2 on ventilator. Thin, watery secretions from ETT. Ativan given once for anxiety. Pt pulled out NG tube twice; reinforced importance of NG tube. Agreeable to a bridled dobhoff. Bedside and Verbal shift change report given to [place] (oncoming nurse) by Jesse Oh (offgoing nurse). Report included the following information SBAR, Kardex, Intake/Output, MAR, Accordion, and Recent Results.

## 2022-08-09 NOTE — PROGRESS NOTES
Comprehensive Nutrition Assessment    Type and Reason for Visit: Initial    Nutrition Recommendations/Plan:   Jevity 1.5 starting at 30 ml/hr x 4 hrs    2. Increase rate by 10 ml/hr q 4 hrs until at goal of 65 ml/hr    3. Give 150 ml water flush q 4 hrs    4. The above goal provides:  1430 ml formula, 2145 kcals, 91 gm pro, 2000 ml of free water           Malnutrition Assessment:  Malnutrition Status: Moderate malnutrition (08/09/22 1447)    Context:  Chronic illness     Findings of the 6 clinical characteristics of malnutrition:   Energy Intake:  75% or less est energy requirements for 1 month or longer  Weight Loss: Body Fat Loss:  Mild body fat loss,     Muscle Mass Loss:  Mild muscle mass loss,    Fluid Accumulation:   ,     Strength:           Nutrition Assessment:    Per history: \" 49 y.o. 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 He had  total laryngectomy on 02/08/21 and had regional recurrence in 04/2021. He received chemotherapy from 05/24/21-07/13/21 with concurrent [facility]24[facility] He was started on Keytruda in 01/2022 followed by the addition of cisplatin and fluoruracil for 4 cycles. He is now on maintenance Keytruda every 3 weeks with his last dose received last Friday. He has had multiple blood transfusions in the past due to recurrent anemia. He is followed by Dr. Samantha Reich for onc  He presented to the ER at [facility] on 07/30/22 due to nausea, vomiting, generalized weakness and a fever of 101. 6. He was admitted to the hospital and treated for pneumonia. He did have some numbness and tingling and weakness in his left . His head CT revealed a left occipital brain mass with cerebral edema; therefore, neurosurgery was consulted. The patient was transferred to [facility] for neurosurgical evaluation. \"    Pt underwent a trach on 02/2022; when he first cam to [facility] he had a elana tube (not a trach).   Pt's respiratory status worsened yesterday and he was placed on the vent, NDT feeds started today. He had a craniotomy on 8/5 with resection of metastatic tumor. In the past, pt has been able to eat orally. Nutritionally Significant Medications:  Meds reviewed      Estimated Daily Nutrient Needs:  Energy Requirements Based On: Kcal/kg  Weight Used for Energy Requirements: Current  Energy (kcal/day): 30-32 kcals/kg or ~ 8817-8206 kcals  Weight Used for Protein Requirements: Current  Protein (g/day): 1.5-1.7 gm pro/kg or 107-120 gm  Method Used for Fluid Requirements: 1 ml/kcal  Fluid (ml/day): 1 ml/kcal    Nutrition Related Findings:   Edema:   No data recorded   Last BM: 08/01/22, Formed, Hard ([facility])    Wounds: Surgical incision      Current Nutrition Therapies:  Diet: NPO  Supplements: none  Meal intake: Patient Vitals for the past 168 hrs:   % Diet Eaten   08/07/22 1800 0%   08/07/22 1200 1 - 25%   08/07/22 0800 1 - 25%     Supplement intake: No data found. Nutrition Support: NDT      Anthropometric Measures:  Height: 6' 0.01\" (182.9 cm)  Ideal Body Weight (IBW): 178 lbs (81 kg)  Admission Body Weight: 157 lb 10.1 oz  Current Body Wt:  71.5 kg (157 lb 10.1 oz), 88.6 % IBW.     Current BMI (kg/m2): 21.4                               Wt Readings from Last 10 Encounters:   08/07/22 71.5 kg (157 lb 10.1 oz)   07/30/22 68.2 kg (150 lb 5.7 oz)   07/29/22 67.5 kg (148 lb 12.8 oz)   07/29/22 67.1 kg (148 lb)   07/08/22 66.2 kg (145 lb 14.4 oz)   07/07/22 66.6 kg (146 lb 14.4 oz)   07/06/22 66.6 kg (146 lb 12.8 oz)   07/05/22 68 kg (150 lb)   06/28/22 65.7 kg (144 lb 12.8 oz)   06/24/22 67.4 kg (148 lb 8 oz)           Nutrition Diagnosis:    Inadequate oral intake related to impaired respiratory function as evidenced by nutrition support-enteral nutrition, intubation    Nutrition Interventions:   Food and/or Nutrient Delivery: Modify tube feeding  Nutrition Education/Counseling: No recommendations at this time  Coordination of Nutrition Care: Continue to monitor while inpatient       Goals: Tolerance of goal tube feeds over the next 2-4 days             Nutrition Monitoring and Evaluation:   Behavioral-Environmental Outcomes: None identified  Food/Nutrient Intake Outcomes: Diet advancement/tolerance, Food and nutrient intake, Enteral nutrition intake/tolerance  Physical Signs/Symptoms Outcomes: Biochemical data, Weight, GI status, Hemodynamic status    Discharge Planning:     Too soon to determine    Bossman Gardner RD, CSP  Available via MiTio

## 2022-08-09 NOTE — PROCEDURES
Procedure Note - Arterial Access:   Performed by Merrick Hernandez MD.  Diagnosis: Circulatory Shock  Insertion Date: 08/09/22   Time: 6:50 AM   Obtained Consent? no; emergent   Procedure Location:  ICU. Immediately prior to the procedure, the patient was reevaluated and found suitable for the planned procedure and any planned medications. Immediately prior to the procedure a time out was called to verify the correct patient, procedure, equipment, staff, and marking as appropriate. Collateral circulation confirmed with Velasquez Art test.     Line Bundle:  Full sterile barrier precautions used. 5 mL 1% Lidocaine placed at insertion site. Method: Seldinger technique. Site Prep: ChloraPrep. Procedure: Arterial Catheter Insertion in Right, Radial Artery   Catheter inserted into a new site. Number of Attempts:  1  Indication: Monitoring. There was bright red, pulsatile blood return. Femoral Site? no. If Yes, reason femoral site was chosen: n/a  Catheter secured. Biopatch/CHG sterile bio-occlusive dressing  in place? yes. Complication None. The procedure was tolerated well.     Merrick Hernandez MD   Critical Care Medicine  [facility]

## 2022-08-10 NOTE — PROGRESS NOTES
0745: Bedside shift change report given to Kasie Morrell RN (oncoming nurse) by Arabella Lawson RN (offgoing nurse). Report included the following information SBAR, Intake/Output, Cardiac Rhythm NSR to ST, and Alarm Parameters . 1640: Nimbex drip ordered. Baseline TOF performed with 4/4 twitches at 40 mA. Patient on versed at 6 mg/hr and fentanyl at 200 mcg/hr. 1930: Bedside shift change report given to Aysa Stewart RN (oncoming nurse) by Kasie Morrell RN (offgoing nurse). Report included the following information SBAR, Intake/Output, Cardiac Rhythm ST, and Alarm Parameters .

## 2022-08-10 NOTE — PROGRESS NOTES
08/09/22 2014   Vent Settings   FIO2 (%) 100 %   SpO2/FIO2 Ratio 100   CMV Rate Set 30   Back-Up Rate 30   PC Set 18   PEEP/VENT (cm H2O) 16 cm H20   I:E Ratio 1:2   Insp Time (sec) 0.66 sec   Insp Rise Time % 70 %   Pressure Trigger 3.0   Ventilator Measurements   Resp Rate Observed 35   Vt Exhaled (Machine Breath) (ml) 517 ml   Ve Observed (l/min) 13.1 l/min   PIP Observed (cm H2O) 40 cm H2O   Plateau Pressure (cm H2O) 33 cm H2O   Driving Pressure 17 FBC0K   MAP (cm H2O) 26   I:E Ratio Actual 1:1.7   Auto PEEP Observed (cm H2O)   (unable to obtain d/t tachypnea)   Static Compliance (ml/cm H20) 33 ml/cm H20   Vent Method/Mode   Ventilation Method Conventional   Ventilator Mode Assist control;Pressure control   Ventilator Mode ID [id number]     Pt stable on vent at this time. No changes made. Will monitor & titrate per protocol, per order.

## 2022-08-10 NOTE — PROGRESS NOTES
Physician Progress Note      Waleska Kruse  CSN #:                  [id number]  :                       1970  ADMIT DATE:       2022 4:04 AM  DISCH DATE:  RESPONDING  PROVIDER #:        Hamzah Nicholas NP          QUERY TEXT:    Pt admitted with Brain tumor. Pt noted to have pneumonia. If possible, please document in the progress notes and discharge summary if you are evaluating and/or treating any of the following:    Note: CAP and HCAP indicate where the pneumonia was acquired, not a specific type. The medical record reflects the following:  Risk Factors: pneumonia    Clinical Indicators:    CT from   Interval bibasilar atelectasis with small effusions. Parenchymal opacities  subpleural on the right and on the left are nonspecific    H&P   Pneumonia  - Chest x-ray noting bilateral patchy opacities, recent transfer from [address] [place] [address] will empirically treat for hospital-acquired pneumonia      Treatment:  levoFLOXacin (LEVAQUIN) 500 mg in D5W IVPB  levoFLOXacin (LEVAQUIN) tablet 500 mg  cefepime (MAXIPIME) 2 g in 0.9% sodium chloride (MBP/ADV) 100 mL MBP  vancomycin (VANCOCIN) 1500 mg in  ml infusion    Thank you,  Yoshi Magana RN [facility]  Clinical Documentation  [phone] or via Perfect Serve  Options provided:  -- Gram negative pneumonia  -- Gram positive pneumonia  -- Aspiration pneumonia  -- Other - I will add my own diagnosis  -- Disagree - Not applicable / Not valid  -- Disagree - Clinically unable to determine / Unknown  -- Refer to Clinical Documentation Reviewer    PROVIDER RESPONSE TEXT:    This patient has aspiration pneumonia. Query created by: Celso Mendoza on 2022 12:32 PM      QUERY TEXT:    Pt admitted with Brain tumor. Pt noted to have resp failure. If possible, please document in progress notes and discharge summary the relationship, if any, between hypoxia resp failure and mucus plug /or pneumonia.     The medical record reflects the following:  Risk Factors: hypoxia resp failure    Clinical Indicators:  CCPN 8/8  Acute hypoxic respiratory failure,? Mucous plug    Pulm consult 8/9  1. Acute hypoxemic respiratory failure with bilateral infiltrates and worsening resp  status despite diuresis. Suspect HCAP    BP  68/58-   138/96  RR 15-38  O2sat's % on vent and trach      Treatment:  pulm consult  vent  decadron 2mg po q6  Options provided:  -- Hypoxia Resp. Failure  due to pneumonia. -- Hypoxia Resp. Failure  due to mucus plug  -- Hypoxia Resp. Failure related to both pneumonia and mucus plug  -- Other - I will add my own diagnosis  -- Disagree - Not applicable / Not valid  -- Disagree - Clinically unable to determine / Unknown  -- Refer to Clinical Documentation Reviewer    PROVIDER RESPONSE TEXT:    Hypoxic respiratory failure secondary to PNA, pulmonary edema, and possibly drug-induced pneumonitis    Query created by: Ga Kinsey on 8/9/2022 12:49 PM      QUERY TEXT:    Pt admitted with Brain Tumor and has malnutrition documented. Please further specify type of malnutrition with documentation in the medical record. The medical record reflects the following:  Risk Factors: larynx/glottis cancer    Clinical Indicators:  Nutritional note 8/9  Malnutrition Assessment:  Malnutrition Status: Moderate malnutrition (08/09/22 1447)  Context:  Chronic illness  Findings of the 6 clinical characteristics of malnutrition:  Energy Intake:  75% or less est energy requirements for 1 month or longer  Weight Loss: Body Fat Loss:  Mild body fat loss,  Muscle Mass Loss:  Mild muscle mass loss,  Fluid Accumulation:   ,   Strength:    Anthropometric Measures:  Height: 6' 0.01\" (182.9 cm)  Ideal Body Weight (IBW): 178 lbs (81 kg)  Admission Body Weight: 157 lb 10.1 oz  Current Body Wt:  71.5 kg (157 lb 10.1 oz), 88.6 % IBW. Current BMI (kg/m2): 21.4    Treatment:  NPO  Nutrition Recommendations/Plan:  1.  Jevity 1.5 starting at 30 ml/hr x 4 hrs  ?  2. Increase rate by 10 ml/hr q 4 hrs until at goal of 65 ml/hr  ? 3. Give 150 ml water flush q 4 hrs  ? 4. The above goal provides:  1430 ml formula, 2145 kcals, 91 gm pro, 2000 ml of free water  ? Thank you,  Pérez Garrett RN [facility]  Clinical Documentation  [phone] or via Alysia [address] Criteria:  https://aspenjournals. onlinelibrary. douglass. com/doi/full/10.1177/7900838290267077  Options provided:  -- Moderate Malnutrition  -- Moderate Protein calorie malnutrition  -- Other - I will add my own diagnosis  -- Disagree - Not applicable / Not valid  -- Disagree - Clinically unable to determine / Unknown  -- Refer to Clinical Documentation Reviewer    PROVIDER RESPONSE TEXT:    This patient has moderate malnutrition.     Query created by: Sandra Landry on 8/10/2022 1:01 PM      Electronically signed by:  Garrett Crespo NP 8/10/2022 4:42 PM

## 2022-08-10 NOTE — PROGRESS NOTES
Pulmonary    Assessment / Plan:      Acute hypoxemic respiratory failure with bilateral infiltrates and worsening resp  status despite diuresis. Suspect HCAP but other concerns drug toxicity ( has been on Keytruda as well  as  amiodarone)  Stage IV laryngeal cancer  Metastatic brain lesion s/p resection - 22    --Remains on full vent support and on broad abx and antifungals  --ESR and CRP markedly elevated as is procalcitonin - CTD panel is pnd  --on broad abx and antifungals  --if lack of improvement and infection workup remains negative may need to consider increased steroid dosing for pneumonitis    --management per ICU team  Will be available to see if needed       History / Subjective:  Chart reviewed / labs and tests reviewed / pertinent images independently viewed  Remains on full vent support and pressors  Opens eyes      Objective:  Patient Vitals for the past 4 hrs:   BP Temp Pulse Resp SpO2   08/10/22 0800 -- 98.7 °F (37.1 °C) -- -- --   08/10/22 0715 -- -- (!) 110 (!) 38 99 %   08/10/22 0700 (!) 138/100 -- (!) 107 19 98 %   08/10/22 0600 (!) 122/95 -- (!) 103 19 100 %     Temp (24hrs), Av.5 °F (36.9 °C), Min:98 °F (36.7 °C), Max:99.2 °F (37.3 °C)      Intake/Output Summary (Last 24 hours) at 8/10/2022 0914  Last data filed at 8/10/2022 0800  Gross per 24 hour   Intake 3145.69 ml   Output 1800 ml   Net 1345.69 ml       Exam:  Sedate / No Respiratory distress / Anciteric / No Accessory muscle use / Symmetrical chest expansion / Scattered rhonchi / RRR / Soft, No guarding, No rebound / Warm and dry / trace edema / RASS -1       Data:       XR Results (maximum last 1): Results from [place] encounter on 22    XR ABD (KUB)    Impression  The enteric tube terminates in the descending portion of the  duodenum.             Lab:  Recent Labs     08/10/22  0450 22  1909 22  0450 22  0129 22  0537   WBC 24.7*  --   --  24.1* 22.1*   HGB 7.9*  --   --  9.5* 9.1*   --   --  357 366     --   --  138 137   K 5.0  --   --  5.3* 4.8     --   --  107 110*   CO2 24  --   --  22 20*   BUN 38*  --   --  41* 27*   CREA 1.31*  --   --  1.39* 0.99   *  --   --  137* 106*   CA 6.8*  --   --  7.5* 7.6*   INR  --  1.2*  --   --   --    CPK  --  251  --   --   --    BNPNT  --   --   --  8,315*  --    LAC  --   --  1.3  --   --         Latest Reference Range & Units 8/9/22 10:26   Sed rate, automated 0 - 20 mm/hr 126 (H)   (H): Data is abnormally high    Recent Labs     08/10/22  0450 08/09/22  1909 08/09/22  1026 08/08/22  1842   CRP  --   --  18.60*  --    FIBRN  --  >800*  --   --    PCT 4.49  --   --  2.26     ABG:  Recent Labs     08/10/22  0423 08/09/22  0300   PHI 7.33* 7.34*   PCO2I 41.6 39.0   PO2I 114* 60*   HCO3I 21.7* 20.9*   SO2I 98.1* 89.0*   FIO2I 100 80     Microbiology:  No results found for: SDES  Lab Results   Component Value Date/Time    Culture result: NO GROWTH AFTER 19 HOURS 08/09/2022 09:35 AM    Culture result: NO GROWTH AFTER 23 HOURS 08/09/2022 04:50 AM    Culture result: NO GROWTH 2 DAYS 08/08/2022 01:57 PM         Natalio Luna MD

## 2022-08-10 NOTE — PROGRESS NOTES
for follow up visit. Pt requested prayer. Pt was drowsy. Provided prayer. Please contact [phone] [facility] for further support.      [address] FABRICE MatthewsDiv, MACE   [phone]

## 2022-08-10 NOTE — PROGRESS NOTES
Music Therapy Assessment  [facility] [id number]     1970  49 y.o.  male    Patient Telephone Number: [phone] (home)   Orthodox Affiliation: Meghna Garrett   Language: English   Patient Active Problem List    Diagnosis Date Noted    Brain mass 08/02/2022    Pneumonia 07/30/2022    GI bleed 06/18/2022    CKD (chronic kidney disease) 06/18/2022    Chronic anemia 06/18/2022    UGIB (upper gastrointestinal bleed) 06/04/2022    Palpitations 05/30/2022    Chest pain 05/30/2022    Anemia 05/30/2022    Symptomatic anemia 05/30/2022    Anemia due to GI blood loss 05/09/2022    Prevention of chemotherapy-induced neutropenia 02/23/2022    Hypokalemia 05/24/2021    Severe protein-calorie malnutrition ([address] 02/15/2021    Laryngeal carcinoma ([address] 02/07/2021    Throat cancer ([address] 01/31/2021    COPD exacerbation ([address] 01/31/2021    Acute and chronic respiratory failure with hypoxia ([address] 01/31/2021    Chronic pain due to neoplasm 01/31/2021    Anxiety 01/31/2021    Insomnia 01/31/2021    Respiratory failure with hypoxia ([address] 01/31/2021        Date: 8/10/2022            Total Time (in minutes): 8          [facility] 7S1 INTENSIVE CARE    Mental Status:   [  ] Alert [  ] Angelic Radha [  ]  Confused  [  ] Minimally responsive  [x] Sleeping-sedated    Communication Status: [x] Impaired Speech-staff said pt mouthed words, used hand gestures and wrote with pen and pad. [  ] Nonverbal     Physical Status:   [x] Oxygen in use  [  ] Hard of Hearing [  ] Vision Impaired  [  ] Ambulatory  [  ] Ambulatory with assistance [  ] Non-ambulatory     Music Preferences, Background: Pt's brother said pt likes R&B music from the 1's to present day and [email or web address]. He said pt used to enjoy singing. Clinical Problem to be addressed: Social and emotional support.     Goal(s) met in session:  Physical/Pain management (Scale of 1-10):    Pre-session rating ___________    Post-session rating __________  [  ] Increased relaxation   [  ] Affected breathing patterns  [  ] Decreased muscle tension   [  ] Decreased agitation  [  ] Affected heart rate    [  ] Increased alertness     Emotional/Psychological:  [  ] Increased self-expression   [  ] Decreased aggressive behavior   [  ] Decreased feelings of stress  [  ] Discussed healthy coping skills     [  ] Improved mood    [  ] Decreased withdrawn behavior     Social:  [  ] Decreased feelings of isolation/loneliness [  ] Positive social interaction   [x] Provided support and/or comfort for family/friends    Spiritual:  [  ] Spiritual support    [  ] Expressed peace  [  ] Expressed angelique    [  ] Discussed beliefs    Techniques Utilized (Check all that apply):   [  ] Procedural support MT [  ] Music for relaxation [  ] Patient preferred music  [  ] Gloria analysis  [  ] Song choice  [  ] Music for validation  [  ] Entrainment  [  ] Movement to music [  ] Guided visualization  [  ] Nancy Ervin  [  ] Patient instrument playing [  ] Ladi Black writing  [  ] Candy Gabriel along   [  ] Dania Edwards  [  ] Sensory stimulation  [x] Active Listening  [  ] Music for spiritual support [  ] Making of CDs as gifts    Session Observations:  Referral from [address]. Patient (pt) was lying in bed with his eyes closed and appeared to be soundly sleeping. This music therapist (MT) knocked on his door and spoke his name, but the pt didn't open his eyes or awaken. MT asked the pt's nurse Kasie Morrell if the pt needed rest or if MT could attempt to awaken him to offer music therapy. Pt's nurse shared the pt recently received more sedative medication so wouldn't be responsive to the MT. She also shared that the pt was about to receive an Echo, so this wouldn't be a good time for music therapy. MT expressed understanding, and saw a staff member walking into the pt's room to do this. MT called the pt's brother Melissa Layton on the phone to offer him support and ask him about the pt's music preferences and music background. Pt's brother shared about these and thanked MT for the call.     Haydee Chopra, MT-BC (Music Therapist-Board Certified)  Spiritual Care Department  Referral-based service

## 2022-08-10 NOTE — PROGRESS NOTES
1930: Bedside and Verbal shift change report given to Whitney Johnson RN, Hammad Ozuna RN (oncoming nurse) by Colin Quintero RN (offgoing nurse). Report included the following information SBAR, Kardex, Procedure Summary, Intake/Output, MAR, Cardiac Rhythm normal sinus, Alarm Parameters , and Dual Neuro Assessment. 0730: Bedside and Verbal shift change report given to [address] [place] [address] (oncoming nurse) by Whitney Johnson RN, Hammad Ozuna RN (offgoing nurse). Report included the following information SBAR, Kardex, Intake/Output, MAR, Cardiac Rhythm normal sinus, Alarm Parameters , and Dual Neuro Assessment. Shift summary: No episodes of desatting overnight. Able to ween levophed.

## 2022-08-10 NOTE — PROGRESS NOTES
Primary Respiratory problems complicate an initially uncomplicated post op course   prior to re intubation he was awake an alert so thinking among ICU staff is that his problem currently is not a neurological one  will defer care to ICU

## 2022-08-10 NOTE — PROGRESS NOTES
Occupational therapy  08/10/22     Chart reviewed in prep and discussed with nursing. Patient's versed and vent settings increased from this morning, not medically appropriate for participation with therapy at this time. Have attempted to see patient x3 days and remains inappropriate, will sign off at this time, please re-consult when patient medically appropriate for participation. Discussed with nursing who is in agreement at this time.     Thank you,  Abel Valencia, OTR/L

## 2022-08-10 NOTE — PROGRESS NOTES
CRITICAL CARE NOTE    Name: Marvel Meeks   : 1970   MRN: [id number]   Date: 8/10/2022      REASON FOR ICU ADMISSION: Brain Mass s/p crani with resection     PRINCIPLE ICU DIAGNOSIS   Left Occipital Mass (+/- necrotic lymphadenopathy in neck)  S/p left occipital Craniotomy (Dr. Garrett Mendez, 22)  Acute hypoxic respiratory failure, aspiration pneumonia versus pulmonary edema   Acute Postoperative Pain  Atelectasis  Anemia  Staph aureus on sputum (?colonization/?contaminate)  Hx of TOTAL LARYNGECTOMY (21) d/t Squamous Cell Carcinoma (invasive) of Larynx s/p Chemo/Radiation/Keytruda      PATIENT SUMMARY   History per chart review:   Marvel Meeks is a 49 y.o. 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 He had total laryngectomy on 21 and had regional recurrence in 2021. He received chemotherapy from 21-21 with concurrent radiation from 21-21 at [facility]. He was started on Keytruda in 2022 followed by the addition of cisplatin and fluoruracil for 4 cycles. He is now on maintenance Keytruda every 3 weeks with his last dose received last Friday. He has had multiple blood transfusions in the past due to recurrent anemia. He is followed by Dr. Samson Stewart for onc  He presented to the ER at [address] [place] [address] on 22 due to nausea, vomiting, generalized weakness and a fever of 101. 6. He was admitted to the hospital and treated for pneumonia. He did have some numbness and tingling and weakness in his left . His head CT revealed a left occipital brain mass with cerebral edema; therefore, neurosurgery was consulted. The patient was transferred to [facility] for neurosurgical evaluation. \"      COMPREHENSIVE ASSESSMENT & PLAN:SYSTEM BASED     24 HOUR EVENTS: Continues on levo and vaso-. Levo weaned slightly overnight. Increased ventilator settings yesterday afternoon. PC 18/14. We will recheck XR this a.m.       NEUROLOGICAL:   Left Occipital Mass s/p left occipital Craniotomy (Dr. Pugh Mask, 8/5/22)  Analgesia: Dilaudid and percocet  Sedation: Versed for vent compliance  Neuro check Frequency: per neuro surgery   Decadron  Kera  Neurosurgery following    PULMONOLOGY:   Laryngeal Tube  (Shiley 10 cuffed at bedside)  Acute hypoxic respiratory failure,  PNA vs pulmonary edema  HAP  Respiratory Goals: Aggressive bronchopulmonary hygiene  Trach care q shift per unit protocol   Pulmonary toileting, guaifenesin  Zosyn, Vanco, Levaquin, Eraxis  Aspergillus and fungal culture pending  Pro-Papo uptrending  CTA chest negative for PE on 8/8, showing aspiration pneumonia  CXR this morning showing interval improvement  Pulmonary consulted, appreciate recs  Autoimmune panel sent 8/9, amio held  Increasing steroids to Solu Medrol 125 mg every 6 hours    CARDIOVASCULAR:   SBP Goal of: > 90 mmHg and < 140 mmHg  MAP Goal of: > 65 mmHg  Norepinephrine and Vasopressin - For above SBP/MAP goals, vaso weaned off  IVF: none  TTE pending    GASTROINTESTINAL   Constipation  Lactulose added to bowel regimen  Diet/Feeding:  No   TF on hold due to pressor requirement    RENAL/ELECTROLYTE/FLUIDS:   Keep K>4; Mg>2    Trend renal indices/ UOP  Trend Chemistry    ENDOCRINE:   Glycemic Control: Goal 120-180: SSI PRN  Prevent hypoglycemia    HEMATOLOGY/ONCOLOGY:   Hx of TOTAL LARYNGECTOMY (2/8/21)  Hx Squamous Cell Carcinoma (invasive) of Larynx s/p Chemo/Radiation/Keytruda   Oncology following  Follow CT chest abd pelvis for 8/8 ordered by oncology   Transfusion Trigger (Hgb): <7 g/dL  Trend CBC    INFECTIOUS DISEASE:   Staph aureus on sputum (?colonization/?contaminate)  ANTIBIOTICS TO DATE:   8/3-present Levaquin  8/8-present Zosyn  8/8-present Vanco  8/9-present Eraxis    CULTURES TO DATE:  8-9 fungal culture pending  8/8 respiratory culture pending  8/9 blood culture pending  8/9 Aspergillus pending  8/2  + light staph aureus   8/2 Legionella urine negative  8/2 Mycoplasma AB negative ICU DAILY CHECKLIST   Code Status: Full Code  DVT Prophylaxis: SCDs  T/L/D: Tubes: Tracheostomy and Nasogastric Tube  Lines: Peripheral IV, Arterial Line, and Central Line  Drains: None  SUP: Protonix   Diet: TF on hold  Activity Level:OOB to chair with assistance  ABCDEF Bundle/Checklist Completed:Yes  Disposition: Stay in ICU  Multidisciplinary Rounds Completed:  Yes  Patient/Family Updated: Yes  Goals of Care Discussion/Palliative: No    [facility]   8/2 - Admitted to hospital   8/5 - Admitted to ICU post op crani   8/6 - Laboy and A line DC.   8/7 - PT ordered. 8/8: placed back on vent. Broaden to Zosyn, Vanco, Levaquin. Aggressive pulmonary toileting. Started on Precedex. CTA chest negative for PE showing aspiration pneumonia  8/9: Started on levo and vaso early in the morning. Vaso now off. Increased vent requirements. Added Eraxis  8/10: PEEP weaned from 16-14. FiO2 weaned from 100 to 90%. We will check gas in afternoon. CXR this a.m. shows slight improvement. Increase steroids to Solu-Medrol 125 every 6. Did on Versed drip for vent compliance. SUBJECTIVE   Review of Systems   Unable to perform ROS: Patient nonverbal     OBJECTIVE   Labs and Data: Reviewed 08/10/22  Medications: Reviewed 08/10/22  Imaging: Reviewed 08/10/22    Physical Exam  Vitals and nursing note reviewed. Constitutional:       General: He is not in acute distress. Appearance: He is ill-appearing. HENT:      Mouth/Throat:      Mouth: Mucous membranes are dry. Eyes:      Pupils: Pupils are equal, round, and reactive to light. Neck:      Trachea: Tracheostomy present. Cardiovascular:      Rate and Rhythm: Regular rhythm. Tachycardia present. Pulmonary:      Effort: Tachypnea present. Breath sounds: Rhonchi and rales present. Abdominal:      Palpations: Abdomen is soft. Tenderness: There is abdominal tenderness.       Comments: Hypoactive   Musculoskeletal:         General: Normal range of motion. Skin:     General: Skin is warm and dry. Capillary Refill: Capillary refill takes less than 2 seconds. Neurological:      General: No focal deficit present. Mental Status: He is alert. Psychiatric:         Mood and Affect: Mood normal.         Judgment: Judgment normal.       Visit Vitals  BP (!) 138/100   Pulse (!) 110   Temp 98.7 °F (37.1 °C)   Resp (!) 38   Ht 6' 0.01\" (1.829 m)   Wt 71.5 kg (157 lb 10.1 oz)   SpO2 99%   BMI 21.37 kg/m²    O2 Flow Rate (L/min): 12 l/min O2 Device: Ventilator, Tracheostomy, Heated, Humidifier Temp (24hrs), Av.5 °F (36.9 °C), Min:98 °F (36.7 °C), Max:99.2 °F (37.3 °C)           Intake/Output:     Intake/Output Summary (Last 24 hours) at 8/10/2022 1903  Last data filed at 8/10/2022 0800  Gross per 24 hour   Intake 3145.69 ml   Output 1800 ml   Net 1345.69 ml         Imaging:  CT Results  (Last 48 hours)                 22 1758  CTA CHEST W OR W WO CONT Final result    Impression:      Imaging findings consistent with progressive neoplastic process with likely   superimposed aspiration pneumonia. Interval increased pulmonary masses and nodules compared to the May 2022   examination. Cavitary lesion in the left upper lobe is increased. Hilar adenopathy is increased. Attenuation of pulmonary arteries without pulmonary embolism. Atelectasis/consolidation at the lung bases suggest an aspiration pneumonia. Right middle lobe atelectasis is significantly increased compared to the prior   exam.   Increased intraperitoneal ascites. Narrative:      CLINICAL HISTORY: Hypoxia       INDICATION:  Hypoxia worsening pulmonary function   COMPARISON:  2022       TECHNIQUE:    Following the uneventful intravenous administration of 100 cc Isovue-370, thin   axial images were obtained through the chest, abdomen and pelvis. Coronal and   sagittal reconstructions were generated. Oral contrast was not administered.         CT dose reduction was achieved through use of a standardized protocol tailored   for this examination and automatic exposure control for dose modulation. Adaptive statistical iterative reconstruction (ASIR) was utilized. For the chest portion of the examination only;    3-D MIP reconstructed imaging was obtained. FINDINGS:    VASCULATURE: Minimal aortic atherosclerotic change. Attenuation of pulmonary   vasculature related to hilar or mediastinal adenopathy. No aortic aneurysm or dissection. No proximal pulmonary embolism is identified. MEDIASTINUM/HEART: Hilar greater than mediastinal lymphadenopathy. Attenuation   of pulmonary vessels without pulmonary embolism. Tracheostomy tube in place. No   esophageal mass. No endotracheal or endobronchial mass. PLEURA/LUNGS: Dense atelectasis/consolidation with scattered areas of   groundglass opacity as well. Cavitary lesion in the left upper lobe. Creased   indicating parenchymal opacity in the right upper lobe. Minimal pericardial   effusion. Right middle lobe atelectasis is significantly increased. SOFT TISSUE/ AXILLA: No axillary adenopathy. No chest wall mass. LIVER/GALLBLADDER: Atrophic gallbladder. Cholelithiasis versus sludge. There is   no intrahepatic duct dilatation. The CBD is not dilated. There is no hepatic   parenchymal mass. Hepatic enhancement pattern is within normal limits. The   portal vein is patent. SPLEEN/PANCREAS: No mass. . There is no pancreatic mass. There is no pancreatic   duct dilatation. There is no evidence of splenomegaly. ADRENALS/KIDNEYS: No mass, calculus, or hydronephrosis. . There is no adrenal   mass. There is no hydroureter or hydronephrosis. There is no perinephric mass. No ureteral or bladder calculus. STOMACH, COLON AND SMALL BOWEL: There is an NG tube in place. There is fecal   stasis. There is no obstruction or free intraperitoneal air. There is no   evidence of incarceration or obstruction.  No mesenteric adenopathy. APPENDIX: Unremarkable. PERITONEUM/RETROPERITONEUM: There is no abdominal aortic aneurysm. No   significant lymphadenopathy. There is ascites. Moderate. URINARY BLADDER: Air within the urinary bladder   PELVIS: There is no pelvic adenopathy. There is no pelvic sidewall mass. There   is no inguinal adenopathy. BONES: No destructive bone lesion. . No lytic or blastic lesions. No evidence of   fracture or dislocation. 08/08/22 1758  CT ABD PELV W CONT Final result    Impression:      Imaging findings consistent with progressive neoplastic process with likely   superimposed aspiration pneumonia. Interval increased pulmonary masses and nodules compared to the May 2022   examination. Cavitary lesion in the left upper lobe is increased. Hilar adenopathy is increased. Attenuation of pulmonary arteries without pulmonary embolism. Atelectasis/consolidation at the lung bases suggest an aspiration pneumonia. Right middle lobe atelectasis is significantly increased compared to the prior   exam.   Increased intraperitoneal ascites. Narrative:      CLINICAL HISTORY: Hypoxia       INDICATION:  Hypoxia worsening pulmonary function   COMPARISON:  5/9/2022       TECHNIQUE:    Following the uneventful intravenous administration of 100 cc Isovue-370, thin   axial images were obtained through the chest, abdomen and pelvis. Coronal and   sagittal reconstructions were generated. Oral contrast was not administered. CT dose reduction was achieved through use of a standardized protocol tailored   for this examination and automatic exposure control for dose modulation. Adaptive statistical iterative reconstruction (ASIR) was utilized. For the chest portion of the examination only;    3-D MIP reconstructed imaging was obtained. FINDINGS:    VASCULATURE: Minimal aortic atherosclerotic change. Attenuation of pulmonary   vasculature related to hilar or mediastinal adenopathy. No aortic aneurysm or dissection. No proximal pulmonary embolism is identified. MEDIASTINUM/HEART: Hilar greater than mediastinal lymphadenopathy. Attenuation   of pulmonary vessels without pulmonary embolism. Tracheostomy tube in place. No   esophageal mass. No endotracheal or endobronchial mass. PLEURA/LUNGS: Dense atelectasis/consolidation with scattered areas of   groundglass opacity as well. Cavitary lesion in the left upper lobe. Creased   indicating parenchymal opacity in the right upper lobe. Minimal pericardial   effusion. Right middle lobe atelectasis is significantly increased. SOFT TISSUE/ AXILLA: No axillary adenopathy. No chest wall mass. LIVER/GALLBLADDER: Atrophic gallbladder. Cholelithiasis versus sludge. There is   no intrahepatic duct dilatation. The CBD is not dilated. There is no hepatic   parenchymal mass. Hepatic enhancement pattern is within normal limits. The   portal vein is patent. SPLEEN/PANCREAS: No mass. . There is no pancreatic mass. There is no pancreatic   duct dilatation. There is no evidence of splenomegaly. ADRENALS/KIDNEYS: No mass, calculus, or hydronephrosis. . There is no adrenal   mass. There is no hydroureter or hydronephrosis. There is no perinephric mass. No ureteral or bladder calculus. STOMACH, COLON AND SMALL BOWEL: There is an NG tube in place. There is fecal   stasis. There is no obstruction or free intraperitoneal air. There is no   evidence of incarceration or obstruction. No mesenteric adenopathy. APPENDIX: Unremarkable. PERITONEUM/RETROPERITONEUM: There is no abdominal aortic aneurysm. No   significant lymphadenopathy. There is ascites. Moderate. URINARY BLADDER: Air within the urinary bladder   PELVIS: There is no pelvic adenopathy. There is no pelvic sidewall mass. There   is no inguinal adenopathy. BONES: No destructive bone lesion. . No lytic or blastic lesions. No evidence of   fracture or dislocation.                    Echo:  None CRITICAL CARE DOCUMENTATION  I had a face to face encounter with the patient, reviewed and interpreted patient data including clinical events, labs, images, vital signs, I/O's, and examined patient. I have discussed the case and the plan and management of the patient's care with the consulting services, the bedside nurses and the respiratory therapist.      NOTE OF PERSONAL INVOLVEMENT IN CARE   This patient has a high probability of imminent, clinically significant deterioration, which requires the highest level of preparedness to intervene urgently. I participated in the decision-making and personally managed or directed the management of the following life and organ supporting interventions that required my frequent assessment to treat or prevent imminent deterioration. I personally spent 70 minutes of critical care time. This is time spent at this critically ill patient's bedside actively involved in patient care as well as the coordination of care. This does not include any procedural time which has been billed separately.       Roland Interiano, DAT    Critical Care Medicine  [facility]

## 2022-08-10 NOTE — PROGRESS NOTES
Physical therapy  08/10/22     Chart reviewed in prep and discussed with nursing. Patient's versed and vent settings increased from this morning, not medically appropriate for participation with therapy at this time. Have attempted to see patient x3 days and remains inappropriate, will sign off at this time, please re-consult when patient medically appropriate for participation. Discussed with nursing who is in agreement at this time.     Thank you,  Russ Hooper, PT, DPT

## 2022-08-11 NOTE — PROGRESS NOTES
08/11/22 1057   Nitric Oxide   Tank PSI 1300   Tank Serial # [id number]   Delivery Mode Vent   $$ Nitric Initiated Yes   FIO2 Low 21   NO2 High 3   NO High 30   NO Low 10   Nitric Oxide   PPM NO Set 20   PPM NO Observed 19   PPM NO2 Observed 0

## 2022-08-11 NOTE — PROGRESS NOTES
Transition of Care Plan  RUR- 24%  2. DISPOSITION: TBD/subject to change pending medical progression  3. F/U with PCP/Specialist    4. Transport: TBD what's medically appropriate at discharge. Sedated and paralyzed  FiO2 weaned  Trach  TF on hold  PT/OT on hold    Pulmonology following  Palliative Care following  Oncology following    CM will continue to follow for appropriate disposition    NADIR Dhillon MSA, MADYSON, CM,

## 2022-08-11 NOTE — PROGRESS NOTES
Day #4 of Vancomycin [restart, -8/3) - Dosing Update  Indication:  Acute hypoxemic respiratory failure with bilateral infiltrates - suspected HAP  - Stage IV laryngeal cancer with resection of metastatic brain lesion on 22  - Currently being treated with pembrolizumab maintenance every 3 weeks (22 - current)  Current regimen:  1250 mg IV Q 24 hr  Abx regimen:  Eraxis + Levaquin + Zosyn + Vancomycin  ID Following ?: NO  Concomitant nephrotoxic drugs (requires more frequent monitoring): Contrast agents (given ), vasopressors  Frequency of BMP?: Daily     Recent Labs     22  1330 22  0315 08/10/22  0450 22  0129   WBC 21.0* 20.4* 24.7* 24.1*   CREA  --  1.62* 1.31* 1.39*   BUN  --  43* 38* 41*     Est CrCl: ~50-60 ml/min; UO: 0.7 ml/kg/hr  Temp (24hrs), Av.6 °F (36.4 °C), Min:97 °F (36.1 °C), Max:98 °F (36.7 °C)    Cultures:    Blood: NG, final   Sputum: light MSSA, final   Sputum: NG, final   Blood: NGTD   Blood for fungus: NGTD    MRSA Swab ordered (if applicable)? NO    Goal target range AUC/DANNY 400-600    Recent level history:  Date/Time Dose & Interval Measured Level (mcg/mL) Associated AUC/DANNY Dose Adjustment     @ 0315 1250 mg IV Q 24 hr 19.9 mcg/mL (drawn ~20.75 hrs post-dose) 667 Hold vancomycin, dose based on levels                                         Plan: The random vancomycin level drawn this morning correlates with an AUC/DANNY of 667, which is above the goal range. Plan will be to hold the vancomycin for now and check a random level at either the 36 or 48-hr milagros depending on labs tomorrow morning. Pharmacy will continue to monitor patient daily and will make dosage adjustments based upon changing renal function.

## 2022-08-11 NOTE — CONSULTS
Infectious Disease Consult    Today's Date: 8/11/2022   Admit Date: 8/2/2022    Impression:   Metastatic laryngeal squamous cell  Trach   Pneumonia--high risk of aspiration  Fever   Leukocytosis--pneumonia plus some steroid effect contributing, as well  Immunocompromised state    Plan:   Agree with current medication regimen  Taper steroids as feasible  Follow up cultures and studies  Work up fevers    Anti-infectives:   Eraxis  Vancomycin   Levaquin  Zosyn     Subjective:   Date of Consultation:  August 11, 2022  Referring Physician: Dr Zaira Arcos    Patient is a 49 y.o. male admitted for resection of brain med (head and neck primary). He has been on high levels of support post op and has a picture of sepsis with elevated WBC, fevers, pressors, etc. He is on broad antibiotic coverage and we are asked to see him in consultation.       Patient Active Problem List   Diagnosis Code    Throat cancer ([facility] C14.0    COPD exacerbation ([facility] J44.1    Acute and chronic respiratory failure with hypoxia (HCC) J96.21    Chronic pain due to neoplasm G89.3    Anxiety F41.9    Insomnia G47.00    Respiratory failure with hypoxia (HCC) J96.91    Laryngeal carcinoma (HCC) C32.9    Severe protein-calorie malnutrition (HCC) E43    Hypokalemia E87.6    Prevention of chemotherapy-induced neutropenia Z76.89    Anemia due to GI blood loss D50.0    Palpitations R00.2    Chest pain R07.9    Anemia D64.9    Symptomatic anemia D64.9    UGIB (upper gastrointestinal bleed) K92.2    GI bleed K92.2    CKD (chronic kidney disease) N18.9    Chronic anemia D64.9    Pneumonia J18.9    Brain mass G93.89     Past Medical History:   Diagnosis Date    Chronic pain     THROAT, EARS, NECK R/T CANCER    COPD (chronic obstructive pulmonary disease) ([address]     EMPHASEMA    Psychiatric disorder     ANXIETY R/T CANCER    Throat cancer (HCC)     Tracheostomy present ([address]       Family History   Problem Relation Age of Onset    Diabetes Mother     Diabetes Father Kidney Disease Father     Diabetes Sister     Heart Disease Daughter     Anesth Problems Neg Hx       Social History     Tobacco Use    Smoking status: Former     Packs/day: 1.50     Years: 30.00     Pack years: 45.00     Types: Cigarettes     Quit date: 10/28/2020     Years since quittin.7    Smokeless tobacco: Never   Substance Use Topics    Alcohol use: Not Currently     Past Surgical History:   Procedure Laterality Date    COLONOSCOPY N/A 2022    COLONOSCOPY performed by Olga Alvarez MD at [facility] N/A 2022    COLONOSCOPY performed by Olga Alvarez MD at [address] [place] [address]- \" PUT PLATE IN\"    HX OTHER SURGICAL  2021    Total Laryngectomy    HX TRACHEOSTOMY  2021    IR INSERT TUNL CVC W PORT OVER 5 YEARS  2021    IR PLACE CVAD FLUORO GUIDE  2021      Prior to Admission medications    Medication Sig Start Date End Date Taking? Authorizing Provider   senna-docusate (PERICOLACE) 8.6-50 mg per tablet Take 1 Tablet by mouth two (2) times a day. Patient not taking: Reported on 2022   David Go MD   guaiFENesin-dextromethorphan SR (HUMIBID DM) 600-30 mg per tablet Take 1 Tablet by mouth every twelve (12) hours as needed for Cough. Patient not taking: Reported on 2022 7/15/22   Karyn Mascorro NP   melatonin 5 mg cap capsule Take 1 Capsule by mouth nightly. Patient not taking: Reported on 2022 7/15/22   Karyn Mascorro NP   benzonatate (TESSALON) 100 mg capsule Take 1 Capsule by mouth three (3) times daily as needed for Cough. Patient not taking: Reported on 2022 7/15/22   Karyn Mascorro NP   sucralfate (Carafate) 1 gram tablet Take 1 Tablet by mouth four (4) times daily as needed (abdominal pain). Dissolve in 10 mL of water before takign. 22   Karyn Mascorro NP   pantoprazole (PROTONIX) 40 mg tablet Take 1 Tablet by mouth two (2) times a day.  Indications: bleeding from stomach, esophagus or duodenum  Patient not taking: Reported on 2022   Lionel Mascorro, NP   ferrous sulfate 325 mg (65 mg iron) tablet Take 1 Tablet by mouth two (2) times daily (with meals). Patient not taking: Reported on 2022   Lionel Mascorro NP       No Known Allergies     Review of Systems:  Review of systems not obtained due to patient factors. Objective:     Visit Vitals  BP 99/78 (BP 1 Location: Right upper arm, BP Patient Position: At rest)   Pulse 89   Temp 98.2 °F (36.8 °C)   Resp 26   Ht 6' (1.829 m)   Wt 71.5 kg (157 lb 10.1 oz)   SpO2 96%   BMI 21.38 kg/m²     Temp (24hrs), Av.8 °F (36.6 °C), Min:97 °F (36.1 °C), Max:98.2 °F (36.8 °C)       Lines:  Arterial Line:   and Central Venous Catheter:       Physical Exam:  Lungs:  rales R anterior, L anterior, rhonchi R anterior, L anterior  Heart:  regular rate and rhythm  Abdomen:  soft, non-tender.  Bowel sounds normal. No masses,  no organomegaly  Skin:  no rash or abnormalities  Wound ok    Data Review:     CBC:  Recent Labs     22  1330 22  0315 08/10/22  0450   WBC 21.0* 20.4* 24.7*   GRANS  --  96* 96*   MONOS  --  3* 3*   EOS  --  0 0   ANEU  --  19.6* 23.8*   ABL  --  0.0* 0.0*   HGB 7.0* 7.0* 7.9*   HCT 22.3* 22.7* 25.3*    258 294       BMP:  Recent Labs     22  1330 22  0315 08/10/22  0450   CREA 1.62* 1.62* 1.31*   BUN 45* 43* 38*    138 137   K 4.2 4.7 5.0    106 105   CO2 22 23 24   AGAP 12 9 8   * 148* 132*       LFTS:  Recent Labs     22  1003   TBILI 0.3   ALT 15      TP 6.7   ALB 2.1*       Microbiology:     All Micro Results       Procedure Component Value Units Date/Time    CULTURE, BLOOD, PAIRED [[id number]] Collected: 22 0450    Order Status: Completed Specimen: Blood Updated: 22     Special Requests: NO SPECIAL REQUESTS        Culture result: NO GROWTH 2 DAYS       CULTURE, BLOOD FOR FUNGUS [[id number]] Collected: 22 0952 Order Status: Completed Specimen: Blood Updated: 08/11/22 0520     Special Requests: HOLD 21 DAYS FOR FUNGUS        Culture result: NO GROWTH 2 DAYS       CULTURE, RESPIRATORY/SPUTUM/BRONCH Arch Pickles STAIN [[id number]] Collected: 08/08/22 1357    Order Status: Completed Specimen: Sputum Updated: 08/10/22 0803     Special Requests: NO SPECIAL REQUESTS        GRAM STAIN RARE WBCS SEEN         NO EPITHELIAL CELLS SEEN         NO ORGANISMS SEEN        Culture result: NO GROWTH 2 DAYS       CULTURE, MRSA [[id number]] Collected: 08/08/22 2030    Order Status: Canceled Specimen: Nasal from Nares     CULTURE, RESPIRATORY/SPUTUM/BRONCH Arch Pickles STAIN [[id number]]  (Abnormal)  (Susceptibility) Collected: 08/02/22 1301    Order Status: Completed Specimen: Sputum Updated: 08/05/22 1028     Special Requests: NO SPECIAL REQUESTS        GRAM STAIN RARE WBCS SEEN               RARE EPITHELIAL CELLS SEEN            NO ORGANISMS SEEN        Culture result:       LIGHT STAPHYLOCOCCUS AUREUS          LEGIONELLA PNEUMOPHILA AG, URINE [[id number]] Collected: 08/02/22 1058    Order Status: Completed Specimen: Urine Updated: 08/04/22 1436     Source URINE        L pneumophila S1 Ag, urine Negative        Comment: (NOTE)  Presumptive negative for L. pneumophila serogroup 1 antigen in urine,  suggesting no recent or current infection. Legionnaires' disease  cannot be ruled out since other serogroups and species may also  cause disease.   Performed At: [address] [id number]  David Green MD JV:[id number]         CULTURE, MRSA [[id number]] Collected: 08/02/22 1500    Order Status: Canceled Specimen: Nasal from Nares     MYCOPLASMA AB, IGM [[id number]] Collected: 08/02/22 1058    Order Status: Completed Specimen: Serum Updated: 08/02/22 1350     Mycoplasma Ab, IgM NONREACTIVE               Imaging:   Reviewed     Signed By: Lissette Darling MD     August 11, 2022

## 2022-08-11 NOTE — PROGRESS NOTES
Music Therapy Assessment  [facility] [id number]     1970  49 y.o.  male    Patient Telephone Number: [phone] (home)   [employer] Affiliation: Jaison    Language: English   Patient Active Problem List    Diagnosis Date Noted    Brain mass 08/02/2022    Pneumonia 07/30/2022    GI bleed 06/18/2022    CKD (chronic kidney disease) 06/18/2022    Chronic anemia 06/18/2022    UGIB (upper gastrointestinal bleed) 06/04/2022    Palpitations 05/30/2022    Chest pain 05/30/2022    Anemia 05/30/2022    Symptomatic anemia 05/30/2022    Anemia due to GI blood loss 05/09/2022    Prevention of chemotherapy-induced neutropenia 02/23/2022    Hypokalemia 05/24/2021    Severe protein-calorie malnutrition ([address] 02/15/2021    Laryngeal carcinoma ([address] 02/07/2021    Throat cancer ([address] 01/31/2021    COPD exacerbation ([address] 01/31/2021    Acute and chronic respiratory failure with hypoxia ([address] 01/31/2021    Chronic pain due to neoplasm 01/31/2021    Anxiety 01/31/2021    Insomnia 01/31/2021    Respiratory failure with hypoxia ([address] 01/31/2021        Date: 8/11/2022            Total Time (in minutes): 25          [facility] 7S1 INTENSIVE CARE    Mental Status:   [  ] Alert [  ] David Gaster [  ]  Confused  [  ] Minimally responsive  [  ] Sleeping  [x] Other-sedated    Communication Status: [  ] Impaired Speech [  ] Nonverbal -N/A    Physical Status:   [x] Oxygen in use  [  ] Hard of Hearing [  ] Vision Impaired  [  ] Ambulatory  [  ] Ambulatory with assistance [  ] Non-ambulatory     Music Preferences, Background: Pt's brother said pt likes R&B music from the 1's to present day and [employer]. He said pt used to enjoy singing. Clinical Problem addressed: Enhance pt's environment to provide comforting sound and presence. Goal(s) met in session:  Physical/Pain management (Scale of 1-10):    Pre-session rating: Pt couldn't report on pain. Post-session rating: Pt couldn't report on pain.   [  ] Increased relaxation   [  ] Affected breathing patterns  [  ] Decreased muscle tension   [  ] Decreased agitation  [  ] Affected heart rate    [  ] Increased alertness     Emotional/Psychological:  [  ] Increased self-expression   [  ] Decreased aggressive behavior   [  ] Decreased feelings of stress  [  ] Discussed healthy coping skills     [  ] Improved mood    [  ] Decreased withdrawn behavior     Social:  [  ] Decreased feelings of isolation/loneliness [  ] Positive social interaction   [  ] Provided support and/or comfort for family/friends    Spiritual:  [x] Spiritual support    [  ] Expressed peace  [  ] Expressed angelique    [  ] Discussed beliefs    Techniques Utilized (Check all that apply):   [  ] Procedural support MT [x] Music for relaxation [x] Patient preferred music  [  ] Gloria analysis  [  ] Song choice  [  ] Music for validation  [  ] Entrainment  [  ] Movement to music [  ] Guided visualization  [  ] Chivo Rivero  [  ] Patient instrument playing [  ] ValueFirst Messaging writing  [  ] Celia Mediate along   [  ] Pawel Gauthier  [  ] Sensory stimulation  [  ] Active Listening  [x] Music for spiritual support [  ] Making of CDs as gifts    Session Observations:  F/up visit; Patient 's nurse Adriane shared with this music therapist (MT) that the patient (pt) was sedated. MT greeted the pt, shared with him about speaking yesterday on the phone with his brother Chance Martinez, the music preferences of the pt's that Chance Martinez shared, and what pt could expect to hear today from the MT. MT expressed hope that the music would be comforting if he was able to hear and listen to it. MT sat at bedside and sang and played with guitar songs that aligned with the pt's music preferences.  MT sang and played with guitar the Jordi Trotter By Your Side, selected for it's sound and lyrics of unconditional love and tender comfort and support; the [employer] song No More Drama, for it's expression of a longing for freedom from physical and emotional pain; and the [facility] [address], chosen for it's themes of angelique and hope. Pt appeared minimally responsive throughout the session. When MT concluded the session, MT thanked the pt and said good bye for now.     MARTIR FormanBC (Music Therapist-Board Certified)  Spiritual Care Department  Referral-based service

## 2022-08-11 NOTE — PROGRESS NOTES
[employer]  Medical Oncology at [facility]    Hematology / Oncology progressnote    Reason for consultation   Marvel Meeks is a 49 y.o. 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 Stage: CATARINO [[id number]] at diagnosis, regional recurrence in 4/2021, now with metastatic disease. Pathology:   2/8/21 Total laryngectomy: Invasive squamous cell carcinoma  Tumor size 3.5cm, moderately differentiated (G2), PNI present, LVI not identified, margins negative  [id number]  -PDL1 (tested 12/28/21 on above specimen) positive with CPS 10. Prior Treatment:   1. 2/8/21 total laryngectomy/cricopharyngeal myotomy   2. Cisplatin 100mg/m2 every 3 weeks x 3 cycles, 5/24/21-7/13/21  3. Radiation 5/24/21-7/19/21  4. Pembrolizumab x 2 cycles, 1/6/22  5. [Cisplatin (100 mg/m2 intravenous, day 1) and fluorouracil (1000 mg/m2 per day, continuous infusion, for four days) and Pembrolizumab, day 1 given every 21 days x 4 cycles. Current Treatment:  Pembrolizumab maintenance every 3 weeks, 1/28/22 - current. History of Present Illness:   Marvel Meeks is a 49 y.o. male who with COPD and stage IV head and neck squamous cell carcinoma who sees Dr. Bonny Bates at [facility], currently on palliative single agent Mckayla Familia was admitted to [facility] on 8/2/2022 for management of brain mass. He was initially admitted on 7/30/2022 at [facility] due to nausea, vomiting, weakness, fevers. He was treated for pneumonia. He had weakness of left  when he had a CT scan done that showed a left occipital mass and hence was transferred to [facility].  MRI brain on 8/4/2022 showed a left occipital lobe mass measuring 2.9 x 3 x 3.8 cm with surrounding vasogenic edema, acute ischemia.   CT abdomen and pelvis without contrast showed increase in intraperitoneal ascites, small pleural effusions. Labs were notable for leukocytosis, chronic stable anemia with a hemoglobin of 7.5 g/dL, thrombocytosis with a platelet count of 543Y. After discussions he elected to proceed with a craniotomy with resection of solitary brain metastases and is seen status post surgery. Had postoperative declining respiratory status and is intubated and sedated. Review of Systems: unable to obtain due to patient factors  Physical Exam:   Blood pressure (!) 126/94, pulse 82, temperature 98 °F (36.7 °C), resp. rate (!) 32, height 6' (1.829 m), weight 157 lb 10.1 oz (71.5 kg), SpO2 96 %. Intubated and sedated    Results:     Lab Results   Component Value Date/Time    WBC 21.0 (H) 08/11/2022 01:30 PM    HGB 7.0 (L) 08/11/2022 01:30 PM    HCT 22.3 (L) 08/11/2022 01:30 PM    PLATELET 941 33/77/8649 01:30 PM    MCV 89.2 08/11/2022 01:30 PM    ABS. NEUTROPHILS 19.6 (H) 08/11/2022 03:15 AM    Hemoglobin (POC) 6.5 (L) 08/05/2022 04:17 PM     Lab Results   Component Value Date/Time    Sodium 137 08/11/2022 01:30 PM    Potassium 4.2 08/11/2022 01:30 PM    Chloride 103 08/11/2022 01:30 PM    CO2 22 08/11/2022 01:30 PM    Glucose 156 (H) 08/11/2022 01:30 PM    BUN 45 (H) 08/11/2022 01:30 PM    Creatinine 1.62 (H) 08/11/2022 01:30 PM    GFR est AA 55 (L) 08/11/2022 01:30 PM    GFR est non-AA 45 (L) 08/11/2022 01:30 PM    Calcium 7.1 (L) 08/11/2022 01:30 PM    Glucose (POC) 146 (H) 08/11/2022 11:13 AM     Lab Results   Component Value Date/Time    Bilirubin, total 0.3 08/11/2022 10:03 AM    ALT (SGPT) 15 08/11/2022 10:03 AM    Alk.  phosphatase 105 08/11/2022 10:03 AM    Protein, total 6.7 08/11/2022 10:03 AM    Albumin 2.1 (L) 08/11/2022 10:03 AM    Globulin 4.6 (H) 08/11/2022 10:03 AM     Lab Results   Component Value Date/Time    Iron 32 (L) 08/01/2022 07:22 AM    TIBC 204 (L) 08/01/2022 07:22 AM    Iron % saturation 16 (L) 08/01/2022 07:22 AM    Ferritin 597 (H) 08/01/2022 07:22 AM Lab Results   Component Value Date/Time    Vitamin B12 >2,000 (H) 05/10/2022 12:23 PM    Folate 13.9 05/10/2022 12:23 PM         Imaging:     CT abdomen and pelvis without contrast 8/2022  IMPRESSION  Interval increased now moderate intraperitoneal ascites. Cardiomegaly and anemia. Interval bibasilar atelectasis with small effusions. Parenchymal opacities  subpleural on the right and on the left are nonspecific. Follow-up CT scan of  chest in 8-12 weeks to evaluate for resolution recommended. MRI Brain 8/2022  IMPRESSION  1. Left occipital mass as noted previously with surrounding vasogenic edema. 2.  Focus of diffusion restriction in the right precentral gyrus extending to  the right centrum semiovale consistent with acute ischemia    CT 8/2022    IMPRESSION     Imaging findings consistent with progressive neoplastic process with likely  superimposed aspiration pneumonia. Interval increased pulmonary masses and nodules compared to the May 2022  examination. Cavitary lesion in the left upper lobe is increased. Hilar adenopathy is increased. Attenuation of pulmonary arteries without pulmonary embolism. Atelectasis/consolidation at the lung bases suggest an aspiration pneumonia. Right middle lobe atelectasis is significantly increased compared to the prior  exam.  Increased intraperitoneal ascites. Assessment & Plan:       1. Squamous cell carcinoma of larynx, Stage CATARINO [pT4a cN2 Mx]: With lung metastases diagnosed around 11/2021. Initially received cisplatin with 5-FU and Keytruda and then transition to maintenance Keytruda. He had stable scans 4/2022. He has a solitary new brain met-is post resection on 8/5/2022, pathology was reviewed. CT scans were reviewed from 8/2022 and also showed progression and systemic disease burden. He is now intubated secondary to respiratory failure. Palliative systemic options that meaningfully improve survival are few and not evidence-based.   In general single agent such as gemcitabine, docetaxel can be considered. With his progressive decline none of these treatments are likely to meaningfully prolong survival.  I discussed the overall poor prognosis with his brother Paola Castellanos on phone today. I have recommended considering focusing on comfort. He is going to be in town tomorrow and would like to discuss options with ICU and palliative care team.  At this moment he has requested we continue current level of care. 2. 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 Essentially all prior work-up has been negative. Capsule endoscopy completed with [facility] on 7/19/22 -I do not see the results of this. He is on Protonix  Transfuse for hemoglobin if less than 7    3. Anasarca  Due to hypoalbuminemia     4. Pulmonary embolism:  Provoked by #1. 11/2021 CT chest.  Rina and the patient had discussed holding blood thinners.   Due to recurrent transfusion dependent anemia and concerns for GI blood losses      Signed By: Tresia Burkitt, MD     08/11/22

## 2022-08-11 NOTE — PROGRESS NOTES
Bedside shift change report given to Maverick Carty RN (oncoming nurse) by Vanessa Lai RN (offgoing nurse). Report included the following information SBAR, Kardex, MAR, Recent Results, and Cardiac Rhythm NSR .

## 2022-08-11 NOTE — PROGRESS NOTES
CRITICAL CARE NOTE    Name: Deepak Mustafa   : 1970   MRN: [id number]   Date: 2022      REASON FOR ICU ADMISSION: Brain Mass s/p crani with resection     PRINCIPLE ICU DIAGNOSIS   Left Occipital Mass (+/- necrotic lymphadenopathy in neck)  S/p left occipital Craniotomy (Dr. Abelardo Jacques, 22)  Acute hypoxic respiratory failure, aspiration pneumonia versus pulmonary edema   Acute Postoperative Pain  Atelectasis  Anemia  Staph aureus on sputum (?colonization/?contaminate)  Hx of TOTAL LARYNGECTOMY (21) d/t Squamous Cell Carcinoma (invasive) of Larynx s/p Chemo/Radiation/Keytruda  TASHI  pHTN     PATIENT SUMMARY   History per chart review:   Deepak Mustafa is a 49 y.o. 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 He had total laryngectomy on 21 and had regional recurrence in 2021. He received chemotherapy from 21-21 with concurrent radiation from 21-21 at [address] [place] [address]. He was started on Keytruda in 2022 followed by the addition of cisplatin and fluoruracil for 4 cycles. He is now on maintenance Keytruda every 3 weeks with his last dose received last Friday. He has had multiple blood transfusions in the past due to recurrent anemia. He is followed by Dr. Reyes Clay for onc  He presented to the ER at [facility] on 22 due to nausea, vomiting, generalized weakness and a fever of 101. 6. He was admitted to the hospital and treated for pneumonia. He did have some numbness and tingling and weakness in his left . His head CT revealed a left occipital brain mass with cerebral edema; therefore, neurosurgery was consulted. The patient was transferred to [facility] for neurosurgical evaluation. \"      COMPREHENSIVE ASSESSMENT & PLAN:SYSTEM BASED     24 HOUR EVENTS: Continues on levo and vaso-. Sedated and paralyzed. ARDSnet protocol. ABG stable this morning. FiO2 weaned. Will check CBC in afternoon.  Dontae started    NEUROLOGICAL:   Left Occipital Mass s/p left occipital Craniotomy (Dr. Michele Singh, 8/5/22)  Analgesia: Dilaudid and percocet  Sedation: Versed for vent compliance  Nimbex, TOF 3 this am  Neuro check Frequency: per neuro surgery   Decadron  Keppra  Neurosurgery following    PULMONOLOGY:   Laryngeal Tube  (Shiley 10 cuffed at bedside)  Acute hypoxic respiratory failure,  PNA vs pulmonary edema  HAP  pHTN  Respiratory Goals: Aggressive bronchopulmonary hygiene  Trach care q shift per unit protocol   Pulmonary toileting, guaifenesin  Zosyn, Vanco, Levaquin, Eraxis  ID consulted, appreciate recs  Aspergillus and fungal culture pending  Pro-Papo starting to downtrend, WBC as well  CTA chest negative for PE on 8/8, showing aspiration pneumonia  CXR showing interval improvement  Pulmonary consulted, appreciate recs  Autoimmune panel sent 8/9, amio held  Continue Solu Medrol 125 mg every 6 hours  TTE as below showing PASP 75mmHg  Dontae started today at 20 ppm  ARDSnet ventilation, weaning FiO2    CARDIOVASCULAR:   SBP Goal of: > 90 mmHg and < 140 mmHg  MAP Goal of: > 65 mmHg  Norepinephrine and Vasopressin - For above SBP/MAP goals  IVF: none  TTE 8/10: EF 60-65%, RV and RA dilation, severely elevated RVSP, PASP in 75mmHg  Diuresis with lasix    GASTROINTESTINAL   Constipation  Stooling after lactulose 8/10  Diet/Feeding:  No   TF on hold due to pressor requirement    RENAL/ELECTROLYTE/FLUIDS:   Keep K>4; Mg>2    Trend renal indices/ UOP  Trend Chemistry    ENDOCRINE:   Glycemic Control: Goal 120-180: SSI PRN  Prevent hypoglycemia    HEMATOLOGY/ONCOLOGY:   Hx of TOTAL LARYNGECTOMY (2/8/21)  Hx Squamous Cell Carcinoma (invasive) of Larynx s/p Chemo/Radiation/Keytruda   Oncology following  Follow CT chest abd pelvis for 8/8 ordered by oncology   Transfusion Trigger (Hgb): <7 g/dL  Trend CBC, check this afternoon    INFECTIOUS DISEASE:   Staph aureus on sputum (?colonization/?contaminate)  ANTIBIOTICS TO DATE:   8/3-present Levaquin  8/8-present Zosyn  8/8-present Vanco  8/9-present Eraxis    CULTURES TO DATE:  8/9 fungal culture pending  8/8 respiratory culture pending  8/9 blood culture pending  8/9 Aspergillus negative  8/2  + light staph aureus   8/2 Legionella urine negative  8/2 Mycoplasma AB negative     ICU DAILY CHECKLIST   Code Status: Full Code  DVT Prophylaxis: SCDs  T/L/D: Tubes: Tracheostomy and Nasogastric Tube  Lines: Peripheral IV, Arterial Line, and Central Line  Drains: None  SUP: Protonix   Diet: TF on hold  Activity Level:OOB to chair with assistance  ABCDEF Bundle/Checklist Completed:Yes  Disposition: Stay in ICU  Multidisciplinary Rounds Completed:  Yes  Patient/Family Updated: Yes  Goals of Care Discussion/Palliative: No    Lauren   8/2 - Admitted to hospital   8/5 - Admitted to ICU post op crani   8/6 - Laboy and A line DC.   8/7 - PT ordered. 8/8: placed back on vent. Broaden to Zosyn, Vanco, Levaquin. Aggressive pulmonary toileting. Started on Precedex. CTA chest negative for PE showing aspiration pneumonia  8/9: Started on levo and vaso early in the morning. Vaso now off. Increased vent requirements. Added Eraxis  8/10: PEEP weaned from 16-14. FiO2 weaned from 100 to 90%. We will check gas in afternoon. CXR this a.m. shows slight improvement. Increase steroids to Solu-Medrol 125 every 6. Did on Versed drip for vent compliance. 8/11: Dontae added, weaning FiO2, remains paralyzed and sedation, serial ABGs    SUBJECTIVE   Review of Systems   Unable to perform ROS: Acuity of condition     OBJECTIVE   Labs and Data: Reviewed 08/11/22  Medications: Reviewed 08/11/22  Imaging: Reviewed 08/11/22    Physical Exam  Vitals and nursing note reviewed. Constitutional:       General: He is not in acute distress. Appearance: He is ill-appearing. Interventions: He is sedated. HENT:      Mouth/Throat:      Mouth: Mucous membranes are dry.    Eyes:      Pupils: Pupils are equal, round, and reactive to light. Neck:      Trachea: Tracheostomy present. Cardiovascular:      Rate and Rhythm: Normal rate and regular rhythm. Pulmonary:      Effort: Tachypnea present. Breath sounds: Wheezing and rhonchi present. Abdominal:      General: There is distension. Palpations: Abdomen is soft. Comments: Hypoactive   Skin:     General: Skin is warm and dry. Capillary Refill: Capillary refill takes less than 2 seconds. Neurological:      Comments: Paralyzed and sedated   Psychiatric:      Comments: JAKUB       Visit Vitals  /79   Pulse 94   Temp 98 °F (36.7 °C)   Resp (!) 32   Ht 6' (1.829 m)   Wt 71.5 kg (157 lb 10.1 oz)   SpO2 99%   BMI 21.38 kg/m²    O2 Flow Rate (L/min): 12 l/min O2 Device: Ventilator, Tracheostomy, Heated, Humidifier Temp (24hrs), Av.7 °F (36.5 °C), Min:97 °F (36.1 °C), Max:98.7 °F (37.1 °C)           Intake/Output:     Intake/Output Summary (Last 24 hours) at 2022 0705  Last data filed at 2022 0400  Gross per 24 hour   Intake 2012.23 ml   Output 1085 ml   Net 927.23 ml         Imaging:  CT Results  (Last 48 hours)      None            Echo:  EF 60-65%, PASP 75, dilated RA and RV    CRITICAL CARE DOCUMENTATION  I had a face to face encounter with the patient, reviewed and interpreted patient data including clinical events, labs, images, vital signs, I/O's, and examined patient. I have discussed the case and the plan and management of the patient's care with the consulting services, the bedside nurses and the respiratory therapist.      NOTE OF PERSONAL INVOLVEMENT IN CARE   This patient has a high probability of imminent, clinically significant deterioration, which requires the highest level of preparedness to intervene urgently. I participated in the decision-making and personally managed or directed the management of the following life and organ supporting interventions that required my frequent assessment to treat or prevent imminent deterioration.      I personally spent 70 minutes of critical care time. This is time spent at this critically ill patient's bedside actively involved in patient care as well as the coordination of care. This does not include any procedural time which has been billed separately.       Maci Hatch, NP    Critical Care Medicine  [facility]

## 2022-08-12 NOTE — PROGRESS NOTES
ID Progress Note  2022    Subjective: Intubated it  Review of Systems:            Symptom Y/N Comments   Symptom Y/N Comments   Fever/Chills       Chest Pain        Poor Appetite       Edema        Cough       Abdominal Pain        Sputum       Joint Pain        SOB/MANRIQUEZ       Pruritis/Rash        Nausea/vomit       Tolerating PT/OT        Diarrhea       Tolerating Diet        Constipation       Other           Could NOT obtain due to:       Objective:     Vitals: Visit Vitals  BP (!) 115/49 (BP 1 Location: Right arm)   Pulse 100   Temp 97.7 °F (36.5 °C)   Resp 24   Ht 6' (1.829 m)   Wt 71.5 kg (157 lb 10.1 oz)   SpO2 (!) 89%   BMI 21.38 kg/m²        Tmax:  Temp (24hrs), Av.8 °F (36.6 °C), Min:97.2 °F (36.2 °C), Max:98.2 °F (36.8 °C)      PHYSICAL EXAM:  General: WD, WN. Intubated, no acute distress    EENT:  Pupils are reactive to light. Anicteric sclerae. Dry mucous membrane, tracheostomy in place  Resp:  Bilateral rhonchi, with coarse breath sounds at bases, no wheezing or rales. No accessory muscle use  CV:  Tachycardia, rhythm,  No edema  GI:  Soft, Non distended, Non tender. hypoactive Bowel sounds  Neurologic:  Intubated it  Psych:   Unable to assess insight. Not anxious nor agitated  Skin:  No rashes.   No jaundice    Labs:   Lab Results   Component Value Date/Time    WBC 19.6 (H) 2022 11:59 AM    Hemoglobin (POC) 6.5 (L) 2022 04:17 PM    HGB 7.7 (L) 2022 11:59 AM    HCT 24.3 (L) 2022 11:59 AM    PLATELET 024  11:59 AM    MCV 89.3 2022 11:59 AM     Lab Results   Component Value Date/Time    Sodium 137 2022 03:17 AM    Potassium 4.2 2022 03:17 AM    Chloride 103 2022 03:17 AM    CO2 25 2022 03:17 AM    Anion gap 9 2022 03:17 AM    Glucose 133 (H) 2022 03:17 AM    BUN 43 (H) 2022 03:17 AM    Creatinine 1.57 (H) 2022 03:17 AM    BUN/Creatinine ratio 27 (H) 2022 03:17 AM    GFR est AA 57 (L) 2022 03:17 AM    GFR est non-AA 47 (L) 08/12/2022 03:17 AM    Calcium 7.1 (L) 08/12/2022 03:17 AM    Bilirubin, total 0.3 08/11/2022 10:03 AM    Alk.  phosphatase 105 08/11/2022 10:03 AM    Protein, total 6.7 08/11/2022 10:03 AM    Albumin 2.1 (L) 08/11/2022 10:03 AM    Globulin 4.6 (H) 08/11/2022 10:03 AM    A-G Ratio 0.5 (L) 08/11/2022 10:03 AM    ALT (SGPT) 15 08/11/2022 10:03 AM         Cultures:   Results       Procedure Component Value Units Date/Time    CULTURE, BLOOD FOR FUNGUS [[id number]] Collected: 08/09/22 0935    Order Status: Completed Specimen: Blood Updated: 08/12/22 0044     Special Requests: HOLD 21 DAYS FOR FUNGUS        Culture result: NO GROWTH 3 DAYS       CULTURE, BLOOD, PAIRED [[id number]] Collected: 08/09/22 0450    Order Status: Completed Specimen: Blood Updated: 08/12/22 0045     Special Requests: NO SPECIAL REQUESTS        Culture result: NO GROWTH 3 DAYS       CULTURE, MRSA [[id number]] Collected: 08/08/22 2030    Order Status: Canceled Specimen: Nasal from Nares     CULTURE, RESPIRATORY/SPUTUM/BRONCH W Melody Schmid [[id number]] Collected: 08/08/22 1357    Order Status: Completed Specimen: Sputum Updated: 08/10/22 0803     Special Requests: NO SPECIAL REQUESTS        GRAM STAIN RARE WBCS SEEN         NO EPITHELIAL CELLS SEEN         NO ORGANISMS SEEN        Culture result: NO GROWTH 2 DAYS       CULTURE, MRSA [[id number]] Collected: 08/02/22 1500    Order Status: Canceled Specimen: Nasal from Nares     CULTURE, RESPIRATORY/SPUTUM/BRONCH W GRAM STAIN [[id number]]  (Abnormal)  (Susceptibility) Collected: 08/02/22 1301    Order Status: Completed Specimen: Sputum Updated: 08/05/22 1028     Special Requests: NO SPECIAL REQUESTS        GRAM STAIN RARE WBCS SEEN               RARE EPITHELIAL CELLS SEEN            NO ORGANISMS SEEN        Culture result:       LIGHT STAPHYLOCOCCUS AUREUS          Susceptibility        Staphylococcus aureus      DANNY      Ciprofloxacin ($) Susceptible      Clindamycin ($) Susceptible      Doxycycline ($$) Susceptible      Erythromycin ($$$$) Resistant      Gentamicin ($) Susceptible      Levofloxacin ($) Susceptible      Linezolid ($$$$$) Susceptible      Moxifloxacin ($$$$) Susceptible      Oxacillin Susceptible      Rifampin ($$$$) Susceptible  [1]       Tetracycline Susceptible      Trimeth/Sulfa Susceptible      Vancomycin ($) Susceptible                   [1]  Rifampin is not to be used for mono-therapy. MYCOPLASMA AB, IGM [[id number]] Collected: 08/02/22 1058    Order Status: Completed Specimen: Serum Updated: 08/02/22 1350     Mycoplasma Ab, IgM NONREACTIVE       LEGIONELLA PNEUMOPHILA AG, URINE [[id number]] Collected: 08/02/22 1058    Order Status: Completed Specimen: Urine Updated: 08/04/22 1436     Source URINE        L pneumophila S1 Ag, urine Negative        Comment: (NOTE)  Presumptive negative for L. pneumophila serogroup 1 antigen in urine,  suggesting no recent or current infection. Legionnaires' disease  cannot be ruled out since other serogroups and species may also  cause disease. Performed At: [address] [id number]  Aamir Goodman MD GW:[id number]         CULTURE, BLOOD, PAIRED [[id number]] Collected: 07/30/22 1030    Order Status: Completed Specimen: Blood Updated: 08/05/22 1444     Special Requests: No Special Requests        Culture result: No growth 6 days       COVID-19 RAPID TEST [[id number]] Collected: 07/30/22 1030    Order Status: Completed Specimen: Nasopharyngeal Updated: 07/30/22 1107     COVID-19 rapid test Not Detected        Comment: Rapid Abbott ID Now   Rapid NAAT:  The specimen is NEGATIVE for SARS-CoV-2, the novel coronavirus associated with COVID-19. Negative results should be treated as presumptive and, if inconsistent with clinical signs and symptoms or necessary for patient management, should be tested with an alternative molecular assay.  Negative results do not preclude SARS-CoV-2 infection and should not be used as the sole basis for patient management decisions. This test has been authorized by the FDA under   an Emergency Use Authorization (EUA) for use by authorized laboratories. Fact sheet for Healthcare Providers: iTendency.uy Fact sheet for Patients: iTendency.uy   Methodology: Isothermal Nucleic Acid Amplification         INFLUENZA A & B AG (RAPID TEST) [[id number]] Collected: 07/30/22 1030    Order Status: Completed Specimen: Nasopharyngeal from Nasal washing Updated: 07/30/22 1055     Influenza A Antigen Negative        Influenza B Antigen Negative                Impression:   Metastatic laryngeal squamous cell        S/p left occipital craniotomy (8/5)  Trach  Pneumonia--high risk of aspiration        Resp cx (8/2) MSSA  Fever (afebrile overnight)  Leukocytosis--pneumonia plus some steroid effect contributing, as well        Blood cx (8/9) no growth         Fungal blood cx (8/9) no growth sofar  Immunocompromised state     Plan:   Continue with IV zosyn and vancomycin; monitor renal function closely        Check MRSA screen; d/c IV vancomycin if MRSA screen is (-)  Taper steroids as feasible  Adjust abx prn  Fever work up if temp >= 100.4       Above plan of care discussed and agreed with Dr. Ana Del Cid will see prn this weekend, call if issues arise.      Taj Morrow NP

## 2022-08-12 NOTE — PROGRESS NOTES
Bedside shift change report given to MARK Osei RN (oncoming nurse) by Christopher Kelly. Rocio Mederos RN and MARK Epps RN (offgoing nurse). Report included the following information SBAR, Kardex, ED Summary, Intake/Output, MAR, Accordion, Recent Results, Med Rec Status, Cardiac Rhythm NSR, and Alarm Parameters . 2000: After entering patients room, patient appeared to be trying to opening eyes and moving jaw. TOF 4/4 twitches. Julia Jiménez RN called to bedside and noted patient appears to be awake. Veresed and Nimbex increased per protocol. 4767: Pt family, Heriberto Melendez, called and consented to pt receiving blood products. 0384: Hgb. 6.4 on AM labs. Orders received for 1 unit PRBCs. Infusion started at 0639.

## 2022-08-12 NOTE — PROGRESS NOTES
Discussed case with intensivist today  his respiratory status is improving  neuro stable  no acute neurosurgical needs

## 2022-08-12 NOTE — PROGRESS NOTES
CRITICAL CARE NOTE    Name: Binnie Gottron   : 1970   MRN: [id number]   Date: 2022      REASON FOR ICU ADMISSION: Brain Mass s/p crani with resection     PRINCIPLE ICU DIAGNOSIS   Left Occipital Mass (+/- necrotic lymphadenopathy in neck)  S/p left occipital Craniotomy (Dr. Moise York, 22)  Acute hypoxic respiratory failure, aspiration pneumonia versus pulmonary edema versus DAH versus drug-induced pneumonitis  Anemia  Hx of TOTAL LARYNGECTOMY (21) d/t Squamous Cell Carcinoma (invasive) of Larynx s/p Chemo/Radiation/Keytruda  TASHI  pHTN     PATIENT SUMMARY   History per chart review:   Binnie Gottron is a 49 y.o. 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 He had total laryngectomy on 21 and had regional recurrence in 2021. He received chemotherapy from 21-21 with concurrent radiation from 21-21 at [address] [place] [address]. He was started on Keytruda in 2022 followed by the addition of cisplatin and fluoruracil for 4 cycles. He is now on maintenance Keytruda every 3 weeks with his last dose received last Friday. He has had multiple blood transfusions in the past due to recurrent anemia. He is followed by Dr. Dejon Kinsey for onc  He presented to the ER at [facility] on 22 due to nausea, vomiting, generalized weakness and a fever of 101. 6. He was admitted to the hospital and treated for pneumonia. He did have some numbness and tingling and weakness in his left . His head CT revealed a left occipital brain mass with cerebral edema; therefore, neurosurgery was consulted. The patient was transferred to [facility] for neurosurgical evaluation. \"      COMPREHENSIVE ASSESSMENT & PLAN:SYSTEM BASED     24 HOUR EVENTS: Weaning levo. Sedated and paralyzed. Will DC paralytics today. Continue MARIYA. ABG stable this morning. Weaning PEEP and FiO2.  1U PRBC this AM.  Will recheck CBC posttransfusion.     NEUROLOGICAL:   Left Occipital Mass s/p left occipital Craniotomy (Dr. Mary Beth Jones, 8/5/22)  Analgesia: Dilaudid and percocet  Sedation: Versed for vent compliance  Nimbex, will discontinue today  Neuro check Frequency: per neuro surgery   Kera  Neurosurgery following    PULMONOLOGY:   Laryngeal Tube  (Shiley 10 cuffed at bedside)  Acute hypoxic respiratory failure,  PNA vs pulmonary edema  HAP  pHTN  Respiratory Goals: Aggressive bronchopulmonary hygiene  Trach care q shift per unit protocol   Pulmonary toileting, guaifenesin  Zosyn, Vanco, s/p Levaquin, Eraxis  ID consulted, appreciate recs  Aspergillus and fungitell negative and fungal culture NGTD  Pro-Papo downtrend, WBC as well  CTA chest negative for PE on 8/8, showing aspiration pneumonia  CXR a.m. to assess interval changes  Pulmonary consulted, appreciate recs  Autoimmune panel sent 8/9, amio held, negative thus far  Continue Solu Medrol 125 mg every 6 hours x 5 days then taper  TTE as below showing PASP 75mmHg  Continue Dontae today at 20 ppm  ABGs as needed with MV adjustments    CARDIOVASCULAR:   SBP Goal of: > 90 mmHg and < 140 mmHg  MAP Goal of: > 65 mmHg  Norepinephrine and Vasopressin - For above SBP/MAP goals  IVF: none  TTE 8/10: EF 60-65%, RV and RA dilation, severely elevated RVSP, PASP in 75mmHg  Diuresis with lasix prn, -1 L yesterday, well hold today with FG even    GASTROINTESTINAL   Constipation  Stooling after lactulose 8/10  Diet/Feeding:  No   TF restarted    RENAL/ELECTROLYTE/FLUIDS:   TASHI  Keep K>4; Mg>2    Trend renal indices/ UOP  Trend Chemistry    ENDOCRINE:   Glycemic Control: Goal 120-180: SSI PRN  Prevent hypoglycemia    HEMATOLOGY/ONCOLOGY:   Hx of TOTAL LARYNGECTOMY (2/8/21)  Hx Squamous Cell Carcinoma (invasive) of Larynx s/p Chemo/Radiation/Keytruda   Oncology following  Follow CT chest abd pelvis for 8/8 ordered by oncology   Transfusion Trigger (Hgb): <7 g/dL  Trend CBC, check this afternoon    INFECTIOUS DISEASE:   Staph aureus on sputum (? colonization/?contaminate)  ANTIBIOTICS TO DATE:   8/3-8/12 Levaquin  8/8-present Zosyn  8/8-present Vanco  8/9-8/12 Eraxis    CULTURES TO DATE:  8/9 fungal culture NGTD  8/8 respiratory culture negative  8/9 blood culture pending  8/9 Aspergillus negative  8/9 Fungitell negative  8/2  + light staph aureus   8/2 Legionella urine negative  8/2 Mycoplasma AB negative     ICU DAILY CHECKLIST   Code Status: Full Code  DVT Prophylaxis: SCDs  T/L/D: Tubes: Tracheostomy and Nasogastric Tube  Lines: Peripheral IV, Arterial Line, and Central Line  Drains: None  SUP: Protonix   Diet: TF on hold  Activity Level:OOB to chair with assistance  ABCDEF Bundle/Checklist Completed:Yes  Disposition: Stay in ICU  Multidisciplinary Rounds Completed:  Yes  Patient/Family Updated: Yes  Goals of Care Discussion/Palliative: No    [facility]   8/2 - Admitted to hospital   8/5 - Admitted to ICU post op crani   8/6 - Laboy and A line DC.   8/7 - PT ordered. 8/8: placed back on vent. Broaden to Zosyn, Vanco, Levaquin. Aggressive pulmonary toileting. Started on Precedex. CTA chest negative for PE showing aspiration pneumonia  8/9: Started on levo and vaso early in the morning. Vaso now off. Increased vent requirements. Added Eraxis  8/10: PEEP weaned from 16-14. FiO2 weaned from 100 to 90%. We will check gas in afternoon. CXR this a.m. shows slight improvement. Increase steroids to Solu-Medrol 125 every 6. Did on Versed drip for vent compliance. 8/11: Dontae added, weaning FiO2, remains paralyzed and sedation, serial ABGs  8/12: Nimbex off today. Continue to wean PEEP and FiO2. Continue DONTAE. Repeat CXR today    SUBJECTIVE   Review of Systems   Unable to perform ROS: Acuity of condition     OBJECTIVE   Labs and Data: Reviewed 08/12/22  Medications: Reviewed 08/12/22  Imaging: Reviewed 08/12/22    Physical Exam  Vitals and nursing note reviewed. Constitutional:       General: He is not in acute distress. Appearance: He is ill-appearing. Interventions: He is sedated. HENT:      Mouth/Throat:      Mouth: Mucous membranes are dry. Eyes:      Pupils: Pupils are equal, round, and reactive to light. Neck:      Trachea: Tracheostomy present. Cardiovascular:      Rate and Rhythm: Normal rate and regular rhythm. Pulmonary:      Effort: Tachypnea present. Breath sounds: Wheezing and rhonchi present. Abdominal:      General: There is distension. Palpations: Abdomen is soft. Comments: Hypoactive   Skin:     General: Skin is warm and dry. Capillary Refill: Capillary refill takes less than 2 seconds. Neurological:      Comments: Paralyzed and sedated   Psychiatric:      Comments: JAKUB       Visit Vitals  /64   Pulse 75   Temp 97.9 °F (36.6 °C)   Resp 24   Ht 6' (1.829 m)   Wt 71.5 kg (157 lb 10.1 oz)   SpO2 100%   BMI 21.38 kg/m²    O2 Flow Rate (L/min): 12 l/min O2 Device: Ventilator Temp (24hrs), Av.8 °F (36.6 °C), Min:97.2 °F (36.2 °C), Max:98.2 °F (36.8 °C)           Intake/Output:     Intake/Output Summary (Last 24 hours) at 2022 0716  Last data filed at 2022 0700  Gross per 24 hour   Intake 1942.44 ml   Output 3275 ml   Net -1332.56 ml         Imaging:  CT Results  (Last 48 hours)      None            Echo:  EF 60-65%, PASP 75, dilated RA and RV    CRITICAL CARE DOCUMENTATION  I had a face to face encounter with the patient, reviewed and interpreted patient data including clinical events, labs, images, vital signs, I/O's, and examined patient. I have discussed the case and the plan and management of the patient's care with the consulting services, the bedside nurses and the respiratory therapist.      NOTE OF PERSONAL INVOLVEMENT IN CARE   This patient has a high probability of imminent, clinically significant deterioration, which requires the highest level of preparedness to intervene urgently.  I participated in the decision-making and personally managed or directed the management of the following life and organ supporting interventions that required my frequent assessment to treat or prevent imminent deterioration. I personally spent 55 minutes of critical care time. This is time spent at this critically ill patient's bedside actively involved in patient care as well as the coordination of care. This does not include any procedural time which has been billed separately.       Tanmay Anthony, NP    Critical Care Medicine  [employer]

## 2022-08-12 NOTE — PROGRESS NOTES
0730: Bedside and Verbal shift change report given to [address] [place] [address] and Brock Angulo (oncoming nurse) by Marietta Santos (offgoing nurse). Report included the following information SBAR, Kardex, Procedure Summary, Intake/Output, MAR, Recent Results, Cardiac Rhythm SR/Afib, Alarm Parameters , and Dual Neuro Assessment. 1545: Restarted Nimbex per Toby Miller NP. TOF 4/4.    1930: Bedside and Verbal shift change report given to Miya COUGHLIN (oncoming nurse) by [address] [place] [address] and Kristen Arellano RN (offgoing nurse). Report included the following information SBAR, Kardex, Intake/Output, MAR, Recent Results, Cardiac Rhythm SR/Afib, Alarm Parameters , and Dual Neuro Assessment.

## 2022-08-12 NOTE — PROGRESS NOTES
Pharmacist Note - Vancomycin Dosing  Therapy day 5 (restart, 8/2-8/3)  Indication: Acute hypoxemic respiratory failure with bilateral infiltrates - suspected HAP  Current regimen: On hold, last dose: 1.25 gm IV on 8/11 @ 0634    Recent Labs     08/12/22  1159 08/12/22  0317 08/11/22  1330 08/11/22  0315   WBC 19.6* 20.5* 21.0* 20.4*   CREA  --  1.57* 1.62* 1.62*   BUN  --  43* 45* 43*       A random vancomycin level of 17.1 mcg/mL was obtained at 6 pm today  Goal target range Trough 10-15 mcg/mL      Plan: Vancomycin 1250 mg x 1 dose at 9 pm  Pharmacy will continue to monitor this patient daily for changes in clinical status and renal function.

## 2022-08-13 NOTE — PROGRESS NOTES
CRITICAL CARE NOTE    Name: Ministerio Grissom   : 1970   MRN: [id number]   Date: 2022      REASON FOR ICU ADMISSION: Brain Mass s/p crani with resection     PRINCIPLE ICU DIAGNOSIS   Left Occipital Mass (+/- necrotic lymphadenopathy in neck)  S/p left occipital Craniotomy (Dr. Hema Bah, 22)  Acute hypoxic respiratory failure, aspiration pneumonia versus pulmonary edema versus DAH versus drug-induced pneumonitis  Anemia  Hx of TOTAL LARYNGECTOMY (21) d/t Squamous Cell Carcinoma (invasive) of Larynx s/p Chemo/Radiation/Keytruda  TASHI  pHTN     PATIENT SUMMARY   History per chart review:   Ministerio Grissom is a 47 y.o. 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 He had total laryngectomy on 21 and had regional recurrence in 2021. He received chemotherapy from 21-21 with concurrent radiation from 21-21 at [facility]. He was started on Keytruda in 2022 followed by the addition of cisplatin and fluoruracil for 4 cycles. He is now on maintenance Keytruda every 3 weeks with his last dose received last Friday. He has had multiple blood transfusions in the past due to recurrent anemia. He is followed by Dr. Lorena Verma for onc  He presented to the ER at [facility] on 22 due to nausea, vomiting, generalized weakness and a fever of 101. 6. He was admitted to the hospital and treated for pneumonia. He did have some numbness and tingling and weakness in his left . His head CT revealed a left occipital brain mass with cerebral edema; therefore, neurosurgery was consulted. The patient was transferred to [facility] for neurosurgical evaluation. \"      COMPREHENSIVE ASSESSMENT & PLAN:SYSTEM BASED     24 HOUR EVENTS: Continues to require high doses of pain/sedation medication in addition to Nimbex.  Continues on Dontae at 20ppm.     NEUROLOGICAL:   Left Occipital Mass s/p left occipital Craniotomy (Dr. Hema Bah, 22)  Analgesia: Fentanyl @ 200mcg (was at 300mcg earlier)  Sedation: Versed at 10mg/hour and Propofol started overnight  Nimbex for vent compliance   [facility]  Neurosurgery following    PULMONOLOGY:   Laryngeal Tube  (Shiley 10 cuffed at bedside)  Acute hypoxic respiratory failure,  PNA vs pulmonary edema  HAP  pHTN  Respiratory Goals: Aggressive bronchopulmonary hygiene  Trach care q shift per unit protocol   Zosyn, Vanco, Eraxis -> If MRSA screen negative can stop Vanco  ID consulted, appreciate recs  Aspergillus and fungitell negative and fungal culture NGTD  Pro-Papo downtrend, WBC elevated but stable  CTA chest negative for PE on 8/8, showing aspiration pneumonia  Pulmonary consulted, appreciate recs  Autoimmune panel sent 8/9, amio held, negative thus far  Continue Solu Medrol 125 mg every 6 hours x 5 days then taper (ordered)  TTE as below showing PASP 75mmHg  Increased PS on 8/13 to overcome large trach leak and increased PEEP for recruitment  Continue Dontae today at 20 ppm  ABGs as needed with MV adjustments    CARDIOVASCULAR:   SBP Goal of: > 90 mmHg and < 140 mmHg  MAP Goal of: > 65 mmHg  Norepinephrine and Vasopressin - For above SBP/MAP goals  IVF: none  TTE 8/10: EF 60-65%, RV and RA dilation, severely elevated RVSP, PASP in 75mmHg  Additional diureses today with Lasix and likely will need additional dose this afternoon    GASTROINTESTINAL   Constipation  Stooling after lactulose 8/10  Diet/Feeding:  No   TF restarted    RENAL/ELECTROLYTE/FLUIDS:   TASHI  Keep K>4; Mg>2    Trend renal indices/ UOP  Trend Chemistry    ENDOCRINE:   Glycemic Control: Goal 120-180: SSI PRN  Prevent hypoglycemia    HEMATOLOGY/ONCOLOGY:   Hx of TOTAL LARYNGECTOMY (2/8/21)  Hx Squamous Cell Carcinoma (invasive) of Larynx s/p Chemo/Radiation/Keytruda, stage 4 metastatic  Oncology following  Follow CT chest abd pelvis for 8/8 ordered by oncology   Transfusion Trigger (Hgb): <7 g/dL  Trend CBC  Dr. Lashae Horton spoke with family on 8/11 with recommendation leaning towards comfort given metastatic disease. INFECTIOUS DISEASE:   Staph aureus on sputum (?colonization/?contaminate)  ANTIBIOTICS TO DATE:   8/3-8/12 Levaquin  8/8-present Zosyn  8/8-present Vanco  8/9-8/12 Eraxis    CULTURES TO DATE:  8/9 fungal culture NGTD  8/8 respiratory culture negative  8/9 blood culture pending  8/9 Aspergillus negative  8/9 Fungitell negative  8/2  + light staph aureus   8/2 Legionella urine negative  8/2 Mycoplasma AB negative     ICU DAILY CHECKLIST   Code Status: Full Code  DVT Prophylaxis: SCDs  T/L/D: Tubes: Tracheostomy and Nasogastric Tube  Lines: Peripheral IV, Arterial Line, and Central Line  Drains: None  SUP: Protonix   Diet: TF  Activity Level: Bedrest  ABCDEF Bundle/Checklist Completed:Yes  Disposition: Stay in ICU  Multidisciplinary Rounds Completed:  Yes  Patient/Family Updated: Yes. Will call  Lani Riding this afternoon if he does not come  Goals of Care Discussion/Palliative: No    [facility]   8/2 - Admitted to hospital   8/5 - Admitted to ICU post op crani   8/6 - Laboy and A line DC.   8/7 - PT ordered. 8/8: placed back on vent. Broaden to Zosyn, Vanco, Levaquin. Aggressive pulmonary toileting. Started on Precedex. CTA chest negative for PE showing aspiration pneumonia  8/9: Started on levo and vaso early in the morning. Vaso now off. Increased vent requirements. Added Eraxis  8/10: PEEP weaned from 16-14. FiO2 weaned from 100 to 90%. We will check gas in afternoon. CXR this a.m. shows slight improvement. Increase steroids to Solu-Medrol 125 every 6. Did on Versed drip for vent compliance. 8/11: Dontae added, weaning FiO2, remains paralyzed and sedation, serial ABGs  8/12: Nimbex off today. Continue to wean PEEP and FiO2. Continue DONTAE. Repeat CXR today  8/13: Nimbex back on overnight and Propofol restarted. Suspect derecruited with turning.      SUBJECTIVE   Review of Systems   Unable to perform ROS: Acuity of condition     OBJECTIVE   Labs and Data: Reviewed 22  Medications: Reviewed 22  Imaging: Reviewed 22    Physical Exam  Vitals and nursing note reviewed. Constitutional:       General: He is not in acute distress. Appearance: He is ill-appearing. Interventions: He is sedated. HENT:      Mouth/Throat:      Mouth: Mucous membranes are dry. Eyes:      Pupils: Pupils are equal, round, and reactive to light. Neck:      Trachea: Tracheostomy present. Comments: Trach in place with air leak  Cardiovascular:      Rate and Rhythm: Regular rhythm. Tachycardia present. Comments: Intermittent a-fib   Pulmonary:      Effort: Tachypnea present. Breath sounds: Wheezing and rhonchi present. Abdominal:      General: There is distension. Palpations: Abdomen is soft. Comments: Hypoactive   Skin:     General: Skin is warm and dry. Capillary Refill: Capillary refill takes less than 2 seconds. Neurological:      Comments: Paralyzed and sedated   Psychiatric:      Comments: JAKUB       Visit Vitals  /64   Pulse (!) 163   Temp 97.5 °F (36.4 °C)   Resp (!) 32   Ht 6' (1.829 m)   Wt 71.5 kg (157 lb 10.1 oz)   SpO2 96%   BMI 21.38 kg/m²    O2 Flow Rate (L/min): 12 l/min O2 Device: Ventilator Temp (24hrs), Av.1 °F (36.7 °C), Min:97.5 °F (36.4 °C), Max:98.7 °F (37.1 °C)           Intake/Output:     Intake/Output Summary (Last 24 hours) at 2022 5888  Last data filed at 2022 0900  Gross per 24 hour   Intake 2142.33 ml   Output 2590 ml   Net -447.67 ml         Imaging:  CT Results  (Last 48 hours)      None            Echo:  EF 60-65%, PASP 75, dilated RA and RV    CRITICAL CARE DOCUMENTATION  I had a face to face encounter with the patient, reviewed and interpreted patient data including clinical events, labs, images, vital signs, I/O's, and examined patient.   I have discussed the case and the plan and management of the patient's care with the attending physician, consulting services, the bedside nurses and the respiratory therapist.      NOTE OF PERSONAL INVOLVEMENT IN CARE   This patient has a high probability of imminent, clinically significant deterioration, which requires the highest level of preparedness to intervene urgently. I participated in the decision-making and personally managed or directed the management of the following life and organ supporting interventions that required my frequent assessment to treat or prevent imminent deterioration. I personally spent 55 minutes of critical care time. This is time spent at this critically ill patient's bedside actively involved in patient care as well as the coordination of care. This does not include any procedural time which has been billed separately.       Noemi Pollard NP    Critical Care Medicine  [facility]

## 2022-08-13 NOTE — PROGRESS NOTES
0730: Bedside and Verbal shift change report given to Sindi Zhu RN (oncoming nurse) by Cholo Quintanilla (offgoing nurse). Report included the following information SBAR, Kardex, ED Summary, Intake/Output, MAR, Recent Results, Med Rec Status, Cardiac Rhythm NSR, Sinus Tachy, Afib, Alarm Parameters , and Dual Neuro Assessment. 1000: unable to irrigate liu, which appeared to be clogged with sediment. Orders received to exchange liu per Amparo NP. Shift summary: weaned nimbex off throughout shift per intensivist, no changes in sedation. Pt remained synchronous with vent on 20 ppm MARIYA. Bedside and Verbal shift change report given to Chela George RN (oncoming nurse) by Amina IGLESIAS RN (offgoing nurse). Report included the following information SBAR, Kardex, ED Summary, Intake/Output, MAR, Recent Results, Med Rec Status, Cardiac Rhythm NSR, sinus tach, AFIB , Alarm Parameters , and Dual Neuro Assessment.

## 2022-08-13 NOTE — PROGRESS NOTES
ID Progress Note  2022    Subjective:     Sedated on vent, not interactive    Objective:     Antibiotics:  Vancomycin   Zosyn       Vitals: Visit Vitals  /76   Pulse (!) 101   Temp 98.8 °F (37.1 °C)   Resp 28   Ht 6' (1.829 m)   Wt 71.5 kg (157 lb 10.1 oz)   SpO2 98%   BMI 21.38 kg/m²        Tmax:  Temp (24hrs), Av.4 °F (36.9 °C), Min:97.5 °F (36.4 °C), Max:99.1 °F (37.3 °C)      Exam:  Lungs:  rales R anterior, L anterior, rhonchi R anterior, L anterior  Heart:  regular rate and rhythm  Abdomen:  soft, non-tender.  Bowel sounds normal. No masses,  no organomegaly      Labs:      Recent Labs     22  0324 22  1159 22  0317 22  1330 22  1003 22  0315   WBC 20.4* 19.6* 20.5* 21.0*  --  20.4*   HGB 7.4* 7.7* 6.4* 7.0*  --  7.0*    210 227 254  --  258   BUN 50*  --  43* 45*  --  43*   CREA 1.69*  --  1.57* 1.62*  --  1.62*   AP  --   --   --   --  105  --    TBILI  --   --   --   --  0.3  --        Cultures:     No results found for: [facility]  Lab Results   Component Value Date/Time    Culture result: NO GROWTH 4 DAYS 2022 09:35 AM    Culture result: NO GROWTH 4 DAYS 2022 04:50 AM    Culture result: NO GROWTH 2 DAYS 2022 01:57 PM       Radiology:     Line/Insert Date:           Assessment:     VDRF  Metastatic head and neck cancer--status post crani  Fevers--improved  Leukocytosis    Objective:     Continue current therapy  Follow up cultures and studies    Saida Luis MD

## 2022-08-14 NOTE — PROGRESS NOTES
ID Progress Note  2022    Subjective:     Sedated on vent, not interactive    Objective:     Antibiotics:  Vancomycin   Zosyn       Vitals: Visit Vitals  BP (!) 106/59 (BP 1 Location: Right lower arm, BP Patient Position: At rest)   Pulse (!) 10   Temp 98.6 °F (37 °C)   Resp 27   Ht 6' (1.829 m)   Wt 72.8 kg (160 lb 7.9 oz)   SpO2 97%   BMI 21.77 kg/m²        Tmax:  Temp (24hrs), Av.5 °F (36.9 °C), Min:98 °F (36.7 °C), Max:98.8 °F (37.1 °C)      Exam:  Lungs:  rales R anterior, L anterior, rhonchi R anterior, L anterior  Heart:  regular rate and rhythm  Abdomen:  soft, non-tender. Bowel sounds normal. No masses,  no organomegaly      Labs:      Recent Labs     22  0235 22  0324 22  1159 22  0317   WBC 23.4* 20.4* 19.6* 20.5*   HGB 7.3* 7.4* 7.7* 6.4*    212 210 227   BUN 57* 50*  --  43*   CREA 1.82* 1.69*  --  1.57*       Cultures:     No results found for: KAROLINA  Lab Results   Component Value Date/Time    Culture result: MRSA NOT PRESENT 2022 05:51 PM    Culture result:  2022 05:51 PM     Screening of patient nares for MRSA is for surveillance purposes and, if positive, to facilitate isolation considerations in high risk settings. It is not intended for automatic decolonization interventions per se as regimens are not sufficiently effective to warrant routine use.       Culture result: NO GROWTH 5 DAYS 2022 09:35 AM       Radiology:     Line/Insert Date:           Assessment:     VDRF  Metastatic head and neck cancer--status post crani  Fevers--improved  Leukocytosis    Objective:     Continue current therapy  Follow up cultures and studies  Level of care decisions being made    Katie Howard MD

## 2022-08-14 NOTE — PROGRESS NOTES
Bedside shift change report given to Barbara Morales, Rn and Radha, RN (oncoming nurse) by Sangita Reynoso RN and Chacho Go RN (offgoing nurse). Report included the following information SBAR.     2000 - assessment complete. Patient grimacing to nail bed pressure but no movement in any extremities. Vasopressin discontinued and levophed increased to 8 mcg/min. 2230 - patient sats dropped to 85%, Mariann NP increased Fi02 to 60% and decreased PEEP to 14.

## 2022-08-14 NOTE — PROGRESS NOTES
CRITICAL CARE NOTE    Name: Alok Allen   : 1970   MRN: [id number]   Date: 2022      REASON FOR ICU ADMISSION: Brain Mass s/p crani with resection     PRINCIPLE ICU DIAGNOSIS   Left Occipital Mass (+/- necrotic lymphadenopathy in neck)  S/p left occipital Craniotomy (Dr. Lexy Rivera, 22)  Acute hypoxic respiratory failure, aspiration pneumonia versus pulmonary edema versus DAH versus drug-induced pneumonitis  Anemia  Hx of TOTAL LARYNGECTOMY (21) d/t Squamous Cell Carcinoma (invasive) of Larynx s/p Chemo/Radiation/Keytruda  TASHI  pHTN     PATIENT SUMMARY   History per chart review:   Alok Allen is a 47 y.o. 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 He had total laryngectomy on 21 and had regional recurrence in 2021. He received chemotherapy from 21-21 with concurrent radiation from 21-21 at [address] [place] [address]. He was started on Keytruda in 2022 followed by the addition of cisplatin and fluoruracil for 4 cycles. He is now on maintenance Keytruda every 3 weeks with his last dose received last Friday. He has had multiple blood transfusions in the past due to recurrent anemia. He is followed by Dr. Tariq Armenta for onc  He presented to the ER at [address] [place] [address] on 22 due to nausea, vomiting, generalized weakness and a fever of 101. 6. He was admitted to the hospital and treated for pneumonia. He did have some numbness and tingling and weakness in his left . His head CT revealed a left occipital brain mass with cerebral edema; therefore, neurosurgery was consulted. The patient was transferred to [facility] for neurosurgical evaluation. \"      COMPREHENSIVE ASSESSMENT & PLAN:SYSTEM BASED     24 HOUR EVENTS: Attempts to wean on vent overnight were initially successful; however, this morning PaO2 back down. Dontae is currently at 10ppm. Vasopressin off.      NEUROLOGICAL:   Left Occipital Mass s/p left occipital Craniotomy (Dr. Lexy Rivera, 8/5/22)  Analgesia: Fentanyl @ 200mcg   Sedation: Versed at 10mg/hour and Propofol started overnight  Nimbex for vent compliance   Kera  Neurosurgery following    PULMONOLOGY:   Laryngeal Tube  (Shiley 10 cuffed at bedside)  Acute hypoxic respiratory failure,  PNA vs pulmonary edema  HAP  pHTN  Respiratory Goals: Aggressive bronchopulmonary hygiene  Trach care q shift per unit protocol   Zosyn, Vanco, Eraxis -> If MRSA screen negative can stop Vanco -> Negative so will stop Vancomycin  ID consulted, appreciate recs  Aspergillus and fungitell negative and fungal culture NGTD  CTA chest negative for PE on 8/8, showing aspiration pneumonia  Pulmonary consulted, appreciate recs  Autoimmune panel sent 8/9, amio held, negative thus far  Continue Solu Medrol 125 mg every 6 hours x 5 days then taper (ordered)  TTE as below showing PASP 75mmHg  Vent modifications -> Repeat ABG at 1100  Continue Dontae today at 10 ppm      CARDIOVASCULAR:   SBP Goal of: > 90 mmHg and < 140 mmHg  MAP Goal of: > 65 mmHg  Norepinephrine - For above SBP/MAP goals  IVF: none  TTE 8/10: EF 60-65%, RV and RA dilation, severely elevated RVSP, PASP in 75mmHg  Continue to push for negative fluid balance    GASTROINTESTINAL   Constipation  Stooling after lactulose 8/10  Diet/Feeding:  No   TF restarted    RENAL/ELECTROLYTE/FLUIDS:   TASHI  Keep K>4; Mg>2    Trend renal indices/ UOP  Trend Chemistry    ENDOCRINE:   Glycemic Control: Goal 120-180: SSI PRN  Prevent hypoglycemia    HEMATOLOGY/ONCOLOGY:   Hx of TOTAL LARYNGECTOMY (2/8/21)  Hx Squamous Cell Carcinoma (invasive) of Larynx s/p Chemo/Radiation/Keytruda, stage 4 metastatic  Oncology following  Follow CT chest abd pelvis for 8/8 ordered by oncology   Transfusion Trigger (Hgb): <7 g/dL  Dr. Florida Mccartney spoke with family on 8/11 with recommendation owards comfort given metastatic disease.      INFECTIOUS DISEASE:   Staph aureus on sputum (?colonization/?contaminate)  ANTIBIOTICS TO DATE:   8/3-8/12 Levaquin  8/8-present Zosyn  8/8-8/14 Vanco  8/9-8/12 Eraxis    CULTURES TO DATE:  8/9 fungal culture NGTD  8/8 respiratory culture negative  8/9 blood culture pending  8/9 Aspergillus negative  8/9 Fungitell negative  8/2  + light staph aureus   8/2 Legionella urine negative  8/2 Mycoplasma AB negative     ICU DAILY CHECKLIST   Code Status: Full Code  DVT Prophylaxis: SCDs  T/L/D: Tubes: Tracheostomy and Nasogastric Tube  Lines: Peripheral IV, Arterial Line, and Central Line  Drains: None  SUP: Protonix   Diet: TF  Activity Level: Bedrest  ABCDEF Bundle/Checklist Completed:Yes  Disposition: Stay in ICU  Multidisciplinary Rounds Completed:  Yes  Patient/Family Updated: Yes. Goals of Care Discussion/Palliative: Yes    8/14:  Discussed overall patient condition with brother, Fara Diana, via phone this morning. He was made aware of the critical nature and worsening condition with overarching Stage 4 metastatic disease. He will be here tomorrow morning for family meeting that we are arranging to discuss goals of care. For now, full code and continue current management. HOSPITAL COURSE/DAILY EVENT LOG   8/2 - Admitted to hospital   8/5 - Admitted to ICU post op crani   8/6 - Laboy and A line DC.   8/7 - PT ordered. 8/8: placed back on vent. Broaden to Zosyn, Vanco, Levaquin. Aggressive pulmonary toileting. Started on Precedex. CTA chest negative for PE showing aspiration pneumonia  8/9: Started on levo and vaso early in the morning. Vaso now off. Increased vent requirements. Added Eraxis  8/10: PEEP weaned from 16-14. FiO2 weaned from 100 to 90%. We will check gas in afternoon. CXR this a.m. shows slight improvement. Increase steroids to Solu-Medrol 125 every 6. Did on Versed drip for vent compliance. 8/11: Dontae added, weaning FiO2, remains paralyzed and sedation, serial ABGs  8/12: Nimbex off today. Continue to wean PEEP and FiO2. Continue DONTAE.   Repeat CXR today  8/13: Nimbex back on overnight and Propofol restarted. Suspect derecruited with turning. : Nimbex and Vasopressin off overnight. Weaned down Dontae. Kidney function mildly worsened    SUBJECTIVE   Review of Systems   Unable to perform ROS: Acuity of condition     OBJECTIVE   Labs and Data: Reviewed 22  Medications: Reviewed 22  Imaging: Reviewed 22    Physical Exam  Vitals and nursing note reviewed. Constitutional:       General: He is not in acute distress. Appearance: He is ill-appearing. Interventions: He is sedated. HENT:      Head: Normocephalic. Mouth/Throat:      Mouth: Mucous membranes are dry. Eyes:      Pupils: Pupils are equal, round, and reactive to light. Neck:      Trachea: Tracheostomy present. Comments: Trach in place with air leak  Cardiovascular:      Rate and Rhythm: Tachycardia present. Rhythm irregular. Comments: a-fib   Pulmonary:      Effort: Tachypnea present. Breath sounds: Rhonchi present. No wheezing. Abdominal:      General: There is distension. Palpations: Abdomen is soft. Comments: Hypoactive   Skin:     General: Skin is warm and dry. Capillary Refill: Capillary refill takes less than 2 seconds.    Neurological:      Comments: sedated   Psychiatric:      Comments: JAKUB       Visit Vitals  /66 (BP 1 Location: Right arm, BP Patient Position: At rest)   Pulse 75   Temp 98.4 °F (36.9 °C)   Resp 19   Ht 6' (1.829 m)   Wt 72.8 kg (160 lb 7.9 oz)   SpO2 91%   BMI 21.77 kg/m²    O2 Flow Rate (L/min): 12 l/min O2 Device: Endotracheal tube, Ventilator Temp (24hrs), Av.6 °F (37 °C), Min:98 °F (36.7 °C), Max:99.1 °F (37.3 °C)           Intake/Output:     Intake/Output Summary (Last 24 hours) at 2022 0944  Last data filed at 2022 0800  Gross per 24 hour   Intake 2844.16 ml   Output 3225 ml   Net -380.84 ml         Imaging:  CT Results  (Last 48 hours)      None            Echo:  EF 60-65%, PASP 75, dilated RA and RV    CRITICAL CARE DOCUMENTATION  I had a face to face encounter with the patient, reviewed and interpreted patient data including clinical events, labs, images, vital signs, I/O's, and examined patient. I have discussed the case and the plan and management of the patient's care with the attending physician, consulting services, the bedside nurses and the respiratory therapist.      NOTE OF PERSONAL INVOLVEMENT IN CARE   This patient has a high probability of imminent, clinically significant deterioration, which requires the highest level of preparedness to intervene urgently. I participated in the decision-making and personally managed or directed the management of the following life and organ supporting interventions that required my frequent assessment to treat or prevent imminent deterioration. I personally spent 45 minutes of critical care time. This is time spent at this critically ill patient's bedside actively involved in patient care as well as the coordination of care. This does not include any procedural time which has been billed separately.       Aguilar Cedillo NP    Critical Care Medicine  [facility]

## 2022-08-14 NOTE — PROGRESS NOTES
ICU NIGHT CHECKLIST     Night round completed with bedside nurse. Plan of care for patient reviewed. Medication weaning / changes discussed. Necessity of any lines, drains, and/or tubes addressed. Respiratory status / modalities discussed. Not a candidate for SBT. Vent adjusted. Minute ventilation increased to 10.8-11L/min, Rate 30 . Peep 14. On turn patient had bile come out nares, required increase in Fio2 to 60%. No bile or tube feeds from ETT. Given x1 dose of reglan. Has DHT. Now sating well, will wean Fio2 as tolerated per pO2. Wean MARIYA to 10 PPM. Recheck ABG at 0400. Chest xray this am.   Vasopressin now off, Wean Levophed as tolerated.        Khoa Bell, NP-C    Critical Care Medicine  [facility]

## 2022-08-14 NOTE — PROGRESS NOTES
0730: Bedside and Verbal shift change report given to Judd Greer and Ji De Souza (oncoming nurse) by Elijah Bower RN and Bren RN (offgoing nurse). Report included the following information SBAR, Kardex, ED Summary, Intake/Output, Recent Results, Med Rec Status, Cardiac Rhythm AFIB, Alarm Parameters , and Dual Neuro Assessment. Shift summary: MARIYA turned down to 5ppm , pt tolerating well. Updated pts brother Phil Juarez), family meeting planned for tomorrow 8/15/22 at [address]: Bedside and Verbal shift change report given to Emely Salgado and Bren COUGHLIN (oncoming nurse) by Judd Greer and Lucie Young RN  (offgoing nurse). Report included the following information SBAR, Kardex, ED Summary, Intake/Output, MAR, Recent Results, Med Rec Status, Cardiac Rhythm AFIB, Alarm Parameters , and Dual Neuro Assessment.

## 2022-08-14 NOTE — PROGRESS NOTES
Pharmacist Note - Vancomycin Dosing  Therapy day 7  Indication: HAP  Current regimen: 1250 mg IV one time on 8/12 @ 2047    Recent Labs     08/14/22  0235 08/13/22  0324 08/12/22  1159 08/12/22  0317   WBC 23.4* 20.4* 19.6* 20.5*   CREA 1.82* 1.69*  --  1.57*   BUN 57* 50*  --  43*       A random vancomycin level of 18 mcg/mL was obtained this am at 0235 ~ 29 hrs p dose    Goal target range Trough 10-15 mcg/mL      Plan: Calculations now predicting a q36 hr dosing interval.  Will order a one time dose today at 4pm (1000 mg) when predicted level is < 15.  Scr is up a bit today. Will continue to dose by levels.

## 2022-08-15 NOTE — PROGRESS NOTES
Attempted to provide support for family of this pt in [facility] 9516. No family/friends present at time of visit and pt was unavailable. Contact Spiritual Care Services for any spiritual or emotional support needs. Michael Sharif MDiv.  Staff   Request  Support/Spiritual Care Services on [phone])

## 2022-08-15 NOTE — DEATH NOTE
Death Declaration         [facility] CRITICAL CARE    Pt Name  Juventino Cerna date:  8/2/2022   Date and time of death:  8/15/22 @ 18   Room Number  7104/01    Medical Record Number  [id number] @ . [address]   Age  47 y.o. Date of Birth 1970   PCP Melissa Blanc, NP   Attending physician Hilda Monson, DO      Code Status  DNR    Patient seen and examined     Mental status   Unresponsive    Pupils Dilated and Fixed    Respiration Nil    Pulse  Absent     Heart Sounds  Absent    Rhythm  Asystole   Family  Notified by Nursing staff    Chaplan Service  Notified by Nursing staff     Death certificate and discharge summary completion remain [facility] responsibility.      Thomas Alcala AGACNP-BC                              8/15/2022

## 2022-08-15 NOTE — PROGRESS NOTES
SLP Contact Note    Pt still having significant difficulty with oxygen and cardiac issues. SLP will sign off for now. Please reconsult should SLP be of any assistance.      Thank you,  FABRICE CallEd, [address] [facility]  Speech-Language Pathologist

## 2022-08-15 NOTE — PROGRESS NOTES
Bedside and Verbal shift change report given to [employer] [facility] and Chiki RN (oncoming nurse) by Nic Rubi (offgoing nurse). Report included the following information SBAR, Kardex, ED Summary, Intake/Output, MAR, Recent Results, Cardiac Rhythm A fib, and Dual Neuro Assessment. Belongings given to brother Karely Gill including cell phone with .

## 2022-08-15 NOTE — DISCHARGE SUMMARY
[facility]                                                                                         Discharge Summary     Patient: Robert Harrington       MRN: [id number]       YOB: 1970       Age: 47 y.o. Date of admission:  2022    Date of discharge:  8/15/2022    Primary care provider:  Luiz Holder NP     Admitting provider:  Queenie Zuniga MD    Discharging provider(s): Isis Freeman NP - Staff Intensivist - [facility] Critical Care     Consultations  IP CONSULT TO NEUROSURGERY  IP CONSULT TO RADIATION ONCOLOGY  IP CONSULT TO PALLIATIVE CARE - PROVIDER  IP CONSULT TO GASTROENTEROLOGY  IP CONSULT TO OTOLARYNGOLOGY  IP CONSULT TO ONCOLOGY  IP CONSULT TO INFECTIOUS DISEASES  IP CONSULT TO PULMONOLOGY      Discharge destination: [facility]. The patient is . Admission diagnosis  Brain mass [G93.89]    Please refer to the admission history and physical for details on the presenting problem. Final discharge diagnoses and brief hospital course    Robert Harrington was a 47year old male with pmhx of stage IV squamous cell carcinoma of larynx/glottis who was s/p laryngectomy in . He had regional recurrence in 2021. At that time he had received chemo and radiation therapies. He was started on Keytruda in 2022 with additional chemotherapy. He had received a maintenance Keytruda and presented to OSH on  for nausea, vomiting, weakness and fever. He was admitted and treated for PNA. He was found to have a left occipital brain mass and was transferred to [facility] for neurosurgery evaluation on . He underwent a craniotomy with resection on . He was recovering well post-op and transferred out of the ICU on . On , he developed respiratory distress and was placed back on the ventilator. He continued to worsen and developed ARDS. Pulmonology was consulted to assist in his management.   He was treated for pneumonia, pulmonary edema, possible drug-induced pneumonitis, and possible DAH. Ultimately he required heavy sedation and paralytics for ventilator compliance. His family was updated and a family meeting took place today, 8/15. His brother and family decided to transition to comfort care given his critical illness and worsening metastatic disease.   He  shortly after transitioning to comfort care at 1116.  ---------------------------------    Chronic Diagnoses:    Problem List as of 8/15/2022 Date Reviewed: 2022            Codes Class Noted - Resolved    Brain mass ICD-10-CM: G93.89  ICD-9-CM: 348.89  2022 - Present        Pneumonia ICD-10-CM: J18.9  ICD-9-CM: 463  2022 - Present        GI bleed ICD-10-CM: K92.2  ICD-9-CM: 578.9  2022 - Present        CKD (chronic kidney disease) ICD-10-CM: N18.9  ICD-9-CM: 585.9  2022 - Present        Chronic anemia ICD-10-CM: D64.9  ICD-9-CM: 285.9  2022 - Present        UGIB (upper gastrointestinal bleed) ICD-10-CM: K92.2  ICD-9-CM: 578.9  2022 - Present        Palpitations ICD-10-CM: R00.2  ICD-9-CM: 785.1  2022 - Present        Chest pain ICD-10-CM: R07.9  ICD-9-CM: 786.50  2022 - Present        Anemia ICD-10-CM: D64.9  ICD-9-CM: 285.9  2022 - Present        Symptomatic anemia ICD-10-CM: D64.9  ICD-9-CM: 285.9  2022 - Present        Anemia due to GI blood loss ICD-10-CM: D50.0  ICD-9-CM: 280.0  2022 - Present        Prevention of chemotherapy-induced neutropenia ICD-10-CM: Z76.89  ICD-9-CM: V72.85  2022 - Present        Hypokalemia ICD-10-CM: E87.6  ICD-9-CM: 276.8  2021 - Present        Severe protein-calorie malnutrition ([facility] ICD-10-CM: E43  ICD-9-CM: 013  2/15/2021 - Present        Laryngeal carcinoma ([facility] ICD-10-CM: C32.9  ICD-9-CM: 161.9  2021 - Present        Throat cancer ([facility] ICD-10-CM: C14.0  ICD-9-CM: 149.0  2021 - Present        COPD exacerbation ([facility] ICD-10-CM: J44.1  ICD-9-CM: 491.21  2021 - Present        Acute and chronic respiratory failure with hypoxia (HCC) ICD-10-CM: J96.21  ICD-9-CM: 518.84, 799.02  1/31/2021 - Present        Chronic pain due to neoplasm ICD-10-CM: G89.3  ICD-9-CM: 338.3  1/31/2021 - Present        Anxiety ICD-10-CM: F41.9  ICD-9-CM: 300.00  1/31/2021 - Present        Insomnia ICD-10-CM: G47.00  ICD-9-CM: 780.52  1/31/2021 - Present        Respiratory failure with hypoxia [facility]) ICD-10-CM: J96.91  ICD-9-CM: 518.81  1/31/2021 - Present           Time spent on discharge related activities today greater than 30 minutes.       Signed:      Thalia Jiménez NP   Staff Intensivist  [facility] Critical Care    8/15/2022   11:57 AM     Lyn Capps DO   Attending        Cc: Shari Enrique NP

## 2022-08-15 NOTE — PROGRESS NOTES
Responded to page for this pt's death in [facility] 1996. No family or friends present during this visit. Contact spiritual care services for any spiritual or emotional support needs. Tereso Weinberg MDiv.  Staff   Request  Support/Spiritual Care Services on [phone]

## 2022-08-15 NOTE — PROGRESS NOTES
Bedside shift change report given to Marlin Kelley, RN and Paige Zambrano, RN (oncoming nurse) by Corin Adams RN and Fredi Hendrix RN (offgoing nurse). Report included the following information SBAR.     0000 - levophed turned off, blood pressures stable. 0630 - patient converted from a fib to a flutter. MD aware. Bedside shift change report given to Jus Cano RN and ISABEL Dejesus RN (oncoming nurse) by Marlin Kelley RN and Paige Zambrano RN (offgoing nurse). Report included the following information SBAR.

## 2022-08-16 ENCOUNTER — HOSPITAL ENCOUNTER (OUTPATIENT)
Dept: INFUSION THERAPY | Age: 52
End: 2022-08-16

## 2022-08-16 LAB
ARTERIAL PATENCY WRIST A: ABNORMAL
BASE DEFICIT BLDA-SCNC: 0.7 MMOL/L
BDY SITE: ABNORMAL
COHGB MFR BLD: 0.8 % (ref 1–2)
HCO3 BLDA-SCNC: 24 MMOL/L (ref 22–26)
HGB BLD OXIMETRY-MCNC: 8.2 G/DL (ref 14–17)
METHGB MFR BLD: 0.2 % (ref 0–1.4)
OXYHGB MFR BLD: 97.9 % (ref 94–97)
PCO2 BLDA: 38 MMHG (ref 35–45)
PH BLDA: 7.41 [PH] (ref 7.35–7.45)
PO2 BLDA: 122 MMHG (ref 80–100)
SAO2 % BLD: 99 % (ref 95–99)
SAO2% DEVICE SAO2% SENSOR NAME: ABNORMAL
SPECIMEN SITE: ABNORMAL

## 2022-08-17 ENCOUNTER — APPOINTMENT (OUTPATIENT)
Dept: PHYSICAL THERAPY | Age: 52
End: 2022-08-17

## 2022-08-19 ENCOUNTER — HOSPITAL ENCOUNTER (OUTPATIENT)
Dept: INFUSION THERAPY | Age: 52
End: 2022-08-19

## 2022-08-23 ENCOUNTER — APPOINTMENT (OUTPATIENT)
Dept: INFUSION THERAPY | Age: 52
End: 2022-08-23

## 2022-08-24 ENCOUNTER — APPOINTMENT (OUTPATIENT)
Dept: PHYSICAL THERAPY | Age: 52
End: 2022-08-24

## 2022-08-30 LAB
BACTERIA SPEC CULT: NORMAL
SERVICE CMNT-IMP: NORMAL

## 2022-08-31 ENCOUNTER — APPOINTMENT (OUTPATIENT)
Dept: PHYSICAL THERAPY | Age: 52
End: 2022-08-31

## 2022-09-09 ENCOUNTER — APPOINTMENT (OUTPATIENT)
Dept: INFUSION THERAPY | Age: 52
End: 2022-09-09

## 2023-06-15 NOTE — PROGRESS NOTES
Cancer [place] at [facility]  [address], [address] [facility] [address] [facility]  Prasanna Skeens: [phone]  F: [phone]    Medical Nutrition Therapy    Nutrition Encounter:  Met with patient and friend in conjunction with NP. He has lost 2 pound over the past week. He is eating 3 meals a day. He drinking 3 Boost VHC daily this is providing 1590kcal, 66g protein. He reports no pain with swallowing. No increased mucus production. No dry mouth. Per Dr. Lupe Menon, the treatment field is large which will cause dry mouth. Last BM yesterday. Results:   Diagnosis: Laryngeal cancer   Cisplatin every 3 weeks and concurrent radiation. Started on 5/25/21    Chemotherapy Flowsheet 5/24/2021   Cycle C1D1   Date 5/24/2021   Drug / Regimen Cisplatin   Pre Hydration given   Post Hydration given   Pre Meds given     Wt Readings from Last 5 Encounters:   06/15/21 140 lb 3.4 oz (63.6 kg)   06/15/21 140 lb 3.2 oz (63.6 kg)   06/10/21 142 lb 3.2 oz (64.5 kg)   06/01/21 147 lb 9.6 oz (67 kg)   05/25/21 149 lb 12.8 oz (67.9 kg)       Estimated Nutrition Needs:   Calorie Range: 2345-2680kcal/day    Protein Range: 67-77g/day     Fluid Needs: 2200ml     Assessment:   Inadequate oral food or beverage intake (ptential) related to concurrent chemoradiation as evidence by treatment side effects. Plan:   - Discussed increased energy demands.  - Continue to eat as tolerated. - Try to drink Boost VHC and at least 4 Boost plus daily. I appreciate the opportunity to participate in Mr. Joshua Peterson's care.     Signed By: Isaac Alatorre, [address], [address]     Contact: [phone] [Negative] : Genitourinary

## 2025-05-23 NOTE — ED TRIAGE NOTES
Pt arrives by EMS from home. Shortness of breath and difficulty swallowing. Pt recently was dx with Stage 4 larynx cancer and its not being treated as it's \"too advanced. \" Pt has not been eating or drinking. intact

## (undated) DEVICE — GLOVE SURG SZ 65 L12IN FNGR THK94MIL STD WHT LTX FREE

## (undated) DEVICE — SOL IRR STRL H2O 1000ML BTL --

## (undated) DEVICE — TUBE ET ID 7.0MM PVC ORAL NSL CUF REINF MAGILL TIP MURPHY

## (undated) DEVICE — SOL IRR SOD CL 0.9% 1000ML BTL --

## (undated) DEVICE — SUT PROL 3-0 30IN SH BLU --

## (undated) DEVICE — FLOSEAL HEMOSTATIC MATRIX, 10ML: Brand: FLOSEAL HEMOSTATIC MATRIX

## (undated) DEVICE — SUT CHRMC 3-0 27IN SH BRN --

## (undated) DEVICE — MAGNETIC INSTR DRAPE 20X16: Brand: MEDLINE INDUSTRIES, INC.

## (undated) DEVICE — GARMENT,MEDLINE,DVT,INT,CALF,MED, GEN2: Brand: MEDLINE

## (undated) DEVICE — STRAIGHT TIP, UNIVERSAL

## (undated) DEVICE — Device

## (undated) DEVICE — TOTAL TRAY, 16FR 10ML SIL FOLEY, URN: Brand: MEDLINE

## (undated) DEVICE — APPLIER CLP L9.38IN M LIG TI DISP STR RNG HNDL LIGACLP

## (undated) DEVICE — HANDLE LT SNAP ON ULT DURABLE LENS FOR TRUMPF ALC DISPOSABLE

## (undated) DEVICE — CANNULA NSL O2 AD 7 FT END-TIDAL CARBON DIOX VENTFLO

## (undated) DEVICE — THE ENDO CARRY-ON PROCEDURE KIT CONTAINS ALL OF THE SUPPLIES AND INFECTION PREVENTION PRODUCTS NEEDED FOR ENDOSCOPIC PROCEDURES: Brand: ENDO CARRY-ON PROCEDURE KIT

## (undated) DEVICE — SCALPFIX STERILE: Brand: AESCULAP

## (undated) DEVICE — TURNOVER KIT OR CUST CMB FLD GEN LF

## (undated) DEVICE — SPONGE GZ W4XL4IN COT RADPQ HIGHLY ABSRB

## (undated) DEVICE — SOLUTION IRRIG 1000ML 0.9% SOD CHL USP POUR PLAS BTL

## (undated) DEVICE — TRAY PREP DRY W/ PREM GLV 2 APPL 6 SPNG 2 UNDPD 1 OVERWRAP

## (undated) DEVICE — TOOL 8TD126 LEGEND 8CM 1.2MM 6MM DEPTH: Brand: MIDAS REX ™

## (undated) DEVICE — MASK ANES INF SZ 2 PREM TAIL VLV INFL PRT UNSCENTED SGL PT

## (undated) DEVICE — MARKER SKIN XR REFLCT LF SPHR DISP

## (undated) DEVICE — SPONGE LAP 18X18IN STRL -- 5/PK

## (undated) DEVICE — RESERVOIR,SUCTION,100CC,SILICONE: Brand: MEDLINE

## (undated) DEVICE — SUT CHRMC 4-0 27IN SH BRN --

## (undated) DEVICE — ROCKER SWITCH PENCIL BLADE ELECTRODE, HOLSTER: Brand: EDGE

## (undated) DEVICE — INFECTION CONTROL KIT SYS

## (undated) DEVICE — INSULATED BLADE ELECTRODE: Brand: EDGE

## (undated) DEVICE — SUT ETHLN 2-0 18IN FS BLK --

## (undated) DEVICE — APPLIER CLP L9.375IN APER 2.1MM CLS L3.8MM 20 SM TI CLP

## (undated) DEVICE — FORCEPS BX L240CM JAW DIA2.8MM L CAP W/ NDL MIC MESH TOOTH

## (undated) DEVICE — DRAPE,REIN 53X77,STERILE: Brand: MEDLINE

## (undated) DEVICE — PACK,EENT,TURBAN DRAPE,PK II: Brand: MEDLINE

## (undated) DEVICE — SUTURE PERMAHAND SZ 2-0 L18IN NONABSORBABLE BLK L26MM PS 1588H

## (undated) DEVICE — TRACHEOSTOMY TUBE CUFFED WITH DISPOSABLE INNER CANNULA: Brand: SHILEY

## (undated) DEVICE — CURVED, SMALL JAW, OPEN SEALER/DIVIDER: Brand: LIGASURE

## (undated) DEVICE — MASTISOL ADHESIVE LIQ 2/3ML

## (undated) DEVICE — ELECTRO LUBE IS A SINGLE PATIENT USE DEVICE THAT IS INTENDED TO BE USED ON ELECTROSURGICAL ELECTRODES TO REDUCE STICKING.: Brand: KEY SURGICAL ELECTRO LUBE

## (undated) DEVICE — DRAPE FLD WRM W44XL66IN C6L FOR INTRATEMP SYS THERMABASIN

## (undated) DEVICE — ENDO KIT 1: Brand: MEDLINE INDUSTRIES, INC.

## (undated) DEVICE — SPONGE: SPECIALTY PEANUT XR 100/CS: Brand: MEDICAL ACTION INDUSTRIES

## (undated) DEVICE — SURGICAL PROCEDURE PACK BASIN MAJ SET CUST NO CAUT

## (undated) DEVICE — CORD BPLR 12FT SGL USE CLR

## (undated) DEVICE — YANKAUER,BULB TIP,W/O VENT,RIGID,STERILE: Brand: MEDLINE

## (undated) DEVICE — KIT GASTMY PERC PEG PULL 20FR -- ENDOVIVE BX/2

## (undated) DEVICE — PREP SKN DURAPREP 26ML APPL --

## (undated) DEVICE — BLOCK BITE STD GRN ORAL AD W/O STRP SLD PLAS DISP BITEGARD

## (undated) DEVICE — STAPLER SKIN H3.9MM WIRE DIA0.58MM CRWN 6.9MM 35 STPL ROT

## (undated) DEVICE — DISPOSABLE TUBING SET AND EXTENDER FILTER TUBING

## (undated) DEVICE — MARKER,SKIN,WI/RULER AND LABELS: Brand: MEDLINE

## (undated) DEVICE — SUTURE PERMAHAND SZ 2-0 L30IN NONABSORBABLE BLK SILK W/O A305H

## (undated) DEVICE — PAD,NON-ADHERENT,2X3,STERILE,LF,1/PK: Brand: MEDLINE

## (undated) DEVICE — REM POLYHESIVE ADULT PATIENT RETURN ELECTRODE: Brand: VALLEYLAB

## (undated) DEVICE — INTENDED FOR TISSUE SEPARATION, AND OTHER PROCEDURES THAT REQUIRE A SHARP SURGICAL BLADE TO PUNCTURE OR CUT.: Brand: BARD-PARKER ® CARBON RIB-BACK BLADES

## (undated) DEVICE — NEEDLE HYPO 25GA L1.5IN BLU POLYPR HUB S STL REG BVL STR

## (undated) DEVICE — MOUTHPIECE ENDOSCP 20X27MM --

## (undated) DEVICE — TOOL 8TA11 LEGEND 8CM 1.1MM TA: Brand: MIDAS REX ™

## (undated) DEVICE — SUT SLK 2-0SH 30IN BLK --

## (undated) DEVICE — AGENT HEMSTAT W2XL14IN OXIDIZED REGENERATED CELOS ABSRB FOR

## (undated) DEVICE — JELLY LUBRICATING 2 OZ SCREW-CAP MTL TUBE STRL SURGLUB

## (undated) DEVICE — SUTURE VCRL SZ 2-0 L18IN ABSRB UD L26MM CP-2 1/2 CIR REV J762D

## (undated) DEVICE — SUTURE VCRL RAPIDE SZ 3-0 L18IN ABSRB UD PS-2 L19MM 3/8 CIR VR497

## (undated) DEVICE — TOWEL SURG W17XL27IN STD BLU COT NONFENESTRATED PREWASHED

## (undated) DEVICE — SYR 10ML CTRL LR LCK NSAF LF --

## (undated) DEVICE — SOLUTION IV 250ML 0.9% SOD CHL CLR INJ FLX BG CONT PRT CLSR

## (undated) DEVICE — DRAIN SURG W10XL20CM SIL SMOOTH FLAT 3/4 PERF DBL WRP

## (undated) DEVICE — COVER LT HNDL BLU PLAS

## (undated) DEVICE — SURGIFOAM SPNG SZ 12-7

## (undated) DEVICE — SURGICAL PROCEDURE KIT CRANIOTOMY

## (undated) DEVICE — WET SKIN PREP TRAY: Brand: MEDLINE INDUSTRIES, INC.

## (undated) DEVICE — SPONGE GZ W4XL4IN COT 12 PLY TYP VII WVN C FLD DSGN

## (undated) DEVICE — SUTURE NRLN SZ 4-0 L18IN NONABSORBABLE BLK L13MM TF 1/2 CIR C584D

## (undated) DEVICE — 40418 TRENDELENBURG ONE-STEP ARM PROTECTORS LARGE (1 PAIR): Brand: 40418 TRENDELENBURG ONE-STEP ARM PROTECTORS LARGE (1 PAIR)

## (undated) DEVICE — HEAD DONUT FOAM POSITIONER: Brand: CARDINAL HEALTH

## (undated) DEVICE — SOL IRR NACL 0.9% 500ML POUR --

## (undated) DEVICE — SUTURE VCRL SZ 0 L18IN ABSRB VLT L36MM CT-1 1/2 CIR J740D

## (undated) DEVICE — GLOVE ORANGE PI 7 1/2   MSG9075

## (undated) DEVICE — SUT SLK 0 30IN SH BLK --

## (undated) DEVICE — SMOKE EVACUATION PENCIL: Brand: VALLEYLAB

## (undated) DEVICE — INTENDED FOR TISSUE SEPARATION, AND OTHER PROCEDURES THAT REQUIRE A SHARP SURGICAL BLADE TO PUNCTURE OR CUT.: Brand: BARD-PARKER SAFETY BLADES SIZE 15, STERILE

## (undated) DEVICE — SUTURE VCRL SZ 3-0 L18IN ABSRB UD L26MM SH 1/2 CIR J864D

## (undated) DEVICE — SOLUTION IRRIG 1000ML STRL H2O USP PLAS POUR BTL

## (undated) DEVICE — GLOVE SURG SZ 65 L12IN FNGR THK79MIL GRN LTX FREE

## (undated) DEVICE — CHS T&A PACK: Brand: MEDLINE INDUSTRIES, INC.

## (undated) DEVICE — TOOL F2/8TA23 LEGEND 8CM 2.3MM TAPER: Brand: MIDAS REX™

## (undated) DEVICE — STRAP,POSITIONING,KNEE/BODY,FOAM,4X60": Brand: MEDLINE

## (undated) DEVICE — BIPOLAR FORCEPS CORD: Brand: VALLEYLAB

## (undated) DEVICE — SINGLE-USE POLYPECTOMY SNARE: Brand: CAPTIFLEX